# Patient Record
Sex: FEMALE | Race: WHITE | NOT HISPANIC OR LATINO | URBAN - METROPOLITAN AREA
[De-identification: names, ages, dates, MRNs, and addresses within clinical notes are randomized per-mention and may not be internally consistent; named-entity substitution may affect disease eponyms.]

---

## 2017-01-24 ENCOUNTER — OUTPATIENT (OUTPATIENT)
Dept: OUTPATIENT SERVICES | Facility: HOSPITAL | Age: 57
LOS: 1 days | Discharge: HOME | End: 2017-01-24

## 2017-06-27 DIAGNOSIS — Z98.0 INTESTINAL BYPASS AND ANASTOMOSIS STATUS: ICD-10-CM

## 2017-06-27 DIAGNOSIS — K95.09 OTHER COMPLICATIONS OF GASTRIC BAND PROCEDURE: ICD-10-CM

## 2017-06-27 DIAGNOSIS — E66.01 MORBID (SEVERE) OBESITY DUE TO EXCESS CALORIES: ICD-10-CM

## 2017-06-27 DIAGNOSIS — Z98.84 BARIATRIC SURGERY STATUS: ICD-10-CM

## 2019-06-03 PROBLEM — Z00.00 ENCOUNTER FOR PREVENTIVE HEALTH EXAMINATION: Status: ACTIVE | Noted: 2019-06-03

## 2019-07-08 ENCOUNTER — OUTPATIENT (OUTPATIENT)
Dept: OUTPATIENT SERVICES | Facility: HOSPITAL | Age: 59
LOS: 1 days | Discharge: HOME | End: 2019-07-08

## 2019-07-08 ENCOUNTER — LABORATORY RESULT (OUTPATIENT)
Age: 59
End: 2019-07-08

## 2019-07-08 ENCOUNTER — APPOINTMENT (OUTPATIENT)
Dept: OBGYN | Facility: CLINIC | Age: 59
End: 2019-07-08
Payer: COMMERCIAL

## 2019-07-08 VITALS — BODY MASS INDEX: 46.95 KG/M2 | HEIGHT: 63 IN | WEIGHT: 265 LBS

## 2019-07-08 LAB
BILIRUB UR QL STRIP: NORMAL
CLARITY UR: CLEAR
GLUCOSE UR-MCNC: NORMAL
HCG UR QL: NORMAL EU/DL
HGB UR QL STRIP.AUTO: NORMAL
KETONES UR-MCNC: NORMAL
LEUKOCYTE ESTERASE UR QL STRIP: 25
NITRITE UR QL STRIP: NORMAL
PH UR STRIP: 5
PROT UR STRIP-MCNC: NORMAL
SP GR UR STRIP: 1.02

## 2019-07-08 PROCEDURE — 99396 PREV VISIT EST AGE 40-64: CPT

## 2019-07-09 DIAGNOSIS — Z01.419 ENCOUNTER FOR GYNECOLOGICAL EXAMINATION (GENERAL) (ROUTINE) WITHOUT ABNORMAL FINDINGS: ICD-10-CM

## 2019-07-20 ENCOUNTER — APPOINTMENT (OUTPATIENT)
Dept: OBGYN | Facility: CLINIC | Age: 59
End: 2019-07-20

## 2020-06-12 ENCOUNTER — INPATIENT (INPATIENT)
Facility: HOSPITAL | Age: 60
LOS: 4 days | Discharge: HOME | End: 2020-06-17
Attending: SURGERY | Admitting: SURGERY
Payer: COMMERCIAL

## 2020-06-12 VITALS
OXYGEN SATURATION: 99 % | HEART RATE: 103 BPM | DIASTOLIC BLOOD PRESSURE: 90 MMHG | RESPIRATION RATE: 18 BRPM | WEIGHT: 285.06 LBS | TEMPERATURE: 98 F | HEIGHT: 63 IN | SYSTOLIC BLOOD PRESSURE: 169 MMHG

## 2020-06-12 LAB
ALBUMIN SERPL ELPH-MCNC: 4.1 G/DL — SIGNIFICANT CHANGE UP (ref 3.5–5.2)
ALP SERPL-CCNC: 78 U/L — SIGNIFICANT CHANGE UP (ref 30–115)
ALT FLD-CCNC: 16 U/L — SIGNIFICANT CHANGE UP (ref 0–41)
AMYLASE P1 CFR SERPL: 22 U/L — LOW (ref 25–115)
ANION GAP SERPL CALC-SCNC: 12 MMOL/L — SIGNIFICANT CHANGE UP (ref 7–14)
APPEARANCE UR: CLEAR — SIGNIFICANT CHANGE UP
AST SERPL-CCNC: 18 U/L — SIGNIFICANT CHANGE UP (ref 0–41)
BACTERIA # UR AUTO: NEGATIVE — SIGNIFICANT CHANGE UP
BASOPHILS # BLD AUTO: 0.01 K/UL — SIGNIFICANT CHANGE UP (ref 0–0.2)
BASOPHILS NFR BLD AUTO: 0.1 % — SIGNIFICANT CHANGE UP (ref 0–1)
BILIRUB SERPL-MCNC: 0.5 MG/DL — SIGNIFICANT CHANGE UP (ref 0.2–1.2)
BILIRUB UR-MCNC: ABNORMAL
BUN SERPL-MCNC: 13 MG/DL — SIGNIFICANT CHANGE UP (ref 10–20)
CALCIUM SERPL-MCNC: 8.7 MG/DL — SIGNIFICANT CHANGE UP (ref 8.5–10.1)
CHLORIDE SERPL-SCNC: 105 MMOL/L — SIGNIFICANT CHANGE UP (ref 98–110)
CO2 SERPL-SCNC: 25 MMOL/L — SIGNIFICANT CHANGE UP (ref 17–32)
COLOR SPEC: YELLOW — SIGNIFICANT CHANGE UP
CREAT SERPL-MCNC: 0.8 MG/DL — SIGNIFICANT CHANGE UP (ref 0.7–1.5)
DIFF PNL FLD: NEGATIVE — SIGNIFICANT CHANGE UP
EOSINOPHIL # BLD AUTO: 0.03 K/UL — SIGNIFICANT CHANGE UP (ref 0–0.7)
EOSINOPHIL NFR BLD AUTO: 0.4 % — SIGNIFICANT CHANGE UP (ref 0–8)
EPI CELLS # UR: 7 /HPF — HIGH (ref 0–5)
GLUCOSE SERPL-MCNC: 131 MG/DL — HIGH (ref 70–99)
GLUCOSE UR QL: NEGATIVE — SIGNIFICANT CHANGE UP
HCT VFR BLD CALC: 40.6 % — SIGNIFICANT CHANGE UP (ref 37–47)
HGB BLD-MCNC: 13.1 G/DL — SIGNIFICANT CHANGE UP (ref 12–16)
HYALINE CASTS # UR AUTO: 6 /LPF — SIGNIFICANT CHANGE UP (ref 0–7)
IMM GRANULOCYTES NFR BLD AUTO: 0.5 % — HIGH (ref 0.1–0.3)
KETONES UR-MCNC: ABNORMAL
LACTATE SERPL-SCNC: 1.1 MMOL/L — SIGNIFICANT CHANGE UP (ref 0.7–2)
LEUKOCYTE ESTERASE UR-ACNC: NEGATIVE — SIGNIFICANT CHANGE UP
LIDOCAIN IGE QN: 11 U/L — SIGNIFICANT CHANGE UP (ref 7–60)
LYMPHOCYTES # BLD AUTO: 1.11 K/UL — LOW (ref 1.2–3.4)
LYMPHOCYTES # BLD AUTO: 13.3 % — LOW (ref 20.5–51.1)
MAGNESIUM SERPL-MCNC: 1.9 MG/DL — SIGNIFICANT CHANGE UP (ref 1.8–2.4)
MCHC RBC-ENTMCNC: 29.4 PG — SIGNIFICANT CHANGE UP (ref 27–31)
MCHC RBC-ENTMCNC: 32.3 G/DL — SIGNIFICANT CHANGE UP (ref 32–37)
MCV RBC AUTO: 91.2 FL — SIGNIFICANT CHANGE UP (ref 81–99)
MONOCYTES # BLD AUTO: 0.49 K/UL — SIGNIFICANT CHANGE UP (ref 0.1–0.6)
MONOCYTES NFR BLD AUTO: 5.9 % — SIGNIFICANT CHANGE UP (ref 1.7–9.3)
NEUTROPHILS # BLD AUTO: 6.66 K/UL — HIGH (ref 1.4–6.5)
NEUTROPHILS NFR BLD AUTO: 79.8 % — HIGH (ref 42.2–75.2)
NITRITE UR-MCNC: NEGATIVE — SIGNIFICANT CHANGE UP
NRBC # BLD: 0 /100 WBCS — SIGNIFICANT CHANGE UP (ref 0–0)
PH UR: 6 — SIGNIFICANT CHANGE UP (ref 5–8)
PLATELET # BLD AUTO: 256 K/UL — SIGNIFICANT CHANGE UP (ref 130–400)
POTASSIUM SERPL-MCNC: 4.4 MMOL/L — SIGNIFICANT CHANGE UP (ref 3.5–5)
POTASSIUM SERPL-SCNC: 4.4 MMOL/L — SIGNIFICANT CHANGE UP (ref 3.5–5)
PROT SERPL-MCNC: 6.7 G/DL — SIGNIFICANT CHANGE UP (ref 6–8)
PROT UR-MCNC: ABNORMAL
RBC # BLD: 4.45 M/UL — SIGNIFICANT CHANGE UP (ref 4.2–5.4)
RBC # FLD: 14.3 % — SIGNIFICANT CHANGE UP (ref 11.5–14.5)
RBC CASTS # UR COMP ASSIST: 6 /HPF — HIGH (ref 0–4)
SODIUM SERPL-SCNC: 142 MMOL/L — SIGNIFICANT CHANGE UP (ref 135–146)
SP GR SPEC: >1.05 (ref 1.01–1.02)
UROBILINOGEN FLD QL: ABNORMAL
WBC # BLD: 8.34 K/UL — SIGNIFICANT CHANGE UP (ref 4.8–10.8)
WBC # FLD AUTO: 8.34 K/UL — SIGNIFICANT CHANGE UP (ref 4.8–10.8)
WBC UR QL: 3 /HPF — SIGNIFICANT CHANGE UP (ref 0–5)

## 2020-06-12 PROCEDURE — 99285 EMERGENCY DEPT VISIT HI MDM: CPT

## 2020-06-12 PROCEDURE — 74177 CT ABD & PELVIS W/CONTRAST: CPT | Mod: 26

## 2020-06-12 RX ORDER — IOHEXOL 300 MG/ML
30 INJECTION, SOLUTION INTRAVENOUS ONCE
Refills: 0 | Status: COMPLETED | OUTPATIENT
Start: 2020-06-12 | End: 2020-06-12

## 2020-06-12 RX ORDER — SODIUM CHLORIDE 9 MG/ML
2000 INJECTION, SOLUTION INTRAVENOUS ONCE
Refills: 0 | Status: COMPLETED | OUTPATIENT
Start: 2020-06-12 | End: 2020-06-12

## 2020-06-12 RX ADMIN — SODIUM CHLORIDE 2000 MILLILITER(S): 9 INJECTION, SOLUTION INTRAVENOUS at 20:45

## 2020-06-12 RX ADMIN — IOHEXOL 30 MILLILITER(S): 300 INJECTION, SOLUTION INTRAVENOUS at 20:50

## 2020-06-12 NOTE — ED ADULT TRIAGE NOTE - CHIEF COMPLAINT QUOTE
I vomiting on Wednesday and I had gas pain, and the pain is persistent. I had gastric bypass in 2001, and an obstruction in 2007 - patient

## 2020-06-12 NOTE — ED PROVIDER NOTE - PHYSICAL EXAMINATION
CONSTITUTIONAL: Well-developed; well-nourished; in no acute distress.   SKIN: warm, dry  HEAD: Normocephalic.  EYES: PERRL, EOMI.  ENT: Airway clear.  NECK: Supple.  LYMPH: No acute cervical adenopathy.  CARD: No murmurs, rubs or gallops. Regular rate and rhythm.   RESP: No wheezing, rales or rhonchi.  ABD: soft, TTP LLQ at site of abd hernia, no overlying rash or warmth  EXT: No clubbing, cyanosis.   NEURO: Alert, oriented.  PSYCH: Cooperative, appropriate.

## 2020-06-12 NOTE — ED PROVIDER NOTE - CLINICAL SUMMARY MEDICAL DECISION MAKING FREE TEXT BOX
61 yo F presented to ED for abdominal pain and was found to have an SBO. Patient was admitted to surgery for further management.

## 2020-06-12 NOTE — ED PROVIDER NOTE - OBJECTIVE STATEMENT
60F with pmh of gastric bypass 2001, obstruction 2007, abd hernias presents with LLQ abd pain at site of LLQ abd hernia since 3 days ago. PT denies fever, chills, rash, last BM yesterday normal, +passing gas. Notes 1 episode of NBNB vomiting yesterday. Denies fever, chills, cough, CP, SOB, diarrhea, melena, hematochezia, dysuria, hematuria. 60F with pmh of gastric bypass 2001, obstruction 2007, abd hernias presents with RLQ abd pain at site of RLQ abd hernia since 3 days ago. PT denies fever, chills, rash, last BM yesterday normal, +passing gas. Notes 1 episode of NBNB vomiting yesterday. Denies fever, chills, cough, CP, SOB, diarrhea, melena, hematochezia, dysuria, hematuria.  Surg:Dolly

## 2020-06-12 NOTE — ED PROVIDER NOTE - ATTENDING CONTRIBUTION TO CARE
59 yo M with PMH of gastric bypass surgery (2001) with obstruction in 2007 presents to ED for RLQ abdominal pain. She has had normal BM and is passing flatus. She states she has multiple hernias and has not noticed a change to any of them. One episode of NBNB emesis yesterday. Patient has had normal PO intake since then. No fever, chills, SOB, Cp.    On PE patient in NAD. Cardio RRR, Lungs CTA  patient is morbidly obese. Umbilical hernia noticed. Right sided lower hernia- hard and tender. Patient states it is always like that.     Concern for intestinal obstruction. Will do labs, CT scan.

## 2020-06-12 NOTE — ED PROVIDER NOTE - NS ED ROS FT
Constitutional: (-) fever  Eyes/ENT: (-) runny nose  Cardiovascular: (-) chest pain, (-) syncope  Respiratory: (-) cough, (-) shortness of breath  Gastrointestinal: (+) vomiting, (-) diarrhea, (+) abdominal pain  : (-) dysuria   Musculoskeletal: (-) back pain, (-) joint pain  Integumentary: (-) rash  Neurological: (-)loc  Allergic/Immunologic: (-) pruritus  Endocrine: (-) history of thyroid disease

## 2020-06-12 NOTE — ED ADULT NURSE NOTE - OBJECTIVE STATEMENT
pt c/o abdominal pain x2 days, vomiting on Wednesday. Pt has BM, normal, yesterday and passing gas. Pt states pain is worse on right side now but yesterday was worse on left side where known hernias are.

## 2020-06-12 NOTE — ED PROVIDER NOTE - PROGRESS NOTE DETAILS
SC:  attempt to reduce in ED made, however PT uncomfortable so efforts stopped. SC: PT refusing NGT. Patient to be admitted to an inpatient floor. Case discussed with and care endorsed to medical admitting resident. Admitting physician notified. SC: Several attempts made to place NGT. PT refusing NGT. SC: Several attempts to contact surgery for disposition unsuccessful. SC: Surgery disposition pending attending evaluation.

## 2020-06-13 DIAGNOSIS — Z98.891 HISTORY OF UTERINE SCAR FROM PREVIOUS SURGERY: Chronic | ICD-10-CM

## 2020-06-13 DIAGNOSIS — Z90.49 ACQUIRED ABSENCE OF OTHER SPECIFIED PARTS OF DIGESTIVE TRACT: Chronic | ICD-10-CM

## 2020-06-13 DIAGNOSIS — Z98.84 BARIATRIC SURGERY STATUS: Chronic | ICD-10-CM

## 2020-06-13 LAB
24R-OH-CALCIDIOL SERPL-MCNC: 19 NG/ML — LOW (ref 30–80)
ANION GAP SERPL CALC-SCNC: 12 MMOL/L — SIGNIFICANT CHANGE UP (ref 7–14)
ANION GAP SERPL CALC-SCNC: 13 MMOL/L — SIGNIFICANT CHANGE UP (ref 7–14)
APTT BLD: 27.2 SEC — SIGNIFICANT CHANGE UP (ref 27–39.2)
BASOPHILS # BLD AUTO: 0.01 K/UL — SIGNIFICANT CHANGE UP (ref 0–0.2)
BASOPHILS # BLD AUTO: 0.01 K/UL — SIGNIFICANT CHANGE UP (ref 0–0.2)
BASOPHILS NFR BLD AUTO: 0.2 % — SIGNIFICANT CHANGE UP (ref 0–1)
BASOPHILS NFR BLD AUTO: 0.2 % — SIGNIFICANT CHANGE UP (ref 0–1)
BLD GP AB SCN SERPL QL: SIGNIFICANT CHANGE UP
BUN SERPL-MCNC: 10 MG/DL — SIGNIFICANT CHANGE UP (ref 10–20)
BUN SERPL-MCNC: 11 MG/DL — SIGNIFICANT CHANGE UP (ref 10–20)
CALCIUM SERPL-MCNC: 8.5 MG/DL — SIGNIFICANT CHANGE UP (ref 8.5–10.1)
CALCIUM SERPL-MCNC: 8.8 MG/DL — SIGNIFICANT CHANGE UP (ref 8.5–10.1)
CHLORIDE SERPL-SCNC: 103 MMOL/L — SIGNIFICANT CHANGE UP (ref 98–110)
CHLORIDE SERPL-SCNC: 105 MMOL/L — SIGNIFICANT CHANGE UP (ref 98–110)
CO2 SERPL-SCNC: 24 MMOL/L — SIGNIFICANT CHANGE UP (ref 17–32)
CO2 SERPL-SCNC: 24 MMOL/L — SIGNIFICANT CHANGE UP (ref 17–32)
CREAT SERPL-MCNC: 0.7 MG/DL — SIGNIFICANT CHANGE UP (ref 0.7–1.5)
CREAT SERPL-MCNC: 0.7 MG/DL — SIGNIFICANT CHANGE UP (ref 0.7–1.5)
EOSINOPHIL # BLD AUTO: 0.01 K/UL — SIGNIFICANT CHANGE UP (ref 0–0.7)
EOSINOPHIL # BLD AUTO: 0.03 K/UL — SIGNIFICANT CHANGE UP (ref 0–0.7)
EOSINOPHIL NFR BLD AUTO: 0.2 % — SIGNIFICANT CHANGE UP (ref 0–8)
EOSINOPHIL NFR BLD AUTO: 0.5 % — SIGNIFICANT CHANGE UP (ref 0–8)
GLUCOSE SERPL-MCNC: 133 MG/DL — HIGH (ref 70–99)
GLUCOSE SERPL-MCNC: 135 MG/DL — HIGH (ref 70–99)
HCT VFR BLD CALC: 40.9 % — SIGNIFICANT CHANGE UP (ref 37–47)
HCT VFR BLD CALC: 40.9 % — SIGNIFICANT CHANGE UP (ref 37–47)
HGB BLD-MCNC: 13 G/DL — SIGNIFICANT CHANGE UP (ref 12–16)
HGB BLD-MCNC: 13 G/DL — SIGNIFICANT CHANGE UP (ref 12–16)
IMM GRANULOCYTES NFR BLD AUTO: 0.3 % — SIGNIFICANT CHANGE UP (ref 0.1–0.3)
IMM GRANULOCYTES NFR BLD AUTO: 0.5 % — HIGH (ref 0.1–0.3)
INR BLD: 1.07 RATIO — SIGNIFICANT CHANGE UP (ref 0.65–1.3)
IRON SATN MFR SERPL: 30 UG/DL — LOW (ref 35–150)
IRON SATN MFR SERPL: 7 % — LOW (ref 15–50)
LACTATE SERPL-SCNC: 1.1 MMOL/L — SIGNIFICANT CHANGE UP (ref 0.7–2)
LYMPHOCYTES # BLD AUTO: 0.76 K/UL — LOW (ref 1.2–3.4)
LYMPHOCYTES # BLD AUTO: 1.01 K/UL — LOW (ref 1.2–3.4)
LYMPHOCYTES # BLD AUTO: 11.8 % — LOW (ref 20.5–51.1)
LYMPHOCYTES # BLD AUTO: 16.1 % — LOW (ref 20.5–51.1)
MAGNESIUM SERPL-MCNC: 1.9 MG/DL — SIGNIFICANT CHANGE UP (ref 1.8–2.4)
MAGNESIUM SERPL-MCNC: 1.9 MG/DL — SIGNIFICANT CHANGE UP (ref 1.8–2.4)
MCHC RBC-ENTMCNC: 29.1 PG — SIGNIFICANT CHANGE UP (ref 27–31)
MCHC RBC-ENTMCNC: 29.3 PG — SIGNIFICANT CHANGE UP (ref 27–31)
MCHC RBC-ENTMCNC: 31.8 G/DL — LOW (ref 32–37)
MCHC RBC-ENTMCNC: 31.8 G/DL — LOW (ref 32–37)
MCV RBC AUTO: 91.5 FL — SIGNIFICANT CHANGE UP (ref 81–99)
MCV RBC AUTO: 92.3 FL — SIGNIFICANT CHANGE UP (ref 81–99)
MONOCYTES # BLD AUTO: 0.57 K/UL — SIGNIFICANT CHANGE UP (ref 0.1–0.6)
MONOCYTES # BLD AUTO: 0.68 K/UL — HIGH (ref 0.1–0.6)
MONOCYTES NFR BLD AUTO: 10.8 % — HIGH (ref 1.7–9.3)
MONOCYTES NFR BLD AUTO: 8.8 % — SIGNIFICANT CHANGE UP (ref 1.7–9.3)
NEUTROPHILS # BLD AUTO: 4.54 K/UL — SIGNIFICANT CHANGE UP (ref 1.4–6.5)
NEUTROPHILS # BLD AUTO: 5.07 K/UL — SIGNIFICANT CHANGE UP (ref 1.4–6.5)
NEUTROPHILS NFR BLD AUTO: 72.1 % — SIGNIFICANT CHANGE UP (ref 42.2–75.2)
NEUTROPHILS NFR BLD AUTO: 78.5 % — HIGH (ref 42.2–75.2)
NRBC # BLD: 0 /100 WBCS — SIGNIFICANT CHANGE UP (ref 0–0)
NRBC # BLD: 0 /100 WBCS — SIGNIFICANT CHANGE UP (ref 0–0)
PHOSPHATE SERPL-MCNC: 3.7 MG/DL — SIGNIFICANT CHANGE UP (ref 2.1–4.9)
PHOSPHATE SERPL-MCNC: 3.9 MG/DL — SIGNIFICANT CHANGE UP (ref 2.1–4.9)
PLATELET # BLD AUTO: 230 K/UL — SIGNIFICANT CHANGE UP (ref 130–400)
PLATELET # BLD AUTO: 235 K/UL — SIGNIFICANT CHANGE UP (ref 130–400)
POTASSIUM SERPL-MCNC: 4 MMOL/L — SIGNIFICANT CHANGE UP (ref 3.5–5)
POTASSIUM SERPL-MCNC: 4.1 MMOL/L — SIGNIFICANT CHANGE UP (ref 3.5–5)
POTASSIUM SERPL-SCNC: 4 MMOL/L — SIGNIFICANT CHANGE UP (ref 3.5–5)
POTASSIUM SERPL-SCNC: 4.1 MMOL/L — SIGNIFICANT CHANGE UP (ref 3.5–5)
PROTHROM AB SERPL-ACNC: 12.3 SEC — SIGNIFICANT CHANGE UP (ref 9.95–12.87)
RBC # BLD: 4.43 M/UL — SIGNIFICANT CHANGE UP (ref 4.2–5.4)
RBC # BLD: 4.47 M/UL — SIGNIFICANT CHANGE UP (ref 4.2–5.4)
RBC # FLD: 14.4 % — SIGNIFICANT CHANGE UP (ref 11.5–14.5)
RBC # FLD: 14.4 % — SIGNIFICANT CHANGE UP (ref 11.5–14.5)
SARS-COV-2 RNA SPEC QL NAA+PROBE: SIGNIFICANT CHANGE UP
SODIUM SERPL-SCNC: 139 MMOL/L — SIGNIFICANT CHANGE UP (ref 135–146)
SODIUM SERPL-SCNC: 142 MMOL/L — SIGNIFICANT CHANGE UP (ref 135–146)
TIBC SERPL-MCNC: 414 UG/DL — SIGNIFICANT CHANGE UP (ref 220–430)
UIBC SERPL-MCNC: 384 UG/DL — HIGH (ref 110–370)
WBC # BLD: 6.29 K/UL — SIGNIFICANT CHANGE UP (ref 4.8–10.8)
WBC # BLD: 6.45 K/UL — SIGNIFICANT CHANGE UP (ref 4.8–10.8)
WBC # FLD AUTO: 6.29 K/UL — SIGNIFICANT CHANGE UP (ref 4.8–10.8)
WBC # FLD AUTO: 6.45 K/UL — SIGNIFICANT CHANGE UP (ref 4.8–10.8)

## 2020-06-13 PROCEDURE — 93010 ELECTROCARDIOGRAM REPORT: CPT

## 2020-06-13 PROCEDURE — 71045 X-RAY EXAM CHEST 1 VIEW: CPT | Mod: 26

## 2020-06-13 RX ORDER — SODIUM CHLORIDE 9 MG/ML
1000 INJECTION, SOLUTION INTRAVENOUS
Refills: 0 | Status: DISCONTINUED | OUTPATIENT
Start: 2020-06-13 | End: 2020-06-13

## 2020-06-13 RX ORDER — HEPARIN SODIUM 5000 [USP'U]/ML
7500 INJECTION INTRAVENOUS; SUBCUTANEOUS EVERY 8 HOURS
Refills: 0 | Status: DISCONTINUED | OUTPATIENT
Start: 2020-06-13 | End: 2020-06-17

## 2020-06-13 RX ORDER — ACETAMINOPHEN 500 MG
1000 TABLET ORAL EVERY 8 HOURS
Refills: 0 | Status: COMPLETED | OUTPATIENT
Start: 2020-06-13 | End: 2020-06-13

## 2020-06-13 RX ORDER — ACETAMINOPHEN 500 MG
650 TABLET ORAL EVERY 6 HOURS
Refills: 0 | Status: DISCONTINUED | OUTPATIENT
Start: 2020-06-13 | End: 2020-06-13

## 2020-06-13 RX ORDER — SODIUM CHLORIDE 9 MG/ML
500 INJECTION, SOLUTION INTRAVENOUS
Refills: 0 | Status: DISCONTINUED | OUTPATIENT
Start: 2020-06-13 | End: 2020-06-13

## 2020-06-13 RX ORDER — FOLIC ACID 0.8 MG
1 TABLET ORAL DAILY
Refills: 0 | Status: DISCONTINUED | OUTPATIENT
Start: 2020-06-13 | End: 2020-06-17

## 2020-06-13 RX ORDER — BENZOCAINE 10 %
1 GEL (GRAM) MUCOUS MEMBRANE ONCE
Refills: 0 | Status: COMPLETED | OUTPATIENT
Start: 2020-06-13 | End: 2020-06-13

## 2020-06-13 RX ORDER — ONDANSETRON 8 MG/1
4 TABLET, FILM COATED ORAL EVERY 6 HOURS
Refills: 0 | Status: DISCONTINUED | OUTPATIENT
Start: 2020-06-13 | End: 2020-06-17

## 2020-06-13 RX ORDER — HEPARIN SODIUM 5000 [USP'U]/ML
7500 INJECTION INTRAVENOUS; SUBCUTANEOUS EVERY 8 HOURS
Refills: 0 | Status: DISCONTINUED | OUTPATIENT
Start: 2020-06-13 | End: 2020-06-13

## 2020-06-13 RX ORDER — LIDOCAINE 4 G/100G
1 CREAM TOPICAL ONCE
Refills: 0 | Status: COMPLETED | OUTPATIENT
Start: 2020-06-13 | End: 2020-06-13

## 2020-06-13 RX ORDER — DEXTROSE MONOHYDRATE, SODIUM CHLORIDE, AND POTASSIUM CHLORIDE 50; .745; 4.5 G/1000ML; G/1000ML; G/1000ML
1000 INJECTION, SOLUTION INTRAVENOUS
Refills: 0 | Status: DISCONTINUED | OUTPATIENT
Start: 2020-06-13 | End: 2020-06-16

## 2020-06-13 RX ORDER — THIAMINE MONONITRATE (VIT B1) 100 MG
100 TABLET ORAL DAILY
Refills: 0 | Status: DISCONTINUED | OUTPATIENT
Start: 2020-06-13 | End: 2020-06-17

## 2020-06-13 RX ADMIN — Medication 1 TABLET(S): at 17:45

## 2020-06-13 RX ADMIN — ONDANSETRON 4 MILLIGRAM(S): 8 TABLET, FILM COATED ORAL at 22:12

## 2020-06-13 RX ADMIN — LIDOCAINE 1 APPLICATION(S): 4 CREAM TOPICAL at 07:54

## 2020-06-13 RX ADMIN — SODIUM CHLORIDE 999 MILLILITER(S): 9 INJECTION, SOLUTION INTRAVENOUS at 11:53

## 2020-06-13 RX ADMIN — DEXTROSE MONOHYDRATE, SODIUM CHLORIDE, AND POTASSIUM CHLORIDE 125 MILLILITER(S): 50; .745; 4.5 INJECTION, SOLUTION INTRAVENOUS at 16:04

## 2020-06-13 RX ADMIN — Medication 1 SPRAY(S): at 09:29

## 2020-06-13 RX ADMIN — Medication 1 MILLIGRAM(S): at 17:45

## 2020-06-13 RX ADMIN — Medication 400 MILLIGRAM(S): at 16:38

## 2020-06-13 RX ADMIN — Medication 100 MILLIGRAM(S): at 17:45

## 2020-06-13 RX ADMIN — HEPARIN SODIUM 7500 UNIT(S): 5000 INJECTION INTRAVENOUS; SUBCUTANEOUS at 21:35

## 2020-06-13 RX ADMIN — ONDANSETRON 4 MILLIGRAM(S): 8 TABLET, FILM COATED ORAL at 09:25

## 2020-06-13 RX ADMIN — SODIUM CHLORIDE 999 MILLILITER(S): 9 INJECTION, SOLUTION INTRAVENOUS at 12:30

## 2020-06-13 NOTE — H&P ADULT - ATTENDING COMMENTS
60 year old lady with PMH morbid obesity PSH cesarian section '91, RNYGB '01 at OSH by Dr. David Fried, SBO '07 s/p ex lap, SBR, and primary anastomosis.  Pt now presents w ~24h n/v and abdominal pain, began suddenly yesterday at 03:00, not associated with exacerbating / relieving fx.   Pt denies CP, SOB, fever, chills, urinary symptoms.  No Flatus or Bowel movement since Thursday   Last Meal on Friday.    had NG Tube placement once with 50 ml drainage.  - removed by pt  had NG Tube placement second time with 250 ml drainage. - removed by pt    pt examined at 7:30   Pt sitting up   comfortable - feels better after 2nd NG tube insertion / removal   Abdomen - distended ( pt states it the normal size), non tender  impression : S/p Open RnY Gastric Bypass with hernia repair and recurrence.   partial sbo - adhesion vs hernia    options discussed with patient and daughter on the phone.   Knows risks of emergency surgery - would like to avoid it.   patient refusing to be able to insert NG tube now.   after talking she states to come back later.     Pt reexamined around 9 and 9:30  Abdomen still soft , no pain.   NG tube placed by me.   400 ml bilious output.     Pt reexamined around 1 pm   Abdomen still soft , no pain.   no flatus or bowel movement  states abdomen is softer and less distended      impression  :  S/p Open RnY Gastric Bypass with hernia repair and recurrence.   partial sbo - adhesion vs hernia  no distention of Abby limb  will keep NPO with NG tube  will get nutrition labs  will get medline if needing PPN  will get Dr. Steven for Cardiolgoy  will get pulm for her Sleep Apnea  If pain worsens or no improvement - will take her for laparotomy with BHUMI and repair of hernia

## 2020-06-13 NOTE — H&P ADULT - HISTORY OF PRESENT ILLNESS
JOSEDOLORES [id number]  60y Female    HPI:  60F PMH morbid obesity PSH cesarian section '91, RNYGB '01 at OSH by Dr. David Fried, SBO '07 s/p ex lap, SBR, and primary anastomosis.     S/p successful NGT placement in ED, bilious drainage, pt thrashed and gagged even after placment completed. Pulled it out immediately. Only time for 50 cc drainage. DOLORES GARCIA [id number]  60y Female    HPI:  60F PMH morbid obesity PSH cesarian section '91, RNYGB '01 at OSH by Dr. David Fried, SBO '07 s/p ex lap, SBR, and primary anastomosis. Pt now presents w ~24h n/v and abdominal pain, began suddenly yesterday at 03:00, not associated with exacerbating / relieving fx. Similar to pain during prior SBO. Pt denies CP, SOB, fever, chills, urinary symptoms.    S/p successful NGT placement in ED, bilious drainage, pt thrashed and gagged even after placement completed. Pulled it out immediately. Only time for 50 cc drainage.

## 2020-06-13 NOTE — H&P ADULT - NSHPPHYSICALEXAM_GEN_ALL_CORE
Vital Signs Last 24 Hrs  T(C): 37.3 (13 Jun 2020 00:25), Max: 37.3 (13 Jun 2020 00:25)  T(F): 99.1 (13 Jun 2020 00:25), Max: 99.1 (13 Jun 2020 00:25)  HR: 98 (13 Jun 2020 00:25) (92 - 103)  BP: 146/78 (13 Jun 2020 00:25) (146/78 - 169/90)  BP(mean): --  RR: 20 (13 Jun 2020 00:25) (18 - 20)  SpO2: 99% (13 Jun 2020 00:25) (99% - 99%)    PHYSICAL EXAM:  GENERAL: NAD, well-appearing  CHEST/LUNG: Clear to auscultation bilaterally  HEART: Regular rate and rhythm  ABDOMEN: Soft, Nontender, Nondistended;   EXTREMITIES:  No clubbing, cyanosis, or edema Vital Signs Last 24 Hrs  T(C): 37.3 (13 Jun 2020 00:25), Max: 37.3 (13 Jun 2020 00:25)  T(F): 99.1 (13 Jun 2020 00:25), Max: 99.1 (13 Jun 2020 00:25)  HR: 98 (13 Jun 2020 00:25) (92 - 103)  BP: 146/78 (13 Jun 2020 00:25) (146/78 - 169/90)  BP(mean): --  RR: 20 (13 Jun 2020 00:25) (18 - 20)  SpO2: 99% (13 Jun 2020 00:25) (99% - 99%)    PHYSICAL EXAM:  GENERAL: NAD, well-appearing  CHEST/LUNG: Clear to auscultation bilaterally  HEART: Regular rate and rhythm  ABDOMEN: Soft, Multiple ventral hernias of varying shapes, sizes, orientations, all bowel- containing, irreducible, tender, Nondistended; morbidly obese. No skin changes  EXTREMITIES:  No clubbing, cyanosis, or edema

## 2020-06-13 NOTE — H&P ADULT - ASSESSMENT
ASSESSMENT:  60y Female     PLAN:        D/w  ASSESSMENT:  60y Female     PLAN:  NPO, IVF  Pt pulled NGT, now refusing, continue to reaffirm importance  Avoid narcotics  An  Maintenance fluids with K  Trend WBC, lactate  D/w Torey Rivera

## 2020-06-13 NOTE — H&P ADULT - CLICK TO LAUNCH ORM
. Lost 3 lbs in 24 hrs, 168 lbs today, diuresing well. JVP about 8 cm today.  Continue Bumex 4 mg po BID. No metolazone.  Continue hydral 100 mg po TID.   Continue isordil 40 mg po TID.  Continue Coreg 6.25 mg po BID  Strict I/O and daily standing weights.  Pt to be seen on 1/2/18 at [facility] HF clinic w/ Dr. Enio Pavon. We will call pt with time.  Ok to d/c from HF standpoint.

## 2020-06-13 NOTE — H&P ADULT - NSHPLABSRESULTS_GEN_ALL_CORE
LABS:  Labs:  CAPILLARY BLOOD GLUCOSE                        13.1   8.34  )-----------( 256      ( 12 Jun 2020 21:14 )             40.6       Auto Neutrophil %: 79.8 % (06-12-20 @ 21:14)  Auto Immature Granulocyte %: 0.5 % (06-12-20 @ 21:14)    06-12    142  |  105  |  13  ----------------------------<  131<H>  4.4   |  25  |  0.8      Calcium, Total Serum: 8.7 mg/dL (06-12-20 @ 21:14)      LFTs:             6.7  | 0.5  | 18       ------------------[78      ( 12 Jun 2020 21:14 )  4.1  | x    | 16          Lipase:11     Amylase:22        Lactate, Blood: 1.1 mmol/L (06-12-20 @ 21:14)      Coags:     12.30  ----< 1.07    ( 13 Jun 2020 03:50 )     27.2       Urinalysis Basic - ( 12 Jun 2020 22:20 )    Color: Yellow / Appearance: Clear / SG: >1.050 / pH: x  Gluc: x / Ketone: Small  / Bili: Small / Urobili: 6 mg/dL   Blood: x / Protein: 100 mg/dL / Nitrite: Negative   Leuk Esterase: Negative / RBC: 6 /HPF / WBC 3 /HPF   Sq Epi: x / Non Sq Epi: 7 /HPF / Bacteria: Negative      RADIOLOGY & ADDITIONAL STUDIES:    < from: CT Abdomen and Pelvis w/ Oral Cont and w/ IV Cont (06.12.20 @ 23:32) >    Complex small bowel obstruction involving at least 4 separate ventral hernias throughout the abdominal wall, described above. Eventual transition to collapsed small bowel upon exiting fourth site of ventral herniation, within right lateral abdominal wall. Obstructed small bowel dilated to 6.4 cm with fecalization just proximal to this final site.    < end of copied text >

## 2020-06-14 LAB
24R-OH-CALCIDIOL SERPL-MCNC: 19 NG/ML — LOW (ref 30–80)
ALBUMIN SERPL ELPH-MCNC: 3.6 G/DL — SIGNIFICANT CHANGE UP (ref 3.5–5.2)
ALP SERPL-CCNC: 69 U/L — SIGNIFICANT CHANGE UP (ref 30–115)
ALT FLD-CCNC: 15 U/L — SIGNIFICANT CHANGE UP (ref 0–41)
ANION GAP SERPL CALC-SCNC: 12 MMOL/L — SIGNIFICANT CHANGE UP (ref 7–14)
AST SERPL-CCNC: 11 U/L — SIGNIFICANT CHANGE UP (ref 0–41)
BASOPHILS # BLD AUTO: 0.02 K/UL — SIGNIFICANT CHANGE UP (ref 0–0.2)
BASOPHILS NFR BLD AUTO: 0.4 % — SIGNIFICANT CHANGE UP (ref 0–1)
BILIRUB DIRECT SERPL-MCNC: <0.2 MG/DL — SIGNIFICANT CHANGE UP (ref 0–0.2)
BILIRUB INDIRECT FLD-MCNC: >0.2 MG/DL — SIGNIFICANT CHANGE UP (ref 0.2–1.2)
BILIRUB SERPL-MCNC: 0.4 MG/DL — SIGNIFICANT CHANGE UP (ref 0.2–1.2)
BUN SERPL-MCNC: 14 MG/DL — SIGNIFICANT CHANGE UP (ref 10–20)
CALCIUM SERPL-MCNC: 8.3 MG/DL — LOW (ref 8.5–10.1)
CHLORIDE SERPL-SCNC: 108 MMOL/L — SIGNIFICANT CHANGE UP (ref 98–110)
CO2 SERPL-SCNC: 25 MMOL/L — SIGNIFICANT CHANGE UP (ref 17–32)
CREAT SERPL-MCNC: 0.8 MG/DL — SIGNIFICANT CHANGE UP (ref 0.7–1.5)
EOSINOPHIL # BLD AUTO: 0.09 K/UL — SIGNIFICANT CHANGE UP (ref 0–0.7)
EOSINOPHIL NFR BLD AUTO: 2 % — SIGNIFICANT CHANGE UP (ref 0–8)
FERRITIN SERPL-MCNC: 49 NG/ML — SIGNIFICANT CHANGE UP (ref 15–150)
GLUCOSE SERPL-MCNC: 124 MG/DL — HIGH (ref 70–99)
HCT VFR BLD CALC: 37.9 % — SIGNIFICANT CHANGE UP (ref 37–47)
HGB BLD-MCNC: 11.9 G/DL — LOW (ref 12–16)
IMM GRANULOCYTES NFR BLD AUTO: 0.4 % — HIGH (ref 0.1–0.3)
LYMPHOCYTES # BLD AUTO: 1.07 K/UL — LOW (ref 1.2–3.4)
LYMPHOCYTES # BLD AUTO: 23.7 % — SIGNIFICANT CHANGE UP (ref 20.5–51.1)
MAGNESIUM SERPL-MCNC: 1.9 MG/DL — SIGNIFICANT CHANGE UP (ref 1.8–2.4)
MCHC RBC-ENTMCNC: 29.4 PG — SIGNIFICANT CHANGE UP (ref 27–31)
MCHC RBC-ENTMCNC: 31.4 G/DL — LOW (ref 32–37)
MCV RBC AUTO: 93.6 FL — SIGNIFICANT CHANGE UP (ref 81–99)
MONOCYTES # BLD AUTO: 0.4 K/UL — SIGNIFICANT CHANGE UP (ref 0.1–0.6)
MONOCYTES NFR BLD AUTO: 8.9 % — SIGNIFICANT CHANGE UP (ref 1.7–9.3)
NEUTROPHILS # BLD AUTO: 2.91 K/UL — SIGNIFICANT CHANGE UP (ref 1.4–6.5)
NEUTROPHILS NFR BLD AUTO: 64.6 % — SIGNIFICANT CHANGE UP (ref 42.2–75.2)
NRBC # BLD: 0 /100 WBCS — SIGNIFICANT CHANGE UP (ref 0–0)
PHOSPHATE SERPL-MCNC: 2.3 MG/DL — SIGNIFICANT CHANGE UP (ref 2.1–4.9)
PLATELET # BLD AUTO: 222 K/UL — SIGNIFICANT CHANGE UP (ref 130–400)
POTASSIUM SERPL-MCNC: 3.6 MMOL/L — SIGNIFICANT CHANGE UP (ref 3.5–5)
POTASSIUM SERPL-SCNC: 3.6 MMOL/L — SIGNIFICANT CHANGE UP (ref 3.5–5)
PROT SERPL-MCNC: 5.8 G/DL — LOW (ref 6–8)
RBC # BLD: 4.05 M/UL — LOW (ref 4.2–5.4)
RBC # FLD: 14.6 % — HIGH (ref 11.5–14.5)
SODIUM SERPL-SCNC: 145 MMOL/L — SIGNIFICANT CHANGE UP (ref 135–146)
VIT B12 SERPL-MCNC: 471 PG/ML — SIGNIFICANT CHANGE UP (ref 232–1245)
WBC # BLD: 4.51 K/UL — LOW (ref 4.8–10.8)
WBC # FLD AUTO: 4.51 K/UL — LOW (ref 4.8–10.8)

## 2020-06-14 PROCEDURE — 93306 TTE W/DOPPLER COMPLETE: CPT | Mod: 26

## 2020-06-14 PROCEDURE — 99233 SBSQ HOSP IP/OBS HIGH 50: CPT

## 2020-06-14 RX ORDER — SODIUM CHLORIDE 9 MG/ML
250 INJECTION INTRAMUSCULAR; INTRAVENOUS; SUBCUTANEOUS ONCE
Refills: 0 | Status: COMPLETED | OUTPATIENT
Start: 2020-06-14 | End: 2020-06-14

## 2020-06-14 RX ADMIN — HEPARIN SODIUM 7500 UNIT(S): 5000 INJECTION INTRAVENOUS; SUBCUTANEOUS at 05:34

## 2020-06-14 RX ADMIN — HEPARIN SODIUM 7500 UNIT(S): 5000 INJECTION INTRAVENOUS; SUBCUTANEOUS at 22:03

## 2020-06-14 RX ADMIN — SODIUM CHLORIDE 250 MILLILITER(S): 9 INJECTION INTRAMUSCULAR; INTRAVENOUS; SUBCUTANEOUS at 08:02

## 2020-06-14 RX ADMIN — DEXTROSE MONOHYDRATE, SODIUM CHLORIDE, AND POTASSIUM CHLORIDE 125 MILLILITER(S): 50; .745; 4.5 INJECTION, SOLUTION INTRAVENOUS at 17:19

## 2020-06-14 RX ADMIN — HEPARIN SODIUM 7500 UNIT(S): 5000 INJECTION INTRAVENOUS; SUBCUTANEOUS at 14:06

## 2020-06-14 NOTE — CONSULT NOTE ADULT - SUBJECTIVE AND OBJECTIVE BOX
Patient is a 60y old  Female who presents with a chief complaint of SBO     HPI:  60F PMH morbid obesity PSH cesarian section ', RNYGB ' at OSH by Dr. David Fried, SBO '07 s/p ex lap, SBR, and primary anastomosis. Pt now presents w ~24h n/v and abdominal pain, began suddenly yesterday at 03:00, not associated with exacerbating / relieving fx. Similar to pain during prior SBO. Pt denies CP, SOB, fever, chills, urinary symptoms.S/p successful NGT placement in ED, bilious drainage, pt thrashed and gagged even after placement completed. Pulled it out immediately. Only time for 50 cc drainage.      PAST MEDICAL & SURGICAL HISTORY:  Gastric bypass status for obesity  S/P small bowel resection  S/P   S/P gastric bypass      SOCIAL HX:   Smoking  denies                       ETOH   denies                         Other denies     FAMILY HISTORY:  .  No cardiovascular or pulmonary family history     Review of System:  See HPI    Allergies    No Known Allergies    Intolerances          PHYSICAL EXAM  Vital Signs Last 24 Hrs  T(C): 36.1 (2020 07:30), Max: 36.7 (2020 15:30)  T(F): 97 (2020 07:30), Max: 98.1 (2020 15:30)  HR: 102 (2020 07:30) (102 - 116)  BP: 132/76 (2020 07:30) (132/76 - 140/89)  BP(mean): --  RR: 18 (2020 07:30) (17 - 18)  SpO2: 98% in RA     General: In NAD  HEENT: PAULY             Lungs: Domenico BS  Cardiovascular: Regular  Gastrointestinal: Soft.  + BS   Musculoskeletal: No Clubbing.  Full range of motion.. Moves all extremities  Skin: Warm.  Intact  Neurological: awake and alert      LABS:                          13.0   6.29  )-----------( 235      ( 2020 20:50 )             40.9                                               06-13    139  |  103  |  11  ----------------------------<  135<H>  4.0   |  24  |  0.7    Ca    8.5      2020 20:50  Phos  3.7     06-13  Mg     1.9     06-13    TPro  6.7  /  Alb  4.1  /  TBili  0.5  /  DBili  x   /  AST  18  /  ALT  16  /  AlkPhos  78  06-12      PT/INR - ( 2020 03:50 )   PT: 12.30 sec;   INR: 1.07 ratio         PTT - ( 2020 03:50 )  PTT:27.2 sec                                       Urinalysis Basic - ( 2020 22:20 )    Color: Yellow / Appearance: Clear / SG: >1.050 / pH: x  Gluc: x / Ketone: Small  / Bili: Small / Urobili: 6 mg/dL   Blood: x / Protein: 100 mg/dL / Nitrite: Negative   Leuk Esterase: Negative / RBC: 6 /HPF / WBC 3 /HPF   Sq Epi: x / Non Sq Epi: 7 /HPF / Bacteria: Negative                                                  LIVER FUNCTIONS - ( 2020 21:14 )  Alb: 4.1 g/dL / Pro: 6.7 g/dL / ALK PHOS: 78 U/L / ALT: 16 U/L / AST: 18 U/L / GGT: x                                                                                                MEDICATIONS  (STANDING):  dextrose 5% + sodium chloride 0.45% with potassium chloride 20 mEq/L 1000 milliLiter(s) (125 mL/Hr) IV Continuous <Continuous>  folic acid 1 milliGRAM(s) Oral daily  heparin   Injectable 7500 Unit(s) SubCutaneous every 8 hours  multivitamin 1 Tablet(s) Oral daily  thiamine 100 milliGRAM(s) Oral daily    MEDICATIONS  (PRN):  ondansetron Injectable 4 milliGRAM(s) IV Push every 6 hours PRN Nausea      < from: Xray Chest 1 View- PORTABLE-Urgent (20 @ 13:11) >  No radiographic evidence of cardiopulmonary disease.      < end of copied text >    < from: CT Abdomen and Pelvis w/ Oral Cont and w/ IV Cont (20 @ 23:32) >  Complex small bowel obstruction involving at least 4 separate ventral hernias throughout the abdominal wall, described above. Eventual transition to collapsed small bowel upon exiting fourth site of ventral herniation, within right lateral abdominal wall. Obstructed small bowel dilated to 6.4 cm with fecalization just proximal to this final site.        < end of copied text >

## 2020-06-14 NOTE — PROGRESS NOTE ADULT - ATTENDING COMMENTS
60 year old lady with PMH morbid obesity PSH cesarian section '91, RNYGB '01 at OSH by Dr. David Fried, SBO '07 s/p ex lap, SBR, and primary anastomosis.  Pt now presents w ~24h n/v and abdominal pain, began suddenly yesterday at 03:00, not associated with exacerbating / relieving fx.   Pt denies CP, SOB, fever, chills, urinary symptoms.  No Flatus or Bowel movement since Thursday   Last Meal on Friday.    had NG Tube placement once with 50 ml drainage.  - removed by pt  had NG Tube placement second time with 250 ml drainage. - removed by pt    pt examined at 7:30   Pt sitting up   comfortable - feels better after 2nd NG tube insertion / removal   Abdomen - distended ( pt states it the normal size), non tender  impression : S/p Open RnY Gastric Bypass with hernia repair and recurrence.   partial sbo - adhesion vs hernia    options discussed with patient and daughter on the phone.   Knows risks of emergency surgery - would like to avoid it.   patient refusing to be able to insert NG tube now.   after talking she states to come back later.     Pt reexamined around 9 and 9:30  Abdomen still soft , no pain.   NG tube placed by me.   400 ml bilious output.     Pt reexamined around 1 pm   Abdomen still soft , no pain.   no flatus or bowel movement  states abdomen is softer and less distended    pt examined 6/14:  Had multiple bowel movement with flatus  ng output still high - less billious  abdomen softer        impression  :  S/p Open RnY Gastric Bypass with hernia repair and recurrence.   partial sbo - adhesion vs hernia  repeat labs  no distention of Abby limb  will keep NPO with NG tube  will get nutrition labs  will get Dr. Steven for Cardiolgoy  will get pulm for her Sleep Apnea  If pain worsens or no improvement - will take her for laparotomy with BHUMI and repair of hernia

## 2020-06-14 NOTE — PROGRESS NOTE ADULT - ASSESSMENT
Assessment:  60y Female PSH of C section, obesity s/p RNYGB 2001 c/b SBO s/p ex lap SBR and primary anastamosis. Presenting with SBO with multiple ventral hernias.     Plan:  - NPO, IVF  - NGT to suction  - f/u nutrition labs  - f/u cardiology and pulm clearance  - if deteriorate, operative intervention

## 2020-06-14 NOTE — CONSULT NOTE ADULT - ASSESSMENT
IMPRESSION    SBO  H/O [place] On CPAP (10 cm H20 )    PLAN    From the pulmonary Standpoint Patient is a moderate risk for the scheduled surgical procedure   Post operative use CPAP   Surgery and general care as per Surgical team recs  Outpatient Pulmonary follow up with Dr sunitha justin  Incentive Spirometry  Pain control  DVT PPX  Recall PRN

## 2020-06-14 NOTE — PROGRESS NOTE ADULT - SUBJECTIVE AND OBJECTIVE BOX
GENERAL SURGERY PROGRESS NOTE     DOLORES GARCIA  60y  Female  Hospital day :1d    OVERNIGHT EVENTS: no acute events overnight     T(F): 96.8 (06-14-20 @ 00:00), Max: 98.1 (06-13-20 @ 15:30)  HR: 104 (06-14-20 @ 00:00) (83 - 116)  BP: 133/74 (06-14-20 @ 00:00) (130/89 - 175/93)  RR: 17 (06-14-20 @ 00:00) (17 - 18)    DIET/FLUIDS: dextrose 5% + sodium chloride 0.45% with potassium chloride 20 mEq/L 1000 milliLiter(s) IV Continuous <Continuous>  folic acid 1 milliGRAM(s) Oral daily  multivitamin 1 Tablet(s) Oral daily  thiamine 100 milliGRAM(s) Oral daily    URINE:    Indwelling Urethral Catheter:     Connect To:  Straight Drainage/Ruth    Indication:  Perioperative use for Selected Surgical Procedures    Additional Instructions:  DO NOT REMOVE without a discontinuation order (06-13-20 @ 05:25)    GI proph:    AC/ proph: heparin   Injectable 7500 Unit(s) SubCutaneous every 8 hours    ABx:     PHYSICAL EXAM:  GENERAL: NAD, well-appearing  CHEST/LUNG: Clear to auscultation bilaterally  HEART: Regular rate and rhythm  ABDOMEN: Soft, Nontender, Nondistended;   EXTREMITIES:  No clubbing, cyanosis, or edema      LABS  CAPILLARY BLOOD GLUCOSE                        13.0   6.29  )-----------( 235      ( 13 Jun 2020 20:50 )             40.9       Auto Neutrophil %: 72.1 % (06-13-20 @ 20:50)  Auto Immature Granulocyte %: 0.3 % (06-13-20 @ 20:50)  Auto Immature Granulocyte %: 0.5 % (06-13-20 @ 11:00)  Auto Neutrophil %: 78.5 % (06-13-20 @ 11:00)    06-13    139  |  103  |  11  ----------------------------<  135<H>  4.0   |  24  |  0.7      Calcium, Total Serum: 8.5 mg/dL (06-13-20 @ 20:50)      LFTs:             6.7  | 0.5  | 18       ------------------[78      ( 12 Jun 2020 21:14 )  4.1  | x    | 16          Lipase:11     Amylase:22        Lactate, Blood: 1.1 mmol/L (06-13-20 @ 11:00)  Lactate, Blood: 1.1 mmol/L (06-12-20 @ 21:14)      Coags:     12.30  ----< 1.07    ( 13 Jun 2020 03:50 )     27.2                Urinalysis Basic - ( 12 Jun 2020 22:20 )    Color: Yellow / Appearance: Clear / SG: >1.050 / pH: x  Gluc: x / Ketone: Small  / Bili: Small / Urobili: 6 mg/dL   Blood: x / Protein: 100 mg/dL / Nitrite: Negative   Leuk Esterase: Negative / RBC: 6 /HPF / WBC 3 /HPF   Sq Epi: x / Non Sq Epi: 7 /HPF / Bacteria: Negative

## 2020-06-15 LAB
ANION GAP SERPL CALC-SCNC: 14 MMOL/L — SIGNIFICANT CHANGE UP (ref 7–14)
BUN SERPL-MCNC: 11 MG/DL — SIGNIFICANT CHANGE UP (ref 10–20)
CALCIUM SERPL-MCNC: 8.5 MG/DL — SIGNIFICANT CHANGE UP (ref 8.5–10.1)
CHLORIDE SERPL-SCNC: 107 MMOL/L — SIGNIFICANT CHANGE UP (ref 98–110)
CO2 SERPL-SCNC: 24 MMOL/L — SIGNIFICANT CHANGE UP (ref 17–32)
CREAT SERPL-MCNC: 0.7 MG/DL — SIGNIFICANT CHANGE UP (ref 0.7–1.5)
FOLATE SERPL-MCNC: 19.3 NG/ML — SIGNIFICANT CHANGE UP
FOLATE SERPL-MCNC: >20 NG/ML — SIGNIFICANT CHANGE UP
GLUCOSE SERPL-MCNC: 122 MG/DL — HIGH (ref 70–99)
HCT VFR BLD CALC: 40 % — SIGNIFICANT CHANGE UP (ref 37–47)
HCV AB S/CO SERPL IA: 0.03 COI — SIGNIFICANT CHANGE UP
HCV AB SERPL-IMP: SIGNIFICANT CHANGE UP
HGB BLD-MCNC: 12.4 G/DL — SIGNIFICANT CHANGE UP (ref 12–16)
MAGNESIUM SERPL-MCNC: 1.8 MG/DL — SIGNIFICANT CHANGE UP (ref 1.8–2.4)
MCHC RBC-ENTMCNC: 29.7 PG — SIGNIFICANT CHANGE UP (ref 27–31)
MCHC RBC-ENTMCNC: 31 G/DL — LOW (ref 32–37)
MCV RBC AUTO: 95.7 FL — SIGNIFICANT CHANGE UP (ref 81–99)
NRBC # BLD: 0 /100 WBCS — SIGNIFICANT CHANGE UP (ref 0–0)
PHOSPHATE SERPL-MCNC: 2.5 MG/DL — SIGNIFICANT CHANGE UP (ref 2.1–4.9)
PLATELET # BLD AUTO: 208 K/UL — SIGNIFICANT CHANGE UP (ref 130–400)
POTASSIUM SERPL-MCNC: 4.1 MMOL/L — SIGNIFICANT CHANGE UP (ref 3.5–5)
POTASSIUM SERPL-SCNC: 4.1 MMOL/L — SIGNIFICANT CHANGE UP (ref 3.5–5)
RBC # BLD: 4.18 M/UL — LOW (ref 4.2–5.4)
RBC # FLD: 14.6 % — HIGH (ref 11.5–14.5)
SODIUM SERPL-SCNC: 145 MMOL/L — SIGNIFICANT CHANGE UP (ref 135–146)
VIT B12 SERPL-MCNC: 500 PG/ML — SIGNIFICANT CHANGE UP (ref 232–1245)
WBC # BLD: 5.07 K/UL — SIGNIFICANT CHANGE UP (ref 4.8–10.8)
WBC # FLD AUTO: 5.07 K/UL — SIGNIFICANT CHANGE UP (ref 4.8–10.8)

## 2020-06-15 PROCEDURE — 64647 CHEMODENERV TRUNK MUSC 6/>: CPT

## 2020-06-15 PROCEDURE — 99254 IP/OBS CNSLTJ NEW/EST MOD 60: CPT

## 2020-06-15 PROCEDURE — 99233 SBSQ HOSP IP/OBS HIGH 50: CPT

## 2020-06-15 PROCEDURE — 76942 ECHO GUIDE FOR BIOPSY: CPT | Mod: 26

## 2020-06-15 PROCEDURE — 99152 MOD SED SAME PHYS/QHP 5/>YRS: CPT

## 2020-06-15 RX ORDER — POTASSIUM CHLORIDE 20 MEQ
20 PACKET (EA) ORAL
Refills: 0 | Status: COMPLETED | OUTPATIENT
Start: 2020-06-15 | End: 2020-06-15

## 2020-06-15 RX ADMIN — Medication 50 MILLIEQUIVALENT(S): at 10:13

## 2020-06-15 RX ADMIN — HEPARIN SODIUM 7500 UNIT(S): 5000 INJECTION INTRAVENOUS; SUBCUTANEOUS at 05:43

## 2020-06-15 RX ADMIN — DEXTROSE MONOHYDRATE, SODIUM CHLORIDE, AND POTASSIUM CHLORIDE 125 MILLILITER(S): 50; .745; 4.5 INJECTION, SOLUTION INTRAVENOUS at 14:47

## 2020-06-15 RX ADMIN — Medication 50 MILLIEQUIVALENT(S): at 05:42

## 2020-06-15 RX ADMIN — HEPARIN SODIUM 7500 UNIT(S): 5000 INJECTION INTRAVENOUS; SUBCUTANEOUS at 21:24

## 2020-06-15 NOTE — PROGRESS NOTE ADULT - ASSESSMENT
61 y/o woman with PMH/PSH of morbid obesity, gastric bypass, C/S, SBO s/p exp lap with small bowel resection and primary anastomosis, b/l knee replacement and LOUIS on CPAP is now admitted for complex small bowel obstruction. She is being treated conservatively at this time but plan is for surgery if she does not improve.

## 2020-06-15 NOTE — PROGRESS NOTE ADULT - ATTENDING COMMENTS
60 year old lady with PMH morbid obesity PSH cesarian section '91, RNYGB '01 at OSH by Dr. David Fried, SBO '07 s/p ex lap, SBR, and primary anastomosis.  Pt now presents w ~24h n/v and abdominal pain, began suddenly yesterday at 03:00, not associated with exacerbating / relieving fx.   Pt denies CP, SOB, fever, chills, urinary symptoms.  No Flatus or Bowel movement since Thursday   Last Meal on Friday.    had NG Tube placement once with 50 ml drainage.  - removed by pt  had NG Tube placement second time with 250 ml drainage. - removed by pt    pt examined at 7:30   Pt sitting up   comfortable - feels better after 2nd NG tube insertion / removal   Abdomen - distended ( pt states it the normal size), non tender  impression : S/p Open RnY Gastric Bypass with hernia repair and recurrence.   partial sbo - adhesion vs hernia    options discussed with patient and daughter on the phone.   Knows risks of emergency surgery - would like to avoid it.   patient refusing to be able to insert NG tube now.   after talking she states to come back later.     Pt reexamined around 9 and 9:30  Abdomen still soft , no pain.   NG tube placed by me.   400 ml bilious output.     Pt reexamined around 1 pm   Abdomen still soft , no pain.   no flatus or bowel movement  states abdomen is softer and less distended    pt examined 6/14:  Had multiple bowel movement with flatus  ng output still high - less billious  abdomen softer    pt examined 6/15:  Had multiple bowel movement with flatus yesterday   ng output minimal - some coffee ground   abdomen much softer  had IR for botox block to lateral abdominal wall        impression  :  S/p Open RnY Gastric Bypass with hernia repair and recurrence.   partial sbo - adhesion vs hernia - resovled    will keep NPO   will get Dr. Steven for Cardiolgoy  will get pulm for her Sleep Apnea  If pain worsens or no improvement - will take her for laparotomy with BHUMI and repair of hernia

## 2020-06-15 NOTE — PROGRESS NOTE ADULT - SUBJECTIVE AND OBJECTIVE BOX
GENERAL SURGERY PROGRESS NOTE     DOLORES GARCIA  Female  Hospital day :2d  POD:  Procedure:   OVERNIGHT EVENTS: no acute overnight events     T(F): 97 (06-15-20 @ 00:03), Max: 97 (20 @ 07:30)  HR: 95 (06-15-20 @ 00:03) (95 - 104)  BP: 140/75 (06-15-20 @ 00:03) (117/64 - 140/75)  RR: 18 (06-15-20 @ 00:03) (18 - 18)  SpO2: --    DIET/FLUIDS: dextrose 5% + sodium chloride 0.45% with potassium chloride 20 mEq/L 1000 milliLiter(s) IV Continuous <Continuous>  folic acid 1 milliGRAM(s) Oral daily  multivitamin 1 Tablet(s) Oral daily  potassium chloride  20 mEq/100 mL IVPB 20 milliEquivalent(s) IV Intermittent every 2 hours  thiamine 100 milliGRAM(s) Oral daily    N20 @ 07:01  -  06-15-20 @ 07:00  --------------------------------------------------------  OUT: 950 mL                                                                                 GI proph:    AC/ proph: heparin   Injectable 7500 Unit(s) SubCutaneous every 8 hours    ABx:     PHYSICAL EXAM:  GENERAL: NAD, well-appearing  CHEST/LUNG: Clear to auscultation bilaterally  HEART: Regular rate and rhythm  ABDOMEN: Soft, Nontender, Nondistended;   EXTREMITIES:  No clubbing, cyanosis, or edema      LABS  Labs:  CAPILLARY BLOOD GLUCOSE                              11.9   4.51  )-----------( 222      ( 2020 21:31 )             37.9       Auto Neutrophil %: 64.6 % (20 @ 21:31)  Auto Immature Granulocyte %: 0.4 % (20 @ 21:31)        145  |  108  |  14  ----------------------------<  124<H>  3.6   |  25  |  0.8      Calcium, Total Serum: 8.3 mg/dL (20 @ 21:31)      LFTs:             5.8  | 0.4  | 11       ------------------[69      ( 2020 16:55 )  3.6  | <0.2 | 15          Lipase:x      Amylase:x         Lactate, Blood: 1.1 mmol/L (20 @ 11:00)  Lactate, Blood: 1.1 mmol/L (20 @ 21:14)      Coags:                    RADIOLOGY & ADDITIONAL TESTS:      A/P

## 2020-06-15 NOTE — PROGRESS NOTE ADULT - SUBJECTIVE AND OBJECTIVE BOX
WAGNERDURAK, DOLORES  60y      Planned Procedure____laparotomy, BHUMI, repair of hernia___________________________________    Does the patient have the following conditions? Type Y or N    __N__DVT/PE           	  Currently anticoagulated ______ IVC Filter _______ Date:______    __N__DM                             Controlled    Y _______ N _______    __N__HTN	                              Controlled    Y _______ N _______    __N__CAD	                                  Prior MI  _____CABG _____STENT  ______ EF _____    __N__CHF                             Chronic _____ Acute _____ EF ______    __N__Afib	                                  Current Rhythm _________   Current anticoagulation _________    __N__PPM/ICD                         Indication ___________  last Interrogated _________    __Y__OSA	                              PAP  Type &Settings _____________    __N__Asthma/COPD	          Last Exac _______ Last Steroid use Date _______     __N__O2 dependent	          Reason ______________    _N___CKD or FUNMI	                  Stable Y _____  N ______    _N___Electrolyte Abn	          Type ___________     Stable Y ______   N _______    __N__Cirrhosis	                  Cause __________     Stable Y ______   N _______    __N__GI Bleed                          Cause __________     Stable Y ______   N _______    __Y__Anemia	                          Cause _iron deficiency_________     Stable Y ______   N _______    __N__Transfusion                  Last date ________      __N__ETOH Use	                  Last date________     Intervention __________________________    __N__Tobacco Abuse	          Last date ________    Intervention __________________________    _N___Drug Use	                 Type____________  Last dose _____ Intervention______________    ____Other                             Details_________________________________________________      PAST MEDICAL & SURGICAL HISTORY:    [place]IS on CPAP nightly  Gastric bypass status for morbid obesity (BMI 50)    S/P small bowel resection, exploratory lap, primary anastomosis   S/P    S/P gastric bypass - Abby-en-Y   b/l knee replacements   Lasik surgery    Allergies    No Known Allergies    Intolerances      MEDICATIONS  (STANDING):  dextrose 5% + sodium chloride 0.45% with potassium chloride 20 mEq/L 1000 milliLiter(s) (125 mL/Hr) IV Continuous <Continuous>  folic acid 1 milliGRAM(s) Oral daily  heparin   Injectable 7500 Unit(s) SubCutaneous every 8 hours  multivitamin 1 Tablet(s) Oral daily  Onabotulinumtoxin  A  (BOTOX) 300 Unit(s),Sodium chloride 0.9% 150 milliLiter(s) 300 Unit(s) Local Injection once  thiamine 100 milliGRAM(s) Oral daily    MEDICATIONS  (PRN):  ondansetron Injectable 4 milliGRAM(s) IV Push every 6 hours PRN Nausea      FH: brother  age 50 from MI (smoker)  mother  age 63 - lung cancer    ROS: pt s/p botox injection today - no complaints.  Last BM on  morning  + flatus  stable abdominal distension  No N/V - NGT now out  reviewed, otherwise negative    Duke Activity Status Index: Can the patient climb a flight of stairs or walk uphill?     ______ N   <4 METS   ___x____ Y > 4 METS    Physical Exam:  Vital Signs Last 24 Hrs  T(C): 35.8 (15 Stephan 2020 15:45), Max: 36.3 (15 Stephan 2020 07:25)  T(F): 96.5 (15 Stephan 2020 15:45), Max: 97.3 (15 Stephan 2020 07:25)  HR: 81 (15 Stephan 2020 15:45) (76 - 95)  BP: 132/66 (15 Stephan 2020 15:45) (123/77 - 140/75)  BP(mean): --  RR: 18 (15 Stephan 2020 15:45) (17 - 18)  SpO2: --    General: WD woman lying in bed in NAD    HEENT: NC, anicteric    Respiratory: CTA b/l    CVS: RRR no murmur    GI: soft, obese, distended (chronic per pt), NT, + BS    Ext: no edema    Neuro: moves all extremities, awake, alert    Psych: pleasant, cooperative    LABS AND OTHER PERTINENT TESTS:                          11.9   4.51  )-----------( 222      ( 2020 21:31 )             37.9     06-14    145  |  108  |  14  ----------------------------<  124<H>  3.6   |  25  |  0.8    Ca    8.3<L>      2020 21:31  Phos  2.3     06-14  Mg     1.9     06-14    TPro  5.8<L>  /  Alb  3.6  /  TBili  0.4  /  DBili  <0.2  /  AST  11  /  ALT  15  /  AlkPhos  69  06-14    Vitamin D, 25-Hydroxy (20 @ 16:55)    Vitamin D, 25-Hydroxy: 19: 30 - 80 ng/mL                                        Optimum Levels  (Reference range)  > 80 ng/mL                                             Toxicity possible  20 - 29 ng/mL                                         Insufficiency  10 - 19 ng/mL                                 Mild to Moderate  Deficiency  < 10 ng/mL                                             Severe Deficiency  Optimum levels for 25-Hydroxy vitamin D are 30 ng/mL and above based on  the Endocrine Society guidelines 2011. However, there is a lack of  consensus on this and the [place] [employer] recommends 20 ng/mL and  above as optimum levels. Vitamin D results may vary depending on the  method of analysis. The current Roche Mark electrochemiluminescent  immunoassaymethod measures both D2 and D3 and was introduced in 2018. ng/mL      Iron with Total Binding Capacity (20 @ 15:50)    % Saturation, Iron: 7 %    Iron - Total Binding Capacity.: 414 ug/dL    Iron Total, Serum: 30 ug/dL    Unsaturated Iron Binding Capacity: 384 ug/dL    Ferritin, Serum (20 @ 15:50)    Ferritin, Serum: 49 ng/mL          < from: CT Abdomen and Pelvis w/ Oral Cont and w/ IV Cont (20 @ 23:32) >  IMPRESSION:     Complex small bowel obstruction involving at least 4 separate ventral hernias throughout the abdominal wall, described above. Eventual transition to collapsed small bowel upon exiting fourth site of ventral herniation, within right lateral abdominal wall. Obstructed small bowel dilated to 6.4 cm with fecalization just proximal to this final site.      < end of copied text >      < from: 12 Lead ECG (20 @ 04:29) >  Diagnosis Line Sinus tachycardia  Otherwise normal ECG      < end of copied text >    < from: Transthoracic Echocardiogram (20 @ 10:34) >    Summary:   1. Left ventricular ejection fraction, by visual estimation, is 55 to 60%.   2. Spectral Doppler shows impaired relaxation pattern of left ventricular myocardial filling (Grade I diastolic dysfunction).    < end of copied text >    EKG reviewed by me    Risk Assessment: (Use One)    [employer] NSQIP Surgical Risk Calculator http://riskcalculator.facs.org/      Revised Cardiac Risk Index 1      ____x___  The patient is optimized for surgery/procedure and may proceed to the operating room as scheduled    _________The patient is NOT optimized for surgery/procedure and should not proceed to the operating room as scheduled      Recommendations for optimization:    f/u cardiology consult recommendations    pt at elevated risk for cardiovascular complications if she has open intraperitoneal procedure for SBO but she does have > 4 METS of activity. Proceed to surgery if the benefit > risk    Pulm consult appreciated: moderate risk, use CPAP postoperatively, incentive spirometry    would start venofer for iron deficiency anemia    start vitamin D supplementation      Anticoagulation Recommendations:     continue heparin SQ perioperatively

## 2020-06-15 NOTE — PROGRESS NOTE ADULT - ASSESSMENT
60 year old lady with PMH morbid obesity PSH cesarian section '91, RNYGB '01 at OSH by Dr. David Fried, SBO '07 s/p ex lap, SBR, and primary anastomosis.  Pt now presents w ~24h n/v and abdominal pain, began suddenly yesterday at 03:00, not associated with exacerbating / relieving fx.   Pt denies CP, SOB, fever, chills, urinary symptoms.  No Flatus or Bowel movement since Thursday   Last Meal on Friday.  impression  :  S/p Open RnY Gastric Bypass with hernia repair and recurrence  plan:     repeat labs  will keep NPO with NG tube  will get nutrition labs  will get Dr. Steven for Cardiolgoy  will get pulm for her Sleep Apnea  If pain worsens or no improvement - will take her for laparotomy with BHUMI and repair of hernia

## 2020-06-15 NOTE — CONSULT NOTE ADULT - SUBJECTIVE AND OBJECTIVE BOX
HPI:  DOLORES GARCIA [id number]  60y Female    HPI:  60 year old F with PMH morbid obesity PSH cesarian section ', RNYGB '01 at OSH by Dr. David Fried, SBO '07 s/p ex lap, SBR, and primary anastomosis. Pt now presents w ~24h n/v and abdominal pain, began suddenly yesterday at 03:00, not associated with exacerbating / relieving fx. Similar to pain during prior SBO. Pt denies CP, SOB, fever, chills, urinary symptoms.    S/p successful NGT placement in ED, bilious drainage, pt thrashed and gagged even after placement completed. Pulled it out immediately. Only time for 50 cc drainage. (2020 04:44)      PAST MEDICAL & SURGICAL HISTORY  Gastric bypass status for obesity  S/P small bowel resection  S/P   S/P gastric bypass      FAMILY HISTORY:  FAMILY HISTORY:      SOCIAL HISTORY:  []smoker  []Alcohol  []Drug    ALLERGIES:  No Known Allergies      MEDICATIONS:  MEDICATIONS  (STANDING):  dextrose 5% + sodium chloride 0.45% with potassium chloride 20 mEq/L 1000 milliLiter(s) (125 mL/Hr) IV Continuous <Continuous>  folic acid 1 milliGRAM(s) Oral daily  heparin   Injectable 7500 Unit(s) SubCutaneous every 8 hours  multivitamin 1 Tablet(s) Oral daily  Onabotulinumtoxin  A  (BOTOX) 300 Unit(s),Sodium chloride 0.9% 150 milliLiter(s) 300 Unit(s) Local Injection once  thiamine 100 milliGRAM(s) Oral daily    MEDICATIONS  (PRN):  ondansetron Injectable 4 milliGRAM(s) IV Push every 6 hours PRN Nausea      HOME MEDICATIONS:  Home Medications:      VITALS:   T(F): 97.3 (06-15 @ 07:25), Max: 99.1 ( @ 00:25)  HR: 93 (06-15 @ 07:25) (83 - 116)  BP: 123/77 (06-15 @ 07:25) (117/64 - 175/93)  BP(mean): --  RR: 18 (06-15 @ 07:25) (17 - 20)  SpO2: 99% ( @ 00:25) (99% - 99%)    I&O's Summary    2020 07:01  -  15 Stephan 2020 07:00  --------------------------------------------------------  IN: 0 mL / OUT: 950 mL / NET: -950 mL        REVIEW OF SYSTEMS:  CONSTITUTIONAL: No weakness, fevers or chills  EYES: No visual changes  ENT: No vertigo or throat pain   NECK: No pain or stiffness  RESPIRATORY: No cough, wheezing, hemoptysis; No shortness of breath  CARDIOVASCULAR: No chest pain or palpitations  GASTROINTESTINAL: No abdominal or epigastric pain. No nausea, vomiting, or hematemesis; No diarrhea or constipation. No melena or hematochezia.  GENITOURINARY: No dysuria, frequency or hematuria  NEUROLOGICAL: No numbness or weakness  SKIN: No itching, no rashes  MSK: no    PHYSICAL EXAM:  NEURO: patient is awake , alert and oriented  GEN: Not in acute distress  NECK: no thyroid enlargement, no JVD  LUNGS: Clear to auscultation bilaterally   CARDIOVASCULAR: S1/S2 present, RRR , no murmurs or rubs, no carotid bruits,  + PP bilaterally  ABD: Soft, non-tender, non-distended, +BS  EXT: No AVIVA  SKIN: Intact    LABS:                        11.9   4.51  )-----------( 222      ( 2020 21:31 )             37.9     -14    145  |  108  |  14  ----------------------------<  124<H>  3.6   |  25  |  0.8    Ca    8.3<L>      2020 21:31  Phos  2.3     -14  Mg     1.9     -14    TPro  5.8<L>  /  Alb  3.6  /  TBili  0.4  /  DBili  <0.2  /  AST  11  /  ALT  15  /  AlkPhos  69  -              Troponin trend:      RADIOLOGY:  -CXR:     XR CHEST PORTABLE URGENT 1V            PROCEDURE DATE:  2020        Impression:    No radiographic evidence of cardiopulmonary disease.      -TTE:    2-D ECHO (TTE) COMPLETE        PROCEDURE DATE:  2020      Summary:   1. Left ventricular ejection fraction, by visual estimation, is 55 to 60%.   2. Spectral Doppler shows impaired relaxation pattern of left ventricular myocardial filling (Grade I diastolic dysfunction).      -CCTA: None     -STRESS TEST: None     -CATHETERIZATION:  None     ECG:     Diagnosis Line Sinus tachycardia  Otherwise normal ECG    TELEMETRY EVENTS:  Not on tele HPI:  DOLORES GARCIA [id number]  60y Female    HPI:  60 year old F with PMH morbid obesity PSH cesarian section ', RNYGB '01 at OSH by Dr. David Fried, SBO '07 s/p ex lap, SBR, and primary anastomosis. Pt now presents w ~24h n/v and abdominal pain, began suddenly yesterday at 03:00, not associated with exacerbating / relieving fx. Similar to pain during prior SBO. Pt denies CP, SOB, fever, chills, urinary symptoms.    S/p successful NGT placement in ED, bilious drainage, pt thrashed and gagged even after placement completed. Pulled it out immediately. Only time for 50 cc drainage. (2020 04:44)      PAST MEDICAL & SURGICAL HISTORY  Gastric bypass status for obesity  S/P small bowel resection  S/P   S/P gastric bypass      FAMILY HISTORY:  FAMILY HISTORY:      SOCIAL HISTORY:  []smoker  []Alcohol  []Drug    ALLERGIES:  No Known Allergies      MEDICATIONS:  MEDICATIONS  (STANDING):  dextrose 5% + sodium chloride 0.45% with potassium chloride 20 mEq/L 1000 milliLiter(s) (125 mL/Hr) IV Continuous <Continuous>  folic acid 1 milliGRAM(s) Oral daily  heparin   Injectable 7500 Unit(s) SubCutaneous every 8 hours  multivitamin 1 Tablet(s) Oral daily  Onabotulinumtoxin  A  (BOTOX) 300 Unit(s),Sodium chloride 0.9% 150 milliLiter(s) 300 Unit(s) Local Injection once  thiamine 100 milliGRAM(s) Oral daily    MEDICATIONS  (PRN):  ondansetron Injectable 4 milliGRAM(s) IV Push every 6 hours PRN Nausea      HOME MEDICATIONS:  Home Medications:      VITALS:   T(F): 97.3 (06-15 @ 07:25), Max: 99.1 ( @ 00:25)  HR: 93 (06-15 @ 07:25) (83 - 116)  BP: 123/77 (06-15 @ 07:25) (117/64 - 175/93)  BP(mean): --  RR: 18 (06-15 @ 07:25) (17 - 20)  SpO2: 99% ( @ 00:25) (99% - 99%)    I&O's Summary    2020 07:01  -  15 Stephan 2020 07:00  --------------------------------------------------------  IN: 0 mL / OUT: 950 mL / NET: -950 mL        REVIEW OF SYSTEMS:  CONSTITUTIONAL: No weakness, fevers or chills  EYES: No visual changes  ENT: No vertigo or throat pain   NECK: No pain or stiffness  RESPIRATORY: No cough, wheezing, hemoptysis; No shortness of breath  CARDIOVASCULAR: No chest pain or palpitations  GASTROINTESTINAL: No abdominal or epigastric pain. No nausea, vomiting, or hematemesis; No diarrhea or constipation. No melena or hematochezia.  GENITOURINARY: No dysuria, frequency or hematuria  NEUROLOGICAL: No numbness or weakness  SKIN: No itching, no rashes  MSK: no    PHYSICAL EXAM:  NEURO: patient is awake , alert and oriented  GEN: Not in acute distress  NECK: no thyroid enlargement, no JVD  LUNGS: Clear to auscultation bilaterally   CARDIOVASCULAR: S1/S2 present, RRR , no murmurs or rubs, no carotid bruits,  + PP bilaterally  ABD: Obese, Soft, non-tender, non-distended, +BS  EXT: No AVIVA  SKIN: Intact    LABS:                        11.9   4.51  )-----------( 222      ( 2020 21:31 )             37.9     06-14    145  |  108  |  14  ----------------------------<  124<H>  3.6   |  25  |  0.8    Ca    8.3<L>      2020 21:31  Phos  2.3     06-14  Mg     1.9     -14    TPro  5.8<L>  /  Alb  3.6  /  TBili  0.4  /  DBili  <0.2  /  AST  11  /  ALT  15  /  AlkPhos  69  -        Troponin trend:      RADIOLOGY:  -CXR:     XR CHEST PORTABLE URGENT 1V            PROCEDURE DATE:  2020        Impression:    No radiographic evidence of cardiopulmonary disease.      -TTE:    2-D ECHO (TTE) COMPLETE        PROCEDURE DATE:  2020      Summary:   1. Left ventricular ejection fraction, by visual estimation, is 55 to 60%.   2. Spectral Doppler shows impaired relaxation pattern of left ventricular myocardial filling (Grade I diastolic dysfunction).      -CCTA: None     -STRESS TEST: None     -CATHETERIZATION:  None     ECG:     Diagnosis Line Sinus tachycardia  Otherwise normal ECG    TELEMETRY EVENTS:  Not on tele HPI:  DOLORES GARCIA [id number]  60y Female    HPI:  60 year old F with PMH morbid obesity PSH cesarian section ', RNYGB '01 at OSH by Dr. David Fried, SBO '07 s/p ex lap, SBR, and primary anastomosis. Pt now presents w ~24h n/v and abdominal pain, began suddenly yesterday at 03:00, not associated with exacerbating / relieving fx. Similar to pain during prior SBO. Pt denies CP, SOB, fever, chills, urinary symptoms.    S/p successful NGT placement in ED, bilious drainage, pt thrashed and gagged even after placement completed. Pulled it out immediately. Only time for 50 cc drainage. (2020 04:44)      PAST MEDICAL & SURGICAL HISTORY  Gastric bypass status for obesity  S/P small bowel resection  S/P   S/P gastric bypass      FAMILY HISTORY:  FAMILY HISTORY:  No family hx of premature CAD       SOCIAL HISTORY:  [] Non smoker  [] No Alcohol  []No Drug    ALLERGIES:  No Known Allergies    MEDICATIONS:  MEDICATIONS  (STANDING):  dextrose 5% + sodium chloride 0.45% with potassium chloride 20 mEq/L 1000 milliLiter(s) (125 mL/Hr) IV Continuous <Continuous>  folic acid 1 milliGRAM(s) Oral daily  heparin   Injectable 7500 Unit(s) SubCutaneous every 8 hours  multivitamin 1 Tablet(s) Oral daily  Onabotulinumtoxin  A  (BOTOX) 300 Unit(s),Sodium chloride 0.9% 150 milliLiter(s) 300 Unit(s) Local Injection once  thiamine 100 milliGRAM(s) Oral daily    MEDICATIONS  (PRN):  ondansetron Injectable 4 milliGRAM(s) IV Push every 6 hours PRN Nausea    HOME MEDICATIONS:  Home Medications:      VITALS:   T(F): 97.3 (06-15 @ 07:25), Max: 99.1 ( @ 00:25)  HR: 93 (06-15 @ 07:25) (83 - 116)  BP: 123/77 (06-15 @ 07:25) (117/64 - 175/93)  BP(mean): --  RR: 18 (06-15 @ 07:25) (17 - 20)  SpO2: 99% ( @ 00:25) (99% - 99%)    I&O's Summary    2020 07:01  -  15 Stephan 2020 07:00  --------------------------------------------------------  IN: 0 mL / OUT: 950 mL / NET: -950 mL      REVIEW OF SYSTEMS:  CONSTITUTIONAL: No weakness, fevers or chills  EYES: No visual changes  ENT: No vertigo or throat pain   NECK: No pain or stiffness  RESPIRATORY: No cough, wheezing, hemoptysis; No shortness of breath  CARDIOVASCULAR: No chest pain or palpitations  GASTROINTESTINAL: No abdominal or epigastric pain. No nausea, vomiting, or hematemesis; No diarrhea or constipation. No melena or hematochezia.  GENITOURINARY: No dysuria, frequency or hematuria  NEUROLOGICAL: No numbness or weakness  SKIN: No itching, no rashes  MSK: no    PHYSICAL EXAM:  NEURO: patient is awake , alert and oriented  GEN: Not in acute distress  NECK: no thyroid enlargement, no JVD  LUNGS: Clear to auscultation bilaterally   CARDIOVASCULAR: S1/S2 present, RRR , no murmurs or rubs, no carotid bruits,  + PP bilaterally  ABD: Obese, Soft, non-tender, non-distended, +BS  EXT: No VAIVA  SKIN: Intact    LABS:                        11.9   4.51  )-----------( 222      ( 2020 21:31 )             37.9     06-14    145  |  108  |  14  ----------------------------<  124<H>  3.6   |  25  |  0.8    Ca    8.3<L>      2020 21:31  Phos  2.3     06-14  Mg     1.9     06-14    TPro  5.8<L>  /  Alb  3.6  /  TBili  0.4  /  DBili  <0.2  /  AST  11  /  ALT  15  /  AlkPhos  69  06-14        Troponin trend:      RADIOLOGY:  -CXR:     XR CHEST PORTABLE URGENT 1V            PROCEDURE DATE:  2020        Impression:    No radiographic evidence of cardiopulmonary disease.      -TTE:    2-D ECHO (TTE) COMPLETE        PROCEDURE DATE:  2020      Summary:   1. Left ventricular ejection fraction, by visual estimation, is 55 to 60%.   2. Spectral Doppler shows impaired relaxation pattern of left ventricular myocardial filling (Grade I diastolic dysfunction).      -CCTA: None     -STRESS TEST: None     -CATHETERIZATION:  None     ECG:     Diagnosis Line Sinus tachycardia  Otherwise normal ECG    TELEMETRY EVENTS:  Not on tele

## 2020-06-15 NOTE — CONSULT NOTE ADULT - ASSESSMENT
60 year old F with PMH morbid obesity,  PSH cesarian section '91, RNYGB '01 at OSH by Dr. David Fried, SBO '07 s/p ex lap, SBR, and primary anastomosis  presenting  w ~24h n/v and abdominal pain.  Cardiology consulted for pre-operative risk stratification for repair of hernia.     IMPRESSION:      pre-operative risk stratification for moderate risk repair of hernia.    RECOMMENDATIONS: 60 year old F with PMHx HFpEF,  morbid obesity,  PSH cesarian section '91, RNYGB '01 at OSH by Dr. David Fried, SBO '07 s/p ex lap, SBR, and primary anastomosis  presenting  w ~24h n/v and abdominal pain.  Cardiology consulted for pre-operative risk stratification for repair of hernia.     IMPRESSION:    HFpEF, euvolemic   Pre-operative risk stratification for moderate risk repair of hernia.  No ACS, no ADHF, currently in NSR, rate controlled.  METS>4, participates independantly in all ADL's    RECOMMENDATIONS:  - patient is low risk patient undergoing moderate risk surgery, risk of perioperative cardiac events <1%, may proceed  if procedural benefits>risk.  - intraoperatively maintain I>O, f/u routine EKG postoperatively to monitor for perioperative cardiac events. 60 year old F with PMHx HFpEF,  morbid obesity,  PSH cesarian section '91, RNYGB '01 at OSH by Dr. David Fried, SBO '07 s/p ex lap, SBR, and primary anastomosis  presenting  w ~24h n/v and abdominal pain.  Cardiology consulted for pre-operative risk stratification for repair of hernia.     IMPRESSION:    HFpEF, euvolemic   Pre-operative risk stratification for moderate risk repair of hernia.  No ACS, no ADHF, currently in NSR, rate controlled.  METS>4, participates independantly in all ADL's    RECOMMENDATIONS:  - Patient is low to moderate risk patient undergoing moderate risk surgery, risk of perioperative cardiac events <1%, may proceed  if procedural benefits>risk.  - Intraoperatively maintain I<O  - F/u routine EKG postoperatively to monitor for perioperative cardiac events. 60 year old F with PMHx HFpEF,  morbid obesity,  PSH cesarian section '91, RNYGB '01 at OSH by Dr. David Fried, SBO '07 s/p ex lap, SBR, and primary anastomosis  presenting  w ~24h n/v and abdominal pain.  Cardiology consulted for pre-operative risk stratification for repair of hernia.     IMPRESSION:    HFpEF, euvolemic   Pre-operative risk stratification for moderate risk repair of hernia.  No ACS, no ADHF, currently in NSR, rate controlled.  METS>4, participates independantly in all ADL's    RECOMMENDATIONS:  - Patient is low to moderate risk patient undergoing moderate risk surgery, risk of perioperative cardiac events <1%, may proceed  if procedural benefits>risk.  - Intraoperatively maintain I<O  - F/u routine EKG postoperatively to monitor for perioperative cardiac events.    Attending Recs to Follow

## 2020-06-15 NOTE — CONSULT NOTE ADULT - SUBJECTIVE AND OBJECTIVE BOX
INTERVENTIONAL RADIOLOGY CONSULT:     Procedure Requested: Botox injection    HPI:  60F PMH morbid obesity PSH cesarian section ', RNYGB '01 at OSH by Dr. David Fried, SBO '07 s/p ex lap, SBR, and primary anastomosis. Pt now presents w ~24h n/v and abdominal pain, began suddenly yesterday at 03:00, not associated with exacerbating / relieving fx. Similar to pain during prior SBO. Pt denies CP, SOB, fever, chills, urinary symptoms.    S/p successful NGT placement in ED, bilious drainage, pt thrashed and gagged even after placement completed. Pulled it out immediately. Only time for 50 cc drainage. (2020 04:44)      PAST MEDICAL & SURGICAL HISTORY:  Gastric bypass status for obesity  S/P small bowel resection  S/P   S/P gastric bypass      MEDICATIONS  (STANDING):  dextrose 5% + sodium chloride 0.45% with potassium chloride 20 mEq/L 1000 milliLiter(s) (125 mL/Hr) IV Continuous <Continuous>  folic acid 1 milliGRAM(s) Oral daily  heparin   Injectable 7500 Unit(s) SubCutaneous every 8 hours  multivitamin 1 Tablet(s) Oral daily  potassium chloride  20 mEq/100 mL IVPB 20 milliEquivalent(s) IV Intermittent every 2 hours  thiamine 100 milliGRAM(s) Oral daily    MEDICATIONS  (PRN):  ondansetron Injectable 4 milliGRAM(s) IV Push every 6 hours PRN Nausea      Allergies  No Known Allergies    Physical Exam:   Vital Signs Last 24 Hrs  T(C): 36.3 (15 Stephan 2020 07:25), Max: 36.3 (15 Stephan 2020 07:25)  T(F): 97.3 (15 Stephan 2020 07:25), Max: 97.3 (15 Stephan 2020 07:25)  HR: 93 (15 Stephan 2020 07:25) (93 - 104)  BP: 123/77 (15 Stephan 2020 07:25) (117/64 - 140/75)  BP(mean): --  RR: 18 (15 Stephan 2020 07:25) (18 - 18)  SpO2: --      Labs:                         11.9   4.51  )-----------( 222      ( 2020 21:31 )             37.9     06-14    145  |  108  |  14  ----------------------------<  124<H>  3.6   |  25  |  0.8    Ca    8.3<L>      2020 21:31  Phos  2.3     06-14  Mg     1.9     06-14    TPro  5.8<L>  /  Alb  3.6  /  TBili  0.4  /  DBili  <0.2  /  AST  11  /  ALT  15  /  AlkPhos  69  14        Pertinent labs:                      11.9   4.51  )-----------( 222      ( 2020 21:31 )             37.9       06-14    145  |  108  |  14  ----------------------------<  124<H>  3.6   |  25  |  0.8    Ca    8.3<L>      2020 21:31  Phos  2.3     06-14  Mg     1.9     -14    TPro  5.8<L>  /  Alb  3.6  /  TBili  0.4  /  DBili  <0.2  /  AST  11  /  ALT  15  /  AlkPhos  69  14      ASSESSMENT/ PLAN:     60F PMH morbid obesity PSH cesarian section '91, RNYGB '01 at OSH by Dr. David Fried, SBO '07 s/p ex lap, SBR, and primary anastomosis.   -IR consulted for preoperative abdominal muscular botox injection.  -Will plan for today   -NPO      Risks, benefits, and alternatives to treatment discussed. All questions answered with understanding.    Thank you for the courtesy of this consult, please call [phone] with any further questions. INTERVENTIONAL RADIOLOGY CONSULT:     Procedure Requested: Botox injection    HPI:  60F PMH morbid obesity PSH cesarian section ', RNYGB '01 at OSH by Dr. David Fried, SBO '07 s/p ex lap, SBR, and primary anastomosis. Pt now presents w ~24h n/v and abdominal pain, began suddenly yesterday at 03:00, not associated with exacerbating / relieving fx. Similar to pain during prior SBO. Pt denies CP, SOB, fever, chills, urinary symptoms.    S/p successful NGT placement in ED, bilious drainage, pt thrashed and gagged even after placement completed. Pulled it out immediately. Only time for 50 cc drainage. (2020 04:44)      PAST MEDICAL & SURGICAL HISTORY:  Gastric bypass status for obesity  S/P small bowel resection  S/P   S/P gastric bypass      MEDICATIONS  (STANDING):  dextrose 5% + sodium chloride 0.45% with potassium chloride 20 mEq/L 1000 milliLiter(s) (125 mL/Hr) IV Continuous <Continuous>  folic acid 1 milliGRAM(s) Oral daily  heparin   Injectable 7500 Unit(s) SubCutaneous every 8 hours  multivitamin 1 Tablet(s) Oral daily  potassium chloride  20 mEq/100 mL IVPB 20 milliEquivalent(s) IV Intermittent every 2 hours  thiamine 100 milliGRAM(s) Oral daily    MEDICATIONS  (PRN):  ondansetron Injectable 4 milliGRAM(s) IV Push every 6 hours PRN Nausea      Allergies  No Known Allergies    Physical Exam:   Vital Signs Last 24 Hrs  T(C): 36.3 (15 Stephan 2020 07:25), Max: 36.3 (15 Stephan 2020 07:25)  T(F): 97.3 (15 Stephan 2020 07:25), Max: 97.3 (15 Stephan 2020 07:25)  HR: 93 (15 Stephan 2020 07:25) (93 - 104)  BP: 123/77 (15 Stephan 2020 07:25) (117/64 - 140/75)  BP(mean): --  RR: 18 (15 Stephan 2020 07:25) (18 - 18)  SpO2: --      Labs:                         11.9   4.51  )-----------( 222      ( 2020 21:31 )             37.9     06-14    145  |  108  |  14  ----------------------------<  124<H>  3.6   |  25  |  0.8    Ca    8.3<L>      2020 21:31  Phos  2.3     06-14  Mg     1.9     06-14    TPro  5.8<L>  /  Alb  3.6  /  TBili  0.4  /  DBili  <0.2  /  AST  11  /  ALT  15  /  AlkPhos  69  14        Pertinent labs:                      11.9   4.51  )-----------( 222      ( 2020 21:31 )             37.9       06-14    145  |  108  |  14  ----------------------------<  124<H>  3.6   |  25  |  0.8    Ca    8.3<L>      2020 21:31  Phos  2.3     06-14  Mg     1.9     -14    TPro  5.8<L>  /  Alb  3.6  /  TBili  0.4  /  DBili  <0.2  /  AST  11  /  ALT  15  /  AlkPhos  69  14      ASSESSMENT/ PLAN:     60F PMH morbid obesity PSH cesarian section '91, RNYGB '01 at OSH by Dr. David Fried, SBO '07 s/p ex lap, SBR, and primary anastomosis.   -IR consulted for preoperative abdominal muscular botox injection, for eventual hernia repair in about 2 weeks.  -Will plan for today   -NPO      Risks, benefits, and alternatives to treatment discussed. All questions answered with understanding.    Thank you for the courtesy of this consult, please call [phone] with any further questions.

## 2020-06-15 NOTE — PROGRESS NOTE ADULT - SUBJECTIVE AND OBJECTIVE BOX
INTERVENTIONAL RADIOLOGY BRIEF-OPERATIVE NOTE    Procedure: Image guided abdominal wall intramuscular Botox injection    Pre-Op Diagnosis: Large ventral abdominal wall hernia    Post-Op Diagnosis: same    Attending: Natalie  Resident: Riki    Anesthesia (type):  [ ] General Anesthesia  [x] Sedation 75mcg Fentanyl, 3mg Versed  [ ] Spinal Anesthesia  [x] Local/Regional    Contrast: none    Estimated Blood Loss: <5cc    Condition:   [ ] Critical  [ ] Serious  [ ] Fair   [x] Good    Findings/Follow up Plan of Care: US guided placement of 3 needles on each side into the anterior abdominal wall musculature. Position confirmed with cone beam CT. A solution of 300 units Botox was prepared in 150cc saline. 24cc injected into each position.     Specimens Removed: none    Implants: none    Complications: none    Disposition:  d/c back to floor. Scheduled for OR in 2 weeks.       Please call Interventional Radiology [phone] with any questions, concerns, or issues. INTERVENTIONAL RADIOLOGY BRIEF-OPERATIVE NOTE    Procedure: Image guided abdominal wall intramuscular Botox injection    Pre-Op Diagnosis: Large ventral abdominal wall hernia, history of mutliple surgeries    Post-Op Diagnosis: same    Attending: Natalie  Resident: Riki    Anesthesia (type):  [ ] General Anesthesia  [x] Sedation 75mcg Fentanyl, 3mg Versed  [ ] Spinal Anesthesia  [x] Local/Regional    Contrast: none    Estimated Blood Loss: <5cc    Condition:   [ ] Critical  [ ] Serious  [ ] Fair   [x] Good    Findings/Follow up Plan of Care: US guided placement of 3 needles on each side into the anterior abdominal wall musculature. Position confirmed with cone beam CT. A solution of 300 units Botox was prepared in 150cc saline. 24cc injected into each position.     Specimens Removed: none    Implants: none    Complications: none    Disposition:  d/c back to floor. Scheduled for OR in 2 weeks as per surgery.       Please call Interventional Radiology [phone] with any questions, concerns, or issues.

## 2020-06-15 NOTE — PROVIDER CONTACT NOTE (OTHER) - SITUATION
Blue team ext [phone] notified that pt Potassium is 3.6 and pt is receiving maintenance IV potassium and multiple additional bags IV. OK as per MD- no intervention or change.

## 2020-06-16 LAB
ANION GAP SERPL CALC-SCNC: 11 MMOL/L — SIGNIFICANT CHANGE UP (ref 7–14)
BASOPHILS # BLD AUTO: 0.01 K/UL — SIGNIFICANT CHANGE UP (ref 0–0.2)
BASOPHILS NFR BLD AUTO: 0.2 % — SIGNIFICANT CHANGE UP (ref 0–1)
BUN SERPL-MCNC: 8 MG/DL — LOW (ref 10–20)
CALCIUM SERPL-MCNC: 8.7 MG/DL — SIGNIFICANT CHANGE UP (ref 8.5–10.1)
CHLORIDE SERPL-SCNC: 106 MMOL/L — SIGNIFICANT CHANGE UP (ref 98–110)
CO2 SERPL-SCNC: 28 MMOL/L — SIGNIFICANT CHANGE UP (ref 17–32)
CREAT SERPL-MCNC: 0.7 MG/DL — SIGNIFICANT CHANGE UP (ref 0.7–1.5)
EOSINOPHIL # BLD AUTO: 0.06 K/UL — SIGNIFICANT CHANGE UP (ref 0–0.7)
EOSINOPHIL NFR BLD AUTO: 1.2 % — SIGNIFICANT CHANGE UP (ref 0–8)
GLUCOSE SERPL-MCNC: 101 MG/DL — HIGH (ref 70–99)
HCT VFR BLD CALC: 35.3 % — LOW (ref 37–47)
HGB BLD-MCNC: 11 G/DL — LOW (ref 12–16)
IMM GRANULOCYTES NFR BLD AUTO: 0.6 % — HIGH (ref 0.1–0.3)
LYMPHOCYTES # BLD AUTO: 1.12 K/UL — LOW (ref 1.2–3.4)
LYMPHOCYTES # BLD AUTO: 22.7 % — SIGNIFICANT CHANGE UP (ref 20.5–51.1)
MAGNESIUM SERPL-MCNC: 1.8 MG/DL — SIGNIFICANT CHANGE UP (ref 1.8–2.4)
MCHC RBC-ENTMCNC: 29 PG — SIGNIFICANT CHANGE UP (ref 27–31)
MCHC RBC-ENTMCNC: 31.2 G/DL — LOW (ref 32–37)
MCV RBC AUTO: 93.1 FL — SIGNIFICANT CHANGE UP (ref 81–99)
MONOCYTES # BLD AUTO: 0.27 K/UL — SIGNIFICANT CHANGE UP (ref 0.1–0.6)
MONOCYTES NFR BLD AUTO: 5.5 % — SIGNIFICANT CHANGE UP (ref 1.7–9.3)
NEUTROPHILS # BLD AUTO: 3.45 K/UL — SIGNIFICANT CHANGE UP (ref 1.4–6.5)
NEUTROPHILS NFR BLD AUTO: 69.8 % — SIGNIFICANT CHANGE UP (ref 42.2–75.2)
NRBC # BLD: 0 /100 WBCS — SIGNIFICANT CHANGE UP (ref 0–0)
PHOSPHATE SERPL-MCNC: 3.6 MG/DL — SIGNIFICANT CHANGE UP (ref 2.1–4.9)
PLATELET # BLD AUTO: 217 K/UL — SIGNIFICANT CHANGE UP (ref 130–400)
POTASSIUM SERPL-MCNC: 3.8 MMOL/L — SIGNIFICANT CHANGE UP (ref 3.5–5)
POTASSIUM SERPL-SCNC: 3.8 MMOL/L — SIGNIFICANT CHANGE UP (ref 3.5–5)
RBC # BLD: 3.79 M/UL — LOW (ref 4.2–5.4)
RBC # FLD: 14 % — SIGNIFICANT CHANGE UP (ref 11.5–14.5)
SODIUM SERPL-SCNC: 145 MMOL/L — SIGNIFICANT CHANGE UP (ref 135–146)
WBC # BLD: 4.94 K/UL — SIGNIFICANT CHANGE UP (ref 4.8–10.8)
WBC # FLD AUTO: 4.94 K/UL — SIGNIFICANT CHANGE UP (ref 4.8–10.8)

## 2020-06-16 PROCEDURE — 99232 SBSQ HOSP IP/OBS MODERATE 35: CPT

## 2020-06-16 RX ORDER — DEXTROSE MONOHYDRATE, SODIUM CHLORIDE, AND POTASSIUM CHLORIDE 50; .745; 4.5 G/1000ML; G/1000ML; G/1000ML
1000 INJECTION, SOLUTION INTRAVENOUS
Refills: 0 | Status: DISCONTINUED | OUTPATIENT
Start: 2020-06-16 | End: 2020-06-17

## 2020-06-16 RX ADMIN — HEPARIN SODIUM 7500 UNIT(S): 5000 INJECTION INTRAVENOUS; SUBCUTANEOUS at 21:08

## 2020-06-16 RX ADMIN — HEPARIN SODIUM 7500 UNIT(S): 5000 INJECTION INTRAVENOUS; SUBCUTANEOUS at 05:14

## 2020-06-16 RX ADMIN — Medication 100 MILLIGRAM(S): at 11:53

## 2020-06-16 RX ADMIN — Medication 1 TABLET(S): at 11:54

## 2020-06-16 RX ADMIN — Medication 1 MILLIGRAM(S): at 11:53

## 2020-06-16 NOTE — PROGRESS NOTE ADULT - SUBJECTIVE AND OBJECTIVE BOX
Interventional Radiology Follow- Up Note      60y Female s/p abdominal wall intramuscular Botox injection  on 6/15    O/N:     No complaints offered. Pt walking around comfortably. Endorses bowel movement. No bloating.     Vitals: T(F): 97.3 (06-16-20 @ 07:59), Max: 98.2 (06-15-20 @ 23:30)  HR: 77 (06-16-20 @ 07:59) (76 - 83)  BP: 135/82 (06-16-20 @ 07:59) (132/66 - 141/85)  RR: 18 (06-16-20 @ 07:59) (17 - 18)  SpO2: --  Wt(kg): --    LABS:                        12.4   5.07  )-----------( 208      ( 15 Stephan 2020 21:06 )             40.0     06-15    145  |  107  |  11  ----------------------------<  122<H>  4.1   |  24  |  0.7    Ca    8.5      15 Stephan 2020 21:06  Phos  2.5     06-15  Mg     1.8     06-15    TPro  5.8<L>  /  Alb  3.6  /  TBili  0.4  /  DBili  <0.2  /  AST  11  /  ALT  15  /  AlkPhos  69  06-14      A/P:  60F PMH morbid obesity PSH cesarian section '91, RNYGB '01 at OSH by Dr. David Fried, SBO '07 s/p ex lap, SBR, and primary anastomosis.   -s/p abdominal wall intramuscular Botox injection  on 6/15  -No complaints   -Maximal effect in 2 weeks from injection.   -Schedule for surgery with Dr. Johnson     Please call Interventional Radiology [phone] with any questions, concerns, or issues regarding above. Interventional Radiology Follow- Up Note      60y Female s/p abdominal wall intramuscular Botox injection  on 6/15    O/N:     No complaints offered. Pt walking around comfortably. Endorses bowel movement. No bloating.     Vitals: T(F): 97.3 (06-16-20 @ 07:59), Max: 98.2 (06-15-20 @ 23:30)  HR: 77 (06-16-20 @ 07:59) (76 - 83)  BP: 135/82 (06-16-20 @ 07:59) (132/66 - 141/85)  RR: 18 (06-16-20 @ 07:59) (17 - 18)  SpO2: --  Wt(kg): --    LABS:                        12.4   5.07  )-----------( 208      ( 15 Stephan 2020 21:06 )             40.0     06-15    145  |  107  |  11  ----------------------------<  122<H>  4.1   |  24  |  0.7    Ca    8.5      15 Stephan 2020 21:06  Phos  2.5     06-15  Mg     1.8     06-15    TPro  5.8<L>  /  Alb  3.6  /  TBili  0.4  /  DBili  <0.2  /  AST  11  /  ALT  15  /  AlkPhos  69  06-14      A/P:  60F PMH morbid obesity PSH cesarian section '91, RNYGB '01 at OSH by Dr. David Fried, SBO '07 s/p ex lap, SBR, and primary anastomosis.   -s/p abdominal wall intramuscular Botox injection  on 6/15  -No complaints   -Maximal effect in 2 weeks from injection.   -Schedule for surgery with Dr. Johnson  - Keep abdominal binder on for 2 weeks     Please call Interventional Radiology [phone] with any questions, concerns, or issues regarding above.

## 2020-06-16 NOTE — PROGRESS NOTE ADULT - ATTENDING COMMENTS
60 year old lady with PMH morbid obesity PSH cesarian section '91, RNYGB '01 at OSH by Dr. David Fried, SBO '07 s/p ex lap, SBR, and primary anastomosis.  Pt now presents w ~24h n/v and abdominal pain, began suddenly yesterday at 03:00, not associated with exacerbating / relieving fx.   Pt denies CP, SOB, fever, chills, urinary symptoms.  No Flatus or Bowel movement since Thursday   Last Meal on Friday.    had NG Tube placement once with 50 ml drainage.  - removed by pt  had NG Tube placement second time with 250 ml drainage. - removed by pt    pt examined at 7:30   Pt sitting up   comfortable - feels better after 2nd NG tube insertion / removal   Abdomen - distended ( pt states it the normal size), non tender  impression : S/p Open RnY Gastric Bypass with hernia repair and recurrence.   partial sbo - adhesion vs hernia    options discussed with patient and daughter on the phone.   Knows risks of emergency surgery - would like to avoid it.   patient refusing to be able to insert NG tube now.   after talking she states to come back later.     Pt reexamined around 9 and 9:30  Abdomen still soft , no pain.   NG tube placed by me.   400 ml bilious output.     Pt reexamined around 1 pm   Abdomen still soft , no pain.   no flatus or bowel movement  states abdomen is softer and less distended    pt examined 6/14:  Had multiple bowel movement with flatus  ng output still high - less billious  abdomen softer    pt examined 6/15:  Had multiple bowel movement with flatus yesterday   ng output minimal - some coffee ground   abdomen much softer  had IR for botox block to lateral abdominal wall  pt examined 6/16:  Had multiple bowel movement with flatus 2 days ago   had small bowel movement and passing flatus  abdomen much softer - at baseline  had IR for botox block to lateral abdominal wall - no obvious side effects    options discussed with patient.   pt wishes to delay the surgery by few weeks.     weight loss counselling done.         impression  :  S/p Open RnY Gastric Bypass with hernia repair and recurrence.   partial sbo - adhesion vs hernia - resovled  starteing diet  check tolernce  If pain worsens or no improvement - will take her for laparotomy with BHUMI and repair of hernia

## 2020-06-16 NOTE — PROGRESS NOTE ADULT - SUBJECTIVE AND OBJECTIVE BOX
GENERAL SURGERY PROGRESS NOTE     DOLORES GARCIA  60y  Female  Hospital day :3d  POD:  Procedure:   OVERNIGHT EVENTS: Uneventful. Tolerating clears. Ambulating. Voiding. Passing flatus. Denies pain.     T(F): 98.2 (06-15-20 @ 23:30), Max: 98.2 (06-15-20 @ 23:30)  HR: 83 (06-15-20 @ 23:30) (76 - 83)  BP: 141/85 (06-15-20 @ 23:30) (132/66 - 141/85)  ABP: --  ABP(mean): --  RR: 18 (06-15-20 @ 23:30) (17 - 18)  SpO2: --    DIET/FLUIDS: dextrose 5% + sodium chloride 0.45% with potassium chloride 20 mEq/L 1000 milliLiter(s) IV Continuous <Continuous>  folic acid 1 milliGRAM(s) Oral daily  multivitamin 1 Tablet(s) Oral daily  thiamine 100 milliGRAM(s) Oral daily    NG:                                                                                DRAINS:     BM:     EMESIS:     URINE:      GI proph:    AC/ proph: heparin   Injectable 7500 Unit(s) SubCutaneous every 8 hours    ABx:     PHYSICAL EXAM:  GENERAL: NAD, well-appearing  CHEST/LUNG: Clear to auscultation bilaterally  HEART: Regular rate and rhythm  ABDOMEN: Soft, Nontender, Nondistended;   EXTREMITIES:  No clubbing, cyanosis, or edema      LABS  Labs:  CAPILLARY BLOOD GLUCOSE                              12.4   5.07  )-----------( 208      ( 15 Stephan 2020 21:06 )             40.0         06-15    145  |  107  |  11  ----------------------------<  122<H>  4.1   |  24  |  0.7      Calcium, Total Serum: 8.5 mg/dL (06-15-20 @ 21:06)      LFTs:             5.8  | 0.4  | 11       ------------------[69      ( 14 Jun 2020 16:55 )  3.6  | <0.2 | 15          Lipase:x      Amylase:x         Lactate, Blood: 1.1 mmol/L (06-13-20 @ 11:00)      Coags:                    RADIOLOGY & ADDITIONAL TESTS:      A/P

## 2020-06-16 NOTE — PROGRESS NOTE ADULT - ASSESSMENT
60 year old lady with PMH morbid obesity PSH cesarian section '91, RNYGB '01 at OSH by Dr. David Fried, SBO '07 s/p ex lap, SBR, and primary anastomosis.  Pt now presents w ~24h n/v and abdominal pain, began suddenly yesterday at 03:00, not associated with exacerbating / relieving fx.   Pt denies CP, SOB, fever, chills, urinary symptoms.  No Flatus or Bowel movement since Thursday   Last Meal on Friday.  impression  :  S/p Open RnY Gastric Bypass with hernia repair and recurrence    PLAN:   FU nutrition labs  Cardiology: moderate risk  Pulmonology: moderate risk  If pain worsens or no improvement - will take her for laparotomy with BHUMI and repair of hernia

## 2020-06-17 ENCOUNTER — TRANSCRIPTION ENCOUNTER (OUTPATIENT)
Age: 60
End: 2020-06-17

## 2020-06-17 VITALS
SYSTOLIC BLOOD PRESSURE: 141 MMHG | DIASTOLIC BLOOD PRESSURE: 67 MMHG | RESPIRATION RATE: 18 BRPM | TEMPERATURE: 98 F | HEART RATE: 87 BPM

## 2020-06-17 PROBLEM — Z98.84 BARIATRIC SURGERY STATUS: Chronic | Status: ACTIVE | Noted: 2020-06-12

## 2020-06-17 PROCEDURE — 99232 SBSQ HOSP IP/OBS MODERATE 35: CPT

## 2020-06-17 RX ADMIN — HEPARIN SODIUM 7500 UNIT(S): 5000 INJECTION INTRAVENOUS; SUBCUTANEOUS at 13:40

## 2020-06-17 RX ADMIN — Medication 1 MILLIGRAM(S): at 13:40

## 2020-06-17 RX ADMIN — Medication 1 TABLET(S): at 13:40

## 2020-06-17 RX ADMIN — Medication 100 MILLIGRAM(S): at 13:40

## 2020-06-17 RX ADMIN — HEPARIN SODIUM 7500 UNIT(S): 5000 INJECTION INTRAVENOUS; SUBCUTANEOUS at 05:01

## 2020-06-17 NOTE — PROGRESS NOTE ADULT - ASSESSMENT
60y Female PSH of C section, obesity s/p RNYGB 2001 c/b SBO s/p ex lap SBR and primary anastamosis. Presenting with SBO with multiple ventral hernias.     Plan:   - D/c today with plan for surgery in two weeks for ventral hernia repair   - CLD with ensure  - cardiology and pulm cleared

## 2020-06-17 NOTE — CHART NOTE - NSCHARTNOTEFT_GEN_A_CORE
Upon Nutritional Assessment by the Registered Dietitian your patient was determined to meet criteria / has evidence of the following diagnosis/diagnoses:          [ ]  Mild Protein Calorie Malnutrition        [ ]  Moderate Protein Calorie Malnutrition        [ ] Severe Protein Calorie Malnutrition        [ ] Unspecified Protein Calorie Malnutrition        [ ] Underweight / BMI <19        [x] Morbid Obesity / BMI > 40    Morbid Obesity Indicator:  BMI 50.5 [63"/285#]    Findings as based on:  •  Comprehensive nutrition assessment and consultation  •  Calorie counts (nutrient intake analysis)  •  Food acceptance and intake status from observations by staff  •  Follow up  •  Patient education  •  Intervention secondary to interdisciplinary rounds  •   concerns      Treatment:    The following diet has been recommended:      PROVIDER Section:     By signing this assessment you are acknowledging and agree with the diagnosis/diagnoses assigned by the Registered Dietitian    Comments:

## 2020-06-17 NOTE — DIETITIAN INITIAL EVALUATION ADULT. - OTHER INFO
Nutrition Hx PTA: Pt with excellent appetite/po intake, typically consumes 3 meals w/ snacks daily and denies use of oral nutrition supplements. Pt with no cultural/Congregational restrictions and has NKFA. Pt follows a regular diet at home.    Pt p/w SBO with multiple ventral hernias s/p abdominal wall intramuscular Botox injection on 6/15.  Plan to d/c today with plan for surgery in two weeks for ventral hernia repair.

## 2020-06-17 NOTE — DIETITIAN INITIAL EVALUATION ADULT. - ADD RECOMMEND
1. Monitor Sx's POC and advance diet as per their recs when medically feasible; GOAL diet is GI Soft, 2. Can D/C Ensure Clear TID, instead order Ensure Enlive BID for more adequate nutrition

## 2020-06-17 NOTE — CHART NOTE - NSCHARTNOTEFT_GEN_A_CORE
pt without complaints, tolerated fulls liquid , voided and ambulated. pain resolved. + flatus, + Bm and vital signs stable and afebrile. As per Dr Lema pt can be discharged home today . pt advised to follow up with Dr Lema in 2 weeks and with Dr Msea @ [facility] ( call office for appointment) and continue full liquid diet and advance as tolerated.

## 2020-06-17 NOTE — DISCHARGE NOTE PROVIDER - HOSPITAL COURSE
60F PMH morbid obesity PSH cesarian section '91, RNYGB '01 at OSH by Dr. David Fried, SBO '07 s/p ex lap, SBR, and primary anastomosis. Pt now presents w ~24h n/v and abdominal pain, began suddenly yesterday at 03:00, not associated with exacerbating / relieving fx. Similar to pain during prior SBO. Pt denies CP, SOB, fever, chills, urinary symptoms. CT significant for complex small bowel obstruction involving at least 4 separate ventral hernias with transition to collapsed bowel upon exiting the fourth ventral hernia site within the right lateral abdominal wall with obstructed small bowel dilated to 6.4 cm with fecalization just proximal to the final site.     S/p successful NGT placement in ED, bilious drainage, pt thrashed and gagged even after placement completed. Pulled it out immediately. Only time for 50 cc drainage. Placed a second time, patient removed. NGT replaced on 6/14 successfully. Patient was admitted to the surgical service for nonoperative management of SBO. While admitted patient was seen by IR who performed botox injection into the abdominal wall in preparation for ventral hernia repair electively in the following weeks. Patient's diet was advanced as tolerated to clears. She is tolerating diet, passing flatus, and having bowel movements. She is stable and ready for discharge with follow-up in Dr. Lema's office for planning for elective repair of her ventral hernias.

## 2020-06-17 NOTE — DISCHARGE NOTE PROVIDER - NSDCFUADDINST_GEN_ALL_CORE_FT
follow up with Dr Lema in 2 weeks call office for appointment  follow up with Dr Mesa @ [facility] call office for appointment.  no strenuous activity    if experience fever, shortness of breath, chest pain, vomiting , dizziness, worsening abdominal pain call MD or return to ED

## 2020-06-17 NOTE — PROGRESS NOTE ADULT - ATTENDING COMMENTS
60 year old lady with PMH morbid obesity PSH cesarian section '91, RNYGB '01 at OSH by Dr. David Fried, SBO '07 s/p ex lap, SBR, and primary anastomosis.  Pt now presents w ~24h n/v and abdominal pain, began suddenly yesterday at 03:00, not associated with exacerbating / relieving fx.   Pt denies CP, SOB, fever, chills, urinary symptoms.  No Flatus or Bowel movement since Thursday   Last Meal on Friday.    had NG Tube placement once with 50 ml drainage.  - removed by pt  had NG Tube placement second time with 250 ml drainage. - removed by pt    pt examined at 7:30   Pt sitting up   comfortable - feels better after 2nd NG tube insertion / removal   Abdomen - distended ( pt states it the normal size), non tender  impression : S/p Open RnY Gastric Bypass with hernia repair and recurrence.   partial sbo - adhesion vs hernia    options discussed with patient and daughter on the phone.   Knows risks of emergency surgery - would like to avoid it.   patient refusing to be able to insert NG tube now.   after talking she states to come back later.     Pt reexamined around 9 and 9:30  Abdomen still soft , no pain.   NG tube placed by me.   400 ml bilious output.     Pt reexamined around 1 pm   Abdomen still soft , no pain.   no flatus or bowel movement  states abdomen is softer and less distended    pt examined 6/14:  Had multiple bowel movement with flatus  ng output still high - less billious  abdomen softer    pt examined 6/15:  Had multiple bowel movement with flatus yesterday   ng output minimal - some coffee ground   abdomen much softer  had IR for botox block to lateral abdominal wall  pt examined 6/16:  Had multiple bowel movement with flatus 2 days ago   had small bowel movement and passing flatus  abdomen much softer - at baseline  had IR for botox block to lateral abdominal wall - no obvious side effects  pt examined 6/17:  tolerating clear liquid diet  Had multiple bowel movement with flatus days ago   had small bowel movement and passing flatus  abdomen much softer - at baseline  had IR for botox block to lateral abdominal wall - no obvious side effects    options discussed with patient.   pt wishes to delay the surgery by few weeks.   pt also wishes revisional surgery   Pt discussed at bariatric conference - will consult Dr. Chavarria about revisional surgery     weight loss counselling done.         impression  :  S/p Open RnY Gastric Bypass with hernia repair and recurrence.   partial sbo - adhesion vs hernia - resovled  start full liquid diet  check tolernce  If pain worsens or no improvement - will take her for laparotomy with BHUMI and repair of hernia

## 2020-06-17 NOTE — PROGRESS NOTE ADULT - SUBJECTIVE AND OBJECTIVE BOX
GENERAL SURGERY PROGRESS NOTE     DOLORES GARCIA  60y  Female  Hospital day: 5    OVERNIGHT EVENTS: No acute events overnight, passing gas and having bowel movements, tolerating CLD with ensure     T(F): 97.6 (06-17-20 @ 07:25), Max: 98 (06-16-20 @ 16:01)  HR: 73 (06-17-20 @ 07:25) (73 - 92)  BP: 126/58 (06-17-20 @ 07:25) (126/58 - 168/88)  RR: 18 (06-17-20 @ 07:25) (18 - 18)    DIET/FLUIDS: dextrose 5% + sodium chloride 0.45% with potassium chloride 20 mEq/L 1000 milliLiter(s) IV Continuous <Continuous>  folic acid 1 milliGRAM(s) Oral daily  multivitamin 1 Tablet(s) Oral daily  thiamine 100 milliGRAM(s) Oral daily    AC/ proph: heparin   Injectable 7500 Unit(s) SubCutaneous every 8 hours    PHYSICAL EXAM:  GENERAL: NAD, well-appearing  CHEST/LUNG: Clear to auscultation bilaterally  HEART: Regular rate and rhythm  ABDOMEN: Soft, Nontender, Nondistended;   EXTREMITIES:  No clubbing, cyanosis, or edema    LABS               11.0   4.94  )-----------( 217      ( 16 Jun 2020 21:37 )             35.3       Auto Immature Granulocyte %: 0.6 % (06-16-20 @ 21:37)  Auto Neutrophil %: 69.8 % (06-16-20 @ 21:37)    06-16    145  |  106  |  8<L>  ----------------------------<  101<H>  3.8   |  28  |  0.7      Calcium, Total Serum: 8.7 mg/dL (06-16-20 @ 21:37)      RADIOLOGY & ADDITIONAL TESTS:  *No new imaging

## 2020-06-17 NOTE — DISCHARGE NOTE NURSING/CASE MANAGEMENT/SOCIAL WORK - PATIENT PORTAL LINK FT
You can access the FollowMyHealth Patient Portal offered by [facility] by registering at the following website: [email or web address]. By joining [email or web address]’s FollowMyHealth portal, you will also be able to view your health information using other applications (apps) compatible with our system.

## 2020-06-17 NOTE — DIETITIAN INITIAL EVALUATION ADULT. - FEEDING SKILL
independent/Pt with good appetite, tolerating CLD, eagerly awaiting diet advancement. At time of RD visit, pt was on CLD, diet recently advanced to FLD a few minutes ago. Pt is drinking Ensure Clear w/o any issues, likes it.

## 2020-06-17 NOTE — DISCHARGE NOTE PROVIDER - NSDCFUSCHEDAPPT_GEN_ALL_CORE_FT
DOLORES GARCIA ; 08/03/2020 ; [facility] [address] DOLORES GARCIA ; 06/26/2020 ; [facility] [address]  DOLORES GARCIA ; 08/03/2020 ; [facility] [address]

## 2020-06-17 NOTE — DISCHARGE NOTE PROVIDER - CARE PROVIDER_API CALL
Rose Lema  SURGERY  [address] [place] [address]  Phone: [phone]  Fax: [phone]  Follow Up Time:

## 2020-06-17 NOTE — DIETITIAN INITIAL EVALUATION ADULT. - ENERGY NEEDS
Calories: 2992-5183 kcal/day (25-30 kcal/kg IBW)--morbid obesity considered  Protein: 52-62 gm/day (1-1.2 gm/kg IBW)--same as above  Fluid: 1 ml/kcal

## 2020-06-19 LAB — VIT B1 SERPL-MCNC: 165.1 NMOL/L — SIGNIFICANT CHANGE UP (ref 66.5–200)

## 2020-06-21 DIAGNOSIS — Z98.84 BARIATRIC SURGERY STATUS: ICD-10-CM

## 2020-06-21 DIAGNOSIS — G47.33 OBSTRUCTIVE SLEEP APNEA (ADULT) (PEDIATRIC): ICD-10-CM

## 2020-06-21 DIAGNOSIS — K43.6 OTHER AND UNSPECIFIED VENTRAL HERNIA WITH OBSTRUCTION, WITHOUT GANGRENE: ICD-10-CM

## 2020-06-21 DIAGNOSIS — I50.32 CHRONIC DIASTOLIC (CONGESTIVE) HEART FAILURE: ICD-10-CM

## 2020-06-21 DIAGNOSIS — E66.01 MORBID (SEVERE) OBESITY DUE TO EXCESS CALORIES: ICD-10-CM

## 2020-06-21 DIAGNOSIS — Z99.89 DEPENDENCE ON OTHER ENABLING MACHINES AND DEVICES: ICD-10-CM

## 2020-06-21 DIAGNOSIS — Z96.653 PRESENCE OF ARTIFICIAL KNEE JOINT, BILATERAL: ICD-10-CM

## 2020-06-21 LAB — VIT B1 SERPL-MCNC: 162.2 NMOL/L — SIGNIFICANT CHANGE UP (ref 66.5–200)

## 2020-07-14 ENCOUNTER — TRANSCRIPTION ENCOUNTER (OUTPATIENT)
Age: 60
End: 2020-07-14

## 2020-07-14 ENCOUNTER — APPOINTMENT (OUTPATIENT)
Dept: SURGERY | Facility: CLINIC | Age: 60
End: 2020-07-14
Payer: COMMERCIAL

## 2020-07-14 PROCEDURE — 99204 OFFICE O/P NEW MOD 45 MIN: CPT | Mod: 95

## 2020-07-14 NOTE — HISTORY OF PRESENT ILLNESS
[de-identified] : 60 yr old female  had gastric bypass in 2001  with Dr Harper with minimal weight loss.  @006  she had a SBO  operated by  Dr Patel.\par Recently she was diagnosed   withSBo in [facility]  and treated conservatively but recommended  consultation for Abdominal wall hernia. She accordingly needs a abdominal wall reconstruction and has received botox injection  in preparation for  surgery. She currently weight about 280lbs  and 5,3 ft.\par she denies any symptoms at the moment

## 2020-07-14 NOTE — PLAN
[FreeTextEntry1] : recommend upper GI series and Endoscopy if she wishes to consider surgical weight loss,\par  discussed the associated risks and the relative urgent need to have the ventral hernia repaired.\par

## 2020-07-16 ENCOUNTER — APPOINTMENT (OUTPATIENT)
Dept: SURGERY | Facility: CLINIC | Age: 60
End: 2020-07-16
Payer: COMMERCIAL

## 2020-07-16 ENCOUNTER — APPOINTMENT (OUTPATIENT)
Dept: SURGERY | Facility: CLINIC | Age: 60
End: 2020-07-16

## 2020-07-16 VITALS
BODY MASS INDEX: 49.43 KG/M2 | SYSTOLIC BLOOD PRESSURE: 120 MMHG | DIASTOLIC BLOOD PRESSURE: 80 MMHG | WEIGHT: 279 LBS | TEMPERATURE: 97.5 F | HEIGHT: 63 IN | HEART RATE: 85 BPM

## 2020-07-16 PROCEDURE — 99214 OFFICE O/P EST MOD 30 MIN: CPT

## 2020-07-16 NOTE — PHYSICAL EXAM
[Respiratory Effort] : normal respiratory effort [JVD] : no jugular venous distention  [Purpura] : no purpura  [Alert] : alert [de-identified] : Normal [Calm] : calm [de-identified] : Normal [de-identified] : Normal\par Midline incision\par With reducible hernia

## 2020-07-16 NOTE — HISTORY OF PRESENT ILLNESS
[de-identified] : 60 yr old female had gastric bypass in 2001 with Dr Harper with minimal weight loss. 2006 she had a SBO operated by Dr Patel.\par Recently she was diagnosed with SBo in [facility] and treated conservatively but recommended consultation for Abdominal wall hernia. \par She accordingly needs a abdominal wall reconstruction and has received botox injection in preparation for surgery. \par She currently weight about 280lbs and 5,3 ft.\par Because of increased BMI she was refered to see Dr. Preciado for Revisional Bariatric surgery. \par She has seen him and requested a EGD and UGI. \par She wishes to have it done as well. \par

## 2020-07-16 NOTE — ASSESSMENT
[FreeTextEntry1] : 60 yr old female had gastric bypass in 2001 with Dr Harper with minimal weight loss. 2006 she had a SBO operated by Dr Patel.\par Recently she was diagnosed with SBo in [facility] and treated conservatively but recommended consultation for Abdominal wall hernia. \par She accordingly needs a abdominal wall reconstruction and has received botox injection in preparation for surgery. \par She currently weight about 280lbs and 5,3 ft.\par Because of increased BMI she was refered to see Dr. Preciado for Revision Bariatric surgery. \par She has seen him and requested a EGD and UGI. \par She wishes to have it done as well. \par  \par \par The various options were explained to the patient. The Risk , benefit and alternatives were discussed. We discussed recovery and possible complications.\par We explained in great detail the pathophysiology of the disease process. We discussed the workup for diagnosis and management. The Post operative care was explained to the patient. She was counselled on diet , exercise and wound care. The Procedure was explained to the patient. The Risk , benefit and alternatives were discussed. We discussed recovery and possible complications. We discussed recurrence rate and complications including chronic abdominal pain. We also discussed hernia, surgery and  pain in relation to her  Job.\par We discussed the intricacies of mesh insertion for hernia.  We discussed between kind of mesh synthetic vs biological, absorbability vs non absorbable meshes.We discussed about the position of mesh. We discussed about the fixation of the mesh. We discussed the complication of the mesh including erosions, infections, adhesions, recurrence, breaking , movement and chronic pain.\par Counselling:\par The issue of increased BMI and weight was explained to the patient. We discussed how reducing weight before surgery can improve the outcomes of surgery. The surgery is more precise, less blood loss, complication and duration. The recovery is also easier, with reduced wound complications including infection. \par She was counselled on diet and exercise. \par We discussed the pathology and surgery with her.\par \par She will continue to follow with Dr. Preciado in preparation for revisional bariatric surgery. \par \par We discussed the importance of close follow up. \par We informed that she needs to follow up as needed. \par We also informed that she can call us if anything changes or has any questions.\par

## 2020-08-03 ENCOUNTER — APPOINTMENT (OUTPATIENT)
Dept: OBGYN | Facility: CLINIC | Age: 60
End: 2020-08-03

## 2020-08-07 ENCOUNTER — APPOINTMENT (OUTPATIENT)
Dept: SURGERY | Facility: CLINIC | Age: 60
End: 2020-08-07
Payer: COMMERCIAL

## 2020-08-07 VITALS
TEMPERATURE: 97.8 F | SYSTOLIC BLOOD PRESSURE: 126 MMHG | HEIGHT: 62.5 IN | DIASTOLIC BLOOD PRESSURE: 88 MMHG | BODY MASS INDEX: 49.88 KG/M2 | WEIGHT: 278 LBS

## 2020-08-07 DIAGNOSIS — Z87.19 PERSONAL HISTORY OF OTHER DISEASES OF THE DIGESTIVE SYSTEM: ICD-10-CM

## 2020-08-07 DIAGNOSIS — Z80.1 FAMILY HISTORY OF MALIGNANT NEOPLASM OF TRACHEA, BRONCHUS AND LUNG: ICD-10-CM

## 2020-08-07 PROCEDURE — 99244 OFF/OP CNSLTJ NEW/EST MOD 40: CPT

## 2020-08-07 RX ORDER — GLUCOSAMINE/MSM/CHONDROIT SULF 500-166.6
500 TABLET ORAL DAILY
Refills: 0 | Status: ACTIVE | COMMUNITY

## 2020-08-10 NOTE — REASON FOR VISIT
[Obesity] : obesity [Initial Consultation] : an initial consultation for [FreeTextEntry2] : Patient presents for medical weight loss consultation.

## 2020-08-10 NOTE — PHYSICAL EXAM
[Normal] : grossly intact [de-identified] : Mallampati IV [de-identified] : Soft. Midline incision with reducible hernia

## 2020-08-10 NOTE — HISTORY OF PRESENT ILLNESS
[FreeTextEntry1] : 60 year old female with a BMI of 50 presents today for medical weight loss consultation. She had RYGB in 2001 and reports very poor weight loss. She consulted Dr. Walker who wants her to lose weight for consideration of revisional surgery. She also needs to have abdominal wall reconstruction which is another motivating factor to lose weight. patient states that she has tried multiple weight loss modalities in the past such as portion control, weight watchers and Corrina Gabe without any long term success. She is not exercising and was encouraged to take daily walks starting out with 5-10 minutes and increasing as tolerated to a goal of 30 minutes.  Discussed the importance of regular scheduled exercise to achieve optimal weight loss. \par \par \par

## 2020-08-12 ENCOUNTER — LABORATORY RESULT (OUTPATIENT)
Age: 60
End: 2020-08-12

## 2020-08-13 ENCOUNTER — APPOINTMENT (OUTPATIENT)
Dept: SURGERY | Facility: CLINIC | Age: 60
End: 2020-08-13
Payer: COMMERCIAL

## 2020-08-13 VITALS — WEIGHT: 278 LBS | BODY MASS INDEX: 49.88 KG/M2 | HEIGHT: 62.5 IN

## 2020-08-13 PROCEDURE — ZZZZZ: CPT

## 2020-08-21 ENCOUNTER — APPOINTMENT (OUTPATIENT)
Dept: SURGERY | Facility: CLINIC | Age: 60
End: 2020-08-21
Payer: COMMERCIAL

## 2020-08-21 VITALS
HEIGHT: 62.5 IN | TEMPERATURE: 97.3 F | HEART RATE: 93 BPM | BODY MASS INDEX: 50.06 KG/M2 | DIASTOLIC BLOOD PRESSURE: 85 MMHG | WEIGHT: 279 LBS | SYSTOLIC BLOOD PRESSURE: 132 MMHG

## 2020-08-21 PROCEDURE — 99213 OFFICE O/P EST LOW 20 MIN: CPT

## 2020-08-25 LAB
25(OH)D3 SERPL-MCNC: 22 NG/ML
ALBUMIN SERPL ELPH-MCNC: 4.2 G/DL
ALP BLD-CCNC: 70 U/L
ALT SERPL-CCNC: 19 U/L
ANION GAP SERPL CALC-SCNC: 13 MMOL/L
AST SERPL-CCNC: 14 U/L
BASOPHILS # BLD AUTO: 0.01 K/UL
BASOPHILS NFR BLD AUTO: 0.2 %
BILIRUB SERPL-MCNC: 0.4 MG/DL
BUN SERPL-MCNC: 18 MG/DL
CALCIUM SERPL-MCNC: 9.3 MG/DL
CHLORIDE SERPL-SCNC: 106 MMOL/L
CHOLEST SERPL-MCNC: 150 MG/DL
CHOLEST/HDLC SERPL: 2.8 RATIO
CO2 SERPL-SCNC: 24 MMOL/L
CREAT SERPL-MCNC: 0.8 MG/DL
EOSINOPHIL # BLD AUTO: 0.04 K/UL
EOSINOPHIL NFR BLD AUTO: 0.7 %
FERRITIN SERPL-MCNC: 21 NG/ML
FOLATE RBC-MCNC: 2044 NG/ML
GLUCOSE SERPL-MCNC: 126 MG/DL
HCT VFR BLD CALC: 39 %
HCT VFR BLD CALC: 40.8 %
HDLC SERPL-MCNC: 54 MG/DL
HGB BLD-MCNC: 11.7 G/DL
IMM GRANULOCYTES NFR BLD AUTO: 0.5 %
IRON SATN MFR SERPL: 14 %
IRON SERPL-MCNC: 66 UG/DL
LDLC SERPL CALC-MCNC: 81 MG/DL
LYMPHOCYTES # BLD AUTO: 1.29 K/UL
LYMPHOCYTES NFR BLD AUTO: 22 %
MAN DIFF?: NORMAL
MCHC RBC-ENTMCNC: 28.3 PG
MCHC RBC-ENTMCNC: 30 G/DL
MCV RBC AUTO: 94.2 FL
MONOCYTES # BLD AUTO: 0.34 K/UL
MONOCYTES NFR BLD AUTO: 5.8 %
NEUTROPHILS # BLD AUTO: 4.15 K/UL
NEUTROPHILS NFR BLD AUTO: 70.8 %
PLATELET # BLD AUTO: 211 K/UL
POTASSIUM SERPL-SCNC: 4.5 MMOL/L
PROT SERPL-MCNC: 6.8 G/DL
RBC # BLD: 4.14 M/UL
RBC # FLD: 14.3 %
SODIUM SERPL-SCNC: 143 MMOL/L
TIBC SERPL-MCNC: 462 UG/DL
TRIGL SERPL-MCNC: 82 MG/DL
UIBC SERPL-MCNC: 396 UG/DL
VIT B1 SERPL-MCNC: 149.6 NMOL/L
VIT B12 SERPL-MCNC: 346 PG/ML
WBC # FLD AUTO: 5.86 K/UL

## 2020-08-25 NOTE — HISTORY OF PRESENT ILLNESS
[FreeTextEntry1] : Patient has had poor weight after RYGB and would like to lose weight to have ventral hernia repair. She states that she has not followed the dietary guidelines recommended to her and she just had the time to order the vitamins and minerals that was recommended for her at her last visit.

## 2020-08-25 NOTE — ASSESSMENT
[FreeTextEntry1] : DOLORES GARCIA is a 60 year female seen today for medical weight loss follow up visit. Patient states that she has not mentally prepared herself to lose weight as she has too many things such as scheduling tests, planning procedures and working. I made patient aware that she has to make time for herself and commit to the process if she wants to lose weight and have her surgery. \par Breakfast -  eggs Frittata with vegetables and cheese. Lunch - tuna fish with grated cheese on a bed of lettuce Dinner - steak, hamburgers, chicken with vegetables and either potatoes or rice, General 's chicken and pizza. \par Snacks - cookies, potato chips and ice cream. Patient states that these are the snacks that her  prefers but she eats them when she is alone as he works the night shift. I discussed healthy snack ideas with patient. \par Fluid intake is usually water and diet sodas.\par She walks 3 days/week for 45 minutes.\par \par Plan: Replace 1 meal day with protein shake and follow My Plate model. Add vitamins as recommended. RTO in 2 weeks. Call with concerns.

## 2020-08-25 NOTE — REASON FOR VISIT
[Follow-Up Visit] : a follow-up visit for [Obesity] : obesity [FreeTextEntry2] : Patient presents for medical weight loss visit

## 2020-08-28 ENCOUNTER — APPOINTMENT (OUTPATIENT)
Dept: SURGERY | Facility: CLINIC | Age: 60
End: 2020-08-28

## 2020-09-05 ENCOUNTER — OUTPATIENT (OUTPATIENT)
Dept: OUTPATIENT SERVICES | Facility: HOSPITAL | Age: 60
LOS: 1 days | Discharge: HOME | End: 2020-09-05

## 2020-09-05 DIAGNOSIS — Z90.49 ACQUIRED ABSENCE OF OTHER SPECIFIED PARTS OF DIGESTIVE TRACT: Chronic | ICD-10-CM

## 2020-09-05 DIAGNOSIS — Z98.84 BARIATRIC SURGERY STATUS: Chronic | ICD-10-CM

## 2020-09-05 DIAGNOSIS — Z98.891 HISTORY OF UTERINE SCAR FROM PREVIOUS SURGERY: Chronic | ICD-10-CM

## 2020-09-05 DIAGNOSIS — Z11.59 ENCOUNTER FOR SCREENING FOR OTHER VIRAL DISEASES: ICD-10-CM

## 2020-09-08 ENCOUNTER — OUTPATIENT (OUTPATIENT)
Dept: OUTPATIENT SERVICES | Facility: HOSPITAL | Age: 60
LOS: 1 days | Discharge: HOME | End: 2020-09-08

## 2020-09-08 ENCOUNTER — TRANSCRIPTION ENCOUNTER (OUTPATIENT)
Age: 60
End: 2020-09-08

## 2020-09-08 VITALS
OXYGEN SATURATION: 99 % | TEMPERATURE: 98 F | SYSTOLIC BLOOD PRESSURE: 132 MMHG | HEART RATE: 81 BPM | RESPIRATION RATE: 17 BRPM | DIASTOLIC BLOOD PRESSURE: 65 MMHG

## 2020-09-08 VITALS
HEIGHT: 63 IN | SYSTOLIC BLOOD PRESSURE: 167 MMHG | DIASTOLIC BLOOD PRESSURE: 89 MMHG | HEART RATE: 74 BPM | TEMPERATURE: 98 F | WEIGHT: 279.99 LBS | RESPIRATION RATE: 18 BRPM

## 2020-09-08 DIAGNOSIS — Z90.49 ACQUIRED ABSENCE OF OTHER SPECIFIED PARTS OF DIGESTIVE TRACT: Chronic | ICD-10-CM

## 2020-09-08 DIAGNOSIS — Z96.653 PRESENCE OF ARTIFICIAL KNEE JOINT, BILATERAL: Chronic | ICD-10-CM

## 2020-09-08 DIAGNOSIS — Z98.84 BARIATRIC SURGERY STATUS: Chronic | ICD-10-CM

## 2020-09-08 DIAGNOSIS — Z98.891 HISTORY OF UTERINE SCAR FROM PREVIOUS SURGERY: Chronic | ICD-10-CM

## 2020-09-08 NOTE — H&P PST ADULT - NSICDXPASTSURGICALHX_GEN_ALL_CORE_FT
PAST SURGICAL HISTORY:  History of total bilateral knee replacement (TKR)     S/P      S/P gastric bypass     S/P small bowel resection

## 2020-09-08 NOTE — ASU PATIENT PROFILE, ADULT - PSH
History of total bilateral knee replacement (TKR)    S/P     S/P gastric bypass    S/P small bowel resection

## 2020-09-08 NOTE — ASU PREOP CHECKLIST - TEMPERATURE IN CELSIUS (DEGREES C)
Problem: DISCHARGE PLANNING  Goal: Discharge to home or other facility with appropriate resources  INTERVENTIONS:  - Identify barriers to discharge w/patient and caregiver  - Deny SI, depression and anxiety  Symptoms will resolve or diminish upon discharge  - Comply with meds, attend 75% of group therapy and identify positive coping skills  - Arrange for needed discharge resources and transportation as appropriate  - Identify discharge learning needs (meds, outpatient mental health services)  - Arrange for interpretive services to assist at discharge as needed  - Refer to Case Management Department for coordinating discharge planning if the patient needs post-hospital services based on physician/advanced practitioner order or complex needs related to functional status, cognitive ability, or social support system   Outcome: Progressing  Worker and pt discussed discharge planning and outpatient options  Pt reports he is willing to try PHP again and work the program differently  Pt reports he wants to try to reintegrate himself back into the community while in [facility] rather than waiting until he completes PHP  Worker educated pt on [employer] and psych rehab  Pt in agreement with all of the above  Pt signed ECHO for [facility] CHEMICAL DEPENDENCY [facility], [facility] Psych rehab and [facility]  36.7

## 2020-09-09 ENCOUNTER — LABORATORY RESULT (OUTPATIENT)
Age: 60
End: 2020-09-09

## 2020-09-09 PROBLEM — G47.33 OBSTRUCTIVE SLEEP APNEA (ADULT) (PEDIATRIC): Chronic | Status: ACTIVE | Noted: 2020-09-08

## 2020-09-10 ENCOUNTER — APPOINTMENT (OUTPATIENT)
Dept: OBGYN | Facility: CLINIC | Age: 60
End: 2020-09-10
Payer: COMMERCIAL

## 2020-09-10 VITALS — TEMPERATURE: 97.8 F | HEIGHT: 63 IN | WEIGHT: 280 LBS | BODY MASS INDEX: 49.61 KG/M2

## 2020-09-10 DIAGNOSIS — G47.33 OBSTRUCTIVE SLEEP APNEA (ADULT) (PEDIATRIC): ICD-10-CM

## 2020-09-10 DIAGNOSIS — K31.89 OTHER DISEASES OF STOMACH AND DUODENUM: ICD-10-CM

## 2020-09-10 DIAGNOSIS — Z96.653 PRESENCE OF ARTIFICIAL KNEE JOINT, BILATERAL: ICD-10-CM

## 2020-09-10 DIAGNOSIS — Z98.84 BARIATRIC SURGERY STATUS: ICD-10-CM

## 2020-09-10 DIAGNOSIS — K43.6 OTHER AND UNSPECIFIED VENTRAL HERNIA WITH OBSTRUCTION, W/OUT GANGRENE: ICD-10-CM

## 2020-09-10 PROCEDURE — 99396 PREV VISIT EST AGE 40-64: CPT

## 2020-09-11 ENCOUNTER — APPOINTMENT (OUTPATIENT)
Dept: SURGERY | Facility: CLINIC | Age: 60
End: 2020-09-11
Payer: COMMERCIAL

## 2020-09-11 VITALS
WEIGHT: 280.6 LBS | TEMPERATURE: 96.5 F | DIASTOLIC BLOOD PRESSURE: 98 MMHG | BODY MASS INDEX: 49.72 KG/M2 | SYSTOLIC BLOOD PRESSURE: 158 MMHG | HEIGHT: 63 IN

## 2020-09-11 DIAGNOSIS — K25.3 ACUTE GASTRIC ULCER W/OUT HEMORRHAGE OR PERFORATION: ICD-10-CM

## 2020-09-11 DIAGNOSIS — K92.89 OTHER SPECIFIED DISEASES OF THE DIGESTIVE SYSTEM: ICD-10-CM

## 2020-09-11 PROCEDURE — ZZZZZ: CPT

## 2020-09-17 ENCOUNTER — APPOINTMENT (OUTPATIENT)
Dept: OBGYN | Facility: CLINIC | Age: 60
End: 2020-09-17
Payer: COMMERCIAL

## 2020-09-17 PROCEDURE — 76830 TRANSVAGINAL US NON-OB: CPT

## 2020-10-02 ENCOUNTER — APPOINTMENT (OUTPATIENT)
Dept: SURGERY | Facility: CLINIC | Age: 60
End: 2020-10-02

## 2020-11-06 ENCOUNTER — OUTPATIENT (OUTPATIENT)
Dept: OUTPATIENT SERVICES | Facility: HOSPITAL | Age: 60
LOS: 1 days | Discharge: HOME | End: 2020-11-06

## 2020-11-06 DIAGNOSIS — Z96.653 PRESENCE OF ARTIFICIAL KNEE JOINT, BILATERAL: Chronic | ICD-10-CM

## 2020-11-06 DIAGNOSIS — Z90.49 ACQUIRED ABSENCE OF OTHER SPECIFIED PARTS OF DIGESTIVE TRACT: Chronic | ICD-10-CM

## 2020-11-06 DIAGNOSIS — Z98.891 HISTORY OF UTERINE SCAR FROM PREVIOUS SURGERY: Chronic | ICD-10-CM

## 2020-11-06 DIAGNOSIS — Z98.84 BARIATRIC SURGERY STATUS: Chronic | ICD-10-CM

## 2020-11-06 DIAGNOSIS — Z11.59 ENCOUNTER FOR SCREENING FOR OTHER VIRAL DISEASES: ICD-10-CM

## 2020-11-10 ENCOUNTER — TRANSCRIPTION ENCOUNTER (OUTPATIENT)
Age: 60
End: 2020-11-10

## 2020-11-10 ENCOUNTER — OUTPATIENT (OUTPATIENT)
Dept: OUTPATIENT SERVICES | Facility: HOSPITAL | Age: 60
LOS: 1 days | Discharge: HOME | End: 2020-11-10

## 2020-11-10 VITALS
OXYGEN SATURATION: 99 % | HEART RATE: 97 BPM | RESPIRATION RATE: 19 BRPM | SYSTOLIC BLOOD PRESSURE: 169 MMHG | DIASTOLIC BLOOD PRESSURE: 81 MMHG

## 2020-11-10 VITALS
HEIGHT: 63 IN | TEMPERATURE: 98 F | DIASTOLIC BLOOD PRESSURE: 88 MMHG | SYSTOLIC BLOOD PRESSURE: 173 MMHG | RESPIRATION RATE: 18 BRPM | WEIGHT: 279.99 LBS | HEART RATE: 92 BPM

## 2020-11-10 DIAGNOSIS — Z98.891 HISTORY OF UTERINE SCAR FROM PREVIOUS SURGERY: Chronic | ICD-10-CM

## 2020-11-10 DIAGNOSIS — Z98.890 OTHER SPECIFIED POSTPROCEDURAL STATES: Chronic | ICD-10-CM

## 2020-11-10 DIAGNOSIS — Z96.653 PRESENCE OF ARTIFICIAL KNEE JOINT, BILATERAL: Chronic | ICD-10-CM

## 2020-11-10 DIAGNOSIS — Z90.49 ACQUIRED ABSENCE OF OTHER SPECIFIED PARTS OF DIGESTIVE TRACT: Chronic | ICD-10-CM

## 2020-11-10 DIAGNOSIS — Z98.84 BARIATRIC SURGERY STATUS: Chronic | ICD-10-CM

## 2020-11-10 RX ORDER — OMEPRAZOLE 10 MG/1
0 CAPSULE, DELAYED RELEASE ORAL
Qty: 0 | Refills: 0 | DISCHARGE

## 2020-11-10 RX ORDER — SUCRALFATE 1 G
4 TABLET ORAL
Qty: 0 | Refills: 0 | DISCHARGE

## 2020-11-10 NOTE — ASU PATIENT PROFILE, ADULT - PSH
H/O colonoscopy  2016  History of total bilateral knee replacement (TKR)    S/P     S/P gastric bypass    S/P small bowel resection

## 2020-11-10 NOTE — H&P PST ADULT - NSICDXPASTSURGICALHX_GEN_ALL_CORE_FT
PAST SURGICAL HISTORY:  H/O colonoscopy 2016    History of total bilateral knee replacement (TKR)     S/P      S/P gastric bypass     S/P small bowel resection

## 2020-11-10 NOTE — H&P PST ADULT - HISTORY OF PRESENT ILLNESS
a 59 yo lady with obesity, abdominal hernias, LOUIS is here for Surveillance colonoscopy with history of polyps

## 2020-11-10 NOTE — ASU DISCHARGE PLAN (ADULT/PEDIATRIC) - CALL YOUR DOCTOR IF YOU HAVE ANY OF THE FOLLOWING:
Inability to tolerate liquids or foods/Numbness, tingling, color or temperature change to extremity/Nausea and vomiting that does not stop/Pain not relieved by Medications/Excessive diarrhea/Bleeding that does not stop

## 2020-11-10 NOTE — CHART NOTE - NSCHARTNOTEFT_GEN_A_CORE
PACU ANESTHESIA ADMISSION NOTE      Procedure: Colonoscopy    Post op diagnosis:  Colon Polyps    ____  Intubated  TV:______       Rate: ______      FiO2: ______    __x__  Patent Airway    __x__  Full return of protective reflexes    __x__  Full recovery from anesthesia / back to baseline status    Vitals:  T(C): 36.8 (11-10-20 @ 12:24), Max: 36.8 (11-10-20 @ 11:38)  HR:88  BP: 127/64  RR: 16  SpO2: 99%    Mental Status:  _x___ Awake   ___x__ Alert   _____ Drowsy   _____ Sedated    Nausea/Vomiting:  ___x_ NO  ______Yes,   See Post - Op Orders          Pain Scale (0-10):  _0____    Treatment: ____ None    ____ See Post - Op/PCA Orders    Post - Operative Fluids:   __x__ Oral   ____ See Post - Op Orders    Plan: Discharge:   __x__Home       _____Floor     _____Critical Care    _____  Other:_________________    Comments: MAC. Uneventful anesthesia course with no complications. VItals stable. Pt transferred to PACU. Please discharge to home once criteria are met.

## 2020-11-13 DIAGNOSIS — Z86.010 PERSONAL HISTORY OF COLONIC POLYPS: ICD-10-CM

## 2020-11-13 DIAGNOSIS — G47.33 OBSTRUCTIVE SLEEP APNEA (ADULT) (PEDIATRIC): ICD-10-CM

## 2020-11-13 DIAGNOSIS — E66.9 OBESITY, UNSPECIFIED: ICD-10-CM

## 2020-11-13 DIAGNOSIS — Z12.11 ENCOUNTER FOR SCREENING FOR MALIGNANT NEOPLASM OF COLON: ICD-10-CM

## 2020-11-13 DIAGNOSIS — K57.30 DIVERTICULOSIS OF LARGE INTESTINE WITHOUT PERFORATION OR ABSCESS WITHOUT BLEEDING: ICD-10-CM

## 2020-11-13 DIAGNOSIS — Z98.84 BARIATRIC SURGERY STATUS: ICD-10-CM

## 2020-11-13 DIAGNOSIS — Z99.89 DEPENDENCE ON OTHER ENABLING MACHINES AND DEVICES: ICD-10-CM

## 2020-12-17 ENCOUNTER — APPOINTMENT (OUTPATIENT)
Dept: OBGYN | Facility: CLINIC | Age: 60
End: 2020-12-17

## 2021-12-06 NOTE — H&P ADULT - NSHPRISKHIVSCREEN_GEN_ALL_CORE
Offered and patient declined
I have personally seen and examined this patient.  I have fully participated in the care of this patient. I have reviewed all pertinent clinical information, including history, physical exam, plan and the Resident’s note and agree except as noted.

## 2022-06-22 ENCOUNTER — NON-APPOINTMENT (OUTPATIENT)
Age: 62
End: 2022-06-22

## 2022-10-03 ENCOUNTER — APPOINTMENT (OUTPATIENT)
Dept: ORTHOPEDIC SURGERY | Facility: CLINIC | Age: 62
End: 2022-10-03

## 2022-10-03 ENCOUNTER — NON-APPOINTMENT (OUTPATIENT)
Age: 62
End: 2022-10-03

## 2022-10-03 DIAGNOSIS — Z96.652 PRESENCE OF LEFT ARTIFICIAL KNEE JOINT: ICD-10-CM

## 2022-10-03 DIAGNOSIS — Z96.651 PRESENCE OF RIGHT ARTIFICIAL KNEE JOINT: ICD-10-CM

## 2022-10-03 DIAGNOSIS — G47.30 SLEEP APNEA, UNSPECIFIED: ICD-10-CM

## 2022-10-03 PROCEDURE — 73564 X-RAY EXAM KNEE 4 OR MORE: CPT | Mod: RT,LT

## 2022-10-03 PROCEDURE — 99204 OFFICE O/P NEW MOD 45 MIN: CPT

## 2022-10-03 NOTE — HISTORY OF PRESENT ILLNESS
[de-identified] : 62 year old female s/p right total knee replacement in 2012 and left total knee replacement in 2013 presents to the office today for an annual follow up. Patient denies any pain in either knees. She denies any swelling, buckling, locking, clicking, or a loss of motion. Patient reports being limited with ambulation and negotiating stairs but not due to her total knees. She states it is usually her weight that limits her. Patient denies taking any medications for pain at this point. Overall, patient reports doing well.

## 2022-10-03 NOTE — ASSESSMENT
[FreeTextEntry1] : 62 year old female s/p RTK and LTK presents for an annual visit. She is doing very well. She is doing her ADLs, negotiating stairs, walking unlimited distances, getting in and out of chairs and cars with no issues. She is very happy with her result. She can f/u in 2-3 years.

## 2022-10-03 NOTE — PHYSICAL EXAM
[de-identified] : Left Knee\par Inspection: no effusion\par Wounds: healed midline incision, no drainage or erythema\par Alignment: normal.\par Palpation: no specific tenderness on palpation.\par ROM: 0-100 degrees \par Muscle Test: good quad strength.\par Leg examination: calf is soft and non-tender.\par \par Right Knee\par Inspection: no effusion\par Wounds: healed midline incision\par Alignment: normal.\par Palpation: no specific tenderness on palpation.\par ROM: 0-100 degrees \par Muscle Test: good quad strength.\par Leg examination: calf is soft and non-tender. [de-identified] : Radiographs done today AP lateral and skyline of knees shows well aligned cemented PS TKAs

## 2022-10-19 ENCOUNTER — NON-APPOINTMENT (OUTPATIENT)
Age: 62
End: 2022-10-19

## 2023-01-03 ENCOUNTER — LABORATORY RESULT (OUTPATIENT)
Age: 63
End: 2023-01-03

## 2023-01-04 ENCOUNTER — APPOINTMENT (OUTPATIENT)
Dept: OBGYN | Facility: CLINIC | Age: 63
End: 2023-01-04
Payer: COMMERCIAL

## 2023-01-04 VITALS — TEMPERATURE: 97 F | HEIGHT: 63 IN | BODY MASS INDEX: 51.91 KG/M2 | WEIGHT: 293 LBS

## 2023-01-04 DIAGNOSIS — Z01.419 ENCOUNTER FOR GYNECOLOGICAL EXAMINATION (GENERAL) (ROUTINE) W/OUT ABNORMAL FINDINGS: ICD-10-CM

## 2023-01-04 DIAGNOSIS — L08.9 SEBACEOUS CYST: ICD-10-CM

## 2023-01-04 DIAGNOSIS — N39.3 STRESS INCONTINENCE (FEMALE) (MALE): ICD-10-CM

## 2023-01-04 DIAGNOSIS — L72.3 SEBACEOUS CYST: ICD-10-CM

## 2023-01-04 DIAGNOSIS — E66.01 MORBID (SEVERE) OBESITY DUE TO EXCESS CALORIES: ICD-10-CM

## 2023-01-04 DIAGNOSIS — Z78.0 ASYMPTOMATIC MENOPAUSAL STATE: ICD-10-CM

## 2023-01-04 DIAGNOSIS — K43.9 VENTRAL HERNIA W/OUT OBSTRUCTION OR GANGRENE: ICD-10-CM

## 2023-01-04 PROCEDURE — 99396 PREV VISIT EST AGE 40-64: CPT

## 2023-01-04 PROCEDURE — 81003 URINALYSIS AUTO W/O SCOPE: CPT | Mod: QW

## 2023-01-04 PROCEDURE — 76830 TRANSVAGINAL US NON-OB: CPT

## 2023-01-06 PROBLEM — Z01.419 WELL WOMAN EXAM: Status: ACTIVE | Noted: 2019-07-14

## 2023-01-06 PROBLEM — K43.9 ABDOMINAL WALL HERNIA: Status: ACTIVE | Noted: 2023-01-06

## 2023-01-06 PROBLEM — N39.3 URINARY, INCONTINENCE, STRESS FEMALE: Status: ACTIVE | Noted: 2023-01-06

## 2023-01-06 PROBLEM — E66.01 OBESITY, MORBID: Status: ACTIVE | Noted: 2020-07-14

## 2023-01-06 PROBLEM — L72.3 INFECTED SEBACEOUS CYST OF SKIN: Status: ACTIVE | Noted: 2023-01-06

## 2023-01-06 PROBLEM — Z78.0 MENOPAUSE: Status: ACTIVE | Noted: 2019-07-14

## 2024-05-21 ENCOUNTER — NON-APPOINTMENT (OUTPATIENT)
Age: 64
End: 2024-05-21

## 2024-09-15 ENCOUNTER — LABORATORY RESULT (OUTPATIENT)
Age: 64
End: 2024-09-15

## 2024-09-16 ENCOUNTER — APPOINTMENT (OUTPATIENT)
Dept: OBGYN | Facility: CLINIC | Age: 64
End: 2024-09-16
Payer: COMMERCIAL

## 2024-09-16 VITALS — HEIGHT: 63 IN | TEMPERATURE: 97 F

## 2024-09-16 DIAGNOSIS — N39.3 STRESS INCONTINENCE (FEMALE) (MALE): ICD-10-CM

## 2024-09-16 DIAGNOSIS — I10 ESSENTIAL (PRIMARY) HYPERTENSION: ICD-10-CM

## 2024-09-16 DIAGNOSIS — K43.9 VENTRAL HERNIA W/OUT OBSTRUCTION OR GANGRENE: ICD-10-CM

## 2024-09-16 DIAGNOSIS — Z78.0 ASYMPTOMATIC MENOPAUSAL STATE: ICD-10-CM

## 2024-09-16 DIAGNOSIS — E66.01 MORBID (SEVERE) OBESITY DUE TO EXCESS CALORIES: ICD-10-CM

## 2024-09-16 DIAGNOSIS — Z01.419 ENCOUNTER FOR GYNECOLOGICAL EXAMINATION (GENERAL) (ROUTINE) W/OUT ABNORMAL FINDINGS: ICD-10-CM

## 2024-09-16 LAB
APPEARANCE: CLEAR
BILIRUBIN URINE: NEGATIVE
BLOOD URINE: NEGATIVE
COLOR: YELLOW
GLUCOSE QUALITATIVE U: NEGATIVE
KETONES URINE: NEGATIVE
LEUKOCYTE ESTERASE URINE: NEGATIVE
NITRITE URINE: NEGATIVE
PH URINE: 5
PROTEIN URINE: NEGATIVE
SPECIFIC GRAVITY URINE: 1.02
UROBILINOGEN URINE: 0.2 (ref 0.2–?)

## 2024-09-16 PROCEDURE — 99396 PREV VISIT EST AGE 40-64: CPT

## 2024-09-22 PROBLEM — I10 ESSENTIAL HYPERTENSION: Status: ACTIVE | Noted: 2024-09-22

## 2024-12-05 NOTE — ASU PATIENT PROFILE, ADULT - PATIENT'S HEIGHT AND WEIGHT RECORDED IN THE VITAL SIGNS FLOWSHEET
Progress Notes by Christy Nobles RN at 09/20/17 09:30 AM     Author:  Christy Nobles RN Service:  (none) Author Type:  Registered Nurse     Filed:  09/20/17 09:31 AM Encounter Date:  9/19/2017 Status:  Signed     :  Christy Nobles RN (Registered Nurse)            Noted, thank you.[DM1.1M]      Revision History        User Key Date/Time User Provider Type Action    > DM1.1 09/20/17 09:31 AM Christy Nobles RN Registered Nurse Sign    M - Manual            
yes
NASAL CONGESTION/SHORTNESS OF BREATH

## 2025-04-02 NOTE — ASU PREOP CHECKLIST - HAND OFF
yes

## 2025-04-03 ENCOUNTER — INPATIENT (INPATIENT)
Facility: HOSPITAL | Age: 65
LOS: 49 days | Discharge: HOME | DRG: 390 | End: 2025-05-23
Attending: SURGERY | Admitting: SURGERY
Payer: COMMERCIAL

## 2025-04-03 VITALS
DIASTOLIC BLOOD PRESSURE: 94 MMHG | SYSTOLIC BLOOD PRESSURE: 190 MMHG | HEART RATE: 94 BPM | TEMPERATURE: 98 F | WEIGHT: 293 LBS | OXYGEN SATURATION: 96 % | RESPIRATION RATE: 18 BRPM

## 2025-04-03 DIAGNOSIS — Z98.890 OTHER SPECIFIED POSTPROCEDURAL STATES: Chronic | ICD-10-CM

## 2025-04-03 DIAGNOSIS — Z90.49 ACQUIRED ABSENCE OF OTHER SPECIFIED PARTS OF DIGESTIVE TRACT: Chronic | ICD-10-CM

## 2025-04-03 DIAGNOSIS — Z96.653 PRESENCE OF ARTIFICIAL KNEE JOINT, BILATERAL: Chronic | ICD-10-CM

## 2025-04-03 DIAGNOSIS — K56.609 UNSPECIFIED INTESTINAL OBSTRUCTION, UNSPECIFIED AS TO PARTIAL VERSUS COMPLETE OBSTRUCTION: ICD-10-CM

## 2025-04-03 DIAGNOSIS — Z98.891 HISTORY OF UTERINE SCAR FROM PREVIOUS SURGERY: Chronic | ICD-10-CM

## 2025-04-03 DIAGNOSIS — Z98.84 BARIATRIC SURGERY STATUS: Chronic | ICD-10-CM

## 2025-04-03 LAB
ALBUMIN SERPL ELPH-MCNC: 4.5 G/DL — SIGNIFICANT CHANGE UP (ref 3.5–5.2)
ALP SERPL-CCNC: 79 U/L — SIGNIFICANT CHANGE UP (ref 30–115)
ALT FLD-CCNC: 19 U/L — SIGNIFICANT CHANGE UP (ref 0–41)
ANION GAP SERPL CALC-SCNC: 13 MMOL/L — SIGNIFICANT CHANGE UP (ref 7–14)
APPEARANCE UR: CLEAR — SIGNIFICANT CHANGE UP
APTT BLD: 27.5 SEC — SIGNIFICANT CHANGE UP (ref 27–39.2)
AST SERPL-CCNC: 18 U/L — SIGNIFICANT CHANGE UP (ref 0–41)
BACTERIA # UR AUTO: ABNORMAL /HPF
BASOPHILS # BLD AUTO: 0.02 K/UL — SIGNIFICANT CHANGE UP (ref 0–0.2)
BASOPHILS NFR BLD AUTO: 0.2 % — SIGNIFICANT CHANGE UP (ref 0–1)
BILIRUB SERPL-MCNC: 0.4 MG/DL — SIGNIFICANT CHANGE UP (ref 0.2–1.2)
BILIRUB UR-MCNC: NEGATIVE — SIGNIFICANT CHANGE UP
BUN SERPL-MCNC: 17 MG/DL — SIGNIFICANT CHANGE UP (ref 10–20)
CALCIUM SERPL-MCNC: 9.1 MG/DL — SIGNIFICANT CHANGE UP (ref 8.4–10.5)
CAST: 0 /LPF — SIGNIFICANT CHANGE UP (ref 0–4)
CHLORIDE SERPL-SCNC: 106 MMOL/L — SIGNIFICANT CHANGE UP (ref 98–110)
CO2 SERPL-SCNC: 22 MMOL/L — SIGNIFICANT CHANGE UP (ref 17–32)
COLOR SPEC: YELLOW — SIGNIFICANT CHANGE UP
CREAT SERPL-MCNC: 1 MG/DL — SIGNIFICANT CHANGE UP (ref 0.7–1.5)
DIFF PNL FLD: NEGATIVE — SIGNIFICANT CHANGE UP
EGFR: 63 ML/MIN/1.73M2 — SIGNIFICANT CHANGE UP
EGFR: 63 ML/MIN/1.73M2 — SIGNIFICANT CHANGE UP
EOSINOPHIL # BLD AUTO: 0.04 K/UL — SIGNIFICANT CHANGE UP (ref 0–0.7)
EOSINOPHIL NFR BLD AUTO: 0.4 % — SIGNIFICANT CHANGE UP (ref 0–8)
GLUCOSE SERPL-MCNC: 112 MG/DL — HIGH (ref 70–99)
GLUCOSE UR QL: NEGATIVE MG/DL — SIGNIFICANT CHANGE UP
HCT VFR BLD CALC: 36.2 % — LOW (ref 37–47)
HGB BLD-MCNC: 12.1 G/DL — SIGNIFICANT CHANGE UP (ref 12–16)
IMM GRANULOCYTES NFR BLD AUTO: 0.4 % — HIGH (ref 0.1–0.3)
INR BLD: 0.93 RATIO — SIGNIFICANT CHANGE UP (ref 0.65–1.3)
KETONES UR-MCNC: NEGATIVE MG/DL — SIGNIFICANT CHANGE UP
LACTATE SERPL-SCNC: 0.9 MMOL/L — SIGNIFICANT CHANGE UP (ref 0.7–2)
LEUKOCYTE ESTERASE UR-ACNC: ABNORMAL
LIDOCAIN IGE QN: 25 U/L — SIGNIFICANT CHANGE UP (ref 7–60)
LYMPHOCYTES # BLD AUTO: 1.04 K/UL — LOW (ref 1.2–3.4)
LYMPHOCYTES # BLD AUTO: 11.4 % — LOW (ref 20.5–51.1)
MCHC RBC-ENTMCNC: 30.7 PG — SIGNIFICANT CHANGE UP (ref 27–31)
MCHC RBC-ENTMCNC: 33.4 G/DL — SIGNIFICANT CHANGE UP (ref 32–37)
MCV RBC AUTO: 91.9 FL — SIGNIFICANT CHANGE UP (ref 81–99)
MONOCYTES # BLD AUTO: 0.57 K/UL — SIGNIFICANT CHANGE UP (ref 0.1–0.6)
MONOCYTES NFR BLD AUTO: 6.2 % — SIGNIFICANT CHANGE UP (ref 1.7–9.3)
NEUTROPHILS # BLD AUTO: 7.43 K/UL — HIGH (ref 1.4–6.5)
NEUTROPHILS NFR BLD AUTO: 81.4 % — HIGH (ref 42.2–75.2)
NITRITE UR-MCNC: NEGATIVE — SIGNIFICANT CHANGE UP
NRBC BLD AUTO-RTO: 0 /100 WBCS — SIGNIFICANT CHANGE UP (ref 0–0)
PH UR: 6 — SIGNIFICANT CHANGE UP (ref 5–8)
PLATELET # BLD AUTO: 239 K/UL — SIGNIFICANT CHANGE UP (ref 130–400)
PMV BLD: 10.5 FL — HIGH (ref 7.4–10.4)
POTASSIUM SERPL-MCNC: 4.5 MMOL/L — SIGNIFICANT CHANGE UP (ref 3.5–5)
POTASSIUM SERPL-SCNC: 4.5 MMOL/L — SIGNIFICANT CHANGE UP (ref 3.5–5)
PROT SERPL-MCNC: 7.4 G/DL — SIGNIFICANT CHANGE UP (ref 6–8)
PROT UR-MCNC: SIGNIFICANT CHANGE UP MG/DL
PROTHROM AB SERPL-ACNC: 11 SEC — SIGNIFICANT CHANGE UP (ref 9.95–12.87)
RBC # BLD: 3.94 M/UL — LOW (ref 4.2–5.4)
RBC # FLD: 13.2 % — SIGNIFICANT CHANGE UP (ref 11.5–14.5)
RBC CASTS # UR COMP ASSIST: 0 /HPF — SIGNIFICANT CHANGE UP (ref 0–4)
SODIUM SERPL-SCNC: 141 MMOL/L — SIGNIFICANT CHANGE UP (ref 135–146)
SP GR SPEC: 1.01 — SIGNIFICANT CHANGE UP (ref 1–1.03)
SQUAMOUS # UR AUTO: 1 /HPF — SIGNIFICANT CHANGE UP (ref 0–5)
UROBILINOGEN FLD QL: 0.2 MG/DL — SIGNIFICANT CHANGE UP (ref 0.2–1)
WBC # BLD: 9.14 K/UL — SIGNIFICANT CHANGE UP (ref 4.8–10.8)
WBC # FLD AUTO: 9.14 K/UL — SIGNIFICANT CHANGE UP (ref 4.8–10.8)
WBC UR QL: 1 /HPF — SIGNIFICANT CHANGE UP (ref 0–5)

## 2025-04-03 PROCEDURE — 76937 US GUIDE VASCULAR ACCESS: CPT

## 2025-04-03 PROCEDURE — 86140 C-REACTIVE PROTEIN: CPT

## 2025-04-03 PROCEDURE — C1765: CPT

## 2025-04-03 PROCEDURE — 87449 NOS EACH ORGANISM AG IA: CPT

## 2025-04-03 PROCEDURE — 80048 BASIC METABOLIC PNL TOTAL CA: CPT

## 2025-04-03 PROCEDURE — 74177 CT ABD & PELVIS W/CONTRAST: CPT | Mod: 26

## 2025-04-03 PROCEDURE — 85260 CLOT FACTOR X STUART-POWER: CPT

## 2025-04-03 PROCEDURE — 82803 BLOOD GASES ANY COMBINATION: CPT

## 2025-04-03 PROCEDURE — 36248 INS CATH ABD/L-EXT ART ADDL: CPT | Mod: 59

## 2025-04-03 PROCEDURE — 86923 COMPATIBILITY TEST ELECTRIC: CPT

## 2025-04-03 PROCEDURE — P9016: CPT

## 2025-04-03 PROCEDURE — 82306 VITAMIN D 25 HYDROXY: CPT

## 2025-04-03 PROCEDURE — 85027 COMPLETE CBC AUTOMATED: CPT

## 2025-04-03 PROCEDURE — 74177 CT ABD & PELVIS W/CONTRAST: CPT | Mod: MC

## 2025-04-03 PROCEDURE — 97530 THERAPEUTIC ACTIVITIES: CPT | Mod: GO

## 2025-04-03 PROCEDURE — 86900 BLOOD TYPING SEROLOGIC ABO: CPT

## 2025-04-03 PROCEDURE — C1887: CPT

## 2025-04-03 PROCEDURE — 84630 ASSAY OF ZINC: CPT

## 2025-04-03 PROCEDURE — 97112 NEUROMUSCULAR REEDUCATION: CPT | Mod: GP

## 2025-04-03 PROCEDURE — 83880 ASSAY OF NATRIURETIC PEPTIDE: CPT

## 2025-04-03 PROCEDURE — 97167 OT EVAL HIGH COMPLEX 60 MIN: CPT | Mod: GO

## 2025-04-03 PROCEDURE — 87324 CLOSTRIDIUM AG IA: CPT

## 2025-04-03 PROCEDURE — 97110 THERAPEUTIC EXERCISES: CPT | Mod: GP

## 2025-04-03 PROCEDURE — 74174 CTA ABD&PLVS W/CONTRAST: CPT | Mod: MC

## 2025-04-03 PROCEDURE — 84300 ASSAY OF URINE SODIUM: CPT

## 2025-04-03 PROCEDURE — 37191 INS ENDOVAS VENA CAVA FILTR: CPT

## 2025-04-03 PROCEDURE — 85610 PROTHROMBIN TIME: CPT

## 2025-04-03 PROCEDURE — 88307 TISSUE EXAM BY PATHOLOGIST: CPT

## 2025-04-03 PROCEDURE — 94003 VENT MGMT INPAT SUBQ DAY: CPT

## 2025-04-03 PROCEDURE — 99153 MOD SED SAME PHYS/QHP EA: CPT

## 2025-04-03 PROCEDURE — C1729: CPT

## 2025-04-03 PROCEDURE — 36430 TRANSFUSION BLD/BLD COMPNT: CPT

## 2025-04-03 PROCEDURE — 87075 CULTR BACTERIA EXCEPT BLOOD: CPT

## 2025-04-03 PROCEDURE — 83036 HEMOGLOBIN GLYCOSYLATED A1C: CPT

## 2025-04-03 PROCEDURE — 87070 CULTURE OTHR SPECIMN AEROBIC: CPT

## 2025-04-03 PROCEDURE — 85014 HEMATOCRIT: CPT

## 2025-04-03 PROCEDURE — 36246 INS CATH ABD/L-EXT ART 2ND: CPT

## 2025-04-03 PROCEDURE — P9045: CPT | Mod: JZ

## 2025-04-03 PROCEDURE — P9100: CPT

## 2025-04-03 PROCEDURE — 82607 VITAMIN B-12: CPT

## 2025-04-03 PROCEDURE — 75774 ARTERY X-RAY EACH VESSEL: CPT

## 2025-04-03 PROCEDURE — 82550 ASSAY OF CK (CPK): CPT

## 2025-04-03 PROCEDURE — 75726 ARTERY X-RAYS ABDOMEN: CPT

## 2025-04-03 PROCEDURE — 80053 COMPREHEN METABOLIC PANEL: CPT

## 2025-04-03 PROCEDURE — 87640 STAPH A DNA AMP PROBE: CPT

## 2025-04-03 PROCEDURE — C1894: CPT

## 2025-04-03 PROCEDURE — 10030 IMG GID FLU COLL DRG SFT TIS: CPT

## 2025-04-03 PROCEDURE — 85730 THROMBOPLASTIN TIME PARTIAL: CPT

## 2025-04-03 PROCEDURE — 83930 ASSAY OF BLOOD OSMOLALITY: CPT

## 2025-04-03 PROCEDURE — 87186 SC STD MICRODIL/AGAR DIL: CPT

## 2025-04-03 PROCEDURE — 86850 RBC ANTIBODY SCREEN: CPT

## 2025-04-03 PROCEDURE — 76775 US EXAM ABDO BACK WALL LIM: CPT

## 2025-04-03 PROCEDURE — 85384 FIBRINOGEN ACTIVITY: CPT

## 2025-04-03 PROCEDURE — 97163 PT EVAL HIGH COMPLEX 45 MIN: CPT | Mod: GP

## 2025-04-03 PROCEDURE — 84484 ASSAY OF TROPONIN QUANT: CPT

## 2025-04-03 PROCEDURE — 85018 HEMOGLOBIN: CPT

## 2025-04-03 PROCEDURE — 84100 ASSAY OF PHOSPHORUS: CPT

## 2025-04-03 PROCEDURE — 71275 CT ANGIOGRAPHY CHEST: CPT | Mod: MC

## 2025-04-03 PROCEDURE — 93306 TTE W/DOPPLER COMPLETE: CPT

## 2025-04-03 PROCEDURE — 83735 ASSAY OF MAGNESIUM: CPT

## 2025-04-03 PROCEDURE — 99285 EMERGENCY DEPT VISIT HI MDM: CPT

## 2025-04-03 PROCEDURE — 85652 RBC SED RATE AUTOMATED: CPT

## 2025-04-03 PROCEDURE — 83605 ASSAY OF LACTIC ACID: CPT

## 2025-04-03 PROCEDURE — 82746 ASSAY OF FOLIC ACID SERUM: CPT

## 2025-04-03 PROCEDURE — 64647 CHEMODENERV TRUNK MUSC 6/>: CPT

## 2025-04-03 PROCEDURE — 94640 AIRWAY INHALATION TREATMENT: CPT

## 2025-04-03 PROCEDURE — 87077 CULTURE AEROBIC IDENTIFY: CPT

## 2025-04-03 PROCEDURE — 94010 BREATHING CAPACITY TEST: CPT

## 2025-04-03 PROCEDURE — 84295 ASSAY OF SERUM SODIUM: CPT

## 2025-04-03 PROCEDURE — 84478 ASSAY OF TRIGLYCERIDES: CPT

## 2025-04-03 PROCEDURE — 87641 MR-STAPH DNA AMP PROBE: CPT

## 2025-04-03 PROCEDURE — 82962 GLUCOSE BLOOD TEST: CPT

## 2025-04-03 PROCEDURE — 87116 MYCOBACTERIA CULTURE: CPT

## 2025-04-03 PROCEDURE — 99222 1ST HOSP IP/OBS MODERATE 55: CPT | Mod: GC

## 2025-04-03 PROCEDURE — 85520 HEPARIN ASSAY: CPT

## 2025-04-03 PROCEDURE — C1781: CPT

## 2025-04-03 PROCEDURE — 81001 URINALYSIS AUTO W/SCOPE: CPT

## 2025-04-03 PROCEDURE — P9035: CPT

## 2025-04-03 PROCEDURE — 88302 TISSUE EXAM BY PATHOLOGIST: CPT

## 2025-04-03 PROCEDURE — 74018 RADEX ABDOMEN 1 VIEW: CPT

## 2025-04-03 PROCEDURE — 87205 SMEAR GRAM STAIN: CPT

## 2025-04-03 PROCEDURE — P9059: CPT

## 2025-04-03 PROCEDURE — 94002 VENT MGMT INPAT INIT DAY: CPT

## 2025-04-03 PROCEDURE — 83935 ASSAY OF URINE OSMOLALITY: CPT

## 2025-04-03 PROCEDURE — 87102 FUNGUS ISOLATION CULTURE: CPT

## 2025-04-03 PROCEDURE — 74176 CT ABD & PELVIS W/O CONTRAST: CPT | Mod: MC

## 2025-04-03 PROCEDURE — 88304 TISSUE EXAM BY PATHOLOGIST: CPT

## 2025-04-03 PROCEDURE — 94660 CPAP INITIATION&MGMT: CPT

## 2025-04-03 PROCEDURE — 93971 EXTREMITY STUDY: CPT | Mod: RT

## 2025-04-03 PROCEDURE — 85025 COMPLETE CBC W/AUTO DIFF WBC: CPT

## 2025-04-03 PROCEDURE — 87015 SPECIMEN INFECT AGNT CONCNTJ: CPT

## 2025-04-03 PROCEDURE — C1751: CPT

## 2025-04-03 PROCEDURE — 93970 EXTREMITY STUDY: CPT

## 2025-04-03 PROCEDURE — C1769: CPT

## 2025-04-03 PROCEDURE — 36415 COLL VENOUS BLD VENIPUNCTURE: CPT

## 2025-04-03 PROCEDURE — 87040 BLOOD CULTURE FOR BACTERIA: CPT

## 2025-04-03 PROCEDURE — C9399: CPT

## 2025-04-03 PROCEDURE — 86901 BLOOD TYPING SEROLOGIC RH(D): CPT

## 2025-04-03 PROCEDURE — 87206 SMEAR FLUORESCENT/ACID STAI: CPT

## 2025-04-03 PROCEDURE — C1889: CPT

## 2025-04-03 PROCEDURE — 97535 SELF CARE MNGMENT TRAINING: CPT | Mod: GO

## 2025-04-03 PROCEDURE — 82140 ASSAY OF AMMONIA: CPT

## 2025-04-03 PROCEDURE — 82570 ASSAY OF URINE CREATININE: CPT

## 2025-04-03 PROCEDURE — P9047: CPT | Mod: JZ

## 2025-04-03 PROCEDURE — 71045 X-RAY EXAM CHEST 1 VIEW: CPT

## 2025-04-03 PROCEDURE — 70450 CT HEAD/BRAIN W/O DYE: CPT | Mod: MC

## 2025-04-03 PROCEDURE — 99152 MOD SED SAME PHYS/QHP 5/>YRS: CPT

## 2025-04-03 PROCEDURE — 76942 ECHO GUIDE FOR BIOPSY: CPT

## 2025-04-03 PROCEDURE — 82330 ASSAY OF CALCIUM: CPT

## 2025-04-03 PROCEDURE — 93005 ELECTROCARDIOGRAM TRACING: CPT

## 2025-04-03 PROCEDURE — 80076 HEPATIC FUNCTION PANEL: CPT

## 2025-04-03 PROCEDURE — 84132 ASSAY OF SERUM POTASSIUM: CPT

## 2025-04-03 RX ORDER — ONDANSETRON HCL/PF 4 MG/2 ML
4 VIAL (ML) INJECTION EVERY 6 HOURS
Refills: 0 | Status: DISCONTINUED | OUTPATIENT
Start: 2025-04-03 | End: 2025-04-10

## 2025-04-03 RX ORDER — AMLODIPINE BESYLATE 10 MG/1
10 TABLET ORAL DAILY
Refills: 0 | Status: DISCONTINUED | OUTPATIENT
Start: 2025-04-03 | End: 2025-04-10

## 2025-04-03 RX ORDER — BENZOCAINE 220 MG/G
1 SPRAY, METERED PERIODONTAL ONCE
Refills: 0 | Status: COMPLETED | OUTPATIENT
Start: 2025-04-03 | End: 2025-04-04

## 2025-04-03 RX ORDER — ACETAMINOPHEN 500 MG/5ML
1000 LIQUID (ML) ORAL EVERY 6 HOURS
Refills: 0 | Status: COMPLETED | OUTPATIENT
Start: 2025-04-03 | End: 2025-04-04

## 2025-04-03 RX ORDER — SODIUM CHLORIDE 9 G/1000ML
1000 INJECTION, SOLUTION INTRAVENOUS
Refills: 0 | Status: DISCONTINUED | OUTPATIENT
Start: 2025-04-03 | End: 2025-04-08

## 2025-04-03 RX ORDER — IOHEXOL 350 MG/ML
30 INJECTION, SOLUTION INTRAVENOUS ONCE
Refills: 0 | Status: COMPLETED | OUTPATIENT
Start: 2025-04-03 | End: 2025-04-03

## 2025-04-03 RX ORDER — LOSARTAN POTASSIUM 100 MG/1
100 TABLET, FILM COATED ORAL DAILY
Refills: 0 | Status: DISCONTINUED | OUTPATIENT
Start: 2025-04-03 | End: 2025-04-07

## 2025-04-03 RX ORDER — ENOXAPARIN SODIUM 100 MG/ML
40 INJECTION SUBCUTANEOUS EVERY 24 HOURS
Refills: 0 | Status: DISCONTINUED | OUTPATIENT
Start: 2025-04-03 | End: 2025-04-07

## 2025-04-03 RX ADMIN — Medication 2 MILLIGRAM(S): at 23:27

## 2025-04-03 RX ADMIN — Medication 400 MILLIGRAM(S): at 23:29

## 2025-04-03 RX ADMIN — SODIUM CHLORIDE 125 MILLILITER(S): 9 INJECTION, SOLUTION INTRAVENOUS at 23:27

## 2025-04-03 RX ADMIN — IOHEXOL 30 MILLILITER(S): 350 INJECTION, SOLUTION INTRAVENOUS at 15:11

## 2025-04-03 RX ADMIN — Medication 1000 MILLILITER(S): at 15:04

## 2025-04-03 RX ADMIN — Medication 6 MILLIGRAM(S): at 15:09

## 2025-04-03 NOTE — H&P ADULT - HISTORY OF PRESENT ILLNESS
The patient is a 64-year-old female with a past medical history of high blood pressure and gastric bypass surgery in 2001, presenting with two days of sharp abdominal pain and one episode of non-bloody, non-bilious vomiting. She has a prior history of small bowel obstruction in 2006, 2008, and most recently in 2020, all of which resolved with conservative management. Her current symptoms are similar in nature. A computed tomography scan of the abdomen and pelvis with oral and intravenous contrast revealed multiple ventral abdominal wall hernias containing both obstructed and non-obstructed bowel loops. A complex small bowel obstruction is present, involving three hernias on the right side of the abdomen, with dilated, fluid-filled loops of small bowel, trace fluid between loops, and a collapsed segment of bowel beyond a hernia in the right lower quadrant. These findings are consistent with a recurrent small bowel obstruction. The hernia was non reducible at bedside.

## 2025-04-03 NOTE — H&P ADULT - ASSESSMENT
The patient is a 64-year-old female with a past medical history of high blood pressure and gastric bypass surgery in 2001, presenting with two days of sharp abdominal pain and one episode of non-bloody, non-bilious vomiting. She has a prior history of small bowel obstruction in 2006, 2008, and most recently in 2020, all of which resolved with conservative management. Her current symptoms are similar in nature. A computed tomography scan of the abdomen and pelvis with oral and intravenous contrast revealed multiple ventral abdominal wall hernias containing both obstructed and non-obstructed bowel loops. A complex small bowel obstruction is present, involving three hernias on the right side of the abdomen, with dilated, fluid-filled loops of small bowel, trace fluid between loops, and a collapsed segment of bowel beyond a hernia in the right lower quadrant. These findings are consistent with a recurrent small bowel obstruction     PLAN:  Admit to surgery   Keep NPO  with IVF  NGT  Possible OR tonight  The patient is a 64-year-old female with a past medical history of high blood pressure and gastric bypass surgery in 2001, presenting with two days of sharp abdominal pain and one episode of non-bloody, non-bilious vomiting. She has a prior history of small bowel obstruction in 2006, 2008, and most recently in 2020, all of which resolved with conservative management. Her current symptoms are similar in nature. A computed tomography scan of the abdomen and pelvis with oral and intravenous contrast revealed multiple ventral abdominal wall hernias containing both obstructed and non-obstructed bowel loops. A complex small bowel obstruction is present, involving three hernias on the right side of the abdomen, with dilated, fluid-filled loops of small bowel, trace fluid between loops, and a collapsed segment of bowel beyond a hernia in the right lower quadrant. These findings are consistent with a recurrent small bowel obstruction     PLAN:  Admit to surgery   Keep NPO  with IVF  NGT  Added on for tomorrow 4/4 for diagnostic laparoscopy, possible ex lap, possible bowel resection, possible ventral hernia repair, possible open

## 2025-04-03 NOTE — H&P ADULT - NSHPREVIEWOFSYSTEMS_GEN_ALL_CORE
PAST MEDICAL & SURGICAL HISTORY:   Gastric bypass status for obesity       LOUIS (obstructive sleep apnea)      S/P gastric bypass       S/P        S/P small bowel resection       History of total bilateral knee replacement (TKR)       H/O colonoscopy

## 2025-04-03 NOTE — ED ADULT NURSE NOTE - CHIEF COMPLAINT
He was given found follow-up Breztri inhaler was told to use it regularly.  He was told if it is helping he can call back and I will send prescription.  Also discussed about possibility of referral to pulmonologist or getting extra testing but he refuses for now.          The patient is a 64y Female complaining of abdominal pain.

## 2025-04-03 NOTE — H&P ADULT - NSHPLABSRESULTS_GEN_ALL_CORE
LABS:            12.1  9.14  )-----------( 239      ( 2025 15:29 )            36.2   04-03   141  |  106  |  17 ----------------------------<  112[H] 4.5   |  22  |  1.0   Ca    9.1      2025 15:29   TPro  7.4  /  Alb  4.5  /  TBili  0.4  /  DBili  x   /  AST  18  /  ALT  19  /  AlkPhos  79  04-03     Urinalysis Basic - ( 2025 20:06 )   Color: Yellow / Appearance: Clear / S.014 / pH: x Gluc: x / Ketone: Negative mg/dL  / Bili: Negative / Urobili: 0.2 mg/dL  Blood: x / Protein: Trace mg/dL / Nitrite: Negative  Leuk Esterase: Small / RBC: 0 /HPF / WBC 1 /HPF  Sq Epi: x / Non Sq Epi: 1 /HPF / Bacteria: Occasional /HPF     CAPILLARY BLOOD GLUCOSE     LIVER FUNCTIONS - ( 2025 15:29 ) Alb: 4.5 g/dL / Pro: 7.4 g/dL / ALK PHOS: 79 U/L / ALT: 19 U/L / AST: 18 U/L / GGT: x

## 2025-04-03 NOTE — H&P ADULT - NSHPPHYSICALEXAM_GEN_ALL_CORE
Physical Exam:   General: NAD, resting comfortably   HEENT: NC/AT, EOMI, normal hearing, no oral lesions, no LAD, neck supple   Pulmonary: normal resp effort, CTA-B   Cardiovascular: NSR   Abdominal: soft, umbilical and ventral hernia with TTP, distention, no CVA tenderness   Extremities: WWP, normal strength, no clubbing/cyanosis/edema

## 2025-04-03 NOTE — ED PROVIDER NOTE - PHYSICAL EXAMINATION
From: Brandy Lopez  To: Viola Flowers  Sent: 10/23/2023 7:44 AM CDT  Subject: RSV shot    Good morning I had previously asked if Dr Viola Flowers wanted me to obtain an RSV shot and was told that she had to read up on it and reach back out to her to see if she indeed did want me to obtain this. I know the pharmacy that is up here is where I've been getting my shots I just recently got my flu shot and my covid booster. On October 9th. But they do not give the RSV shot so I would have to schedule to get this done which I'm uncertain if they will take me at the [facility] because in the past they've said that I don't have a doctor there so they wouldn't take me for other things which I didn't think that was an ongoing clinic with having a ongoing physician's assistant I thought maybe you know it was on an as-need basis that you could schedule. But I have a friend because I do not drive long distances who will be taking me on Friday and I thought I could check with the [facility] pharmacy in [place] and set up an appointment to get an RSV shot if needed I just need to know if   I need to obtain this or not if you could let me know I'd appreciate it.   Gen: NAD, AOx3  Head: NCAT  HEENT: PERRL, oral mucosa moist, normal conjunctiva, oropharynx clear without exudate or erythema  Lung: CTAB, no respiratory distress, no wheezing, rales, rhonchi  CV: normal s1/s2, rrr, Normal perfusion, pulses 2+ throughout  Abd: soft, umbilical and ventral hernia with TTP, distention, no CVA tenderness  Genitourinary: no pelvic tenderness  MSK: No edema, no visible deformities, full range of motion in all 4 extremities  Neuro: CN II-XII grossly intact, No focal neurologic deficits  Skin: No rash   Psych: normal affect

## 2025-04-03 NOTE — ED PROVIDER NOTE - CLINICAL SUMMARY MEDICAL DECISION MAKING FREE TEXT BOX
64F with a past medical history of high blood pressure and gastric bypass surgery in 2001, presenting with two days of sharp abdominal pain and one episode of non-bloody, non-bilious vomiting. She has a prior history of small bowel obstruction in 2006, 2008, and most recently in 2020, all of which resolved with conservative management. Her current symptoms are similar in nature. CT of the abdomen and pelvis with oral and intravenous contrast revealed multiple ventral abdominal wall hernias containing both obstructed and non-obstructed bowel loops. A complex small bowel obstruction is present, involving three hernias on the right side of the abdomen, with dilated, fluid-filled loops of small bowel, trace fluid between loops, and a collapsed segment of bowel beyond a hernia in the right lower quadrant. These findings are consistent with a recurrent small bowel obstruction. The hernia was non reducible at bedside.  Surgery consulted. ADMIT.

## 2025-04-03 NOTE — ED PROVIDER NOTE - OBJECTIVE STATEMENT
64-year-old female with a pmh of hypertension, diabetes, gastric bypass presents complaining of abdominal pain for 2 days.  Patient describes the pain as sharp in nature and this morning had 1 episode of NBNB emesis.  Patient states to have had a SBO in the past that felt similar to her symptoms today. pt denies any other symptoms including fevers, chill, headache, recent illness/travel, cough, chest pain, or SOB.

## 2025-04-03 NOTE — H&P ADULT - ATTENDING COMMENTS
Pt examined  labs and images reviewed  pt with supermorbid obesity and complex hernia with sbo  previously treated without surgery     impression : extremely high risk - supermorbid obesity and complex hernia with partial sbo  various options discussed  pt and family and me would try to avoid surgery   if things change will emergently take her to OR

## 2025-04-04 LAB
ANION GAP SERPL CALC-SCNC: 10 MMOL/L — SIGNIFICANT CHANGE UP (ref 7–14)
BASOPHILS # BLD AUTO: 0.01 K/UL — SIGNIFICANT CHANGE UP (ref 0–0.2)
BASOPHILS NFR BLD AUTO: 0.2 % — SIGNIFICANT CHANGE UP (ref 0–1)
BUN SERPL-MCNC: 18 MG/DL — SIGNIFICANT CHANGE UP (ref 10–20)
CALCIUM SERPL-MCNC: 8.6 MG/DL — SIGNIFICANT CHANGE UP (ref 8.4–10.5)
CHLORIDE SERPL-SCNC: 106 MMOL/L — SIGNIFICANT CHANGE UP (ref 98–110)
CO2 SERPL-SCNC: 27 MMOL/L — SIGNIFICANT CHANGE UP (ref 17–32)
CREAT SERPL-MCNC: 1.1 MG/DL — SIGNIFICANT CHANGE UP (ref 0.7–1.5)
EGFR: 56 ML/MIN/1.73M2 — LOW
EGFR: 56 ML/MIN/1.73M2 — LOW
EOSINOPHIL # BLD AUTO: 0.07 K/UL — SIGNIFICANT CHANGE UP (ref 0–0.7)
EOSINOPHIL NFR BLD AUTO: 1.1 % — SIGNIFICANT CHANGE UP (ref 0–8)
GLUCOSE SERPL-MCNC: 105 MG/DL — HIGH (ref 70–99)
HCT VFR BLD CALC: 34.5 % — LOW (ref 37–47)
HGB BLD-MCNC: 11 G/DL — LOW (ref 12–16)
IMM GRANULOCYTES NFR BLD AUTO: 0.6 % — HIGH (ref 0.1–0.3)
LACTATE SERPL-SCNC: 0.9 MMOL/L — SIGNIFICANT CHANGE UP (ref 0.7–2)
LYMPHOCYTES # BLD AUTO: 0.95 K/UL — LOW (ref 1.2–3.4)
LYMPHOCYTES # BLD AUTO: 14.8 % — LOW (ref 20.5–51.1)
MAGNESIUM SERPL-MCNC: 2.1 MG/DL — SIGNIFICANT CHANGE UP (ref 1.8–2.4)
MCHC RBC-ENTMCNC: 30.2 PG — SIGNIFICANT CHANGE UP (ref 27–31)
MCHC RBC-ENTMCNC: 31.9 G/DL — LOW (ref 32–37)
MCV RBC AUTO: 94.8 FL — SIGNIFICANT CHANGE UP (ref 81–99)
MONOCYTES # BLD AUTO: 0.51 K/UL — SIGNIFICANT CHANGE UP (ref 0.1–0.6)
MONOCYTES NFR BLD AUTO: 7.9 % — SIGNIFICANT CHANGE UP (ref 1.7–9.3)
NEUTROPHILS # BLD AUTO: 4.85 K/UL — SIGNIFICANT CHANGE UP (ref 1.4–6.5)
NEUTROPHILS NFR BLD AUTO: 75.4 % — HIGH (ref 42.2–75.2)
NRBC BLD AUTO-RTO: 0 /100 WBCS — SIGNIFICANT CHANGE UP (ref 0–0)
PHOSPHATE SERPL-MCNC: 4.1 MG/DL — SIGNIFICANT CHANGE UP (ref 2.1–4.9)
PLATELET # BLD AUTO: 209 K/UL — SIGNIFICANT CHANGE UP (ref 130–400)
PMV BLD: 10.1 FL — SIGNIFICANT CHANGE UP (ref 7.4–10.4)
POTASSIUM SERPL-MCNC: 3.9 MMOL/L — SIGNIFICANT CHANGE UP (ref 3.5–5)
POTASSIUM SERPL-SCNC: 3.9 MMOL/L — SIGNIFICANT CHANGE UP (ref 3.5–5)
RBC # BLD: 3.64 M/UL — LOW (ref 4.2–5.4)
RBC # FLD: 13.2 % — SIGNIFICANT CHANGE UP (ref 11.5–14.5)
SODIUM SERPL-SCNC: 143 MMOL/L — SIGNIFICANT CHANGE UP (ref 135–146)
WBC # BLD: 6.43 K/UL — SIGNIFICANT CHANGE UP (ref 4.8–10.8)
WBC # FLD AUTO: 6.43 K/UL — SIGNIFICANT CHANGE UP (ref 4.8–10.8)

## 2025-04-04 PROCEDURE — 74018 RADEX ABDOMEN 1 VIEW: CPT | Mod: 26

## 2025-04-04 PROCEDURE — 93010 ELECTROCARDIOGRAM REPORT: CPT

## 2025-04-04 RX ORDER — KETOROLAC TROMETHAMINE 30 MG/ML
15 INJECTION, SOLUTION INTRAMUSCULAR; INTRAVENOUS EVERY 6 HOURS
Refills: 0 | Status: DISCONTINUED | OUTPATIENT
Start: 2025-04-04 | End: 2025-04-04

## 2025-04-04 RX ADMIN — Medication 40 MILLIGRAM(S): at 06:44

## 2025-04-04 RX ADMIN — AMLODIPINE BESYLATE 10 MILLIGRAM(S): 10 TABLET ORAL at 06:44

## 2025-04-04 RX ADMIN — Medication 400 MILLIGRAM(S): at 17:45

## 2025-04-04 RX ADMIN — Medication 400 MILLIGRAM(S): at 06:44

## 2025-04-04 RX ADMIN — Medication 400 MILLIGRAM(S): at 11:34

## 2025-04-04 RX ADMIN — BENZOCAINE 1 SPRAY(S): 220 SPRAY, METERED PERIODONTAL at 00:10

## 2025-04-04 RX ADMIN — SODIUM CHLORIDE 125 MILLILITER(S): 9 INJECTION, SOLUTION INTRAVENOUS at 06:40

## 2025-04-04 RX ADMIN — ENOXAPARIN SODIUM 40 MILLIGRAM(S): 100 INJECTION SUBCUTANEOUS at 06:40

## 2025-04-04 RX ADMIN — LOSARTAN POTASSIUM 100 MILLIGRAM(S): 100 TABLET, FILM COATED ORAL at 06:44

## 2025-04-04 RX ADMIN — KETOROLAC TROMETHAMINE 15 MILLIGRAM(S): 30 INJECTION, SOLUTION INTRAMUSCULAR; INTRAVENOUS at 03:23

## 2025-04-04 NOTE — CHART NOTE - NSCHARTNOTEFT_GEN_A_CORE
Patient was seen and examined at bedside in no acute distress. Patient agreed to nasogastric tube placement which was attempted and she was unable to tolerate, gagging and pulling the tube shortly after advancing pass the nasopharynx cavity and unable to gain passage pass the epiglottis. Final attempt was done with adjunctive hurricane spray with minimal relief, but patient still unable to tolerate. After multiple attempts, patient refused the NGT, stating she is unable to tolerate due to the pain and gagging. Attending Dr. Lema to be made aware and plan for surgical intervention to be initiated.    [phone]

## 2025-04-04 NOTE — PROGRESS NOTE ADULT - SUBJECTIVE AND OBJECTIVE BOX
Procedure/ diagnosis: complex SBO with transition in ventral hernia      PAST MEDICAL & SURGICAL HISTORY:  Gastric bypass status for obesity      LOUIS (obstructive sleep apnea)      S/P gastric bypass      S/P       S/P small bowel resection      History of total bilateral knee replacement (TKR)      H/O colonoscopy        24H EVENTS: pt refused NGT placement                           Vital Signs Last 24 Hrs  T(C): 36.6 (2025 07:30), Max: 36.9 (2025 14:05)  T(F): 97.9 (2025 07:30), Max: 98.4 (2025 14:05)  HR: 90 (2025 07:30) (88 - 96)  BP: 114/73 (2025 07:30) (108/66 - 190/94)  BP(mean): 87 (2025 07:30) (80 - 87)  RR: 18 (2025 07:30) (18 - 18)  SpO2: 97% (2025 07:30) (96% - 99%)    Parameters below as of 2025 07:30  Patient On (Oxygen Delivery Method): room air    I&O's Detail      Urinalysis Basic - ( 2025 06:37 )    Color: x / Appearance: x / SG: x / pH: x  Gluc: 105 mg/dL / Ketone: x  / Bili: x / Urobili: x   Blood: x / Protein: x / Nitrite: x   Leuk Esterase: x / RBC: x / WBC x   Sq Epi: x / Non Sq Epi: x / Bacteria: x                   11.0   6.43  )-----------( 209      (  @ 06:37 )             34.5                12.1   9.14  )-----------( 239      (  @ 15:29 )             36.2                    143   |  106   |  18                 Ca: 8.6    BMP:   ----------------------------< 105    M.1   (25 @ 06:37)             3.9    |  27    | 1.1                Ph: 4.1      LFT:     TPro: 7.4 / Alb: 4.5 / TBili: 0.4 / DBili: x / AST: 18 / ALT: 19 / AlkPhos: 79   (25 @ 15:29)          PT/INR - ( 2025 20:08 )   PT: 11.00 sec;   INR: 0.93 ratio         PTT - ( 2025 20:08 )  PTT:27.5 sec          Urinalysis Basic - ( 2025 06:37 )    Color: x / Appearance: x / SG: x / pH: x  Gluc: 105 mg/dL / Ketone: x  / Bili: x / Urobili: x   Blood: x / Protein: x / Nitrite: x   Leuk Esterase: x / RBC: x / WBC x   Sq Epi: x / Non Sq Epi: x / Bacteria: x      Urinalysis with Rflx Culture (collected 2025 20:06)    MEDICATIONS  (STANDING):  acetaminophen   IVPB .. 1000 milliGRAM(s) IV Intermittent every 6 hours  amLODIPine   Tablet 10 milliGRAM(s) Oral daily  enoxaparin Injectable 40 milliGRAM(s) SubCutaneous every 24 hours  hydrochlorothiazide 25 milliGRAM(s) Oral daily  lactated ringers. 1000 milliLiter(s) (125 mL/Hr) IV Continuous <Continuous>  losartan 100 milliGRAM(s) Oral daily  pantoprazole    Tablet 40 milliGRAM(s) Oral before breakfast    MEDICATIONS  (PRN):  ketorolac   Injectable 15 milliGRAM(s) IV Push every 6 hours PRN Moderate Pain (4 - 6)  ondansetron Injectable 4 milliGRAM(s) IV Push every 6 hours PRN Nausea      Diet, NPO:   Except Medications     Special Instructions for Nursing:  Except Medications (25 @ 22:49)      PHYSICAL EXAM:  General Appearance: pt in NAD  Chest: + air entry bilat  CV: S1, S2  Abdomen: Soft, distended  no rebound tenderness no guarding  Extremities: + ROM  Neuro: A&Ox3

## 2025-04-04 NOTE — PROGRESS NOTE ADULT - ATTENDING COMMENTS
Pt examined  labs and images reviewed  pt with supermorbid obesity and complex hernia with sbo  previously treated without surgery     impression : extremely high risk - supermorbid obesity and complex hernia with partial sbo  will continue aggressive hydration  various options discussed  pt and family and me would try to avoid surgery   if things change will emergently take her to OR

## 2025-04-04 NOTE — PROGRESS NOTE ADULT - ASSESSMENT
A/P The patient is a 64-year-old female with a past medical history of high blood pressure and gastric bypass surgery in 2001, presenting with two days of sharp abdominal pain and one episode of non-bloody, non-bilious vomiting. She has a prior history of small bowel obstruction in 2006, 2008, and most recently in 2020, all of which resolved with conservative management. Her current symptoms are similar in nature.  Ct scan with complex SBO with multiple ventral abdominal wall hernias    continue current management   aspiration precaution   monitor Bowel function  serial abdominal exams  NGT if pt vomits  IR for botox   encourage ambulation  continue close monitoring

## 2025-04-05 LAB
ANION GAP SERPL CALC-SCNC: 13 MMOL/L — SIGNIFICANT CHANGE UP (ref 7–14)
ANION GAP SERPL CALC-SCNC: 15 MMOL/L — HIGH (ref 7–14)
BASOPHILS # BLD AUTO: 0.02 K/UL — SIGNIFICANT CHANGE UP (ref 0–0.2)
BASOPHILS # BLD AUTO: 0.02 K/UL — SIGNIFICANT CHANGE UP (ref 0–0.2)
BASOPHILS NFR BLD AUTO: 0.3 % — SIGNIFICANT CHANGE UP (ref 0–1)
BASOPHILS NFR BLD AUTO: 0.3 % — SIGNIFICANT CHANGE UP (ref 0–1)
BLD GP AB SCN SERPL QL: SIGNIFICANT CHANGE UP
BUN SERPL-MCNC: 22 MG/DL — HIGH (ref 10–20)
BUN SERPL-MCNC: 25 MG/DL — HIGH (ref 10–20)
CALCIUM SERPL-MCNC: 8.1 MG/DL — LOW (ref 8.4–10.5)
CALCIUM SERPL-MCNC: 8.7 MG/DL — SIGNIFICANT CHANGE UP (ref 8.4–10.5)
CHLORIDE SERPL-SCNC: 107 MMOL/L — SIGNIFICANT CHANGE UP (ref 98–110)
CHLORIDE SERPL-SCNC: 108 MMOL/L — SIGNIFICANT CHANGE UP (ref 98–110)
CO2 SERPL-SCNC: 21 MMOL/L — SIGNIFICANT CHANGE UP (ref 17–32)
CO2 SERPL-SCNC: 23 MMOL/L — SIGNIFICANT CHANGE UP (ref 17–32)
CREAT SERPL-MCNC: 1.2 MG/DL — SIGNIFICANT CHANGE UP (ref 0.7–1.5)
CREAT SERPL-MCNC: 1.2 MG/DL — SIGNIFICANT CHANGE UP (ref 0.7–1.5)
EGFR: 51 ML/MIN/1.73M2 — LOW
EOSINOPHIL # BLD AUTO: 0.03 K/UL — SIGNIFICANT CHANGE UP (ref 0–0.7)
EOSINOPHIL # BLD AUTO: 0.08 K/UL — SIGNIFICANT CHANGE UP (ref 0–0.7)
EOSINOPHIL NFR BLD AUTO: 0.4 % — SIGNIFICANT CHANGE UP (ref 0–8)
EOSINOPHIL NFR BLD AUTO: 1.1 % — SIGNIFICANT CHANGE UP (ref 0–8)
GLUCOSE SERPL-MCNC: 107 MG/DL — HIGH (ref 70–99)
GLUCOSE SERPL-MCNC: 89 MG/DL — SIGNIFICANT CHANGE UP (ref 70–99)
HCT VFR BLD CALC: 30.2 % — LOW (ref 37–47)
HCT VFR BLD CALC: 34 % — LOW (ref 37–47)
HGB BLD-MCNC: 11 G/DL — LOW (ref 12–16)
HGB BLD-MCNC: 9.6 G/DL — LOW (ref 12–16)
IMM GRANULOCYTES NFR BLD AUTO: 0.3 % — SIGNIFICANT CHANGE UP (ref 0.1–0.3)
IMM GRANULOCYTES NFR BLD AUTO: 0.7 % — HIGH (ref 0.1–0.3)
LYMPHOCYTES # BLD AUTO: 1 K/UL — LOW (ref 1.2–3.4)
LYMPHOCYTES # BLD AUTO: 1.04 K/UL — LOW (ref 1.2–3.4)
LYMPHOCYTES # BLD AUTO: 13.6 % — LOW (ref 20.5–51.1)
LYMPHOCYTES # BLD AUTO: 14.7 % — LOW (ref 20.5–51.1)
MAGNESIUM SERPL-MCNC: 1.7 MG/DL — LOW (ref 1.8–2.4)
MAGNESIUM SERPL-MCNC: 2 MG/DL — SIGNIFICANT CHANGE UP (ref 1.8–2.4)
MCHC RBC-ENTMCNC: 30.1 PG — SIGNIFICANT CHANGE UP (ref 27–31)
MCHC RBC-ENTMCNC: 30.2 PG — SIGNIFICANT CHANGE UP (ref 27–31)
MCHC RBC-ENTMCNC: 31.8 G/DL — LOW (ref 32–37)
MCHC RBC-ENTMCNC: 32.4 G/DL — SIGNIFICANT CHANGE UP (ref 32–37)
MCV RBC AUTO: 93.4 FL — SIGNIFICANT CHANGE UP (ref 81–99)
MCV RBC AUTO: 94.7 FL — SIGNIFICANT CHANGE UP (ref 81–99)
MONOCYTES # BLD AUTO: 0.58 K/UL — SIGNIFICANT CHANGE UP (ref 0.1–0.6)
MONOCYTES # BLD AUTO: 0.66 K/UL — HIGH (ref 0.1–0.6)
MONOCYTES NFR BLD AUTO: 8.2 % — SIGNIFICANT CHANGE UP (ref 1.7–9.3)
MONOCYTES NFR BLD AUTO: 9 % — SIGNIFICANT CHANGE UP (ref 1.7–9.3)
NEUTROPHILS # BLD AUTO: 5.34 K/UL — SIGNIFICANT CHANGE UP (ref 1.4–6.5)
NEUTROPHILS # BLD AUTO: 5.58 K/UL — SIGNIFICANT CHANGE UP (ref 1.4–6.5)
NEUTROPHILS NFR BLD AUTO: 75.4 % — HIGH (ref 42.2–75.2)
NEUTROPHILS NFR BLD AUTO: 76 % — HIGH (ref 42.2–75.2)
NRBC BLD AUTO-RTO: 0 /100 WBCS — SIGNIFICANT CHANGE UP (ref 0–0)
NRBC BLD AUTO-RTO: 0 /100 WBCS — SIGNIFICANT CHANGE UP (ref 0–0)
PHOSPHATE SERPL-MCNC: 3.1 MG/DL — SIGNIFICANT CHANGE UP (ref 2.1–4.9)
PHOSPHATE SERPL-MCNC: 3.9 MG/DL — SIGNIFICANT CHANGE UP (ref 2.1–4.9)
PLATELET # BLD AUTO: 199 K/UL — SIGNIFICANT CHANGE UP (ref 130–400)
PLATELET # BLD AUTO: 214 K/UL — SIGNIFICANT CHANGE UP (ref 130–400)
PMV BLD: 10.1 FL — SIGNIFICANT CHANGE UP (ref 7.4–10.4)
PMV BLD: 10.5 FL — HIGH (ref 7.4–10.4)
POTASSIUM SERPL-MCNC: 3.9 MMOL/L — SIGNIFICANT CHANGE UP (ref 3.5–5)
POTASSIUM SERPL-MCNC: 3.9 MMOL/L — SIGNIFICANT CHANGE UP (ref 3.5–5)
POTASSIUM SERPL-SCNC: 3.9 MMOL/L — SIGNIFICANT CHANGE UP (ref 3.5–5)
POTASSIUM SERPL-SCNC: 3.9 MMOL/L — SIGNIFICANT CHANGE UP (ref 3.5–5)
RBC # BLD: 3.19 M/UL — LOW (ref 4.2–5.4)
RBC # BLD: 3.64 M/UL — LOW (ref 4.2–5.4)
RBC # FLD: 13.2 % — SIGNIFICANT CHANGE UP (ref 11.5–14.5)
RBC # FLD: 13.3 % — SIGNIFICANT CHANGE UP (ref 11.5–14.5)
SODIUM SERPL-SCNC: 143 MMOL/L — SIGNIFICANT CHANGE UP (ref 135–146)
SODIUM SERPL-SCNC: 144 MMOL/L — SIGNIFICANT CHANGE UP (ref 135–146)
WBC # BLD: 7.08 K/UL — SIGNIFICANT CHANGE UP (ref 4.8–10.8)
WBC # BLD: 7.34 K/UL — SIGNIFICANT CHANGE UP (ref 4.8–10.8)
WBC # FLD AUTO: 7.08 K/UL — SIGNIFICANT CHANGE UP (ref 4.8–10.8)
WBC # FLD AUTO: 7.34 K/UL — SIGNIFICANT CHANGE UP (ref 4.8–10.8)

## 2025-04-05 PROCEDURE — 99231 SBSQ HOSP IP/OBS SF/LOW 25: CPT

## 2025-04-05 RX ORDER — ACETAMINOPHEN 500 MG/5ML
1000 LIQUID (ML) ORAL ONCE
Refills: 0 | Status: DISCONTINUED | OUTPATIENT
Start: 2025-04-05 | End: 2025-04-10

## 2025-04-05 RX ORDER — MAGNESIUM SULFATE 500 MG/ML
2 SYRINGE (ML) INJECTION ONCE
Refills: 0 | Status: COMPLETED | OUTPATIENT
Start: 2025-04-05 | End: 2025-04-06

## 2025-04-05 RX ADMIN — LOSARTAN POTASSIUM 100 MILLIGRAM(S): 100 TABLET, FILM COATED ORAL at 05:04

## 2025-04-05 RX ADMIN — Medication 40 MILLIGRAM(S): at 05:05

## 2025-04-05 RX ADMIN — AMLODIPINE BESYLATE 10 MILLIGRAM(S): 10 TABLET ORAL at 05:04

## 2025-04-05 RX ADMIN — ENOXAPARIN SODIUM 40 MILLIGRAM(S): 100 INJECTION SUBCUTANEOUS at 06:13

## 2025-04-05 RX ADMIN — SODIUM CHLORIDE 125 MILLILITER(S): 9 INJECTION, SOLUTION INTRAVENOUS at 03:36

## 2025-04-05 NOTE — PROGRESS NOTE ADULT - ASSESSMENT
The patient is a 64-year-old female with a past medical history of high blood pressure and gastric bypass surgery in 2001, presenting with two days of sharp abdominal pain and one episode of non-bloody, non-bilious vomiting. She has a prior history of small bowel obstruction in 2006, 2008, and most recently in 2020, all of which resolved with conservative management. Her current symptoms are similar in nature.  Ct scan with complex SBO with multiple ventral abdominal wall hernias    Possible OR Monday for  dx lap, poss ex lap, poss bowel resection, poss ventral hernia  continue current management   aspiration precaution   monitor Bowel function  serial abdominal exams  NGT if pt vomits  f/u IR for botox   encourage ambulation  continue close monitoring

## 2025-04-05 NOTE — PROVIDER CONTACT NOTE (OTHER) - ACTION/TREATMENT ORDERED:
MD Allred made aware, states vs are stable. No further intervention needed at this time. MD Allred made aware.. No further intervention needed at this time, continue to monitor pt.

## 2025-04-05 NOTE — PROGRESS NOTE ADULT - ATTENDING COMMENTS
64F with PMH of high blood pressure and gastric bypass surgery in 2001, ventral hernia presents with SBO     Patient seen and examined. No acute events. Patient reports discomfort. Denies flatus or BM, nausea or vomiting    Abdomen - soft, non distended, non tender.  Large ventral hernia with midline incision    Vitals and labs reviewed    Plan  - NPO  - IVF  - pain control  - possible OR Monday for ex lap, possible bowel resection possible hernia repair

## 2025-04-05 NOTE — PROGRESS NOTE ADULT - SUBJECTIVE AND OBJECTIVE BOX
GENERAL SURGERY PROGRESS NOTE     NANCY SARGENT  [address] [place] [address] day :3d    Surgical Attending: Dr. Mason  24 hour events: NPO w/ IVF still, pain controlled, -N/V, -g, -bm    PHYSICAL EXAM:  General Appearance: pt in NAD  Chest: + air entry bilat  CV: S1, S2  Abdomen: Soft, distended, prominent hernia noted, ventral  no rebound tenderness no guarding  Extremities: + ROM  Neuro: A&Ox3        T(F): 98.1 (04-05-25 @ 00:25), Max: 98.2 (04-04-25 @ 19:05)  HR: 100 (04-05-25 @ 00:25) (88 - 113)  BP: 115/69 (04-05-25 @ 00:25) (107/71 - 116/81)  ABP: --  ABP(mean): --  RR: 18 (04-05-25 @ 00:25) (18 - 18)  SpO2: 95% (04-05-25 @ 00:25) (95% - 97%)    IN'S / OUT's:    04-04-25 @ 07:01  -  04-05-25 @ 04:32  --------------------------------------------------------  IN:    Lactated Ringers: 1250 mL  Total IN: 1250 mL    OUT:  Total OUT: 0 mL    Total NET: 1250 mL          MEDICATIONS  (STANDING):  amLODIPine   Tablet 10 milliGRAM(s) Oral daily  enoxaparin Injectable 40 milliGRAM(s) SubCutaneous every 24 hours  hydrochlorothiazide 25 milliGRAM(s) Oral daily  lactated ringers. 1000 milliLiter(s) (125 mL/Hr) IV Continuous <Continuous>  losartan 100 milliGRAM(s) Oral daily  pantoprazole    Tablet 40 milliGRAM(s) Oral before breakfast    MEDICATIONS  (PRN):  ondansetron Injectable 4 milliGRAM(s) IV Push every 6 hours PRN Nausea      LABS  Labs:  CAPILLARY BLOOD GLUCOSE                              11.0   7.08  )-----------( 214      ( 05 Apr 2025 00:45 )             34.0       Auto Immature Granulocyte %: 0.3 % (04-05-25 @ 00:45)  Auto Immature Granulocyte %: 0.6 % (04-04-25 @ 06:37)    04-05    144  |  108  |  22[H]  ----------------------------<  107[H]  3.9   |  23  |  1.2      Calcium: 8.7 mg/dL (04-05-25 @ 00:45)      LFTs:             7.4  | 0.4  | 18       ------------------[79      ( 03 Apr 2025 15:29 )  4.5  | x    | 19          Lipase:25     Amylase:x         Lactate, Blood: 0.9 mmol/L (04-04-25 @ 06:37)  Lactate, Blood: 0.9 mmol/L (04-03-25 @ 20:08)      Coags:     11.00  ----< 0.93    ( 03 Apr 2025 20:08 )     27.5                Urinalysis Basic - ( 05 Apr 2025 00:45 )    Color: x / Appearance: x / SG: x / pH: x  Gluc: 107 mg/dL / Ketone: x  / Bili: x / Urobili: x   Blood: x / Protein: x / Nitrite: x   Leuk Esterase: x / RBC: x / WBC x   Sq Epi: x / Non Sq Epi: x / Bacteria: x        Urinalysis with Rflx Culture (collected 03 Apr 2025 20:06)          RADIOLOGY & ADDITIONAL TESTS:

## 2025-04-06 LAB
-  AMOXICILLIN/CLAVULANIC ACID: SIGNIFICANT CHANGE UP
-  AMPICILLIN/SULBACTAM: SIGNIFICANT CHANGE UP
-  AMPICILLIN: SIGNIFICANT CHANGE UP
-  AZTREONAM: SIGNIFICANT CHANGE UP
-  CEFAZOLIN: SIGNIFICANT CHANGE UP
-  CEFEPIME: SIGNIFICANT CHANGE UP
-  CEFOXITIN: SIGNIFICANT CHANGE UP
-  CEFTRIAXONE: SIGNIFICANT CHANGE UP
-  CEFUROXIME: SIGNIFICANT CHANGE UP
-  CIPROFLOXACIN: SIGNIFICANT CHANGE UP
-  ERTAPENEM: SIGNIFICANT CHANGE UP
-  GENTAMICIN: SIGNIFICANT CHANGE UP
-  IMIPENEM: SIGNIFICANT CHANGE UP
-  LEVOFLOXACIN: SIGNIFICANT CHANGE UP
-  MEROPENEM: SIGNIFICANT CHANGE UP
-  NITROFURANTOIN: SIGNIFICANT CHANGE UP
-  PIPERACILLIN/TAZOBACTAM: SIGNIFICANT CHANGE UP
-  TOBRAMYCIN: SIGNIFICANT CHANGE UP
-  TRIMETHOPRIM/SULFAMETHOXAZOLE: SIGNIFICANT CHANGE UP
ANION GAP SERPL CALC-SCNC: 18 MMOL/L — HIGH (ref 7–14)
APTT BLD: 25.9 SEC — LOW (ref 27–39.2)
BASOPHILS # BLD AUTO: 0.02 K/UL — SIGNIFICANT CHANGE UP (ref 0–0.2)
BASOPHILS NFR BLD AUTO: 0.2 % — SIGNIFICANT CHANGE UP (ref 0–1)
BUN SERPL-MCNC: 39 MG/DL — HIGH (ref 10–20)
CALCIUM SERPL-MCNC: 8.9 MG/DL — SIGNIFICANT CHANGE UP (ref 8.4–10.5)
CHLORIDE SERPL-SCNC: 108 MMOL/L — SIGNIFICANT CHANGE UP (ref 98–110)
CO2 SERPL-SCNC: 21 MMOL/L — SIGNIFICANT CHANGE UP (ref 17–32)
CREAT SERPL-MCNC: 1.6 MG/DL — HIGH (ref 0.7–1.5)
CULTURE RESULTS: ABNORMAL
EGFR: 36 ML/MIN/1.73M2 — LOW
EGFR: 36 ML/MIN/1.73M2 — LOW
EOSINOPHIL # BLD AUTO: 0.06 K/UL — SIGNIFICANT CHANGE UP (ref 0–0.7)
EOSINOPHIL NFR BLD AUTO: 0.7 % — SIGNIFICANT CHANGE UP (ref 0–8)
GLUCOSE SERPL-MCNC: 109 MG/DL — HIGH (ref 70–99)
HCT VFR BLD CALC: 36.1 % — LOW (ref 37–47)
HGB BLD-MCNC: 11.4 G/DL — LOW (ref 12–16)
IMM GRANULOCYTES NFR BLD AUTO: 0.6 % — HIGH (ref 0.1–0.3)
INR BLD: 0.97 RATIO — SIGNIFICANT CHANGE UP (ref 0.65–1.3)
LYMPHOCYTES # BLD AUTO: 1.07 K/UL — LOW (ref 1.2–3.4)
LYMPHOCYTES # BLD AUTO: 12.6 % — LOW (ref 20.5–51.1)
MCHC RBC-ENTMCNC: 30.2 PG — SIGNIFICANT CHANGE UP (ref 27–31)
MCHC RBC-ENTMCNC: 31.6 G/DL — LOW (ref 32–37)
MCV RBC AUTO: 95.5 FL — SIGNIFICANT CHANGE UP (ref 81–99)
METHOD TYPE: SIGNIFICANT CHANGE UP
MONOCYTES # BLD AUTO: 0.62 K/UL — HIGH (ref 0.1–0.6)
MONOCYTES NFR BLD AUTO: 7.3 % — SIGNIFICANT CHANGE UP (ref 1.7–9.3)
NEUTROPHILS # BLD AUTO: 6.68 K/UL — HIGH (ref 1.4–6.5)
NEUTROPHILS NFR BLD AUTO: 78.6 % — HIGH (ref 42.2–75.2)
NRBC BLD AUTO-RTO: 0 /100 WBCS — SIGNIFICANT CHANGE UP (ref 0–0)
ORGANISM # SPEC MICROSCOPIC CNT: ABNORMAL
ORGANISM # SPEC MICROSCOPIC CNT: SIGNIFICANT CHANGE UP
PHOSPHATE SERPL-MCNC: 4 MG/DL — SIGNIFICANT CHANGE UP (ref 2.1–4.9)
PLATELET # BLD AUTO: 277 K/UL — SIGNIFICANT CHANGE UP (ref 130–400)
PMV BLD: 10.2 FL — SIGNIFICANT CHANGE UP (ref 7.4–10.4)
POTASSIUM SERPL-MCNC: 3.8 MMOL/L — SIGNIFICANT CHANGE UP (ref 3.5–5)
POTASSIUM SERPL-SCNC: 3.8 MMOL/L — SIGNIFICANT CHANGE UP (ref 3.5–5)
PROTHROM AB SERPL-ACNC: 11.4 SEC — SIGNIFICANT CHANGE UP (ref 9.95–12.87)
RBC # BLD: 3.78 M/UL — LOW (ref 4.2–5.4)
RBC # FLD: 13.3 % — SIGNIFICANT CHANGE UP (ref 11.5–14.5)
SODIUM SERPL-SCNC: 147 MMOL/L — HIGH (ref 135–146)
SPECIMEN SOURCE: SIGNIFICANT CHANGE UP
WBC # BLD: 8.5 K/UL — SIGNIFICANT CHANGE UP (ref 4.8–10.8)
WBC # FLD AUTO: 8.5 K/UL — SIGNIFICANT CHANGE UP (ref 4.8–10.8)

## 2025-04-06 PROCEDURE — 99231 SBSQ HOSP IP/OBS SF/LOW 25: CPT

## 2025-04-06 PROCEDURE — 93010 ELECTROCARDIOGRAM REPORT: CPT

## 2025-04-06 RX ORDER — SODIUM CHLORIDE 9 G/1000ML
500 INJECTION, SOLUTION INTRAVENOUS ONCE
Refills: 0 | Status: COMPLETED | OUTPATIENT
Start: 2025-04-06 | End: 2025-04-06

## 2025-04-06 RX ADMIN — SODIUM CHLORIDE 125 MILLILITER(S): 9 INJECTION, SOLUTION INTRAVENOUS at 01:00

## 2025-04-06 RX ADMIN — AMLODIPINE BESYLATE 10 MILLIGRAM(S): 10 TABLET ORAL at 05:53

## 2025-04-06 RX ADMIN — Medication 25 GRAM(S): at 01:00

## 2025-04-06 RX ADMIN — ENOXAPARIN SODIUM 40 MILLIGRAM(S): 100 INJECTION SUBCUTANEOUS at 05:52

## 2025-04-06 RX ADMIN — SODIUM CHLORIDE 1000 MILLILITER(S): 9 INJECTION, SOLUTION INTRAVENOUS at 11:20

## 2025-04-06 RX ADMIN — LOSARTAN POTASSIUM 100 MILLIGRAM(S): 100 TABLET, FILM COATED ORAL at 05:53

## 2025-04-06 RX ADMIN — Medication 40 MILLIGRAM(S): at 05:53

## 2025-04-06 NOTE — PROGRESS NOTE ADULT - ATTENDING COMMENTS
64F with PMH of high blood pressure and gastric bypass surgery in 2001, ventral hernia presents with SBO     Patient seen and examined. No acute events. Patient reports discomfort. Reports small flatus and denies BM, nausea or vomiting    Abdomen - soft, non distended, non tender.  Large ventral hernia with midline incision    Vitals and labs reviewed    Plan  - NPO  - IVF  - pain control  - possible OR Monday for ex lap, possible bowel resection possible hernia repair 22-Jan-2022 16:30

## 2025-04-06 NOTE — PROGRESS NOTE ADULT - SUBJECTIVE AND OBJECTIVE BOX
GENERAL SURGERY PROGRESS NOTE     Patient: NANCY SARGENT , 64y ([birth year])Female   MRN: [id number]  Location: [address]  Visit: 04-03-25 Inpatient  Date: 04-06-25 @ 01:44        Admitted :04-03-25 (3d)  LOS: 3d    Procedure/Dx/Injuries: Small bowel obstruction         Events of past 24 hours: Patient denies nausea or vomiting. passing gas, no bowel movements.   >>> <<<>>> <<<>>> <<<>>> <<<>>> <<<>>> <<<>>> <<<>>> <<<>>> <<<>>> <<<    Vitals:   T(F): 99 (04-06-25 @ 00:03), Max: 99 (04-06-25 @ 00:03)  HR: 109 (04-06-25 @ 00:03)  BP: 106/72 (04-06-25 @ 00:03)  RR: 18 (04-06-25 @ 00:03)  SpO2: 96% (04-06-25 @ 00:03)      PHYSICAL EXAM:  GENERAL: NAD, awake and alert  CHEST/LUNG: Equal chest rise bilaterally  HEART: Regular rate and rhythm  ABDOMEN: Soft, non-distended.   EXTREMITIES: No peripheral edema  >>> <<<>>> <<<>>> <<<>>> <<<>>> <<<>>> <<<>>> <<<>>> <<<>>> <<<>>> <<<   Is & Os:   Diet, NPO:   Except Medications     Special Instructions for Nursing:  Except Medications    Fluids:     04-04-25 @ 07:01  -  04-05-25 @ 07:00  --------------------------------------------------------  IN:    Lactated Ringers: 1375 mL  Total IN: 1375 mL    OUT:  Total OUT: 0 mL    Total NET: 1375 mL      >>> <<<>>> <<<>>> <<<>>> <<<>>> <<<>>> <<<>>> <<<>>> <<<>>> <<<>>> <<<    MEDICATIONS  (STANDING):  amLODIPine   Tablet 10 milliGRAM(s) Oral daily  enoxaparin Injectable 40 milliGRAM(s) SubCutaneous every 24 hours  hydrochlorothiazide 25 milliGRAM(s) Oral daily  lactated ringers. 1000 milliLiter(s) (125 mL/Hr) IV Continuous <Continuous>  losartan 100 milliGRAM(s) Oral daily  pantoprazole    Tablet 40 milliGRAM(s) Oral before breakfast    MEDICATIONS  (PRN):  acetaminophen   IVPB .. 1000 milliGRAM(s) IV Intermittent once PRN Moderate Pain (4 - 6)  ondansetron Injectable 4 milliGRAM(s) IV Push every 6 hours PRN Nausea      DVT PROPHYLAXIS: enoxaparin Injectable 40 milliGRAM(s) SubCutaneous every 24 hours    GI PROPHYLAXIS: pantoprazole    Tablet 40 milliGRAM(s) Oral before breakfast    ANTICOAGULATION:   ANTIBIOTICS:      >>> <<<>>> <<<>>> <<<>>> <<<>>> <<<>>> <<<>>> <<<>>> <<<>>> <<<>>> <<<        LAB/STUDIES:  Labs:  CAPILLARY BLOOD GLUCOSE                              9.6    7.34  )-----------( 199      ( 05 Apr 2025 20:00 )             30.2       Auto Immature Granulocyte %: 0.7 % (04-05-25 @ 20:00)    04-05    143  |  107  |  25[H]  ----------------------------<  89  3.9   |  21  |  1.2      Calcium: 8.1 mg/dL (04-05-25 @ 20:00)      LFTs:     Lactate, Blood: 0.9 mmol/L (04-04-25 @ 06:37)  Lactate, Blood: 0.9 mmol/L (04-03-25 @ 20:08)      Coags:            Urinalysis Basic - ( 05 Apr 2025 20:00 )    Color: x / Appearance: x / SG: x / pH: x  Gluc: 89 mg/dL / Ketone: x  / Bili: x / Urobili: x   Blood: x / Protein: x / Nitrite: x   Leuk Esterase: x / RBC: x / WBC x   Sq Epi: x / Non Sq Epi: x / Bacteria: x        Urinalysis with Rflx Culture (collected 03 Apr 2025 20:06)    Culture - Urine (collected 03 Apr 2025 20:06)  Source: Clean Catch None  Preliminary Report (05 Apr 2025 10:51):    50,000 - 99,000 CFU/mL Escherichia coli    <10,000 CFU/ml Normal Urogenital andra present

## 2025-04-07 LAB
ANION GAP SERPL CALC-SCNC: 13 MMOL/L — SIGNIFICANT CHANGE UP (ref 7–14)
ANION GAP SERPL CALC-SCNC: 15 MMOL/L — HIGH (ref 7–14)
BASOPHILS # BLD AUTO: 0.02 K/UL — SIGNIFICANT CHANGE UP (ref 0–0.2)
BASOPHILS NFR BLD AUTO: 0.3 % — SIGNIFICANT CHANGE UP (ref 0–1)
BUN SERPL-MCNC: 38 MG/DL — HIGH (ref 10–20)
BUN SERPL-MCNC: 38 MG/DL — HIGH (ref 10–20)
CALCIUM SERPL-MCNC: 8.5 MG/DL — SIGNIFICANT CHANGE UP (ref 8.4–10.5)
CALCIUM SERPL-MCNC: 8.6 MG/DL — SIGNIFICANT CHANGE UP (ref 8.4–10.5)
CHLORIDE SERPL-SCNC: 108 MMOL/L — SIGNIFICANT CHANGE UP (ref 98–110)
CHLORIDE SERPL-SCNC: 111 MMOL/L — HIGH (ref 98–110)
CO2 SERPL-SCNC: 22 MMOL/L — SIGNIFICANT CHANGE UP (ref 17–32)
CO2 SERPL-SCNC: 23 MMOL/L — SIGNIFICANT CHANGE UP (ref 17–32)
CREAT SERPL-MCNC: 1.2 MG/DL — SIGNIFICANT CHANGE UP (ref 0.7–1.5)
CREAT SERPL-MCNC: 1.3 MG/DL — SIGNIFICANT CHANGE UP (ref 0.7–1.5)
EGFR: 46 ML/MIN/1.73M2 — LOW
EGFR: 46 ML/MIN/1.73M2 — LOW
EGFR: 51 ML/MIN/1.73M2 — LOW
EGFR: 51 ML/MIN/1.73M2 — LOW
EOSINOPHIL # BLD AUTO: 0.06 K/UL — SIGNIFICANT CHANGE UP (ref 0–0.7)
EOSINOPHIL NFR BLD AUTO: 1 % — SIGNIFICANT CHANGE UP (ref 0–8)
GLUCOSE SERPL-MCNC: 103 MG/DL — HIGH (ref 70–99)
GLUCOSE SERPL-MCNC: 98 MG/DL — SIGNIFICANT CHANGE UP (ref 70–99)
HCT VFR BLD CALC: 32.7 % — LOW (ref 37–47)
HGB BLD-MCNC: 10.6 G/DL — LOW (ref 12–16)
IMM GRANULOCYTES NFR BLD AUTO: 0.8 % — HIGH (ref 0.1–0.3)
LYMPHOCYTES # BLD AUTO: 1.09 K/UL — LOW (ref 1.2–3.4)
LYMPHOCYTES # BLD AUTO: 18.4 % — LOW (ref 20.5–51.1)
MAGNESIUM SERPL-MCNC: 2 MG/DL — SIGNIFICANT CHANGE UP (ref 1.8–2.4)
MCHC RBC-ENTMCNC: 30.5 PG — SIGNIFICANT CHANGE UP (ref 27–31)
MCHC RBC-ENTMCNC: 32.4 G/DL — SIGNIFICANT CHANGE UP (ref 32–37)
MCV RBC AUTO: 94.2 FL — SIGNIFICANT CHANGE UP (ref 81–99)
MONOCYTES # BLD AUTO: 0.54 K/UL — SIGNIFICANT CHANGE UP (ref 0.1–0.6)
MONOCYTES NFR BLD AUTO: 9.1 % — SIGNIFICANT CHANGE UP (ref 1.7–9.3)
NEUTROPHILS # BLD AUTO: 4.16 K/UL — SIGNIFICANT CHANGE UP (ref 1.4–6.5)
NEUTROPHILS NFR BLD AUTO: 70.4 % — SIGNIFICANT CHANGE UP (ref 42.2–75.2)
NRBC BLD AUTO-RTO: 0 /100 WBCS — SIGNIFICANT CHANGE UP (ref 0–0)
PHOSPHATE SERPL-MCNC: 3.3 MG/DL — SIGNIFICANT CHANGE UP (ref 2.1–4.9)
PLATELET # BLD AUTO: 239 K/UL — SIGNIFICANT CHANGE UP (ref 130–400)
PMV BLD: 10.4 FL — SIGNIFICANT CHANGE UP (ref 7.4–10.4)
POTASSIUM SERPL-MCNC: 3.7 MMOL/L — SIGNIFICANT CHANGE UP (ref 3.5–5)
POTASSIUM SERPL-MCNC: 3.9 MMOL/L — SIGNIFICANT CHANGE UP (ref 3.5–5)
POTASSIUM SERPL-SCNC: 3.7 MMOL/L — SIGNIFICANT CHANGE UP (ref 3.5–5)
POTASSIUM SERPL-SCNC: 3.9 MMOL/L — SIGNIFICANT CHANGE UP (ref 3.5–5)
RBC # BLD: 3.47 M/UL — LOW (ref 4.2–5.4)
RBC # FLD: 13.2 % — SIGNIFICANT CHANGE UP (ref 11.5–14.5)
SODIUM SERPL-SCNC: 144 MMOL/L — SIGNIFICANT CHANGE UP (ref 135–146)
SODIUM SERPL-SCNC: 148 MMOL/L — HIGH (ref 135–146)
WBC # BLD: 5.92 K/UL — SIGNIFICANT CHANGE UP (ref 4.8–10.8)
WBC # FLD AUTO: 5.92 K/UL — SIGNIFICANT CHANGE UP (ref 4.8–10.8)

## 2025-04-07 PROCEDURE — 99232 SBSQ HOSP IP/OBS MODERATE 35: CPT | Mod: GC

## 2025-04-07 PROCEDURE — 74176 CT ABD & PELVIS W/O CONTRAST: CPT | Mod: 26

## 2025-04-07 RX ORDER — SODIUM CHLORIDE 9 G/1000ML
500 INJECTION, SOLUTION INTRAVENOUS ONCE
Refills: 0 | Status: COMPLETED | OUTPATIENT
Start: 2025-04-07 | End: 2025-04-07

## 2025-04-07 RX ORDER — HEPARIN SODIUM 1000 [USP'U]/ML
5000 INJECTION INTRAVENOUS; SUBCUTANEOUS EVERY 8 HOURS
Refills: 0 | Status: DISCONTINUED | OUTPATIENT
Start: 2025-04-08 | End: 2025-04-10

## 2025-04-07 RX ORDER — HEPARIN SODIUM 1000 [USP'U]/ML
5000 INJECTION INTRAVENOUS; SUBCUTANEOUS EVERY 8 HOURS
Refills: 0 | Status: DISCONTINUED | OUTPATIENT
Start: 2025-04-07 | End: 2025-04-07

## 2025-04-07 RX ADMIN — Medication 40 MILLIGRAM(S): at 05:23

## 2025-04-07 RX ADMIN — ENOXAPARIN SODIUM 40 MILLIGRAM(S): 100 INJECTION SUBCUTANEOUS at 05:28

## 2025-04-07 RX ADMIN — SODIUM CHLORIDE 125 MILLILITER(S): 9 INJECTION, SOLUTION INTRAVENOUS at 14:42

## 2025-04-07 RX ADMIN — SODIUM CHLORIDE 500 MILLILITER(S): 9 INJECTION, SOLUTION INTRAVENOUS at 13:00

## 2025-04-07 RX ADMIN — Medication 50 MILLIEQUIVALENT(S): at 14:39

## 2025-04-07 RX ADMIN — SODIUM CHLORIDE 500 MILLILITER(S): 9 INJECTION, SOLUTION INTRAVENOUS at 07:18

## 2025-04-07 RX ADMIN — LOSARTAN POTASSIUM 100 MILLIGRAM(S): 100 TABLET, FILM COATED ORAL at 05:23

## 2025-04-07 RX ADMIN — SODIUM CHLORIDE 125 MILLILITER(S): 9 INJECTION, SOLUTION INTRAVENOUS at 00:02

## 2025-04-07 RX ADMIN — AMLODIPINE BESYLATE 10 MILLIGRAM(S): 10 TABLET ORAL at 05:23

## 2025-04-07 NOTE — DIETITIAN INITIAL EVALUATION ADULT - ORAL INTAKE PTA/DIET HISTORY
Patient reports adequate appetite at home usually eating 3 meals/day. Denies difficulty chewing/swallowing. NKFA or Mandaeism/cultural food restrictions.     Reports UBW is 132kg, with some weight gain within the past few months due to overeating. Current weight documented is 140.6kg. No height documented for current admission; previous visit data documents height of 160cm.     Patient was NPO at time of RD visit; will attempt to observe PO intake of meals in hospital at f/u. Denies symptoms of N/V/D/Constipation. Last BM was on 4/2/25 per patient; patient reports usually having daily BMs.

## 2025-04-07 NOTE — CONSULT NOTE ADULT - SUBJECTIVE AND OBJECTIVE BOX
PLASTIC SURGERY CONSULT NOTE    Patient is a 64y old  Female who presents with a chief complaint of     HPI:  The patient is a 64-year-old female with a past medical history of high blood pressure and gastric bypass surgery in , presenting with two days of sharp abdominal pain and one episode of non-bloody, non-bilious vomiting. She has a prior history of small bowel obstruction in , , and most recently in , all of which resolved with conservative management. Her current symptoms are similar in nature. A computed tomography scan of the abdomen and pelvis with oral and intravenous contrast revealed multiple ventral abdominal wall hernias containing both obstructed and non-obstructed bowel loops. A complex small bowel obstruction is present, involving three hernias on the right side of the abdomen, with dilated, fluid-filled loops of small bowel, trace fluid between loops, and a collapsed segment of bowel beyond a hernia in the right lower quadrant. These findings are consistent with a recurrent small bowel obstruction. The hernia was non reducible at bedside. (2025 20:41)    History as above. Briefly, patient with multiple abdominal surgeries and history of SBOs treated conservatively in the past. General surgery discussed with patient that she is high risk for future SBO and recommend surgery for hernia repair. PRS consulted for abdominal reconstruction. Patient reports b/l TKA, but denies any other extremity surgery.       PAST MEDICAL & SURGICAL HISTORY:  Gastric bypass status for obesity      LOUIS (obstructive sleep apnea)      S/P gastric bypass      S/P       S/P small bowel resection      History of total bilateral knee replacement (TKR)      H/O colonoscopy          [  ] No significant past history as reviewed with the patient and family    FAMILY HISTORY:    [  ] Family history not pertinent as reviewed with the patient and family    SOCIAL HISTORY:    MEDICATIONS  (STANDING):  amLODIPine   Tablet 10 milliGRAM(s) Oral daily  chlorhexidine 2% Cloths 1 Application(s) Topical <User Schedule>  hydrochlorothiazide 25 milliGRAM(s) Oral daily  lactated ringers Bolus 500 milliLiter(s) IV Bolus once  lactated ringers. 1000 milliLiter(s) (125 mL/Hr) IV Continuous <Continuous>  pantoprazole    Tablet 40 milliGRAM(s) Oral before breakfast    MEDICATIONS  (PRN):  acetaminophen   IVPB .. 1000 milliGRAM(s) IV Intermittent once PRN Moderate Pain (4 - 6)  ondansetron Injectable 4 milliGRAM(s) IV Push every 6 hours PRN Nausea    Allergies    No Known Allergies    Intolerances        Vital Signs Last 24 Hrs  T(C): 36.4 (2025 09:38), Max: 36.8 (2025 21:00)  T(F): 97.6 (2025 09:38), Max: 98.2 (2025 21:00)  HR: 101 (2025 09:38) (101 - 113)  BP: 126/70 (2025 09:38) (109/73 - 126/70)  BP(mean): --  RR: 18 (2025 09:38) (18 - 18)  SpO2: 98% (2025 09:38) (96% - 98%)      Daily     Daily     EXAM:  Gen: In NAD  Abd: Large ventral scar cdi, abdomen is soft and distended. No obvious wounds/dehiscences on exam  Extremity:   B/L TKA scars, but no other apparent scars on thighs.     Labs:                        11.4   8.50  )-----------( 277      ( 2025 20:00 )             36.1     04-06    147[H]  |  108  |  39[H]  ----------------------------<  109[H]  3.8   |  21  |  1.6[H]    Ca    8.9      2025 20:00  Phos  4.0     04-06  Mg     1.7     04-05      PT/INR - ( 2025 20:00 )   PT: 11.40 sec;   INR: 0.97 ratio         PTT - ( 2025 20:00 )  PTT:25.9 sec  Urinalysis Basic - ( 2025 20:00 )    Color: x / Appearance: x / SG: x / pH: x  Gluc: 109 mg/dL / Ketone: x  / Bili: x / Urobili: x   Blood: x / Protein: x / Nitrite: x   Leuk Esterase: x / RBC: x / WBC x   Sq Epi: x / Non Sq Epi: x / Bacteria: x        IMAGING STUDIES:      < from: CT Abdomen and Pelvis w/ Oral Cont and w/ IV Cont (25 @ 17:47) >  PERITONEUM/MESENTERY/BOWEL/ABDOMINAL WALL: Post gastric bypass.      Multiple ventral abdominal wall hernias containing obstructed and   nonobstructed bowel loops. Complex small bowel obstruction again noted   with involvement of 3 right-sided abdominal hernias with multiple dilated   fluid-filled loops of small bowel with several areas of short segment   colonic. Completely collapsed small bowel/colon noted distal to a right   lower quadrant hernia which contains a short segment of small bowel   (4/251). Trace interloop ascites and edema surrounding the dilated loops.   Upper abdominal wall hernias containing short segment of nondilated colon.      No ascites or pneumoperitoneum. Scattered colonic diverticulosis.    BONES/SOFT TISSUES: Multilevel degenerative changes of the spine.    IMPRESSION:  Complex small bowel obstruction with multiple ventral abdominal wall   hernias containing nonobstructed small bowel loops; trace interloop   ascites/edema. Transition point in a right lower quadrant hernia with   completely collapsed distal small bowel and colonic loops.    < end of copied text >

## 2025-04-07 NOTE — CONSULT NOTE ADULT - ASSESSMENT
A: 64-year-old female with a past medical history of high blood pressure and gastric bypass surgery in 2001, presenting with two days of sharp abdominal pain and one episode of non-bloody, non-bilious vomiting. She has a prior history of small bowel obstruction in 2006, 2008, and most recently in 2020, all of which resolved with conservative management.     Had bedside conversation with Dr. Huerta and patient. Discussed that she is high risk for recurrent SBO and would recommend surgery. PRS would assist with abdominal reconstruction. Patient unlikely to be candidate for component separation and would likely require Melecio of b/l legs to recruit new tissue. Discussed with patient/daughter (over the phone). They are understanding and would like to pursue non-operative mgmt at this time with weight loss. Patient to follow up with Dr. Woodard in the office to further discussed TE surgery.     Plan:  -No acute surgical intervention  -F/U repeat CT scan  -PRS available to assist with abdominal reconstruction  -Pt to follow up with Dr. Woodard to further discuss operative mgmt   -If patient changes mind while in patient, please let PRS know and can further discuss next steps    Patient and plan discussed with Dr. Woodard    Formerly named [facility]  Plastic Surgery  #[phone] [facility] pager  [phone] [facility] pager  Available on teams

## 2025-04-07 NOTE — DIETITIAN INITIAL EVALUATION ADULT - ADD RECOMMEND
Interventions:  -When medically feasible, advance diet starting with Clear Liquids -> Full Liquids -> Soft and Bite Sized texture -> Regular texture with Low Fiber and DASH/TLC diet modifiers   -Add Ensure Clear 3x/day while on Clear Liquids diet (provides 240kcal, 8g protein each)    Monitor:  -Clearance for PO intake  -Diet/texture tolerance  -Weight  -Nutrition related labs  -Nutrition focused physical findings    High Nutrition Risk Follow Up

## 2025-04-07 NOTE — DIETITIAN INITIAL EVALUATION ADULT - NS FNS DIET ORDER
Diet, NPO:   Except Medications     Special Instructions for Nursing:  Except Medications (04-03-25 @ 22:49) [Active]

## 2025-04-07 NOTE — PROGRESS NOTE ADULT - ATTENDING COMMENTS
Pt examined  labs and images reviewed  pt with supermorbid obesity and complex hernia with sbo  previously treated without surgery     passing flatus  impression : extremely high risk - supermorbid obesity and complex hernia with partial sbo  will continue aggressive hydration  awaiting bowel movement  various options discussed  pt and family and me would try to avoid surgery   if things change will emergently take her to OR

## 2025-04-07 NOTE — PROGRESS NOTE ADULT - SUBJECTIVE AND OBJECTIVE BOX
SURGERY PROGRESS NOTE    Patient: NANCY SARGENT , 64y (62)Female   MRN: [id number]  Location: [address]  Visit: 25 Inpatient  Date: 25 @ 00:01    Hospital Day #: 5     Events of past 24 hours:  Pt is add on for OR today. Preoped. Pending AM CT.     PAST MEDICAL & SURGICAL HISTORY:  Gastric bypass status for obesity  LOUIS (obstructive sleep apnea)  S/P gastric bypass  S/P   S/P small bowel resection  History of total bilateral knee replacement (TKR)  H/O colonoscopy          Vitals:   T(F): 98.2 (25 @ 21:00), Max: 99 (25 @ 00:03)  HR: 113 (25 @ 21:00)  BP: 123/75 (25 @ 21:00)  RR: 18 (25 @ 21:00)  SpO2: 96% (25 @ 21:00)      Diet, NPO:   Except Medications     Special Instructions for Nursing:  Except Medications      Fluids:     I & O's:    PHYSICAL EXAM:  General: NAD, calm  HEENT: NCAT, EOMI  Cardiac: RRR  Respiratory: On RA, saturating well  Abdomen: Soft, distended, nontender  Skin: Warm/dry, normal color, no jaundice    MEDICATIONS  (STANDING):  amLODIPine   Tablet 10 milliGRAM(s) Oral daily  enoxaparin Injectable 40 milliGRAM(s) SubCutaneous every 24 hours  hydrochlorothiazide 25 milliGRAM(s) Oral daily  lactated ringers. 1000 milliLiter(s) (125 mL/Hr) IV Continuous <Continuous>  losartan 100 milliGRAM(s) Oral daily  pantoprazole    Tablet 40 milliGRAM(s) Oral before breakfast    MEDICATIONS  (PRN):  acetaminophen   IVPB .. 1000 milliGRAM(s) IV Intermittent once PRN Moderate Pain (4 - 6)  ondansetron Injectable 4 milliGRAM(s) IV Push every 6 hours PRN Nausea      DVT PROPHYLAXIS: enoxaparin Injectable 40 milliGRAM(s) SubCutaneous every 24 hours    GI PROPHYLAXIS: pantoprazole    Tablet 40 milliGRAM(s) Oral before breakfast    ANTICOAGULATION:   ANTIBIOTICS:            LAB/STUDIES:  Labs:  CAPILLARY BLOOD GLUCOSE                              11.4   8.50  )-----------( 277      ( 2025 20:00 )             36.1       Auto Immature Granulocyte %: 0.6 % (25 @ 20:00)    04    147[H]  |  108  |  39[H]  ----------------------------<  109[H]  3.8   |  21  |  1.6[H]      Calcium: 8.9 mg/dL (25 @ 20:00)      LFTs:     Lactate, Blood: 0.9 mmol/L (25 @ 06:37)      Coags:     11.40  ----< 0.97    ( 2025 20:00 )     25.9                Urinalysis Basic - ( 2025 20:00 )    Color: x / Appearance: x / SG: x / pH: x  Gluc: 109 mg/dL / Ketone: x  / Bili: x / Urobili: x   Blood: x / Protein: x / Nitrite: x   Leuk Esterase: x / RBC: x / WBC x   Sq Epi: x / Non Sq Epi: x / Bacteria: x                IMAGING:      ACCESS/ DEVICES:  [ ] Peripheral IV  [ ] Central Venous Line	[ ] R	[ ] L	[ ] IJ	[ ] Fem	[ ] SC	Placed:   [ ] Arterial Line		[ ] R	[ ] L	[ ] Fem	[ ] Rad	[ ] Ax	Placed:   [ ] PICC:					[ ] Mediport  [ ] Urinary Catheter,  Date Placed:   [ ] Chest tube: [ ] Right, [ ] Left  [ ] THONY/Oliver Drains

## 2025-04-07 NOTE — CONSULT NOTE ADULT - SUBJECTIVE AND OBJECTIVE BOX
INTERVENTIONAL RADIOLOGY CONSULT:     HPI:  The patient is a 64-year-old female with a past medical history of high blood pressure and gastric bypass surgery in , presenting with two days of sharp abdominal pain and one episode of non-bloody, non-bilious vomiting. She has a prior history of small bowel obstruction in , , and most recently in , all of which resolved with conservative management. Her current symptoms are similar in nature. A computed tomography scan of the abdomen and pelvis with oral and intravenous contrast revealed multiple ventral abdominal wall hernias containing both obstructed and non-obstructed bowel loops. A complex small bowel obstruction is present, involving three hernias on the right side of the abdomen, with dilated, fluid-filled loops of small bowel, trace fluid between loops, and a collapsed segment of bowel beyond a hernia in the right lower quadrant. These findings are consistent with a recurrent small bowel obstruction. The hernia was non reducible at bedside. (2025 20:41)      PAST MEDICAL & SURGICAL HISTORY:  Gastric bypass status for obesity      LOUIS (obstructive sleep apnea)      S/P gastric bypass      S/P       S/P small bowel resection      History of total bilateral knee replacement (TKR)      H/O colonoscopy            MEDICATIONS  (STANDING):  amLODIPine   Tablet 10 milliGRAM(s) Oral daily  chlorhexidine 2% Cloths 1 Application(s) Topical <User Schedule>  hydrochlorothiazide 25 milliGRAM(s) Oral daily  lactated ringers. 1000 milliLiter(s) (125 mL/Hr) IV Continuous <Continuous>  pantoprazole    Tablet 40 milliGRAM(s) Oral before breakfast    MEDICATIONS  (PRN):  acetaminophen   IVPB .. 1000 milliGRAM(s) IV Intermittent once PRN Moderate Pain (4 - 6)  ondansetron Injectable 4 milliGRAM(s) IV Push every 6 hours PRN Nausea      Allergies    No Known Allergies    Intolerances          FAMILY HISTORY:      Physical Exam:   Vital Signs Last 24 Hrs  T(C): 36.4 (2025 09:38), Max: 36.8 (2025 21:00)  T(F): 97.6 (2025 09:38), Max: 98.2 (2025 21:00)  HR: 101 (2025 09:38) (101 - 113)  BP: 126/70 (2025 09:38) (109/73 - 126/70)  BP(mean): --  RR: 18 (2025 09:38) (18 - 18)  SpO2: 98% (2025 09:38) (96% - 98%)          Labs:                         11.4   8.50  )-----------( 277      ( 2025 20:00 )             36.1     04-07    144  |  108  |  38[H]  ----------------------------<  103[H]  3.7   |  23  |  1.2    Ca    8.6      2025 10:55  Phos  4.0     04-06  Mg     1.7     04-05      PT/INR - ( 2025 20:00 )   PT: 11.40 sec;   INR: 0.97 ratio         PTT - ( 2025 20:00 )  PTT:25.9 sec    Pertinent labs:                      11.4   8.50  )-----------( 277      ( 2025 20:00 )             36.1       04-07    144  |  108  |  38[H]  ----------------------------<  103[H]  3.7   |  23  |  1.2    Ca    8.6      2025 10:55  Phos  4.0     04-06  Mg     1.7     04-05        PT/INR - ( 2025 20:00 )   PT: 11.40 sec;   INR: 0.97 ratio         PTT - ( 2025 20:00 )  PTT:25.9 sec    Radiology & Additional Studies:     Radiology imaging reviewed.

## 2025-04-07 NOTE — CONSULT NOTE ADULT - ASSESSMENT
ASSESSMENT AND PLAN:  4 y/o F PMH gastric bypass surgery in 2001, hx multiple SBO and abdominal wall hernias, presented to ED for 2 days of abdominal pain, CTAP showed complex SBO, multiple ventral abdominal wall hernias, planned for OR today for exploratory lap., consulted for abdominal wall Botox to facilitate ventral hernia repair    - We will not able to perform procedure, as there is no efficacy for Botox injections to assist in ventral hernia repair for up to 10 days prior to surgery. Discussed with primary team.    Please call Interventional Radiology with questions or concerns:   - M-F [phone]: [phone]   - All other hours: [phone]

## 2025-04-07 NOTE — PROGRESS NOTE ADULT - ASSESSMENT
ASSESSMENT:  64-year-old female with a past medical history of high blood pressure and gastric bypass surgery in 2001, presenting with two days of sharp abdominal pain and one episode of non-bloody, non-bilious vomiting.     PLAN:  - F/u CT A/P no con  - NPO + IVF  - Preoped for OR today  - Monitor BF  - Encourage ambulation        BLUE TEAM SPECTRA: [id number]

## 2025-04-07 NOTE — DIETITIAN INITIAL EVALUATION ADULT - OTHER CALCULATIONS
Energy: 1933kcal/day (using MSJ x 1) with consideration for age, weight status  Protein: 78-104g/day (using 1.5-2g/kg of IBW 52kg) with considerations for same reasons as above  Fluid: 1mL/kcal/day

## 2025-04-07 NOTE — DIETITIAN INITIAL EVALUATION ADULT - OTHER INFO
Patient is a 64-year-old female with a past medical history of high blood pressure and gastric bypass surgery in 2001, presenting with two days of sharp abdominal pain and one episode of non-bloody, non-bilious vomiting. She has a prior history of small bowel obstruction in 2006, 2008, and most recently in 2020, all of which resolved with conservative management. Her current symptoms are similar in nature. Ct scan with complex SBO with multiple ventral abdominal wall hernias.       Patient noted with anemia, elevated BUN, low eGFR, hypomagnesemia.

## 2025-04-07 NOTE — DIETITIAN INITIAL EVALUATION ADULT - PERTINENT MEDS FT
MEDICATIONS  (STANDING):  amLODIPine   Tablet 10 milliGRAM(s) Oral daily  chlorhexidine 2% Cloths 1 Application(s) Topical <User Schedule>  hydrochlorothiazide 25 milliGRAM(s) Oral daily  lactated ringers. 1000 milliLiter(s) (125 mL/Hr) IV Continuous <Continuous>  pantoprazole    Tablet 40 milliGRAM(s) Oral before breakfast    MEDICATIONS  (PRN):  acetaminophen   IVPB .. 1000 milliGRAM(s) IV Intermittent once PRN Moderate Pain (4 - 6)  ondansetron Injectable 4 milliGRAM(s) IV Push every 6 hours PRN Nausea

## 2025-04-07 NOTE — DIETITIAN INITIAL EVALUATION ADULT - PERTINENT LABORATORY DATA
04-07    144  |  108  |  38[H]  ----------------------------<  103[H]  3.7   |  23  |  1.2    Ca    8.6      07 Apr 2025 10:55  Phos  4.0     04-06  Mg     1.7     04-05

## 2025-04-08 RX ORDER — SODIUM CHLORIDE 9 G/1000ML
1000 INJECTION, SOLUTION INTRAVENOUS
Refills: 0 | Status: DISCONTINUED | OUTPATIENT
Start: 2025-04-08 | End: 2025-04-10

## 2025-04-08 RX ORDER — SODIUM CHLORIDE 9 G/1000ML
500 INJECTION, SOLUTION INTRAVENOUS ONCE
Refills: 0 | Status: COMPLETED | OUTPATIENT
Start: 2025-04-08 | End: 2025-04-08

## 2025-04-08 RX ADMIN — Medication 40 MILLIGRAM(S): at 06:26

## 2025-04-08 RX ADMIN — AMLODIPINE BESYLATE 10 MILLIGRAM(S): 10 TABLET ORAL at 05:23

## 2025-04-08 RX ADMIN — HEPARIN SODIUM 5000 UNIT(S): 1000 INJECTION INTRAVENOUS; SUBCUTANEOUS at 05:24

## 2025-04-08 RX ADMIN — SODIUM CHLORIDE 160 MILLILITER(S): 9 INJECTION, SOLUTION INTRAVENOUS at 18:53

## 2025-04-08 RX ADMIN — HEPARIN SODIUM 5000 UNIT(S): 1000 INJECTION INTRAVENOUS; SUBCUTANEOUS at 21:30

## 2025-04-08 RX ADMIN — SODIUM CHLORIDE 500 MILLILITER(S): 9 INJECTION, SOLUTION INTRAVENOUS at 07:54

## 2025-04-08 RX ADMIN — SODIUM CHLORIDE 125 MILLILITER(S): 9 INJECTION, SOLUTION INTRAVENOUS at 11:06

## 2025-04-08 RX ADMIN — SODIUM CHLORIDE 125 MILLILITER(S): 9 INJECTION, SOLUTION INTRAVENOUS at 07:53

## 2025-04-08 RX ADMIN — Medication 1 APPLICATION(S): at 05:28

## 2025-04-08 RX ADMIN — HEPARIN SODIUM 5000 UNIT(S): 1000 INJECTION INTRAVENOUS; SUBCUTANEOUS at 14:07

## 2025-04-08 NOTE — PROGRESS NOTE ADULT - ASSESSMENT
ASSESSMENT:  64y F w/ PMHx of HTN and gastric bypass surgery in 2001 presents with abdominal distention. CT with incarcerated ventral hernia with SBO.     PLAN:  - NPO + IVF  - Booked for today   - Monitor BF  - Encourage ambulation      BLUE TEAM SPECTRA: [id number]

## 2025-04-08 NOTE — PROGRESS NOTE ADULT - SUBJECTIVE AND OBJECTIVE BOX
GENERAL SURGERY PROGRESS NOTE    Patient: NANCY SARGENT , 64y (62)Female   MRN: [id number]  Location: [address]  Visit: 25 Inpatient  Date: 25 @ 06:46    Hospital Day #: 6    Procedure/Dx/Injuries: incarcerated ventral hernia with SBO    Events of past 24 hours: Patient was seen and examined bedside. Patient denies having a bowel movement. Denies nausea and vomiting. Admits to passing gas. Denies abdominal pain. Patient is ambulating. IR was consulted and no intervention.     PAST MEDICAL & SURGICAL HISTORY:    Gastric bypass status for obesity  LOUIS (obstructive sleep apnea)  S/P gastric bypass  S/P   S/P small bowel resection  History of total bilateral knee replacement (TKR)  H/O colonoscopy     Vitals:   T(F): 97.6 (25 @ 00:29), Max: 98.4 (25 @ 16:39)  HR: 90 (25 @ 05:15)  BP: 117/90 (25 @ 05:15)  RR: 18 (25 @ 00:29)  SpO2: 97% (25 @ 00:29)    Diet, NPO:   Except Medications     Special Instructions for Nursing:  Except Medications    Fluids:     I & O's:    25 @ 07:01  -  25 @ 07:00  --------------------------------------------------------  IN:    Lactated Ringers: 1375 mL  Total IN: 1375 mL    OUT:    Voided (mL): 250 mL  Total OUT: 250 mL    Total NET: 1125 mL    PHYSICAL EXAM:  General: NAD  Cardiac: RRR   Respiratory: normal respiratory effort  Abdomen: Distended abdomen, non-tender, no rebound, no guarding.     MEDICATIONS  (STANDING):  amLODIPine   Tablet 10 milliGRAM(s) Oral daily  chlorhexidine 2% Cloths 1 Application(s) Topical <User Schedule>  heparin   Injectable 5000 Unit(s) SubCutaneous every 8 hours  hydrochlorothiazide 25 milliGRAM(s) Oral daily  lactated ringers. 1000 milliLiter(s) (125 mL/Hr) IV Continuous <Continuous>  pantoprazole    Tablet 40 milliGRAM(s) Oral before breakfast    MEDICATIONS  (PRN):  acetaminophen   IVPB .. 1000 milliGRAM(s) IV Intermittent once PRN Moderate Pain (4 - 6)  ondansetron Injectable 4 milliGRAM(s) IV Push every 6 hours PRN Nausea    DVT PROPHYLAXIS: heparin   Injectable 5000 Unit(s) SubCutaneous every 8 hours    GI PROPHYLAXIS: pantoprazole    Tablet 40 milliGRAM(s) Oral before breakfast    LAB/STUDIES:  Labs:                        10.6   5.92  )-----------( 239      ( 2025 21:35 )             32.7       Auto Immature Granulocyte %: 0.8 % (25 @ 21:35)        148[H]  |  111[H]  |  38[H]  ----------------------------<  98  3.9   |  22  |  1.3      Calcium: 8.5 mg/dL (25 @ 21:35)    Coags:     11.40  ----< 0.97    ( 2025 20:00 )     25.9      Urinalysis Basic - ( 2025 21:35 )    Color: x / Appearance: x / SG: x / pH: x  Gluc: 98 mg/dL / Ketone: x  / Bili: x / Urobili: x   Blood: x / Protein: x / Nitrite: x   Leuk Esterase: x / RBC: x / WBC x   Sq Epi: x / Non Sq Epi: x / Bacteria: x    IMAGING:    CT Abdomen and Pelvis No Cont (25 @ 14:27)     IMPRESSION:  Redemonstration of multiple ventral abdominal wall hernias with complex   small bowel obstruction. Once again, there is transition point at the   exit site of right-sided hernia. Contrast is largely proximal to this   transition point. There is question of a small amount of contrast   traversing immediately past the transition point but this is   indeterminate. Degree of small bowel dilatation is similar to slightly   increased from prior.    Remainder findings are without significant change on this short-term   follow-up exam. See prior report.      ACCESS/ DEVICES:  [X] Peripheral IV

## 2025-04-09 LAB
ANION GAP SERPL CALC-SCNC: 13 MMOL/L — SIGNIFICANT CHANGE UP (ref 7–14)
ANION GAP SERPL CALC-SCNC: 14 MMOL/L — SIGNIFICANT CHANGE UP (ref 7–14)
APTT BLD: 26.6 SEC — LOW (ref 27–39.2)
BASOPHILS # BLD AUTO: 0.01 K/UL — SIGNIFICANT CHANGE UP (ref 0–0.2)
BASOPHILS # BLD AUTO: 0.02 K/UL — SIGNIFICANT CHANGE UP (ref 0–0.2)
BASOPHILS NFR BLD AUTO: 0.2 % — SIGNIFICANT CHANGE UP (ref 0–1)
BASOPHILS NFR BLD AUTO: 0.3 % — SIGNIFICANT CHANGE UP (ref 0–1)
BUN SERPL-MCNC: 16 MG/DL — SIGNIFICANT CHANGE UP (ref 10–20)
BUN SERPL-MCNC: 23 MG/DL — HIGH (ref 10–20)
CALCIUM SERPL-MCNC: 8.1 MG/DL — LOW (ref 8.4–10.5)
CALCIUM SERPL-MCNC: 8.5 MG/DL — SIGNIFICANT CHANGE UP (ref 8.4–10.5)
CHLORIDE SERPL-SCNC: 105 MMOL/L — SIGNIFICANT CHANGE UP (ref 98–110)
CHLORIDE SERPL-SCNC: 107 MMOL/L — SIGNIFICANT CHANGE UP (ref 98–110)
CO2 SERPL-SCNC: 23 MMOL/L — SIGNIFICANT CHANGE UP (ref 17–32)
CO2 SERPL-SCNC: 23 MMOL/L — SIGNIFICANT CHANGE UP (ref 17–32)
CREAT SERPL-MCNC: 1 MG/DL — SIGNIFICANT CHANGE UP (ref 0.7–1.5)
CREAT SERPL-MCNC: 1.1 MG/DL — SIGNIFICANT CHANGE UP (ref 0.7–1.5)
EGFR: 56 ML/MIN/1.73M2 — LOW
EGFR: 56 ML/MIN/1.73M2 — LOW
EGFR: 63 ML/MIN/1.73M2 — SIGNIFICANT CHANGE UP
EGFR: 63 ML/MIN/1.73M2 — SIGNIFICANT CHANGE UP
EOSINOPHIL # BLD AUTO: 0.03 K/UL — SIGNIFICANT CHANGE UP (ref 0–0.7)
EOSINOPHIL # BLD AUTO: 0.07 K/UL — SIGNIFICANT CHANGE UP (ref 0–0.7)
EOSINOPHIL NFR BLD AUTO: 0.5 % — SIGNIFICANT CHANGE UP (ref 0–8)
EOSINOPHIL NFR BLD AUTO: 1.6 % — SIGNIFICANT CHANGE UP (ref 0–8)
GLUCOSE SERPL-MCNC: 111 MG/DL — HIGH (ref 70–99)
GLUCOSE SERPL-MCNC: 120 MG/DL — HIGH (ref 70–99)
HCT VFR BLD CALC: 30.6 % — LOW (ref 37–47)
HCT VFR BLD CALC: 31.5 % — LOW (ref 37–47)
HGB BLD-MCNC: 10 G/DL — LOW (ref 12–16)
HGB BLD-MCNC: 10.2 G/DL — LOW (ref 12–16)
IMM GRANULOCYTES NFR BLD AUTO: 0.9 % — HIGH (ref 0.1–0.3)
IMM GRANULOCYTES NFR BLD AUTO: 1.1 % — HIGH (ref 0.1–0.3)
INR BLD: 0.93 RATIO — SIGNIFICANT CHANGE UP (ref 0.65–1.3)
LYMPHOCYTES # BLD AUTO: 0.79 K/UL — LOW (ref 1.2–3.4)
LYMPHOCYTES # BLD AUTO: 0.97 K/UL — LOW (ref 1.2–3.4)
LYMPHOCYTES # BLD AUTO: 12.3 % — LOW (ref 20.5–51.1)
LYMPHOCYTES # BLD AUTO: 21.7 % — SIGNIFICANT CHANGE UP (ref 20.5–51.1)
MAGNESIUM SERPL-MCNC: 1.8 MG/DL — SIGNIFICANT CHANGE UP (ref 1.8–2.4)
MAGNESIUM SERPL-MCNC: 2.1 MG/DL — SIGNIFICANT CHANGE UP (ref 1.8–2.4)
MCHC RBC-ENTMCNC: 30.4 PG — SIGNIFICANT CHANGE UP (ref 27–31)
MCHC RBC-ENTMCNC: 30.4 PG — SIGNIFICANT CHANGE UP (ref 27–31)
MCHC RBC-ENTMCNC: 32.4 G/DL — SIGNIFICANT CHANGE UP (ref 32–37)
MCHC RBC-ENTMCNC: 32.7 G/DL — SIGNIFICANT CHANGE UP (ref 32–37)
MCV RBC AUTO: 93 FL — SIGNIFICANT CHANGE UP (ref 81–99)
MCV RBC AUTO: 93.8 FL — SIGNIFICANT CHANGE UP (ref 81–99)
MONOCYTES # BLD AUTO: 0.36 K/UL — SIGNIFICANT CHANGE UP (ref 0.1–0.6)
MONOCYTES # BLD AUTO: 0.46 K/UL — SIGNIFICANT CHANGE UP (ref 0.1–0.6)
MONOCYTES NFR BLD AUTO: 7.2 % — SIGNIFICANT CHANGE UP (ref 1.7–9.3)
MONOCYTES NFR BLD AUTO: 8.1 % — SIGNIFICANT CHANGE UP (ref 1.7–9.3)
NEUTROPHILS # BLD AUTO: 3.01 K/UL — SIGNIFICANT CHANGE UP (ref 1.4–6.5)
NEUTROPHILS # BLD AUTO: 5.04 K/UL — SIGNIFICANT CHANGE UP (ref 1.4–6.5)
NEUTROPHILS NFR BLD AUTO: 67.5 % — SIGNIFICANT CHANGE UP (ref 42.2–75.2)
NEUTROPHILS NFR BLD AUTO: 78.6 % — HIGH (ref 42.2–75.2)
NRBC BLD AUTO-RTO: 0 /100 WBCS — SIGNIFICANT CHANGE UP (ref 0–0)
NRBC BLD AUTO-RTO: 0 /100 WBCS — SIGNIFICANT CHANGE UP (ref 0–0)
PHOSPHATE SERPL-MCNC: 2.4 MG/DL — SIGNIFICANT CHANGE UP (ref 2.1–4.9)
PHOSPHATE SERPL-MCNC: 3.2 MG/DL — SIGNIFICANT CHANGE UP (ref 2.1–4.9)
PLATELET # BLD AUTO: 208 K/UL — SIGNIFICANT CHANGE UP (ref 130–400)
PLATELET # BLD AUTO: 213 K/UL — SIGNIFICANT CHANGE UP (ref 130–400)
PMV BLD: 10 FL — SIGNIFICANT CHANGE UP (ref 7.4–10.4)
PMV BLD: 10.4 FL — SIGNIFICANT CHANGE UP (ref 7.4–10.4)
POTASSIUM SERPL-MCNC: 3.4 MMOL/L — LOW (ref 3.5–5)
POTASSIUM SERPL-MCNC: 3.9 MMOL/L — SIGNIFICANT CHANGE UP (ref 3.5–5)
POTASSIUM SERPL-SCNC: 3.4 MMOL/L — LOW (ref 3.5–5)
POTASSIUM SERPL-SCNC: 3.9 MMOL/L — SIGNIFICANT CHANGE UP (ref 3.5–5)
PROTHROM AB SERPL-ACNC: 10.9 SEC — SIGNIFICANT CHANGE UP (ref 9.95–12.87)
RBC # BLD: 3.29 M/UL — LOW (ref 4.2–5.4)
RBC # BLD: 3.36 M/UL — LOW (ref 4.2–5.4)
RBC # FLD: 12.8 % — SIGNIFICANT CHANGE UP (ref 11.5–14.5)
RBC # FLD: 13 % — SIGNIFICANT CHANGE UP (ref 11.5–14.5)
SODIUM SERPL-SCNC: 141 MMOL/L — SIGNIFICANT CHANGE UP (ref 135–146)
SODIUM SERPL-SCNC: 144 MMOL/L — SIGNIFICANT CHANGE UP (ref 135–146)
WBC # BLD: 4.46 K/UL — LOW (ref 4.8–10.8)
WBC # BLD: 6.41 K/UL — SIGNIFICANT CHANGE UP (ref 4.8–10.8)
WBC # FLD AUTO: 4.46 K/UL — LOW (ref 4.8–10.8)
WBC # FLD AUTO: 6.41 K/UL — SIGNIFICANT CHANGE UP (ref 4.8–10.8)

## 2025-04-09 PROCEDURE — 99232 SBSQ HOSP IP/OBS MODERATE 35: CPT | Mod: 57,GC

## 2025-04-09 RX ORDER — BISACODYL 5 MG
10 TABLET, DELAYED RELEASE (ENTERIC COATED) ORAL AT BEDTIME
Refills: 0 | Status: COMPLETED | OUTPATIENT
Start: 2025-04-09 | End: 2025-04-09

## 2025-04-09 RX ORDER — MAGNESIUM SULFATE 500 MG/ML
2 SYRINGE (ML) INJECTION ONCE
Refills: 0 | Status: COMPLETED | OUTPATIENT
Start: 2025-04-09 | End: 2025-04-09

## 2025-04-09 RX ORDER — POTASSIUM PHOSPHATE, MONOBASIC POTASSIUM PHOSPHATE, DIBASIC INJECTION, 236; 224 MG/ML; MG/ML
15 SOLUTION, CONCENTRATE INTRAVENOUS ONCE
Refills: 0 | Status: COMPLETED | OUTPATIENT
Start: 2025-04-09 | End: 2025-04-09

## 2025-04-09 RX ADMIN — Medication 50 MILLIEQUIVALENT(S): at 08:15

## 2025-04-09 RX ADMIN — POTASSIUM PHOSPHATE, MONOBASIC POTASSIUM PHOSPHATE, DIBASIC INJECTION, 63.75 MILLIMOLE(S): 236; 224 SOLUTION, CONCENTRATE INTRAVENOUS at 22:23

## 2025-04-09 RX ADMIN — HEPARIN SODIUM 5000 UNIT(S): 1000 INJECTION INTRAVENOUS; SUBCUTANEOUS at 14:09

## 2025-04-09 RX ADMIN — Medication 10 MILLIGRAM(S): at 22:23

## 2025-04-09 RX ADMIN — SODIUM CHLORIDE 160 MILLILITER(S): 9 INJECTION, SOLUTION INTRAVENOUS at 00:37

## 2025-04-09 RX ADMIN — SODIUM CHLORIDE 160 MILLILITER(S): 9 INJECTION, SOLUTION INTRAVENOUS at 11:26

## 2025-04-09 RX ADMIN — HEPARIN SODIUM 5000 UNIT(S): 1000 INJECTION INTRAVENOUS; SUBCUTANEOUS at 22:30

## 2025-04-09 RX ADMIN — Medication 25 GRAM(S): at 12:01

## 2025-04-09 RX ADMIN — Medication 50 MILLIEQUIVALENT(S): at 14:08

## 2025-04-09 RX ADMIN — Medication 1 APPLICATION(S): at 06:01

## 2025-04-09 RX ADMIN — SODIUM CHLORIDE 160 MILLILITER(S): 9 INJECTION, SOLUTION INTRAVENOUS at 05:58

## 2025-04-09 RX ADMIN — Medication 40 MILLIGRAM(S): at 05:58

## 2025-04-09 RX ADMIN — Medication 50 MILLIEQUIVALENT(S): at 11:59

## 2025-04-09 RX ADMIN — HEPARIN SODIUM 5000 UNIT(S): 1000 INJECTION INTRAVENOUS; SUBCUTANEOUS at 05:58

## 2025-04-09 RX ADMIN — AMLODIPINE BESYLATE 10 MILLIGRAM(S): 10 TABLET ORAL at 05:58

## 2025-04-09 NOTE — PROVIDER CONTACT NOTE (MEDICATION) - ACTION/TREATMENT ORDERED:
MD notified, will look into ordering medication again
Provider aware pt received 1 dose, give 2 additional doses.

## 2025-04-09 NOTE — PROGRESS NOTE ADULT - ATTENDING COMMENTS
Pt examined  labs and images reviewed  pt with supermorbid obesity and complex hernia with sbo  previously treated without surgery   passing flatus awaiting bowel movement    passing flatus  impression : extremely high risk - supermorbid obesity and complex hernia with partial sbo  will continue aggressive hydration  as she is not improving will schedule for surgery   various options discussed  pt and family and me would try to avoid surgery   if things change will emergently take her to OR

## 2025-04-09 NOTE — PROGRESS NOTE ADULT - SUBJECTIVE AND OBJECTIVE BOX
SURGERY PROGRESS NOTE    Patient: NANCY SARGENT , 64y (62)Female   MRN: [id number]  Location: [address]  Visit: 25 Inpatient  Date: 25 @ 03:30    Hospital Day #: 7    Procedure/Dx/Injuries: incarcerated ventral hernia with SBO    Events of past 24 hours: Yesterday patient declined surgery despite discussion about risks/benefits. Booked again for thursday. Continues to walk around the unit and pass gas, no BM. Denies nausea and vomiting. Over night refused labs.     PHYSICAL EXAM:  General: NAD  Cardiac: RRR   Respiratory: normal respiratory effort  Abdomen: Morbid obesity, ventral hernia. Distended abdomen, non-tender, no rebound, no guarding.       PAST MEDICAL & SURGICAL HISTORY:  Gastric bypass status for obesity      LOUIS (obstructive sleep apnea)      S/P gastric bypass      S/P       S/P small bowel resection      History of total bilateral knee replacement (TKR)      H/O colonoscopy            Vitals:   T(F): 97.1 (25 @ 00:04), Max: 98 (25 @ 16:53)  HR: 93 (25 @ 00:04)  BP: 112/75 (25 @ 00:04)  RR: 18 (25 @ 00:04)  SpO2: 96% (25 @ 00:04)      Diet, NPO:   Except Medications     Special Instructions for Nursing:  Except Medications      Fluids: dextrose 5% + sodium chloride 0.45%.: Solution, 1000 milliLiter(s) infuse at 160 mL/Hr      I & O's:    25 @ 07:01  -  25 @ 07:00  --------------------------------------------------------  IN:    Lactated Ringers: 2125 mL  Total IN: 2125 mL    OUT:    Voided (mL): 1350 mL  Total OUT: 1350 mL    Total NET: 775 mL      MEDICATIONS  (STANDING):  amLODIPine   Tablet 10 milliGRAM(s) Oral daily  chlorhexidine 2% Cloths 1 Application(s) Topical <User Schedule>  dextrose 5% + sodium chloride 0.45%. 1000 milliLiter(s) (160 mL/Hr) IV Continuous <Continuous>  heparin   Injectable 5000 Unit(s) SubCutaneous every 8 hours  hydrochlorothiazide 25 milliGRAM(s) Oral daily  pantoprazole    Tablet 40 milliGRAM(s) Oral before breakfast    MEDICATIONS  (PRN):  acetaminophen   IVPB .. 1000 milliGRAM(s) IV Intermittent once PRN Moderate Pain (4 - 6)  ondansetron Injectable 4 milliGRAM(s) IV Push every 6 hours PRN Nausea      DVT PROPHYLAXIS: heparin   Injectable 5000 Unit(s) SubCutaneous every 8 hours    GI PROPHYLAXIS: pantoprazole    Tablet 40 milliGRAM(s) Oral before breakfast    ANTICOAGULATION:   ANTIBIOTICS:            LAB/STUDIES:  Labs:  CAPILLARY BLOOD GLUCOSE                              10.6   5.92  )-----------( 239      ( 2025 21:35 )             32.7         04-07    148[H]  |  111[H]  |  38[H]  ----------------------------<  98  3.9   |  22  |  1.3          LFTs:         Coags:            Urinalysis Basic - ( 2025 21:35 )    Color: x / Appearance: x / SG: x / pH: x  Gluc: 98 mg/dL / Ketone: x  / Bili: x / Urobili: x   Blood: x / Protein: x / Nitrite: x   Leuk Esterase: x / RBC: x / WBC x   Sq Epi: x / Non Sq Epi: x / Bacteria: x      64y F w/ PMHx of HTN and gastric bypass surgery in  presents with abdominal distention. CT with incarcerated ventral hernia with SBO.     PLAN:  - NPO + IVF  - NG tube to LCWS  - Booked for thursday  - Preop today  - Monitor BF  - Encourage ambulation      BLUE TEAM SPECTRA: [id number]

## 2025-04-09 NOTE — PROVIDER CONTACT NOTE (MEDICATION) - SITUATION
patient has a new order for potassium chloride x2 doses, RN already gave a dose of potassium this AM, does team still want 2 additional doses?
patient has been getting Losartan 100 mg daily. order was discontinued and patient does not know why and is asking for it

## 2025-04-10 ENCOUNTER — RESULT REVIEW (OUTPATIENT)
Age: 65
End: 2025-04-10

## 2025-04-10 LAB
ANION GAP SERPL CALC-SCNC: 16 MMOL/L — HIGH (ref 7–14)
APTT BLD: 20.4 SEC — CRITICAL LOW (ref 27–39.2)
BASOPHILS # BLD AUTO: 0.06 K/UL — SIGNIFICANT CHANGE UP (ref 0–0.2)
BASOPHILS NFR BLD AUTO: 0.6 % — SIGNIFICANT CHANGE UP (ref 0–1)
BUN SERPL-MCNC: 17 MG/DL — SIGNIFICANT CHANGE UP (ref 10–20)
CALCIUM SERPL-MCNC: 7.9 MG/DL — LOW (ref 8.4–10.5)
CHLORIDE SERPL-SCNC: 108 MMOL/L — SIGNIFICANT CHANGE UP (ref 98–110)
CO2 SERPL-SCNC: 18 MMOL/L — SIGNIFICANT CHANGE UP (ref 17–32)
CREAT SERPL-MCNC: 1.9 MG/DL — HIGH (ref 0.7–1.5)
EGFR: 29 ML/MIN/1.73M2 — LOW
EGFR: 29 ML/MIN/1.73M2 — LOW
EOSINOPHIL # BLD AUTO: 0.03 K/UL — SIGNIFICANT CHANGE UP (ref 0–0.7)
EOSINOPHIL NFR BLD AUTO: 0.3 % — SIGNIFICANT CHANGE UP (ref 0–8)
GLUCOSE SERPL-MCNC: 217 MG/DL — HIGH (ref 70–99)
HCT VFR BLD CALC: 36.5 % — LOW (ref 37–47)
HCT VFR BLD CALC: 37.2 % — SIGNIFICANT CHANGE UP (ref 37–47)
HGB BLD-MCNC: 11.3 G/DL — LOW (ref 12–16)
HGB BLD-MCNC: 12 G/DL — SIGNIFICANT CHANGE UP (ref 12–16)
IMM GRANULOCYTES NFR BLD AUTO: 0.8 % — HIGH (ref 0.1–0.3)
INR BLD: 1.06 RATIO — SIGNIFICANT CHANGE UP (ref 0.65–1.3)
LYMPHOCYTES # BLD AUTO: 1.02 K/UL — LOW (ref 1.2–3.4)
LYMPHOCYTES # BLD AUTO: 9.6 % — LOW (ref 20.5–51.1)
MAGNESIUM SERPL-MCNC: 1.6 MG/DL — LOW (ref 1.8–2.4)
MCHC RBC-ENTMCNC: 29.8 PG — SIGNIFICANT CHANGE UP (ref 27–31)
MCHC RBC-ENTMCNC: 30.3 PG — SIGNIFICANT CHANGE UP (ref 27–31)
MCHC RBC-ENTMCNC: 31 G/DL — LOW (ref 32–37)
MCHC RBC-ENTMCNC: 32.3 G/DL — SIGNIFICANT CHANGE UP (ref 32–37)
MCV RBC AUTO: 93.9 FL — SIGNIFICANT CHANGE UP (ref 81–99)
MCV RBC AUTO: 96.3 FL — SIGNIFICANT CHANGE UP (ref 81–99)
MONOCYTES # BLD AUTO: 0.32 K/UL — SIGNIFICANT CHANGE UP (ref 0.1–0.6)
MONOCYTES NFR BLD AUTO: 3 % — SIGNIFICANT CHANGE UP (ref 1.7–9.3)
NEUTROPHILS # BLD AUTO: 9.07 K/UL — HIGH (ref 1.4–6.5)
NEUTROPHILS NFR BLD AUTO: 85.7 % — HIGH (ref 42.2–75.2)
NRBC BLD AUTO-RTO: 0 /100 WBCS — SIGNIFICANT CHANGE UP (ref 0–0)
PHOSPHATE SERPL-MCNC: 5.6 MG/DL — HIGH (ref 2.1–4.9)
PLATELET # BLD AUTO: 291 K/UL — SIGNIFICANT CHANGE UP (ref 130–400)
PLATELET # BLD AUTO: 306 K/UL — SIGNIFICANT CHANGE UP (ref 130–400)
PMV BLD: 10.7 FL — HIGH (ref 7.4–10.4)
PMV BLD: 10.9 FL — HIGH (ref 7.4–10.4)
POTASSIUM SERPL-MCNC: 4.5 MMOL/L — SIGNIFICANT CHANGE UP (ref 3.5–5)
POTASSIUM SERPL-SCNC: 4.5 MMOL/L — SIGNIFICANT CHANGE UP (ref 3.5–5)
PROTHROM AB SERPL-ACNC: 12.5 SEC — SIGNIFICANT CHANGE UP (ref 9.95–12.87)
RBC # BLD: 3.79 M/UL — LOW (ref 4.2–5.4)
RBC # BLD: 3.96 M/UL — LOW (ref 4.2–5.4)
RBC # FLD: 13.5 % — SIGNIFICANT CHANGE UP (ref 11.5–14.5)
RBC # FLD: 13.5 % — SIGNIFICANT CHANGE UP (ref 11.5–14.5)
SODIUM SERPL-SCNC: 142 MMOL/L — SIGNIFICANT CHANGE UP (ref 135–146)
WBC # BLD: 10.58 K/UL — SIGNIFICANT CHANGE UP (ref 4.8–10.8)
WBC # BLD: 15.24 K/UL — HIGH (ref 4.8–10.8)
WBC # FLD AUTO: 10.58 K/UL — SIGNIFICANT CHANGE UP (ref 4.8–10.8)
WBC # FLD AUTO: 15.24 K/UL — HIGH (ref 4.8–10.8)

## 2025-04-10 PROCEDURE — 99233 SBSQ HOSP IP/OBS HIGH 50: CPT | Mod: 57

## 2025-04-10 PROCEDURE — 44120 REMOVAL OF SMALL INTESTINE: CPT

## 2025-04-10 PROCEDURE — 88307 TISSUE EXAM BY PATHOLOGIST: CPT | Mod: 26

## 2025-04-10 PROCEDURE — 88302 TISSUE EXAM BY PATHOLOGIST: CPT | Mod: 26

## 2025-04-10 PROCEDURE — 49594 RPR AA HRN 1ST 3-10 NCR/STRN: CPT | Mod: 59

## 2025-04-10 PROCEDURE — 74176 CT ABD & PELVIS W/O CONTRAST: CPT | Mod: 26

## 2025-04-10 PROCEDURE — 71045 X-RAY EXAM CHEST 1 VIEW: CPT | Mod: 26

## 2025-04-10 PROCEDURE — 88304 TISSUE EXAM BY PATHOLOGIST: CPT | Mod: 26

## 2025-04-10 PROCEDURE — 44955 APPENDECTOMY ADD-ON: CPT

## 2025-04-10 PROCEDURE — 93010 ELECTROCARDIOGRAM REPORT: CPT

## 2025-04-10 RX ORDER — HEPARIN SODIUM 1000 [USP'U]/ML
5000 INJECTION INTRAVENOUS; SUBCUTANEOUS EVERY 8 HOURS
Refills: 0 | Status: DISCONTINUED | OUTPATIENT
Start: 2025-04-10 | End: 2025-04-11

## 2025-04-10 RX ORDER — HYDROMORPHONE/SOD CHLOR,ISO/PF 2 MG/10 ML
0.5 SYRINGE (ML) INJECTION
Refills: 0 | Status: DISCONTINUED | OUTPATIENT
Start: 2025-04-10 | End: 2025-04-10

## 2025-04-10 RX ORDER — SODIUM CHLORIDE 9 G/1000ML
1000 INJECTION, SOLUTION INTRAVENOUS
Refills: 0 | Status: DISCONTINUED | OUTPATIENT
Start: 2025-04-10 | End: 2025-04-14

## 2025-04-10 RX ORDER — ACETAMINOPHEN 500 MG/5ML
1000 LIQUID (ML) ORAL ONCE
Refills: 0 | Status: COMPLETED | OUTPATIENT
Start: 2025-04-11 | End: 2025-04-11

## 2025-04-10 RX ORDER — PIPERACILLIN-TAZO-DEXTROSE,ISO 3.375G/5
3.38 IV SOLUTION, PIGGYBACK PREMIX FROZEN(ML) INTRAVENOUS EVERY 8 HOURS
Refills: 0 | Status: DISCONTINUED | OUTPATIENT
Start: 2025-04-11 | End: 2025-04-11

## 2025-04-10 RX ORDER — PIPERACILLIN-TAZO-DEXTROSE,ISO 3.375G/5
3.38 IV SOLUTION, PIGGYBACK PREMIX FROZEN(ML) INTRAVENOUS ONCE
Refills: 0 | Status: COMPLETED | OUTPATIENT
Start: 2025-04-10 | End: 2025-04-10

## 2025-04-10 RX ORDER — SODIUM CHLORIDE 9 G/1000ML
1000 INJECTION, SOLUTION INTRAVENOUS
Refills: 0 | Status: DISCONTINUED | OUTPATIENT
Start: 2025-04-10 | End: 2025-04-10

## 2025-04-10 RX ORDER — ACETAMINOPHEN 500 MG/5ML
1000 LIQUID (ML) ORAL ONCE
Refills: 0 | Status: COMPLETED | OUTPATIENT
Start: 2025-04-10 | End: 2025-04-12

## 2025-04-10 RX ORDER — MAGNESIUM SULFATE 500 MG/ML
2 SYRINGE (ML) INJECTION ONCE
Refills: 0 | Status: COMPLETED | OUTPATIENT
Start: 2025-04-10 | End: 2025-04-10

## 2025-04-10 RX ORDER — ONDANSETRON HCL/PF 4 MG/2 ML
4 VIAL (ML) INJECTION EVERY 6 HOURS
Refills: 0 | Status: DISCONTINUED | OUTPATIENT
Start: 2025-04-10 | End: 2025-04-11

## 2025-04-10 RX ORDER — HYDROMORPHONE/SOD CHLOR,ISO/PF 2 MG/10 ML
0.5 SYRINGE (ML) INJECTION EVERY 6 HOURS
Refills: 0 | Status: DISCONTINUED | OUTPATIENT
Start: 2025-04-10 | End: 2025-04-11

## 2025-04-10 RX ORDER — ACETAMINOPHEN 500 MG/5ML
1000 LIQUID (ML) ORAL ONCE
Refills: 0 | Status: COMPLETED | OUTPATIENT
Start: 2025-04-10 | End: 2025-04-10

## 2025-04-10 RX ORDER — HYDROMORPHONE/SOD CHLOR,ISO/PF 2 MG/10 ML
0.25 SYRINGE (ML) INJECTION EVERY 6 HOURS
Refills: 0 | Status: DISCONTINUED | OUTPATIENT
Start: 2025-04-10 | End: 2025-04-11

## 2025-04-10 RX ORDER — SIMETHICONE 80 MG
80 TABLET,CHEWABLE ORAL ONCE
Refills: 0 | Status: COMPLETED | OUTPATIENT
Start: 2025-04-10 | End: 2025-04-10

## 2025-04-10 RX ADMIN — Medication 0.5 MILLIGRAM(S): at 20:31

## 2025-04-10 RX ADMIN — Medication 1000 MILLIGRAM(S): at 20:29

## 2025-04-10 RX ADMIN — SODIUM CHLORIDE 160 MILLILITER(S): 9 INJECTION, SOLUTION INTRAVENOUS at 02:33

## 2025-04-10 RX ADMIN — Medication 25 GRAM(S): at 20:41

## 2025-04-10 RX ADMIN — Medication 25 GRAM(S): at 21:13

## 2025-04-10 RX ADMIN — Medication 4 MILLIGRAM(S): at 07:30

## 2025-04-10 RX ADMIN — Medication 0.5 MILLIGRAM(S): at 23:24

## 2025-04-10 RX ADMIN — HEPARIN SODIUM 5000 UNIT(S): 1000 INJECTION INTRAVENOUS; SUBCUTANEOUS at 21:14

## 2025-04-10 RX ADMIN — Medication 1 APPLICATION(S): at 20:41

## 2025-04-10 RX ADMIN — Medication 0.25 MILLIGRAM(S): at 20:51

## 2025-04-10 RX ADMIN — Medication 1 APPLICATION(S): at 05:12

## 2025-04-10 RX ADMIN — Medication 0.5 MILLIGRAM(S): at 19:39

## 2025-04-10 RX ADMIN — Medication 80 MILLIGRAM(S): at 05:01

## 2025-04-10 RX ADMIN — Medication 400 MILLIGRAM(S): at 19:43

## 2025-04-10 RX ADMIN — Medication 200 GRAM(S): at 19:43

## 2025-04-10 RX ADMIN — Medication 40 MILLIGRAM(S): at 05:06

## 2025-04-10 RX ADMIN — AMLODIPINE BESYLATE 10 MILLIGRAM(S): 10 TABLET ORAL at 05:06

## 2025-04-10 RX ADMIN — Medication 0.25 MILLIGRAM(S): at 20:39

## 2025-04-10 RX ADMIN — HEPARIN SODIUM 5000 UNIT(S): 1000 INJECTION INTRAVENOUS; SUBCUTANEOUS at 05:03

## 2025-04-10 NOTE — PROGRESS NOTE ADULT - SUBJECTIVE AND OBJECTIVE BOX
GENERAL SURGERY PROGRESS NOTE     NANCY SARGENT  [address] [place] [address] day :8d      Surgical Attending: Rose Lema  24 hour events: +g, -bm, -N/V. Ambulating around the floor unit. Consented and preopd for today. 15Kphos for repletions. R brachial vein midline placed.    PHYSICAL EXAM:  General: NAD  Cardiac: RRR   Respiratory: normal respiratory effort  Abdomen: Morbid obesity, ventral hernia. Distended abdomen, non-tender, no rebound, no guarding.       T(F): 98.6 (04-09-25 @ 17:04), Max: 98.6 (04-09-25 @ 17:04)  HR: 100 (04-09-25 @ 17:04) (93 - 100)  BP: 130/80 (04-09-25 @ 17:04) (112/75 - 139/87)  ABP: --  ABP(mean): --  RR: 18 (04-09-25 @ 17:04) (18 - 18)  SpO2: 96% (04-09-25 @ 17:04) (96% - 97%)    IN'S / OUT's:    04-08-25 @ 07:01  -  04-09-25 @ 07:00  --------------------------------------------------------  IN:    dextrose 5% + sodium chloride 0.45%: 1920 mL    Lactated Ringers: 1000 mL    Lactated Ringers Bolus: 500 mL  Total IN: 3420 mL    OUT:    Voided (mL): 2650 mL  Total OUT: 2650 mL    Total NET: 770 mL      04-09-25 @ 07:01  -  04-10-25 @ 00:02  --------------------------------------------------------  IN:    dextrose 5% + sodium chloride 0.45%: 1600 mL    IV PiggyBack: 350 mL  Total IN: 1950 mL    OUT:    Voided (mL): 1100 mL  Total OUT: 1100 mL    Total NET: 850 mL          MEDICATIONS  (STANDING):  amLODIPine   Tablet 10 milliGRAM(s) Oral daily  chlorhexidine 2% Cloths 1 Application(s) Topical <User Schedule>  dextrose 5% + sodium chloride 0.45%. 1000 milliLiter(s) (160 mL/Hr) IV Continuous <Continuous>  heparin   Injectable 5000 Unit(s) SubCutaneous every 8 hours  hydrochlorothiazide 25 milliGRAM(s) Oral daily  pantoprazole    Tablet 40 milliGRAM(s) Oral before breakfast    MEDICATIONS  (PRN):  acetaminophen   IVPB .. 1000 milliGRAM(s) IV Intermittent once PRN Moderate Pain (4 - 6)  ondansetron Injectable 4 milliGRAM(s) IV Push every 6 hours PRN Nausea      LABS  Labs:  CAPILLARY BLOOD GLUCOSE                              10.0   6.41  )-----------( 213      ( 09 Apr 2025 20:04 )             30.6       Auto Immature Granulocyte %: 1.1 % (04-09-25 @ 20:04)  Auto Immature Granulocyte %: 0.9 % (04-09-25 @ 06:18)    04-09    141  |  105  |  16  ----------------------------<  111[H]  3.9   |  23  |  1.0      Calcium: 8.5 mg/dL (04-09-25 @ 20:04)      LFTs:         Coags:     10.90  ----< 0.93    ( 09 Apr 2025 06:18 )     26.6                Urinalysis Basic - ( 09 Apr 2025 20:04 )    Color: x / Appearance: x / SG: x / pH: x  Gluc: 111 mg/dL / Ketone: x  / Bili: x / Urobili: x   Blood: x / Protein: x / Nitrite: x   Leuk Esterase: x / RBC: x / WBC x   Sq Epi: x / Non Sq Epi: x / Bacteria: x            RADIOLOGY & ADDITIONAL TESTS:

## 2025-04-10 NOTE — CONSULT NOTE ADULT - ASSESSMENT
ASSESSMENT:  64F PMHx HTN, morbid obesity s/p 2001 Abby-en-Y gastric bypass who presented on 4/3 with incarcerated ventral hernia with SBO. S/p 4/10 open ventral hernia repair, small bowel resection, and primary fascial closure with Torey Cornejo. Admitted to SICU for Q1 abdominal checks and hemodynamic monitoring.     NEURO:  #Postoperative pain  - Acetaminophen IV Q6  - Dilaudid 0.25mg Q6 PRN (moderate), 0.5mg Q6 PRN (severe)    PULM:  #Oxygenation  - Postoperatively placed on BiPAP  - AM CXR, ABG    CV:  #HTN  - Home med: HCTZ 25mg QD (holding while NPO)    GI:  #SBO  #Ventral hernia s/p 4/10 open repair  - Prevena in place, holding suction  - Monitor RUQ and RLQ THONY drain output  - NGT to LCS, monitor output  - Q1 abdominal exams, monitor for change in exam, SOB, decreased urinary output  F: LR @ 180  E: Replete for K > 4, P > 3, Mg > 2  N: NPO    :  #Monitoring  - An placed 4/10  - Strict I/Os  - F/u postoperative BMP  - Preoperative Cr 1.0 (gudelia to 1.6 on 4/6, since downtrending)    HEME:  - DVT ppx: HSQ Q8  - F/u postoperative CBC  - Preoperative hgb 12    ID:  #Purulent abscesses noted intraoperatively   - Monitor for fevers, WBC  - Abx: Zosyn Q8  - Preoperative WBC 10.6    MSK:  - Activity: OOBAT    SKIN:        DISPO: SICU    Discussed with and approved by Dr. Alva. ASSESSMENT:  64F PMHx HTN, morbid obesity s/p 2001 Abby-en-Y gastric bypass who presented on 4/3 with incarcerated ventral hernia with SBO. S/p 4/10 open ventral hernia repair, small bowel resection, and primary fascial closure with Torey Cornejo. Admitted to SICU for Q1 abdominal checks and hemodynamic monitoring.     NEURO:  #Postoperative pain  - Acetaminophen IV Q6  - Dilaudid 0.25mg Q6 PRN (moderate), 0.5mg Q6 PRN (severe)    PULM:  #Oxygenation  #PMHx LOUIS on home CPAP  - Postoperatively placed on BiPAP, will remain overnight  - Postop CXR: low lung volumes   - AM CXR, ABG    CV:  #HTN  - Home med: HCTZ 25mg QD (holding while NPO)    GI:  #SBO  #Ventral hernia s/p 4/10 open repair  - Prevena in place, holding suction  - Monitor RUQ and RLQ THONY drain output  - NGT to LCS, monitor output  - Q1 abdominal exams, monitor for change in exam, SOB, decreased urinary output  F: LR @ 180  E: Replete for K > 4, P > 3, Mg > 2  N: NPO    :  #Monitoring  - An placed 4/10  - Strict I/Os  04-10    142  |  108  |  17  ----------------------------<  217[H]  4.5   |  18  |  1.9[H]    Ca    7.9[L]      10 Apr 2025 17:45  Phos  5.6     04-10  Mg     1.6     04-10    HEME:  - DVT ppx: HSQ Q8                        12.0   10.58 )-----------( 306      ( 10 Apr 2025 17:45 )             37.2     ID:  #Purulent abscesses noted intraoperatively   - Monitor for fevers, WBC  - Abx: Zosyn Q8    MSK:  - Activity: OOBAT    SKIN:        DISPO: SICU    Discussed with and approved by Dr. Alva. ASSESSMENT:  64F PMHx HTN, morbid obesity s/p 2001 Abby-en-Y gastric bypass who presented on 4/3 with incarcerated ventral hernia with SBO. S/p 4/10 open ventral hernia repair, small bowel resection, and primary fascial closure with Torey Cornejo. Admitted to SICU for Q1 abdominal checks and hemodynamic monitoring.     NEURO:  #Postoperative pain  - Acetaminophen IV Q6  - Dilaudid 0.25mg Q6 PRN (moderate), 0.5mg Q6 PRN (severe)    PULM:  #Oxygenation  #PMHx LOUIS on home CPAP  - Postoperatively placed on BiPAP, will remain overnight  - Postop CXR: low lung volumes   - AM CXR, ABG    CV:  #HTN  - Home med: HCTZ 25mg QD (holding while NPO)    GI:  #SBO  #Ventral hernia s/p 4/10 open repair  - Prevena in place, holding suction  - Monitor RUQ and RLQ THONY drain output  - NGT to LCS, monitor output  - Q1 abdominal exams, monitor for change in exam, SOB, decreased urinary output  F: LR @ 180  E: Replete for K > 4, P > 3, Mg > 2  N: NPO    :  #Monitoring  - An placed 4/10  - Strict I/Os  04-10    142  |  108  |  17  ----------------------------<  217[H]  4.5   |  18  |  1.9[H]    Ca    7.9[L]      10 Apr 2025 17:45  Phos  5.6     04-10  Mg     1.6     04-10    HEME:  - DVT ppx: HSQ Q8                        12.0   10.58 )-----------( 306      ( 10 Apr 2025 17:45 )             37.2     ID:  #Purulent abscesses noted intraoperatively   - Monitor for fevers, WBC  - Abx: Zosyn Q8    MSK:  - Activity: OOBAT    SKIN:  #DTI - negative    DISPO: SICU    Discussed with and approved by Dr. Alva.

## 2025-04-10 NOTE — PROGRESS NOTE ADULT - ATTENDING COMMENTS
Pt examined  labs and images reviewed  pt with supermorbid obesity and complex hernia with sbo  previously treated without surgery   passing flatus awaiting bowel movement  had a bm   but more pain     impression : extremely high risk - supermorbid obesity and complex hernia with partial sbo  was scheduled -but will take her urgently for surgery

## 2025-04-10 NOTE — CONSULT NOTE ADULT - SUBJECTIVE AND OBJECTIVE BOX
NANCY SARGENT   [id number]   [birth year]  64yF  ============================================================   DATE OF INITIAL SICU/SDU CONSULT: 04-10-25    INDICATION FOR SICU CONSULT:       SICU COURSE EVENTS :  04-10 - admitted to SICU service     24HOUR EVENTS  -Admission under SICU service    [X] A ten-point review of systems was negative except as expressed in note.  [X ] History was obtained from patient. If unable to participate in their care, history was from review of the chart and collateral sources of information.  ============================================================      HPI     HPI:  The patient is a 64-year-old female with a past medical history of high blood pressure and gastric bypass surgery in 2001, presenting with two days of sharp abdominal pain and one episode of non-bloody, non-bilious vomiting. She has a prior history of small bowel obstruction in 2006, 2008, and most recently in 2020, all of which resolved with conservative management. Her current symptoms are similar in nature. A computed tomography scan of the abdomen and pelvis with oral and intravenous contrast revealed multiple ventral abdominal wall hernias containing both obstructed and non-obstructed bowel loops. A complex small bowel obstruction is present, involving three hernias on the right side of the abdomen, with dilated, fluid-filled loops of small bowel, trace fluid between loops, and a collapsed segment of bowel beyond a hernia in the right lower quadrant. These findings are consistent with a recurrent small bowel obstruction. The hernia was non reducible at bedside. (03 Apr 2025 20:41)      Pertinent Imaging    OR Stats for 04-10-25    OR Time :   IV Fluids:  EBL:  Blood Products:  UOP:  Findings -    PAST MEDICAL & SURGICAL HISTORY  Gastric bypass status for obesity    LOUIS (obstructive sleep apnea)        HOME MEDICATIONS    amLODIPine 10 mg oral tablet: 1 tab(s) orally once a day  cyanocobalamin 2500 mcg sublingual tablet: 1 tab(s) sublingually once a day  hydroCHLOROthiazide 25 mg oral tablet: 1 tab(s) orally once a day  losartan 100 mg oral tablet: 1 tab(s) orally once a day  omeprazole 40 mg oral delayed release capsule: 1 cap(s) orally once a day as needed for  heartburn  Wegovy (2.4 mg dose) subcutaneous solution: 2.4 milligram(s) subcutaneously    ACTIVE MEDICATIONS    acetaminophen   IVPB .. 1000 milliGRAM(s) IV Intermittent once  acetaminophen   IVPB .. 1000 milliGRAM(s) IV Intermittent once PRN Moderate Pain (4 - 6)  chlorhexidine 2% Cloths 1 Application(s) Topical <User Schedule>  heparin   Injectable 5000 Unit(s) SubCutaneous every 8 hours  hydrochlorothiazide 25 milliGRAM(s) Oral daily  HYDROmorphone  Injectable 0.5 milliGRAM(s) IV Push every 10 minutes PRN Moderate Pain (4 - 6)  lactated ringers. 1000 milliLiter(s) IV Continuous <Continuous>  ondansetron Injectable 4 milliGRAM(s) IV Push every 6 hours PRN Nausea  piperacillin/tazobactam IVPB. 3.375 Gram(s) IV Intermittent once  piperacillin/tazobactam IVPB.- 3.375 Gram(s) IV Intermittent once    ALLERGIES  Allergies    No Known Allergies    Intolerances         VITAL SIGNS/INTAKE/OUTPUT (Last 24Hours)  ICU Vital Signs Last 24 Hrs  T(C): 36.7 (10 Apr 2025 17:22), Max: 37.5 (10 Apr 2025 08:53)  T(F): 98.1 (10 Apr 2025 17:22), Max: 99.5 (10 Apr 2025 08:53)  HR: 95 (10 Apr 2025 17:35) (95 - 115)  BP: 113/56 (10 Apr 2025 17:35) (112/73 - 126/60)  BP(mean): 83 (10 Apr 2025 07:48) (83 - 83)  ABP: --  ABP(mean): --  RR: 18 (10 Apr 2025 17:35) (16 - 18)  SpO2: 96% (10 Apr 2025 17:35) (93% - 96%)    O2 Parameters below as of 10 Apr 2025 18:00  Patient On (Oxygen Delivery Method): BiPAP/CPAP          I&O's Summary    09 Apr 2025 07:01  -  10 Apr 2025 07:00  --------------------------------------------------------  IN: 1950 mL / OUT: 1100 mL / NET: 850 mL    10 Apr 2025 07:01  -  10 Apr 2025 18:31  --------------------------------------------------------  IN: 0 mL / OUT: 20 mL / NET: -20 mL        LABS (Last 24Hours)        MECHANICAL VENT/BLOOD GAS  if indicated         PHYSICAL EXAM   GCS:  15  Exam: A&Ox3, no focal deficits    RESPIRATORY:  On BiPAP 50/8    CARDIOVASCULAR:   Regular rate and rhythm    GASTROINTESTINAL:  Abdomen obese, soft, non-distended, prevena in place, holding suction, RUQ THONY with SS output, RLQ THONY with murky output, NGT in place    :   Exam: An catheter in place, draining clear yellow urine    MUSCULOSKELETAL:  Extremities warm, pink, well-perfused, moving BLE and BUE spontaneously     DERM:  No skin breakdown     ----------------------------------------------------------------------------------------------------------  Tubes/Lines/Drains    [x] Peripheral IV    [x] Urinary Catheter An  [ ]Present on Admission          Insertion Date: 4/10                                  [ ] Central Venous Line       [ ]IJ             [ ]Femoral     [ ]Subclavian   [ ]Left  [ ]Right      Insertion Date:          [ ] Arterial Line 	                [ ]Radial    [ ]Femoral     [ ]Axillary         [ ]Left  [ ]Right      Insertion Date:        SARGENTNANCY   [id number]   [birth year]  64yF  ============================================================   DATE OF INITIAL SICU/SDU CONSULT: 04-10-25    INDICATION FOR SICU CONSULT:       SICU COURSE EVENTS :  04-10 - admitted to SICU service     24HOUR EVENTS  -Admission under SICU service    [X] A ten-point review of systems was negative except as expressed in note.  [X ] History was obtained from patient. If unable to participate in their care, history was from review of the chart and collateral sources of information.  ============================================================     HPI  Pt is 64F PMHx HTN, morbid obesity, LOUIS (home CPAP), PSHx 2001 Abby-en-Y gastric bypass who presented 4/3 with SBO from ventral hernia. S/p 4/10 open repair of ventral hernia, small bowel resection (areas of purulence encountered), vicryl underlay mesh placement, primary fascial closure. Admitted to SICU for abdominal exams and monitoring for compartment syndrome. Pt brought to recovery extubated, placed on BiPAP.     OR Stats for 04-10-25    OR Time : 6.5 hours   IV Fluids: 5L  EBL: 100cc  Blood Products: None  UOP: 200cc  Findings - Laparoscopic converted to open repair of large ventral hernia, incarcerated small bowel with two areas of perforation requiring SBR.  THONY drain placed in pelvis, underlay Vicryl mesh placed and fascia closed primarily.    PAST MEDICAL & SURGICAL HISTORY  Gastric bypass status for obesity  LOUIS (obstructive sleep apnea)    HOME MEDICATIONS    amLODIPine 10 mg oral tablet: 1 tab(s) orally once a day  cyanocobalamin 2500 mcg sublingual tablet: 1 tab(s) sublingually once a day  hydroCHLOROthiazide 25 mg oral tablet: 1 tab(s) orally once a day  losartan 100 mg oral tablet: 1 tab(s) orally once a day  omeprazole 40 mg oral delayed release capsule: 1 cap(s) orally once a day as needed for  heartburn  Wegovy (2.4 mg dose) subcutaneous solution: 2.4 milligram(s) subcutaneously    ACTIVE MEDICATIONS    acetaminophen   IVPB .. 1000 milliGRAM(s) IV Intermittent once  acetaminophen   IVPB .. 1000 milliGRAM(s) IV Intermittent once PRN Moderate Pain (4 - 6)  chlorhexidine 2% Cloths 1 Application(s) Topical <User Schedule>  heparin   Injectable 5000 Unit(s) SubCutaneous every 8 hours  hydrochlorothiazide 25 milliGRAM(s) Oral daily  HYDROmorphone  Injectable 0.5 milliGRAM(s) IV Push every 10 minutes PRN Moderate Pain (4 - 6)  lactated ringers. 1000 milliLiter(s) IV Continuous <Continuous>  ondansetron Injectable 4 milliGRAM(s) IV Push every 6 hours PRN Nausea  piperacillin/tazobactam IVPB. 3.375 Gram(s) IV Intermittent once  piperacillin/tazobactam IVPB.- 3.375 Gram(s) IV Intermittent once    ALLERGIES  Allergies  No Known Allergies    Intolerances    VITAL SIGNS/INTAKE/OUTPUT (Last 24Hours)  ICU Vital Signs Last 24 Hrs  T(C): 36.7 (10 Apr 2025 17:22), Max: 37.5 (10 Apr 2025 08:53)  T(F): 98.1 (10 Apr 2025 17:22), Max: 99.5 (10 Apr 2025 08:53)  HR: 95 (10 Apr 2025 17:35) (95 - 115)  BP: 113/56 (10 Apr 2025 17:35) (112/73 - 126/60)  BP(mean): 83 (10 Apr 2025 07:48) (83 - 83)  ABP: --  ABP(mean): --  RR: 18 (10 Apr 2025 17:35) (16 - 18)  SpO2: 96% (10 Apr 2025 17:35) (93% - 96%)    O2 Parameters below as of 10 Apr 2025 18:00  Patient On (Oxygen Delivery Method): BiPAP/CPAP    I&O's Summary    09 Apr 2025 07:01  -  10 Apr 2025 07:00  --------------------------------------------------------  IN: 1950 mL / OUT: 1100 mL / NET: 850 mL    10 Apr 2025 07:01  -  10 Apr 2025 18:31  --------------------------------------------------------  IN: 0 mL / OUT: 20 mL / NET: -20 mL      LABS (Last 24Hours)                        12.0   10.58 )-----------( 306      ( 10 Apr 2025 17:45 )             37.2   04-10    142  |  108  |  17  ----------------------------<  217[H]  4.5   |  18  |  1.9[H]    Ca    7.9[L]      10 Apr 2025 17:45  Phos  5.6     04-10  Mg     1.6     04-10        PHYSICAL EXAM   GCS:  15  Exam: A&Ox3, no focal deficits    RESPIRATORY:  On BiPAP 50/8    CARDIOVASCULAR:   Regular rate and rhythm    GASTROINTESTINAL:  Abdomen obese, soft, non-distended, prevena in place, holding suction, RUQ THONY with SS output, RLQ THONY with murky output, NGT in place    :   Exam: An catheter in place, draining clear yellow urine    MUSCULOSKELETAL:  Extremities warm, pink, well-perfused, moving BLE and BUE spontaneously     DERM:  No skin breakdown     ----------------------------------------------------------------------------------------------------------  Tubes/Lines/Drains    [x] Peripheral IV    [x] Urinary Catheter An  [ ]Present on Admission          Insertion Date: 4/10                                  [ ] Central Venous Line       [ ]IJ             [ ]Femoral     [ ]Subclavian   [ ]Left  [ ]Right      Insertion Date:          [ ] Arterial Line 	                [ ]Radial    [ ]Femoral     [ ]Axillary         [ ]Left  [ ]Right      Insertion Date:

## 2025-04-10 NOTE — PATIENT PROFILE ADULT - FALL HARM RISK - HARM RISK INTERVENTIONS
Assistance with ambulation/Assistance OOB with selected safe patient handling equipment/Communicate Risk of Fall with Harm to all staff/Discuss with provider need for PT consult/Monitor gait and stability/Provide patient with walking aids - walker, cane, crutches/Reinforce activity limits and safety measures with patient and family/Sit up slowly, dangle for a short time, stand at bedside before walking/Tailored Fall Risk Interventions/Use of alarms - bed, chair and/or voice tab/Visual Cue: Yellow wristband and red socks/Bed in lowest position, wheels locked, appropriate side rails in place/Call bell, personal items and telephone in reach/Instruct patient to call for assistance before getting out of bed or chair/Non-slip footwear when patient is out of bed/[place] to call system/Physically safe environment - no spills, clutter or unnecessary equipment/Purposeful Proactive Rounding/Room/bathroom lighting operational, light cord in reach

## 2025-04-10 NOTE — PROGRESS NOTE ADULT - ASSESSMENT
64y F w/ PMHx of HTN and gastric bypass surgery in 2001 presents with abdominal distention. CT with incarcerated ventral hernia with SBO.     PLAN:  - NPO + IVF, preopd and consented for   - Preop today  - Monitor BF  - Encourage ambulation      BLUE TEAM SPECTRA: [id number]

## 2025-04-10 NOTE — CHART NOTE - NSCHARTNOTEFT_GEN_A_CORE
PACU ANESTHESIA ADMISSION NOTE      Procedure: Open repair of ventral hernia using mesh and component separation technique    Small bowel resection      Post op diagnosis:  Incarcerated ventral hernia    SBO (small bowel obstruction)        ____  Intubated  TV:______       Rate: ______      FiO2: ______    __x__  Patent Airway    _x___  Full return of protective reflexes    ___x_  Full recovery from anesthesia / back to baseline status    Vitals:  T(F): 97 (04-10-25 @ 17:23), Max: 37.5 (04-10-25 @ 08:53)  HR: 98 (04-10-25 @ 17:23) (101 - 115)  BP: 119/64 (04-10-25 @ 17:23) (112/73 - 121/85)  RR: 24 (04-10-25 @ 17:23) (16 - 18)  SpO2: 99% (04-10-25 @ 17:23) (93% - 96%)    Mental Status:  __x__ Awake   ____x_ Alert   _____ Drowsy   _____ Sedated    Nausea/Vomiting:  __x__ NO  ______Yes,   See Post - Op Orders          Pain Scale (0-10):  ___5__    Treatment: ____ None    ___x_ See Post - Op/PCA Orders    Post - Operative Fluids:   ____ Oral   __x__ See Post - Op Orders    Plan: Discharge:   ____Home       _____Floor     ___x__Critical Care    _____  Other:_________________    Comments: Transferred care to PACU RN; discharge to SICU when criteria met.

## 2025-04-11 ENCOUNTER — RESULT REVIEW (OUTPATIENT)
Age: 65
End: 2025-04-11

## 2025-04-11 LAB
ANION GAP SERPL CALC-SCNC: 13 MMOL/L — SIGNIFICANT CHANGE UP (ref 7–14)
ANION GAP SERPL CALC-SCNC: 15 MMOL/L — HIGH (ref 7–14)
ANION GAP SERPL CALC-SCNC: 16 MMOL/L — HIGH (ref 7–14)
ANION GAP SERPL CALC-SCNC: 18 MMOL/L — HIGH (ref 7–14)
APTT BLD: 26.4 SEC — LOW (ref 27–39.2)
BASOPHILS # BLD AUTO: 0.05 K/UL — SIGNIFICANT CHANGE UP (ref 0–0.2)
BASOPHILS NFR BLD AUTO: 0.3 % — SIGNIFICANT CHANGE UP (ref 0–1)
BUN SERPL-MCNC: 20 MG/DL — SIGNIFICANT CHANGE UP (ref 10–20)
BUN SERPL-MCNC: 27 MG/DL — HIGH (ref 10–20)
BUN SERPL-MCNC: 31 MG/DL — HIGH (ref 10–20)
BUN SERPL-MCNC: 36 MG/DL — HIGH (ref 10–20)
CALCIUM SERPL-MCNC: 6.5 MG/DL — LOW (ref 8.4–10.5)
CALCIUM SERPL-MCNC: 7.3 MG/DL — LOW (ref 8.4–10.5)
CALCIUM SERPL-MCNC: 7.7 MG/DL — LOW (ref 8.4–10.5)
CALCIUM SERPL-MCNC: 7.9 MG/DL — LOW (ref 8.4–10.5)
CHLORIDE SERPL-SCNC: 104 MMOL/L — SIGNIFICANT CHANGE UP (ref 98–110)
CHLORIDE SERPL-SCNC: 106 MMOL/L — SIGNIFICANT CHANGE UP (ref 98–110)
CHLORIDE SERPL-SCNC: 108 MMOL/L — SIGNIFICANT CHANGE UP (ref 98–110)
CHLORIDE SERPL-SCNC: 112 MMOL/L — HIGH (ref 98–110)
CO2 SERPL-SCNC: 19 MMOL/L — SIGNIFICANT CHANGE UP (ref 17–32)
CO2 SERPL-SCNC: 21 MMOL/L — SIGNIFICANT CHANGE UP (ref 17–32)
CO2 SERPL-SCNC: 21 MMOL/L — SIGNIFICANT CHANGE UP (ref 17–32)
CO2 SERPL-SCNC: 22 MMOL/L — SIGNIFICANT CHANGE UP (ref 17–32)
CREAT ?TM UR-MCNC: 105 MG/DL — SIGNIFICANT CHANGE UP
CREAT SERPL-MCNC: 2.1 MG/DL — HIGH (ref 0.7–1.5)
CREAT SERPL-MCNC: 3 MG/DL — HIGH (ref 0.7–1.5)
CREAT SERPL-MCNC: 3.3 MG/DL — HIGH (ref 0.7–1.5)
CREAT SERPL-MCNC: 3.6 MG/DL — HIGH (ref 0.7–1.5)
EGFR: 14 ML/MIN/1.73M2 — LOW
EGFR: 14 ML/MIN/1.73M2 — LOW
EGFR: 15 ML/MIN/1.73M2 — LOW
EGFR: 15 ML/MIN/1.73M2 — LOW
EGFR: 17 ML/MIN/1.73M2 — LOW
EGFR: 17 ML/MIN/1.73M2 — LOW
EGFR: 26 ML/MIN/1.73M2 — LOW
EGFR: 26 ML/MIN/1.73M2 — LOW
EOSINOPHIL # BLD AUTO: 0.03 K/UL — SIGNIFICANT CHANGE UP (ref 0–0.7)
EOSINOPHIL NFR BLD AUTO: 0.2 % — SIGNIFICANT CHANGE UP (ref 0–8)
GAS PNL BLDA: SIGNIFICANT CHANGE UP
GAS PNL BLDA: SIGNIFICANT CHANGE UP
GLUCOSE BLDC GLUCOMTR-MCNC: 167 MG/DL — HIGH (ref 70–99)
GLUCOSE SERPL-MCNC: 145 MG/DL — HIGH (ref 70–99)
GLUCOSE SERPL-MCNC: 163 MG/DL — HIGH (ref 70–99)
GLUCOSE SERPL-MCNC: 168 MG/DL — HIGH (ref 70–99)
GLUCOSE SERPL-MCNC: 176 MG/DL — HIGH (ref 70–99)
HCT VFR BLD CALC: 30.2 % — LOW (ref 37–47)
HCT VFR BLD CALC: 34.4 % — LOW (ref 37–47)
HGB BLD-MCNC: 10.6 G/DL — LOW (ref 12–16)
HGB BLD-MCNC: 9.5 G/DL — LOW (ref 12–16)
IMM GRANULOCYTES NFR BLD AUTO: 1.4 % — HIGH (ref 0.1–0.3)
INR BLD: 1.27 RATIO — SIGNIFICANT CHANGE UP (ref 0.65–1.3)
LYMPHOCYTES # BLD AUTO: 1.15 K/UL — LOW (ref 1.2–3.4)
LYMPHOCYTES # BLD AUTO: 7.5 % — LOW (ref 20.5–51.1)
MAGNESIUM SERPL-MCNC: 1.9 MG/DL — SIGNIFICANT CHANGE UP (ref 1.8–2.4)
MAGNESIUM SERPL-MCNC: 1.9 MG/DL — SIGNIFICANT CHANGE UP (ref 1.8–2.4)
MAGNESIUM SERPL-MCNC: 2 MG/DL — SIGNIFICANT CHANGE UP (ref 1.8–2.4)
MCHC RBC-ENTMCNC: 30.3 PG — SIGNIFICANT CHANGE UP (ref 27–31)
MCHC RBC-ENTMCNC: 30.8 G/DL — LOW (ref 32–37)
MCHC RBC-ENTMCNC: 30.8 PG — SIGNIFICANT CHANGE UP (ref 27–31)
MCHC RBC-ENTMCNC: 31.5 G/DL — LOW (ref 32–37)
MCV RBC AUTO: 98.1 FL — SIGNIFICANT CHANGE UP (ref 81–99)
MCV RBC AUTO: 98.3 FL — SIGNIFICANT CHANGE UP (ref 81–99)
MONOCYTES # BLD AUTO: 0.48 K/UL — SIGNIFICANT CHANGE UP (ref 0.1–0.6)
MONOCYTES NFR BLD AUTO: 3.1 % — SIGNIFICANT CHANGE UP (ref 1.7–9.3)
MRSA PCR RESULT.: NEGATIVE — SIGNIFICANT CHANGE UP
NEUTROPHILS # BLD AUTO: 13.32 K/UL — HIGH (ref 1.4–6.5)
NEUTROPHILS NFR BLD AUTO: 87.5 % — HIGH (ref 42.2–75.2)
NIGHT BLUE STAIN TISS: SIGNIFICANT CHANGE UP
NIGHT BLUE STAIN TISS: SIGNIFICANT CHANGE UP
NRBC BLD AUTO-RTO: 0 /100 WBCS — SIGNIFICANT CHANGE UP (ref 0–0)
PHOSPHATE SERPL-MCNC: 5.3 MG/DL — HIGH (ref 2.1–4.9)
PHOSPHATE SERPL-MCNC: 6.8 MG/DL — HIGH (ref 2.1–4.9)
PHOSPHATE SERPL-MCNC: 7.2 MG/DL — HIGH (ref 2.1–4.9)
PLATELET # BLD AUTO: 280 K/UL — SIGNIFICANT CHANGE UP (ref 130–400)
PLATELET # BLD AUTO: 333 K/UL — SIGNIFICANT CHANGE UP (ref 130–400)
PMV BLD: 10.3 FL — SIGNIFICANT CHANGE UP (ref 7.4–10.4)
PMV BLD: 10.6 FL — HIGH (ref 7.4–10.4)
POTASSIUM SERPL-MCNC: 4.1 MMOL/L — SIGNIFICANT CHANGE UP (ref 3.5–5)
POTASSIUM SERPL-MCNC: 4.7 MMOL/L — SIGNIFICANT CHANGE UP (ref 3.5–5)
POTASSIUM SERPL-MCNC: 4.7 MMOL/L — SIGNIFICANT CHANGE UP (ref 3.5–5)
POTASSIUM SERPL-MCNC: 5.1 MMOL/L — HIGH (ref 3.5–5)
POTASSIUM SERPL-SCNC: 4.1 MMOL/L — SIGNIFICANT CHANGE UP (ref 3.5–5)
POTASSIUM SERPL-SCNC: 4.7 MMOL/L — SIGNIFICANT CHANGE UP (ref 3.5–5)
POTASSIUM SERPL-SCNC: 4.7 MMOL/L — SIGNIFICANT CHANGE UP (ref 3.5–5)
POTASSIUM SERPL-SCNC: 5.1 MMOL/L — HIGH (ref 3.5–5)
PROTHROM AB SERPL-ACNC: 15 SEC — HIGH (ref 9.95–12.87)
RBC # BLD: 3.08 M/UL — LOW (ref 4.2–5.4)
RBC # BLD: 3.5 M/UL — LOW (ref 4.2–5.4)
RBC # FLD: 14 % — SIGNIFICANT CHANGE UP (ref 11.5–14.5)
RBC # FLD: 14.1 % — SIGNIFICANT CHANGE UP (ref 11.5–14.5)
SODIUM SERPL-SCNC: 140 MMOL/L — SIGNIFICANT CHANGE UP (ref 135–146)
SODIUM SERPL-SCNC: 143 MMOL/L — SIGNIFICANT CHANGE UP (ref 135–146)
SODIUM SERPL-SCNC: 145 MMOL/L — SIGNIFICANT CHANGE UP (ref 135–146)
SODIUM SERPL-SCNC: 147 MMOL/L — HIGH (ref 135–146)
SODIUM UR-SCNC: <20 MMOL/L — SIGNIFICANT CHANGE UP
SPECIMEN SOURCE: SIGNIFICANT CHANGE UP
SPECIMEN SOURCE: SIGNIFICANT CHANGE UP
WBC # BLD: 17.94 K/UL — HIGH (ref 4.8–10.8)
WBC # BLD: 24.05 K/UL — HIGH (ref 4.8–10.8)
WBC # FLD AUTO: 17.94 K/UL — HIGH (ref 4.8–10.8)
WBC # FLD AUTO: 24.05 K/UL — HIGH (ref 4.8–10.8)

## 2025-04-11 PROCEDURE — 36556 INSERT NON-TUNNEL CV CATH: CPT

## 2025-04-11 PROCEDURE — 76942 ECHO GUIDE FOR BIOPSY: CPT | Mod: 26

## 2025-04-11 PROCEDURE — 64647 CHEMODENERV TRUNK MUSC 6/>: CPT | Mod: LT

## 2025-04-11 PROCEDURE — 93306 TTE W/DOPPLER COMPLETE: CPT | Mod: 26

## 2025-04-11 PROCEDURE — 99291 CRITICAL CARE FIRST HOUR: CPT | Mod: 24

## 2025-04-11 PROCEDURE — 71045 X-RAY EXAM CHEST 1 VIEW: CPT | Mod: 26

## 2025-04-11 PROCEDURE — 71045 X-RAY EXAM CHEST 1 VIEW: CPT | Mod: 26,77

## 2025-04-11 PROCEDURE — 36620 INSERTION CATHETER ARTERY: CPT

## 2025-04-11 RX ORDER — HYDROXYZINE HYDROCHLORIDE 25 MG/1
12.5 TABLET, FILM COATED ORAL ONCE
Refills: 0 | Status: COMPLETED | OUTPATIENT
Start: 2025-04-11 | End: 2025-04-11

## 2025-04-11 RX ORDER — PIPERACILLIN-TAZO-DEXTROSE,ISO 3.375G/5
4.5 IV SOLUTION, PIGGYBACK PREMIX FROZEN(ML) INTRAVENOUS EVERY 8 HOURS
Refills: 0 | Status: DISCONTINUED | OUTPATIENT
Start: 2025-04-11 | End: 2025-04-14

## 2025-04-11 RX ORDER — FENTANYL CITRATE-0.9 % NACL/PF 100MCG/2ML
0.5 SYRINGE (ML) INTRAVENOUS
Qty: 2500 | Refills: 0 | Status: DISCONTINUED | OUTPATIENT
Start: 2025-04-11 | End: 2025-04-15

## 2025-04-11 RX ORDER — DEXMEDETOMIDINE HYDROCHLORIDE IN SODIUM CHLORIDE 4 UG/ML
0.2 INJECTION INTRAVENOUS
Qty: 400 | Refills: 0 | Status: DISCONTINUED | OUTPATIENT
Start: 2025-04-11 | End: 2025-04-11

## 2025-04-11 RX ORDER — DEXMEDETOMIDINE HYDROCHLORIDE IN SODIUM CHLORIDE 4 UG/ML
0.2 INJECTION INTRAVENOUS
Qty: 400 | Refills: 0 | Status: DISCONTINUED | OUTPATIENT
Start: 2025-04-11 | End: 2025-04-12

## 2025-04-11 RX ORDER — ALBUMIN (HUMAN) 12.5 G/50ML
250 INJECTION, SOLUTION INTRAVENOUS ONCE
Refills: 0 | Status: COMPLETED | OUTPATIENT
Start: 2025-04-11 | End: 2025-04-11

## 2025-04-11 RX ORDER — CALCIUM GLUCONATE 20 MG/ML
2 INJECTION, SOLUTION INTRAVENOUS ONCE
Refills: 0 | Status: COMPLETED | OUTPATIENT
Start: 2025-04-11 | End: 2025-04-11

## 2025-04-11 RX ORDER — PROPOFOL 10 MG/ML
100 INJECTION, EMULSION INTRAVENOUS ONCE
Refills: 0 | Status: COMPLETED | OUTPATIENT
Start: 2025-04-11 | End: 2025-04-11

## 2025-04-11 RX ORDER — ALBUMIN (HUMAN) 12.5 G/50ML
100 INJECTION, SOLUTION INTRAVENOUS ONCE
Refills: 0 | Status: COMPLETED | OUTPATIENT
Start: 2025-04-11 | End: 2025-04-11

## 2025-04-11 RX ORDER — SODIUM CHLORIDE 9 G/1000ML
500 INJECTION, SOLUTION INTRAVENOUS ONCE
Refills: 0 | Status: COMPLETED | OUTPATIENT
Start: 2025-04-11 | End: 2025-04-11

## 2025-04-11 RX ORDER — HEPARIN SODIUM 1000 [USP'U]/ML
7500 INJECTION INTRAVENOUS; SUBCUTANEOUS EVERY 8 HOURS
Refills: 0 | Status: DISCONTINUED | OUTPATIENT
Start: 2025-04-11 | End: 2025-04-17

## 2025-04-11 RX ORDER — NOREPINEPHRINE BITARTRATE 8 MG
0.05 SOLUTION INTRAVENOUS
Qty: 16 | Refills: 0 | Status: DISCONTINUED | OUTPATIENT
Start: 2025-04-11 | End: 2025-04-13

## 2025-04-11 RX ORDER — CISATRACURIUM BESYLATE 10 MG/5ML
3 INJECTION, SOLUTION INTRAVENOUS
Qty: 200 | Refills: 0 | Status: DISCONTINUED | OUTPATIENT
Start: 2025-04-11 | End: 2025-04-12

## 2025-04-11 RX ORDER — NOREPINEPHRINE BITARTRATE 8 MG
0.05 SOLUTION INTRAVENOUS
Qty: 8 | Refills: 0 | Status: DISCONTINUED | OUTPATIENT
Start: 2025-04-11 | End: 2025-04-11

## 2025-04-11 RX ORDER — FENTANYL CITRATE-0.9 % NACL/PF 100MCG/2ML
25 SYRINGE (ML) INTRAVENOUS
Refills: 0 | Status: DISCONTINUED | OUTPATIENT
Start: 2025-04-11 | End: 2025-04-11

## 2025-04-11 RX ORDER — SODIUM CHLORIDE 9 G/1000ML
1000 INJECTION, SOLUTION INTRAVENOUS ONCE
Refills: 0 | Status: DISCONTINUED | OUTPATIENT
Start: 2025-04-11 | End: 2025-04-11

## 2025-04-11 RX ORDER — ROCURONIUM BROMIDE 10 MG/ML
100 INJECTION, SOLUTION INTRAVENOUS ONCE
Refills: 0 | Status: COMPLETED | OUTPATIENT
Start: 2025-04-11 | End: 2025-04-11

## 2025-04-11 RX ADMIN — DEXMEDETOMIDINE HYDROCHLORIDE IN SODIUM CHLORIDE 7.03 MICROGRAM(S)/KG/HR: 4 INJECTION INTRAVENOUS at 12:48

## 2025-04-11 RX ADMIN — SODIUM CHLORIDE 120 MILLILITER(S): 9 INJECTION, SOLUTION INTRAVENOUS at 12:49

## 2025-04-11 RX ADMIN — Medication 400 MILLIGRAM(S): at 01:58

## 2025-04-11 RX ADMIN — NOREPINEPHRINE BITARTRATE 13.2 MICROGRAM(S)/KG/MIN: 8 SOLUTION at 12:48

## 2025-04-11 RX ADMIN — SODIUM CHLORIDE 500 MILLILITER(S): 9 INJECTION, SOLUTION INTRAVENOUS at 21:00

## 2025-04-11 RX ADMIN — CISATRACURIUM BESYLATE 25.3 MICROGRAM(S)/KG/MIN: 10 INJECTION, SOLUTION INTRAVENOUS at 12:48

## 2025-04-11 RX ADMIN — ALBUMIN (HUMAN) 125 MILLILITER(S): 12.5 INJECTION, SOLUTION INTRAVENOUS at 17:40

## 2025-04-11 RX ADMIN — CALCIUM GLUCONATE 200 GRAM(S): 20 INJECTION, SOLUTION INTRAVENOUS at 13:26

## 2025-04-11 RX ADMIN — Medication 25 GRAM(S): at 22:44

## 2025-04-11 RX ADMIN — ROCURONIUM BROMIDE 100 MILLIGRAM(S): 10 INJECTION, SOLUTION INTRAVENOUS at 10:00

## 2025-04-11 RX ADMIN — Medication 40 MILLIGRAM(S): at 13:27

## 2025-04-11 RX ADMIN — HYDROXYZINE HYDROCHLORIDE 12.5 MILLIGRAM(S): 25 TABLET, FILM COATED ORAL at 06:13

## 2025-04-11 RX ADMIN — Medication 25 GRAM(S): at 14:55

## 2025-04-11 RX ADMIN — PROPOFOL 100 MILLIGRAM(S): 10 INJECTION, EMULSION INTRAVENOUS at 10:00

## 2025-04-11 RX ADMIN — Medication 25 GRAM(S): at 06:12

## 2025-04-11 RX ADMIN — HEPARIN SODIUM 7500 UNIT(S): 1000 INJECTION INTRAVENOUS; SUBCUTANEOUS at 13:28

## 2025-04-11 RX ADMIN — CALCIUM GLUCONATE 200 GRAM(S): 20 INJECTION, SOLUTION INTRAVENOUS at 17:55

## 2025-04-11 RX ADMIN — HEPARIN SODIUM 5000 UNIT(S): 1000 INJECTION INTRAVENOUS; SUBCUTANEOUS at 06:11

## 2025-04-11 RX ADMIN — SODIUM CHLORIDE 1000 MILLILITER(S): 9 INJECTION, SOLUTION INTRAVENOUS at 11:15

## 2025-04-11 RX ADMIN — ALBUMIN (HUMAN) 50 MILLILITER(S): 12.5 INJECTION, SOLUTION INTRAVENOUS at 01:58

## 2025-04-11 RX ADMIN — Medication 1 APPLICATION(S): at 06:12

## 2025-04-11 RX ADMIN — Medication 15 MILLILITER(S): at 17:55

## 2025-04-11 RX ADMIN — Medication 7.03 MICROGRAM(S)/KG/HR: at 12:49

## 2025-04-11 RX ADMIN — HEPARIN SODIUM 7500 UNIT(S): 1000 INJECTION INTRAVENOUS; SUBCUTANEOUS at 22:44

## 2025-04-11 NOTE — PROGRESS NOTE ADULT - ASSESSMENT
ASSESSMENT:  64F PMHx HTN, morbid obesity s/p 2001 Abby-en-Y gastric bypass who presented on 4/3 with incarcerated ventral hernia with SBO. S/p 4/10 open ventral hernia repair, small bowel resection, and primary fascial closure with Torey Cornejo. Admitted to SICU for Q1 abdominal checks and hemodynamic monitoring.     NEURO:  #Postoperative pain  - Acetaminophen IV Q6  - Dilaudid 0.25mg Q6 PRN (moderate), 0.5mg Q6 PRN (severe)    PULM:  #Oxygenation  #PMHx LOUIS on home CPAP  - Postoperatively placed on BiPAP, will remain overnight  - Postop CXR: low lung volumes   - AM CXR, ABG    CV:  #HTN  - Home med: HCTZ 25mg QD (holding while NPO)    GI:  #SBO  #Ventral hernia s/p 4/10 open repair  - Prevena in place, holding suction  - Monitor RUQ and RLQ THONY drain output  - NGT to LCS, monitor output  - Q1 abdominal exams, monitor for change in exam, SOB, decreased urinary output  F: LR @ 180  E: Replete for K > 4, P > 3, Mg > 2  N: NPO    :  #Monitoring  - Uptrending Cr 4/10: 1.0 > 1.9 > 2.1 and dec UOP (10/hr > 0/hr)   - urine lytes, albumin 100cc  - An placed 4/10  - Strict I/Os  04-10    142  |  108  |  17  ----------------------------<  217[H]  4.5   |  18  |  1.9[H]    Ca    7.9[L]      10 Apr 2025 17:45  Phos  5.6     04-10  Mg     1.6     04-10    HEME:  - DVT ppx: HSQ Q8                        12.0   10.58 )-----------( 306      ( 10 Apr 2025 17:45 )             37.2     ID:  #Purulent abscesses noted intraoperatively   - Monitor for fevers, WBC  - Abx: Zosyn Q8    MSK:  - Activity: OOBAT    SKIN:  #DTI - negative    DISPO: SICU    Discussed with and approved by Dr. Alva.     ASSESSMENT:  64F PMHx HTN, morbid obesity s/p 2001 Abby-en-Y gastric bypass who presented on 4/3 with incarcerated ventral hernia with SBO. S/p 4/10 open ventral hernia repair, small bowel resection, and primary fascial closure with Torey Cornejo. Admitted to SICU for Q1 abdominal checks and hemodynamic monitoring.       NEUROLOGICAL:  #Acute pain    - IV APAP prn   - Dilaudid 0.25mg Q6 PRN (moderate), 0.5mg Q6 PRN (severe)    RESPIRATORY:   #Oxygenation    -wean to RA as tolerated    - encourage incentive spirometry use  #hx LOUIS on CPAP@ home  - currently on BiPAP  #Activity    -increase as tolerated    CARDS:   #hx of HTN  - holding home HCTZ 25mg QD, amlodipine and losartan  (while NPO)  #EKG- NSR  #TTE 6/2022: EF 55-60%. G1DD.     GASTROINTESTINAL/NUTRITION:   #Diet, NPO    -aspiration precautions, HOB 30  #SBO  - s/p ventral hernia repair with SBR  - NGT LCWS, strict NPO  -  Q1 abdominal exams; monitor for compartment syndrome  #GI Prophylaxis/hx GERD   - pantoprazole  Injectable 40 IV Push daily (takes omeprazole @home)  #Bowel regimen    - holding    -last bowel movement prior to admission    /RENAL:   #urine output in critically ill    -indwelling pierce   #FUNMI  - Cr uptrending, now anuric   - pending urine lytes  - s/p 100cc albumin, 1L LR bolus overnight  - nephrology consult pending    Labs          BUN/Cr: 20/2.1 --> 27/3.0          [04-11 @ 06:19]Na  145 // K  4.7 // Mg  -- // Phos  --  [04-10 @ 22:44]Na  147 // K  4.1 // Mg  1.9 // Phos  5.3         HEME/ONC:   #DVT prophylaxis     -Chemical: heparin   Injectable 5000 Unit(s) SubCutaneous every 8 hours     -Mechanical: SCDs       Labs: Hb/Hct:  12.0/37.2  (04-10 @ 17:45)  -->,  11.3/36.5  (04-10 @ 22:44)  -->                      Plts:  306  -->,  291  -->                 PTT/INR:  20.4/1.06  --->         ID:  #Antibiotics Course  - continue zosyn > Purulent abscesses noted intraoperatively   WBC- 6.41  --->>,  10.58  --->>,  15.24  --->>  Temp trend- 24hrs T(F): 96.8 (04-11 @ 04:00), Max: 99.5 (04-10 @ 08:53)  Current antibiotics-piperacillin/tazobactam IVPB.. 3.375 every 8 hours  #MRSA nares negative       ENDOCRINE:  #glycemic monitoring    -FSG q6 if NPO    -Glucose goal 140-180. If above 180, will start corrective insulin sliding scale    MSK:  #Activity - Ambulate as Tolerated:     Time/Priority:  Routine (04-10-25 @ 17:00)      SKIN:  #DTI screening negative     - will continue to monitor daily skin changes      LINES/DRAINS:  PIV, Pierce, NGT  ADVANCED DIRECTIVES:  Full Code  INDICATION FOR SICU/SDU: Intestinal obstruction  DISPO:   SICU. Case discussed with attending Dr. Bear         ASSESSMENT:  64F PMHx HTN, morbid obesity s/p 2001 Abby-en-Y gastric bypass who presented on 4/3 with incarcerated ventral hernia with SBO. S/p 4/10 open ventral hernia repair, small bowel resection, and primary fascial closure with Torey Cornejo. Admitted to SICU for Q1 abdominal checks and hemodynamic monitoring.       NEUROLOGICAL:  #Acute pain    - IV APAP prn   - Dilaudid 0.25mg Q6 PRN (moderate), 0.5mg Q6 PRN (severe)    RESPIRATORY:   #Oxygenation    -wean to RA as tolerated    - encourage incentive spirometry use  #hx LOUIS on CPAP@ home  - currently on BiPAP  #Activity    -increase as tolerated    CARDS:   #hx of HTN  - holding home HCTZ 25mg QD, amlodipine and losartan  (while NPO)  #EKG- NSR  #TTE 6/2022: EF 55-60%. G1DD.     GASTROINTESTINAL/NUTRITION:   #Diet, NPO    -aspiration precautions, HOB 30  #SBO  - s/p ventral hernia repair with SBR  - NGT LCWS, strict NPO  -  Q1 abdominal exams; monitor for compartment syndrome  #GI Prophylaxis/hx GERD   - pantoprazole  Injectable 40 IV Push daily (takes omeprazole @home)  #Bowel regimen    - holding    -last bowel movement prior to admission    /RENAL:   #urine output in critically ill    -indwelling pierce   #FUNMI  - Cr uptrending, now anuric   - FeNa 0.4% > pre-renal --> s/p 100cc albumin, 1L LR bolus overnight  - nephrology consult pending    Labs          BUN/Cr: 20/2.1 --> 27/3.0          [04-11 @ 06:19]Na  145 // K  4.7 // Mg  -- // Phos  --  [04-10 @ 22:44]Na  147 // K  4.1 // Mg  1.9 // Phos  5.3         HEME/ONC:   #DVT prophylaxis     -Chemical: heparin   Injectable 5000 Unit(s) SubCutaneous every 8 hours     -Mechanical: SCDs       Labs: Hb/Hct:  12.0/37.2  (04-10 @ 17:45)  -->,  11.3/36.5  (04-10 @ 22:44)  -->                      Plts:  306  -->,  291  -->                 PTT/INR:  20.4/1.06  --->         ID:  #Antibiotics Course  - continue zosyn > Purulent abscesses noted intraoperatively   WBC- 6.41  --->>,  10.58  --->>,  15.24  --->>  Temp trend- 24hrs T(F): 96.8 (04-11 @ 04:00), Max: 99.5 (04-10 @ 08:53)  Current antibiotics-piperacillin/tazobactam IVPB.. 3.375 every 8 hours  #MRSA nares negative       ENDOCRINE:  #glycemic monitoring    -FSG q6 if NPO    -Glucose goal 140-180. If above 180, will start corrective insulin sliding scale    MSK:  #Activity - Ambulate as Tolerated:     Time/Priority:  Routine (04-10-25 @ 17:00)      SKIN:  #DTI screening negative     - will continue to monitor daily skin changes      LINES/DRAINS:  PIV, Pierce, NGT  ADVANCED DIRECTIVES:  Full Code  INDICATION FOR SICU/SDU: Intestinal obstruction  DISPO:   SICU. Case discussed with attending Dr. Bear         ASSESSMENT:  64F PMHx HTN, morbid obesity s/p 2001 Abby-en-Y gastric bypass who presented on 4/3 with incarcerated ventral hernia with SBO. S/p 4/10 open ventral hernia repair, small bowel resection, and primary fascial closure with Torey Cornejo. Admitted to SICU for Q1 abdominal checks and hemodynamic monitoring.       NEUROLOGICAL:  #Acute pain    - IV APAP prn   - Dilaudid 0.25mg Q6 PRN (moderate), 0.5mg Q6 PRN (severe)    RESPIRATORY:   #Oxygenation    -wean to RA as tolerated    - encourage incentive spirometry use  #hx LOUIS on CPAP@ home  - currently on BiPAP  #Activity    -increase as tolerated    CARDS:   #hx of HTN  - holding home HCTZ 25mg QD, amlodipine and losartan  (while NPO)  #EKG- NSR  #TTE 6/2022: EF 55-60%. G1DD.     GASTROINTESTINAL/NUTRITION:   #Diet, NPO    -aspiration precautions, HOB 30  #SBO  - s/p ventral hernia repair with SBR  - NGT LCWS, strict NPO  -  Q1 abdominal exams; monitor for compartment syndrome  #GI Prophylaxis/hx GERD   - pantoprazole  Injectable 40 IV Push daily (takes omeprazole @home)  #Bowel regimen    - holding    -last bowel movement prior to admission    /RENAL:   #urine output in critically ill    -indwelling pierce   #FUNMI  - Cr uptrending, now anuric   - FeNa 0.4% > pre-renal --> s/p 100cc albumin, 1L LR bolus overnight  - nephrology consult pending  - LR @ 120cc/hr     Labs          BUN/Cr: 20/2.1 --> 27/3.0          [04-11 @ 06:19]Na  145 // K  4.7 // Mg  -- // Phos  --  [04-10 @ 22:44]Na  147 // K  4.1 // Mg  1.9 // Phos  5.3         HEME/ONC:   #DVT prophylaxis     -Chemical: heparin   Injectable 5000 Unit(s) SubCutaneous every 8 hours     -Mechanical: SCDs       Labs: Hb/Hct:  12.0/37.2  (04-10 @ 17:45)  -->,  11.3/36.5  (04-10 @ 22:44)  -->                      Plts:  306  -->,  291  -->                 PTT/INR:  20.4/1.06  --->         ID:  #Antibiotics Course  - continue zosyn > Purulent abscesses noted intraoperatively   WBC- 6.41  --->>,  10.58  --->>,  15.24  --->>  Temp trend- 24hrs T(F): 96.8 (04-11 @ 04:00), Max: 99.5 (04-10 @ 08:53)  Current antibiotics-piperacillin/tazobactam IVPB.. 3.375 every 8 hours  #MRSA nares negative       ENDOCRINE:  #glycemic monitoring    -FSG q6 if NPO    -Glucose goal 140-180. If above 180, will start corrective insulin sliding scale    MSK:  #Activity - Ambulate as Tolerated:     Time/Priority:  Routine (04-10-25 @ 17:00)      SKIN:  #DTI screening negative     - will continue to monitor daily skin changes      LINES/DRAINS:  PIV, Pierce, NGT  ADVANCED DIRECTIVES:  Full Code  INDICATION FOR SICU/SDU: Intestinal obstruction  DISPO:   SICU. Case discussed with attending Dr. Bear

## 2025-04-11 NOTE — PROGRESS NOTE ADULT - SUBJECTIVE AND OBJECTIVE BOX
NANCY SARGENT   [id number]   [birth year]  64yF  ============================================================   DATE OF INITIAL SICU/SDU CONSULT: 04-10-25    INDICATION FOR SICU CONSULT:  s/p open repair of ventral hernia, SBR     SICU COURSE EVENTS :  04-10 - admitted to SICU service s/p open repair of ventral hernia, SBR. Patient requiring bipap for ams and hypercarbia, improvement of co2 and mental status to anox3. Uptrending Cr and decreased UOP, albumin 100cc given, urine lytes     24HOUR EVENTS  4/10  NIGHT  - PM rounds: BiPAP 12/8. 50% O2 sats >96%, systolics 90s, HR 90s, anox3, abdomen distended slightly tense in upper quadrants, soft lower quadrants, pravena in place, THONY drain x (RU SS, RL murky).  - BiPAP  - 2g Mag  - uptrending Cr > pending urinlytes, albumin 100cc    [X] A ten-point review of systems was negative except as expressed in note.  [X ] History was obtained from patient. If unable to participate in their care, history was from review of the chart and collateral sources of information.  ============================================================    NANCY SARGENT   [id number]   [birth year]  64yF  ============================================================   DATE OF INITIAL SICU/SDU CONSULT: 04-10-25    INDICATION FOR SICU CONSULT:  s/p open repair of ventral hernia, SBR     SICU COURSE EVENTS :  04-10 - admitted to SICU service s/p open repair of ventral hernia, SBR, extubated to bipap. Uptrending Cr and decreased UOP, albumin 100cc given, urine lytes     24HOUR EVENTS  4/10  NIGHT  - PM rounds: BiPAP 12/8. 50% O2 sats >96%, systolics 90s, HR 90s, anox3, abdomen distended slightly tense in upper quadrants, soft lower quadrants, pravena in place, THONY drain x (RU SS, RL murky).  - BiPAP  - 2g Mag  - uptrending Cr > pending urinlytes, albumin 100cc    [X] A ten-point review of systems was negative except as expressed in note.  [X ] History was obtained from patient. If unable to participate in their care, history was from review of the chart and collateral sources of information.  ============================================================    NANCY SARGENT   [id number]   [birth year]  64yF  ============================================================   DATE OF INITIAL SICU/SDU CONSULT: 04-10-25    INDICATION FOR SICU CONSULT:  s/p open repair of ventral hernia, SBR    SICU COURSE EVENTS :  04-10 - admitted to SICU service s/p open repair of ventral hernia, SBR, extubated to bipap. Uptrending Cr and decreased UOP, albumin 100cc given, urine lytes     24HOUR EVENTS  4/10  NIGHT  - PM rounds: BiPAP 12/8. 50% O2 sats >96%, systolics 90s, HR 90s, anox3, abdomen distended slightly tense in upper quadrants, soft lower quadrants, pravena in place, THONY drain x (RU SS, RL murky).  - BiPAP  - 2g Mag  - uptrending Cr > pending urinlytes, albumin 100cc    [X] A ten-point review of systems was negative except as expressed in note.  [X ] History was obtained from patient. If unable to participate in their care, history was from review of the chart and collateral sources of information.  ============================================================    NANCY SARGENT   [id number]   [birth year]  64yF  ============================================================   DATE OF INITIAL SICU/SDU CONSULT: 04-10-25    INDICATION FOR SICU CONSULT:  s/p open repair of ventral hernia, SBR    SICU COURSE EVENTS :  04-10 - admitted to SICU service s/p open repair of ventral hernia, SBR, extubated to bipap. Uptrending Cr and decreased UOP, albumin 100cc given, urine lytes     24HOUR EVENTS  4/10  NIGHT  - PM rounds: BiPAP . 50% O2 sats >96%, systolics 90s, HR 90s, anox3, abdomen distended slightly tense in upper quadrants, soft lower quadrants, pravena in place, THONY drain x (RU SS, RL murky).  - BiPAP  - 2g Mag  - uptrending Cr > pending urinlytes, albumin 100cc    [X] A ten-point review of systems was negative except as expressed in note.  [X ] History was obtained from patient. If unable to participate in their care, history was from review of the chart and collateral sources of information.  ============================================================     Daily     Daily Weight in k.3 (2025 06:00)  Diet, NPO (04-10-25 @ 17:00)    CURRENT MEDS:  Neurologic Medications  acetaminophen   IVPB .. 1000 milliGRAM(s) IV Intermittent once PRN Moderate Pain (4 - 6)  acetaminophen   IVPB .. 1000 milliGRAM(s) IV Intermittent once  HYDROmorphone  Injectable 0.25 milliGRAM(s) IV Push every 6 hours PRN Moderate Pain (4 - 6)  HYDROmorphone  Injectable 0.5 milliGRAM(s) IV Push every 6 hours PRN Severe Pain (7 - 10)  ondansetron Injectable 4 milliGRAM(s) IV Push every 6 hours PRN Nausea    Respiratory Medications    Cardiovascular Medications    Gastrointestinal Medications  lactated ringers Bolus 1000 milliLiter(s) IV Bolus once  lactated ringers. 1000 milliLiter(s) IV Continuous <Continuous>  pantoprazole  Injectable 40 milliGRAM(s) IV Push daily    Genitourinary Medications    Hematologic/Oncologic Medications  heparin   Injectable 5000 Unit(s) SubCutaneous every 8 hours    Antimicrobial/Immunologic Medications  piperacillin/tazobactam IVPB.. 3.375 Gram(s) IV Intermittent every 8 hours    Endocrine/Metabolic Medications    Topical/Other Medications  chlorhexidine 2% Cloths 1 Application(s) Topical <User Schedule>    ICU Vital Signs Last 24 Hrs  T(C): 36 (2025 04:00), Max: 37.5 (10 Apr 2025 08:53)  T(F): 96.8 (2025 04:00), Max: 99.5 (10 Apr 2025 08:53)  HR: 105 (2025 06:00) (88 - 115)  BP: 97/57 (2025 06:00) (89/63 - 126/60)  BP(mean): 73 (2025 06:00) (71 - 79)  ABP: --  ABP(mean): --  RR: 18 (2025 05:00) (16 - 20)  SpO2: 89% (2025 06:00) (89% - 98%)    O2 Parameters below as of 2025 05:00  Patient On (Oxygen Delivery Method): BiPAP/CPAP              I&O's Summary    10 Apr 2025 07:  -  2025 07:00  --------------------------------------------------------  IN: 2520 mL / OUT: 160 mL / NET: 2360 mL      I&O's Detail    10 Apr 2025 07:01  -  2025 07:00  --------------------------------------------------------  IN:    IV PiggyBack: 450 mL    Lactated Ringers: 150 mL    Lactated Ringers: 1920 mL  Total IN: 2520 mL    OUT:    Bulb (mL): 85 mL    Bulb (mL): 15 mL    Indwelling Catheter - Urethral (mL): 60 mL  Total OUT: 160 mL    Total NET: 2360 mL          PHYSICAL EXAM:   NEURO/GENERAL: NAD. A&Ox3. no focal deficits   RESP: equal chest rise b/l, currently on BiPAP .   CARDIAC: S1,S2. Sinus tachycardia on monitor to 100s.   GI: abdomen soft, distended. NGT in place. midline vac holding suction. THONY x2 with minimal output.   : urinary catheter in place, minimal dark urine in bag  EXTREMITIES: moving extremities; no peripheral edema   SKIN: no DTI noted

## 2025-04-11 NOTE — PROGRESS NOTE ADULT - ATTENDING COMMENTS
Pt examined  labs and images reviewed  pt with supermorbid obesity and complex hernia with sbo  previously treated without surgery   passing flatus awaiting bowel movement  had a bm   but more pain   s/p BHUMI , Bowel Resection / Anastamosis / Primary repair of Fascia / hernia sac    opvernight - renal failure / respiratory compromise    impression : extremely high risk - supermorbid obesity and complex hernia with partial sbo  will intubate  will resuscitate  may need paralyzing agent    SICU     prognosis discussed with family

## 2025-04-11 NOTE — PROGRESS NOTE ADULT - SUBJECTIVE AND OBJECTIVE BOX
SURGERY PROGRESS NOTE    Patient: NANCY SARGENT , 64y (62)Female   MRN: [id number]  Location: [address]  Visit: 25 Inpatient  Date: 25 @ 02:02    Hospital Day #: 9  Post-Op Day #: 1    Procedure/Dx/Injuries:  S/p Open repair of ventral hernia using mesh and component separation technique, Small bowel resection     Events of past 24 hours:  No events overnight. Pt was transferred to SICU. Remains on BiPAP    PAST MEDICAL & SURGICAL HISTORY:  Gastric bypass status for obesity      LOUIS (obstructive sleep apnea)      S/P gastric bypass      S/P       S/P small bowel resection      History of total bilateral knee replacement (TKR)      H/O colonoscopy            Vitals:   T(F): 96.7 (04-10-25 @ 23:00), Max: 99.5 (04-10-25 @ 08:53)  HR: 98 (04-10-25 @ 23:00)  BP: 98/70 (04-10-25 @ 23:00)  RR: 18 (04-10-25 @ 23:00)  SpO2: 94% (04-10-25 @ 23:00)      Diet, NPO      Fluids: lactated ringers.: Solution, 1000 milliLiter(s) infuse at 120 mL/Hr      I & O's:    25 @ 07:01  -  04-10-25 @ 07:00  --------------------------------------------------------  IN:    dextrose 5% + sodium chloride 0.45%: 1600 mL    IV PiggyBack: 350 mL  Total IN: 1950 mL    OUT:    Voided (mL): 1100 mL  Total OUT: 1100 mL    Total NET: 850 mL        Bowel Movement: : [] YES [] NO  Flatus: : [] YES [] NO    PHYSICAL EXAM:  General: NAD, AAOx3, calm  HEENT: NCAT, EOMI  Cardiac: RRR  Respiratory: On BiPaP, normal respiratory effort  Abdomen: Soft, non-distended, non-tender, THONY x2 in place, midline previna in place, abd binder over  Skin: Warm/dry, normal color, no jaundice  Incision/wound: healing well, dressings in place, clean, dry and intact    MEDICATIONS  (STANDING):  acetaminophen   IVPB .. 1000 milliGRAM(s) IV Intermittent once  chlorhexidine 2% Cloths 1 Application(s) Topical <User Schedule>  heparin   Injectable 5000 Unit(s) SubCutaneous every 8 hours  lactated ringers. 1000 milliLiter(s) (120 mL/Hr) IV Continuous <Continuous>  pantoprazole  Injectable 40 milliGRAM(s) IV Push daily  piperacillin/tazobactam IVPB.. 3.375 Gram(s) IV Intermittent every 8 hours    MEDICATIONS  (PRN):  acetaminophen   IVPB .. 1000 milliGRAM(s) IV Intermittent once PRN Moderate Pain (4 - 6)  HYDROmorphone  Injectable 0.25 milliGRAM(s) IV Push every 6 hours PRN Moderate Pain (4 - 6)  HYDROmorphone  Injectable 0.5 milliGRAM(s) IV Push every 6 hours PRN Severe Pain (7 - 10)  ondansetron Injectable 4 milliGRAM(s) IV Push every 6 hours PRN Nausea      DVT PROPHYLAXIS: heparin   Injectable 5000 Unit(s) SubCutaneous every 8 hours    GI PROPHYLAXIS: pantoprazole  Injectable 40 milliGRAM(s) IV Push daily    ANTICOAGULATION:   ANTIBIOTICS:  piperacillin/tazobactam IVPB.. 3.375 Gram(s)            LAB/STUDIES:  Labs:  CAPILLARY BLOOD GLUCOSE                              11.3   15.24 )-----------( 291      ( 10 Apr 2025 22:44 )             36.5       Auto Immature Granulocyte %: 1.4 % (04-10-25 @ 22:44)  Auto Immature Granulocyte %: 0.8 % (04-10-25 @ 17:45)    04-10    147[H]  |  112[H]  |  20  ----------------------------<  176[H]  4.1   |  19  |  2.1[H]      Calcium: 6.5 mg/dL (04-10-25 @ 22:44)      LFTs:         Coags:     12.50  ----< 1.06    ( 10 Apr 2025 17:45 )     20.4                Urinalysis Basic - ( 10 Apr 2025 22:44 )    Color: x / Appearance: x / SG: x / pH: x  Gluc: 176 mg/dL / Ketone: x  / Bili: x / Urobili: x   Blood: x / Protein: x / Nitrite: x   Leuk Esterase: x / RBC: x / WBC x   Sq Epi: x / Non Sq Epi: x / Bacteria: x                IMAGING:      ACCESS/ DEVICES:  [ ] Peripheral IV  [ ] Central Venous Line	[ ] R	[ ] L	[ ] IJ	[ ] Fem	[ ] SC	Placed:   [ ] Arterial Line		[ ] R	[ ] L	[ ] Fem	[ ] Rad	[ ] Ax	Placed:   [ ] PICC:					[ ] Mediport  [ ] Urinary Catheter,  Date Placed:   [ ] Chest tube: [ ] Right, [ ] Left  [ ] THONY/Oliver Drains

## 2025-04-11 NOTE — CONSULT NOTE ADULT - ASSESSMENT
Patient is a 64F PMHx HTN, morbid obesity s/p 2001 Abby-en-Y gastric bypass who presented on 4/3 with incarcerated ventral hernia with SBO. S/p open ventral hernia repair, small bowel resection, and primary fascial closure with Dr. Lema on 4/10. Currently admitted to SICU for monitoring. Nephrology consulted for FUNMI and oliguria.    ASSESSMENT AND PLAN:  #FUNMI- likely pre-renal/ATN  #Incarcerated ventral hernia with SBO  #S/p open hernia repair and small bowel resection 4/10  #HTN  #LOUIS  #Hx of Morbid obesity sp gastric bypass Sx      - Cr trend: 3<--2.1<--1.9<--1.0(baseline Cr 1)  - K 4.1, HCO3 21, Phos 5.3  - Please get VBG, UA, U lytes  - Monitor BMP, lytes and urine OP  - Maintain MAP>65 Patient is a 64F PMHx HTN, morbid obesity s/p 2001 Abby-en-Y gastric bypass who presented on 4/3 with incarcerated ventral hernia with SBO. S/p open ventral hernia repair, small bowel resection, and primary fascial closure with Dr. Lema on 4/10. Currently admitted to SICU for monitoring. Nephrology consulted for FUNMI and oliguria.    ASSESSMENT AND PLAN:  #FUNMI- likely pre-renal/ATN  #Incarcerated ventral hernia with SBO  #S/p open hernia repair and small bowel resection 4/10  #HTN  #LOUIS  #Hx of Morbid obesity sp gastric bypass Sx      - Cr trend: 3<--2.1<--1.9<--1.0(baseline Cr 1)  - K 4.1, HCO3 21, Phos 5.3  - Please get VBG, UA, U lytes  - Monitor BMP, lytes and urine OP  - Maintain MAP>65  - Dose medications(Zosyn) for gfr<15 Patient is a 64F PMHx HTN, morbid obesity s/p 2001 Abby-en-Y gastric bypass who presented on 4/3 with incarcerated ventral hernia with SBO. S/p open ventral hernia repair, small bowel resection, and primary fascial closure with Dr. Lema on 4/10. Currently admitted to SICU for monitoring. Nephrology consulted for FUNMI and oliguria.    ASSESSMENT AND PLAN:  #FUNMI- likely pre-renal/ATN  #Incarcerated ventral hernia with SBO  #S/p open hernia repair and small bowel resection 4/10  #HTN  #LOUIS on CPAP  #Hx of Morbid obesity sp gastric bypass Sx      - Cr trend: 3<--2.1<--1.9<--1.0(baseline Cr 1)  - K 4.1, HCO3 21, Phos 5.3  - Please get VBG, UA, U lytes  - Monitor BMP, lytes and urine OP  - Maintain MAP>65  - Dose medications(Zosyn) for gfr<15 Patient is a 64F PMHx HTN, morbid obesity s/p 2001 Abby-en-Y gastric bypass who presented on 4/3 with incarcerated ventral hernia with SBO. S/p open ventral hernia repair, small bowel resection, and primary fascial closure with Dr. Lema on 4/10. Currently admitted to SICU for monitoring. Nephrology consulted for FUNMI and oliguria.    ASSESSMENT AND PLAN:  #FUNMI- likely pre-renal/ATN  #Acute hypoxic respiratory failure sp intubation  #?Abdominal compartment syndrome  #Incarcerated ventral hernia with SBO  #S/p open hernia repair and small bowel resection 4/10  #HTN  #LOUIS on CPAP  #Hx of Morbid obesity sp gastric bypass Sx      - Cr trend: 3<--2.1<--1.9<--1.0(baseline Cr 1)  - K 4.1, HCO3 21, Phos 5.3  - Please get ABG, UA, U lytes  - Monitor bladder pressures  - Monitor BMP, lytes and urine OP  - Maintain MAP>65  - Dose medications(Zosyn) for gfr<15 Patient is a 64F PMHx HTN, morbid obesity s/p 2001 Abby-en-Y gastric bypass who presented on 4/3 with incarcerated ventral hernia with SBO. S/p open ventral hernia repair, small bowel resection, and primary fascial closure with Dr. Lema on 4/10. Currently admitted to SICU for monitoring. Nephrology consulted for FUNMI and oliguria.    ASSESSMENT AND PLAN:  #FUNMI- likely pre-renal/ATN  #Acute hypoxic respiratory failure sp intubation  #Concern for abdominal compartment syndrome  #Incarcerated ventral hernia with SBO  #S/p open hernia repair and small bowel resection 4/10  #HTN  #LOUIS on CPAP  #Hx of Morbid obesity sp gastric bypass Sx      - Cr trend: 3<--2.1<--1.9<--1.0(baseline Cr 1)  - K 4.1, HCO3 21, Phos 5.3  - Start IV Bumex 2mg BID  - Please get ABG, UA, U lytes  - Monitor bladder pressures if possible  - Monitor BMP, lytes and urine OP  - Maintain MAP>65  - Dose medications(Zosyn) for gfr<15; avoid nephrotoxic agents Patient is a 64F PMHx HTN, morbid obesity s/p 2001 Abby-en-Y gastric bypass who presented on 4/3 with incarcerated ventral hernia with SBO. S/p open ventral hernia repair, small bowel resection, and primary fascial closure with Dr. Lema on 4/10. Currently admitted to SICU for monitoring. Nephrology consulted for FUNMI and oliguria.    ASSESSMENT AND PLAN:  #FUNMI- likely pre-renal; ATN  #Acute hypoxic respiratory failure sp intubation  #Concern for abdominal compartment syndrome  #Incarcerated ventral hernia with SBO  #S/p open hernia repair and small bowel resection 4/10  #HTN  #LOUIS on CPAP  #Hx of Morbid obesity sp gastric bypass Sx      - Cr trend: 3<--2.1<--1.9<--1.0(baseline Cr 1)  - K 4.1, HCO3 21, Phos 5.3  - Start IV Bumex 2mg BID  - Please get ABG, UA, U lytes  - Monitor bladder pressures if possible  - Monitor BMP, lytes and urine OP  - Maintain MAP>65  - Dose medications(Zosyn) for gfr<15; avoid nephrotoxic agents

## 2025-04-11 NOTE — PROCEDURE NOTE - NSPROCDETAILS_GEN_ALL_CORE
sutured in place location identified, draped/prepped, sterile technique used, needle inserted/introduced/positive blood return obtained via catheter/sutured in place

## 2025-04-11 NOTE — PROGRESS NOTE ADULT - CRITICAL CARE ATTENDING COMMENT
POD C1.  Patient was on BiPAP overnight.  Looks uncomfortable.  CXR with bilateral atelectasis.  Creat 3 from 1 preop. Minimal urine output.  WBC 15.    PE:  AAO x3  Chest: decreased breath sounds in bilateral lung bases  CV : rrr  Abdomen: moderately distended.    ASSESSMENT:  65 y/o female with Incarcerated Ventral Hernia. Small Bowel Obstruction.  S/P repair of large ventral hernia, incarcerated.   S/P small bowel resection in two areas of perforation with anastomosis.  Acute postoperative pain.  Atelectasis. LOUIS.  At risk for respiratory complications.  At risk for hemodynamic instability.  Abdominal hypertension.  FUNMI.  Morbid obesity.    PLAN:  - pain control - on Tylenol, Dilaudid prn  - patient needs intubation and mechanical ventilation.  - needs sedation - use Precedex and Fentanyl  - keep MAP>65; use Flowtrack and fluid resuscitate patient  - NPO, NGT to suction  - follow serum electrolytes and UOP  - Nephrology eval needed  - ID - on Zosyn  - DVT proph

## 2025-04-11 NOTE — CONSULT NOTE ADULT - SUBJECTIVE AND OBJECTIVE BOX
NEPHROLOGY CONSULTATION NOTE    Patient is a 64F PMHx HTN, morbid obesity s/p  Abby-en-Y gastric bypass who presented on 4/3 with incarcerated ventral hernia with SBO. S/p open ventral hernia repair, small bowel resection, and primary fascial closure with Dr. Lema on 4/10. Currently admitted to SICU for monitoring.     PAST MEDICAL & SURGICAL HISTORY:  Gastric bypass status for obesity      LOUIS (obstructive sleep apnea)      S/P gastric bypass      S/P       S/P small bowel resection      History of total bilateral knee replacement (TKR)      H/O colonoscopy          Allergies:  No Known Allergies    Home Medications Reviewed  Hospital Medications:   MEDICATIONS  (STANDING):  acetaminophen   IVPB .. 1000 milliGRAM(s) IV Intermittent once  chlorhexidine 2% Cloths 1 Application(s) Topical <User Schedule>  heparin   Injectable 5000 Unit(s) SubCutaneous every 8 hours  lactated ringers Bolus 1000 milliLiter(s) IV Bolus once  lactated ringers. 1000 milliLiter(s) (120 mL/Hr) IV Continuous <Continuous>  pantoprazole  Injectable 40 milliGRAM(s) IV Push daily  piperacillin/tazobactam IVPB.. 3.375 Gram(s) IV Intermittent every 8 hours    SOCIAL HISTORY:  Denies ETOH,Smoking,   FAMILY HISTORY:      REVIEW OF SYSTEMS:  CONSTITUTIONAL: No weakness, fevers or chills  EYES/ENT: No visual changes;  No vertigo or throat pain   NECK: No pain or stiffness  RESPIRATORY: No cough, wheezing, hemoptysis; No shortness of breath  CARDIOVASCULAR: No chest pain or palpitations.  GASTROINTESTINAL: No abdominal or epigastric pain. No nausea, vomiting, or hematemesis; No diarrhea or constipation. No melena or hematochezia.  GENITOURINARY: No dysuria, frequency, foamy urine, urinary urgency, incontinence or hematuria  NEUROLOGICAL: No numbness or weakness  SKIN: No itching, burning, rashes, or lesions   VASCULAR: No bilateral lower extremity edema.   All other review of systems is negative unless indicated above.    VITALS:  Vital Signs Last 24 Hrs  T(C): 36.1 (2025 08:00), Max: 37.1 (10 Apr 2025 19:00)  T(F): 97 (2025 08:00), Max: 98.8 (10 Apr 2025 19:00)  HR: 108 (2025 08:00) (88 - 108)  BP: 104/73 (2025 08:00) (89/63 - 126/60)  BP(mean): 73 (2025 06:00) (71 - 79)  RR: 18 (2025 05:00) (16 - 20)  SpO2: 92% (2025 08:00) (89% - 98%)    Parameters below as of 2025 08:00  Patient On (Oxygen Delivery Method): BiPAP/CPAP        04-10 @ 07:  -   @ 07:00  --------------------------------------------------------  IN: 2520 mL / OUT: 160 mL / NET: 2360 mL     @ 07: @ 09:00  --------------------------------------------------------  IN: 145 mL / OUT: 10 mL / NET: 135 mL        PHYSICAL EXAM:  Constitutional: NAD  HEENT: anicteric sclera, oropharynx clear, MMM  Neck: No JVD  Respiratory: CTAB, no wheezes, rales or rhonchi  Cardiovascular: S1, S2, RRR  Gastrointestinal:   Extremities: No cyanosis or clubbing. No peripheral edema  Neurological: A/O x 3, no focal deficits  Psychiatric: Normal mood, normal affect  : No CVA tenderness. + pierce.   Skin: No rashes      LABS:      145  |  106  |  27[H]  ----------------------------<  145[H]  4.7   |  21  |  3.0[H]    Ca    7.9[L]      2025 06:19  Phos  5.3     04-10  Mg     1.9     04-10      Creatinine Trend: 3.0 <--, 2.1 <--, 1.9 <--, 1.0 <--, 1.1 <--, 1.3 <--, 1.2 <--, 1.6 <--, 1.2 <--, 1.2 <--, 1.1 <--, 1.0 <--                        11.3   15.24 )-----------( 291      ( 10 Apr 2025 22:44 )             36.5     Urine Studies:  Urinalysis Basic - ( 2025 06:19 )    Color:  / Appearance:  / SG:  / pH:   Gluc: 145 mg/dL / Ketone:   / Bili:  / Urobili:    Blood:  / Protein:  / Nitrite:    Leuk Esterase:  / RBC:  / WBC    Sq Epi:  / Non Sq Epi:  / Bacteria:       Sodium, Random Urine: <20.0 mmoL/L ( @ 06:27)  Creatinine, Random Urine: 105 mg/dL ( @ 06:27)            RADIOLOGY & ADDITIONAL STUDIES:    CT Abdomen and Pelvis No Cont (04.10.25 @ 08:17)   IMPRESSION:    Increased proximal small bowel loop dilatation with increased   inflammatory changes distally at the level of the hernias.    Stable dilated loop of small bowel containing oral contrast associated   with the right abdominal wall hernias as described. A developing closed   loop (given twotransition points/hernia entries) obstruction is not   excluded.    There is interval passage of contrast into the ascending colon.    Distended gallbladder with questionable sludge. Likely mildly dilated CBD.                   NEPHROLOGY CONSULTATION NOTE    Patient is a 64F PMHx HTN, morbid obesity s/p  Abby-en-Y gastric bypass who presented on 4/3 with incarcerated ventral hernia with SBO. S/p open ventral hernia repair, small bowel resection, and primary fascial closure with Dr. Lema on 4/10. Currently admitted to SICU for monitoring.     PAST MEDICAL & SURGICAL HISTORY:  Gastric bypass status for obesity      LOUIS (obstructive sleep apnea)      S/P gastric bypass      S/P       S/P small bowel resection      History of total bilateral knee replacement (TKR)      H/O colonoscopy          Allergies:  No Known Allergies    Home Medications Reviewed  Hospital Medications:   MEDICATIONS  (STANDING):  acetaminophen   IVPB .. 1000 milliGRAM(s) IV Intermittent once  chlorhexidine 2% Cloths 1 Application(s) Topical <User Schedule>  heparin   Injectable 5000 Unit(s) SubCutaneous every 8 hours  lactated ringers Bolus 1000 milliLiter(s) IV Bolus once  lactated ringers. 1000 milliLiter(s) (120 mL/Hr) IV Continuous <Continuous>  pantoprazole  Injectable 40 milliGRAM(s) IV Push daily  piperacillin/tazobactam IVPB.. 3.375 Gram(s) IV Intermittent every 8 hours    SOCIAL HISTORY:  Denies ETOH,Smoking,   FAMILY HISTORY:      REVIEW OF SYSTEMS:  Unable to obtain    VITALS:  Vital Signs Last 24 Hrs  T(C): 36.1 (2025 08:00), Max: 37.1 (10 Apr 2025 19:00)  T(F): 97 (2025 08:00), Max: 98.8 (10 Apr 2025 19:00)  HR: 108 (2025 08:00) (88 - 108)  BP: 104/73 (2025 08:00) (89/63 - 126/60)  BP(mean): 73 (2025 06:00) (71 - 79)  RR: 18 (2025 05:00) (16 - 20)  SpO2: 92% (2025 08:00) (89% - 98%)    Parameters below as of 2025 08:00  Patient On (Oxygen Delivery Method): BiPAP/CPAP        -10 @ 07:01  -  -11 @ 07:00  --------------------------------------------------------  IN: 2520 mL / OUT: 160 mL / NET: 2360 mL     @ 07:01  -   @ 09:00  --------------------------------------------------------  IN: 145 mL / OUT: 10 mL / NET: 135 mL        PHYSICAL EXAM:  Constitutional: Intubated, sedated  HEENT: anicteric sclera, oropharynx clear, MMM  Neck: No JVD  Respiratory: CTAB, no wheezes, rales or rhonchi  Cardiovascular: S1, S2, RRR  Gastrointestinal: distended and firm abdomen with drains  Extremities: No cyanosis or clubbing. No peripheral edema  Neurological: sedated  : No CVA tenderness. + pierce.   Skin: No rashes      LABS:      145  |  106  |  27[H]  ----------------------------<  145[H]  4.7   |  21  |  3.0[H]    Ca    7.9[L]      2025 06:19  Phos  5.3     04-10  Mg     1.9     04-10      Creatinine Trend: 3.0 <--, 2.1 <--, 1.9 <--, 1.0 <--, 1.1 <--, 1.3 <--, 1.2 <--, 1.6 <--, 1.2 <--, 1.2 <--, 1.1 <--, 1.0 <--                        11.3   15.24 )-----------( 291      ( 10 Apr 2025 22:44 )             36.5     Urine Studies:  Urinalysis Basic - ( 2025 06:19 )    Color:  / Appearance:  / SG:  / pH:   Gluc: 145 mg/dL / Ketone:   / Bili:  / Urobili:    Blood:  / Protein:  / Nitrite:    Leuk Esterase:  / RBC:  / WBC    Sq Epi:  / Non Sq Epi:  / Bacteria:       Sodium, Random Urine: <20.0 mmoL/L ( @ 06:27)  Creatinine, Random Urine: 105 mg/dL ( @ :27)            RADIOLOGY & ADDITIONAL STUDIES:    CT Abdomen and Pelvis No Cont (04.10.25 @ 08:17)   IMPRESSION:    Increased proximal small bowel loop dilatation with increased   inflammatory changes distally at the level of the hernias.    Stable dilated loop of small bowel containing oral contrast associated   with the right abdominal wall hernias as described. A developing closed   loop (given twotransition points/hernia entries) obstruction is not   excluded.    There is interval passage of contrast into the ascending colon.    Distended gallbladder with questionable sludge. Likely mildly dilated CBD.

## 2025-04-11 NOTE — CONSULT NOTE ADULT - SUBJECTIVE AND OBJECTIVE BOX
INTERVENTIONAL RADIOLOGY CONSULT:     Procedure Requested: ABDOMINAL WALL BOTOX     HPI:  The patient is a 64-year-old female with a past medical history of high blood pressure and gastric bypass surgery in , presenting with two days of sharp abdominal pain and one episode of non-bloody, non-bilious vomiting. She has a prior history of small bowel obstruction in , , and most recently in , all of which resolved with conservative management. Her current symptoms are similar in nature. A computed tomography scan of the abdomen and pelvis with oral and intravenous contrast revealed multiple ventral abdominal wall hernias containing both obstructed and non-obstructed bowel loops. A complex small bowel obstruction is present, involving three hernias on the right side of the abdomen, with dilated, fluid-filled loops of small bowel, trace fluid between loops, and a collapsed segment of bowel beyond a hernia in the right lower quadrant. These findings are consistent with a recurrent small bowel obstruction. The hernia was non reducible at bedside. (2025 20:41)      PAST MEDICAL & SURGICAL HISTORY:  Gastric bypass status for obesity    LOUIS (obstructive sleep apnea)    S/P gastric bypass    S/P     S/P small bowel resection    History of total bilateral knee replacement (TKR)    H/O colonoscopy      MEDICATIONS  (STANDING):  acetaminophen   IVPB .. 1000 milliGRAM(s) IV Intermittent once  chlorhexidine 0.12% Liquid 15 milliLiter(s) Oral Mucosa every 12 hours  chlorhexidine 2% Cloths 1 Application(s) Topical <User Schedule>  cisatracurium Infusion 3 MICROgram(s)/kG/Min (25.3 mL/Hr) IV Continuous <Continuous>  dexMEDEtomidine Infusion 0.2 MICROgram(s)/kG/Hr (7.03 mL/Hr) IV Continuous <Continuous>  dexMEDEtomidine Infusion 0.2 MICROgram(s)/kG/Hr (7.03 mL/Hr) IV Continuous <Continuous>  fentaNYL   Infusion 0.5 MICROgram(s)/kG/Hr (7.03 mL/Hr) IV Continuous <Continuous>  heparin   Injectable 7500 Unit(s) SubCutaneous every 8 hours  lactated ringers Bolus 1000 milliLiter(s) IV Bolus once  lactated ringers. 1000 milliLiter(s) (120 mL/Hr) IV Continuous <Continuous>  pantoprazole  Injectable 40 milliGRAM(s) IV Push daily  piperacillin/tazobactam IVPB.. 4.5 Gram(s) IV Intermittent every 8 hours    MEDICATIONS  (PRN):  acetaminophen   IVPB .. 1000 milliGRAM(s) IV Intermittent once PRN Moderate Pain (4 - 6)      Allergies    No Known Allergies    Intolerances      FAMILY HISTORY:    Physical Exam:   Vital Signs Last 24 Hrs  T(C): 36.1 (2025 08:00), Max: 37.1 (10 Apr 2025 19:00)  T(F): 97 (2025 08:00), Max: 98.8 (10 Apr 2025 19:00)  HR: 108 (2025 08:00) (88 - 108)  BP: 104/73 (2025 08:00) (89/63 - 126/60)  BP(mean): 73 (2025 06:00) (71 - 79)  RR: 18 (2025 05:00) (16 - 20)  SpO2: 92% (2025 08:00) (89% - 98%)    Parameters below as of 2025 08:00  Patient On (Oxygen Delivery Method): BiPAP/CPAP          Labs:                         11.3   15.24 )-----------( 291      ( 10 Apr 2025 22:44 )             36.5     04-11    145  |  106  |  27[H]  ----------------------------<  145[H]  4.7   |  21  |  3.0[H]    Ca    7.9[L]      2025 06:19  Phos  5.3     04-10  Mg     1.9     04-10      PT/INR - ( 10 Apr 2025 17:45 )   PT: 12.50 sec;   INR: 1.06 ratio         PTT - ( 10 Apr 2025 17:45 )  PTT:20.4 sec    Pertinent labs:                      11.3   15.24 )-----------( 291      ( 10 Apr 2025 22:44 )             36.5       04-11    145  |  106  |  27[H]  ----------------------------<  145[H]  4.7   |  21  |  3.0[H]    Ca    7.9[L]      2025 06:19  Phos  5.3     04-10  Mg     1.9     04-10        PT/INR - ( 10 Apr 2025 17:45 )   PT: 12.50 sec;   INR: 1.06 ratio         PTT - ( 10 Apr 2025 17:45 )  PTT:20.4 sec    Radiology & Additional Studies:     Radiology imaging reviewed.       ASSESSMENT AND PLAN:  -Will proceed with abdominal wall botox today at the bedside.  -Spoke with Dr. Lema and Dr. Santizo     Please call Interventional Radiology with questions or concerns:   - M-F [id number]: [phone]   - All other hours: [phone]

## 2025-04-11 NOTE — PROGRESS NOTE ADULT - ASSESSMENT
ASSESSMENT:  64y F w/ PMHx of HTN and gastric bypass surgery in 2001 presents with abdominal distention. CT with incarcerated ventral hernia with SBO.     S/p Open repair of ventral hernia using mesh and component separation technique, Small bowel resection     PLAN:  - Monitor pierce output  - NGT to LCWS  - NPO + IVF  - Monitor THONY drain output  - Pain control  - Rest of care per SICU      BLUE TEAM SPECTRA: [id number]

## 2025-04-12 LAB
ALBUMIN SERPL ELPH-MCNC: 2.6 G/DL — LOW (ref 3.5–5.2)
ALP SERPL-CCNC: 50 U/L — SIGNIFICANT CHANGE UP (ref 30–115)
ALT FLD-CCNC: 43 U/L — HIGH (ref 0–41)
ANION GAP SERPL CALC-SCNC: 13 MMOL/L — SIGNIFICANT CHANGE UP (ref 7–14)
ANION GAP SERPL CALC-SCNC: 15 MMOL/L — HIGH (ref 7–14)
APTT BLD: 27.6 SEC — SIGNIFICANT CHANGE UP (ref 27–39.2)
AST SERPL-CCNC: 51 U/L — HIGH (ref 0–41)
BILIRUB DIRECT SERPL-MCNC: 0.2 MG/DL — SIGNIFICANT CHANGE UP (ref 0–0.3)
BILIRUB INDIRECT FLD-MCNC: 0.2 MG/DL — SIGNIFICANT CHANGE UP (ref 0.2–1.2)
BILIRUB SERPL-MCNC: 0.4 MG/DL — SIGNIFICANT CHANGE UP (ref 0.2–1.2)
BUN SERPL-MCNC: 39 MG/DL — HIGH (ref 10–20)
BUN SERPL-MCNC: 45 MG/DL — HIGH (ref 10–20)
CALCIUM SERPL-MCNC: 7.6 MG/DL — LOW (ref 8.4–10.5)
CALCIUM SERPL-MCNC: 8 MG/DL — LOW (ref 8.4–10.5)
CHLORIDE SERPL-SCNC: 105 MMOL/L — SIGNIFICANT CHANGE UP (ref 98–110)
CHLORIDE SERPL-SCNC: 107 MMOL/L — SIGNIFICANT CHANGE UP (ref 98–110)
CO2 SERPL-SCNC: 20 MMOL/L — SIGNIFICANT CHANGE UP (ref 17–32)
CO2 SERPL-SCNC: 21 MMOL/L — SIGNIFICANT CHANGE UP (ref 17–32)
CREAT SERPL-MCNC: 4.2 MG/DL — CRITICAL HIGH (ref 0.7–1.5)
CREAT SERPL-MCNC: 4.4 MG/DL — CRITICAL HIGH (ref 0.7–1.5)
EGFR: 11 ML/MIN/1.73M2 — LOW
GAS PNL BLDA: SIGNIFICANT CHANGE UP
GLUCOSE BLDC GLUCOMTR-MCNC: 134 MG/DL — HIGH (ref 70–99)
GLUCOSE BLDC GLUCOMTR-MCNC: 135 MG/DL — HIGH (ref 70–99)
GLUCOSE BLDC GLUCOMTR-MCNC: 141 MG/DL — HIGH (ref 70–99)
GLUCOSE BLDC GLUCOMTR-MCNC: 142 MG/DL — HIGH (ref 70–99)
GLUCOSE BLDC GLUCOMTR-MCNC: 144 MG/DL — HIGH (ref 70–99)
GLUCOSE SERPL-MCNC: 133 MG/DL — HIGH (ref 70–99)
GLUCOSE SERPL-MCNC: 146 MG/DL — HIGH (ref 70–99)
HCT VFR BLD CALC: 26.7 % — LOW (ref 37–47)
HCT VFR BLD CALC: 28.2 % — LOW (ref 37–47)
HCT VFR BLD CALC: 28.9 % — LOW (ref 37–47)
HGB BLD-MCNC: 8.3 G/DL — LOW (ref 12–16)
HGB BLD-MCNC: 8.6 G/DL — LOW (ref 12–16)
HGB BLD-MCNC: 8.9 G/DL — LOW (ref 12–16)
INR BLD: 1.09 RATIO — SIGNIFICANT CHANGE UP (ref 0.65–1.3)
MAGNESIUM SERPL-MCNC: 1.9 MG/DL — SIGNIFICANT CHANGE UP (ref 1.8–2.4)
MAGNESIUM SERPL-MCNC: 2 MG/DL — SIGNIFICANT CHANGE UP (ref 1.8–2.4)
MCHC RBC-ENTMCNC: 30.2 PG — SIGNIFICANT CHANGE UP (ref 27–31)
MCHC RBC-ENTMCNC: 30.4 PG — SIGNIFICANT CHANGE UP (ref 27–31)
MCHC RBC-ENTMCNC: 30.5 G/DL — LOW (ref 32–37)
MCHC RBC-ENTMCNC: 30.5 PG — SIGNIFICANT CHANGE UP (ref 27–31)
MCHC RBC-ENTMCNC: 30.8 G/DL — LOW (ref 32–37)
MCHC RBC-ENTMCNC: 31.1 G/DL — LOW (ref 32–37)
MCV RBC AUTO: 98 FL — SIGNIFICANT CHANGE UP (ref 81–99)
MCV RBC AUTO: 98.2 FL — SIGNIFICANT CHANGE UP (ref 81–99)
MCV RBC AUTO: 99.6 FL — HIGH (ref 81–99)
NRBC BLD AUTO-RTO: 0 /100 WBCS — SIGNIFICANT CHANGE UP (ref 0–0)
PHOSPHATE SERPL-MCNC: 5.9 MG/DL — HIGH (ref 2.1–4.9)
PHOSPHATE SERPL-MCNC: 5.9 MG/DL — HIGH (ref 2.1–4.9)
PLATELET # BLD AUTO: 206 K/UL — SIGNIFICANT CHANGE UP (ref 130–400)
PLATELET # BLD AUTO: 241 K/UL — SIGNIFICANT CHANGE UP (ref 130–400)
PLATELET # BLD AUTO: 256 K/UL — SIGNIFICANT CHANGE UP (ref 130–400)
PMV BLD: 10.4 FL — SIGNIFICANT CHANGE UP (ref 7.4–10.4)
PMV BLD: 10.5 FL — HIGH (ref 7.4–10.4)
PMV BLD: 10.5 FL — HIGH (ref 7.4–10.4)
POTASSIUM SERPL-MCNC: 4.7 MMOL/L — SIGNIFICANT CHANGE UP (ref 3.5–5)
POTASSIUM SERPL-MCNC: 4.8 MMOL/L — SIGNIFICANT CHANGE UP (ref 3.5–5)
POTASSIUM SERPL-SCNC: 4.7 MMOL/L — SIGNIFICANT CHANGE UP (ref 3.5–5)
POTASSIUM SERPL-SCNC: 4.8 MMOL/L — SIGNIFICANT CHANGE UP (ref 3.5–5)
PROT SERPL-MCNC: 4.4 G/DL — LOW (ref 6–8)
PROTHROM AB SERPL-ACNC: 12.9 SEC — HIGH (ref 9.95–12.87)
RBC # BLD: 2.72 M/UL — LOW (ref 4.2–5.4)
RBC # BLD: 2.83 M/UL — LOW (ref 4.2–5.4)
RBC # BLD: 2.95 M/UL — LOW (ref 4.2–5.4)
RBC # FLD: 13.9 % — SIGNIFICANT CHANGE UP (ref 11.5–14.5)
RBC # FLD: 14 % — SIGNIFICANT CHANGE UP (ref 11.5–14.5)
RBC # FLD: 14 % — SIGNIFICANT CHANGE UP (ref 11.5–14.5)
SODIUM SERPL-SCNC: 140 MMOL/L — SIGNIFICANT CHANGE UP (ref 135–146)
SODIUM SERPL-SCNC: 141 MMOL/L — SIGNIFICANT CHANGE UP (ref 135–146)
WBC # BLD: 11.11 K/UL — HIGH (ref 4.8–10.8)
WBC # BLD: 14.38 K/UL — HIGH (ref 4.8–10.8)
WBC # BLD: 14.8 K/UL — HIGH (ref 4.8–10.8)
WBC # FLD AUTO: 11.11 K/UL — HIGH (ref 4.8–10.8)
WBC # FLD AUTO: 14.38 K/UL — HIGH (ref 4.8–10.8)
WBC # FLD AUTO: 14.8 K/UL — HIGH (ref 4.8–10.8)

## 2025-04-12 PROCEDURE — 99291 CRITICAL CARE FIRST HOUR: CPT | Mod: 24

## 2025-04-12 PROCEDURE — 76775 US EXAM ABDO BACK WALL LIM: CPT | Mod: 26

## 2025-04-12 PROCEDURE — 71045 X-RAY EXAM CHEST 1 VIEW: CPT | Mod: 26

## 2025-04-12 RX ORDER — CALCIUM GLUCONATE 20 MG/ML
2 INJECTION, SOLUTION INTRAVENOUS ONCE
Refills: 0 | Status: COMPLETED | OUTPATIENT
Start: 2025-04-12 | End: 2025-04-12

## 2025-04-12 RX ORDER — SODIUM CHLORIDE 9 G/1000ML
1000 INJECTION, SOLUTION INTRAVENOUS ONCE
Refills: 0 | Status: COMPLETED | OUTPATIENT
Start: 2025-04-12 | End: 2025-04-12

## 2025-04-12 RX ORDER — DEXMEDETOMIDINE HYDROCHLORIDE IN SODIUM CHLORIDE 4 UG/ML
0.2 INJECTION INTRAVENOUS
Qty: 400 | Refills: 0 | Status: DISCONTINUED | OUTPATIENT
Start: 2025-04-12 | End: 2025-04-15

## 2025-04-12 RX ORDER — SODIUM CHLORIDE 9 G/1000ML
1000 INJECTION, SOLUTION INTRAVENOUS ONCE
Refills: 0 | Status: DISCONTINUED | OUTPATIENT
Start: 2025-04-12 | End: 2025-04-12

## 2025-04-12 RX ORDER — BUMETANIDE 1 MG/1
1 TABLET ORAL
Qty: 20 | Refills: 0 | Status: DISCONTINUED | OUTPATIENT
Start: 2025-04-12 | End: 2025-04-14

## 2025-04-12 RX ORDER — DEXTROSE 50 % IN WATER 50 %
50 SYRINGE (ML) INTRAVENOUS ONCE
Refills: 0 | Status: COMPLETED | OUTPATIENT
Start: 2025-04-12 | End: 2025-04-12

## 2025-04-12 RX ORDER — SODIUM CHLORIDE 9 G/1000ML
500 INJECTION, SOLUTION INTRAVENOUS ONCE
Refills: 0 | Status: COMPLETED | OUTPATIENT
Start: 2025-04-12 | End: 2025-04-12

## 2025-04-12 RX ORDER — ALBUMIN (HUMAN) 12.5 G/50ML
100 INJECTION, SOLUTION INTRAVENOUS ONCE
Refills: 0 | Status: COMPLETED | OUTPATIENT
Start: 2025-04-12 | End: 2025-04-12

## 2025-04-12 RX ORDER — BUMETANIDE 1 MG/1
2 TABLET ORAL ONCE
Refills: 0 | Status: COMPLETED | OUTPATIENT
Start: 2025-04-12 | End: 2025-04-12

## 2025-04-12 RX ADMIN — Medication 50 MILLILITER(S): at 03:11

## 2025-04-12 RX ADMIN — DEXMEDETOMIDINE HYDROCHLORIDE IN SODIUM CHLORIDE 7.03 MICROGRAM(S)/KG/HR: 4 INJECTION INTRAVENOUS at 21:48

## 2025-04-12 RX ADMIN — CALCIUM GLUCONATE 200 GRAM(S): 20 INJECTION, SOLUTION INTRAVENOUS at 09:14

## 2025-04-12 RX ADMIN — SODIUM CHLORIDE 2000 MILLILITER(S): 9 INJECTION, SOLUTION INTRAVENOUS at 08:35

## 2025-04-12 RX ADMIN — NOREPINEPHRINE BITARTRATE 6.59 MICROGRAM(S)/KG/MIN: 8 SOLUTION at 21:47

## 2025-04-12 RX ADMIN — SODIUM CHLORIDE 80 MILLILITER(S): 9 INJECTION, SOLUTION INTRAVENOUS at 21:47

## 2025-04-12 RX ADMIN — SODIUM CHLORIDE 500 MILLILITER(S): 9 INJECTION, SOLUTION INTRAVENOUS at 06:45

## 2025-04-12 RX ADMIN — Medication 15 MILLILITER(S): at 18:29

## 2025-04-12 RX ADMIN — HEPARIN SODIUM 7500 UNIT(S): 1000 INJECTION INTRAVENOUS; SUBCUTANEOUS at 13:08

## 2025-04-12 RX ADMIN — Medication 5 UNIT(S): at 03:11

## 2025-04-12 RX ADMIN — Medication 7.03 MICROGRAM(S)/KG/HR: at 21:48

## 2025-04-12 RX ADMIN — BUMETANIDE 10 MG/HR: 1 TABLET ORAL at 21:47

## 2025-04-12 RX ADMIN — BUMETANIDE 2 MILLIGRAM(S): 1 TABLET ORAL at 12:20

## 2025-04-12 RX ADMIN — ALBUMIN (HUMAN) 50 MILLILITER(S): 12.5 INJECTION, SOLUTION INTRAVENOUS at 09:41

## 2025-04-12 RX ADMIN — Medication 1000 MILLIGRAM(S): at 06:00

## 2025-04-12 RX ADMIN — HEPARIN SODIUM 7500 UNIT(S): 1000 INJECTION INTRAVENOUS; SUBCUTANEOUS at 21:48

## 2025-04-12 RX ADMIN — CALCIUM GLUCONATE 200 GRAM(S): 20 INJECTION, SOLUTION INTRAVENOUS at 03:12

## 2025-04-12 RX ADMIN — Medication 25 GRAM(S): at 13:08

## 2025-04-12 RX ADMIN — Medication 1 APPLICATION(S): at 05:01

## 2025-04-12 RX ADMIN — HEPARIN SODIUM 7500 UNIT(S): 1000 INJECTION INTRAVENOUS; SUBCUTANEOUS at 05:01

## 2025-04-12 RX ADMIN — Medication 400 MILLIGRAM(S): at 05:01

## 2025-04-12 RX ADMIN — Medication 25 GRAM(S): at 05:01

## 2025-04-12 RX ADMIN — Medication 25 GRAM(S): at 21:47

## 2025-04-12 RX ADMIN — BUMETANIDE 10 MG/HR: 1 TABLET ORAL at 14:24

## 2025-04-12 RX ADMIN — Medication 40 MILLIGRAM(S): at 11:08

## 2025-04-12 RX ADMIN — Medication 15 MILLILITER(S): at 05:01

## 2025-04-12 NOTE — PROGRESS NOTE ADULT - ASSESSMENT
64y F w/ PMHx of HTN and gastric bypass surgery in 2001 presents with abdominal distention. CT with incarcerated ventral hernia with SBO. S/p Open repair of ventral hernia using mesh and component separation technique, Small bowel resection. On POD 1 with respiratory failure, now mechanically ventilated and in FUNMI.    PLAN:  - Continue mechanical ventilation and and muscle paralysis.  - Monitor pierce output  - NPO + IVF  - Monitor THONY drain output  - Multimodal pain control  - Continue following recs by neprhology/IR  - Rest of care per SICU    BLUE TEAM SPECTRA: [id number]

## 2025-04-12 NOTE — PROGRESS NOTE ADULT - SUBJECTIVE AND OBJECTIVE BOX
SURGERY PROGRESS NOTE    Patient: NANCY SARGENT , 64y (62)Female   MRN: [id number]  Location: [address]  Visit: 25 Inpatient  Date: 25 @ 00:29    Hospital day :10d  POD1    24 hour events: Early in AM patient with FUNMI, respiratory difficulty on bipap, and increased abdominal tension, decision made to intubate and paralyze the muscle tissue. Evaluated by nephrology for FUNMI, will conitnue to follow, no RRT for now, evaluate for compartment syndrome. Evaluated by IR for increased abdominal pressure, they  performed an US guided intra abdominal muscle botox injection. Now with improved urinary output, mechanicall ventilation paramenters stable, on minimal vasopressive support. Abdominal dressings c/d/i.     PHYSICAL EXAM:  General: NAD  Cardiac: RRR   Respiratory: normal respiratory effort on mechanical ventilation  Abdomen: Morbid obesity, Distended abdomen, non-tender, no rebound, no guarding. Prevena in place.     PAST MEDICAL & SURGICAL HISTORY:  Gastric bypass status for obesity      LOUIS (obstructive sleep apnea)      S/P gastric bypass      S/P       S/P small bowel resection      History of total bilateral knee replacement (TKR)      H/O colonoscopy            Vitals:   T(F): 99 (25 @ 20:00), Max: 99 (25 @ 20:00)  HR: 104 (25 @ 21:00)  BP: 161/119 (25 @ 10:00)  RR: 22 (25 @ 21:00)  SpO2: 100% (25 @ 21:00)  Mode: AC/ CMV (Assist Control/ Continuous Mandatory Ventilation), RR (machine): 22, TV (machine): 400, FiO2: 70, PEEP: 12, ITime: 1, MAP: 17, PIP: 25    Diet, NPO      Fluids:     I & O's:    04-10-25 @ 07:01  -  25 @ 07:00  --------------------------------------------------------  IN:    IV PiggyBack: 450 mL    Lactated Ringers: 150 mL    Lactated Ringers: 1920 mL  Total IN: 2520 mL    OUT:    Bulb (mL): 85 mL    Bulb (mL): 15 mL    Indwelling Catheter - Urethral (mL): 60 mL  Total OUT: 160 mL    Total NET: 2360 mL    MEDICATIONS  (STANDING):  chlorhexidine 0.12% Liquid 15 milliLiter(s) Oral Mucosa every 12 hours  chlorhexidine 2% Cloths 1 Application(s) Topical <User Schedule>  cisatracurium Infusion 3 MICROgram(s)/kG/Min (25.3 mL/Hr) IV Continuous <Continuous>  dexMEDEtomidine Infusion 0.2 MICROgram(s)/kG/Hr (7.03 mL/Hr) IV Continuous <Continuous>  fentaNYL   Infusion 0.5 MICROgram(s)/kG/Hr (7.03 mL/Hr) IV Continuous <Continuous>  heparin   Injectable 7500 Unit(s) SubCutaneous every 8 hours  lactated ringers. 1000 milliLiter(s) (120 mL/Hr) IV Continuous <Continuous>  norepinephrine Infusion 0.05 MICROgram(s)/kG/Min (6.59 mL/Hr) IV Continuous <Continuous>  pantoprazole  Injectable 40 milliGRAM(s) IV Push daily  piperacillin/tazobactam IVPB.. 4.5 Gram(s) IV Intermittent every 8 hours    MEDICATIONS  (PRN):  acetaminophen   IVPB .. 1000 milliGRAM(s) IV Intermittent once PRN Moderate Pain (4 - 6)      DVT PROPHYLAXIS: heparin   Injectable 7500 Unit(s) SubCutaneous every 8 hours    GI PROPHYLAXIS: pantoprazole  Injectable 40 milliGRAM(s) IV Push daily    ANTICOAGULATION:   ANTIBIOTICS:  piperacillin/tazobactam IVPB.. 4.5 Gram(s)            LAB/STUDIES:  Labs:  CAPILLARY BLOOD GLUCOSE      POCT Blood Glucose.: 141 mg/dL (2025 00:09)  POCT Blood Glucose.: 167 mg/dL (2025 18:08)                          9.5    17.94 )-----------( 280      ( 2025 19:07 )             30.2             143  |  108  |  36[H]  ----------------------------<  163[H]  5.1[H]   |  22  |  3.6[H]      Calcium: 7.7 mg/dL (25 @ 19:07)      LFTs:     Blood Gas Arterial, Lactate: 0.8 mmol/L (25 @ 00:21)  Blood Gas Arterial, Lactate: 1.0 mmol/L (25 @ 17:42)  Blood Gas Arterial, Lactate: 1.1 mmol/L (25 @ 11:07)    ABG - ( 2025 00:21 )  pH: 7.25  /  pCO2: 50    /  pO2: 83    / HCO3: 22    / Base Excess: -4.8  /  SaO2: 96.9            ABG - ( 2025 17:42 )  pH: 7.24  /  pCO2: 51    /  pO2: 81    / HCO3: 22    / Base Excess: -5.6  /  SaO2: 93.6            ABG - ( 2025 11:07 )  pH: 7.23  /  pCO2: 54    /  pO2: 73    / HCO3: 23    / Base Excess: -5.2  /  SaO2: 91.8              Coags:     15.00  ----< 1.27    ( 2025 12:44 )     26.4                Urinalysis Basic - ( 2025 19:07 )    Color: x / Appearance: x / SG: x / pH: x  Gluc: 163 mg/dL / Ketone: x  / Bili: x / Urobili: x   Blood: x / Protein: x / Nitrite: x   Leuk Esterase: x / RBC: x / WBC x   Sq Epi: x / Non Sq Epi: x / Bacteria: x        Culture - Acid Fast - Other w/Smear (collected 10 Apr 2025 18:00)  Source: Other Peritoneal Fluid #2    Culture - Acid Fast - Other w/Smear (collected 10 Apr 2025 10:30)  Source: Other Peritoneal Fluid #1

## 2025-04-12 NOTE — PROGRESS NOTE ADULT - CRITICAL CARE ATTENDING COMMENT
POD 2.  Patient was intubated yesterday.  On Nimbex drip as well.  Had Bladder pressure 17 last night.  On Vent, /24/70%/PEEP 12  Has P/F ratio 132  ABG this am 7.31/41/93  Urine output 5-10 ml/h  Creat 4.2 and potassium 4.7 this am.  Had 2D Echo- EF 56%.  Volume +6L/24h.  On Levo 0.1 mcg/kg/min  Flowtrack - CO 6; SVV 7%    PE:  AAO x3  Chest: decreased breath sounds in bilateral lung bases  CV : rrr  Abdomen: moderately distended.    ASSESSMENT:  63 y/o female with Incarcerated Ventral Hernia. Small Bowel Obstruction.  S/P repair of large ventral hernia, incarcerated.   S/P small bowel resection in two areas of perforation with anastomosis.  ARDS.  On mechanica ventilation  At risk for hemodynamic instability.  FUNMI.  Morbid obesity.    PLAN:  - sedation  - use Precedex and Fentanyl drip for RASS -2  - stop Nimbex  - on Vent support, continue /24/70%/PEEP 12  - keep MAP>65; on Levo; give Alb 25% 100 ml  - NPO, NGT to suction  - follow serum electrolytes and UOP  - Nephrology f/u  - may need CVVHD later today  - ID - on Zosyn  - DVT proph    Patient's family was at bedside and was updated on all the above. POD 2.  Patient was intubated yesterday.  On Nimbex drip as well.  Had Bladder pressure 17 last night.  On Vent, /24/70%/PEEP 12  Has P/F ratio 132  ABG this am 7.31/41/93  Urine output 5-10 ml/h  Creat 4.2 and potassium 4.7 this am.  Had 2D Echo- EF 56%.  Volume +6L/24h.  On Levo 0.1 mcg/kg/min  Flowtrack - CO 6; SVV 7%    PE:  AAO x3  Chest: decreased breath sounds in bilateral lung bases  CV : rrr  Abdomen: moderately distended.    ASSESSMENT:  65 y/o female with Incarcerated Ventral Hernia. Small Bowel Obstruction.  S/P repair of large ventral hernia, incarcerated.   S/P small bowel resection in two areas of perforation with anastomosis.  ARDS.  On mechanica ventilation  Hemodynamic instability.  UFNMI.  Morbid obesity.    PLAN:  - sedation  - use Precedex and Fentanyl drip for RASS -2  - stop Nimbex  - on Vent support, continue /24/70%/PEEP 12  - keep MAP>65; on Levo; give Alb 25% 100 ml  - NPO, NGT to suction  - follow serum electrolytes and UOP  - Nephrology f/u  - may need CVVHD later today  - ID - on Zosyn  - DVT proph    Patient's family was at bedside and was updated on all the above.

## 2025-04-12 NOTE — PROGRESS NOTE ADULT - SUBJECTIVE AND OBJECTIVE BOX
NANCY SARGENT   [id number]   [birth year]  64yF  ============================================================   DATE OF INITIAL SICU/SDU CONSULT: 04-10-25    INDICATION FOR SICU CONSULT:       SICU COURSE EVENTS :  04-10 - admitted to SICU service  4/11: Intubated and started on paralytic. Arterial line placed, subclavian TLC placed. Nephrology consulted for oliguria, recommending bumex bid, no RRT. Given fluids with 500cc bolus, albumin 250 bolus, LR 500cc bolus, UOP approx 10/hr. IR abdominal muscle botox injection performed. TTE performed. Bladder pressure 17-19.      24HOUR EVENTS  4/11  NIGHT  -Levo @ 0.07, precedex @ 0.5, fentanyl @ 2, Nimbex @ 1 mcg  -CVP 12  -bladder pressure: 17-19  -500mL LR bolus      DAY  - FeNa > 0.4% pre-renal > additional 500cc bolus given   - nephrology consulted > bumex bid?  - IR consult > intraabdominal botox injection  - Intubate, precedex and fentanyl gtt for sedation, paralyze with nimbex gtt  - TTE > EF of 56 %.G1DD.   - peak pressure 20s  - 2g Ca  - levophed started  - Subclavian central line placed, A-line placed  - albumin 250cc  - 2 g Ca  - vent settings > 500/22/12/70  - Replacing pierce with bladder pressure > family refusing      [X] A ten-point review of systems was negative except as expressed in note.  [X ] History was obtained from patient. If unable to participate in their care, history was from review of the chart and collateral sources of information.  ============================================================    NANCY SARGENT   [id number]   [birth year]  64yF  ============================================================   DATE OF INITIAL SICU/SDU CONSULT: 04-10-25    INDICATION FOR SICU CONSULT:       SICU COURSE EVENTS :  04-10 - admitted to SICU service  : Intubated and started on paralytic. Arterial line placed, subclavian TLC placed. Nephrology consulted for oliguria, recommending bumex bid, no RRT. Given fluids with 500cc bolus, albumin 250 bolus, LR 500cc bolus, UOP approx 10/hr. IR abdominal muscle botox injection performed. TTE performed. Bladder pressure 17-19.      24HOUR EVENTS    NIGHT  -Levo @ 0.07, precedex @ 0.5, fentanyl @ 2, Nimbex @ 1 mcg  -CVP 12  -bladder pressure: 17-19  -500mL LR bolus  -2g Ca, insulin 5/dextrose 50  -AM AB.23/51/79/21 > increased RR to 24 (500//)  - 500cc LR . 2-3L over the course of the day per Dr. Lema    DAY  - FeNa > 0.4% pre-renal > additional 500cc bolus given   - nephrology consulted > bumex bid?  - IR consult > intraabdominal botox injection  - Intubate, precedex and fentanyl gtt for sedation, paralyze with nimbex gtt  - TTE > EF of 56 %.G1DD.   - peak pressure 20s  - 2g Ca  - levophed started  - Subclavian central line placed, A-line placed  - albumin 250cc  - 2 g Ca  - vent settings > 500//      [X] A ten-point review of systems was negative except as expressed in note.  [X ] History was obtained from patient. If unable to participate in their care, history was from review of the chart and collateral sources of information.  ============================================================     Daily     Daily   Diet, NPO (04-10-25 @ 17:00)    CURRENT MEDS:  Neurologic Medications  cisatracurium Infusion 3 MICROgram(s)/kG/Min IV Continuous <Continuous>  dexMEDEtomidine Infusion 0.2 MICROgram(s)/kG/Hr IV Continuous <Continuous>  fentaNYL   Infusion 0.5 MICROgram(s)/kG/Hr IV Continuous <Continuous>    Respiratory Medications    Cardiovascular Medications  norepinephrine Infusion 0.05 MICROgram(s)/kG/Min IV Continuous <Continuous>    Gastrointestinal Medications  lactated ringers Bolus 1000 milliLiter(s) IV Bolus once  lactated ringers. 1000 milliLiter(s) IV Continuous <Continuous>  pantoprazole  Injectable 40 milliGRAM(s) IV Push daily    Genitourinary Medications    Hematologic/Oncologic Medications  heparin   Injectable 7500 Unit(s) SubCutaneous every 8 hours    Antimicrobial/Immunologic Medications  piperacillin/tazobactam IVPB.. 4.5 Gram(s) IV Intermittent every 8 hours    Endocrine/Metabolic Medications    Topical/Other Medications  chlorhexidine 0.12% Liquid 15 milliLiter(s) Oral Mucosa every 12 hours  chlorhexidine 2% Cloths 1 Application(s) Topical <User Schedule>    ICU Vital Signs Last 24 Hrs  T(C): 37.7 (2025 06:00), Max: 38.1 (2025 04:00)  T(F): 99.8 (2025 06:00), Max: 100.6 (2025 04:00)  HR: 95 (2025 07:00) (95 - 124)  BP: 161/119 (2025 10:00) (104/73 - 161/119)  BP(mean): 132 (2025 10:00) (99 - 132)  ABP: 113/53 (2025 07:00) (74/43 - 160/87)  ABP(mean): 71 (2025 07:00) (52 - 104)  RR: 10 (2025 07:00) (5 - 24)  SpO2: 98% (2025 07:00) (90% - 100%)    O2 Parameters below as of 2025 07:00  Patient On (Oxygen Delivery Method): ventilator    O2 Concentration (%): 70      Mode: AC/ CMV (Assist Control/ Continuous Mandatory Ventilation)  RR (machine): 22  TV (machine): 400  FiO2: 70  PEEP: 12  ITime: 1  MAP: 17  PIP: 26    ABG - ( 2025 04:12 )  pH, Arterial: 7.23  pH, Blood: x     /  pCO2: 51    /  pO2: 79    / HCO3: 21    / Base Excess: -6.0  /  SaO2: 94.0              I&O's Summary    2025 07:  -  2025 07:00  --------------------------------------------------------  IN: 6622.7 mL / OUT: 645 mL / NET: 5977.7 mL      I&O's Detail    2025 07:01  -  2025 07:00  --------------------------------------------------------  IN:    Cisatracurium: 193.3 mL    Dexmedetomidine: 366 mL    FentaNYL: 632.8 mL    IV PiggyBack: 700 mL    Lactated Ringers: 2880 mL    Lactated Ringers Bolus: 1500 mL    Norepinephrine: 126.6 mL    Norepinephrine: 224 mL  Total IN: 6622.7 mL    OUT:    Bulb (mL): 20 mL    Bulb (mL): 140 mL    Indwelling Catheter - Urethral (mL): 235 mL    Nasogastric/Oral tube (mL): 250 mL  Total OUT: 645 mL    Total NET: 5977.7 mL          PHYSICAL EXAM:     NEURO/GENERAL: NAD. currently on nimbex gtt, fentanyl gtt and precedex gtt   RESP: equal chest rise b/l, intubated on mechanical ventilation  CARDIAC: S1,S2. RRR on monitor.   GI: abdomen soft, nontender, distended. NGT in place. midline vac holding suction. THONY x2 in place.   : urinary catheter in place   SKIN: no DTI noted

## 2025-04-12 NOTE — PROGRESS NOTE ADULT - ASSESSMENT
Patient is a 64F PMHx HTN, morbid obesity s/p 2001 Abby-en-Y gastric bypass who presented on 4/3 with incarcerated ventral hernia with SBO. S/p open ventral hernia repair, small bowel resection, and primary fascial closure with Dr. Lema on 4/10. Currently admitted to SICU for monitoring. Nephrology consulted for FUNMI and oliguria.  # FUNMI most likely ATN   # resp failure on MV  #  incarcerated ventral hernia with SBO S/p open ventral hernia repair, small bowel resection, and primary fascial closure  cr trending up   receiving bolus now   UO noted   will give 1 dose of bumex 2 if positive  response start bumex drip / if UO not improving will start CVVHD   on pressors   check CK level   adjust all meds to GFR < 15   case discussed with surgical team and family  will follow

## 2025-04-12 NOTE — PROGRESS NOTE ADULT - SUBJECTIVE AND OBJECTIVE BOX
seen and examined   24 h events noted   intubated/ventilated       PAST HISTORY  --------------------------------------------------------------------------------  No significant changes to PMH, PSH, FHx, SHx, unless otherwise noted    ALLERGIES & MEDICATIONS  --------------------------------------------------------------------------------  Allergies    No Known Allergies    Intolerances      Standing Inpatient Medications  chlorhexidine 0.12% Liquid 15 milliLiter(s) Oral Mucosa every 12 hours  chlorhexidine 2% Cloths 1 Application(s) Topical <User Schedule>  cisatracurium Infusion 3 MICROgram(s)/kG/Min IV Continuous <Continuous>  dexMEDEtomidine Infusion 0.2 MICROgram(s)/kG/Hr IV Continuous <Continuous>  fentaNYL   Infusion 0.5 MICROgram(s)/kG/Hr IV Continuous <Continuous>  heparin   Injectable 7500 Unit(s) SubCutaneous every 8 hours  lactated ringers Bolus 1000 milliLiter(s) IV Bolus once  lactated ringers. 1000 milliLiter(s) IV Continuous <Continuous>  norepinephrine Infusion 0.05 MICROgram(s)/kG/Min IV Continuous <Continuous>  pantoprazole  Injectable 40 milliGRAM(s) IV Push daily  piperacillin/tazobactam IVPB.. 4.5 Gram(s) IV Intermittent every 8 hours    PRN Inpatient Medications          VITALS/PHYSICAL EXAM  --------------------------------------------------------------------------------  T(C): 37.7 (04-12-25 @ 06:00), Max: 38.1 (04-12-25 @ 04:00)  HR: 95 (04-12-25 @ 07:00) (95 - 124)  BP: 161/119 (04-11-25 @ 10:00) (129/80 - 161/119)  RR: 10 (04-12-25 @ 07:00) (5 - 24)  SpO2: 98% (04-12-25 @ 07:00) (90% - 100%)  Wt(kg): --  Height (cm): 160 (04-10-25 @ 08:47)  Weight (kg): 140.6 (04-10-25 @ 08:47)  BMI (kg/m2): 54.9 (04-10-25 @ 08:47)  BSA (m2): 2.33 (04-10-25 @ 08:47)      04-11-25 @ 07:01  -  04-12-25 @ 07:00  --------------------------------------------------------  IN: 6822.7 mL / OUT: 645 mL / NET: 6177.7 mL      Physical Exam:  	Gen: intubated/ ventilated  	Pulm:  B/L shannon   	CV:  S1S2; no rub  	Abd: +distended/ drain / wound  	    LABS/STUDIES  --------------------------------------------------------------------------------              8.6    14.38 >-----------<  256      [04-12-25 @ 04:15]              28.2     141  |  107  |  39  ----------------------------<  146      [04-12-25 @ 04:15]  4.7   |  21  |  4.2        Ca     8.0     [04-12-25 @ 04:15]      Mg     2.0     [04-12-25 @ 04:15]      Phos  5.9     [04-12-25 @ 04:15]    TPro  4.4  /  Alb  2.6  /  TBili  0.4  /  DBili  0.2  /  AST  51  /  ALT  43  /  AlkPhos  50  [04-12-25 @ 04:15]    PT/INR: PT 15.00, INR 1.27       [04-11-25 @ 12:44]  PTT: 26.4       [04-11-25 @ 12:44]      Creatinine Trend:  SCr 4.2 [04-12 @ 04:15]  SCr 3.6 [04-11 @ 19:07]  SCr 3.3 [04-11 @ 12:44]  SCr 3.0 [04-11 @ 06:19]  SCr 2.1 [04-10 @ 22:44]    Urinalysis - [04-12-25 @ 04:15]      Color  / Appearance  / SG  / pH       Gluc 146 / Ketone   / Bili  / Urobili        Blood  / Protein  / Leuk Est  / Nitrite       RBC  / WBC  / Hyaline  / Gran  / Sq Epi  / Non Sq Epi  / Bacteria     Urine Creatinine 105      [04-11-25 @ 06:27]  Urine Sodium <20.0      [04-11-25 @ 06:27]         seen and examined   24 h events noted   intubated/ventilated       PAST HISTORY  --------------------------------------------------------------------------------  No significant changes to PMH, PSH, FHx, SHx, unless otherwise noted    ALLERGIES & MEDICATIONS  --------------------------------------------------------------------------------  Allergies    No Known Allergies    Intolerances      Standing Inpatient Medications  chlorhexidine 0.12% Liquid 15 milliLiter(s) Oral Mucosa every 12 hours  chlorhexidine 2% Cloths 1 Application(s) Topical <User Schedule>  cisatracurium Infusion 3 MICROgram(s)/kG/Min IV Continuous <Continuous>  dexMEDEtomidine Infusion 0.2 MICROgram(s)/kG/Hr IV Continuous <Continuous>  fentaNYL   Infusion 0.5 MICROgram(s)/kG/Hr IV Continuous <Continuous>  heparin   Injectable 7500 Unit(s) SubCutaneous every 8 hours  lactated ringers Bolus 1000 milliLiter(s) IV Bolus once  lactated ringers. 1000 milliLiter(s) IV Continuous <Continuous>  norepinephrine Infusion 0.05 MICROgram(s)/kG/Min IV Continuous <Continuous>  pantoprazole  Injectable 40 milliGRAM(s) IV Push daily  piperacillin/tazobactam IVPB.. 4.5 Gram(s) IV Intermittent every 8 hours    PRN Inpatient Medications          VITALS/PHYSICAL EXAM  --------------------------------------------------------------------------------  T(C): 37.7 (04-12-25 @ 06:00), Max: 38.1 (04-12-25 @ 04:00)  HR: 95 (04-12-25 @ 07:00) (95 - 124)  BP: 161/119 (04-11-25 @ 10:00) (129/80 - 161/119)  RR: 10 (04-12-25 @ 07:00) (5 - 24)  SpO2: 98% (04-12-25 @ 07:00) (90% - 100%)  Wt(kg): --  Height (cm): 160 (04-10-25 @ 08:47)  Weight (kg): 140.6 (04-10-25 @ 08:47)  BMI (kg/m2): 54.9 (04-10-25 @ 08:47)  BSA (m2): 2.33 (04-10-25 @ 08:47)      04-11-25 @ 07:01  -  04-12-25 @ 07:00  --------------------------------------------------------  IN: 6822.7 mL / OUT: 645 mL / NET: 6177.7 mL      Physical Exam:  	Gen: intubated/ ventilated  	Pulm:  B/L shannon   	CV:  S1S2; no rub  	Abd: +distended/ drain / wound  	    LABS/STUDIES  --------------------------------------------------------------------------------              8.6    14.38 >-----------<  256      [04-12-25 @ 04:15]              28.2     141  |  107  |  39  ----------------------------<  146      [04-12-25 @ 04:15]  4.7   |  21  |  4.2        Ca     8.0     [04-12-25 @ 04:15]      Mg     2.0     [04-12-25 @ 04:15]      Phos  5.9     [04-12-25 @ 04:15]    TPro  4.4  /  Alb  2.6  /  TBili  0.4  /  DBili  0.2  /  AST  51  /  ALT  43  /  AlkPhos  50  [04-12-25 @ 04:15]    PT/INR: PT 15.00, INR 1.27       [04-11-25 @ 12:44]  PTT: 26.4       [04-11-25 @ 12:44]      Creatinine Trend:  SCr 4.2 [04-12 @ 04:15]  SCr 3.6 [04-11 @ 19:07]  SCr 3.3 [04-11 @ 12:44]  SCr 3.0 [04-11 @ 06:19]  SCr 2.1 [04-10 @ 22:44]    Urinalysis - [04-12-25 @ 04:15]      Color  / Appearance  / SG  / pH       Gluc 146 / Ketone   / Bili  / Urobili        Blood  / Protein  / Leuk Est  / Nitrite       RBC  / WBC  / Hyaline  / Gran  / Sq Epi  / Non Sq Epi  / Bacteria

## 2025-04-12 NOTE — PROGRESS NOTE ADULT - ASSESSMENT
ASSESSMENT:  64F PMHx HTN, morbid obesity s/p 2001 Abby-en-Y gastric bypass who presented on 4/3 with incarcerated ventral hernia with SBO. S/p 4/10 open ventral hernia repair, small bowel resection, and primary fascial closure with Torey Cornejo. Admitted to SICU for Q1 abdominal checks and hemodynamic monitoring.       NEUROLOGICAL:  #Acute pain    - IV APAP prn    - fent gtt  #sedation   - precedex gtt  #paralytic   - nimbex gtt     RESPIRATORY:   #mechanically ventilated   - /22/12/70  - monitor ABG/CXR  #hx LOUIS on CPAP@ home  #Activity   - bedrest    CARDS:   #acute hypotension  - currently on levophed gtt  - MAP >65  #hx of HTN  - holding home HCTZ 25mg QD, amlodipine and losartan  (while NPO)  #EKG- NSR  #TTE 6/2022: EF 55-60%. G1DD.   - TTE 4/11: EF of 56 %.G1DD.     GASTROINTESTINAL/NUTRITION:   #Diet, NPO    -aspiration precautions, HOB 30  #SBO s/p ventral hernia repair with SBR  - NGT LCWS, strict NPO  -  Q1 abdominal exams; monitor for compartment syndrome > peak pressures in 20s  - bladder pressure 17-19 on 4/11  #GI Prophylaxis/hx GERD   - pantoprazole  Injectable 40 IV Push daily (takes omeprazole @home)  #Bowel regimen    - holding    -last bowel movement prior to admission    /RENAL:   #urine output in critically ill    -indwelling pierce   #FUNMI  - LR @120cc/hr  - oliguric   - FeNa 0.4% > pre-renal > 500cc bolus > albumin 250cc >   - nephrology consulted > recommending bumex bid, no RRT    Labs          BUN/Cr: 20/2.1 --> 27/3.0 > 31/3.3 > 36/3.6          [04-11 @ 06:19]Na  145 // K  4.7 // Mg  -- // Phos  --  [04-10 @ 22:44]Na  147 // K  4.1 // Mg  1.9 // Phos  5.3         HEME/ONC:   #DVT prophylaxis     -Chemical: heparin   Injectable 5000 Unit(s) SubCutaneous every 8 hours     -Mechanical: SCDs       Labs: Hb/Hct:  12.0/37.2  (04-10 @ 17:45)  -->,  11.3/36.5  (04-10 @ 22:44)  -->                      Plts:  306  -->,  291  -->                 PTT/INR:  20.4/1.06  --->         ID:  #Antibiotics Course  - continue zosyn > Purulent abscesses noted intraoperatively   WBC- 6.41  --->>,  10.58  --->>,  15.24  --->> 24 > 17  Temp trend- 24hrs T(F): 96.8 (04-11 @ 04:00), Max: 99.5 (04-10 @ 08:53)  Current antibiotics-piperacillin/tazobactam IVPB.. 3.375 every 8 hours  #MRSA nares negative       ENDOCRINE:  #glycemic monitoring    -FSG q6 if NPO    -Glucose goal 140-180. If above 180, will start corrective insulin sliding scale    MSK:  #Bedrest       SKIN:  #DTI screening negative     - will continue to monitor daily skin changes      LINES/DRAINS:  PIV, Pierce, NGT, ETT, L radial arterial line, L subclavian TLC  ADVANCED DIRECTIVES:  Full Code  HCP- Daughter   INDICATION FOR SICU/SDU: Intestinal obstruction  DISPO:   SICU. Case discussed with attending Dr. Bear       ASSESSMENT:  64F PMHx HTN, morbid obesity s/p 2001 Abby-en-Y gastric bypass who presented on 4/3 with incarcerated ventral hernia with SBO. S/p 4/10 open ventral hernia repair, small bowel resection, and primary fascial closure with Torey Cornejo. Admitted to SICU for Q1 abdominal checks and hemodynamic monitoring.       NEUROLOGICAL:  #Acute pain    - IV APAP prn    - fent gtt  #sedation   - precedex gtt  #paralytic   - nimbex gtt > likely wean off today     RESPIRATORY:   #mechanically ventilated   - /24/12/70  - monitor ABG/CXR  ABG - ( 12 Apr 2025 04:12 )  pH, Arterial: 7.23  pH, Blood: x     /  pCO2: 51    /  pO2: 79    / HCO3: 21    / Base Excess: -6.0  /  SaO2: 94.0    #hx LOUIS on CPAP@ home  #Activity   - bedrest    CARDS:   #acute hypotension  - currently on levophed gtt  - MAP >65  #hx of HTN  - holding home HCTZ 25mg QD, amlodipine and losartan  (while NPO)  #EKG- NSR  #TTE 6/2022: EF 55-60%. G1DD.   - TTE 4/11: EF of 56 %.G1DD.     GASTROINTESTINAL/NUTRITION:   #Diet, NPO    -aspiration precautions, HOB 30  #SBO s/p ventral hernia repair with SBR  - NGT LCWS, strict NPO  - Q1 abdominal exams; monitor for compartment syndrome > peak pressures in 20s  - bladder pressure 17-19 on 4/11  #GI Prophylaxis/hx GERD   - pantoprazole  Injectable 40 IV Push daily (takes omeprazole @home)  #Bowel regimen    - holding    -last bowel movement prior to admission    /RENAL:   #urine output in critically ill    -indwelling pierce   #worsening FUNMI  - LR @120cc/hr  - oliguric   - FeNa 0.4% > pre-renal > multiple fluid boluses given   - nephrology consulted > recommending bumex bid, no RRT    Labs          BUN/Cr: 27/3.0 > 31/3.3 > 36/3.6 --> 39/4.2           Na  141 // K  4.7 // Mg  2.0 // Phos  5.9         HEME/ONC:   #DVT prophylaxis     -Chemical: heparin   Injectable 5000 Unit(s) SubCutaneous every 8 hours     -Mechanical: SCDs       Labs: Hb/Hct:  8.9/28.9  (04-12 @ 00:50)  -->,  8.6/28.2  (04-12 @ 04:15)  -->                      Plts:  241  -->,  256  -->                 PTT/INR:  26.4/1.27  --->         ID:  #Antibiotics Course  - continue zosyn > Purulent abscesses noted intraoperatively   WBC- 17.94  --->>,  14.80  --->>,  14.38  --->>  Temp trend- 24hrs T(F): 99.8 (04-12 @ 06:00), Max: 100.6 (04-12 @ 04:00)  Current antibiotics-piperacillin/tazobactam IVPB.. 3.375 every 8 hours  #MRSA nares negative       ENDOCRINE:  #glycemic monitoring    -FSG q6 if NPO    -Glucose goal 140-180. If above 180, will start corrective insulin sliding scale    MSK:  #Bedrest > currently on paralytic       SKIN:  #DTI screening negative     - will continue to monitor daily skin changes      LINES/DRAINS:  PIV, Pierce, NGT, ETT, L radial arterial line, L subclavian TLC  ADVANCED DIRECTIVES:  Full Code  HCP- Daughter   INDICATION FOR SICU/SDU: Intestinal obstruction  DISPO:   SICU. Case discussed with attending Dr. Bear

## 2025-04-13 LAB
-  AMOXICILLIN/CLAVULANIC ACID: SIGNIFICANT CHANGE UP
-  AMPICILLIN/SULBACTAM: SIGNIFICANT CHANGE UP
-  AMPICILLIN: SIGNIFICANT CHANGE UP
-  AZTREONAM: SIGNIFICANT CHANGE UP
-  CEFAZOLIN: SIGNIFICANT CHANGE UP
-  CEFEPIME: SIGNIFICANT CHANGE UP
-  CEFOXITIN: SIGNIFICANT CHANGE UP
-  CEFTRIAXONE: SIGNIFICANT CHANGE UP
-  CIPROFLOXACIN: SIGNIFICANT CHANGE UP
-  ERTAPENEM: SIGNIFICANT CHANGE UP
-  GENTAMICIN: SIGNIFICANT CHANGE UP
-  IMIPENEM: SIGNIFICANT CHANGE UP
-  LEVOFLOXACIN: SIGNIFICANT CHANGE UP
-  MEROPENEM: SIGNIFICANT CHANGE UP
-  PIPERACILLIN/TAZOBACTAM: SIGNIFICANT CHANGE UP
-  TOBRAMYCIN: SIGNIFICANT CHANGE UP
-  TRIMETHOPRIM/SULFAMETHOXAZOLE: SIGNIFICANT CHANGE UP
ALBUMIN SERPL ELPH-MCNC: 2.8 G/DL — LOW (ref 3.5–5.2)
ALP SERPL-CCNC: 57 U/L — SIGNIFICANT CHANGE UP (ref 30–115)
ALT FLD-CCNC: 32 U/L — SIGNIFICANT CHANGE UP (ref 0–41)
ANION GAP SERPL CALC-SCNC: 16 MMOL/L — HIGH (ref 7–14)
ANION GAP SERPL CALC-SCNC: 16 MMOL/L — HIGH (ref 7–14)
APPEARANCE UR: CLEAR — SIGNIFICANT CHANGE UP
AST SERPL-CCNC: 35 U/L — SIGNIFICANT CHANGE UP (ref 0–41)
BACTERIA # UR AUTO: NEGATIVE /HPF — SIGNIFICANT CHANGE UP
BILIRUB DIRECT SERPL-MCNC: 0.2 MG/DL — SIGNIFICANT CHANGE UP (ref 0–0.3)
BILIRUB INDIRECT FLD-MCNC: 0.1 MG/DL — LOW (ref 0.2–1.2)
BILIRUB SERPL-MCNC: 0.3 MG/DL — SIGNIFICANT CHANGE UP (ref 0.2–1.2)
BILIRUB UR-MCNC: NEGATIVE — SIGNIFICANT CHANGE UP
BUN SERPL-MCNC: 51 MG/DL — HIGH (ref 10–20)
BUN SERPL-MCNC: 57 MG/DL — HIGH (ref 10–20)
CALCIUM SERPL-MCNC: 7.5 MG/DL — LOW (ref 8.4–10.5)
CALCIUM SERPL-MCNC: 7.7 MG/DL — LOW (ref 8.4–10.5)
CAST: 7 /LPF — HIGH (ref 0–4)
CHLORIDE SERPL-SCNC: 106 MMOL/L — SIGNIFICANT CHANGE UP (ref 98–110)
CHLORIDE SERPL-SCNC: 106 MMOL/L — SIGNIFICANT CHANGE UP (ref 98–110)
CK SERPL-CCNC: 1318 U/L — HIGH (ref 0–225)
CK SERPL-CCNC: 1502 U/L — HIGH (ref 0–225)
CO2 SERPL-SCNC: 21 MMOL/L — SIGNIFICANT CHANGE UP (ref 17–32)
CO2 SERPL-SCNC: 21 MMOL/L — SIGNIFICANT CHANGE UP (ref 17–32)
COLOR SPEC: YELLOW — SIGNIFICANT CHANGE UP
CREAT SERPL-MCNC: 4.8 MG/DL — CRITICAL HIGH (ref 0.7–1.5)
CREAT SERPL-MCNC: 5.2 MG/DL — CRITICAL HIGH (ref 0.7–1.5)
CULTURE RESULTS: ABNORMAL
CULTURE RESULTS: ABNORMAL
DIFF PNL FLD: ABNORMAL
EGFR: 10 ML/MIN/1.73M2 — LOW
EGFR: 10 ML/MIN/1.73M2 — LOW
EGFR: 9 ML/MIN/1.73M2 — LOW
EGFR: 9 ML/MIN/1.73M2 — LOW
FINE GRAN CASTS #/AREA URNS AUTO: PRESENT
GAS PNL BLDA: SIGNIFICANT CHANGE UP
GLUCOSE BLDC GLUCOMTR-MCNC: 134 MG/DL — HIGH (ref 70–99)
GLUCOSE BLDC GLUCOMTR-MCNC: 147 MG/DL — HIGH (ref 70–99)
GLUCOSE BLDC GLUCOMTR-MCNC: 155 MG/DL — HIGH (ref 70–99)
GLUCOSE BLDC GLUCOMTR-MCNC: 157 MG/DL — HIGH (ref 70–99)
GLUCOSE SERPL-MCNC: 132 MG/DL — HIGH (ref 70–99)
GLUCOSE SERPL-MCNC: 140 MG/DL — HIGH (ref 70–99)
GLUCOSE UR QL: NEGATIVE MG/DL — SIGNIFICANT CHANGE UP
HCT VFR BLD CALC: 25 % — LOW (ref 37–47)
HGB BLD-MCNC: 7.8 G/DL — LOW (ref 12–16)
KETONES UR-MCNC: NEGATIVE MG/DL — SIGNIFICANT CHANGE UP
LEUKOCYTE ESTERASE UR-ACNC: NEGATIVE — SIGNIFICANT CHANGE UP
MAGNESIUM SERPL-MCNC: 1.7 MG/DL — LOW (ref 1.8–2.4)
MAGNESIUM SERPL-MCNC: 2.1 MG/DL — SIGNIFICANT CHANGE UP (ref 1.8–2.4)
MCHC RBC-ENTMCNC: 29.9 PG — SIGNIFICANT CHANGE UP (ref 27–31)
MCHC RBC-ENTMCNC: 31.2 G/DL — LOW (ref 32–37)
MCV RBC AUTO: 95.8 FL — SIGNIFICANT CHANGE UP (ref 81–99)
METHOD TYPE: SIGNIFICANT CHANGE UP
NITRITE UR-MCNC: NEGATIVE — SIGNIFICANT CHANGE UP
NRBC BLD AUTO-RTO: 0 /100 WBCS — SIGNIFICANT CHANGE UP (ref 0–0)
ORGANISM # SPEC MICROSCOPIC CNT: ABNORMAL
ORGANISM # SPEC MICROSCOPIC CNT: SIGNIFICANT CHANGE UP
PH UR: 6 — SIGNIFICANT CHANGE UP (ref 5–8)
PHOSPHATE SERPL-MCNC: 5.9 MG/DL — HIGH (ref 2.1–4.9)
PHOSPHATE SERPL-MCNC: 6.5 MG/DL — HIGH (ref 2.1–4.9)
PLATELET # BLD AUTO: 182 K/UL — SIGNIFICANT CHANGE UP (ref 130–400)
PMV BLD: 10.1 FL — SIGNIFICANT CHANGE UP (ref 7.4–10.4)
POTASSIUM SERPL-MCNC: 4.2 MMOL/L — SIGNIFICANT CHANGE UP (ref 3.5–5)
POTASSIUM SERPL-MCNC: 4.5 MMOL/L — SIGNIFICANT CHANGE UP (ref 3.5–5)
POTASSIUM SERPL-SCNC: 4.2 MMOL/L — SIGNIFICANT CHANGE UP (ref 3.5–5)
POTASSIUM SERPL-SCNC: 4.5 MMOL/L — SIGNIFICANT CHANGE UP (ref 3.5–5)
PROT SERPL-MCNC: 4.4 G/DL — LOW (ref 6–8)
PROT UR-MCNC: 30 MG/DL
RBC # BLD: 2.61 M/UL — LOW (ref 4.2–5.4)
RBC # FLD: 13.7 % — SIGNIFICANT CHANGE UP (ref 11.5–14.5)
RBC CASTS # UR COMP ASSIST: 2 /HPF — SIGNIFICANT CHANGE UP (ref 0–4)
SODIUM SERPL-SCNC: 143 MMOL/L — SIGNIFICANT CHANGE UP (ref 135–146)
SODIUM SERPL-SCNC: 143 MMOL/L — SIGNIFICANT CHANGE UP (ref 135–146)
SP GR SPEC: 1.01 — SIGNIFICANT CHANGE UP (ref 1–1.03)
SPECIMEN SOURCE: SIGNIFICANT CHANGE UP
SPECIMEN SOURCE: SIGNIFICANT CHANGE UP
SQUAMOUS # UR AUTO: 1 /HPF — SIGNIFICANT CHANGE UP (ref 0–5)
UROBILINOGEN FLD QL: 0.2 MG/DL — SIGNIFICANT CHANGE UP (ref 0.2–1)
WBC # BLD: 9.63 K/UL — SIGNIFICANT CHANGE UP (ref 4.8–10.8)
WBC # FLD AUTO: 9.63 K/UL — SIGNIFICANT CHANGE UP (ref 4.8–10.8)
WBC UR QL: 1 /HPF — SIGNIFICANT CHANGE UP (ref 0–5)

## 2025-04-13 PROCEDURE — 99291 CRITICAL CARE FIRST HOUR: CPT | Mod: 24

## 2025-04-13 PROCEDURE — 71045 X-RAY EXAM CHEST 1 VIEW: CPT | Mod: 26

## 2025-04-13 RX ORDER — ACETAMINOPHEN 500 MG/5ML
1000 LIQUID (ML) ORAL ONCE
Refills: 0 | Status: COMPLETED | OUTPATIENT
Start: 2025-04-13 | End: 2025-04-13

## 2025-04-13 RX ORDER — MAGNESIUM SULFATE 500 MG/ML
2 SYRINGE (ML) INJECTION ONCE
Refills: 0 | Status: COMPLETED | OUTPATIENT
Start: 2025-04-13 | End: 2025-04-13

## 2025-04-13 RX ORDER — CALCIUM GLUCONATE 20 MG/ML
2 INJECTION, SOLUTION INTRAVENOUS ONCE
Refills: 0 | Status: COMPLETED | OUTPATIENT
Start: 2025-04-13 | End: 2025-04-13

## 2025-04-13 RX ADMIN — Medication 1 APPLICATION(S): at 06:02

## 2025-04-13 RX ADMIN — Medication 25 GRAM(S): at 00:20

## 2025-04-13 RX ADMIN — Medication 25 GRAM(S): at 21:42

## 2025-04-13 RX ADMIN — Medication 40 MILLIGRAM(S): at 11:27

## 2025-04-13 RX ADMIN — HEPARIN SODIUM 7500 UNIT(S): 1000 INJECTION INTRAVENOUS; SUBCUTANEOUS at 06:02

## 2025-04-13 RX ADMIN — Medication 400 MILLIGRAM(S): at 00:20

## 2025-04-13 RX ADMIN — Medication 25 GRAM(S): at 06:01

## 2025-04-13 RX ADMIN — Medication 15 MILLILITER(S): at 06:01

## 2025-04-13 RX ADMIN — HEPARIN SODIUM 7500 UNIT(S): 1000 INJECTION INTRAVENOUS; SUBCUTANEOUS at 13:04

## 2025-04-13 RX ADMIN — CALCIUM GLUCONATE 200 GRAM(S): 20 INJECTION, SOLUTION INTRAVENOUS at 07:20

## 2025-04-13 RX ADMIN — Medication 25 GRAM(S): at 13:05

## 2025-04-13 RX ADMIN — Medication 15 MILLILITER(S): at 17:04

## 2025-04-13 RX ADMIN — HEPARIN SODIUM 7500 UNIT(S): 1000 INJECTION INTRAVENOUS; SUBCUTANEOUS at 21:42

## 2025-04-13 NOTE — PROGRESS NOTE ADULT - SUBJECTIVE AND OBJECTIVE BOX
Nephrology progress note    THIS IS AN INCOMPLETE NOTE . FULL NOTE TO FOLLOW SHORTLY    Patient is seen and examined, events over the last 24 h noted .    Allergies:  No Known Allergies    Hospital Medications:   MEDICATIONS  (STANDING):  buMETAnide Infusion 2 mG/Hr (10 mL/Hr) IV Continuous <Continuous>  chlorhexidine 0.12% Liquid 15 milliLiter(s) Oral Mucosa every 12 hours  chlorhexidine 2% Cloths 1 Application(s) Topical <User Schedule>  dexMEDEtomidine Infusion 0.2 MICROgram(s)/kG/Hr (7.03 mL/Hr) IV Continuous <Continuous>  fentaNYL   Infusion 0.5 MICROgram(s)/kG/Hr (7.03 mL/Hr) IV Continuous <Continuous>  heparin   Injectable 7500 Unit(s) SubCutaneous every 8 hours  lactated ringers. 1000 milliLiter(s) (80 mL/Hr) IV Continuous <Continuous>  norepinephrine Infusion 0.05 MICROgram(s)/kG/Min (6.59 mL/Hr) IV Continuous <Continuous>  pantoprazole  Injectable 40 milliGRAM(s) IV Push daily  piperacillin/tazobactam IVPB.. 4.5 Gram(s) IV Intermittent every 8 hours        VITALS:  T(F): 100.4 (25 @ 08:00), Max: 101.3 (25 @ 00:00)  HR: 98 (25 @ 08:30)  BP: 114/55 (25 @ 05:00)  RR: 12 (25 @ 08:30)  SpO2: 100% (25 @ 08:30)  Wt(kg): --     @ :  -   @ 07:00  --------------------------------------------------------  IN: 6822.7 mL / OUT: 645 mL / NET: 6177.7 mL     @ 07:01  -   @ 07:00  --------------------------------------------------------  IN: 5305.1 mL / OUT: 2813 mL / NET: 2492.1 mL     @ 07: @ 08:50  --------------------------------------------------------  IN: 240.6 mL / OUT: 225 mL / NET: 15.6 mL      2025 07:  -  2025 07:00  --------------------------------------------------------  IN:    Albumin 25%  -  50 mL: 100 mL    Bumetanide: 180 mL    Cisatracurium: 16.9 mL    Dexmedetomidine: 337.4 mL    Dexmedetomidine: 316 mL    FentaNYL: 359.4 mL    IV PiggyBack: 100 mL    IV PiggyBack: 100 mL    IV PiggyBack: 50 mL    IV PiggyBack: 200 mL    Lactated Ringers: 2400 mL    Lactated Ringers Bolus: 1000 mL    Norepinephrine: 145.4 mL  Total IN: 5305.1 mL    OUT:    Bulb (mL): 208 mL    Bulb (mL): 30 mL    Indwelling Catheter - Urethral (mL): 2525 mL    Nasogastric/Oral tube (mL): 50 mL  Total OUT: 2813 mL    Total NET: 2492.1 mL      2025 07:  -  2025 08:50  --------------------------------------------------------  IN:    Bumetanide: 10 mL    Dexmedetomidine: 35.2 mL    FentaNYL: 14.1 mL    IV PiggyBack: 100 mL    Lactated Ringers: 80 mL    Norepinephrine: 1.3 mL  Total IN: 240.6 mL    OUT:    Indwelling Catheter - Urethral (mL): 225 mL  Total OUT: 225 mL    Total NET: 15.6 mL            PHYSICAL EXAM:  Constitutional: NAD  HEENT: anicteric sclera, oropharynx clear, MMM  Neck: No JVD  Respiratory: CTAB, no wheezes, rales or rhonchi  Cardiovascular: S1, S2, RRR  Gastrointestinal: BS+, soft, NT/ND  Extremities: No cyanosis or clubbing. No peripheral edema  :  No pierce.   Skin: No rashes    LABS:      143  |  106  |  51[H]  ----------------------------<  132[H]  4.5   |  21  |  4.8[HH]    Ca    7.5[L]      2025 23:15  Phos  5.9       Mg     1.7         TPro  4.4[L]  /  Alb  2.6[L]  /  TBili  0.4  /  DBili  0.2  /  AST  51[H]  /  ALT  43[H]  /  AlkPhos  50                            8.3    11.11 )-----------( 206      ( 2025 23:15 )             26.7       Urine Studies:  Urinalysis Basic - ( 2025 07:03 )    Color: Yellow / Appearance: Clear / S.012 / pH:   Gluc:  / Ketone: Negative mg/dL  / Bili: Negative / Urobili: 0.2 mg/dL   Blood:  / Protein: 30 mg/dL / Nitrite: Negative   Leuk Esterase: Negative / RBC: 2 /HPF / WBC 1 /HPF   Sq Epi:  / Non Sq Epi: 1 /HPF / Bacteria: Negative /HPF      Sodium, Random Urine: <20.0 mmoL/L ( @ 06:27)  Creatinine, Random Urine: 105 mg/dL ( @ 06:27)              RADIOLOGY & ADDITIONAL STUDIES:   Nephrology progress note    Patient is seen and examined, events over the last 24 h noted .  intubated on MV     Allergies:  No Known Allergies    Hospital Medications:   MEDICATIONS  (STANDING):  buMETAnide Infusion 2 mG/Hr (10 mL/Hr) IV Continuous <Continuous>  dexMEDEtomidine Infusion 0.2 MICROgram(s)/kG/Hr (7.03 mL/Hr) IV Continuous <Continuous>  fentaNYL   Infusion 0.5 MICROgram(s)/kG/Hr (7.03 mL/Hr) IV Continuous <Continuous>  heparin   Injectable 7500 Unit(s) SubCutaneous every 8 hours  lactated ringers. 1000 milliLiter(s) (80 mL/Hr) IV Continuous <Continuous>  norepinephrine Infusion 0.05 MICROgram(s)/kG/Min (6.59 mL/Hr) IV Continuous <Continuous>  pantoprazole  Injectable 40 milliGRAM(s) IV Push daily  piperacillin/tazobactam IVPB.. 4.5 Gram(s) IV Intermittent every 8 hours        VITALS:  T(F): 100.4 (25 @ 08:00), Max: 101.3 (25 @ 00:00)  HR: 98 (25 @ 08:30)  BP: 114/55 (25 @ 05:00)  RR: 12 (25 @ 08:30)  SpO2: 100% (25 @ 08:30)       @ 07:  -   @ 07:00  --------------------------------------------------------  IN: 6822.7 mL / OUT: 645 mL / NET: 6177.7 mL     @ 07:  -   @ 07:00  --------------------------------------------------------  IN: 5305.1 mL / OUT: 2813 mL / NET: 2492.1 mL     @ 07:  -   @ 08:50  --------------------------------------------------------  IN: 240.6 mL / OUT: 225 mL / NET: 15.6 mL      2025 07:01  -  2025 07:00  --------------------------------------------------------  IN:    Albumin 25%  -  50 mL: 100 mL    Bumetanide: 180 mL    Cisatracurium: 16.9 mL    Dexmedetomidine: 337.4 mL    Dexmedetomidine: 316 mL    FentaNYL: 359.4 mL    IV PiggyBack: 100 mL    IV PiggyBack: 100 mL    IV PiggyBack: 50 mL    IV PiggyBack: 200 mL    Lactated Ringers: 2400 mL    Lactated Ringers Bolus: 1000 mL    Norepinephrine: 145.4 mL  Total IN: 5305.1 mL    OUT:    Bulb (mL): 208 mL    Bulb (mL): 30 mL    Indwelling Catheter - Urethral (mL): 2525 mL    Nasogastric/Oral tube (mL): 50 mL  Total OUT: 2813 mL    Total NET: 2492.1 mL      2025 07:01  -  2025 08:50  --------------------------------------------------------  IN:    Bumetanide: 10 mL    Dexmedetomidine: 35.2 mL    FentaNYL: 14.1 mL    IV PiggyBack: 100 mL    Lactated Ringers: 80 mL    Norepinephrine: 1.3 mL  Total IN: 240.6 mL    OUT:    Indwelling Catheter - Urethral (mL): 225 mL  Total OUT: 225 mL    Total NET: 15.6 mL            PHYSICAL EXAM:  Constitutional: intubated on MV   Respiratory: CTAB,  Cardiovascular: S1, S2, RRR  Gastrointestinal: BS+, soft, NT/ND  Extremities: No cyanosis or clubbing. No peripheral edema  :  pierce.   Skin: No rashes    LABS:      143  |  106  |  51[H]  ----------------------------<  132[H]  4.5   |  21  |  4.8[HH]    Ca    7.5[L]      2025 23:15  Phos  5.9       Mg     1.7         TPro  4.4[L]  /  Alb  2.6[L]  /  TBili  0.4  /  DBili  0.2  /  AST  51[H]  /  ALT  43[H]  /  AlkPhos  50                            8.3    11.11 )-----------( 206      ( 2025 23:15 )             26.7       Urine Studies:  Urinalysis Basic - ( 2025 07:03 )    Color: Yellow / Appearance: Clear / S.012 / pH:   Gluc:  / Ketone: Negative mg/dL  / Bili: Negative / Urobili: 0.2 mg/dL   Blood:  / Protein: 30 mg/dL / Nitrite: Negative   Leuk Esterase: Negative / RBC: 2 /HPF / WBC 1 /HPF   Sq Epi:  / Non Sq Epi: 1 /HPF / Bacteria: Negative /HPF      Sodium, Random Urine: <20.0 mmoL/L ( @ 06:27)  Creatinine, Random Urine: 105 mg/dL ( @ 06:27)      Creatine Kinase: 1502 U/L (25 @ 23:15)            RADIOLOGY & ADDITIONAL STUDIES:

## 2025-04-13 NOTE — PROGRESS NOTE ADULT - SUBJECTIVE AND OBJECTIVE BOX
SURGERY DAILY PROGRESS NOTE    24 HR EVENTS: Patient febrile to 101.3, given IV tylenol and placed on cooling blanket. Patient ventilated 70/12 and saturating well. Tacky to 109 and mildly tachypneic to a max of 25, otherwise vitals WNL. BUN/cr rising (51/4.8 from 39/4.2), WBC decreasing (11.11 from 14.38).    OBJECTIVE:  Vital Signs Last 24 Hrs  T(C): 38.4 (13 Apr 2025 01:45), Max: 38.5 (13 Apr 2025 00:00)  T(F): 101.1 (13 Apr 2025 01:45), Max: 101.3 (13 Apr 2025 00:00)  HR: 101 (13 Apr 2025 02:00) (91 - 112)  BP: 113/59 (13 Apr 2025 02:00) (113/59 - 113/59)  BP(mean): 80 (13 Apr 2025 02:00) (80 - 80)  RR: 24 (13 Apr 2025 02:00) (9 - 28)  SpO2: 100% (13 Apr 2025 02:00) (97% - 100%)    Parameters below as of 13 Apr 2025 01:00  Patient On (Oxygen Delivery Method): ventilator    O2 Concentration (%): 70    I&O's Summary    11 Apr 2025 07:01  -  12 Apr 2025 07:00  --------------------------------------------------------  IN: 6822.7 mL / OUT: 645 mL / NET: 6177.7 mL    12 Apr 2025 07:01  -  13 Apr 2025 02:33  --------------------------------------------------------  IN: 4638.3 mL / OUT: 2058 mL / NET: 2580.3 mL        Physical Exam:  General Appearance: Sedated and ventilated, NGT  Chest: Equal expansion bilaterally, NARD, ventilated 70/12  CV: Pulse regular presently  Abdomen: Morbid obesity. Soft, distended. Preveena and THONY x2 with SS fluid output  Extremities: Grossly symmetric, SCD's in place     LABS:                        8.3    11.11 )-----------( 206      ( 12 Apr 2025 23:15 )             26.7     04-12    143  |  106  |  51[H]  ----------------------------<  132[H]  4.5   |  21  |  4.8[HH]    Ca    7.5[L]      12 Apr 2025 23:15  Phos  5.9     04-12  Mg     1.7     04-12    TPro  4.4[L]  /  Alb  2.6[L]  /  TBili  0.4  /  DBili  0.2  /  AST  51[H]  /  ALT  43[H]  /  AlkPhos  50  04-12    PT/INR - ( 12 Apr 2025 23:15 )   PT: 12.90 sec;   INR: 1.09 ratio         PTT - ( 12 Apr 2025 23:15 )  PTT:27.6 sec  Urinalysis Basic - ( 12 Apr 2025 23:15 )    Color: x / Appearance: x / SG: x / pH: x  Gluc: 132 mg/dL / Ketone: x  / Bili: x / Urobili: x   Blood: x / Protein: x / Nitrite: x   Leuk Esterase: x / RBC: x / WBC x   Sq Epi: x / Non Sq Epi: x / Bacteria: x        RADIOLOGY & ADDITIONAL STUDIES:

## 2025-04-13 NOTE — PROGRESS NOTE ADULT - ASSESSMENT
ASSESSMENT:  64F PMHx HTN, morbid obesity s/p 2001 Abyb-en-Y gastric bypass who presented on 4/3 with incarcerated ventral hernia with SBO. S/p 4/10 open ventral hernia repair, small bowel resection, and primary fascial closure with Torey Cornejo. Admitted to SICU for Q1 abdominal checks and hemodynamic monitoring.       NEUROLOGICAL:  #Acute pain    - IV APAP prn    - fent gtt  #sedation   - precedex gtt    RESPIRATORY:   #mechanically ventilated   - /24/12/70  - monitor ABG/CXR  #hx LOUIS on CPAP@ home  #Activity   - bedrest    CARDS:   #acute hypotension  - currently on levophed gtt  - MAP >65  #hx of HTN  - holding home HCTZ 25mg QD, amlodipine and losartan  (while NPO)  #EKG- NSR  #TTE 6/2022: EF 55-60%. G1DD.   - TTE 4/11: EF of 56 %.G1DD.     GASTROINTESTINAL/NUTRITION:   #Diet, NPO    -aspiration precautions, HOB 30  #SBO s/p ventral hernia repair with SBR  - NGT LCWS, strict NPO  - Q1 abdominal exams; monitor for compartment syndrome > peak pressures in 20s  - bladder pressure 17-19 on 4/11  #GI Prophylaxis/hx GERD   - pantoprazole  Injectable 40 IV Push daily (takes omeprazole @home)  #Bowel regimen    - holding    -last bowel movement prior to admission    /RENAL:   #urine output in critically ill    -indwelling pierce   #worsening FUNMI  - LR @80cc/hr  - oliguric   - bumex 2 IV push given 4/12, bumex drip started > -220 cc/hr  - nephrology consulted > may consider starting CVVH  -renal u/s> no hydronephrosis. 2-3cm anechoic cyst on right kidney     Labs          BUN/Cr: 27/3.0 > 31/3.3 > 36/3.6 --> 39/4.2 -> 45/ 4.4 --> 51/4.8           Na  141 // K  4.7 // Mg  2.0 // Phos  5.9         HEME/ONC:   #DVT prophylaxis     -Chemical: heparin   Injectable 5000 Unit(s) SubCutaneous every 8 hours     -Mechanical: SCDs       Labs: Hb/Hct:  8.9/28.9  (04-12 @ 00:50)  -->,  8.6/28.2  (04-12 @ 04:15)  -->                      Plts:  241  -->,  256  -->                 PTT/INR:  26.4/1.27  --->         ID:  #Antibiotics Course  - continue zosyn > Purulent abscesses noted intraoperatively   #cultures  - OR cx: few E.coli  WBC- 17.94  --->>,  14.80  --->>,  14.38  --->>  Temp trend- 24hrs T(F): 99.8 (04-12 @ 06:00), Max: 100.6 (04-12 @ 04:00)  Current antibiotics-piperacillin/tazobactam IVPB.. 3.375 every 8 hours  #MRSA nares negative       ENDOCRINE:  #glycemic monitoring    -FSG q6 if NPO    -Glucose goal 140-180. If above 180, will start corrective insulin sliding scale    MSK:  #Bedrest       SKIN:  #DTI screening negative     - will continue to monitor daily skin changes      LINES/DRAINS:  PIV, Pierce, NGT, ETT, L radial arterial line, L subclavian TLC  ADVANCED DIRECTIVES:  Full Code  HCP- Daughter   INDICATION FOR SICU/SDU: Intestinal obstruction  DISPO:   SICU. Case discussed with attending Dr. Bear     ASSESSMENT:  64F PMHx HTN, morbid obesity s/p 2001 Abby-en-Y gastric bypass who presented on 4/3 with incarcerated ventral hernia with SBO. S/p 4/10 open ventral hernia repair, small bowel resection, and primary fascial closure with Torey Cornejo. Admitted to SICU for Q1 abdominal checks and hemodynamic monitoring.     NEUROLOGICAL:  #Acute pain    - IV APAP prn    - fent gtt  #sedation   - precedex gtt    RESPIRATORY:   #mechanically ventilated/ ARDS  - /24/12/70  > P/F ratio 140s = moderate ARDS  - monitor ABG/CXR  ABG - ( 13 Apr 2025 03:29 )  pH, Arterial: 7.33  pH, Blood: x     /  pCO2: 38    /  pO2: 100   / HCO3: 20    / Base Excess: -5.5  /  SaO2: 96.8    #hx LOUIS on CPAP@ home  #Activity   - bedrest    CARDS:   #acute hypotension  - currently on levophed gtt  - MAP >65  #hx of HTN  - holding home HCTZ 25mg QD, amlodipine and losartan  #EKG- NSR  #TTE 6/2022: EF 55-60%. G1DD.   - TTE 4/11: EF of 56 %.G1DD.     GASTROINTESTINAL/NUTRITION:   #Diet, NPO    -aspiration precautions, HOB 30  #SBO s/p ventral hernia repair with SBR  - NGT LCWS, strict NPO  - Q1 abdominal exams; monitor for compartment syndrome > peak pressures in 20s  #GI Prophylaxis/hx GERD   - pantoprazole  Injectable 40 IV Push daily (takes omeprazole @home)  #Bowel regimen    - holding    -last bowel movement prior to admission    /RENAL:   #urine output in critically ill    -indwelling pierce   #worsening FUNMI  - LR @80cc/hr  - oliguric > improving on bumex gtt  - -220 cc/hr  - nephrology consulted and following > likely hold off on CVVH  -renal u/s> no hydronephrosis. 2-3cm anechoic cyst on right kidney     Labs          BUN/Cr:  36/3.6 --> 39/4.2 -> 45/ 4.4 --> 51/4.8          Na  143 // K  4.5 // Mg  1.7 // Phos  5.9         HEME/ONC:   #DVT prophylaxis     -Chemical: heparin   Injectable 5000 Unit(s) SubCutaneous every 8 hours     -Mechanical: SCDs       Labs: Hb/Hct:  8.6/28.2  (04-12 @ 04:15)  -->,  8.3/26.7  (04-12 @ 23:15)  -->                      Plts:  256  -->,  206  -->                 PTT/INR:  27.6/1.09  --->         ID:  #Antibiotics Course  - continue zosyn > Purulent abscesses noted intraoperatively   #febrile  - UA negative  - blood cultures sent 4/13   - sputum cultures pending   #cultures  - OR cx: few E.coli  WBC- 14.80  --->>,  14.38  --->>,  11.11  --->>  Temp trend- 24hrs T(F): 100.4 (04-13 @ 08:00), Max: 101.3 (04-13 @ 00:00)  Current antibiotics-piperacillin/tazobactam IVPB.. 3.375 every 8 hours  #MRSA nares negative       ENDOCRINE:  #glycemic monitoring    -FSG q6 if NPO    -Glucose goal 140-180. If above 180, will start corrective insulin sliding scale    MSK:  #Bedrest       SKIN:  #DTI screening negative     - will continue to monitor daily skin changes      LINES/DRAINS:  PIV, Pierce, NGT, ETT, L radial arterial line, L subclavian TLC  ADVANCED DIRECTIVES:  Full Code  HCP- Daughter   INDICATION FOR SICU/SDU: Intestinal obstruction  DISPO:  SICU. Case discussed with attending Dr. Bear

## 2025-04-13 NOTE — PROGRESS NOTE ADULT - SUBJECTIVE AND OBJECTIVE BOX
SARGENTNANCY   [id number]   62  64yF  ============================================================   DATE OF INITIAL SICU/SDU CONSULT: 04-10-25    INDICATION FOR SICU CONSULT:       SICU COURSE EVENTS :  04-10 - admitted to SICU service  : Intubated and started on paralytic. Arterial line placed, subclavian TLC placed. Nephrology consulted for oliguria. IR abdominal muscle botox injection performed. TTE performed.   : Additional fluid boluses given; trial fo bumex started, considering CVVH. Weaned off nimbex gtt.  > 220 /hr. P/F ratio 130 > moderate ARDS. Renal U/S w/out hydro. Febrile to 101.3, IV tylenol x 1.     24HOUR EVENTS    NIGHT  -PM rounds: SBP 09s (MAP 66), HR 90s-100, Levo 0.04, Precedex 0.9, Fentanyl 1, Bumex @ 2. Awake, following commands. Abdomen soft. THONY x 2   -VENT: 400/12//70  -PM ab.33/39/91/21  - Febrile to 101.3, IV tylenol x1  - 2g Mag  - CK 1502    DAY  - 1L bolus per Dr. Mendez, additional 100cc albumin bolus given  - d/c nimbex gtt  - P/F ratio 130 > moderate ARDS  - 2g Ca   - bumex 2 IV x1 given > 75cc/hr UOP > per nephrology recs started on bumex gtt@2mg/hr  - OR cx > few E.coli   - renal u/s> no hydronephrosis. 2-3cm anechoic cyst on right kidney   - IVF to 80cc/hr      [X] A ten-point review of systems was negative except as expressed in note.  [X ] History was obtained from patient. If unable to participate in their care, history was from review of the chart and collateral sources of information.  ============================================================    SARGENTNANCY   [id number]   62  64yF  ============================================================   DATE OF INITIAL SICU/SDU CONSULT: 04-10-25    INDICATION FOR SICU CONSULT:       SICU COURSE EVENTS :  04-10 - admitted to SICU service  : Intubated and started on paralytic. Arterial line placed, subclavian TLC placed. Nephrology consulted for oliguria. IR abdominal muscle botox injection performed. TTE performed.   : Additional fluid boluses given; trial fo bumex started, considering CVVH. Weaned off nimbex gtt.  > 220 /hr. P/F ratio 130 > moderate ARDS. Renal U/S w/out hydro. Febrile to 101.3, IV tylenol x 1.     24HOUR EVENTS    NIGHT  -PM rounds: SBP 09s (MAP 66), HR 90s-100, Levo 0.04, Precedex 0.9, Fentanyl 1, Bumex @ 2. Awake, following commands. Abdomen soft. THONY x 2   -VENT: 400/12//70  -PM ab.33/39/91/21  - Febrile to 101.3, IV tylenol x1  - 2g Mag  - CK 1502    DAY  - 1L bolus per Dr. Mendez, additional 100cc albumin bolus given  - d/c nimbex gtt  - P/F ratio 130 > moderate ARDS  - 2g Ca   - bumex 2 IV x1 given > 75cc/hr UOP > per nephrology recs started on bumex gtt@2mg/hr  - OR cx > few E.coli   - renal u/s> no hydronephrosis. 2-3cm anechoic cyst on right kidney   - IVF to 80cc/hr      [X] A ten-point review of systems was negative except as expressed in note.  [X ] History was obtained from patient. If unable to participate in their care, history was from review of the chart and collateral sources of information.  ============================================================   Daily     Daily   Diet, NPO (04-10-25 @ 17:00)    CURRENT MEDS:  Neurologic Medications  dexMEDEtomidine Infusion 0.2 MICROgram(s)/kG/Hr IV Continuous <Continuous>  fentaNYL   Infusion 0.5 MICROgram(s)/kG/Hr IV Continuous <Continuous>    Respiratory Medications    Cardiovascular Medications  buMETAnide Infusion 2 mG/Hr IV Continuous <Continuous>  norepinephrine Infusion 0.05 MICROgram(s)/kG/Min IV Continuous <Continuous>    Gastrointestinal Medications  lactated ringers. 1000 milliLiter(s) IV Continuous <Continuous>  pantoprazole  Injectable 40 milliGRAM(s) IV Push daily    Genitourinary Medications    Hematologic/Oncologic Medications  heparin   Injectable 7500 Unit(s) SubCutaneous every 8 hours    Antimicrobial/Immunologic Medications  piperacillin/tazobactam IVPB.. 4.5 Gram(s) IV Intermittent every 8 hours    Endocrine/Metabolic Medications    Topical/Other Medications  chlorhexidine 0.12% Liquid 15 milliLiter(s) Oral Mucosa every 12 hours  chlorhexidine 2% Cloths 1 Application(s) Topical <User Schedule>    ICU Vital Signs Last 24 Hrs  T(C): 38 (2025 08:00), Max: 38.5 (2025 00:00)  T(F): 100.4 (2025 08:00), Max: 101.3 (2025 00:00)  HR: 100 (2025 07:00) (91 - 112)  BP: 114/55 (2025 05:00) (113/59 - 115/60)  BP(mean): 75 (2025 05:00) (75 - 82)  ABP: 119/64 (2025 07:00) (88/49 - 137/64)  ABP(mean): 81 (2025 07:00) (61 - 89)  RR: 24 (2025 07:00) (9 - 28)  SpO2: 100% (2025 07:00) (97% - 100%)    O2 Parameters below as of 2025 07:00  Patient On (Oxygen Delivery Method): ventilator    O2 Concentration (%): 70      Mode: AC/ CMV (Assist Control/ Continuous Mandatory Ventilation)  RR (machine): 24  TV (machine): 400  FiO2: 70  PEEP: 12  ITime: 0.8  MAP: 18  PIP: 29    ABG - ( 2025 03:29 )  pH, Arterial: 7.33  pH, Blood: x     /  pCO2: 38    /  pO2: 100   / HCO3: 20    / Base Excess: -5.5  /  SaO2: 96.8              I&O's Summary    2025 07:  -  2025 07:00  --------------------------------------------------------  IN: 5305.1 mL / OUT: 2813 mL / NET: 2492.1 mL      I&O's Detail    2025 07:01  -  2025 07:00  --------------------------------------------------------  IN:    Albumin 25%  -  50 mL: 100 mL    Bumetanide: 180 mL    Cisatracurium: 16.9 mL    Dexmedetomidine: 337.4 mL    Dexmedetomidine: 316 mL    FentaNYL: 359.4 mL    IV PiggyBack: 100 mL    IV PiggyBack: 100 mL    IV PiggyBack: 50 mL    IV PiggyBack: 200 mL    Lactated Ringers: 2400 mL    Lactated Ringers Bolus: 1000 mL    Norepinephrine: 145.4 mL  Total IN: 5305.1 mL    OUT:    Bulb (mL): 208 mL    Bulb (mL): 30 mL    Indwelling Catheter - Urethral (mL): 2525 mL    Nasogastric/Oral tube (mL): 50 mL  Total OUT: 2813 mL    Total NET: 2492.1 mL          PHYSICAL EXAM:     NEURO/GENERAL: NAD. GCS 11T, follows commands. PERRLA   RESP: equal chest rise b/l, intubated on mechanical ventilation.   CARDIAC: S1,S2. sinus tachycardia to 100s on monitor.  GI: abdomen soft, nontender, distended. NGT in place. midline vac holding suction. THONY x2 in place.    : urinary catheter in place with urine.   EXTREMITIES: moving extremities spontaneously   SKIN: no DTI noted

## 2025-04-13 NOTE — CHART NOTE - NSCHARTNOTEFT_GEN_A_CORE
Registered Dietitian Follow-Up     Patient Profile Reviewed                           Yes [x]   No []     Pertinent Subjective Information:  Patient seen due to now intubated. Not on propofol at time of visit. Currently NPO      Pertinent Medical Interventions:  Patient is a 64F PMHx HTN, morbid obesity s/p  Abby-en-Y gastric bypass who presented on 4/3 with incarcerated ventral hernia with SBO. S/p 4/10 open ventral hernia repair, small bowel resection, and primary fascial closure with Torey Cornejo. Admitted to SICU for Q1 abdominal checks and hemodynamic monitoring.     #SBO s/p ventral hernia repair with SBR  - NGT LCWS, strict NPO  - Q1 abdominal exams; monitor for compartment syndrome > peak pressures in 20s  #GI Prophylaxis/hx GERD   - pantoprazole  Injectable 40 IV Push daily (takes omeprazole @home)  #Bowel regimen    - holding    -last bowel movement prior to admission  #worsening FUNMI  - LR @80cc/hr  - oliguric > improving on bumex gtt  - -220 cc/hr  - nephrology consulted and following > likely hold off on CVVH  #glycemic monitoring    -FSG q6 if NPO    -Glucose goal 140-180. If above 180, will start corrective insulin sliding scale     Diet order:   Diet, NPO (04-10-25 @ 17:00) [Active]    Anthropometrics:  Height (cm): 160 (04-10-25 @ 08:47)  Weight (kg): 140.6 (04-10-25 @ 08:47)  BMI (kg/m2): 54.9 (04-10-25 @ 08:47)  IBW: 45.3 kg     Daily Weight in k.3 (), Weight in k ()  140.6 kg (4/10) vs 130.3 kg ()  indicating 7.9% weight loss x 1 day?    MEDICATIONS  (STANDING):  buMETAnide Infusion 2 mG/Hr (10 mL/Hr) IV Continuous <Continuous>  chlorhexidine 0.12% Liquid 15 milliLiter(s) Oral Mucosa every 12 hours  chlorhexidine 2% Cloths 1 Application(s) Topical <User Schedule>  dexMEDEtomidine Infusion 0.2 MICROgram(s)/kG/Hr (7.03 mL/Hr) IV Continuous <Continuous>  fentaNYL   Infusion 0.5 MICROgram(s)/kG/Hr (7.03 mL/Hr) IV Continuous <Continuous>  heparin   Injectable 7500 Unit(s) SubCutaneous every 8 hours  lactated ringers. 1000 milliLiter(s) (50 mL/Hr) IV Continuous <Continuous>  norepinephrine Infusion 0.05 MICROgram(s)/kG/Min (6.59 mL/Hr) IV Continuous <Continuous>  pantoprazole  Injectable 40 milliGRAM(s) IV Push daily  piperacillin/tazobactam IVPB.. 4.5 Gram(s) IV Intermittent every 8 hours    MEDICATIONS  (PRN):    Pertinent Labs:  @ 12:19: Na 143, BUN 57[H], Cr 5.2[HH], [H], K+ 4.2, Phos 6.5[H], Mg 2.1, Alk Phos 57, ALT/SGPT 32, AST/SGOT 35, HbA1c --   @ 23:15: Na 143, BUN 51[H], Cr 4.8[HH], [H], K+ 4.5, Phos 5.9[H], Mg 1.7[L], Alk Phos --, ALT/SGPT --, AST/SGOT --, HbA1c --    Finger Sticks:  POCT Blood Glucose.: 147 mg/dL ( @ 12:00)  POCT Blood Glucose.: 134 mg/dL ( @ 05:17)  POCT Blood Glucose.: 144 mg/dL ( @ 23:02)  POCT Blood Glucose.: 134 mg/dL ( @ 18:44)    Physical Findings:  - Appearance: intubated  - GI function: NGT output 50 mL   - Tubes: +NGT  - Oral/Mouth cavity: NPO   - Skin: no pressure injuries documented  - Edema: 2+ edema  - left and right foot      Nutrition Requirements:  Weight Used: 130.3 kg () for kcal needs and IBW 52.3 kg for protein needs - estimated needs with consideration for age, BMI, acuity of illness, critical care setting     Estimated Energy Needs    Continue []  Adjust [x]  1150 - 1308 kcal/day (22-25 kcal/kg IBW 52.3 kg) vs (PSE 2010 2077.31 kcal/day (Tmax 38.5, Ve 9.26)      Estimated Protein Needs    Continue []  Adjust [x]  104 - 131 gm/day (2-2.5 gm/kg IBW 52.3 kg)     Estimated Fluid Needs        Continue [x]  Adjust []  1 mL/kcal      Nutrient Intake: NPO      [x] Previous Nutrition Diagnosis:  Inadequate Protein Energy Intake             [x] Ongoing          [] Resolved    Nutrition Education: deferred     Nutrition Intervention:  EN recs     Goal/Expected Outcome:   EN to meet >85% to 105% of estimated nutrient needs within 3-5 days      Indicator/Monitoring:   diet order, weight, labs, skin status, NFPF, BM, GI s/s      Recommendation:   1) As patient has been NPO since admission (10 days), recommend to consider PN in 3-5 days if SBO is not resolved and diet cannot be advanced  2) Monitor electrolytes, BG, renal profile  -insulin regimen if BG >/=180  3) Monitor BM, NG output, GI s/s     Patient is at high nutrition risk, RD to f/u  RD to remain available: Rayna Venegas, DEL [phone] or via Team

## 2025-04-13 NOTE — PROGRESS NOTE ADULT - ATTENDING COMMENTS
POD 3.  Patient remains on Vent support, /24/70%/PEEP 12.  AB.33/38/100  Levo at 0.02 mcg/kg/min this am.  Patient is off Nimbex, on sedation.  She was placed on Bumex drip 2mg/h - started making urine and this am -250 ml/h  Volume +2.5L/24h    PE:  Sedated  Chest: decreased breath sounds in bilateral lung bases  CV : rrr  Abdomen: moderately distended.    ASSESSMENT:  63 y/o female with Incarcerated Ventral Hernia. Small Bowel Obstruction.  S/P repair of large ventral hernia, incarcerated.   S/P small bowel resection in two areas of perforation with anastomosis.  ARDS.  On mechanica ventilation  Hemodynamic instability.  FUNMI.  Morbid obesity.    PLAN:  - sedation  - use Precedex and Fentanyl drip  - no SAT today  - on Vent support, continue AC ; put on FiO2 60%, keep PEEP 12; follow ABG  - keep MAP>65; reaching euvolemia; wean Levo off  - NPO, NGT to suction  - follow serum electrolytes and UOP  - continue Bumex drip   - Nephrology f/u  - ID - on Zosyn  - DVT proph  Patient's family was again at bedside and was updated on the above.

## 2025-04-13 NOTE — PROGRESS NOTE ADULT - ASSESSMENT
Patient is a 64F PMHx HTN, morbid obesity s/p 2001 Abby-en-Y gastric bypass who presented on 4/3 with incarcerated ventral hernia with SBO. S/p open ventral hernia repair, small bowel resection, and primary fascial closure with Dr. Lema on 4/10. Currently admitted to SICU for monitoring. Nephrology consulted for FUNMI and oliguria.    # FUNMI most likely ATN   # resp failure on MV  #  incarcerated ventral hernia with SBO S/p open ventral hernia repair, small bowel resection, and primary fascial closure    cr trending up on BUmex drip  non oliguric  continue BUmex drip decrease to 1 mg per hour   no need for CVVHD today   UO noted   on pressors keep MAP > 65   noted CK level order and  follow repeat   adjust all meds to GFR < 15   case discussed with surgical team and family today as well   will follow

## 2025-04-13 NOTE — PROGRESS NOTE ADULT - ASSESSMENT
ASSESSMENT:  64y F w/ PMHx of HTN and gastric bypass surgery in 2001 presents with abdominal distention. CT with incarcerated ventral hernia with SBO. S/p Open repair of ventral hernia using mesh and component separation technique, Small bowel resection. On POD 1 with respiratory failure, now mechanically ventilated and in FUNMI.    PLAN:  - Continue mechanical ventilation as needed  - Wean pressors as toelrated  - Monitor pierce output  - NPO + IVF  - Monitor THONY drain output  - Multimodal pain control  - Continue following recs by nephrology  - Rest of care per SICU    BLUE TEAM SPECTRA: [id number]

## 2025-04-14 LAB
ALBUMIN SERPL ELPH-MCNC: 2.6 G/DL — LOW (ref 3.5–5.2)
ALP SERPL-CCNC: 76 U/L — SIGNIFICANT CHANGE UP (ref 30–115)
ALT FLD-CCNC: 47 U/L — HIGH (ref 0–41)
ANION GAP SERPL CALC-SCNC: 17 MMOL/L — HIGH (ref 7–14)
ANION GAP SERPL CALC-SCNC: 20 MMOL/L — HIGH (ref 7–14)
ANION GAP SERPL CALC-SCNC: 22 MMOL/L — HIGH (ref 7–14)
APTT BLD: 22.6 SEC — CRITICAL LOW (ref 27–39.2)
AST SERPL-CCNC: 89 U/L — HIGH (ref 0–41)
BILIRUB DIRECT SERPL-MCNC: 0.2 MG/DL — SIGNIFICANT CHANGE UP (ref 0–0.3)
BILIRUB INDIRECT FLD-MCNC: 0.1 MG/DL — LOW (ref 0.2–1.2)
BILIRUB SERPL-MCNC: 0.3 MG/DL — SIGNIFICANT CHANGE UP (ref 0.2–1.2)
BUN SERPL-MCNC: 65 MG/DL — CRITICAL HIGH (ref 10–20)
BUN SERPL-MCNC: 73 MG/DL — CRITICAL HIGH (ref 10–20)
BUN SERPL-MCNC: 77 MG/DL — CRITICAL HIGH (ref 10–20)
CALCIUM SERPL-MCNC: 7.6 MG/DL — LOW (ref 8.4–10.5)
CALCIUM SERPL-MCNC: 8.1 MG/DL — LOW (ref 8.4–10.5)
CALCIUM SERPL-MCNC: 8.2 MG/DL — LOW (ref 8.4–10.5)
CHLORIDE SERPL-SCNC: 104 MMOL/L — SIGNIFICANT CHANGE UP (ref 98–110)
CHLORIDE SERPL-SCNC: 106 MMOL/L — SIGNIFICANT CHANGE UP (ref 98–110)
CHLORIDE SERPL-SCNC: 106 MMOL/L — SIGNIFICANT CHANGE UP (ref 98–110)
CO2 SERPL-SCNC: 21 MMOL/L — SIGNIFICANT CHANGE UP (ref 17–32)
CO2 SERPL-SCNC: 21 MMOL/L — SIGNIFICANT CHANGE UP (ref 17–32)
CO2 SERPL-SCNC: 22 MMOL/L — SIGNIFICANT CHANGE UP (ref 17–32)
CREAT SERPL-MCNC: 4.9 MG/DL — CRITICAL HIGH (ref 0.7–1.5)
CREAT SERPL-MCNC: 5.1 MG/DL — CRITICAL HIGH (ref 0.7–1.5)
CREAT SERPL-MCNC: 5.1 MG/DL — CRITICAL HIGH (ref 0.7–1.5)
EGFR: 9 ML/MIN/1.73M2 — LOW
GAS PNL BLDA: SIGNIFICANT CHANGE UP
GAS PNL BLDA: SIGNIFICANT CHANGE UP
GLUCOSE BLDC GLUCOMTR-MCNC: 158 MG/DL — HIGH (ref 70–99)
GLUCOSE BLDC GLUCOMTR-MCNC: 166 MG/DL — HIGH (ref 70–99)
GLUCOSE SERPL-MCNC: 143 MG/DL — HIGH (ref 70–99)
GLUCOSE SERPL-MCNC: 145 MG/DL — HIGH (ref 70–99)
GLUCOSE SERPL-MCNC: 154 MG/DL — HIGH (ref 70–99)
HCT VFR BLD CALC: 25.2 % — LOW (ref 37–47)
HCT VFR BLD CALC: 26.5 % — LOW (ref 37–47)
HGB BLD-MCNC: 8 G/DL — LOW (ref 12–16)
HGB BLD-MCNC: 8.5 G/DL — LOW (ref 12–16)
INR BLD: 0.96 RATIO — SIGNIFICANT CHANGE UP (ref 0.65–1.3)
MAGNESIUM SERPL-MCNC: 2 MG/DL — SIGNIFICANT CHANGE UP (ref 1.8–2.4)
MAGNESIUM SERPL-MCNC: 2.1 MG/DL — SIGNIFICANT CHANGE UP (ref 1.8–2.4)
MAGNESIUM SERPL-MCNC: 2.2 MG/DL — SIGNIFICANT CHANGE UP (ref 1.8–2.4)
MCHC RBC-ENTMCNC: 30.1 PG — SIGNIFICANT CHANGE UP (ref 27–31)
MCHC RBC-ENTMCNC: 30.3 PG — SIGNIFICANT CHANGE UP (ref 27–31)
MCHC RBC-ENTMCNC: 31.7 G/DL — LOW (ref 32–37)
MCHC RBC-ENTMCNC: 32.1 G/DL — SIGNIFICANT CHANGE UP (ref 32–37)
MCV RBC AUTO: 94 FL — SIGNIFICANT CHANGE UP (ref 81–99)
MCV RBC AUTO: 95.5 FL — SIGNIFICANT CHANGE UP (ref 81–99)
NRBC BLD AUTO-RTO: 0 /100 WBCS — SIGNIFICANT CHANGE UP (ref 0–0)
NRBC BLD AUTO-RTO: 0 /100 WBCS — SIGNIFICANT CHANGE UP (ref 0–0)
PHOSPHATE SERPL-MCNC: 6.2 MG/DL — HIGH (ref 2.1–4.9)
PHOSPHATE SERPL-MCNC: 6.2 MG/DL — HIGH (ref 2.1–4.9)
PHOSPHATE SERPL-MCNC: 6.3 MG/DL — HIGH (ref 2.1–4.9)
PLATELET # BLD AUTO: 180 K/UL — SIGNIFICANT CHANGE UP (ref 130–400)
PLATELET # BLD AUTO: 191 K/UL — SIGNIFICANT CHANGE UP (ref 130–400)
PMV BLD: 10.6 FL — HIGH (ref 7.4–10.4)
PMV BLD: 10.7 FL — HIGH (ref 7.4–10.4)
POTASSIUM SERPL-MCNC: 3.9 MMOL/L — SIGNIFICANT CHANGE UP (ref 3.5–5)
POTASSIUM SERPL-MCNC: 3.9 MMOL/L — SIGNIFICANT CHANGE UP (ref 3.5–5)
POTASSIUM SERPL-MCNC: 4.1 MMOL/L — SIGNIFICANT CHANGE UP (ref 3.5–5)
POTASSIUM SERPL-SCNC: 3.9 MMOL/L — SIGNIFICANT CHANGE UP (ref 3.5–5)
POTASSIUM SERPL-SCNC: 3.9 MMOL/L — SIGNIFICANT CHANGE UP (ref 3.5–5)
POTASSIUM SERPL-SCNC: 4.1 MMOL/L — SIGNIFICANT CHANGE UP (ref 3.5–5)
PROT SERPL-MCNC: 5.3 G/DL — LOW (ref 6–8)
PROTHROM AB SERPL-ACNC: 11.3 SEC — SIGNIFICANT CHANGE UP (ref 9.95–12.87)
RBC # BLD: 2.64 M/UL — LOW (ref 4.2–5.4)
RBC # BLD: 2.82 M/UL — LOW (ref 4.2–5.4)
RBC # FLD: 13.4 % — SIGNIFICANT CHANGE UP (ref 11.5–14.5)
RBC # FLD: 13.8 % — SIGNIFICANT CHANGE UP (ref 11.5–14.5)
SODIUM SERPL-SCNC: 144 MMOL/L — SIGNIFICANT CHANGE UP (ref 135–146)
SODIUM SERPL-SCNC: 147 MMOL/L — HIGH (ref 135–146)
SODIUM SERPL-SCNC: 148 MMOL/L — HIGH (ref 135–146)
WBC # BLD: 10.03 K/UL — SIGNIFICANT CHANGE UP (ref 4.8–10.8)
WBC # BLD: 11.11 K/UL — HIGH (ref 4.8–10.8)
WBC # FLD AUTO: 10.03 K/UL — SIGNIFICANT CHANGE UP (ref 4.8–10.8)
WBC # FLD AUTO: 11.11 K/UL — HIGH (ref 4.8–10.8)

## 2025-04-14 PROCEDURE — 99291 CRITICAL CARE FIRST HOUR: CPT

## 2025-04-14 PROCEDURE — 71045 X-RAY EXAM CHEST 1 VIEW: CPT | Mod: 26

## 2025-04-14 RX ORDER — PIPERACILLIN-TAZO-DEXTROSE,ISO 3.375G/5
3.38 IV SOLUTION, PIGGYBACK PREMIX FROZEN(ML) INTRAVENOUS EVERY 12 HOURS
Refills: 0 | Status: DISCONTINUED | OUTPATIENT
Start: 2025-04-14 | End: 2025-04-17

## 2025-04-14 RX ORDER — CALCIUM GLUCONATE 20 MG/ML
2 INJECTION, SOLUTION INTRAVENOUS ONCE
Refills: 0 | Status: COMPLETED | OUTPATIENT
Start: 2025-04-14 | End: 2025-04-14

## 2025-04-14 RX ORDER — PIPERACILLIN-TAZO-DEXTROSE,ISO 3.375G/5
4.5 IV SOLUTION, PIGGYBACK PREMIX FROZEN(ML) INTRAVENOUS EVERY 8 HOURS
Refills: 0 | Status: DISCONTINUED | OUTPATIENT
Start: 2025-04-14 | End: 2025-04-14

## 2025-04-14 RX ORDER — BUMETANIDE 1 MG/1
0.5 TABLET ORAL
Qty: 20 | Refills: 0 | Status: DISCONTINUED | OUTPATIENT
Start: 2025-04-14 | End: 2025-04-15

## 2025-04-14 RX ORDER — SODIUM CHLORIDE 9 G/1000ML
500 INJECTION, SOLUTION INTRAVENOUS ONCE
Refills: 0 | Status: COMPLETED | OUTPATIENT
Start: 2025-04-14 | End: 2025-04-14

## 2025-04-14 RX ORDER — NOREPINEPHRINE BITARTRATE 8 MG
0.05 SOLUTION INTRAVENOUS
Qty: 16 | Refills: 0 | Status: DISCONTINUED | OUTPATIENT
Start: 2025-04-14 | End: 2025-04-15

## 2025-04-14 RX ADMIN — HEPARIN SODIUM 7500 UNIT(S): 1000 INJECTION INTRAVENOUS; SUBCUTANEOUS at 13:20

## 2025-04-14 RX ADMIN — SODIUM CHLORIDE 500 MILLILITER(S): 9 INJECTION, SOLUTION INTRAVENOUS at 04:20

## 2025-04-14 RX ADMIN — CALCIUM GLUCONATE 200 GRAM(S): 20 INJECTION, SOLUTION INTRAVENOUS at 19:19

## 2025-04-14 RX ADMIN — Medication 40 MILLIGRAM(S): at 11:22

## 2025-04-14 RX ADMIN — BUMETANIDE 5 MG/HR: 1 TABLET ORAL at 21:11

## 2025-04-14 RX ADMIN — Medication 15 MILLILITER(S): at 17:21

## 2025-04-14 RX ADMIN — Medication 15 MILLILITER(S): at 05:42

## 2025-04-14 RX ADMIN — HEPARIN SODIUM 7500 UNIT(S): 1000 INJECTION INTRAVENOUS; SUBCUTANEOUS at 21:09

## 2025-04-14 RX ADMIN — NOREPINEPHRINE BITARTRATE 6.47 MICROGRAM(S)/KG/MIN: 8 SOLUTION at 07:20

## 2025-04-14 RX ADMIN — HEPARIN SODIUM 7500 UNIT(S): 1000 INJECTION INTRAVENOUS; SUBCUTANEOUS at 05:42

## 2025-04-14 RX ADMIN — DEXMEDETOMIDINE HYDROCHLORIDE IN SODIUM CHLORIDE 7.03 MICROGRAM(S)/KG/HR: 4 INJECTION INTRAVENOUS at 21:11

## 2025-04-14 RX ADMIN — CALCIUM GLUCONATE 200 GRAM(S): 20 INJECTION, SOLUTION INTRAVENOUS at 09:11

## 2025-04-14 RX ADMIN — Medication 25 GRAM(S): at 13:20

## 2025-04-14 RX ADMIN — Medication 1 APPLICATION(S): at 05:42

## 2025-04-14 RX ADMIN — Medication 7.03 MICROGRAM(S)/KG/HR: at 21:11

## 2025-04-14 RX ADMIN — SODIUM CHLORIDE 80 MILLILITER(S): 9 INJECTION, SOLUTION INTRAVENOUS at 05:42

## 2025-04-14 RX ADMIN — DEXMEDETOMIDINE HYDROCHLORIDE IN SODIUM CHLORIDE 7.03 MICROGRAM(S)/KG/HR: 4 INJECTION INTRAVENOUS at 05:41

## 2025-04-14 RX ADMIN — Medication 25 GRAM(S): at 05:42

## 2025-04-14 NOTE — PROGRESS NOTE ADULT - SUBJECTIVE AND OBJECTIVE BOX
seen and examined   intubated/ventilated         PAST HISTORY  --------------------------------------------------------------------------------  No significant changes to PMH, PSH, FHx, SHx, unless otherwise noted    ALLERGIES & MEDICATIONS  --------------------------------------------------------------------------------  Allergies    No Known Allergies    Intolerances      Standing Inpatient Medications  buMETAnide Infusion 1 mG/Hr IV Continuous <Continuous>  chlorhexidine 0.12% Liquid 15 milliLiter(s) Oral Mucosa every 12 hours  chlorhexidine 2% Cloths 1 Application(s) Topical <User Schedule>  dexMEDEtomidine Infusion 0.2 MICROgram(s)/kG/Hr IV Continuous <Continuous>  fentaNYL   Infusion 0.5 MICROgram(s)/kG/Hr IV Continuous <Continuous>  heparin   Injectable 7500 Unit(s) SubCutaneous every 8 hours  lactated ringers. 1000 milliLiter(s) IV Continuous <Continuous>  norepinephrine Infusion 0.05 MICROgram(s)/kG/Min IV Continuous <Continuous>  pantoprazole  Injectable 40 milliGRAM(s) IV Push daily  piperacillin/tazobactam IVPB.. 4.5 Gram(s) IV Intermittent every 8 hours    PRN Inpatient Medications        VITALS/PHYSICAL EXAM  --------------------------------------------------------------------------------  T(C): 37.6 (04-14-25 @ 04:00), Max: 38 (04-13-25 @ 08:00)  HR: 117 (04-14-25 @ 06:00) (84 - 117)  BP: --  RR: 20 (04-14-25 @ 06:00) (11 - 24)  SpO2: 98% (04-14-25 @ 06:00) (98% - 100%)  Wt(kg): --    Weight (kg): 138 (04-14-25 @ 05:00)      04-13-25 @ 07:01  -  04-14-25 @ 07:00  --------------------------------------------------------  IN: 3738.5 mL / OUT: 5245 mL / NET: -1506.5 mL      Physical Exam:  	Gen: intubated/ventilated   	Pulm: decrease BS  B/L  	CV: S1S2; no rub  	Abd: +distended/ THONY drain   	LE:  edema      LABS/STUDIES  --------------------------------------------------------------------------------              8.0    10.03 >-----------<  180      [04-13-25 @ 23:58]              25.2     144  |  106  |  65  ----------------------------<  143      [04-13-25 @ 23:58]  4.1   |  21  |  5.1        Ca     7.6     [04-13-25 @ 23:58]      Mg     2.0     [04-13-25 @ 23:58]      Phos  6.3     [04-13-25 @ 23:58]    TPro  4.4  /  Alb  2.8  /  TBili  0.3  /  DBili  0.2  /  AST  35  /  ALT  32  /  AlkPhos  57  [04-13-25 @ 12:19]    PT/INR: PT 12.90, INR 1.09       [04-12-25 @ 23:15]  PTT: 27.6       [04-12-25 @ 23:15]    Creatinine Trend:  SCr 5.1 [04-13 @ 23:58]  SCr 5.2 [04-13 @ 12:19]  SCr 4.8 [04-12 @ 23:15]  SCr 4.4 [04-12 @ 14:11]  SCr 4.2 [04-12 @ 04:15]    Urinalysis - [04-13-25 @ 23:58]      Color  / Appearance  / SG  / pH       Gluc 143 / Ketone   / Bili  / Urobili        Blood  / Protein  / Leuk Est  / Nitrite       RBC  / WBC  / Hyaline  / Gran  / Sq Epi  / Non Sq Epi  / Bacteria     Urine Creatinine 105      [04-11-25 @ 06:27]  Urine Sodium <20.0      [04-11-25 @ 06:27]

## 2025-04-14 NOTE — PROGRESS NOTE ADULT - ATTENDING COMMENTS
Pt examined  labs and images reviewed  pt with supermorbid obesity and complex hernia with sbo  previously treated without surgery   passing flatus awaiting bowel movement  had a bm   but more pain   s/p BHUMI , Bowel Resection / Anastamosis / Primary repair of Fascia / hernia sac  overnight - renal failure / respiratory compromise  was intubated / paralysed/ botox  now on bumex - tapering     impression : extremely high risk - supermorbid obesity and complex hernia with partial sbo  awaiting bm     SICU     prognosis and plan discussed with family

## 2025-04-14 NOTE — PROGRESS NOTE ADULT - ASSESSMENT
ASSESSMENT:  64F PMHx HTN, morbid obesity s/p 2001 Abby-en-Y gastric bypass who presented on 4/3 with incarcerated ventral hernia with SBO. S/p 4/10 open ventral hernia repair, small bowel resection, and primary fascial closure with Torey Cornejo. Admitted to SICU for Q1 abdominal checks and hemodynamic monitoring.     NEUROLOGICAL:  #Acute pain    - IV APAP prn    - fent gtt  #sedation   - precedex gtt    RESPIRATORY:   #mechanically ventilated/ ARDS  - /24/12/60  > P/F ratio 140s = moderate ARDS  - monitor ABG/CXR  #hx LOUIS on CPAP@ home  #Activity   - bedrest    CARDS:   #acute hypotension  - currently on levophed gtt  - MAP >65  #hx of HTN  - holding home HCTZ 25mg QD, amlodipine and losartan  #EKG- NSR  #TTE 6/2022: EF 55-60%. G1DD.   - TTE 4/11: EF of 56 %.G1DD.     GASTROINTESTINAL/NUTRITION:   #Diet, NPO    -aspiration precautions, HOB 30  #SBO s/p ventral hernia repair with SBR  - NGT LCWS, strict NPO  - Q1 abdominal exams; monitor for compartment syndrome > peak pressures in 20s  #GI Prophylaxis/hx GERD   - pantoprazole  Injectable 40 IV Push daily (takes omeprazole @home)  #Bowel regimen    - holding    -last bowel movement prior to admission    /RENAL:   #urine output in critically ill    -indwelling pierce   #worsening FUNMI  - LR @80cc/hr  - oliguric > improving on bumex gtt @ 1  - -220 cc/hr  - nephrology consulted and following > hold off on CVVH  -renal u/s> no hydronephrosis. 2-3cm anechoic cyst on right kidney     Labs          BUN/Cr:  45/ 4.4 --> 51/4.8  -->57/5.2          Na  143 // K  4.2 // Mg  2.1 // Phos  6.5         HEME/ONC:   #DVT prophylaxis     -Chemical: heparin   Injectable 5000 Unit(s) SubCutaneous every 8 hours     -Mechanical: SCDs       Labs: Hb/Hct:  8.3/26.7 --> 7.8/25 --> 8.0/25.2                    Plts:  206  --> 182 --> 180              PTT/INR:  27.6/1.09  --->         ID:  #Antibiotics Course  - continue zosyn > Purulent abscesses noted intraoperatively   #febrile  - UA negative  - blood cultures sent 4/13   - sputum cultures pending   #cultures  - OR cx: few E.coli, few bacteroides, rare clostridium  WBC- 9.63 > 10.03  Temp trend- 24hrs T(F): 100.4 (04-13 @ 08:00), Max: 101.3 (04-13 @ 00:00)  Current antibiotics-piperacillin/tazobactam IVPB.. 3.375 every 8 hours  #MRSA nares negative       ENDOCRINE:  #glycemic monitoring    -FSG q6 if NPO    -Glucose goal 140-180. If above 180, will start corrective insulin sliding scale    MSK:  #Bedrest       SKIN:  #DTI screening negative     - will continue to monitor daily skin changes      LINES/DRAINS:  PIV, Pierce, NGT, ETT, L radial arterial line, L subclavian TLC  ADVANCED DIRECTIVES:  Full Code  HCP- Daughter   INDICATION FOR SICU/SDU: Intestinal obstruction  DISPO:  SICU. Case discussed with attending Dr. Bear     ASSESSMENT:  64F PMHx HTN, morbid obesity s/p 2001 Abby-en-Y gastric bypass who presented on 4/3 with incarcerated ventral hernia with SBO. S/p 4/10 open ventral hernia repair, small bowel resection, and primary fascial closure with Torey Cornejo. Admitted to SICU for Q1 abdominal checks and hemodynamic monitoring.     NEUROLOGICAL:  #Acute pain    - IV APAP prn    - fent gtt  #sedation   - precedex gtt    RESPIRATORY:   #mechanically ventilated/ ARDS  - /24/12/50  > P/F ratio 160s  ABG - ( 14 Apr 2025 05:11 )  pH, Arterial: 7.40  pH, Blood: x     /  pCO2: 37    /  pO2: 82    / HCO3: 23    / Base Excess: -1.7  /  SaO2: 95.5    - monitor ABG/CXR  #hx LOUIS on CPAP@ home  #Activity   - bedrest    CARDS:   #acute hypotension  - intermittently requiring levophed gtt, currently off   - MAP >65  #hx of HTN  - holding home HCTZ 25mg QD, amlodipine and losartan  #EKG- NSR  #TTE 6/2022: EF 55-60%. G1DD.   - TTE 4/11: EF of 56 %.G1DD.     GASTROINTESTINAL/NUTRITION:   #Diet, NPO    -aspiration precautions, HOB 30  #SBO s/p ventral hernia repair with SBR  - NGT LCWS, strict NPO  - Q1 abdominal exams; monitor for compartment syndrome > peak pressures in 20s  #GI Prophylaxis/hx GERD   - pantoprazole  Injectable 40 IV Push daily (takes omeprazole @home)  #Bowel regimen    - holding    -last bowel movement prior to admission    /RENAL:   #urine output in critically ill    -indwelling pierce   #worsening FUNMI  - LR @80cc/hr  - oliguric > improving on bumex gtt @ 1mg/hr  - -220 cc/hr  - nephrology consulted and following > hold off on CVVH  - renal u/s> no hydronephrosis. 2-3cm anechoic cyst on right kidney     Labs          BUN/Cr:  45/ 4.4 --> 51/4.8  -->57/5.2 -->65/5.1          Na  144 // K  4.1 // Mg  2.0 // Phos  6.3         HEME/ONC:   #DVT prophylaxis     -Chemical: heparin   Injectable 5000 Unit(s) SubCutaneous every 8 hours     -Mechanical: SCDs       Labs: Hb/Hct:  8.3/26.7 --> 7.8/25 --> 8.0/25.2                    Plts:  206  --> 182 --> 180              PTT/INR:  27.6/1.09  --->         ID:  #Antibiotics Course  - continue zosyn > Purulent abscesses noted intraoperatively   #febrile  - UA negative  - blood cultures sent 4/13   - sputum cultures pending   #cultures  - OR cx: few E.coli, few bacteroides, rare clostridium  WBC- 11.11  --->>,  9.63  --->>,  10.03  --->>  Temp trend- 24hrs T(F): 99.7 (04-14 @ 04:00), Max: 100.4 (04-13 @ 08:00)  Current antibiotics-piperacillin/tazobactam IVPB.. 3.375 every 8 hours  #MRSA nares negative       ENDOCRINE:  #glycemic monitoring    -FSG q6 if NPO    -Glucose goal 140-180. If above 180, will start corrective insulin sliding scale    MSK:  #Bedrest       SKIN:  #DTI screening negative     - will continue to monitor daily skin changes      LINES/DRAINS:  PIV, Pierce, NGT, ETT, L radial arterial line, L subclavian TLC  ADVANCED DIRECTIVES:  Full Code  HCP- Daughter   INDICATION FOR SICU/SDU: Intestinal obstruction  DISPO:  SICU. Case discussed with attending Dr. Bear     ASSESSMENT:  64F PMHx HTN, morbid obesity s/p 2001 Abby-en-Y gastric bypass who presented on 4/3 with incarcerated ventral hernia with SBO. S/p 4/10 open ventral hernia repair, small bowel resection, and primary fascial closure with Torey Cornejo. Admitted to SICU for Q1 abdominal checks and hemodynamic monitoring.     NEUROLOGICAL:  #Acute pain    - IV APAP prn    - fent gtt  #sedation   - precedex gtt    RESPIRATORY:   #mechanically ventilated/ ARDS  - /24/12/50  > P/F ratio 160s  ABG - ( 14 Apr 2025 05:11 )  pH, Arterial: 7.40  pH, Blood: x     /  pCO2: 37    /  pO2: 82    / HCO3: 23    / Base Excess: -1.7  /  SaO2: 95.5    - monitor ABG/CXR  #hx LOUIS on CPAP@ home  #Activity   - bedrest    CARDS:   #acute hypotension  - intermittently requiring levophed gtt, currently off   - MAP >65  #hx of HTN  - holding home HCTZ 25mg QD, amlodipine and losartan  #EKG- NSR  #TTE 6/2022: EF 55-60%. G1DD.   - TTE 4/11: EF of 56 %.G1DD.     GASTROINTESTINAL/NUTRITION:   #Diet, NPO    -aspiration precautions, HOB 30  #SBO s/p ventral hernia repair with SBR  - NGT LCWS, strict NPO  - Q1 abdominal exams; monitor for compartment syndrome > peak pressures in 20s  #GI Prophylaxis/hx GERD   - pantoprazole  Injectable 40 IV Push daily (takes omeprazole @home)  #Bowel regimen    - holding    -last bowel movement prior to admission    /RENAL:   #urine output in critically ill    -indwelling pierce   #worsening FUNMI  - LR @80cc/hr  - oliguric > improving on bumex gtt @ 1mg/hr  - -220 cc/hr  - nephrology consulted and following > hold off on CVVH  - renal u/s> no hydronephrosis. 2-3cm anechoic cyst on right kidney     Labs          BUN/Cr:  45/ 4.4 --> 51/4.8  -->57/5.2 -->65/5.1          Na  144 // K  4.1 // Mg  2.0 // Phos  6.3         HEME/ONC:   #DVT prophylaxis     -Chemical: heparin   Injectable 5000 Unit(s) SubCutaneous every 8 hours     -Mechanical: SCDs       Labs: Hb/Hct:  8.3/26.7 --> 7.8/25 --> 8.0/25.2                    Plts:  206  --> 182 --> 180              PTT/INR:  27.6/1.09  --->         ID:  #Antibiotics Course  - continue zosyn > Purulent abscesses noted intraoperatively   #febrile  - UA negative  - blood cultures sent 4/13   - sputum cultures pending   #cultures  - OR cx: few E.coli, few bacteroides, rare clostridium  WBC- 11.11  --->>,  9.63  --->>,  10.03  --->>  Temp trend- 24hrs T(F): 99.7 (04-14 @ 04:00), Max: 100.4 (04-13 @ 08:00)  Current antibiotics-piperacillin/tazobactam IVPB.. 3.375 every 8 hours  #MRSA nares negative       ENDOCRINE:  #glycemic monitoring    -FSG q6 if NPO    -Glucose goal 140-180. If above 180, will start corrective insulin sliding scale    MSK:  #Bedrest       SKIN:  #DTI screening negative     - will continue to monitor daily skin changes      LINES/DRAINS:  PIV, Pierce, NGT, ETT, L radial arterial line, L subclavian TLC  ADVANCED DIRECTIVES:  Full Code  HCP- Daughter   INDICATION FOR SICU/SDU: Intestinal obstruction  DISPO:  SICU. Case discussed with attending Dr. Bermudez

## 2025-04-14 NOTE — PROGRESS NOTE ADULT - SUBJECTIVE AND OBJECTIVE BOX
GENERAL SURGERY PROGRESS NOTE    Patient: NANCY SARGENT , 64y (62)Female   MRN: [id number]  Location: [address]  Visit: 25 Inpatient  Date: 25 @ 07:56    Hospital Day #: 12  Post-Op Day #: 4    Procedure/Dx/Injuries: INCARCERATED VENTRAL HERNIA W/ SBO  S/P LAP CONVERTED TO OPEN VENTRAL HERNIA REPAIR W/ MESH AND SMALL BOWEL RESECTION    Events of past 24 hours:   - Vent: 400///50, fentanyl @ 1, precedex @ 1, Bumex @ 1  - Inc maintence to 80  - Fio2 dec to 50%  - MAPs low 60s > 500L bolus > levophed  - off Bumex drip since 6am     PAST MEDICAL & SURGICAL HISTORY:  Gastric bypass status for obesity  LOUIS (obstructive sleep apnea)  S/P gastric bypass  S/P   S/P small bowel resection  History of total bilateral knee replacement (TKR)  H/O colonoscopy      Vitals:   T(F): 99.7 (25 @ 04:00), Max: 100.4 (25 @ 08:00)  HR: 117 (25 @ 06:00)  BP: --  RR: 20 (25 @ 06:00)  SpO2: 98% (25 @ 06:00)  Mode: AC/ CMV (Assist Control/ Continuous Mandatory Ventilation), RR (machine): 24, TV (machine): 400, FiO2: 50, PEEP: 12, ITime: 0.8, MAP: 16, PIP: 24    Diet, NPO    Fluids:     I & O's:    25 @ 07:01  -  25 @ 07:00  --------------------------------------------------------  IN:    Bumetanide: 80 mL    Bumetanide: 80 mL    Dexmedetomidine: 838.8 mL    FentaNYL: 338.4 mL    IV PiggyBack: 700 mL    IV PiggyBack: 200 mL    Lactated Ringers: 1500 mL    Norepinephrine: 1.3 mL  Total IN: 3738.5 mL    OUT:    Bulb (mL): 280 mL    Bulb (mL): 15 mL    Indwelling Catheter - Urethral (mL): 4950 mL  Total OUT: 5245 mL    Total NET: -1506.5 mL    PHYSICAL EXAM:  General: NAD, awake, following commands  HEENT: NCAT, EOMI, Trachea ML, NGT in place  Cardiac: RRR  Respiratory: intubated, vented  Abdomen: Soft, non-distended, non-tender, midline prevena in place with THONY drains x2  Skin: Warm/dry, normal color, no jaundice  Incision/wound: healing well, dressings in place, clean, dry and intact    MEDICATIONS  (STANDING):  buMETAnide Infusion 1 mG/Hr (5 mL/Hr) IV Continuous <Continuous>  chlorhexidine 0.12% Liquid 15 milliLiter(s) Oral Mucosa every 12 hours  chlorhexidine 2% Cloths 1 Application(s) Topical <User Schedule>  dexMEDEtomidine Infusion 0.2 MICROgram(s)/kG/Hr (7.03 mL/Hr) IV Continuous <Continuous>  fentaNYL   Infusion 0.5 MICROgram(s)/kG/Hr (7.03 mL/Hr) IV Continuous <Continuous>  heparin   Injectable 7500 Unit(s) SubCutaneous every 8 hours  lactated ringers. 1000 milliLiter(s) (80 mL/Hr) IV Continuous <Continuous>  norepinephrine Infusion 0.05 MICROgram(s)/kG/Min (6.47 mL/Hr) IV Continuous <Continuous>  pantoprazole  Injectable 40 milliGRAM(s) IV Push daily  piperacillin/tazobactam IVPB.. 4.5 Gram(s) IV Intermittent every 8 hours    DVT PROPHYLAXIS: heparin   Injectable 7500 Unit(s) SubCutaneous every 8 hours  GI PROPHYLAXIS: pantoprazole  Injectable 40 milliGRAM(s) IV Push daily  ANTIBIOTICS:  piperacillin/tazobactam IVPB.. 4.5 Gram(s)    LAB/STUDIES:  POCT Blood Glucose.: 157 mg/dL (2025 23:11)  POCT Blood Glucose.: 155 mg/dL (2025 17:13)  POCT Blood Glucose.: 147 mg/dL (2025 12:00)                          8.0    10.03 )-----------( 180      ( 2025 23:58 )             25.2         04-13    144  |  106  |  65[HH]  ----------------------------<  143[H]  4.1   |  21  |  5.1[HH]      Calcium: 7.6 mg/dL (25 @ 23:58)      LFTs:             4.4  | 0.3  | 35       ------------------[57      ( 2025 12:19 )  2.8  | 0.2  | 32          Lipase:x      Amylase:x         Blood Gas Arterial, Lactate: 0.9 mmol/L (25 @ 05:11)  Blood Gas Arterial, Lactate: 0.7 mmol/L (25 @ 03:29)  Blood Gas Arterial, Lactate: 0.7 mmol/L (25 @ 23:04)  Blood Gas Arterial, Lactate: 0.8 mmol/L (25 @ 07:38)  Blood Gas Arterial, Lactate: 1.2 mmol/L (25 @ 04:12)  Blood Gas Arterial, Lactate: 0.8 mmol/L (25 @ 00:21)  Blood Gas Arterial, Lactate: 1.0 mmol/L (25 @ 17:42)  Blood Gas Arterial, Lactate: 1.1 mmol/L (25 @ 11:07)    ABG - ( 2025 05:11 )  pH: 7.40  /  pCO2: 37    /  pO2: 82    / HCO3: 23    / Base Excess: -1.7  /  SaO2: 95.5      ABG - ( 2025 03:29 )  pH: 7.33  /  pCO2: 38    /  pO2: 100   / HCO3: 20    / Base Excess: -5.5  /  SaO2: 96.8      ABG - ( 2025 23:04 )  pH: 7.33  /  pCO2: 39    /  pO2: 91    / HCO3: 21    / Base Excess: -4.9  /  SaO2: 96.1      Coags:     12.90  ----< 1.09    ( 2025 23:15 )     27.6      Urinalysis Basic - ( 2025 23:58 )    Color: x / Appearance: x / SG: x / pH: x  Gluc: 143 mg/dL / Ketone: x  / Bili: x / Urobili: x   Blood: x / Protein: x / Nitrite: x   Leuk Esterase: x / RBC: x / WBC x   Sq Epi: x / Non Sq Epi: x / Bacteria: x

## 2025-04-14 NOTE — PROGRESS NOTE ADULT - ASSESSMENT
64y F w/ PMHx of HTN and gastric bypass surgery in 2001 presents with abdominal distention. CT with incarcerated ventral hernia with SBO. S/p Open repair of ventral hernia using mesh and component separation technique, Small bowel resection.    PLAN:  - Monitor vent settings  - SBT, possible extubation  - On sedation: precedex gtt, fentanyl gtt  - Off pressors (previously required levo)   - Holding home HTN meds  - strict NPO w/ NGT to LCWS  - GI ppx  - Monitor bowel function  - Worsening FUNMI -> pierce in place, monitor UOP and creatinine  - Nephrology following:  recommending bumex drip (off this morning) and holding off on CVVH  - IV abx: zosyn  - Monitor for fevers  - Monitor THONY drain outputs  - Rest of care per SICU    BLUE TEAM SPECTRA: [id number]

## 2025-04-14 NOTE — PROGRESS NOTE ADULT - ASSESSMENT
Patient is a 64F PMHx HTN, morbid obesity s/p 2001 Abby-en-Y gastric bypass who presented on 4/3 with incarcerated ventral hernia with SBO. S/p open ventral hernia repair, small bowel resection, and primary fascial closure with Dr. Lema on 4/10. Currently admitted to SICU for monitoring. Nephrology consulted for FUNMI and oliguria.  # FUNMI most likely ATN   # resp failure on MV  #  incarcerated ventral hernia with SBO S/p open ventral hernia repair, small bowel resection, and primary fascial closure  cr  stable   hold bumex drip   non oliguric  follow CK level   continue LR at 75 cc/h   ph noted / no binders   adjust all meds to GFR < 15   no acute indication for RRT   will follow

## 2025-04-14 NOTE — PROGRESS NOTE ADULT - SUBJECTIVE AND OBJECTIVE BOX
NANCY SARGENT   [id number]   [birth year]  64yF  ============================================================   DATE OF INITIAL SICU/SDU CONSULT: 04-10-25    INDICATION FOR SICU CONSULT:       SICU COURSE EVENTS :  04-10 - admitted to SICU service  4/11: Intubated and started on paralytic. Arterial line placed, subclavian TLC placed. Nephrology consulted for oliguria. IR abdominal muscle botox injection performed. TTE performed.   4/12: Additional fluid boluses given; trial fo bumex started, considering CVVH. Weaned off nimbex gtt.  > 220 /hr. Renal U/S w/out hydro.   4/13: off Levophed at 08:30. Bumex gtt 2 > 1. Goal net negative 1-1.5L, UOP approx, 200c/hr. Maintenance increased to 80.     24HOUR EVENTS  4/13  NIGHT  -PM rounds: SBP 130s, HR 80s, Vent: 400/12/24/50, fentanyl @ 1, precedex @ 1, Bumex @ 1, awake, following commands. abdomen soft. THONY x2 w/ SS.  - Inc maintence to 80    DAY  - UA neg  - 4/12 wound culture, pan sens E-coli,Bacteroides, Clostridium   - Renal, no need for CVVH. keep on bumex gtt @2  - decrease IVF to 50  - off Levo @830AM  - net negative goal 1L -1.5L for today   - bumex gtt decreased to 1      [X] A ten-point review of systems was negative except as expressed in note.  [X ] History was obtained from patient. If unable to participate in their care, history was from review of the chart and collateral sources of information.  ============================================================    NANCY SARGENT   [id number]   [birth year]  64yF  ============================================================   DATE OF INITIAL SICU/SDU CONSULT: 04-10-25    INDICATION FOR SICU CONSULT:       SICU COURSE EVENTS :  04-10 - admitted to SICU service  4/11: Intubated and started on paralytic. Arterial line placed, subclavian TLC placed. Nephrology consulted for oliguria. IR abdominal muscle botox injection performed. TTE performed.   4/12: Additional fluid boluses given; trial fo bumex started, considering CVVH. Weaned off nimbex gtt.  > 220 /hr. Renal U/S w/out hydro.   4/13: off Levophed at 08:30. Bumex gtt @2mg/hr > 1mg/hr. Goal net negative 1-1.5L, UOP approx, 200c/hr. Maintenance increased to 80.     24HOUR EVENTS  4/13  NIGHT  -PM rounds: SBP 130s, HR 80s, Vent: 400/12/24/50, fentanyl @ 1, precedex @ 1, Bumex @ 1, awake, following commands. abdomen soft. THONY x2 w/ SS.  - Inc maintence to 80    DAY  - UA neg  - 4/12 wound culture, pan sens E-coli,Bacteroides, Clostridium   - Renal, no need for CVVH. keep on bumex gtt @2  - decrease IVF to 50  - off Levo @830AM  - net negative goal 1L -1.5L for today   - bumex gtt decreased to 1      [X] A ten-point review of systems was negative except as expressed in note.  [X ] History was obtained from patient. If unable to participate in their care, history was from review of the chart and collateral sources of information.  ============================================================     Daily     Daily   Diet, NPO (04-10-25 @ 17:00)    CURRENT MEDS:  Neurologic Medications  dexMEDEtomidine Infusion 0.2 MICROgram(s)/kG/Hr IV Continuous <Continuous>  fentaNYL   Infusion 0.5 MICROgram(s)/kG/Hr IV Continuous <Continuous>    Respiratory Medications    Cardiovascular Medications  buMETAnide Infusion 1 mG/Hr IV Continuous <Continuous>  norepinephrine Infusion 0.05 MICROgram(s)/kG/Min IV Continuous <Continuous>    Gastrointestinal Medications  lactated ringers. 1000 milliLiter(s) IV Continuous <Continuous>  pantoprazole  Injectable 40 milliGRAM(s) IV Push daily    Genitourinary Medications    Hematologic/Oncologic Medications  heparin   Injectable 7500 Unit(s) SubCutaneous every 8 hours    Antimicrobial/Immunologic Medications  piperacillin/tazobactam IVPB.. 4.5 Gram(s) IV Intermittent every 8 hours    Endocrine/Metabolic Medications    Topical/Other Medications  chlorhexidine 0.12% Liquid 15 milliLiter(s) Oral Mucosa every 12 hours  chlorhexidine 2% Cloths 1 Application(s) Topical <User Schedule>    ICU Vital Signs Last 24 Hrs  T(C): 37.6 (14 Apr 2025 04:00), Max: 38 (13 Apr 2025 08:00)  T(F): 99.7 (14 Apr 2025 04:00), Max: 100.4 (13 Apr 2025 08:00)  HR: 117 (14 Apr 2025 06:00) (84 - 117)  BP: --  BP(mean): --  ABP: 154/80 (14 Apr 2025 06:00) (92/49 - 154/80)  ABP(mean): 104 (14 Apr 2025 06:00) (61 - 104)  RR: 20 (14 Apr 2025 06:00) (11 - 24)  SpO2: 98% (14 Apr 2025 06:00) (98% - 100%)    O2 Parameters below as of 13 Apr 2025 19:00  Patient On (Oxygen Delivery Method): ventilator    O2 Concentration (%): 60      Mode: AC/ CMV (Assist Control/ Continuous Mandatory Ventilation)  RR (machine): 24  TV (machine): 400  FiO2: 50  PEEP: 12  ITime: 0.8  MAP: 16  PIP: 24    ABG - ( 14 Apr 2025 05:11 )  pH, Arterial: 7.40  pH, Blood: x     /  pCO2: 37    /  pO2: 82    / HCO3: 23    / Base Excess: -1.7  /  SaO2: 95.5              I&O's Summary    13 Apr 2025 07:01  -  14 Apr 2025 07:00  --------------------------------------------------------  IN: 3738.5 mL / OUT: 5245 mL / NET: -1506.5 mL      I&O's Detail    13 Apr 2025 07:01  -  14 Apr 2025 07:00  --------------------------------------------------------  IN:    Bumetanide: 80 mL    Bumetanide: 80 mL    Dexmedetomidine: 838.8 mL    FentaNYL: 338.4 mL    IV PiggyBack: 700 mL    IV PiggyBack: 200 mL    Lactated Ringers: 1500 mL    Norepinephrine: 1.3 mL  Total IN: 3738.5 mL    OUT:    Bulb (mL): 280 mL    Bulb (mL): 15 mL    Indwelling Catheter - Urethral (mL): 4950 mL  Total OUT: 5245 mL    Total NET: -1506.5 mL          PHYSICAL EXAM:   NEURO/GENERAL: NAD. GCS 11T, follows commands. PERRLA   RESP: equal chest rise b/l, intubated on mechanical ventilation.   CARDIAC: S1,S2. sinus tachycardia to 100s on monitor.  GI: abdomen soft, nontender, distended. NGT in place. midline vac holding suction. THONY x2 in place.    : urinary catheter in place with urine.   EXTREMITIES: moving extremities spontaneously   SKIN: no DTI noted         NANCY SARGENT   [id number]   [birth year]  64yF  ============================================================   DATE OF INITIAL SICU/SDU CONSULT: 04-10-25    INDICATION FOR SICU CONSULT:  abdominal checks     SICU COURSE EVENTS :  04-10 - admitted to SICU service  4/11: Intubated and started on paralytic. Arterial line placed, subclavian TLC placed. Nephrology consulted for oliguria. IR abdominal muscle botox injection performed. TTE performed.   4/12: Additional fluid boluses given; trial fo bumex started, considering CVVH. Weaned off nimbex gtt.  > 220 /hr. Renal U/S w/out hydro.   4/13: off Levophed at 08:30. Bumex gtt @2mg/hr > 1mg/hr. Goal net negative 1-1.5L, UOP approx, 200c/hr. Maintenance increased to 80.     24HOUR EVENTS  4/13  NIGHT  -PM rounds: SBP 130s, HR 80s, Vent: 400/12/24/50, fentanyl @ 1, precedex @ 1, Bumex @ 1, awake, following commands. abdomen soft. THONY x2 w/ SS.  - Inc maintence to 80    DAY  - UA neg  - 4/12 wound culture, pan sens E-coli,Bacteroides, Clostridium   - Renal, no need for CVVH. keep on bumex gtt @2  - decrease IVF to 50  - off Levo @830AM  - net negative goal 1L -1.5L for today   - bumex gtt decreased to 1      [X] A ten-point review of systems was negative except as expressed in note.  [X ] History was obtained from patient. If unable to participate in their care, history was from review of the chart and collateral sources of information.  ============================================================     Daily     Daily   Diet, NPO (04-10-25 @ 17:00)    CURRENT MEDS:  Neurologic Medications  dexMEDEtomidine Infusion 0.2 MICROgram(s)/kG/Hr IV Continuous <Continuous>  fentaNYL   Infusion 0.5 MICROgram(s)/kG/Hr IV Continuous <Continuous>    Respiratory Medications    Cardiovascular Medications  buMETAnide Infusion 1 mG/Hr IV Continuous <Continuous>  norepinephrine Infusion 0.05 MICROgram(s)/kG/Min IV Continuous <Continuous>    Gastrointestinal Medications  lactated ringers. 1000 milliLiter(s) IV Continuous <Continuous>  pantoprazole  Injectable 40 milliGRAM(s) IV Push daily    Genitourinary Medications    Hematologic/Oncologic Medications  heparin   Injectable 7500 Unit(s) SubCutaneous every 8 hours    Antimicrobial/Immunologic Medications  piperacillin/tazobactam IVPB.. 4.5 Gram(s) IV Intermittent every 8 hours    Endocrine/Metabolic Medications    Topical/Other Medications  chlorhexidine 0.12% Liquid 15 milliLiter(s) Oral Mucosa every 12 hours  chlorhexidine 2% Cloths 1 Application(s) Topical <User Schedule>    ICU Vital Signs Last 24 Hrs  T(C): 37.6 (14 Apr 2025 04:00), Max: 38 (13 Apr 2025 08:00)  T(F): 99.7 (14 Apr 2025 04:00), Max: 100.4 (13 Apr 2025 08:00)  HR: 117 (14 Apr 2025 06:00) (84 - 117)  BP: --  BP(mean): --  ABP: 154/80 (14 Apr 2025 06:00) (92/49 - 154/80)  ABP(mean): 104 (14 Apr 2025 06:00) (61 - 104)  RR: 20 (14 Apr 2025 06:00) (11 - 24)  SpO2: 98% (14 Apr 2025 06:00) (98% - 100%)    O2 Parameters below as of 13 Apr 2025 19:00  Patient On (Oxygen Delivery Method): ventilator    O2 Concentration (%): 60      Mode: AC/ CMV (Assist Control/ Continuous Mandatory Ventilation)  RR (machine): 24  TV (machine): 400  FiO2: 50  PEEP: 12  ITime: 0.8  MAP: 16  PIP: 24    ABG - ( 14 Apr 2025 05:11 )  pH, Arterial: 7.40  pH, Blood: x     /  pCO2: 37    /  pO2: 82    / HCO3: 23    / Base Excess: -1.7  /  SaO2: 95.5              I&O's Summary    13 Apr 2025 07:01  -  14 Apr 2025 07:00  --------------------------------------------------------  IN: 3738.5 mL / OUT: 5245 mL / NET: -1506.5 mL      I&O's Detail    13 Apr 2025 07:01  -  14 Apr 2025 07:00  --------------------------------------------------------  IN:    Bumetanide: 80 mL    Bumetanide: 80 mL    Dexmedetomidine: 838.8 mL    FentaNYL: 338.4 mL    IV PiggyBack: 700 mL    IV PiggyBack: 200 mL    Lactated Ringers: 1500 mL    Norepinephrine: 1.3 mL  Total IN: 3738.5 mL    OUT:    Bulb (mL): 280 mL    Bulb (mL): 15 mL    Indwelling Catheter - Urethral (mL): 4950 mL  Total OUT: 5245 mL    Total NET: -1506.5 mL          PHYSICAL EXAM:   NEURO/GENERAL: NAD. GCS 11T, follows commands. PERRLA   RESP: equal chest rise b/l, intubated on mechanical ventilation.   CARDIAC: S1,S2. sinus tachycardia to 100s on monitor.  GI: abdomen soft, nontender, distended. NGT in place. midline vac holding suction. THONY x2 in place.    : urinary catheter in place with urine.   EXTREMITIES: moving extremities spontaneously   SKIN: no DTI noted

## 2025-04-14 NOTE — PROGRESS NOTE ADULT - CRITICAL CARE ATTENDING COMMENT
Critical Care: [id number]   This patient has a high probability of sudden, clinically significant deterioration, which requires the highest level of physician preparedness to intervene urgently. I managed/supervised life or organ supporting interventions that required frequent physician assessment. I have reviewed and agree with note above. I devoted my full attention to the direct care of this patient for the period of time indicated, including reviewing relevant labs and imaging, discussing treatment plan with the SICU team, nurses, and primary team at the time of multidisciplinary rounds, and reviewing findings with patient and family. This does not include time devoted to teaching any trainees and to performing any procedures.    NANCY SARGENT 64y Female admitted to SICU with incarcerated ventral hernia with SBO now s/p open VHR, SBR with postop respiratory failure and nonoliguric ARF secondary to septic shock    Issues we are addressing today:    Neuro: minimizing sedation, minimizing pain meds, home meds as possible, delirium precautions, sleep meds as needed at night  CV: optimize fluid status, wean pressors as possible, holding home antihypertensives  Respiratory: continue to wean support as possible, PS as tolerated  Nutrition: npo at this time, ngt suction  Renal: monitor Is &Os, diuresis today  ID: abx ongoing   Lines: continue for now  Heme: continue to evaluate for acute blood loss anemia   Endocrine: Prevent and treat hyperglycemia with insulin as needed    PV: follow pulse exam  Skin: decub precautions  DVT Prophylaxis   Stress Gastritis Prophylaxis   Mobility: bedrest for now    I spoke to family about patient's current status and plan for today as well as the next day. They expressed concern regarding her kidney function among other concerns. I stated the risk/benefit balance of continuing aggressive diuresis may delay full recovery of her creatinine but achieving a significant negative fluid balance will dramatically improve her respiratory, cardiac, and GI tract recovery and therefore lead to an overall recovery faster than waiting for her kidney function to improve on its own. They understood and agreed with the plan.    The above note is NOT written at the time of rounds and will reflect all changes throughout management of the patient for the day note is written for.    Chi Bermudez MD, D.PRATEEK

## 2025-04-15 LAB
ALBUMIN SERPL ELPH-MCNC: 2.8 G/DL — LOW (ref 3.5–5.2)
ALP SERPL-CCNC: 81 U/L — SIGNIFICANT CHANGE UP (ref 30–115)
ALT FLD-CCNC: 62 U/L — HIGH (ref 0–41)
ANION GAP SERPL CALC-SCNC: 21 MMOL/L — HIGH (ref 7–14)
AST SERPL-CCNC: 90 U/L — HIGH (ref 0–41)
BILIRUB DIRECT SERPL-MCNC: 0.3 MG/DL — SIGNIFICANT CHANGE UP (ref 0–0.3)
BILIRUB INDIRECT FLD-MCNC: 0.1 MG/DL — LOW (ref 0.2–1.2)
BILIRUB SERPL-MCNC: 0.4 MG/DL — SIGNIFICANT CHANGE UP (ref 0.2–1.2)
BUN SERPL-MCNC: 90 MG/DL — CRITICAL HIGH (ref 10–20)
CALCIUM SERPL-MCNC: 8.6 MG/DL — SIGNIFICANT CHANGE UP (ref 8.4–10.5)
CHLORIDE SERPL-SCNC: 102 MMOL/L — SIGNIFICANT CHANGE UP (ref 98–110)
CO2 SERPL-SCNC: 23 MMOL/L — SIGNIFICANT CHANGE UP (ref 17–32)
CREAT SERPL-MCNC: 5.1 MG/DL — CRITICAL HIGH (ref 0.7–1.5)
EGFR: 9 ML/MIN/1.73M2 — LOW
EGFR: 9 ML/MIN/1.73M2 — LOW
GAS PNL BLDA: SIGNIFICANT CHANGE UP
GAS PNL BLDA: SIGNIFICANT CHANGE UP
GLUCOSE BLDC GLUCOMTR-MCNC: 120 MG/DL — HIGH (ref 70–99)
GLUCOSE BLDC GLUCOMTR-MCNC: 121 MG/DL — HIGH (ref 70–99)
GLUCOSE BLDC GLUCOMTR-MCNC: 150 MG/DL — HIGH (ref 70–99)
GLUCOSE BLDC GLUCOMTR-MCNC: 155 MG/DL — HIGH (ref 70–99)
GLUCOSE BLDC GLUCOMTR-MCNC: 181 MG/DL — HIGH (ref 70–99)
GLUCOSE SERPL-MCNC: 118 MG/DL — HIGH (ref 70–99)
GRAM STN FLD: SIGNIFICANT CHANGE UP
HCT VFR BLD CALC: 27.9 % — LOW (ref 37–47)
HGB BLD-MCNC: 9.1 G/DL — LOW (ref 12–16)
MAGNESIUM SERPL-MCNC: 2.2 MG/DL — SIGNIFICANT CHANGE UP (ref 1.8–2.4)
MCHC RBC-ENTMCNC: 30.5 PG — SIGNIFICANT CHANGE UP (ref 27–31)
MCHC RBC-ENTMCNC: 32.6 G/DL — SIGNIFICANT CHANGE UP (ref 32–37)
MCV RBC AUTO: 93.6 FL — SIGNIFICANT CHANGE UP (ref 81–99)
NRBC BLD AUTO-RTO: 0 /100 WBCS — SIGNIFICANT CHANGE UP (ref 0–0)
PHOSPHATE SERPL-MCNC: 6.8 MG/DL — HIGH (ref 2.1–4.9)
PLATELET # BLD AUTO: 253 K/UL — SIGNIFICANT CHANGE UP (ref 130–400)
PMV BLD: 10.5 FL — HIGH (ref 7.4–10.4)
POTASSIUM SERPL-MCNC: 3 MMOL/L — LOW (ref 3.5–5)
POTASSIUM SERPL-SCNC: 3 MMOL/L — LOW (ref 3.5–5)
PROT SERPL-MCNC: 5.6 G/DL — LOW (ref 6–8)
RBC # BLD: 2.98 M/UL — LOW (ref 4.2–5.4)
RBC # FLD: 13.6 % — SIGNIFICANT CHANGE UP (ref 11.5–14.5)
SODIUM SERPL-SCNC: 146 MMOL/L — SIGNIFICANT CHANGE UP (ref 135–146)
SPECIMEN SOURCE: SIGNIFICANT CHANGE UP
SURGICAL PATHOLOGY STUDY: SIGNIFICANT CHANGE UP
WBC # BLD: 14.08 K/UL — HIGH (ref 4.8–10.8)
WBC # FLD AUTO: 14.08 K/UL — HIGH (ref 4.8–10.8)

## 2025-04-15 PROCEDURE — 71045 X-RAY EXAM CHEST 1 VIEW: CPT | Mod: 26

## 2025-04-15 PROCEDURE — 99291 CRITICAL CARE FIRST HOUR: CPT

## 2025-04-15 RX ORDER — ACETAMINOPHEN 500 MG/5ML
1000 LIQUID (ML) ORAL ONCE
Refills: 0 | Status: COMPLETED | OUTPATIENT
Start: 2025-04-15 | End: 2025-04-15

## 2025-04-15 RX ORDER — BUMETANIDE 1 MG/1
2 TABLET ORAL ONCE
Refills: 0 | Status: COMPLETED | OUTPATIENT
Start: 2025-04-15 | End: 2025-04-15

## 2025-04-15 RX ORDER — CALCIUM GLUCONATE 20 MG/ML
1 INJECTION, SOLUTION INTRAVENOUS ONCE
Refills: 0 | Status: COMPLETED | OUTPATIENT
Start: 2025-04-15 | End: 2025-04-15

## 2025-04-15 RX ORDER — HYDROMORPHONE/SOD CHLOR,ISO/PF 2 MG/10 ML
0.5 SYRINGE (ML) INJECTION EVERY 4 HOURS
Refills: 0 | Status: DISCONTINUED | OUTPATIENT
Start: 2025-04-15 | End: 2025-04-17

## 2025-04-15 RX ORDER — ACETAMINOPHEN 500 MG/5ML
650 LIQUID (ML) ORAL EVERY 6 HOURS
Refills: 0 | Status: DISCONTINUED | OUTPATIENT
Start: 2025-04-15 | End: 2025-04-16

## 2025-04-15 RX ORDER — HYDROMORPHONE/SOD CHLOR,ISO/PF 2 MG/10 ML
0.25 SYRINGE (ML) INJECTION EVERY 4 HOURS
Refills: 0 | Status: DISCONTINUED | OUTPATIENT
Start: 2025-04-15 | End: 2025-04-17

## 2025-04-15 RX ORDER — LABETALOL HYDROCHLORIDE 200 MG/1
5 TABLET, FILM COATED ORAL ONCE
Refills: 0 | Status: COMPLETED | OUTPATIENT
Start: 2025-04-15 | End: 2025-04-15

## 2025-04-15 RX ADMIN — BUMETANIDE 2.5 MG/HR: 1 TABLET ORAL at 08:42

## 2025-04-15 RX ADMIN — HEPARIN SODIUM 7500 UNIT(S): 1000 INJECTION INTRAVENOUS; SUBCUTANEOUS at 13:51

## 2025-04-15 RX ADMIN — HEPARIN SODIUM 7500 UNIT(S): 1000 INJECTION INTRAVENOUS; SUBCUTANEOUS at 05:03

## 2025-04-15 RX ADMIN — Medication 1 APPLICATION(S): at 05:05

## 2025-04-15 RX ADMIN — LABETALOL HYDROCHLORIDE 5 MILLIGRAM(S): 200 TABLET, FILM COATED ORAL at 22:50

## 2025-04-15 RX ADMIN — DEXMEDETOMIDINE HYDROCHLORIDE IN SODIUM CHLORIDE 7.03 MICROGRAM(S)/KG/HR: 4 INJECTION INTRAVENOUS at 08:42

## 2025-04-15 RX ADMIN — Medication 400 MILLIGRAM(S): at 14:04

## 2025-04-15 RX ADMIN — Medication 650 MILLIGRAM(S): at 23:20

## 2025-04-15 RX ADMIN — Medication 40 MILLIGRAM(S): at 12:22

## 2025-04-15 RX ADMIN — CALCIUM GLUCONATE 100 GRAM(S): 20 INJECTION, SOLUTION INTRAVENOUS at 15:37

## 2025-04-15 RX ADMIN — Medication 25 GRAM(S): at 13:51

## 2025-04-15 RX ADMIN — Medication 7.03 MICROGRAM(S)/KG/HR: at 08:41

## 2025-04-15 RX ADMIN — Medication 1000 MILLIGRAM(S): at 14:30

## 2025-04-15 RX ADMIN — Medication 15 MILLILITER(S): at 05:04

## 2025-04-15 RX ADMIN — Medication 25 GRAM(S): at 02:56

## 2025-04-15 RX ADMIN — HEPARIN SODIUM 7500 UNIT(S): 1000 INJECTION INTRAVENOUS; SUBCUTANEOUS at 22:49

## 2025-04-15 RX ADMIN — Medication 50 MILLIEQUIVALENT(S): at 22:49

## 2025-04-15 RX ADMIN — Medication 650 MILLIGRAM(S): at 22:50

## 2025-04-15 RX ADMIN — BUMETANIDE 2 MILLIGRAM(S): 1 TABLET ORAL at 18:36

## 2025-04-15 NOTE — PROGRESS NOTE ADULT - SUBJECTIVE AND OBJECTIVE BOX
Nephrology progress note    Patient is seen and examined, events over the last 24 h noted .  Lying in bed comfortable     Allergies:  No Known Allergies    Hospital Medications:   MEDICATIONS  (STANDING):  buMETAnide Infusion 0.5 mG/Hr (2.5 mL/Hr) IV Continuous <Continuous>  dexMEDEtomidine Infusion 0.2 MICROgram(s)/kG/Hr (7.03 mL/Hr) IV Continuous <Continuous>  fentaNYL   Infusion 0.5 MICROgram(s)/kG/Hr (7.03 mL/Hr) IV Continuous <Continuous>  heparin   Injectable 7500 Unit(s) SubCutaneous every 8 hours  metolazone 5 milliGRAM(s) Oral once  pantoprazole  Injectable 40 milliGRAM(s) IV Push daily  piperacillin/tazobactam IVPB.. 3.375 Gram(s) IV Intermittent every 12 hours        VITALS:  T(F): 98.1 (04-15-25 @ 08:00), Max: 99.7 (04-14-25 @ 21:00)  HR: 89 (04-15-25 @ 11:30)  RR: 24 (04-15-25 @ 11:00)  SpO2: 97% (04-15-25 @ 11:30)    04-13 @ 07:01  -  04-14 @ 07:00  --------------------------------------------------------  IN: 3738.5 mL / OUT: 5245 mL / NET: -1506.5 mL    04-14 @ 07:01  -  04-15 @ 07:00  --------------------------------------------------------  IN: 1368 mL / OUT: 5095 mL / NET: -3727 mL    04-15 @ 07:01  -  04-15 @ 12:02  --------------------------------------------------------  IN: 117.9 mL / OUT: 500 mL / NET: -382.1 mL          PHYSICAL EXAM:  Constitutional: intubated on MV   Respiratory: CTAB  Cardiovascular: S1, S2, RRR  Gastrointestinal: BS+, soft, NT/ND  Extremities: No cyanosis or clubbing. No peripheral edema  :   pierce.   Skin: No rashes    LABS:  04-14    148[H]  |  106  |  77[HH]  ----------------------------<  145[H]  3.9   |  22  |  4.9[HH]  Creatinine Trend: 4.9<--, 5.1<--, 5.1<--, 5.2<--, 4.8<--, 4.4<--  Ca    8.1[L]      14 Apr 2025 20:21  Phos  6.2     04-14  Mg     2.1     04-14    TPro  5.3[L]  /  Alb  2.6[L]  /  TBili  0.3  /  DBili  0.2  /  AST  89[H]  /  ALT  47[H]  /  AlkPhos  76  04-14                          8.5    11.11 )-----------( 191      ( 14 Apr 2025 20:21 )             26.5       Urine Studies:  Urinalysis Basic - ( 14 Apr 2025 20:21 )    Color:  / Appearance:  / SG:  / pH:   Gluc: 145 mg/dL / Ketone:   / Bili:  / Urobili:    Blood:  / Protein:  / Nitrite:    Leuk Esterase:  / RBC:  / WBC    Sq Epi:  / Non Sq Epi:  / Bacteria:       Sodium, Random Urine: <20.0 mmoL/L (04-11 @ 06:27)  Creatinine, Random Urine: 105 mg/dL (04-11 @ 06:27)              RADIOLOGY & ADDITIONAL STUDIES:

## 2025-04-15 NOTE — PROGRESS NOTE ADULT - ASSESSMENT
ASSESSMENT:  64F PMHx HTN, morbid obesity s/p 2001 Abby-en-Y gastric bypass who presented on 4/3 with incarcerated ventral hernia with SBO. S/p 4/10 open ventral hernia repair, small bowel resection, and primary fascial closure with Torey Cornejo. Admitted to SICU for Q1 abdominal checks and hemodynamic monitoring.     NEUROLOGICAL:  #Acute pain    - IV APAP prn    - fent gtt  #sedation   - precedex gtt    RESPIRATORY:   #mechanically ventilated/ ARDS  - /24/12/30    - monitor ABG/CXR  - tolerated 6 hours CPAP 4/14  #hx LOUIS on CPAP@ home  #Activity   - bedrest    CARDS:   #acute hypotension  - intermittently requiring levophed gtt, currently off   - MAP >65  #hx of HTN  - holding home HCTZ 25mg QD, amlodipine and losartan  #EKG- NSR  #TTE 6/2022: EF 55-60%. G1DD.   - TTE 4/11: EF of 56 %.G1DD.     GASTROINTESTINAL/NUTRITION:   #Diet, NPO    -aspiration precautions, HOB 30  #SBO s/p ventral hernia repair with SBR  - NGT LCWS, strict NPO  - Q1 abdominal exams; monitor for compartment syndrome > peak pressures in 20s  #GI Prophylaxis/hx GERD   - pantoprazole  Injectable 40 IV Push daily (takes omeprazole @home)  #Bowel regimen    - holding    -last bowel movement prior to admission    /RENAL:   #urine output in critically ill    -indwelling pierce   #worsening FUNMI  - LR @80cc/hr  - previously oliguric, now improved. currently on bumex gtt @ 0.5mg/hr  - -220 cc/hr  - nephrology consulted and following > hold off on CVVH  - renal u/s> no hydronephrosis. 2-3cm anechoic cyst on right kidney     Labs          BUN/Cr:  57/5.2 -->65/5.1--> 73/5.1 --> 77/4.9          Na  148 // K  3.9 // Mg  2.1 // Phos  6.2         HEME/ONC:   #DVT prophylaxis     -Chemical: heparin   Injectable 5000 Unit(s) SubCutaneous every 8 hours     -Mechanical: SCDs       Labs: Hb/Hct: 8.0/25.2  --> 8.5/26.5                  Plts:  206  --> 182 --> 180              PTT/INR:  27.6/1.09  --->         ID:  #Antibiotics Course  - continue zosyn > Purulent abscesses noted intraoperatively   #febrile  - UA negative  - blood cultures sent 4/13   - sputum cultures pending   #cultures  - OR cx: few E.coli, few bacteroides, rare clostridium  WBC- 11.11  --->>,  9.63  --->>,  10.03  --->> 11.11  Temp trend- 24hrs T(F): 99.7 (04-14 @ 04:00), Max: 100.4 (04-13 @ 08:00)  Current antibiotics-piperacillin/tazobactam IVPB.. 3.375 every 12 hours  #MRSA nares negative       ENDOCRINE:  #glycemic monitoring    -FSG q6 if NPO    -Glucose goal 140-180. If above 180, will start corrective insulin sliding scale    MSK:  #Bedrest       SKIN:  #DTI screening negative     - will continue to monitor daily skin changes      LINES/DRAINS:  PIV, Pierce, NGT, ETT, L radial arterial line, L subclavian TLC  ADVANCED DIRECTIVES:  Full Code  HCP- Daughter   INDICATION FOR SICU/SDU: Intestinal obstruction  DISPO:  SICU. Case discussed with attending Dr. Bermudez     ASSESSMENT:  64F PMHx HTN, morbid obesity s/p 2001 Abby-en-Y gastric bypass who presented on 4/3 with incarcerated ventral hernia with SBO. S/p 4/10 open ventral hernia repair, small bowel resection, and primary fascial closure with Torey Cornejo. Admitted to SICU for Q1 abdominal checks and hemodynamic monitoring.     NEUROLOGICAL:  #Acute pain    - IV APAP prn    - fent gtt  #sedation   - precedex gtt    RESPIRATORY:   #ARDS  -extubated to BiPAP 4/15   - /24/12/30    - monitor ABG/CXR  - tolerated 6 hours CPAP 4/14  #hx LOUIS on CPAP@ home  #Activity   - bedrest    CARDS:   #acute hypotension  - intermittently requiring levophed gtt, currently off   - MAP >65  #hx of HTN  - holding home HCTZ 25mg QD, amlodipine and losartan  #EKG- NSR  #TTE 6/2022: EF 55-60%. G1DD.   - TTE 4/11: EF of 56 %.G1DD.     GASTROINTESTINAL/NUTRITION:   #Diet, NPO    -aspiration precautions, HOB 30  #SBO s/p ventral hernia repair with SBR  - NGT LCWS, strict NPO  - Q1 abdominal exams; monitor for compartment syndrome > peak pressures in 20s  #GI Prophylaxis/hx GERD   - pantoprazole  Injectable 40 IV Push daily (takes omeprazole @home)  #Bowel regimen    - holding    -last bowel movement prior to admission    /RENAL:   #urine output in critically ill    -indwelling pierce   #worsening FUNMI  - LR @80cc/hr  - previously oliguric, now improved. currently on bumex gtt @ 0.5mg/hr  - -220 cc/hr  - nephrology consulted and following > hold off on CVVH  - renal u/s> no hydronephrosis. 2-3cm anechoic cyst on right kidney     Labs          BUN/Cr:  57/5.2 -->65/5.1--> 73/5.1 --> 77/4.9          Na  148 // K  3.9 // Mg  2.1 // Phos  6.2         HEME/ONC:   #DVT prophylaxis     -Chemical: heparin   Injectable 5000 Unit(s) SubCutaneous every 8 hours     -Mechanical: SCDs       Labs: Hb/Hct: 8.0/25.2  --> 8.5/26.5                  Plts:  206  --> 182 --> 180              PTT/INR:  27.6/1.09  --->         ID:  #Antibiotics Course  - continue zosyn > Purulent abscesses noted intraoperatively   #febrile  - UA negative  - blood cultures sent 4/13   - sputum cultures pending   #cultures  - OR cx: few E.coli, few bacteroides, rare clostridium  WBC- 11.11  --->>,  9.63  --->>,  10.03  --->> 11.11  Temp trend- 24hrs T(F): 99.7 (04-14 @ 04:00), Max: 100.4 (04-13 @ 08:00)  Current antibiotics-piperacillin/tazobactam IVPB.. 3.375 every 12 hours  #MRSA nares negative       ENDOCRINE:  #glycemic monitoring    -FSG q6 if NPO    -Glucose goal 140-180. If above 180, will start corrective insulin sliding scale    MSK:  #Bedrest       SKIN:  #DTI screening negative     - will continue to monitor daily skin changes      LINES/DRAINS:  PIV, Pierce, NGT, ETT, L radial arterial line, L subclavian TLC  ADVANCED DIRECTIVES:  Full Code  HCP- Daughter   INDICATION FOR SICU/SDU: Intestinal obstruction  DISPO:  SICU. Case discussed with attending Dr. Bermudez

## 2025-04-15 NOTE — PROGRESS NOTE ADULT - SUBJECTIVE AND OBJECTIVE BOX
SURGERY PROGRESS NOTE    Patient: NANCY SARGENT , 64y (62)Female   MRN: [id number]  Location: [address]  Visit: 25 Inpatient  Date: 04-15-25 @ 00:12    Hospital Day #: 13  Post-Op Day #: 5    Procedure/Dx/Injuries: INCARCERATED VENTRAL HERNIA W/ SBO  S/P LAP CONVERTED TO OPEN VENTRAL HERNIA REPAIR W/ MESH AND SMALL BOWEL RESECTION    Events of past 24 hours: Over night Fentanyl gtt @ 1 c/o pain, bumex @1, precedex gtt @ 0.8, following commands. Both JPs serosanguinous. Creatinine downtrending 4.9 from 5.1.    PHYSICAL EXAM:  General: NAD, awake, following commands  HEENT: NCAT, EOMI, Trachea ML, NGT in place  Cardiac: RRR  Respiratory: intubated, vented  Abdomen: Soft, non-distended, non-tender, midline prevena in place with THONY drains x2  Skin: Warm/dry, normal color, no jaundice  Incision/wound: healing well, dressings in place, clean, dry and intact    PAST MEDICAL & SURGICAL HISTORY:  Gastric bypass status for obesity      LOUIS (obstructive sleep apnea)      S/P gastric bypass      S/P       S/P small bowel resection      History of total bilateral knee replacement (TKR)      H/O colonoscopy            Vitals:   T(F): 99.7 (04-15-25 @ 00:00), Max: 99.7 (25 @ 04:00)  HR: 83 (04-15-25 @ 00:00)  BP: --  RR: 24 (04-15-25 @ 00:00)  SpO2: 100% (04-15-25 @ 00:00)  Mode: AC/ CMV (Assist Control/ Continuous Mandatory Ventilation), RR (machine): 24, TV (machine): 400, FiO2: 30, PEEP: 12, ITime: 0.8, MAP: 23, PIP: 32    Diet, NPO      Fluids:     I & O's:    25 @ 07:01  -  25 @ 07:00  --------------------------------------------------------  IN:    Bumetanide: 80 mL    Bumetanide: 80 mL    Dexmedetomidine: 838.8 mL    FentaNYL: 338.4 mL    IV PiggyBack: 700 mL    IV PiggyBack: 200 mL    Lactated Ringers: 1500 mL    Norepinephrine: 1.3 mL  Total IN: 3738.5 mL    OUT:    Bulb (mL): 280 mL    Bulb (mL): 15 mL    Indwelling Catheter - Urethral (mL): 4950 mL  Total OUT: 5245 mL    Total NET: -1506.5 mL    MEDICATIONS  (STANDING):  buMETAnide Infusion 1 mG/Hr (5 mL/Hr) IV Continuous <Continuous>  chlorhexidine 0.12% Liquid 15 milliLiter(s) Oral Mucosa every 12 hours  chlorhexidine 2% Cloths 1 Application(s) Topical <User Schedule>  dexMEDEtomidine Infusion 0.2 MICROgram(s)/kG/Hr (7.03 mL/Hr) IV Continuous <Continuous>  fentaNYL   Infusion 0.5 MICROgram(s)/kG/Hr (7.03 mL/Hr) IV Continuous <Continuous>  heparin   Injectable 7500 Unit(s) SubCutaneous every 8 hours  norepinephrine Infusion 0.05 MICROgram(s)/kG/Min (6.47 mL/Hr) IV Continuous <Continuous>  pantoprazole  Injectable 40 milliGRAM(s) IV Push daily  piperacillin/tazobactam IVPB.. 3.375 Gram(s) IV Intermittent every 12 hours    MEDICATIONS  (PRN):      DVT PROPHYLAXIS: heparin   Injectable 7500 Unit(s) SubCutaneous every 8 hours    GI PROPHYLAXIS: pantoprazole  Injectable 40 milliGRAM(s) IV Push daily    ANTICOAGULATION:   ANTIBIOTICS:  piperacillin/tazobactam IVPB.. 3.375 Gram(s)            LAB/STUDIES:  Labs:  CAPILLARY BLOOD GLUCOSE      POCT Blood Glucose.: 150 mg/dL (15 Apr 2025 00:10)  POCT Blood Glucose.: 158 mg/dL (2025 19:47)  POCT Blood Glucose.: 166 mg/dL (2025 12:41)                          8.5    11.11 )-----------( 191      ( 2025 20:21 )             26.5         04-14    148[H]  |  106  |  77[HH]  ----------------------------<  145[H]  3.9   |  22  |  4.9[HH]      Calcium: 8.1 mg/dL (25 @ 20:21)      LFTs:             5.3  | 0.3  | 89       ------------------[76      ( 2025 20:21 )  2.6  | 0.2  | 47          Lipase:x      Amylase:x         Blood Gas Arterial, Lactate: 0.9 mmol/L (25 @ 18:46)  Blood Gas Arterial, Lactate: 0.9 mmol/L (25 @ 05:11)  Blood Gas Arterial, Lactate: 0.7 mmol/L (25 @ 03:29)  Blood Gas Arterial, Lactate: 0.7 mmol/L (25 @ 23:04)  Blood Gas Arterial, Lactate: 0.8 mmol/L (25 @ 07:38)  Blood Gas Arterial, Lactate: 1.2 mmol/L (25 @ 04:12)  Blood Gas Arterial, Lactate: 0.8 mmol/L (25 @ 00:21)    ABG - ( 2025 18:46 )  pH: 7.45  /  pCO2: 35    /  pO2: 70    / HCO3: 24    / Base Excess: 0.5   /  SaO2: 93.7            ABG - ( 2025 05:11 )  pH: 7.40  /  pCO2: 37    /  pO2: 82    / HCO3: 23    / Base Excess: -1.7  /  SaO2: 95.5            ABG - ( 2025 03:29 )  pH: 7.33  /  pCO2: 38    /  pO2: 100   / HCO3: 20    / Base Excess: -5.5  /  SaO2: 96.8              Coags:     11.30  ----< 0.96    ( 2025 20:21 )     22.6                Urinalysis Basic - ( 2025 20:21 )    Color: x / Appearance: x / SG: x / pH: x  Gluc: 145 mg/dL / Ketone: x  / Bili: x / Urobili: x   Blood: x / Protein: x / Nitrite: x   Leuk Esterase: x / RBC: x / WBC x   Sq Epi: x / Non Sq Epi: x / Bacteria: x        Culture - Blood (collected 2025 07:01)  Source: Blood Blood-Peripheral  Preliminary Report (2025 12:01):    No growth at 24 hours

## 2025-04-15 NOTE — PROGRESS NOTE ADULT - ATTENDING COMMENTS
Pt examined  labs and images reviewed  pt with supermorbid obesity and complex hernia with sbo  previously treated without surgery   passing flatus awaiting bowel movement  had a bm   but more pain   4/10 s/p BHUMI , Bowel Resection / Anastamosis / Primary repair of Fascia / hernia sac  overnight - renal failure / respiratory compromise  was intubated / paralysed/ botox  now on bumex - tapering   4/15: everythign continues to improve  passing flatus     impression : extremely high risk - supermorbid obesity and complex hernia with partial sbo  awaiting bm     SICU     prognosis and plan discussed with family

## 2025-04-15 NOTE — PROGRESS NOTE ADULT - ASSESSMENT
Patient is a 64F PMHx HTN, morbid obesity s/p 2001 Abby-en-Y gastric bypass who presented on 4/3 with incarcerated ventral hernia with SBO. S/p open ventral hernia repair, small bowel resection, and primary fascial closure with Dr. Lema on 4/10. Currently admitted to SICU for monitoring. Nephrology consulted for FUNMI and oliguria.  # FUNMI most likely ATN   # resp failure on MV  #  incarcerated ventral hernia with SBO S/p open ventral hernia repair, small bowel resection, and primary fascial closure  cr  stable better  hold bumex drip today   non oliguric  follow CK level    LR at 75 cc/h   ph noted / no binders   adjust all meds to GFR < 15   no acute indication for RRT   will follow     discussed with family at bedside

## 2025-04-15 NOTE — PROGRESS NOTE ADULT - SUBJECTIVE AND OBJECTIVE BOX
NANCY SARGENT   [id number]   [birth year]  64yF  ============================================================   DATE OF INITIAL SICU/SDU CONSULT: 04-10-25    INDICATION FOR SICU CONSULT:  q1hr abdominal checks, mechanical ventilation     SICU COURSE EVENTS :  04-10 - admitted to SICU service  4/11: Intubated and started on paralytic. Arterial line placed, subclavian TLC placed. Nephrology consulted for oliguria. IR abdominal muscle botox injection performed. TTE performed.   4/12: Additional fluid boluses given; trial fo bumex started, considering CVVH. Weaned off nimbex gtt.  > 220 /hr. Renal U/S w/out hydro.   4/13: Weaned off Levophed. Bumex gtt 2mg/hr > 1mg/hr. Goal net negative 1-1.5L, UOP approx, 200c/hr.   4/14: Remained on bumex gtt for further diuresis, decreased to 0.5 overnight, vent settings weaned. Cr downtrending, LFTs worsening.      24HOUR EVENTS  -Admission under SICU service    [X] A ten-point review of systems was negative except as expressed in note.  [X ] History was obtained from patient. If unable to participate in their care, history was from review of the chart and collateral sources of information.  ============================================================    NANCY SARGENT   [id number]   [birth year]  64yF  ============================================================   DATE OF INITIAL SICU/SDU CONSULT: 04-10-25    INDICATION FOR SICU CONSULT:  q1hr abdominal checks, mechanical ventilation     SICU COURSE EVENTS :  04-10 - admitted to SICU service  : Intubated and started on paralytic. Arterial line placed, subclavian TLC placed. Nephrology consulted for oliguria. IR abdominal muscle botox injection performed. TTE performed.   : Additional fluid boluses given; trial fo bumex started, considering CVVH. Weaned off nimbex gtt.  > 220 /hr. Renal U/S w/out hydro.   : Weaned off Levophed. Bumex gtt 2mg/hr > 1mg/hr. Goal net negative 1-1.5L, UOP approx, 200c/hr.   : Remained on bumex gtt for further diuresis, decreased to 0.5 overnight, vent settings weaned. Cr downtrending, LFTs worsening.      24HOUR EVENTS  -Admission under SICU service    [X] A ten-point review of systems was negative except as expressed in note.  [X ] History was obtained from patient. If unable to participate in their care, history was from review of the chart and collateral sources of information.  ============================================================   Daily     Daily Weight in k.9 (15 Apr 2025 07:00)    Diet, NPO:   Except Medications (04-15-25 @ 17:26)      CURRENT MEDS:  Neurologic Medications    Respiratory Medications    Cardiovascular Medications  buMETAnide Injectable 2 milliGRAM(s) IV Push once    Gastrointestinal Medications  pantoprazole  Injectable 40 milliGRAM(s) IV Push daily    Genitourinary Medications    Hematologic/Oncologic Medications  heparin   Injectable 7500 Unit(s) SubCutaneous every 8 hours    Antimicrobial/Immunologic Medications  piperacillin/tazobactam IVPB.. 3.375 Gram(s) IV Intermittent every 12 hours    Endocrine/Metabolic Medications    Topical/Other Medications  chlorhexidine 2% Cloths 1 Application(s) Topical <User Schedule>      ICU Vital Signs Last 24 Hrs  T(C): 36.2 (15 Apr 2025 16:00), Max: 37.6 (2025 20:00)  T(F): 97.2 (15 Apr 2025 16:00), Max: 99.7 (2025 21:00)  HR: 95 (15 Apr 2025 16:00) (70 - 117)  BP: --  BP(mean): --  ABP: 128/60 (15 Apr 2025 16:00) (98/56 - 212/101)  ABP(mean): 81 (15 Apr 2025 16:00) (68 - 131)  RR: 18 (15 Apr 2025 16:00) (10 - 34)  SpO2: 97% (15 Apr 2025 16:00) (97% - 100%)    O2 Parameters below as of 15 Apr 2025 16:00  Patient On (Oxygen Delivery Method): nasal cannula  O2 Flow (L/min): 6            Mode: AC/ CMV (Assist Control/ Continuous Mandatory Ventilation)  RR (machine): 24  TV (machine): 400  FiO2: 30  PEEP: 12  ITime: 0.8    ABG - ( 15 Apr 2025 14:31 )  pH, Arterial: 7.50  pH, Blood: x     /  pCO2: 33    /  pO2: 126   / HCO3: 26    / Base Excess: 2.5   /  SaO2: 96.1                I&O's Summary    2025 07:  -  15 Apr 2025 07:00  --------------------------------------------------------  IN: 1368 mL / OUT: 5095 mL / NET: -3727 mL    15 Apr 2025 07:01  -  15 Apr 2025 17:43  --------------------------------------------------------  IN: 185.7 mL / OUT: 1570 mL / NET: -1384.3 mL      I&O's Detail    2025 07:01  -  15 Apr 2025 07:00  --------------------------------------------------------  IN:    Bumetanide: 5 mL    Bumetanide: 90 mL    Bumetanide: 35 mL    Bumetanide: 17.5 mL    Dexmedetomidine: 682.4 mL    FentaNYL: 258.1 mL    IV PiggyBack: 100 mL    IV PiggyBack: 100 mL    Lactated Ringers: 80 mL  Total IN: 1368 mL    OUT:    Bulb (mL): 220 mL    Bulb (mL): 20 mL    Indwelling Catheter - Urethral (mL): 4855 mL    Nasogastric/Oral tube (mL): 0 mL    Norepinephrine: 0 mL    VAC (Vacuum Assisted Closure) System (mL): 0 mL  Total OUT: 5095 mL    Total NET: -3727 mL      15 Apr 2025 07:01  -  15 2025 17:43  --------------------------------------------------------  IN:    Bumetanide: 22.5 mL    Dexmedetomidine: 135.6 mL    FentaNYL: 27.6 mL  Total IN: 185.7 mL    OUT:    Bulb (mL): 10 mL    Bulb (mL): 35 mL    Indwelling Catheter - Urethral (mL): 1525 mL    Nasogastric/Oral tube (mL): 0 mL    Norepinephrine: 0 mL    VAC (Vacuum Assisted Closure) System (mL): 0 mL  Total OUT: 1570 mL    Total NET: -1384.3 mL          PHYSICAL EXAM:  *reflects exam during AM rounds     General/Neuro: Sedated on precedex and fentanyl gtt. RASS=-1. GCS 11T.   Lungs: Vented on /24/30/12.   Cardiovascular : S1, S2.  Non-tachycardic.   GI: Abdomen soft, Non-tender, Non-distended.  NGT in place. midline vac holding suction. THONY x2 in place, serous OP.  Extremities: Extremities warm, pink, well-perfused. Peripheral edema.   Derm: Good skin turgor, no skin breakdown.    :   An catheter in place.      LABS:  CAPILLARY BLOOD GLUCOSE      POCT Blood Glucose.: 155 mg/dL (15 Apr 2025 12:20)  POCT Blood Glucose.: 181 mg/dL (15 Apr 2025 05:02)  POCT Blood Glucose.: 150 mg/dL (15 Apr 2025 00:10)  POCT Blood Glucose.: 158 mg/dL (2025 19:47)                          8.5    11.11 )-----------( 191      ( 2025 20:21 )             26.5       04-14    148[H]  |  106  |  77[HH]  ----------------------------<  145[H]  3.9   |  22  |  4.9[HH]    Ca    8.1[L]      2025 20:21  Phos  6.2     04-14  Mg     2.1     04-14    TPro  5.3[L]  /  Alb  2.6[L]  /  TBili  0.3  /  DBili  0.2  /  AST  89[H]  /  ALT  47[H]  /  AlkPhos  76  04-14      PT/INR - ( 2025 20:21 )   PT: 11.30 sec;   INR: 0.96 ratio         PTT - ( 2025 20:21 )  PTT:22.6 sec      Urinalysis Basic - ( 2025 20:21 )    Color: x / Appearance: x / SG: x / pH: x  Gluc: 145 mg/dL / Ketone: x  / Bili: x / Urobili: x   Blood: x / Protein: x / Nitrite: x   Leuk Esterase: x / RBC: x / WBC x   Sq Epi: x / Non Sq Epi: x / Bacteria: x        Culture - Sputum (collected 2025 20:15)  Source: Trach Asp Tracheal Aspirate  Gram Stain (15 Apr 2025 11:15):    Rare polymorphonuclear leukocytes per low power field    No Squamous epithelial cells per low power field    No organisms seen per oil power field    Culture - Blood (collected 2025 07:01)  Source: Blood Blood-Peripheral  Preliminary Report (15 Apr 2025 12:01):    No growth at 48 Hours

## 2025-04-15 NOTE — PROGRESS NOTE ADULT - CRITICAL CARE ATTENDING COMMENT
Critical Care: [id number]   This patient has a high probability of sudden, clinically significant deterioration, which requires the highest level of physician preparedness to intervene urgently. I managed/supervised life or organ supporting interventions that required frequent physician assessment. I have reviewed and agree with note above. I devoted my full attention to the direct care of this patient for the period of time indicated, including reviewing relevant labs and imaging, discussing treatment plan with the SICU team, nurses, and primary team at the time of multidisciplinary rounds, and reviewing findings with patient and family. This does not include time devoted to teaching any trainees and to performing any procedures.    NANCY SARGENT 64y Female admitted to SICU with incarcerated ventral hernia with SBO now s/p open VHR, SBR with postop respiratory failure and nonoliguric ARF secondary to septic shock    Issues we are addressing today:    Neuro: minimizing pain meds, home meds as possible, delirium precautions, sleep meds as needed at night  CV: optimize fluid status, holding home antihypertensives  Respiratory: continue to wean support as possible, extubated today, bipap and HFNC as tolerated  Nutrition: npo at this time, ngt suction  Renal: monitor Is &Os, continue diuresis today  ID: abx ongoing   Lines: continue for now  Heme: continue to evaluate for acute blood loss anemia   Endocrine: Prevent and treat hyperglycemia with insulin as needed    PV: follow pulse exam  Skin: decub precautions  DVT Prophylaxis   Stress Gastritis Prophylaxis   Mobility: PT/OT as tolerated    I spoke to family about patient's current status and plan for today as well as the next day. They expressed concern regarding her kidney function among other concerns. I stated the risk/benefit balance of continuing aggressive diuresis may delay full recovery of her creatinine but achieving a significant negative fluid balance will dramatically improve her respiratory, cardiac, and GI tract recovery and therefore lead to an overall recovery faster than waiting for her kidney function to improve on its own. They understood and agreed with the plan.    The above note is NOT written at the time of rounds and will reflect all changes throughout management of the patient for the day note is written for.    Chi Bermudez MD, D.[email or web address].

## 2025-04-15 NOTE — PROGRESS NOTE ADULT - CRITICAL CARE SERVICES
Controlled Substance Refill Request  Medication:   Requested Prescriptions     Pending Prescriptions Disp Refills     HYDROcodone-acetaminophen 5-325 mg per tablet 90 tablet 0     Sig: Take 1 tablet by mouth 3 (three) times a day.     Date Last Fill: 8/28/2019     Pharmacy: [facility]  Submit electronically to pharmacy  Controlled Substance Agreement on File:   Encounter-Level CSA Scan Date:    There are no encounter-level csa scan date.       Last office visit: 8/28/2019 Enio Lechuga MD       65

## 2025-04-15 NOTE — PROGRESS NOTE ADULT - ASSESSMENT
64y F w/ PMHx of HTN and gastric bypass surgery in 2001 presents with abdominal distention. CT with incarcerated ventral hernia with SBO. S/p Open repair of ventral hernia using mesh and component separation technique, Small bowel resection.    PLAN:  - Monitor vent settings and continue SBT  - On sedation: precedex gtt, fentanyl gtt  - Holding home HTN meds  - strict NPO w/ NGT to LCWS  - GI ppx  - Monitor bowel function  - Worsening FUNMI -> pierce in place, monitor UOP and creatinine  - Nephrology following: initially recommending bumex drip and holding off on CVVH  - IV abx: zosyn  - Monitor for fevers  - Monitor THONY drain outputs  - Rest of care per SICU    BLUE TEAM SPECTRA: [id number]

## 2025-04-16 LAB
ALBUMIN SERPL ELPH-MCNC: 3.2 G/DL — LOW (ref 3.5–5.2)
ALP SERPL-CCNC: 85 U/L — SIGNIFICANT CHANGE UP (ref 30–115)
ALT FLD-CCNC: 54 U/L — HIGH (ref 0–41)
AMMONIA BLD-MCNC: 35 UMOL/L — SIGNIFICANT CHANGE UP (ref 11–55)
ANION GAP SERPL CALC-SCNC: 21 MMOL/L — HIGH (ref 7–14)
ANION GAP SERPL CALC-SCNC: 21 MMOL/L — HIGH (ref 7–14)
ANION GAP SERPL CALC-SCNC: 22 MMOL/L — HIGH (ref 7–14)
AST SERPL-CCNC: 52 U/L — HIGH (ref 0–41)
BILIRUB SERPL-MCNC: 0.4 MG/DL — SIGNIFICANT CHANGE UP (ref 0.2–1.2)
BUN SERPL-MCNC: 100 MG/DL — CRITICAL HIGH (ref 10–20)
BUN SERPL-MCNC: 97 MG/DL — CRITICAL HIGH (ref 10–20)
BUN SERPL-MCNC: 99 MG/DL — CRITICAL HIGH (ref 10–20)
CALCIUM SERPL-MCNC: 8.2 MG/DL — LOW (ref 8.4–10.5)
CALCIUM SERPL-MCNC: 8.2 MG/DL — LOW (ref 8.4–10.5)
CALCIUM SERPL-MCNC: 8.3 MG/DL — LOW (ref 8.4–10.5)
CHLORIDE SERPL-SCNC: 106 MMOL/L — SIGNIFICANT CHANGE UP (ref 98–110)
CHLORIDE SERPL-SCNC: 107 MMOL/L — SIGNIFICANT CHANGE UP (ref 98–110)
CHLORIDE SERPL-SCNC: 108 MMOL/L — SIGNIFICANT CHANGE UP (ref 98–110)
CO2 SERPL-SCNC: 25 MMOL/L — SIGNIFICANT CHANGE UP (ref 17–32)
CO2 SERPL-SCNC: 26 MMOL/L — SIGNIFICANT CHANGE UP (ref 17–32)
CO2 SERPL-SCNC: 27 MMOL/L — SIGNIFICANT CHANGE UP (ref 17–32)
CREAT SERPL-MCNC: 4.3 MG/DL — CRITICAL HIGH (ref 0.7–1.5)
CREAT SERPL-MCNC: 4.3 MG/DL — CRITICAL HIGH (ref 0.7–1.5)
CREAT SERPL-MCNC: 4.5 MG/DL — CRITICAL HIGH (ref 0.7–1.5)
CULTURE RESULTS: NO GROWTH — SIGNIFICANT CHANGE UP
EGFR: 10 ML/MIN/1.73M2 — LOW
EGFR: 10 ML/MIN/1.73M2 — LOW
EGFR: 11 ML/MIN/1.73M2 — LOW
GLUCOSE BLDC GLUCOMTR-MCNC: 132 MG/DL — HIGH (ref 70–99)
GLUCOSE BLDC GLUCOMTR-MCNC: 132 MG/DL — HIGH (ref 70–99)
GLUCOSE BLDC GLUCOMTR-MCNC: 137 MG/DL — HIGH (ref 70–99)
GLUCOSE BLDC GLUCOMTR-MCNC: 140 MG/DL — HIGH (ref 70–99)
GLUCOSE SERPL-MCNC: 125 MG/DL — HIGH (ref 70–99)
GLUCOSE SERPL-MCNC: 130 MG/DL — HIGH (ref 70–99)
GLUCOSE SERPL-MCNC: 149 MG/DL — HIGH (ref 70–99)
HCT VFR BLD CALC: 30.5 % — LOW (ref 37–47)
HGB BLD-MCNC: 9.7 G/DL — LOW (ref 12–16)
MAGNESIUM SERPL-MCNC: 2.1 MG/DL — SIGNIFICANT CHANGE UP (ref 1.8–2.4)
MAGNESIUM SERPL-MCNC: 2.4 MG/DL — SIGNIFICANT CHANGE UP (ref 1.8–2.4)
MCHC RBC-ENTMCNC: 30.1 PG — SIGNIFICANT CHANGE UP (ref 27–31)
MCHC RBC-ENTMCNC: 31.8 G/DL — LOW (ref 32–37)
MCV RBC AUTO: 94.7 FL — SIGNIFICANT CHANGE UP (ref 81–99)
NRBC BLD AUTO-RTO: 0 /100 WBCS — SIGNIFICANT CHANGE UP (ref 0–0)
PHOSPHATE SERPL-MCNC: 6.3 MG/DL — HIGH (ref 2.1–4.9)
PHOSPHATE SERPL-MCNC: 6.6 MG/DL — HIGH (ref 2.1–4.9)
PLATELET # BLD AUTO: 358 K/UL — SIGNIFICANT CHANGE UP (ref 130–400)
PMV BLD: 11.1 FL — HIGH (ref 7.4–10.4)
POTASSIUM SERPL-MCNC: 2.9 MMOL/L — LOW (ref 3.5–5)
POTASSIUM SERPL-MCNC: 3.3 MMOL/L — LOW (ref 3.5–5)
POTASSIUM SERPL-MCNC: 3.3 MMOL/L — LOW (ref 3.5–5)
POTASSIUM SERPL-SCNC: 2.9 MMOL/L — LOW (ref 3.5–5)
POTASSIUM SERPL-SCNC: 3.3 MMOL/L — LOW (ref 3.5–5)
POTASSIUM SERPL-SCNC: 3.3 MMOL/L — LOW (ref 3.5–5)
PROT SERPL-MCNC: 5.8 G/DL — LOW (ref 6–8)
RBC # BLD: 3.22 M/UL — LOW (ref 4.2–5.4)
RBC # FLD: 13.9 % — SIGNIFICANT CHANGE UP (ref 11.5–14.5)
SODIUM SERPL-SCNC: 153 MMOL/L — HIGH (ref 135–146)
SODIUM SERPL-SCNC: 154 MMOL/L — HIGH (ref 135–146)
SODIUM SERPL-SCNC: 156 MMOL/L — HIGH (ref 135–146)
SPECIMEN SOURCE: SIGNIFICANT CHANGE UP
WBC # BLD: 14.53 K/UL — HIGH (ref 4.8–10.8)
WBC # FLD AUTO: 14.53 K/UL — HIGH (ref 4.8–10.8)

## 2025-04-16 PROCEDURE — 99291 CRITICAL CARE FIRST HOUR: CPT

## 2025-04-16 PROCEDURE — 71045 X-RAY EXAM CHEST 1 VIEW: CPT | Mod: 26

## 2025-04-16 RX ORDER — LABETALOL HYDROCHLORIDE 200 MG/1
5 TABLET, FILM COATED ORAL EVERY 6 HOURS
Refills: 0 | Status: DISCONTINUED | OUTPATIENT
Start: 2025-04-16 | End: 2025-04-16

## 2025-04-16 RX ORDER — ONDANSETRON HCL/PF 4 MG/2 ML
4 VIAL (ML) INJECTION ONCE
Refills: 0 | Status: COMPLETED | OUTPATIENT
Start: 2025-04-16 | End: 2025-04-16

## 2025-04-16 RX ORDER — SODIUM CHLORIDE 9 G/1000ML
500 INJECTION, SOLUTION INTRAVENOUS ONCE
Refills: 0 | Status: COMPLETED | OUTPATIENT
Start: 2025-04-16 | End: 2025-04-16

## 2025-04-16 RX ORDER — SODIUM CHLORIDE 9 G/1000ML
1000 INJECTION, SOLUTION INTRAVENOUS
Refills: 0 | Status: DISCONTINUED | OUTPATIENT
Start: 2025-04-16 | End: 2025-04-17

## 2025-04-16 RX ORDER — IOHEXOL 350 MG/ML
30 INJECTION, SOLUTION INTRAVENOUS ONCE
Refills: 0 | Status: COMPLETED | OUTPATIENT
Start: 2025-04-16 | End: 2025-04-16

## 2025-04-16 RX ORDER — HYDROMORPHONE/SOD CHLOR,ISO/PF 2 MG/10 ML
0.25 SYRINGE (ML) INJECTION ONCE
Refills: 0 | Status: DISCONTINUED | OUTPATIENT
Start: 2025-04-16 | End: 2025-04-16

## 2025-04-16 RX ADMIN — SODIUM CHLORIDE 500 MILLILITER(S): 9 INJECTION, SOLUTION INTRAVENOUS at 20:22

## 2025-04-16 RX ADMIN — Medication 100 MILLIEQUIVALENT(S): at 13:28

## 2025-04-16 RX ADMIN — Medication 650 MILLIGRAM(S): at 05:48

## 2025-04-16 RX ADMIN — HEPARIN SODIUM 7500 UNIT(S): 1000 INJECTION INTRAVENOUS; SUBCUTANEOUS at 21:55

## 2025-04-16 RX ADMIN — Medication 0.25 MILLIGRAM(S): at 23:15

## 2025-04-16 RX ADMIN — Medication 0.5 MILLIGRAM(S): at 17:23

## 2025-04-16 RX ADMIN — Medication 0.5 MILLIGRAM(S): at 21:55

## 2025-04-16 RX ADMIN — Medication 650 MILLIGRAM(S): at 06:18

## 2025-04-16 RX ADMIN — Medication 0.25 MILLIGRAM(S): at 13:30

## 2025-04-16 RX ADMIN — Medication 100 MILLIEQUIVALENT(S): at 14:22

## 2025-04-16 RX ADMIN — SODIUM CHLORIDE 75 MILLILITER(S): 9 INJECTION, SOLUTION INTRAVENOUS at 23:55

## 2025-04-16 RX ADMIN — Medication 0.25 MILLIGRAM(S): at 19:09

## 2025-04-16 RX ADMIN — Medication 0.25 MILLIGRAM(S): at 13:03

## 2025-04-16 RX ADMIN — IOHEXOL 30 MILLILITER(S): 350 INJECTION, SOLUTION INTRAVENOUS at 20:36

## 2025-04-16 RX ADMIN — Medication 0.5 MILLIGRAM(S): at 22:30

## 2025-04-16 RX ADMIN — Medication 1 APPLICATION(S): at 05:48

## 2025-04-16 RX ADMIN — Medication 25 GRAM(S): at 14:14

## 2025-04-16 RX ADMIN — Medication 50 MILLIEQUIVALENT(S): at 23:56

## 2025-04-16 RX ADMIN — Medication 25 GRAM(S): at 02:00

## 2025-04-16 RX ADMIN — Medication 100 MILLIEQUIVALENT(S): at 12:18

## 2025-04-16 RX ADMIN — Medication 50 MILLIEQUIVALENT(S): at 00:00

## 2025-04-16 RX ADMIN — Medication 0.5 MILLIGRAM(S): at 18:00

## 2025-04-16 RX ADMIN — Medication 0.25 MILLIGRAM(S): at 09:30

## 2025-04-16 RX ADMIN — HEPARIN SODIUM 7500 UNIT(S): 1000 INJECTION INTRAVENOUS; SUBCUTANEOUS at 14:13

## 2025-04-16 RX ADMIN — Medication 4 MILLIGRAM(S): at 12:16

## 2025-04-16 RX ADMIN — HEPARIN SODIUM 7500 UNIT(S): 1000 INJECTION INTRAVENOUS; SUBCUTANEOUS at 05:48

## 2025-04-16 RX ADMIN — Medication 0.25 MILLIGRAM(S): at 08:54

## 2025-04-16 RX ADMIN — Medication 40 MILLIGRAM(S): at 12:16

## 2025-04-16 NOTE — CONSULT NOTE ADULT - SUBJECTIVE AND OBJECTIVE BOX
INTERVENTIONAL RADIOLOGY CONSULT:     HPI:  The patient is a 64-year-old female with a past medical history of high blood pressure and gastric bypass surgery in , presenting with two days of sharp abdominal pain and one episode of non-bloody, non-bilious vomiting. She has a prior history of small bowel obstruction in , , and most recently in , all of which resolved with conservative management. Her current symptoms are similar in nature. A computed tomography scan of the abdomen and pelvis with oral and intravenous contrast revealed multiple ventral abdominal wall hernias containing both obstructed and non-obstructed bowel loops. A complex small bowel obstruction is present, involving three hernias on the right side of the abdomen, with dilated, fluid-filled loops of small bowel, trace fluid between loops, and a collapsed segment of bowel beyond a hernia in the right lower quadrant. These findings are consistent with a recurrent small bowel obstruction. The hernia was non reducible at bedside. (2025 20:41)      PAST MEDICAL & SURGICAL HISTORY:  Gastric bypass status for obesity      LOUIS (obstructive sleep apnea)      S/P gastric bypass      S/P       S/P small bowel resection      History of total bilateral knee replacement (TKR)      H/O colonoscopy            MEDICATIONS  (STANDING):  chlorhexidine 2% Cloths 1 Application(s) Topical <User Schedule>  heparin   Injectable 7500 Unit(s) SubCutaneous every 8 hours  pantoprazole  Injectable 40 milliGRAM(s) IV Push daily  piperacillin/tazobactam IVPB.. 3.375 Gram(s) IV Intermittent every 12 hours    MEDICATIONS  (PRN):  HYDROmorphone  Injectable 0.25 milliGRAM(s) IV Push every 4 hours PRN Moderate Pain (4 - 6)  HYDROmorphone  Injectable 0.5 milliGRAM(s) IV Push every 4 hours PRN Severe Pain (7 - 10)  labetalol Injectable 5 milliGRAM(s) IV Push every 6 hours PRN SBP > 160      Allergies    No Known Allergies    Intolerances          FAMILY HISTORY:      Physical Exam:   Vital Signs Last 24 Hrs  T(C): 36.3 (2025 08:00), Max: 37 (2025 00:00)  T(F): 97.4 (2025 08:00), Max: 98.6 (2025 00:00)  HR: 99 (2025 10:00) (84 - 107)  BP: 145/75 (2025 10:00) (145/75 - 145/75)  BP(mean): 102 (2025 10:00) (102 - 102)  RR: 27 (2025 10:00) (10 - 27)  SpO2: 100% (2025 10:00) (97% - 100%)    Parameters below as of 2025 10:00  Patient On (Oxygen Delivery Method): nasal cannula  O2 Flow (L/min): 4        Labs:                         9.1    14.08 )-----------( 253      ( 15 Apr 2025 21:33 )             27.9     04-15    146  |  102  |  90[HH]  ----------------------------<  118[H]  3.0[L]   |  23  |  5.1[HH]    Ca    8.6      15 Apr 2025 21:33  Phos  6.8     04-15  Mg     2.2     04-15    TPro  5.6[L]  /  Alb  2.8[L]  /  TBili  0.4  /  DBili  0.3  /  AST  90[H]  /  ALT  62[H]  /  AlkPhos  81  04-15    PT/INR - ( 2025 20:21 )   PT: 11.30 sec;   INR: 0.96 ratio         PTT - ( 2025 20:21 )  PTT:22.6 sec    Pertinent labs:                      9.1    14.08 )-----------( 253      ( 15 Apr 2025 21:33 )             27.9       04-15    146  |  102  |  90[HH]  ----------------------------<  118[H]  3.0[L]   |  23  |  5.1[HH]    Ca    8.6      15 Apr 2025 21:33  Phos  6.8     04-15  Mg     2.2     04-15    TPro  5.6[L]  /  Alb  2.8[L]  /  TBili  0.4  /  DBili  0.3  /  AST  90[H]  /  ALT  62[H]  /  AlkPhos  81  04-15      PT/INR - ( 2025 20:21 )   PT: 11.30 sec;   INR: 0.96 ratio         PTT - ( 2025 20:21 )  PTT:22.6 sec    Radiology & Additional Studies:     Radiology imaging reviewed.

## 2025-04-16 NOTE — PROGRESS NOTE ADULT - SUBJECTIVE AND OBJECTIVE BOX
SARGENTNANCY   [id number]   [birth year]  64yF  ============================================================   DATE OF INITIAL SICU/SDU CONSULT: 04-10-25    INDICATION FOR SICU CONSULT:  q1hr abdominal checks, mechanical ventilation     SICU COURSE EVENTS :  04-10 - admitted to SICU service  4/11: Intubated and started on paralytic. Arterial line placed, subclavian TLC placed. Nephrology consulted for oliguria. IR abdominal muscle botox injection performed. TTE performed.   4/12: Additional fluid boluses given; trial fo bumex started, considering CVVH. Weaned off nimbex gtt.  > 220 /hr. Renal U/S w/out hydro.   4/13: Weaned off Levophed. Bumex gtt 2mg/hr > 1mg/hr. Goal net negative 1-1.5L, UOP approx, 200c/hr.   4/14: Remained on bumex gtt for further diuresis, decreased to 0.5 overnight, vent settings weaned. Cr downtrending, LFTs worsening.   4/15: Extubated to BiPAP, transitioned to NC. Dc'd bumex gtt, given 5mg metolazone x 1 and 2mg bumex x 1.      24HOUR EVENTS  -Admission under SICU service  4/15  NIGHT   -PM rounds: SBP 150s-160s remains off pressors, CO 8.0, SVV 12, sinus tachycardic 100s, saturating 99% on 4L NC, mild tenderness to palpation over incision, THONY drains x2 serous  -40mEq K  -BUN 90   -AM CXR  -Labetalol 5mg IVP SBP 160s-170s    Day   - SBT on 8/12  -  metolazone 5mg x 1   - keep bumex 0.5mg - keep bumex on as long as stays off pressors   - f/up primary team for continuing NPO/TPN vs starting feeds  - no CXR tomorrow  -7.47/34/74/25 RSBI 35, NIF -18, + cuff leak--> on CPAP 8/12 30%, extubating to BiPAP 14/12   - 1gm calcium  - d/c bumex gtt at 4pm - 2mg bumex x1 given at 6pm  - d/c NGT, ok for meds, start clears tomorrow morning per Dr. Mendez      [X] A ten-point review of systems was negative except as expressed in note.  [X ] History was obtained from patient. If unable to participate in their care, history was from review of the chart and collateral sources of information.  ============================================================      SARGENTNANCY   [id number]   [birth year]  64yF  ============================================================   DATE OF INITIAL SICU/SDU CONSULT: 04-10-25    INDICATION FOR SICU CONSULT:  q1hr abdominal checks, mechanical ventilation     SICU COURSE EVENTS :  04-10 - admitted to SICU service  4/11: Intubated and started on paralytic. Arterial line placed, subclavian TLC placed. Nephrology consulted for oliguria. IR abdominal muscle botox injection performed. TTE performed.   4/12: Additional fluid boluses given; trial fo bumex started, considering CVVH. Weaned off nimbex gtt.  > 220 /hr. Renal U/S w/out hydro.   4/13: Weaned off Levophed. Bumex gtt 2mg/hr > 1mg/hr. Goal net negative 1-1.5L, UOP approx, 200c/hr.   4/14: Remained on bumex gtt for further diuresis, decreased to 0.5 overnight, vent settings weaned. Cr downtrending, LFTs worsening.   4/15: Extubated to BiPAP, transitioned to NC. Dc'd bumex gtt, given 5mg metolazone x 1 and 2mg bumex x 1.      24HOUR EVENTS  -Admission under SICU service  4/15  NIGHT   -PM rounds: SBP 150s-160s remains off pressors, CO 8.0, SVV 12, sinus tachycardic 100s, saturating 99% on 4L NC, mild tenderness to palpation over incision, THONY drains x2 serous  -40mEq K  -BUN 90   -AM CXR  -Labetalol 5mg IVP SBP 160s-170s    Day   - SBT on 8/12  -  metolazone 5mg x 1   - keep bumex 0.5mg - keep bumex on as long as stays off pressors   - f/up primary team for continuing NPO/TPN vs starting feeds  - no CXR tomorrow  -7.47/34/74/25 RSBI 35, NIF -18, + cuff leak--> on CPAP 8/12 30%, extubating to BiPAP 14/12   - 1gm calcium  - d/c bumex gtt at 4pm - 2mg bumex x1 given at 6pm  - d/c NGT, ok for meds, start clears tomorrow morning per Dr. Mendez      [X] A ten-point review of systems was negative except as expressed in note.  [X ] History was obtained from patient. If unable to participate in their care, history was from review of the chart and collateral sources of information.  ============================================================   Daily     Daily     Diet, NPO (04-16-25 @ 08:40)      CURRENT MEDS:  Neurologic Medications  HYDROmorphone  Injectable 0.25 milliGRAM(s) IV Push every 4 hours PRN Moderate Pain (4 - 6)  HYDROmorphone  Injectable 0.5 milliGRAM(s) IV Push every 4 hours PRN Severe Pain (7 - 10)    Respiratory Medications    Cardiovascular Medications    Gastrointestinal Medications  pantoprazole  Injectable 40 milliGRAM(s) IV Push daily    Genitourinary Medications    Hematologic/Oncologic Medications  heparin   Injectable 7500 Unit(s) SubCutaneous every 8 hours    Antimicrobial/Immunologic Medications  piperacillin/tazobactam IVPB.. 3.375 Gram(s) IV Intermittent every 12 hours    Endocrine/Metabolic Medications    Topical/Other Medications  chlorhexidine 2% Cloths 1 Application(s) Topical <User Schedule>      ICU Vital Signs Last 24 Hrs  T(C): 36.3 (16 Apr 2025 08:00), Max: 37 (16 Apr 2025 00:00)  T(F): 97.4 (16 Apr 2025 08:00), Max: 98.6 (16 Apr 2025 00:00)  HR: 97 (16 Apr 2025 08:00) (71 - 107)  BP: --  BP(mean): --  ABP: 157/81 (16 Apr 2025 08:00) (103/57 - 162/64)  ABP(mean): 107 (16 Apr 2025 08:00) (70 - 107)  RR: 25 (16 Apr 2025 08:00) (10 - 25)  SpO2: 97% (16 Apr 2025 08:00) (97% - 100%)    O2 Parameters below as of 16 Apr 2025 08:00  Patient On (Oxygen Delivery Method): nasal cannula  O2 Flow (L/min): 4            Mode: AC/ CMV (Assist Control/ Continuous Mandatory Ventilation)  RR (machine): 24  TV (machine): 400  FiO2: 30  PEEP: 12  ITime: 0.8    ABG - ( 15 Apr 2025 14:31 )  pH, Arterial: 7.50  pH, Blood: x     /  pCO2: 33    /  pO2: 126   / HCO3: 26    / Base Excess: 2.5   /  SaO2: 96.1                I&O's Summary    15 Apr 2025 07:01  -  16 Apr 2025 07:00  --------------------------------------------------------  IN: 735.7 mL / OUT: 5180 mL / NET: -4444.3 mL    16 Apr 2025 07:01  -  16 Apr 2025 08:41  --------------------------------------------------------  IN: 0 mL / OUT: 150 mL / NET: -150 mL      I&O's Detail    15 Apr 2025 07:01  -  16 Apr 2025 07:00  --------------------------------------------------------  IN:    Bumetanide: 22.5 mL    Dexmedetomidine: 135.6 mL    FentaNYL: 27.6 mL    IV PiggyBack: 100 mL    IV PiggyBack: 250 mL    IV PiggyBack: 200 mL  Total IN: 735.7 mL    OUT:    Bulb (mL): 10 mL    Bulb (mL): 80 mL    Indwelling Catheter - Urethral (mL): 5090 mL    Nasogastric/Oral tube (mL): 0 mL    Norepinephrine: 0 mL    VAC (Vacuum Assisted Closure) System (mL): 0 mL  Total OUT: 5180 mL    Total NET: -4444.3 mL      16 Apr 2025 07:01  -  16 Apr 2025 08:41  --------------------------------------------------------  IN:  Total IN: 0 mL    OUT:    Bulb (mL): 10 mL    Bulb (mL): 15 mL    Indwelling Catheter - Urethral (mL): 125 mL    VAC (Vacuum Assisted Closure) System (mL): 0 mL  Total OUT: 150 mL    Total NET: -150 mL          PHYSICAL EXAM:    General/Neuro: A&O x 3, no focal deficits.   Lungs: Saturating well on 3L NC. Mild rales.   Cardiovascular : S1, S2.  Non-tachycardic.   GI: Abdomen soft, mildly tender to RUQ and RLQ. Mild distension RLQ. THONY drain #1 serous, THONY drain #2 tea-colored.   Extremities: Extremities warm, pink, well-perfused. Peripheral edema.   Derm: Good skin turgor, no skin breakdown.    :  An catheter in place.    LABS:  CAPILLARY BLOOD GLUCOSE      POCT Blood Glucose.: 137 mg/dL (16 Apr 2025 05:44)  POCT Blood Glucose.: 121 mg/dL (15 Apr 2025 23:00)  POCT Blood Glucose.: 120 mg/dL (15 Apr 2025 19:14)  POCT Blood Glucose.: 155 mg/dL (15 Apr 2025 12:20)                          9.1    14.08 )-----------( 253      ( 15 Apr 2025 21:33 )             27.9       04-15    146  |  102  |  90[HH]  ----------------------------<  118[H]  3.0[L]   |  23  |  5.1[HH]    Ca    8.6      15 Apr 2025 21:33  Phos  6.8     04-15  Mg     2.2     04-15    TPro  5.6[L]  /  Alb  2.8[L]  /  TBili  0.4  /  DBili  0.3  /  AST  90[H]  /  ALT  62[H]  /  AlkPhos  81  04-15      PT/INR - ( 14 Apr 2025 20:21 )   PT: 11.30 sec;   INR: 0.96 ratio         PTT - ( 14 Apr 2025 20:21 )  PTT:22.6 sec      Urinalysis Basic - ( 15 Apr 2025 21:33 )    Color: x / Appearance: x / SG: x / pH: x  Gluc: 118 mg/dL / Ketone: x  / Bili: x / Urobili: x   Blood: x / Protein: x / Nitrite: x   Leuk Esterase: x / RBC: x / WBC x   Sq Epi: x / Non Sq Epi: x / Bacteria: x        Culture - Sputum (collected 14 Apr 2025 20:15)  Source: Trach Asp Tracheal Aspirate  Gram Stain (15 Apr 2025 11:15):    Rare polymorphonuclear leukocytes per low power field    No Squamous epithelial cells per low power field    No organisms seen per oil power field  Preliminary Report (16 Apr 2025 07:13):    No growth to date

## 2025-04-16 NOTE — PROGRESS NOTE ADULT - ATTENDING COMMENTS
Pt examined  labs and images reviewed  pt with supermorbid obesity and complex hernia with sbo  previously treated without surgery   passing flatus awaiting bowel movement  had a bm   but more pain   4/10 s/p BHUMI , Bowel Resection / Anastamosis / Primary repair of Fascia / hernia sac  overnight - renal failure / respiratory compromise  was intubated / paralysed/ botox  now on bumex - tapering   4/16: everything continues to improve  passing flatus having bm  ng tube was removed  superificial drain appears bile tinged    impression : extremely high risk - supermorbid obesity and complex hernia with partial sbo  superificial drain appears bile tinged  will consider CT scan     SICU     prognosis and plan discussed with family

## 2025-04-16 NOTE — PROGRESS NOTE ADULT - CRITICAL CARE ATTENDING COMMENT
Critical Care: [id number]   This patient has a high probability of sudden, clinically significant deterioration, which requires the highest level of physician preparedness to intervene urgently. I managed/supervised life or organ supporting interventions that required frequent physician assessment. I have reviewed and agree with note above. I devoted my full attention to the direct care of this patient for the period of time indicated, including reviewing relevant labs and imaging, discussing treatment plan with the SICU team, nurses, and primary team at the time of multidisciplinary rounds, and reviewing findings with patient and family. This does not include time devoted to teaching any trainees and to performing any procedures.    NANCY SARGENT 64y Female admitted to SICU with incarcerated ventral hernia with SBO now s/p open VHR, SBR with postop respiratory failure and nonoliguric ARF secondary to septic shock    Issues we are addressing today:    Neuro: minimizing pain meds, home meds as possible, delirium precautions, sleep meds as needed at night  CV: optimize fluid status, holding home antihypertensives  Respiratory: continue to wean support as possible, cpap when sleeping for LOUIS  Nutrition: npo at this time  Renal: monitor Is &Os, hold diuresis today  ID: [email or web address] ongoing   Lines: continue for now  Heme: continue to evaluate for acute blood loss anemia   Endocrine: Prevent and treat hyperglycemia with insulin as needed    PV: follow pulse exam  Skin: decub precautions  DVT Prophylaxis   Stress Gastritis Prophylaxis   Mobility: PT/OT, OOBTC    I spoke to family about patient's current status and plan for today as well as the next day. They expressed concern regarding her kidney function among other concerns. I stated the risk/benefit balance of continuing aggressive diuresis may delay full recovery of her creatinine but achieving a significant negative fluid balance will dramatically improve her respiratory, cardiac, and GI tract recovery and therefore lead to an overall recovery faster than waiting for her kidney function to improve on its own. They understood and agreed with the plan.    The above note is NOT written at the time of rounds and will reflect all changes throughout management of the patient for the day note is written for.    Chi Bermudez MD, D.PRATEEK. Critical Care: [id number]   This patient has a high probability of sudden, clinically significant deterioration, which requires the highest level of physician preparedness to intervene urgently. I managed/supervised life or organ supporting interventions that required frequent physician assessment. I have reviewed and agree with note above. I devoted my full attention to the direct care of this patient for the period of time indicated, including reviewing relevant labs and imaging, discussing treatment plan with the SICU team, nurses, and primary team at the time of multidisciplinary rounds, and reviewing findings with patient and family. This does not include time devoted to teaching any trainees and to performing any procedures.    NANCY SARGENT 64y Female admitted to SICU with incarcerated ventral hernia with SBO now s/p open VHR, SBR with postop respiratory failure and nonoliguric ARF secondary to septic shock    Issues we are addressing today:    Neuro: minimizing pain meds, home meds as possible, delirium precautions, sleep meds as needed at night  CV: optimize fluid status, holding home antihypertensives  Respiratory: continue to wean support as possible, cpap when sleeping for LOUIS  Nutrition: npo at this time  Renal: monitor Is &Os, hold diuresis today  ID: [email or web address] ongoing   Lines: continue for now  Heme: continue to evaluate for acute blood loss anemia   Endocrine: Prevent and treat hyperglycemia with insulin as needed    PV: follow pulse exam  Skin: decub precautions  DVT Prophylaxis   Stress Gastritis Prophylaxis   Mobility: PT/OT, OOBTC    Safe for transfer to SDU    The above note is NOT written at the time of rounds and will reflect all changes throughout management of the patient for the day note is written for.    Chi Bermudez MD, D.PRATEEK.

## 2025-04-16 NOTE — CONSULT NOTE ADULT - SUBJECTIVE AND OBJECTIVE BOX
HPI:  The patient is a 64-year-old female with a past medical history of high blood pressure and gastric bypass surgery in , presenting with two days of sharp abdominal pain and one episode of non-bloody, non-bilious vomiting. She has a prior history of small bowel obstruction in , , and most recently in , all of which resolved with conservative management. Her current symptoms are similar in nature. A computed tomography scan of the abdomen and pelvis with oral and intravenous contrast revealed multiple ventral abdominal wall hernias containing both obstructed and non-obstructed bowel loops. A complex small bowel obstruction is present, involving three hernias on the right side of the abdomen, with dilated, fluid-filled loops of small bowel, trace fluid between loops, and a collapsed segment of bowel beyond a hernia in the right lower quadrant. These findings are consistent with a recurrent small bowel obstruction. The hernia was non reducible at bedside.     CT with incarcerated ventral hernia with SBO. S/p Open repair of ventral hernia using mesh and component separation technique, Small bowel resection done on 4/10/25      PAST MEDICAL & SURGICAL HISTORY:  Gastric bypass status for obesity      LOUIS (obstructive sleep apnea)      S/P gastric bypass      S/P       S/P small bowel resection      History of total bilateral knee replacement (TKR)      H/O colonoscopy  2016          Hospital Course:    TODAY'S SUBJECTIVE & REVIEW OF SYMPTOMS:     Constitutional WNL   Cardio WNL   Resp WNL   GI abd pain   Heme WNL  Endo WNL  Skin WNL  MSK WNL  Neuro WNL  Cognitive WNL  Psych WNL      MEDICATIONS  (STANDING):  chlorhexidine 2% Cloths 1 Application(s) Topical <User Schedule>  heparin   Injectable 7500 Unit(s) SubCutaneous every 8 hours  pantoprazole  Injectable 40 milliGRAM(s) IV Push daily  piperacillin/tazobactam IVPB.. 3.375 Gram(s) IV Intermittent every 12 hours    MEDICATIONS  (PRN):  HYDROmorphone  Injectable 0.25 milliGRAM(s) IV Push every 4 hours PRN Moderate Pain (4 - 6)  HYDROmorphone  Injectable 0.5 milliGRAM(s) IV Push every 4 hours PRN Severe Pain (7 - 10)  labetalol Injectable 5 milliGRAM(s) IV Push every 6 hours PRN SBP > 160      FAMILY HISTORY:      Allergies    No Known Allergies    Intolerances        SOCIAL HISTORY:    [  ] Etoh  [  ] Smoking  [  ] Substance abuse     Home Environment:  [   ] Home Alone  [  x ] Lives with Family  [   ] Home Health Aid    Dwelling:  [   ] Apartment  [ x  ] Private House  [   ] Adult Home  [   ] Skilled Nursing Facility      [   ] Short Term  [   ] Long Term  [ x  ] Stairs       Elevator [   ]    FUNCTIONAL STATUS PTA: (Check all that apply)  Ambulation: [ x   ]Independent    [   ] Dependent     [   ] Non-Ambulatory  Assistive Device: [   ] SA Cane  [   ]  Q Cane  [   ] Walker  [   ]  Wheelchair  ADL : [ x  ] Independent  [    ]  Dependent       Vital Signs Last 24 Hrs  T(C): 36.7 (2025 12:00), Max: 37 (2025 00:00)  T(F): 98 (2025 12:00), Max: 98.6 (2025 00:00)  HR: 109 (2025 13:00) (94 - 109)  BP: 152/81 (2025 13:00) (145/75 - 152/81)  BP(mean): 107 (2025 13:00) (98 - 107)  RR: 15 (2025 13:00) (11 - 30)  SpO2: 99% (2025 13:00) (97% - 100%)    Parameters below as of 2025 13:00  Patient On (Oxygen Delivery Method): nasal cannula  O2 Flow (L/min): 3        PHYSICAL EXAM: Awake & Alert  GENERAL: NAD  HEAD:  Normocephalic  CHEST/LUNG: Clear   HEART: S1S2+  ABDOMEN: Soft  EXTREMITIES:  no calf tenderness    NERVOUS SYSTEM:  Cranial Nerves 2-12 intact [   ] Abnormal  [   ]  ROM: WFL all extremities [   ]  Abnormal [   ]able to move all ext equally - limited exam due to abd pain   Motor Strength: WFL all extremities  [   ]  Abnormal [   ]  Sensation: intact to light touch [ x  ] Abnormal [   ]    FUNCTIONAL STATUS:  Bed Mobility: Independent [   ]  Supervision [   ]  Needs Assistance [ x  ]  N/A [   ]  Transfers: Independent [   ]  Supervision [   ]  Needs Assistance [   ]  N/A [   ]   Ambulation: Independent [   ]  Supervision [   ]  Needs Assistance [   ]  N/A [   ]  ADL: Independent [   ] Requires Assistance [   ] N/A [   ]      LABS:                        9.1    14.08 )-----------( 253      ( 15 Apr 2025 21:33 )             27.9     04-16    153[H]  |  107  |  97[HH]  ----------------------------<  149[H]  2.9[L]   |  25  |  4.5[HH]    Ca    8.2[L]      2025 10:55  Phos  6.6     04-16  Mg     2.1     -16    TPro  5.6[L]  /  Alb  2.8[L]  /  TBili  0.4  /  DBili  0.3  /  AST  90[H]  /  ALT  62[H]  /  AlkPhos  81  04-15    PT/INR - ( 2025 20:21 )   PT: 11.30 sec;   INR: 0.96 ratio         PTT - ( 2025 20:21 )  PTT:22.6 sec  Urinalysis Basic - ( 2025 10:55 )    Color: x / Appearance: x / SG: x / pH: x  Gluc: 149 mg/dL / Ketone: x  / Bili: x / Urobili: x   Blood: x / Protein: x / Nitrite: x   Leuk Esterase: x / RBC: x / WBC x   Sq Epi: x / Non Sq Epi: x / Bacteria: x        RADIOLOGY & ADDITIONAL STUDIES:

## 2025-04-16 NOTE — PROGRESS NOTE ADULT - ASSESSMENT
Patient is a 64F PMHx HTN, morbid obesity s/p 2001 Abby-en-Y gastric bypass who presented on 4/3 with incarcerated ventral hernia with SBO. S/p open ventral hernia repair, small bowel resection, and primary fascial closure with Dr. Lema on 4/10. Currently admitted to SICU for monitoring. Nephrology consulted for FUNMI and oliguria.    # FUNMI most likely ATN   # resp failure on MV  #  incarcerated ventral hernia with SBO S/p open ventral hernia repair, small bowel resection, and primary fascial closure    cr  stable   off diuretics   non oliguric  follow CK level    cont LR at 75 cc/h ? TPN   ph noted / no binders   adjust all meds to GFR < 15 ml/min   still no acute indication for RRT   will follow   / follow BMP replete K to 4 / check phosphorus   discussed with family at bedside

## 2025-04-16 NOTE — PROGRESS NOTE ADULT - SUBJECTIVE AND OBJECTIVE BOX
GENERAL SURGERY PROGRESS NOTE    Patient: NANCY SARGENT , 64y (62)Female   MRN: [id number]  Location: [address]  Visit: 25 Inpatient  Date: 25 @ 00:46    Hospital Day #: 14  Post-Op Day #: 6    Procedure/Dx/Injuries: S/P LAP CONVERTED TO OPEN VENTRAL HERNIA REPAIR W/ MESH AND SMALL BOWEL RESECTION    Events of past 24 hours:   SBP 150s-160s remains off pressors, CO 8.0, SVV 12, sinus tachycardic 100s, saturating 99% on 4L NC, mild tenderness to palpation over incision, THONY drains x2 serous. NGT DC'd. Remained NPO except for meds.     PHYSICAL EXAM:  General: NAD, awake, following commands  HEENT: NCAT, EOMI, Trachea ML, 4L NC  Cardiac: RRR  Abdomen: Soft, non-distended, non-tender, midline prevena in place with THONY drains x2 serous.   Skin: Warm/dry, normal color, no jaundice  Incision/wound: healing well, dressings in place, clean, dry and intact      PAST MEDICAL & SURGICAL HISTORY:  Gastric bypass status for obesity      LOUIS (obstructive sleep apnea)      S/P gastric bypass      S/P       S/P small bowel resection      History of total bilateral knee replacement (TKR)      H/O colonoscopy            Vitals:   T(F): 98.6 (25 @ 00:00), Max: 99 (04-15-25 @ 01:00)  HR: 97 (25 @ 00:38)  BP: --  RR: 20 (25 @ 00:00)  SpO2: 100% (25 @ 00:38)  Mode: AC/ CMV (Assist Control/ Continuous Mandatory Ventilation), RR (machine): 24, TV (machine): 400, FiO2: 30, PEEP: 12, ITime: 0.8    Diet, NPO:   Except Medications      Fluids:     I & O's:    25 @ 07:01  -  04-15-25 @ 07:00  --------------------------------------------------------  IN:    Bumetanide: 5 mL    Bumetanide: 90 mL    Bumetanide: 35 mL    Bumetanide: 17.5 mL    Dexmedetomidine: 682.4 mL    FentaNYL: 258.1 mL    IV PiggyBack: 100 mL    IV PiggyBack: 100 mL    Lactated Ringers: 80 mL  Total IN: 1368 mL    OUT:    Bulb (mL): 220 mL    Bulb (mL): 20 mL    Indwelling Catheter - Urethral (mL): 4855 mL    Nasogastric/Oral tube (mL): 0 mL    Norepinephrine: 0 mL    VAC (Vacuum Assisted Closure) System (mL): 0 mL  Total OUT: 5095 mL    Total NET: -3727 mL      MEDICATIONS  (STANDING):  acetaminophen     Tablet .. 650 milliGRAM(s) Oral every 6 hours  chlorhexidine 2% Cloths 1 Application(s) Topical <User Schedule>  heparin   Injectable 7500 Unit(s) SubCutaneous every 8 hours  pantoprazole  Injectable 40 milliGRAM(s) IV Push daily  piperacillin/tazobactam IVPB.. 3.375 Gram(s) IV Intermittent every 12 hours    MEDICATIONS  (PRN):  HYDROmorphone  Injectable 0.25 milliGRAM(s) IV Push every 4 hours PRN Moderate Pain (4 - 6)  HYDROmorphone  Injectable 0.5 milliGRAM(s) IV Push every 4 hours PRN Severe Pain (7 - 10)      DVT PROPHYLAXIS: heparin   Injectable 7500 Unit(s) SubCutaneous every 8 hours    GI PROPHYLAXIS: pantoprazole  Injectable 40 milliGRAM(s) IV Push daily    ANTICOAGULATION:   ANTIBIOTICS:  piperacillin/tazobactam IVPB.. 3.375 Gram(s)            LAB/STUDIES:  Labs:  CAPILLARY BLOOD GLUCOSE      POCT Blood Glucose.: 121 mg/dL (15 Apr 2025 23:00)  POCT Blood Glucose.: 120 mg/dL (15 Apr 2025 19:14)  POCT Blood Glucose.: 155 mg/dL (15 Apr 2025 12:20)  POCT Blood Glucose.: 181 mg/dL (15 Apr 2025 05:02)                          9.1    14.08 )-----------( 253      ( 15 Apr 2025 21:33 )             27.9         04-15    146  |  102  |  90[HH]  ----------------------------<  118[H]  3.0[L]   |  23  |  5.1[HH]      Calcium: 8.6 mg/dL (04-15-25 @ 21:33)      LFTs:             5.6  | 0.4  | 90       ------------------[81      ( 15 Apr 2025 23:22 )  2.8  | 0.3  | 62          Lipase:x      Amylase:x         Blood Gas Arterial, Lactate: 1.1 mmol/L (04-15-25 @ 14:31)  Blood Gas Arterial, Lactate: 0.8 mmol/L (04-15-25 @ 04:05)  Blood Gas Arterial, Lactate: 0.9 mmol/L (25 @ 18:46)  Blood Gas Arterial, Lactate: 0.9 mmol/L (25 @ 05:11)  Blood Gas Arterial, Lactate: 0.7 mmol/L (25 @ 03:29)    ABG - ( 15 Apr 2025 14:31 )  pH: 7.50  /  pCO2: 33    /  pO2: 126   / HCO3: 26    / Base Excess: 2.5   /  SaO2: 96.1            ABG - ( 15 Apr 2025 04:05 )  pH: 7.45  /  pCO2: 36    /  pO2: 85    / HCO3: 25    / Base Excess: 1.1   /  SaO2: 95.3            ABG - ( 2025 18:46 )  pH: 7.45  /  pCO2: 35    /  pO2: 70    / HCO3: 24    / Base Excess: 0.5   /  SaO2: 93.7              Coags:     11.30  ----< 0.96    ( 2025 20:21 )     22.6                Urinalysis Basic - ( 15 Apr 2025 21:33 )    Color: x / Appearance: x / SG: x / pH: x  Gluc: 118 mg/dL / Ketone: x  / Bili: x / Urobili: x   Blood: x / Protein: x / Nitrite: x   Leuk Esterase: x / RBC: x / WBC x   Sq Epi: x / Non Sq Epi: x / Bacteria: x        Culture - Sputum (collected 2025 20:15)  Source: Trach Asp Tracheal Aspirate  Gram Stain (15 Apr 2025 11:15):    Rare polymorphonuclear leukocytes per low power field    No Squamous epithelial cells per low power field    No organisms seen per oil power field    Culture - Blood (collected 2025 07:01)  Source: Blood Blood-Peripheral  Preliminary Report (15 Apr 2025 12:01):    No growth at 48 Hours        64y F w/ PMHx of HTN and gastric bypass surgery in  presents with abdominal distention. CT with incarcerated ventral hernia with SBO. S/p Open repair of ventral hernia using mesh and component separation technique, Small bowel resection.    PLAN:    - NPO with meds.  - Monitor bowel function  - monitor UOP and creatinine  - IV abx: zosyn  - Monitor for fevers  - Monitor THONY drain outputs  - Rest of care per SICU    BLUE TEAM SPECTRA: [id number]   GENERAL SURGERY PROGRESS NOTE    Patient: NANCY SARGENT , 64y (62)Female   MRN: [id number]  Location: [address]  Visit: 25 Inpatient  Date: 25 @ 00:46    Hospital Day #: 14  Post-Op Day #: 6    Procedure/Dx/Injuries: S/P LAP CONVERTED TO OPEN VENTRAL HERNIA REPAIR W/ MESH AND SMALL BOWEL RESECTION    Events of past 24 hours:   SBP 150s-160s remains off pressors, CO 8.0, SVV 12, sinus tachycardic 100s, saturating 99% on 4L NC and BIPAP/CPAP alternatively, mild tenderness to palpation over incision, THONY drains x2 serous. NGT DC'd. Remained NPO except for meds.     PHYSICAL EXAM:  General: NAD, awake, following commands  HEENT: NCAT, EOMI, Trachea ML, 4L NC  Cardiac: RRR  Abdomen: Soft, non-distended, non-tender, midline prevena in place with THONY drains x2 serous.   Skin: Warm/dry, normal color, no jaundice  Incision/wound: healing well, dressings in place, clean, dry and intact      PAST MEDICAL & SURGICAL HISTORY:  Gastric bypass status for obesity      LOUIS (obstructive sleep apnea)      S/P gastric bypass      S/P       S/P small bowel resection      History of total bilateral knee replacement (TKR)      H/O colonoscopy            Vitals:   T(F): 98.6 (25 @ 00:00), Max: 99 (04-15-25 @ 01:00)  HR: 97 (25 @ 00:38)  BP: --  RR: 20 (25 @ 00:00)  SpO2: 100% (25 @ 00:38)  Mode: AC/ CMV (Assist Control/ Continuous Mandatory Ventilation), RR (machine): 24, TV (machine): 400, FiO2: 30, PEEP: 12, ITime: 0.8    Diet, NPO:   Except Medications      Fluids:     I & O's:    25 @ 07:01  -  04-15-25 @ 07:00  --------------------------------------------------------  IN:    Bumetanide: 5 mL    Bumetanide: 90 mL    Bumetanide: 35 mL    Bumetanide: 17.5 mL    Dexmedetomidine: 682.4 mL    FentaNYL: 258.1 mL    IV PiggyBack: 100 mL    IV PiggyBack: 100 mL    Lactated Ringers: 80 mL  Total IN: 1368 mL    OUT:    Bulb (mL): 220 mL    Bulb (mL): 20 mL    Indwelling Catheter - Urethral (mL): 4855 mL    Nasogastric/Oral tube (mL): 0 mL    Norepinephrine: 0 mL    VAC (Vacuum Assisted Closure) System (mL): 0 mL  Total OUT: 5095 mL    Total NET: -3727 mL      MEDICATIONS  (STANDING):  acetaminophen     Tablet .. 650 milliGRAM(s) Oral every 6 hours  chlorhexidine 2% Cloths 1 Application(s) Topical <User Schedule>  heparin   Injectable 7500 Unit(s) SubCutaneous every 8 hours  pantoprazole  Injectable 40 milliGRAM(s) IV Push daily  piperacillin/tazobactam IVPB.. 3.375 Gram(s) IV Intermittent every 12 hours    MEDICATIONS  (PRN):  HYDROmorphone  Injectable 0.25 milliGRAM(s) IV Push every 4 hours PRN Moderate Pain (4 - 6)  HYDROmorphone  Injectable 0.5 milliGRAM(s) IV Push every 4 hours PRN Severe Pain (7 - 10)      DVT PROPHYLAXIS: heparin   Injectable 7500 Unit(s) SubCutaneous every 8 hours    GI PROPHYLAXIS: pantoprazole  Injectable 40 milliGRAM(s) IV Push daily    ANTICOAGULATION:   ANTIBIOTICS:  piperacillin/tazobactam IVPB.. 3.375 Gram(s)            LAB/STUDIES:  Labs:  CAPILLARY BLOOD GLUCOSE      POCT Blood Glucose.: 121 mg/dL (15 Apr 2025 23:00)  POCT Blood Glucose.: 120 mg/dL (15 Apr 2025 19:14)  POCT Blood Glucose.: 155 mg/dL (15 Apr 2025 12:20)  POCT Blood Glucose.: 181 mg/dL (15 Apr 2025 05:02)                          9.1    14.08 )-----------( 253      ( 15 Apr 2025 21:33 )             27.9         04-15    146  |  102  |  90[HH]  ----------------------------<  118[H]  3.0[L]   |  23  |  5.1[HH]      Calcium: 8.6 mg/dL (04-15-25 @ 21:33)      LFTs:             5.6  | 0.4  | 90       ------------------[81      ( 15 Apr 2025 23:22 )  2.8  | 0.3  | 62          Lipase:x      Amylase:x         Blood Gas Arterial, Lactate: 1.1 mmol/L (04-15-25 @ 14:31)  Blood Gas Arterial, Lactate: 0.8 mmol/L (04-15-25 @ 04:05)  Blood Gas Arterial, Lactate: 0.9 mmol/L (25 @ 18:46)  Blood Gas Arterial, Lactate: 0.9 mmol/L (25 @ 05:11)  Blood Gas Arterial, Lactate: 0.7 mmol/L (25 @ 03:29)    ABG - ( 15 Apr 2025 14:31 )  pH: 7.50  /  pCO2: 33    /  pO2: 126   / HCO3: 26    / Base Excess: 2.5   /  SaO2: 96.1            ABG - ( 15 Apr 2025 04:05 )  pH: 7.45  /  pCO2: 36    /  pO2: 85    / HCO3: 25    / Base Excess: 1.1   /  SaO2: 95.3            ABG - ( 2025 18:46 )  pH: 7.45  /  pCO2: 35    /  pO2: 70    / HCO3: 24    / Base Excess: 0.5   /  SaO2: 93.7              Coags:     11.30  ----< 0.96    ( 2025 20:21 )     22.6                Urinalysis Basic - ( 15 Apr 2025 21:33 )    Color: x / Appearance: x / SG: x / pH: x  Gluc: 118 mg/dL / Ketone: x  / Bili: x / Urobili: x   Blood: x / Protein: x / Nitrite: x   Leuk Esterase: x / RBC: x / WBC x   Sq Epi: x / Non Sq Epi: x / Bacteria: x        Culture - Sputum (collected 2025 20:15)  Source: Trach Asp Tracheal Aspirate  Gram Stain (15 Apr 2025 11:15):    Rare polymorphonuclear leukocytes per low power field    No Squamous epithelial cells per low power field    No organisms seen per oil power field    Culture - Blood (collected 2025 07:01)  Source: Blood Blood-Peripheral  Preliminary Report (15 Apr 2025 12:01):    No growth at 48 Hours        64y F w/ PMHx of HTN and gastric bypass surgery in  presents with abdominal distention. CT with incarcerated ventral hernia with SBO. S/p Open repair of ventral hernia using mesh and component separation technique, Small bowel resection.    PLAN:    - NPO with meds.  - Monitor bowel function  - monitor UOP and creatinine  - IV abx: zosyn  - Monitor for fevers  - Monitor THONY drain outputs  - Rest of care per SICU    BLUE TEAM SPECTRA: [id number]

## 2025-04-16 NOTE — CONSULT NOTE ADULT - ASSESSMENT
IMPRESSION: Rehab of debilitation / incarcerated ventral hernia, S/P LAP CONVERTED TO OPEN VENTRAL HERNIA REPAIR W/ MESH AND SMALL BOWEL RESECTION / HTN and gastric bypass surgery in 2001    PRECAUTIONS: [   ] Cardiac  [   ] Respiratory  [   ] Seizures [   ] Contact Isolation  [   ] Droplet Isolation  [   ] Other    Weight Bearing Status:     RECOMMENDATION:    Out of Bed to Chair     DVT/Decubiti Prophylaxis    REHAB PLAN:     [ x   ] Bedside P/T 3-5 times a week   [  x  ]   Bedside O/T  2-3 times a week             [    ] Speech Therapy               [    ]  No Rehab Therapy Indicated   Conditioning/ROM                                    ADL  Bed Mobility                                               Conditioning/ROM  Transfers                                                     Bed Mobility  Sitting /Standing Balance                         Transfers                                        Gait Training                                               Sitting/Standing Balance  Stair Training [   ]Applicable                    Home equipment Eval                                                                        Splinting  [   ] Only      GOALS:   ADL   [  x  ]   Independent                    Transfers  [  x  ] Independent                          Ambulation  [ x   ] Independent     [  x   ] With device                            [    ]  CG                                                         [    ]  CG                                                                  [    ] CG                            [    ] Min A                                                   [    ] Min A                                                              [    ] Min  A          DISCHARGE PLAN:   [    ]  Good candidate for Intensive Rehabilitation/Hospital based                                             Will tolerate 3hrs Intensive Rehab Daily                                       [     ]  Short Term Rehab in Skilled Nursing Facility                                       [     ]  Home with Outpatient or  services                                         [ x    ]  Possible Candidate for Intensive Hospital based Rehab

## 2025-04-16 NOTE — CONSULT NOTE ADULT - ASSESSMENT
Patient is a 63 y/o PMHx of HTN and gastric bypass surgery in 2001 presents with abdominal distention. CT with incarcerated ventral hernia with SBO. S/p Open repair of ventral hernia and small bowel resection. Patient is s/p abdominal muscle Botox injection by IR on 4/14. IR consulted for PICC line placement for TPN d/t NPO status and concern for leak.    Patient will be scheduled as an add-on for PICC line placement for Friday, 4/18/25.  - NPO after midnight 4/17  - Draw CBC/CMP/coags prior to procedure  - Hold anticoagulation until after procedure    Please call Interventional Radiology with questions or concerns:   - M-F [phone]: [phone]   - All other hours: [phone]

## 2025-04-16 NOTE — PROGRESS NOTE ADULT - SUBJECTIVE AND OBJECTIVE BOX
Nephrology progress note    Patient is seen and examined, events over the last 24 h noted .  extubated     Allergies:  No Known Allergies    Hospital Medications:   MEDICATIONS  (STANDING):  chlorhexidine 2% Cloths 1 Application(s) Topical <User Schedule>  heparin   Injectable 7500 Unit(s) SubCutaneous every 8 hours  pantoprazole  Injectable 40 milliGRAM(s) IV Push daily  piperacillin/tazobactam IVPB.. 3.375 Gram(s) IV Intermittent every 12 hours        VITALS:  T(F): 97.4 (04-16-25 @ 08:00), Max: 98.6 (04-16-25 @ 00:00)  HR: 99 (04-16-25 @ 10:00)  BP: 145/75 (04-16-25 @ 10:00)  RR: 27 (04-16-25 @ 10:00)  SpO2: 100% (04-16-25 @ 10:00)      04-14 @ 07:01  -  04-15 @ 07:00  --------------------------------------------------------  IN: 1368 mL / OUT: 5095 mL / NET: -3727 mL    04-15 @ 07:01  -  04-16 @ 07:00  --------------------------------------------------------  IN: 735.7 mL / OUT: 5180 mL / NET: -4444.3 mL    04-16 @ 07:01  -  04-16 @ 11:17  --------------------------------------------------------  IN: 0 mL / OUT: 925 mL / NET: -925 mL        Weight (kg): 135.6 (04-16 @ 06:00)    PHYSICAL EXAM:  Constitutional: NAD  Respiratory: CTAB,   Cardiovascular: S1, S2, RRR  Gastrointestinal: BS+, soft, NT/ND  Extremities: No cyanosis or clubbing. No peripheral edema  :   pierce.   Skin: No rashes    LABS:  04-15    146  |  102  |  90[HH]  ----------------------------<  118[H]  3.0[L]   |  23  |  5.1[HH]  Creatinine Trend: 5.1<--, 4.9<--, 5.1<--, 5.1<--, 5.2<--, 4.8<--  Ca    8.6      15 Apr 2025 21:33  Phos  6.8     04-15  Mg     2.2     04-15    TPro  5.6[L]  /  Alb  2.8[L]  /  TBili  0.4  /  DBili  0.3  /  AST  90[H]  /  ALT  62[H]  /  AlkPhos  81  04-15                          9.1    14.08 )-----------( 253      ( 15 Apr 2025 21:33 )             27.9       Urine Studies:  Urinalysis Basic - ( 15 Apr 2025 21:33 )    Color:  / Appearance:  / SG:  / pH:   Gluc: 118 mg/dL / Ketone:   / Bili:  / Urobili:    Blood:  / Protein:  / Nitrite:    Leuk Esterase:  / RBC:  / WBC    Sq Epi:  / Non Sq Epi:  / Bacteria:       Sodium, Random Urine: <20.0 mmoL/L (04-11 @ 06:27)  Creatinine, Random Urine: 105 mg/dL (04-11 @ 06:27)              RADIOLOGY & ADDITIONAL STUDIES:

## 2025-04-16 NOTE — PROGRESS NOTE ADULT - ASSESSMENT
ASSESSMENT:  64F PMHx HTN, morbid obesity s/p 2001 Abby-en-Y gastric bypass who presented on 4/3 with incarcerated ventral hernia with SBO. S/p 4/10 open ventral hernia repair, small bowel resection, and primary fascial closure with Torey Cornejo. Admitted to SICU for Q1 abdominal checks and hemodynamic monitoring.     NEUROLOGICAL:  #Acute pain  -APAP ATC  -Dilaudid 0.25mg q 4 hours PRN for moderate pain, dilaudid 0.5mg q 4 hours PRN for severe pain     RESPIRATORY:   #ARDS  -extubated to BiPAP on 4/15  -CPAP at night   -diuresed with 5mg metolazone 5mg x 1, lasix 20mg x 1   #hx LOUIS on CPAP@ home  #Activity   - bedrest    CARDS:   #acute hypotension - resolved   - intermittently requiring levophed gtt, currently off   - MAP >65  #hx of HTN  - s/p labetalol 5mg IVP x1 4/15 PM   - holding home HCTZ 25mg QD, amlodipine and losartan  #EKG- NSR  #TTE 6/2022: EF 55-60%. G1DD.   - TTE 4/11: EF of 56 %.G1DD.     GASTROINTESTINAL/NUTRITION:   #Diet, NPOxrx  -possible CLD tomorow per Dr. Lema    -aspiration precautions, HOB 30  #SBO s/p ventral hernia repair with SBR  - NGT removed per Dr. Lema  #GI Prophylaxis/hx GERD   - pantoprazole  Injectable 40 IV Push daily (takes omeprazole @home)  #Bowel regimen    -holding    -last bowel movement prior to admission    /RENAL:   #urine output in critically ill  - indwelling pierce   - IVL   #worsening FUNMI   - Uptrending BUN   - previously oliguric, now improved. wss on bumex gtt @ 0.5mg/hr  - s/p bumex 2mg x1 IVP   - -220 cc/hr  - nephrology consulted and following > hold off on CVVH  - renal u/s> no hydronephrosis. 2-3cm anechoic cyst on right kidney   #Hypokalemia - repleted    Labs:          BUN/Cr- 77/4.9  -->,  90/5.1  -->          [04-15 @ 21:33]Na  146 // K  3.0 // Mg  2.2 // Phos  6.8    HEME/ONC:   #DVT prophylaxis     -Chemical: heparin   Injectable 5000 Unit(s) SubCutaneous every 8 hours     -Mechanical: SCDs    Labs: Hb/Hct:  8.5/26.5  -->,  9.1/27.9  -->                      Plts:  191  -->,  253  -->                 PTT/INR:        ID:  #Antibiotics Course  - continue zosyn > Purulent abscesses noted intraoperatively   #febrile  - UA negative  - blood cultures sent 4/13   - sputum cultures pending   #cultures  - OR cx: few E.coli, few bacteroides, rare clostridium  #MRSA nares negative   WBC- 10.03  --->>,  11.11  --->>,  14.08  --->>  Temp trend- 24hrs T(F): 98.6 (04-16 @ 00:00), Max: 99 (04-15 @ 01:00)  Current antibiotics-piperacillin/tazobactam IVPB.. 3.375 every 12 hours    ENDOCRINE:  #glycemic monitoring    -FSG q6 if NPO    -Glucose goal 140-180. If above 180, will start corrective insulin sliding scale    MSK:  #Bedrest       SKIN:  #DTI screening negative     - will continue to monitor daily skin changes      LINES/DRAINS:  PIV, Pierce, NGT, ETT, L radial arterial line, L subclavian TLC  ADVANCED DIRECTIVES:  Full Code  HCP- Daughter   INDICATION FOR SICU/SDU: Intestinal obstruction  DISPO:  SICU. Case discussed with attending Dr. Bermudez ASSESSMENT:  64F PMHx HTN, morbid obesity s/p 2001 Abby-en-Y gastric bypass who presented on 4/3 with incarcerated ventral hernia with SBO. S/p 4/10 open ventral hernia repair, small bowel resection, and primary fascial closure with Torey Cornejo. Admitted to SICU for Q1 abdominal checks and hemodynamic monitoring.     NEUROLOGICAL:  #Acute pain  -APAP ATC  -Dilaudid 0.25mg q 4 hours PRN for moderate pain, dilaudid 0.5mg q 4 hours PRN for severe pain     RESPIRATORY:   #ARDS  -extubated to BiPAP on 4/15  -CPAP at night   -diuresed with 5mg metolazone 5mg x 1, lasix 20mg x 1   #hx LOUIS on CPAP@ home  #Activity   - bedrest    CARDS:   #acute hypotension - resolved   - intermittently requiring levophed gtt, currently off   - MAP >65  #hx of HTN  - s/p labetalol 5mg IVP x1 4/15 PM   - holding home HCTZ 25mg QD, amlodipine and losartan  #EKG- NSR  #TTE 6/2022: EF 55-60%. G1DD.   - TTE 4/11: EF of 56 %.G1DD.     GASTROINTESTINAL/NUTRITION:   #Diet, NPOxrx  -possible CLD tomorow per Dr. Lema    -aspiration precautions, HOB 30  #SBO s/p ventral hernia repair with SBR  - NGT removed per Dr. Lema  #GI Prophylaxis/hx GERD   - pantoprazole  Injectable 40 IV Push daily (takes omeprazole @home)  #Bowel regimen    -holding    -last bowel movement prior to admission    /RENAL:   #urine output in critically ill  - indwelling pierce   - IVL   #worsening FUNMI   - Uptrending BUN   - previously oliguric, now improved. wss on bumex gtt @ 0.5mg/hr  - s/p bumex 2mg x1 IVP   - -220 cc/hr  - nephrology consulted and following > hold off on CVVH  - renal u/s> no hydronephrosis. 2-3cm anechoic cyst on right kidney   #Hypokalemia - repleted    Labs:          BUN/Cr- 77/4.9  -->,  90/5.1  -->          [04-15 @ 21:33]Na  146 // K  3.0 // Mg  2.2 // Phos  6.8    HEME/ONC:   #DVT prophylaxis     -Chemical: heparin   Injectable 5000 Unit(s) SubCutaneous every 8 hours     -Mechanical: SCDs    Labs: Hb/Hct:  8.5/26.5  -->,  9.1/27.9  -->                      Plts:  191  -->,  253  -->                 PTT/INR:        ID:  #Antibiotics Course  - continue zosyn > Purulent abscesses noted intraoperatively   #febrile  - UA negative  - blood cultures sent 4/13   - sputum cultures pending   #cultures  - OR cx: few E.coli, few bacteroides, rare clostridium  #MRSA nares negative   WBC- 10.03  --->>,  11.11  --->>,  14.08  --->>  Temp trend- 24hrs T(F): 98.6 (04-16 @ 00:00), Max: 99 (04-15 @ 01:00)  Current antibiotics-piperacillin/tazobactam IVPB.. 3.375 every 12 hours    ENDOCRINE:  #glycemic monitoring    -FSG q6 if NPO    -Glucose goal 140-180. If above 180, will start corrective insulin sliding scale    MSK:  #Bedrest       SKIN:  #DTI screening negative     - will continue to monitor daily skin changes      LINES/DRAINS:  PIV, Pierce, NGT, ETT, L radial arterial line, L subclavian TLC  ADVANCED DIRECTIVES:  Full Code  HCP- Daughter   INDICATION FOR SICU/SDU: Intestinal obstruction  DISPO:  SICU. Case to be discussed with attending Dr. Bermudez ASSESSMENT:  64F PMHx HTN, morbid obesity s/p 2001 Abby-en-Y gastric bypass who presented on 4/3 with incarcerated ventral hernia with SBO. S/p 4/10 open ventral hernia repair, small bowel resection, and primary fascial closure with Torey Cornejo. Admitted to SICU for Q1 abdominal checks and hemodynamic monitoring.     NEUROLOGICAL:  #Acute pain  -IV tylenol  -Dilaudid 0.25mg q 4 hours PRN for moderate pain, dilaudid 0.5mg q 4 hours PRN for severe pain     RESPIRATORY:   #ARDS  -extubated to BiPAP on 4/15  -CPAP at night   -diuresed with 5mg metolazone 5mg x 1, lasix 20mg x 1   #hx LOUIS on CPAP@ home  #Activity   - bedrest    CARDS:   #acute hypotension - resolved   -off levophed since 4/13   - MAP >65  #hx of HTN  -labetalol 5mg q 6 hours PRN for SBP > 160  - s/p labetalol 5mg IVP x1 4/15 PM   - holding home HCTZ 25mg QD, amlodipine and losartan  #EKG- NSR  #TTE 6/2022: EF 55-60%. G1DD.   - TTE 4/11: EF of 56 %.G1DD.     GASTROINTESTINAL/NUTRITION:   #Diet, strict NPO  -concern for leak per Dr. Lema, THONY drain #2 tea-colored; messaged for further recs   -IR consult for TPN     -aspiration precautions, HOB 30  #SBO s/p ventral hernia repair with SBR  - NGT removed per Dr. Lema  #GI Prophylaxis/hx GERD   - pantoprazole  Injectable 40 IV Push daily (takes omeprazole @home)  #Bowel regimen    -holding    -last bowel movement prior to admission    /RENAL:   #urine output in critically ill  - indwelling pierce   - IVL   #worsening FUNMI   - Uptrending BUN   - previously oliguric, now improved. wss on bumex gtt @ 0.5mg/hr  - s/p bumex 2mg x1 IVP   - -220 cc/hr  - nephrology consulted and following > hold off on CVVH  - renal u/s> no hydronephrosis. 2-3cm anechoic cyst on right kidney   #Hypokalemia - repleted    Labs:          BUN/Cr- 77/4.9  -->,  90/5.1  -->          [04-15 @ 21:33]Na  146 // K  3.0 // Mg  2.2 // Phos  6.8    HEME/ONC:   #DVT prophylaxis     -Chemical: heparin   Injectable 5000 Unit(s) SubCutaneous every 8 hours     -Mechanical: SCDs    Labs: Hb/Hct:  8.5/26.5  -->,  9.1/27.9  -->                      Plts:  191  -->,  253  -->                 PTT/INR:        ID:  #Antibiotics Course  - continue zosyn > Purulent abscesses noted intraoperatively   #febrile  - UA negative  - blood cultures sent 4/13   - sputum cultures pending   #cultures  - OR cx: few E.coli, few bacteroides, rare clostridium  #MRSA nares negative   WBC- 10.03  --->>,  11.11  --->>,  14.08  --->>  Temp trend- 24hrs T(F): 98.6 (04-16 @ 00:00), Max: 99 (04-15 @ 01:00)  Current antibiotics-piperacillin/tazobactam IVPB.. 3.375 every 12 hours    ENDOCRINE:  #glycemic monitoring    -FSG q6 if NPO    -Glucose goal 140-180. If above 180, will start corrective insulin sliding scale    MSK:  #Bedrest       SKIN:  #DTI screening negative     - will continue to monitor daily skin changes      LINES/DRAINS:  PIV, Pierce, NGT, ETT, L radial arterial line, L subclavian TLC  ADVANCED DIRECTIVES:  Full Code  HCP- Daughter   INDICATION FOR SICU/SDU: Intestinal obstruction  DISPO:  SICU. Case to be discussed with attending Dr. Bermudez

## 2025-04-16 NOTE — PHYSICAL THERAPY INITIAL EVALUATION ADULT - PERTINENT HX OF CURRENT PROBLEM, REHAB EVAL
64y F w/ PMHx of HTN and gastric bypass surgery in 2001 presents with abdominal distention. CT with incarcerated ventral hernia with SBO. S/p Open repair of ventral hernia using mesh and component separation technique, Small bowel resection.

## 2025-04-17 ENCOUNTER — RESULT REVIEW (OUTPATIENT)
Age: 65
End: 2025-04-17

## 2025-04-17 LAB
ANION GAP SERPL CALC-SCNC: 15 MMOL/L — HIGH (ref 7–14)
ANION GAP SERPL CALC-SCNC: 17 MMOL/L — HIGH (ref 7–14)
ANION GAP SERPL CALC-SCNC: 18 MMOL/L — HIGH (ref 7–14)
ANISOCYTOSIS BLD QL: SLIGHT — SIGNIFICANT CHANGE UP
APTT BLD: 19.6 SEC — CRITICAL LOW (ref 27–39.2)
BASOPHILS # BLD AUTO: 0 K/UL — SIGNIFICANT CHANGE UP (ref 0–0.2)
BASOPHILS # BLD AUTO: 0.07 K/UL — SIGNIFICANT CHANGE UP (ref 0–0.2)
BASOPHILS NFR BLD AUTO: 0 % — SIGNIFICANT CHANGE UP (ref 0–1)
BASOPHILS NFR BLD AUTO: 0.4 % — SIGNIFICANT CHANGE UP (ref 0–1)
BUN SERPL-MCNC: 104 MG/DL — CRITICAL HIGH (ref 10–20)
BUN SERPL-MCNC: 95 MG/DL — CRITICAL HIGH (ref 10–20)
BUN SERPL-MCNC: 96 MG/DL — CRITICAL HIGH (ref 10–20)
CALCIUM SERPL-MCNC: 7.9 MG/DL — LOW (ref 8.4–10.5)
CALCIUM SERPL-MCNC: 8.1 MG/DL — LOW (ref 8.4–10.5)
CALCIUM SERPL-MCNC: 9 MG/DL — SIGNIFICANT CHANGE UP (ref 8.4–10.5)
CHLORIDE SERPL-SCNC: 106 MMOL/L — SIGNIFICANT CHANGE UP (ref 98–110)
CHLORIDE SERPL-SCNC: 106 MMOL/L — SIGNIFICANT CHANGE UP (ref 98–110)
CHLORIDE SERPL-SCNC: 108 MMOL/L — SIGNIFICANT CHANGE UP (ref 98–110)
CO2 SERPL-SCNC: 26 MMOL/L — SIGNIFICANT CHANGE UP (ref 17–32)
CO2 SERPL-SCNC: 27 MMOL/L — SIGNIFICANT CHANGE UP (ref 17–32)
CO2 SERPL-SCNC: 28 MMOL/L — SIGNIFICANT CHANGE UP (ref 17–32)
CREAT ?TM UR-MCNC: 65 MG/DL — SIGNIFICANT CHANGE UP
CREAT SERPL-MCNC: 3.7 MG/DL — HIGH (ref 0.7–1.5)
CREAT SERPL-MCNC: 3.7 MG/DL — HIGH (ref 0.7–1.5)
CREAT SERPL-MCNC: 4.2 MG/DL — CRITICAL HIGH (ref 0.7–1.5)
EGFR: 11 ML/MIN/1.73M2 — LOW
EGFR: 11 ML/MIN/1.73M2 — LOW
EGFR: 13 ML/MIN/1.73M2 — LOW
EOSINOPHIL # BLD AUTO: 0 K/UL — SIGNIFICANT CHANGE UP (ref 0–0.7)
EOSINOPHIL # BLD AUTO: 0.02 K/UL — SIGNIFICANT CHANGE UP (ref 0–0.7)
EOSINOPHIL NFR BLD AUTO: 0 % — SIGNIFICANT CHANGE UP (ref 0–8)
EOSINOPHIL NFR BLD AUTO: 0.1 % — SIGNIFICANT CHANGE UP (ref 0–8)
GAS PNL BLDA: SIGNIFICANT CHANGE UP
GLUCOSE BLDC GLUCOMTR-MCNC: 163 MG/DL — HIGH (ref 70–99)
GLUCOSE BLDC GLUCOMTR-MCNC: 166 MG/DL — HIGH (ref 70–99)
GLUCOSE BLDC GLUCOMTR-MCNC: 201 MG/DL — HIGH (ref 70–99)
GLUCOSE SERPL-MCNC: 153 MG/DL — HIGH (ref 70–99)
GLUCOSE SERPL-MCNC: 164 MG/DL — HIGH (ref 70–99)
GLUCOSE SERPL-MCNC: 172 MG/DL — HIGH (ref 70–99)
HCT VFR BLD CALC: 25.9 % — LOW (ref 37–47)
HCT VFR BLD CALC: 26.9 % — LOW (ref 37–47)
HGB BLD-MCNC: 7.9 G/DL — LOW (ref 12–16)
HGB BLD-MCNC: 8.4 G/DL — LOW (ref 12–16)
IMM GRANULOCYTES NFR BLD AUTO: 5.8 % — HIGH (ref 0.1–0.3)
INR BLD: 0.98 RATIO — SIGNIFICANT CHANGE UP (ref 0.65–1.3)
LYMPHOCYTES # BLD AUTO: 0.63 K/UL — LOW (ref 1.2–3.4)
LYMPHOCYTES # BLD AUTO: 1.14 K/UL — LOW (ref 1.2–3.4)
LYMPHOCYTES # BLD AUTO: 3.5 % — LOW (ref 20.5–51.1)
LYMPHOCYTES # BLD AUTO: 6.1 % — LOW (ref 20.5–51.1)
MAGNESIUM SERPL-MCNC: 2.1 MG/DL — SIGNIFICANT CHANGE UP (ref 1.8–2.4)
MANUAL SMEAR VERIFICATION: SIGNIFICANT CHANGE UP
MCHC RBC-ENTMCNC: 29.8 PG — SIGNIFICANT CHANGE UP (ref 27–31)
MCHC RBC-ENTMCNC: 30.3 PG — SIGNIFICANT CHANGE UP (ref 27–31)
MCHC RBC-ENTMCNC: 30.5 G/DL — LOW (ref 32–37)
MCHC RBC-ENTMCNC: 31.2 G/DL — LOW (ref 32–37)
MCV RBC AUTO: 97.1 FL — SIGNIFICANT CHANGE UP (ref 81–99)
MCV RBC AUTO: 97.7 FL — SIGNIFICANT CHANGE UP (ref 81–99)
MICROCYTES BLD QL: SLIGHT — SIGNIFICANT CHANGE UP
MONOCYTES # BLD AUTO: 0.3 K/UL — SIGNIFICANT CHANGE UP (ref 0.1–0.6)
MONOCYTES # BLD AUTO: 0.78 K/UL — HIGH (ref 0.1–0.6)
MONOCYTES NFR BLD AUTO: 1.7 % — SIGNIFICANT CHANGE UP (ref 1.7–9.3)
MONOCYTES NFR BLD AUTO: 4.1 % — SIGNIFICANT CHANGE UP (ref 1.7–9.3)
NEUTROPHILS # BLD AUTO: 15.73 K/UL — HIGH (ref 1.4–6.5)
NEUTROPHILS # BLD AUTO: 16.83 K/UL — HIGH (ref 1.4–6.5)
NEUTROPHILS NFR BLD AUTO: 83.5 % — HIGH (ref 42.2–75.2)
NEUTROPHILS NFR BLD AUTO: 93.9 % — HIGH (ref 42.2–75.2)
NRBC BLD AUTO-RTO: 0 /100 WBCS — SIGNIFICANT CHANGE UP (ref 0–0)
OSMOLALITY SERPL: 348 MOS/KG — HIGH (ref 280–301)
OSMOLALITY UR: 382 MOS/KG — SIGNIFICANT CHANGE UP (ref 50–1200)
PHOSPHATE SERPL-MCNC: 5.2 MG/DL — HIGH (ref 2.1–4.9)
PLAT MORPH BLD: NORMAL — SIGNIFICANT CHANGE UP
PLATELET # BLD AUTO: 359 K/UL — SIGNIFICANT CHANGE UP (ref 130–400)
PLATELET # BLD AUTO: 394 K/UL — SIGNIFICANT CHANGE UP (ref 130–400)
PMV BLD: 11 FL — HIGH (ref 7.4–10.4)
PMV BLD: 11.3 FL — HIGH (ref 7.4–10.4)
POLYCHROMASIA BLD QL SMEAR: SLIGHT — SIGNIFICANT CHANGE UP
POTASSIUM SERPL-MCNC: 3.3 MMOL/L — LOW (ref 3.5–5)
POTASSIUM SERPL-MCNC: 3.7 MMOL/L — SIGNIFICANT CHANGE UP (ref 3.5–5)
POTASSIUM SERPL-MCNC: 3.7 MMOL/L — SIGNIFICANT CHANGE UP (ref 3.5–5)
POTASSIUM SERPL-SCNC: 3.3 MMOL/L — LOW (ref 3.5–5)
POTASSIUM SERPL-SCNC: 3.7 MMOL/L — SIGNIFICANT CHANGE UP (ref 3.5–5)
POTASSIUM SERPL-SCNC: 3.7 MMOL/L — SIGNIFICANT CHANGE UP (ref 3.5–5)
PROTHROM AB SERPL-ACNC: 11.6 SEC — SIGNIFICANT CHANGE UP (ref 9.95–12.87)
RBC # BLD: 2.65 M/UL — LOW (ref 4.2–5.4)
RBC # BLD: 2.77 M/UL — LOW (ref 4.2–5.4)
RBC # FLD: 13.9 % — SIGNIFICANT CHANGE UP (ref 11.5–14.5)
RBC # FLD: 14.3 % — SIGNIFICANT CHANGE UP (ref 11.5–14.5)
RBC BLD AUTO: ABNORMAL
SODIUM SERPL-SCNC: 150 MMOL/L — HIGH (ref 135–146)
SODIUM SERPL-SCNC: 150 MMOL/L — HIGH (ref 135–146)
SODIUM SERPL-SCNC: 151 MMOL/L — HIGH (ref 135–146)
SODIUM UR-SCNC: 51 MMOL/L — SIGNIFICANT CHANGE UP
TRIGL SERPL-MCNC: 290 MG/DL — HIGH
VARIANT LYMPHS # BLD: 0.9 % — SIGNIFICANT CHANGE UP (ref 0–5)
VARIANT LYMPHS NFR BLD MANUAL: 0.9 % — SIGNIFICANT CHANGE UP (ref 0–5)
WBC # BLD: 17.92 K/UL — HIGH (ref 4.8–10.8)
WBC # BLD: 18.84 K/UL — HIGH (ref 4.8–10.8)
WBC # FLD AUTO: 17.92 K/UL — HIGH (ref 4.8–10.8)
WBC # FLD AUTO: 18.84 K/UL — HIGH (ref 4.8–10.8)

## 2025-04-17 PROCEDURE — 71045 X-RAY EXAM CHEST 1 VIEW: CPT | Mod: 26

## 2025-04-17 PROCEDURE — 71045 X-RAY EXAM CHEST 1 VIEW: CPT | Mod: 26,76,77

## 2025-04-17 PROCEDURE — 88307 TISSUE EXAM BY PATHOLOGIST: CPT | Mod: 26

## 2025-04-17 PROCEDURE — 74176 CT ABD & PELVIS W/O CONTRAST: CPT | Mod: 26

## 2025-04-17 PROCEDURE — 14301 TIS TRNFR ANY 30.1-60 SQ CM: CPT | Mod: 78

## 2025-04-17 PROCEDURE — 49618 RPR AA HRN RCR > 10 NCR/STRN: CPT | Mod: 78

## 2025-04-17 PROCEDURE — 97606 NEG PRS WND THER DME>50 SQCM: CPT | Mod: 78

## 2025-04-17 PROCEDURE — 44120 REMOVAL OF SMALL INTESTINE: CPT | Mod: 78

## 2025-04-17 RX ORDER — DEXMEDETOMIDINE HYDROCHLORIDE IN SODIUM CHLORIDE 4 UG/ML
0.2 INJECTION INTRAVENOUS
Qty: 400 | Refills: 0 | Status: DISCONTINUED | OUTPATIENT
Start: 2025-04-17 | End: 2025-04-18

## 2025-04-17 RX ORDER — I.V. FAT EMULSION 20 G/100ML
0.37 EMULSION INTRAVENOUS
Qty: 50 | Refills: 0 | Status: DISCONTINUED | OUTPATIENT
Start: 2025-04-17 | End: 2025-04-17

## 2025-04-17 RX ORDER — FENTANYL CITRATE-0.9 % NACL/PF 100MCG/2ML
0.5 SYRINGE (ML) INTRAVENOUS
Qty: 2500 | Refills: 0 | Status: DISCONTINUED | OUTPATIENT
Start: 2025-04-17 | End: 2025-04-18

## 2025-04-17 RX ORDER — PIPERACILLIN-TAZO-DEXTROSE,ISO 3.375G/5
3.38 IV SOLUTION, PIGGYBACK PREMIX FROZEN(ML) INTRAVENOUS EVERY 12 HOURS
Refills: 0 | Status: DISCONTINUED | OUTPATIENT
Start: 2025-04-17 | End: 2025-04-21

## 2025-04-17 RX ORDER — FENTANYL CITRATE-0.9 % NACL/PF 100MCG/2ML
50 SYRINGE (ML) INTRAVENOUS EVERY 4 HOURS
Refills: 0 | Status: DISCONTINUED | OUTPATIENT
Start: 2025-04-17 | End: 2025-04-17

## 2025-04-17 RX ORDER — NOREPINEPHRINE BITARTRATE 8 MG
0.05 SOLUTION INTRAVENOUS
Qty: 16 | Refills: 0 | Status: DISCONTINUED | OUTPATIENT
Start: 2025-04-17 | End: 2025-04-19

## 2025-04-17 RX ORDER — HEPARIN SODIUM 1000 [USP'U]/ML
7500 INJECTION INTRAVENOUS; SUBCUTANEOUS EVERY 8 HOURS
Refills: 0 | Status: DISCONTINUED | OUTPATIENT
Start: 2025-04-17 | End: 2025-04-21

## 2025-04-17 RX ORDER — SODIUM/POT/MAG/CALC/CHLOR/ACET 35-20-5MEQ
1 VIAL (ML) INTRAVENOUS
Refills: 0 | Status: DISCONTINUED | OUTPATIENT
Start: 2025-04-17 | End: 2025-04-17

## 2025-04-17 RX ORDER — ACETAMINOPHEN 500 MG/5ML
1000 LIQUID (ML) ORAL ONCE
Refills: 0 | Status: COMPLETED | OUTPATIENT
Start: 2025-04-17 | End: 2025-04-17

## 2025-04-17 RX ORDER — SODIUM CHLORIDE 9 G/1000ML
1000 INJECTION, SOLUTION INTRAVENOUS
Refills: 0 | Status: DISCONTINUED | OUTPATIENT
Start: 2025-04-17 | End: 2025-04-18

## 2025-04-17 RX ORDER — PROPOFOL 10 MG/ML
10 INJECTION, EMULSION INTRAVENOUS
Qty: 1000 | Refills: 0 | Status: DISCONTINUED | OUTPATIENT
Start: 2025-04-17 | End: 2025-04-18

## 2025-04-17 RX ORDER — FENTANYL CITRATE-0.9 % NACL/PF 100MCG/2ML
25 SYRINGE (ML) INTRAVENOUS
Refills: 0 | Status: DISCONTINUED | OUTPATIENT
Start: 2025-04-17 | End: 2025-04-17

## 2025-04-17 RX ADMIN — Medication 0.5 MILLIGRAM(S): at 04:02

## 2025-04-17 RX ADMIN — Medication 6.78 MICROGRAM(S)/KG/HR: at 20:39

## 2025-04-17 RX ADMIN — Medication 50 MILLIEQUIVALENT(S): at 20:19

## 2025-04-17 RX ADMIN — Medication 50 MILLIEQUIVALENT(S): at 02:25

## 2025-04-17 RX ADMIN — PROPOFOL 8.14 MICROGRAM(S)/KG/MIN: 10 INJECTION, EMULSION INTRAVENOUS at 18:21

## 2025-04-17 RX ADMIN — Medication 50 MILLIEQUIVALENT(S): at 06:56

## 2025-04-17 RX ADMIN — HEPARIN SODIUM 7500 UNIT(S): 1000 INJECTION INTRAVENOUS; SUBCUTANEOUS at 21:52

## 2025-04-17 RX ADMIN — NOREPINEPHRINE BITARTRATE 6.36 MICROGRAM(S)/KG/MIN: 8 SOLUTION at 21:12

## 2025-04-17 RX ADMIN — DEXMEDETOMIDINE HYDROCHLORIDE IN SODIUM CHLORIDE 6.78 MICROGRAM(S)/KG/HR: 4 INJECTION INTRAVENOUS at 23:00

## 2025-04-17 RX ADMIN — Medication 50 MILLIEQUIVALENT(S): at 21:53

## 2025-04-17 RX ADMIN — SODIUM CHLORIDE 125 MILLILITER(S): 9 INJECTION, SOLUTION INTRAVENOUS at 20:04

## 2025-04-17 RX ADMIN — Medication 1000 MILLIGRAM(S): at 19:25

## 2025-04-17 RX ADMIN — Medication 25 MICROGRAM(S): at 18:36

## 2025-04-17 RX ADMIN — DEXMEDETOMIDINE HYDROCHLORIDE IN SODIUM CHLORIDE 6.78 MICROGRAM(S)/KG/HR: 4 INJECTION INTRAVENOUS at 19:11

## 2025-04-17 RX ADMIN — Medication 50 MILLIEQUIVALENT(S): at 10:57

## 2025-04-17 RX ADMIN — Medication 400 MILLIGRAM(S): at 19:10

## 2025-04-17 RX ADMIN — HEPARIN SODIUM 7500 UNIT(S): 1000 INJECTION INTRAVENOUS; SUBCUTANEOUS at 06:56

## 2025-04-17 RX ADMIN — Medication 75 EACH: at 20:04

## 2025-04-17 RX ADMIN — Medication 0.25 MILLIGRAM(S): at 00:30

## 2025-04-17 RX ADMIN — SODIUM CHLORIDE 150 MILLILITER(S): 9 INJECTION, SOLUTION INTRAVENOUS at 18:21

## 2025-04-17 RX ADMIN — Medication 25 GRAM(S): at 18:39

## 2025-04-17 RX ADMIN — Medication 25 MICROGRAM(S): at 18:21

## 2025-04-17 RX ADMIN — Medication 1 APPLICATION(S): at 06:57

## 2025-04-17 RX ADMIN — Medication 25 GRAM(S): at 02:26

## 2025-04-17 RX ADMIN — I.V. FAT EMULSION 20.8 GM/KG/DAY: 20 EMULSION INTRAVENOUS at 20:04

## 2025-04-17 RX ADMIN — Medication 50 MILLIEQUIVALENT(S): at 09:01

## 2025-04-17 NOTE — CHART NOTE - NSCHARTNOTEFT_GEN_A_CORE
NANCY SARGENT   [id number] (1960)    Procedure: s/p ex lap, small bowel resection, side to side anastomosis       OR time: 3 hours  EBL:  50cc  IVF:  3L IVF  UOP: 200cc    Operative findings: exploratory laparotomy, opened previous incision found Vicryl mesh intact. excised mesh. once peritoneum was entered, discovery of bilious contents in abdomen approx 500cc. significant adhesions and small bowel distention, found to have anastomotic leak at staple line of end to side ileoileostomy. resected proximal and distal measuring about 30cm. creation of new ileoileostomy, side to side, antiperistaltic, using EndoGIA stapler, tan loads. common enterotomy closed with PDS. placement of new 10Fr THONY drain from left abdomen to near anastomosis, repair of ventral hernia using 2 layered Vicryl meshes [id number], fixed to fascia and peritoneum with Vicryl suture. thickened peritoneum closed overtop of Vicryl using #2 PDS Quill running, with interrupted 0 vicryl internal retention sutures. scarpas closed and skin approximated with deep dermal interrupted vicryl and steri strips with placement of prevena.        T(C): 36.4 (04-17), Max: 37.2 (04-16)  HR: 96 (04-17) (87 - 120)  BP: 157/78 (04-17) (126/72 - 176/83)  BP(mean): 102 (04-17) (82 - 115)  ABP: 110/57 (04-17) (110/57 - 183/80)  ABP(mean): 72 (04-17) (72 - 105)  RR: 26 (04-17) (18 - 26)  SpO2: 100% (04-17) (92% - 100%)    Physical Exam:  Neuro: GCS 11T  Respiratory: CTAB/L  Cards: tachycardic  GI: Abdomen soft, wound vac in place. THONY drains x 3, all serosanguinous  : pierce in place    A/P:  - STAT labs  - EKG  - NGT placement   - Post op CXR  - Wean Fio2

## 2025-04-17 NOTE — PROGRESS NOTE ADULT - SUBJECTIVE AND OBJECTIVE BOX
Nephrology progress note      Patient is seen and examined, events over the last 24 h noted .    Allergies:  No Known Allergies    Hospital Medications:   MEDICATIONS  (STANDING):  chlorhexidine 2% Cloths 1 Application(s) Topical <User Schedule>  dextrose 5%. 1000 milliLiter(s) (75 mL/Hr) IV Continuous <Continuous>  heparin   Injectable 7500 Unit(s) SubCutaneous every 8 hours  lipid, fat emulsion (Fish Oil and Plant Based) 20% Infusion 0.3687 Gm/kG/Day (20.8 mL/Hr) IV Continuous <Continuous>  pantoprazole  Injectable 40 milliGRAM(s) IV Push daily  Parenteral Nutrition - Adult 1 Each (75 mL/Hr) TPN Continuous <Continuous>  piperacillin/tazobactam IVPB.. 3.375 Gram(s) IV Intermittent every 12 hours        VITALS:  T(F): 98.8 (04-17-25 @ 12:00), Max: 99.1 (04-16-25 @ 18:03)  HR: 94 (04-17-25 @ 12:00)  BP: 137/67 (04-17-25 @ 12:00)  RR: 18 (04-17-25 @ 12:00)  SpO2: 97% (04-17-25 @ 12:00)  Wt(kg): --    04-15 @ 07:01  -  04-16 @ 07:00  --------------------------------------------------------  IN: 735.7 mL / OUT: 5180 mL / NET: -4444.3 mL    04-16 @ 07:01  -  04-17 @ 07:00  --------------------------------------------------------  IN: 2000 mL / OUT: 3405 mL / NET: -1405 mL    04-17 @ 07:01  -  04-17 @ 12:47  --------------------------------------------------------  IN: 325 mL / OUT: 500 mL / NET: -175 mL      Height (cm): 160 (04-17 @ 11:26)  Weight (kg): 135.6 (04-17 @ 11:26)  BMI (kg/m2): 53 (04-17 @ 11:26)  BSA (m2): 2.29 (04-17 @ 11:26)    PHYSICAL EXAM:  Constitutional: NAD  Respiratory: CTAB, no wheezes, rales or rhonchi  Cardiovascular: S1, S2, RRR  Gastrointestinal: BS+, soft, NT/ND  Extremities: No cyanosis or clubbing. No peripheral edema  :  No pierce.   Skin: No rashes    LABS:  04-17    150[H]  |  106  |  96[HH]  ----------------------------<  164[H]  3.3[L]   |  26  |  4.2[HH]    Ca    8.1[L]      17 Apr 2025 04:26  Phos  6.3     04-16  Mg     2.4     04-16    TPro  5.8[L]  /  Alb  3.2[L]  /  TBili  0.4  /  DBili      /  AST  52[H]  /  ALT  54[H]  /  AlkPhos  85  04-16                          9.7    14.53 )-----------( 358      ( 16 Apr 2025 22:33 )             30.5       Urine Studies:  Urinalysis Basic - ( 17 Apr 2025 04:26 )    Color:  / Appearance:  / SG:  / pH:   Gluc: 164 mg/dL / Ketone:   / Bili:  / Urobili:    Blood:  / Protein:  / Nitrite:    Leuk Esterase:  / RBC:  / WBC    Sq Epi:  / Non Sq Epi:  / Bacteria:       Sodium, Random Urine: 51.0 mmoL/L (04-16 @ 07:08)  Creatinine, Random Urine: 65 mg/dL (04-16 @ 07:08)  Osmolality, Random Urine: 382 mos/kg (04-16 @ 07:08)  Sodium, Random Urine: <20.0 mmoL/L (04-11 @ 06:27)  Creatinine, Random Urine: 105 mg/dL (04-11 @ 06:27)      Lipid: chol --, , HDL --, LDL --      [04-17-25 @ 12:00]          RADIOLOGY & ADDITIONAL STUDIES:

## 2025-04-17 NOTE — PROGRESS NOTE ADULT - ASSESSMENT
ASSESSMENT:  64y F w/ PMHx of HTN and gastric bypass surgery in 2001 presents with abdominal distention. CT with incarcerated ventral hernia with SBO. S/p Open repair of ventral hernia using mesh and component separation technique, Small bowel resection.    PLAN:  - F/u CT read  - Closely monitor THONY drain outputs  - NPO with meds  - Monitor bowel function  - Monitor UOP and creatinine  - IV abx: zosyn  - Monitor for fevers  - Rest of care per SICU    BLUE TEAM SPECTRA: [id number] ASSESSMENT:  64y F w/ PMHx of HTN and gastric bypass surgery in 2001 presents with abdominal distention. CT with incarcerated ventral hernia with SBO. S/p Open repair of ventral hernia using mesh and component separation technique, Small bowel resection.    PLAN:  - F/u CT read  - Start TPN  - Closely monitor THONY drain outputs  - Strict NPO  - Monitor bowel function  - Monitor UOP and creatinine  - IV abx: zosyn  - Monitor for fevers  - Rest of care per SICU    BLUE TEAM SPECTRA: [id number] ASSESSMENT:  64y F w/ PMHx of HTN and gastric bypass surgery in 2001 presents with abdominal distention. CT with incarcerated ventral hernia with SBO. S/p Open repair of ventral hernia using mesh and component separation technique, Small bowel resection.    PLAN:  - F/u CT read  - Start TPN  - F/U IR for PICC line placement  - Closely monitor THONY drain outputs  - Strict NPO  - Monitor bowel function  - Monitor UOP and creatinine  - IV abx: zosyn  - Monitor for fevers  - Rest of care per SICU    BLUE TEAM SPECTRA: [id number] ASSESSMENT:  64y F w/ PMHx of HTN and gastric bypass surgery in 2001 presents with abdominal distention. CT with incarcerated ventral hernia with SBO. S/p Open repair of ventral hernia using mesh and component separation technique, Small bowel resection.    PLAN:  - F/u CT read  - Strict NPO  - Start TPN  - F/U IR for PICC line placement  - Added on today (4/17) for Exploratory Laparotomy, possible bowel resection and all indicated procedures  - Closely monitor THONY drain outputs  - Monitor bowel function  - Monitor UOP and creatinine  - IV abx: zosyn  - Monitor for fevers  - Rest of care per SICU    BLUE TEAM SPECTRA: [id number] ASSESSMENT:  64y F w/ PMHx of HTN and gastric bypass surgery in 2001 presents with abdominal distention. CT with incarcerated ventral hernia with SBO. S/p Open repair of ventral hernia using mesh and component separation technique, Small bowel resection.    PLAN:  - F/u CT read  - Strict NPO  - Start TPN  - F/U IR for PICC line placement today  - Added on today (4/17) for Exploratory Laparotomy, possible bowel resection and all indicated procedures  - Closely monitor THONY drain outputs  - Monitor bowel function  - Monitor UOP and creatinine  - IV abx: zosyn  - Monitor for fevers  - Rest of care per SICU    BLUE TEAM SPECTRA: [id number] ASSESSMENT:  64y F w/ PMHx of HTN and gastric bypass surgery in 2001 presents with abdominal distention. CT with incarcerated ventral hernia with SBO. S/p Open repair of ventral hernia using mesh and component separation technique, Small bowel resection.    PLAN:  - Strict NPO  - Start TPN  - F/U IR for PICC line placement today  - Added on today (4/17) for Exploratory Laparotomy, possible bowel resection and all indicated procedures  - Closely monitor THONY drain outputs  - Monitor bowel function  - Monitor UOP and creatinine  - IV abx: zosyn  - Monitor for fevers  - Rest of care per SICU    BLUE TEAM SPECTRA: [id number] ASSESSMENT:  64y F w/ PMHx of HTN and gastric bypass surgery in 2001 presents with abdominal distention. CT with incarcerated ventral hernia with SBO. S/p Open repair of ventral hernia using mesh and component separation technique, Small bowel resection.    PLAN:  - Strict NPO  - F/U IR for PICC line placement today  - Start TPN once PICC line placed  - Added on today (4/17) for Exploratory Laparotomy, possible bowel resection and all indicated procedures  - Closely monitor THONY drain outputs  - Monitor bowel function  - Monitor UOP and creatinine  - IV abx: zosyn  - Monitor for fevers  - Rest of care per SICU    BLUE TEAM SPECTRA: [id number]

## 2025-04-17 NOTE — PRE-ANESTHESIA EVALUATION ADULT - NSANTHADDINFOFT_GEN_ALL_CORE
Multidisciplinary discussion had with surgical, SICU/stepdown, and anesthesia team. Plan for disposition to stepdown vs. ICU depending on ability to extubate patient at end of case. Plan for arterial line, large bore PIVs +/- TLC depending on pressor requirements intraop.

## 2025-04-17 NOTE — PROGRESS NOTE ADULT - ASSESSMENT
ASSESSMENT:  64F PMHx HTN, morbid obesity s/p 2001 Abby-en-Y gastric bypass who presented on 4/3 with incarcerated ventral hernia with SBO. S/p 4/10 open ventral hernia repair, small bowel resection, and primary fascial closure with Torey Cornejo. Admitted to SICU for Q1 abdominal checks and hemodynamic monitoring.     NEUROLOGICAL:  #Acute pain  -IV tylenol  -Dilaudid 0.25mg q 4 hours PRN for moderate pain, dilaudid 0.5mg q 4 hours PRN for severe pain     RESPIRATORY:   #ARDS  -extubated to BiPAP on 4/15  -CPAP at night   #hx LOUIS on CPAP@ home  #Activity   - bedrest    CARDS:   #Sinus tachycardia   -s/p 500cc IVF bolus   #acute hypotension - resolved   -off levophed since 4/13   - MAP >65  #hx of HTN  - holding home HCTZ 25mg QD, amlodipine and losartan  #EKG- NSR  #TTE 6/2022: EF 55-60%. G1DD.   - TTE 4/11: EF of 56 %.G1DD.     GASTROINTESTINAL/NUTRITION:   #Diet, strict NPO  -concern for leak, THONY drain #2 tea-colored  -Per primary team, CTAP with PO contrast:   -IR consult for TPN, add on for Friday 4/18/25    -aspiration precautions, HOB 30  #SBO s/p ventral hernia repair with SBR  - NGT removed 4/15  #GI Prophylaxis/hx GERD   - pantoprazole  Injectable 40 IV Push daily (takes omeprazole @home)  #Bowel regimen    -holding    -last BM today 4/16     /RENAL:   #urine output in critically ill  - indwelling pierce   - High UOP, pending urine lytes   #worsening FUNMI   - Uptrending BUN   - Ammonia 35  - previously oliguric, now improved. wss on bumex gtt @ 0.5mg/hr  - -300cc/hr  - nephrology consulted and following > hold off on CVVH  - renal u/s> no hydronephrosis. 2-3cm anechoic cyst on right kidney   #Hypernatremia   -Na 156, free water deficit 5L   -Started D5W @ 75cc/hr,  #Hypokalemia - repleted     Labs:          BUN/Cr- 100/4.3  -->,  99/4.3  -->          [04-16 @ 22:33]Na  156 // K  3.3 // Mg  2.4 // Phos  6.3    HEME/ONC:   #DVT prophylaxis     -Chemical: heparin   Injectable 5000 Unit(s) SubCutaneous every 8 hours     -Mechanical: SCDs    Labs: Hb/Hct:  9.1/27.9  -->,  9.7/30.5  -->                      Plts:  253  -->,  358  -->                 PTT/INR:        ID:  #MRSA nares negative   WBC- 11.11  --->>,  14.08  --->>,  14.53  --->>  Temp trend- 24hrs T(F): 98.9 (04-16 @ 20:00), Max: 99.1 (04-16 @ 18:03)  Current antibiotics-piperacillin/tazobactam IVPB.. 3.375 every 12 hours  #Antibiotics Course  - continue zosyn > Purulent abscesses noted intraoperatively   #febrile  - UA negative  - blood cultures sent 4/13   - sputum cultures pending   #cultures  - OR cx: few E.coli, few bacteroides, rare clostridium  -4/13 Blood Cx: no growth at 72 hrs   -4/14 tracheal aspirate: no growth     ENDOCRINE:  #glycemic monitoring    -FSG q6 if NPO    -Glucose goal 140-180. If above 180, will start corrective insulin sliding scale    MSK:  #Bedrest     SKIN:  #DTI screening negative     - will continue to monitor daily skin changes    LINES/DRAINS:  Nataliya DAVIES  ADVANCED DIRECTIVES:  Full Code  HCP- Daughter   INDICATION FOR SICU/SDU: Intestinal obstruction  DISPO:  SICU. Case to be discussed with attending Dr. Bermudez  4A candidate    ASSESSMENT:  64F PMHx HTN, morbid obesity s/p 2001 Abby-en-Y gastric bypass who presented on 4/3 with incarcerated ventral hernia with SBO. S/p 4/10 open ventral hernia repair, small bowel resection, and primary fascial closure with Torey Cornejo. Admitted to SICU for Q1 abdominal checks and hemodynamic monitoring.     NEUROLOGICAL:  #Acute pain  -IV tylenol  -Dilaudid 0.25mg q 4 hours PRN for moderate pain  - dilaudid 0.5mg q 4 hours PRN for severe pain     RESPIRATORY:   #ARDS  -extubated to BiPAP on 4/15  -CPAP at night   #hx LOUIS on CPAP@ home  #Activity   - activity- increase as tolerated     CARDS:   #Sinus tachycardia   -s/p 500cc IVF bolus   #acute hypotension - resolved   -off levophed since 4/13   - MAP >65  #hx of HTN  - holding home HCTZ 25mg QD, amlodipine and losartan  #EKG- NSR  #TTE 6/2022: EF 55-60%. G1DD.   - TTE 4/11: EF of 56 %.G1DD.     GASTROINTESTINAL/NUTRITION:   #Diet, strict NPO  -concern for leak, THONY drain #2 tea-colored  - CTAP with PO contrast: Evidence of defect in small bowel loop with connection to anterior midline intraperitoneal and subcutaneous pannus collections containing extravasated oral contrast. Unclear if this small bowel defect occurs at the anastomotic site. Surgical review recommended.  -IR consult for TPN, add on for Thursday 4/17/25    -aspiration precautions, HOB 30  #SBO s/p ventral hernia repair with SBR  - NGT removed 4/15  #GI Prophylaxis/hx GERD   - pantoprazole  Injectable 40 IV Push daily (takes omeprazole @home)  #Bowel regimen    -holding    -last BM today 4/17     /RENAL:   #urine output in critically ill  - indwelling pierce   - High UOP, pending urine lytes   #worsening FUNMI   - Uptrending BUN   - Ammonia 35  - previously oliguric, now improved. Previously on bumex gtt @ 0.5mg/hr  - -300cc/hr  - nephrology consulted and following > hold off on CVVH  - renal u/s> no hydronephrosis. 2-3cm anechoic cyst on right kidney   #Hypernatremia   -Na 156, free water deficit 5L   -Started D5W @ 75cc/hr,  #Hypokalemia - repleted   Labs:          BUN/Cr- 99/4.3  -->,  96/4.2  -->          [04-17 @ 04:26]Na  150 // K  3.3 // Mg  -- // Phos  --  [04-16 @ 22:33]Na  156 // K  3.3 // Mg  2.4 // Phos  6.3      HEME/ONC:   #DVT prophylaxis     DVT prophylaxis-heparin   Injectable 7500 q8, SCDs    Labs: Hb/Hct:  9.1/27.9  -->,  9.7/30.5  -->                      Plts:  253  -->,  358  -->                 PTT/INR:        ID:  #MRSA nares negative   WBC- 11.11  --->>,  14.08  --->>,  14.53  --->>  Temp trend- 24hrs T(F): 98.9 (04-16 @ 20:00), Max: 99.1 (04-16 @ 18:03)  Current antibiotics-piperacillin/tazobactam IVPB.. 3.375 every 12 hours  #Antibiotics Course  - continue zosyn > Purulent abscesses noted intraoperatively   #febrile  - UA negative  - blood cultures sent 4/13   - sputum cultures pending   #cultures  - OR cx: few E.coli, few bacteroides, rare clostridium  -4/13 Blood Cx: no growth at 72 hrs   -4/14 tracheal aspirate: no growth     ENDOCRINE:  #glycemic monitoring    -FSG q6 if NPO    -Glucose goal 140-180. If above 180, will start corrective insulin sliding scale    MSK:  #Activity- increase as tolerated     SKIN:  #DTI screening negative     - will continue to monitor daily skin changes    LINES/DRAINS:  Nataliya DAVIES  ADVANCED DIRECTIVES:  Full Code  HCP- Daughter   INDICATION FOR SICU/SDU: Intestinal obstruction  DISPO:  SICU. Case to be discussed with attending Dr. Bermudez  4A candidate

## 2025-04-17 NOTE — CHART NOTE - NSCHARTNOTEFT_GEN_A_CORE
Surgery Post-Op Note    CC:  Pre-Op Dx: Intestinal obstruction    Incarcerated ventral hernia    SBO (small bowel obstruction)    Perforated viscus    Small bowel obstruction      Procedure: Open repair of ventral hernia using mesh and component separation technique    Small bowel resection    Insertion, arterial line, percutaneous    Exploratory laparotomy      Vital Signs Last 24 Hrs  T(C): 36.6 (17 Apr 2025 20:00), Max: 37.2 (17 Apr 2025 04:00)  T(F): 97.8 (17 Apr 2025 20:00), Max: 98.9 (17 Apr 2025 04:00)  HR: 77 (17 Apr 2025 23:30) (61 - 118)  BP: 100/68 (17 Apr 2025 23:15) (82/43 - 176/83)  BP(mean): 64 (17 Apr 2025 23:00) (60 - 115)  RR: 22 (17 Apr 2025 23:30) (17 - 26)  SpO2: 94% (17 Apr 2025 23:30) (94% - 100%)    Parameters below as of 17 Apr 2025 20:00  Patient On (Oxygen Delivery Method): ventilator    O2 Concentration (%): 80    PHYSICAL EXAM:  General: NAD  HEENT: NCAT, EOMI, Trachea ML  CHEST: intubated.  Cardiac: RRR  Abdomen: Soft, non-distended, non-tender, midline prevena in place with THONY drains x3 ss outputs. pierce intact. abd binder intact  Skin: Warm/dry, normal color, no jaundice  Incision/wound: healing well, dressings in place, clean, dry and intact    LABS:                        7.9    18.84 )-----------( 394      ( 17 Apr 2025 22:38 )             25.9     04-17    150[H]  |  108  |  95[HH]  ----------------------------<  153[H]  3.7   |  27  |  3.7[H]    Ca    9.0      17 Apr 2025 17:56  Phos  5.2     04-17  Mg     2.1     04-17    TPro  5.8[L]  /  Alb  3.2[L]  /  TBili  0.4  /  DBili  x   /  AST  52[H]  /  ALT  54[H]  /  AlkPhos  85  04-16    PT/INR - ( 17 Apr 2025 17:56 )   PT: 11.60 sec;   INR: 0.98 ratio         PTT - ( 17 Apr 2025 17:56 )  PTT:19.6 sec  CAPILLARY BLOOD GLUCOSE      POCT Blood Glucose.: 219 mg/dL (18 Apr 2025 00:33)  POCT Blood Glucose.: 166 mg/dL (17 Apr 2025 18:26)  POCT Blood Glucose.: 163 mg/dL (17 Apr 2025 12:57)  POCT Blood Glucose.: 201 mg/dL (17 Apr 2025 06:54)    LIVER FUNCTIONS - ( 16 Apr 2025 22:33 )  Alb: 3.2 g/dL / Pro: 5.8 g/dL / ALK PHOS: 85 U/L / ALT: 54 U/L / AST: 52 U/L / GGT: x           Urinalysis Basic - ( 17 Apr 2025 17:56 )    Color: x / Appearance: x / SG: x / pH: x  Gluc: 153 mg/dL / Ketone: x  / Bili: x / Urobili: x   Blood: x / Protein: x / Nitrite: x   Leuk Esterase: x / RBC: x / WBC x   Sq Epi: x / Non Sq Epi: x / Bacteria: x        Radiology and Additional Studies:    Assessment:  64y F w/ PMHx of HTN and gastric bypass surgery in 2001 presents with abdominal distention. CT with incarcerated ventral hernia with SBO. S/p Open repair of ventral hernia using mesh and component separation technique, Small bowel resection. 4/17 s/p ex lap, 30 cm sbr, creation of new side to side ileoileostomy, ventral hernia repair w/ mesh    PLAN:  - Strict NPO w/ NGT  - TPN via R IJ central line (brown port)  - Closely monitor THONY drain outputs  - Monitor bowel function  - Monitor UOP and creatinine  - IV abx: zosyn  - Monitor for fevers  - Rest of care per SICU    BLUE TEAM SPECTRA: [id number]

## 2025-04-17 NOTE — CHART NOTE - NSCHARTNOTEFT_GEN_A_CORE
Contacted by SICU team for initiation of TPN. NPO day#15 with PICC placement planned. Suspected anastomotic leak with RTOR this afternoon. Start TPN tonight, re-evaluate IVF's post op when TPN starts. Monitor lytes, gluc closely. TG level 290, start SMOF lipids (contraindicated with TG >1000) and monitor TG clearance. Will follow

## 2025-04-17 NOTE — PROGRESS NOTE ADULT - SUBJECTIVE AND OBJECTIVE BOX
NANCY SARGENT   [id number]   [birth year]  64yF  ============================================================   DATE OF INITIAL SICU/SDU CONSULT: 04-10-25    INDICATION FOR SICU CONSULT:  q1hr abdominal checks, mechanical ventilation     SICU COURSE EVENTS :  04-10 - admitted to SICU service  4/11: Intubated and started on paralytic. Arterial line placed, subclavian TLC placed. Nephrology consulted for oliguria. IR abdominal muscle botox injection performed. TTE performed.   4/12: Additional fluid boluses given; trial fo bumex started, considering CVVH. Weaned off nimbex gtt.  > 220 /hr. Renal U/S w/out hydro.   4/13: Weaned off Levophed. Bumex gtt 2mg/hr > 1mg/hr. Goal net negative 1-1.5L, UOP approx, 200c/hr.   4/14: Remained on bumex gtt for further diuresis, decreased to 0.5 overnight, vent settings weaned. Cr downtrending, LFTs worsening.   4/15: Extubated to BiPAP, transitioned to NC. Dc'd bumex gtt, given 5mg metolazone x 1 and 2mg bumex x 1.      24HOUR EVENTS  -Admission under SICU service  4/16  NIGHT  - PM rounds: , sinus tachycardia -120, saturating 98% on 2L NC, temperature 99.1, c/o worsened abdominal pain compared to yesterday, abdomen soft, tender, #1 THONY serous, #2 THONY murky/bilious, UOP 300cc/hr  - Per primary team, CTAP with PO contrast, 1am   - Hypernatremia 156 > D5W @ 75cc/hr ordered   - Persistent hypokalemia > 60mEq K   - Pending urine lytes, serum osmolality for high UOP     DAY  - Bring cpap from home  - PT/ Phys consult placed  - No CT per primary  - IR c/s for PICC--> scheduled as an add-on for PICC line placement for Friday  - D/c breanne, TLC  - DG to SDU  - Labetalol 5mg q6  -3 K riders for K 2.9  - 4mg zofran x 1 for nausea  - Bilious output in THONY, per Torey re-examine in AM, will consider scanning  - IR probably going to add on for PICC tomorrow   - tachycardic to 120s --> 500mL LR bolus ordered, primary and SICU attending aware      [X] A ten-point review of systems was negative except as expressed in note.  [X ] History was obtained from patient. If unable to participate in their care, history was from review of the chart and collateral sources of information.  ============================================================        ROSETTANANCY   [id number]   [birth year]  64yF  ============================================================   DATE OF INITIAL SICU/SDU CONSULT: 04-10-25    INDICATION FOR SICU CONSULT:  q1hr abdominal checks, mechanical ventilation     SICU COURSE EVENTS :  04-10 - admitted to SICU service  4/11: Intubated and started on paralytic. Arterial line placed, subclavian TLC placed. Nephrology consulted for oliguria. IR abdominal muscle botox injection performed. TTE performed.   4/12: Additional fluid boluses given; trial fo bumex started, considering CVVH. Weaned off nimbex gtt.  > 220 /hr. Renal U/S w/out hydro.   4/13: Weaned off Levophed. Bumex gtt 2mg/hr > 1mg/hr. Goal net negative 1-1.5L, UOP approx, 200c/hr.   4/14: Remained on bumex gtt for further diuresis, decreased to 0.5 overnight, vent settings weaned. Cr downtrending, LFTs worsening.   4/15: Extubated to BiPAP, transitioned to NC. Dc'd bumex gtt, given 5mg metolazone x 1 and 2mg bumex x 1.   4/16: THONY drain #2 murky/bilious, pending CTAP with PO contrast, hypernatremia, started D5W @ 75cc/hr, persistent hypokalemia      24HOUR EVENTS  -Admission under SICU service  4/16  NIGHT  - PM rounds: , sinus tachycardia -120, saturating 98% on 2L NC, temperature 99.1, c/o worsened abdominal pain compared to yesterday, abdomen soft, tender, #1 THONY serous, #2 THONY murky/bilious, UOP 300cc/hr  - Per primary team, CTAP with PO contrast, 1am   - Hypernatremia 156 > D5W @ 75cc/hr ordered   - Persistent hypokalemia > 60mEq K   - Pending urine lytes, serum osmolality for high UOP     DAY  - Bring cpap from home  - PT/ Phys consult placed  - No CT per primary  - IR c/s for PICC--> scheduled as an add-on for PICC line placement for Friday  - D/c breanne, TLC  - DG to SDU  - Labetalol 5mg q6  -3 K riders for K 2.9  - 4mg zofran x 1 for nausea  - Bilious output in THONY, per Torey re-examine in AM, will consider scanning  - IR probably going to add on for PICC tomorrow   - tachycardic to 120s --> 500mL LR bolus ordered, primary and SICU attending aware      [X] A ten-point review of systems was negative except as expressed in note.  [X ] History was obtained from patient. If unable to participate in their care, history was from review of the chart and collateral sources of information.  ============================================================        ROSETTANANCY   [id number]   [birth year]  64yF  ============================================================   DATE OF INITIAL SICU/SDU CONSULT: 04-10-25    INDICATION FOR SICU CONSULT:  q1hr abdominal checks, mechanical ventilation     SICU COURSE EVENTS :  04-10 - admitted to SICU service  4/11: Intubated and started on paralytic. Arterial line placed, subclavian TLC placed. Nephrology consulted for oliguria. IR abdominal muscle botox injection performed. TTE performed.   4/12: Additional fluid boluses given; trial fo bumex started, considering CVVH. Weaned off nimbex gtt.  > 220 /hr. Renal U/S w/out hydro.   4/13: Weaned off Levophed. Bumex gtt 2mg/hr > 1mg/hr. Goal net negative 1-1.5L, UOP approx, 200c/hr.   4/14: Remained on bumex gtt for further diuresis, decreased to 0.5 overnight, vent settings weaned. Cr downtrending, LFTs worsening.   4/15: Extubated to BiPAP, transitioned to NC. Dc'd bumex gtt, given 5mg metolazone x 1 and 2mg bumex x 1.   4/16: THONY drain #2 murky/bilious, pending CTAP with PO contrast, hypernatremia, started D5W @ 75cc/hr, persistent hypokalemia      24HOUR EVENTS  -Admission under SICU service  4/16  NIGHT  - PM rounds: , sinus tachycardia -120, saturating 98% on 2L NC, temperature 99.1, c/o worsened abdominal pain compared to yesterday, abdomen soft, tender, #1 THONY serous, #2 THONY murky/bilious, UOP 300cc/hr  - Per primary team, CTAP with PO contrast, 1am   - Hypernatremia 156 > D5W @ 75cc/hr ordered   - Persistent hypokalemia > 60mEq K   - Pending urine lytes, serum osmolality for high UOP     DAY  - Bring cpap from home  - PT/ Phys consult placed  - No CT per primary  - IR c/s for PICC--> scheduled as an add-on for PICC line placement for Friday  - D/c ERIKA raymundo  - DG to SDU  - Labetalol 5mg q6  -3 K riders for K 2.9  - 4mg zofran x 1 for nausea  - Bilious output in THONY, per Torey re-examine in AM, will consider scanning  - IR probably going to add on for PICC tomorrow   - tachycardic to 120s --> 500mL LR bolus ordered, primary and SICU attending aware      [X] A ten-point review of systems was negative except as expressed in note.  [X ] History was obtained from patient. If unable to participate in their care, history was from review of the chart and collateral sources of information.  ============================================================     Daily     Daily     Diet, NPO (04-16-25 @ 08:40)      CURRENT MEDS:  Neurologic Medications  HYDROmorphone  Injectable 0.25 milliGRAM(s) IV Push every 4 hours PRN Moderate Pain (4 - 6)  HYDROmorphone  Injectable 0.5 milliGRAM(s) IV Push every 4 hours PRN Severe Pain (7 - 10)    Respiratory Medications    Cardiovascular Medications    Gastrointestinal Medications  dextrose 5%. 1000 milliLiter(s) IV Continuous <Continuous>  pantoprazole  Injectable 40 milliGRAM(s) IV Push daily  potassium chloride  20 mEq/100 mL IVPB 20 milliEquivalent(s) IV Intermittent every 2 hours    Genitourinary Medications    Hematologic/Oncologic Medications  heparin   Injectable 7500 Unit(s) SubCutaneous every 8 hours    Antimicrobial/Immunologic Medications  piperacillin/tazobactam IVPB.. 3.375 Gram(s) IV Intermittent every 12 hours    Endocrine/Metabolic Medications    Topical/Other Medications  chlorhexidine 2% Cloths 1 Application(s) Topical <User Schedule>      ICU Vital Signs Last 24 Hrs  T(C): 36.7 (17 Apr 2025 08:00), Max: 37.3 (16 Apr 2025 18:03)  T(F): 98 (17 Apr 2025 08:00), Max: 99.1 (16 Apr 2025 18:03)  HR: 99 (17 Apr 2025 08:00) (94 - 120)  BP: 135/66 (17 Apr 2025 08:00) (127/63 - 152/81)  BP(mean): 93 (17 Apr 2025 08:00) (82 - 107)  ABP: --  ABP(mean): --  RR: 18 (17 Apr 2025 08:00) (15 - 30)  SpO2: 96% (17 Apr 2025 08:00) (92% - 99%)    O2 Parameters below as of 17 Apr 2025 04:00  Patient On (Oxygen Delivery Method): nasal cannula  O2 Flow (L/min): 2          Adult Advanced Hemodynamics Last 24 Hrs  CVP(mm Hg): --  CVP(cm H2O): --  CO: --  CI: --  PA: --  PA(mean): --  PCWP: --  SVR: --  SVRI: --  PVR: --  PVRI: --      ABG - ( 15 Apr 2025 14:31 )  pH, Arterial: 7.50  pH, Blood: x     /  pCO2: 33    /  pO2: 126   / HCO3: 26    / Base Excess: 2.5   /  SaO2: 96.1                I&O's Summary    16 Apr 2025 07:01  -  17 Apr 2025 07:00  --------------------------------------------------------  IN: 2000 mL / OUT: 3405 mL / NET: -1405 mL    17 Apr 2025 07:01  -  17 Apr 2025 10:34  --------------------------------------------------------  IN: 325 mL / OUT: 500 mL / NET: -175 mL      I&O's Detail    16 Apr 2025 07:01  -  17 Apr 2025 07:00  --------------------------------------------------------  IN:    dextrose 5%: 600 mL    IV PiggyBack: 600 mL    IV PiggyBack: 300 mL    Lactated Ringers Bolus: 500 mL  Total IN: 2000 mL    OUT:    Bulb (mL): 100 mL    Bulb (mL): 15 mL    Indwelling Catheter - Urethral (mL): 3290 mL    VAC (Vacuum Assisted Closure) System (mL): 0 mL  Total OUT: 3405 mL    Total NET: -1405 mL      17 Apr 2025 07:01  -  17 Apr 2025 10:34  --------------------------------------------------------  IN:    dextrose 5%: 225 mL    IV PiggyBack: 100 mL  Total IN: 325 mL    OUT:    Indwelling Catheter - Urethral (mL): 500 mL  Total OUT: 500 mL    Total NET: -175 mL          PHYSICAL EXAM:    General/Neuro  RASS:             GCS:  15   = E   / V   / M      Deficits:    alert & oriented x 3, no focal deficits    Lungs:      clear to auscultation, Normal expansion/effort.     Cardiovascular : S1, S2.  Regular rate and rhythm.  Peripheral edema in bilateral lower extremities   Cardiac Rhythm: Normal Sinus Rhythm with PVCs     GI: Abdomen soft, +tender in all 4 quadrants, +distension  Wound vac in place  THONY drain x 2 bilious/murky     Extremities: Extremities warm, pink, well-perfused. Palpable DP pulses bilaterally     Derm: Good skin turgor, no skin breakdown.      :    An catheter in place.      LABS:  POCT Blood Glucose.: 201 mg/dL (17 Apr 2025 06:54)  POCT Blood Glucose.: 132 mg/dL (16 Apr 2025 23:48)  POCT Blood Glucose.: 132 mg/dL (16 Apr 2025 17:56)  POCT Blood Glucose.: 140 mg/dL (16 Apr 2025 12:50)                          9.7    14.53 )-----------( 358      ( 16 Apr 2025 22:33 )             30.5       04-17    150[H]  |  106  |  96[HH]  ----------------------------<  164[H]  3.3[L]   |  26  |  4.2[HH]    Ca    8.1[L]      17 Apr 2025 04:26  Phos  6.3     04-16  Mg     2.4     04-16    TPro  5.8[L]  /  Alb  3.2[L]  /  TBili  0.4  /  DBili  x   /  AST  52[H]  /  ALT  54[H]  /  AlkPhos  85  04-16       Urinalysis Basic - ( 17 Apr 2025 04:26 )    Color: x / Appearance: x / SG: x / pH: x  Gluc: 164 mg/dL / Ketone: x  / Bili: x / Urobili: x   Blood: x / Protein: x / Nitrite: x   Leuk Esterase: x / RBC: x / WBC x   Sq Epi: x / Non Sq Epi: x / Bacteria: x        Culture - Sputum (collected 14 Apr 2025 20:15)  Source: Trach Asp Tracheal Aspirate  Gram Stain (15 Apr 2025 11:15):    Rare polymorphonuclear leukocytes per low power field    No Squamous epithelial cells per low power field    No organisms seen per oil power field  Final Report (16 Apr 2025 21:32):    No growth

## 2025-04-17 NOTE — PROGRESS NOTE ADULT - ATTENDING COMMENTS
Pt examined  labs and images reviewed  pt with supermorbid obesity and complex hernia with sbo  previously treated without surgery   passing flatus awaiting bowel movement  had a bm   but more pain   4/10 s/p BHUMI , Bowel Resection / Anastamosis / Primary repair of Fascia / hernia sac  overnight - renal failure / respiratory compromise  was intubated / paralysed/ botox  now on bumex - tapering   4/17: everything was imprving - but more pain and two drain billious  passing flatus having bm  CT scan done      impression : extremely high risk - supermorbid obesity and complex hernia with partial sbo  will take to OR  SICU     prognosis and plan discussed with family

## 2025-04-17 NOTE — PRE PROCEDURE NOTE - PRE PROCEDURE EVALUATION
Vascular & Interventional Radiology Pre-Procedure Note    Procedure Name: image guided peripherally inserted central catheter      Allergies: No Known Allergies    Medications (Abx/Cardiac/Anticoagulation/Blood Products)    buMETAnide Injectable: 2 milliGRAM(s) IV Push (04-15 @ 18:36)  heparin   Injectable: 7500 Unit(s) SubCutaneous (04-17 @ 06:56)  labetalol Injectable: 5 milliGRAM(s) IV Push (04-15 @ 22:50)  metolazone: 5 milliGRAM(s) Oral (04-15 @ 12:22)  piperacillin/tazobactam IVPB..: 25 mL/Hr IV Intermittent (04-17 @ 02:26)    Data:  160  135.6  T(C): 36.7  HR: 99  BP: 135/66  RR: 18  SpO2: 96%    Exam  General: NAD, AAO x3, no distress  Chest: breathing comfortably on room air, CTAB  Abdomen: soft, non-tender, non- distended   Extremities: positive pulses bilaterally x4        Labs:   -WBC 14.53 / HgB 9.7 / Hct 30.5 / Plt 358  -Na 150 / Cl 106 / BUN 96 / Glucose 164  -K 3.3 / CO2 26 / Cr 4.2  -ALT -- / Alk Phos -- / T.Bili --  -INR0.96      Consentable: [x ] Yes   [ ] No     Plan:   -64y Female presents for image guided peripherally inserted central catheter  -Risks/Benefits/alternatives explained with the patient and/or healthcare proxy and witnessed informed consent obtained.

## 2025-04-17 NOTE — PROGRESS NOTE ADULT - SUBJECTIVE AND OBJECTIVE BOX
SURGERY PROGRESS NOTE    24 HR EVENTS: Patient with new bilious drainage from THONY #2 and increased output, as well as new tachycardia. CT abd/pelvis with oral contrast ordered and following LFTs, CT read pending. Patient distended, complains of mild diffuse abdominal pain. Denies nausea or vomiting, no gas or BMs overnight. THONY x2 with JP1 serous and JP2 bilious. Abdominal binder and preveena in place. HR max 120, tachypenic to RR max 26 on 2L NC, saturating WNL. LFTs stable, labs stable.    OBJECTIVE:  Vital Signs Last 24 Hrs  T(C): 37.2 (16 Apr 2025 20:00), Max: 37.3 (16 Apr 2025 18:03)  T(F): 98.9 (16 Apr 2025 20:00), Max: 99.1 (16 Apr 2025 18:03)  HR: 107 (17 Apr 2025 00:00) (94 - 120)  BP: 147/73 (17 Apr 2025 00:00) (127/63 - 152/81)  BP(mean): 101 (17 Apr 2025 00:00) (82 - 107)  RR: 25 (17 Apr 2025 00:00) (15 - 30)  SpO2: 95% (17 Apr 2025 00:00) (92% - 100%)    Parameters below as of 17 Apr 2025 00:00  Patient On (Oxygen Delivery Method): nasal cannula  O2 Flow (L/min): 2      I&O's Summary    15 Apr 2025 07:01  -  16 Apr 2025 07:00  --------------------------------------------------------  IN: 735.7 mL / OUT: 5180 mL / NET: -4444.3 mL    16 Apr 2025 07:01  -  17 Apr 2025 01:54  --------------------------------------------------------  IN: 1225 mL / OUT: 2535 mL / NET: -1310 mL        PHYSICAL EXAM:  General: NAD, awake, following commands  HEENT: NCAT, EOMI, Trachea ML  CHEST: Mildly tachypneic saturating well on 2L NC  Cardiac: RRR  Abdomen: Soft, non-distended, non-tender, midline prevena in place with THONY drains x2- serous and bilious serous outputs.   Skin: Warm/dry, normal color, no jaundice  Incision/wound: healing well, dressings in place, clean, dry and intact    LABS:                        9.7    14.53 )-----------( 358      ( 16 Apr 2025 22:33 )             30.5     04-16    156[H]  |  108  |  99[HH]  ----------------------------<  130[H]  3.3[L]   |  27  |  4.3[HH]    Ca    8.2[L]      16 Apr 2025 22:33  Phos  6.3     04-16  Mg     2.4     04-16    TPro  5.8[L]  /  Alb  3.2[L]  /  TBili  0.4  /  DBili  x   /  AST  52[H]  /  ALT  54[H]  /  AlkPhos  85  04-16      Urinalysis Basic - ( 16 Apr 2025 22:33 )    Color: x / Appearance: x / SG: x / pH: x  Gluc: 130 mg/dL / Ketone: x  / Bili: x / Urobili: x   Blood: x / Protein: x / Nitrite: x   Leuk Esterase: x / RBC: x / WBC x   Sq Epi: x / Non Sq Epi: x / Bacteria: x        RADIOLOGY & ADDITIONAL STUDIES:

## 2025-04-17 NOTE — PROGRESS NOTE ADULT - ASSESSMENT
Patient is a 64F PMHx HTN, morbid obesity s/p 2001 Abby-en-Y gastric bypass who presented on 4/3 with incarcerated ventral hernia with SBO. S/p open ventral hernia repair, small bowel resection, and primary fascial closure with Dr. Lema on 4/10. Currently admitted to SICU for monitoring. Nephrology consulted for FUNMI and oliguria.    # FUNMI most likely ATN   # resp failure on MV  #  incarcerated ventral hernia with SBO S/p open ventral hernia repair, small bowel resection, and primary fascial closure  # hypernatremia     cr  stable better  off diuretics   non oliguric  follow CK level    cont IVF switch to D5w 1/2 ns at 100 cc per hour    ph noted / no binders   adjust all meds to GFR < 15 ml/min   still no acute indication for RRT   will follow   / follow BMP replete K to 4 / cIP nad Ca noted no binders yet   discussed with family at bedside / OR today

## 2025-04-17 NOTE — PRE-ANESTHESIA EVALUATION ADULT - NSANTHPMHFT_GEN_ALL_CORE
Evidence of defect in small bowel loop with connection to anterior midline intraperitoneal and subcutaneous pannus collections containing extravasated oral contrast. Unclear if this small bowel defect occurs at the anastomotic site. Plan for exploratory laparotomy with surgical team. Per SICU team, patient in ARF secondary to ATN however patient making good urine and appropriately diuresing. Patient with RUE midline and R hand 20g PIV in place.

## 2025-04-18 LAB
ALBUMIN SERPL ELPH-MCNC: 2.4 G/DL — LOW (ref 3.5–5.2)
ALP SERPL-CCNC: 58 U/L — SIGNIFICANT CHANGE UP (ref 30–115)
ALT FLD-CCNC: 45 U/L — HIGH (ref 0–41)
ANION GAP SERPL CALC-SCNC: 12 MMOL/L — SIGNIFICANT CHANGE UP (ref 7–14)
AST SERPL-CCNC: 37 U/L — SIGNIFICANT CHANGE UP (ref 0–41)
BASOPHILS # BLD AUTO: 0.04 K/UL — SIGNIFICANT CHANGE UP (ref 0–0.2)
BASOPHILS NFR BLD AUTO: 0.2 % — SIGNIFICANT CHANGE UP (ref 0–1)
BILIRUB DIRECT SERPL-MCNC: 0.2 MG/DL — SIGNIFICANT CHANGE UP (ref 0–0.3)
BILIRUB INDIRECT FLD-MCNC: 0 MG/DL — LOW (ref 0.2–1.2)
BILIRUB SERPL-MCNC: 0.2 MG/DL — SIGNIFICANT CHANGE UP (ref 0.2–1.2)
BUN SERPL-MCNC: 97 MG/DL — CRITICAL HIGH (ref 10–20)
CALCIUM SERPL-MCNC: 8.4 MG/DL — SIGNIFICANT CHANGE UP (ref 8.4–10.5)
CHLORIDE SERPL-SCNC: 110 MMOL/L — SIGNIFICANT CHANGE UP (ref 98–110)
CO2 SERPL-SCNC: 26 MMOL/L — SIGNIFICANT CHANGE UP (ref 17–32)
CREAT SERPL-MCNC: 3.5 MG/DL — HIGH (ref 0.7–1.5)
CULTURE RESULTS: SIGNIFICANT CHANGE UP
EGFR: 14 ML/MIN/1.73M2 — LOW
EGFR: 14 ML/MIN/1.73M2 — LOW
EOSINOPHIL # BLD AUTO: 0.03 K/UL — SIGNIFICANT CHANGE UP (ref 0–0.7)
EOSINOPHIL NFR BLD AUTO: 0.2 % — SIGNIFICANT CHANGE UP (ref 0–8)
GAS PNL BLDA: SIGNIFICANT CHANGE UP
GAS PNL BLDA: SIGNIFICANT CHANGE UP
GLUCOSE BLDC GLUCOMTR-MCNC: 153 MG/DL — HIGH (ref 70–99)
GLUCOSE BLDC GLUCOMTR-MCNC: 166 MG/DL — HIGH (ref 70–99)
GLUCOSE BLDC GLUCOMTR-MCNC: 201 MG/DL — HIGH (ref 70–99)
GLUCOSE BLDC GLUCOMTR-MCNC: 219 MG/DL — HIGH (ref 70–99)
GLUCOSE SERPL-MCNC: 211 MG/DL — HIGH (ref 70–99)
GRAM STN FLD: SIGNIFICANT CHANGE UP
HCT VFR BLD CALC: 25.6 % — LOW (ref 37–47)
HGB BLD-MCNC: 7.9 G/DL — LOW (ref 12–16)
IMM GRANULOCYTES NFR BLD AUTO: 6.8 % — HIGH (ref 0.1–0.3)
LYMPHOCYTES # BLD AUTO: 0.92 K/UL — LOW (ref 1.2–3.4)
LYMPHOCYTES # BLD AUTO: 5.7 % — LOW (ref 20.5–51.1)
MAGNESIUM SERPL-MCNC: 2 MG/DL — SIGNIFICANT CHANGE UP (ref 1.8–2.4)
MCHC RBC-ENTMCNC: 30.3 PG — SIGNIFICANT CHANGE UP (ref 27–31)
MCHC RBC-ENTMCNC: 30.9 G/DL — LOW (ref 32–37)
MCV RBC AUTO: 98.1 FL — SIGNIFICANT CHANGE UP (ref 81–99)
MONOCYTES # BLD AUTO: 0.52 K/UL — SIGNIFICANT CHANGE UP (ref 0.1–0.6)
MONOCYTES NFR BLD AUTO: 3.2 % — SIGNIFICANT CHANGE UP (ref 1.7–9.3)
NEUTROPHILS # BLD AUTO: 13.5 K/UL — HIGH (ref 1.4–6.5)
NEUTROPHILS NFR BLD AUTO: 83.9 % — HIGH (ref 42.2–75.2)
NRBC BLD AUTO-RTO: 0 /100 WBCS — SIGNIFICANT CHANGE UP (ref 0–0)
PHOSPHATE SERPL-MCNC: 3.8 MG/DL — SIGNIFICANT CHANGE UP (ref 2.1–4.9)
PLATELET # BLD AUTO: 371 K/UL — SIGNIFICANT CHANGE UP (ref 130–400)
PMV BLD: 11 FL — HIGH (ref 7.4–10.4)
POTASSIUM SERPL-MCNC: 4.1 MMOL/L — SIGNIFICANT CHANGE UP (ref 3.5–5)
POTASSIUM SERPL-SCNC: 4.1 MMOL/L — SIGNIFICANT CHANGE UP (ref 3.5–5)
PROT SERPL-MCNC: 4.6 G/DL — LOW (ref 6–8)
RBC # BLD: 2.61 M/UL — LOW (ref 4.2–5.4)
RBC # FLD: 14.2 % — SIGNIFICANT CHANGE UP (ref 11.5–14.5)
SODIUM SERPL-SCNC: 148 MMOL/L — HIGH (ref 135–146)
SPECIMEN SOURCE: SIGNIFICANT CHANGE UP
SPECIMEN SOURCE: SIGNIFICANT CHANGE UP
WBC # BLD: 16.11 K/UL — HIGH (ref 4.8–10.8)
WBC # FLD AUTO: 16.11 K/UL — HIGH (ref 4.8–10.8)

## 2025-04-18 PROCEDURE — 71045 X-RAY EXAM CHEST 1 VIEW: CPT | Mod: 26

## 2025-04-18 PROCEDURE — 99291 CRITICAL CARE FIRST HOUR: CPT

## 2025-04-18 RX ORDER — BENZOCAINE 220 MG/G
1 SPRAY, METERED PERIODONTAL ONCE
Refills: 0 | Status: COMPLETED | OUTPATIENT
Start: 2025-04-18 | End: 2025-04-18

## 2025-04-18 RX ORDER — INSULIN LISPRO 100 U/ML
INJECTION, SOLUTION INTRAVENOUS; SUBCUTANEOUS EVERY 6 HOURS
Refills: 0 | Status: DISCONTINUED | OUTPATIENT
Start: 2025-04-18 | End: 2025-04-18

## 2025-04-18 RX ORDER — ACETAMINOPHEN 500 MG/5ML
1000 LIQUID (ML) ORAL ONCE
Refills: 0 | Status: COMPLETED | OUTPATIENT
Start: 2025-04-18 | End: 2025-04-18

## 2025-04-18 RX ORDER — HYDROMORPHONE/SOD CHLOR,ISO/PF 2 MG/10 ML
0.5 SYRINGE (ML) INJECTION EVERY 4 HOURS
Refills: 0 | Status: DISCONTINUED | OUTPATIENT
Start: 2025-04-18 | End: 2025-04-18

## 2025-04-18 RX ORDER — ACETAMINOPHEN 500 MG/5ML
650 LIQUID (ML) ORAL EVERY 6 HOURS
Refills: 0 | Status: DISCONTINUED | OUTPATIENT
Start: 2025-04-18 | End: 2025-04-18

## 2025-04-18 RX ORDER — BUMETANIDE 1 MG/1
2 TABLET ORAL ONCE
Refills: 0 | Status: DISCONTINUED | OUTPATIENT
Start: 2025-04-18 | End: 2025-04-18

## 2025-04-18 RX ORDER — SODIUM/POT/MAG/CALC/CHLOR/ACET 35-20-5MEQ
1 VIAL (ML) INTRAVENOUS
Refills: 0 | Status: DISCONTINUED | OUTPATIENT
Start: 2025-04-18 | End: 2025-04-18

## 2025-04-18 RX ORDER — INSULIN LISPRO 100 U/ML
INJECTION, SOLUTION INTRAVENOUS; SUBCUTANEOUS EVERY 6 HOURS
Refills: 0 | Status: DISCONTINUED | OUTPATIENT
Start: 2025-04-18 | End: 2025-04-19

## 2025-04-18 RX ORDER — I.V. FAT EMULSION 20 G/100ML
0.37 EMULSION INTRAVENOUS
Qty: 50 | Refills: 0 | Status: DISCONTINUED | OUTPATIENT
Start: 2025-04-18 | End: 2025-04-18

## 2025-04-18 RX ADMIN — Medication 15 MILLILITER(S): at 05:17

## 2025-04-18 RX ADMIN — Medication 0.5 MILLIGRAM(S): at 21:25

## 2025-04-18 RX ADMIN — Medication 0.5 MILLIGRAM(S): at 21:47

## 2025-04-18 RX ADMIN — BENZOCAINE 1 SPRAY(S): 220 SPRAY, METERED PERIODONTAL at 16:56

## 2025-04-18 RX ADMIN — INSULIN LISPRO 2: 100 INJECTION, SOLUTION INTRAVENOUS; SUBCUTANEOUS at 12:45

## 2025-04-18 RX ADMIN — Medication 1 APPLICATION(S): at 05:24

## 2025-04-18 RX ADMIN — HEPARIN SODIUM 7500 UNIT(S): 1000 INJECTION INTRAVENOUS; SUBCUTANEOUS at 15:21

## 2025-04-18 RX ADMIN — BENZOCAINE 1 SPRAY(S): 220 SPRAY, METERED PERIODONTAL at 21:45

## 2025-04-18 RX ADMIN — INSULIN LISPRO 4: 100 INJECTION, SOLUTION INTRAVENOUS; SUBCUTANEOUS at 01:03

## 2025-04-18 RX ADMIN — HEPARIN SODIUM 7500 UNIT(S): 1000 INJECTION INTRAVENOUS; SUBCUTANEOUS at 05:17

## 2025-04-18 RX ADMIN — Medication 25 GRAM(S): at 05:17

## 2025-04-18 RX ADMIN — INSULIN LISPRO 4: 100 INJECTION, SOLUTION INTRAVENOUS; SUBCUTANEOUS at 05:57

## 2025-04-18 RX ADMIN — INSULIN LISPRO 2: 100 INJECTION, SOLUTION INTRAVENOUS; SUBCUTANEOUS at 18:19

## 2025-04-18 RX ADMIN — Medication 40 MILLIGRAM(S): at 12:43

## 2025-04-18 RX ADMIN — Medication 50 EACH: at 21:36

## 2025-04-18 RX ADMIN — HEPARIN SODIUM 7500 UNIT(S): 1000 INJECTION INTRAVENOUS; SUBCUTANEOUS at 21:25

## 2025-04-18 RX ADMIN — Medication 25 GRAM(S): at 18:19

## 2025-04-18 RX ADMIN — Medication 400 MILLIGRAM(S): at 17:05

## 2025-04-18 RX ADMIN — I.V. FAT EMULSION 20.8 GM/KG/DAY: 20 EMULSION INTRAVENOUS at 21:37

## 2025-04-18 RX ADMIN — Medication 400 MILLIGRAM(S): at 04:00

## 2025-04-18 NOTE — PROGRESS NOTE ADULT - SUBJECTIVE AND OBJECTIVE BOX
NANCY SARGENT   [id number]   [birth year]  64yF  ============================================================   DATE OF INITIAL SICU/SDU CONSULT: 04-10-25    INDICATION FOR SICU CONSULT:  q1hr abdominal checks, mechanical ventilation     SICU COURSE EVENTS :  04-10 - admitted to SICU service  4/11: Intubated and started on paralytic. Arterial line placed, subclavian TLC placed. Nephrology consulted for oliguria. IR abdominal muscle botox injection performed. TTE performed.   4/12: Additional fluid boluses given; trial fo bumex started, considering CVVH. Weaned off nimbex gtt.  > 220 /hr. Renal U/S w/out hydro.   4/13: Weaned off Levophed. Bumex gtt 2mg/hr > 1mg/hr. Goal net negative 1-1.5L, UOP approx, 200c/hr.   4/14: Remained on bumex gtt for further diuresis, decreased to 0.5 overnight, vent settings weaned. Cr downtrending, LFTs worsening.   4/15: Extubated to BiPAP, transitioned to NC. Dc'd bumex gtt, given 5mg metolazone x 1 and 2mg bumex x 1.   4/16: THONY drain #2 murky/bilious, pending CTAP with PO contrast, hypernatremia, started D5W @ 75cc/hr, persistent hypokalemia   4/17: RTOR for resection of anastomosis for anastomotic leak. Returned intubated, 400/24/80/10, sinus tachy to . Abdomen soft. Was initially on propofol @ 30 and precedex, but propofol weaned off due to hypotension with MAPs in 50s, fentanyl ggt started for acute pain. Remained hypotensive despite fluids, started on Levophed, on 0.05.      24HOUR EVENTS  4/17  NIGHT   -PM rounds: -120, sinus tachycardia , saturating 96% on 400/24/80/10, abdomen, precedex @ 0.7, propofol @ 25, weaning, TPN started, abdomen soft, prevena vac w/ good suction, THONY x1 and 2 serosanguinous, THONY#3 sanguinous, primary team aware    -Fentanyl gtt started for acute pain per pt, hypotensive on propofol which is now off   -Hypotensive despite fluids, LR 250cc bolus, low dose levophed gtt started. Stat CBC, primary team and ICU attending aware  -AM labs   -AM CXR/ABG      DAY  - FENA intrinsic  - BMP 11am  - IR today for PICC for TPN  - if no PICC > needs TLC  - triglycerides 290  - s/p OR: ex lap with discovery of 500cc of bilious contents in abdomen, anastomotic leak at staple liene of end side of ileostomu, resected and creation new new ileoileostomy, placement of THONY drain, closed and Prevena placed          [X] A ten-point review of systems was negative except as expressed in note.  [X ] History was obtained from patient. If unable to participate in their care, history was from review of the chart and collateral sources of information.  ============================================================    NANCY SARGENT   [id number]   [birth year]  64yF  ============================================================   DATE OF INITIAL SICU/SDU CONSULT: 04-10-25    INDICATION FOR SICU CONSULT:  q1hr abdominal checks, mechanical ventilation     SICU COURSE EVENTS :  04-10 - admitted to SICU service  4/11: Intubated and started on paralytic. Arterial line placed, subclavian TLC placed. Nephrology consulted for oliguria. IR abdominal muscle botox injection performed. TTE performed.   4/12: Additional fluid boluses given; trial fo bumex started, considering CVVH. Weaned off nimbex gtt.  > 220 /hr. Renal U/S w/out hydro.   4/13: Weaned off Levophed. Bumex gtt 2mg/hr > 1mg/hr. Goal net negative 1-1.5L, UOP approx, 200c/hr.   4/14: Remained on bumex gtt for further diuresis, decreased to 0.5 overnight, vent settings weaned. Cr downtrending, LFTs worsening.   4/15: Extubated to BiPAP, transitioned to NC. Dc'd bumex gtt, given 5mg metolazone x 1 and 2mg bumex x 1.   4/16: THONY drain #2 murky/bilious, pending CTAP with PO contrast, hypernatremia, started D5W @ 75cc/hr, persistent hypokalemia   4/17: RTOR for resection of anastomosis for anastomotic leak. Returned intubated, 400/24/80/10, sinus tachy to . Abdomen soft. Was initially on propofol @ 30 and precedex, but propofol weaned off due to hypotension with MAPs in 50s, fentanyl ggt started for acute pain. Remained hypotensive despite fluids, started on Levophed, on 0.05.      24HOUR EVENTS  4/17  NIGHT   -PM rounds: -120, sinus tachycardia , saturating 96% on 400/24/80/10, abdomen, precedex @ 0.7, propofol @ 25, weaning, TPN started, abdomen soft, prevena vac w/ good suction, THONY x1 and 2 serosanguinous, THONY#3 sanguinous, primary team aware    -Fentanyl gtt started for acute pain per pt, hypotensive on propofol which is now off   -Hypotensive despite fluids, LR 250cc bolus, low dose levophed gtt started. Stat CBC, primary team and ICU attending aware  -AM labs   -AM CXR/ABG      DAY  - FENA intrinsic  - BMP 11am  - IR today for PICC for TPN  - if no PICC > needs TLC  - triglycerides 290  - s/p OR: ex lap with discovery of 500cc of bilious contents in abdomen, anastomotic leak at staple liene of end side of ileostomu, resected and creation new new ileoileostomy, placement of THONY drain, closed and Prevena placed      [X] A ten-point review of systems was negative except as expressed in note.  [X ] History was obtained from patient. If unable to participate in their care, history was from review of the chart and collateral sources of information.  ============================================================   Daily Height in cm: 160.02 (17 Apr 2025 11:26)    Daily     Diet, NPO (04-17-25 @ 18:07)      CURRENT MEDS:  Neurologic Medications  dexMEDEtomidine Infusion 0.2 MICROgram(s)/kG/Hr IV Continuous <Continuous>  fentaNYL   Infusion. 0.5 MICROgram(s)/kG/Hr IV Continuous <Continuous>  propofol Infusion 10 MICROgram(s)/kG/Min IV Continuous <Continuous>    Respiratory Medications    Cardiovascular Medications  norepinephrine Infusion 0.05 MICROgram(s)/kG/Min IV Continuous <Continuous>    Gastrointestinal Medications  lipid, fat emulsion (Fish Oil and Plant Based) 20% Infusion 0.3687 Gm/kG/Day IV Continuous <Continuous>  pantoprazole  Injectable 40 milliGRAM(s) IV Push daily  Parenteral Nutrition - Adult 1 Each TPN Continuous <Continuous>    Genitourinary Medications    Hematologic/Oncologic Medications  heparin   Injectable 7500 Unit(s) SubCutaneous every 8 hours    Antimicrobial/Immunologic Medications  piperacillin/tazobactam IVPB.. 3.375 Gram(s) IV Intermittent every 12 hours    Endocrine/Metabolic Medications  insulin lispro (ADMELOG) corrective regimen sliding scale   SubCutaneous every 6 hours    Topical/Other Medications  chlorhexidine 0.12% Liquid 15 milliLiter(s) Oral Mucosa every 12 hours  chlorhexidine 2% Cloths 1 Application(s) Topical <User Schedule>      ICU Vital Signs Last 24 Hrs  T(C): 37.7 (18 Apr 2025 08:00), Max: 37.9 (18 Apr 2025 03:00)  T(F): 99.8 (18 Apr 2025 08:00), Max: 100.3 (18 Apr 2025 03:00)  HR: 77 (18 Apr 2025 08:00) (61 - 118)  BP: 100/60 (18 Apr 2025 08:00) (82/43 - 176/83)  BP(mean): 74 (18 Apr 2025 08:00) (60 - 115)  ABP: 95/49 (18 Apr 2025 08:00) (59/53 - 183/80)  ABP(mean): 63 (18 Apr 2025 08:00) (50 - 105)  RR: 21 (18 Apr 2025 08:00) (17 - 26)  SpO2: 97% (18 Apr 2025 08:00) (94% - 100%)    O2 Parameters below as of 18 Apr 2025 05:00  Patient On (Oxygen Delivery Method): ventilator    O2 Concentration (%): 70    Mode: AC/ CMV (Assist Control/ Continuous Mandatory Ventilation)  RR (machine): 24  TV (machine): 400  FiO2: 70  PEEP: 10  ITime: 1  MAP: 15  PIP: 28    ABG - ( 18 Apr 2025 06:06 )  pH, Arterial: 7.46  pH, Blood: x     /  pCO2: 39    /  pO2: 179   / HCO3: 28    / Base Excess: 3.6   /  SaO2: 96.7        I&O's Summary    17 Apr 2025 07:01  -  18 Apr 2025 07:00  --------------------------------------------------------  IN: 4644.8 mL / OUT: 2295 mL / NET: 2349.8 mL    18 Apr 2025 07:01  -  18 Apr 2025 08:10  --------------------------------------------------------  IN: 183.4 mL / OUT: 70 mL / NET: 113.4 mL      I&O's Detail    17 Apr 2025 07:01  -  18 Apr 2025 07:00  --------------------------------------------------------  IN:    Dexmedetomidine: 309.6 mL    dextrose 5%: 375 mL    Fat Emulsion (Fish Oil &amp; Plant Based) 20% Infusion: 249.6 mL    FentaNYL: 61.5 mL    IV PiggyBack: 100 mL    IV PiggyBack: 300 mL    IV PiggyBack: 100 mL    IV PiggyBack: 200 mL    Lactated Ringers: 1925 mL    Norepinephrine: 63 mL    Propofol: 61.1 mL    TPN (Total Parenteral Nutrition): 900 mL  Total IN: 4644.8 mL    OUT:    Bulb (mL): 100 mL    Bulb (mL): 120 mL    Bulb (mL): 145 mL    Indwelling Catheter - Urethral (mL): 1730 mL    Nasogastric/Oral tube (mL): 200 mL    VAC (Vacuum Assisted Closure) System (mL): 0 mL  Total OUT: 2295 mL    Total NET: 2349.8 mL      18 Apr 2025 07:01  -  18 Apr 2025 08:10  --------------------------------------------------------  IN:    Dexmedetomidine: 27.1 mL    Norepinephrine: 6.3 mL    TPN (Total Parenteral Nutrition): 150 mL  Total IN: 183.4 mL    OUT:    Indwelling Catheter - Urethral (mL): 70 mL  Total OUT: 70 mL    Total NET: 113.4 mL      PHYSICAL EXAM:    General/Neuro  RASS:             GCS:  14   = E   / V   / M      Deficits:  alert & oriented x 2, no focal deficits, moving all extremities    Lungs:   clear to auscultation, Normal expansion/effort.     Cardiovascular : S1, S2.  Regular rate and rhythm.  Peripheral edema   Cardiac Rhythm: Normal Sinus Rhythm    GI: Abdomen soft, +tender, +distended/obese  THONY drains draining serosanguinous      Extremities: Extremities warm, pink, well-perfused. Palpable DP B/L    Derm: Good skin turgor, no skin breakdown.      :   An catheter in place- draining cloudy urine       LABS:  POCT Blood Glucose.: 201 mg/dL (18 Apr 2025 05:56)  POCT Blood Glucose.: 219 mg/dL (18 Apr 2025 00:33)  POCT Blood Glucose.: 166 mg/dL (17 Apr 2025 18:26)  POCT Blood Glucose.: 163 mg/dL (17 Apr 2025 12:57)                          7.9    16.11 )-----------( 371      ( 18 Apr 2025 05:55 )             25.6       04-18    148[H]  |  110  |  97[HH]  ----------------------------<  211[H]  4.1   |  26  |  3.5[H]    Ca    8.4      18 Apr 2025 05:55  Phos  3.8     04-18  Mg     2.0     04-18    TPro  4.6[L]  /  Alb  2.4[L]  /  TBili  0.2  /  DBili  0.2  /  AST  37  /  ALT  45[H]  /  AlkPhos  58  04-18      PT/INR - ( 17 Apr 2025 17:56 )   PT: 11.60 sec;   INR: 0.98 ratio         PTT - ( 17 Apr 2025 17:56 )  PTT:19.6 sec      Urinalysis Basic - ( 18 Apr 2025 05:55 )    Color: x / Appearance: x / SG: x / pH: x  Gluc: 211 mg/dL / Ketone: x  / Bili: x / Urobili: x   Blood: x / Protein: x / Nitrite: x   Leuk Esterase: x / RBC: x / WBC x   Sq Epi: x / Non Sq Epi: x / Bacteria: x   NANCY SARGENT   [id number]   [birth year]  64yF  ============================================================   DATE OF INITIAL SICU/SDU CONSULT: 04-10-25    INDICATION FOR SICU CONSULT:  q1hr abdominal checks, mechanical ventilation     SICU COURSE EVENTS :  04-10 - admitted to SICU service  4/11: Intubated and started on paralytic. Arterial line placed, subclavian TLC placed. Nephrology consulted for oliguria. IR abdominal muscle botox injection performed. TTE performed.   4/12: Additional fluid boluses given; trial fo bumex started, considering CVVH. Weaned off nimbex gtt.  > 220 /hr. Renal U/S w/out hydro.   4/13: Weaned off Levophed. Bumex gtt 2mg/hr > 1mg/hr. Goal net negative 1-1.5L, UOP approx, 200c/hr.   4/14: Remained on bumex gtt for further diuresis, decreased to 0.5 overnight, vent settings weaned. Cr downtrending, LFTs worsening.   4/15: Extubated to BiPAP, transitioned to NC. Dc'd bumex gtt, given 5mg metolazone x 1 and 2mg bumex x 1.   4/16: THONY drain #2 murky/bilious, pending CTAP with PO contrast, hypernatremia, started D5W @ 75cc/hr, persistent hypokalemia   4/17: RTOR for resection of anastomosis for anastomotic leak. Returned intubated, 400/24/80/10, sinus tachy to . Abdomen soft. Was initially on propofol @ 30 and precedex, but propofol weaned off due to hypotension with MAPs in 50s, fentanyl ggt started for acute pain. Remained hypotensive despite fluids, started on Levophed, on 0.05.      24HOUR EVENTS  4/17  NIGHT   -PM rounds: -120, sinus tachycardia , saturating 96% on 400/24/80/10, abdomen, precedex @ 0.7, propofol @ 25, weaning, TPN started, abdomen soft, prevena vac w/ good suction, THONY x1 and 2 serosanguinous, THONY#3 sanguinous, primary team aware    -Fentanyl gtt started for acute pain per pt, hypotensive on propofol which is now off   -Hypotensive despite fluids, LR 250cc bolus, low dose levophed gtt started. Stat CBC, primary team and ICU attending aware  -AM labs   -AM CXR/ABG      DAY  - FENA intrinsic  - BMP 11am  - IR today for PICC for TPN  - if no PICC > needs TLC  - triglycerides 290  - s/p OR: ex lap with discovery of 500cc of bilious contents in abdomen, anastomotic leak at staple liene of end side of ileostomu, resected and creation new new ileoileostomy, placement of THONY drain, closed and Prevena placed      [X] A ten-point review of systems was negative except as expressed in note.  [X ] History was obtained from patient. If unable to participate in their care, history was from review of the chart and collateral sources of information.  ============================================================   Daily Height in cm: 160.02 (17 Apr 2025 11:26)    Daily     Diet, NPO (04-17-25 @ 18:07)      CURRENT MEDS:  Neurologic Medications  dexMEDEtomidine Infusion 0.2 MICROgram(s)/kG/Hr IV Continuous <Continuous>  fentaNYL   Infusion. 0.5 MICROgram(s)/kG/Hr IV Continuous <Continuous>  propofol Infusion 10 MICROgram(s)/kG/Min IV Continuous <Continuous>    Respiratory Medications    Cardiovascular Medications  norepinephrine Infusion 0.05 MICROgram(s)/kG/Min IV Continuous <Continuous>    Gastrointestinal Medications  lipid, fat emulsion (Fish Oil and Plant Based) 20% Infusion 0.3687 Gm/kG/Day IV Continuous <Continuous>  pantoprazole  Injectable 40 milliGRAM(s) IV Push daily  Parenteral Nutrition - Adult 1 Each TPN Continuous <Continuous>    Genitourinary Medications    Hematologic/Oncologic Medications  heparin   Injectable 7500 Unit(s) SubCutaneous every 8 hours    Antimicrobial/Immunologic Medications  piperacillin/tazobactam IVPB.. 3.375 Gram(s) IV Intermittent every 12 hours    Endocrine/Metabolic Medications  insulin lispro (ADMELOG) corrective regimen sliding scale   SubCutaneous every 6 hours    Topical/Other Medications  chlorhexidine 0.12% Liquid 15 milliLiter(s) Oral Mucosa every 12 hours  chlorhexidine 2% Cloths 1 Application(s) Topical <User Schedule>      ICU Vital Signs Last 24 Hrs  T(C): 37.7 (18 Apr 2025 08:00), Max: 37.9 (18 Apr 2025 03:00)  T(F): 99.8 (18 Apr 2025 08:00), Max: 100.3 (18 Apr 2025 03:00)  HR: 77 (18 Apr 2025 08:00) (61 - 118)  BP: 100/60 (18 Apr 2025 08:00) (82/43 - 176/83)  BP(mean): 74 (18 Apr 2025 08:00) (60 - 115)  ABP: 95/49 (18 Apr 2025 08:00) (59/53 - 183/80)  ABP(mean): 63 (18 Apr 2025 08:00) (50 - 105)  RR: 21 (18 Apr 2025 08:00) (17 - 26)  SpO2: 97% (18 Apr 2025 08:00) (94% - 100%)    O2 Parameters below as of 18 Apr 2025 05:00  Patient On (Oxygen Delivery Method): ventilator    O2 Concentration (%): 70    Mode: AC/ CMV (Assist Control/ Continuous Mandatory Ventilation)  RR (machine): 24  TV (machine): 400  FiO2: 70  PEEP: 10  ITime: 1  MAP: 15  PIP: 28    ABG - ( 18 Apr 2025 06:06 )  pH, Arterial: 7.46  pH, Blood: x     /  pCO2: 39    /  pO2: 179   / HCO3: 28    / Base Excess: 3.6   /  SaO2: 96.7        I&O's Summary    17 Apr 2025 07:01  -  18 Apr 2025 07:00  --------------------------------------------------------  IN: 4644.8 mL / OUT: 2295 mL / NET: 2349.8 mL    18 Apr 2025 07:01  -  18 Apr 2025 08:10  --------------------------------------------------------  IN: 183.4 mL / OUT: 70 mL / NET: 113.4 mL      I&O's Detail    17 Apr 2025 07:01  -  18 Apr 2025 07:00  --------------------------------------------------------  IN:    Dexmedetomidine: 309.6 mL    dextrose 5%: 375 mL    Fat Emulsion (Fish Oil &amp; Plant Based) 20% Infusion: 249.6 mL    FentaNYL: 61.5 mL    IV PiggyBack: 100 mL    IV PiggyBack: 300 mL    IV PiggyBack: 100 mL    IV PiggyBack: 200 mL    Lactated Ringers: 1925 mL    Norepinephrine: 63 mL    Propofol: 61.1 mL    TPN (Total Parenteral Nutrition): 900 mL  Total IN: 4644.8 mL    OUT:    Bulb (mL): 100 mL    Bulb (mL): 120 mL    Bulb (mL): 145 mL    Indwelling Catheter - Urethral (mL): 1730 mL    Nasogastric/Oral tube (mL): 200 mL    VAC (Vacuum Assisted Closure) System (mL): 0 mL  Total OUT: 2295 mL    Total NET: 2349.8 mL      18 Apr 2025 07:01  -  18 Apr 2025 08:10  --------------------------------------------------------  IN:    Dexmedetomidine: 27.1 mL    Norepinephrine: 6.3 mL    TPN (Total Parenteral Nutrition): 150 mL  Total IN: 183.4 mL    OUT:    Indwelling Catheter - Urethral (mL): 70 mL  Total OUT: 70 mL    Total NET: 113.4 mL      PHYSICAL EXAM:    General/Neuro  RASS:             GCS:  11T  = E   / V   / M      Deficits: no focal deficits, moving all extremities    Lungs:   clear to auscultation, Normal expansion/effort.     Cardiovascular : S1, S2.  Regular rate and rhythm.  Peripheral edema   Cardiac Rhythm: Normal Sinus Rhythm with PVCs    GI: Abdomen soft, +tender, +distended/obese  THONY drains x 3 - draining serosanguinous      Extremities: Extremities warm, pink, well-perfused. Palpable DP B/L    Derm: Good skin turgor, no skin breakdown.      :   An catheter in place      LABS:  POCT Blood Glucose.: 201 mg/dL (18 Apr 2025 05:56)  POCT Blood Glucose.: 219 mg/dL (18 Apr 2025 00:33)  POCT Blood Glucose.: 166 mg/dL (17 Apr 2025 18:26)  POCT Blood Glucose.: 163 mg/dL (17 Apr 2025 12:57)                          7.9    16.11 )-----------( 371      ( 18 Apr 2025 05:55 )             25.6       04-18    148[H]  |  110  |  97[HH]  ----------------------------<  211[H]  4.1   |  26  |  3.5[H]    Ca    8.4      18 Apr 2025 05:55  Phos  3.8     04-18  Mg     2.0     04-18    TPro  4.6[L]  /  Alb  2.4[L]  /  TBili  0.2  /  DBili  0.2  /  AST  37  /  ALT  45[H]  /  AlkPhos  58  04-18      PT/INR - ( 17 Apr 2025 17:56 )   PT: 11.60 sec;   INR: 0.98 ratio         PTT - ( 17 Apr 2025 17:56 )  PTT:19.6 sec      Urinalysis Basic - ( 18 Apr 2025 05:55 )    Color: x / Appearance: x / SG: x / pH: x  Gluc: 211 mg/dL / Ketone: x  / Bili: x / Urobili: x   Blood: x / Protein: x / Nitrite: x   Leuk Esterase: x / RBC: x / WBC x   Sq Epi: x / Non Sq Epi: x / Bacteria: x

## 2025-04-18 NOTE — CHART NOTE - NSCHARTNOTEFT_GEN_A_CORE
GI NUTRITION SUPPORT TEAM  -  CONSULT NOTE     The patient is a 64-year-old female with a past medical history of high blood pressure and gastric bypass surgery in , presenting with two days of sharp abdominal pain and one episode of non-bloody, non-bilious vomiting. She has a prior history of small bowel obstruction in , , and most recently in , all of which resolved with conservative management. Her current symptoms are similar in nature. A computed tomography scan of the abdomen and pelvis with oral and intravenous contrast revealed multiple ventral abdominal wall hernias containing both obstructed and non-obstructed bowel loops. A complex small bowel obstruction is present, involving three hernias on the right side of the abdomen, with dilated, fluid-filled loops of small bowel, trace fluid between loops, and a collapsed segment of bowel beyond a hernia in the right lower quadrant. These findings are consistent with a recurrent small bowel obstruction. The hernia was non reducible at bedside. (2025 20:41)      GI NUTRITION SUPPORT NOTE:    POD#1 for anastomotic leak s/p ex lap, 30 cm small bowel resection, creation of new side to side ileoileostomy, ventral hernia repair w/ mesh. Extubated and on HFNC this am. NPO with NG suction, TPN initiated last night via RIJ TLC. Hyperglycemia with ss coverage. , SMOF lipids given.     REVIEW OF SYSTEMS:  Negative except as noted above.     PAST MEDICAL/SURGICAL HISTORY:   Gastric bypass status for obesity  LOUIS (obstructive sleep apnea)  S/P gastric bypass  S/P   S/P small bowel resection  History of total bilateral knee replacement (TKR)  H/O colonoscopy      ALLERGIES:  No Known Allergies      VITALS:  T(F): 99.5 (18 @ 12:00), Max: 100.3 (18 @ 03:00)  HR: 123 (18 @ 13:00) (62 - 123)  BP: 159/78 (18 @ 13:00) (92/50 - 159/78)  RR: 33 (18 @ 13:00) (16 - 33)  SpO2: 93% (18 @ 13:00) (93% - 100%)    HEIGHT/WEIGHT/BMI:   Height (cm): 160 (-17)  Weight (kg): 135.6 (-17)  BMI (kg/m2): 53 (-17)    PHYSICAL EXAM:   GENERAL: A&O X 3, comfortable on HFNC  HEENT: Moist mucous membranes, adequate dentition   ABDOMEN: Soft, Nondistended +THONY's X 2 RUQ, X 1 LUQ SS output  EXTREMITIES: +bilat LE edema  SKIN: warm and well perfused; No obvious rashes or lesions  IV ACCESS: RIJ TLC dressing c/d/i  ENTERAL ACCESS: NG [place] (suction)    I/Os:     25 @ 07:01  -  25 @ 07:00  --------------------------------------------------------  IN:    Dexmedetomidine: 309.6 mL    dextrose 5%: 375 mL    Fat Emulsion (Fish Oil &amp; Plant Based) 20% Infusion: 249.6 mL    FentaNYL: 61.5 mL    IV PiggyBack: 100 mL    IV PiggyBack: 300 mL    IV PiggyBack: 100 mL    IV PiggyBack: 200 mL    Lactated Ringers: 1925 mL    Norepinephrine: 63 mL    Propofol: 61.1 mL    TPN (Total Parenteral Nutrition): 900 mL  Total IN: 4644.8 mL    OUT:    Bulb (mL): 100 mL    Bulb (mL): 120 mL    Bulb (mL): 145 mL    Indwelling Catheter - Urethral (mL): 1730 mL    Nasogastric/Oral tube (mL): 200 mL    VAC (Vacuum Assisted Closure) System (mL): 0 mL  Total OUT: 2295 mL    Total NET: 2349.8 mL      STANDING MEDICATIONS:   acetaminophen   IVPB .. 1000 milliGRAM(s) IV Intermittent once  chlorhexidine 2% Cloths 1 Application(s) Topical <User Schedule>  heparin   Injectable 7500 Unit(s) SubCutaneous every 8 hours  HYDROmorphone  Injectable 0.5 milliGRAM(s) IV Push every 4 hours PRN  insulin lispro (ADMELOG) corrective regimen sliding scale   SubCutaneous every 6 hours  lipid, fat emulsion (Fish Oil and Plant Based) 20% Infusion 0.3687 Gm/kG/Day IV Continuous <Continuous>  lipid, fat emulsion (Fish Oil and Plant Based) 20% Infusion 0.3687 Gm/kG/Day IV Continuous <Continuous>  norepinephrine Infusion 0.05 MICROgram(s)/kG/Min IV Continuous <Continuous>  pantoprazole  Injectable 40 milliGRAM(s) IV Push daily  Parenteral Nutrition - Adult 1 Each TPN Continuous <Continuous>  Parenteral Nutrition - Adult 1 Each TPN Continuous <Continuous>  piperacillin/tazobactam IVPB.. 3.375 Gram(s) IV Intermittent every 12 hours      LABS:                         7.9    16.11 )-----------( 371      ( 2025 05:55 )             25.6     Mean Cell Volume: 98.1 fL (25 @ 05:55)  Red Cell Distrib Width: 14.2 % (25 05:55)    148[H]  |  110  |  97[HH]  ----------------------------<  211[H]          (25 @ 05:55)  4.1   |  26  |  3.5[H]    Ca    8.4          (25 05:55)  Phos  3.8         (25 05:55)  Mg     2.0         (25 05:55)    TPro  4.6[L]  /  Alb  2.4[L]  /  TBili  0.2  /  DBili  0.2  /  AST  37  /  ALT  45[H]  /  AlkPhos  58       25 @ 05:55    Triglycerides, Serum: 290 mg/dL ( @ 12:00)    Blood Glucose (Past 24 hours):  153 mg/dL ( @ 12:29)  201 mg/dL ( 05:56)  219 mg/dL ( @ 00:33)  166 mg/dL ( 18:26)      DIET:   Diet, NPO (25 @ 18:07) [Active]    Parenteral Nutrition - Adult 1 Each (50 mL/Hr) TPN Continuous <Continuous>, 25 @ 20:00, 20:00, Stop order after: 1 Days  Parenteral Nutrition - Adult 1 Each (75 mL/Hr) TPN Continuous <Continuous>, 25 @ 20:00, 20:00, Stop order after: 1 Days      ASSESSMENT  64y F w/ PMHx of HTN and gastric bypass surgery in  presented with abdominal distention and CT showing incarcerated ventral hernia with SBO. Taken to OR 4/10 s/p open repair of ventral hernia using mesh and component separation technique, small bowel resection and primary fascial closure. RTOR  for anastomotic leak s/p ex lap, 30 cm small bowel resection, creation of new side to side ileoileostomy, ventral hernia repair w/ mesh.     - recurrent SBO, s/p repair 4/10 then anastomotic leak s/p repair    - FUNMI   - high risk for malnutrition, prolonged NPO X 15d, TPN initiated   - class III obesity, BMI 53  - hypertriglyceridemia  - hyperglycemia     Est nutrient needs: IBW 52.3kg, 2803-5920 kcals (22-25kcals/kg IBW ASPEN/CCM guidelines for BMI>50), 105-131g protein (2-2.5g/kg IBW ASPEN/CCM guidelines for BMI >40)       PLAN  - cont TPN tonight, solution concentrated (15% AA)- 115g protein, 165g dextrose, 50g SMOF lipid @50ml/hr- avg 1271 kcals daily (lipids qod)  - cont with lipids for tonight then will give QOD, please get TG clearance when off lipids min 4-6 hrs   - glycemic control- 10U regular insulin added to tonight's TPN  - thiamine and folate added to TPN (refeeding, prolonged NPO)  - daily am bmp, phos, mg while on parenteral nutrition  - add zinc, 25-oh vitamin D, vitamin B12, folate and A1c levels to next blood draw  -  please add CRP and prealbumin to am labs   - will follow  d/w Dr. Bermudez and SICU team GI NUTRITION SUPPORT TEAM  -  CONSULT NOTE     The patient is a 64-year-old female with a past medical history of high blood pressure and gastric bypass surgery in , presenting with two days of sharp abdominal pain and one episode of non-bloody, non-bilious vomiting. She has a prior history of small bowel obstruction in , , and most recently in , all of which resolved with conservative management. Her current symptoms are similar in nature. A computed tomography scan of the abdomen and pelvis with oral and intravenous contrast revealed multiple ventral abdominal wall hernias containing both obstructed and non-obstructed bowel loops. A complex small bowel obstruction is present, involving three hernias on the right side of the abdomen, with dilated, fluid-filled loops of small bowel, trace fluid between loops, and a collapsed segment of bowel beyond a hernia in the right lower quadrant. These findings are consistent with a recurrent small bowel obstruction. The hernia was non reducible at bedside. (2025 20:41)      GI NUTRITION SUPPORT NOTE:    POD#1 for anastomotic leak s/p ex lap, 30 cm small bowel resection, creation of new side to side ileoileostomy, ventral hernia repair w/ mesh. Extubated and on HFNC this am. NPO with NG suction, TPN initiated last night via RIJ TLC. Hyperglycemia with ss coverage. , SMOF lipids given.     REVIEW OF SYSTEMS:  Negative except as noted above.     PAST MEDICAL/SURGICAL HISTORY:   Gastric bypass status for obesity  LOUIS (obstructive sleep apnea)  S/P gastric bypass  S/P   S/P small bowel resection  History of total bilateral knee replacement (TKR)  H/O colonoscopy      ALLERGIES:  No Known Allergies      VITALS:  T(F): 99.5 (18 @ 12:00), Max: 100.3 (18 @ 03:00)  HR: 123 (18 @ 13:00) (62 - 123)  BP: 159/78 (18 @ 13:00) (92/50 - 159/78)  RR: 33 (18 @ 13:00) (16 - 33)  SpO2: 93% (18 @ 13:00) (93% - 100%)    HEIGHT/WEIGHT/BMI:   Height (cm): 160 (-17)  Weight (kg): 135.6 (-17)  BMI (kg/m2): 53 (-17)    PHYSICAL EXAM:   GENERAL: A&O X 3, comfortable on HFNC  HEENT: Moist mucous membranes, adequate dentition   ABDOMEN: Soft, Nondistended +THONY's X 2 RUQ, X 1 LUQ SS output  EXTREMITIES: +bilat LE edema  SKIN: warm and well perfused; No obvious rashes or lesions  IV ACCESS: RIJ TLC dressing c/d/i  ENTERAL ACCESS: NG [place] (suction)    I/Os:     25 @ 07:01  -  25 @ 07:00  --------------------------------------------------------  IN:    Dexmedetomidine: 309.6 mL    dextrose 5%: 375 mL    Fat Emulsion (Fish Oil &amp; Plant Based) 20% Infusion: 249.6 mL    FentaNYL: 61.5 mL    IV PiggyBack: 100 mL    IV PiggyBack: 300 mL    IV PiggyBack: 100 mL    IV PiggyBack: 200 mL    Lactated Ringers: 1925 mL    Norepinephrine: 63 mL    Propofol: 61.1 mL    TPN (Total Parenteral Nutrition): 900 mL  Total IN: 4644.8 mL    OUT:    Bulb (mL): 100 mL    Bulb (mL): 120 mL    Bulb (mL): 145 mL    Indwelling Catheter - Urethral (mL): 1730 mL    Nasogastric/Oral tube (mL): 200 mL    VAC (Vacuum Assisted Closure) System (mL): 0 mL  Total OUT: 2295 mL    Total NET: 2349.8 mL      STANDING MEDICATIONS:   acetaminophen   IVPB .. 1000 milliGRAM(s) IV Intermittent once  chlorhexidine 2% Cloths 1 Application(s) Topical <User Schedule>  heparin   Injectable 7500 Unit(s) SubCutaneous every 8 hours  HYDROmorphone  Injectable 0.5 milliGRAM(s) IV Push every 4 hours PRN  insulin lispro (ADMELOG) corrective regimen sliding scale   SubCutaneous every 6 hours  lipid, fat emulsion (Fish Oil and Plant Based) 20% Infusion 0.3687 Gm/kG/Day IV Continuous <Continuous>  lipid, fat emulsion (Fish Oil and Plant Based) 20% Infusion 0.3687 Gm/kG/Day IV Continuous <Continuous>  norepinephrine Infusion 0.05 MICROgram(s)/kG/Min IV Continuous <Continuous>  pantoprazole  Injectable 40 milliGRAM(s) IV Push daily  Parenteral Nutrition - Adult 1 Each TPN Continuous <Continuous>  Parenteral Nutrition - Adult 1 Each TPN Continuous <Continuous>  piperacillin/tazobactam IVPB.. 3.375 Gram(s) IV Intermittent every 12 hours      LABS:                         7.9    16.11 )-----------( 371      ( 2025 05:55 )             25.6     Mean Cell Volume: 98.1 fL (25 @ 05:55)  Red Cell Distrib Width: 14.2 % (25 05:55)    148[H]  |  110  |  97[HH]  ----------------------------<  211[H]          (25 @ 05:55)  4.1   |  26  |  3.5[H]    Ca    8.4          (25 05:55)  Phos  3.8         (25 05:55)  Mg     2.0         (25 05:55)    TPro  4.6[L]  /  Alb  2.4[L]  /  TBili  0.2  /  DBili  0.2  /  AST  37  /  ALT  45[H]  /  AlkPhos  58       25 @ 05:55    Triglycerides, Serum: 290 mg/dL ( @ 12:00)    Blood Glucose (Past 24 hours):  153 mg/dL ( @ 12:29)  201 mg/dL ( 05:56)  219 mg/dL ( @ 00:33)  166 mg/dL ( 18:26)      DIET:   Diet, NPO (25 @ 18:07) [Active]    Parenteral Nutrition - Adult 1 Each (50 mL/Hr) TPN Continuous <Continuous>, 25 @ 20:00, 20:00, Stop order after: 1 Days  Parenteral Nutrition - Adult 1 Each (75 mL/Hr) TPN Continuous <Continuous>, 25 @ 20:00, 20:00, Stop order after: 1 Days      ASSESSMENT  64y F w/ PMHx of HTN and gastric bypass surgery in  presented with abdominal distention and CT showing incarcerated ventral hernia with SBO. Taken to OR 4/10 s/p open repair of ventral hernia using mesh and component separation technique, small bowel resection and primary fascial closure. RTOR  for anastomotic leak s/p ex lap, 30 cm small bowel resection, creation of new side to side ileoileostomy, ventral hernia repair w/ mesh.     - recurrent SBO, s/p repair 4/10 then anastomotic leak s/p repair    - FUNMI   - high risk for malnutrition, prolonged NPO X 15d, TPN initiated   - class III obesity, BMI 53  - hypertriglyceridemia  - hyperglycemia     Est nutrient needs: IBW 52.3kg, 3389-9055 kcals (22-25kcals/kg IBW ASPEN/CCM guidelines for BMI>50), 105-131g protein (2-2.5g/kg IBW ASPEN/CCM guidelines for BMI >40)       PLAN  - cont TPN tonight, solution concentrated (15% AA)- 115g protein, 165g dextrose, 50g SMOF lipid @50ml/hr- avg 1271 kcals daily (lipids qod)  - cont with lipids for tonight then will give QOD, please get TG clearance when off lipids min 4-6 hrs   - glycemic control- 10U regular insulin added to tonight's TPN  - thiamine and folate added to TPN (refeeding, prolonged NPO)  - daily am bmp, phos, mg while on parenteral nutrition  - add zinc, 25-oh vitamin D, vitamin B12, folate and A1c levels to next blood draw  -  please add CRP and prealbumin to am labs   - will follow  d/w Dr. Bermudez and SICU team  Patient seen and examined during the GI advanced endoscopy rounds with GI Fellow and GI PA, GI Nutritionist, case discussed during rounds and discussed with primary team, assessment and plan as above

## 2025-04-18 NOTE — PROGRESS NOTE ADULT - ASSESSMENT
ASSESSMENT:  64F PMHx HTN, morbid obesity s/p  Abby-en-Y gastric bypass who presented on 4/3 with incarcerated ventral hernia with SBO. S/p 4/10 open ventral hernia repair, small bowel resection, and primary fascial closure with Torey Cornejo. Admitted to SICU for Q1 abdominal checks and hemodynamic monitoring.     NEUROLOGICAL:  #Acute pain  - IV tylenol  - fentanyl 25 mcg q3 prn severe pain   #sedation  -precedex ggt  -fentanyl ggt    RESPIRATORY:   #mechanical ventilation  - remained intubated post op   - ETT 8.0, 20cm at lipline   - Vent settings: 400/24/80/10  - Post op ab.44/45/201/31  #ARDS  -extubated to BiPAP on 4/15  #hx LOUIS on CPAP@ home  -CPAP at night when extubated   #Activity   - activity- increase as tolerated     CARDS:   #Sinus tachycardia   - improved   #acute hypotension - resolved   -off levophed since    -Levophed 0.05 on   - MAP >65  #hx of HTN  - holding home HCTZ 25mg QD, amlodipine and losartan  #EKG- NSR  #TTE 2022: EF 55-60%. G1DD.   - TTE : EF of 56 %.G1DD.     GASTROINTESTINAL/NUTRITION:   #Diet, strict NPO  -concern for leak, THONY drain #2 tea-colored  - CTAP with PO contrast: Evidence of defect in small bowel loop with connection to anterior midline intraperitoneal and subcutaneous pannus collections containing extravasated oral contrast. Unclear if this small bowel defect occurs at the anastomotic site. Surgical review recommended.  #: ex lap anastomosis resection and creation of side to side anastomosis   -IR consult - readdress when extubated     -aspiration precautions, HOB 30  #SBO s/p ventral hernia repair with SBR  #GI Prophylaxis/hx GERD   - pantoprazole  Injectable 40 IV Push daily (takes omeprazole @home)  #Bowel regimen    -holding    -last BM today     - NO dignishield     /RENAL:   #urine output in critically ill  - indwelling pirece   - High UOP  #worsening FUNMI   - Uptrending BUN   - Ammonia 35  - previously oliguric, now improved. Previously on bumex gtt @ 0.5mg/hr  - -300cc/hr  - nephrology consulted and following > hold off on CVVH  - renal u/s> no hydronephrosis. 2-3cm anechoic cyst on right kidney   #Hypernatremia   #maintain euvolemia   - LR @ 125  #Hypokalemia - repleted   Labs:        HEME/ONC:   # DVT prophylaxis-heparin   Injectable 7500 q8, SCDs          ID:  #MRSA nares negative       Current antibiotics-piperacillin/tazobactam IVPB.. 3.375 every 12 hours  #Antibiotics Course  - continue zosyn > Purulent abscesses noted intraoperatively   #febrile  - UA negative  - blood cultures sent    - sputum cultures pending   #cultures  - OR cx: few E.coli, few bacteroides, rare clostridium  - Blood Cx: no growth at 72 hrs   - tracheal aspirate: no growth     ENDOCRINE:  #glycemic monitoring    -FSG q6 if NPO    -Glucose goal 140-180. If above 180, will start corrective insulin sliding scale    MSK:  #Activity- increase as tolerated     SKIN:  #DTI screening negative     - will continue to monitor daily skin changes    LINES/DRAINS:  PIV, [place], ETT, NGT, THONY drain x 3, Radial A line, RIJ TLC   ADVANCED DIRECTIVES:  Full Code  HCP- Daughter   INDICATION FOR SICU/SDU: Intestinal obstruction  DISPO:  SICU. Case to be discussed with attending Dr. Bermudez  4A candidate      ASSESSMENT:  64F PMHx HTN, morbid obesity s/p  Abby-en-Y gastric bypass who presented on 4/3 with incarcerated ventral hernia with SBO. S/p 4/10 open ventral hernia repair, small bowel resection, and primary fascial closure with Torey Cornejo. Admitted to SICU for Q1 abdominal checks and hemodynamic monitoring.     NEUROLOGICAL:  #Acute pain  - IV tylenol  - fentanyl 25 mcg q3 prn severe pain   #sedation  -precedex gtt  -fentanyl ggt    RESPIRATORY:   #mechanical ventilation  - remained intubated post op   - ETT 8.0, 20cm at lipline   - Vent settings: 400/24/60/10  - Post op ab.44/45/201/31  - AM AB.46/39/179/28  #ARDS  -extubated to BiPAP on 4/15  #hx LOUIS on CPAP@ home  -CPAP at night when extubated   #Activity   - activity- increase as tolerated     CARDS:   #Sinus tachycardia   - improved   #acute hypotension - resolved   -off levophed since    -Levophed 0.05 on   - MAP >65  #hx of HTN  - holding home HCTZ 25mg QD, amlodipine and losartan  #EKG- NSR  #TTE 2022: EF 55-60%. G1DD.   - TTE : EF of 56 %.G1DD.     GASTROINTESTINAL/NUTRITION:   #Diet, strict NPO  -concern for leak, THONY drain #2 tea-colored  - CTAP with PO contrast: Evidence of defect in small bowel loop with connection to anterior midline intraperitoneal and subcutaneous pannus collections containing extravasated oral contrast. Unclear if this small bowel defect occurs at the anastomotic site. Surgical review recommended.  #: ex lap anastomosis resection and creation of side to side anastomosis   -IR consult - readdress when extubated     -aspiration precautions, HOB 30  #SBO s/p ventral hernia repair with SBR  #GI Prophylaxis/hx GERD   - pantoprazole  Injectable 40 IV Push daily (takes omeprazole @home)  #Bowel regimen    -holding    -last BM today     - NO dignishield     /RENAL:   #urine output in critically ill  - indwelling pierce   - High UOP  #worsening FUNMI   - Uptrending BUN   - Ammonia 35  - previously oliguric, now improved. Previously on bumex gtt @ 0.5mg/hr  - -300cc/hr  - nephrology consulted and following > hold off on CVVH  - renal u/s> no hydronephrosis. 2-3cm anechoic cyst on right kidney   #Hypernatremia   #maintain euvolemia   - LR @ 125  #Hypokalemia - repleted   Labs:        HEME/ONC:   # DVT prophylaxis-heparin   Injectable 7500 q8, SCDs          ID:  #MRSA nares negative       Current antibiotics-piperacillin/tazobactam IVPB.. 3.375 every 12 hours  #Antibiotics Course  - continue zosyn > Purulent abscesses noted intraoperatively   #febrile  - UA negative  - blood cultures sent    - sputum cultures pending   #cultures  - OR cx: few E.coli, few bacteroides, rare clostridium  - Blood Cx: no growth at 72 hrs   - tracheal aspirate: no growth     ENDOCRINE:  #glycemic monitoring    -FSG q6 if NPO    -Glucose goal 140-180. If above 180, will start corrective insulin sliding scale    MSK:  #Activity- increase as tolerated     SKIN:  #DTI screening negative     - will continue to monitor daily skin changes    LINES/DRAINS:  PIV, [place], ETT, NGT, THONY drain x 3, Radial A line, RIJ TLC   ADVANCED DIRECTIVES:  Full Code  HCP- Daughter   INDICATION FOR SICU/SDU: Intestinal obstruction  DISPO:  SICU. Case to be discussed with attending Dr. Bermudez  4A candidate

## 2025-04-18 NOTE — PROGRESS NOTE ADULT - SUBJECTIVE AND OBJECTIVE BOX
GENERAL SURGERY PROGRESS NOTE     NANCY SARGENT  [address] [place] [address] day :16d    POD: 1d  Procedure: Open repair of ventral hernia using mesh and component separation technique    Small bowel resection    Insertion, arterial line, percutaneous    Exploratory laparotomy      Surgical Attending: Rose Lema  24 hour events: s/p  s/p ex lap, 30 cm sbr, creation of new side to side ileoileostomy, ventral hernia repair w/ mesh    PHYSICAL EXAM:  General: NAD  HEENT: NCAT, EOMI, Trachea ML  CHEST: intubated.  Cardiac: RRR  Abdomen: Soft, non-distended, non-tender, midline prevena in place with THONY drains x3 ss outputs. pierce intact. abd binder intact  Skin: Warm/dry, normal color, no jaundice  Incision/wound: healing well, dressings in place, clean, dry and intact      T(F): 97.8 (04-17-25 @ 20:00), Max: 98.9 (04-17-25 @ 04:00)  HR: 77 (04-17-25 @ 23:30) (61 - 118)  BP: 100/68 (04-17-25 @ 23:15) (82/43 - 176/83)  ABP: 116/64 (04-17-25 @ 23:30) (78/35 - 183/80)  ABP(mean): 77 (04-17-25 @ 23:30) (50 - 105)  RR: 22 (04-17-25 @ 23:30) (17 - 26)  SpO2: 94% (04-17-25 @ 23:30) (94% - 100%)    IN'S / OUT's:    04-16-25 @ 07:01  -  04-17-25 @ 07:00  --------------------------------------------------------  IN:    dextrose 5%: 600 mL    IV PiggyBack: 600 mL    IV PiggyBack: 300 mL    Lactated Ringers Bolus: 500 mL  Total IN: 2000 mL    OUT:    Bulb (mL): 100 mL    Bulb (mL): 15 mL    Indwelling Catheter - Urethral (mL): 3290 mL    VAC (Vacuum Assisted Closure) System (mL): 0 mL  Total OUT: 3405 mL    Total NET: -1405 mL      04-17-25 @ 07:01  -  04-18-25 @ 00:59  --------------------------------------------------------  IN:    Dexmedetomidine: 119.9 mL    dextrose 5%: 375 mL    Fat Emulsion (Fish Oil &amp; Plant Based) 20% Infusion: 104 mL    FentaNYL: 7.2 mL    IV PiggyBack: 100 mL    IV PiggyBack: 100 mL    IV PiggyBack: 300 mL    Lactated Ringers: 1050 mL    Norepinephrine: 18.9 mL    Propofol: 61.1 mL    TPN (Total Parenteral Nutrition): 375 mL  Total IN: 2611.1 mL    OUT:    Bulb (mL): 70 mL    Bulb (mL): 80 mL    Bulb (mL): 100 mL    Indwelling Catheter - Urethral (mL): 1215 mL    VAC (Vacuum Assisted Closure) System (mL): 0 mL  Total OUT: 1465 mL    Total NET: 1146.1 mL          MEDICATIONS  (STANDING):  chlorhexidine 0.12% Liquid 15 milliLiter(s) Oral Mucosa every 12 hours  chlorhexidine 2% Cloths 1 Application(s) Topical <User Schedule>  dexMEDEtomidine Infusion 0.2 MICROgram(s)/kG/Hr (6.78 mL/Hr) IV Continuous <Continuous>  fentaNYL   Infusion. 0.5 MICROgram(s)/kG/Hr (6.78 mL/Hr) IV Continuous <Continuous>  heparin   Injectable 7500 Unit(s) SubCutaneous every 8 hours  insulin lispro (ADMELOG) corrective regimen sliding scale   SubCutaneous every 6 hours  lactated ringers. 1000 milliLiter(s) (125 mL/Hr) IV Continuous <Continuous>  lipid, fat emulsion (Fish Oil and Plant Based) 20% Infusion 0.3687 Gm/kG/Day (20.8 mL/Hr) IV Continuous <Continuous>  norepinephrine Infusion 0.05 MICROgram(s)/kG/Min (6.36 mL/Hr) IV Continuous <Continuous>  pantoprazole  Injectable 40 milliGRAM(s) IV Push daily  Parenteral Nutrition - Adult 1 Each (75 mL/Hr) TPN Continuous <Continuous>  piperacillin/tazobactam IVPB.. 3.375 Gram(s) IV Intermittent every 12 hours  propofol Infusion 10 MICROgram(s)/kG/Min (8.14 mL/Hr) IV Continuous <Continuous>    MEDICATIONS  (PRN):      LABS  Labs:  CAPILLARY BLOOD GLUCOSE      POCT Blood Glucose.: 219 mg/dL (18 Apr 2025 00:33)  POCT Blood Glucose.: 166 mg/dL (17 Apr 2025 18:26)  POCT Blood Glucose.: 163 mg/dL (17 Apr 2025 12:57)  POCT Blood Glucose.: 201 mg/dL (17 Apr 2025 06:54)                          7.9    18.84 )-----------( 394      ( 17 Apr 2025 22:38 )             25.9       Auto Immature Granulocyte %: 5.8 % (04-17-25 @ 22:38)    04-17    150[H]  |  108  |  95[HH]  ----------------------------<  153[H]  3.7   |  27  |  3.7[H]      Calcium: 9.0 mg/dL (04-17-25 @ 17:56)      LFTs:             5.8  | 0.4  | 52       ------------------[85      ( 16 Apr 2025 22:33 )  3.2  | x    | 54          Lipase:x      Amylase:x         Blood Gas Arterial, Lactate: 1.2 mmol/L (04-17-25 @ 18:36)  Blood Gas Arterial, Lactate: 1.1 mmol/L (04-15-25 @ 14:31)  Blood Gas Arterial, Lactate: 0.8 mmol/L (04-15-25 @ 04:05)    ABG - ( 17 Apr 2025 18:36 )  pH: 7.44  /  pCO2: 45    /  pO2: 201   / HCO3: 31    / Base Excess: 5.6   /  SaO2: 97.2            ABG - ( 15 Apr 2025 14:31 )  pH: 7.50  /  pCO2: 33    /  pO2: 126   / HCO3: 26    / Base Excess: 2.5   /  SaO2: 96.1            ABG - ( 15 Apr 2025 04:05 )  pH: 7.45  /  pCO2: 36    /  pO2: 85    / HCO3: 25    / Base Excess: 1.1   /  SaO2: 95.3              Coags:     11.60  ----< 0.98    ( 17 Apr 2025 17:56 )     19.6                Urinalysis Basic - ( 17 Apr 2025 17:56 )    Color: x / Appearance: x / SG: x / pH: x  Gluc: 153 mg/dL / Ketone: x  / Bili: x / Urobili: x   Blood: x / Protein: x / Nitrite: x   Leuk Esterase: x / RBC: x / WBC x   Sq Epi: x / Non Sq Epi: x / Bacteria: x            RADIOLOGY & ADDITIONAL TESTS:

## 2025-04-18 NOTE — PROGRESS NOTE ADULT - ASSESSMENT
64y F w/ PMHx of HTN and gastric bypass surgery in 2001 presents with abdominal distention. CT with incarcerated ventral hernia with SBO. S/p Open repair of ventral hernia using mesh and component separation technique, Small bowel resection. 4/17 s/p ex lap, 30 cm sbr, creation of new side to side ileoileostomy, ventral hernia repair w/ mesh    PLAN:  - Strict NPO w/ NGT  - TPN via R IJ central line (brown port)  - Closely monitor THONY drain outputs  - Monitor bowel function  - Monitor UOP and creatinine  - IV abx: zosyn  - Monitor for fevers  - Rest of care per SICU    BLUE TEAM SPECTRA: [id number].

## 2025-04-18 NOTE — CHART NOTE - NSCHARTNOTEFT_GEN_A_CORE
Patient noted to be POD 1 s/p ex-lap, 30 cm sbr, creation of new side to side ileoileostomy, ventral hernia repair w/ mesh. Patient is also intubated. Receiving TPN through right IJ central line. If needed, please call IR for PICC placement once patient is extubated and stable.    Please call Interventional Radiology with questions or concerns:   - M-F [phone]: [phone]   - All other hours: [phone]

## 2025-04-18 NOTE — PROGRESS NOTE ADULT - ATTENDING COMMENTS
Pt examined  labs and images reviewed  pt with supermorbid obesity and complex hernia with sbo  previously treated without surgery   passing flatus awaiting bowel movement  had a bm   but more pain   4/10 s/p BHUMI , Bowel Resection / Anastamosis / Primary repair of Fascia / hernia sac  overnight - renal failure / respiratory compromise  was intubated / paralysed/ botox  now on bumex - tapering   4/17:  s/p BHUMI , Bowel Resection / Anastamosis / Primary repair of Fascia / hernia sac  4/18: intubated - but labs and vitals stable  mariam drain sero sang    impression : extremely high risk - supermorbid obesity and complex hernia with partial sbo  will keep npo   SICU     prognosis and plan discussed with family

## 2025-04-18 NOTE — PROGRESS NOTE ADULT - ASSESSMENT
Patient is a 64F PMHx HTN, morbid obesity s/p 2001 Abby-en-Y gastric bypass who presented on 4/3 with incarcerated ventral hernia with SBO. S/p open ventral hernia repair, small bowel resection, and primary fascial closure with Dr. Lema on 4/10. Currently admitted to SICU for monitoring. Nephrology consulted for FUNMI and oliguria.    # FUNMI most likely ATN   # resp failure on MV  #  incarcerated ventral hernia with SBO S/p open ventral hernia repair, small bowel resection, and primary fascial closure  # hypernatremia     s/p ex-lap, 30 cm sbr, creation of new side to side ileoileostomy, ventral hernia repair w/ mesh yesterday   intubated now   cr  stable better  off diuretics on TPN   non oliguric  follow CK level   ph noted / no binders   adjust all meds to GFR < 15 ml/min   still no acute indication for RRT   will follow  IP nad Ca noted no binders yet   discussed with family at bedside

## 2025-04-18 NOTE — PROGRESS NOTE ADULT - SUBJECTIVE AND OBJECTIVE BOX
Nephrology progress note    THIS IS AN INCOMPLETE NOTE . FULL NOTE TO FOLLOW SHORTLY    Patient is seen and examined, events over the last 24 h noted .    Allergies:  No Known Allergies    Hospital Medications:   MEDICATIONS  (STANDING):  chlorhexidine 0.12% Liquid 15 milliLiter(s) Oral Mucosa every 12 hours  chlorhexidine 2% Cloths 1 Application(s) Topical <User Schedule>  dexMEDEtomidine Infusion 0.2 MICROgram(s)/kG/Hr (6.78 mL/Hr) IV Continuous <Continuous>  fentaNYL   Infusion. 0.5 MICROgram(s)/kG/Hr (6.78 mL/Hr) IV Continuous <Continuous>  heparin   Injectable 7500 Unit(s) SubCutaneous every 8 hours  insulin lispro (ADMELOG) corrective regimen sliding scale   SubCutaneous every 6 hours  lipid, fat emulsion (Fish Oil and Plant Based) 20% Infusion 0.3687 Gm/kG/Day (20.8 mL/Hr) IV Continuous <Continuous>  norepinephrine Infusion 0.05 MICROgram(s)/kG/Min (6.36 mL/Hr) IV Continuous <Continuous>  pantoprazole  Injectable 40 milliGRAM(s) IV Push daily  Parenteral Nutrition - Adult 1 Each (75 mL/Hr) TPN Continuous <Continuous>  piperacillin/tazobactam IVPB.. 3.375 Gram(s) IV Intermittent every 12 hours  propofol Infusion 10 MICROgram(s)/kG/Min (8.14 mL/Hr) IV Continuous <Continuous>        VITALS:  T(F): 99.8 (04-18-25 @ 08:00), Max: 100.3 (04-18-25 @ 03:00)  HR: 71 (04-18-25 @ 08:45)  BP: 112/56 (04-18-25 @ 08:30)  RR: 20 (04-18-25 @ 08:45)  SpO2: 98% (04-18-25 @ 08:45)  Wt(kg): --    04-16 @ 07:01  -  04-17 @ 07:00  --------------------------------------------------------  IN: 2000 mL / OUT: 3405 mL / NET: -1405 mL    04-17 @ 07:01  -  04-18 @ 07:00  --------------------------------------------------------  IN: 4644.8 mL / OUT: 2295 mL / NET: 2349.8 mL    04-18 @ 07:01  -  04-18 @ 08:46  --------------------------------------------------------  IN: 183.4 mL / OUT: 70 mL / NET: 113.4 mL      Height (cm): 160 (04-17 @ 11:26)  Weight (kg): 135.6 (04-17 @ 11:26)  BMI (kg/m2): 53 (04-17 @ 11:26)  BSA (m2): 2.29 (04-17 @ 11:26)    PHYSICAL EXAM:  Constitutional: NAD  HEENT: anicteric sclera, oropharynx clear, MMM  Neck: No JVD  Respiratory: CTAB, no wheezes, rales or rhonchi  Cardiovascular: S1, S2, RRR  Gastrointestinal: BS+, soft, NT/ND  Extremities: No cyanosis or clubbing. No peripheral edema  :  No pierce.   Skin: No rashes    LABS:  04-18    148[H]  |  110  |  97[HH]  ----------------------------<  211[H]  4.1   |  26  |  3.5[H]    Ca    8.4      18 Apr 2025 05:55  Phos  3.8     04-18  Mg     2.0     04-18    TPro  4.6[L]  /  Alb  2.4[L]  /  TBili  0.2  /  DBili  0.2  /  AST  37  /  ALT  45[H]  /  AlkPhos  58  04-18                          7.9    16.11 )-----------( 371      ( 18 Apr 2025 05:55 )             25.6       Urine Studies:  Urinalysis Basic - ( 18 Apr 2025 05:55 )    Color:  / Appearance:  / SG:  / pH:   Gluc: 211 mg/dL / Ketone:   / Bili:  / Urobili:    Blood:  / Protein:  / Nitrite:    Leuk Esterase:  / RBC:  / WBC    Sq Epi:  / Non Sq Epi:  / Bacteria:       Sodium, Random Urine: 51.0 mmoL/L (04-16 @ 07:08)  Creatinine, Random Urine: 65 mg/dL (04-16 @ 07:08)  Osmolality, Random Urine: 382 mos/kg (04-16 @ 07:08)      Lipid: chol --, , HDL --, LDL --      [04-17-25 @ 12:00]          RADIOLOGY & ADDITIONAL STUDIES:   Nephrology progress note    Patient is seen and examined, events over the last 24 h noted .  POD 1 s/p ex-lap, 30 cm sbr, creation of new side to side ileoileostomy, ventral hernia repair w/ mesh  intubated     Allergies:  No Known Allergies    Hospital Medications:   MEDICATIONS  (STANDING):  dexMEDEtomidine Infusion 0.2 MICROgram(s)/kG/Hr (6.78 mL/Hr) IV Continuous <Continuous>  fentaNYL   Infusion. 0.5 MICROgram(s)/kG/Hr (6.78 mL/Hr) IV Continuous <Continuous>  heparin   Injectable 7500 Unit(s) SubCutaneous every 8 hours  insulin lispro (ADMELOG) corrective regimen sliding scale   SubCutaneous every 6 hours  lipid, fat emulsion (Fish Oil and Plant Based) 20% Infusion 0.3687 Gm/kG/Day (20.8 mL/Hr) IV Continuous <Continuous>  norepinephrine Infusion 0.05 MICROgram(s)/kG/Min (6.36 mL/Hr) IV Continuous <Continuous>  pantoprazole  Injectable 40 milliGRAM(s) IV Push daily  Parenteral Nutrition - Adult 1 Each (75 mL/Hr) TPN Continuous <Continuous>  piperacillin/tazobactam IVPB.. 3.375 Gram(s) IV Intermittent every 12 hours  propofol Infusion 10 MICROgram(s)/kG/Min (8.14 mL/Hr) IV Continuous <Continuous>        VITALS:  T(F): 99.8 (04-18-25 @ 08:00), Max: 100.3 (04-18-25 @ 03:00)  HR: 71 (04-18-25 @ 08:45)  BP: 112/56 (04-18-25 @ 08:30)  RR: 20 (04-18-25 @ 08:45)  SpO2: 98% (04-18-25 @ 08:45)      04-16 @ 07:01  -  04-17 @ 07:00  --------------------------------------------------------  IN: 2000 mL / OUT: 3405 mL / NET: -1405 mL    04-17 @ 07:01  -  04-18 @ 07:00  --------------------------------------------------------  IN: 4644.8 mL / OUT: 2295 mL / NET: 2349.8 mL    04-18 @ 07:01  -  04-18 @ 08:46  --------------------------------------------------------  IN: 183.4 mL / OUT: 70 mL / NET: 113.4 mL      Height (cm): 160 (04-17 @ 11:26)  Weight (kg): 135.6 (04-17 @ 11:26)  BMI (kg/m2): 53 (04-17 @ 11:26)  BSA (m2): 2.29 (04-17 @ 11:26)    PHYSICAL EXAM:  Constitutional: on MV   Respiratory: CTAB,  Cardiovascular: S1, S2, RRR  Gastrointestinal: BS+, soft, NT/ND  Extremities: No cyanosis or clubbing. No peripheral edema  :  No pierce.   Skin: No rashes    LABS:  04-18    148[H]  |  110  |  97[HH]  ----------------------------<  211[H]  4.1   |  26  |  3.5[H]    Ca    8.4      18 Apr 2025 05:55  Phos  3.8     04-18  Mg     2.0     04-18    TPro  4.6[L]  /  Alb  2.4[L]  /  TBili  0.2  /  DBili  0.2  /  AST  37  /  ALT  45[H]  /  AlkPhos  58  04-18                          7.9    16.11 )-----------( 371      ( 18 Apr 2025 05:55 )             25.6       Urine Studies:  Urinalysis Basic - ( 18 Apr 2025 05:55 )    Color:  / Appearance:  / SG:  / pH:   Gluc: 211 mg/dL / Ketone:   / Bili:  / Urobili:    Blood:  / Protein:  / Nitrite:    Leuk Esterase:  / RBC:  / WBC    Sq Epi:  / Non Sq Epi:  / Bacteria:       Sodium, Random Urine: 51.0 mmoL/L (04-16 @ 07:08)  Creatinine, Random Urine: 65 mg/dL (04-16 @ 07:08)  Osmolality, Random Urine: 382 mos/kg (04-16 @ 07:08)      Lipid: chol --, , HDL --, LDL --      [04-17-25 @ 12:00]          RADIOLOGY & ADDITIONAL STUDIES:

## 2025-04-18 NOTE — PROGRESS NOTE ADULT - CRITICAL CARE ATTENDING COMMENT
Critical Care: [id number]   This patient has a high probability of sudden, clinically significant deterioration, which requires the highest level of physician preparedness to intervene urgently. I managed/supervised life or organ supporting interventions that required frequent physician assessment. I have reviewed and agree with note above. I devoted my full attention to the direct care of this patient for the period of time indicated, including reviewing relevant labs and imaging, discussing treatment plan with the SICU team, nurses, and primary team at the time of multidisciplinary rounds, and reviewing findings with patient and family. This does not include time devoted to teaching any trainees and to performing any procedures.    NANCY SARGENT 64y Female admitted to SICU with incarcerated ventral hernia with SBO now s/p open VHR, SBR with postop respiratory failure and nonoliguric ARF secondary to septic shock    Issues we are addressing today:    Neuro: minimizing pain meds, home meds as possible, delirium precautions, sleep meds as needed at night  CV: optimize fluid status, holding home antihypertensives  Respiratory: continue to wean support as possible, extubated today, bipap and HFNC as tolerated  Nutrition: npo at this time, ngt suction, TPN  Renal: monitor Is &Os, consider diuresis today  ID: abx ongoing   Lines: continue for now  Heme: continue to evaluate for acute blood loss anemia   Endocrine: Prevent and treat hyperglycemia with insulin as needed    PV: follow pulse exam  Skin: decub precautions  DVT Prophylaxis   Stress Gastritis Prophylaxis   Mobility: PT/OT, OOBTC    The above note is NOT written at the time of rounds and will reflect all changes throughout management of the patient for the day note is written for.    Chi Bermudez MD, D.[email or web address].

## 2025-04-19 LAB
24R-OH-CALCIDIOL SERPL-MCNC: 13 NG/ML — SIGNIFICANT CHANGE UP
A1C WITH ESTIMATED AVERAGE GLUCOSE RESULT: 5.8 % — HIGH (ref 4–5.6)
ANION GAP SERPL CALC-SCNC: 13 MMOL/L — SIGNIFICANT CHANGE UP (ref 7–14)
ANION GAP SERPL CALC-SCNC: 14 MMOL/L — SIGNIFICANT CHANGE UP (ref 7–14)
ANION GAP SERPL CALC-SCNC: 15 MMOL/L — HIGH (ref 7–14)
BASOPHILS # BLD AUTO: 0.05 K/UL — SIGNIFICANT CHANGE UP (ref 0–0.2)
BASOPHILS NFR BLD AUTO: 0.3 % — SIGNIFICANT CHANGE UP (ref 0–1)
BUN SERPL-MCNC: 101 MG/DL — CRITICAL HIGH (ref 10–20)
BUN SERPL-MCNC: 102 MG/DL — CRITICAL HIGH (ref 10–20)
BUN SERPL-MCNC: 99 MG/DL — CRITICAL HIGH (ref 10–20)
CALCIUM SERPL-MCNC: 7.9 MG/DL — LOW (ref 8.4–10.5)
CALCIUM SERPL-MCNC: 8 MG/DL — LOW (ref 8.4–10.5)
CALCIUM SERPL-MCNC: 8 MG/DL — LOW (ref 8.4–10.5)
CHLORIDE SERPL-SCNC: 115 MMOL/L — HIGH (ref 98–110)
CHLORIDE SERPL-SCNC: 116 MMOL/L — HIGH (ref 98–110)
CHLORIDE SERPL-SCNC: 119 MMOL/L — HIGH (ref 98–110)
CO2 SERPL-SCNC: 25 MMOL/L — SIGNIFICANT CHANGE UP (ref 17–32)
CO2 SERPL-SCNC: 26 MMOL/L — SIGNIFICANT CHANGE UP (ref 17–32)
CO2 SERPL-SCNC: 26 MMOL/L — SIGNIFICANT CHANGE UP (ref 17–32)
CREAT SERPL-MCNC: 2.5 MG/DL — HIGH (ref 0.7–1.5)
CREAT SERPL-MCNC: 2.5 MG/DL — HIGH (ref 0.7–1.5)
CREAT SERPL-MCNC: 2.9 MG/DL — HIGH (ref 0.7–1.5)
EGFR: 18 ML/MIN/1.73M2 — LOW
EGFR: 18 ML/MIN/1.73M2 — LOW
EGFR: 21 ML/MIN/1.73M2 — LOW
EOSINOPHIL # BLD AUTO: 0.05 K/UL — SIGNIFICANT CHANGE UP (ref 0–0.7)
EOSINOPHIL NFR BLD AUTO: 0.3 % — SIGNIFICANT CHANGE UP (ref 0–8)
ESTIMATED AVERAGE GLUCOSE: 120 MG/DL — HIGH (ref 68–114)
FOLATE SERPL-MCNC: 17.3 NG/ML — SIGNIFICANT CHANGE UP
GLUCOSE BLDC GLUCOMTR-MCNC: 171 MG/DL — HIGH (ref 70–99)
GLUCOSE BLDC GLUCOMTR-MCNC: 172 MG/DL — HIGH (ref 70–99)
GLUCOSE BLDC GLUCOMTR-MCNC: 175 MG/DL — HIGH (ref 70–99)
GLUCOSE BLDC GLUCOMTR-MCNC: 180 MG/DL — HIGH (ref 70–99)
GLUCOSE BLDC GLUCOMTR-MCNC: 201 MG/DL — HIGH (ref 70–99)
GLUCOSE SERPL-MCNC: 175 MG/DL — HIGH (ref 70–99)
GLUCOSE SERPL-MCNC: 178 MG/DL — HIGH (ref 70–99)
GLUCOSE SERPL-MCNC: 184 MG/DL — HIGH (ref 70–99)
GRAM STN FLD: ABNORMAL
HCT VFR BLD CALC: 24.1 % — LOW (ref 37–47)
HGB BLD-MCNC: 7.5 G/DL — LOW (ref 12–16)
IMM GRANULOCYTES NFR BLD AUTO: 5.2 % — HIGH (ref 0.1–0.3)
LYMPHOCYTES # BLD AUTO: 0.91 K/UL — LOW (ref 1.2–3.4)
LYMPHOCYTES # BLD AUTO: 4.9 % — LOW (ref 20.5–51.1)
MAGNESIUM SERPL-MCNC: 2 MG/DL — SIGNIFICANT CHANGE UP (ref 1.8–2.4)
MAGNESIUM SERPL-MCNC: 2.1 MG/DL — SIGNIFICANT CHANGE UP (ref 1.8–2.4)
MAGNESIUM SERPL-MCNC: 2.3 MG/DL — SIGNIFICANT CHANGE UP (ref 1.8–2.4)
MCHC RBC-ENTMCNC: 30.7 PG — SIGNIFICANT CHANGE UP (ref 27–31)
MCHC RBC-ENTMCNC: 31.1 G/DL — LOW (ref 32–37)
MCV RBC AUTO: 98.8 FL — SIGNIFICANT CHANGE UP (ref 81–99)
MONOCYTES # BLD AUTO: 0.72 K/UL — HIGH (ref 0.1–0.6)
MONOCYTES NFR BLD AUTO: 3.9 % — SIGNIFICANT CHANGE UP (ref 1.7–9.3)
NEUTROPHILS # BLD AUTO: 15.92 K/UL — HIGH (ref 1.4–6.5)
NEUTROPHILS NFR BLD AUTO: 85.4 % — HIGH (ref 42.2–75.2)
NRBC BLD AUTO-RTO: 0 /100 WBCS — SIGNIFICANT CHANGE UP (ref 0–0)
OSMOLALITY UR: 408 MOS/KG — SIGNIFICANT CHANGE UP (ref 50–1200)
PHOSPHATE SERPL-MCNC: 2.5 MG/DL — SIGNIFICANT CHANGE UP (ref 2.1–4.9)
PHOSPHATE SERPL-MCNC: 2.5 MG/DL — SIGNIFICANT CHANGE UP (ref 2.1–4.9)
PHOSPHATE SERPL-MCNC: 2.9 MG/DL — SIGNIFICANT CHANGE UP (ref 2.1–4.9)
PLATELET # BLD AUTO: 298 K/UL — SIGNIFICANT CHANGE UP (ref 130–400)
PMV BLD: 11.1 FL — HIGH (ref 7.4–10.4)
POTASSIUM SERPL-MCNC: 3.2 MMOL/L — LOW (ref 3.5–5)
POTASSIUM SERPL-MCNC: 3.3 MMOL/L — LOW (ref 3.5–5)
POTASSIUM SERPL-MCNC: 3.4 MMOL/L — LOW (ref 3.5–5)
POTASSIUM SERPL-SCNC: 3.2 MMOL/L — LOW (ref 3.5–5)
POTASSIUM SERPL-SCNC: 3.3 MMOL/L — LOW (ref 3.5–5)
POTASSIUM SERPL-SCNC: 3.4 MMOL/L — LOW (ref 3.5–5)
RBC # BLD: 2.44 M/UL — LOW (ref 4.2–5.4)
RBC # FLD: 14.3 % — SIGNIFICANT CHANGE UP (ref 11.5–14.5)
SODIUM SERPL-SCNC: 155 MMOL/L — HIGH (ref 135–146)
SODIUM SERPL-SCNC: 155 MMOL/L — HIGH (ref 135–146)
SODIUM SERPL-SCNC: 159 MMOL/L — HIGH (ref 135–146)
VIT B12 SERPL-MCNC: 1417 PG/ML — HIGH (ref 232–1245)
WBC # BLD: 18.62 K/UL — HIGH (ref 4.8–10.8)
WBC # FLD AUTO: 18.62 K/UL — HIGH (ref 4.8–10.8)

## 2025-04-19 PROCEDURE — 99291 CRITICAL CARE FIRST HOUR: CPT

## 2025-04-19 PROCEDURE — 71045 X-RAY EXAM CHEST 1 VIEW: CPT | Mod: 26

## 2025-04-19 RX ORDER — BUMETANIDE 1 MG/1
1 TABLET ORAL ONCE
Refills: 0 | Status: COMPLETED | OUTPATIENT
Start: 2025-04-19 | End: 2025-04-19

## 2025-04-19 RX ORDER — INSULIN LISPRO 100 U/ML
INJECTION, SOLUTION INTRAVENOUS; SUBCUTANEOUS EVERY 6 HOURS
Refills: 0 | Status: DISCONTINUED | OUTPATIENT
Start: 2025-04-19 | End: 2025-04-23

## 2025-04-19 RX ORDER — POTASSIUM PHOSPHATE, MONOBASIC POTASSIUM PHOSPHATE, DIBASIC INJECTION, 236; 224 MG/ML; MG/ML
30 SOLUTION, CONCENTRATE INTRAVENOUS ONCE
Refills: 0 | Status: COMPLETED | OUTPATIENT
Start: 2025-04-19 | End: 2025-04-19

## 2025-04-19 RX ORDER — IPRATROPIUM BROMIDE AND ALBUTEROL SULFATE .5; 2.5 MG/3ML; MG/3ML
3 SOLUTION RESPIRATORY (INHALATION) EVERY 6 HOURS
Refills: 0 | Status: DISCONTINUED | OUTPATIENT
Start: 2025-04-19 | End: 2025-04-27

## 2025-04-19 RX ORDER — SODIUM CHLORIDE 9 G/1000ML
1000 INJECTION, SOLUTION INTRAVENOUS
Refills: 0 | Status: DISCONTINUED | OUTPATIENT
Start: 2025-04-19 | End: 2025-04-20

## 2025-04-19 RX ORDER — SODIUM/POT/MAG/CALC/CHLOR/ACET 35-20-5MEQ
1 VIAL (ML) INTRAVENOUS
Refills: 0 | Status: DISCONTINUED | OUTPATIENT
Start: 2025-04-19 | End: 2025-04-19

## 2025-04-19 RX ADMIN — INSULIN LISPRO 2: 100 INJECTION, SOLUTION INTRAVENOUS; SUBCUTANEOUS at 05:08

## 2025-04-19 RX ADMIN — Medication 25 GRAM(S): at 05:08

## 2025-04-19 RX ADMIN — HEPARIN SODIUM 7500 UNIT(S): 1000 INJECTION INTRAVENOUS; SUBCUTANEOUS at 22:28

## 2025-04-19 RX ADMIN — INSULIN LISPRO 2: 100 INJECTION, SOLUTION INTRAVENOUS; SUBCUTANEOUS at 00:08

## 2025-04-19 RX ADMIN — INSULIN LISPRO 2: 100 INJECTION, SOLUTION INTRAVENOUS; SUBCUTANEOUS at 18:22

## 2025-04-19 RX ADMIN — SODIUM CHLORIDE 50 MILLILITER(S): 9 INJECTION, SOLUTION INTRAVENOUS at 06:39

## 2025-04-19 RX ADMIN — INSULIN LISPRO 4: 100 INJECTION, SOLUTION INTRAVENOUS; SUBCUTANEOUS at 12:33

## 2025-04-19 RX ADMIN — Medication 40 MILLIGRAM(S): at 12:15

## 2025-04-19 RX ADMIN — Medication 50 EACH: at 20:32

## 2025-04-19 RX ADMIN — Medication 25 GRAM(S): at 17:37

## 2025-04-19 RX ADMIN — INSULIN LISPRO 2: 100 INJECTION, SOLUTION INTRAVENOUS; SUBCUTANEOUS at 23:23

## 2025-04-19 RX ADMIN — HEPARIN SODIUM 7500 UNIT(S): 1000 INJECTION INTRAVENOUS; SUBCUTANEOUS at 05:08

## 2025-04-19 RX ADMIN — Medication 50 MILLIEQUIVALENT(S): at 06:39

## 2025-04-19 RX ADMIN — Medication 50 MILLIEQUIVALENT(S): at 15:23

## 2025-04-19 RX ADMIN — IPRATROPIUM BROMIDE AND ALBUTEROL SULFATE 3 MILLILITER(S): .5; 2.5 SOLUTION RESPIRATORY (INHALATION) at 14:17

## 2025-04-19 RX ADMIN — HEPARIN SODIUM 7500 UNIT(S): 1000 INJECTION INTRAVENOUS; SUBCUTANEOUS at 13:46

## 2025-04-19 RX ADMIN — BUMETANIDE 1 MILLIGRAM(S): 1 TABLET ORAL at 12:14

## 2025-04-19 RX ADMIN — Medication 1 APPLICATION(S): at 05:08

## 2025-04-19 RX ADMIN — Medication 50 MILLIEQUIVALENT(S): at 14:26

## 2025-04-19 RX ADMIN — IPRATROPIUM BROMIDE AND ALBUTEROL SULFATE 3 MILLILITER(S): .5; 2.5 SOLUTION RESPIRATORY (INHALATION) at 19:43

## 2025-04-19 NOTE — PROGRESS NOTE ADULT - SUBJECTIVE AND OBJECTIVE BOX
NANCY SARGENT   [id number]   [birth year]  64yF  ============================================================   DATE OF INITIAL SICU/SDU CONSULT: 04-10-25    INDICATION FOR SICU CONSULT:  q1hr abdominal checks, mechanical ventilation     SICU COURSE EVENTS :  04-10 - admitted to SICU service  4/11: Intubated and started on paralytic. Arterial line placed, subclavian TLC placed. Nephrology consulted for oliguria. IR abdominal muscle botox injection performed. TTE performed.   4/12: Additional fluid boluses given; trial fo bumex started, considering CVVH. Weaned off nimbex gtt.  > 220 /hr. Renal U/S w/out hydro.   4/13: Weaned off Levophed. Bumex gtt 2mg/hr > 1mg/hr. Goal net negative 1-1.5L, UOP approx, 200c/hr.   4/14: Remained on bumex gtt for further diuresis, decreased to 0.5 overnight, vent settings weaned. Cr downtrending, LFTs worsening.   4/15: Extubated to BiPAP, transitioned to NC. Dc'd bumex gtt, given 5mg metolazone x 1 and 2mg bumex x 1.   4/16: THONY drain #2 murky/bilious, pending CTAP with PO contrast, hypernatremia, started D5W @ 75cc/hr, persistent hypokalemia   4/17: RTOR for resection of anastomosis for anastomotic leak. Returned intubated, 400/24/80/10, sinus tachy to . Abdomen soft. Was initially on propofol @ 30 and precedex, but propofol weaned off due to hypotension with MAPs in 50s, fentanyl ggt started for acute pain. Remained hypotensive despite fluids, started on Levophed, on 0.05.   4/18: Extubated. HFNC 40L/49%, Levo off since 11 AM      24HOUR EVENTS  4/18  NIGHT  -PM rounds: systolics 130-140s, HFNC 40L/49%, O2 sat >94%, HR 110s,  no abdominal pain, abdomen soft. THONY x 2 SS (on the right), THONY x 1 Serous (on the left)    DAY  - Extubated to HFNC   - IV tylenol, dilaudid prn   - A-line not reflecting pressures   - concentrate TPN   - Zosyn x 4 days   - wean levophed - off since 11AM  - Blood clx negative after 5 days  - placed on bipap for labored breathing   - d/c ISS once new bag of TPN starts       [X] A ten-point review of systems was negative except as expressed in note.  [X ] History was obtained from patient. If unable to participate in their care, history was from review of the chart and collateral sources of information.  ============================================================    NANCY SARGENT   [id number]   [birth year]  64yF  ============================================================   DATE OF INITIAL SICU/SDU CONSULT: 04-10-25    INDICATION FOR SICU CONSULT:  q1hr abdominal checks, mechanical ventilation     SICU COURSE EVENTS :  04-10 - admitted to SICU service  4/11: Intubated and started on paralytic. Arterial line placed, subclavian TLC placed. Nephrology consulted for oliguria. IR abdominal muscle botox injection performed. TTE performed.   4/12: Additional fluid boluses given; trial fo bumex started, considering CVVH. Weaned off nimbex gtt.  > 220 /hr. Renal U/S w/out hydro.   4/13: Weaned off Levophed. Bumex gtt 2mg/hr > 1mg/hr. Goal net negative 1-1.5L, UOP approx, 200c/hr.   4/14: Remained on bumex gtt for further diuresis, decreased to 0.5 overnight, vent settings weaned. Cr downtrending, LFTs worsening.   4/15: Extubated to BiPAP, transitioned to NC. Dc'd bumex gtt, given 5mg metolazone x 1 and 2mg bumex x 1.   4/16: THONY drain #2 murky/bilious, pending CTAP with PO contrast, hypernatremia, started D5W @ 75cc/hr, persistent hypokalemia   4/17: RTOR for resection of anastomosis for anastomotic leak. Returned intubated, 400/24/80/10, sinus tachy to . Abdomen soft. Was initially on propofol @ 30 and precedex, but propofol weaned off due to hypotension with MAPs in 50s, fentanyl ggt started for acute pain. Remained hypotensive despite fluids, started on Levophed, on 0.05.   4/18: Extubated. HFNC 40L/49%, Levo off since 11 AM      24HOUR EVENTS  4/18  NIGHT  -PM rounds: systolics 130-140s, HFNC 40L/49%, O2 sat >94%, HR 110s,  no abdominal pain, abdomen soft. THONY x 2 SS (on the right), THONY x 1 Serous (on the left)    DAY  - Extubated to HFNC   - IV tylenol, dilaudid prn   - A-line not reflecting pressures   - concentrate TPN   - Zosyn x 4 days   - wean levophed - off since 11AM  - Blood clx negative after 5 days  - placed on bipap for labored breathing   - d/c ISS once new bag of TPN starts       [X] A ten-point review of systems was negative except as expressed in note.  [X ] History was obtained from patient. If unable to participate in their care, history was from review of the chart and collateral sources of information.  ============================================================   Daily     Daily     Diet, NPO (04-17-25 @ 18:07)      CURRENT MEDS:  Neurologic Medications  HYDROmorphone  Injectable 0.5 milliGRAM(s) IV Push every 4 hours PRN Severe Pain (7 - 10)    Respiratory Medications    Cardiovascular Medications    Gastrointestinal Medications  dextrose 5%. 1000 milliLiter(s) IV Continuous <Continuous>  lipid, fat emulsion (Fish Oil and Plant Based) 20% Infusion 0.3687 Gm/kG/Day IV Continuous <Continuous>  pantoprazole  Injectable 40 milliGRAM(s) IV Push daily  Parenteral Nutrition - Adult 1 Each TPN Continuous <Continuous>    Genitourinary Medications    Hematologic/Oncologic Medications  heparin   Injectable 7500 Unit(s) SubCutaneous every 8 hours    Antimicrobial/Immunologic Medications  piperacillin/tazobactam IVPB.. 3.375 Gram(s) IV Intermittent every 12 hours    Endocrine/Metabolic Medications  insulin lispro (ADMELOG) corrective regimen sliding scale   SubCutaneous every 6 hours    Topical/Other Medications  chlorhexidine 2% Cloths 1 Application(s) Topical <User Schedule>      ICU Vital Signs Last 24 Hrs  T(C): 36.3 (19 Apr 2025 08:00), Max: 37.5 (18 Apr 2025 12:00)  T(F): 97.3 (19 Apr 2025 08:00), Max: 99.5 (18 Apr 2025 12:00)  HR: 102 (19 Apr 2025 06:00) (62 - 123)  BP: 142/70 (19 Apr 2025 05:00) (104/57 - 159/78)  BP(mean): 98 (19 Apr 2025 05:00) (75 - 100)  ABP: 147/70 (19 Apr 2025 06:00) (70/27 - 157/73)  ABP(mean): 94 (19 Apr 2025 06:00) (51 - 144)  RR: 27 (19 Apr 2025 06:00) (16 - 33)  SpO2: 97% (19 Apr 2025 06:00) (92% - 99%)          Mode: PS (Pressure Support)/ Spontaneous  FiO2: 40  PEEP: 10  ITime: 1  PIP: 19    ABG - ( 18 Apr 2025 15:00 )  pH, Arterial: 7.52  pH, Blood: x     /  pCO2: 33    /  pO2: 105   / HCO3: 27    / Base Excess: 4.0   /  SaO2: 96.5                I&O's Summary    18 Apr 2025 07:01  -  19 Apr 2025 07:00  --------------------------------------------------------  IN: 2133 mL / OUT: 2170 mL / NET: -37 mL      I&O's Detail    18 Apr 2025 07:01  -  19 Apr 2025 07:00  --------------------------------------------------------  IN:    Dexmedetomidine: 27.1 mL    dextrose 5%: 50 mL    Fat Emulsion (Fish Oil &amp; Plant Based) 20% Infusion: 249.6 mL    IV PiggyBack: 100 mL    IV PiggyBack: 100 mL    IV PiggyBack: 100 mL    Norepinephrine: 6.3 mL    TPN (Total Parenteral Nutrition): 1500 mL  Total IN: 2133 mL    OUT:    Bulb (mL): 45 mL    Bulb (mL): 55 mL    Bulb (mL): 60 mL    Indwelling Catheter - Urethral (mL): 1890 mL    Nasogastric/Oral tube (mL): 120 mL    VAC (Vacuum Assisted Closure) System (mL): 0 mL  Total OUT: 2170 mL    Total NET: -37 mL          PHYSICAL EXAM:    General/Neuro: A&O x 3, no focal deficits.   Lungs: Saturating well on BiPAP 14/6. No increased work of breathing or tachypnea. CTAB. Normal expansion/effort.  Cardiovascular : S1, S2.  Non-tachycardic.  GI: Abdomen soft, mildly distended, mild tenderness on deep palpation. THONY drains x 3 (RUQ, RLQ, LLQ) with serosanginous OP. NGT in place to suction.   Extremities: Extremities warm, pink, well-perfused.  Derm: Good skin turgor, no skin breakdown.    :       An catheter in place.      CXR:     LABS:  CAPILLARY BLOOD GLUCOSE      POCT Blood Glucose.: 171 mg/dL (19 Apr 2025 05:00)  POCT Blood Glucose.: 172 mg/dL (19 Apr 2025 00:05)  POCT Blood Glucose.: 166 mg/dL (18 Apr 2025 17:59)  POCT Blood Glucose.: 153 mg/dL (18 Apr 2025 12:29)                          7.5    18.62 )-----------( 298      ( 19 Apr 2025 05:08 )             24.1       04-19    155[H]  |  116[H]  |  99[HH]  ----------------------------<  178[H]  3.4[L]   |  26  |  2.9[H]    Ca    8.0[L]      19 Apr 2025 05:08  Phos  2.9     04-19  Mg     2.1     04-19    TPro  4.6[L]  /  Alb  2.4[L]  /  TBili  0.2  /  DBili  0.2  /  AST  37  /  ALT  45[H]  /  AlkPhos  58  04-18      PT/INR - ( 17 Apr 2025 17:56 )   PT: 11.60 sec;   INR: 0.98 ratio         PTT - ( 17 Apr 2025 17:56 )  PTT:19.6 sec      Urinalysis Basic - ( 19 Apr 2025 05:08 )    Color: x / Appearance: x / SG: x / pH: x  Gluc: 178 mg/dL / Ketone: x  / Bili: x / Urobili: x   Blood: x / Protein: x / Nitrite: x   Leuk Esterase: x / RBC: x / WBC x   Sq Epi: x / Non Sq Epi: x / Bacteria: x        Culture - Body Fluid with Gram Stain (collected 17 Apr 2025 15:20)  Source: Body Fluid None  Gram Stain (18 Apr 2025 10:09):    No polymorphonuclear leukocytes seen    No organisms seen

## 2025-04-19 NOTE — PROGRESS NOTE ADULT - ATTENDING COMMENTS
Pt examined  labs and images reviewed  pt with supermorbid obesity and complex hernia with sbo  previously treated without surgery   passing flatus awaiting bowel movement but was having  more pain taken to OR urgently   4/10 s/p BHUMI , Bowel Resection / Anastamosis / Primary repair of Fascia / hernia sac  overnight - renal failure / respiratory compromise  was intubated / paralysed/ botox  now on bumex - tapering   4/17:  s/p BHUMI , Bowel Resection / Anastamosis / Primary repair of Fascia / hernia sac  4/19: extubated - but labs and vitals stable  mariam drain sero sang    impression : extremely high risk - supermorbid obesity and complex hernia with partial sbo  will keep npo   SICU

## 2025-04-19 NOTE — PROGRESS NOTE ADULT - CRITICAL CARE ATTENDING COMMENT
Critical Care: [id number]   This patient has a high probability of sudden, clinically significant deterioration, which requires the highest level of physician preparedness to intervene urgently. I managed/supervised life or organ supporting interventions that required frequent physician assessment. I have reviewed and agree with note above. I devoted my full attention to the direct care of this patient for the period of time indicated, including reviewing relevant labs and imaging, discussing treatment plan with the SICU team, nurses, and primary team at the time of multidisciplinary rounds, and reviewing findings with patient and family. This does not include time devoted to teaching any trainees and to performing any procedures.    NANCY SARGENT 64y Female admitted to SICU with incarcerated ventral hernia with SBO now s/p open VHR, SBR with postop respiratory failure and nonoliguric ARF secondary to septic shock    Issues we are addressing today:    Neuro: minimizing pain meds, home meds as possible, delirium precautions, sleep meds as needed at night  CV: optimize fluid status, holding home antihypertensives  Respiratory: continue to wean support as possible, extubated today, bipap and HFNC as tolerated  Nutrition: npo at this time, TPN  Renal: monitor Is &Os, repeat diuresis today  ID: abx ongoing   Lines: continue for now  Heme: continue to evaluate for acute blood loss anemia   Endocrine: Prevent and treat hyperglycemia with insulin as needed    PV: follow pulse exam  Skin: decub precautions  DVT Prophylaxis   Stress Gastritis Prophylaxis   Mobility: PT/OT, OOBTC    The above note is NOT written at the time of rounds and will reflect all changes throughout management of the patient for the day note is written for.    Chi Bermudez MD, D.PRATEEK.

## 2025-04-19 NOTE — PROGRESS NOTE ADULT - SUBJECTIVE AND OBJECTIVE BOX
SURGERY PROGRESS NOTE    Patient: NANCY SARGENT , 64y (62)Female   MRN: [id number]  Location: [address]  Visit: 25 Inpatient  Date: 25 @ 01:01    Hospital day :17d  POD: 2d  Procedure: Open repair of ventral hernia using mesh and component separation technique    Small bowel resection    Insertion, arterial line, percutaneous    Exploratory laparotomy      24 hour events: Yesterday extubated, now on a HFNC 40L/49%. Bloodd cx negtive. THONY x 2 SS (on the right), THONY x 1 Serous (on the left).    PHYSICAL EXAM:  General: NAD  HEENT: NCAT, EOMI, Trachea ML  CHEST: intubated.  Cardiac: RRR  Abdomen: Soft, distended, noderately tender, midline prevena in place with R THONY drains x2 ss outputs, L THONY serous output. pierce intact. abd binder intact  Skin: Warm/dry, normal color, no jaundice  Incision/wound: healing well, dressings in place, clean, dry and intact      PAST MEDICAL & SURGICAL HISTORY:  Gastric bypass status for obesity      LOUIS (obstructive sleep apnea)      S/P gastric bypass      S/P       S/P small bowel resection      History of total bilateral knee replacement (TKR)      H/O colonoscopy            Vitals:   T(F): 99.5 (25 @ 12:00), Max: 100.3 (25 @ 03:00)  HR: 101 (25 @ 00:00)  BP: 140/69 (25 @ 00:00)  RR: 22 (25 @ 00:00)  SpO2: 97% (25 @ 00:00)  Mode: PS (Pressure Support)/ Spontaneous, FiO2: 40, PEEP: 10, ITime: 1, PIP: 19    Diet, NPO      Fluids:     I & O's:    25 @ 07:01  -  25 @ 07:00  --------------------------------------------------------  IN:    Dexmedetomidine: 309.6 mL    dextrose 5%: 375 mL    Fat Emulsion (Fish Oil &amp; Plant Based) 20% Infusion: 249.6 mL    FentaNYL: 61.5 mL    IV PiggyBack: 100 mL    IV PiggyBack: 300 mL    IV PiggyBack: 100 mL    IV PiggyBack: 200 mL    Lactated Ringers: 1925 mL    Norepinephrine: 63 mL    Propofol: 61.1 mL    TPN (Total Parenteral Nutrition): 900 mL  Total IN: 4644.8 mL    OUT:    Bulb (mL): 100 mL    Bulb (mL): 120 mL    Bulb (mL): 145 mL    Indwelling Catheter - Urethral (mL): 1730 mL    Nasogastric/Oral tube (mL): 200 mL    VAC (Vacuum Assisted Closure) System (mL): 0 mL  Total OUT: 2295 mL    Total NET: 2349.8 mL          MEDICATIONS  (STANDING):  chlorhexidine 2% Cloths 1 Application(s) Topical <User Schedule>  heparin   Injectable 7500 Unit(s) SubCutaneous every 8 hours  insulin lispro (ADMELOG) corrective regimen sliding scale   SubCutaneous every 6 hours  lipid, fat emulsion (Fish Oil and Plant Based) 20% Infusion 0.3687 Gm/kG/Day (20.8 mL/Hr) IV Continuous <Continuous>  norepinephrine Infusion 0.05 MICROgram(s)/kG/Min (6.36 mL/Hr) IV Continuous <Continuous>  pantoprazole  Injectable 40 milliGRAM(s) IV Push daily  Parenteral Nutrition - Adult 1 Each (50 mL/Hr) TPN Continuous <Continuous>  Parenteral Nutrition - Adult 1 Each (75 mL/Hr) TPN Continuous <Continuous>  piperacillin/tazobactam IVPB.. 3.375 Gram(s) IV Intermittent every 12 hours    MEDICATIONS  (PRN):  HYDROmorphone  Injectable 0.5 milliGRAM(s) IV Push every 4 hours PRN Severe Pain (7 - 10)      DVT PROPHYLAXIS: heparin   Injectable 7500 Unit(s) SubCutaneous every 8 hours    GI PROPHYLAXIS: pantoprazole  Injectable 40 milliGRAM(s) IV Push daily    ANTICOAGULATION:   ANTIBIOTICS:  piperacillin/tazobactam IVPB.. 3.375 Gram(s)            LAB/STUDIES:  Labs:  CAPILLARY BLOOD GLUCOSE      POCT Blood Glucose.: 172 mg/dL (2025 00:05)  POCT Blood Glucose.: 166 mg/dL (2025 17:59)  POCT Blood Glucose.: 153 mg/dL (2025 12:29)  POCT Blood Glucose.: 201 mg/dL (2025 05:56)                          7.9    16.11 )-----------( 371      ( 2025 05:55 )             25.6       Auto Immature Granulocyte %: 6.8 % (25 @ 05:55)        148[H]  |  110  |  97[HH]  ----------------------------<  211[H]  4.1   |  26  |  3.5[H]      Calcium: 8.4 mg/dL (25 @ 05:55)      LFTs:             4.6  | 0.2  | 37       ------------------[58      ( 2025 05:55 )  2.4  | 0.2  | 45          Lipase:x      Amylase:x         Blood Gas Arterial, Lactate: 1.3 mmol/L (25 @ 15:00)  Blood Gas Arterial, Lactate: 1.1 mmol/L (25 @ 06:06)  Blood Gas Arterial, Lactate: 1.2 mmol/L (25 @ 18:36)    ABG - ( 2025 15:00 )  pH: 7.52  /  pCO2: 33    /  pO2: 105   / HCO3: 27    / Base Excess: 4.0   /  SaO2: 96.5            ABG - ( 2025 06:06 )  pH: 7.46  /  pCO2: 39    /  pO2: 179   / HCO3: 28    / Base Excess: 3.6   /  SaO2: 96.7            ABG - ( 2025 18:36 )  pH: 7.44  /  pCO2: 45    /  pO2: 201   / HCO3: 31    / Base Excess: 5.6   /  SaO2: 97.2              Coags:     11.60  ----< 0.98    ( 2025 17:56 )     19.6                Urinalysis Basic - ( 2025 05:55 )    Color: x / Appearance: x / SG: x / pH: x  Gluc: 211 mg/dL / Ketone: x  / Bili: x / Urobili: x   Blood: x / Protein: x / Nitrite: x   Leuk Esterase: x / RBC: x / WBC x   Sq Epi: x / Non Sq Epi: x / Bacteria: x        Culture - Body Fluid with Gram Stain (collected 2025 15:20)  Source: Body Fluid None  Gram Stain (2025 10:09):    No polymorphonuclear leukocytes seen    No organisms seen

## 2025-04-19 NOTE — PROGRESS NOTE ADULT - ASSESSMENT
Patient is a 64F PMHx HTN, morbid obesity s/p 2001 Abby-en-Y gastric bypass who presented on 4/3 with incarcerated ventral hernia with SBO. S/p open ventral hernia repair, small bowel resection, and primary fascial closure with Dr. Lema on 4/10. Currently admitted to SICU for monitoring. Nephrology consulted for FUNMI and oliguria.  # FUNMI most likely ATN   # resp failure on MV/ extubated / on high flow   #  incarcerated ventral hernia with SBO S/p open ventral hernia repair, small bowel resection, and primary fascial closure  # hypernatremia   s/p ex-lap, creation of new side to side ileoileostomy, ventral hernia repair w/ mesh yesterday   on high flow   cr  trending down   off diuretics on TPN   hypernatremic on TPN/ repeat u osm  start d5 at 60 cc/h  follow sodium level q 12   non oliguric  ph noted / no binders   adjust all meds to GFR < 15 ml/min   still no acute indication for RRT   will follow

## 2025-04-19 NOTE — PROGRESS NOTE ADULT - SUBJECTIVE AND OBJECTIVE BOX
seen and examined   24 h events noted   on high flow         PAST HISTORY  --------------------------------------------------------------------------------  No significant changes to PMH, PSH, FHx, SHx, unless otherwise noted    ALLERGIES & MEDICATIONS  --------------------------------------------------------------------------------  Allergies    No Known Allergies    Intolerances      Standing Inpatient Medications  chlorhexidine 2% Cloths 1 Application(s) Topical <User Schedule>  dextrose 5%. 1000 milliLiter(s) IV Continuous <Continuous>  heparin   Injectable 7500 Unit(s) SubCutaneous every 8 hours  insulin lispro (ADMELOG) corrective regimen sliding scale   SubCutaneous every 6 hours  lipid, fat emulsion (Fish Oil and Plant Based) 20% Infusion 0.3687 Gm/kG/Day IV Continuous <Continuous>  pantoprazole  Injectable 40 milliGRAM(s) IV Push daily  Parenteral Nutrition - Adult 1 Each TPN Continuous <Continuous>  piperacillin/tazobactam IVPB.. 3.375 Gram(s) IV Intermittent every 12 hours    PRN Inpatient Medications  HYDROmorphone  Injectable 0.5 milliGRAM(s) IV Push every 4 hours PRN          VITALS/PHYSICAL EXAM  --------------------------------------------------------------------------------  T(C): 36.7 (04-19-25 @ 04:00), Max: 37.7 (04-18-25 @ 08:00)  HR: 102 (04-19-25 @ 06:00) (62 - 123)  BP: 142/70 (04-19-25 @ 05:00) (100/60 - 159/78)  RR: 27 (04-19-25 @ 06:00) (16 - 33)  SpO2: 97% (04-19-25 @ 06:00) (92% - 100%)  Wt(kg): --  Height (cm): 160 (04-17-25 @ 11:26)  Weight (kg): 135.6 (04-17-25 @ 11:26)  BMI (kg/m2): 53 (04-17-25 @ 11:26)  BSA (m2): 2.29 (04-17-25 @ 11:26)      04-18-25 @ 07:01  -  04-19-25 @ 07:00  --------------------------------------------------------  IN: 2133 mL / OUT: 2170 mL / NET: -37 mL      Physical Exam:  	Gen: on highflow   	Abd:distended/ THONY drain   	LE: edema      LABS/STUDIES  --------------------------------------------------------------------------------              7.5    18.62 >-----------<  298      [04-19-25 @ 05:08]              24.1     155  |  116  |  99  ----------------------------<  178      [04-19-25 @ 05:08]  3.4   |  26  |  2.9        Ca     8.0     [04-19-25 @ 05:08]      Mg     2.1     [04-19-25 @ 05:08]      Phos  2.9     [04-19-25 @ 05:08]    TPro  4.6  /  Alb  2.4  /  TBili  0.2  /  DBili  0.2  /  AST  37  /  ALT  45  /  AlkPhos  58  [04-18-25 @ 05:55]    PT/INR: PT 11.60, INR 0.98       [04-17-25 @ 17:56]  PTT: 19.6       [04-17-25 @ 17:56]      Creatinine Trend:  SCr 2.9 [04-19 @ 05:08]  SCr 3.5 [04-18 @ 05:55]  SCr 3.7 [04-17 @ 17:56]  SCr 3.7 [04-17 @ 11:08]  SCr 4.2 [04-17 @ 04:26]    Urinalysis - [04-19-25 @ 05:08]      Color  / Appearance  / SG  / pH       Gluc 178 / Ketone   / Bili  / Urobili        Blood  / Protein  / Leuk Est  / Nitrite       RBC  / WBC  / Hyaline  / Gran  / Sq Epi  / Non Sq Epi  / Bacteria     Urine Creatinine 65      [04-16-25 @ 07:08]  Urine Sodium 51.0      [04-16-25 @ 07:08]  Urine Osmolality 382      [04-16-25 @ 07:08]    Lipid: chol --, , HDL --, LDL --      [04-17-25 @ 12:00]

## 2025-04-19 NOTE — PROGRESS NOTE ADULT - ASSESSMENT
ASSESSMENT:  64F PMHx HTN, morbid obesity s/p 2001 Abby-en-Y gastric bypass who presented on 4/3 with incarcerated ventral hernia with SBO. S/p 4/10 open ventral hernia repair, small bowel resection, and primary fascial closure with Torey Cornejo. Admitted to SICU for Q1 abdominal checks and hemodynamic monitoring.     NEUROLOGICAL:  #Acute pain  - IV tylenol  - dilaudid 0.5mg prn severe pain     RESPIRATORY:   #Oxygenation  -intubated 4/10-4/15  -reintubated 4/17-4/18  - 4hrs on/off bipap/HFNC   #hx LOUIS on CPAP@ home  -CPAP at night when extubated   #Activity   - activity- increase as tolerated     CARDS:   #acute hypotension in setting of SIRS, resolved   -off levophed since 4/18  - MAP >65  #hx of HTN  - holding home HCTZ 25mg QD, amlodipine and losartan  #EKG- NSR  #TTE 6/2022: EF 55-60%. G1DD.   - TTE 4/11: EF of 56 %.G1DD.     GASTROINTESTINAL/NUTRITION:   #s/p lap converted to open repair of large ventral hernia with incarcerated small bowel, small bowel resection, vicryl mesh placement; c/b anastomotic leak     - CTAP with PO contrast: Evidence of defect in small bowel loop with connection to anterior midline intraperitoneal and subcutaneous pannus collections containing extravasated oral contrast. Unclear if this small bowel defect occurs at the anastomotic site. Surgical review recommended.    - 4/17: ex lap anastomosis resection and creation of side to side anastomosis     - IR consult - readdress when extubated     - aspiration precautions, HOB 30  #SBO s/p ventral hernia repair with SBR  #GI Prophylaxis/hx GERD   - pantoprazole  Injectable 40 IV Push daily (takes omeprazole @home)  #Bowel regimen    -holding    -last BM 4/19   - NO dignishield     /RENAL:   #urine output in critically ill  - indwelling pierce   - High UOP  #worsening FUNMI   - Uptrending BUN   - Ammonia 35  - previously oliguric, now improved. Previously on bumex gtt @ 0.5mg/hr  - -300cc/hr  - nephrology consulted and following > hold off on CVVH  - renal u/s> no hydronephrosis. 2-3cm anechoic cyst on right kidney     Labs:          BUN/Cr- 97/3.5  -->,  99/2.9  -->          [04-19 @ 05:08]Na  155 // K  3.4 // Mg  2.1 // Phos  2.9  [04-18 @ 05:55]Na  148 // K  4.1 // Mg  2.0 // Phos  3.8  #hypernatremia  -started on D5W @ 50cc/hr   #hypokalemia- repleted in TPN, 20mEq KCl overnight      HEME/ONC:   # DVT prophylaxis-heparin   Injectable 7500 q8, SCDs          ID:  #MRSA nares negative       Current antibiotics-piperacillin/tazobactam IVPB.. 3.375 every 12 hours x 4 days post op (end date 4/22)  #Antibiotics Course  - continue zosyn > Purulent abscesses noted intraoperatively   #febrile  - UA negative  - blood cultures sent 4/13   - sputum cultures pending   #cultures  - OR cx: few E.coli, few bacteroides, rare clostridium  -4/13 Blood Cx: NGTF  -4/14 tracheal aspirate: no growth     ENDOCRINE:  #glycemic monitoring    -FSG q6   -ISS    MSK:  #Activity- increase as tolerated     SKIN:  #DTI screening negative     - will continue to monitor daily skin changes    LINES/DRAINS:  PIV, Pierce, NGT, THONY drain x 3, Radial A line, RIJ TLC   ADVANCED DIRECTIVES:  Full Code  HCP- Daughter   INDICATION FOR SICU/SDU: Intestinal obstruction  DISPO:  SICU. Case to be discussed with attending Dr. Bermudez  4A candidate

## 2025-04-19 NOTE — PROGRESS NOTE ADULT - ASSESSMENT
64y F w/ PMHx of HTN and gastric bypass surgery in 2001 presents with abdominal distention. CT with incarcerated ventral hernia with SBO. S/p Open repair of ventral hernia using mesh and component separation technique, Small bowel resection. 4/17 s/p ex lap, 30 cm sbr, creation of new side to side ileoileostomy, ventral hernia repair w/ mesh    PLAN:  - Strict NPO w/ NGT  - TPN via R IJ central line (brown port)  - Closely monitor THONY drain outputs  - Monitor bowel function  - Monitor UOP and creatinine  - IV abx: zosyn  - Monitor for fevers  - Rest of care per SICU    BLUE TEAM SPECTRA: [id number]. 64y F w/ PMHx of HTN and gastric bypass surgery in 2001 presents with abdominal distention. CT with incarcerated ventral hernia with SBO. S/p Open repair of ventral hernia using mesh and component separation technique, Small bowel resection. 4/17 s/p ex lap, 30 cm sbr, creation of new side to side ileoileostomy, ventral hernia repair w/ mesh    PLAN:  - Strict NPO w/ NGT  - TPN via R IJ central line (brown port)  - Closely monitor THONY drain outputs  - Monitor bowel function  - Monitor UOP and creatinine  - IV abx: zosyn  - Monitor for fevers  - Rest of care per SICU    BLUE TEAM SPECTRA: [id number].  Attending to be assigned in AM by covering team

## 2025-04-20 LAB
-  AMOXICILLIN/CLAVULANIC ACID: SIGNIFICANT CHANGE UP
-  AMPICILLIN/SULBACTAM: SIGNIFICANT CHANGE UP
-  AMPICILLIN: SIGNIFICANT CHANGE UP
-  AZTREONAM: SIGNIFICANT CHANGE UP
-  CEFAZOLIN: SIGNIFICANT CHANGE UP
-  CEFEPIME: SIGNIFICANT CHANGE UP
-  CEFOXITIN: SIGNIFICANT CHANGE UP
-  CEFTRIAXONE: SIGNIFICANT CHANGE UP
-  CIPROFLOXACIN: SIGNIFICANT CHANGE UP
-  ERTAPENEM: SIGNIFICANT CHANGE UP
-  GENTAMICIN: SIGNIFICANT CHANGE UP
-  IMIPENEM: SIGNIFICANT CHANGE UP
-  LEVOFLOXACIN: SIGNIFICANT CHANGE UP
-  MEROPENEM: SIGNIFICANT CHANGE UP
-  PIPERACILLIN/TAZOBACTAM: SIGNIFICANT CHANGE UP
-  TOBRAMYCIN: SIGNIFICANT CHANGE UP
-  TRIMETHOPRIM/SULFAMETHOXAZOLE: SIGNIFICANT CHANGE UP
ANION GAP SERPL CALC-SCNC: 12 MMOL/L — SIGNIFICANT CHANGE UP (ref 7–14)
ANION GAP SERPL CALC-SCNC: 14 MMOL/L — SIGNIFICANT CHANGE UP (ref 7–14)
BASOPHILS # BLD AUTO: 0.04 K/UL — SIGNIFICANT CHANGE UP (ref 0–0.2)
BASOPHILS NFR BLD AUTO: 0.2 % — SIGNIFICANT CHANGE UP (ref 0–1)
BUN SERPL-MCNC: 87 MG/DL — CRITICAL HIGH (ref 10–20)
BUN SERPL-MCNC: 98 MG/DL — CRITICAL HIGH (ref 10–20)
CALCIUM SERPL-MCNC: 7.9 MG/DL — LOW (ref 8.4–10.5)
CALCIUM SERPL-MCNC: 8 MG/DL — LOW (ref 8.4–10.5)
CHLORIDE SERPL-SCNC: 110 MMOL/L — SIGNIFICANT CHANGE UP (ref 98–110)
CHLORIDE SERPL-SCNC: 117 MMOL/L — HIGH (ref 98–110)
CO2 SERPL-SCNC: 23 MMOL/L — SIGNIFICANT CHANGE UP (ref 17–32)
CO2 SERPL-SCNC: 26 MMOL/L — SIGNIFICANT CHANGE UP (ref 17–32)
CREAT SERPL-MCNC: 2.1 MG/DL — HIGH (ref 0.7–1.5)
CREAT SERPL-MCNC: 2.5 MG/DL — HIGH (ref 0.7–1.5)
EGFR: 21 ML/MIN/1.73M2 — LOW
EGFR: 21 ML/MIN/1.73M2 — LOW
EGFR: 26 ML/MIN/1.73M2 — LOW
EGFR: 26 ML/MIN/1.73M2 — LOW
EOSINOPHIL # BLD AUTO: 0.06 K/UL — SIGNIFICANT CHANGE UP (ref 0–0.7)
EOSINOPHIL NFR BLD AUTO: 0.3 % — SIGNIFICANT CHANGE UP (ref 0–8)
GAS PNL BLDA: SIGNIFICANT CHANGE UP
GLUCOSE BLDC GLUCOMTR-MCNC: 136 MG/DL — HIGH (ref 70–99)
GLUCOSE BLDC GLUCOMTR-MCNC: 175 MG/DL — HIGH (ref 70–99)
GLUCOSE BLDC GLUCOMTR-MCNC: 182 MG/DL — HIGH (ref 70–99)
GLUCOSE BLDC GLUCOMTR-MCNC: 186 MG/DL — HIGH (ref 70–99)
GLUCOSE BLDC GLUCOMTR-MCNC: 217 MG/DL — HIGH (ref 70–99)
GLUCOSE SERPL-MCNC: 156 MG/DL — HIGH (ref 70–99)
GLUCOSE SERPL-MCNC: 172 MG/DL — HIGH (ref 70–99)
HCT VFR BLD CALC: 22.4 % — LOW (ref 37–47)
HCT VFR BLD CALC: 26.8 % — LOW (ref 37–47)
HGB BLD-MCNC: 6.8 G/DL — CRITICAL LOW (ref 12–16)
HGB BLD-MCNC: 8.4 G/DL — LOW (ref 12–16)
IMM GRANULOCYTES NFR BLD AUTO: 4.7 % — HIGH (ref 0.1–0.3)
LYMPHOCYTES # BLD AUTO: 0.77 K/UL — LOW (ref 1.2–3.4)
LYMPHOCYTES # BLD AUTO: 4.4 % — LOW (ref 20.5–51.1)
MAGNESIUM SERPL-MCNC: 1.9 MG/DL — SIGNIFICANT CHANGE UP (ref 1.8–2.4)
MAGNESIUM SERPL-MCNC: 2.2 MG/DL — SIGNIFICANT CHANGE UP (ref 1.8–2.4)
MCHC RBC-ENTMCNC: 30.4 G/DL — LOW (ref 32–37)
MCHC RBC-ENTMCNC: 30.5 PG — SIGNIFICANT CHANGE UP (ref 27–31)
MCHC RBC-ENTMCNC: 30.6 PG — SIGNIFICANT CHANGE UP (ref 27–31)
MCHC RBC-ENTMCNC: 31.3 G/DL — LOW (ref 32–37)
MCV RBC AUTO: 100.9 FL — HIGH (ref 81–99)
MCV RBC AUTO: 97.5 FL — SIGNIFICANT CHANGE UP (ref 81–99)
METHOD TYPE: SIGNIFICANT CHANGE UP
MONOCYTES # BLD AUTO: 0.76 K/UL — HIGH (ref 0.1–0.6)
MONOCYTES NFR BLD AUTO: 4.4 % — SIGNIFICANT CHANGE UP (ref 1.7–9.3)
NEUTROPHILS # BLD AUTO: 15 K/UL — HIGH (ref 1.4–6.5)
NEUTROPHILS NFR BLD AUTO: 86 % — HIGH (ref 42.2–75.2)
NRBC BLD AUTO-RTO: 0 /100 WBCS — SIGNIFICANT CHANGE UP (ref 0–0)
NRBC BLD AUTO-RTO: 0 /100 WBCS — SIGNIFICANT CHANGE UP (ref 0–0)
PHOSPHATE SERPL-MCNC: 2.8 MG/DL — SIGNIFICANT CHANGE UP (ref 2.1–4.9)
PHOSPHATE SERPL-MCNC: 4.3 MG/DL — SIGNIFICANT CHANGE UP (ref 2.1–4.9)
PLATELET # BLD AUTO: 269 K/UL — SIGNIFICANT CHANGE UP (ref 130–400)
PLATELET # BLD AUTO: 329 K/UL — SIGNIFICANT CHANGE UP (ref 130–400)
PMV BLD: 10.9 FL — HIGH (ref 7.4–10.4)
PMV BLD: 11.3 FL — HIGH (ref 7.4–10.4)
POTASSIUM SERPL-MCNC: 3.2 MMOL/L — LOW (ref 3.5–5)
POTASSIUM SERPL-MCNC: 3.6 MMOL/L — SIGNIFICANT CHANGE UP (ref 3.5–5)
POTASSIUM SERPL-SCNC: 3.2 MMOL/L — LOW (ref 3.5–5)
POTASSIUM SERPL-SCNC: 3.6 MMOL/L — SIGNIFICANT CHANGE UP (ref 3.5–5)
RBC # BLD: 2.22 M/UL — LOW (ref 4.2–5.4)
RBC # BLD: 2.75 M/UL — LOW (ref 4.2–5.4)
RBC # FLD: 14.6 % — HIGH (ref 11.5–14.5)
RBC # FLD: 14.8 % — HIGH (ref 11.5–14.5)
SODIUM SERPL-SCNC: 147 MMOL/L — HIGH (ref 135–146)
SODIUM SERPL-SCNC: 155 MMOL/L — HIGH (ref 135–146)
TRIGL SERPL-MCNC: 232 MG/DL — HIGH
WBC # BLD: 12.84 K/UL — HIGH (ref 4.8–10.8)
WBC # BLD: 17.45 K/UL — HIGH (ref 4.8–10.8)
WBC # FLD AUTO: 12.84 K/UL — HIGH (ref 4.8–10.8)
WBC # FLD AUTO: 17.45 K/UL — HIGH (ref 4.8–10.8)

## 2025-04-20 PROCEDURE — 71045 X-RAY EXAM CHEST 1 VIEW: CPT | Mod: 26

## 2025-04-20 PROCEDURE — 99291 CRITICAL CARE FIRST HOUR: CPT

## 2025-04-20 RX ORDER — BUMETANIDE 1 MG/1
1 TABLET ORAL ONCE
Refills: 0 | Status: COMPLETED | OUTPATIENT
Start: 2025-04-20 | End: 2025-04-20

## 2025-04-20 RX ORDER — NICARDIPINE HCL 30 MG
5 CAPSULE ORAL
Qty: 40 | Refills: 0 | Status: DISCONTINUED | OUTPATIENT
Start: 2025-04-20 | End: 2025-04-23

## 2025-04-20 RX ORDER — ACETAMINOPHEN 500 MG/5ML
1000 LIQUID (ML) ORAL ONCE
Refills: 0 | Status: COMPLETED | OUTPATIENT
Start: 2025-04-20 | End: 2025-04-20

## 2025-04-20 RX ORDER — AMLODIPINE BESYLATE 10 MG/1
10 TABLET ORAL DAILY
Refills: 0 | Status: DISCONTINUED | OUTPATIENT
Start: 2025-04-20 | End: 2025-04-21

## 2025-04-20 RX ORDER — MAGNESIUM SULFATE 500 MG/ML
2 SYRINGE (ML) INJECTION ONCE
Refills: 0 | Status: COMPLETED | OUTPATIENT
Start: 2025-04-20 | End: 2025-04-20

## 2025-04-20 RX ORDER — SODIUM CHLORIDE 9 G/1000ML
1000 INJECTION, SOLUTION INTRAVENOUS
Refills: 0 | Status: DISCONTINUED | OUTPATIENT
Start: 2025-04-20 | End: 2025-04-21

## 2025-04-20 RX ORDER — MIDAZOLAM IN 0.9 % SOD.CHLORID 1 MG/ML
1 PLASTIC BAG, INJECTION (ML) INTRAVENOUS ONCE
Refills: 0 | Status: DISCONTINUED | OUTPATIENT
Start: 2025-04-20 | End: 2025-04-20

## 2025-04-20 RX ORDER — I.V. FAT EMULSION 20 G/100ML
0.37 EMULSION INTRAVENOUS
Qty: 50 | Refills: 0 | Status: DISCONTINUED | OUTPATIENT
Start: 2025-04-20 | End: 2025-04-20

## 2025-04-20 RX ORDER — DIPHENHYDRAMINE HCL 12.5MG/5ML
12.5 ELIXIR ORAL ONCE
Refills: 0 | Status: COMPLETED | OUTPATIENT
Start: 2025-04-20 | End: 2025-04-20

## 2025-04-20 RX ORDER — TRAZODONE HCL 100 MG
25 TABLET ORAL AT BEDTIME
Refills: 0 | Status: DISCONTINUED | OUTPATIENT
Start: 2025-04-20 | End: 2025-04-27

## 2025-04-20 RX ORDER — AMLODIPINE BESYLATE 10 MG/1
10 TABLET ORAL DAILY
Refills: 0 | Status: DISCONTINUED | OUTPATIENT
Start: 2025-04-20 | End: 2025-04-20

## 2025-04-20 RX ORDER — CALCIUM GLUCONATE 20 MG/ML
2 INJECTION, SOLUTION INTRAVENOUS ONCE
Refills: 0 | Status: COMPLETED | OUTPATIENT
Start: 2025-04-20 | End: 2025-04-20

## 2025-04-20 RX ORDER — SODIUM/POT/MAG/CALC/CHLOR/ACET 35-20-5MEQ
1 VIAL (ML) INTRAVENOUS
Refills: 0 | Status: DISCONTINUED | OUTPATIENT
Start: 2025-04-20 | End: 2025-04-20

## 2025-04-20 RX ADMIN — IPRATROPIUM BROMIDE AND ALBUTEROL SULFATE 3 MILLILITER(S): .5; 2.5 SOLUTION RESPIRATORY (INHALATION) at 20:20

## 2025-04-20 RX ADMIN — Medication 25 GRAM(S): at 23:33

## 2025-04-20 RX ADMIN — Medication 40 MILLIGRAM(S): at 11:27

## 2025-04-20 RX ADMIN — POTASSIUM PHOSPHATE, MONOBASIC POTASSIUM PHOSPHATE, DIBASIC INJECTION, 83.33 MILLIMOLE(S): 236; 224 SOLUTION, CONCENTRATE INTRAVENOUS at 01:01

## 2025-04-20 RX ADMIN — Medication 65 EACH: at 22:00

## 2025-04-20 RX ADMIN — AMLODIPINE BESYLATE 10 MILLIGRAM(S): 10 TABLET ORAL at 17:36

## 2025-04-20 RX ADMIN — BUMETANIDE 1 MILLIGRAM(S): 1 TABLET ORAL at 12:32

## 2025-04-20 RX ADMIN — Medication 1 MILLIGRAM(S): at 20:47

## 2025-04-20 RX ADMIN — Medication 10 MILLIGRAM(S): at 19:55

## 2025-04-20 RX ADMIN — Medication 1 APPLICATION(S): at 06:19

## 2025-04-20 RX ADMIN — Medication 5 MILLIGRAM(S): at 14:03

## 2025-04-20 RX ADMIN — CALCIUM GLUCONATE 200 GRAM(S): 20 INJECTION, SOLUTION INTRAVENOUS at 22:27

## 2025-04-20 RX ADMIN — Medication 100 MILLIEQUIVALENT(S): at 23:33

## 2025-04-20 RX ADMIN — Medication 25 MG/HR: at 20:15

## 2025-04-20 RX ADMIN — INSULIN LISPRO 0: 100 INJECTION, SOLUTION INTRAVENOUS; SUBCUTANEOUS at 06:18

## 2025-04-20 RX ADMIN — Medication 50 MILLIEQUIVALENT(S): at 09:03

## 2025-04-20 RX ADMIN — Medication 100 MILLIEQUIVALENT(S): at 21:40

## 2025-04-20 RX ADMIN — Medication 50 MILLIEQUIVALENT(S): at 00:03

## 2025-04-20 RX ADMIN — Medication 25 GRAM(S): at 17:37

## 2025-04-20 RX ADMIN — Medication 25 GRAM(S): at 06:09

## 2025-04-20 RX ADMIN — IPRATROPIUM BROMIDE AND ALBUTEROL SULFATE 3 MILLILITER(S): .5; 2.5 SOLUTION RESPIRATORY (INHALATION) at 07:08

## 2025-04-20 RX ADMIN — IPRATROPIUM BROMIDE AND ALBUTEROL SULFATE 3 MILLILITER(S): .5; 2.5 SOLUTION RESPIRATORY (INHALATION) at 02:26

## 2025-04-20 RX ADMIN — INSULIN LISPRO 4: 100 INJECTION, SOLUTION INTRAVENOUS; SUBCUTANEOUS at 11:42

## 2025-04-20 RX ADMIN — IPRATROPIUM BROMIDE AND ALBUTEROL SULFATE 3 MILLILITER(S): .5; 2.5 SOLUTION RESPIRATORY (INHALATION) at 14:24

## 2025-04-20 RX ADMIN — Medication 12.5 MILLIGRAM(S): at 00:30

## 2025-04-20 RX ADMIN — INSULIN LISPRO 6: 100 INJECTION, SOLUTION INTRAVENOUS; SUBCUTANEOUS at 23:33

## 2025-04-20 RX ADMIN — Medication 1 MILLIGRAM(S): at 20:30

## 2025-04-20 RX ADMIN — INSULIN LISPRO 2: 100 INJECTION, SOLUTION INTRAVENOUS; SUBCUTANEOUS at 17:52

## 2025-04-20 RX ADMIN — Medication 100 MILLIEQUIVALENT(S): at 22:22

## 2025-04-20 RX ADMIN — Medication 400 MILLIGRAM(S): at 22:20

## 2025-04-20 RX ADMIN — Medication 50 MILLIEQUIVALENT(S): at 10:17

## 2025-04-20 RX ADMIN — HEPARIN SODIUM 7500 UNIT(S): 1000 INJECTION INTRAVENOUS; SUBCUTANEOUS at 22:18

## 2025-04-20 RX ADMIN — SODIUM CHLORIDE 80 MILLILITER(S): 9 INJECTION, SOLUTION INTRAVENOUS at 11:26

## 2025-04-20 RX ADMIN — HEPARIN SODIUM 7500 UNIT(S): 1000 INJECTION INTRAVENOUS; SUBCUTANEOUS at 06:09

## 2025-04-20 RX ADMIN — HEPARIN SODIUM 7500 UNIT(S): 1000 INJECTION INTRAVENOUS; SUBCUTANEOUS at 13:44

## 2025-04-20 RX ADMIN — I.V. FAT EMULSION 20.8 GM/KG/DAY: 20 EMULSION INTRAVENOUS at 21:59

## 2025-04-20 NOTE — PROGRESS NOTE ADULT - SUBJECTIVE AND OBJECTIVE BOX
GENERAL SURGERY PROGRESS NOTE    Patient: NANCY SARGENT , 64y (62)Female   MRN: [id number]  Location: [place] [address] A  Visit: 25 Inpatient  Date: 25 @ 03:55    Hospital Day #: 18  Post-Op Day #: 3    Procedure/Dx/Injuries:  INCARCERATED VENTRAL HERNIA W/ SBO  4/10 S/P LAP CONVERTED TO OPEN VENTRAL HERNIA REPAIR W/ MESH AND SMALL BOWEL RESECTION   S/P EX LAP, 30 CM SBR, CREATION OF NEW SIDE TO SIDE ILEOILEOSTOMY, VENTRAL HERNIA REPAIR W/ MESH     Events of past 24 hours:  - fluid cx  rare e. coli  -1mg bumex > 1.5L response  - PM rounds: HR 87, SBP 130s, SpO2 99% on BiPAP .   - Several BMs  - PO4 2.5, K 3.3 --> 20 mEq KCl, 30mmol Kphos  - Na 159, D5W increased to 80cc/hr        PAST MEDICAL & SURGICAL HISTORY:  Gastric bypass status for obesity      LOUIS (obstructive sleep apnea)      S/P gastric bypass      S/P       S/P small bowel resection      History of total bilateral knee replacement (TKR)      H/O colonoscopy            Vitals:   T(F): 97.5 (25 @ 00:00), Max: 99 (25 @ 12:00)  HR: 82 (25 @ 02:00)  BP: 142/60 (25 @ 02:00)  RR: 22 (25 @ 02:00)  SpO2: 99% (25 @ 02:00)      Diet, NPO      Fluids:     I & O's:    25 @ 07:01  -  25 @ 07:00  --------------------------------------------------------  IN:    Dexmedetomidine: 27.1 mL    dextrose 5%: 50 mL    Fat Emulsion (Fish Oil &amp; Plant Based) 20% Infusion: 249.6 mL    IV PiggyBack: 100 mL    IV PiggyBack: 100 mL    IV PiggyBack: 100 mL    Norepinephrine: 6.3 mL    TPN (Total Parenteral Nutrition): 1500 mL  Total IN: 2133 mL    OUT:    Bulb (mL): 45 mL    Bulb (mL): 55 mL    Bulb (mL): 60 mL    Indwelling Catheter - Urethral (mL): 1890 mL    Nasogastric/Oral tube (mL): 120 mL    VAC (Vacuum Assisted Closure) System (mL): 0 mL  Total OUT: 2170 mL    Total NET: -37 mL        PHYSICAL EXAM:  General: NAD  HEENT: NCAT, EOMI, Trachea ML  CHEST: On BIPAP.  Cardiac: RRR  Abdomen: Soft, distended, noderately tender, midline prevena in place with R THONY drains x2 ss outputs, L THONY serous output. pierce intact. abd binder intact  Skin: Warm/dry, normal color, no jaundice  Incision/wound: healing well, dressings in place, clean, dry and intact    MEDICATIONS  (STANDING):  albuterol/ipratropium for Nebulization 3 milliLiter(s) Nebulizer every 6 hours  chlorhexidine 2% Cloths 1 Application(s) Topical <User Schedule>  dextrose 5%. 1000 milliLiter(s) (80 mL/Hr) IV Continuous <Continuous>  heparin   Injectable 7500 Unit(s) SubCutaneous every 8 hours  insulin lispro (ADMELOG) corrective regimen sliding scale   SubCutaneous every 6 hours  pantoprazole  Injectable 40 milliGRAM(s) IV Push daily  Parenteral Nutrition - Adult 1 Each (50 mL/Hr) TPN Continuous <Continuous>  piperacillin/tazobactam IVPB.. 3.375 Gram(s) IV Intermittent every 12 hours    MEDICATIONS  (PRN):  HYDROmorphone  Injectable 0.5 milliGRAM(s) IV Push every 4 hours PRN Severe Pain (7 - 10)      DVT PROPHYLAXIS: heparin   Injectable 7500 Unit(s) SubCutaneous every 8 hours    GI PROPHYLAXIS: pantoprazole  Injectable 40 milliGRAM(s) IV Push daily    ANTICOAGULATION:   ANTIBIOTICS:  piperacillin/tazobactam IVPB.. 3.375 Gram(s)            LAB/STUDIES:  Labs:  CAPILLARY BLOOD GLUCOSE      POCT Blood Glucose.: 180 mg/dL (2025 23:17)  POCT Blood Glucose.: 175 mg/dL (2025 17:27)  POCT Blood Glucose.: 201 mg/dL (2025 12:24)  POCT Blood Glucose.: 171 mg/dL (2025 05:00)                          7.5    18.62 )-----------( 298      ( 2025 05:08 )             24.1       Auto Immature Granulocyte %: 5.2 % (25 @ 05:08)        159[H]  |  119[H]  |  102[HH]  ----------------------------<  175[H]  3.3[L]   |  26  |  2.5[H]      Calcium: 8.0 mg/dL (25 @ 19:37)      LFTs:             4.6  | 0.2  | 37       ------------------[58      ( 2025 05:55 )  2.4  | 0.2  | 45          Lipase:x      Amylase:x         Blood Gas Arterial, Lactate: 1.3 mmol/L (25 @ 15:00)  Blood Gas Arterial, Lactate: 1.1 mmol/L (25 @ 06:06)  Blood Gas Arterial, Lactate: 1.2 mmol/L (25 @ 18:36)    ABG - ( 2025 15:00 )  pH: 7.52  /  pCO2: 33    /  pO2: 105   / HCO3: 27    / Base Excess: 4.0   /  SaO2: 96.5            ABG - ( 2025 06:06 )  pH: 7.46  /  pCO2: 39    /  pO2: 179   / HCO3: 28    / Base Excess: 3.6   /  SaO2: 96.7            ABG - ( 2025 18:36 )  pH: 7.44  /  pCO2: 45    /  pO2: 201   / HCO3: 31    / Base Excess: 5.6   /  SaO2: 97.2              Coags:            Urinalysis Basic - ( 2025 19:37 )    Color: x / Appearance: x / SG: x / pH: x  Gluc: 175 mg/dL / Ketone: x  / Bili: x / Urobili: x   Blood: x / Protein: x / Nitrite: x   Leuk Esterase: x / RBC: x / WBC x   Sq Epi: x / Non Sq Epi: x / Bacteria: x        Culture - Body Fluid with Gram Stain (collected 2025 15:20)  Source: Body Fluid None  Gram Stain (2025 12:15):    No polymorphonuclear leukocytes seen    No organisms seen  Preliminary Report (2025 12:15):    Rare Escherichia coli                ASSESSMENT:  64y F w/ PMHx of HTN and gastric bypass surgery in  presents with abdominal distention. CT with incarcerated ventral hernia with SBO. S/p Open repair of ventral hernia using mesh and component separation technique, Small bowel resection.  s/p ex lap, 30 cm sbr, creation of new side to side ileoileostomy, ventral hernia repair w/ mesh    PLAN:  - TPN via R IJ central line (brown port)  - Closely monitor THONY drain outputs  - Monitor bowel function  - Monitor UOP and creatinine  - IV abx: zosyn  - Monitor for fevers  - Rest of care per SICU    BLUE TEAM SPECTRA: [id number].

## 2025-04-20 NOTE — PROGRESS NOTE ADULT - SUBJECTIVE AND OBJECTIVE BOX
NANCY SARGENT   [id number]   [birth year]  64yF  ============================================================   DATE OF INITIAL SICU/SDU CONSULT: 04-10-25    INDICATION FOR SICU CONSULT:  q1hr abdominal checks, mechanical ventilation     SICU COURSE EVENTS :  04-10 - admitted to SICU service  4/11: Intubated and started on paralytic. Arterial line placed, subclavian TLC placed. Nephrology consulted for oliguria. IR abdominal muscle botox injection performed. TTE performed.   4/12: Additional fluid boluses given; trial fo bumex started, considering CVVH. Weaned off nimbex gtt.  > 220 /hr. Renal U/S w/out hydro.   4/13: Weaned off Levophed. Bumex gtt 2mg/hr > 1mg/hr. Goal net negative 1-1.5L, UOP approx, 200c/hr.   4/14: Remained on bumex gtt for further diuresis, decreased to 0.5 overnight, vent settings weaned. Cr downtrending, LFTs worsening.   4/15: Extubated to BiPAP, transitioned to NC. Dc'd bumex gtt, given 5mg metolazone x 1 and 2mg bumex x 1.   4/16: THONY drain #2 murky/bilious, pending CTAP with PO contrast, hypernatremia, started D5W @ 75cc/hr, persistent hypokalemia   4/17: RTOR for resection of anastomosis for anastomotic leak. Returned intubated, 400/24/80/10, sinus tachy to . Abdomen soft. Was initially on propofol @ 30 and precedex, but propofol weaned off due to hypotension with MAPs in 50s, fentanyl ggt started for acute pain. Remained hypotensive despite fluids, started on Levophed, on 0.05.   4/18: Extubated. HFNC 40L/49%, Levo off since 11 AM   4/19: NGT removed. Started on duonebs. D5W increased to 60cc/hr. 1mg bumex, > 1.5L response     24HOUR EVENTS  4/19  NIGHT  - PM rounds: HR 87, SBP 130s, SpO2 99% on BiPAP 12/6.   - Several BMs  - PO4 2.5, K 3.3 --> 20 mEq KCl, 30mmol Kphos, AM BMP  -Na 159, D5W increased to 80cc/hr    DAY  -dc NGT   -duoneb   -2K riders  -fluid cx 4/17 rare e. coli  -1mg bumex > 1.5L response      [X] A ten-point review of systems was negative except as expressed in note.  [X ] History was obtained from patient. If unable to participate in their care, history was from review of the chart and collateral sources of information.

## 2025-04-20 NOTE — PROGRESS NOTE ADULT - CRITICAL CARE ATTENDING COMMENT
Critical Care: [id number]   This patient has a high probability of sudden, clinically significant deterioration, which requires the highest level of physician preparedness to intervene urgently. I managed/supervised life or organ supporting interventions that required frequent physician assessment. I have reviewed and agree with note above. I devoted my full attention to the direct care of this patient for the period of time indicated, including reviewing relevant labs and imaging, discussing treatment plan with the SICU team, nurses, and primary team at the time of multidisciplinary rounds, and reviewing findings with patient and family. This does not include time devoted to teaching any trainees and to performing any procedures.    NANCY SARGENT 64y Female admitted to SICU with incarcerated ventral hernia with SBO now s/p open VHR, SBR with postop respiratory failure and nonoliguric ARF secondary to septic shock    Issues we are addressing today:    Neuro: minimizing pain meds, home meds as possible, delirium precautions, sleep meds as needed at night  CV: optimize fluid status, consider restarting home antihypertensives  Respiratory: continue to wean support as possible, bipap and HFNC as tolerated  Nutrition: TPN, sips of clears, advance slowly  Renal: monitor Is &Os, repeat gentle diuresis today  ID: abx ongoing   Lines: continue for now  Heme: continue to evaluate for acute blood loss anemia   Endocrine: Prevent and treat hyperglycemia with insulin as needed    PV: follow pulse exam  Skin: decub precdautions  DVT Prophylaxis   Stress Gastritis Prophylaxis   Mobility: PT/OT, OOBTC    The above note is NOT written at the time of rounds and will reflect all changes throughout management of the patient for the day note is written for.    Chi Bermudez MD, D.PRATEEK.

## 2025-04-20 NOTE — PROGRESS NOTE ADULT - ASSESSMENT
============================================================   ASSESSMENT:  64F PMHx HTN, morbid obesity s/p 2001 Abby-en-Y gastric bypass who presented on 4/3 with incarcerated ventral hernia with SBO. S/p 4/10 open ventral hernia repair, small bowel resection, and primary fascial closure with Torey Cornejo. Admitted to SICU for Q1 abdominal checks and hemodynamic monitoring.     NEUROLOGICAL:  #Acute pain  - IV tylenol  - dilaudid 0.5mg prn severe pain     RESPIRATORY:   #Oxygenation  -intubated 4/10-4/15  -reintubated 4/17-4/18  - 4hrs on/off bipap/HFNC   #diuresis  -1mg bumex x 1 4/19  #hx LOUIS on CPAP@ home  -CPAP at night when extubated   #Activity   - activity- increase as tolerated     CARDS:   #acute hypotension in setting of SIRS, resolved   -off levophed since 4/18  - MAP >65  #hx of HTN  - holding home HCTZ 25mg QD, amlodipine and losartan  #EKG- NSR  #TTE 6/2022: EF 55-60%. G1DD.   - TTE 4/11: EF of 56 %.G1DD.     GASTROINTESTINAL/NUTRITION:   #s/p lap converted to open repair of large ventral hernia with incarcerated small bowel, small bowel resection, vicryl mesh placement; c/b anastomotic leak     - CTAP with PO contrast: Evidence of defect in small bowel loop with connection to anterior midline intraperitoneal and subcutaneous pannus collections containing extravasated oral contrast. Unclear if this small bowel defect occurs at the anastomotic site. Surgical review recommended.    - 4/17: ex lap anastomosis resection and creation of side to side anastomosis     - IR consult - readdress when extubated     - aspiration precautions, HOB 30  #SBO s/p ventral hernia repair with SBR  #GI Prophylaxis/hx GERD   - pantoprazole  Injectable 40 IV Push  daily (omeprazole @home)  #Bowel regimen    -holding    -last BM 4/19   - NO dignishield     /RENAL:   #urine output in critically ill  - indwelling pierce   - High UOP   #worsening FUNMI   - Uptrending BUN   - Ammonia 35  - previously oliguric, now improved. Previously on bumex gtt @ 0.5mg/hr  - -300cc/hr  - nephrology consulted and following > hold off on CVVH  - renal u/s> no hydronephrosis. 2-3cm anechoic cyst on right kidney     Labs:          BUN/Cr- 101/2.5  -->,  102/2.5  -->          [04-19 @ 19:37]Na  159 // K  3.3 // Mg  2.3 // Phos  2.5  [04-19 @ 12:43]Na  155 // K  3.2 // Mg  2.0 // Phos  2.5  #hypernatremia  -started on D5W @ 80cc/hr       HEME/ONC:   # DVT prophylaxis-heparin   Injectable 7500 q8, SCDs    Labs: Hb/Hct:  7.9/25.6  -->,  7.5/24.1  -->                      Plts:  371  -->,  298  -->                 PTT/INR:      T&S Expires:       ID:  #MRSA nares negative   Current antibiotics-piperacillin/tazobactam IVPB.. 3.375 every 12 hours x 4 days post op (end date 4/22)  #Antibiotics Course  - continue zosyn > Purulent abscesses noted intraoperatively   WBC- 18.84  --->>,  16.11  --->>,  18.62  --->>  Temp trend- 24hrs T(F): 99 (04-19 @ 20:00), Max: 99 (04-19 @ 12:00)  #febrile  - UA negative  - blood cultures; no growth at 5d  - sputum cultures pending   #cultures  - OR cx: few E.coli, few bacteroides, rare clostridium  -4/13 Blood Cx: NGTF  -4/14 tracheal aspirate: no growth   -4/17 body fluid cx rare e. coli     ENDOCRINE:  #glycemic monitoring    -FSG q6   -ISS  -A1C 5.8%   MSK:  #Activity- increase as tolerated     SKIN:  #DTI screening negative     - will continue to monitor daily skin changes    LINES/DRAINS:  PIV, Pierce, NGT, THONY drain x 3, Radial A line, RIJ TLC   ADVANCED DIRECTIVES:  Full Code  HCP- Daughter   INDICATION FOR SICU/SDU: Intestinal obstruction  DISPO:  SICU. Case to be discussed with attending Dr. Lara NEVES candidate      ============================================================   ASSESSMENT:  64F PMHx HTN, morbid obesity s/p 2001 Abby-en-Y gastric bypass who presented on 4/3 with incarcerated ventral hernia with SBO. S/p 4/10 open ventral hernia repair, small bowel resection, and primary fascial closure with Dr. Lema. Admitted to SICU for Q1 abdominal checks and hemodynamic monitoring. S/p 4/17 takeback for exploratory     NEUROLOGICAL:  #Acute pain  - IV tylenol  - dilaudid 0.5mg prn severe pain     RESPIRATORY:   #Oxygenation  -intubated 4/10-4/15  -reintubated 4/17-4/18  - 4hrs on/off bipap/HFNC   #diuresis  -1mg bumex x 1 4/19  #hx LOUIS on CPAP@ home  -CPAP at night when extubated   #Activity   - activity- increase as tolerated     CARDS:   #acute hypotension in setting of SIRS, resolved   -off levophed since 4/18  - MAP >65  #hx of HTN  - holding home HCTZ 25mg QD, amlodipine and losartan  #EKG- NSR  #TTE 6/2022: EF 55-60%. G1DD.   - TTE 4/11: EF of 56 %.G1DD.     GASTROINTESTINAL/NUTRITION:   #s/p lap converted to open repair of large ventral hernia with incarcerated small bowel, small bowel resection, vicryl mesh placement; c/b anastomotic leak     - CTAP with PO contrast: Evidence of defect in small bowel loop with connection to anterior midline intraperitoneal and subcutaneous pannus collections containing extravasated oral contrast. Unclear if this small bowel defect occurs at the anastomotic site. Surgical review recommended.    - 4/17: ex lap anastomosis resection and creation of side to side anastomosis     - IR consult - readdress when extubated     - aspiration precautions, HOB 30  #SBO s/p ventral hernia repair with SBR  #GI Prophylaxis/hx GERD   - pantoprazole  Injectable 40 IV Push  daily (omeprazole @home)  #Bowel regimen    -holding    -last BM 4/19   - NO dignishield     /RENAL:   #urine output in critically ill  - indwelling pierce   - High UOP   #worsening FUNMI   - Uptrending BUN   - Ammonia 35  - previously oliguric, now improved. Previously on bumex gtt @ 0.5mg/hr  - -300cc/hr  - nephrology consulted and following > hold off on CVVH  - renal u/s> no hydronephrosis. 2-3cm anechoic cyst on right kidney     Labs:          BUN/Cr- 101/2.5  -->,  102/2.5  -->          [04-19 @ 19:37]Na  159 // K  3.3 // Mg  2.3 // Phos  2.5  [04-19 @ 12:43]Na  155 // K  3.2 // Mg  2.0 // Phos  2.5  #hypernatremia  -started on D5W @ 80cc/hr       HEME/ONC:   # DVT prophylaxis-heparin   Injectable 7500 q8, SCDs    Labs: Hb/Hct:  7.9/25.6  -->,  7.5/24.1  -->                      Plts:  371  -->,  298  -->                 PTT/INR:      T&S Expires:       ID:  #MRSA nares negative   Current antibiotics-piperacillin/tazobactam IVPB.. 3.375 every 12 hours x 4 days post op (end date 4/22)  #Antibiotics Course  - continue zosyn > Purulent abscesses noted intraoperatively   WBC- 18.84  --->>,  16.11  --->>,  18.62  --->>  Temp trend- 24hrs T(F): 99 (04-19 @ 20:00), Max: 99 (04-19 @ 12:00)  #febrile  - UA negative  - blood cultures; no growth at 5d  - sputum cultures pending   #cultures  - OR cx: few E.coli, few bacteroides, rare clostridium  -4/13 Blood Cx: NGTF  -4/14 tracheal aspirate: no growth   -4/17 body fluid cx rare e. coli     ENDOCRINE:  #glycemic monitoring    -FSG q6   -ISS  -A1C 5.8%   MSK:  #Activity- increase as tolerated     SKIN:  #DTI screening negative     - will continue to monitor daily skin changes    LINES/DRAINS:  PIV, Pierce, NGT, THONY drain x 3, Radial A line, RIJ TLC   ADVANCED DIRECTIVES:  Full Code  HCP- Daughter   INDICATION FOR SICU/SDU: Intestinal obstruction  DISPO:  SICU. Case to be discussed with attending Dr. Lara NEVES candidate      ============================================================   ASSESSMENT:  64F PMHx HTN, morbid obesity s/p 2001 Abby-en-Y gastric bypass who presented on 4/3 with incarcerated ventral hernia with SBO. S/p 4/10 open ventral hernia repair, small bowel resection, and primary fascial closure with Dr. Lema. Admitted to SICU for Q1 abdominal checks and hemodynamic monitoring. S/p 4/17 takeback for exploratory laparotomy, repair of anastomotic leak.    NEUROLOGICAL:  #Acute pain  - IV tylenol PRN  - Dilaudid 0.5mg Q4 PRN severe pain   - Trazodone 25mg QPM    RESPIRATORY:   #Oxygenation  - Intubated 4/10-4/15  - Reintubated 4/17-4/18  - 4hrs on/off BiPAP/HFNC   #Diuresis  - 1mg bumex x 1 4/19  - 1mg Bumex x 1 4/20  #PMHxx LOUIS on CPAP@ home   #Activity   - Activity- increase as tolerated     CARDS:   #acute hypotension in setting of SIRS, resolved   - Off levophed since 4/18  - MAP >65  #PMHx of HTN  - Holding home amlodipine and losartan  - 4/20 restarted home HCTZ 25mg QD  #EKG- NSR  #TTE 6/2022: EF 55-60%. G1DD.   - TTE 4/11: EF of 56 %.G1DD.     GASTROINTESTINAL/NUTRITION:   #s/p lap converted to open repair of large ventral hernia with incarcerated small bowel, small bowel resection, vicryl mesh placement; c/b anastomotic leak     - CTAP with PO contrast: Evidence of defect in small bowel loop with connection to anterior midline intraperitoneal and subcutaneous pannus collections containing extravasated oral contrast. Unclear if this small bowel defect occurs at the anastomotic site. Surgical review recommended.    - 4/17: ex lap anastomosis resection and creation of side to side anastomosis     - IR consult - readdress when extubated     - aspiration precautions, HOB 30  #SBO s/p ventral hernia repair with SBR  #Diet, Bariatric Clears, sips  - Continue TPN with AM labs  #GI Prophylaxis/hx GERD   - pantoprazole  Injectable 40 IV Push  daily (omeprazole @home)  #Bowel regimen    -holding    -last BM 4/19   - NO dignishield     /RENAL:   #urine output in critically ill  - An removed 4/20, f/u TOV  - High UOP   #worsening FUNMI   - Uptrending BUN   - Ammonia 35  - previously oliguric, now improved. Previously on bumex gtt @ 0.5mg/hr  - -300cc/hr  - Nephrology consulted and following > hold off on CVVH  - Renal u/s> no hydronephrosis. 2-3cm anechoic cyst on right kidney     Labs:          BUN/Cr- 101/2.5  -->,  102/2.5  -->          [04-19 @ 19:37]Na  159 // K  3.3 // Mg  2.3 // Phos  2.5  [04-19 @ 12:43]Na  155 // K  3.2 // Mg  2.0 // Phos  2.5  #Hypernatremia  -started on D5W @ 80cc/hr     HEME/ONC:   # DVT prophylaxis-heparin   Injectable 7500 q8, SCDs    Labs: Hb/Hct:  7.9/25.6  -->,  7.5/24.1  -->                      Plts:  371  -->,  298  -->                 PTT/INR:      T&S Expires: 4/23  - Hgb 6.8 from 7.5 4/20, s/p 1U pRBC, f/u post-transfusion CBC    ID:  #MRSA nares negative   Current antibiotics-piperacillin/tazobactam IVPB.. 3.375 every 12 hours x 4 days post op (end date 4/22)  #Antibiotics Course  - continue zosyn > Purulent abscesses noted intraoperatively   WBC- 18.84  --->>,  16.11  --->>,  18.62  --->>  Temp trend- 24hrs T(F): 99 (04-19 @ 20:00), Max: 99 (04-19 @ 12:00)  #febrile  - UA negative  - blood cultures; no growth at 5d  - sputum cultures pending   #cultures  - OR cx: few E.coli, few bacteroides, rare clostridium  -4/13 Blood Cx: NGTF  -4/14 tracheal aspirate: no growth   -4/17 body fluid cx rare e. coli     ENDOCRINE:  #Glycemic monitoring  - FSG Q6   - ISS  - A1C 5.8%     MSK:  #Activity- increase as tolerated     SKIN:  #DTI screening negative     - will continue to monitor daily skin changes    LINES/DRAINS:  PIV, NGT, THONY drain x 3, Radial A line, RIJ TLC   ADVANCED DIRECTIVES:  Full Code  HCP- Daughter  (Shavonne Moss)  INDICATION FOR SICU/SDU: Intestinal obstruction  DISPO:  SICU.     Discussed with SICU attending Dr. Bermudez.

## 2025-04-20 NOTE — PROGRESS NOTE ADULT - ASSESSMENT
Patient is a 64F PMHx HTN, morbid obesity s/p 2001 Abby-en-Y gastric bypass who presented on 4/3 with incarcerated ventral hernia with SBO. S/p open ventral hernia repair, small bowel resection, and primary fascial closure with Dr. Lema on 4/10. Currently admitted to SICU for monitoring. Nephrology consulted for FUNMI and oliguria.  # FUNMI most likely ATN   # resp failure on MV/ extubated / on high flow   #  incarcerated ventral hernia with SBO S/p open ventral hernia repair, small bowel resection, and primary fascial closure  # hypernatremia   s/p ex-lap, creation of new side to side ileoileostomy, ventral hernia repair w/ mesh   on high flow   cr  trending down   off diuretics on TPN   hypernatremic on TPN/ repeat u osm  cont d5w   follow sodium level q 12 / follow with nutrition for TPN   non oliguric  ph noted / no binders   transfuse to Hb>7  adjust all meds to GFR < 15 ml/min   still no acute indication for RRT   will follow

## 2025-04-20 NOTE — PROGRESS NOTE ADULT - SUBJECTIVE AND OBJECTIVE BOX
Nephrology progress note    Patient is seen and examined, events over the last 24 h noted .  in bed talkative     Allergies:  No Known Allergies    Hospital Medications:   MEDICATIONS  (STANDING):  albuterol/ipratropium for Nebulization 3 milliLiter(s) Nebulizer every 6 hours  buMETAnide Injectable 1 milliGRAM(s) IV Push once  dextrose 5%. 1000 milliLiter(s) (80 mL/Hr) IV Continuous <Continuous>  heparin   Injectable 7500 Unit(s) SubCutaneous every 8 hours  hydrochlorothiazide 25 milliGRAM(s) Oral once  insulin lispro (ADMELOG) corrective regimen sliding scale   SubCutaneous every 6 hours  lipid, fat emulsion (Fish Oil and Plant Based) 20% Infusion 0.3687 Gm/kG/Day (20.8 mL/Hr) IV Continuous <Continuous>  pantoprazole  Injectable 40 milliGRAM(s) IV Push daily  Parenteral Nutrition - Adult 1 Each (65 mL/Hr) TPN Continuous <Continuous>  Parenteral Nutrition - Adult 1 Each (50 mL/Hr) TPN Continuous <Continuous>  piperacillin/tazobactam IVPB.. 3.375 Gram(s) IV Intermittent every 12 hours  traZODone 25 milliGRAM(s) Oral at bedtime        VITALS:  T(F): 97.3 (04-20-25 @ 12:00), Max: 99 (04-19-25 @ 20:00)  HR: 80 (04-20-25 @ 12:00)  BP: 181/79 (04-20-25 @ 11:50)  RR: 24 (04-20-25 @ 12:00)  SpO2: 100% (04-20-25 @ 12:00)      04-18 @ 07:01  -  04-19 @ 07:00  --------------------------------------------------------  IN: 2133 mL / OUT: 2170 mL / NET: -37 mL    04-19 @ 07:01  -  04-20 @ 07:00  --------------------------------------------------------  IN: 3438.6 mL / OUT: 3492.5 mL / NET: -53.9 mL    04-20 @ 07:01  -  04-20 @ 12:13  --------------------------------------------------------  IN: 1330 mL / OUT: 920 mL / NET: 410 mL          PHYSICAL EXAM:  Constitutional: NAD  Respiratory: CTAB, no wheezes, rales or rhonchi  Cardiovascular: S1, S2, RRR  Gastrointestinal: BS+, soft, NT/ND  Extremities: No cyanosis or clubbing. No peripheral edema  :   pierce.   Skin: No rashes    LABS:  04-20    155[H]  |  117[H]  |  98[HH]  ----------------------------<  172[H]  3.6   |  26  |  2.5[H]    Ca    8.0[L]      20 Apr 2025 04:59  Phos  4.3     04-20  Mg     2.2     04-20                            6.8    12.84 )-----------( 269      ( 20 Apr 2025 07:41 )             22.4       Urine Studies:  Urinalysis Basic - ( 20 Apr 2025 04:59 )    Color:  / Appearance:  / SG:  / pH:   Gluc: 172 mg/dL / Ketone:   / Bili:  / Urobili:    Blood:  / Protein:  / Nitrite:    Leuk Esterase:  / RBC:  / WBC    Sq Epi:  / Non Sq Epi:  / Bacteria:       Osmolality, Random Urine: 408 mos/kg (04-19 @ 20:25)  Sodium, Random Urine: 51.0 mmoL/L (04-16 @ 07:08)  Creatinine, Random Urine: 65 mg/dL (04-16 @ 07:08)  Osmolality, Random Urine: 382 mos/kg (04-16 @ 07:08)      Vitamin D (25OH) 13      [04-19-25 @ 05:08]  Lipid: chol --, , HDL --, LDL --      [04-20-25 @ 04:59]          RADIOLOGY & ADDITIONAL STUDIES:

## 2025-04-20 NOTE — PROGRESS NOTE ADULT - ATTENDING COMMENTS
Pt examined  labs and images reviewed  pt with supermorbid obesity and complex hernia with sbo  previously treated without surgery   passing flatus awaiting bowel movement but was having  more pain taken to OR urgently   4/10 s/p BHUMI , Bowel Resection / Anastamosis / Primary repair of Fascia / hernia sac  overnight - renal failure / respiratory compromise  was intubated / paralysed/ botox  now on bumex - tapering   4/17:  s/p BHUMI , Bowel Resection / Anastamosis / Primary repair of Fascia / hernia sac  4/20: extubated - but labs and vitals stable  mariam drain sero sang  ng tube removed and started on sips  wbc elevated    impression : extremely high risk - supermorbid obesity and complex hernia with partial sbo  will keep npo other than sips  SICU

## 2025-04-21 LAB
ANION GAP SERPL CALC-SCNC: 14 MMOL/L — SIGNIFICANT CHANGE UP (ref 7–14)
ANION GAP SERPL CALC-SCNC: 14 MMOL/L — SIGNIFICANT CHANGE UP (ref 7–14)
APPEARANCE UR: CLEAR — SIGNIFICANT CHANGE UP
APTT BLD: 23.2 SEC — CRITICAL LOW (ref 27–39.2)
BILIRUB UR-MCNC: NEGATIVE — SIGNIFICANT CHANGE UP
BUN SERPL-MCNC: 77 MG/DL — CRITICAL HIGH (ref 10–20)
BUN SERPL-MCNC: 85 MG/DL — CRITICAL HIGH (ref 10–20)
CALCIUM SERPL-MCNC: 8.3 MG/DL — LOW (ref 8.4–10.5)
CALCIUM SERPL-MCNC: 8.5 MG/DL — SIGNIFICANT CHANGE UP (ref 8.4–10.5)
CHLORIDE SERPL-SCNC: 111 MMOL/L — HIGH (ref 98–110)
CHLORIDE SERPL-SCNC: 113 MMOL/L — HIGH (ref 98–110)
CO2 SERPL-SCNC: 23 MMOL/L — SIGNIFICANT CHANGE UP (ref 17–32)
CO2 SERPL-SCNC: 24 MMOL/L — SIGNIFICANT CHANGE UP (ref 17–32)
COLOR SPEC: YELLOW — SIGNIFICANT CHANGE UP
CREAT SERPL-MCNC: 1.7 MG/DL — HIGH (ref 0.7–1.5)
CREAT SERPL-MCNC: 1.7 MG/DL — HIGH (ref 0.7–1.5)
CULTURE RESULTS: ABNORMAL
CULTURE RESULTS: ABNORMAL
DIFF PNL FLD: ABNORMAL
EGFR: 33 ML/MIN/1.73M2 — LOW
GLUCOSE BLDC GLUCOMTR-MCNC: 173 MG/DL — HIGH (ref 70–99)
GLUCOSE BLDC GLUCOMTR-MCNC: 192 MG/DL — HIGH (ref 70–99)
GLUCOSE BLDC GLUCOMTR-MCNC: 224 MG/DL — HIGH (ref 70–99)
GLUCOSE SERPL-MCNC: 175 MG/DL — HIGH (ref 70–99)
GLUCOSE SERPL-MCNC: 218 MG/DL — HIGH (ref 70–99)
GLUCOSE UR QL: NEGATIVE MG/DL — SIGNIFICANT CHANGE UP
HCT VFR BLD CALC: 27.9 % — LOW (ref 37–47)
HGB BLD-MCNC: 8.8 G/DL — LOW (ref 12–16)
INR BLD: 0.89 RATIO — SIGNIFICANT CHANGE UP (ref 0.65–1.3)
KETONES UR-MCNC: NEGATIVE MG/DL — SIGNIFICANT CHANGE UP
LEUKOCYTE ESTERASE UR-ACNC: NEGATIVE — SIGNIFICANT CHANGE UP
MAGNESIUM SERPL-MCNC: 1.8 MG/DL — SIGNIFICANT CHANGE UP (ref 1.8–2.4)
MAGNESIUM SERPL-MCNC: 2.5 MG/DL — HIGH (ref 1.8–2.4)
MCHC RBC-ENTMCNC: 30.3 PG — SIGNIFICANT CHANGE UP (ref 27–31)
MCHC RBC-ENTMCNC: 31.5 G/DL — LOW (ref 32–37)
MCV RBC AUTO: 96.2 FL — SIGNIFICANT CHANGE UP (ref 81–99)
NITRITE UR-MCNC: NEGATIVE — SIGNIFICANT CHANGE UP
NRBC BLD AUTO-RTO: 0 /100 WBCS — SIGNIFICANT CHANGE UP (ref 0–0)
ORGANISM # SPEC MICROSCOPIC CNT: ABNORMAL
ORGANISM # SPEC MICROSCOPIC CNT: SIGNIFICANT CHANGE UP
PH UR: 6 — SIGNIFICANT CHANGE UP (ref 5–8)
PHOSPHATE SERPL-MCNC: 2.3 MG/DL — SIGNIFICANT CHANGE UP (ref 2.1–4.9)
PHOSPHATE SERPL-MCNC: 2.9 MG/DL — SIGNIFICANT CHANGE UP (ref 2.1–4.9)
PLATELET # BLD AUTO: 345 K/UL — SIGNIFICANT CHANGE UP (ref 130–400)
PMV BLD: 10.5 FL — HIGH (ref 7.4–10.4)
POTASSIUM SERPL-MCNC: 3.3 MMOL/L — LOW (ref 3.5–5)
POTASSIUM SERPL-MCNC: 3.5 MMOL/L — SIGNIFICANT CHANGE UP (ref 3.5–5)
POTASSIUM SERPL-SCNC: 3.3 MMOL/L — LOW (ref 3.5–5)
POTASSIUM SERPL-SCNC: 3.5 MMOL/L — SIGNIFICANT CHANGE UP (ref 3.5–5)
PROT UR-MCNC: SIGNIFICANT CHANGE UP MG/DL
PROTHROM AB SERPL-ACNC: 10.5 SEC — SIGNIFICANT CHANGE UP (ref 9.95–12.87)
RBC # BLD: 2.9 M/UL — LOW (ref 4.2–5.4)
RBC # FLD: 14.6 % — HIGH (ref 11.5–14.5)
SODIUM SERPL-SCNC: 149 MMOL/L — HIGH (ref 135–146)
SODIUM SERPL-SCNC: 150 MMOL/L — HIGH (ref 135–146)
SP GR SPEC: 1.01 — SIGNIFICANT CHANGE UP (ref 1–1.03)
SPECIMEN SOURCE: SIGNIFICANT CHANGE UP
UROBILINOGEN FLD QL: 0.2 MG/DL — SIGNIFICANT CHANGE UP (ref 0.2–1)
WBC # BLD: 19.06 K/UL — HIGH (ref 4.8–10.8)
WBC # FLD AUTO: 19.06 K/UL — HIGH (ref 4.8–10.8)

## 2025-04-21 PROCEDURE — 99291 CRITICAL CARE FIRST HOUR: CPT | Mod: 24

## 2025-04-21 PROCEDURE — 74177 CT ABD & PELVIS W/CONTRAST: CPT | Mod: 26

## 2025-04-21 PROCEDURE — 71045 X-RAY EXAM CHEST 1 VIEW: CPT | Mod: 26

## 2025-04-21 PROCEDURE — 93970 EXTREMITY STUDY: CPT | Mod: 26

## 2025-04-21 PROCEDURE — 71275 CT ANGIOGRAPHY CHEST: CPT | Mod: 26

## 2025-04-21 RX ORDER — IOHEXOL 350 MG/ML
30 INJECTION, SOLUTION INTRAVENOUS ONCE
Refills: 0 | Status: COMPLETED | OUTPATIENT
Start: 2025-04-21 | End: 2025-04-21

## 2025-04-21 RX ORDER — DEXMEDETOMIDINE HYDROCHLORIDE IN SODIUM CHLORIDE 4 UG/ML
0.1 INJECTION INTRAVENOUS
Qty: 200 | Refills: 0 | Status: DISCONTINUED | OUTPATIENT
Start: 2025-04-21 | End: 2025-04-22

## 2025-04-21 RX ORDER — SODIUM CHLORIDE 9 G/1000ML
1000 INJECTION, SOLUTION INTRAVENOUS
Refills: 0 | Status: DISCONTINUED | OUTPATIENT
Start: 2025-04-21 | End: 2025-04-24

## 2025-04-21 RX ORDER — METOPROLOL SUCCINATE 50 MG/1
25 TABLET, EXTENDED RELEASE ORAL
Refills: 0 | Status: DISCONTINUED | OUTPATIENT
Start: 2025-04-21 | End: 2025-04-27

## 2025-04-21 RX ORDER — PIPERACILLIN-TAZO-DEXTROSE,ISO 3.375G/5
3.38 IV SOLUTION, PIGGYBACK PREMIX FROZEN(ML) INTRAVENOUS EVERY 8 HOURS
Refills: 0 | Status: DISCONTINUED | OUTPATIENT
Start: 2025-04-21 | End: 2025-04-22

## 2025-04-21 RX ORDER — AMLODIPINE BESYLATE 10 MG/1
10 TABLET ORAL DAILY
Refills: 0 | Status: DISCONTINUED | OUTPATIENT
Start: 2025-04-21 | End: 2025-04-27

## 2025-04-21 RX ORDER — SODIUM/POT/MAG/CALC/CHLOR/ACET 35-20-5MEQ
1 VIAL (ML) INTRAVENOUS
Refills: 0 | Status: DISCONTINUED | OUTPATIENT
Start: 2025-04-21 | End: 2025-04-22

## 2025-04-21 RX ORDER — AMLODIPINE BESYLATE 10 MG/1
10 TABLET ORAL DAILY
Refills: 0 | Status: DISCONTINUED | OUTPATIENT
Start: 2025-04-21 | End: 2025-04-21

## 2025-04-21 RX ORDER — HEPARIN SODIUM 1000 [USP'U]/ML
2400 INJECTION INTRAVENOUS; SUBCUTANEOUS
Qty: 25000 | Refills: 0 | Status: DISCONTINUED | OUTPATIENT
Start: 2025-04-21 | End: 2025-04-22

## 2025-04-21 RX ORDER — MELATONIN 5 MG
5 TABLET ORAL AT BEDTIME
Refills: 0 | Status: DISCONTINUED | OUTPATIENT
Start: 2025-04-21 | End: 2025-04-27

## 2025-04-21 RX ORDER — MAGNESIUM SULFATE 500 MG/ML
2 SYRINGE (ML) INJECTION ONCE
Refills: 0 | Status: COMPLETED | OUTPATIENT
Start: 2025-04-21 | End: 2025-04-21

## 2025-04-21 RX ADMIN — HEPARIN SODIUM 7500 UNIT(S): 1000 INJECTION INTRAVENOUS; SUBCUTANEOUS at 06:42

## 2025-04-21 RX ADMIN — INSULIN LISPRO 6: 100 INJECTION, SOLUTION INTRAVENOUS; SUBCUTANEOUS at 18:50

## 2025-04-21 RX ADMIN — IPRATROPIUM BROMIDE AND ALBUTEROL SULFATE 3 MILLILITER(S): .5; 2.5 SOLUTION RESPIRATORY (INHALATION) at 19:58

## 2025-04-21 RX ADMIN — HEPARIN SODIUM 7500 UNIT(S): 1000 INJECTION INTRAVENOUS; SUBCUTANEOUS at 13:39

## 2025-04-21 RX ADMIN — Medication 100 MILLIEQUIVALENT(S): at 01:30

## 2025-04-21 RX ADMIN — HEPARIN SODIUM 24 UNIT(S)/HR: 1000 INJECTION INTRAVENOUS; SUBCUTANEOUS at 22:50

## 2025-04-21 RX ADMIN — Medication 1 APPLICATION(S): at 06:44

## 2025-04-21 RX ADMIN — AMLODIPINE BESYLATE 10 MILLIGRAM(S): 10 TABLET ORAL at 18:38

## 2025-04-21 RX ADMIN — Medication 25 GRAM(S): at 06:42

## 2025-04-21 RX ADMIN — Medication 25 GRAM(S): at 01:30

## 2025-04-21 RX ADMIN — METOPROLOL SUCCINATE 25 MILLIGRAM(S): 50 TABLET, EXTENDED RELEASE ORAL at 19:02

## 2025-04-21 RX ADMIN — INSULIN LISPRO 2: 100 INJECTION, SOLUTION INTRAVENOUS; SUBCUTANEOUS at 06:00

## 2025-04-21 RX ADMIN — Medication 100 MILLIEQUIVALENT(S): at 04:00

## 2025-04-21 RX ADMIN — Medication 40 MILLIGRAM(S): at 11:00

## 2025-04-21 RX ADMIN — INSULIN LISPRO 4: 100 INJECTION, SOLUTION INTRAVENOUS; SUBCUTANEOUS at 11:10

## 2025-04-21 RX ADMIN — Medication 25 MILLIGRAM(S): at 22:51

## 2025-04-21 RX ADMIN — IPRATROPIUM BROMIDE AND ALBUTEROL SULFATE 3 MILLILITER(S): .5; 2.5 SOLUTION RESPIRATORY (INHALATION) at 14:21

## 2025-04-21 RX ADMIN — Medication 65 EACH: at 20:30

## 2025-04-21 RX ADMIN — Medication 25 MILLIGRAM(S): at 03:00

## 2025-04-21 RX ADMIN — Medication 25 GRAM(S): at 22:50

## 2025-04-21 RX ADMIN — IPRATROPIUM BROMIDE AND ALBUTEROL SULFATE 3 MILLILITER(S): .5; 2.5 SOLUTION RESPIRATORY (INHALATION) at 08:20

## 2025-04-21 RX ADMIN — IOHEXOL 30 MILLILITER(S): 350 INJECTION, SOLUTION INTRAVENOUS at 11:14

## 2025-04-21 RX ADMIN — Medication 25 GRAM(S): at 13:39

## 2025-04-21 RX ADMIN — Medication 100 MILLIEQUIVALENT(S): at 02:39

## 2025-04-21 RX ADMIN — IPRATROPIUM BROMIDE AND ALBUTEROL SULFATE 3 MILLILITER(S): .5; 2.5 SOLUTION RESPIRATORY (INHALATION) at 02:14

## 2025-04-21 NOTE — PROGRESS NOTE ADULT - SUBJECTIVE AND OBJECTIVE BOX
NANCY SARGENT   [id number]   62  64yF  ============================================================   DATE OF INITIAL SICU/SDU CONSULT: 04-10-25    INDICATION FOR SICU CONSULT:  q1hr abdominal checks, mechanical ventilation     SICU COURSE EVENTS :  04-10 - admitted to SICU service  : Intubated and started on paralytic. Arterial line placed, subclavian TLC placed. Nephrology consulted for oliguria. IR abdominal muscle botox injection performed. TTE performed.   : Additional fluid boluses given; trial fo bumex started, considering CVVH. Weaned off nimbex gtt.  > 220 /hr. Renal U/S w/out hydro.   : Weaned off Levophed. Bumex gtt 2mg/hr > 1mg/hr. Goal net negative 1-1.5L, UOP approx, 200c/hr.   : Remained on bumex gtt for further diuresis, decreased to 0.5 overnight, vent settings weaned. Cr downtrending, LFTs worsening.   4/15: Extubated to BiPAP, transitioned to NC. Dc'd bumex gtt, given 5mg metolazone x 1 and 2mg bumex x 1.   : THONY drain #2 murky/bilious, pending CTAP with PO contrast, hypernatremia, started D5W @ 75cc/hr, persistent hypokalemia   : RTOR for resection of anastomosis for anastomotic leak. Returned intubated, 400/24/80/10, sinus tachy to . Abdomen soft. Was initially on propofol @ 30 and precedex, but propofol weaned off due to hypotension with MAPs in 50s, fentanyl ggt started for acute pain. Remained hypotensive despite fluids, started on Levophed, on 0.05.   : Extubated. HFNC 40L/49%, Levo off since 11 AM   : NGT removed. Started on duonebs. D5W increased to 60cc/hr. 1mg bumex, > 1.5L response  : Episode of afib RVR resolved with metoprolol 5mg IVP, cardiology c/s placed      24HOUR EVENTS    NIGHT  - Hydral 10mg x 1 IV for SBP 200s  -Nicrdipine gtt for SBP 200s; currently at 7.5  -Versed 1mg x2 for severe anxiety   -AB.51/29/137/23 on BiPAP at 80%  -Na 147, decreased D5W to 50cc/hr  -K 3.2 60mEq given, repeat 3.3 60mEq given; 2g Mg  -CT PE study pending     DAY  - Advanced to sips of bariatric clears  - Hgb 6.8 from 7.5, 1U pRBC ordered after T&S updated  - An out, TOV, out of bed   - Bumex 1mg, goal 500-1000cc net negative over next day  - HCTZ 25 QD started (home med), amlodipine 10mg QD started       [X] A ten-point review of systems was negative except as expressed in note.  [X ] History was obtained from patient. If unable to participate in their care, history was from review of the chart and collateral sources of information.  ============================================================   ============================================================    NANCY SARGENT   [id number]   62  64yF  ============================================================   DATE OF INITIAL SICU/SDU CONSULT: 04-10-25    INDICATION FOR SICU CONSULT:  q1hr abdominal checks, mechanical ventilation     SICU COURSE EVENTS :  04-10 - admitted to SICU service  : Intubated and started on paralytic. Arterial line placed, subclavian TLC placed. Nephrology consulted for oliguria. IR abdominal muscle botox injection performed. TTE performed.   : Additional fluid boluses given; trial fo bumex started, considering CVVH. Weaned off nimbex gtt.  > 220 /hr. Renal U/S w/out hydro.   : Weaned off Levophed. Bumex gtt 2mg/hr > 1mg/hr. Goal net negative 1-1.5L, UOP approx, 200c/hr.   : Remained on bumex gtt for further diuresis, decreased to 0.5 overnight, vent settings weaned. Cr downtrending, LFTs worsening.   4/15: Extubated to BiPAP, transitioned to NC. Dc'd bumex gtt, given 5mg metolazone x 1 and 2mg bumex x 1.   : THONY drain #2 murky/bilious, pending CTAP with PO contrast, hypernatremia, started D5W @ 75cc/hr, persistent hypokalemia   : RTOR for resection of anastomosis for anastomotic leak. Returned intubated, 400/24/80/10, sinus tachy to . Abdomen soft. Was initially on propofol @ 30 and precedex, but propofol weaned off due to hypotension with MAPs in 50s, fentanyl ggt started for acute pain. Remained hypotensive despite fluids, started on Levophed, on 0.05.   : Extubated. HFNC 40L/49%, Levo off since 11 AM   : NGT removed. Started on duonebs. D5W increased to 60cc/hr. 1mg bumex, > 1.5L response  : Episode of afib RVR resolved with metoprolol 5mg IVP, cardiology c/s placed      24HOUR EVENTS    NIGHT  - Hydral 10mg x 1 IV for SBP 200s  -Nicrdipine gtt for SBP 200s; currently at 7.5  -Versed 1mg x2 for severe anxiety   -AB.51/29/137/23 on BiPAP at 80%  -Na 147, decreased D5W to 50cc/hr  -K 3.2 60mEq given, repeat 3.3 60mEq given; 2g Mg  -CT PE study pending     DAY  - Advanced to sips of bariatric clears  - Hgb 6.8 from 7.5, 1U pRBC ordered after T&S updated  - An out, TOV, out of bed   - Bumex 1mg, goal 500-1000cc net negative over next day  - HCTZ 25 QD started (home med), amlodipine 10mg QD started       [X] A ten-point review of systems was negative except as expressed in note.  [X ] History was obtained from patient. If unable to participate in their care, history was from review of the chart and collateral sources of information.    Daily     Daily     Diet, Clear Liquid:   Bariatric Clear Liquid (BARICLLIQ) (25 @ 08:13)      CURRENT MEDS:  Neurologic Medications  dexMEDEtomidine Infusion 0.1 MICROgram(s)/kG/Hr IV Continuous <Continuous>  HYDROmorphone  Injectable 0.5 milliGRAM(s) IV Push every 4 hours PRN Severe Pain (7 - 10)  traZODone 25 milliGRAM(s) Oral at bedtime    Respiratory Medications  albuterol/ipratropium for Nebulization 3 milliLiter(s) Nebulizer every 6 hours    Cardiovascular Medications  amLODIPine   Tablet 10 milliGRAM(s) Oral daily  hydrochlorothiazide 25 milliGRAM(s) Oral daily  niCARdipine Infusion 5 mG/Hr IV Continuous <Continuous>    Gastrointestinal Medications  dextrose 5%. 1000 milliLiter(s) IV Continuous <Continuous>  pantoprazole  Injectable 40 milliGRAM(s) IV Push daily  Parenteral Nutrition - Adult 1 Each TPN Continuous <Continuous>  Parenteral Nutrition - Adult 1 Each TPN Continuous <Continuous>    Genitourinary Medications    Hematologic/Oncologic Medications  heparin   Injectable 7500 Unit(s) SubCutaneous every 8 hours    Antimicrobial/Immunologic Medications  piperacillin/tazobactam IVPB.. 3.375 Gram(s) IV Intermittent every 8 hours    Endocrine/Metabolic Medications  insulin lispro (ADMELOG) corrective regimen sliding scale   SubCutaneous every 6 hours    Topical/Other Medications  chlorhexidine 2% Cloths 1 Application(s) Topical <User Schedule>      ICU Vital Signs Last 24 Hrs  T(C): 36.2 (2025 12:00), Max: 36.7 (2025 16:00)  T(F): 97.1 (2025 12:00), Max: 98.1 (2025 16:00)  HR: 111 (2025 13:45) (86 - 175)  BP: 143/65 (2025 13:00) (115/65 - 218/86)  BP(mean): 93 (2025 13:00) (82 - 135)  ABP: 149/53 (2025 13:45) (118/68 - 223/79)  ABP(mean): 75 (2025 13:45) (70 - 129)  RR: 31 (2025 13:45) (20 - 40)  SpO2: 92% (2025 13:45) (84% - 100%)    O2 Parameters below as of 2025 13:00  Patient On (Oxygen Delivery Method): nasal cannula, high flow  O2 Flow (L/min): 60  O2 Concentration (%): 80        Adult Advanced Hemodynamics Last 24 Hrs  CVP(mm Hg): --  CVP(cm H2O): --  CO: --  CI: --  PA: --  PA(mean): --  PCWP: --  SVR: --  SVRI: --  PVR: --  PVRI: --      ABG - ( 2025 21:19 )  pH, Arterial: 7.51  pH, Blood: x     /  pCO2: 29    /  pO2: 137   / HCO3: 23    / Base Excess: 0.6   /  SaO2: 96.6                I&O's Summary    2025 07:01  -  2025 07:00  --------------------------------------------------------  IN: 5580 mL / OUT: 6225 mL / NET: -645 mL    2025 07:  -  2025 14:17  --------------------------------------------------------  IN: 1347.5 mL / OUT: 950 mL / NET: 397.5 mL      I&O's Detail    2025 07:  -  2025 07:00  --------------------------------------------------------  IN:    dextrose 5%: 1360 mL    dextrose 5%: 350 mL    Fat Emulsion (Fish Oil &amp; Plant Based) 20% Infusion: 207.5 mL    IV PiggyBack: 100 mL    IV PiggyBack: 100 mL    IV PiggyBack: 200 mL    IV PiggyBack: 800 mL    IV PiggyBack: 200 mL    NiCARdipine: 512.5 mL    Oral Fluid: 50 mL    PRBCs (Packed Red Blood Cells): 300 mL    TPN (Total Parenteral Nutrition): 1400 mL  Total IN: 5580 mL    OUT:    Bulb (mL): 20 mL    Bulb (mL): 50 mL    Bulb (mL): 30 mL    Indwelling Catheter - Urethral (mL): 920 mL    VAC (Vacuum Assisted Closure) System (mL): 5 mL    Voided (mL): 5200 mL  Total OUT: 6225 mL    Total NET: -645 mL      2025 07:  -  2025 14:17  --------------------------------------------------------  IN:    dextrose 5%: 350 mL    NiCARdipine: 542.5 mL    TPN (Total Parenteral Nutrition): 455 mL  Total IN: 1347.5 mL    OUT:    Voided (mL): 950 mL  Total OUT: 950 mL    Total NET: 397.5 mL    PHYSICAL EXAM:  GENERAL: A&Ox3, NAD  HEENT: Normocephalic, atraumatic, NGT to suction  PULM: Non-labored respirations, bilateral chest rise, saturating well on BiPAP  CV: Regular rate and rhythm  ABDOMEN: Abdomen obese, soft, mildly-distended, mildly-tender to deep palpation, THONY x 3 (RUQ, LRLQ, LLQ), SS output  : Primafit in place  MSK: Moving extremities spontaneously  NEURO: No focal deficits  SKIN: Warm, dry, normal skin color, texture, turgor    LABS:  CAPILLARY BLOOD GLUCOSE  POCT Blood Glucose.: 192 mg/dL (2025 10:59)  POCT Blood Glucose.: 173 mg/dL (2025 05:28)  POCT Blood Glucose.: 217 mg/dL (2025 23:26)  POCT Blood Glucose.: 175 mg/dL (2025 17:43)                     8.8    19.06 )-----------( 345      ( 2025 05:45 )             27.9       04-21    149[H]  |  111[H]  |  77[HH]  ----------------------------<  175[H]  3.5   |  24  |  1.7[H]    Ca    8.5      2025 05:45  Phos  2.3     04-21  Mg     2.5     04-21    Urinalysis Basic - ( 2025 05:45 )    Color: x / Appearance: x / SG: x / pH: x  Gluc: 175 mg/dL / Ketone: x  / Bili: x / Urobili: x   Blood: x / Protein: x / Nitrite: x   Leuk Esterase: x / RBC: x / WBC x   Sq Epi: x / Non Sq Epi: x / Bacteria: x  ============================================================   ============================================================

## 2025-04-21 NOTE — PROGRESS NOTE ADULT - ASSESSMENT
ASSESSMENT:  64F PMHx HTN, morbid obesity s/p 2001 Abby-en-Y gastric bypass who presented on 4/3 with incarcerated ventral hernia with SBO. S/p 4/10 open ventral hernia repair, small bowel resection, and primary fascial closure with Dr. Lema. Admitted to SICU for Q1 abdominal checks and hemodynamic monitoring. S/p 4/17 takeback for exploratory laparotomy, repair of anastomotic leak.    NEUROLOGICAL:  #Acute pain  - IV tylenol PRN  - Dilaudid 0.5mg Q4 PRN severe pain   - Trazodone 25mg QPM    RESPIRATORY:   #Oxygenation  - Intubated 4/10-4/15  - Reintubated 4/17-4/18  - 4hrs on/off BiPAP/HFNC   #Diuresis  - 1mg bumex x 1 4/19  - 1mg Bumex x 1 4/20  #PMHxx LOUIS on CPAP@ home   #Activity   - Activity- increase as tolerated     CARDS:   #acute hypotension in setting of SIRS, resolved   - Off levophed since 4/18  - MAP >65  #PMHx of HTN  - Nicardipine gtt   - Amlodipine   - Restarted amlodipine 10mg qD   - Restarted home HCTZ 25mg QD  - Holding home losartan  #EKG- NSR  #TTE 6/2022: EF 55-60%. G1DD.   - TTE 4/11: EF of 56 %.G1DD.     GASTROINTESTINAL/NUTRITION:   #s/p lap converted to open repair of large ventral hernia with incarcerated small bowel, small bowel resection, vicryl mesh placement; c/b anastomotic leak     - CTAP with PO contrast: Evidence of defect in small bowel loop with connection to anterior midline intraperitoneal and subcutaneous pannus collections containing extravasated oral contrast. Unclear if this small bowel defect occurs at the anastomotic site. Surgical review recommended.    - 4/17: ex lap anastomosis resection and creation of side to side anastomosis     - IR consult - readdress when extubated     - aspiration precautions, HOB 30  #SBO s/p ventral hernia repair with SBR  #Diet, Bariatric Clears, sips  - Continue TPN with AM labs  #GI Prophylaxis/hx GERD   - pantoprazole  Injectable 40 IV Push  daily (omeprazole @home)  #Bowel regimen    -holding    -last BM 4/19   - NO dignishield     /RENAL:   #urine output in critically ill  - An removed, passed TOV   - High UOP   #worsening FUNMI   - Uptrending BUN   - Ammonia 35  - previously oliguric, now improved. Previously on bumex gtt @ 0.5mg/hr  - Nephrology consulted and following > hold off on CVVH  - Renal u/s> no hydronephrosis. 2-3cm anechoic cyst on right kidney     Labs:          BUN/Cr- 87/2.1  -->,  85/1.7  -->          [04-20 @ 23:40]Na  150 // K  3.3 // Mg  1.8 // Phos  2.9  [04-20 @ 20:08]Na  147 // K  3.2 // Mg  1.9 // Phos  2.8  #Hypernatremia  -started on D5W @ 50cc/hr     HEME/ONC:   # DVT prophylaxis-heparin   Injectable 7500 q8, SCDs    Labs: Hb/Hct:  6.8/22.4  -->,  8.4/26.8  -->                      Plts:  269  -->,  329  -->                 PTT/INR:      T&S Expires: 4/23  - Hgb 6.8 s/p 1U pRBC     ID:  #MRSA nares negative   Current antibiotics-piperacillin/tazobactam IVPB.. 3.375 every 12 hours x 4 days post op (end date 4/22)  #Antibiotics Course  - continue zosyn > Purulent abscesses noted intraoperatively   WBC- 18.62  --->>,  12.84  --->>,  17.45  --->>  Temp trend- 24hrs T(F): 98 (04-21 @ 00:00), Max: 98.1 (04-20 @ 16:00)  Current antibiotics-piperacillin/tazobactam IVPB.. 3.375 every 12 hours  #febrile  - UA negative  - blood cultures; no growth at 5d  - sputum cultures pending   #cultures  - OR cx: few E.coli, few bacteroides, rare clostridium  -4/13 Blood Cx: NGTF  -4/14 tracheal aspirate: no growth   -4/17 body fluid cx rare e. coli     ENDOCRINE:  #Glycemic monitoring  - FSG Q6   - ISS  - A1C 5.8%     MSK:  #Activity- increase as tolerated     SKIN:  #DTI screening negative     - will continue to monitor daily skin changes    LINES/DRAINS:  PIV, THONY drain x 3, Radial A line, RIJ TLC   ADVANCED DIRECTIVES:  Full Code  HCP- Daughter  (Shavonne Moss)  INDICATION FOR SICU/SDU: Intestinal obstruction  DISPO:  SICU.     Discussed with SICU attending Dr. Johnson

## 2025-04-21 NOTE — PROGRESS NOTE ADULT - ASSESSMENT
ASSESSMENT:  64y F w/ PMHx of HTN and gastric bypass surgery in 2001 presents with abdominal distention. CT with incarcerated ventral hernia with SBO. S/p Open repair of ventral hernia using mesh and component separation technique, Small bowel resection. 4/17 s/p ex lap, 30 cm sbr, creation of new side to side ileoileostomy, ventral hernia repair w/ mesh    PLAN:  - TPN via R IJ central line (brown port)  - Closely monitor THONY drain outputs  - Monitor bowel function  - Monitor UOP and creatinine  - IV abx: zosyn  - Monitor for fevers  - Rest of care per SICU    BLUE TEAM SPECTRA: [id number]

## 2025-04-21 NOTE — PROGRESS NOTE ADULT - ATTENDING COMMENTS
Pt examined  labs and images reviewed  pt with supermorbid obesity and complex hernia with sbo  previously treated without surgery   passing flatus awaiting bowel movement but was having  more pain taken to OR urgently   4/10 s/p BHUMI , Bowel Resection / Anastamosis / Primary repair of Fascia / hernia sac  overnight - renal failure / respiratory compromise  was intubated / paralysed/ botox  now on bumex - tapering   4/17:  s/p BHUMI , Bowel Resection / Anastamosis / Primary repair of Fascia / hernia sac  4/21: extubated -  wbc increasing  hypertensive - on nicardipine drip  tachy cardia - episode acute even yesterday at 8 pm - still not completely resolving    mariam drain sero sang  ng tube removed and started on sips      impression : extremely high risk - supermorbid obesity and complex hernia with partial sbo  will keep npo other than sips\  will get ct o r/o PE and abdomen - to r/o collection v leak   SICU

## 2025-04-21 NOTE — PROGRESS NOTE ADULT - ATTENDING COMMENTS
Subjective:  - Patient ID : Miss Dawn Eddy  - Subjective, Family History, Past Medical History, and Impression :  - Subjective : Patient with a history of morbid obesity. She underwent incurred ventral hernia, open ventral hernia repair, small bowel resection, and primary fascial exposure. Thereafter she presented signs of compartment syndrome and underwent another surgery for the repair of an anastomotic leak. Recently, Dawn has been showing persistent symptoms of hypertension and arrhythmia, as well as suddenly becoming very agitated.  - Family History :  - Past Medical History : Patient has a medical history of morbid obesity. She had a bypass in 2001, and had incarcerated ventral hernia repair and small bowel resection on April 10th. Subsequently, patient was diagnosed with an anastomotic leak and underwent another exploratory laparotomy. She is currently on overnight monitoring.  - Impression : The patient's overall medical state appears to be fluctuating due to multiple factors. The health care team is primarily concerned about the rising white cell count, which could indicate an ongoing infection. Her hypertension, arrhythmia and agitation symptoms might also suggest a possible cardiac or neurological disorder. Persistent renal issues and low potassium levels are also requiring attention.  - Review Of Systems :  - Review of Systems : The patient has reported no changes in the subjective state of the abdominal drain or its output, despite the changes in white cell counts. She presented with elevated blood pressure and anxiety-like symptoms, which could not be attributed to abdominal causes. Cardiac and neurological symptoms are also noted.  Objective:  - Physical Examination (PE) : No significant changes noted in the patient’s abdominal exam. White cell count has been rising from 12,000 to 17 to 19. No evidence of a pneumonia observed in the chest x-ray. Kidney function has been slowly improving with a creatinine of 1.7 from 2.1.  Assessment:  - Summary : Critical assessment of the patient's condition shows increased anxiety with hypertension and arrhythmia, rising white cell count, and improvement in kidney function. However, there remains uncertainty about the cause of the elevated white cell count.  - Problems :  - Increased Anxiety    - Hypertension and arrhythmia    - Rising white cell count    - Persistent low potassium levels    - Differential Diagnosis :  - Intra-abdominal infection    - Intravenous line infection    - Leaking anastomosis    - Cardiac complications    Plan:  - Summary : Plan includes performing a CT angiogram of chest, abdomen, and pelvis, escalating antibiotics, consulting with an infectious disease specialist, gaining blood cultures, and following up with a cardiology consultation. In addition, adjusting the TPN bag formulation for potassium and considering a bariatric ICU bed for her comfort.  - Plan :  - Perform a CT angio of the chest, abdomen, and pelvis    - Escalate antibiotic regimen    - Consult infectious disease    - Gather blood cultures    - Follow up with cardiology consultation.    - Adjust TPN bag with appropriate potassium levels    - Investigate the availability of a bariatric ICU bed    - Continue monitoring vitals and address any emotional distress    Signature:  - Werner Johnson MD FACS

## 2025-04-21 NOTE — PROGRESS NOTE ADULT - SUBJECTIVE AND OBJECTIVE BOX
seen and examined   24 h events noted   on high flow         PAST HISTORY  --------------------------------------------------------------------------------  No significant changes to PMH, PSH, FHx, SHx, unless otherwise noted    ALLERGIES & MEDICATIONS  --------------------------------------------------------------------------------  Allergies    No Known Allergies    Intolerances      Standing Inpatient Medications  albuterol/ipratropium for Nebulization 3 milliLiter(s) Nebulizer every 6 hours  amLODIPine   Tablet 10 milliGRAM(s) Oral daily  chlorhexidine 2% Cloths 1 Application(s) Topical <User Schedule>  dexMEDEtomidine Infusion 0.1 MICROgram(s)/kG/Hr IV Continuous <Continuous>  dextrose 5%. 1000 milliLiter(s) IV Continuous <Continuous>  heparin   Injectable 7500 Unit(s) SubCutaneous every 8 hours  hydrochlorothiazide 25 milliGRAM(s) Oral daily  insulin lispro (ADMELOG) corrective regimen sliding scale   SubCutaneous every 6 hours  lipid, fat emulsion (Fish Oil and Plant Based) 20% Infusion 0.3687 Gm/kG/Day IV Continuous <Continuous>  niCARdipine Infusion 5 mG/Hr IV Continuous <Continuous>  pantoprazole  Injectable 40 milliGRAM(s) IV Push daily  Parenteral Nutrition - Adult 1 Each TPN Continuous <Continuous>  piperacillin/tazobactam IVPB.. 3.375 Gram(s) IV Intermittent every 12 hours  traZODone 25 milliGRAM(s) Oral at bedtime    PRN Inpatient Medications  HYDROmorphone  Injectable 0.5 milliGRAM(s) IV Push every 4 hours PRN          VITALS/PHYSICAL EXAM  --------------------------------------------------------------------------------  T(C): 36.7 (04-21-25 @ 00:00), Max: 36.7 (04-20-25 @ 16:00)  HR: 104 (04-21-25 @ 07:00) (73 - 175)  BP: 146/65 (04-21-25 @ 06:15) (115/65 - 218/86)  RR: 33 (04-21-25 @ 07:00) (18 - 40)  SpO2: 95% (04-21-25 @ 07:00) (84% - 100%)  Wt(kg): --        04-20-25 @ 07:01  -  04-21-25 @ 07:00  --------------------------------------------------------  IN: 5580 mL / OUT: 6225 mL / NET: -645 mL      Physical Exam:  	Gen: on high flow   	Pulm: decrease BS  B/L  	CV:  S1S2; no rub  	Abd: +distended  	LE:  edema      LABS/STUDIES  --------------------------------------------------------------------------------              8.8    19.06 >-----------<  345      [04-21-25 @ 05:45]              27.9     149  |  111  |  77  ----------------------------<  175      [04-21-25 @ 05:45]  3.5   |  24  |  1.7        Ca     8.5     [04-21-25 @ 05:45]      Mg     2.5     [04-21-25 @ 05:45]      Phos  2.3     [04-21-25 @ 05:45]        Creatinine Trend:  SCr 1.7 [04-21 @ 05:45]  SCr 1.7 [04-20 @ 23:40]  SCr 2.1 [04-20 @ 20:08]  SCr 2.5 [04-20 @ 04:59]  SCr 2.5 [04-19 @ 19:37]    Urinalysis - [04-21-25 @ 05:45]      Color  / Appearance  / SG  / pH       Gluc 175 / Ketone   / Bili  / Urobili        Blood  / Protein  / Leuk Est  / Nitrite       RBC  / WBC  / Hyaline  / Gran  / Sq Epi  / Non Sq Epi  / Bacteria     Urine Creatinine 65      [04-16-25 @ 07:08]  Urine Sodium 51.0      [04-16-25 @ 07:08]  Urine Osmolality 408      [04-19-25 @ 20:25]    Vitamin D (25OH) 13      [04-19-25 @ 05:08]  Lipid: chol --, , HDL --, LDL --      [04-20-25 @ 04:59]

## 2025-04-21 NOTE — CHART NOTE - NSCHARTNOTEFT_GEN_A_CORE
Alerted by RN patient anxious, came to assess patient tachycardic with HR fluctuating between 140-200, possible afib RVR with ventricular beats. Attached patient to [facility] and obtained EKG, demonstrating afib RVR. Patient given Metoprolol 5mg IVP, with improvement to HR 130s and in normal sinus rhythm with PVCs. ABG obtained 7.51/29/137/23 lactate 1.2. Chest xray unchanged from previous study. Bedside echo was negative for right heart strain and effusion. Cardiology fellow called to evaluate, recommending to continue current management and correct electrolytes. Potassium 3.3, 60mEq KCl and 2g Mg given. Cardiology consult placed with outpatient cardiologist Dr. Cam. CT PE study pending. At approximately 8:30PM alerted by RN patient anxious, came to assess patient tachycardic with HR fluctuating between 140-200, possible afib RVR with ventricular beats. Attached patient to Zoll and obtained EKG, demonstrating afib RVR. Patient given Metoprolol 5mg IVP, with improvement to HR 130s and in normal sinus rhythm with PVCs. ABG obtained 7.51/29/137/23 lactate 1.2. Chest xray unchanged from previous study. Bedside echo was negative for right heart strain and effusion. Cardiology fellow called to evaluate, recommending to continue current management and correct electrolytes. Potassium 3.3, 60mEq KCl and 2g Mg given. Cardiology consult placed with outpatient cardiologist Dr. Cam. CT PE study pending. At approximately 8:30PM alerted by RN patient anxious, came to assess patient tachycardic with HR fluctuating between 140-200, possible afib RVR with ventricular beats. Attached patient to Zoll and obtained EKG, demonstrating afib RVR. Patient given Metoprolol 5mg IVP, with improvement to HR 130s and in normal sinus rhythm with PVCs. ABG obtained 7.51/29/137/23 lactate 1.2. Chest xray unchanged from previous study. Bedside echo was negative for right heart strain and effusion. Cardiology fellow called to evaluate, recommending to continue current management and correct electrolytes. Potassium 3.2, 60mEq KCl and 2g Mg given. Cardiology consult placed with outpatient cardiologist Dr. Cam. CT PE study pending. At approximately 8:30PM alerted by RN patient anxious, came to assess patient tachycardic with HR fluctuating between 140-200, possible afib RVR with ventricular beats. Attached patient to Zoll and obtained EKG, demonstrating afib RVR. Patient given Metoprolol 5mg IVP, with improvement to HR 130s and in normal sinus rhythm with PVCs. ABG obtained 7.51/29/137/23 lactate 1.2. Chest xray unchanged from previous study. Bedside echo was negative for right heart strain and effusion. Cardiology fellow called to evaluate, recommending to continue current management and correct electrolytes. Potassium 3.2, 60mEq KCl and 2g Mg given. Cardiology consult placed with outpatient cardiologist Dr. Cam.

## 2025-04-21 NOTE — PROGRESS NOTE ADULT - ASSESSMENT
Patient is a 64F PMHx HTN, morbid obesity s/p 2001 Abby-en-Y gastric bypass who presented on 4/3 with incarcerated ventral hernia with SBO. S/p open ventral hernia repair, small bowel resection, and primary fascial closure with Dr. Lema on 4/10. Currently admitted to SICU for monitoring. Nephrology consulted for FUNMI and oliguria.  # FUNMI most likely ATN   # resp failure on MV/ extubated / on high flow   #  incarcerated ventral hernia with SBO S/p open ventral hernia repair, small bowel resection, and primary fascial closure  # hypernatremia   s/p ex-lap, creation of new side to side ileoileostomy, ventral hernia repair w/ mesh   on high flow   cr stable   sodium level stable   d/c HCTZ  on cardene drip/ start tapering down / can add labetalol  non oliguric  replete k to 4   ph noted / no binders   transfuse to Hb>7  adjust all meds to GFR < 15 ml/min   still no acute indication for RRT   will follow

## 2025-04-21 NOTE — PROGRESS NOTE ADULT - SUBJECTIVE AND OBJECTIVE BOX
SURGERY DAILY PROGRESS NOTE    24 HR EVENTS: Patient passing gas, having BMs. Pain currently well-controlled. Pierce removed, passed TOV. Received hydralazine and nicardipine drip started for persistent hypertension. Versed given x1 for anxiety. CT PE pending. Patient low-grade tachycardic to HR max 103 overnight, RR max 28 (tachypneic) and patient on HFNC. THONY x3 in place and [place] functioning.    OBJECTIVE:  Vital Signs Last 24 Hrs  T(C): 36.7 (21 Apr 2025 00:00), Max: 36.7 (20 Apr 2025 04:00)  T(F): 98 (21 Apr 2025 00:00), Max: 98.1 (20 Apr 2025 16:00)  HR: 103 (21 Apr 2025 02:33) (69 - 175)  BP: 146/63 (21 Apr 2025 02:33) (133/62 - 218/86)  BP(mean): 90 (21 Apr 2025 02:33) (86 - 135)  RR: 24 (21 Apr 2025 02:33) (18 - 40)  SpO2: 93% (21 Apr 2025 02:33) (84% - 100%)    Parameters below as of 21 Apr 2025 02:33  Patient On (Oxygen Delivery Method): nasal cannula, high flow  O2 Flow (L/min): 60  O2 Concentration (%): 50    I&O's Summary    19 Apr 2025 07:01  -  20 Apr 2025 07:00  --------------------------------------------------------  IN: 3438.6 mL / OUT: 3492.5 mL / NET: -53.9 mL    20 Apr 2025 07:01  -  21 Apr 2025 02:39  --------------------------------------------------------  IN: 3978.2 mL / OUT: 3270 mL / NET: 708.2 mL        PHYSICAL EXAM:  General: NAD  HEENT: NCAT, EOMI, Trachea ML  CHEST: On BIPAP.  Cardiac: RRR  Abdomen: Soft, distended, noderately tender, midline prevena in place with R THONY drains x2 SS outputs, L THONY serous output. pierce intact. abd binder intact  Skin: Warm/dry, normal color, no jaundice  Incision/wound: healing well, dressings in place, clean, dry and intact    LABS:                        8.4    17.45 )-----------( 329      ( 20 Apr 2025 20:08 )             26.8     04-20    150[H]  |  113[H]  |  85[HH]  ----------------------------<  218[H]  3.3[L]   |  23  |  1.7[H]    Ca    8.3[L]      20 Apr 2025 23:40  Phos  2.9     04-20  Mg     1.8     04-20        Urinalysis Basic - ( 20 Apr 2025 23:40 )    Color: x / Appearance: x / SG: x / pH: x  Gluc: 218 mg/dL / Ketone: x  / Bili: x / Urobili: x   Blood: x / Protein: x / Nitrite: x   Leuk Esterase: x / RBC: x / WBC x   Sq Epi: x / Non Sq Epi: x / Bacteria: x        RADIOLOGY & ADDITIONAL STUDIES:

## 2025-04-22 ENCOUNTER — RESULT REVIEW (OUTPATIENT)
Age: 65
End: 2025-04-22

## 2025-04-22 LAB
ANION GAP SERPL CALC-SCNC: 15 MMOL/L — HIGH (ref 7–14)
ANION GAP SERPL CALC-SCNC: 15 MMOL/L — HIGH (ref 7–14)
APTT BLD: 38.1 SEC — SIGNIFICANT CHANGE UP (ref 27–39.2)
APTT BLD: 52.3 SEC — HIGH (ref 27–39.2)
APTT BLD: 94.1 SEC — CRITICAL HIGH (ref 27–39.2)
BASOPHILS # BLD AUTO: 0.09 K/UL — SIGNIFICANT CHANGE UP (ref 0–0.2)
BASOPHILS NFR BLD AUTO: 0.4 % — SIGNIFICANT CHANGE UP (ref 0–1)
BUN SERPL-MCNC: 64 MG/DL — CRITICAL HIGH (ref 10–20)
BUN SERPL-MCNC: 64 MG/DL — CRITICAL HIGH (ref 10–20)
CALCIUM SERPL-MCNC: 7.9 MG/DL — LOW (ref 8.4–10.5)
CALCIUM SERPL-MCNC: 8.1 MG/DL — LOW (ref 8.4–10.5)
CHLORIDE SERPL-SCNC: 109 MMOL/L — SIGNIFICANT CHANGE UP (ref 98–110)
CHLORIDE SERPL-SCNC: 109 MMOL/L — SIGNIFICANT CHANGE UP (ref 98–110)
CO2 SERPL-SCNC: 22 MMOL/L — SIGNIFICANT CHANGE UP (ref 17–32)
CO2 SERPL-SCNC: 23 MMOL/L — SIGNIFICANT CHANGE UP (ref 17–32)
CREAT SERPL-MCNC: 1.4 MG/DL — SIGNIFICANT CHANGE UP (ref 0.7–1.5)
CREAT SERPL-MCNC: 1.5 MG/DL — SIGNIFICANT CHANGE UP (ref 0.7–1.5)
EGFR: 39 ML/MIN/1.73M2 — LOW
EGFR: 39 ML/MIN/1.73M2 — LOW
EGFR: 42 ML/MIN/1.73M2 — LOW
EGFR: 42 ML/MIN/1.73M2 — LOW
EOSINOPHIL # BLD AUTO: 0.05 K/UL — SIGNIFICANT CHANGE UP (ref 0–0.7)
EOSINOPHIL NFR BLD AUTO: 0.2 % — SIGNIFICANT CHANGE UP (ref 0–8)
GLUCOSE BLDC GLUCOMTR-MCNC: 169 MG/DL — HIGH (ref 70–99)
GLUCOSE BLDC GLUCOMTR-MCNC: 179 MG/DL — HIGH (ref 70–99)
GLUCOSE BLDC GLUCOMTR-MCNC: 186 MG/DL — HIGH (ref 70–99)
GLUCOSE BLDC GLUCOMTR-MCNC: 195 MG/DL — HIGH (ref 70–99)
GLUCOSE BLDC GLUCOMTR-MCNC: 196 MG/DL — HIGH (ref 70–99)
GLUCOSE SERPL-MCNC: 171 MG/DL — HIGH (ref 70–99)
GLUCOSE SERPL-MCNC: 173 MG/DL — HIGH (ref 70–99)
HCT VFR BLD CALC: 28.5 % — LOW (ref 37–47)
HGB BLD-MCNC: 9.3 G/DL — LOW (ref 12–16)
IMM GRANULOCYTES NFR BLD AUTO: 4.4 % — HIGH (ref 0.1–0.3)
LYMPHOCYTES # BLD AUTO: 1.11 K/UL — LOW (ref 1.2–3.4)
LYMPHOCYTES # BLD AUTO: 4.8 % — LOW (ref 20.5–51.1)
MAGNESIUM SERPL-MCNC: 2 MG/DL — SIGNIFICANT CHANGE UP (ref 1.8–2.4)
MAGNESIUM SERPL-MCNC: 2 MG/DL — SIGNIFICANT CHANGE UP (ref 1.8–2.4)
MCHC RBC-ENTMCNC: 30.6 PG — SIGNIFICANT CHANGE UP (ref 27–31)
MCHC RBC-ENTMCNC: 32.6 G/DL — SIGNIFICANT CHANGE UP (ref 32–37)
MCV RBC AUTO: 93.8 FL — SIGNIFICANT CHANGE UP (ref 81–99)
MONOCYTES # BLD AUTO: 1.1 K/UL — HIGH (ref 0.1–0.6)
MONOCYTES NFR BLD AUTO: 4.8 % — SIGNIFICANT CHANGE UP (ref 1.7–9.3)
NEUTROPHILS # BLD AUTO: 19.68 K/UL — HIGH (ref 1.4–6.5)
NEUTROPHILS NFR BLD AUTO: 85.4 % — HIGH (ref 42.2–75.2)
NRBC BLD AUTO-RTO: 0 /100 WBCS — SIGNIFICANT CHANGE UP (ref 0–0)
PHOSPHATE SERPL-MCNC: 2.6 MG/DL — SIGNIFICANT CHANGE UP (ref 2.1–4.9)
PHOSPHATE SERPL-MCNC: 2.7 MG/DL — SIGNIFICANT CHANGE UP (ref 2.1–4.9)
PLATELET # BLD AUTO: 422 K/UL — HIGH (ref 130–400)
PMV BLD: 10.6 FL — HIGH (ref 7.4–10.4)
POTASSIUM SERPL-MCNC: 3.2 MMOL/L — LOW (ref 3.5–5)
POTASSIUM SERPL-MCNC: 3.5 MMOL/L — SIGNIFICANT CHANGE UP (ref 3.5–5)
POTASSIUM SERPL-SCNC: 3.2 MMOL/L — LOW (ref 3.5–5)
POTASSIUM SERPL-SCNC: 3.5 MMOL/L — SIGNIFICANT CHANGE UP (ref 3.5–5)
RBC # BLD: 3.04 M/UL — LOW (ref 4.2–5.4)
RBC # FLD: 14.6 % — HIGH (ref 11.5–14.5)
SODIUM SERPL-SCNC: 146 MMOL/L — SIGNIFICANT CHANGE UP (ref 135–146)
SODIUM SERPL-SCNC: 147 MMOL/L — HIGH (ref 135–146)
SURGICAL PATHOLOGY STUDY: SIGNIFICANT CHANGE UP
WBC # BLD: 23.04 K/UL — HIGH (ref 4.8–10.8)
WBC # FLD AUTO: 23.04 K/UL — HIGH (ref 4.8–10.8)
ZINC SERPL-MCNC: 54 UG/DL — SIGNIFICANT CHANGE UP (ref 44–115)

## 2025-04-22 PROCEDURE — 99291 CRITICAL CARE FIRST HOUR: CPT | Mod: 24

## 2025-04-22 PROCEDURE — 36620 INSERTION CATHETER ARTERY: CPT

## 2025-04-22 PROCEDURE — 93306 TTE W/DOPPLER COMPLETE: CPT | Mod: 26

## 2025-04-22 PROCEDURE — 99254 IP/OBS CNSLTJ NEW/EST MOD 60: CPT | Mod: 25

## 2025-04-22 PROCEDURE — 10030 IMG GID FLU COLL DRG SFT TIS: CPT

## 2025-04-22 PROCEDURE — 36620 INSERTION CATHETER ARTERY: CPT | Mod: 59

## 2025-04-22 RX ORDER — SODIUM/POT/MAG/CALC/CHLOR/ACET 35-20-5MEQ
1 VIAL (ML) INTRAVENOUS
Refills: 0 | Status: DISCONTINUED | OUTPATIENT
Start: 2025-04-22 | End: 2025-04-22

## 2025-04-22 RX ORDER — HEPARIN SODIUM 1000 [USP'U]/ML
3200 INJECTION INTRAVENOUS; SUBCUTANEOUS
Qty: 25000 | Refills: 0 | Status: DISCONTINUED | OUTPATIENT
Start: 2025-04-22 | End: 2025-04-23

## 2025-04-22 RX ORDER — HEPARIN SODIUM 1000 [USP'U]/ML
2900 INJECTION INTRAVENOUS; SUBCUTANEOUS
Qty: 25000 | Refills: 0 | Status: DISCONTINUED | OUTPATIENT
Start: 2025-04-22 | End: 2025-04-22

## 2025-04-22 RX ORDER — MEROPENEM 1 G/30ML
1000 INJECTION INTRAVENOUS ONCE
Refills: 0 | Status: COMPLETED | OUTPATIENT
Start: 2025-04-22 | End: 2025-04-22

## 2025-04-22 RX ORDER — I.V. FAT EMULSION 20 G/100ML
0.37 EMULSION INTRAVENOUS
Qty: 50 | Refills: 0 | Status: DISCONTINUED | OUTPATIENT
Start: 2025-04-22 | End: 2025-04-22

## 2025-04-22 RX ORDER — MEROPENEM 1 G/30ML
1000 INJECTION INTRAVENOUS EVERY 8 HOURS
Refills: 0 | Status: COMPLETED | OUTPATIENT
Start: 2025-04-22 | End: 2025-04-29

## 2025-04-22 RX ORDER — MEROPENEM 1 G/30ML
INJECTION INTRAVENOUS
Refills: 0 | Status: COMPLETED | OUTPATIENT
Start: 2025-04-22 | End: 2025-04-29

## 2025-04-22 RX ADMIN — DEXMEDETOMIDINE HYDROCHLORIDE IN SODIUM CHLORIDE 3.39 MICROGRAM(S)/KG/HR: 4 INJECTION INTRAVENOUS at 02:40

## 2025-04-22 RX ADMIN — IPRATROPIUM BROMIDE AND ALBUTEROL SULFATE 3 MILLILITER(S): .5; 2.5 SOLUTION RESPIRATORY (INHALATION) at 20:32

## 2025-04-22 RX ADMIN — INSULIN LISPRO 4: 100 INJECTION, SOLUTION INTRAVENOUS; SUBCUTANEOUS at 11:33

## 2025-04-22 RX ADMIN — Medication 25 MG/HR: at 08:52

## 2025-04-22 RX ADMIN — IPRATROPIUM BROMIDE AND ALBUTEROL SULFATE 3 MILLILITER(S): .5; 2.5 SOLUTION RESPIRATORY (INHALATION) at 13:55

## 2025-04-22 RX ADMIN — Medication 65 EACH: at 08:53

## 2025-04-22 RX ADMIN — Medication 25 MILLIGRAM(S): at 21:46

## 2025-04-22 RX ADMIN — METOPROLOL SUCCINATE 25 MILLIGRAM(S): 50 TABLET, EXTENDED RELEASE ORAL at 05:37

## 2025-04-22 RX ADMIN — METOPROLOL SUCCINATE 25 MILLIGRAM(S): 50 TABLET, EXTENDED RELEASE ORAL at 18:33

## 2025-04-22 RX ADMIN — I.V. FAT EMULSION 20.8 GM/KG/DAY: 20 EMULSION INTRAVENOUS at 20:04

## 2025-04-22 RX ADMIN — Medication 50 MILLIEQUIVALENT(S): at 04:05

## 2025-04-22 RX ADMIN — INSULIN LISPRO 2: 100 INJECTION, SOLUTION INTRAVENOUS; SUBCUTANEOUS at 00:11

## 2025-04-22 RX ADMIN — Medication 5 MILLIGRAM(S): at 21:46

## 2025-04-22 RX ADMIN — HEPARIN SODIUM 29 UNIT(S)/HR: 1000 INJECTION INTRAVENOUS; SUBCUTANEOUS at 05:37

## 2025-04-22 RX ADMIN — INSULIN LISPRO 4: 100 INJECTION, SOLUTION INTRAVENOUS; SUBCUTANEOUS at 05:38

## 2025-04-22 RX ADMIN — HEPARIN SODIUM 29 UNIT(S)/HR: 1000 INJECTION INTRAVENOUS; SUBCUTANEOUS at 08:53

## 2025-04-22 RX ADMIN — MEROPENEM 100 MILLIGRAM(S): 1 INJECTION INTRAVENOUS at 11:33

## 2025-04-22 RX ADMIN — SODIUM CHLORIDE 50 MILLILITER(S): 9 INJECTION, SOLUTION INTRAVENOUS at 08:53

## 2025-04-22 RX ADMIN — HEPARIN SODIUM 32 UNIT(S)/HR: 1000 INJECTION INTRAVENOUS; SUBCUTANEOUS at 21:19

## 2025-04-22 RX ADMIN — IPRATROPIUM BROMIDE AND ALBUTEROL SULFATE 3 MILLILITER(S): .5; 2.5 SOLUTION RESPIRATORY (INHALATION) at 09:08

## 2025-04-22 RX ADMIN — HEPARIN SODIUM 32 UNIT(S)/HR: 1000 INJECTION INTRAVENOUS; SUBCUTANEOUS at 13:46

## 2025-04-22 RX ADMIN — Medication 50 MILLIEQUIVALENT(S): at 01:27

## 2025-04-22 RX ADMIN — MEROPENEM 100 MILLIGRAM(S): 1 INJECTION INTRAVENOUS at 21:46

## 2025-04-22 RX ADMIN — Medication 1 APPLICATION(S): at 05:38

## 2025-04-22 RX ADMIN — Medication 50 MILLIEQUIVALENT(S): at 02:39

## 2025-04-22 RX ADMIN — INSULIN LISPRO 4: 100 INJECTION, SOLUTION INTRAVENOUS; SUBCUTANEOUS at 18:32

## 2025-04-22 RX ADMIN — Medication 25 GRAM(S): at 05:36

## 2025-04-22 RX ADMIN — AMLODIPINE BESYLATE 10 MILLIGRAM(S): 10 TABLET ORAL at 05:37

## 2025-04-22 RX ADMIN — Medication 40 MILLIGRAM(S): at 11:32

## 2025-04-22 RX ADMIN — Medication 67 EACH: at 20:04

## 2025-04-22 NOTE — OCCUPATIONAL THERAPY INITIAL EVALUATION ADULT - DIAGNOSIS, OT EVAL
Rehab of debilitation / incarcerated ventral hernia, S/P LAP CONVERTED TO OPEN VENTRAL HERNIA REPAIR W/ MESH AND SMALL BOWEL RESECTION / HTN and gastric bypass surgery in 2001

## 2025-04-22 NOTE — CONSULT NOTE ADULT - ASSESSMENT
The patient is a 64-year-old female with a past medical history of high blood pressure and gastric bypass surgery in 2001,zeeshan, borderline dm, bowel obstruction,  presenting with two days of sharp abdominal pain and one episode of non-bloody, non-bilious vomiting.  pt with surgery for bowel obstruction and re expolore for anastomotic leak.  pt with pulmonary embolus and svt, vpds and nsvt in this setting of respiratory distress.  pt on iv heparin and nicardipine.  wean off nicardipine drip  cont metoprolol 25 po q12  d/c hctz  bedside echo  ekg today  cont norvasc  supplement K+

## 2025-04-22 NOTE — CONSULT NOTE ADULT - SUBJECTIVE AND OBJECTIVE BOX
Patient is a 64y old  Female who presents with a chief complaint of Abdominal Pain (2025 07:01)      HPI:  The patient is a 64-year-old female with a past medical history of high blood pressure and gastric bypass surgery in ,zeeshan, borderline dm, bowel obstruction,  presenting with two days of sharp abdominal pain and one episode of non-bloody, non-bilious vomiting. She has a prior history of small bowel obstruction in , , and most recently in , all of which resolved with conservative management. Her current symptoms are similar in nature. A computed tomography scan of the abdomen and pelvis with oral and intravenous contrast revealed multiple ventral abdominal wall hernias containing both obstructed and non-obstructed bowel loops. A complex small bowel obstruction is present, involving three hernias on the right side of the abdomen, with dilated, fluid-filled loops of small bowel, trace fluid between loops, and a collapsed segment of bowel beyond a hernia in the right lower quadrant. These findings are consistent with a recurrent small bowel obstruction. The hernia was non reducible at bedside. (2025 20:41.  pt had repair of hernia and small bowel obstruction.  pt had re exploration and repai of a leak.  pt 2 nights ago became anxiuos and sob with svt to 200 bp, ventricular ectopy and sinus tachycardia with elevated bp. pt was placed on a nicardipine drip. pt was dx with a pulmonary embolus and placed on heparin.  pt had renal insufficiency that is improving.  pt also being tx for infection and possible pneumonia.   )      PAST MEDICAL & SURGICAL HISTORY:  Gastric bypass status for obesity      ZEESHAN (obstructive sleep apnea)      S/P gastric bypass      S/P       S/P small bowel resection      History of total bilateral knee replacement (TKR)      H/O colonoscopy  2016          PREVIOUS DIAGNOSTIC TESTING:      ECHO  FINDINGS:    STRESS TEST  FINDINGS:    CATHETERIZATION  FINDINGS:    MEDICATIONS  (STANDING):  albuterol/ipratropium for Nebulization 3 milliLiter(s) Nebulizer every 6 hours  amLODIPine   Tablet 10 milliGRAM(s) Oral daily  chlorhexidine 2% Cloths 1 Application(s) Topical <User Schedule>  dexMEDEtomidine Infusion 0.1 MICROgram(s)/kG/Hr (3.39 mL/Hr) IV Continuous <Continuous>  dextrose 5%. 1000 milliLiter(s) (50 mL/Hr) IV Continuous <Continuous>  heparin  Infusion 2900 Unit(s)/Hr (29 mL/Hr) IV Continuous <Continuous>  hydrochlorothiazide 25 milliGRAM(s) Oral daily  insulin lispro (ADMELOG) corrective regimen sliding scale   SubCutaneous every 6 hours  melatonin 5 milliGRAM(s) Oral at bedtime  metoprolol tartrate 25 milliGRAM(s) Oral two times a day  niCARdipine Infusion 5 mG/Hr (25 mL/Hr) IV Continuous <Continuous>  pantoprazole  Injectable 40 milliGRAM(s) IV Push daily  Parenteral Nutrition - Adult 1 Each (65 mL/Hr) TPN Continuous <Continuous>  piperacillin/tazobactam IVPB.. 3.375 Gram(s) IV Intermittent every 8 hours  traZODone 25 milliGRAM(s) Oral at bedtime    MEDICATIONS  (PRN):  HYDROmorphone  Injectable 0.5 milliGRAM(s) IV Push every 4 hours PRN Severe Pain (7 - 10)      FAMILY HISTORY:      SOCIAL HISTORY:  CIGARETTES:  ALCOHOL:  DRUGS:                      REVIEW OF SYSTEMS:  CONSTITUTIONAL: No distress, Looks stable  NECK: No pain or stiffnes  RESPIRATORY: No cough, wheezing,(+) shortness of breath  CARDIOVASCULAR: No chest pain, (+)SOB, no  palpitations,(+) leg swelling  GASTROINTESTINAL:(+) abdominal or epigastric pain. No nausea, vomiting, or hematemesis;  No melena.  NEUROLOGICAL: No dizziness, headaches, memory loss, loss of strength  SKIN: No itching, burning, rashes, or lesions   ENDOCRINE: No heat or cold intolerance  MUSCULOSKELETAL: No joint pain, No  swelling; (+) muscle pain  PSYCHIATRIC: No depression,(+) anxiety,  difficulty sleeping  ALLERGY: No hives, itching, rash          Vital Signs Last 24 Hrs  T(C): 37.4 (2025 04:00), Max: 37.4 (2025 04:00)  T(F): 99.3 (2025 04:00), Max: 99.3 (2025 04:00)  HR: 93 (2025 06:15) (83 - 116)  BP: 116/59 (2025 07:00) (105/54 - 174/74)  BP(mean): 84 (2025 07:00) (68 - 112)  RR: 34 (2025 06:00) (22 - 39)  SpO2: 94% (2025 07:00) (87% - 99%)    Parameters below as of 2025 07:00  Patient On (Oxygen Delivery Method): nasal cannula, high flow  O2 Flow (L/min): 60  O2 Concentration (%): 80                      PHYSICAL EXAM:  GENERAL: No distress, well developed  HEAD:  Atraumatic, Normocephalic  NECK: Supple, No JVD, No Bruit of either carotid arteries  NERVOUS SYSTEM:  Alert, Awake, Oriented to time, place, person; Normal memory and speech; Normal motor Strength 5/5 B/L upper and lower extremities  CHEST/LUNG: Normal air entry to lung base bilaterally; No wheeze, crackle, rales, rhonchi  HEART: Regular heart beat, S1, A2, P2, No S3, No S4, No gallop, No murmur  ABDOMEN: Soft, Non tender, Non distended; Bowel sounds present  EXTREMITIES:  2+ Peripheral Pulses, No clubbing, (+)No edema  SKIN: No rashes or lesions    TELEMETRY:nsr, svt, nsvt, vpd    ECG:Diagnosis Line Normal sinus rhythm  Normal ECG      I&O's Detail    2025 07:01  -  2025 07:00  --------------------------------------------------------  IN:    Dexmedetomidine: 25.4 mL    dextrose 5%: 1200 mL    Heparin: 87 mL    Heparin: 150 mL    IV PiggyBack: 100 mL    NiCARdipine: 1390 mL    TPN (Total Parenteral Nutrition): 1680 mL  Total IN: 4632.4 mL    OUT:    Bulb (mL): 13 mL    Bulb (mL): 35 mL    Bulb (mL): 13 mL    VAC (Vacuum Assisted Closure) System (mL): 0 mL    Voided (mL): 3000 mL  Total OUT: 3061 mL    Total NET: 1571.4 mL          LABS:                        9.3    23.04 )-----------( 422      ( 2025 04:20 )             28.5     04-22    146  |  109  |  64[HH]  ----------------------------<  173[H]  3.5   |  22  |  1.5    Ca    8.1[L]      2025 04:20  Phos  2.6     04-22  Mg     2.0     -22          PT/INR - ( 2025 21:27 )   PT: 10.50 sec;   INR: 0.89 ratio         PTT - ( 2025 04:20 )  PTT:38.1 sec  Urinalysis Basic - ( 2025 04:20 )    Color: x / Appearance: x / SG: x / pH: x  Gluc: 173 mg/dL / Ketone: x  / Bili: x / Urobili: x   Blood: x / Protein: x / Nitrite: x   Leuk Esterase: x / RBC: x / WBC x   Sq Epi: x / Non Sq Epi: x / Bacteria: x      I&O's Summary    2025 07:01  -  2025 07:00  --------------------------------------------------------  IN: 4632.4 mL / OUT: 3061 mL / NET: 1571.4 mL        RADIOLOGY & ADDITIONAL STUDIES:

## 2025-04-22 NOTE — PROCEDURAL SAFETY CHECKLIST WITH OR WITHOUT SEDATION - NSPRESEDATION2FT_GEN_ALL_CORE
Care of the patient is governed by the Department of Anesthesia policy and procedure manual.

## 2025-04-22 NOTE — OCCUPATIONAL THERAPY INITIAL EVALUATION ADULT - PERTINENT HX OF CURRENT PROBLEM, REHAB EVAL
64-year-old female with a past medical history of high blood pressure and gastric bypass surgery in 2001, presenting with two days of sharp abdominal pain and one episode of non-bloody, non-bilious vomiting. She has a prior history of small bowel obstruction in 2006, 2008, and most recently in 2020, all of which resolved with conservative management. Her current symptoms are similar in nature. A computed tomography scan of the abdomen and pelvis with oral and intravenous contrast revealed multiple ventral abdominal wall hernias containing both obstructed and non-obstructed bowel loops. A complex small bowel obstruction is present, involving three hernias on the right side of the abdomen, with dilated, fluid-filled loops of small bowel, trace fluid between loops, and a collapsed segment of bowel beyond a hernia in the right lower quadrant. These findings are consistent with a recurrent small bowel obstruction. The hernia was non reducible at bedside.

## 2025-04-22 NOTE — PROGRESS NOTE ADULT - SUBJECTIVE AND OBJECTIVE BOX
GENERAL SURGERY PROGRESS NOTE  Patient: NANCY SARGENT , 64y ([birth year])Female   MRN: [id number]  Location: [place] [address] A  Visit: 04-03-25 Inpatient  Date: 04-22-25 @ 00:37    Admission: Intestinal obstruction    Hospital Day:  Post operative day:  Incarcerated ventral hernia  SBO (small bowel obstruction)  Perforated viscus   s/p Open repair of ventral hernia using mesh and component separation technique  Small bowel resection  Insertion, arterial line, percutaneous  Exploratory laparotomy      24 Hour Events:  Nicardipine gtt for HTN, titrated to goal SBP  Between BiPAP and HFNC, mentating well  tolerating sips of aubrey CLD with +BM/+F  R upper THONY drain with brown tinged SS fluid, remainder of drains SS  CT PE with R sided PE, no evidence of R heart strain --> Hep gtt started    Vitals:  T(F): 98.1 (04-21-25 @ 20:00), Max: 98.1 (04-21-25 @ 20:00)  HR: 92 (04-21-25 @ 22:00)  BP: 167/67 (04-21-25 @ 21:00)  RR: 30 (04-21-25 @ 22:00)  SpO2: 94% (04-21-25 @ 22:00)    Diet, Clear Liquid:   Bariatric Clear Liquid (BARICLLIQ)    Fluids:   I & O's:    04-20-25 @ 07:01  -  04-21-25 @ 07:00  --------------------------------------------------------  IN:    dextrose 5%: 1360 mL    dextrose 5%: 350 mL    Fat Emulsion (Fish Oil &amp; Plant Based) 20% Infusion: 207.5 mL    IV PiggyBack: 100 mL    IV PiggyBack: 100 mL    IV PiggyBack: 200 mL    IV PiggyBack: 800 mL    IV PiggyBack: 200 mL    NiCARdipine: 512.5 mL    Oral Fluid: 50 mL    PRBCs (Packed Red Blood Cells): 300 mL    TPN (Total Parenteral Nutrition): 1400 mL  Total IN: 5580 mL    OUT:    Bulb (mL): 20 mL    Bulb (mL): 50 mL    Bulb (mL): 30 mL    Indwelling Catheter - Urethral (mL): 920 mL    VAC (Vacuum Assisted Closure) System (mL): 5 mL    Voided (mL): 5200 mL  Total OUT: 6225 mL    Total NET: -645 mL    PHYSICAL EXAM:  General: NAD  Cardiac: RRR  Respiratory: unlabored breathing at rest, BiPAP  Abdomen: Soft, non-distended, mild midline tenderness, no rebound, no guarding.   - R upper abdominal THONY with brown tinged SS fluid, remainder of drains SS  Musculoskeletal: FROM in b/l UE and LE  Neuro: Sensation grossly intact and equal throughout, no focal deficits  Skin: Warm/dry, normal color, no jaundice  Incision/wound: prevena in place, C/d/i    MEDICATIONS  (STANDING):  albuterol/ipratropium for Nebulization 3 milliLiter(s) Nebulizer every 6 hours  amLODIPine   Tablet 10 milliGRAM(s) Oral daily  chlorhexidine 2% Cloths 1 Application(s) Topical <User Schedule>  dexMEDEtomidine Infusion 0.1 MICROgram(s)/kG/Hr (3.39 mL/Hr) IV Continuous <Continuous>  dextrose 5%. 1000 milliLiter(s) (50 mL/Hr) IV Continuous <Continuous>  heparin  Infusion 2400 Unit(s)/Hr (24 mL/Hr) IV Continuous <Continuous>  hydrochlorothiazide 25 milliGRAM(s) Oral daily  insulin lispro (ADMELOG) corrective regimen sliding scale   SubCutaneous every 6 hours  melatonin 5 milliGRAM(s) Oral at bedtime  metoprolol tartrate 25 milliGRAM(s) Oral two times a day  niCARdipine Infusion 5 mG/Hr (25 mL/Hr) IV Continuous <Continuous>  pantoprazole  Injectable 40 milliGRAM(s) IV Push daily  Parenteral Nutrition - Adult 1 Each (65 mL/Hr) TPN Continuous <Continuous>  Parenteral Nutrition - Adult 1 Each (65 mL/Hr) TPN Continuous <Continuous>  piperacillin/tazobactam IVPB.. 3.375 Gram(s) IV Intermittent every 8 hours  traZODone 25 milliGRAM(s) Oral at bedtime    MEDICATIONS  (PRN):  HYDROmorphone  Injectable 0.5 milliGRAM(s) IV Push every 4 hours PRN Severe Pain (7 - 10)  DVT PROPHYLAXIS: heparin  Infusion 2400 Unit(s)/Hr IV Continuous <Continuous>  GI PROPHYLAXIS: pantoprazole  Injectable 40 milliGRAM(s) IV Push daily  ANTICOAGULATION:   ANTIBIOTICS:  piperacillin/tazobactam IVPB.. 3.375 Gram(s)    LAB/STUDIES:  Labs:  CAPILLARY BLOOD GLUCOSE  POCT Blood Glucose.: 179 mg/dL (22 Apr 2025 00:02)  POCT Blood Glucose.: 224 mg/dL (21 Apr 2025 18:34)  POCT Blood Glucose.: 192 mg/dL (21 Apr 2025 10:59)  POCT Blood Glucose.: 173 mg/dL (21 Apr 2025 05:28)               8.8    19.06 )-----------( 345      ( 21 Apr 2025 05:45 )             27.9       04-21    149[H]  |  111[H]  |  77[HH]  ----------------------------<  175[H]  3.5   |  24  |  1.7[H]      Calcium: 8.5 mg/dL (04-21-25 @ 05:45)    LFTs:     Blood Gas Arterial, Lactate: 1.2 mmol/L (04-20-25 @ 21:19)    ABG - ( 20 Apr 2025 21:19 )  pH: 7.51  /  pCO2: 29    /  pO2: 137   / HCO3: 23    / Base Excess: 0.6   /  SaO2: 96.6      ABG - ( 18 Apr 2025 15:00 )  pH: 7.52  /  pCO2: 33    /  pO2: 105   / HCO3: 27    / Base Excess: 4.0   /  SaO2: 96.5      ABG - ( 18 Apr 2025 06:06 )  pH: 7.46  /  pCO2: 39    /  pO2: 179   / HCO3: 28    / Base Excess: 3.6   /  SaO2: 96.7      Coags:     10.50  ----< 0.89    ( 21 Apr 2025 21:27 )     23.2      Urinalysis Basic - ( 21 Apr 2025 05:45 )    Color: x / Appearance: x / SG: x / pH: x  Gluc: 175 mg/dL / Ketone: x  / Bili: x / Urobili: x   Blood: x / Protein: x / Nitrite: x   Leuk Esterase: x / RBC: x / WBC x   Sq Epi: x / Non Sq Epi: x / Bacteria: x

## 2025-04-22 NOTE — CHART NOTE - NSCHARTNOTEFT_GEN_A_CORE
GI NUTRITION SUPPORT TEAM  -  PROGRESS NOTE     Interval Events:        REVIEW OF SYSTEMS:  Negative except as noted above.     VITALS:  T(F): 99.3 (04-22 @ 04:00), Max: 99.3 (04-22 @ 04:00)  HR: 93 (04-22 @ 06:15) (83 - 106)  BP: 116/59 (04-22 @ 07:00) (105/54 - 174/74)  RR: 34 (04-22 @ 06:00) (28 - 37)  SpO2: 94% (04-22 @ 07:00) (87% - 99%)    HEIGHT/WEIGHT/BMI:   Height (cm): 160 (04-17)  Weight (kg): 135.6 (04-17)  BMI (kg/m2): 53 (04-17)    I/Os:     04-21-25 @ 07:01  -  04-22-25 @ 07:00  --------------------------------------------------------  IN:    Dexmedetomidine: 25.4 mL    dextrose 5%: 1200 mL    Heparin: 87 mL    Heparin: 150 mL    IV PiggyBack: 100 mL    NiCARdipine: 1390 mL    TPN (Total Parenteral Nutrition): 1680 mL  Total IN: 4632.4 mL    OUT:    Bulb (mL): 13 mL    Bulb (mL): 35 mL    Bulb (mL): 13 mL    VAC (Vacuum Assisted Closure) System (mL): 0 mL    Voided (mL): 3000 mL  Total OUT: 3061 mL    Total NET: 1571.4 mL      MEDICATIONS:   albuterol/ipratropium for Nebulization 3 milliLiter(s) Nebulizer every 6 hours  amLODIPine   Tablet 10 milliGRAM(s) Oral daily  chlorhexidine 2% Cloths 1 Application(s) Topical <User Schedule>  dexMEDEtomidine Infusion 0.1 MICROgram(s)/kG/Hr (3.39 mL/Hr) IV Continuous <Continuous>  dextrose 5%. 1000 milliLiter(s) (50 mL/Hr) IV Continuous <Continuous>  heparin  Infusion 2900 Unit(s)/Hr (29 mL/Hr) IV Continuous <Continuous>  hydrochlorothiazide 25 milliGRAM(s) Oral daily  HYDROmorphone  Injectable 0.5 milliGRAM(s) IV Push every 4 hours PRN  insulin lispro (ADMELOG) corrective regimen sliding scale   SubCutaneous every 6 hours  melatonin 5 milliGRAM(s) Oral at bedtime  metoprolol tartrate 25 milliGRAM(s) Oral two times a day  niCARdipine Infusion 5 mG/Hr (25 mL/Hr) IV Continuous <Continuous>  pantoprazole  Injectable 40 milliGRAM(s) IV Push daily  Parenteral Nutrition - Adult 1 Each (65 mL/Hr) TPN Continuous <Continuous>  piperacillin/tazobactam IVPB.. 3.375 Gram(s) IV Intermittent every 8 hours  traZODone 25 milliGRAM(s) Oral at bedtime    LABS:                         9.3    23.04 )-----------( 422      ( 22 Apr 2025 04:20 )             28.5     146  |  109  |  64[HH]  ----------------------------<  173[H]          (04-22-25 @ 04:20)  3.5   |  22  |  1.5    Ca    8.1[L]          (04-22-25 @ 04:20)  Phos  2.6         (04-22-25 @ 04:20)  Mg     2.0         (04-22-25 @ 04:20)    Triglycerides, Serum: 232 mg/dL (04-20 @ 04:59)  Vitamin D, 25-Hydroxy: 13 ng/mL (04-19 @ 05:08)  Folate: 17.3 ng/mL (04-19 @ 05:08)  Vitamin B12, Serum: 1417 pg/mL (04-19 @ 05:08)  Zinc Level, Plasma: 54 ug/dL (04-19 @ 05:08)    A1C with Estimated Average Glucose Result: 5.8   Estimated Average Glucose: 120        Blood Glucose (Past 24 hours):  186 mg/dL (04-22 @ 05:34)  179 mg/dL (04-22 @ 00:02)  224 mg/dL (04-21 @ 18:34)  192 mg/dL (04-21 @ 10:59)    DIET:   Diet, Clear Liquid:   Bariatric Clear Liquid (BARICLLIQ) (04-20-25 @ 08:13) [Active]    Parenteral Nutrition - Adult 1 Each (65 mL/Hr) TPN Continuous <Continuous>, 04-21-25 @ 20:00, 20:00, Stop order after: 1 Days      RADIOLOGY:   < from: CT Abdomen and Pelvis w/ Oral Cont and w/ IV Cont (04.21.25 @ 18:11) >    ACC: [id number] EXAM:  CT ABDOMEN AND PELVIS OC IC   ORDERED BY: JAYLEN DRAKE     ACC: [id number] EXAM:  CT ANGIO CHEST PULM ART WAWIC   ORDERED BY: JAYLEN DRAKE     PROCEDURE DATE:  04/21/2025      INTERPRETATION:  CTA chest with IV contrast, CT abdomen and pelvis with   IV contrast    CLINICAL HISTORY/REASON FOR EXAM: Post ventral hernia repair. Tachypnea.   Tachycardia..    TECHNIQUE: CTA chest with IV contrast, CT abdomen and pelvis with IV   contrast performed. Initial CTA imagesof chest obtained with 100 cc   Omnipaque 350 IV contrast administration. Subsequently, CT images of   abdomen and pelvis obtained with IV contrast administration. Oral   contrast was not administered. Reformatted images in the coronal and   sagittalplanes were acquired. 3D (MIP images) generated.    COMPARISON: CT abdomen and pelvis 4/17/2025.      FINDINGS:    CHEST:    PULMONARY ARTERIES: Images degraded by streak artifact. Evidence of a   right distal main pulmonary embolus extending to right lower lobar and   segmental pulmonary arteries; no definite right heart strain (series 4,   image [id number]).    LUNGS, PLEURA, AIRWAYS: Bilateral lower lobe consolidative opacities,   findings could represent atelectasis versus pneumonia in the appropriate   clinical setting. No lobar mass, effusion, or pneumothorax. No evidence   of central endobronchial obstruction. No bronchiectasis or honeycombing.   No suspicious pulmonary nodule.    THORACIC NODES: No mediastinal, hilar, supraclavicular, oraxillary   lymphadenopathy.    MEDIASTINUM/GREAT VESSELS: No pericardial effusion. Heart size is within   normal limits. The aorta and main pulmonary artery are of normal caliber.   Right IJ central venous catheter to SVC.    ABDOMEN/PELVIS:    HEPATOBILIARY: Unchanged.    SPLEEN: Unchanged.    PANCREAS: Unchanged.    ADRENAL GLANDS: Unchanged.    KIDNEYS: Unchanged.    ABDOMINOPELVIC NODES: Unchanged.    PELVIC ORGANS: Urinary bladder unremarkable. Uterus unremarkable.    PERITONEUM/MESENTERY/BOWEL/SOFT TISSUES: Interval ventral hernia repair ,   small bowel resection. Lower abdominal wall 9.7 x 6.2 cm fluid collection   at site of previously seen extravasated contrast, similar in size since   prior study (series 5, image 360). Decreased anterior peritoneal fluid   collection, measuring up to 6.3 x 8.3 cm (series 5, image 315). No   definite current oral contrast leak. Post multiple change catheter   placement. No pneumoperitoneum.    BONES: Degenerative change of spine.    IMPRESSION:    Evidence of a right distal main pulmonary embolus extending to right   lower lobar and segmental pulmonary arteries: Examination limited by   streak artifact; no definite right heart strain .    Interval ventral hernia repair, small bowel resection. Lower abdominal   wall 9.7 x 6.2 cm fluid collection at site of previously seen   extravasated contrast, similar in size since prior study. Decreased   anterior peritoneal fluid collection, measuring up to 6.3 x 8.3 cm. No   definite current oral contrast leak.    Bilateral lower lobe consolidative opacities, findings could represent   atelectasis versus pneumonia in the appropriate clinical setting    Speak with KAROLYN De Luna    --- End of Report ---  < end of copied text >      ASSESSMENT  64y F w/ PMHx of HTN and gastric bypass surgery in 2001 presented with abdominal distention and CT showing incarcerated ventral hernia with SBO. Taken to OR 4/10 s/p open repair of ventral hernia using mesh and component separation technique, small bowel resection and primary fascial closure. RTOR 4/17 for anastomotic leak s/p ex lap, 30 cm small bowel resection, creation of new side to side ileoileostomy, ventral hernia repair w/ mesh.     - recurrent SBO, s/p repair 4/10 then anastomotic leak s/p repair 4/17   - FUNMI   - high risk for malnutrition, prolonged NPO X 15d, TPN initiated 4/17  - class III obesity, BMI 53  - hypertriglyceridemia  - hyperglycemia   - persistent hypokalemia    Est nutrient needs: IBW 52.3kg, 2134-2811 kcals (22-25kcals/kg IBW ASPEN/CCM guidelines for BMI>50), 105-131g protein (2-2.5g/kg IBW ASPEN/CCM guidelines for BMI >40)       PLAN  - cont TPN tonight- 115g protein, 165g dextrose, 50g SMOF lipid @67ml/hr- avg 1271 kcals daily (lipids qod)  - glycemic control- 22U regular insulin added to tonight's TPN  - daily am bmp, phos, mg while on parenteral nutrition  - will follow GI NUTRITION SUPPORT TEAM  -  PROGRESS NOTE     Interval Events:    Tolerating 4L HFNC, bipap overnight. CT significant for PE and abdominal fluid collection. IR planning drainage of collection today. Remains NPO with TPN infusing via RIJ TLC. WBC's 23.04, afebrile and ID consulted. Blood cx's being drawn.     REVIEW OF SYSTEMS:  Negative except as noted above.     VITALS:  T(F): 99.3 (04-22 @ 04:00), Max: 99.3 (04-22 @ 04:00)  HR: 93 (04-22 @ 06:15) (83 - 106)  BP: 116/59 (04-22 @ 07:00) (105/54 - 174/74)  RR: 34 (04-22 @ 06:00) (28 - 37)  SpO2: 94% (04-22 @ 07:00) (87% - 99%)    HEIGHT/WEIGHT/BMI:   Height (cm): 160 (04-17)  Weight (kg): 135.6 (04-17)  BMI (kg/m2): 53 (04-17)    I/Os:     04-21-25 @ 07:01  -  04-22-25 @ 07:00  --------------------------------------------------------  IN:    Dexmedetomidine: 25.4 mL    dextrose 5%: 1200 mL    Heparin: 87 mL    Heparin: 150 mL    IV PiggyBack: 100 mL    NiCARdipine: 1390 mL    TPN (Total Parenteral Nutrition): 1680 mL  Total IN: 4632.4 mL    OUT:    Bulb (mL): 13 mL    Bulb (mL): 35 mL    Bulb (mL): 13 mL    VAC (Vacuum Assisted Closure) System (mL): 0 mL    Voided (mL): 3000 mL  Total OUT: 3061 mL    Total NET: 1571.4 mL      MEDICATIONS:   albuterol/ipratropium for Nebulization 3 milliLiter(s) Nebulizer every 6 hours  amLODIPine   Tablet 10 milliGRAM(s) Oral daily  chlorhexidine 2% Cloths 1 Application(s) Topical <User Schedule>  dexMEDEtomidine Infusion 0.1 MICROgram(s)/kG/Hr (3.39 mL/Hr) IV Continuous <Continuous>  dextrose 5%. 1000 milliLiter(s) (50 mL/Hr) IV Continuous <Continuous>  heparin  Infusion 2900 Unit(s)/Hr (29 mL/Hr) IV Continuous <Continuous>  hydrochlorothiazide 25 milliGRAM(s) Oral daily  HYDROmorphone  Injectable 0.5 milliGRAM(s) IV Push every 4 hours PRN  insulin lispro (ADMELOG) corrective regimen sliding scale   SubCutaneous every 6 hours  melatonin 5 milliGRAM(s) Oral at bedtime  metoprolol tartrate 25 milliGRAM(s) Oral two times a day  niCARdipine Infusion 5 mG/Hr (25 mL/Hr) IV Continuous <Continuous>  pantoprazole  Injectable 40 milliGRAM(s) IV Push daily  Parenteral Nutrition - Adult 1 Each (65 mL/Hr) TPN Continuous <Continuous>  piperacillin/tazobactam IVPB.. 3.375 Gram(s) IV Intermittent every 8 hours  traZODone 25 milliGRAM(s) Oral at bedtime    LABS:                         9.3    23.04 )-----------( 422      ( 22 Apr 2025 04:20 )             28.5     146  |  109  |  64[HH]  ----------------------------<  173[H]          (04-22-25 @ 04:20)  3.5   |  22  |  1.5    Ca    8.1[L]          (04-22-25 @ 04:20)  Phos  2.6         (04-22-25 @ 04:20)  Mg     2.0         (04-22-25 @ 04:20)    Triglycerides, Serum: 232 mg/dL (04-20 @ 04:59)  Vitamin D, 25-Hydroxy: 13 ng/mL (04-19 @ 05:08)  Folate: 17.3 ng/mL (04-19 @ 05:08)  Vitamin B12, Serum: 1417 pg/mL (04-19 @ 05:08)  Zinc Level, Plasma: 54 ug/dL (04-19 @ 05:08)    A1C with Estimated Average Glucose Result: 5.8   Estimated Average Glucose: 120        Blood Glucose (Past 24 hours):  186 mg/dL (04-22 @ 05:34)  179 mg/dL (04-22 @ 00:02)  224 mg/dL (04-21 @ 18:34)  192 mg/dL (04-21 @ 10:59)    DIET:   Diet, Clear Liquid:   Bariatric Clear Liquid (BARICLLIQ) (04-20-25 @ 08:13) [Active]    Parenteral Nutrition - Adult 1 Each (65 mL/Hr) TPN Continuous <Continuous>, 04-21-25 @ 20:00, 20:00, Stop order after: 1 Days      RADIOLOGY:   < from: CT Abdomen and Pelvis w/ Oral Cont and w/ IV Cont (04.21.25 @ 18:11) >    ACC: [id number] EXAM:  CT ABDOMEN AND PELVIS OC IC   ORDERED BY: JAYLEN DRAKE     ACC: [id number] EXAM:  CT ANGIO CHEST PULM ART WAWIC   ORDERED BY: JAYLEN DRAKE     PROCEDURE DATE:  04/21/2025      INTERPRETATION:  CTA chest with IV contrast, CT abdomen and pelvis with   IV contrast    CLINICAL HISTORY/REASON FOR EXAM: Post ventral hernia repair. Tachypnea.   Tachycardia..    TECHNIQUE: CTA chest with IV contrast, CT abdomen and pelvis with IV   contrast performed. Initial CTA imagesof chest obtained with 100 cc   Omnipaque 350 IV contrast administration. Subsequently, CT images of   abdomen and pelvis obtained with IV contrast administration. Oral   contrast was not administered. Reformatted images in the coronal and   sagittalplanes were acquired. 3D (MIP images) generated.    COMPARISON: CT abdomen and pelvis 4/17/2025.      FINDINGS:    CHEST:    PULMONARY ARTERIES: Images degraded by streak artifact. Evidence of a   right distal main pulmonary embolus extending to right lower lobar and   segmental pulmonary arteries; no definite right heart strain (series 4,   image [id number]).    LUNGS, PLEURA, AIRWAYS: Bilateral lower lobe consolidative opacities,   findings could represent atelectasis versus pneumonia in the appropriate   clinical setting. No lobar mass, effusion, or pneumothorax. No evidence   of central endobronchial obstruction. No bronchiectasis or honeycombing.   No suspicious pulmonary nodule.    THORACIC NODES: No mediastinal, hilar, supraclavicular, oraxillary   lymphadenopathy.    MEDIASTINUM/GREAT VESSELS: No pericardial effusion. Heart size is within   normal limits. The aorta and main pulmonary artery are of normal caliber.   Right IJ central venous catheter to SVC.    ABDOMEN/PELVIS:    HEPATOBILIARY: Unchanged.    SPLEEN: Unchanged.    PANCREAS: Unchanged.    ADRENAL GLANDS: Unchanged.    KIDNEYS: Unchanged.    ABDOMINOPELVIC NODES: Unchanged.    PELVIC ORGANS: Urinary bladder unremarkable. Uterus unremarkable.    PERITONEUM/MESENTERY/BOWEL/SOFT TISSUES: Interval ventral hernia repair ,   small bowel resection. Lower abdominal wall 9.7 x 6.2 cm fluid collection   at site of previously seen extravasated contrast, similar in size since   prior study (series 5, image 360). Decreased anterior peritoneal fluid   collection, measuring up to 6.3 x 8.3 cm (series 5, image 315). No   definite current oral contrast leak. Post multiple change catheter   placement. No pneumoperitoneum.    BONES: Degenerative change of spine.    IMPRESSION:    Evidence of a right distal main pulmonary embolus extending to right   lower lobar and segmental pulmonary arteries: Examination limited by   streak artifact; no definite right heart strain .    Interval ventral hernia repair, small bowel resection. Lower abdominal   wall 9.7 x 6.2 cm fluid collection at site of previously seen   extravasated contrast, similar in size since prior study. Decreased   anterior peritoneal fluid collection, measuring up to 6.3 x 8.3 cm. No   definite current oral contrast leak.    Bilateral lower lobe consolidative opacities, findings could represent   atelectasis versus pneumonia in the appropriate clinical setting    Speak with KAROLYN DeL una    --- End of Report ---  < end of copied text >      ASSESSMENT  64y F w/ PMHx of HTN and gastric bypass surgery in 2001 presented with abdominal distention and CT showing incarcerated ventral hernia with SBO. Taken to OR 4/10 s/p open repair of ventral hernia using mesh and component separation technique, small bowel resection and primary fascial closure. RTOR 4/17 for anastomotic leak s/p ex lap, 30 cm small bowel resection, creation of new side to side ileoileostomy, ventral hernia repair w/ mesh.     - recurrent SBO, s/p repair 4/10 then anastomotic leak s/p repair 4/17   - FUNMI   - high risk for malnutrition, prolonged NPO X 15d, TPN initiated 4/17  - class III obesity, BMI 53  - hypertriglyceridemia  - hyperglycemia   - hypokalemia  - vitamin D deficiency     Est nutrient needs: IBW 52.3kg, 4687-2314 kcals (22-25kcals/kg IBW ASPEN/CCM guidelines for BMI>50), 105-131g protein (2-2.5g/kg IBW ASPEN/CCM guidelines for BMI >40)       PLAN  - cont TPN tonight- 115g protein, 165g dextrose, 50g SMOF lipid @67ml/hr- avg 1271 kcals daily (lipids qod)  - lytes adjusted in TPN, please be cautious with K supplementation- 187.2mEq K+ in tonight's TPN   - glycemic control- 22U regular insulin added to tonight's TPN  - daily am bmp, phos, mg while on parenteral nutrition  - rec starting 50,000 IU ergocalciferol PO daily X 2 days then give once weekly. Repeat 25-oh vitamin D level 4-6 weeks after starting tx   - will follow

## 2025-04-22 NOTE — PROCEDURE NOTE - NSPROCDETAILS_GEN_ALL_CORE
positive blood return obtained via catheter/connected to a pressurized flush line/sutured in place/all materials/supplies accounted for at end of procedure

## 2025-04-22 NOTE — PROGRESS NOTE ADULT - ASSESSMENT
64y F w/ PMHx of HTN and gastric bypass surgery in 2001 presents with abdominal distention. CT with incarcerated ventral hernia with SBO. S/p Open repair of ventral hernia using mesh and component separation technique, Small bowel resection. 4/17 s/p ex lap, 30 cm sbr, creation of new side to side ileoileostomy, ventral hernia repair w/ mesh    PLAN:  #SBO 2/2 Complex Incarcerated Ventral Hernia  #S/p Open Ventral Hernia Repair with Mesh, SBR (4/10)  #RTOR for Anastomotic Leak, SBR, Creation of New Ileoileostomy, Ventral Hernia Repair (4/17)   - Pain control   - Wean BiPAP and HFNC as tolerated   - TPN via R IJ central line (brown port), plan to wean when tolerating regular diet   - Continue aubrey CLD for now   - Closely monitor THONY drain outputs and character of fluid   - Monitor bowel function   - Monitor UOP and creatinine   - IV abx: zosyn   - Monitor for fevers   - Continue full AC for new PE   - Rest of care per SICU    BLUE TEAM SPECTRA: [id number]

## 2025-04-22 NOTE — CONSULT NOTE ADULT - ASSESSMENT
ASSESSMENT AND PLAN:  63 y/o F w/ MHx HTN, morbid obesity s/p 2001 Abby-en-Y gastric bypass who presented on 4/3 with incarcerated ventral hernia with SBO. S/p 4/10 open ventral hernia repair, small bowel resection, and primary fascial closure with Dr. Lema. Admitted to SICU for Q1 abdominal checks and hemodynamic monitoring. S/p 4/17 takeback for exploratory laparotomy, repair of anastomotic leak. IR performed Abdominal muscle botox 4/11, pending PICC placement by us but noted to be intubated after surgery.    Overnight, patient had CTA performed for tachypnea and tachycardia which showed R main -> lobar pulm thrombosis/embolism, no R heart strain. Echo pending, Duplex awaiting final read.    Recommend full dose anticoagulation/conservative management at this time.    Please call Interventional Radiology with questions or concerns:   - M-F [phone]: [phone]   - All other hours: [phone]

## 2025-04-22 NOTE — PROGRESS NOTE ADULT - ATTENDING COMMENTS
Pt examined  labs and images reviewed  pt with supermorbid obesity and complex hernia with sbo  previously treated without surgery   passing flatus awaiting bowel movement but was having  more pain taken to OR urgently   4/10 s/p BHUMI , Bowel Resection / Anastamosis / Primary repair of Fascia / hernia sac  overnight - renal failure / respiratory compromise  was intubated / paralysed/ botox  now on bumex - tapering   4/17:  s/p BHUMI , Bowel Resection / Anastamosis / Primary repair of Fascia / hernia sac  4/21 extubated -  4/22 : hypertensive - on nicardipine drip  tachy cardia - episode acute even yesterday at 8 pm - still not completely resolving  Ct shows PE  will start anticoagulation    mariam drain sero sang  ng tube removed and started on sips      impression : extremely high risk - supermorbid obesity and complex hernia with partial sbo  will keep npo other than sips\  PE: continue heparin anticoagulation  SICU

## 2025-04-22 NOTE — CONSULT NOTE ADULT - SUBJECTIVE AND OBJECTIVE BOX
INTERVENTIONAL RADIOLOGY CONSULT:     HPI:  The patient is a 64-year-old female with a past medical history of high blood pressure and gastric bypass surgery in , presenting with two days of sharp abdominal pain and one episode of non-bloody, non-bilious vomiting. She has a prior history of small bowel obstruction in , , and most recently in , all of which resolved with conservative management. Her current symptoms are similar in nature. A computed tomography scan of the abdomen and pelvis with oral and intravenous contrast revealed multiple ventral abdominal wall hernias containing both obstructed and non-obstructed bowel loops. A complex small bowel obstruction is present, involving three hernias on the right side of the abdomen, with dilated, fluid-filled loops of small bowel, trace fluid between loops, and a collapsed segment of bowel beyond a hernia in the right lower quadrant. These findings are consistent with a recurrent small bowel obstruction. The hernia was non reducible at bedside. (2025 20:41)      PAST MEDICAL & SURGICAL HISTORY:  Gastric bypass status for obesity      LOUIS (obstructive sleep apnea)      S/P gastric bypass      S/P       S/P small bowel resection      History of total bilateral knee replacement (TKR)      H/O colonoscopy            MEDICATIONS  (STANDING):  albuterol/ipratropium for Nebulization 3 milliLiter(s) Nebulizer every 6 hours  amLODIPine   Tablet 10 milliGRAM(s) Oral daily  chlorhexidine 2% Cloths 1 Application(s) Topical <User Schedule>  dexMEDEtomidine Infusion 0.1 MICROgram(s)/kG/Hr (3.39 mL/Hr) IV Continuous <Continuous>  dextrose 5%. 1000 milliLiter(s) (50 mL/Hr) IV Continuous <Continuous>  heparin  Infusion 2900 Unit(s)/Hr (29 mL/Hr) IV Continuous <Continuous>  hydrochlorothiazide 25 milliGRAM(s) Oral daily  insulin lispro (ADMELOG) corrective regimen sliding scale   SubCutaneous every 6 hours  melatonin 5 milliGRAM(s) Oral at bedtime  metoprolol tartrate 25 milliGRAM(s) Oral two times a day  niCARdipine Infusion 5 mG/Hr (25 mL/Hr) IV Continuous <Continuous>  pantoprazole  Injectable 40 milliGRAM(s) IV Push daily  Parenteral Nutrition - Adult 1 Each (65 mL/Hr) TPN Continuous <Continuous>  piperacillin/tazobactam IVPB.. 3.375 Gram(s) IV Intermittent every 8 hours  traZODone 25 milliGRAM(s) Oral at bedtime    MEDICATIONS  (PRN):  HYDROmorphone  Injectable 0.5 milliGRAM(s) IV Push every 4 hours PRN Severe Pain (7 - 10)      Allergies    No Known Allergies    Intolerances          FAMILY HISTORY:      Physical Exam:   Vital Signs Last 24 Hrs  T(C): 37.4 (2025 04:00), Max: 37.4 (2025 04:00)  T(F): 99.3 (2025 04:00), Max: 99.3 (2025 04:00)  HR: 93 (2025 06:15) (83 - 116)  BP: 116/59 (2025 07:00) (105/54 - 174/74)  BP(mean): 84 (2025 07:00) (68 - 112)  RR: 34 (2025 06:00) (22 - 39)  SpO2: 94% (2025 07:00) (87% - 99%)    Parameters below as of 2025 07:00  Patient On (Oxygen Delivery Method): nasal cannula, high flow  O2 Flow (L/min): 60  O2 Concentration (%): 80      Labs:                         9.3    23.04 )-----------( 422      ( 2025 04:20 )             28.5     04-22    146  |  109  |  64[HH]  ----------------------------<  173[H]  3.5   |  22  |  1.5    Ca    8.1[L]      2025 04:20  Phos  2.6     04-22  Mg     2.0     04-22      PT/INR - ( 2025 21:27 )   PT: 10.50 sec;   INR: 0.89 ratio         PTT - ( 2025 04:20 )  PTT:38.1 sec    Pertinent labs:                      9.3    23.04 )-----------( 422      ( 2025 04:20 )             28.5       04-22    146  |  109  |  64[HH]  ----------------------------<  173[H]  3.5   |  22  |  1.5    Ca    8.1[L]      2025 04:20  Phos  2.6     -  Mg     2.0     -        PT/INR - ( 2025 21:27 )   PT: 10.50 sec;   INR: 0.89 ratio         PTT - ( 2025 04:20 )  PTT:38.1 sec    Radiology & Additional Studies:     Radiology imaging reviewed.

## 2025-04-22 NOTE — CONSULT NOTE ADULT - SUBJECTIVE AND OBJECTIVE BOX
TERESA SARGENTY  64y, Female  Allergy: No Known Allergies      CHIEF COMPLAINT: SBO 2/2 complex incarcerated hernia (2025 00:36)      LOS  19d    HPI:  The patient is a 64-year-old female with a past medical history of high blood pressure and gastric bypass surgery in , presenting with two days of sharp abdominal pain and one episode of non-bloody, non-bilious vomiting. She has a prior history of small bowel obstruction in , , and most recently in , all of which resolved with conservative management. Her current symptoms are similar in nature. A computed tomography scan of the abdomen and pelvis with oral and intravenous contrast revealed multiple ventral abdominal wall hernias containing both obstructed and non-obstructed bowel loops. A complex small bowel obstruction is present, involving three hernias on the right side of the abdomen, with dilated, fluid-filled loops of small bowel, trace fluid between loops, and a collapsed segment of bowel beyond a hernia in the right lower quadrant. These findings are consistent with a recurrent small bowel obstruction. The hernia was non reducible at bedside. (2025 20:41)      INFECTIOUS DISEASE HISTORY:  History as above.  ID Consulted for worsening leukocytosis.   She is s/p small bowell resection 4/10, followed by ex-lap  -- found to have nastomotic leap at staple line of end to side ileoleostomy -- [id number] with creation of new ileoielostomy   Also repair of ventral hernia  CT Abd/Pelvis  with fluid collection.     PAST MEDICAL & SURGICAL HISTORY:  Gastric bypass status for obesity      LOUIS (obstructive sleep apnea)      S/P gastric bypass      S/P       S/P small bowel resection      History of total bilateral knee replacement (TKR)      H/O colonoscopy  2016          FAMILY HISTORY      SOCIAL HISTORY  Social History:        ROS  General: Denies rigors, nightsweats  HEENT: Denies headache, rhinorrhea, sore throat, eye pain  CV: Denies CP, palpitations  PULM: Denies wheezing, hemoptysis  GI: Denies hematemesis, hematochezia, melena  : Denies discharge, hematuria  MSK: Denies arthralgias, myalgias  SKIN: Denies rash, lesions  NEURO: Denies paresthesias, weakness  PSYCH: Denies depression, anxiety    VITALS:  T(F): 99.3, Max: 99.3 (25 @ 04:00)  HR: 94  BP: 116/57  RR: 34Vital Signs Last 24 Hrs  T(C): 37.4 (2025 04:00), Max: 37.4 (2025 04:00)  T(F): 99.3 (2025 04:00), Max: 99.3 (2025 04:00)  HR: 94 (2025 06:00) (83 - 116)  BP: 116/57 (2025 06:00) (105/54 - 174/74)  BP(mean): 77 (2025 06:00) (68 - 112)  RR: 34 (2025 06:00) (22 - 39)  SpO2: 92% (2025 06:00) (87% - 99%)    Parameters below as of 2025 06:00  Patient On (Oxygen Delivery Method): nasal cannula, high flow  O2 Flow (L/min): 60  O2 Concentration (%): 80    PHYSICAL EXAM:  Gen: NAD, resting in bed  HEENT: Normocephalic, atraumatic  Neck: supple, no lymphadenopathy  CV: Regular rate & regular rhythm  Lungs: decreased BS at bases, no fremitus  Abdomen: Soft, BS present  Ext: Warm, well perfused  Neuro: non focal, awake  Skin: no rash, no erythema  Lines: no phlebitis    TESTS & MEASUREMENTS:                        9.3    23.04 )-----------( 422      ( 2025 04:20 )             28.5     04-22    146  |  109  |  64[HH]  ----------------------------<  173[H]  3.5   |  22  |  1.5    Ca    8.1[L]      2025 04:20  Phos  2.6     04-22  Mg     2.0     04-22          Urinalysis Basic - ( 2025 04:20 )    Color: x / Appearance: x / SG: x / pH: x  Gluc: 173 mg/dL / Ketone: x  / Bili: x / Urobili: x   Blood: x / Protein: x / Nitrite: x   Leuk Esterase: x / RBC: x / WBC x   Sq Epi: x / Non Sq Epi: x / Bacteria: x        Culture - Body Fluid with Gram Stain (collected 25 @ 15:20)  Source: Body Fluid None  Gram Stain (25 @ 12:15):    No polymorphonuclear leukocytes seen    No organisms seen  Final Report (25 @ 13:36):    Culture in progress    Rare Escherichia coli    Rare Corynebacterium tuberculostearicum "Susceptibilities not performed"  Organism: Escherichia coli (25 @ 13:35)  Organism: Escherichia coli (25 @ 13:35)      -  Levofloxacin: S <=0.5      -  Tobramycin: S <=2      -  Aztreonam: S <=4      -  Gentamicin: S <=2      -  Cefepime: S <=2      -  Cefazolin: S <=2      -  Piperacillin/Tazobactam: S <=8      -  Ciprofloxacin: S <=0.25      -  Imipenem: S <=1      -  Ceftriaxone: S <=1      -  Ampicillin: S <=8 These ampicillin results predict results for amoxicillin      Method Type: LOUISE      -  Meropenem: S <=1      -  Ampicillin/Sulbactam: S <=4/2      -  Cefoxitin: S <=8      -  Amoxicillin/Clavulanic Acid: S <=8/4      -  Trimethoprim/Sulfamethoxazole: S <=0.5/9.5      -  Ertapenem: S <=0.5    Culture - Sputum (collected 25 @ 20:15)  Source: Trach Asp Tracheal Aspirate  Gram Stain (04-15-25 @ 11:15):    Rare polymorphonuclear leukocytes per low power field    No Squamous epithelial cells per low power field    No organisms seen per oil power field  Final Report (25 @ 21:32):    No growth    Culture - Blood (collected 25 @ 07:01)  Source: Blood Blood-Peripheral  Final Report (25 @ 12:01):    No growth at 5 days    Culture - Acid Fast - Other w/Smear (collected 04-10-25 @ 18:00)  Source: Other Peritoneal Fluid #2  Preliminary Report (25 @ 23:06):    No acid-fast bacilli isolated at 1 week. ****Acid-fast cultures are held    for 6 weeks.****    Culture - Fungal, Other (collected 04-10-25 @ 18:00)  Source: Other Peritoneal Fluid #2  Preliminary Report (25 @ 23:02):    No fungus isolated at 1 week.    Culture - Wound Aerobic/Anaerobic (collected 04-10-25 @ 10:30)  Source: Surgical Swab Peritoneal Fluid #1  Final Report (25 @ 14:15):    Rare Bacteroides fragilis "Susceptibilities not performed"    Rare Clostridium clostridioforme "Susceptibilities not performed"    Culture - Acid Fast - Other w/Smear (collected 04-10-25 @ 10:30)  Source: Other Peritoneal Fluid #1  Preliminary Report (25 @ 23:06):    No acid-fast bacilli isolated at 1 week. ****Acid-fast cultures are held    for 6 weeks.****    Culture - Fungal, Other (collected 04-10-25 @ 10:30)  Source: Other Peritoneal Fluid #1  Preliminary Report (25 @ 23:02):    No fungus isolated at 1 week.    Culture - Wound Aerobic/Anaerobic (collected 04-10-25 @ 10:30)  Source: Surgical Swab Peritoneal Fluid #1  Final Report (25 @ 13:48):    Few Escherichia coli    Few Bacteroides fragilis "Susceptibilities not performed"  Organism: Escherichia coli (25 @ 13:48)  Organism: Escherichia coli (25 @ 13:48)      -  Levofloxacin: S <=0.5      -  Tobramycin: S <=2      -  Aztreonam: S <=4      -  Gentamicin: S <=2      -  Cefazolin: S <=2      -  Cefepime: S <=2      -  Piperacillin/Tazobactam: S <=8      -  Ciprofloxacin: S <=0.25      -  Imipenem: S <=1      -  Ceftriaxone: S <=1      -  Ampicillin: S <=8 These ampicillin results predict results for amoxicillin      Method Type: LOUISE      -  Meropenem: S <=1      -  Ampicillin/Sulbactam: S <=4/2      -  Cefoxitin: S <=8      -  Amoxicillin/Clavulanic Acid: S <=8/4      -  Trimethoprim/Sulfamethoxazole: S <=0.5/9.5      -  Ertapenem: S <=0.5    Urinalysis with Rflx Culture (collected 25 @ 20:06)    Culture - Urine (collected 25 @ 20:06)  Source: Clean Catch None  Final Report (25 @ 14:03):    50,000 - 99,000 CFU/mL Escherichia coli    <10,000 CFU/ml Normal Urogenital andra present  Organism: Escherichia coli (25 @ 14:03)  Organism: Escherichia coli (25 @ 14:03)      -  Levofloxacin: S <=0.5      -  Tobramycin: S <=2      -  Nitrofurantoin: S <=32 Should not be used to treat pyelonephritis      -  Aztreonam: S <=4      -  Gentamicin: S <=2      -  Cefazolin: S <=2 For uncomplicated UTI with K. pneumoniae, E. coli, or P. mirablis: LOUISE <=16 is sensitive and LOUISE >=32 is resistant. This also predicts results for oral agents cefaclor, cefdinir, cefpodoxime, cefprozil, cefuroxime axetil, cephalexin and locarbef for uncomplicated UTI. Note that some isolates may be susceptible to these agents while testing resistant to cefazolin.      -  Cefepime: S <=2      -  Piperacillin/Tazobactam: S <=8      -  Ciprofloxacin: S <=0.25      -  Imipenem: S <=1      -  Ceftriaxone: S <=1      -  Ampicillin: S <=8 These ampicillin results predict results for amoxicillin      Method Type: LOUISE      -  Meropenem: S <=1      -  Ampicillin/Sulbactam: S <=4/2      -  Cefoxitin: S <=8      -  Cefuroxime: S <=4      -  Amoxicillin/Clavulanic Acid: S <=8/4      -  Trimethoprim/Sulfamethoxazole: S <=0.5/9.5      -  Ertapenem: S <=0.5            INFECTIOUS DISEASES TESTING  MRSA PCR Result.: Negative (25 @ 00:40)      RADIOLOGY & ADDITIONAL TESTS:  I have personally reviewed the last Chest xray  CXR  Xray Chest 1 View- PORTABLE-Urgent:   ACC: [id number] EXAM:  XR CHEST PORTABLE URGENT 1V   ORDERED BY: ARIA ROMAN     PROCEDURE DATE:  2025          INTERPRETATION:  Clinical History / Reason for exam: SICU    Comparison : Chest radiograph: 2025    Technique/Positioning: Frontal view of the chest    Findings/  impression:    Support devices: . There is a right internal jugular central venous   catheter terminating at the distal SVC.    Cardiac/mediastinum/hilum: No significant change    Skeleton/soft tissues: No significant change.    Lung parenchyma/Pleura: Low lung volume. Stable bibasilar opacities. No   definite pneumothorax.    --- End of Report ---            ASAD GUEVARA MD; Attending Radiologist  This document has been electronically signed. 2025 10:42PM (25 @ 22:11)      CT  CT Angio Chest PE Protocol w/ IV Cont:   ACC: [id number] EXAM:  CT ABDOMEN AND PELVIS OC IC   ORDERED BY: JAYLEN DRAKE     ACC: [id number] EXAM:  CT ANGIO CHEST PULM ART WAWIC   ORDERED BY: JAYLEN DRAKE     PROCEDURE DATE:  2025          INTERPRETATION:  CTA chest with IV contrast, CT abdomen and pelvis with   IV contrast    CLINICAL HISTORY/REASON FOR EXAM: Post ventral hernia repair. Tachypnea.   Tachycardia..    TECHNIQUE: CTA chest with IV contrast, CT abdomen and pelvis with IV   contrast performed. Initial CTA imagesof chest obtained with 100 cc   Omnipaque 350 IV contrast administration. Subsequently, CT images of   abdomen and pelvis obtained with IV contrast administration. Oral   contrast was not administered. Reformatted images in the coronal and   sagittalplanes were acquired. 3D (MIP images) generated.    COMPARISON: CT abdomen and pelvis 2025.      FINDINGS:    CHEST:      PULMONARY ARTERIES: Images degraded by streak artifact. Evidence of a   right distal main pulmonary embolus extending to right lower lobar and   segmental pulmonary arteries; no definite right heart strain (series 4,   image [id number]).    LUNGS, PLEURA, AIRWAYS: Bilateral lower lobe consolidative opacities,   findings could represent atelectasis versus pneumonia in the appropriate   clinical setting. No lobar mass, effusion, or pneumothorax. No evidence   of central endobronchial obstruction. No bronchiectasis or honeycombing.   No suspicious pulmonary nodule.    THORACIC NODES: No mediastinal, hilar, supraclavicular, oraxillary   lymphadenopathy.    MEDIASTINUM/GREAT VESSELS: No pericardial effusion. Heart size is within   normal limits. The aorta and main pulmonary artery are of normal caliber.   Right IJ central venous catheter to SVC.    ABDOMEN/PELVIS:    HEPATOBILIARY: Unchanged.    SPLEEN: Unchanged.    PANCREAS: Unchanged.    ADRENAL GLANDS: Unchanged.    KIDNEYS: Unchanged.    ABDOMINOPELVIC NODES: Unchanged.    PELVIC ORGANS: Urinary bladder unremarkable. Uterus unremarkable.    PERITONEUM/MESENTERY/BOWEL/SOFT TISSUES: Interval ventral hernia repair ,   small bowel resection. Lower abdominal wall 9.7 x 6.2 cm fluid collection   at site of previously seen extravasated contrast, similar in size since   prior study (series 5, image 360). Decreased anterior peritoneal fluid   collection, measuring up to 6.3 x 8.3 cm (series 5, image 315). No   definite current oral contrast leak. Post multiple change catheter   placement. No pneumoperitoneum.    BONES: Degenerative change of spine.      IMPRESSION:    Evidence of a right distal main pulmonary embolus extending to right   lower lobar and segmental pulmonary arteries: Examination limited by   streak artifact; no definite right heart strain .    Interval ventral hernia repair, small bowel resection. Lower abdominal   wall 9.7 x 6.2 cm fluid collection at site of previously seen   extravasated contrast, similar in size since prior study. Decreased   anterior peritoneal fluid collection, measuring up to 6.3 x 8.3 cm. No   definite current oral contrast leak.    Bilateral lower lobe consolidative opacities, findings could represent   atelectasis versus pneumonia in the appropriate clinical setting    Speak with KAROLYN De Luna    --- End of Report ---            NIXON SALDAÑA MD; Attending Radiologist  This document has been electronically signed. 2025  7:59PM (25 @ 18:11)      CARDIOLOGY TESTING  12 Lead ECG:   Ventricular Rate 96 BPM    Atrial Rate 96 BPM    P-R Interval 130 ms    QRS Duration 104 ms    Q-T Interval 364 ms    QTC Calculation(Bazett) 459 ms    P Axis 41 degrees    R Axis 25 degrees    T Axis 26 degrees    Diagnosis Line Normal sinus rhythm  Normal ECG    Confirmed by JUSTINO GILES MD ([id number]) on 2025 7:13:52 AM (04-10-25 @ 17:33)      MEDICATIONS  albuterol/ipratropium for Nebulization 3 Nebulizer every 6 hours  amLODIPine   Tablet 10 Oral daily  chlorhexidine 2% Cloths 1 Topical <User Schedule>  dexMEDEtomidine Infusion 0.1 IV Continuous <Continuous>  dextrose 5%. 1000 IV Continuous <Continuous>  heparin  Infusion 2900 IV Continuous <Continuous>  hydrochlorothiazide 25 Oral daily  insulin lispro (ADMELOG) corrective regimen sliding scale  SubCutaneous every 6 hours  melatonin 5 Oral at bedtime  metoprolol tartrate 25 Oral two times a day  niCARdipine Infusion 5 IV Continuous <Continuous>  pantoprazole  Injectable 40 IV Push daily  Parenteral Nutrition - Adult 1 TPN Continuous <Continuous>  piperacillin/tazobactam IVPB.. 3.375 IV Intermittent every 8 hours  traZODone 25 Oral at bedtime      Weight  Weight (kg): 135.6 (25 @ 11:26)    ANTIBIOTICS:  piperacillin/tazobactam IVPB.. 3.375 Gram(s) IV Intermittent every 8 hours      ALLERGIES:  No Known Allergies           ROSETTA NANCY  64y, Female  Allergy: No Known Allergies      CHIEF COMPLAINT: SBO 2/2 complex incarcerated hernia (2025 00:36)      LOS  19d    HPI:  The patient is a 64-year-old female with a past medical history of high blood pressure and gastric bypass surgery in , presenting with two days of sharp abdominal pain and one episode of non-bloody, non-bilious vomiting. She has a prior history of small bowel obstruction in , , and most recently in , all of which resolved with conservative management. Her current symptoms are similar in nature. A computed tomography scan of the abdomen and pelvis with oral and intravenous contrast revealed multiple ventral abdominal wall hernias containing both obstructed and non-obstructed bowel loops. A complex small bowel obstruction is present, involving three hernias on the right side of the abdomen, with dilated, fluid-filled loops of small bowel, trace fluid between loops, and a collapsed segment of bowel beyond a hernia in the right lower quadrant. These findings are consistent with a recurrent small bowel obstruction. The hernia was non reducible at bedside. (2025 20:41)      INFECTIOUS DISEASE HISTORY:  History as above.  ID Consulted for worsening leukocytosis.   She is s/p small bowell resection 4/10, followed by ex-lap  -- found to have nastomotic leap at staple line of end to side ileoleostomy -- [id number] with creation of new ileoielostomy   Also repair of ventral hernia  CT Abd/Pelvis  with fluid collection.   On HFNC -- has been on mix of bipap/HFNC since recent intubations.   Deneis any abdominal pain, nausea.   Denies any chills.   No fevers.     PAST MEDICAL & SURGICAL HISTORY:  Gastric bypass status for obesity      LOUIS (obstructive sleep apnea)      S/P gastric bypass      S/P       S/P small bowel resection      History of total bilateral knee replacement (TKR)      H/O colonoscopy  2016          FAMILY HISTORY      SOCIAL HISTORY  Social History:        ROS  General: Denies rigors, nightsweats  HEENT: Denies headache, rhinorrhea, sore throat, eye pain  CV: Denies CP, palpitations  PULM: Denies wheezing, hemoptysis  GI: Denies hematemesis, hematochezia, melena  : Denies discharge, hematuria  MSK: Denies arthralgias, myalgias  SKIN: Denies rash, lesions  NEURO: Denies paresthesias, weakness  PSYCH: Denies depression, anxiety    VITALS:  T(F): 99.3, Max: 99.3 (25 @ 04:00)  HR: 94  BP: 116/57  RR: 34Vital Signs Last 24 Hrs  T(C): 37.4 (2025 04:00), Max: 37.4 (2025 04:00)  T(F): 99.3 (2025 04:00), Max: 99.3 (2025 04:00)  HR: 94 (2025 06:00) (83 - 116)  BP: 116/57 (2025 06:00) (105/54 - 174/74)  BP(mean): 77 (2025 06:00) (68 - 112)  RR: 34 (2025 06:00) (22 - 39)  SpO2: 92% (2025 06:00) (87% - 99%)    Parameters below as of 2025 06:00  Patient On (Oxygen Delivery Method): nasal cannula, high flow  O2 Flow (L/min): 60  O2 Concentration (%): 80    PHYSICAL EXAM:  Gen: NAD, resting in bed  HEENT: Normocephalic, atraumatic  Neck: supple, no lymphadenopathy  CV: Regular rate & regular rhythm  Lungs: decreased BS at bases, no fremitus  Abdomen: Soft, BS present; surgical site with vac -- 3 drains in place - no puurlent drainage  Ext: Warm, well perfused  Neuro: non focal, awake  Skin: no rash, no erythema  Lines: no phlebitis    TESTS & MEASUREMENTS:                        9.3    23.04 )-----------( 422      ( 2025 04:20 )             28.5     04-22    146  |  109  |  64[HH]  ----------------------------<  173[H]  3.5   |  22  |  1.5    Ca    8.1[L]      2025 04:20  Phos  2.6     04-22  Mg     2.0     04-22          Urinalysis Basic - ( 2025 04:20 )    Color: x / Appearance: x / SG: x / pH: x  Gluc: 173 mg/dL / Ketone: x  / Bili: x / Urobili: x   Blood: x / Protein: x / Nitrite: x   Leuk Esterase: x / RBC: x / WBC x   Sq Epi: x / Non Sq Epi: x / Bacteria: x        Culture - Body Fluid with Gram Stain (collected 25 @ 15:20)  Source: Body Fluid None  Gram Stain (25 @ 12:15):    No polymorphonuclear leukocytes seen    No organisms seen  Final Report (25 @ 13:36):    Culture in progress    Rare Escherichia coli    Rare Corynebacterium tuberculostearicum "Susceptibilities not performed"  Organism: Escherichia coli (25 @ 13:35)  Organism: Escherichia coli (25 @ 13:35)      -  Levofloxacin: S <=0.5      -  Tobramycin: S <=2      -  Aztreonam: S <=4      -  Gentamicin: S <=2      -  Cefepime: S <=2      -  Cefazolin: S <=2      -  Piperacillin/Tazobactam: S <=8      -  Ciprofloxacin: S <=0.25      -  Imipenem: S <=1      -  Ceftriaxone: S <=1      -  Ampicillin: S <=8 These ampicillin results predict results for amoxicillin      Method Type: LOUISE      -  Meropenem: S <=1      -  Ampicillin/Sulbactam: S <=4/2      -  Cefoxitin: S <=8      -  Amoxicillin/Clavulanic Acid: S <=8/4      -  Trimethoprim/Sulfamethoxazole: S <=0.5/9.5      -  Ertapenem: S <=0.5    Culture - Sputum (collected 25 @ 20:15)  Source: Trach Asp Tracheal Aspirate  Gram Stain (04-15-25 @ 11:15):    Rare polymorphonuclear leukocytes per low power field    No Squamous epithelial cells per low power field    No organisms seen per oil power field  Final Report (25 @ 21:32):    No growth    Culture - Blood (collected 25 @ 07:01)  Source: Blood Blood-Peripheral  Final Report (25 @ 12:01):    No growth at 5 days    Culture - Acid Fast - Other w/Smear (collected 04-10-25 @ 18:00)  Source: Other Peritoneal Fluid #2  Preliminary Report (25 @ 23:06):    No acid-fast bacilli isolated at 1 week. ****Acid-fast cultures are held    for 6 weeks.****    Culture - Fungal, Other (collected 04-10-25 @ 18:00)  Source: Other Peritoneal Fluid #2  Preliminary Report (25 @ 23:02):    No fungus isolated at 1 week.    Culture - Wound Aerobic/Anaerobic (collected 04-10-25 @ 10:30)  Source: Surgical Swab Peritoneal Fluid #1  Final Report (25 @ 14:15):    Rare Bacteroides fragilis "Susceptibilities not performed"    Rare Clostridium clostridioforme "Susceptibilities not performed"    Culture - Acid Fast - Other w/Smear (collected 04-10-25 @ 10:30)  Source: Other Peritoneal Fluid #1  Preliminary Report (25 @ 23:06):    No acid-fast bacilli isolated at 1 week. ****Acid-fast cultures are held    for 6 weeks.****    Culture - Fungal, Other (collected 04-10-25 @ 10:30)  Source: Other Peritoneal Fluid #1  Preliminary Report (25 @ 23:02):    No fungus isolated at 1 week.    Culture - Wound Aerobic/Anaerobic (collected 04-10-25 @ 10:30)  Source: Surgical Swab Peritoneal Fluid #1  Final Report (25 @ 13:48):    Few Escherichia coli    Few Bacteroides fragilis "Susceptibilities not performed"  Organism: Escherichia coli (25 @ 13:48)  Organism: Escherichia coli (25 @ 13:48)      -  Levofloxacin: S <=0.5      -  Tobramycin: S <=2      -  Aztreonam: S <=4      -  Gentamicin: S <=2      -  Cefazolin: S <=2      -  Cefepime: S <=2      -  Piperacillin/Tazobactam: S <=8      -  Ciprofloxacin: S <=0.25      -  Imipenem: S <=1      -  Ceftriaxone: S <=1      -  Ampicillin: S <=8 These ampicillin results predict results for amoxicillin      Method Type: LOUISE      -  Meropenem: S <=1      -  Ampicillin/Sulbactam: S <=4/2      -  Cefoxitin: S <=8      -  Amoxicillin/Clavulanic Acid: S <=8/4      -  Trimethoprim/Sulfamethoxazole: S <=0.5/9.5      -  Ertapenem: S <=0.5    Urinalysis with Rflx Culture (collected 25 @ 20:06)    Culture - Urine (collected 25 @ 20:06)  Source: Clean Catch None  Final Report (25 @ 14:03):    50,000 - 99,000 CFU/mL Escherichia coli    <10,000 CFU/ml Normal Urogenital andra present  Organism: Escherichia coli (25 @ 14:03)  Organism: Escherichia coli (25 @ 14:03)      -  Levofloxacin: S <=0.5      -  Tobramycin: S <=2      -  Nitrofurantoin: S <=32 Should not be used to treat pyelonephritis      -  Aztreonam: S <=4      -  Gentamicin: S <=2      -  Cefazolin: S <=2 For uncomplicated UTI with K. pneumoniae, E. coli, or P. mirablis: LOUISE <=16 is sensitive and LOUISE >=32 is resistant. This also predicts results for oral agents cefaclor, cefdinir, cefpodoxime, cefprozil, cefuroxime axetil, cephalexin and locarbef for uncomplicated UTI. Note that some isolates may be susceptible to these agents while testing resistant to cefazolin.      -  Cefepime: S <=2      -  Piperacillin/Tazobactam: S <=8      -  Ciprofloxacin: S <=0.25      -  Imipenem: S <=1      -  Ceftriaxone: S <=1      -  Ampicillin: S <=8 These ampicillin results predict results for amoxicillin      Method Type: LOUISE      -  Meropenem: S <=1      -  Ampicillin/Sulbactam: S <=4/2      -  Cefoxitin: S <=8      -  Cefuroxime: S <=4      -  Amoxicillin/Clavulanic Acid: S <=8/4      -  Trimethoprim/Sulfamethoxazole: S <=0.5/9.5      -  Ertapenem: S <=0.5            INFECTIOUS DISEASES TESTING  MRSA PCR Result.: Negative (25 @ 00:40)      RADIOLOGY & ADDITIONAL TESTS:  I have personally reviewed the last Chest xray  CXR  Xray Chest 1 View- PORTABLE-Urgent:   ACC: [id number] EXAM:  XR CHEST PORTABLE URGENT 1V   ORDERED BY: ARIA ROMAN     PROCEDURE DATE:  2025          INTERPRETATION:  Clinical History / Reason for exam: SICU    Comparison : Chest radiograph: 2025    Technique/Positioning: Frontal view of the chest    Findings/  impression:    Support devices: . There is a right internal jugular central venous   catheter terminating at the distal SVC.    Cardiac/mediastinum/hilum: No significant change    Skeleton/soft tissues: No significant change.    Lung parenchyma/Pleura: Low lung volume. Stable bibasilar opacities. No   definite pneumothorax.    --- End of Report ---            ASAD GUEVARA MD; Attending Radiologist  This document has been electronically signed. 2025 10:42PM (25 @ 22:11)      CT  CT Angio Chest PE Protocol w/ IV Cont:   ACC: [id number] EXAM:  CT ABDOMEN AND PELVIS OC IC   ORDERED BY: JAYLEN DRAKE     ACC: [id number] EXAM:  CT ANGIO CHEST PULM ART WAWIC   ORDERED BY: JAYLEN DRAKE     PROCEDURE DATE:  2025          INTERPRETATION:  CTA chest with IV contrast, CT abdomen and pelvis with   IV contrast    CLINICAL HISTORY/REASON FOR EXAM: Post ventral hernia repair. Tachypnea.   Tachycardia..    TECHNIQUE: CTA chest with IV contrast, CT abdomen and pelvis with IV   contrast performed. Initial CTA imagesof chest obtained with 100 cc   Omnipaque 350 IV contrast administration. Subsequently, CT images of   abdomen and pelvis obtained with IV contrast administration. Oral   contrast was not administered. Reformatted images in the coronal and   sagittalplanes were acquired. 3D (MIP images) generated.    COMPARISON: CT abdomen and pelvis 2025.      FINDINGS:    CHEST:      PULMONARY ARTERIES: Images degraded by streak artifact. Evidence of a   right distal main pulmonary embolus extending to right lower lobar and   segmental pulmonary arteries; no definite right heart strain (series 4,   image [id number]).    LUNGS, PLEURA, AIRWAYS: Bilateral lower lobe consolidative opacities,   findings could represent atelectasis versus pneumonia in the appropriate   clinical setting. No lobar mass, effusion, or pneumothorax. No evidence   of central endobronchial obstruction. No bronchiectasis or honeycombing.   No suspicious pulmonary nodule.    THORACIC NODES: No mediastinal, hilar, supraclavicular, oraxillary   lymphadenopathy.    MEDIASTINUM/GREAT VESSELS: No pericardial effusion. Heart size is within   normal limits. The aorta and main pulmonary artery are of normal caliber.   Right IJ central venous catheter to SVC.    ABDOMEN/PELVIS:    HEPATOBILIARY: Unchanged.    SPLEEN: Unchanged.    PANCREAS: Unchanged.    ADRENAL GLANDS: Unchanged.    KIDNEYS: Unchanged.    ABDOMINOPELVIC NODES: Unchanged.    PELVIC ORGANS: Urinary bladder unremarkable. Uterus unremarkable.    PERITONEUM/MESENTERY/BOWEL/SOFT TISSUES: Interval ventral hernia repair ,   small bowel resection. Lower abdominal wall 9.7 x 6.2 cm fluid collection   at site of previously seen extravasated contrast, similar in size since   prior study (series 5, image 360). Decreased anterior peritoneal fluid   collection, measuring up to 6.3 x 8.3 cm (series 5, image 315). No   definite current oral contrast leak. Post multiple change catheter   placement. No pneumoperitoneum.    BONES: Degenerative change of spine.      IMPRESSION:    Evidence of a right distal main pulmonary embolus extending to right   lower lobar and segmental pulmonary arteries: Examination limited by   streak artifact; no definite right heart strain .    Interval ventral hernia repair, small bowel resection. Lower abdominal   wall 9.7 x 6.2 cm fluid collection at site of previously seen   extravasated contrast, similar in size since prior study. Decreased   anterior peritoneal fluid collection, measuring up to 6.3 x 8.3 cm. No   definite current oral contrast leak.    Bilateral lower lobe consolidative opacities, findings could represent   atelectasis versus pneumonia in the appropriate clinical setting    Speak with KAROLYN De Luna    --- End of Report ---            NIXON SALDAÑA MD; Attending Radiologist  This document has been electronically signed. 2025  7:59PM (25 @ 18:11)      CARDIOLOGY TESTING  12 Lead ECG:   Ventricular Rate 96 BPM    Atrial Rate 96 BPM    P-R Interval 130 ms    QRS Duration 104 ms    Q-T Interval 364 ms    QTC Calculation(Bazett) 459 ms    P Axis 41 degrees    R Axis 25 degrees    T Axis 26 degrees    Diagnosis Line Normal sinus rhythm  Normal ECG    Confirmed by JUSTINO GILES MD ([id number]) on 2025 7:13:52 AM (04-10-25 @ 17:33)      MEDICATIONS  albuterol/ipratropium for Nebulization 3 Nebulizer every 6 hours  amLODIPine   Tablet 10 Oral daily  chlorhexidine 2% Cloths 1 Topical <User Schedule>  dexMEDEtomidine Infusion 0.1 IV Continuous <Continuous>  dextrose 5%. 1000 IV Continuous <Continuous>  heparin  Infusion 2900 IV Continuous <Continuous>  hydrochlorothiazide 25 Oral daily  insulin lispro (ADMELOG) corrective regimen sliding scale  SubCutaneous every 6 hours  melatonin 5 Oral at bedtime  metoprolol tartrate 25 Oral two times a day  niCARdipine Infusion 5 IV Continuous <Continuous>  pantoprazole  Injectable 40 IV Push daily  Parenteral Nutrition - Adult 1 TPN Continuous <Continuous>  piperacillin/tazobactam IVPB.. 3.375 IV Intermittent every 8 hours  traZODone 25 Oral at bedtime      Weight  Weight (kg): 135.6 (25 @ 11:26)    ANTIBIOTICS:  piperacillin/tazobactam IVPB.. 3.375 Gram(s) IV Intermittent every 8 hours      ALLERGIES:  No Known Allergies

## 2025-04-22 NOTE — PROGRESS NOTE ADULT - SUBJECTIVE AND OBJECTIVE BOX
NANCY SARGENT   [id number]   [birth year]  64yF  ============================================================   DATE OF INITIAL SICU/SDU CONSULT: 04-10-25    INDICATION FOR SICU CONSULT:  q1hr abdominal checks, mechanical ventilation     SICU COURSE EVENTS :  04-10 - admitted to SICU service  4/11: Intubated and started on paralytic. Arterial line placed, subclavian TLC placed. Nephrology consulted for oliguria. IR abdominal muscle botox injection performed. TTE performed.   4/12: Additional fluid boluses given; trial fo bumex started, considering CVVH. Weaned off nimbex gtt.  > 220 /hr. Renal U/S w/out hydro.   4/13: Weaned off Levophed. Bumex gtt 2mg/hr > 1mg/hr. Goal net negative 1-1.5L, UOP approx, 200c/hr.   4/14: Remained on bumex gtt for further diuresis, decreased to 0.5 overnight, vent settings weaned. Cr downtrending, LFTs worsening.   4/15: Extubated to BiPAP, transitioned to NC. Dc'd bumex gtt, given 5mg metolazone x 1 and 2mg bumex x 1.   4/16: THONY drain #2 murky/bilious, pending CTAP with PO contrast, hypernatremia, started D5W @ 75cc/hr, persistent hypokalemia   4/17: RTOR for resection of anastomosis for anastomotic leak. Returned intubated, 400/24/80/10, sinus tachy to . Abdomen soft. Was initially on propofol @ 30 and precedex, but propofol weaned off due to hypotension with MAPs in 50s, fentanyl ggt started for acute pain. Remained hypotensive despite fluids, started on Levophed, on 0.05.   4/18: Extubated. HFNC 40L/49%, Levo off since 11 AM   4/19: NGT removed. Started on duonebs. D5W increased to 60cc/hr. 1mg bumex, > 1.5L response  4/20: Episode of afib RVR resolved with metoprolol 5mg IVP, cardiology c/s placed   4/21: PE on CTA, therapeutic heparin gtt started      24HOUR EVENTS  4/21  NIGHT  -distal R main -> lobar pulm thrombosis/embolism, no R heart strain; starting heparin gtt, echo pending, bedside echo, DVT sono ordered   -CT abd/pelvis without extravasation, decreased fluid collections  -nicardipine at 8.5mcg/kg/hr  -HFNC at 60L and 80% fio2, pt will sleep on bipap tn  -3k riders     DAY  - F/u Dr. Lema re: rising WBC: CTAP PO and IV contrast (aubrey protocol), CT PE protocol chest> F/U READ  - Blood clx  - ID consult pending > OR cultures with Ecoli and Corynebacterium tuberculostearium   - Add meropenem   - F/u cardiology consult > Dr. Cam > start lopressor 25bid, repeat TTE, EKG  - F/u formal CT read  - IR consult for drainage       [X] A ten-point review of systems was negative except as expressed in note.  [X ] History was obtained from patient. If unable to participate in their care, history was from review of the chart and collateral sources of information.  ============================================================   ============================================================  NANCY SARGENT   [id number]   [birth year]  64yF  ============================================================   DATE OF INITIAL SICU/SDU CONSULT: 04-10-25    INDICATION FOR SICU CONSULT:  q1hr abdominal checks, mechanical ventilation     SICU COURSE EVENTS :  04-10 - admitted to SICU service  4/11: Intubated and started on paralytic. Arterial line placed, subclavian TLC placed. Nephrology consulted for oliguria. IR abdominal muscle botox injection performed. TTE performed.   4/12: Additional fluid boluses given; trial fo bumex started, considering CVVH. Weaned off nimbex gtt.  > 220 /hr. Renal U/S w/out hydro.   4/13: Weaned off Levophed. Bumex gtt 2mg/hr > 1mg/hr. Goal net negative 1-1.5L, UOP approx, 200c/hr.   4/14: Remained on bumex gtt for further diuresis, decreased to 0.5 overnight, vent settings weaned. Cr downtrending, LFTs worsening.   4/15: Extubated to BiPAP, transitioned to NC. Dc'd bumex gtt, given 5mg metolazone x 1 and 2mg bumex x 1.   4/16: THONY drain #2 murky/bilious, pending CTAP with PO contrast, hypernatremia, started D5W @ 75cc/hr, persistent hypokalemia   4/17: RTOR for resection of anastomosis for anastomotic leak. Returned intubated, 400/24/80/10, sinus tachy to . Abdomen soft. Was initially on propofol @ 30 and precedex, but propofol weaned off due to hypotension with MAPs in 50s, fentanyl ggt started for acute pain. Remained hypotensive despite fluids, started on Levophed, on 0.05.   4/18: Extubated. HFNC 40L/49%, Levo off since 11 AM   4/19: NGT removed. Started on duonebs. D5W increased to 60cc/hr. 1mg bumex, > 1.5L response  4/20: Episode of afib RVR resolved with metoprolol 5mg IVP, cardiology c/s placed   4/21: PE on CTA, therapeutic heparin gtt started      24HOUR EVENTS  4/21  NIGHT  -distal R main -> lobar pulm thrombosis/embolism, no R heart strain; starting heparin gtt, echo pending, bedside echo, DVT sono ordered   -CT abd/pelvis without extravasation, decreased fluid collections  -nicardipine at 8.5mcg/kg/hr  -HFNC at 60L and 80% fio2, pt will sleep on bipap tn  -3k riders     DAY  - F/u Dr. Lema re: rising WBC: CTAP PO and IV contrast (aubrey protocol), CT PE protocol chest> F/U READ  - Blood clx  - ID consult pending > OR cultures with Ecoli and Corynebacterium tuberculostearium   - Add meropenem   - F/u cardiology consult > Dr. Cam > start lopressor 25bid, repeat TTE, EKG  - F/u formal CT read  - IR consult for drainage     [X] A ten-point review of systems was negative except as expressed in note.  [X ] History was obtained from patient. If unable to participate in their care, history was from review of the chart and collateral sources of information.  ============================================================   Daily     Daily     Diet, Clear Liquid:   Bariatric Clear Liquid (BARICLLIQ) (04-20-25 @ 08:13)      CURRENT MEDS:  Neurologic Medications  dexMEDEtomidine Infusion 0.1 MICROgram(s)/kG/Hr IV Continuous <Continuous>  HYDROmorphone  Injectable 0.5 milliGRAM(s) IV Push every 4 hours PRN Severe Pain (7 - 10)  melatonin 5 milliGRAM(s) Oral at bedtime  traZODone 25 milliGRAM(s) Oral at bedtime    Respiratory Medications  albuterol/ipratropium for Nebulization 3 milliLiter(s) Nebulizer every 6 hours    Cardiovascular Medications  amLODIPine   Tablet 10 milliGRAM(s) Oral daily  hydrochlorothiazide 25 milliGRAM(s) Oral daily  metoprolol tartrate 25 milliGRAM(s) Oral two times a day  niCARdipine Infusion 5 mG/Hr IV Continuous <Continuous>    Gastrointestinal Medications  dextrose 5%. 1000 milliLiter(s) IV Continuous <Continuous>  lipid, fat emulsion (Fish Oil and Plant Based) 20% Infusion 0.3687 Gm/kG/Day IV Continuous <Continuous>  pantoprazole  Injectable 40 milliGRAM(s) IV Push daily  Parenteral Nutrition - Adult 1 Each TPN Continuous <Continuous>  Parenteral Nutrition - Adult 1 Each TPN Continuous <Continuous>    Genitourinary Medications    Hematologic/Oncologic Medications  heparin  Infusion 2900 Unit(s)/Hr IV Continuous <Continuous>    Antimicrobial/Immunologic Medications  meropenem  IVPB      meropenem  IVPB 1000 milliGRAM(s) IV Intermittent every 8 hours    Endocrine/Metabolic Medications  insulin lispro (ADMELOG) corrective regimen sliding scale   SubCutaneous every 6 hours    Topical/Other Medications  chlorhexidine 2% Cloths 1 Application(s) Topical <User Schedule>      ICU Vital Signs Last 24 Hrs  T(C): 37.4 (22 Apr 2025 04:00), Max: 37.4 (22 Apr 2025 04:00)  T(F): 99.3 (22 Apr 2025 04:00), Max: 99.3 (22 Apr 2025 04:00)  HR: 93 (22 Apr 2025 06:15) (83 - 116)  BP: 116/59 (22 Apr 2025 07:00) (105/54 - 174/74)  BP(mean): 84 (22 Apr 2025 07:00) (68 - 112)  ABP: 94/64 (22 Apr 2025 05:45) (-16/-19 - 163/52)  ABP(mean): 78 (22 Apr 2025 05:45) (-71 - 104)  RR: 34 (22 Apr 2025 06:00) (27 - 39)  SpO2: 94% (22 Apr 2025 07:00) (87% - 99%)    O2 Parameters below as of 22 Apr 2025 07:00  Patient On (Oxygen Delivery Method): nasal cannula, high flow  O2 Flow (L/min): 60  O2 Concentration (%): 80        Adult Advanced Hemodynamics Last 24 Hrs  CVP(mm Hg): --  CVP(cm H2O): --  CO: --  CI: --  PA: --  PA(mean): --  PCWP: --  SVR: --  SVRI: --  PVR: --  PVRI: --      ABG - ( 20 Apr 2025 21:19 )  pH, Arterial: 7.51  pH, Blood: x     /  pCO2: 29    /  pO2: 137   / HCO3: 23    / Base Excess: 0.6   /  SaO2: 96.6                I&O's Summary    21 Apr 2025 07:01  -  22 Apr 2025 07:00  --------------------------------------------------------  IN: 4632.4 mL / OUT: 3061 mL / NET: 1571.4 mL      I&O's Detail    21 Apr 2025 07:01  -  22 Apr 2025 07:00  --------------------------------------------------------  IN:    Dexmedetomidine: 25.4 mL    dextrose 5%: 1200 mL    Heparin: 87 mL    Heparin: 150 mL    IV PiggyBack: 100 mL    NiCARdipine: 1390 mL    TPN (Total Parenteral Nutrition): 1680 mL  Total IN: 4632.4 mL    OUT:    Bulb (mL): 13 mL    Bulb (mL): 35 mL    Bulb (mL): 13 mL    VAC (Vacuum Assisted Closure) System (mL): 0 mL    Voided (mL): 3000 mL  Total OUT: 3061 mL    Total NET: 1571.4 mL    PHYSICAL EXAM:  GENERAL: A&Ox3, NAD  HEENT: Normocephalic, atraumatic, NGT to suction  PULM: Non-labored respirations, bilateral chest rise, saturating well on HFNC (60/80)  CV: Regular rate and rhythm  ABDOMEN: Abdomen obese, soft, mildly-distended, mildly-tender to deep palpation, THONY x 3 (RUQ, LRLQ, LLQ), SS output  : Primafit in place  MSK: Moving extremities spontaneously  NEURO: No focal deficits  SKIN: Warm, dry, normal skin color, texture, turgor    LABS:  CAPILLARY BLOOD GLUCOSE  POCT Blood Glucose.: 196 mg/dL (22 Apr 2025 11:27)  POCT Blood Glucose.: 186 mg/dL (22 Apr 2025 05:34)  POCT Blood Glucose.: 179 mg/dL (22 Apr 2025 00:02)  POCT Blood Glucose.: 224 mg/dL (21 Apr 2025 18:34)                      9.3    23.04 )-----------( 422      ( 22 Apr 2025 04:20 )             28.5       04-22    146  |  109  |  64[HH]  ----------------------------<  173[H]  3.5   |  22  |  1.5    Ca    8.1[L]      22 Apr 2025 04:20  Phos  2.6     04-22  Mg     2.0     04-22    PT/INR - ( 21 Apr 2025 21:27 )   PT: 10.50 sec;   INR: 0.89 ratio     PTT - ( 22 Apr 2025 04:20 )  PTT:38.1 sec    Urinalysis Basic - ( 22 Apr 2025 04:20 )    Color: x / Appearance: x / SG: x / pH: x  Gluc: 173 mg/dL / Ketone: x  / Bili: x / Urobili: x   Blood: x / Protein: x / Nitrite: x   Leuk Esterase: x / RBC: x / WBC x   Sq Epi: x / Non Sq Epi: x / Bacteria: x

## 2025-04-22 NOTE — PROCEDURAL SAFETY CHECKLIST WITH OR WITHOUT SEDATION - NS2PTIDENTIFIERS_GEN_ALL_CORE
Arrived tro clinic to receive 1st dose daptomycin. . Pt tolerated infusion. Discharged in NAD.    
done

## 2025-04-22 NOTE — CONSULT NOTE ADULT - ASSESSMENT
ASSESSMENT  64-year-old female with a past medical history of high blood pressure and gastric bypass surgery in 2001, presenting with two days of sharp abdominal pain and one episode of non-bloody, non-bilious vomiting. She has a prior history of small bowel obstruction in 2006, 2008, and most recently in 2020, all of which resolved with conservative management    IMPRESSION  #SBO 2/2 Complex Incarcerated Ventral Hernia, s/p small bowel resection and ventral hernia repair 4/10   - OR Cx 4/10 - B frag, Clostridium species, E coli     #RTOR for Anastomotic Leak, SBR, Creation of New Ileoileostomy, Ventral Hernia Repair - 4/17   - CT abd/pelvis 4/17 - PERITONEUM/MESENTERY/BOWEL: Ventral abdominal wall midline defect containing nonobstructed small bowel loop with surrounding subcutaneous  stranding (series 5, image 83). The small bowel segment just proximal to  this herniated loop demonstrates a possible transmural defect with  extravasation of oral contrast into a midline intraperitoneal collection   measuring 9.0 x 4.0 x 6.1 cm (series 601, image 83). This intraperitoneal  collection connects to a more inferiorly-located subcutaneous pannus  collection, which contains oral contrast and measures 9.0 x 4.3 cm  (series 5, image 114). Wall thickening and interloop ascites of the involved small bowel loops,  likely reactive. Oral contrast visualized in the sigmoid colon.   Intraperitoneal surgical drain. Descending and sigmoid colonic wall  thickening. Post gastric bypass surgery. Scattered foci of  pneumoperitoneum.  - s/p OR for anastomatic leak/SBR/Creation of new ileostomy 4/17 - Wound Cx E coli   - CT Abdomen and Pelvis w/ Oral Cont and w/ IV Cont (04.21.25 @ 18:11) > Interval ventral hernia repair, small bowel resection. Lower abdominal  wall 9.7 x 6.2 cm fluid collection at site of previously seen  extravasated contrast, similar in size since prior study. Decreased anterior peritoneal fluid collection, measuring up to 6.3 x 8.3 cm. No  definite current oral contrast leak. Bilateral lower lobe consolidative opacities, findings could represent  atelectasis versus pneumonia in the appropriate clinical setting    #Pulmonary Embolism   - CT Abdomen and Pelvis w/ Oral Cont and w/ IV Cont (04.21.25 @ 18:11): Evidence of a right distal main pulmonary embolus extending to right  lower lobar and segmental pulmonary arteries: Examination limited by  streak artifact; no definite right heart strain .    #Obesity BMI (kg/m2): 53  #Abx allergy: No Known Allergies    RECOMMENDATIONS  This is a preliminary incomplete pended note, all final recommendations to follow after interview and examination of the patient.    Please call or message on [employer] Teams if with any questions.  Spectra [phone]   ASSESSMENT  64-year-old female with a past medical history of high blood pressure and gastric bypass surgery in 2001, presenting with two days of sharp abdominal pain and one episode of non-bloody, non-bilious vomiting. She has a prior history of small bowel obstruction in 2006, 2008, and most recently in 2020, all of which resolved with conservative management    IMPRESSION  #SBO 2/2 Complex Incarcerated Ventral Hernia, s/p small bowel resection and ventral hernia repair 4/10   - OR Cx 4/10 - B frag, Clostridium species, E coli     #RTOR for Anastomotic Leak, SBR, Creation of New Ileoileostomy, Ventral Hernia Repair - 4/17   - CT abd/pelvis 4/17 - PERITONEUM/MESENTERY/BOWEL: Ventral abdominal wall midline defect containing nonobstructed small bowel loop with surrounding subcutaneous  stranding (series 5, image 83). The small bowel segment just proximal to  this herniated loop demonstrates a possible transmural defect with  extravasation of oral contrast into a midline intraperitoneal collection   measuring 9.0 x 4.0 x 6.1 cm (series 601, image 83). This intraperitoneal  collection connects to a more inferiorly-located subcutaneous pannus  collection, which contains oral contrast and measures 9.0 x 4.3 cm  (series 5, image 114). Wall thickening and interloop ascites of the involved small bowel loops,  likely reactive. Oral contrast visualized in the sigmoid colon.   Intraperitoneal surgical drain. Descending and sigmoid colonic wall  thickening. Post gastric bypass surgery. Scattered foci of  pneumoperitoneum.  - s/p OR for anastomatic leak/SBR/Creation of new ileostomy 4/17 - Wound Cx E coli   - CT Abdomen and Pelvis w/ Oral Cont and w/ IV Cont (04.21.25 @ 18:11) > Interval ventral hernia repair, small bowel resection. Lower abdominal  wall 9.7 x 6.2 cm fluid collection at site of previously seen  extravasated contrast, similar in size since prior study. Decreased anterior peritoneal fluid collection, measuring up to 6.3 x 8.3 cm. No  definite current oral contrast leak. Bilateral lower lobe consolidative opacities, findings could represent  atelectasis versus pneumonia in the appropriate clinical setting    #Pulmonary Embolism   - CT Abdomen and Pelvis w/ Oral Cont and w/ IV Cont (04.21.25 @ 18:11): Evidence of a right distal main pulmonary embolus extending to right  lower lobar and segmental pulmonary arteries: Examination limited by  streak artifact; no definite right heart strain .    #Leukocytosis     #Obesity BMI (kg/m2): 53  #Abx allergy: No Known Allergies    RECOMMENDATIONS  - CT Abd/pelvis 4/21 reviewed -- known abdominal fluid collections and decreased anterior peritoneal fluid collections -- overall abdominal exam is benign   - possible for Leukocytosis to be reactive to newly diagnosed PE -- she is currently on heparin   - will hold off on broadening antibiotics at this time, but if worsening, despite treatment of PE -- ideally would need sample of fluid collection for culture   - please monitor for fevers   - obtain 2 sets of blood cx   - continue zosyn 3.375 mg q 8 hours for now     Please call or message on [employer] Teams if with any questions.  Spectra [phone]   ASSESSMENT  64-year-old female with a past medical history of high blood pressure and gastric bypass surgery in 2001, presenting with two days of sharp abdominal pain and one episode of non-bloody, non-bilious vomiting. She has a prior history of small bowel obstruction in 2006, 2008, and most recently in 2020, all of which resolved with conservative management    IMPRESSION  #SBO 2/2 Complex Incarcerated Ventral Hernia, s/p small bowel resection and ventral hernia repair 4/10   - OR Cx 4/10 - B frag, Clostridium species, E coli     #RTOR for Anastomotic Leak, SBR, Creation of New Ileoileostomy, Ventral Hernia Repair - 4/17   - CT abd/pelvis 4/17 - PERITONEUM/MESENTERY/BOWEL: Ventral abdominal wall midline defect containing nonobstructed small bowel loop with surrounding subcutaneous  stranding (series 5, image 83). The small bowel segment just proximal to  this herniated loop demonstrates a possible transmural defect with  extravasation of oral contrast into a midline intraperitoneal collection   measuring 9.0 x 4.0 x 6.1 cm (series 601, image 83). This intraperitoneal  collection connects to a more inferiorly-located subcutaneous pannus  collection, which contains oral contrast and measures 9.0 x 4.3 cm  (series 5, image 114). Wall thickening and interloop ascites of the involved small bowel loops,  likely reactive. Oral contrast visualized in the sigmoid colon.   Intraperitoneal surgical drain. Descending and sigmoid colonic wall  thickening. Post gastric bypass surgery. Scattered foci of  pneumoperitoneum.  - s/p OR for anastomatic leak/SBR/Creation of new ileostomy 4/17 - Wound Cx E coli   - CT Abdomen and Pelvis w/ Oral Cont and w/ IV Cont (04.21.25 @ 18:11) > Interval ventral hernia repair, small bowel resection. Lower abdominal  wall 9.7 x 6.2 cm fluid collection at site of previously seen  extravasated contrast, similar in size since prior study. Decreased anterior peritoneal fluid collection, measuring up to 6.3 x 8.3 cm. No  definite current oral contrast leak. Bilateral lower lobe consolidative opacities, findings could represent  atelectasis versus pneumonia in the appropriate clinical setting    #Pulmonary Embolism   - CT Abdomen and Pelvis w/ Oral Cont and w/ IV Cont (04.21.25 @ 18:11): Evidence of a right distal main pulmonary embolus extending to right  lower lobar and segmental pulmonary arteries: Examination limited by  streak artifact; no definite right heart strain .    #Leukocytosis     #Obesity BMI (kg/m2): 53  #Abx allergy: No Known Allergies    RECOMMENDATIONS  - CT Abd/pelvis 4/21 reviewed -- known abdominal fluid collections and decreased anterior peritoneal fluid collections -- overall abdominal exam is benign   - possible for Leukocytosis to be reactive to newly diagnosed PE -- she is currently on heparin   - will hold off on broadening antibiotics at this time, but if worsening, despite treatment of PE -- ideally would need sample of fluid collection for culture   - please monitor for fevers   - obtain 2 sets of blood cx   - continue zosyn 3.375 mg q 8 hours for now     Addendum -- Discussed with Surgery and SICU teams -- will plan on getting drainage of fluid collections; in mean time, can broaden zosyn to meropenem 1g q 8 hours while working on obtaining -- once drained, please send for bacterial and fungal Cx     Please call or message on [employer] Teams if with any questions.  Spectra [phone]

## 2025-04-22 NOTE — PROCEDURAL SAFETY CHECKLIST WITH OR WITHOUT SEDATION - [id number]_ALL_CORE
S/P Watchman device.      COPD - w/ acute exacerbation.  Responded to tx in ED - improving on current tx per pulmonology consult - appreciated.    -continued steroid taper on dc    ESRD - on HD Tues/Thurs/Sat.  Nephrology consulted and appreciated      Acute on Chronic Respiratory Failure - w/ hypoxia, POArrival.  Mulitfactorial 2nd to above.  Continued current tx.  Supplemental O2 and wean as tolerated. Pulmonology consulted and appreciated.      HTN/CAD - w/ known CAD but no evidence of active signs/sxs of ischemia/failure. Currently controlled on home meds w/ vitals reviewed and documented as above.     HyperLipidemia - controlled on home Statin. Continued, w/ f/u and med adjustment w/ PCP     Afib - Rate controlled on Amiodarone - continued.  with hx of Watchman procedure   -cards on board, plan for SWETHA (6 month f/u) for monitoring and to be done as outpt    DM2 - diet controlled on no home meds.  Follow FSBS/SSI low regimen.      GERD - w/out active signs/sxs of dysphagia/odynophagia. No evidence of active PUD or hx of GI bleed bu w/ hx Rosario's. Controlled on home PPI - continued.     Obesity -  With Body mass index is 61.37 kg/m². Complicating assessment and treatment. Placing patient at risk for multiple co-morbidities as well as early death and contributing to the patient's presentation. Counseled on weight loss.            Physical Exam Performed:     /77   Pulse 101   Temp 97.4 °F (36.3 °C) (Oral)   Resp 16   Ht 5' 10\" (1.778 m)   Wt 212 lb (96.2 kg)   SpO2 100%   BMI 30.42 kg/m²       General appearance:  No apparent distress, appears stated age and cooperative. HEENT:  Normal cephalic, atraumatic without obvious deformity. Pupils equal, round, and reactive to light. Extra ocular muscles intact. Conjunctivae/corneas clear. Neck: Supple, with full range of motion. No jugular venous distention. Trachea midline. Respiratory:  Normal respiratory effort.  Clear to auscultation, bilaterally
done
without Rales/Wheezes/Rhonchi. Cardiovascular:  Regular rate and rhythm with normal S1/S2 without murmurs, rubs or gallops. Abdomen: Soft, non-tender, non-distended with normal bowel sounds. Musculoskeletal:  No clubbing, cyanosis or edema bilaterally. Full range of motion without deformity. Skin: Skin color, texture, turgor normal.  No rashes or lesions. Neurologic:  Neurovascularly intact without any focal sensory/motor deficits. Cranial nerves: II-XII intact, grossly non-focal.  Psychiatric:  Alert and oriented, thought content appropriate, normal insight  Capillary Refill: Brisk,< 3 seconds   Peripheral Pulses: +2 palpable, equal bilaterally       Labs: For convenience and continuity at follow-up the following most recent labs are provided:      CBC:    Lab Results   Component Value Date    WBC 10.6 09/16/2018    HGB 13.9 09/16/2018    HCT 43.3 09/16/2018     09/16/2018       Renal:    Lab Results   Component Value Date     09/15/2018    K 4.9 09/15/2018    K 4.1 09/12/2018    CL 95 09/15/2018    CO2 21 09/15/2018    BUN 70 09/15/2018    CREATININE 6.4 09/15/2018    CALCIUM 9.8 09/15/2018    PHOS 4.6 09/15/2018         Significant Diagnostic Studies    Radiology:   XR CHEST PORTABLE   Final Result   No acute interval change.                 Consults:     IP CONSULT TO HOSPITALIST  IP CONSULT TO NEPHROLOGY  IP CONSULT TO CARDIOLOGY  IP CONSULT TO PULMONOLOGY  IP CONSULT TO HOME CARE NEEDS    Disposition:  Home with [facility]     Condition at Discharge: Stable    Discharge Instructions/Follow-up:  PCP, nephro, EP as outpt as instructed    Code Status:  Full Code     Activity: activity as tolerated    Diet: diabetic diet      Discharge Medications:     Discharge Medication List as of 9/17/2018  6:54 PM           Details   guaiFENesin (MUCINEX) 600 MG extended release tablet Take 1 tablet by mouth 2 times daily, Disp-6 tablet, R-0Print      metoprolol tartrate (LOPRESSOR) 50 MG tablet Take 1 tablet by
done
done

## 2025-04-22 NOTE — PROGRESS NOTE ADULT - ATTENDING COMMENTS
ubjective:  - Patient ID : Dawn Eddy  - Subjective, Family History, Past Medical History, and Impression :  - Subjective : Patient exhibits episodes of agitation and has pulled out her arterial line resulting in replacement. She's on high flow at 60 over 80 and is currently on a heparin drip. Past medical history includes treatment for a hernia and bowel resection.  - Family History :  - Past Medical History : Patient initially presented with a hernia and obstruction. Treated for hernia and underwent bowel resection. Patient experienced an anastomotic leak that was dealt with in further surgery.  - Impression : Patient is agitated and restless. They are breathing with the support of high flow oxygen and are currently on a heparin drip. A hernia patient with past surgical interventions.  - Review Of Systems :  - Review of Systems : Regarding the Cardiovascular system, a PE was found and she was started on a heparin drip. Respiratory system requires high flow oxygen. The patient's gastrointestinal health is being supported with TPN. The presence of infectious symptoms raises concerns.  Objective:  - Physical Examination (PE) : Physical examination revealed agitation in the patient and arterial line replacement. Use of high flow oxygen machine and the administration of a heparin drip. No details provided on specific measurements or observations.  Assessment:  - Summary : Patient displays rise in white cell count, suggesting an infection. Sub-therapeutic PTT levels and recent CT Scan resulted in detection of a PE and a fluid collection in the anterior abdominal wall which hinders her recovery.  - Problems :  - Increased white cell count.    -Sepsis    -Pulmonary embolism    -Respiratory insufficiency    -Delirium    - Sub-therapeutic PTT levels.    - Fluid collection in anterior abdominal wall.    - Possible Infection.    - Differential Diagnosis :  - Inflammation due to surgical history.    - Infection localized within the anterior abdominal wall.    Plan:  - Summary : Awaiting further work-up including PTT levels, blood cultures and an aspirate of the fluid collection in the anterior abdominal wall. If deemed feasible, initiation of drainage procedures can happen. A change in the antibiotic regimen may be required and possibility of removing the central line or placing a midline needs to be explored.  - Plan :  - Draw PTT and ascertain levels.    - Obtain blood cultures.    - Aspirate fluid collection in anterior abdominal wall.    - Possible drainage of fluid collection.    - Upgrade antibiotics regimen.    - Consider removing the central line or placing a midline.    - Mobilization assistance and PT intervention.    Signature:  - Werner Johnson MD FACS

## 2025-04-22 NOTE — PROGRESS NOTE ADULT - ASSESSMENT
============================================================   ============================================================   ASSESSMENT:  64F PMHx HTN, morbid obesity s/p 2001 Abby-en-Y gastric bypass who presented on 4/3 with incarcerated ventral hernia with SBO. S/p 4/10 open ventral hernia repair, small bowel resection, and primary fascial closure with Dr. Lema. Admitted to SICU for Q1 abdominal checks and hemodynamic monitoring. S/p 4/17 takeback for exploratory laparotomy, repair of anastomotic leak.    NEUROLOGICAL:  #Acute pain  - IV tylenol PRN  - Dilaudid 0.5mg Q4 PRN severe pain   - Trazodone 25mg QPM  - Melatonin 5mg qHS    RESPIRATORY:   #Right distal main pulmonary embolus  - Therapeutic Heparin gtt   - IR consult   - Echo pending   - B/l DVT sono complete, pending read  #Oxygenation  - Intubated 4/10-4/15  - Reintubated 4/17-4/18  - 4hrs on/off BiPAP/HFNC   #Diuresis  - 1mg bumex x 1 4/19  - 1mg Bumex x 1 4/20  #PMHxx LOUIS on CPAP@ home   #Activity   - Activity- increase as tolerated     CARDS:   #acute hypotension in setting of SIRS, resolved   - Off levophed since 4/18  - MAP >65  #PMHx of HTN  - Nicardipine gtt   - Restarted amlodipine 10mg qD   - Restarted home HCTZ 25mg QD  - Holding home losartan  #EKG- NSR  #TTE 6/2022: EF 55-60%. G1DD.   - TTE 4/11: EF of 56 %.G1DD.     GASTROINTESTINAL/NUTRITION:   #s/p lap converted to open repair of large ventral hernia with incarcerated small bowel, small bowel resection, vicryl mesh placement; c/b anastomotic leak     - CTAP with PO contrast: Evidence of defect in small bowel loop with connection to anterior midline intraperitoneal and subcutaneous pannus collections containing extravasated oral contrast. Unclear if this small bowel defect occurs at the anastomotic site. Surgical review recommended.    - 4/17: ex lap anastomosis resection and creation of side to side anastomosis     - IR consult - readdress when extubated     - aspiration precautions, HOB 30  #SBO s/p ventral hernia repair with SBR  #Diet, Bariatric Clears, sips  - Continue TPN with AM labs  #GI Prophylaxis/hx GERD   - pantoprazole  Injectable 40 IV Push  daily (omeprazole @home)  #Bowel regimen    -holding    -last BM 4/19   - NO dignishield     /RENAL:   #urine output in critically ill  - An removed, passed TOV   - High UOP   #FUNMI; resolving   - Uptrending BUN   - Ammonia 35  - previously oliguric, now improved. Previously on bumex gtt @ 0.5mg/hr  - Nephrology consulted and following > hold off on CVVH  - Renal u/s> no hydronephrosis. 2-3cm anechoic cyst on right kidney     Labs:          BUN/Cr- 87/2.1  -->,  85/1.7  -->          [04-20 @ 23:40]Na  150 // K  3.3 // Mg  1.8 // Phos  2.9  [04-20 @ 20:08]Na  147 // K  3.2 // Mg  1.9 // Phos  2.8  #Hypernatremia  -started on D5W @ 50cc/hr     HEME/ONC:   # DVT prophylaxis-  - Therapeutic heparin gtt in setting of PE    Labs: Hb/Hct:  6.8/22.4  -->,  8.4/26.8  -->                      Plts:  269  -->,  329  -->                 PTT/INR:      T&S Expires: 4/23      ID:  #MRSA nares negative   Current antibiotics-piperacillin/tazobactam IVPB.. 3.375 every 12 hours x 4 days post op (end date 4/22)  #Antibiotics Course  - continue zosyn > Purulent abscesses noted intraoperatively   WBC- 12.84  --->>,  17.45  --->>,  19.06  --->>  Temp trend- 24hrs T(F): 96.7 (04-22 @ 00:00), Max: 98.1 (04-21 @ 20:00)  Current antibiotics-piperacillin/tazobactam IVPB.. 3.375 every 8 hours  #febrile  - UA negative  - blood cultures; no growth at 5d  - sputum cultures pending   #cultures  - OR cx: few E.coli, few bacteroides, rare clostridium  -4/13 Blood Cx: NGTF  -4/14 tracheal aspirate: no growth   -4/17 body fluid cx rare e. coli     ENDOCRINE:  #Glycemic monitoring  - FSG Q6   - ISS  - A1C 5.8%     MSK:  #Activity- increase as tolerated     SKIN:  #DTI screening negative     - will continue to monitor daily skin changes    LINES/DRAINS:  PIV, THONY drain x 3, Radial A line, RIJ TLC   ADVANCED DIRECTIVES:  Full Code  HCP- Daughter  (Shavonne Moss)  INDICATION FOR SICU/SDU: Intestinal obstruction  DISPO:  SICU.     Discussed with SICU attending Dr. Johnson ============================================================   ============================================================   ASSESSMENT:  64F PMHx HTN, morbid obesity s/p 2001 Abby-en-Y gastric bypass who presented on 4/3 with incarcerated ventral hernia with SBO. S/p 4/10 open ventral hernia repair, small bowel resection, and primary fascial closure with Dr. Lema. Admitted to SICU for Q1 abdominal checks and hemodynamic monitoring. S/p 4/17 takeback for exploratory laparotomy, repair of anastomotic leak.    NEUROLOGICAL:  #Acute pain  - IV tylenol PRN  - Dilaudid 0.5mg Q4 PRN severe pain   - Trazodone 25mg QPM  - Melatonin 5mg qHS    RESPIRATORY:   #Right distal main pulmonary embolus  - Therapeutic Heparin gtt   - IR consult   - Echo pending   - B/l DVT sono complete, pending read  #Oxygenation  - Intubated 4/10-4/15  - Reintubated 4/17-4/18  - 4hrs on/off BiPAP/HFNC   #Diuresis  - 1mg bumex x 1 4/19  - 1mg Bumex x 1 4/20  #PMHxx L[place]IS on CPAP@ home   #Activity   - Activity- increase as tolerated     CARDS:   #acute hypotension in setting of SIRS, resolved   - Off levophed since 4/18  - MAP >65  #PMHx of HTN  - Nicardipine gtt   - Restarted amlodipine 10mg qD   - Restarted home HCTZ 25mg QD  - Holding home losartan  #EKG- NSR  #TTE 6/2022: EF 55-60%. G1DD.   - TTE 4/11: EF of 56 %.G1DD.     GASTROINTESTINAL/NUTRITION:   #s/p lap converted to open repair of large ventral hernia with incarcerated small bowel, small bowel resection, vicryl mesh placement; c/b anastomotic leak     - CTAP with PO contrast: Evidence of defect in small bowel loop with connection to anterior midline intraperitoneal and subcutaneous pannus collections containing extravasated oral contrast. Unclear if this small bowel defect occurs at the anastomotic site. Surgical review recommended.    - 4/17: ex lap anastomosis resection and creation of side to side anastomosis     - IR consult - readdress when extubated     - aspiration precautions, HOB 30  #SBO s/p ventral hernia repair with SBR  #Diet, Bariatric Clears, sips  - Continue TPN with AM labs  #GI Prophylaxis/hx GERD   - pantoprazole  Injectable 40 IV Push  daily (omeprazole @home)  #Bowel regimen    -holding    -last BM 4/19   - NO dignishield     /RENAL:   #urine output in critically ill  - An removed, passed TOV   - High UOP   #FUNMI; resolving   - Uptrending BUN   - Ammonia 35  - previously oliguric, now improved. Previously on bumex gtt @ 0.5mg/hr  - Nephrology consulted and following > hold off on CVVH  - Renal u/s> no hydronephrosis. 2-3cm anechoic cyst on right kidney     Labs:          BUN/Cr- 77/1.7  -->,  64/1.4  -->          [04-22 @ 00:07]Na  147 // K  3.2 // Mg  2.0 // Phos  2.7  [04-21 @ 05:45]Na  149 // K  3.5 // Mg  2.5 // Phos  2.3  #Hypernatremia  -started on D5W @ 50cc/hr     HEME/ONC:   # DVT prophylaxis-  - Therapeutic heparin gtt in setting of PE    Labs: Hb/Hct:  8.4/26.8  -->,  8.8/27.9  -->                      Plts:  329  -->,  345  -->                 PTT/INR:  23.2/0.89  --->     T&S Expires: 4/23      ID:  #MRSA nares negative   Current antibiotics-piperacillin/tazobactam IVPB.. 3.375 every 12 hours x 4 days post op (end date 4/22)  #Antibiotics Course  - continue zosyn > Purulent abscesses noted intraoperatively   WBC- 12.84  --->>,  17.45  --->>,  19.06  --->>  Temp trend- 24hrs T(F): 96.7 (04-22 @ 00:00), Max: 98.1 (04-21 @ 20:00)  Current antibiotics-piperacillin/tazobactam IVPB.. 3.375 every 8 hours  #febrile  - UA negative  - blood cultures; no growth at 5d  - sputum cultures pending   #cultures  - OR cx: few E.coli, few bacteroides, rare clostridium  -4/13 Blood Cx: NGTF  -4/14 tracheal aspirate: no growth   -4/17 body fluid cx rare e. coli     ENDOCRINE:  #Glycemic monitoring  - FSG Q6   - ISS  - A1C 5.8%     MSK:  #Activity- increase as tolerated     SKIN:  #DTI screening negative     - will continue to monitor daily skin changes    LINES/DRAINS:  PIV, THONY drain x 3, Radial A line, RIJ TLC   ADVANCED DIRECTIVES:  Full Code  HCP- Daughter  (Shavonne Moss)  INDICATION FOR SICU/SDU: Intestinal obstruction  DISPO:  SICU.     Discussed with SICU attending Dr. Johnson ============================================================   ASSESSMENT:  64F PMHx HTN, morbid obesity s/p 2001 Abby-en-Y gastric bypass who presented on 4/3 with incarcerated ventral hernia with SBO. S/p 4/10 open ventral hernia repair, small bowel resection, and primary fascial closure with Dr. Lema. Admitted to SICU for Q1 abdominal checks and hemodynamic monitoring. S/p 4/17 takeback for exploratory laparotomy, repair of anastomotic leak.    NEUROLOGICAL:  #Acute pain  - IV tylenol PRN  - Dilaudid 0.5mg Q4 PRN severe pain   - Trazodone 25mg QPM  - Melatonin 5mg qHS    RESPIRATORY:   #Right distal main pulmonary embolus  - Therapeutic Heparin gtt   - Echo:  < from: TTE Echo Complete w/o Contrast w/ Doppler (04.22.25 @ 08:45) >  Summary:   1. Left ventricular ejection fraction, byvisual estimation, is 70 to   75%.   2. Normal global left ventricular systolic function.   3. Normal left ventricular internal cavity size.   4. Normal right ventricular size and function.   5. Normal left atrial size.   6. Normal right atrial size.   7. Trace mitral valve regurgitation.    < end of copied text >    - B/l DVT sono complete, prelim R PT thrombus, f/u final read  #Oxygenation  - Intubated 4/10-4/15  - Reintubated 4/17-4/18  - 4hrs on/off BiPAP/HFNC   #Diuresis  - 1mg bumex x 1 4/19  - 1mg Bumex x 1 4/20  #PMHxx LOUIS on CPAP@ home   #Activity   - Activity- increase as tolerated     CARDS:   #acute hypotension in setting of SIRS, resolved   - Off levophed since 4/18  - MAP >65  #PMHx of HTN  - Nicardipine gtt   - Restarted amlodipine 10mg qD   - Restarted home HCTZ 25mg QD  - Holding home losartan  #EKG- NSR  #TTE 6/2022: EF 55-60%. G1DD.   - TTE 4/11: EF of 56 %.G1DD.     GASTROINTESTINAL/NUTRITION:   #s/p lap converted to open repair of large ventral hernia with incarcerated small bowel, small bowel resection, vicryl mesh placement; c/b anastomotic leak     - CTAP with PO contrast: Evidence of defect in small bowel loop with connection to anterior midline intraperitoneal and subcutaneous pannus collections containing extravasated oral contrast. Unclear if this small bowel defect occurs at the anastomotic site. Surgical review recommended.    - 4/17: ex lap anastomosis resection and creation of side to side anastomosis     - IR consult - readdress when extubated     - aspiration precautions, HOB 30  #Anterior abdominal wall collection  < from: CT Abdomen and Pelvis w/ Oral Cont and w/ IV Cont (04.21.25 @ 18:11) >  Interval ventral hernia repair, small bowel resection. Lower abdominal   wall 9.7 x 6.2 cm fluid collection at site of previously seen   extravasated contrast, similar in size since prior study. Decreased   anterior peritoneal fluid collection, measuring up to 6.3 x 8.3 cm. No   definite current oral contrast leak.    < end of copied text >  - F/u IR consult for drainage   #SBO s/p ventral hernia repair with SBR  #Diet, Bariatric Clears, sips  - Continue TPN with AM labs  #GI Prophylaxis/hx GERD   - pantoprazole  Injectable 40 IV Push  daily (omeprazole @home)  #Bowel regimen  - Holding  - Last BM 4/19  - NO dignishield     /RENAL:   #urine output in critically ill  - An removed, passed TOV   - High UOP   #FUNMI; resolving   - Uptrending BUN   - Ammonia 35  - previously oliguric, now improved. Previously on bumex gtt @ 0.5mg/hr  - Nephrology consulted and following > hold off on CVVH  - Renal u/s> no hydronephrosis. 2-3cm anechoic cyst on right kidney     Labs:          BUN/Cr- 77/1.7  -->,  64/1.4  -->          [04-22 @ 00:07]Na  147 // K  3.2 // Mg  2.0 // Phos  2.7  [04-21 @ 05:45]Na  149 // K  3.5 // Mg  2.5 // Phos  2.3  #Hypernatremia  -started on D5W @ 50cc/hr     HEME/ONC:   # DVT prophylaxis-  - Therapeutic heparin gtt in setting of PE    Labs: Hb/Hct:  8.4/26.8  -->,  8.8/27.9  -->                      Plts:  329  -->,  345  -->                 PTT/INR:  23.2/0.89  --->     T&S Expires: 4/23  - F/u 11AM PTT    ID:  #MRSA nares negative   Current antibiotics-piperacillin/tazobactam IVPB.. 3.375 every 12 hours x 4 days post op (end date 4/22)  #Antibiotics Course  - continue zosyn > Purulent abscesses noted intraoperatively   WBC- 12.84  --->>,  17.45  --->>,  19.06  --->>  Temp trend- 24hrs T(F): 96.7 (04-22 @ 00:00), Max: 98.1 (04-21 @ 20:00)  Current antibiotics-piperacillin/tazobactam IVPB.. 3.375 every 8 hours  #febrile  - UA negative  - blood cultures; no growth at 5d  - sputum cultures pending   #cultures  - OR cx: few E.coli, few bacteroides, rare clostridium  -4/13 Blood Cx: NGTF  -4/14 tracheal aspirate: no growth   -4/17 body fluid cx rare e. coli     ENDOCRINE:  #Glycemic monitoring  - FSG Q6   - ISS  - A1C 5.8%     MSK:  #Activity- increase as tolerated     SKIN:  #DTI screening negative     - will continue to monitor daily skin changes    LINES/DRAINS:  PIV, THONY drain x 3, Radial A line, RIJ TLC   ADVANCED DIRECTIVES:  Full Code  HCP- Daughter  (Shavonne Moss)  INDICATION FOR SICU/SDU: Intestinal obstruction  DISPO:  SICU.     Discussed with SICU attending Dr. Johnson

## 2025-04-22 NOTE — PROGRESS NOTE ADULT - SUBJECTIVE AND OBJECTIVE BOX
seen and examined  24 h events noted   on high flow         PAST HISTORY  --------------------------------------------------------------------------------  No significant changes to PMH, PSH, FHx, SHx, unless otherwise noted    ALLERGIES & MEDICATIONS  --------------------------------------------------------------------------------  Allergies    No Known Allergies    Intolerances      Standing Inpatient Medications  albuterol/ipratropium for Nebulization 3 milliLiter(s) Nebulizer every 6 hours  amLODIPine   Tablet 10 milliGRAM(s) Oral daily  chlorhexidine 2% Cloths 1 Application(s) Topical <User Schedule>  dexMEDEtomidine Infusion 0.1 MICROgram(s)/kG/Hr IV Continuous <Continuous>  dextrose 5%. 1000 milliLiter(s) IV Continuous <Continuous>  heparin  Infusion 2900 Unit(s)/Hr IV Continuous <Continuous>  hydrochlorothiazide 25 milliGRAM(s) Oral daily  insulin lispro (ADMELOG) corrective regimen sliding scale   SubCutaneous every 6 hours  melatonin 5 milliGRAM(s) Oral at bedtime  metoprolol tartrate 25 milliGRAM(s) Oral two times a day  niCARdipine Infusion 5 mG/Hr IV Continuous <Continuous>  pantoprazole  Injectable 40 milliGRAM(s) IV Push daily  Parenteral Nutrition - Adult 1 Each TPN Continuous <Continuous>  piperacillin/tazobactam IVPB.. 3.375 Gram(s) IV Intermittent every 8 hours  traZODone 25 milliGRAM(s) Oral at bedtime    PRN Inpatient Medications  HYDROmorphone  Injectable 0.5 milliGRAM(s) IV Push every 4 hours PRN          VITALS/PHYSICAL EXAM  --------------------------------------------------------------------------------  T(C): 37.4 (04-22-25 @ 04:00), Max: 37.4 (04-22-25 @ 04:00)  HR: 93 (04-22-25 @ 06:15) (83 - 116)  BP: 116/59 (04-22-25 @ 07:00) (105/54 - 174/74)  RR: 34 (04-22-25 @ 06:00) (22 - 39)  SpO2: 94% (04-22-25 @ 07:00) (87% - 99%)  Wt(kg): --        04-21-25 @ 07:01  -  04-22-25 @ 07:00  --------------------------------------------------------  IN: 4632.4 mL / OUT: 3061 mL / NET: 1571.4 mL      Physical Exam:  	Gen: on high flow   	Pulm: decrease BS  B/L  	CV:  S1S2; no rub  	Abd: +distended  	LE: edema  	    LABS/STUDIES  --------------------------------------------------------------------------------              9.3    23.04 >-----------<  422      [04-22-25 @ 04:20]              28.5     146  |  109  |  64  ----------------------------<  173      [04-22-25 @ 04:20]  3.5   |  22  |  1.5        Ca     8.1     [04-22-25 @ 04:20]      Mg     2.0     [04-22-25 @ 04:20]      Phos  2.6     [04-22-25 @ 04:20]      PT/INR: PT 10.50, INR 0.89       [04-21-25 @ 21:27]  PTT: 38.1       [04-22-25 @ 04:20]      Creatinine Trend:  SCr 1.5 [04-22 @ 04:20]  SCr 1.4 [04-22 @ 00:07]  SCr 1.7 [04-21 @ 05:45]  SCr 1.7 [04-20 @ 23:40]  SCr 2.1 [04-20 @ 20:08]    Urinalysis - [04-22-25 @ 04:20]      Color  / Appearance  / SG  / pH       Gluc 173 / Ketone   / Bili  / Urobili        Blood  / Protein  / Leuk Est  / Nitrite       RBC  / WBC  / Hyaline  / Gran  / Sq Epi  / Non Sq Epi  / Bacteria     Urine Creatinine 65      [04-16-25 @ 07:08]  Urine Sodium 51.0      [04-16-25 @ 07:08]  Urine Osmolality 408      [04-19-25 @ 20:25]    Vitamin D (25OH) 13      [04-19-25 @ 05:08]  Lipid: chol --, , HDL --, LDL --      [04-20-25 @ 04:59]

## 2025-04-22 NOTE — CONSULT NOTE ADULT - SUBJECTIVE AND OBJECTIVE BOX
VASCULAR SURGERY CONSULT NOTE      HPI:  The patient is a 64-year-old female with a past medical history of high blood pressure and gastric bypass surgery in , presenting with two days of sharp abdominal pain and one episode of non-bloody, non-bilious vomiting. She has a prior history of small bowel obstruction in , , and most recently in , all of which resolved with conservative management. Her current symptoms are similar in nature. A computed tomography scan of the abdomen and pelvis with oral and intravenous contrast revealed multiple ventral abdominal wall hernias containing both obstructed and non-obstructed bowel loops. A complex small bowel obstruction is present, involving three hernias on the right side of the abdomen, with dilated, fluid-filled loops of small bowel, trace fluid between loops, and a collapsed segment of bowel beyond a hernia in the right lower quadrant. These findings are consistent with a recurrent small bowel obstruction. The hernia was non reducible at bedside. (2025 20:41)        PAST MEDICAL & SURGICAL HISTORY:  Gastric bypass status for obesity      LOUIS (obstructive sleep apnea)      S/P gastric bypass      S/P       S/P small bowel resection      History of total bilateral knee replacement (TKR)      H/O colonoscopy          No Known Allergies    Home Medications:  amLODIPine 10 mg oral tablet: 1 tab(s) orally once a day (2025 22:39)  cyanocobalamin 2500 mcg sublingual tablet: 1 tab(s) sublingually once a day (2025 22:42)  hydroCHLOROthiazide 25 mg oral tablet: 1 tab(s) orally once a day (2025 02:18)  losartan 100 mg oral tablet: 1 tab(s) orally once a day (2025 22:38)  omeprazole 40 mg oral delayed release capsule: 1 cap(s) orally once a day as needed for  heartburn (2025 22:41)  Wegovy (2.4 mg dose) subcutaneous solution: 2.4 milligram(s) subcutaneously (2025 02:38)    No permtinent family history of PVD    REVIEW OF SYSTEMS:  GENERAL:                                         negative  SKIN:                                                 negative  OPTHALMOLOGIC:                          negative  ENMT:                                               negative  RESPIRATORY AND THORAX:        negative  CARDIOVASCULAR:                         see HPI  GASTROINTESTINAL:                       negative  NEPHROLOGY:                                  negative  MUSCULOSKELETAL:                       negative  NEUROLOGIC:                                   negative  PSYCHIATRIC:                                    negative  HEMATOLOGY/LYMPHATICS:         negative  ENDOCRINE:                                     negative  ALLERGIC/IMMUNOLOGIC:            negative    12 point ROS otherwise normal except as stated in HPI  FHx: none  SHX:  [ ]  smoking     [ ] alcohol use    Vital Signs Last 24 Hrs  T(C): 37.4 (2025 04:00), Max: 37.4 (2025 04:00)  T(F): 99.3 (2025 04:00), Max: 99.3 (2025 04:00)  HR: 93 (2025 06:15) (83 - 116)  BP: 116/59 (2025 07:00) (105/54 - 174/74)  BP(mean): 84 (2025 07:00) (68 - 112)  RR: 34 (2025 06:00) (27 - 39)  SpO2: 94% (2025 07:00) (87% - 99%)    Parameters below as of 2025 07:00  Patient On (Oxygen Delivery Method): nasal cannula, high flow  O2 Flow (L/min): 60  O2 Concentration (%): 80      PHYSICAL EXAM  Appearance: Normal	  HEENT:   Normal oral mucosa, PERRL, EOMI	  Neck: Supple, - JVD; Carotid Bruit   Cardiovascular: Normal S1 S2, No JVD, No murmurs,   Respiratory: Lungs clear to auscultation, No Rales, Rhonchi, Wheezing	  Gastrointestinal:  Soft, BS present; surgical site with vac -- 3 drains in place - no purulent discharge  Rt upper abdominal THONY with brown tinged SS fluid, remainder of drains SS  Skin: No rashes, No ecchymoses, No cyanosis  Extremities: Normal range of motion, No clubbing, cyanosis or edema  Neurologic: Non-focal  Psychiatry: A & O x 3, Mood & affect appropriate      MEDICATIONS:   MEDICATIONS  (STANDING):  albuterol/ipratropium for Nebulization 3 milliLiter(s) Nebulizer every 6 hours  amLODIPine   Tablet 10 milliGRAM(s) Oral daily  chlorhexidine 2% Cloths 1 Application(s) Topical <User Schedule>  dexMEDEtomidine Infusion 0.1 MICROgram(s)/kG/Hr (3.39 mL/Hr) IV Continuous <Continuous>  dextrose 5%. 1000 milliLiter(s) (50 mL/Hr) IV Continuous <Continuous>  heparin  Infusion 2900 Unit(s)/Hr (29 mL/Hr) IV Continuous <Continuous>  hydrochlorothiazide 25 milliGRAM(s) Oral daily  insulin lispro (ADMELOG) corrective regimen sliding scale   SubCutaneous every 6 hours  lipid, fat emulsion (Fish Oil and Plant Based) 20% Infusion 0.3687 Gm/kG/Day (20.8 mL/Hr) IV Continuous <Continuous>  melatonin 5 milliGRAM(s) Oral at bedtime  meropenem  IVPB      meropenem  IVPB 1000 milliGRAM(s) IV Intermittent every 8 hours  metoprolol tartrate 25 milliGRAM(s) Oral two times a day  niCARdipine Infusion 5 mG/Hr (25 mL/Hr) IV Continuous <Continuous>  pantoprazole  Injectable 40 milliGRAM(s) IV Push daily  Parenteral Nutrition - Adult 1 Each (65 mL/Hr) TPN Continuous <Continuous>  Parenteral Nutrition - Adult 1 Each (67 mL/Hr) TPN Continuous <Continuous>  traZODone 25 milliGRAM(s) Oral at bedtime    MEDICATIONS  (PRN):  HYDROmorphone  Injectable 0.5 milliGRAM(s) IV Push every 4 hours PRN Severe Pain (7 - 10)      LAB/STUDIES:                        9.3    23.04 )-----------( 422      ( 2025 04:20 )             28.5     04-22    146  |  109  |  64[HH]  ----------------------------<  173[H]  3.5   |  22  |  1.5    Ca    8.1[L]      2025 04:20  Phos  2.6     04-22  Mg     2.0     04-22      PT/INR - ( 2025 21:27 )   PT: 10.50 sec;   INR: 0.89 ratio         PTT - ( 2025 04:20 )  PTT:38.1 sec      Urinalysis Basic - ( 2025 04:20 )    Color: x / Appearance: x / SG: x / pH: x  Gluc: 173 mg/dL / Ketone: x  / Bili: x / Urobili: x   Blood: x / Protein: x / Nitrite: x   Leuk Esterase: x / RBC: x / WBC x   Sq Epi: x / Non Sq Epi: x / Bacteria: x              ABG - ( 2025 21:19 )  pH, Arterial: 7.51  pH, Blood: x     /  pCO2: 29    /  pO2: 137   / HCO3: 23    / Base Excess: 0.6   /  SaO2: 96.6                  IMAGING:    VA Duplex Lower Ext Vein Scan, Bilat (25 @ 22:40) >  Right posterior tibial vein DVT  No evidence of DVT in the left lower extremity        CT Angio Chest PE Protocol w/ IV Cont (25 @ 18:11) >  Evidence of a right distal main pulmonary embolus extending to right   lower lobar and segmental pulmonary arteries: Examination limited by   streak artifact; no definite right heart strain .    Interval ventral hernia repair, small bowel resection. Lower abdominal   wall 9.7 x 6.2 cm fluid collection at site of previously seen   extravasated contrast, similar in size since prior study. Decreased   anterior peritoneal fluid collection, measuring up to 6.3 x 8.3 cm. No   definite current oral contrast leak.    Bilateral lower lobe consolidative opacities, findings could represent   atelectasis versus pneumonia in the appropriate clinical setting

## 2025-04-22 NOTE — CHART NOTE - NSCHARTNOTEFT_GEN_A_CORE
Reconsulted by primary team for lower abdominal wall fluid drainage. 4/21Overnight CT abdomen/pelvis performed that showed lower abdominal wall fluid collection measuring 9.7 x 6.2 cm, correlating to area of extravasated oral contrast seen on  CT abd/pel 4/17.    Patient will be scheduled as an add-on for lower abdominal fluid collection drainage today, 4/22/25.    Please call Interventional Radiology with questions or concerns:   M-[phone]: [phone]   All other hours: [phone]

## 2025-04-22 NOTE — CONSULT NOTE ADULT - ASSESSMENT
64y F w/ PMHx of HTN and gastric bypass surgery in 2001 presents with abdominal distention. CT with incarcerated ventral hernia with SBO. S/p Open repair of ventral hernia using mesh and component separation technique, Small bowel resection. 4/17 s/p ex lap, 30 cm sbr, creation of new side to side ileoileostomy, ventral hernia repair w/ mesh  Tachycardia work up included CTA performed for tachypnea and tachycardia which showed R main -> lobar pulm thrombosis/embolism, no R heart strain. Initiated on Heparin drip  Vascular surgery called after venous dplx showed Right posterior tibial vein DVT    Plan:  - I reviewed today's labs  - I reviewed and personally visualized all the radiology imagings  - I discussed the plan with attending Dr. Mora:  - no need for any surgical interventions for DVT  - AC with heparin drip (upon discharge transition to Eliquis 10 mg po BID x 7 days then 5 mg po BID for at least 6 months)  - Follow up with vascular surgery as outpt in 1 months after pt is discharged    SPECTRA [id number] 64y F w/ PMHx of HTN and gastric bypass surgery in 2001 presents with abdominal distention. CT with incarcerated ventral hernia with SBO. S/p Open repair of ventral hernia using mesh and component separation technique, Small bowel resection. 4/17 s/p ex lap, 30 cm sbr, creation of new side to side ileoileostomy, ventral hernia repair w/ mesh  Tachycardia work up included CTA performed for tachypnea and tachycardia which showed R main -> lobar pulm thrombosis/embolism, no R heart strain. Initiated on Heparin drip  Vascular surgery called after venous dplx showed Right posterior tibial vein DVT    Plan:  - I reviewed today's labs  - I reviewed and personally visualized all the radiology imagings  - I discussed the plan with attending Dr. Mora:  - no need for any surgical interventions for DVT  - AC with heparin drip (upon discharge transition to Eliquis 10 mg po BID x 7 days then 5 mg po BID for at least 6 months)  - Follow up with vascular surgery as outpt in 1 months after pt is discharged    I spoke with the patient and her family in extend. Patient verbalized understanding. All questions have been answered.    SPECTRA [id number]

## 2025-04-22 NOTE — PROCEDURAL SAFETY CHECKLIST WITH OR WITHOUT SEDATION - NSPRESEDATIONFT_GEN_ALL_CORE
Physician confirms case reviewed for anesthesia consultation.

## 2025-04-23 LAB
ALBUMIN SERPL ELPH-MCNC: 3.1 G/DL — LOW (ref 3.5–5.2)
ALP SERPL-CCNC: 93 U/L — SIGNIFICANT CHANGE UP (ref 30–115)
ALT FLD-CCNC: 72 U/L — HIGH (ref 0–41)
ANION GAP SERPL CALC-SCNC: 15 MMOL/L — HIGH (ref 7–14)
ANION GAP SERPL CALC-SCNC: 16 MMOL/L — HIGH (ref 7–14)
APTT BLD: 128.9 SEC — CRITICAL HIGH (ref 27–39.2)
APTT BLD: 84.1 SEC — CRITICAL HIGH (ref 27–39.2)
AST SERPL-CCNC: 52 U/L — HIGH (ref 0–41)
BASOPHILS # BLD AUTO: 0.13 K/UL — SIGNIFICANT CHANGE UP (ref 0–0.2)
BASOPHILS NFR BLD AUTO: 0.6 % — SIGNIFICANT CHANGE UP (ref 0–1)
BILIRUB DIRECT SERPL-MCNC: 0.2 MG/DL — SIGNIFICANT CHANGE UP (ref 0–0.3)
BILIRUB INDIRECT FLD-MCNC: 0.2 MG/DL — SIGNIFICANT CHANGE UP (ref 0.2–1.2)
BILIRUB SERPL-MCNC: 0.4 MG/DL — SIGNIFICANT CHANGE UP (ref 0.2–1.2)
BUN SERPL-MCNC: 59 MG/DL — HIGH (ref 10–20)
BUN SERPL-MCNC: 62 MG/DL — CRITICAL HIGH (ref 10–20)
CALCIUM SERPL-MCNC: 8.2 MG/DL — LOW (ref 8.4–10.5)
CALCIUM SERPL-MCNC: 8.6 MG/DL — SIGNIFICANT CHANGE UP (ref 8.4–10.5)
CHLORIDE SERPL-SCNC: 104 MMOL/L — SIGNIFICANT CHANGE UP (ref 98–110)
CHLORIDE SERPL-SCNC: 107 MMOL/L — SIGNIFICANT CHANGE UP (ref 98–110)
CO2 SERPL-SCNC: 22 MMOL/L — SIGNIFICANT CHANGE UP (ref 17–32)
CO2 SERPL-SCNC: 23 MMOL/L — SIGNIFICANT CHANGE UP (ref 17–32)
CREAT SERPL-MCNC: 1.2 MG/DL — SIGNIFICANT CHANGE UP (ref 0.7–1.5)
CREAT SERPL-MCNC: 1.3 MG/DL — SIGNIFICANT CHANGE UP (ref 0.7–1.5)
CULTURE RESULTS: ABNORMAL
EGFR: 46 ML/MIN/1.73M2 — LOW
EGFR: 46 ML/MIN/1.73M2 — LOW
EGFR: 51 ML/MIN/1.73M2 — LOW
EGFR: 51 ML/MIN/1.73M2 — LOW
EOSINOPHIL # BLD AUTO: 0.12 K/UL — SIGNIFICANT CHANGE UP (ref 0–0.7)
EOSINOPHIL NFR BLD AUTO: 0.6 % — SIGNIFICANT CHANGE UP (ref 0–8)
GAS PNL BLDA: SIGNIFICANT CHANGE UP
GAS PNL BLDA: SIGNIFICANT CHANGE UP
GLUCOSE BLDC GLUCOMTR-MCNC: 130 MG/DL — HIGH (ref 70–99)
GLUCOSE BLDC GLUCOMTR-MCNC: 139 MG/DL — HIGH (ref 70–99)
GLUCOSE BLDC GLUCOMTR-MCNC: 156 MG/DL — HIGH (ref 70–99)
GLUCOSE BLDC GLUCOMTR-MCNC: 160 MG/DL — HIGH (ref 70–99)
GLUCOSE BLDC GLUCOMTR-MCNC: 170 MG/DL — HIGH (ref 70–99)
GLUCOSE SERPL-MCNC: 140 MG/DL — HIGH (ref 70–99)
GLUCOSE SERPL-MCNC: 170 MG/DL — HIGH (ref 70–99)
GRAM STN FLD: SIGNIFICANT CHANGE UP
HCT VFR BLD CALC: 29.4 % — LOW (ref 37–47)
HGB BLD-MCNC: 9.4 G/DL — LOW (ref 12–16)
IMM GRANULOCYTES NFR BLD AUTO: 5.1 % — HIGH (ref 0.1–0.3)
LYMPHOCYTES # BLD AUTO: 1.3 K/UL — SIGNIFICANT CHANGE UP (ref 1.2–3.4)
LYMPHOCYTES # BLD AUTO: 6.2 % — LOW (ref 20.5–51.1)
MAGNESIUM SERPL-MCNC: 1.9 MG/DL — SIGNIFICANT CHANGE UP (ref 1.8–2.4)
MCHC RBC-ENTMCNC: 30.6 PG — SIGNIFICANT CHANGE UP (ref 27–31)
MCHC RBC-ENTMCNC: 32 G/DL — SIGNIFICANT CHANGE UP (ref 32–37)
MCV RBC AUTO: 95.8 FL — SIGNIFICANT CHANGE UP (ref 81–99)
MONOCYTES # BLD AUTO: 1.23 K/UL — HIGH (ref 0.1–0.6)
MONOCYTES NFR BLD AUTO: 5.9 % — SIGNIFICANT CHANGE UP (ref 1.7–9.3)
NEUTROPHILS # BLD AUTO: 17.05 K/UL — HIGH (ref 1.4–6.5)
NEUTROPHILS NFR BLD AUTO: 81.6 % — HIGH (ref 42.2–75.2)
NRBC BLD AUTO-RTO: 0 /100 WBCS — SIGNIFICANT CHANGE UP (ref 0–0)
PHOSPHATE SERPL-MCNC: 3.5 MG/DL — SIGNIFICANT CHANGE UP (ref 2.1–4.9)
PLATELET # BLD AUTO: 373 K/UL — SIGNIFICANT CHANGE UP (ref 130–400)
PMV BLD: 11 FL — HIGH (ref 7.4–10.4)
POTASSIUM SERPL-MCNC: 3.2 MMOL/L — LOW (ref 3.5–5)
POTASSIUM SERPL-MCNC: 3.8 MMOL/L — SIGNIFICANT CHANGE UP (ref 3.5–5)
POTASSIUM SERPL-SCNC: 3.2 MMOL/L — LOW (ref 3.5–5)
POTASSIUM SERPL-SCNC: 3.8 MMOL/L — SIGNIFICANT CHANGE UP (ref 3.5–5)
PROT SERPL-MCNC: 6.1 G/DL — SIGNIFICANT CHANGE UP (ref 6–8)
RBC # BLD: 3.07 M/UL — LOW (ref 4.2–5.4)
RBC # FLD: 14.7 % — HIGH (ref 11.5–14.5)
SODIUM SERPL-SCNC: 142 MMOL/L — SIGNIFICANT CHANGE UP (ref 135–146)
SODIUM SERPL-SCNC: 145 MMOL/L — SIGNIFICANT CHANGE UP (ref 135–146)
SPECIMEN SOURCE: SIGNIFICANT CHANGE UP
WBC # BLD: 20.9 K/UL — HIGH (ref 4.8–10.8)
WBC # FLD AUTO: 20.9 K/UL — HIGH (ref 4.8–10.8)

## 2025-04-23 PROCEDURE — 99232 SBSQ HOSP IP/OBS MODERATE 35: CPT | Mod: GC,24

## 2025-04-23 PROCEDURE — 99291 CRITICAL CARE FIRST HOUR: CPT | Mod: 24

## 2025-04-23 PROCEDURE — 71045 X-RAY EXAM CHEST 1 VIEW: CPT | Mod: 26

## 2025-04-23 RX ORDER — INSULIN LISPRO 100 U/ML
INJECTION, SOLUTION INTRAVENOUS; SUBCUTANEOUS
Refills: 0 | Status: DISCONTINUED | OUTPATIENT
Start: 2025-04-23 | End: 2025-04-27

## 2025-04-23 RX ORDER — SODIUM/POT/MAG/CALC/CHLOR/ACET 35-20-5MEQ
1 VIAL (ML) INTRAVENOUS
Refills: 0 | Status: DISCONTINUED | OUTPATIENT
Start: 2025-04-23 | End: 2025-04-23

## 2025-04-23 RX ORDER — INSULIN LISPRO 100 U/ML
2 INJECTION, SOLUTION INTRAVENOUS; SUBCUTANEOUS ONCE
Refills: 0 | Status: COMPLETED | OUTPATIENT
Start: 2025-04-23 | End: 2025-04-23

## 2025-04-23 RX ORDER — ENOXAPARIN SODIUM 100 MG/ML
135 INJECTION SUBCUTANEOUS EVERY 12 HOURS
Refills: 0 | Status: DISCONTINUED | OUTPATIENT
Start: 2025-04-23 | End: 2025-04-27

## 2025-04-23 RX ORDER — HEPARIN SODIUM 1000 [USP'U]/ML
2800 INJECTION INTRAVENOUS; SUBCUTANEOUS
Qty: 25000 | Refills: 0 | Status: DISCONTINUED | OUTPATIENT
Start: 2025-04-23 | End: 2025-04-23

## 2025-04-23 RX ORDER — CALCIUM GLUCONATE 20 MG/ML
2 INJECTION, SOLUTION INTRAVENOUS ONCE
Refills: 0 | Status: COMPLETED | OUTPATIENT
Start: 2025-04-23 | End: 2025-04-23

## 2025-04-23 RX ADMIN — CALCIUM GLUCONATE 200 GRAM(S): 20 INJECTION, SOLUTION INTRAVENOUS at 13:30

## 2025-04-23 RX ADMIN — IPRATROPIUM BROMIDE AND ALBUTEROL SULFATE 3 MILLILITER(S): .5; 2.5 SOLUTION RESPIRATORY (INHALATION) at 08:05

## 2025-04-23 RX ADMIN — METOPROLOL SUCCINATE 25 MILLIGRAM(S): 50 TABLET, EXTENDED RELEASE ORAL at 05:10

## 2025-04-23 RX ADMIN — INSULIN LISPRO 2: 100 INJECTION, SOLUTION INTRAVENOUS; SUBCUTANEOUS at 00:00

## 2025-04-23 RX ADMIN — INSULIN LISPRO 2: 100 INJECTION, SOLUTION INTRAVENOUS; SUBCUTANEOUS at 05:45

## 2025-04-23 RX ADMIN — Medication 40 MILLIGRAM(S): at 12:20

## 2025-04-23 RX ADMIN — HEPARIN SODIUM 28 UNIT(S)/HR: 1000 INJECTION INTRAVENOUS; SUBCUTANEOUS at 05:09

## 2025-04-23 RX ADMIN — Medication 25 MILLIGRAM(S): at 21:16

## 2025-04-23 RX ADMIN — Medication 1 APPLICATION(S): at 05:11

## 2025-04-23 RX ADMIN — IPRATROPIUM BROMIDE AND ALBUTEROL SULFATE 3 MILLILITER(S): .5; 2.5 SOLUTION RESPIRATORY (INHALATION) at 15:38

## 2025-04-23 RX ADMIN — ENOXAPARIN SODIUM 135 MILLIGRAM(S): 100 INJECTION SUBCUTANEOUS at 17:01

## 2025-04-23 RX ADMIN — Medication 50 MILLIEQUIVALENT(S): at 07:38

## 2025-04-23 RX ADMIN — METOPROLOL SUCCINATE 25 MILLIGRAM(S): 50 TABLET, EXTENDED RELEASE ORAL at 17:28

## 2025-04-23 RX ADMIN — INSULIN LISPRO 2 UNIT(S): 100 INJECTION, SOLUTION INTRAVENOUS; SUBCUTANEOUS at 14:22

## 2025-04-23 RX ADMIN — IPRATROPIUM BROMIDE AND ALBUTEROL SULFATE 3 MILLILITER(S): .5; 2.5 SOLUTION RESPIRATORY (INHALATION) at 20:21

## 2025-04-23 RX ADMIN — MEROPENEM 100 MILLIGRAM(S): 1 INJECTION INTRAVENOUS at 14:48

## 2025-04-23 RX ADMIN — MEROPENEM 100 MILLIGRAM(S): 1 INJECTION INTRAVENOUS at 21:16

## 2025-04-23 RX ADMIN — MEROPENEM 100 MILLIGRAM(S): 1 INJECTION INTRAVENOUS at 05:10

## 2025-04-23 RX ADMIN — AMLODIPINE BESYLATE 10 MILLIGRAM(S): 10 TABLET ORAL at 05:10

## 2025-04-23 RX ADMIN — Medication 5 MILLIGRAM(S): at 21:17

## 2025-04-23 NOTE — PROGRESS NOTE ADULT - SUBJECTIVE AND OBJECTIVE BOX
seen and examined  24 h events noted   on high flow       PAST HISTORY  --------------------------------------------------------------------------------  No significant changes to PMH, PSH, FHx, SHx, unless otherwise noted    ALLERGIES & MEDICATIONS  --------------------------------------------------------------------------------  Allergies    No Known Allergies    Intolerances      Standing Inpatient Medications  albuterol/ipratropium for Nebulization 3 milliLiter(s) Nebulizer every 6 hours  amLODIPine   Tablet 10 milliGRAM(s) Oral daily  chlorhexidine 2% Cloths 1 Application(s) Topical <User Schedule>  dextrose 5%. 1000 milliLiter(s) IV Continuous <Continuous>  heparin  Infusion 2800 Unit(s)/Hr IV Continuous <Continuous>  insulin lispro (ADMELOG) corrective regimen sliding scale   SubCutaneous every 6 hours  lipid, fat emulsion (Fish Oil and Plant Based) 20% Infusion 0.3687 Gm/kG/Day IV Continuous <Continuous>  melatonin 5 milliGRAM(s) Oral at bedtime  meropenem  IVPB      meropenem  IVPB 1000 milliGRAM(s) IV Intermittent every 8 hours  metoprolol tartrate 25 milliGRAM(s) Oral two times a day  pantoprazole  Injectable 40 milliGRAM(s) IV Push daily  Parenteral Nutrition - Adult 1 Each TPN Continuous <Continuous>  traZODone 25 milliGRAM(s) Oral at bedtime    PRN Inpatient Medications  HYDROmorphone  Injectable 0.5 milliGRAM(s) IV Push every 4 hours PRN      VITALS/PHYSICAL EXAM  --------------------------------------------------------------------------------  T(C): 36.1 (04-23-25 @ 08:00), Max: 37.1 (04-23-25 @ 00:00)  HR: 85 (04-23-25 @ 08:00) (85 - 109)  BP: 115/57 (04-22-25 @ 16:30) (112/57 - 184/79)  RR: 25 (04-23-25 @ 08:00) (22 - 60)  SpO2: 97% (04-23-25 @ 08:00) (91% - 99%)  Wt(kg): --        04-22-25 @ 07:01  -  04-23-25 @ 07:00  --------------------------------------------------------  IN: 3926.8 mL / OUT: 4665 mL / NET: -738.2 mL      Physical Exam:  	Gen: on high flow   	Pulm: decrease BS  B/L  	CV:  S1S2; no rub  	Abd: +distended  	LE:  edema  	  LABS/STUDIES  --------------------------------------------------------------------------------              9.4    20.90 >-----------<  373      [04-23-25 @ 04:42]              29.4     145  |  107  |  62  ----------------------------<  140      [04-23-25 @ 04:42]  3.2   |  23  |  1.3        Ca     8.2     [04-23-25 @ 04:42]      Mg     1.9     [04-23-25 @ 04:42]      Phos  3.5     [04-23-25 @ 04:42]    TPro  6.1  /  Alb  3.1  /  TBili  0.4  /  DBili  0.2  /  AST  52  /  ALT  72  /  AlkPhos  93  [04-23-25 @ 04:42]    PT/INR: PT 10.50, INR 0.89       [04-21-25 @ 21:27]  PTT: 128.9      [04-23-25 @ 02:40]      Creatinine Trend:  SCr 1.3 [04-23 @ 04:42]  SCr 1.5 [04-22 @ 04:20]  SCr 1.4 [04-22 @ 00:07]  SCr 1.7 [04-21 @ 05:45]  SCr 1.7 [04-20 @ 23:40]    Urinalysis - [04-23-25 @ 04:42]      Color  / Appearance  / SG  / pH       Gluc 140 / Ketone   / Bili  / Urobili        Blood  / Protein  / Leuk Est  / Nitrite       RBC  / WBC  / Hyaline  / Gran  / Sq Epi  / Non Sq Epi  / Bacteria     Urine Osmolality 408      [04-19-25 @ 20:25]    Vitamin D (25OH) 13      [04-19-25 @ 05:08]  Lipid: chol --, , HDL --, LDL --      [04-20-25 @ 04:59]

## 2025-04-23 NOTE — PROGRESS NOTE ADULT - ATTENDING COMMENTS
Pt examined  labs and images reviewed  pt with supermorbid obesity and complex hernia with sbo  previously treated without surgery   passing flatus awaiting bowel movement but was having  more pain taken to OR urgently   4/10 s/p BHUMI , Bowel Resection / Anastamosis / Primary repair of Fascia / hernia sac  overnight - renal failure / respiratory compromise  was intubated / paralysed/ botox  now on bumex - tapering   4/17:  s/p BHUMI , Bowel Resection / Anastamosis / Primary repair of Fascia / hernia sac  4/21 extubated -  4/22 : hypertensive - on nicardipine drip  tachy cardia - episode acute even yesterday at 8 pm - still not completely resolving  Ct shows PE  subcutaneous collection drained by IR  will start anticoagulation  mariam drain sero sang  tolerating clears      impression : extremely high risk - supermorbid obesity and complex hernia with partial sbo  will keep npo other than sips\  PE: continue heparin anticoagulation  collection to be by IR  SICU

## 2025-04-23 NOTE — PROGRESS NOTE ADULT - ASSESSMENT
Patient is a 64F PMHx HTN, morbid obesity s/p 2001 Abby-en-Y gastric bypass who presented on 4/3 with incarcerated ventral hernia with SBO. S/p open ventral hernia repair, small bowel resection, and primary fascial closure with Dr. Lema on 4/10. Currently admitted to SICU for monitoring. Nephrology consulted for FUNMI and oliguria.  # FUNMI most likely ATN   # resp failure on MV/ extubated / on high flow   #  incarcerated ventral hernia with SBO S/p open ventral hernia repair, small bowel resection, and primary fascial closure  # hypernatremia   s/p ex-lap, creation of new side to side ileoileostomy, ventral hernia repair w/ mesh   on high flow   cr trending down   sodium level stable   bp better controlled/ off HCTZ   IR notes appreciated s/p drainage / follow culture / on melissa  PE on heparin / follow PTT   non oliguric  replete k to 4   ph noted / no binders    will sign off recall as needed

## 2025-04-23 NOTE — PROGRESS NOTE ADULT - SUBJECTIVE AND OBJECTIVE BOX
GENERAL SURGERY PROGRESS NOTE    Patient: NANCY SARGENT , 64y (62)Female   MRN: [id number]  Location: [address]  Visit: 25 Inpatient  Date: 25 @ 01:13      Events of past 24 hours: NAEON, S/P IR Drain placement of abd wall collection, zosyn broadened to meropenem,  nicardipine drip discontinued, Heparin drip currently being titrated for DVT/PE. Still tenuous respiratory status    PAST MEDICAL & SURGICAL HISTORY:  Gastric bypass status for obesity      LOUIS (obstructive sleep apnea)      S/P gastric bypass      S/P       S/P small bowel resection      History of total bilateral knee replacement (TKR)      H/O colonoscopy            Vitals:   T(F): 98.7 (25 @ 00:00), Max: 99.3 (25 @ 04:00)  HR: 108 (25 @ 00:00)  BP: 115/57 (25 @ 16:30)  RR: 32 (25 @ 00:00)  SpO2: 95% (25 @ 00:00)      Diet, Clear Liquid:   Bariatric Clear Liquid (BARICLLIQ)      Fluids:     I & O's:    25 @ 07:01  -  25 @ 07:00  --------------------------------------------------------  IN:    Dexmedetomidine: 25.4 mL    dextrose 5%: 1200 mL    Heparin: 87 mL    Heparin: 150 mL    IV PiggyBack: 100 mL    NiCARdipine: 1390 mL    TPN (Total Parenteral Nutrition): 1670 mL  Total IN: 4622.4 mL    OUT:    Bulb (mL): 13 mL    Bulb (mL): 35 mL    Bulb (mL): 13 mL    VAC (Vacuum Assisted Closure) System (mL): 0 mL    Voided (mL): 3000 mL  Total OUT: 3061 mL    Total NET: 1561.4 mL        Bowel Movement: : [] YES [] NO  Flatus: : [] YES [] NO    PHYSICAL EXAM:  GENERAL: A&Ox3, NAD  HEENT: Normocephalic, atraumatic, NGT to suction  PULM: bilateral chest rise, saturating well on BiPAP  CV: Regular rate and rhythm  ABDOMEN: Abdomen obese, soft, mildly-distended, mildly-tender to deep palpation, THONY x 3, IR drain with purulent drainage  : Primafit in place  MSK: Moving extremities spontaneously  NEURO: No focal deficits  SKIN: Warm, dry, normal skin color, texture, turgor    MEDICATIONS  (STANDING):  albuterol/ipratropium for Nebulization 3 milliLiter(s) Nebulizer every 6 hours  amLODIPine   Tablet 10 milliGRAM(s) Oral daily  chlorhexidine 2% Cloths 1 Application(s) Topical <User Schedule>  dextrose 5%. 1000 milliLiter(s) (50 mL/Hr) IV Continuous <Continuous>  heparin  Infusion 3200 Unit(s)/Hr (32 mL/Hr) IV Continuous <Continuous>  insulin lispro (ADMELOG) corrective regimen sliding scale   SubCutaneous every 6 hours  lipid, fat emulsion (Fish Oil and Plant Based) 20% Infusion 0.3687 Gm/kG/Day (20.8 mL/Hr) IV Continuous <Continuous>  melatonin 5 milliGRAM(s) Oral at bedtime  meropenem  IVPB      meropenem  IVPB 1000 milliGRAM(s) IV Intermittent every 8 hours  metoprolol tartrate 25 milliGRAM(s) Oral two times a day  niCARdipine Infusion 5 mG/Hr (25 mL/Hr) IV Continuous <Continuous>  pantoprazole  Injectable 40 milliGRAM(s) IV Push daily  Parenteral Nutrition - Adult 1 Each (67 mL/Hr) TPN Continuous <Continuous>  Parenteral Nutrition - Adult 1 Each (65 mL/Hr) TPN Continuous <Continuous>  traZODone 25 milliGRAM(s) Oral at bedtime    MEDICATIONS  (PRN):  HYDROmorphone  Injectable 0.5 milliGRAM(s) IV Push every 4 hours PRN Severe Pain (7 - 10)      DVT PROPHYLAXIS: heparin  Infusion 3200 Unit(s)/Hr IV Continuous <Continuous>    GI PROPHYLAXIS: pantoprazole  Injectable 40 milliGRAM(s) IV Push daily    ANTICOAGULATION:   ANTIBIOTICS:  meropenem  IVPB    meropenem  IVPB 1000 milliGRAM(s)            LAB/STUDIES:  Labs:  CAPILLARY BLOOD GLUCOSE      POCT Blood Glucose.: 169 mg/dL (2025 23:55)  POCT Blood Glucose.: 195 mg/dL (2025 17:45)  POCT Blood Glucose.: 196 mg/dL (2025 11:27)  POCT Blood Glucose.: 186 mg/dL (2025 05:34)                          9.3    23.04 )-----------( 422      ( 2025 04:20 )             28.5       Auto Immature Granulocyte %: 4.4 % (25 @ 04:20)        146  |  109  |  64[HH]  ----------------------------<  173[H]  3.5   |  22  |  1.5      Calcium: 8.1 mg/dL (25 @ 04:20)      LFTs:     Blood Gas Arterial, Lactate: 1.2 mmol/L (25 @ 21:19)    ABG - ( 2025 21:19 )  pH: 7.51  /  pCO2: 29    /  pO2: 137   / HCO3: 23    / Base Excess: 0.6   /  SaO2: 96.6            ABG - ( 2025 15:00 )  pH: 7.52  /  pCO2: 33    /  pO2: 105   / HCO3: 27    / Base Excess: 4.0   /  SaO2: 96.5            ABG - ( 2025 06:06 )  pH: 7.46  /  pCO2: 39    /  pO2: 179   / HCO3: 28    / Base Excess: 3.6   /  SaO2: 96.7              Coags:     x      ----< x       ( 2025 19:58 )     94.1                Urinalysis Basic - ( 2025 04:20 )    Color: x / Appearance: x / SG: x / pH: x  Gluc: 173 mg/dL / Ketone: x  / Bili: x / Urobili: x   Blood: x / Protein: x / Nitrite: x   Leuk Esterase: x / RBC: x / WBC x   Sq Epi: x / Non Sq Epi: x / Bacteria: x                IMAGING:      ACCESS/ DEVICES:  [ x] Peripheral IV  [ ] Central Venous Line	[ ] R	[ ] L	[ ] IJ	[ ] Fem	[ ] SC	Placed:   [ ] Arterial Line		[ ] R	[ ] L	[ ] Fem	[ ] Rad	[ ] Ax	Placed:   [ ] PICC:					[ ] Mediport  [ ] Urinary Catheter,  Date Placed:   [ ] Chest tube: [ ] Right, [ ] Left  [ ] THONY/Oliver Drains

## 2025-04-23 NOTE — PROGRESS NOTE ADULT - ASSESSMENT
ASSESSMENT:  64y F w/ PMHx of  Morbid obesity, LOUIS, large complex ventral hernia s/p repair sbr and loss of domain c/b post op intubation, hypotension requiring vasopressors, anastomotic leakage s/p RTOR and reanastomosis, now doing better post op from a respiratory status, however, still tenuous. She has a rising leukocytosis and an abdominal fluid collection which was drained by IR on 4/22, Zosyn broadened to meropenem, and has a newly diagnosed DVT/PE which she is currently on a heparin drip.     PLAN:  -Continue to monitor respiratory status with NIV.   -PT eval; patient will likely need rehab placement given how susceptible she is to severe deconditioning with her body habitus and postoperative course  -TPN should be ideally be continued until she is able to reliably tolerate PO intake given healing concerns from anastomosis and hernia repair, although would consider discontinuing and removing TLC if blood cx + given leukocytosis and infection concerns. If wbc continues to rise or she has a fever would discuss with ID regarding fungal coverage   -Continue AC for DVT/PE

## 2025-04-23 NOTE — PROGRESS NOTE ADULT - SUBJECTIVE AND OBJECTIVE BOX
NANCY SARGENT  64y, Female  Allergy: No Known Allergies      LOS  20d    CHIEF COMPLAINT: bowel obstruction (22 Apr 2025 08:33)      INTERVAL EVENTS/HPI  - No acute events overnight  - T(F): , Max: 98.7 (04-23-25 @ 00:00)  - underwent drainge of anterior abdominal wall collection -- brown fluid aspirated   - denies any abdominal pain  - remains on HFNC - no dyspnea   - WBC Count: 20.90 (04-23-25 @ 04:42)  WBC Count: 23.04 (04-22-25 @ 04:20)     - Creatinine: 1.3 (04-23-25 @ 04:42)  Creatinine: 1.5 (04-22-25 @ 04:20)       ROS  General: Denies rigors, nightsweats  HEENT: Denies headache, rhinorrhea, sore throat, eye pain  CV: Denies CP, palpitations  PULM: Denies wheezing, hemoptysis  GI: Denies hematemesis, hematochezia, melena  : Denies discharge, hematuria  MSK: Denies arthralgias, myalgias  SKIN: Denies rash, lesions  NEURO: Denies paresthesias, weakness  PSYCH: Denies depression, anxiety    VITALS:  T(F): 97, Max: 98.7 (04-23-25 @ 00:00)  HR: 85  BP: 115/57  RR: 26Vital Signs Last 24 Hrs  T(C): 36.1 (23 Apr 2025 08:00), Max: 37.1 (23 Apr 2025 00:00)  T(F): 97 (23 Apr 2025 08:00), Max: 98.7 (23 Apr 2025 00:00)  HR: 85 (23 Apr 2025 08:00) (85 - 109)  BP: 115/57 (22 Apr 2025 16:30) (112/57 - 184/79)  BP(mean): 79 (22 Apr 2025 16:30) (79 - 110)  RR: 26 (23 Apr 2025 08:10) (22 - 60)  SpO2: 98% (23 Apr 2025 08:10) (93% - 99%)    Parameters below as of 23 Apr 2025 08:10  Patient On (Oxygen Delivery Method): nasal cannula, high flow  O2 Flow (L/min): 60  O2 Concentration (%): 60    PHYSICAL EXAM:  Gen: NAD, resting in bed  HEENT: Normocephalic, atraumatic  Neck: supple, no lymphadenopathy  CV: Regular rate & regular rhythm  Lungs: decreased BS at bases, no fremitus  Abdomen: Soft, BS present  Ext: Warm, well perfused  Neuro: non focal, awake  Skin: no rash, no erythema  Lines: no phlebitis    FH: Non-contributory  Social Hx: Non-contributory    TESTS & MEASUREMENTS:                        9.4    20.90 )-----------( 373      ( 23 Apr 2025 04:42 )             29.4     04-23    145  |  107  |  62[HH]  ----------------------------<  140[H]  3.2[L]   |  23  |  1.3    Ca    8.2[L]      23 Apr 2025 04:42  Phos  3.5     04-23  Mg     1.9     04-23    TPro  6.1  /  Alb  3.1[L]  /  TBili  0.4  /  DBili  0.2  /  AST  52[H]  /  ALT  72[H]  /  AlkPhos  93  04-23      LIVER FUNCTIONS - ( 23 Apr 2025 04:42 )  Alb: 3.1 g/dL / Pro: 6.1 g/dL / ALK PHOS: 93 U/L / ALT: 72 U/L / AST: 52 U/L / GGT: x           Urinalysis Basic - ( 23 Apr 2025 04:42 )    Color: x / Appearance: x / SG: x / pH: x  Gluc: 140 mg/dL / Ketone: x  / Bili: x / Urobili: x   Blood: x / Protein: x / Nitrite: x   Leuk Esterase: x / RBC: x / WBC x   Sq Epi: x / Non Sq Epi: x / Bacteria: x        Culture - Body Fluid with Gram Stain (collected 04-22-25 @ 15:00)  Source: Abdominal Fl Abdominal Fluid  Gram Stain (04-23-25 @ 06:50):    polymorphonuclear leukocytes seen    No organisms seen    by cytocentrifuge    Culture - Body Fluid with Gram Stain (collected 04-17-25 @ 15:20)  Source: Body Fluid None  Gram Stain (04-19-25 @ 12:15):    No polymorphonuclear leukocytes seen    No organisms seen  Final Report (04-21-25 @ 13:36):    Culture in progress    Rare Escherichia coli    Rare Corynebacterium tuberculostearicum "Susceptibilities not performed"  Organism: Escherichia coli (04-21-25 @ 13:35)  Organism: Escherichia coli (04-21-25 @ 13:35)      -  Levofloxacin: S <=0.5      -  Tobramycin: S <=2      -  Aztreonam: S <=4      -  Gentamicin: S <=2      -  Cefepime: S <=2      -  Cefazolin: S <=2      -  Piperacillin/Tazobactam: S <=8      -  Ciprofloxacin: S <=0.25      -  Imipenem: S <=1      -  Ceftriaxone: S <=1      -  Ampicillin: S <=8 These ampicillin results predict results for amoxicillin      Method Type: LOUISE      -  Meropenem: S <=1      -  Ampicillin/Sulbactam: S <=4/2      -  Cefoxitin: S <=8      -  Amoxicillin/Clavulanic Acid: S <=8/4      -  Trimethoprim/Sulfamethoxazole: S <=0.5/9.5      -  Ertapenem: S <=0.5    Culture - Sputum (collected 04-14-25 @ 20:15)  Source: Trach Asp Tracheal Aspirate  Gram Stain (04-15-25 @ 11:15):    Rare polymorphonuclear leukocytes per low power field    No Squamous epithelial cells per low power field    No organisms seen per oil power field  Final Report (04-16-25 @ 21:32):    No growth    Culture - Blood (collected 04-13-25 @ 07:01)  Source: Blood Blood-Peripheral  Final Report (04-18-25 @ 12:01):    No growth at 5 days    Culture - Acid Fast - Other w/Smear (collected 04-10-25 @ 18:00)  Source: Other Peritoneal Fluid #2  Preliminary Report (04-19-25 @ 23:06):    No acid-fast bacilli isolated at 1 week. ****Acid-fast cultures are held    for 6 weeks.****    Culture - Fungal, Other (collected 04-10-25 @ 18:00)  Source: Other Peritoneal Fluid #2  Preliminary Report (04-19-25 @ 23:02):    No fungus isolated at 1 week.    Culture - Wound Aerobic/Anaerobic (collected 04-10-25 @ 10:30)  Source: Surgical Swab Peritoneal Fluid #1  Final Report (04-13-25 @ 14:15):    Rare Bacteroides fragilis "Susceptibilities not performed"    Rare Clostridium clostridioforme "Susceptibilities not performed"    Culture - Acid Fast - Other w/Smear (collected 04-10-25 @ 10:30)  Source: Other Peritoneal Fluid #1  Preliminary Report (04-19-25 @ 23:06):    No acid-fast bacilli isolated at 1 week. ****Acid-fast cultures are held    for 6 weeks.****    Culture - Fungal, Other (collected 04-10-25 @ 10:30)  Source: Other Peritoneal Fluid #1  Preliminary Report (04-19-25 @ 23:02):    No fungus isolated at 1 week.    Culture - Wound Aerobic/Anaerobic (collected 04-10-25 @ 10:30)  Source: Surgical Swab Peritoneal Fluid #1  Final Report (04-13-25 @ 13:48):    Few Escherichia coli    Few Bacteroides fragilis "Susceptibilities not performed"  Organism: Escherichia coli (04-13-25 @ 13:48)  Organism: Escherichia coli (04-13-25 @ 13:48)      -  Levofloxacin: S <=0.5      -  Tobramycin: S <=2      -  Aztreonam: S <=4      -  Gentamicin: S <=2      -  Cefazolin: S <=2      -  Cefepime: S <=2      -  Piperacillin/Tazobactam: S <=8      -  Ciprofloxacin: S <=0.25      -  Imipenem: S <=1      -  Ceftriaxone: S <=1      -  Ampicillin: S <=8 These ampicillin results predict results for amoxicillin      Method Type: LOUISE      -  Meropenem: S <=1      -  Ampicillin/Sulbactam: S <=4/2      -  Cefoxitin: S <=8      -  Amoxicillin/Clavulanic Acid: S <=8/4      -  Trimethoprim/Sulfamethoxazole: S <=0.5/9.5      -  Ertapenem: S <=0.5    Urinalysis with Rflx Culture (collected 04-03-25 @ 20:06)    Culture - Urine (collected 04-03-25 @ 20:06)  Source: Clean Catch None  Final Report (04-06-25 @ 14:03):    50,000 - 99,000 CFU/mL Escherichia coli    <10,000 CFU/ml Normal Urogenital andra present  Organism: Escherichia coli (04-06-25 @ 14:03)  Organism: Escherichia coli (04-06-25 @ 14:03)      -  Levofloxacin: S <=0.5      -  Tobramycin: S <=2      -  Nitrofurantoin: S <=32 Should not be used to treat pyelonephritis      -  Aztreonam: S <=4      -  Gentamicin: S <=2      -  Cefazolin: S <=2 For uncomplicated UTI with K. pneumoniae, E. coli, or P. mirablis: LOUISE <=16 is sensitive and LOUISE >=32 is resistant. This also predicts results for oral agents cefaclor, cefdinir, cefpodoxime, cefprozil, cefuroxime axetil, cephalexin and locarbef for uncomplicated UTI. Note that some isolates may be susceptible to these agents while testing resistant to cefazolin.      -  Cefepime: S <=2      -  Piperacillin/Tazobactam: S <=8      -  Ciprofloxacin: S <=0.25      -  Imipenem: S <=1      -  Ceftriaxone: S <=1      -  Ampicillin: S <=8 These ampicillin results predict results for amoxicillin      Method Type: LOUISE      -  Meropenem: S <=1      -  Ampicillin/Sulbactam: S <=4/2      -  Cefoxitin: S <=8      -  Cefuroxime: S <=4      -  Amoxicillin/Clavulanic Acid: S <=8/4      -  Trimethoprim/Sulfamethoxazole: S <=0.5/9.5      -  Ertapenem: S <=0.5            INFECTIOUS DISEASES TESTING  MRSA PCR Result.: Negative (04-11-25 @ 00:40)      INFLAMMATORY MARKERS      RADIOLOGY & ADDITIONAL TESTS:  I have personally reviewed the last available Chest xray  CXR  Xray Chest 1 View- PORTABLE-Urgent:   ACC: [id number] EXAM:  XR CHEST PORTABLE URGENT 1V   ORDERED BY: ARIA ROMAN     PROCEDURE DATE:  04/20/2025          INTERPRETATION:  Clinical History / Reason for exam: SICU    Comparison : Chest radiograph: April 19 2025    Technique/Positioning: Frontal view of the chest    Findings/  impression:    Support devices: . There is a right internal jugular central venous   catheter terminating at the distal SVC.    Cardiac/mediastinum/hilum: No significant change    Skeleton/soft tissues: No significant change.    Lung parenchyma/Pleura: Low lung volume. Stable bibasilar opacities. No   definite pneumothorax.    --- End of Report ---            ASAD GUEVARA MD; Attending Radiologist  This document has been electronically signed. Apr 20 2025 10:42PM (04-20-25 @ 22:11)      CT  CT Angio Chest PE Protocol w/ IV Cont:   ACC: [id number] EXAM:  CT ABDOMEN AND PELVIS OC IC   ORDERED BY: JAYLEN DRAKE     ACC: [id number] EXAM:  CT ANGIO CHEST PULM ART WAWIC   ORDERED BY: JAYLEN DRAKE     PROCEDURE DATE:  04/21/2025          INTERPRETATION:  CTA chest with IV contrast, CT abdomen and pelvis with   IV contrast    CLINICAL HISTORY/REASON FOR EXAM: Post ventral hernia repair. Tachypnea.   Tachycardia..    TECHNIQUE: CTA chest with IV contrast, CT abdomen and pelvis with IV   contrast performed. Initial CTA imagesof chest obtained with 100 cc   Omnipaque 350 IV contrast administration. Subsequently, CT images of   abdomen and pelvis obtained with IV contrast administration. Oral   contrast was not administered. Reformatted images in the coronal and   sagittalplanes were acquired. 3D (MIP images) generated.    COMPARISON: CT abdomen and pelvis 4/17/2025.      FINDINGS:    CHEST:      PULMONARY ARTERIES: Images degraded by streak artifact. Evidence of a   right distal main pulmonary embolus extending to right lower lobar and   segmental pulmonary arteries; no definite right heart strain (series 4,   image [id number]).    LUNGS, PLEURA, AIRWAYS: Bilateral lower lobe consolidative opacities,   findings could represent atelectasis versus pneumonia in the appropriate   clinical setting. No lobar mass, effusion, or pneumothorax. No evidence   of central endobronchial obstruction. No bronchiectasis or honeycombing.   No suspicious pulmonary nodule.    THORACIC NODES: No mediastinal, hilar, supraclavicular, oraxillary   lymphadenopathy.    MEDIASTINUM/GREAT VESSELS: No pericardial effusion. Heart size is within   normal limits. The aorta and main pulmonary artery are of normal caliber.   Right IJ central venous catheter to SVC.    ABDOMEN/PELVIS:    HEPATOBILIARY: Unchanged.    SPLEEN: Unchanged.    PANCREAS: Unchanged.    ADRENAL GLANDS: Unchanged.    KIDNEYS: Unchanged.    ABDOMINOPELVIC NODES: Unchanged.    PELVIC ORGANS: Urinary bladder unremarkable. Uterus unremarkable.    PERITONEUM/MESENTERY/BOWEL/SOFT TISSUES: Interval ventral hernia repair ,   small bowel resection. Lower abdominal wall 9.7 x 6.2 cm fluid collection   at site of previously seen extravasated contrast, similar in size since   prior study (series 5, image 360). Decreased anterior peritoneal fluid   collection, measuring up to 6.3 x 8.3 cm (series 5, image 315). No   definite current oral contrast leak. Post multiple change catheter   placement. No pneumoperitoneum.    BONES: Degenerative change of spine.      IMPRESSION:    Evidence of a right distal main pulmonary embolus extending to right   lower lobar and segmental pulmonary arteries: Examination limited by   streak artifact; no definite right heart strain .    Interval ventral hernia repair, small bowel resection. Lower abdominal   wall 9.7 x 6.2 cm fluid collection at site of previously seen   extravasated contrast, similar in size since prior study. Decreased   anterior peritoneal fluid collection, measuring up to 6.3 x 8.3 cm. No   definite current oral contrast leak.    Bilateral lower lobe consolidative opacities, findings could represent   atelectasis versus pneumonia in the appropriate clinical setting    Speak with KAROLYN De Luna    --- End of Report ---            NIXON SALDAÑA MD; Attending Radiologist  This document has been electronically signed. Apr 21 2025  7:59PM (04-21-25 @ 18:11)      CARDIOLOGY TESTING  12 Lead ECG:   Ventricular Rate 96 BPM    Atrial Rate 96 BPM    P-R Interval 130 ms    QRS Duration 104 ms    Q-T Interval 364 ms    QTC Calculation(Bazett) 459 ms    P Axis 41 degrees    R Axis 25 degrees    T Axis 26 degrees    Diagnosis Line Normal sinus rhythm  Normal ECG    Confirmed by JUSTINO GILES MD ([id number]) on 4/11/2025 7:13:52 AM (04-10-25 @ 17:33)      MEDICATIONS  albuterol/ipratropium for Nebulization 3 Nebulizer every 6 hours  amLODIPine   Tablet 10 Oral daily  chlorhexidine 2% Cloths 1 Topical <User Schedule>  dextrose 5%. 1000 IV Continuous <Continuous>  heparin  Infusion 2800 IV Continuous <Continuous>  insulin lispro (ADMELOG) corrective regimen sliding scale  SubCutaneous every 6 hours  lipid, fat emulsion (Fish Oil and Plant Based) 20% Infusion 0.3687 IV Continuous <Continuous>  melatonin 5 Oral at bedtime  meropenem  IVPB     meropenem  IVPB 1000 IV Intermittent every 8 hours  metoprolol tartrate 25 Oral two times a day  pantoprazole  Injectable 40 IV Push daily  Parenteral Nutrition - Adult 1 TPN Continuous <Continuous>  traZODone 25 Oral at bedtime      WEIGHT  Weight (kg): 135.6 (04-17-25 @ 11:26)  Creatinine: 1.3 mg/dL (04-23-25 @ 04:42)      ANTIBIOTICS:  meropenem  IVPB      meropenem  IVPB 1000 milliGRAM(s) IV Intermittent every 8 hours      All available historical records have been reviewed

## 2025-04-23 NOTE — PROGRESS NOTE ADULT - ASSESSMENT
ASSESSMENT:  64F PMHx HTN, morbid obesity s/p 2001 Abby-en-Y gastric bypass who presented on 4/3 with incarcerated ventral hernia with SBO. S/p 4/10 open ventral hernia repair, small bowel resection, and primary fascial closure with Dr. Lema. Admitted to SICU for Q1 abdominal checks and hemodynamic monitoring. S/p 4/17 takeback for exploratory laparotomy, repair of anastomotic leak.    NEUROLOGICAL:  #Acute pain  - IV tylenol PRN  - Dilaudid 0.5mg Q4 PRN severe pain   - Trazodone 25mg QPM  - Melatonin 5mg qHS    RESPIRATORY:   #Right distal main pulmonary embolus  - Therapeutic Heparin gtt   - Echo 4/22/25: EF 70 to 75%.  - B/l DVT sono complete, prelim R PT thrombus, f/u final read  #Oxygenation  - Intubated 4/10-4/15  - Reintubated 4/17-4/18  - 4hrs on/off BiPAP/HFNC   #Diuresis  - 1mg bumex x 1 4/19  - 1mg Bumex x 1 4/20  #PMHxx [place] on CPAP@ home   #Activity   - Activity- increase as tolerated     CARDS:   #acute hypotension in setting of SIRS, resolved   - Off levophed since 4/18  - MAP >65  #PMHx of HTN  - Nicardipine gtt off at 20:00  - Restarted amlodipine 10mg qD   - Restarted home HCTZ 25mg QD  - Holding home losartan  #EKG- NSR  #TTE 6/2022: EF 55-60%. G1DD.   - TTE 4/11: EF of 56 %.G1DD.     GASTROINTESTINAL/NUTRITION:   #s/p lap converted to open repair of large ventral hernia with incarcerated small bowel, small bowel resection, vicryl mesh placement; c/b anastomotic leak     - CTAP with PO contrast: Evidence of defect in small bowel loop with connection to anterior midline intraperitoneal and subcutaneous pannus collections containing extravasated oral contrast. Unclear if this small bowel defect occurs at the anastomotic site. Surgical review recommended.    - 4/17: ex lap anastomosis resection and creation of side to side anastomosis     - aspiration precautions, HOB 30  #Anterior abdominal wall collection  < from: CT Abdomen and Pelvis w/ Oral Cont and w/ IV Cont (04.21.25 @ 18:11) >  Interval ventral hernia repair, small bowel resection. Lower abdominal wall 9.7 x 6.2 cm fluid collection at site of previously seen extravasated contrast, similar in size since prior study. Decreased anterior peritoneal fluid collection, measuring up to 6.3 x 8.3 cm. No definite current oral contrast leak.  < end of copied text >  - 4/22: s/p IR percutaneous placement of a 8.5 Kiny[place]wanda drainage catheter into lower abdominal wall fluid collection, yielding 150 mL of foul appearing fluid.  #SBO s/p ventral hernia repair with SBR  #Diet, Bariatric Clears, sips  - Continue TPN with AM labs  #GI Prophylaxis/hx GERD   - pantoprazole  Injectable 40 IV Push  daily (omeprazole @home)  #Bowel regimen  - Holding  - Last BM 4/22  - NO dignishield     /RENAL:   #urine output in critically ill  - An removed, passed TOV   - High UOP   #FUNMI; resolving   - Uptrending BUN   - Ammonia 35  - previously oliguric, now improved. Previously on bumex gtt @ 0.5mg/hr  - Nephrology consulted and following > hold off on CVVH  - Renal u/s> no hydronephrosis. 2-3cm anechoic cyst on right kidney     Labs:          BUN/Cr- 64/1.4  -->,  64/1.5  -->          [04-22 @ 04:20]Na  146 // K  3.5 // Mg  2.0 // Phos  2.6  [04-22 @ 00:07]Na  147 // K  3.2 // Mg  2.0 // Phos  2.7  #Hypernatremia  -started on D5W @ 50cc/hr     HEME/ONC:   # DVT prophylaxis-  - Therapeutic heparin gtt in setting of PE - currently at 3200 units/hr   #R posterior tibial vein DVT  - vascular surgery consulted- - AC with heparin drip (upon discharge transition to Eliquis 10 mg po BID x 7 days then 5 mg po BID for at least 6 months)    Labs: Hb/Hct:  8.8/27.9  -->,  9.3/28.5  -->                      Plts:  345  -->,  422  -->                 PTT/INR:  52.3/--  --->,  94.1/--  --->     T&S Expires: 4/23    ID:  #MRSA nares negative   Current antibiotics-piperacillin/tazobactam IVPB.. 3.375 every 12 hours x 4 days post op (end date 4/22)  #Antibiotics Course  - continue zosyn > Purulent abscesses noted intraoperatively - ended 4/22  - Meropenem (4/22 - )  WBC- 17.45  --->>,  19.06  --->>,  23.04  --->>  Temp trend- 24hrs T(F): 98.7 (04-23 @ 00:00), Max: 99.3 (04-22 @ 04:00)  #febrile  - UA negative  - blood cultures; no growth at 5d  - sputum cultures pending   #cultures  - OR cx: few E.coli, few bacteroides, rare clostridium  -4/13 Blood Cx: NGTF  -4/14 tracheal aspirate: no growth   -4/17 body fluid cx rare e. coli     ENDOCRINE:  #Glycemic monitoring  - FSG Q6   - ISS  - A1C 5.8%     MSK:  #Activity- increase as tolerated     SKIN:  #DTI screening negative     - will continue to monitor daily skin changes    LINES/DRAINS:  PIV, THONY drain x 4, Radial A line, RIJ TLC   ADVANCED DIRECTIVES:  Full Code  HCP- Daughter  (Shavonne Moss)  INDICATION FOR SICU/SDU: Intestinal obstruction  DISPO:  SICU - to be diiscussed with SICU attending Dr. Johnson. ASSESSMENT:  64F PMHx HTN, morbid obesity s/p 2001 Abby-en-Y gastric bypass who presented on 4/3 with incarcerated ventral hernia with SBO. S/p 4/10 open ventral hernia repair, small bowel resection, and primary fascial closure with Dr. Lema. Admitted to SICU for Q1 abdominal checks and hemodynamic monitoring. S/p 4/17 takeback for exploratory laparotomy, repair of anastomotic leak.    NEUROLOGICAL:  #Acute pain  - IV tylenol PRN  - Dilaudid 0.5mg Q4 PRN severe pain   - Trazodone 25mg QPM  - Melatonin 5mg qHS    RESPIRATORY:   #Right distal main pulmonary embolus  - Therapeutic Heparin gtt   - Echo 4/22/25: EF 70 to 75%.  - B/l DVT sono complete, prelim R PT thrombus, f/u final read  #Oxygenation  - Intubated 4/10-4/15  - Reintubated 4/17-4/18  - 4hrs on/off BiPAP/HFNC   #Diuresis  - 1mg bumex x 1 4/19  - 1mg Bumex x 1 4/20  #PMHxx [place] on CPAP@ home   #Activity   - Activity- increase as tolerated     CARDS:   #acute hypotension in setting of SIRS, resolved   - Off levophed since 4/18  - MAP >65  #PMHx of HTN  - Nicardipine gtt off at 20:00  - Restarted amlodipine 10mg qD   - Restarted home HCTZ 25mg QD  - Holding home losartan  #EKG- NSR  #TTE 6/2022: EF 55-60%. G1DD.   - TTE 4/11: EF of 56 %.G1DD.     GASTROINTESTINAL/NUTRITION:   #s/p lap converted to open repair of large ventral hernia with incarcerated small bowel, small bowel resection, vicryl mesh placement; c/b anastomotic leak     - CTAP with PO contrast: Evidence of defect in small bowel loop with connection to anterior midline intraperitoneal and subcutaneous pannus collections containing extravasated oral contrast. Unclear if this small bowel defect occurs at the anastomotic site. Surgical review recommended.    - 4/17: ex lap anastomosis resection and creation of side to side anastomosis     - aspiration precautions, HOB 30  #Anterior abdominal wall collection  < from: CT Abdomen and Pelvis w/ Oral Cont and w/ IV Cont (04.21.25 @ 18:11) >  Interval ventral hernia repair, small bowel resection. Lower abdominal wall 9.7 x 6.2 cm fluid collection at site of previously seen extravasated contrast, similar in size since prior study. Decreased anterior peritoneal fluid collection, measuring up to 6.3 x 8.3 cm. No definite current oral contrast leak.  < end of copied text >  - 4/22: s/p IR percutaneous placement of a 8.5 [place]ian drainage catheter into lower abdominal wall fluid collection, yielding 150 mL of foul appearing fluid.  #SBO s/p ventral hernia repair with SBR  #Diet, Bariatric Clears, sips  - Continue TPN with AM labs  #GI Prophylaxis/hx GERD   - pantoprazole  Injectable 40 IV Push  daily (omeprazole @home)  #Bowel regimen  - Holding  - Last BM 4/22  - NO dignishield     /RENAL:   #urine output in critically ill  - An removed, passed TOV   - High UOP   #FUNMI; resolving   - Uptrending BUN   - Ammonia 35  - previously oliguric, now improved. Previously on bumex gtt @ 0.5mg/hr  - Nephrology consulted and following > hold off on CVVH  - Renal u/s> no hydronephrosis. 2-3cm anechoic cyst on right kidney     Labs:          BUN/Cr- 64/1.5  -->,  62/1.3  -->          [04-23 @ 04:42]Na  145 // K  3.2 // Mg  1.9 // Phos  3.5  [04-22 @ 04:20]Na  146 // K  3.5 // Mg  2.0 // Phos  2.6  #Hypernatremia  -started on D5W @ 50cc/hr     HEME/ONC:   # DVT prophylaxis-  - Therapeutic heparin gtt in setting of PE - currently at 3200 units/hr   #R posterior tibial vein DVT  - vascular surgery consulted- - AC with heparin drip (upon discharge transition to Eliquis 10 mg po BID x 7 days then 5 mg po BID for at least 6 months)    Labs: Hb/Hct:  9.3/28.5  -->,  9.4/29.4  -->                      Plts:  422  -->,  373  -->                 PTT/INR:  94.1/--  --->,  128.9/--  --->     T&S Expires: 4/23    ID:  #MRSA nares negative   Current antibiotics-piperacillin/tazobactam IVPB.. 3.375 every 12 hours x 4 days post op (end date 4/22)  #Antibiotics Course  - continue zosyn > Purulent abscesses noted intraoperatively - ended 4/22  - Meropenem (4/22 - )  WBC- 19.06  --->>,  23.04  --->>,  20.90  --->>  Temp trend- 24hrs T(F): 98.6 (04-23 @ 04:00), Max: 98.7 (04-23 @ 00:00)  Antibiotics- meropenem  IVPB 1000 every 8 hours  #febrile  - UA negative  - blood cultures; no growth at 5d  - sputum cultures pending   #cultures  - OR cx: few E.coli, few bacteroides, rare clostridium  -4/13 Blood Cx: NGTF  -4/14 tracheal aspirate: no growth   -4/17 body fluid cx rare e. coli     ENDOCRINE:  #Glycemic monitoring  - FSG Q6   - ISS  - A1C 5.8%     MSK:  #Activity- increase as tolerated     SKIN:  #DTI screening negative     - will continue to monitor daily skin changes    LINES/DRAINS:  PIV, THONY drain x 4, Radial A line, RIJ TLC   ADVANCED DIRECTIVES:  Full Code  HCP- Daughter  (Shavonne Moss)  INDICATION FOR SICU/SDU: Intestinal obstruction  DISPO:  SICU - to be diiscussed with SICU attending Dr. Johnson. ASSESSMENT:  64F PMHx HTN, morbid obesity s/p 2001 Abby-en-Y gastric bypass who presented on 4/3 with incarcerated ventral hernia with SBO. S/p 4/10 open ventral hernia repair, small bowel resection, and primary fascial closure with Dr. Lema. Admitted to SICU for Q1 abdominal checks and hemodynamic monitoring. S/p 4/17 takeback for exploratory laparotomy, repair of anastomotic leak.    NEUROLOGICAL:  #Acute pain  - IV tylenol PRN  - Dilaudid 0.5mg Q4 PRN severe pain   - Trazodone 25mg QPM  - Melatonin 5mg qHS    RESPIRATORY:   #Right distal main pulmonary embolus  - Therapeutic Heparin gtt   - Echo 4/22/25: EF 70 to 75%.  - B/l DVT sono complete, prelim R PT thrombus, f/u final read  #Oxygenation  - Intubated 4/10-4/15  - Reintubated 4/17-4/18  - 4hrs on/off BiPAP/HFNC   #Diuresis  - 1mg bumex x 1 4/19  - 1mg Bumex x 1 4/20  #PMHxx [place] on CPAP@ home   #Activity   - Activity- increase as tolerated     CARDS:   #acute hypotension in setting of SIRS, resolved   - Off levophed since 4/18  - MAP >65  #PMHx of HTN  - Nicardipine gtt off at 20:00  - Restarted amlodipine 10mg qD   - holding home HCTZ 25mg QD per cardiology and nephrology  - Holding home losartan  #EKG- NSR  #TTE 6/2022: EF 55-60%. G1DD.   - TTE 4/11: EF of 56 %.G1DD.     GASTROINTESTINAL/NUTRITION:   #s/p lap converted to open repair of large ventral hernia with incarcerated small bowel, small bowel resection, vicryl mesh placement; c/b anastomotic leak     - CTAP with PO contrast: Evidence of defect in small bowel loop with connection to anterior midline intraperitoneal and subcutaneous pannus collections containing extravasated oral contrast. Unclear if this small bowel defect occurs at the anastomotic site. Surgical review recommended.    - 4/17: ex lap anastomosis resection and creation of side to side anastomosis     - aspiration precautions, HOB 30  #Anterior abdominal wall collection  < from: CT Abdomen and Pelvis w/ Oral Cont and w/ IV Cont (04.21.25 @ 18:11) >  Interval ventral hernia repair, small bowel resection. Lower abdominal wall 9.7 x 6.2 cm fluid collection at site of previously seen extravasated contrast, similar in size since prior study. Decreased anterior peritoneal fluid collection, measuring up to 6.3 x 8.3 cm. No definite current oral contrast leak.  < end of copied text >  - 4/22: s/p IR percutaneous placement of a 8.5 Urdu drainage catheter into lower abdominal wall fluid collection, yielding 150 mL of foul appearing fluid.  #SBO s/p ventral hernia repair with SBR  #Diet, Bariatric Clears, sips  - Continue TPN with AM labs  #GI Prophylaxis/hx GERD   - pantoprazole  Injectable 40 IV Push  daily (omeprazole @home)  #Bowel regimen  - Holding  - Last BM 4/22  - NO dignishield     /RENAL:   #urine output in critically ill  - An removed, passed TOV   - High UOP   #FUMNI; resolving   - Uptrending BUN   - Ammonia 35  - previously oliguric, now improved. Previously on bumex gtt @ 0.5mg/hr  - Nephrology consulted and following > hold off on CVVH  - Renal u/s> no hydronephrosis. 2-3cm anechoic cyst on right kidney     Labs:          BUN/Cr- 64/1.5  -->,  62/1.3  -->          [04-23 @ 04:42]Na  145 // K  3.2 // Mg  1.9 // Phos  3.5  [04-22 @ 04:20]Na  146 // K  3.5 // Mg  2.0 // Phos  2.6  #Hypernatremia  -started on D5W @ 50cc/hr     HEME/ONC:   # DVT prophylaxis-  - Therapeutic heparin gtt in setting of PE - currently at 2800u/hr --> transitioning to therapeutic lovenox 135mg BID  #R posterior tibial vein DVT  - vascular surgery consulted- - AC with heparin drip (upon discharge transition to Eliquis 10 mg po BID x 7 days then 5 mg po BID for at least 6 months)    Labs: Hb/Hct:  9.3/28.5  -->,  9.4/29.4  -->                      Plts:  422  -->,  373  -->                 PTT/INR:  94.1/--  --->,  128.9/--  --->     T&S Expires: 4/23    ID:  #MRSA nares negative   Current antibiotics-piperacillin/tazobactam IVPB.. 3.375 every 12 hours x 4 days post op (end date 4/22)  #Antibiotics Course  - continue zosyn > Purulent abscesses noted intraoperatively - ended 4/22  - Meropenem (4/22 - )  WBC- 19.06  --->>,  23.04  --->>,  20.90  --->>  Temp trend- 24hrs T(F): 98.6 (04-23 @ 04:00), Max: 98.7 (04-23 @ 00:00)  Antibiotics- meropenem  IVPB 1000 every 8 hours  #febrile  - UA negative  - blood cultures; no growth at 5d  - sputum cultures pending   #cultures  - OR cx: few E.coli, few bacteroides, rare clostridium  -4/13 Blood Cx: NGTF  -4/14 tracheal aspirate: no growth   -4/17 body fluid cx rare e. coli     ENDOCRINE:  #Glycemic monitoring  - FSG Q6   - ISS  - A1C 5.8%     MSK:  #Activity- increase as tolerated     SKIN:  #DTI screening negative     - will continue to monitor daily skin changes    LINES/DRAINS:  PIV, THONY drain x 4, Radial A line, RIJ TLC   ADVANCED DIRECTIVES:  Full Code  HCP- Daughter  (Shavonne Moss)  INDICATION FOR SICU/SDU: Intestinal obstruction  DISPO:  SICU - to be diiscussed with SICU attending Dr. Johnson.

## 2025-04-23 NOTE — PROGRESS NOTE ADULT - SUBJECTIVE AND OBJECTIVE BOX
NANCY SARGENT   [id number]   [birth year]  64yF  ============================================================   DATE OF INITIAL SICU/SDU CONSULT: 04-10-25    INDICATION FOR SICU CONSULT:  q1hr abdominal checks, mechanical ventilation     SICU COURSE EVENTS :  04-10 - admitted to SICU service  4/11: Intubated and started on paralytic. Arterial line placed, subclavian TLC placed. Nephrology consulted for oliguria. IR abdominal muscle botox injection performed. TTE performed.   4/12: Additional fluid boluses given; trial fo bumex started, considering CVVH. Weaned off nimbex gtt.  > 220 /hr. Renal U/S w/out hydro.   4/13: Weaned off Levophed. Bumex gtt 2mg/hr > 1mg/hr. Goal net negative 1-1.5L, UOP approx, 200c/hr.   4/14: Remained on bumex gtt for further diuresis, decreased to 0.5 overnight, vent settings weaned. Cr downtrending, LFTs worsening.   4/15: Extubated to BiPAP, transitioned to NC. Dc'd bumex gtt, given 5mg metolazone x 1 and 2mg bumex x 1.   4/16: THONY drain #2 murky/bilious, pending CTAP with PO contrast, hypernatremia, started D5W @ 75cc/hr, persistent hypokalemia   4/17: RTOR for resection of anastomosis for anastomotic leak. Returned intubated, 400/24/80/10, sinus tachy to . Abdomen soft. Was initially on propofol @ 30 and precedex, but propofol weaned off due to hypotension with MAPs in 50s, fentanyl ggt started for acute pain. Remained hypotensive despite fluids, started on Levophed, on 0.05.   4/18: Extubated. HFNC 40L/49%, Levo off since 11 AM   4/19: NGT removed. Started on duonebs. D5W increased to 60cc/hr. 1mg bumex, > 1.5L response  4/20: Episode of afib RVR resolved with metoprolol 5mg IVP, cardiology c/s placed   4/21: PE on CTA, therapeutic heparin gtt started      24HOUR EVENTS  4/22  NIGHT  -PTT therapeutic at 84.1, heparin drip @ , repeat PTT @ 2AM  -nicardpine drip d/c, BP 140s/90s  -hctz held per cardiology and nephrology given persistent hypokalemia despite TPN and additional repletion, and subsequent diarrhea  -new drain with serous output, 2 drains with serosanguinous output, 1 with murky/serosang    DAY  - Echo: EF 70-75%, trace mitral regurgitation, otherwise normal function  - repeat blood cx  - LFTs  - Start meropenem per ID  -   - heparin gtt @ 3200 units  - S/p IR percutaneous placement of a 8.5 Austrian drainage catheter into lower abdominal wall fluid collection, yielding 150 mL of bowel appearing fluid.  - new left radial a line placed      [X] A ten-point review of systems was negative except as expressed in note.  [X ] History was obtained from patient. If unable to participate in their care, history was from review of the chart and collateral sources of information.  ============================================================    NANCY SARGENT   [id number]   [birth year]  64yF  ============================================================   DATE OF INITIAL SICU/SDU CONSULT: 04-10-25    INDICATION FOR SICU CONSULT:  q1hr abdominal checks, mechanical ventilation     SICU COURSE EVENTS :  04-10 - admitted to SICU service  : Intubated and started on paralytic. Arterial line placed, subclavian TLC placed. Nephrology consulted for oliguria. IR abdominal muscle botox injection performed. TTE performed.   : Additional fluid boluses given; trial fo bumex started, considering CVVH. Weaned off nimbex gtt.  > 220 /hr. Renal U/S w/out hydro.   : Weaned off Levophed. Bumex gtt 2mg/hr > 1mg/hr. Goal net negative 1-1.5L, UOP approx, 200c/hr.   : Remained on bumex gtt for further diuresis, decreased to 0.5 overnight, vent settings weaned. Cr downtrending, LFTs worsening.   4/15: Extubated to BiPAP, transitioned to NC. Dc'd bumex gtt, given 5mg metolazone x 1 and 2mg bumex x 1.   : THONY drain #2 murky/bilious, pending CTAP with PO contrast, hypernatremia, started D5W @ 75cc/hr, persistent hypokalemia   : RTOR for resection of anastomosis for anastomotic leak. Returned intubated, 400/24/80/10, sinus tachy to . Abdomen soft. Was initially on propofol @ 30 and precedex, but propofol weaned off due to hypotension with MAPs in 50s, fentanyl ggt started for acute pain. Remained hypotensive despite fluids, started on Levophed, on 0.05.   : Extubated. HFNC 40L/49%, Levo off since 11 AM   : NGT removed. Started on duonebs. D5W increased to 60cc/hr. 1mg bumex, > 1.5L response  : Episode of afib RVR resolved with metoprolol 5mg IVP, cardiology c/s placed   : PE on CTA, therapeutic heparin gtt started      24HOUR EVENTS    NIGHT  -PTT therapeutic at 84.1, heparin drip @ , repeat PTT @ 2AM  -nicardpine drip d/c, BP 140s/90s  -hctz held per cardiology and nephrology given persistent hypokalemia despite TPN and additional repletion, and subsequent diarrhea  -new drain with serous output, 2 drains with serosanguinous output, 1 with murky/serosang    DAY  - Echo: EF 70-75%, trace mitral regurgitation, otherwise normal function  - repeat blood cx  - LFTs  - Start meropenem per ID  -   - heparin gtt @ 3200 units  - S/p IR percutaneous placement of a 8.5 Micronesian drainage catheter into lower abdominal wall fluid collection, yielding 150 mL of bowel appearing fluid.  - new left radial a line placed      [X] A ten-point review of systems was negative except as expressed in note.  [X ] History was obtained from patient. If unable to participate in their care, history was from review of the chart and collateral sources of information.  ============================================================   Daily     Daily Weight in k (2025 06:00)    Diet, Clear Liquid:   Bariatric Clear Liquid (BARICLLIQ)  Supplement Feeding Modality:  Oral  Ensure Surgery Cans or Servings Per Day:  2       Frequency:  Three Times a day (25 @ 08:38)      CURRENT MEDS:  Neurologic Medications  HYDROmorphone  Injectable 0.5 milliGRAM(s) IV Push every 4 hours PRN Severe Pain (7 - 10)  melatonin 5 milliGRAM(s) Oral at bedtime  traZODone 25 milliGRAM(s) Oral at bedtime    Respiratory Medications  albuterol/ipratropium for Nebulization 3 milliLiter(s) Nebulizer every 6 hours    Cardiovascular Medications  amLODIPine   Tablet 10 milliGRAM(s) Oral daily  metoprolol tartrate 25 milliGRAM(s) Oral two times a day    Gastrointestinal Medications  dextrose 5%. 1000 milliLiter(s) IV Continuous <Continuous>  pantoprazole    Tablet 40 milliGRAM(s) Oral every 24 hours  Parenteral Nutrition - Adult 1 Each TPN Continuous <Continuous>  Parenteral Nutrition - Adult 1 Each TPN Continuous <Continuous>    Genitourinary Medications    Hematologic/Oncologic Medications  heparin  Infusion 2800 Unit(s)/Hr IV Continuous <Continuous>    Antimicrobial/Immunologic Medications  meropenem  IVPB      meropenem  IVPB 1000 milliGRAM(s) IV Intermittent every 8 hours    Endocrine/Metabolic Medications  insulin lispro (ADMELOG) corrective regimen sliding scale   SubCutaneous Before meals and at bedtime  insulin lispro Injectable (ADMELOG). 2 Unit(s) SubCutaneous once    Topical/Other Medications  chlorhexidine 2% Cloths 1 Application(s) Topical <User Schedule>      ICU Vital Signs Last 24 Hrs  T(C): 36.1 (2025 12:00), Max: 37.1 (2025 00:00)  T(F): 97 (2025 12:00), Max: 98.7 (2025 00:00)  HR: 94 (2025 10:00) (85 - 109)  BP: 115/57 (2025 16:30) (115/57 - 184/79)  BP(mean): 79 (2025 16:30) (79 - 110)  ABP: 141/62 (2025 10:00) (92/53 - 147/65)  ABP(mean): 85 (2025 10:00) (67 - 90)  RR: 25 (2025 10:00) (22 - 60)  SpO2: 96% (2025 10:00) (95% - 99%)    O2 Parameters below as of 2025 08:10  Patient On (Oxygen Delivery Method): nasal cannula, high flow  O2 Flow (L/min): 60  O2 Concentration (%): 60          ABG - ( 2025 11:59 )  pH, Arterial: 7.58  pH, Blood: x     /  pCO2: 26    /  pO2: 121   / HCO3: 24    / Base Excess: 3.6   /  SaO2: 97.5                I&O's Summary    2025 07:01  -  2025 07:00  --------------------------------------------------------  IN: 3926.8 mL / OUT: 4665 mL / NET: -738.2 mL    2025 07:01  -  2025 14:08  --------------------------------------------------------  IN: 84 mL / OUT: 330 mL / NET: -246 mL      I&O's Detail    2025 07:  -  2025 07:00  --------------------------------------------------------  IN:    dextrose 5%: 1150 mL    Fat Emulsion (Fish Oil &amp; Plant Based) 20% Infusion: 228.8 mL    Heparin: 174 mL    Heparin: 448 mL    Heparin: 56 mL    IV PiggyBack: 100 mL    NiCARdipine: 275 mL    TPN (Total Parenteral Nutrition): 1495 mL  Total IN: 3926.8 mL    OUT:    Bulb (mL): 0 mL    Bulb (mL): 40 mL    Bulb (mL): 25 mL    Bulb (mL): 0 mL    Dexmedetomidine: 0 mL    VAC (Vacuum Assisted Closure) System (mL): 0 mL    Voided (mL): 4600 mL  Total OUT: 4665 mL    Total NET: -738.2 mL      2025 07:01  -  2025 14:08  --------------------------------------------------------  IN:    Heparin: 84 mL  Total IN: 84 mL    OUT:    Bulb (mL): 30 mL    Voided (mL): 300 mL  Total OUT: 330 mL    Total NET: -246 mL              PHYSICAL EXAM: reflects exam at AM rounds    General: Pt lying comfortably in bed.        Neuro: Alert & oriented x 3, no focal deficits. CNs II-XII grossly intact. PERRLA, EOMs intact. Spontaneously moving all extremities.    Lungs: Clear to auscultation bilaterally, normal expansion/effort. Oxygen delivery: HFNC 60L/60%. Pulling 750mL on IS.     Cardiovascular : S1, S2.  Regular rate and rhythm. Cardiac Rhythm: NSR  Peripheral edema: 1+ pedal edema bilaterally    GI: Abdomen soft, Non-tender, Non-distended. 3 THONY drains with serosanguinous output    Wound: midline wound vac to suction    Extremities: Extremities warm, pink, well-perfused.        - Pulse exam: DP/PT palpable bilaterally.     Derm: Good skin turgor, no skin breakdown.     : voiding freely.     CXR: < from: Xray Chest 1 View-PORTABLE IMMEDIATE (Xray Chest 1 View-PORTABLE IMMEDIATE .) (25 @ 12:21) >  Findings/impression:    Low lung volumes    Support devices: Right IJ catheter satisfactory position. External devices obscures anatomy.    Cardiac/ Mediastinum: Stable    Lungs/ Pleura: Elevation of right hemidiaphragm. Bilateral basilar atelectatic opacities without change. No pneumothorax    Skeletal/ soft tissues: Stable    < end of copied text >      LABS:  CAPILLARY BLOOD GLUCOSE      POCT Blood Glucose.: 160 mg/dL (2025 13:28)  POCT Blood Glucose.: 170 mg/dL (2025 05:42)  POCT Blood Glucose.: 169 mg/dL (2025 23:55)  POCT Blood Glucose.: 195 mg/dL (2025 17:45)                          9.4    20.90 )-----------( 373      ( 2025 04:42 )             29.4           145  |  107  |  62[HH]  ----------------------------<  140[H]  3.2[L]   |  23  |  1.3    Ca    8.2[L]      2025 04:42  Phos  3.5       Mg     1.9         TPro  6.1  /  Alb  3.1[L]  /  TBili  0.4  /  DBili  0.2  /  AST  52[H]  /  ALT  72[H]  /  AlkPhos  93  -      PT/INR - ( 2025 21:27 )   PT: 10.50 sec;   INR: 0.89 ratio         PTT - ( 2025 11:20 )  PTT:84.1 sec      Urinalysis Basic - ( 2025 04:42 )    Color: x / Appearance: x / SG: x / pH: x  Gluc: 140 mg/dL / Ketone: x  / Bili: x / Urobili: x   Blood: x / Protein: x / Nitrite: x   Leuk Esterase: x / RBC: x / WBC x   Sq Epi: x / Non Sq Epi: x / Bacteria: x        Culture - Body Fluid with Gram Stain (collected 2025 15:00)  Source: Abdominal Fl Abdominal Fluid  Gram Stain (2025 06:50):    polymorphonuclear leukocytes seen    No organisms seen    by cytocentrifuge

## 2025-04-23 NOTE — PROGRESS NOTE ADULT - ASSESSMENT
ASSESSMENT  64-year-old female with a past medical history of high blood pressure and gastric bypass surgery in 2001, presenting with two days of sharp abdominal pain and one episode of non-bloody, non-bilious vomiting. She has a prior history of small bowel obstruction in 2006, 2008, and most recently in 2020, all of which resolved with conservative management    IMPRESSION  #SBO 2/2 Complex Incarcerated Ventral Hernia, s/p small bowel resection and ventral hernia repair 4/10   - OR Cx 4/10 - B frag, Clostridium species, E coli     #RTOR for Anastomotic Leak, SBR, Creation of New Ileoileostomy, Ventral Hernia Repair - 4/17   - CT abd/pelvis 4/17 - PERITONEUM/MESENTERY/BOWEL: Ventral abdominal wall midline defect containing nonobstructed small bowel loop with surrounding subcutaneous  stranding (series 5, image 83). The small bowel segment just proximal to  this herniated loop demonstrates a possible transmural defect with  extravasation of oral contrast into a midline intraperitoneal collection   measuring 9.0 x 4.0 x 6.1 cm (series 601, image 83). This intraperitoneal  collection connects to a more inferiorly-located subcutaneous pannus  collection, which contains oral contrast and measures 9.0 x 4.3 cm  (series 5, image 114). Wall thickening and interloop ascites of the involved small bowel loops,  likely reactive. Oral contrast visualized in the sigmoid colon.   Intraperitoneal surgical drain. Descending and sigmoid colonic wall  thickening. Post gastric bypass surgery. Scattered foci of  pneumoperitoneum.  - s/p OR for anastomatic leak/SBR/Creation of new ileostomy 4/17 - Wound Cx E coli   - CT Abdomen and Pelvis w/ Oral Cont and w/ IV Cont (04.21.25 @ 18:11) > Interval ventral hernia repair, small bowel resection. Lower abdominal  wall 9.7 x 6.2 cm fluid collection at site of previously seen  extravasated contrast, similar in size since prior study. Decreased anterior peritoneal fluid collection, measuring up to 6.3 x 8.3 cm. No  definite current oral contrast leak. Bilateral lower lobe consolidative opacities, findings could represent  atelectasis versus pneumonia in the appropriate clinical setting  - s/p drainage of anterior fluid collection 4/22 -- 150cc of foul appearing material aspirated with THONY drain in place    #Pulmonary Embolism   - CT Abdomen and Pelvis w/ Oral Cont and w/ IV Cont (04.21.25 @ 18:11): Evidence of a right distal main pulmonary embolus extending to right  lower lobar and segmental pulmonary arteries: Examination limited by  streak artifact; no definite right heart strain .    #Leukocytosis     #Obesity BMI (kg/m2): 53  #Abx allergy: No Known Allergies    RECOMMENDATIONS  - underwent drainage of anterior fluid collection with brown fluid aspirated/in THONY drain   - follow-up cultures   - continue meropenem 1g q 8 hours empirically until cx finalizes   - trend WBC    Please call or message on [employer] Teams if with any questions.  Spectra [phone]

## 2025-04-24 LAB
ANION GAP SERPL CALC-SCNC: 10 MMOL/L — SIGNIFICANT CHANGE UP (ref 7–14)
BASOPHILS # BLD AUTO: 0.03 K/UL — SIGNIFICANT CHANGE UP (ref 0–0.2)
BASOPHILS NFR BLD AUTO: 0.2 % — SIGNIFICANT CHANGE UP (ref 0–1)
BUN SERPL-MCNC: 62 MG/DL — CRITICAL HIGH (ref 10–20)
CALCIUM SERPL-MCNC: 8.6 MG/DL — SIGNIFICANT CHANGE UP (ref 8.4–10.5)
CHLORIDE SERPL-SCNC: 109 MMOL/L — SIGNIFICANT CHANGE UP (ref 98–110)
CO2 SERPL-SCNC: 25 MMOL/L — SIGNIFICANT CHANGE UP (ref 17–32)
CREAT SERPL-MCNC: 1.3 MG/DL — SIGNIFICANT CHANGE UP (ref 0.7–1.5)
EGFR: 46 ML/MIN/1.73M2 — LOW
EGFR: 46 ML/MIN/1.73M2 — LOW
EOSINOPHIL # BLD AUTO: 0.13 K/UL — SIGNIFICANT CHANGE UP (ref 0–0.7)
EOSINOPHIL NFR BLD AUTO: 1 % — SIGNIFICANT CHANGE UP (ref 0–8)
GLUCOSE BLDC GLUCOMTR-MCNC: 112 MG/DL — HIGH (ref 70–99)
GLUCOSE BLDC GLUCOMTR-MCNC: 113 MG/DL — HIGH (ref 70–99)
GLUCOSE BLDC GLUCOMTR-MCNC: 124 MG/DL — HIGH (ref 70–99)
GLUCOSE BLDC GLUCOMTR-MCNC: 133 MG/DL — HIGH (ref 70–99)
GLUCOSE SERPL-MCNC: 128 MG/DL — HIGH (ref 70–99)
HCT VFR BLD CALC: 25.9 % — LOW (ref 37–47)
HGB BLD-MCNC: 8.2 G/DL — LOW (ref 12–16)
IMM GRANULOCYTES NFR BLD AUTO: 5.3 % — HIGH (ref 0.1–0.3)
LYMPHOCYTES # BLD AUTO: 1.14 K/UL — LOW (ref 1.2–3.4)
LYMPHOCYTES # BLD AUTO: 8.3 % — LOW (ref 20.5–51.1)
MAGNESIUM SERPL-MCNC: 2 MG/DL — SIGNIFICANT CHANGE UP (ref 1.8–2.4)
MCHC RBC-ENTMCNC: 30.5 PG — SIGNIFICANT CHANGE UP (ref 27–31)
MCHC RBC-ENTMCNC: 31.7 G/DL — LOW (ref 32–37)
MCV RBC AUTO: 96.3 FL — SIGNIFICANT CHANGE UP (ref 81–99)
MONOCYTES # BLD AUTO: 0.74 K/UL — HIGH (ref 0.1–0.6)
MONOCYTES NFR BLD AUTO: 5.4 % — SIGNIFICANT CHANGE UP (ref 1.7–9.3)
NEUTROPHILS # BLD AUTO: 10.91 K/UL — HIGH (ref 1.4–6.5)
NEUTROPHILS NFR BLD AUTO: 79.8 % — HIGH (ref 42.2–75.2)
NRBC BLD AUTO-RTO: 0 /100 WBCS — SIGNIFICANT CHANGE UP (ref 0–0)
PHOSPHATE SERPL-MCNC: 4.6 MG/DL — SIGNIFICANT CHANGE UP (ref 2.1–4.9)
PLATELET # BLD AUTO: 338 K/UL — SIGNIFICANT CHANGE UP (ref 130–400)
PMV BLD: 10.8 FL — HIGH (ref 7.4–10.4)
POTASSIUM SERPL-MCNC: 4.1 MMOL/L — SIGNIFICANT CHANGE UP (ref 3.5–5)
POTASSIUM SERPL-SCNC: 4.1 MMOL/L — SIGNIFICANT CHANGE UP (ref 3.5–5)
RBC # BLD: 2.69 M/UL — LOW (ref 4.2–5.4)
RBC # FLD: 14.6 % — HIGH (ref 11.5–14.5)
SODIUM SERPL-SCNC: 144 MMOL/L — SIGNIFICANT CHANGE UP (ref 135–146)
WBC # BLD: 13.68 K/UL — HIGH (ref 4.8–10.8)
WBC # FLD AUTO: 13.68 K/UL — HIGH (ref 4.8–10.8)

## 2025-04-24 PROCEDURE — 71045 X-RAY EXAM CHEST 1 VIEW: CPT | Mod: 26

## 2025-04-24 PROCEDURE — 99291 CRITICAL CARE FIRST HOUR: CPT

## 2025-04-24 RX ORDER — SODIUM/POT/MAG/CALC/CHLOR/ACET 35-20-5MEQ
1 VIAL (ML) INTRAVENOUS
Refills: 0 | Status: DISCONTINUED | OUTPATIENT
Start: 2025-04-24 | End: 2025-04-24

## 2025-04-24 RX ORDER — I.V. FAT EMULSION 20 G/100ML
0.37 EMULSION INTRAVENOUS
Qty: 50 | Refills: 0 | Status: DISCONTINUED | OUTPATIENT
Start: 2025-04-24 | End: 2025-04-24

## 2025-04-24 RX ORDER — ACETAMINOPHEN 500 MG/5ML
650 LIQUID (ML) ORAL EVERY 6 HOURS
Refills: 0 | Status: DISCONTINUED | OUTPATIENT
Start: 2025-04-24 | End: 2025-04-27

## 2025-04-24 RX ADMIN — MEROPENEM 100 MILLIGRAM(S): 1 INJECTION INTRAVENOUS at 21:42

## 2025-04-24 RX ADMIN — MEROPENEM 100 MILLIGRAM(S): 1 INJECTION INTRAVENOUS at 13:50

## 2025-04-24 RX ADMIN — IPRATROPIUM BROMIDE AND ALBUTEROL SULFATE 3 MILLILITER(S): .5; 2.5 SOLUTION RESPIRATORY (INHALATION) at 01:45

## 2025-04-24 RX ADMIN — I.V. FAT EMULSION 20.8 GM/KG/DAY: 20 EMULSION INTRAVENOUS at 20:42

## 2025-04-24 RX ADMIN — Medication 40 MILLIGRAM(S): at 12:04

## 2025-04-24 RX ADMIN — AMLODIPINE BESYLATE 10 MILLIGRAM(S): 10 TABLET ORAL at 06:39

## 2025-04-24 RX ADMIN — Medication 25 MILLIGRAM(S): at 21:43

## 2025-04-24 RX ADMIN — ENOXAPARIN SODIUM 135 MILLIGRAM(S): 100 INJECTION SUBCUTANEOUS at 06:40

## 2025-04-24 RX ADMIN — MEROPENEM 100 MILLIGRAM(S): 1 INJECTION INTRAVENOUS at 06:40

## 2025-04-24 RX ADMIN — Medication 1 APPLICATION(S): at 07:22

## 2025-04-24 RX ADMIN — ENOXAPARIN SODIUM 135 MILLIGRAM(S): 100 INJECTION SUBCUTANEOUS at 17:46

## 2025-04-24 RX ADMIN — IPRATROPIUM BROMIDE AND ALBUTEROL SULFATE 3 MILLILITER(S): .5; 2.5 SOLUTION RESPIRATORY (INHALATION) at 08:16

## 2025-04-24 RX ADMIN — METOPROLOL SUCCINATE 25 MILLIGRAM(S): 50 TABLET, EXTENDED RELEASE ORAL at 17:46

## 2025-04-24 RX ADMIN — Medication 650 MILLIGRAM(S): at 06:39

## 2025-04-24 RX ADMIN — METOPROLOL SUCCINATE 25 MILLIGRAM(S): 50 TABLET, EXTENDED RELEASE ORAL at 06:40

## 2025-04-24 RX ADMIN — IPRATROPIUM BROMIDE AND ALBUTEROL SULFATE 3 MILLILITER(S): .5; 2.5 SOLUTION RESPIRATORY (INHALATION) at 20:25

## 2025-04-24 RX ADMIN — IPRATROPIUM BROMIDE AND ALBUTEROL SULFATE 3 MILLILITER(S): .5; 2.5 SOLUTION RESPIRATORY (INHALATION) at 13:50

## 2025-04-24 RX ADMIN — Medication 650 MILLIGRAM(S): at 12:04

## 2025-04-24 RX ADMIN — Medication 67 EACH: at 20:42

## 2025-04-24 RX ADMIN — Medication 5 MILLIGRAM(S): at 21:43

## 2025-04-24 NOTE — PROGRESS NOTE ADULT - SUBJECTIVE AND OBJECTIVE BOX
GENERAL SURGERY PROGRESS NOTE    Patient: NANCY SARGENT , 64y (62)Female   MRN: [id number]  Location: [address]  Visit: 25 Inpatient  Date: 25 @ 06:34    Hospital Day #: 22  Post-Op Day #: 7     Procedure/Dx/Injuries:  INCARCERATED VENTRAL HERNIA W/ SBO  4/10 S/P LAP CONVERTED TO OPEN VENTRAL HERNIA REPAIR W/ MESH AND SMALL BOWEL RESECTION   S/P EX LAP, 30 CM SBR, CREATION OF NEW SIDE TO SIDE ILEOILEOSTOMY, VENTRAL HERNIA REPAIR W/ MESH    S/P IR PLACEMENT OF 8.5 Fr DRAINAGE  CATH     Events of past 24 hours:  A&O3, on HFNC 60L 60% saturating 97%. Abdomen soft, nondistended. THONY drains serosanguinous, IR drain seropurulent. Episode of liquid diarrhea non bloody  repeat ABG--> 7.53/28/109/23; leave on HFNC overnight         PAST MEDICAL & SURGICAL HISTORY:  Gastric bypass status for obesity      LOUIS (obstructive sleep apnea)      S/P gastric bypass      S/P       S/P small bowel resection      History of total bilateral knee replacement (TKR)      H/O colonoscopy            Vitals:   T(F): 97.7 (25 @ 00:00), Max: 97.8 (25 @ 20:00)  HR: 93 (25 @ 05:00)  BP: --  RR: 28 (25 @ 05:00)  SpO2: 97% (25 @ 05:00)      Diet, Clear Liquid:   Bariatric Clear Liquid (BARICLLIQ)  Supplement Feeding Modality:  Oral  Ensure Surgery Cans or Servings Per Day:  2       Frequency:  Three Times a day      Fluids:     I & O's:    25 @ 07:01  -  25 @ 07:00  --------------------------------------------------------  IN:    dextrose 5%: 1150 mL    Fat Emulsion (Fish Oil &amp; Plant Based) 20% Infusion: 228.8 mL    Heparin: 174 mL    Heparin: 448 mL    Heparin: 56 mL    IV PiggyBack: 100 mL    NiCARdipine: 275 mL    TPN (Total Parenteral Nutrition): 1495 mL  Total IN: 3926.8 mL    OUT:    Bulb (mL): 0 mL    Bulb (mL): 40 mL    Bulb (mL): 25 mL    Bulb (mL): 0 mL    Dexmedetomidine: 0 mL    VAC (Vacuum Assisted Closure) System (mL): 0 mL    Voided (mL): 4600 mL  Total OUT: 4665 mL    Total NET: -738.2 mL        PHYSICAL EXAM:  GENERAL: A&Ox3, NAD  HEENT: Normocephalic, atraumatic, NGT to suction  PULM: bilateral chest rise, saturating well on BiPAP  CV: Regular rate and rhythm  ABDOMEN: Abdomen obese, soft, mildly-distended, mildly-tender to deep palpation, THONY x 3, IR drain with purulent drainage  : Primafit in place  MSK: Moving extremities spontaneously  NEURO: No focal deficits  SKIN: Warm, dry, normal skin color, texture, turgor      MEDICATIONS  (STANDING):  acetaminophen     Tablet .. 650 milliGRAM(s) Oral every 6 hours  albuterol/ipratropium for Nebulization 3 milliLiter(s) Nebulizer every 6 hours  amLODIPine   Tablet 10 milliGRAM(s) Oral daily  chlorhexidine 2% Cloths 1 Application(s) Topical <User Schedule>  dextrose 5%. 1000 milliLiter(s) (50 mL/Hr) IV Continuous <Continuous>  enoxaparin Injectable 135 milliGRAM(s) SubCutaneous every 12 hours  insulin lispro (ADMELOG) corrective regimen sliding scale   SubCutaneous Before meals and at bedtime  melatonin 5 milliGRAM(s) Oral at bedtime  meropenem  IVPB      meropenem  IVPB 1000 milliGRAM(s) IV Intermittent every 8 hours  metoprolol tartrate 25 milliGRAM(s) Oral two times a day  pantoprazole    Tablet 40 milliGRAM(s) Oral every 24 hours  Parenteral Nutrition - Adult 1 Each (67 mL/Hr) TPN Continuous <Continuous>  traZODone 25 milliGRAM(s) Oral at bedtime    MEDICATIONS  (PRN):  HYDROmorphone  Injectable 0.5 milliGRAM(s) IV Push every 4 hours PRN Severe Pain (7 - 10)      DVT PROPHYLAXIS: enoxaparin Injectable 135 milliGRAM(s) SubCutaneous every 12 hours    GI PROPHYLAXIS: pantoprazole    Tablet 40 milliGRAM(s) Oral every 24 hours    ANTICOAGULATION:   ANTIBIOTICS:  meropenem  IVPB    meropenem  IVPB 1000 milliGRAM(s)            LAB/STUDIES:  Labs:  CAPILLARY BLOOD GLUCOSE      POCT Blood Glucose.: 133 mg/dL (2025 05:56)  POCT Blood Glucose.: 130 mg/dL (2025 22:56)  POCT Blood Glucose.: 156 mg/dL (2025 21:39)  POCT Blood Glucose.: 139 mg/dL (2025 17:01)  POCT Blood Glucose.: 160 mg/dL (2025 13:28)                          8.2    13.68 )-----------( 338      ( 2025 05:05 )             25.9       Auto Immature Granulocyte %: 5.3 % (25 @ 05:05)        144  |  109  |  62[HH]  ----------------------------<  128[H]  4.1   |  25  |  1.3      Calcium: 8.6 mg/dL (25 @ 05:05)      LFTs:             6.1  | 0.4  | 52       ------------------[93      ( 2025 04:42 )  3.1  | 0.2  | 72          Lipase:x      Amylase:x         Blood Gas Arterial, Lactate: 1.0 mmol/L (25 @ 21:36)  Blood Gas Arterial, Lactate: 1.3 mmol/L (25 @ 11:59)    ABG - ( 2025 21:36 )  pH: 7.53  /  pCO2: 28    /  pO2: 109   / HCO3: 23    / Base Excess: 1.7   /  SaO2: 97.1            ABG - ( 2025 11:59 )  pH: 7.58  /  pCO2: 26    /  pO2: 121   / HCO3: 24    / Base Excess: 3.6   /  SaO2: 97.5            ABG - ( 2025 21:19 )  pH: 7.51  /  pCO2: 29    /  pO2: 137   / HCO3: 23    / Base Excess: 0.6   /  SaO2: 96.6              Coags:     x      ----< x       ( 2025 11:20 )     84.1                Urinalysis Basic - ( 2025 05:05 )    Color: x / Appearance: x / SG: x / pH: x  Gluc: 128 mg/dL / Ketone: x  / Bili: x / Urobili: x   Blood: x / Protein: x / Nitrite: x   Leuk Esterase: x / RBC: x / WBC x   Sq Epi: x / Non Sq Epi: x / Bacteria: x        Culture - Blood (collected 2025 17:47)  Source: Blood Blood  Preliminary Report (2025 23:09):    No growth at 24 hours    Culture - Body Fluid with Gram Stain (collected 2025 15:00)  Source: Abdominal Fl Abdominal Fluid  Gram Stain (2025 06:50):    polymorphonuclear leukocytes seen    No organisms seen    by cytocentrifuge  Preliminary Report (2025 18:25):    No growth    Culture - Blood (collected 2025 13:16)  Source: Blood Blood-Peripheral  Preliminary Report (2025 23:09):    No growth at 24 hours        ASSESSMENT:  64y F w/ PMHx of  Morbid obesity, LOUIS, large complex ventral hernia s/p repair sbr and loss of domain c/b post op intubation, hypotension requiring vasopressors, anastomotic leakage s/p RTOR and reanastomosis, now doing better post op from a respiratory status, however, still tenuous. She has a rising leukocytosis and an abdominal fluid collection which was drained by IR on , Zosyn broadened to meropenem, and has a newly diagnosed DVT/PE which she is currently on a LVX 135BID.     PLAN:   - Wean off HFNC as tolerated   - TPN via R IJ central line (brown port), plan to wean when tolerating regular diet   - Continue aubrey CLD for now   - Closely monitor THONY drain outputs and character of fluid   - Monitor bowel function   - Monitor UOP and creatinine   - IV abx: meropenem   - Monitor for fevers   - Continue full AC    - Rest of care per SICU

## 2025-04-24 NOTE — PROGRESS NOTE ADULT - SUBJECTIVE AND OBJECTIVE BOX
NANCY SARGENT  64y, Female  Allergy: No Known Allergies      LOS  21d    CHIEF COMPLAINT: bowel obstruction (22 Apr 2025 08:33)      INTERVAL EVENTS/HPI  - No acute events overnight  - T(F): , Max: 98.2 (04-24-25 @ 04:00)  - remains on HFNC - denies abdominal pain - noted to have diarrhea  - WBC Count: 13.68 (04-24-25 @ 05:05)  WBC Count: 20.90 (04-23-25 @ 04:42)     - Creatinine: 1.3 (04-24-25 @ 05:05)  Creatinine: 1.2 (04-23-25 @ 20:00)       ROS  General: Denies rigors, nightsweats  HEENT: Denies headache, rhinorrhea, sore throat, eye pain  CV: Denies CP, palpitations  PULM: Denies wheezing, hemoptysis  GI: Denies hematemesis, hematochezia, melena  : Denies discharge, hematuria  MSK: Denies arthralgias, myalgias  SKIN: Denies rash, lesions  NEURO: Denies paresthesias, weakness  PSYCH: Denies depression, anxiety    VITALS:  T(F): 98.2, Max: 98.2 (04-24-25 @ 04:00)  HR: 79  BP: --  RR: 19Vital Signs Last 24 Hrs  T(C): 36.8 (24 Apr 2025 04:00), Max: 36.8 (24 Apr 2025 04:00)  T(F): 98.2 (24 Apr 2025 04:00), Max: 98.2 (24 Apr 2025 04:00)  HR: 79 (24 Apr 2025 06:00) (77 - 99)  BP: --  BP(mean): --  RR: 19 (24 Apr 2025 06:00) (18 - 33)  SpO2: 98% (24 Apr 2025 06:00) (96% - 99%)    Parameters below as of 23 Apr 2025 20:25  Patient On (Oxygen Delivery Method): nasal cannula, high flow  O2 Flow (L/min): 60  O2 Concentration (%): 60    PHYSICAL EXAM:  Gen: NAD, resting in bed  HEENT: Normocephalic, atraumatic  Neck: supple, no lymphadenopathy  CV: Regular rate & regular rhythm  Lungs: decreased BS at bases, no fremitus  Abdomen: Soft, BS present  Ext: Warm, well perfused  Neuro: non focal, awake  Skin: no rash, no erythema  Lines: no phlebitis    FH: Non-contributory  Social Hx: Non-contributory    TESTS & MEASUREMENTS:                        8.2    13.68 )-----------( 338      ( 24 Apr 2025 05:05 )             25.9     04-24    144  |  109  |  62[HH]  ----------------------------<  128[H]  4.1   |  25  |  1.3    Ca    8.6      24 Apr 2025 05:05  Phos  4.6     04-24  Mg     2.0     04-24    TPro  6.1  /  Alb  3.1[L]  /  TBili  0.4  /  DBili  0.2  /  AST  52[H]  /  ALT  72[H]  /  AlkPhos  93  04-23      LIVER FUNCTIONS - ( 23 Apr 2025 04:42 )  Alb: 3.1 g/dL / Pro: 6.1 g/dL / ALK PHOS: 93 U/L / ALT: 72 U/L / AST: 52 U/L / GGT: x           Urinalysis Basic - ( 24 Apr 2025 05:05 )    Color: x / Appearance: x / SG: x / pH: x  Gluc: 128 mg/dL / Ketone: x  / Bili: x / Urobili: x   Blood: x / Protein: x / Nitrite: x   Leuk Esterase: x / RBC: x / WBC x   Sq Epi: x / Non Sq Epi: x / Bacteria: x        Culture - Blood (collected 04-22-25 @ 17:47)  Source: Blood Blood  Preliminary Report (04-23-25 @ 23:09):    No growth at 24 hours    Culture - Body Fluid with Gram Stain (collected 04-22-25 @ 15:00)  Source: Abdominal Fl Abdominal Fluid  Gram Stain (04-23-25 @ 06:50):    polymorphonuclear leukocytes seen    No organisms seen    by cytocentrifuge  Preliminary Report (04-23-25 @ 18:25):    No growth    Culture - Blood (collected 04-22-25 @ 13:16)  Source: Blood Blood-Peripheral  Preliminary Report (04-23-25 @ 23:09):    No growth at 24 hours    Culture - Body Fluid with Gram Stain (collected 04-17-25 @ 15:20)  Source: Body Fluid None  Gram Stain (04-19-25 @ 12:15):    No polymorphonuclear leukocytes seen    No organisms seen  Final Report (04-23-25 @ 10:23):    Rare Escherichia coli    Rare Corynebacterium tuberculostearicum "Susceptibilities not performed"  Organism: Escherichia coli (04-21-25 @ 13:35)  Organism: Escherichia coli (04-21-25 @ 13:35)      Method Type: LOUISE      -  Amoxicillin/Clavulanic Acid: S <=8/4      -  Ampicillin: S <=8 These ampicillin results predict results for amoxicillin      -  Ampicillin/Sulbactam: S <=4/2      -  Aztreonam: S <=4      -  Cefazolin: S <=2      -  Cefepime: S <=2      -  Cefoxitin: S <=8      -  Ceftriaxone: S <=1      -  Ciprofloxacin: S <=0.25      -  Ertapenem: S <=0.5      -  Gentamicin: S <=2      -  Imipenem: S <=1      -  Levofloxacin: S <=0.5      -  Meropenem: S <=1      -  Piperacillin/Tazobactam: S <=8      -  Tobramycin: S <=2      -  Trimethoprim/Sulfamethoxazole: S <=0.5/9.5    Culture - Sputum (collected 04-14-25 @ 20:15)  Source: Trach Asp Tracheal Aspirate  Gram Stain (04-15-25 @ 11:15):    Rare polymorphonuclear leukocytes per low power field    No Squamous epithelial cells per low power field    No organisms seen per oil power field  Final Report (04-16-25 @ 21:32):    No growth    Culture - Blood (collected 04-13-25 @ 07:01)  Source: Blood Blood-Peripheral  Final Report (04-18-25 @ 12:01):    No growth at 5 days    Culture - Fungal, Other (collected 04-10-25 @ 18:00)  Source: Other Peritoneal Fluid #2  Preliminary Report (04-19-25 @ 23:02):    No fungus isolated at 1 week.    Culture - Acid Fast - Other w/Smear (collected 04-10-25 @ 18:00)  Source: Other Peritoneal Fluid #2  Preliminary Report (04-19-25 @ 23:06):    No acid-fast bacilli isolated at 1 week. ****Acid-fast cultures are held    for 6 weeks.****    Culture - Wound Aerobic/Anaerobic (collected 04-10-25 @ 10:30)  Source: Surgical Swab Peritoneal Fluid #1  Final Report (04-13-25 @ 13:48):    Few Escherichia coli    Few Bacteroides fragilis "Susceptibilities not performed"  Organism: Escherichia coli (04-13-25 @ 13:48)  Organism: Escherichia coli (04-13-25 @ 13:48)      Method Type: LOUISE      -  Amoxicillin/Clavulanic Acid: S <=8/4      -  Ampicillin: S <=8 These ampicillin results predict results for amoxicillin      -  Ampicillin/Sulbactam: S <=4/2      -  Aztreonam: S <=4      -  Cefazolin: S <=2      -  Cefepime: S <=2      -  Cefoxitin: S <=8      -  Ceftriaxone: S <=1      -  Ciprofloxacin: S <=0.25      -  Ertapenem: S <=0.5      -  Gentamicin: S <=2      -  Imipenem: S <=1      -  Levofloxacin: S <=0.5      -  Meropenem: S <=1      -  Piperacillin/Tazobactam: S <=8      -  Tobramycin: S <=2      -  Trimethoprim/Sulfamethoxazole: S <=0.5/9.5    Culture - Fungal, Other (collected 04-10-25 @ 10:30)  Source: Other Peritoneal Fluid #1  Preliminary Report (04-19-25 @ 23:02):    No fungus isolated at 1 week.    Culture - Acid Fast - Other w/Smear (collected 04-10-25 @ 10:30)  Source: Other Peritoneal Fluid #1  Preliminary Report (04-19-25 @ 23:06):    No acid-fast bacilli isolated at 1 week. ****Acid-fast cultures are held    for 6 weeks.****    Culture - Wound Aerobic/Anaerobic (collected 04-10-25 @ 10:30)  Source: Surgical Swab Peritoneal Fluid #1  Final Report (04-13-25 @ 14:15):    Rare Bacteroides fragilis "Susceptibilities not performed"    Rare Clostridium clostridioforme "Susceptibilities not performed"    Urinalysis with Rflx Culture (collected 04-03-25 @ 20:06)    Culture - Urine (collected 04-03-25 @ 20:06)  Source: Clean Catch None  Final Report (04-06-25 @ 14:03):    50,000 - 99,000 CFU/mL Escherichia coli    <10,000 CFU/ml Normal Urogenital andra present  Organism: Escherichia coli (04-06-25 @ 14:03)  Organism: Escherichia coli (04-06-25 @ 14:03)      Method Type: LOUISE      -  Amoxicillin/Clavulanic Acid: S <=8/4      -  Ampicillin: S <=8 These ampicillin results predict results for amoxicillin      -  Ampicillin/Sulbactam: S <=4/2      -  Aztreonam: S <=4      -  Cefazolin: S <=2 For uncomplicated UTI with K. pneumoniae, E. coli, or P. mirablis: LOUISE <=16 is sensitive and LOUISE >=32 is resistant. This also predicts results for oral agents cefaclor, cefdinir, cefpodoxime, cefprozil, cefuroxime axetil, cephalexin and locarbef for uncomplicated UTI. Note that some isolates may be susceptible to these agents while testing resistant to cefazolin.      -  Cefepime: S <=2      -  Cefoxitin: S <=8      -  Ceftriaxone: S <=1      -  Cefuroxime: S <=4      -  Ciprofloxacin: S <=0.25      -  Ertapenem: S <=0.5      -  Gentamicin: S <=2      -  Imipenem: S <=1      -  Levofloxacin: S <=0.5      -  Meropenem: S <=1      -  Nitrofurantoin: S <=32 Should not be used to treat pyelonephritis      -  Piperacillin/Tazobactam: S <=8      -  Tobramycin: S <=2      -  Trimethoprim/Sulfamethoxazole: S <=0.5/9.5            INFECTIOUS DISEASES TESTING  MRSA PCR Result.: Negative (04-11-25 @ 00:40)      INFLAMMATORY MARKERS      RADIOLOGY & ADDITIONAL TESTS:  I have personally reviewed the last available Chest xray  CXR      CT  CT Angio Chest PE Protocol w/ IV Cont:   ACC: [id number] EXAM:  CT ABDOMEN AND PELVIS OC IC   ORDERED BY: JAYLEN DRAKE     ACC: [id number] EXAM:  CT ANGIO CHEST PULM ART WAWIC   ORDERED BY: AJYLEN DRAKE     PROCEDURE DATE:  04/21/2025          INTERPRETATION:  CTA chest with IV contrast, CT abdomen and pelvis with   IV contrast    CLINICAL HISTORY/REASON FOR EXAM: Post ventral hernia repair. Tachypnea.   Tachycardia..    TECHNIQUE: CTA chest with IV contrast, CT abdomen and pelvis with IV   contrast performed. Initial CTA imagesof chest obtained with 100 cc   Omnipaque 350 IV contrast administration. Subsequently, CT images of   abdomen and pelvis obtained with IV contrast administration. Oral   contrast was not administered. Reformatted images in the coronal and   sagittalplanes were acquired. 3D (MIP images) generated.    COMPARISON: CT abdomen and pelvis 4/17/2025.      FINDINGS:    CHEST:      PULMONARY ARTERIES: Images degraded by streak artifact. Evidence of a   right distal main pulmonary embolus extending to right lower lobar and   segmental pulmonary arteries; no definite right heart strain (series 4,   image [id number]).    LUNGS, PLEURA, AIRWAYS: Bilateral lower lobe consolidative opacities,   findings could represent atelectasis versus pneumonia in the appropriate   clinical setting. No lobar mass, effusion, or pneumothorax. No evidence   of central endobronchial obstruction. No bronchiectasis or honeycombing.   No suspicious pulmonary nodule.    THORACIC NODES: No mediastinal, hilar, supraclavicular, oraxillary   lymphadenopathy.    MEDIASTINUM/GREAT VESSELS: No pericardial effusion. Heart size is within   normal limits. The aorta and main pulmonary artery are of normal caliber.   Right IJ central venous catheter to SVC.    ABDOMEN/PELVIS:    HEPATOBILIARY: Unchanged.    SPLEEN: Unchanged.    PANCREAS: Unchanged.    ADRENAL GLANDS: Unchanged.    KIDNEYS: Unchanged.    ABDOMINOPELVIC NODES: Unchanged.    PELVIC ORGANS: Urinary bladder unremarkable. Uterus unremarkable.    PERITONEUM/MESENTERY/BOWEL/SOFT TISSUES: Interval ventral hernia repair ,   small bowel resection. Lower abdominal wall 9.7 x 6.2 cm fluid collection   at site of previously seen extravasated contrast, similar in size since   prior study (series 5, image 360). Decreased anterior peritoneal fluid   collection, measuring up to 6.3 x 8.3 cm (series 5, image 315). No   definite current oral contrast leak. Post multiple change catheter   placement. No pneumoperitoneum.    BONES: Degenerative change of spine.      IMPRESSION:    Evidence of a right distal main pulmonary embolus extending to right   lower lobar and segmental pulmonary arteries: Examination limited by   streak artifact; no definite right heart strain .    Interval ventral hernia repair, small bowel resection. Lower abdominal   wall 9.7 x 6.2 cm fluid collection at site of previously seen   extravasated contrast, similar in size since prior study. Decreased   anterior peritoneal fluid collection, measuring up to 6.3 x 8.3 cm. No   definite current oral contrast leak.    Bilateral lower lobe consolidative opacities, findings could represent   atelectasis versus pneumonia in the appropriate clinical setting    Speak with KAROLYN De Luna    --- End of Report ---            NIXON SALDAÑA MD; Attending Radiologist  This document has been electronically signed. Apr 21 2025  7:59PM (04-21-25 @ 18:11)      CARDIOLOGY TESTING  12 Lead ECG:   Ventricular Rate 96 BPM    Atrial Rate 96 BPM    P-R Interval 130 ms    QRS Duration 104 ms    Q-T Interval 364 ms    QTC Calculation(Bazett) 459 ms    P Axis 41 degrees    R Axis 25 degrees    T Axis 26 degrees    Diagnosis Line Normal sinus rhythm  Normal ECG    Confirmed by JUSTINO GILES MD ([id number]) on 4/11/2025 7:13:52 AM (04-10-25 @ 17:33)      MEDICATIONS  acetaminophen     Tablet .. 650 Oral every 6 hours  albuterol/ipratropium for Nebulization 3 Nebulizer every 6 hours  amLODIPine   Tablet 10 Oral daily  chlorhexidine 2% Cloths 1 Topical <User Schedule>  enoxaparin Injectable 135 SubCutaneous every 12 hours  insulin lispro (ADMELOG) corrective regimen sliding scale  SubCutaneous Before meals and at bedtime  melatonin 5 Oral at bedtime  meropenem  IVPB     meropenem  IVPB 1000 IV Intermittent every 8 hours  metoprolol tartrate 25 Oral two times a day  pantoprazole    Tablet 40 Oral every 24 hours  Parenteral Nutrition - Adult 1 TPN Continuous <Continuous>  traZODone 25 Oral at bedtime      WEIGHT  Weight (kg): 135.6 (04-17-25 @ 11:26)  Creatinine: 1.3 mg/dL (04-24-25 @ 05:05)  Creatinine: 1.2 mg/dL (04-23-25 @ 20:00)      ANTIBIOTICS:  meropenem  IVPB      meropenem  IVPB 1000 milliGRAM(s) IV Intermittent every 8 hours      All available historical records have been reviewed

## 2025-04-24 NOTE — PROGRESS NOTE ADULT - ATTENDING COMMENTS
Pt examined  labs and images reviewed  pt with supermorbid obesity and complex hernia with sbo  previously treated without surgery   passing flatus awaiting bowel movement but was having  more pain taken to OR urgently   4/10 s/p BHUMI , Bowel Resection / Anastamosis / Primary repair of Fascia / hernia sac  overnight - renal failure / respiratory compromise  was intubated / paralysed/ botox  now on bumex - tapering   4/17:  s/p BHUMI , Bowel Resection / Anastamosis / Primary repair of Fascia / hernia sac  4/22 : IR drain placed - subcutaneous collection  - purulent   hypertensive - on nicardipine drip  Ct shows PE - transition to Lovenox BID  mariam drain sero sang  tolerating clears - having bm       impression : extremely high risk - supermorbid obesity and complex hernia with partial sbo  will gradually advance diet    continue anticoagulation    SICU

## 2025-04-24 NOTE — PROGRESS NOTE ADULT - ASSESSMENT
ASSESSMENT & PLAN:  64F PMHx HTN, morbid obesity s/p  Abby-en-Y gastric bypass who presented on 4/3 with incarcerated ventral hernia with SBO. S/p 4/10 open ventral hernia repair, small bowel resection, and primary fascial closure with Dr. Lema. Admitted to SICU for Q1 abdominal checks and hemodynamic monitoring. S/p  takeback for exploratory laparotomy, repair of anastomotic leak.    NEUROLOGICAL:  #Acute pain  -APAP ATC   - Dilaudid 0.5mg Q4 PRN severe pain   - Trazodone 25mg QPM  - Melatonin 5mg qHS    RESPIRATORY:   #Right distal main pulmonary embolus  -on therapeutic heparin gtt -, currently on therapeutic lovenox   - Echo 25: EF 70 to 75%.  - B/l DVT sono: Right posterior tibial vein DVT    #Oxygenation  - Intubated 4/10-4/15  - Reintubated -  -currently on HFNC 60L/60%  -duoneb   -encourage IS   #Diuresis  - 1mg bumex x 1   - 1mg Bumex x 1   #PMHxx [place] on CPAP@ home   #Activity   - Activity- increase as tolerated     CARDS:   #acute hypotension in setting of SIRS, resolved   - Off levophed since   - MAP >65  #supraventricular tachycardia, Non-sustained ventricular tachycardia, Ventricular Premature Depolarization  -evaluated by cardiology  -continue metoprolol 25mg q 12 per recommendations  #PMHx of HTN  -off cardene   - Restarted amlodipine 10mg qD   - Restarted home HCTZ 25mg QD  - Holding home losartan  #EKG- NSR  #TTE 2022: EF 55-60%. G1DD.   - TTE : EF of 56 %.G1DD.     GASTROINTESTINAL/NUTRITION:   #s/p lap converted to open repair of large ventral hernia with incarcerated small bowel, small bowel resection, vicryl mesh placement; c/b anastomotic leak     - CTAP with PO contrast: Evidence of defect in small bowel loop with connection to anterior midline intraperitoneal and subcutaneous pannus collections containing extravasated oral contrast. Unclear if this small bowel defect occurs at the anastomotic site. Surgical review recommended.    - : ex lap anastomosis resection and creation of side to side anastomosis     - aspiration precautions, HOB 30  #Anterior abdominal wall collection  < from: CT Abdomen and Pelvis w/ Oral Cont and w/ IV Cont (25 @ 18:11) >  Interval ventral hernia repair, small bowel resection. Lower abdominal wall 9.7 x 6.2 cm fluid collection at site of previously seen extravasated contrast, similar in size since prior study. Decreased anterior peritoneal fluid collection, measuring up to 6.3 x 8.3 cm. No definite current oral contrast leak.  < end of copied text >  - : s/p IR percutaneous placement of a 8.5 [place]ien drainage catheter into lower abdominal wall fluid collection, yielding 150 mL of foul appearing fluid.  #SBO s/p ventral hernia repair with SBR  #Diet, Bariatric Clears, sips  - Continue TPN with AM labs  #GI Prophylaxis/hx GERD   - pantoprazole  Injectable 40 IV Push  daily (omeprazole @home)  #Bowel regimen  - Holding  - Last BM   - NO dignishield     /RENAL:   #urine output in critically ill  - An removed, passed TOV   - High UOP   #FUNMI; resolving   - Uptrending BUN   - Ammonia 35  - previously oliguric, now improved. Previously on bumex gtt @ 0.5mg/hr  - Nephrology consulted and following > hold off on CVVH  - Renal u/s> no hydronephrosis. 2-3cm anechoic cyst on right kidney     Labs:          BUN/Cr- 62/1.3  -->,  59/1.2  -->          [ @ 20:00]Na  142 // K  3.8 // Mg  -- // Phos  --  [ @ 04:42]Na  145 // K  3.2 // Mg  1.9 // Phos  3.5  **PENDING AM LABS WHILE ON TPN    #Hypernatremia  -started on D5W @ 50cc/hr     HEME/ONC:   # DVT prophylaxis-  - Therapeutic heparin gtt in setting of PE - currently at 3200 units/hr   #R posterior tibial vein DVT  - vascular surgery consulted- - AC with heparin drip (upon discharge transition to Eliquis 10 mg po BID x 7 days then 5 mg po BID for at least 6 months)    Labs: Hb/Hct:  9.3/28.5  -->,  9.4/29.4  -->                      Plts:  422  -->,  373  -->                 PTT/INR:  128.9/--  --->,  84.1/--  --->     *AM LABS PENDING*  T&S , repeat pending    ID:  #MRSA nares negative   Current antibiotics-piperacillin/tazobactam IVPB.. 3.375 every 12 hours x 4 days post op (end date )  #Antibiotics Course  -ID following  - continue zosyn > Purulent abscesses noted intraoperatively - ended   - Meropenem ( - )  WBC- 19.06  --->>,  23.04  --->>,  20.90  --->>  Temp trend- 24hrs T(F): 97.8 ( @ 20:00), Max: 98.6 ( @ 04:00)    #febrile  - UA negative  - blood cultures; no growth at 5d  - sputum cultures pending   #cultures  - OR cx: few E.coli, few bacteroides, rare clostridium  - Blood Cx: NGTF  - tracheal aspirate: no growth   - body fluid cx rare e. coli     ENDOCRINE:  #Glycemic monitoring  - FSG Q6   - ISS  - A1C 5.8%     MSK:  #Activity- increase as tolerated     SKIN:  #DTI screening negative     - will continue to monitor daily skin changes    LINES/DRAINS:  PIV, THONY drain x 4, Radial A line, RIJ TLC   ADVANCED DIRECTIVES:  Full Code  HCP- Daughter  (Shavonne Moss)  INDICATION FOR SICU/SDU: Intestinal obstruction  DISPO:  SICU - to be diiscussed with SICU attending Dr. Johnson.   	  ASSESSMENT & PLAN:  64F PMHx HTN, morbid obesity s/p  Abby-en-Y gastric bypass who presented on 4/3 with incarcerated ventral hernia with SBO. S/p 4/10 open ventral hernia repair, small bowel resection, and primary fascial closure with Dr. Lema. Admitted to SICU for Q1 abdominal checks and hemodynamic monitoring. S/p  takeback for exploratory laparotomy, repair of anastomotic leak.    NEUROLOGICAL:  #Acute pain  -APAP ATC   - Dilaudid 0.5mg Q4 PRN severe pain   #sleep hygiene  - Trazodone 25mg QPM  - Melatonin 5mg qHS    RESPIRATORY:   #Right distal main pulmonary embolus  -on therapeutic heparin gtt -, currently on therapeutic lovenox   - Echo 25: EF 70 to 75%.  - B/l DVT sono: Right posterior tibial vein DVT    #Oxygenation  - Intubated 4/10-4/15  - Reintubated -  -currently on HFNC 60L/60%  -duoneb   -encourage IS   #Diuresis  - 1mg bumex x 1   - 1mg Bumex x 1   #PMHxx [place] on CPAP@ home   #Activity   - Activity- increase as tolerated     CARDS:   #acute hypotension in setting of SIRS, resolved   - Off levophed since   - MAP >65  #supraventricular tachycardia, Non-sustained ventricular tachycardia, Ventricular Premature Depolarization  -evaluated by cardiology  -continue metoprolol 25mg q 12 per recommendations  #PMHx of HTN  -off cardene   - Restarted amlodipine 10mg qD   - Restarted home HCTZ 25mg QD  - Holding home losartan  #EKG- NSR  #TTE 2022: EF 55-60%. G1DD.   - TTE : EF of 56 %.G1DD.     GASTROINTESTINAL/NUTRITION:   #s/p lap converted to open repair of large ventral hernia with incarcerated small bowel, small bowel resection, vicryl mesh placement; c/b anastomotic leak     - CTAP with PO contrast: Evidence of defect in small bowel loop with connection to anterior midline intraperitoneal and subcutaneous pannus collections containing extravasated oral contrast. Unclear if this small bowel defect occurs at the anastomotic site. Surgical review recommended.    - : ex lap anastomosis resection and creation of side to side anastomosis     - aspiration precautions, HOB 30  #Anterior abdominal wall collection  < from: CT Abdomen and Pelvis w/ Oral Cont and w/ IV Cont (25 @ 18:11) >  Interval ventral hernia repair, small bowel resection. Lower abdominal wall 9.7 x 6.2 cm fluid collection at site of previously seen extravasated contrast, similar in size since prior study. Decreased anterior peritoneal fluid collection, measuring up to 6.3 x 8.3 cm. No definite current oral contrast leak.  < end of copied text >  - : s/p IR percutaneous placement of a 8.5 [place] drainage catheter into lower abdominal wall fluid collection, yielding 150 mL of foul appearing fluid.  #SBO s/p ventral hernia repair with SBR  #Diet, Bariatric Clears, sips  - Continue TPN with AM labs  #GI Prophylaxis/hx GERD   - pantoprazole  Injectable 40 IV Push  daily (omeprazole @home)  #Bowel regimen  - Holding  - Last BM   - NO dignishield     /RENAL:   #urine output in critically ill  - An removed, passed TOV   - High UOP   #FUNMI; resolving   - Uptrending BUN   - Ammonia 35  - previously oliguric, now improved. Previously on bumex gtt @ 0.5mg/hr  - Nephrology consulted and following > hold off on CVVH  - Renal u/s> no hydronephrosis. 2-3cm anechoic cyst on right kidney     Labs:          BUN/Cr- 62/1.3  -->,  59/1.2  -->          [ @ 20:00]Na  142 // K  3.8 // Mg  -- // Phos  --  [ @ 04:42]Na  145 // K  3.2 // Mg  1.9 // Phos  3.5  **PENDING AM LABS WHILE ON TPN    #Hypernatremia  -started on D5W @ 50cc/hr     HEME/ONC:   # DVT prophylaxis-  - Therapeutic heparin gtt in setting of PE - currently at 3200 units/hr   #R posterior tibial vein DVT  - vascular surgery consulted- - AC with heparin drip (upon discharge transition to Eliquis 10 mg po BID x 7 days then 5 mg po BID for at least 6 months)    Labs: Hb/Hct:  9.3/28.5  -->,  9.4/29.4  -->                      Plts:  422  -->,  373  -->                 PTT/INR:  128.9/--  --->,  84.1/--  --->     *AM LABS PENDING*  T&S , repeat pending    ID:  #MRSA nares negative   Current antibiotics-piperacillin/tazobactam IVPB.. 3.375 every 12 hours x 4 days post op (end date )  #Antibiotics Course  -ID following  - continue zosyn > Purulent abscesses noted intraoperatively - ended   - Meropenem ( - )  WBC- 19.06  --->>,  23.04  --->>,  20.90  --->>  Temp trend- 24hrs T(F): 97.8 ( @ 20:00), Max: 98.6 ( @ 04:00)    #febrile  - UA negative  - blood cultures; no growth at 5d  - sputum cultures pending   #cultures  - OR cx: few E.coli, few bacteroides, rare clostridium  - Blood Cx: NGTF  - tracheal aspirate: no growth   - body fluid cx rare e. coli     ENDOCRINE:  #Glycemic monitoring  - FSG Q6   - ISS  - A1C 5.8%     MSK:  #Activity- increase as tolerated     SKIN:  #DTI screening negative     - will continue to monitor daily skin changes    LINES/DRAINS:  PIV, THONY drain x 4, Radial A line, RIJ TLC   ADVANCED DIRECTIVES:  Full Code  HCP- Daughter  (Shavonne Moss)  INDICATION FOR SICU/SDU: Intestinal obstruction  DISPO:  SICU - to be diiscussed with SICU attending Dr. Johnson.   	  ASSESSMENT & PLAN:  64F PMHx HTN, morbid obesity s/p  Abby-en-Y gastric bypass who presented on 4/3 with incarcerated ventral hernia with SBO. S/p 4/10 open ventral hernia repair, small bowel resection, and primary fascial closure with Dr. Lema. Admitted to SICU for Q1 abdominal checks and hemodynamic monitoring. S/p  takeback for exploratory laparotomy, repair of anastomotic leak.    NEUROLOGICAL:  #Acute pain  -APAP ATC   - Dilaudid 0.5mg Q4 PRN severe pain   #sleep hygiene  - Trazodone 25mg QPM  - Melatonin 5mg qHS    RESPIRATORY:   #Right distal main pulmonary embolus  -on therapeutic heparin gtt -, currently on therapeutic lovenox   - Echo 25: EF 70 to 75%.  - B/l DVT sono: Right posterior tibial vein DVT    #Oxygenation  - Intubated 4/10-4/15  - Reintubated -  -currently on HFNC 60L/60%  -duoneb   -encourage IS   #Diuresis  - 1mg bumex x 1   - 1mg Bumex x 1   #PMHxx [place] on CPAP@ home   #Activity   - Activity- increase as tolerated     CARDS:   #acute hypotension in setting of SIRS, resolved   - Off levophed since   - MAP >65  #supraventricular tachycardia, Non-sustained ventricular tachycardia, Ventricular Premature Depolarization  -evaluated by cardiology  -continue metoprolol 25mg q 12 per recommendations  #PMHx of HTN  -off cardene   - Restarted amlodipine 10mg qD   - Restarted home HCTZ 25mg QD  - Holding home losartan  #EKG- NSR  #TTE 2022: EF 55-60%. G1DD.   - TTE : EF of 56 %.G1DD.     GASTROINTESTINAL/NUTRITION:   #s/p lap converted to open repair of large ventral hernia with incarcerated small bowel, small bowel resection, vicryl mesh placement; c/b anastomotic leak     - CTAP with PO contrast: Evidence of defect in small bowel loop with connection to anterior midline intraperitoneal and subcutaneous pannus collections containing extravasated oral contrast. Unclear if this small bowel defect occurs at the anastomotic site. Surgical review recommended.    - : ex lap anastomosis resection and creation of side to side anastomosis     - aspiration precautions, HOB 30  #Anterior abdominal wall collection  < from: CT Abdomen and Pelvis w/ Oral Cont and w/ IV Cont (25 @ 18:11) >  Interval ventral hernia repair, small bowel resection. Lower abdominal wall 9.7 x 6.2 cm fluid collection at site of previously seen extravasated contrast, similar in size since prior study. Decreased anterior peritoneal fluid collection, measuring up to 6.3 x 8.3 cm. No definite current oral contrast leak.  < end of copied text >  - : s/p IR percutaneous placement of a 8.5 [place] drainage catheter into lower abdominal wall fluid collection, yielding 150 mL of foul appearing fluid.  #SBO s/p ventral hernia repair with SBR  #Diet, aubrey fulls   - Continue TPN with AM labs  #GI Prophylaxis/hx GERD   - pantoprazole  Injectable 40 IV Push  daily (omeprazole @home)  #Bowel regimen  - Holding  - Last BM   - NO dignishield     /RENAL:   #urine output in critically ill  - An removed, passed TOV   - High UOP   #FUNMI; resolving   - Uptrending BUN   - Ammonia 35  - previously oliguric, now improved. Previously on bumex gtt @ 0.5mg/hr  - Nephrology consulted and following > hold off on CVVH  - Renal u/s> no hydronephrosis. 2-3cm anechoic cyst on right kidney     Labs:          BUN/Cr- 62/1.3  -->,  59/1.2  -->62/1.3         Electrolytes-Na 144 // K 4.1 // Mg 2.0 //  Phos 4.6  @ 05:05  Na 142 // K 3.8 // Mg -- //  Phos --  @ 20:00      #Hypernatremia  -started on D5W @ 50cc/hr     HEME/ONC:   # DVT prophylaxis-  - Therapeutic heparin gtt in setting of PE - currently at 3200 units/hr   #R posterior tibial vein DVT  - vascular surgery consulted- - AC with heparin drip (upon discharge transition to Eliquis 10 mg po BID x 7 days then 5 mg po BID for at least 6 months)    Labs: DVT propylaxis-enoxaparin Injectable    Hb/Hct:  9.3/28.5  -->,  9.4/29.4  -->,  8.2/25.9  -->  Plts:  422  -->,  373  -->,  338  -->              PTT/INR:  128.9/--  --->,  84.1/--  --->     T&S , repeat pending    ID:  #MRSA nares negative   Current antibiotics-piperacillin/tazobactam IVPB.. 3.375 every 12 hours x 4 days post op (end date )  #Antibiotics Course  -ID following  - continue zosyn > Purulent abscesses noted intraoperatively - ended   - Meropenem ( - )  WBC- 19.06  --->>,  23.04  --->>,  20.90  --->>13.6  Temp trend- 24hrs T(F):96.5 (2025 08:00), Max: 98.2 (2025 04:00)    #febrile  - UA negative  - blood cultures; no growth at 5d  - sputum cultures pending   #cultures  - OR cx: few E.coli, few bacteroides, rare clostridium  - Blood Cx: NGTF  - tracheal aspirate: no growth   - body fluid cx rare e. coli     ENDOCRINE:  #Glycemic monitoring  - FSG Q6   - ISS  - A1C 5.8%     MSK:  #Activity- increase as tolerated     SKIN:  #DTI screening negative     - will continue to monitor daily skin changes    LINES/DRAINS:  PIV, THONY drain x 4, Radial A line, RIJ TLC   ADVANCED DIRECTIVES:  Full Code  HCP- Daughter  (Shavonne Moss)  INDICATION FOR SICU/SDU: Intestinal obstruction  DISPO:  SICU - to be diiscussed with SICU attending Dr. Johnson.

## 2025-04-24 NOTE — CHART NOTE - NSCHARTNOTEFT_GEN_A_CORE
GI NUTRITION SUPPORT TEAM  -  PROGRESS NOTE     Interval Events:        REVIEW OF SYSTEMS:  Negative except as noted above.     VITALS:  T(F): 96.8 (04-24 @ 12:00), Max: 98.2 (04-24 @ 04:00)  HR: 66 (04-24 @ 11:00) (66 - 93)  BP: 121/76 (04-24 @ 08:00) (121/76 - 121/76)  RR: 18 (04-24 @ 11:00) (18 - 28)  SpO2: 96% (04-24 @ 11:00) (96% - 98%)    HEIGHT/WEIGHT/BMI:   Height (cm): 160 (04-17)  Weight (kg): 135.6 (04-17)  BMI (kg/m2): 53 (04-17)    PHYSICAL EXAM:   GENERAL: NAD, well-groomed, well-developed  HEENT: Moist mucous membranes, Good dentition, No lesions  ABDOMEN: Soft, Nontender, Nondistended  EXTREMITIES:  No clubbing, cyanosis, or edema  SKIN: warm and well perfused; No obvious rashes or lesions  IV ACCESS:   ENTERAL ACCESS:     I/Os:     04-23-25 @ 07:01  -  04-24-25 @ 07:00  --------------------------------------------------------  IN:    dextrose 5%: 1200 mL    Heparin: 252 mL    TPN (Total Parenteral Nutrition): 1608 mL  Total IN: 3060 mL    OUT:    Bulb (mL): 0 mL    Bulb (mL): 25 mL    Bulb (mL): 50 mL    Bulb (mL): 65 mL    VAC (Vacuum Assisted Closure) System (mL): 0 mL    Voided (mL): 1400 mL  Total OUT: 1540 mL    Total NET: 1520 mL        MEDICATIONS:   acetaminophen     Tablet .. 650 milliGRAM(s) Oral every 6 hours  albuterol/ipratropium for Nebulization 3 milliLiter(s) Nebulizer every 6 hours  amLODIPine   Tablet 10 milliGRAM(s) Oral daily  chlorhexidine 2% Cloths 1 Application(s) Topical <User Schedule>  enoxaparin Injectable 135 milliGRAM(s) SubCutaneous every 12 hours  HYDROmorphone  Injectable 0.5 milliGRAM(s) IV Push every 4 hours PRN  insulin lispro (ADMELOG) corrective regimen sliding scale   SubCutaneous Before meals and at bedtime  melatonin 5 milliGRAM(s) Oral at bedtime  meropenem  IVPB      meropenem  IVPB 1000 milliGRAM(s) IV Intermittent every 8 hours  metoprolol tartrate 25 milliGRAM(s) Oral two times a day  pantoprazole    Tablet 40 milliGRAM(s) Oral every 24 hours  Parenteral Nutrition - Adult 1 Each (67 mL/Hr) TPN Continuous <Continuous>  traZODone 25 milliGRAM(s) Oral at bedtime    LABS:                         8.2    13.68 )-----------( 338      ( 24 Apr 2025 05:05 )             25.9     144  |  109  |  62[HH]  ----------------------------<  128[H]          (04-24-25 @ 05:05)  4.1   |  25  |  1.3    Ca    8.6          (04-24-25 @ 05:05)  Phos  4.6         (04-24-25 @ 05:05)  Mg     2.0         (04-24-25 @ 05:05)    TPro  6.1  /  Alb  3.1[L]  /  TBili  0.4  /  DBili  0.2  /  AST  52[H]  /  ALT  72[H]  /  AlkPhos  93       04-23-25 @ 04:42    Triglycerides, Serum: 232 mg/dL (04-20 @ 04:59)  Vitamin D, 25-Hydroxy: 13 ng/mL (04-19 @ 05:08)  Folate: 17.3 ng/mL (04-19 @ 05:08)  Vitamin B12, Serum: 1417 pg/mL (04-19 @ 05:08)  Zinc Level, Plasma: 54 ug/dL (04-19 @ 05:08)    Blood Glucose (Past 24 hours):  124 mg/dL (04-24 @ 11:54)  133 mg/dL (04-24 @ 05:56)  130 mg/dL (04-23 @ 22:56)  156 mg/dL (04-23 @ 21:39)  139 mg/dL (04-23 @ 17:01)  160 mg/dL (04-23 @ 13:28)    DIET:   Diet, Full Liquid:   Phase 1 Bariatric Full Liquid ([id number])  Beneprotein ([facility] Only)     Qty per Day:  3  Supplement Feeding Modality:  Oral  Glucerna Shake Cans or Servings Per Day:  3       Frequency:  Three Times a day (04-24-25 @ 11:45) [Active]    Parenteral Nutrition - Adult 1 Each (67 mL/Hr) TPN Continuous <Continuous>, 04-23-25 @ 20:00, 20:00, Stop order after: 1 Days          ASSESSMENT/PLAN: GI NUTRITION SUPPORT TEAM  -  PROGRESS NOTE     Interval Events:        REVIEW OF SYSTEMS:  Negative except as noted above.     VITALS:  T(F): 96.8 (04-24 @ 12:00), Max: 98.2 (04-24 @ 04:00)  HR: 66 (04-24 @ 11:00) (66 - 93)  BP: 121/76 (04-24 @ 08:00) (121/76 - 121/76)  RR: 18 (04-24 @ 11:00) (18 - 28)  SpO2: 96% (04-24 @ 11:00) (96% - 98%)    HEIGHT/WEIGHT/BMI:   Height (cm): 160 (04-17)  Weight (kg): 135.6 (04-17)  BMI (kg/m2): 53 (04-17)    I/Os:     04-23-25 @ 07:01  -  04-24-25 @ 07:00  --------------------------------------------------------  IN:    dextrose 5%: 1200 mL    Heparin: 252 mL    TPN (Total Parenteral Nutrition): 1608 mL  Total IN: 3060 mL    OUT:    Bulb (mL): 0 mL    Bulb (mL): 25 mL    Bulb (mL): 50 mL    Bulb (mL): 65 mL    VAC (Vacuum Assisted Closure) System (mL): 0 mL    Voided (mL): 1400 mL  Total OUT: 1540 mL    Total NET: 1520 mL    MEDICATIONS:   acetaminophen     Tablet .. 650 milliGRAM(s) Oral every 6 hours  albuterol/ipratropium for Nebulization 3 milliLiter(s) Nebulizer every 6 hours  amLODIPine   Tablet 10 milliGRAM(s) Oral daily  chlorhexidine 2% Cloths 1 Application(s) Topical <User Schedule>  enoxaparin Injectable 135 milliGRAM(s) SubCutaneous every 12 hours  HYDROmorphone  Injectable 0.5 milliGRAM(s) IV Push every 4 hours PRN  insulin lispro (ADMELOG) corrective regimen sliding scale   SubCutaneous Before meals and at bedtime  melatonin 5 milliGRAM(s) Oral at bedtime  meropenem  IVPB      meropenem  IVPB 1000 milliGRAM(s) IV Intermittent every 8 hours  metoprolol tartrate 25 milliGRAM(s) Oral two times a day  pantoprazole    Tablet 40 milliGRAM(s) Oral every 24 hours  Parenteral Nutrition - Adult 1 Each (67 mL/Hr) TPN Continuous <Continuous>  traZODone 25 milliGRAM(s) Oral at bedtime    LABS:                         8.2    13.68 )-----------( 338      ( 24 Apr 2025 05:05 )             25.9     144  |  109  |  62[HH]  ----------------------------<  128[H]          (04-24-25 @ 05:05)  4.1   |  25  |  1.3    Ca    8.6          (04-24-25 @ 05:05)  Phos  4.6         (04-24-25 @ 05:05)  Mg     2.0         (04-24-25 @ 05:05)    TPro  6.1  /  Alb  3.1[L]  /  TBili  0.4  /  DBili  0.2  /  AST  52[H]  /  ALT  72[H]  /  AlkPhos  93       04-23-25 @ 04:42    Triglycerides, Serum: 232 mg/dL (04-20 @ 04:59)  Vitamin D, 25-Hydroxy: 13 ng/mL (04-19 @ 05:08)  Folate: 17.3 ng/mL (04-19 @ 05:08)  Vitamin B12, Serum: 1417 pg/mL (04-19 @ 05:08)  Zinc Level, Plasma: 54 ug/dL (04-19 @ 05:08)    Blood Glucose (Past 24 hours):  124 mg/dL (04-24 @ 11:54)  133 mg/dL (04-24 @ 05:56)  130 mg/dL (04-23 @ 22:56)  156 mg/dL (04-23 @ 21:39)  139 mg/dL (04-23 @ 17:01)  160 mg/dL (04-23 @ 13:28)    DIET:   Diet, Full Liquid:   Phase 1 Bariatric Full Liquid ([id number])  Beneprotein ([facility] Only)     Qty per Day:  3  Supplement Feeding Modality:  Oral  Glucerna Shake Cans or Servings Per Day:  3       Frequency:  Three Times a day (04-24-25 @ 11:45) [Active]    Parenteral Nutrition - Adult 1 Each (67 mL/Hr) TPN Continuous <Continuous>, 04-23-25 @ 20:00, 20:00, Stop order after: 1 Days    ASSESSMENT  64y F w/ PMHx of HTN and gastric bypass surgery in 2001 presented with abdominal distention and CT showing incarcerated ventral hernia with SBO. Taken to OR 4/10 s/p open repair of ventral hernia using mesh and component separation technique, small bowel resection and primary fascial closure. RTOR 4/17 for anastomotic leak s/p ex lap, 30 cm small bowel resection, creation of new side to side ileoileostomy, ventral hernia repair w/ mesh.     - recurrent SBO, s/p repair 4/10 then anastomotic leak s/p repair 4/17   - FUNMI   - high risk for malnutrition, prolonged NPO X 15d, TPN initiated 4/17  - class III obesity, BMI 53  - hypertriglyceridemia  - hyperglycemia   - hypokalemia  - vitamin D deficiency     Est nutrient needs: IBW 52.3kg, 3537-4257 kcals (22-25kcals/kg IBW ASPEN/CCM guidelines for BMI>50), 105-131g protein (2-2.5g/kg IBW ASPEN/CCM guidelines for BMI >40)       PLAN  - cont TPN tonight- 115g protein, 165g dextrose, 50g SMOF lipid @67ml/hr- avg 1271 kcals daily (lipids qod)  - lytes adjusted in TPN, K+ decreased to 80.8 mEq in tonight's back   - glycemic control- 25U regular insulin added to tonight's TPN  - daily am bmp, phos, mg while on parenteral nutrition  - rec starting 50,000 IU ergocalciferol PO daily X 2 days then give once weekly. Repeat 25-oh vitamin D level 4-6 weeks after starting tx   - will follow GI NUTRITION SUPPORT TEAM  -  PROGRESS NOTE     Interval Events:    Comfortable on HFNC, OOB this afternoon. Diet advanced to full liquids, tolerated clears well. Denies N/V, reporting good appetite. Having BM's. TPN infusing day#7    REVIEW OF SYSTEMS:  Negative except as noted above.     VITALS:  T(F): 96.8 (04-24 @ 12:00), Max: 98.2 (04-24 @ 04:00)  HR: 66 (04-24 @ 11:00) (66 - 93)  BP: 121/76 (04-24 @ 08:00) (121/76 - 121/76)  RR: 18 (04-24 @ 11:00) (18 - 28)  SpO2: 96% (04-24 @ 11:00) (96% - 98%)    HEIGHT/WEIGHT/BMI:   Height (cm): 160 (04-17)  Weight (kg): 135.6 (04-17)  BMI (kg/m2): 53 (04-17)    I/Os:     04-23-25 @ 07:01  -  04-24-25 @ 07:00  --------------------------------------------------------  IN:    dextrose 5%: 1200 mL    Heparin: 252 mL    TPN (Total Parenteral Nutrition): 1608 mL  Total IN: 3060 mL    OUT:    Bulb (mL): 0 mL    Bulb (mL): 25 mL    Bulb (mL): 50 mL    Bulb (mL): 65 mL    VAC (Vacuum Assisted Closure) System (mL): 0 mL    Voided (mL): 1400 mL  Total OUT: 1540 mL    Total NET: 1520 mL    MEDICATIONS:   acetaminophen     Tablet .. 650 milliGRAM(s) Oral every 6 hours  albuterol/ipratropium for Nebulization 3 milliLiter(s) Nebulizer every 6 hours  amLODIPine   Tablet 10 milliGRAM(s) Oral daily  chlorhexidine 2% Cloths 1 Application(s) Topical <User Schedule>  enoxaparin Injectable 135 milliGRAM(s) SubCutaneous every 12 hours  HYDROmorphone  Injectable 0.5 milliGRAM(s) IV Push every 4 hours PRN  insulin lispro (ADMELOG) corrective regimen sliding scale   SubCutaneous Before meals and at bedtime  melatonin 5 milliGRAM(s) Oral at bedtime  meropenem  IVPB      meropenem  IVPB 1000 milliGRAM(s) IV Intermittent every 8 hours  metoprolol tartrate 25 milliGRAM(s) Oral two times a day  pantoprazole    Tablet 40 milliGRAM(s) Oral every 24 hours  Parenteral Nutrition - Adult 1 Each (67 mL/Hr) TPN Continuous <Continuous>  traZODone 25 milliGRAM(s) Oral at bedtime    LABS:                         8.2    13.68 )-----------( 338      ( 24 Apr 2025 05:05 )             25.9     144  |  109  |  62[HH]  ----------------------------<  128[H]          (04-24-25 @ 05:05)  4.1   |  25  |  1.3    Ca    8.6          (04-24-25 @ 05:05)  Phos  4.6         (04-24-25 @ 05:05)  Mg     2.0         (04-24-25 @ 05:05)    TPro  6.1  /  Alb  3.1[L]  /  TBili  0.4  /  DBili  0.2  /  AST  52[H]  /  ALT  72[H]  /  AlkPhos  93       04-23-25 @ 04:42    Triglycerides, Serum: 232 mg/dL (04-20 @ 04:59)  Vitamin D, 25-Hydroxy: 13 ng/mL (04-19 @ 05:08)  Folate: 17.3 ng/mL (04-19 @ 05:08)  Vitamin B12, Serum: 1417 pg/mL (04-19 @ 05:08)  Zinc Level, Plasma: 54 ug/dL (04-19 @ 05:08)    Blood Glucose (Past 24 hours):  124 mg/dL (04-24 @ 11:54)  133 mg/dL (04-24 @ 05:56)  130 mg/dL (04-23 @ 22:56)  156 mg/dL (04-23 @ 21:39)  139 mg/dL (04-23 @ 17:01)  160 mg/dL (04-23 @ 13:28)    DIET:   Diet, Full Liquid:   Phase 1 Bariatric Full Liquid ([id number])  Beneprotein ([facility] Only)     Qty per Day:  3  Supplement Feeding Modality:  Oral  Glucerna Shake Cans or Servings Per Day:  3       Frequency:  Three Times a day (04-24-25 @ 11:45) [Active]    Parenteral Nutrition - Adult 1 Each (67 mL/Hr) TPN Continuous <Continuous>, 04-23-25 @ 20:00, 20:00, Stop order after: 1 Days    ASSESSMENT  64y F w/ PMHx of HTN and gastric bypass surgery in 2001 presented with abdominal distention and CT showing incarcerated ventral hernia with SBO. Taken to OR 4/10 s/p open repair of ventral hernia using mesh and component separation technique, small bowel resection and primary fascial closure. RTOR 4/17 for anastomotic leak s/p ex lap, 30 cm small bowel resection, creation of new side to side ileoileostomy, ventral hernia repair w/ mesh.     - recurrent SBO, s/p repair 4/10 then anastomotic leak s/p repair 4/17   - FUNMI   - high risk for malnutrition, prolonged NPO X 15d, TPN initiated 4/17  - class III obesity, BMI 53  - hypertriglyceridemia  - hyperglycemia   - hypokalemia  - vitamin D deficiency     Est nutrient needs: IBW 52.3kg, 7676-8567 kcals (22-25kcals/kg IBW ASPEN/CCM guidelines for BMI>50), 105-131g protein (2-2.5g/kg IBW ASPEN/CCM guidelines for BMI >40)       PLAN  - PO liquids as tolerated per surgery- add Ensure Max 240ml X 3/d and magic cup twice daily   - cont TPN tonight- 115g protein, 165g dextrose, 50g SMOF lipid @67ml/hr- avg 1271 kcals daily (lipids qod)  - lytes adjusted in TPN, K+ decreased to 80.8 mEq in tonight's back   - glycemic control- 25U regular insulin added to tonight's TPN  - daily am bmp, phos, mg while on parenteral nutrition  - rec starting 50,000 IU ergocalciferol PO daily X 2 days then give once weekly. Repeat 25-oh vitamin D level 4-6 weeks after starting tx   - will follow

## 2025-04-24 NOTE — PROGRESS NOTE ADULT - ASSESSMENT
ASSESSMENT  64-year-old female with a past medical history of high blood pressure and gastric bypass surgery in 2001, presenting with two days of sharp abdominal pain and one episode of non-bloody, non-bilious vomiting. She has a prior history of small bowel obstruction in 2006, 2008, and most recently in 2020, all of which resolved with conservative management    IMPRESSION  #SBO 2/2 Complex Incarcerated Ventral Hernia, s/p small bowel resection and ventral hernia repair 4/10   - OR Cx 4/10 - B frag, Clostridium species, E coli     #RTOR for Anastomotic Leak, SBR, Creation of New Ileoileostomy, Ventral Hernia Repair - 4/17   - CT abd/pelvis 4/17 - PERITONEUM/MESENTERY/BOWEL: Ventral abdominal wall midline defect containing nonobstructed small bowel loop with surrounding subcutaneous  stranding (series 5, image 83). The small bowel segment just proximal to  this herniated loop demonstrates a possible transmural defect with  extravasation of oral contrast into a midline intraperitoneal collection   measuring 9.0 x 4.0 x 6.1 cm (series 601, image 83). This intraperitoneal  collection connects to a more inferiorly-located subcutaneous pannus  collection, which contains oral contrast and measures 9.0 x 4.3 cm  (series 5, image 114). Wall thickening and interloop ascites of the involved small bowel loops,  likely reactive. Oral contrast visualized in the sigmoid colon.   Intraperitoneal surgical drain. Descending and sigmoid colonic wall  thickening. Post gastric bypass surgery. Scattered foci of  pneumoperitoneum.  - s/p OR for anastomatic leak/SBR/Creation of new ileostomy 4/17 - Wound Cx E coli   - CT Abdomen and Pelvis w/ Oral Cont and w/ IV Cont (04.21.25 @ 18:11) > Interval ventral hernia repair, small bowel resection. Lower abdominal  wall 9.7 x 6.2 cm fluid collection at site of previously seen  extravasated contrast, similar in size since prior study. Decreased anterior peritoneal fluid collection, measuring up to 6.3 x 8.3 cm. No  definite current oral contrast leak. Bilateral lower lobe consolidative opacities, findings could represent  atelectasis versus pneumonia in the appropriate clinical setting  - s/p drainage of anterior fluid collection 4/22 -- 150cc of foul appearing material aspirated with THONY drain in place    #Pulmonary Embolism   - CT Abdomen and Pelvis w/ Oral Cont and w/ IV Cont (04.21.25 @ 18:11): Evidence of a right distal main pulmonary embolus extending to right  lower lobar and segmental pulmonary arteries: Examination limited by  streak artifact; no definite right heart strain .    #Leukocytosis     #Obesity BMI (kg/m2): 53  #Abx allergy: No Known Allergies    RECOMMENDATIONS  - continue meropenem 1g q 8 hours  - follow-up IR drainage cx - so far negative   - trend WBC -- improving so far, monitor diarrhea     Please call or message on [employer] Teams if with any questions.  Spectra [phone]

## 2025-04-24 NOTE — PROGRESS NOTE ADULT - SUBJECTIVE AND OBJECTIVE BOX
NANCY SARGENT   [id number]   [birth year]  64yF  ============================================================   DATE OF INITIAL SICU/SDU CONSULT: 04-10-25    INDICATION FOR SICU CONSULT:  q1hr abdominal checks, mechanical ventilation     SICU COURSE EVENTS :  04-10 - admitted to SICU service  4/11: Intubated and started on paralytic. Arterial line placed, subclavian TLC placed. Nephrology consulted for oliguria. IR abdominal muscle botox injection performed. TTE performed.   4/12: Additional fluid boluses given; trial fo bumex started, considering CVVH. Weaned off nimbex gtt.  > 220 /hr. Renal U/S w/out hydro.   4/13: Weaned off Levophed. Bumex gtt 2mg/hr > 1mg/hr. Goal net negative 1-1.5L, UOP approx, 200c/hr.   4/14: Remained on bumex gtt for further diuresis, decreased to 0.5 overnight, vent settings weaned. Cr downtrending, LFTs worsening.   4/15: Extubated to BiPAP, transitioned to NC. Dc'd bumex gtt, given 5mg metolazone x 1 and 2mg bumex x 1.   4/16: THONY drain #2 murky/bilious, pending CTAP with PO contrast, hypernatremia, started D5W @ 75cc/hr, persistent hypokalemia   4/17: RTOR for resection of anastomosis for anastomotic leak. Returned intubated, 400/24/80/10, sinus tachy to . Abdomen soft. Was initially on propofol @ 30 and precedex, but propofol weaned off due to hypotension with MAPs in 50s, fentanyl ggt started for acute pain. Remained hypotensive despite fluids, started on Levophed, on 0.05.   4/18: Extubated. HFNC 40L/49%, Levo off since 11 AM   4/19: NGT removed. Started on duonebs. D5W increased to 60cc/hr. 1mg bumex, > 1.5L response  4/20: Episode of afib RVR resolved with metoprolol 5mg IVP, cardiology c/s placed   4/21: PE on CTA, therapeutic heparin gtt started. Started ib cardebe gtt,   4/22: Meropenem started per ID.  S/p IR percutaneous placement of a 8.5 [place] drainage catheter into lower abdominal wall fluid collection, yielding 150 mL of bowel appearing fluid. New left radial a-line placed. Off nicarrdipine gtt.   4/23: Switched to therapeutic lovenox. Diet advanced to [employer] with ensures.    4/23  Night   - PM rounds: A&O3, on HFNC 60L 60% saturating 97%. Abdomen soft, nondistended. THONY drains serosanguinous, IR drain seropurulent. Episode of liquid diarrhea non bloody  - repeat BMP--> K 3.8  - repeat ABG--> 7.53/28/109/23; leave on HFNC overnight       4/23  DAY  - 20meq KCL repleted  - advanced to [facility] with ensures per Dr Lema  - F/u Dr. Lema about eliquis  - F/u ABG --> 04-23 @ 11:59--7.58 / 26 / 121 / 24 / [id number] 97.5 / Lactate 1.3 / iCal 1.11  - 2g Ca  - F/u CXR  - PTT at 11am 84, keep at 28, rpt in 6h  - eliquis/lovenox --> eliquis has reduced absorption w/ current gut, therapeutic lovenox    [X] A ten-point review of systems was negative except as expressed in note.  [X ] History was obtained from patient. If unable to participate in their care, history was from review of the chart and collateral sources of information.  ===================================================================================================================   NANCY SARGENT   [id number]   [birth year]  64yF  ============================================================   DATE OF INITIAL SICU/SDU CONSULT: 04-10-25    INDICATION FOR SICU CONSULT:  q1hr abdominal checks, mechanical ventilation     SICU COURSE EVENTS :  04-10 - admitted to SICU service  : Intubated and started on paralytic. Arterial line placed, subclavian TLC placed. Nephrology consulted for oliguria. IR abdominal muscle botox injection performed. TTE performed.   : Additional fluid boluses given; trial fo bumex started, considering CVVH. Weaned off nimbex gtt.  > 220 /hr. Renal U/S w/out hydro.   : Weaned off Levophed. Bumex gtt 2mg/hr > 1mg/hr. Goal net negative 1-1.5L, UOP approx, 200c/hr.   : Remained on bumex gtt for further diuresis, decreased to 0.5 overnight, vent settings weaned. Cr downtrending, LFTs worsening.   4/15: Extubated to BiPAP, transitioned to NC. Dc'd bumex gtt, given 5mg metolazone x 1 and 2mg bumex x 1.   : THONY drain #2 murky/bilious, pending CTAP with PO contrast, hypernatremia, started D5W @ 75cc/hr, persistent hypokalemia   : RTOR for resection of anastomosis for anastomotic leak. Returned intubated, 400/24/80/10, sinus tachy to . Abdomen soft. Was initially on propofol @ 30 and precedex, but propofol weaned off due to hypotension with MAPs in 50s, fentanyl ggt started for acute pain. Remained hypotensive despite fluids, started on Levophed, on 0.05.   : Extubated. HFNC 40L/49%, Levo off since 11 AM   : NGT removed. Started on duonebs. D5W increased to 60cc/hr. 1mg bumex, > 1.5L response  : Episode of afib RVR resolved with metoprolol 5mg IVP, cardiology c/s placed   : PE on CTA, therapeutic heparin gtt started. Started ib cardebe gtt,   : Meropenem started per ID.  S/p IR percutaneous placement of a 8.5 [place] drainage catheter into lower abdominal wall fluid collection, yielding 150 mL of bowel appearing fluid. New left radial a-line placed. Off nicarrdipine gtt.   : Switched to therapeutic lovenox. Diet advanced to [employer] with ensures.      Night   - PM rounds: A&O3, on HFNC 60L 60% saturating 97%. Abdomen soft, nondistended. THONY drains serosanguinous, IR drain seropurulent. Episode of liquid diarrhea non bloody  - repeat BMP--> K 3.8  - repeat ABG--> 7.53/28/109/23; leave on HFNC overnight         DAY  - 20meq KCL repleted  - advanced to [place] with ensures per Dr Lema  - F/u Dr. Lema about eliquis  - F/u ABG -->  @ 11:59--7.58 / 26 / 121 / 24 / [id number] 97.5 / Lactate 1.3 / iCal 1.11  - 2g Ca  - F/u CXR  - PTT at 11am 84, keep at 28, rpt in 6h  - eliquis/lovenox --> eliquis has reduced absorption w/ current gut, therapeutic lovenox    [X] A ten-point review of systems was negative except as expressed in note.  [X ] History was obtained from patient. If unable to participate in their care, history was from review of the chart and collateral sources of information.  ===================================================================================================================  Daily     Daily Weight in k.6 (2025 06:00)    Diet, Clear Liquid:   Bariatric Clear Liquid (BARICLLIQ)  Supplement Feeding Modality:  Oral  Ensure Surgery Cans or Servings Per Day:  2       Frequency:  Three Times a day (25 @ 08:38)      CURRENT MEDS:  Neurologic Medications  acetaminophen     Tablet .. 650 milliGRAM(s) Oral every 6 hours  HYDROmorphone  Injectable 0.5 milliGRAM(s) IV Push every 4 hours PRN Severe Pain (7 - 10)  melatonin 5 milliGRAM(s) Oral at bedtime  traZODone 25 milliGRAM(s) Oral at bedtime    Respiratory Medications  albuterol/ipratropium for Nebulization 3 milliLiter(s) Nebulizer every 6 hours    Cardiovascular Medications  amLODIPine   Tablet 10 milliGRAM(s) Oral daily  metoprolol tartrate 25 milliGRAM(s) Oral two times a day    Gastrointestinal Medications  pantoprazole    Tablet 40 milliGRAM(s) Oral every 24 hours  Parenteral Nutrition - Adult 1 Each TPN Continuous <Continuous>    Genitourinary Medications    Hematologic/Oncologic Medications  enoxaparin Injectable 135 milliGRAM(s) SubCutaneous every 12 hours    Antimicrobial/Immunologic Medications  meropenem  IVPB      meropenem  IVPB 1000 milliGRAM(s) IV Intermittent every 8 hours    Endocrine/Metabolic Medications  insulin lispro (ADMELOG) corrective regimen sliding scale   SubCutaneous Before meals and at bedtime    Topical/Other Medications  chlorhexidine 2% Cloths 1 Application(s) Topical <User Schedule>      ICU Vital Signs Last 24 Hrs  T(C): 35.8 (2025 08:00), Max: 36.8 (2025 04:00)  T(F): 96.5 (2025 08:00), Max: 98.2 (2025 04:00)  HR: 79 (2025 06:00) (77 - 99)  BP: --  BP(mean): --  ABP: 130/53 (2025 06:00) (93/88 - 147/62)  ABP(mean): 73 (2025 06:00) (73 - 101)  RR: 19 (2025 06:00) (18 - 33)  SpO2: 98% (2025 06:00) (96% - 99%)    O2 Parameters below as of 2025 20:25  Patient On (Oxygen Delivery Method): nasal cannula, high flow  O2 Flow (L/min): 60  O2 Concentration (%): 60        ABG - ( 2025 21:36 )  pH, Arterial: 7.53  pH, Blood: x     /  pCO2: 28    /  pO2: 109   / HCO3: 23    / Base Excess: 1.7   /  SaO2: 97.1        I&O's Summary    2025 07:  -  2025 07:00  --------------------------------------------------------  IN: 3060 mL / OUT: 1540 mL / NET: 1520 mL      I&O's Detail    2025 07:01  -  2025 07:00  --------------------------------------------------------  IN:    dextrose 5%: 1200 mL    Heparin: 252 mL    TPN (Total Parenteral Nutrition): 1608 mL  Total IN: 3060 mL    OUT:    Bulb (mL): 0 mL    Bulb (mL): 25 mL    Bulb (mL): 50 mL    Bulb (mL): 65 mL    VAC (Vacuum Assisted Closure) System (mL): 0 mL    Voided (mL): 1400 mL  Total OUT: 1540 mL    Total NET: 1520 mL      PHYSICAL EXAM:    General/Neuro  RASS:             GCS:     = E   / V   / M      Deficits:                             alert & oriented x 3, no focal deficits  Pupils:    Lungs:      clear to auscultation, Normal expansion/effort.     Cardiovascular : S1, S2.  Regular rate and rhythm.  Peripheral edema   Cardiac Rhythm: Normal Sinus Rhythm    GI: Abdomen soft, Non-tender, Non-distended.    Wound: 4 drains, vac drain     Extremities: Extremities warm, pink, well-perfused. Pulses:Rt     Lt    Derm: Good skin turgor, no skin breakdown.      :       voiding      CXR:     LABS:  CAPILLARY BLOOD GLUCOSE      POCT Blood Glucose.: 133 mg/dL (2025 05:56)  POCT Blood Glucose.: 130 mg/dL (2025 22:56)  POCT Blood Glucose.: 156 mg/dL (2025 21:39)  POCT Blood Glucose.: 139 mg/dL (2025 17:01)  POCT Blood Glucose.: 160 mg/dL (2025 13:28)                          8.2    13.68 )-----------( 338      ( 2025 05:05 )             25.9       04-24    144  |  109  |  62[HH]  ----------------------------<  128[H]  4.1   |  25  |  1.3    Ca    8.6      2025 05:05  Phos  4.6     04-24  Mg     2.0     04-24    TPro  6.1  /  Alb  3.1[L]  /  TBili  0.4  /  DBili  0.2  /  AST  52[H]  /  ALT  72[H]  /  AlkPhos  93  04-23      PTT - ( 2025 11:20 )  PTT:84.1 sec      Urinalysis Basic - ( 2025 05:05 )    Color: x / Appearance: x / SG: x / pH: x  Gluc: 128 mg/dL / Ketone: x  / Bili: x / Urobili: x   Blood: x / Protein: x / Nitrite: x   Leuk Esterase: x / RBC: x / WBC x   Sq Epi: x / Non Sq Epi: x / Bacteria: x        Culture - Blood (collected 2025 17:47)  Source: Blood Blood  Preliminary Report (2025 23:09):    No growth at 24 hours    Culture - Body Fluid with Gram Stain (collected 2025 15:00)  Source: Abdominal Fl Abdominal Fluid  Gram Stain (2025 06:50):    polymorphonuclear leukocytes seen    No organisms seen    by cytocentrifuge  Preliminary Report (2025 18:25):    No growth    Culture - Blood (collected 2025 13:16)  Source: Blood Blood-Peripheral  Preliminary Report (2025 23:09):    No growth at 24 hours       NANCY SARGENT   [id number]   62  64yF  ============================================================   DATE OF INITIAL SICU/SDU CONSULT: 04-10-25    INDICATION FOR SICU CONSULT:  q1hr abdominal checks, mechanical ventilation     SICU COURSE EVENTS :  04-10 - admitted to SICU service  : Intubated and started on paralytic. Arterial line placed, subclavian TLC placed. Nephrology consulted for oliguria. IR abdominal muscle botox injection performed. TTE performed.   : Additional fluid boluses given; trial fo bumex started, considering CVVH. Weaned off nimbex gtt.  > 220 /hr. Renal U/S w/out hydro.   : Weaned off Levophed. Bumex gtt 2mg/hr > 1mg/hr. Goal net negative 1-1.5L, UOP approx, 200c/hr.   : Remained on bumex gtt for further diuresis, decreased to 0.5 overnight, vent settings weaned. Cr downtrending, LFTs worsening.   4/15: Extubated to BiPAP, transitioned to NC. Dc'd bumex gtt, given 5mg metolazone x 1 and 2mg bumex x 1.   : THONY drain #2 murky/bilious, pending CTAP with PO contrast, hypernatremia, started D5W @ 75cc/hr, persistent hypokalemia   : RTOR for resection of anastomosis for anastomotic leak. Returned intubated, 400/24/80/10, sinus tachy to . Abdomen soft. Was initially on propofol @ 30 and precedex, but propofol weaned off due to hypotension with MAPs in 50s, fentanyl ggt started for acute pain. Remained hypotensive despite fluids, started on Levophed, on 0.05.   : Extubated. HFNC 40L/49%, Levo off since 11 AM   : NGT removed. Started on duonebs. D5W increased to 60cc/hr. 1mg bumex, > 1.5L response  : Episode of afib RVR resolved with metoprolol 5mg IVP, cardiology c/s placed   : PE on CTA, therapeutic heparin gtt started. Started ib cardebe gtt,   : Meropenem started per ID.  S/p IR percutaneous placement of a 8.5 [place] drainage catheter into lower abdominal wall fluid collection, yielding 150 mL of bowel appearing fluid. New left radial a-line placed. Off nicarrdipine gtt.   : Switched to therapeutic lovenox. Diet advanced to [employer] Study Course with ensures.      Night   - PM rounds: A&O3, on HFNC 60L 60% saturating 97%. Abdomen soft, nondistended. THONY drains serosanguinous, IR drain seropurulent. Episode of liquid diarrhea non bloody  - repeat BMP--> K 3.8  - repeat ABG--> 7.53/28/109/23; leave on HFNC overnight         DAY  - 20meq KCL repleted  - advanced to [facility] Study Course with ensures per Dr Lema  - F/u Dr. Lema about eliquis  - F/u ABG -->  @ 11:59--7.58 / 26 / 121 / 24 / [id number] 97.5 / Lactate 1.3 / iCal 1.11  - 2g Ca  - F/u CXR  - PTT at 11am 84, keep at 28, rpt in 6h  - eliquis/lovenox --> eliquis has reduced absorption w/ current gut, therapeutic lovenox    [X] A ten-point review of systems was negative except as expressed in note.  [X ] History was obtained from patient. If unable to participate in their care, history was from review of the chart and collateral sources of information.  ===================================================================================================================  Daily     Daily Weight in k.6 (2025 06:00)    Diet, Clear Liquid:   Bariatric Clear Liquid (BARICLLIQ)  Supplement Feeding Modality:  Oral  Ensure Surgery Cans or Servings Per Day:  2       Frequency:  Three Times a day (25 @ 08:38)      CURRENT MEDS:  Neurologic Medications  acetaminophen     Tablet .. 650 milliGRAM(s) Oral every 6 hours  HYDROmorphone  Injectable 0.5 milliGRAM(s) IV Push every 4 hours PRN Severe Pain (7 - 10)  melatonin 5 milliGRAM(s) Oral at bedtime  traZODone 25 milliGRAM(s) Oral at bedtime    Respiratory Medications  albuterol/ipratropium for Nebulization 3 milliLiter(s) Nebulizer every 6 hours    Cardiovascular Medications  amLODIPine   Tablet 10 milliGRAM(s) Oral daily  metoprolol tartrate 25 milliGRAM(s) Oral two times a day    Gastrointestinal Medications  pantoprazole    Tablet 40 milliGRAM(s) Oral every 24 hours  Parenteral Nutrition - Adult 1 Each TPN Continuous <Continuous>    Genitourinary Medications    Hematologic/Oncologic Medications  enoxaparin Injectable 135 milliGRAM(s) SubCutaneous every 12 hours    Antimicrobial/Immunologic Medications  meropenem  IVPB      meropenem  IVPB 1000 milliGRAM(s) IV Intermittent every 8 hours    Endocrine/Metabolic Medications  insulin lispro (ADMELOG) corrective regimen sliding scale   SubCutaneous Before meals and at bedtime    Topical/Other Medications  chlorhexidine 2% Cloths 1 Application(s) Topical <User Schedule>      ICU Vital Signs Last 24 Hrs  T(C): 35.8 (2025 08:00), Max: 36.8 (2025 04:00)  T(F): 96.5 (2025 08:00), Max: 98.2 (2025 04:00)  HR: 79 (2025 06:00) (77 - 99)  BP: --  BP(mean): --  ABP: 130/53 (2025 06:00) (93/88 - 147/62)  ABP(mean): 73 (2025 06:00) (73 - 101)  RR: 19 (2025 06:00) (18 - 33)  SpO2: 98% (2025 06:00) (96% - 99%)    O2 Parameters below as of 2025 20:25  Patient On (Oxygen Delivery Method): nasal cannula, high flow  O2 Flow (L/min): 60  O2 Concentration (%): 60        ABG - ( 2025 21:36 )  pH, Arterial: 7.53  pH, Blood: x     /  pCO2: 28    /  pO2: 109   / HCO3: 23    / Base Excess: 1.7   /  SaO2: 97.1        I&O's Summary    2025 07:  -  2025 07:00  --------------------------------------------------------  IN: 3060 mL / OUT: 1540 mL / NET: 1520 mL      I&O's Detail    2025 07:01  -  2025 07:00  --------------------------------------------------------  IN:    dextrose 5%: 1200 mL    Heparin: 252 mL    TPN (Total Parenteral Nutrition): 1608 mL  Total IN: 3060 mL    OUT:    Bulb (mL): 0 mL    Bulb (mL): 25 mL    Bulb (mL): 50 mL    Bulb (mL): 65 mL    VAC (Vacuum Assisted Closure) System (mL): 0 mL    Voided (mL): 1400 mL  Total OUT: 1540 mL    Total NET: 1520 mL      PHYSICAL EXAM:    General/Neuro  Deficits:                             alert & oriented x 3, no focal deficits  Pupils:    Lungs:      clear to auscultation, Normal expansion/effort.     Cardiovascular : S1, S2.  Regular rate and rhythm.  3+Peripheral edema LLE>R  Cardiac Rhythm: Normal Sinus Rhythm    GI: Abdomen soft, Non-tender, Non-distended.    Wound: 4 drains, midline incision w/ vac drain     Extremities: Extremities warm, pink, well-perfused. Pulses: palpable dp b/l    Derm: Good skin turgor, no skin breakdown.      :       voiding      CXR:     LABS:  CAPILLARY BLOOD GLUCOSE      POCT Blood Glucose.: 133 mg/dL (2025 05:56)  POCT Blood Glucose.: 130 mg/dL (2025 22:56)  POCT Blood Glucose.: 156 mg/dL (2025 21:39)  POCT Blood Glucose.: 139 mg/dL (2025 17:01)  POCT Blood Glucose.: 160 mg/dL (2025 13:28)                          8.2    13.68 )-----------( 338      ( 2025 05:05 )             25.9       04-24    144  |  109  |  62[HH]  ----------------------------<  128[H]  4.1   |  25  |  1.3    Ca    8.6      2025 05:05  Phos  4.6     04-24  Mg     2.0     04-24    TPro  6.1  /  Alb  3.1[L]  /  TBili  0.4  /  DBili  0.2  /  AST  52[H]  /  ALT  72[H]  /  AlkPhos  93  04-23      PTT - ( 2025 11:20 )  PTT:84.1 sec      Urinalysis Basic - ( 2025 05:05 )    Color: x / Appearance: x / SG: x / pH: x  Gluc: 128 mg/dL / Ketone: x  / Bili: x / Urobili: x   Blood: x / Protein: x / Nitrite: x   Leuk Esterase: x / RBC: x / WBC x   Sq Epi: x / Non Sq Epi: x / Bacteria: x      Culture - Blood (collected 2025 17:47)  Source: Blood Blood  Preliminary Report (2025 23:09):    No growth at 24 hours    Culture - Body Fluid with Gram Stain (collected 2025 15:00)  Source: Abdominal Fl Abdominal Fluid  Gram Stain (2025 06:50):    polymorphonuclear leukocytes seen    No organisms seen    by cytocentrifuge  Preliminary Report (2025 18:25):    No growth    Culture - Blood (collected 2025 13:16)  Source: Blood Blood-Peripheral  Preliminary Report (2025 23:09):    No growth at 24 hours

## 2025-04-25 LAB
ANION GAP SERPL CALC-SCNC: 11 MMOL/L — SIGNIFICANT CHANGE UP (ref 7–14)
ANION GAP SERPL CALC-SCNC: 14 MMOL/L — SIGNIFICANT CHANGE UP (ref 7–14)
APTT BLD: 31.2 SEC — SIGNIFICANT CHANGE UP (ref 27–39.2)
BUN SERPL-MCNC: 52 MG/DL — HIGH (ref 10–20)
BUN SERPL-MCNC: 54 MG/DL — HIGH (ref 10–20)
CALCIUM SERPL-MCNC: 8.3 MG/DL — LOW (ref 8.4–10.5)
CALCIUM SERPL-MCNC: 8.3 MG/DL — LOW (ref 8.4–10.5)
CHLORIDE SERPL-SCNC: 105 MMOL/L — SIGNIFICANT CHANGE UP (ref 98–110)
CHLORIDE SERPL-SCNC: 106 MMOL/L — SIGNIFICANT CHANGE UP (ref 98–110)
CO2 SERPL-SCNC: 20 MMOL/L — SIGNIFICANT CHANGE UP (ref 17–32)
CO2 SERPL-SCNC: 22 MMOL/L — SIGNIFICANT CHANGE UP (ref 17–32)
CREAT SERPL-MCNC: 1.2 MG/DL — SIGNIFICANT CHANGE UP (ref 0.7–1.5)
CREAT SERPL-MCNC: 1.2 MG/DL — SIGNIFICANT CHANGE UP (ref 0.7–1.5)
EGFR: 51 ML/MIN/1.73M2 — LOW
GLUCOSE BLDC GLUCOMTR-MCNC: 120 MG/DL — HIGH (ref 70–99)
GLUCOSE BLDC GLUCOMTR-MCNC: 122 MG/DL — HIGH (ref 70–99)
GLUCOSE BLDC GLUCOMTR-MCNC: 122 MG/DL — HIGH (ref 70–99)
GLUCOSE BLDC GLUCOMTR-MCNC: 143 MG/DL — HIGH (ref 70–99)
GLUCOSE SERPL-MCNC: 115 MG/DL — HIGH (ref 70–99)
GLUCOSE SERPL-MCNC: 131 MG/DL — HIGH (ref 70–99)
HCT VFR BLD CALC: 26.1 % — LOW (ref 37–47)
HCT VFR BLD CALC: 27 % — LOW (ref 37–47)
HGB BLD-MCNC: 8.1 G/DL — LOW (ref 12–16)
HGB BLD-MCNC: 8.6 G/DL — LOW (ref 12–16)
INR BLD: 0.95 RATIO — SIGNIFICANT CHANGE UP (ref 0.65–1.3)
MAGNESIUM SERPL-MCNC: 2 MG/DL — SIGNIFICANT CHANGE UP (ref 1.8–2.4)
MAGNESIUM SERPL-MCNC: 2.4 MG/DL — SIGNIFICANT CHANGE UP (ref 1.8–2.4)
MCHC RBC-ENTMCNC: 30.6 PG — SIGNIFICANT CHANGE UP (ref 27–31)
MCHC RBC-ENTMCNC: 30.6 PG — SIGNIFICANT CHANGE UP (ref 27–31)
MCHC RBC-ENTMCNC: 31 G/DL — LOW (ref 32–37)
MCHC RBC-ENTMCNC: 31.9 G/DL — LOW (ref 32–37)
MCV RBC AUTO: 96.1 FL — SIGNIFICANT CHANGE UP (ref 81–99)
MCV RBC AUTO: 98.5 FL — SIGNIFICANT CHANGE UP (ref 81–99)
NRBC BLD AUTO-RTO: 0 /100 WBCS — SIGNIFICANT CHANGE UP (ref 0–0)
NRBC BLD AUTO-RTO: 0 /100 WBCS — SIGNIFICANT CHANGE UP (ref 0–0)
PHOSPHATE SERPL-MCNC: 3.6 MG/DL — SIGNIFICANT CHANGE UP (ref 2.1–4.9)
PHOSPHATE SERPL-MCNC: 4.2 MG/DL — SIGNIFICANT CHANGE UP (ref 2.1–4.9)
PLATELET # BLD AUTO: 263 K/UL — SIGNIFICANT CHANGE UP (ref 130–400)
PLATELET # BLD AUTO: 346 K/UL — SIGNIFICANT CHANGE UP (ref 130–400)
PMV BLD: 11.1 FL — HIGH (ref 7.4–10.4)
PMV BLD: 11.3 FL — HIGH (ref 7.4–10.4)
POTASSIUM SERPL-MCNC: 4 MMOL/L — SIGNIFICANT CHANGE UP (ref 3.5–5)
POTASSIUM SERPL-MCNC: 4 MMOL/L — SIGNIFICANT CHANGE UP (ref 3.5–5)
POTASSIUM SERPL-SCNC: 4 MMOL/L — SIGNIFICANT CHANGE UP (ref 3.5–5)
POTASSIUM SERPL-SCNC: 4 MMOL/L — SIGNIFICANT CHANGE UP (ref 3.5–5)
PROTHROM AB SERPL-ACNC: 11.2 SEC — SIGNIFICANT CHANGE UP (ref 9.95–12.87)
RBC # BLD: 2.65 M/UL — LOW (ref 4.2–5.4)
RBC # BLD: 2.81 M/UL — LOW (ref 4.2–5.4)
RBC # FLD: 14.8 % — HIGH (ref 11.5–14.5)
RBC # FLD: 14.8 % — HIGH (ref 11.5–14.5)
SODIUM SERPL-SCNC: 138 MMOL/L — SIGNIFICANT CHANGE UP (ref 135–146)
SODIUM SERPL-SCNC: 140 MMOL/L — SIGNIFICANT CHANGE UP (ref 135–146)
WBC # BLD: 11.11 K/UL — HIGH (ref 4.8–10.8)
WBC # BLD: 12.77 K/UL — HIGH (ref 4.8–10.8)
WBC # FLD AUTO: 11.11 K/UL — HIGH (ref 4.8–10.8)
WBC # FLD AUTO: 12.77 K/UL — HIGH (ref 4.8–10.8)

## 2025-04-25 PROCEDURE — 71045 X-RAY EXAM CHEST 1 VIEW: CPT | Mod: 26

## 2025-04-25 PROCEDURE — 93971 EXTREMITY STUDY: CPT | Mod: 26,RT

## 2025-04-25 PROCEDURE — 99291 CRITICAL CARE FIRST HOUR: CPT | Mod: 24

## 2025-04-25 RX ORDER — FUROSEMIDE 10 MG/ML
20 INJECTION INTRAMUSCULAR; INTRAVENOUS ONCE
Refills: 0 | Status: DISCONTINUED | OUTPATIENT
Start: 2025-04-25 | End: 2025-04-25

## 2025-04-25 RX ORDER — BUMETANIDE 1 MG/1
1 TABLET ORAL ONCE
Refills: 0 | Status: COMPLETED | OUTPATIENT
Start: 2025-04-25 | End: 2025-04-25

## 2025-04-25 RX ADMIN — MEROPENEM 100 MILLIGRAM(S): 1 INJECTION INTRAVENOUS at 21:35

## 2025-04-25 RX ADMIN — BUMETANIDE 1 MILLIGRAM(S): 1 TABLET ORAL at 11:35

## 2025-04-25 RX ADMIN — ENOXAPARIN SODIUM 135 MILLIGRAM(S): 100 INJECTION SUBCUTANEOUS at 05:10

## 2025-04-25 RX ADMIN — IPRATROPIUM BROMIDE AND ALBUTEROL SULFATE 3 MILLILITER(S): .5; 2.5 SOLUTION RESPIRATORY (INHALATION) at 15:22

## 2025-04-25 RX ADMIN — METOPROLOL SUCCINATE 25 MILLIGRAM(S): 50 TABLET, EXTENDED RELEASE ORAL at 18:11

## 2025-04-25 RX ADMIN — AMLODIPINE BESYLATE 10 MILLIGRAM(S): 10 TABLET ORAL at 05:09

## 2025-04-25 RX ADMIN — IPRATROPIUM BROMIDE AND ALBUTEROL SULFATE 3 MILLILITER(S): .5; 2.5 SOLUTION RESPIRATORY (INHALATION) at 07:48

## 2025-04-25 RX ADMIN — Medication 5 MILLIGRAM(S): at 22:55

## 2025-04-25 RX ADMIN — Medication 1 APPLICATION(S): at 05:18

## 2025-04-25 RX ADMIN — METOPROLOL SUCCINATE 25 MILLIGRAM(S): 50 TABLET, EXTENDED RELEASE ORAL at 05:09

## 2025-04-25 RX ADMIN — Medication 650 MILLIGRAM(S): at 18:14

## 2025-04-25 RX ADMIN — IPRATROPIUM BROMIDE AND ALBUTEROL SULFATE 3 MILLILITER(S): .5; 2.5 SOLUTION RESPIRATORY (INHALATION) at 20:11

## 2025-04-25 RX ADMIN — MEROPENEM 100 MILLIGRAM(S): 1 INJECTION INTRAVENOUS at 16:01

## 2025-04-25 RX ADMIN — IPRATROPIUM BROMIDE AND ALBUTEROL SULFATE 3 MILLILITER(S): .5; 2.5 SOLUTION RESPIRATORY (INHALATION) at 01:31

## 2025-04-25 RX ADMIN — ENOXAPARIN SODIUM 135 MILLIGRAM(S): 100 INJECTION SUBCUTANEOUS at 18:11

## 2025-04-25 RX ADMIN — Medication 650 MILLIGRAM(S): at 23:32

## 2025-04-25 RX ADMIN — Medication 25 MILLIGRAM(S): at 22:54

## 2025-04-25 RX ADMIN — MEROPENEM 100 MILLIGRAM(S): 1 INJECTION INTRAVENOUS at 05:08

## 2025-04-25 RX ADMIN — Medication 40 MILLIGRAM(S): at 11:43

## 2025-04-25 NOTE — PROGRESS NOTE ADULT - ATTENDING COMMENTS
Pt examined  labs and images reviewed  pt with supermorbid obesity and complex hernia with sbo  previously treated without surgery   passing flatus awaiting bowel movement but was having  more pain taken to OR urgently   4/10 s/p BHUMI , Bowel Resection / Anastamosis / Primary repair of Fascia / hernia sac  overnight - renal failure / respiratory compromise  was intubated / paralysed/ botox  now on bumex - tapering   4/17:  s/p BHUMI , Bowel Resection / Anastamosis / Primary repair of Fascia / hernia sac  4/22 : IR drain placed - subcutaneous collection  - purulent   hypertensive - on nicardipine drip  Ct shows PE - transition to Lovenox BID  mariam drain sero sang  tolerating clears - having bm   gradually improving   tpn continue     impression : extremely high risk - supermorbid obesity and complex hernia with partial sbo  will gradually advance diet    continue anticoagulation    SICU

## 2025-04-25 NOTE — PROGRESS NOTE ADULT - SUBJECTIVE AND OBJECTIVE BOX
SURGERY PROGRESS NOTE    24 HR EVENTS: Patient not nauseous or vomiting. Passing gas and having BMs (liquid consistency). 3 JPs and 1 IR drain in place. Patient on HFNC and tachypneic to RR max of 27.     OBJECTIVE:  Vital Signs Last 24 Hrs  T(C): 37.1 (25 Apr 2025 00:00), Max: 37.1 (25 Apr 2025 00:00)  T(F): 98.8 (25 Apr 2025 00:00), Max: 98.8 (25 Apr 2025 00:00)  HR: 81 (25 Apr 2025 00:00) (66 - 93)  BP: 121/76 (24 Apr 2025 08:00) (121/76 - 121/76)  BP(mean): 93 (24 Apr 2025 08:00) (93 - 93)  RR: 25 (25 Apr 2025 00:00) (18 - 28)  SpO2: 96% (25 Apr 2025 00:00) (95% - 98%)    Parameters below as of 25 Apr 2025 00:00  Patient On (Oxygen Delivery Method): nasal cannula, high flow  O2 Flow (L/min): 50  O2 Concentration (%): 50    I&O's Summary    23 Apr 2025 07:01  -  24 Apr 2025 07:00  --------------------------------------------------------  IN: 3060 mL / OUT: 1540 mL / NET: 1520 mL    24 Apr 2025 07:01  -  25 Apr 2025 01:24  --------------------------------------------------------  IN: 2654 mL / OUT: 3047 mL / NET: -393 mL        PHYSICAL EXAM:  GENERAL: A&Ox3, NAD  HEENT: Normocephalic, atraumatic  PULM: bilateral chest rise, saturating well on HFNC  CV: Regular rate and rhythm  ABDOMEN: Abdomen obese, soft, mildly-distended, mildly-tender to deep palpation, THONY x 3, IR drain with purulent drainage  : Primafit in place  MSK: Moving extremities spontaneously  NEURO: No focal deficits  SKIN: Warm, dry, normal skin color, texture, turgor    LABS:                        8.2    13.68 )-----------( 338      ( 24 Apr 2025 05:05 )             25.9     04-24    144  |  109  |  62[HH]  ----------------------------<  128[H]  4.1   |  25  |  1.3    Ca    8.6      24 Apr 2025 05:05  Phos  4.6     04-24  Mg     2.0     04-24    TPro  6.1  /  Alb  3.1[L]  /  TBili  0.4  /  DBili  0.2  /  AST  52[H]  /  ALT  72[H]  /  AlkPhos  93  04-23    PTT - ( 23 Apr 2025 11:20 )  PTT:84.1 sec  Urinalysis Basic - ( 24 Apr 2025 05:05 )    Color: x / Appearance: x / SG: x / pH: x  Gluc: 128 mg/dL / Ketone: x  / Bili: x / Urobili: x   Blood: x / Protein: x / Nitrite: x   Leuk Esterase: x / RBC: x / WBC x   Sq Epi: x / Non Sq Epi: x / Bacteria: x        RADIOLOGY & ADDITIONAL STUDIES:

## 2025-04-25 NOTE — CHART NOTE - NSCHARTNOTEFT_GEN_A_CORE
GI NUTRITION SUPPORT TEAM  -  PROGRESS NOTE     Interval Events:        REVIEW OF SYSTEMS:  Negative except as noted above.     VITALS:  T(F): 98.8 (04-25 @ 00:00), Max: 98.8 (04-25 @ 00:00)  HR: 81 (04-25 @ 07:50) (74 - 89)  BP: --  RR: 22 (04-25 @ 07:00) (22 - 26)  SpO2: 95% (04-25 @ 07:50) (95% - 98%)    HEIGHT/WEIGHT/BMI:   Height (cm): 160 (04-17)  Weight (kg): 135.6 (04-17)  BMI (kg/m2): 53 (04-17)      I/Os:     04-24-25 @ 07:01  -  04-25-25 @ 07:00  --------------------------------------------------------  IN:    Fat Emulsion (Fish Oil &amp; Plant Based) 20% Infusion: 249.6 mL    IV PiggyBack: 150 mL    Oral Fluid: 1361 mL    TPN (Total Parenteral Nutrition): 1608 mL  Total IN: 3368.6 mL    OUT:    Bulb (mL): 7.5 mL    Bulb (mL): 11 mL    Bulb (mL): 19.5 mL    Bulb (mL): 26 mL    Rectal Tube (mL): 400 mL    VAC (Vacuum Assisted Closure) System (mL): 0 mL    Voided (mL): 3500 mL  Total OUT: 3964 mL    Total NET: -595.4 mL      MEDICATIONS:   acetaminophen     Tablet .. 650 milliGRAM(s) Oral every 6 hours  albuterol/ipratropium for Nebulization 3 milliLiter(s) Nebulizer every 6 hours  amLODIPine   Tablet 10 milliGRAM(s) Oral daily  chlorhexidine 2% Cloths 1 Application(s) Topical <User Schedule>  enoxaparin Injectable 135 milliGRAM(s) SubCutaneous every 12 hours  HYDROmorphone  Injectable 0.5 milliGRAM(s) IV Push every 4 hours PRN  insulin lispro (ADMELOG) corrective regimen sliding scale   SubCutaneous Before meals and at bedtime  lipid, fat emulsion (Fish Oil and Plant Based) 20% Infusion 0.3687 Gm/kG/Day (20.8 mL/Hr) IV Continuous <Continuous>  melatonin 5 milliGRAM(s) Oral at bedtime  meropenem  IVPB      meropenem  IVPB 1000 milliGRAM(s) IV Intermittent every 8 hours  metoprolol tartrate 25 milliGRAM(s) Oral two times a day  pantoprazole    Tablet 40 milliGRAM(s) Oral every 24 hours  Parenteral Nutrition - Adult 1 Each (67 mL/Hr) TPN Continuous <Continuous>  traZODone 25 milliGRAM(s) Oral at bedtime    LABS:                         8.1    11.11 )-----------( 263      ( 25 Apr 2025 04:53 )             26.1     138  |  105  |  52[H]  ----------------------------<  131[H]          (04-25-25 @ 04:53)  4.0   |  22  |  1.2    Ca    8.3[L]          (04-25-25 @ 04:53)  Phos  3.6         (04-25-25 @ 04:53)  Mg     2.0         (04-25-25 @ 04:53)    Triglycerides, Serum: 232 mg/dL (04-20 @ 04:59)    Vitamin D, 25-Hydroxy: 13 ng/mL (04-19 @ 05:08)    Folate: 17.3 ng/mL (04-19 @ 05:08)    Vitamin B12, Serum: 1417 pg/mL (04-19 @ 05:08)    Zinc Level, Plasma: 54 ug/dL (04-19 @ 05:08)      Blood Glucose (Past 24 hours):  122 mg/dL (04-25 @ 06:27)  112 mg/dL (04-24 @ 21:37)  113 mg/dL (04-24 @ 17:55)  124 mg/dL (04-24 @ 11:54)    DIET:   Diet, Full Liquid:   Phase 1 Bariatric Full Liquid ([id number])  Beneprotein ([facility] Only)     Qty per Day:  3  Supplement Feeding Modality:  Oral  Glucerna Shake Cans or Servings Per Day:  3       Frequency:  Three Times a day (04-24-25 @ 11:45) [Active]    Parenteral Nutrition - Adult 1 Each (67 mL/Hr) TPN Continuous <Continuous>, 04-24-25 @ 20:00, 20:00, Stop order after: 1 Days       ASSESSMENT  64y F w/ PMHx of HTN and gastric bypass surgery in 2001 presented with abdominal distention and CT showing incarcerated ventral hernia with SBO. Taken to OR 4/10 s/p open repair of ventral hernia using mesh and component separation technique, small bowel resection and primary fascial closure. RTOR 4/17 for anastomotic leak s/p ex lap, 30 cm small bowel resection, creation of new side to side ileoileostomy, ventral hernia repair w/ mesh.     - recurrent SBO, s/p repair 4/10 then anastomotic leak s/p repair 4/17   - FUNMI   - high risk for malnutrition, prolonged NPO X 15d, TPN initiated 4/17  - class III obesity, BMI 53  - hypertriglyceridemia  - hyperglycemia   - hypokalemia  - vitamin D deficiency     Est nutrient needs: IBW 52.3kg, 7772-3964 kcals (22-25kcals/kg IBW ASPEN/CCM guidelines for BMI>50), 105-131g protein (2-2.5g/kg IBW ASPEN/CCM guidelines for BMI >40)       PLAN  - PO liquids as tolerated per surgery- add Ensure Max 240ml X 3/d and magic cup twice daily   - cont TPN tonight- 115g protein, 165g dextrose, 50g SMOF lipid @67ml/hr- avg 1271 kcals daily (lipids qod)  - lytes adjusted in TPN   - glycemic control- 25U regular insulin added to tonight's TPN  - daily am bmp, phos, mg while on parenteral nutrition  - rec starting 50,000 IU ergocalciferol PO daily X 2 days then give once weekly. Repeat 25-oh vitamin D level 4-6 weeks after starting tx   - will follow. GI NUTRITION SUPPORT TEAM  -  PROGRESS NOTE     Interval Events:    Comfortable on HFNC, tolerating PO liquids. +loose BM's, dignishield placed 4/24. TPN infusing day #8    REVIEW OF SYSTEMS:  Negative except as noted above.     VITALS:  T(F): 98.8 (04-25 @ 00:00), Max: 98.8 (04-25 @ 00:00)  HR: 81 (04-25 @ 07:50) (74 - 89)  BP: --  RR: 22 (04-25 @ 07:00) (22 - 26)  SpO2: 95% (04-25 @ 07:50) (95% - 98%)    HEIGHT/WEIGHT/BMI:   Height (cm): 160 (04-17)  Weight (kg): 135.6 (04-17)  BMI (kg/m2): 53 (04-17)    I/Os:     04-24-25 @ 07:01  -  04-25-25 @ 07:00  --------------------------------------------------------  IN:    Fat Emulsion (Fish Oil &amp; Plant Based) 20% Infusion: 249.6 mL    IV PiggyBack: 150 mL    Oral Fluid: 1361 mL    TPN (Total Parenteral Nutrition): 1608 mL  Total IN: 3368.6 mL    OUT:    Bulb (mL): 7.5 mL    Bulb (mL): 11 mL    Bulb (mL): 19.5 mL    Bulb (mL): 26 mL    Rectal Tube (mL): 400 mL    VAC (Vacuum Assisted Closure) System (mL): 0 mL    Voided (mL): 3500 mL  Total OUT: 3964 mL    Total NET: -595.4 mL      MEDICATIONS:   acetaminophen     Tablet .. 650 milliGRAM(s) Oral every 6 hours  albuterol/ipratropium for Nebulization 3 milliLiter(s) Nebulizer every 6 hours  amLODIPine   Tablet 10 milliGRAM(s) Oral daily  chlorhexidine 2% Cloths 1 Application(s) Topical <User Schedule>  enoxaparin Injectable 135 milliGRAM(s) SubCutaneous every 12 hours  HYDROmorphone  Injectable 0.5 milliGRAM(s) IV Push every 4 hours PRN  insulin lispro (ADMELOG) corrective regimen sliding scale   SubCutaneous Before meals and at bedtime  lipid, fat emulsion (Fish Oil and Plant Based) 20% Infusion 0.3687 Gm/kG/Day (20.8 mL/Hr) IV Continuous <Continuous>  melatonin 5 milliGRAM(s) Oral at bedtime  meropenem  IVPB      meropenem  IVPB 1000 milliGRAM(s) IV Intermittent every 8 hours  metoprolol tartrate 25 milliGRAM(s) Oral two times a day  pantoprazole    Tablet 40 milliGRAM(s) Oral every 24 hours  Parenteral Nutrition - Adult 1 Each (67 mL/Hr) TPN Continuous <Continuous>  traZODone 25 milliGRAM(s) Oral at bedtime    LABS:                         8.1    11.11 )-----------( 263      ( 25 Apr 2025 04:53 )             26.1     138  |  105  |  52[H]  ----------------------------<  131[H]          (04-25-25 @ 04:53)  4.0   |  22  |  1.2    Ca    8.3[L]          (04-25-25 @ 04:53)  Phos  3.6         (04-25-25 @ 04:53)  Mg     2.0         (04-25-25 @ 04:53)    Triglycerides, Serum: 232 mg/dL (04-20 @ 04:59)    Vitamin D, 25-Hydroxy: 13 ng/mL (04-19 @ 05:08)    Folate: 17.3 ng/mL (04-19 @ 05:08)    Vitamin B12, Serum: 1417 pg/mL (04-19 @ 05:08)    Zinc Level, Plasma: 54 ug/dL (04-19 @ 05:08)      Blood Glucose (Past 24 hours):  122 mg/dL (04-25 @ 06:27)  112 mg/dL (04-24 @ 21:37)  113 mg/dL (04-24 @ 17:55)  124 mg/dL (04-24 @ 11:54)    DIET:   Diet, Full Liquid:   Phase 1 Bariatric Full Liquid ([id number])  Beneprotein ([facility] Only)     Qty per Day:  3  Supplement Feeding Modality:  Oral  Glucerna Shake Cans or Servings Per Day:  3       Frequency:  Three Times a day (04-24-25 @ 11:45) [Active]    Parenteral Nutrition - Adult 1 Each (67 mL/Hr) TPN Continuous <Continuous>, 04-24-25 @ 20:00, 20:00, Stop order after: 1 Days       ASSESSMENT  64y F w/ PMHx of HTN and gastric bypass surgery in 2001 presented with abdominal distention and CT showing incarcerated ventral hernia with SBO. Taken to OR 4/10 s/p open repair of ventral hernia using mesh and component separation technique, small bowel resection and primary fascial closure. RTOR 4/17 for anastomotic leak s/p ex lap, 30 cm small bowel resection, creation of new side to side ileoileostomy, ventral hernia repair w/ mesh.     - recurrent SBO, s/p repair 4/10 then anastomotic leak s/p repair 4/17   - FUNMI   - high risk for malnutrition, prolonged NPO X 15d, TPN initiated 4/17  - class III obesity, BMI 53  - hypertriglyceridemia  - hyperglycemia   - hypokalemia  - vitamin D deficiency     Est nutrient needs: IBW 52.3kg, 7673-3135 kcals (22-25kcals/kg IBW ASPEN/CCM guidelines for BMI>50), 105-131g protein (2-2.5g/kg IBW ASPEN/CCM guidelines for BMI >40)       PLAN  - PO liquids as tolerated per surgery- high protein supplements per pt preference (Ensure Max, SF Prosource gelatein, SF Prosource at bedside)  - taper and d/c TPN tonight   - glycemic control   - rec starting 50,000 IU ergocalciferol PO daily X 2 days then give once weekly. Repeat 25-oh vitamin D level 4-6 weeks after starting tx   - will follow.

## 2025-04-25 NOTE — CHART NOTE - NSCHARTNOTEFT_GEN_A_CORE
Spoke with patient and family bedside.   -transitioned from High flow nasal cannual to 5L  -out of bed to chair today  -possible candidate for inpatient rehab, will be reevalauted by PT/Physiatry    All questions and concerns were addressed and answered. SICU Team and attending made aware of all updates.

## 2025-04-25 NOTE — PROGRESS NOTE ADULT - ASSESSMENT
ASSESSMENT  64-year-old female with a past medical history of high blood pressure and gastric bypass surgery in 2001, presenting with two days of sharp abdominal pain and one episode of non-bloody, non-bilious vomiting. She has a prior history of small bowel obstruction in 2006, 2008, and most recently in 2020, all of which resolved with conservative management    IMPRESSION  #SBO 2/2 Complex Incarcerated Ventral Hernia, s/p small bowel resection and ventral hernia repair 4/10   - OR Cx 4/10 - B frag, Clostridium species, E coli     #RTOR for Anastomotic Leak, SBR, Creation of New Ileoileostomy, Ventral Hernia Repair - 4/17   - CT abd/pelvis 4/17 - PERITONEUM/MESENTERY/BOWEL: Ventral abdominal wall midline defect containing nonobstructed small bowel loop with surrounding subcutaneous  stranding (series 5, image 83). The small bowel segment just proximal to  this herniated loop demonstrates a possible transmural defect with  extravasation of oral contrast into a midline intraperitoneal collection   measuring 9.0 x 4.0 x 6.1 cm (series 601, image 83). This intraperitoneal  collection connects to a more inferiorly-located subcutaneous pannus  collection, which contains oral contrast and measures 9.0 x 4.3 cm  (series 5, image 114). Wall thickening and interloop ascites of the involved small bowel loops,  likely reactive. Oral contrast visualized in the sigmoid colon.   Intraperitoneal surgical drain. Descending and sigmoid colonic wall  thickening. Post gastric bypass surgery. Scattered foci of  pneumoperitoneum.  - s/p OR for anastomatic leak/SBR/Creation of new ileostomy 4/17 - Wound Cx E coli   - CT Abdomen and Pelvis w/ Oral Cont and w/ IV Cont (04.21.25 @ 18:11) > Interval ventral hernia repair, small bowel resection. Lower abdominal  wall 9.7 x 6.2 cm fluid collection at site of previously seen  extravasated contrast, similar in size since prior study. Decreased anterior peritoneal fluid collection, measuring up to 6.3 x 8.3 cm. No  definite current oral contrast leak. Bilateral lower lobe consolidative opacities, findings could represent  atelectasis versus pneumonia in the appropriate clinical setting  - s/p drainage of anterior fluid collection 4/22 -- 150cc of foul appearing material aspirated with THONY drain in place - CX NG     #Pulmonary Embolism   - CT Abdomen and Pelvis w/ Oral Cont and w/ IV Cont (04.21.25 @ 18:11): Evidence of a right distal main pulmonary embolus extending to right  lower lobar and segmental pulmonary arteries: Examination limited by  streak artifact; no definite right heart strain .    #Leukocytosis     #Obesity BMI (kg/m2): 53  #Abx allergy: No Known Allergies    RECOMMENDATIONS  - continue meropenem 1g q 8 hours  - WBC continues to improve -- Although Cx no growth, will keep meropenem for now   -- will plan to keep until repeat CT to re-evaluate abdomina fluid collections     I will be away from 4/26-5/4  Please call Dr. Cnotreras, Dr. Perdue or on-call ID  if with further questions during those days.   Spectra [phone]

## 2025-04-25 NOTE — PROGRESS NOTE ADULT - SUBJECTIVE AND OBJECTIVE BOX
NANCY SARGENT   [id number]   [birth year]  64yF  ============================================================   DATE OF INITIAL SICU/SDU CONSULT: 04-10-25    INDICATION FOR SICU CONSULT:  q1hr abdominal checks, mechanical ventilation     SICU COURSE EVENTS :  04-10 - admitted to SICU service  4/11: Intubated and started on paralytic. Arterial line placed, subclavian TLC placed. Nephrology consulted for oliguria. IR abdominal muscle botox injection performed. TTE performed.   4/12: Additional fluid boluses given; trial fo bumex started, considering CVVH. Weaned off nimbex gtt.  > 220 /hr. Renal U/S w/out hydro.   4/13: Weaned off Levophed. Bumex gtt 2mg/hr > 1mg/hr. Goal net negative 1-1.5L, UOP approx, 200c/hr.   4/14: Remained on bumex gtt for further diuresis, decreased to 0.5 overnight, vent settings weaned. Cr downtrending, LFTs worsening.   4/15: Extubated to BiPAP, transitioned to NC. Dc'd bumex gtt, given 5mg metolazone x 1 and 2mg bumex x 1.   4/16: THONY drain #2 murky/bilious, pending CTAP with PO contrast, hypernatremia, started D5W @ 75cc/hr, persistent hypokalemia   4/17: RTOR for resection of anastomosis for anastomotic leak. Returned intubated, 400/24/80/10, sinus tachy to . Abdomen soft. Was initially on propofol @ 30 and precedex, but propofol weaned off due to hypotension with MAPs in 50s, fentanyl ggt started for acute pain. Remained hypotensive despite fluids, started on Levophed, on 0.05.   4/18: Extubated. HFNC 40L/49%, Levo off since 11 AM   4/19: NGT removed. Started on duonebs. D5W increased to 60cc/hr. 1mg bumex, > 1.5L response  4/20: Episode of afib RVR resolved with metoprolol 5mg IVP, cardiology c/s placed, recommending metoprolol 25mg q 12   4/21: PE on CTA, therapeutic heparin gtt started. Started on cardebe gtt,   4/22: Meropenem started per ID.  S/p IR percutaneous placement of a 8.5 [place] drainage catheter into lower abdominal wall fluid collection, yielding 150 mL of bowel appearing fluid. New left radial a-line placed. Off nicarrdipine gtt.   4/23: Switched to therapeutic lovenox. Diet advanced to aubrey clears with ensures.  4/24: advacned to bariatric FLD, downgraded to SDU    24Hour Events:  4/24  NIGHT  - PM ROUNDS: A&O x 3, abdomen soft ND, . THONY drains serosanguinous, IR drain seropurulent, on HFNC 50L/50%    DAY  - Aubrey FLD, magic cup  - Protein added to diet  - SDU    [X] A ten-point review of systems was negative except as expressed in note.  [X ] History was obtained from patient. If unable to participate in their care, history was from review of the chart and collateral sources of information.  =================================================================================================================== NANCY SARGENT   [id number]   [birth year]  64yF  ============================================================   DATE OF INITIAL SICU/SDU CONSULT: 04-10-25    INDICATION FOR SICU CONSULT:  q1hr abdominal checks, mechanical ventilation     SICU COURSE EVENTS :  04-10 - admitted to SICU service  4/11: Intubated and started on paralytic. Arterial line placed, subclavian TLC placed. Nephrology consulted for oliguria. IR abdominal muscle botox injection performed. TTE performed.   4/12: Additional fluid boluses given; trial fo bumex started, considering CVVH. Weaned off nimbex gtt.  > 220 /hr. Renal U/S w/out hydro.   4/13: Weaned off Levophed. Bumex gtt 2mg/hr > 1mg/hr. Goal net negative 1-1.5L, UOP approx, 200c/hr.   4/14: Remained on bumex gtt for further diuresis, decreased to 0.5 overnight, vent settings weaned. Cr downtrending, LFTs worsening.   4/15: Extubated to BiPAP, transitioned to NC. Dc'd bumex gtt, given 5mg metolazone x 1 and 2mg bumex x 1.   4/16: THONY drain #2 murky/bilious, pending CTAP with PO contrast, hypernatremia, started D5W @ 75cc/hr, persistent hypokalemia   4/17: RTOR for resection of anastomosis for anastomotic leak. Returned intubated, 400/24/80/10, sinus tachy to . Abdomen soft. Was initially on propofol @ 30 and precedex, but propofol weaned off due to hypotension with MAPs in 50s, fentanyl ggt started for acute pain. Remained hypotensive despite fluids, started on Levophed, on 0.05.   4/18: Extubated. HFNC 40L/49%, Levo off since 11 AM   4/19: NGT removed. Started on duonebs. D5W increased to 60cc/hr. 1mg bumex, > 1.5L response  4/20: Episode of afib RVR resolved with metoprolol 5mg IVP, cardiology c/s placed, recommending metoprolol 25mg q 12   4/21: PE on CTA, therapeutic heparin gtt started. Started on cardebe gtt,   4/22: Meropenem started per ID.  S/p IR percutaneous placement of a 8.5 [place] drainage catheter into lower abdominal wall fluid collection, yielding 150 mL of bowel appearing fluid. New left radial a-line placed. Off nicarrdipine gtt.   4/23: Switched to therapeutic lovenox. Diet advanced to aubrey clears with ensures.  4/24: advacned to bariatric FLD, downgraded to SDU    24Hour Events:  4/24  NIGHT  - PM ROUNDS: A&O x 3, abdomen soft ND, . THONY drains serosanguinous, IR drain seropurulent, on HFNC 50L/50%    DAY  - Aubrey FLD, magic cup  - Protein added to diet  - SDU    [X] A ten-point review of systems was negative except as expressed in note.  [X ] History was obtained from patient. If unable to participate in their care, history was from review of the chart and collateral sources of information.  ===================================================================================================================        ACTIVE MEDICATIONS  acetaminophen     Tablet .. 650 milliGRAM(s) Oral every 6 hours  albuterol/ipratropium for Nebulization 3 milliLiter(s) Nebulizer every 6 hours  amLODIPine   Tablet 10 milliGRAM(s) Oral daily  buMETAnide Injectable 1 milliGRAM(s) IV Push once  chlorhexidine 2% Cloths 1 Application(s) Topical <User Schedule>  enoxaparin Injectable 135 milliGRAM(s) SubCutaneous every 12 hours  HYDROmorphone  Injectable 0.5 milliGRAM(s) IV Push every 4 hours PRN Severe Pain (7 - 10)  insulin lispro (ADMELOG) corrective regimen sliding scale   SubCutaneous Before meals and at bedtime  lipid, fat emulsion (Fish Oil and Plant Based) 20% Infusion 0.3687 Gm/kG/Day IV Continuous <Continuous>  melatonin 5 milliGRAM(s) Oral at bedtime  meropenem  IVPB      meropenem  IVPB 1000 milliGRAM(s) IV Intermittent every 8 hours  metoprolol tartrate 25 milliGRAM(s) Oral two times a day  pantoprazole    Tablet 40 milliGRAM(s) Oral every 24 hours  Parenteral Nutrition - Adult 1 Each TPN Continuous <Continuous>  traZODone 25 milliGRAM(s) Oral at bedtime     VITALS LAST 24 HOURS   T(F): 96.7 (04-25 @ 08:35), Max: 98.8 (04-25 @ 00:00)  HR: 89 (04-25 @ 11:00) (74 - 92)  BP: --  BP(mean): --  ABP: 135/65 (04-25 @ 08:00) (119/57 - 167/71)  ABP(mean): 87 (04-25 @ 08:00) (77 - 96)  RR: 23 (04-25 @ 08:00) (21 - 27)  SpO2: 96% (04-25 @ 11:00) (95% - 98%)  FLUID STATUS    04-24-25 @ 07:01  -  04-25-25 @ 07:00  --------------------------------------------------------  IN:    Fat Emulsion (Fish Oil &amp; Plant Based) 20% Infusion: 249.6 mL    IV PiggyBack: 150 mL    Oral Fluid: 1361 mL    TPN (Total Parenteral Nutrition): 1608 mL  Total IN: 3368.6 mL    OUT:    Bulb (mL): 7.5 mL    Bulb (mL): 11 mL    Bulb (mL): 19.5 mL    Bulb (mL): 26 mL    Rectal Tube (mL): 400 mL    VAC (Vacuum Assisted Closure) System (mL): 0 mL    Voided (mL): 3500 mL  Total OUT: 3964 mL    Total NET: -595.4 mL      04-25-25 @ 07:01  -  04-25-25 @ 11:22  --------------------------------------------------------  IN:    Fat Emulsion (Fish Oil &amp; Plant Based) 20% Infusion: 20.8 mL    Oral Fluid: 337 mL    TPN (Total Parenteral Nutrition): 134 mL  Total IN: 491.8 mL    OUT:    Bulb (mL): 0 mL    Bulb (mL): 0 mL    Bulb (mL): 0 mL    Bulb (mL): 0 mL    Rectal Tube (mL): 50 mL    VAC (Vacuum Assisted Closure) System (mL): 0 mL    Voided (mL): 800 mL  Total OUT: 850 mL    Total NET: -358.2 mL        MECHANICAL VENT/BLOOD GAS       PHYSICAL EXAM:    a&ox3  follows commands, BLACK  no acute distress  HFNC  equal chest rise b/l  abdomen soft, obese body habitus  prevena vac in place on suction  right sided THONY drain x2 minimal output serous  left sided THONY drain x1 minimal output serous  midline abdominal IR drain, serous no blood, mild yello tinge  extremities soft  urinary cath in place

## 2025-04-25 NOTE — PROGRESS NOTE ADULT - SUBJECTIVE AND OBJECTIVE BOX
NANCY SARGENT  64y, Female  Allergy: No Known Allergies      LOS  22d    CHIEF COMPLAINT: bowel obstruction (22 Apr 2025 08:33)      INTERVAL EVENTS/HPI  - No acute events overnight  - T(F): , Max: 98.8 (04-25-25 @ 00:00)  - remains on HFNC - denies abdominal pain   - WBC Count: 11.11 (04-25-25 @ 04:53)  WBC Count: 13.68 (04-24-25 @ 05:05)     - Creatinine: 1.2 (04-25-25 @ 04:53)  Creatinine: 1.3 (04-24-25 @ 05:05)       ROS  General: Denies rigors, nightsweats  HEENT: Denies headache, rhinorrhea, sore throat, eye pain  CV: Denies CP, palpitations  PULM: Denies wheezing, hemoptysis  GI: Denies hematemesis, hematochezia, melena  : Denies discharge, hematuria  MSK: Denies arthralgias, myalgias  SKIN: Denies rash, lesions  NEURO: Denies paresthesias, weakness  PSYCH: Denies depression, anxiety    VITALS:  T(F): 98.8, Max: 98.8 (04-25-25 @ 00:00)  HR: 81  BP: --  RR: 22Vital Signs Last 24 Hrs  T(C): 37.1 (25 Apr 2025 00:00), Max: 37.1 (25 Apr 2025 00:00)  T(F): 98.8 (25 Apr 2025 00:00), Max: 98.8 (25 Apr 2025 00:00)  HR: 81 (25 Apr 2025 07:50) (66 - 92)  BP: --  BP(mean): --  RR: 22 (25 Apr 2025 07:00) (18 - 27)  SpO2: 95% (25 Apr 2025 07:50) (95% - 98%)    Parameters below as of 25 Apr 2025 07:50  Patient On (Oxygen Delivery Method): nasal cannula, high flow  O2 Flow (L/min): 50  O2 Concentration (%): 45    PHYSICAL EXAM:  Gen: NAD, resting in bed  HEENT: Normocephalic, atraumatic  Neck: supple, no lymphadenopathy  CV: Regular rate & regular rhythm  Lungs: decreased BS at bases, no fremitus  Abdomen: Soft, BS present  Ext: Warm, well perfused  Neuro: non focal, awake  Skin: no rash, no erythema  Lines: no phlebitis    FH: Non-contributory  Social Hx: Non-contributory    TESTS & MEASUREMENTS:                        8.1    11.11 )-----------( 263      ( 25 Apr 2025 04:53 )             26.1     04-25    138  |  105  |  52[H]  ----------------------------<  131[H]  4.0   |  22  |  1.2    Ca    8.3[L]      25 Apr 2025 04:53  Phos  3.6     04-25  Mg     2.0     04-25          Urinalysis Basic - ( 25 Apr 2025 04:53 )    Color: x / Appearance: x / SG: x / pH: x  Gluc: 131 mg/dL / Ketone: x  / Bili: x / Urobili: x   Blood: x / Protein: x / Nitrite: x   Leuk Esterase: x / RBC: x / WBC x   Sq Epi: x / Non Sq Epi: x / Bacteria: x        Culture - Blood (collected 04-22-25 @ 17:47)  Source: Blood Blood  Preliminary Report (04-24-25 @ 23:01):    No growth at 48 Hours    Culture - Body Fluid with Gram Stain (collected 04-22-25 @ 15:00)  Source: Abdominal Fl Abdominal Fluid  Gram Stain (04-23-25 @ 06:50):    polymorphonuclear leukocytes seen    No organisms seen    by cytocentrifuge  Preliminary Report (04-23-25 @ 18:25):    No growth    Culture - Blood (collected 04-22-25 @ 13:16)  Source: Blood Blood-Peripheral  Preliminary Report (04-24-25 @ 23:01):    No growth at 48 Hours    Culture - Body Fluid with Gram Stain (collected 04-17-25 @ 15:20)  Source: Body Fluid None  Gram Stain (04-19-25 @ 12:15):    No polymorphonuclear leukocytes seen    No organisms seen  Final Report (04-23-25 @ 10:23):    Rare Escherichia coli    Rare Corynebacterium tuberculostearicum "Susceptibilities not performed"  Organism: Escherichia coli (04-21-25 @ 13:35)  Organism: Escherichia coli (04-21-25 @ 13:35)      -  Levofloxacin: S <=0.5      -  Tobramycin: S <=2      -  Aztreonam: S <=4      -  Gentamicin: S <=2      -  Cefepime: S <=2      -  Cefazolin: S <=2      -  Piperacillin/Tazobactam: S <=8      -  Ciprofloxacin: S <=0.25      -  Imipenem: S <=1      -  Ceftriaxone: S <=1      -  Ampicillin: S <=8 These ampicillin results predict results for amoxicillin      Method Type: LOUISE      -  Meropenem: S <=1      -  Ampicillin/Sulbactam: S <=4/2      -  Cefoxitin: S <=8      -  Amoxicillin/Clavulanic Acid: S <=8/4      -  Trimethoprim/Sulfamethoxazole: S <=0.5/9.5      -  Ertapenem: S <=0.5    Culture - Sputum (collected 04-14-25 @ 20:15)  Source: Trach Asp Tracheal Aspirate  Gram Stain (04-15-25 @ 11:15):    Rare polymorphonuclear leukocytes per low power field    No Squamous epithelial cells per low power field    No organisms seen per oil power field  Final Report (04-16-25 @ 21:32):    No growth    Culture - Blood (collected 04-13-25 @ 07:01)  Source: Blood Blood-Peripheral  Final Report (04-18-25 @ 12:01):    No growth at 5 days    Culture - Acid Fast - Other w/Smear (collected 04-10-25 @ 18:00)  Source: Other Peritoneal Fluid #2  Preliminary Report (04-19-25 @ 23:06):    No acid-fast bacilli isolated at 1 week. ****Acid-fast cultures are held    for 6 weeks.****    Culture - Fungal, Other (collected 04-10-25 @ 18:00)  Source: Other Peritoneal Fluid #2  Preliminary Report (04-19-25 @ 23:02):    No fungus isolated at 1 week.    Culture - Wound Aerobic/Anaerobic (collected 04-10-25 @ 10:30)  Source: Surgical Swab Peritoneal Fluid #1  Final Report (04-13-25 @ 14:15):    Rare Bacteroides fragilis "Susceptibilities not performed"    Rare Clostridium clostridioforme "Susceptibilities not performed"    Culture - Acid Fast - Other w/Smear (collected 04-10-25 @ 10:30)  Source: Other Peritoneal Fluid #1  Preliminary Report (04-19-25 @ 23:06):    No acid-fast bacilli isolated at 1 week. ****Acid-fast cultures are held    for 6 weeks.****    Culture - Fungal, Other (collected 04-10-25 @ 10:30)  Source: Other Peritoneal Fluid #1  Preliminary Report (04-19-25 @ 23:02):    No fungus isolated at 1 week.    Culture - Wound Aerobic/Anaerobic (collected 04-10-25 @ 10:30)  Source: Surgical Swab Peritoneal Fluid #1  Final Report (04-13-25 @ 13:48):    Few Escherichia coli    Few Bacteroides fragilis "Susceptibilities not performed"  Organism: Escherichia coli (04-13-25 @ 13:48)  Organism: Escherichia coli (04-13-25 @ 13:48)      -  Levofloxacin: S <=0.5      -  Tobramycin: S <=2      -  Aztreonam: S <=4      -  Gentamicin: S <=2      -  Cefazolin: S <=2      -  Cefepime: S <=2      -  Piperacillin/Tazobactam: S <=8      -  Ciprofloxacin: S <=0.25      -  Imipenem: S <=1      -  Ceftriaxone: S <=1      -  Ampicillin: S <=8 These ampicillin results predict results for amoxicillin      Method Type: LOUISE      -  Meropenem: S <=1      -  Ampicillin/Sulbactam: S <=4/2      -  Cefoxitin: S <=8      -  Amoxicillin/Clavulanic Acid: S <=8/4      -  Trimethoprim/Sulfamethoxazole: S <=0.5/9.5      -  Ertapenem: S <=0.5    Urinalysis with Rflx Culture (collected 04-03-25 @ 20:06)    Culture - Urine (collected 04-03-25 @ 20:06)  Source: Clean Catch None  Final Report (04-06-25 @ 14:03):    50,000 - 99,000 CFU/mL Escherichia coli    <10,000 CFU/ml Normal Urogenital andra present  Organism: Escherichia coli (04-06-25 @ 14:03)  Organism: Escherichia coli (04-06-25 @ 14:03)      -  Levofloxacin: S <=0.5      -  Tobramycin: S <=2      -  Nitrofurantoin: S <=32 Should not be used to treat pyelonephritis      -  Aztreonam: S <=4      -  Gentamicin: S <=2      -  Cefazolin: S <=2 For uncomplicated UTI with K. pneumoniae, E. coli, or P. mirablis: LOUISE <=16 is sensitive and LOUISE >=32 is resistant. This also predicts results for oral agents cefaclor, cefdinir, cefpodoxime, cefprozil, cefuroxime axetil, cephalexin and locarbef for uncomplicated UTI. Note that some isolates may be susceptible to these agents while testing resistant to cefazolin.      -  Cefepime: S <=2      -  Piperacillin/Tazobactam: S <=8      -  Ciprofloxacin: S <=0.25      -  Imipenem: S <=1      -  Ceftriaxone: S <=1      -  Ampicillin: S <=8 These ampicillin results predict results for amoxicillin      Method Type: LOUISE      -  Meropenem: S <=1      -  Ampicillin/Sulbactam: S <=4/2      -  Cefoxitin: S <=8      -  Cefuroxime: S <=4      -  Amoxicillin/Clavulanic Acid: S <=8/4      -  Trimethoprim/Sulfamethoxazole: S <=0.5/9.5      -  Ertapenem: S <=0.5            INFECTIOUS DISEASES TESTING  MRSA PCR Result.: Negative (04-11-25 @ 00:40)      INFLAMMATORY MARKERS      RADIOLOGY & ADDITIONAL TESTS:  I have personally reviewed the last available Chest xray  CXR      CT      CARDIOLOGY TESTING      MEDICATIONS  acetaminophen     Tablet .. 650 Oral every 6 hours  albuterol/ipratropium for Nebulization 3 Nebulizer every 6 hours  amLODIPine   Tablet 10 Oral daily  chlorhexidine 2% Cloths 1 Topical <User Schedule>  enoxaparin Injectable 135 SubCutaneous every 12 hours  insulin lispro (ADMELOG) corrective regimen sliding scale  SubCutaneous Before meals and at bedtime  lipid, fat emulsion (Fish Oil and Plant Based) 20% Infusion 0.3687 IV Continuous <Continuous>  melatonin 5 Oral at bedtime  meropenem  IVPB     meropenem  IVPB 1000 IV Intermittent every 8 hours  metoprolol tartrate 25 Oral two times a day  pantoprazole    Tablet 40 Oral every 24 hours  Parenteral Nutrition - Adult 1 TPN Continuous <Continuous>  traZODone 25 Oral at bedtime      WEIGHT  Weight (kg): 135.6 (04-17-25 @ 11:26)  Creatinine: 1.2 mg/dL (04-25-25 @ 04:53)      ANTIBIOTICS:  meropenem  IVPB      meropenem  IVPB 1000 milliGRAM(s) IV Intermittent every 8 hours      All available historical records have been reviewed

## 2025-04-25 NOTE — PROGRESS NOTE ADULT - NS ATTEND AMEND GEN_ALL_CORE FT
- Subjective : Patient Dawn Eddy, currently in bed number three, has had significant developments within the last 24 hours. She's undergone advancements to the barrier and consults have been made regarding changes to her feeding practices.  - Past Medical History : The patient has a history of Deep Vein Thrombosis (DVT). She is currently being treated with Lovenox. She has been on a 50-50 solution, but it has now been reduced to 40. She also battles with insomnia and has been prescribed Trazodone and Meladone. She has been on home Sartan, which has been discontinued for a while. She has a history of renal insufficiency.  - Impression : Patient Dawn Eddy's condition has shown improvement. She is experiencing weight fluctuations and needs frequent assessments for treatment adjustments. She is also on a diuretic plan, treating with Lovenox for her Right-sided Pulmonary Embolism (PE). Her Creatinine is slightly up from 1.2 to 1.3.  - Review of Systems : Her respiratory system has shown considerable improvements as her oxygen flow and the required percentage of IO2 has been reduced. There are concerns regarding her gastrointestinal system where the quantity and range of her oral intake needs to be reviewed.  Objective:  - Physical Examination (PE) : Physical examination has been rescheduled due to the patient being previously assessed multiple times. However, her sodium level is slightly elevated from 144 to 138. Creatinine level increased from 1.2 to 1.3.  Assessment:  - Summary : Patient Dawn Eddy has been making gradual progress, with signs of improvement in her PE. However, the overall weight gains and the amount of her oral intake needs close monitoring.  - Problems :  - Significant weight fluctuations.    - Slight increase in Creatinine level.    - Insomnia.    - Right-sided Pulmonary Embolism (PE).    - Differential Diagnosis :  - PE    - Weight fluctuations    - Insomnia    - Elevated Creatinine and Sodium levels    Plan:  - Summary : Plan involves adjusting Lovenox dosage as per her new weight results, continued monitoring, and trending down high flow oxygen. Potential to transition patient to out-of-bed treatment.  - Plan :  - Continue patient on Lovenox 135 Q12    - Aim to improve oral intake, including boosting protein    - Gradual transition from bed confinement.    - Discontinuation of TPN when feasible.    - Increase in diuretic regimen with attention to renal function.    - Physical therapy when possible.    - Scheduled Labs for 4 p.m.    I have personally seen and examined the patient.  I fully participated in the care of this patient.  I have made amendments to the documentation where necessary, and agree with the history, physical exam, and plan as documented by the Resident.    Critical Care: [id number]  This patient has a high probability of sudden, clinically significant deterioration, which requires the highest level of physician preparedness to intervene urgently. I managed/supervised life or organ supporting interventions that required frequent physician assessment. I devoted my full attention in the ICU to the direct care of this patient for the period of time indicated below. Time I spent with the family or surrogate(s) is included only if the patient was incapable of providing the necessary information or participating in medical decision making. Time devoted to teaching and to any procedures I billed separately is not included.    Patient is examined and evaluated at the bedside with SICU team. Treatment plan discussed with SICU team, nurses and primary team.  Chest X-ray and all relevant studies reviewed during rounds.  Will continue hemodynamic monitoring as per protocol in SICU.    I have personally and independently provided [ 40 ] minutes of critical care services. This excludes any time spent on separate procedures or teaching. I have reviewed and amended the note as needed. Treatment plan discussed with SICU team, nurses and primary team at the time of the multidisciplinary rounds. The above note is NOT written at the time of rounds and will reflect all changes throughout management of the patient for the day note is written for.    Werner Johnson MD, FACS  Trauma/ACS/SICU Attending .
I have made amendments to the documentation where necessary. Additional comments: This patient has a high probability of sudden, clinically significant deterioration, which requires the highest level of physician preparedness to intervene urgently. I managed/supervised life or organ supporting interventions that required frequent physician assessment. I devoted my full attention in the ICU to the direct care of this patient for the period of time indicated below. Time I spent with the family or surrogate(s) is included only if the patient was incapable of providing the necessary information or participating in medical decision making. Time devoted to teaching and to any procedures I billed separately is not included.     Patient examined and evaluated at the bedside with SICU team. I performed a medically appropriate exam. Pertinent findings are detailed below. Vital signs, laboratory work, and imaging reviewed.     Neuro:  Awake and alert x 3  Respiratory: respirations even and unlabored on high flow oxygen  CXR reviewed and interpreted by me -     #Nutrition - On TPN. Tolerating PO with full liquids. Add protien to PO diet    Renal: Continue I&Os monitoring, replete electrolytes as needed  Heme: continue to evaluate for acute blood loss anemia- trend Hg/Hct              Anticoagulation - Replace i/v heparin wityh lovenox    ID: trend decreasing WBC, monitor for fevers: continue meropenem  Recent culture data - awaiting drain c/s    Endocrine: Prevent and treat hyperglycemia with insulin as needed    PV: follow pulse exam    MSK: OOB and mobilize as able, PT eval    Skin: decub precautions    Lines:   DVT Prophylaxis:   Stress Ulcer Prophylaxis:   Disposition:    - Subjective : Mrs. Eddy is a 64-year-old female recuperating from a lap converted to open repair of a large ventral hernia with incarcerated small bowel. This was followed by a small bowel resection, and bipolar mass displacement. An anastomotic leak required her to return to the OR for resection and side-to-side anastomosis. Another recent event involved an IR drain placement to manage an abscess. The patient was on high flow. She does not complain of pain, and has had no gas.  - Impression : Given her operative history, Mrs. Eddy appears to have endured multiple operations and is currently showing some signs of progress. Her pain management regimen appears to be effective.  - Review of Systems : Mrs. Eddy was reduced to 50 percent oxygen and 50L/min this morning on high flow. Cardiovascular conditions are being managed with metoprolol and amlodipine, and her Losartan is being held because of FUNMI. Her most recent echo showed EF 56% with a grade 1 diastolic dysfunction. The patient has four drains inserted, with the most recent drain being for fluid collection.  Objective:  - Physical Examination (PE) : Mrs. Eddy was reportedly awake in the morning with no complaints of pain. Intake of oral fluids has been noticeably low. Her white cell count has dropped from 23 to 13 indicating that her sepsis is improving. However, her creatinine levels hint at renal insufficiency.  Assessment:  - Summary : Mrs. Eddy is exhibiting progress post multiple operations. Her pain is currently well managed, and sepsis signs are reducing. Renal insufficiency is a concern.  - Problems :  -Respiratory insufficiency  -Abdominal sepsis  - Renal Insufficiency  -Moderate malnutrition  Plan:  - Summary : The treatment plan focuses on monitoring her fluid intake and output, ensuring protein intake for healing, avoiding aggressive diurese due to renal insufficiency and ensuring she becomes more active to hasten recovery.  - Plan :  - Continue current treatments and medications.  - Focus on improving oral fluid intake.  - Increase protein intake for healing - 200 grams additional to start.  - Avoid aggressive diuresis due to renal insufficiency.  - Encourage more physical activity - sitting up, standing, taking a few steps.  - Preparing for transfer to step down ICU.    Signature:  - Dr. Werner Johnson MD, FACS  Trauma/EGS and SICU Attending.     Patient is critically ill, requiring critical care services.     Attending: I have personally and independently provided 45 minutes of critical care services.  This excludes any time spent on separate procedures or teaching.
SDU Addendum    NANCY SARGENT 64y Female admitted to SICU with incarcerated ventral hernia with SBO now s/p open VHR, SBR with postop respiratory failure and nonoliguric ARF secondary to septic shock    Issues we are addressing today:    Neuro: minimizing pain meds, home meds as possible, delirium precautions, sleep meds as needed at night  CV: optimize fluid status, holding home antihypertensives  Respiratory: continue to wean support as possible, cpap when sleeping for LOUIS  Nutrition: npo at this time  Renal: monitor Is &Os, hold diuresis today  ID: abx ongoing   Lines: continue for now  Heme: continue to evaluate for acute blood loss anemia   Endocrine: Prevent and treat hyperglycemia with insulin as needed    PV: follow pulse exam  Skin: decub precautions  DVT Prophylaxis   Stress Gastritis Prophylaxis   Mobility: PT/OT, OOBTC    will reevaluate after OR    I saw and evaluated the patient personally. I have reviewed and agree with note above. Treatment plan discussed with SICU team, nurses and primary team at the time of the multidisciplinary rounds. The above note is NOT written at the time of rounds and will reflect all changes throughout management of the patient for the day note is written for.    Chi Bermudez MD, D.PRATEEK.
This patient has a high probability of sudden, clinically significant deterioration, which requires the highest level of physician preparedness to intervene urgently. I managed/supervised life or organ supporting interventions that required frequent physician assessment. I devoted my full attention in the ICU to the direct care of this patient for the period of time indicated below. Time I spent with the family or surrogate(s) is included only if the patient was incapable of providing the necessary information or participating in medical decision making. Time devoted to teaching and to any procedures I billed separately is not included.     Patient examined and evaluated at the bedside with SICU team. I performed a medically appropriate exam. Pertinent findings are detailed below. Vital signs, laboratory work, and imaging reviewed.     Neuro:  Awake and alert x 3    Respiratory: respirations even and unlabored on high flow oxygen  CXR reviewed and interpreted by me -     ABG - ( 23 Apr 2025 11:59 )  pH, Arterial: 7.58  pH, Blood: x     /  pCO2: 26    /  pO2: 121   / HCO3: 24    / Base Excess: 3.6   /  SaO2: 97.5      CV: monitor hemodynamics, MAP goal > 65    GI: abd s/nt/nd  ( 23 Apr 2025 04:42 )  Alb: 3.1 g/dL / Pro: 6.1 g/dL / AlkPhos: 93 U/L / ALT: 72 U/L / AST: 52 U/L / GGT:x     / Tbili 0.4 mg/dL/ Dbili 0.2 mg/dL / Indbili 0.2 mg/dL    #Nutrition - On TPN. May take PO    Renal: Continue I&Os monitoring, replete electrolytes as needed  04-23    145  |  107  |  62[HH]  ----------------------------<  140[H]  3.2[L]   |  23  |  1.3    Ca 8.2[L]; Mg 1.9; Phos 3.5      UOP -     I/O -   IN: 3926.8 mL / OUT: 4665 mL / NET: -738.2 mL    IN: 1472 mL / OUT: 1050 mL / NET: 422 mL      Heme: continue to evaluate for acute blood loss anemia- trend Hg/Hct                         9.4    20.90 )-----------( 373      ( 23 Apr 2025 04:42 )             29.4     PT/INR/PTT - ( 23 Apr 2025 11:20 )   PT: x    ; INR: x    ; PTT 84.1 sec     Anticoagulation - Replace i/v heparin wityh lovenox    ID: trend WBC, monitor for fevers: continue meropenem  Recent culture data - awaiting drain c/s    Endocrine: Prevent and treat hyperglycemia with insulin as needed    PV: follow pulse exam    MSK: OOB and mobilize as able, PT eval    Skin: decub precautions    Lines:   DVT Prophylaxis:   Stress Ulcer Prophylaxis:   Disposition:      Werner Johnson MD, FACS  Trauma/EGS and SICU Attending

## 2025-04-25 NOTE — PROGRESS NOTE ADULT - ASSESSMENT
ASSESSMENT:  64y F w/ PMHx of  Morbid obesity, LOUIS, large complex ventral hernia s/p repair sbr and loss of domain c/b post op intubation, hypotension requiring vasopressors, anastomotic leakage s/p RTOR and reanastomosis, now doing better post op from a respiratory status, however, still tenuous. She has a rising leukocytosis and an abdominal fluid collection which was drained by IR on 4/22, Zosyn broadened to meropenem, and has a newly diagnosed DVT/PE which she is currently on a LVX 135BID.     PLAN:   - Wean off HFNC as tolerated   - TPN via R IJ central line (brown port), plan to wean when tolerating regular diet   - Continue aubrey CLD for now   - Closely monitor THONY drain outputs and character of fluid   - Monitor bowel function   - Monitor UOP and creatinine   - Monitor for fevers   - Continue full AC    - Rest of care per SICU

## 2025-04-25 NOTE — PROGRESS NOTE ADULT - ASSESSMENT
ASSESSMENT & PLAN:  64F PMHx HTN, morbid obesity s/p 2001 Abby-en-Y gastric bypass who presented on 4/3 with incarcerated ventral hernia with SBO. S/p 4/10 open ventral hernia repair, small bowel resection, and primary fascial closure with Dr. Lema. Admitted to SICU for Q1 abdominal checks and hemodynamic monitoring. S/p 4/17 takeback for exploratory laparotomy, repair of anastomotic leak.    NEUROLOGICAL:  #Acute pain  - APAP ATC   - Dilaudid 0.5mg Q4 PRN severe pain   #sleep hygiene  - Trazodone 25mg QPM  - Melatonin 5mg qHS    RESPIRATORY:   #Right distal main pulmonary embolus  - on therapeutic heparin gtt 4/21-4/23, currently on therapeutic lovenox   - Echo 4/22/25: EF 70 to 75%.  - B/l DVT sono: Right posterior tibial vein DVT    #Oxygenation  - Intubated 4/10-4/15  - Reintubated 4/17-4/18  -currently on HFNC 50L/50%  -duoneb   -encourage IS   #Diuresis  - 1mg Bumex x 1 4/20  #PMHxx LOUIS on CPAP@ home   #Activity   - Activity- increase as tolerated     CARDS:   #acute hypotension in setting of SIRS, resolved   - Off levophed since 4/18  - MAP >65  #supraventricular tachycardia, Non-sustained ventricular tachycardia, Ventricular Premature Depolarization  -evaluated by cardiology  -continue metoprolol 25mg q 12 per recommendations  #PMHx of HTN  -off cardene 4/22  - Restarted home amlodipine 10mg qD   - Restarted home HCTZ 25mg QD  - Holding home losartan  #EKG- NSR  #TTE 6/2022: EF 55-60%. G1DD.   - TTE 4/11: EF of 56 %.G1DD.     GASTROINTESTINAL/NUTRITION:   #s/p lap converted to open repair of large ventral hernia with incarcerated small bowel, small bowel resection, vicryl mesh placement; c/b anastomotic leak     - CTAP with PO contrast: Evidence of defect in small bowel loop with connection to anterior midline intraperitoneal and subcutaneous pannus collections containing extravasated oral contrast. Unclear if this small bowel defect occurs at the anastomotic site. Surgical review recommended.    - 4/17: ex lap anastomosis resection and creation of side to side anastomosis     - aspiration precautions, HOB 30  #Anterior abdominal wall collection  < from: CT Abdomen and Pelvis w/ Oral Cont and w/ IV Cont (04.21.25 @ 18:11) >  Interval ventral hernia repair, small bowel resection. Lower abdominal wall 9.7 x 6.2 cm fluid collection at site of previously seen extravasated contrast, similar in size since prior study. Decreased anterior peritoneal fluid collection, measuring up to 6.3 x 8.3 cm. No definite current oral contrast leak.  < end of copied text >  - 4/22: s/p IR percutaneous placement of a 8.5 [place] drainage catheter into lower abdominal wall fluid collection, yielding 150 mL of foul appearing fluid.  #SBO s/p ventral hernia repair with SBR  #Diet, Bariatric fulls,   - Continue TPN with AM labs  #GI Prophylaxis/hx GERD  - home pantoprazole 40mg PO QD  #Bowel regimen  - Holding  - Last BM 4/24  - NO dignishield     /RENAL:   #urine output in critically ill  - voiding  - High UOP   #FUNMI; resolving   - Uptrending BUN   - Ammonia 35  - previously oliguric, now improved. Previously on bumex gtt @ 0.5mg/hr  - Nephrology consulted and following > hold off on CVVH  - Renal u/s> no hydronephrosis. 2-3cm anechoic cyst on right kidney   #Hypernatremia - resolved      Labs:          BUN/Cr -  59/1.2  -->,  62/1.3  -->          [04-24 @ 05:05]Na  144 // K  4.1 // Mg  2.0 // Phos  4.6  [04-23 @ 20:00]Na  142 // K  3.8 // Mg  -- // Phos  --    HEME/ONC:   #Full anticoagulation in the setting of DVT/PE  - previously on heparin gtt, transitioned to therapeutic lovenox - enoxaparin Injectable 135 milliGRAM(s) SubCutaneous every 12 hours  - concern for malabsorption of Eliquis given small bowel resection per pharmacy  #R posterior tibial vein DVT  - vascular surgery consulted - AC with heparin drip (upon discharge transition to Eliquis 10 mg po BID x 7 days then 5 mg po BID for at least 6 months)    T&S expires: 4/27      Labs: Hgb/Hct:  9.4/29.4  (04-23 @ 04:42)  -->,  8.2/25.9  (04-24 @ 05:05)  -->                      Platelets:  373  -->,  338  -->                 PTT/INR:        ID:  #MRSA nares negative   Current antibiotics-piperacillin/tazobactam IVPB.. 3.375 every 12 hours x 4 days post op (end date 4/22)  #Antibiotics Course  -ID following  - continue zosyn > Purulent abscesses noted intraoperatively - ended 4/22  - Meropenem (4/22 - )  #febrile  - UA negative  - blood cultures; no growth at 5d  - sputum cultures pending   #cultures  - OR cx: few E.coli, few bacteroides, rare clostridium  -4/13 Blood Cx: NGTF  -4/14 tracheal aspirate: no growth   -4/17 body fluid cx rare e. coli     WBC -  23.04  --->>,  20.90  --->>,  13.68  --->>  Temp trend - 24hrs T(F): 98.8 (04-25 @ 00:00), Max: 98.8 (04-25 @ 00:00)    Current antibiotics - meropenem  IVPB     meropenem  IVPB 1000 IV Intermittent every 8 hours, Stop order after: 7 Days    ENDOCRINE:  #Glycemic monitoring  - FSG Q6 while on TPN  - ISS  - A1C 5.8%     MSK:  #Activity- increase as tolerated     SKIN:  #DTI screening negative     - will continue to monitor daily skin changes    LINES/DRAINS:  PIV, THONY drain x 4, Radial A line, RIJ TLC   ADVANCED DIRECTIVES:  Full Code  HCP- Daughter  (Shavonne Moss)  INDICATION FOR SICU/SDU: Intestinal obstruction  DISPO:  SDU - to be discussed with SICU attending Dr. Johnson.   ASSESSMENT & PLAN:  64F PMHx HTN, morbid obesity s/p 2001 Abby-en-Y gastric bypass who presented on 4/3 with incarcerated ventral hernia with SBO. S/p 4/10 open ventral hernia repair, small bowel resection, and primary fascial closure with Dr. Lema. Admitted to SICU for Q1 abdominal checks and hemodynamic monitoring. S/p 4/17 takeback for exploratory laparotomy, repair of anastomotic leak.    NEUROLOGICAL:  #Acute pain  - APAP ATC   - Dilaudid 0.5mg Q4 PRN severe pain   #sleep hygiene  - Trazodone 25mg QPM  - Melatonin 5mg qHS    RESPIRATORY:   #Right distal main pulmonary embolus  - on therapeutic heparin gtt 4/21-4/23, currently on therapeutic lovenox   - Echo 4/22/25: EF 70 to 75%.  - B/l DVT sono: Right posterior tibial vein DVT    #Oxygenation  - Intubated 4/10-4/15  - Reintubated 4/17-4/18  -currently on HFNC 50L/50%  -duoneb   -encourage IS   #Diuresis  - 1mg Bumex x 1 4/20  #PMHxx LOUIS on CPAP@ home   #Activity   - Activity- increase as tolerated     CARDS:   #acute hypotension in setting of SIRS, resolved   - Off levophed since 4/18  - MAP >65  #supraventricular tachycardia, Non-sustained ventricular tachycardia, Ventricular Premature Depolarization  -evaluated by cardiology  -continue metoprolol 25mg q 12 per recommendations  #PMHx of HTN  -off cardene 4/22  - Restarted home amlodipine 10mg qD   - Restarted home HCTZ 25mg QD  - Holding home losartan  #EKG- NSR  #TTE 6/2022: EF 55-60%. G1DD.   - TTE 4/11: EF of 56 %.G1DD.     GASTROINTESTINAL/NUTRITION:   #s/p lap converted to open repair of large ventral hernia with incarcerated small bowel, small bowel resection, vicryl mesh placement; c/b anastomotic leak     - CTAP with PO contrast: Evidence of defect in small bowel loop with connection to anterior midline intraperitoneal and subcutaneous pannus collections containing extravasated oral contrast. Unclear if this small bowel defect occurs at the anastomotic site. Surgical review recommended.    - 4/17: ex lap anastomosis resection and creation of side to side anastomosis     - aspiration precautions, HOB 30  #Anterior abdominal wall collection  < from: CT Abdomen and Pelvis w/ Oral Cont and w/ IV Cont (04.21.25 @ 18:11) >  Interval ventral hernia repair, small bowel resection. Lower abdominal wall 9.7 x 6.2 cm fluid collection at site of previously seen extravasated contrast, similar in size since prior study. Decreased anterior peritoneal fluid collection, measuring up to 6.3 x 8.3 cm. No definite current oral contrast leak.  < end of copied text >  - 4/22: s/p IR percutaneous placement of a 8.5 [place] drainage catheter into lower abdominal wall fluid collection, yielding 150 mL of foul appearing fluid.  #SBO s/p ventral hernia repair with SBR  #Diet, Bariatric fulls,   - Continue TPN with AM labs  #GI Prophylaxis/hx GERD  - home pantoprazole 40mg PO QD  #Bowel regimen  - Holding  - Last BM 4/24  - NO dignishield     /RENAL:   #urine output in critically ill  - voiding  - High UOP   #FUNMI; resolving   - Uptrending BUN   - Ammonia 35  - previously oliguric, now improved. Previously on bumex gtt @ 0.5mg/hr  - Nephrology consulted and following > hold off on CVVH  - Renal u/s> no hydronephrosis. 2-3cm anechoic cyst on right kidney   #Hypernatremia - resolved      Labs:          BUN/Cr -  59/1.2  -->,  62/1.3  -->          [04-24 @ 05:05]Na  144 // K  4.1 // Mg  2.0 // Phos  4.6  [04-23 @ 20:00]Na  142 // K  3.8 // Mg  -- // Phos  --    HEME/ONC:   #Full anticoagulation in the setting of DVT/PE  - previously on heparin gtt, transitioned to therapeutic lovenox - enoxaparin Injectable 135 milliGRAM(s) SubCutaneous every 12 hours  - concern for malabsorption of Eliquis given small bowel resection per pharmacy  #R posterior tibial vein DVT  - vascular surgery consulted - AC with heparin drip (upon discharge transition to Eliquis 10 mg po BID x 7 days then 5 mg po BID for at least 6 months)    T&S expires: 4/27      Labs: Hgb/Hct:  9.4/29.4  (04-23 @ 04:42)  -->,  8.2/25.9  (04-24 @ 05:05)  -->                      Platelets:  373  -->,  338  -->                 PTT/INR:        ID:  #MRSA nares negative   Current antibiotics-piperacillin/tazobactam IVPB.. 3.375 every 12 hours x 4 days post op (end date 4/22)  #Antibiotics Course  -ID following  - continue zosyn > Purulent abscesses noted intraoperatively - ended 4/22  - Meropenem (4/22 - )  #febrile  - UA negative  - blood cultures; no growth at 5d  - sputum cultures pending   #cultures  - OR cx: few E.coli, few bacteroides, rare clostridium  -4/13 Blood Cx: NGTF  -4/14 tracheal aspirate: no growth   -4/17 body fluid cx rare e. coli     WBC -  23.04  --->>,  20.90  --->>,  13.68  --->>  Temp trend - 24hrs T(F): 98.8 (04-25 @ 00:00), Max: 98.8 (04-25 @ 00:00)    Current antibiotics - meropenem  IVPB     meropenem  IVPB 1000 IV Intermittent every 8 hours, Stop order after: 7 Days    ENDOCRINE:  #Glycemic monitoring  - FSG Q6 while on TPN  - ISS  - A1C 5.8%     MSK:  #Activity- increase as tolerated     SKIN:  #DTI screening negative     - will continue to monitor daily skin changes    LINES/DRAINS:  PIV, THONY drain x 4, Radial A line, RIJ TLC   ADVANCED DIRECTIVES:  Full Code  HCP- Daughter  (Shavonne Moss)  INDICATION FOR SICU/SDU: Intestinal obstruction  DISPO:  SDU - to be discussed with SICU attending Dr. Johnson.     ASSESSMENT & PLAN:  64F PMHx HTN, morbid obesity s/p 2001 Abby-en-Y gastric bypass who presented on 4/3 with incarcerated ventral hernia with SBO.     4/10 open ventral hernia repair, small bowel resection, and primary fascial closure with Dr. Lema. Admitted to SICU for Q1 abdominal checks and hemodynamic monitoring.   4/17 takeback for exploratory laparotomy, repair of anastomotic leak  4/22 8Fr pigtail into lower abdominal wall collection,150cc of foul appearing material aspirated, Attached to THONY bulb      NEUROLOGICAL:  #Acute pain  - APAP ATC   - Dilaudid 0.5mg Q4 PRN severe pain   #sleep hygiene  - Trazodone 25mg QPM  - Melatonin 5mg qHS    RESPIRATORY:   #Right distal main pulmonary embolus    -Lovenox 135mg q12  - Echo 4/22/25: EF 70 to 75%.    -B/L LE Venous duplex 4/21-RIGHT PT DVT, no LEFT DVT  #acute hypoxic respiratory failure    -extubated 4/18  -currently on HFNC 50L/50%  -duoneb   #intermittent diuresis    Bumex 1mg on 4/20  #PMHxx LOUIS on CPAP@ home   #Activity   - Activity- increase as tolerated     CARDS:   #acute hypotension in setting of SIRS, resolved   #supraventricular tachycardia, Non-sustained ventricular tachycardia, Ventricular Premature Depolarization    Cardiology - Metoprolol 25mg q12   #PMHx of HTN    - Amlodipine 10mg qD     - HCTZ 25mg QD    - Holding home losartan  #EKG- NSR  - TTE 4/11: EF of 56 %.G1DD.     GASTROINTESTINAL/NUTRITION:   #4/10 open ventral hernia repair, small bowel resection, and primary fascial closure with Dr. Lema. Admitted to SICU for Q1 abdominal checks and hemodynamic monitoring.   #4/17 takeback for exploratory laparotomy, repair of anastomotic leak  #4/22 8Fr pigtail into lower abdominal wall collection,150cc of foul appearing material aspirated, Attached to THONY bulb    CT 4/21 :right distal main PE, no heart strain, abdominal wall 9.7*6.2cm fluid collection, no oral contrast leak    - aspiration precautions, HOB 30  #Anterior abdominal wall collection  - 4/22: s/p IR percutaneous placement of a 8.5 [place] drainage catheter into lower abdominal wall fluid collection, yielding 150 mL of foul appearing fluid.  #SBO s/p ventral hernia repair with SBR  #Diet, Bariatric fulls,   - Continue TPN with AM labs  #GI Prophylaxis/hx GERD  - home pantoprazole 40mg PO QD  #Bowel regimen  - Holding  - Last BM 4/24  - NO dignishield     /RENAL:   #urine output in critically ill  - voiding  - High UOP   #FUNMI; resolving   - Uptrending BUN   - Ammonia 35  - previously oliguric, now improved. Previously on bumex gtt @ 0.5mg/hr  - Nephrology consulted and following > hold off on CVVH  - Renal u/s> no hydronephrosis. 2-3cm anechoic cyst on right kidney   #Hypernatremia - resolved    Labs:  BUN/Cr -  59/1.2  -->,  62/1.3  -->  [04-24 @ 05:05]Na  144 // K  4.1 // Mg  2.0 // Phos  4.6  [04-23 @ 20:00]Na  142 // K  3.8 // Mg  -- // Phos  --    HEME/ONC:   #Full anticoagulation in the setting of DVT/PE  - Lovenox 135mg q12  - concern for malabsorption of Eliquis given small bowel resection per pharmacy  #R posterior tibial vein DVT  - vascular surgery consulted - AC with heparin drip (upon discharge transition to Eliquis 10 mg po BID x 7 days then 5 mg po BID for at least 6 months)  T&S expires: 4/27      Labs: Hb/Hct:  8.2/25.9  (04-24 @ 05:05)  -->,  8.1/26.1  (04-25 @ 04:53)  -->                      Plts:  338  -->,  263  -->                 PTT/INR:      ID:  #MRSA nares negative   Current antibiotics-piperacillin/tazobactam IVPB.. 3.375 every 12 hours x 4 days post op (end date 4/22)    - continue meropenem 1g q 8 hours  - WBC continues to improve -- Although Cx no growth, will keep meropenem for now   -- will plan to keep until repeat CT to re-evaluate abdomina fluid collections   - Meropenem (4/22 - )  #febrile  - UA negative  - blood cultures; no growth at 5d  - sputum cultures pending   #cultures    OR 4/1 - E Coli  - OR cx: few E.coli, few bacteroides, rare clostridium  -4/13 Blood Cx: NGTF  -4/14 tracheal aspirate: no growth   -4/17 body fluid cx rare e. coli    4/11 - MRSA negative  WBC- 20.90  --->>,  13.68  --->>,  11.11  --->>  Temp trend- 24hrs T(F): 96.7 (04-25 @ 08:35), Max: 98.8 (04-25 @ 00:00)  Current antibiotics-  meropenem  IVPB 1000 every 8 hours    ENDOCRINE:  #Glycemic monitoring  - FSG Q6 while on TPN  - ISS  - A1C 5.8%     MSK:  #Activity- increase as tolerated     SKIN:  #DTI screening negative     - will continue to monitor daily skin changes    LINES/DRAINS:  PIV, THONY drain x 4, Radial A line, RIJ TLC   ADVANCED DIRECTIVES:  Full Code  HCP- Daughter  (Shavonne Moss)  INDICATION FOR SICU/SDU: Intestinal obstruction  DISPO:  SDU - to be discussed with SICU attending Dr. Johnson.     ASSESSMENT & PLAN:  64F PMHx HTN, morbid obesity s/p 2001 Abby-en-Y gastric bypass who presented on 4/3 with incarcerated ventral hernia with SBO.     4/10 open ventral hernia repair, small bowel resection, and primary fascial closure with Dr. Lema. Admitted to SICU for Q1 abdominal checks and hemodynamic monitoring.   4/17 takeback for exploratory laparotomy, repair of anastomotic leak  4/22 8Fr pigtail into lower abdominal wall collection,150cc of foul appearing material aspirated, Attached to THONY bulb      NEUROLOGICAL:  #Acute pain  - APAP ATC   - Dilaudid 0.5mg Q4 PRN severe pain   #sleep hygiene  - Trazodone 25mg QPM  - Melatonin 5mg qHS    RESPIRATORY:   #Right distal main pulmonary embolus    -Lovenox 135mg q12  - Echo 4/22/25: EF 70 to 75%.    -B/L LE Venous duplex 4/21-RIGHT PT DVT, no LEFT DVT  #acute hypoxic respiratory failure    -extubated 4/18  -currently on HFNC 50L/50%  -duoneb   #intermittent diuresis    Bumex 1mg on 4/20  #PMHxx LOUIS on CPAP@ home   #Activity   - Activity- increase as tolerated     CARDS:   #acute hypotension in setting of SIRS, resolved   #supraventricular tachycardia, Non-sustained ventricular tachycardia, Ventricular Premature Depolarization    Cardiology - Metoprolol 25mg q12   #PMHx of HTN    - Amlodipine 10mg qD     - HCTZ 25mg QD    - Holding home losartan  #EKG- NSR  - TTE 4/11: EF of 56 %.G1DD.     GASTROINTESTINAL/NUTRITION:   #4/10 open ventral hernia repair, small bowel resection, and primary fascial closure with Dr. Lema. Admitted to SICU for Q1 abdominal checks and hemodynamic monitoring.   #4/17 takeback for exploratory laparotomy, repair of anastomotic leak  #4/22 8Fr pigtail into lower abdominal wall collection,150cc of foul appearing material aspirated, Attached to THONY bulb    CT 4/21 :right distal main PE, no heart strain, abdominal wall 9.7*6.2cm fluid collection, no oral contrast leak    - aspiration precautions, HOB 30  #Anterior abdominal wall collection  - 4/22: s/p IR percutaneous placement of a 8.5 Italian drainage catheter into lower abdominal wall fluid collection, yielding 150 mL of foul appearing fluid.  #SBO s/p ventral hernia repair with SBR  #Diet, Bariatric fulls,   - Continue TPN with AM labs  #GI Prophylaxis/hx GERD  - home pantoprazole 40mg PO QD  #Bowel regimen  - Holding  - Last BM 4/24  - NO dignishield     /RENAL:   #urine output in critically ill  - voiding  - High UOP   #FUNMI; resolving   - Uptrending BUN   - Ammonia 35  - previously oliguric, now improved. Previously on bumex gtt @ 0.5mg/hr  - Nephrology consulted and following > hold off on CVVH  - Renal u/s> no hydronephrosis. 2-3cm anechoic cyst on right kidney   #Hypernatremia - resolved    Labs:    [04-24 @ 05:05] BUN/Cr  62/1.3  -->, [04-25 @ 04:53] BUN/Cr  52/1.2  -->  [04-25 @ 04:53]Na  138 // K  4.0 // Mg  2.0 // Phos  3.6  [04-24 @ 05:05]Na  144 // K  4.1 // Mg  2.0 // Phos  4.6    HEME/ONC:   #Full anticoagulation in the setting of DVT/PE  - Lovenox 135mg q12  - concern for malabsorption of Eliquis given small bowel resection per pharmacy  #R posterior tibial vein DVT  - vascular surgery consulted - AC with heparin drip (upon discharge transition to Eliquis 10 mg po BID x 7 days then 5 mg po BID for at least 6 months)  T&S expires: 4/27       Labs: Hb/Hct:  8.2/25.9  (04-24 @ 05:05)  -->,  8.1/26.1  (04-25 @ 04:53)  -->                      Plts:  338  -->,  263  -->                 PTT/INR:        ID:  #MRSA nares negative   Current antibiotics-piperacillin/tazobactam IVPB.. 3.375 every 12 hours x 4 days post op (end date 4/22)    - continue meropenem 1g q 8 hours  - WBC continues to improve -- Although Cx no growth, will keep meropenem for now   -- will plan to keep until repeat CT to re-evaluate abdomina fluid collections   - Meropenem (4/22 - )  #febrile  - UA negative  - blood cultures; no growth at 5d  - sputum cultures pending   #cultures    OR 4/1 - E Coli  - OR cx: few E.coli, few bacteroides, rare clostridium  -4/13 Blood Cx: NGTF  -4/14 tracheal aspirate: no growth   -4/17 body fluid cx rare e. coli    4/11 - MRSA negative  WBC- 20.90  --->>,  13.68  --->>,  11.11  --->>  Temp trend- 24hrs T(F): 96.7 (04-25 @ 08:35), Max: 98.8 (04-25 @ 00:00)  meropenem  IVPB 1000 every 8 hours        ENDOCRINE:  #Glycemic monitoring  - FSG Q6 while on TPN  - ISS  - A1C 5.8%     MSK:  #Activity- increase as tolerated     SKIN:  #DTI screening negative     - will continue to monitor daily skin changes    LINES/DRAINS:  PIV, THONY drain x 4, Radial A line, RIJ TLC   ADVANCED DIRECTIVES:  Full Code  HCP- Daughter  (Shavonne Moss)  INDICATION FOR SICU/SDU: Intestinal obstruction  DISPO:  SDU - to be discussed with SICU attending Dr. Johnson.

## 2025-04-26 LAB
ANION GAP SERPL CALC-SCNC: 13 MMOL/L — SIGNIFICANT CHANGE UP (ref 7–14)
BASOPHILS # BLD AUTO: 0.03 K/UL — SIGNIFICANT CHANGE UP (ref 0–0.2)
BASOPHILS NFR BLD AUTO: 0.3 % — SIGNIFICANT CHANGE UP (ref 0–1)
BUN SERPL-MCNC: 43 MG/DL — HIGH (ref 10–20)
CALCIUM SERPL-MCNC: 8 MG/DL — LOW (ref 8.4–10.5)
CHLORIDE SERPL-SCNC: 107 MMOL/L — SIGNIFICANT CHANGE UP (ref 98–110)
CO2 SERPL-SCNC: 20 MMOL/L — SIGNIFICANT CHANGE UP (ref 17–32)
CREAT SERPL-MCNC: 1.1 MG/DL — SIGNIFICANT CHANGE UP (ref 0.7–1.5)
EGFR: 56 ML/MIN/1.73M2 — LOW
EGFR: 56 ML/MIN/1.73M2 — LOW
EOSINOPHIL # BLD AUTO: 0.04 K/UL — SIGNIFICANT CHANGE UP (ref 0–0.7)
EOSINOPHIL NFR BLD AUTO: 0.4 % — SIGNIFICANT CHANGE UP (ref 0–8)
GLUCOSE BLDC GLUCOMTR-MCNC: 108 MG/DL — HIGH (ref 70–99)
GLUCOSE BLDC GLUCOMTR-MCNC: 109 MG/DL — HIGH (ref 70–99)
GLUCOSE BLDC GLUCOMTR-MCNC: 112 MG/DL — HIGH (ref 70–99)
GLUCOSE BLDC GLUCOMTR-MCNC: 123 MG/DL — HIGH (ref 70–99)
GLUCOSE SERPL-MCNC: 108 MG/DL — HIGH (ref 70–99)
HCT VFR BLD CALC: 26.6 % — LOW (ref 37–47)
HGB BLD-MCNC: 8.3 G/DL — LOW (ref 12–16)
IMM GRANULOCYTES NFR BLD AUTO: 5.2 % — HIGH (ref 0.1–0.3)
LYMPHOCYTES # BLD AUTO: 0.87 K/UL — LOW (ref 1.2–3.4)
LYMPHOCYTES # BLD AUTO: 8.8 % — LOW (ref 20.5–51.1)
MAGNESIUM SERPL-MCNC: 1.9 MG/DL — SIGNIFICANT CHANGE UP (ref 1.8–2.4)
MCHC RBC-ENTMCNC: 30.5 PG — SIGNIFICANT CHANGE UP (ref 27–31)
MCHC RBC-ENTMCNC: 31.2 G/DL — LOW (ref 32–37)
MCV RBC AUTO: 97.8 FL — SIGNIFICANT CHANGE UP (ref 81–99)
MONOCYTES # BLD AUTO: 0.53 K/UL — SIGNIFICANT CHANGE UP (ref 0.1–0.6)
MONOCYTES NFR BLD AUTO: 5.3 % — SIGNIFICANT CHANGE UP (ref 1.7–9.3)
NEUTROPHILS # BLD AUTO: 7.93 K/UL — HIGH (ref 1.4–6.5)
NEUTROPHILS NFR BLD AUTO: 80 % — HIGH (ref 42.2–75.2)
NRBC BLD AUTO-RTO: 0 /100 WBCS — SIGNIFICANT CHANGE UP (ref 0–0)
PHOSPHATE SERPL-MCNC: 3.9 MG/DL — SIGNIFICANT CHANGE UP (ref 2.1–4.9)
PLATELET # BLD AUTO: 325 K/UL — SIGNIFICANT CHANGE UP (ref 130–400)
PMV BLD: 11.4 FL — HIGH (ref 7.4–10.4)
POTASSIUM SERPL-MCNC: 3.9 MMOL/L — SIGNIFICANT CHANGE UP (ref 3.5–5)
POTASSIUM SERPL-SCNC: 3.9 MMOL/L — SIGNIFICANT CHANGE UP (ref 3.5–5)
RBC # BLD: 2.72 M/UL — LOW (ref 4.2–5.4)
RBC # FLD: 15.2 % — HIGH (ref 11.5–14.5)
SODIUM SERPL-SCNC: 140 MMOL/L — SIGNIFICANT CHANGE UP (ref 135–146)
WBC # BLD: 9.92 K/UL — SIGNIFICANT CHANGE UP (ref 4.8–10.8)
WBC # FLD AUTO: 9.92 K/UL — SIGNIFICANT CHANGE UP (ref 4.8–10.8)

## 2025-04-26 PROCEDURE — 71045 X-RAY EXAM CHEST 1 VIEW: CPT | Mod: 26

## 2025-04-26 PROCEDURE — 93971 EXTREMITY STUDY: CPT | Mod: 26,LT

## 2025-04-26 RX ORDER — MAGNESIUM SULFATE 500 MG/ML
1 SYRINGE (ML) INJECTION ONCE
Refills: 0 | Status: COMPLETED | OUTPATIENT
Start: 2025-04-26 | End: 2025-04-26

## 2025-04-26 RX ADMIN — AMLODIPINE BESYLATE 10 MILLIGRAM(S): 10 TABLET ORAL at 06:31

## 2025-04-26 RX ADMIN — IPRATROPIUM BROMIDE AND ALBUTEROL SULFATE 3 MILLILITER(S): .5; 2.5 SOLUTION RESPIRATORY (INHALATION) at 19:52

## 2025-04-26 RX ADMIN — Medication 650 MILLIGRAM(S): at 13:19

## 2025-04-26 RX ADMIN — Medication 650 MILLIGRAM(S): at 00:20

## 2025-04-26 RX ADMIN — MEROPENEM 100 MILLIGRAM(S): 1 INJECTION INTRAVENOUS at 21:57

## 2025-04-26 RX ADMIN — METOPROLOL SUCCINATE 25 MILLIGRAM(S): 50 TABLET, EXTENDED RELEASE ORAL at 06:32

## 2025-04-26 RX ADMIN — ENOXAPARIN SODIUM 135 MILLIGRAM(S): 100 INJECTION SUBCUTANEOUS at 17:46

## 2025-04-26 RX ADMIN — MEROPENEM 100 MILLIGRAM(S): 1 INJECTION INTRAVENOUS at 13:19

## 2025-04-26 RX ADMIN — Medication 100 MILLIEQUIVALENT(S): at 23:14

## 2025-04-26 RX ADMIN — IPRATROPIUM BROMIDE AND ALBUTEROL SULFATE 3 MILLILITER(S): .5; 2.5 SOLUTION RESPIRATORY (INHALATION) at 07:39

## 2025-04-26 RX ADMIN — Medication 5 MILLIGRAM(S): at 21:58

## 2025-04-26 RX ADMIN — METOPROLOL SUCCINATE 25 MILLIGRAM(S): 50 TABLET, EXTENDED RELEASE ORAL at 17:44

## 2025-04-26 RX ADMIN — Medication 50 GRAM(S): at 23:14

## 2025-04-26 RX ADMIN — Medication 650 MILLIGRAM(S): at 06:31

## 2025-04-26 RX ADMIN — Medication 650 MILLIGRAM(S): at 17:44

## 2025-04-26 RX ADMIN — Medication 25 MILLIGRAM(S): at 21:58

## 2025-04-26 RX ADMIN — Medication 1 APPLICATION(S): at 06:46

## 2025-04-26 RX ADMIN — ENOXAPARIN SODIUM 135 MILLIGRAM(S): 100 INJECTION SUBCUTANEOUS at 06:30

## 2025-04-26 RX ADMIN — Medication 40 MILLIGRAM(S): at 13:19

## 2025-04-26 RX ADMIN — Medication 650 MILLIGRAM(S): at 23:14

## 2025-04-26 RX ADMIN — IPRATROPIUM BROMIDE AND ALBUTEROL SULFATE 3 MILLILITER(S): .5; 2.5 SOLUTION RESPIRATORY (INHALATION) at 13:47

## 2025-04-26 RX ADMIN — MEROPENEM 100 MILLIGRAM(S): 1 INJECTION INTRAVENOUS at 06:30

## 2025-04-26 NOTE — PROGRESS NOTE ADULT - ASSESSMENT
64y F w/ PMHx of  Morbid obesity, LOUIS, large complex ventral hernia s/p repair sbr and loss of domain c/b post op intubation, hypotension requiring vasopressors, anastomotic leakage s/p RTOR and reanastomosis, now doing better post op from a respiratory status, however, still tenuous. She has a rising leukocytosis and an abdominal fluid collection which was drained by IR on 4/22, Zosyn broadened to meropenem, and has a newly diagnosed DVT/PE which she is currently on a LVX 135BID.     PLAN:   - wean off O2, currently on 5LNC, IS 500cc   - Continue aubrey CLD for now, weaned off TPN   - Closely monitor THONY drain outputs and character of fluid   - Monitor bowel function   - Monitor UOP and creatinine   - Monitor for fevers   - Continue full AC    - Rest of care per SICU

## 2025-04-26 NOTE — CHART NOTE - NSCHARTNOTEFT_GEN_A_CORE
NANCY SARGENT   [id number]   [birth year]  64yF  ============================================================   DATE OF INITIAL SICU/SDU CONSULT: 04-10-25    INDICATION FOR SICU CONSULT:  q1hr abdominal checks, mechanical ventilation     SICU COURSE EVENTS :  04-10 - admitted to SICU service  : Intubated and started on paralytic. Arterial line placed, subclavian TLC placed. Nephrology consulted for oliguria. IR abdominal muscle botox injection performed. TTE performed.   : Additional fluid boluses given; trial fo bumex started, considering CVVH. Weaned off nimbex gtt.  > 220 /hr. Renal U/S w/out hydro.   : Weaned off Levophed. Bumex gtt 2mg/hr > 1mg/hr. Goal net negative 1-1.5L, UOP approx, 200c/hr.   : Remained on bumex gtt for further diuresis, decreased to 0.5 overnight, vent settings weaned. Cr downtrending, LFTs worsening.   4/15: Extubated to BiPAP, transitioned to NC. Dc'd bumex gtt, given 5mg metolazone x 1 and 2mg bumex x 1.   : THONY drain #2 murky/bilious, pending CTAP with PO contrast, hypernatremia, started D5W @ 75cc/hr, persistent hypokalemia   : RTOR for resection of anastomosis for anastomotic leak. Returned intubated, 400/24/80/10, sinus tachy to . Abdomen soft. Was initially on propofol @ 30 and precedex, but propofol weaned off due to hypotension with MAPs in 50s, fentanyl ggt started for acute pain. Remained hypotensive despite fluids, started on Levophed, on 0.05.   : Extubated. HFNC 40L/49%, Levo off since 11 AM   : NGT removed. Started on duonebs. D5W increased to 60cc/hr. 1mg bumex, > 1.5L response  : Episode of afib RVR resolved with metoprolol 5mg IVP, cardiology c/s placed, recommending metoprolol 25mg q 12   : PE on CTA, therapeutic heparin gtt started. Started on cardebe gtt,   : Meropenem started per ID.  S/p IR percutaneous placement of a 8.5 Japanese drainage catheter into lower abdominal wall fluid collection, yielding 150 mL of bowel appearing fluid. New left radial a-line placed. Off nicarrdipine gtt.   : Switched to therapeutic lovenox. Diet advanced to aubrey clears with ensures.  : advacned to bariatric FLD, downgraded to SDU  : TPN discontinued, diet advanced to full liquid , Right cephalic DVT prelim  : Right VA Duplex final no DVT, thromboplebitis, Arterial line removed, 2L NC    ================================================================================      SICU Transfer Note  NANCY SARGENT  64y (1960)  [id number]  Admitted to Hospital:25  HD: 23d    Transfer from: SICU  Transfer to: 4C     [  ]BLUE          [  ]TRAUMA            [  ]GREEN/THORACIC                [  ]ENT/UROLOGY                [  ]NSGY      64y Female HD#23d  SICU CONSULTED FOR:      SICU COURSE:    PAST MEDICAL & SURGICAL HISTORY:  Gastric bypass status for obesity      LOUIS (obstructive sleep apnea)      S/P gastric bypass      S/P       S/P small bowel resection      History of total bilateral knee replacement (TKR)      H/O colonoscopy          Allergies    No Known Allergies    Intolerances      MEDICATIONS  (STANDING):  acetaminophen     Tablet .. 650 milliGRAM(s) Oral every 6 hours  albuterol/ipratropium for Nebulization 3 milliLiter(s) Nebulizer every 6 hours  amLODIPine   Tablet 10 milliGRAM(s) Oral daily  chlorhexidine 2% Cloths 1 Application(s) Topical <User Schedule>  enoxaparin Injectable 135 milliGRAM(s) SubCutaneous every 12 hours  insulin lispro (ADMELOG) corrective regimen sliding scale   SubCutaneous Before meals and at bedtime  melatonin 5 milliGRAM(s) Oral at bedtime  meropenem  IVPB      meropenem  IVPB 1000 milliGRAM(s) IV Intermittent every 8 hours  metoprolol tartrate 25 milliGRAM(s) Oral two times a day  pantoprazole    Tablet 40 milliGRAM(s) Oral every 24 hours  traZODone 25 milliGRAM(s) Oral at bedtime    MEDICATIONS  (PRN):    Vital Signs Last 24 Hrs  T(C): 36.6 (2025 17:45), Max: 36.6 (2025 12:00)  T(F): 97.9 (2025 17:45), Max: 97.9 (2025 17:45)  HR: 93 (2025 17:45) (78 - 93)  BP: 135/77 (2025 17:45) (135/77 - 135/77)  BP(mean): 99 (2025 17:45) (99 - 99)  RR: 26 (2025 17:45) (20 - 28)  SpO2: 97% (2025 17:45) (96% - 99%)    Parameters below as of 2025 17:45  Patient On (Oxygen Delivery Method): nasal cannula  O2 Flow (L/min): 4    I&O's Summary    2025 07:  -  2025 07:00  --------------------------------------------------------  IN: 2735.8 mL / OUT: 4625 mL / NET: -1889.2 mL    2025 07:  -  2025 18:56  --------------------------------------------------------  IN: 50 mL / OUT: 1500 mL / NET: -1450 mL    ASSESSMENT & PLAN:  64F PMHx HTN, morbid obesity s/p  Abby-en-Y gastric bypass who presented on 4/3 with incarcerated ventral hernia with SBO.     4/10 open ventral hernia repair, small bowel resection, and primary fascial closure with Dr. Lema. Admitted to SICU for Q1 abdominal checks and hemodynamic monitoring.    takeback for exploratory laparotomy, repair of anastomotic leak   8Fr pigtail into lower abdominal wall collection,150cc of foul appearing material aspirated, Attached to THONY bulb    NEUROLOGICAL:  #Acute pain  - APAP ATC   #sleep hygiene  - Trazodone 25mg QPM  - Melatonin 5mg qHS    RESPIRATORY:   #Right distal main pulmonary embolus    -Lovenox 135mg q12  - Echo 25: EF 70 to 75%.    -B/L LE Venous duplex -RIGHT PT DVT, no LEFT DVT  #acute hypoxic respiratory failure    -extubated , requiring 4L NC    -uses home CPAP machine    -duoneb   #intermittent diuresis    Bumex 1mg on    #PMHxx LOUIS on CPAP@ home   #Activity   - Activity- increase as tolerated     CARDS:   #acute hypotension in setting of SIRS, resolved   #supraventricular tachycardia, Non-sustained ventricular tachycardia, Ventricular Premature Depolarization    Cardiology - Metoprolol 25mg q12   #PMHx of HTN    - Amlodipine 10mg qD     - HCTZ 25mg QD HOLDING    -Losartan 100mg daily HOLDING    - #EKG- NSR  - TTE : EF of 56 %.G1DD.     GASTROINTESTINAL/NUTRITION:   #4/10 open ventral hernia repair, small bowel resection, ileoileo anstamosis and primary fascial closure, 2 THONY Right  # takeback for exploratory laparotomy, repair of anastomotic leak at previsou ileoileo anastamosis, resected, new ileoileo anastamosis created, 1 THONY Left (total 3)  # 8Fr pigtail into lower abdominal wall collection,150cc of foul appearing material aspirated, Attached to THONY bulb    CT  :right distal main PE, no heart strain, abdominal wall 9.7*6.2cm fluid collection, no oral contrast leak    - aspiration precautions, HOB 30  #Anterior abdominal wall collection  - : s/p IR percutaneous placement of a 8.5 Japanese drainage catheter into lower abdominal wall fluid collection, yielding 150 mL of foul appearing fluid.  #SBO s/p ventral hernia repair with SBR  #Diet, Regular    -approx 2L oral intake    -if diarrhea, d/w team adding fiber  - TPN discontinued   wound vac to be changed and THONY to be removed  #GI Prophylaxis/hx GERD  - home pantoprazole 40mg PO QD  #Bowel regimen  - Holding  - Last BM   - Dignishield in place     /RENAL:   #urine output in critically ill  - voiding  4.3 L urine output  Negative 1.3L/24hrs  Positive 7L/LOS  #FUNMI;oliguric, resolving   - Nephrology consulted and following > hold off on CVVH  - Renal u/s> no hydronephrosis. 2-3cm anechoic cyst on right kidney     [ @ 04:53] BUN/Cr  52/1.2  -->, [ @ 20:19] BUN/Cr  54/1.2  -->  [ @ 20:19]Na  140 // K  4.0 // Mg  2.4 // Phos  4.2  [ @ :53]Na  138 // K  4.0 // Mg  2.0 // Phos  3.6       HEME/ONC:   #Acute right pulmonary embolis  #RUE Venous Duplex -thrombopheblitis  #B/L LE Venous Duplex - right PT DVT    -Lovenox 135mg q12  - concern for malabsorption of Eliquis given small bowel resection per pharmacy  - vascular surgery consulted - AC with heparin drip (upon discharge transition to Eliquis 10 mg po BID x 7 days then 5 mg po BID for at least 6 months)  T&S expires:     Labs: Hb/Hct:  8.1/26.1  ( @ 04:53)  -->,  8.6/27.0  ( @ 20:19)  -->                      Plts:  263  -->,  346  -->                 PTT/INR:  31.2/0.95  --->       ID:  #intraabdominal anastamotic leak, fluid collection  - continue meropenem 1g q 8 hours  - WBC continues to improve -- Although Cx no growth, will keep meropenem for now   -- will plan to keep until repeat CT to re-evaluate abdomina fluid collections   - Meropenem ( - )  #Culture    OR  - E Coli    OR cx: few E.coli, few bacteroides, rare clostridium     Blood Cx: NGTF     tracheal aspirate: no growth      body fluid cx rare e. coli      Blood - NGTD f/u final     Abdominal - Negative FINAL  WBC- 13.68  --->>,  11.11  --->>,  12.77  --->>  Temp trend- 24hrs T(F): 97.2 ( @ 08:00), Max: 97.6 ( @ 04:00)  meropenem  IVPB 1000 every 8 hours    ENDOCRINE:  #Glycemic monitoring    -ISS ACHS    - ISS    - A1C 5.8%     MSK:  #Activity- increase as tolerated     SKIN:  #DTI screening negative     - will continue to monitor daily skin changes    LINES/DRAINS:  PIV, THONY drain x 4, Prevena Vac midline, Radial A line, RIJ TLC    ADVANCED DIRECTIVES:  Full Code  HCP- Daughter  (Shavonne Moss)  INDICATION FOR SICU/SDU: Intestinal obstruction      Follow Up:  -8pm routine labs  -wound vac change, removal of THONY drains  -f/u timeline of antibiotics  -f/u if patient will require outpatient antibiotics vs no antibiotics if patient to require midline vs picc      Signed out to:Lissa SALAZAR  Date: 25  Time:1900

## 2025-04-26 NOTE — PROGRESS NOTE ADULT - ASSESSMENT
ASSESSMENT & PLAN:  64F PMHx HTN, morbid obesity s/p 2001 Abby-en-Y gastric bypass who presented on 4/3 with incarcerated ventral hernia with SBO.     4/10 open ventral hernia repair, small bowel resection, and primary fascial closure with Dr. Lema. Admitted to SICU for Q1 abdominal checks and hemodynamic monitoring.   4/17 takeback for exploratory laparotomy, repair of anastomotic leak  4/22 8Fr pigtail into lower abdominal wall collection,150cc of foul appearing material aspirated, Attached to THONY bulb    NEUROLOGICAL:  #Acute pain  - APAP ATC   - Dilaudid 0.5mg Q4 PRN severe pain   #sleep hygiene  - Trazodone 25mg QPM  - Melatonin 5mg qHS    RESPIRATORY:   #Right distal main pulmonary embolus    -Lovenox 135mg q12  - Echo 4/22/25: EF 70 to 75%.    -B/L LE Venous duplex 4/21-RIGHT PT DVT, no LEFT DVT  #acute hypoxic respiratory failure    -extubated 4/18  -currently on HFNC 50L/50%  -duoneb   #intermittent diuresis    Bumex 1mg on 4/20  #PMHxx LOUIS on CPAP@ home   #Activity   - Activity- increase as tolerated     CARDS:   #acute hypotension in setting of SIRS, resolved   #supraventricular tachycardia, Non-sustained ventricular tachycardia, Ventricular Premature Depolarization    Cardiology - Metoprolol 25mg q12   #PMHx of HTN    - Amlodipine 10mg qD     - HCTZ 25mg QD    - Holding home losartan  #EKG- NSR  - TTE 4/11: EF of 56 %.G1DD.     GASTROINTESTINAL/NUTRITION:   #4/10 open ventral hernia repair, small bowel resection, and primary fascial closure with Dr. Lema. Admitted to SICU for Q1 abdominal checks and hemodynamic monitoring.   #4/17 takeback for exploratory laparotomy, repair of anastomotic leak  #4/22 8Fr pigtail into lower abdominal wall collection,150cc of foul appearing material aspirated, Attached to THONY bulb    CT 4/21 :right distal main PE, no heart strain, abdominal wall 9.7*6.2cm fluid collection, no oral contrast leak    - aspiration precautions, HOB 30  #Anterior abdominal wall collection  - 4/22: s/p IR percutaneous placement of a 8.5 Chinese drainage catheter into lower abdominal wall fluid collection, yielding 150 mL of foul appearing fluid.  #SBO s/p ventral hernia repair with SBR  #Diet, Bariatric fulls,   - TPN discontinued 4/25  #GI Prophylaxis/hx GERD  - home pantoprazole 40mg PO QD  #Bowel regimen  - Holding  - Last BM 4/25  - Dignishield in place     /RENAL:   #urine output in critically ill  - voiding  Negative 1.3L/24hrs  Positive 7L/LOS  #FUNMI;oliguric, resolving   - Nephrology consulted and following > hold off on CVVH  - Renal u/s> no hydronephrosis. 2-3cm anechoic cyst on right kidney     [04-25 @ 04:53] BUN/Cr  52/1.2  -->, [04-25 @ 20:19] BUN/Cr  54/1.2  -->  [04-25 @ 20:19]Na  140 // K  4.0 // Mg  2.4 // Phos  4.2  [04-25 @ 04:53]Na  138 // K  4.0 // Mg  2.0 // Phos  3.6       HEME/ONC:   #Full anticoagulation in the setting of DVT/PE  - Lovenox 135mg q12  - concern for malabsorption of Eliquis given small bowel resection per pharmacy  #Right posterior tibial vein DVT  - vascular surgery consulted - AC with heparin drip (upon discharge transition to Eliquis 10 mg po BID x 7 days then 5 mg po BID for at least 6 months)  T&S expires: 4/27    Labs: Hb/Hct:  8.1/26.1  (04-25 @ 04:53)  -->,  8.6/27.0  (04-25 @ 20:19)  -->                      Plts:  263  -->,  346  -->                 PTT/INR:  31.2/0.95  --->       ID:  #MRSA nares negative   Current antibiotics-piperacillin/tazobactam IVPB.. 3.375 every 12 hours x 4 days post op (end date 4/22)  - continue meropenem 1g q 8 hours  - WBC continues to improve -- Although Cx no growth, will keep meropenem for now   -- will plan to keep until repeat CT to re-evaluate abdomina fluid collections   - Meropenem (4/22 - )  #febrile  - UA negative  - blood cultures; no growth at 5d  - sputum cultures pending   #cultures    OR 4/1 - E Coli  - OR cx: few E.coli, few bacteroides, rare clostridium  -4/13 Blood Cx: NGTF  -4/14 tracheal aspirate: no growth   -4/17 body fluid cx rare e. coli    4/11 - MRSA negative  WBC- 20.90  --->>,  13.68  --->>,  11.11  --->>  Temp trend- 24hrs T(F): 96.7 (04-25 @ 08:35), Max: 98.8 (04-25 @ 00:00)  meropenem  IVPB 1000 every 8 hours    ENDOCRINE:  #Glycemic monitoring  - FSG ACHS  - ISS  - A1C 5.8%     MSK:  #Activity- increase as tolerated     SKIN:  #DTI screening negative     - will continue to monitor daily skin changes    LINES/DRAINS:  PIV, THONY drain x 4, Radial A line, RIJ TLC   ADVANCED DIRECTIVES:  Full Code  HCP- Daughter  (Shavonne Moss)  INDICATION FOR SICU/SDU: Intestinal obstruction  DISPO: SDU     Pending discussion with SICU attending Dr. Lane ASSESSMENT & PLAN:  64F PMHx HTN, morbid obesity s/p 2001 Abby-en-Y gastric bypass who presented on 4/3 with incarcerated ventral hernia with SBO.     4/10 open ventral hernia repair, small bowel resection, and primary fascial closure with Dr. Lema. Admitted to SICU for Q1 abdominal checks and hemodynamic monitoring.   4/17 takeback for exploratory laparotomy, repair of anastomotic leak  4/22 8Fr pigtail into lower abdominal wall collection,150cc of foul appearing material aspirated, Attached to THONY bulb    NEUROLOGICAL:  #Acute pain  - APAP ATC   - Dilaudid 0.5mg Q4 PRN severe pain   #sleep hygiene  - Trazodone 25mg QPM  - Melatonin 5mg qHS    RESPIRATORY:   #Right distal main pulmonary embolus    -Lovenox 135mg q12  - Echo 4/22/25: EF 70 to 75%.    -B/L LE Venous duplex 4/21-RIGHT PT DVT, no LEFT DVT  #acute hypoxic respiratory failure    -extubated 4/18   - weaned from HFNC to 5L on 4/25  -duoneb   #intermittent diuresis    Bumex 1mg on 4/25   4.3 L urine output  Negative 1.3L/24hrs  Positive 7L/LOS  #PMHxx LOUIS on CPAP@ home   #Activity   - Activity- increase as tolerated     CARDS:   #acute hypotension in setting of SIRS, resolved   #supraventricular tachycardia, Non-sustained ventricular tachycardia, Ventricular Premature Depolarization    Cardiology - Metoprolol 25mg q12   #PMHx of HTN    - Amlodipine 10mg qD     - HCTZ 25mg QD HOLDING    -Losartan 100mg daily HOLDING    - #EKG- NSR  - TTE 4/11: EF of 56 %.G1DD.     GASTROINTESTINAL/NUTRITION:   #4/10 open ventral hernia repair, small bowel resection, ileoileo anstamosis and primary fascial closure, 2 THONY Right  #4/17 takeback for exploratory laparotomy, repair of anastomotic leak at previsou ileoileo anastamosis, resected, new ileoileo anastamosis created, 1 THONY Left (total 3)  #4/22 8Fr pigtail into lower abdominal wall collection,150cc of foul appearing material aspirated, Attached to THONY bulb    CT 4/21 :right distal main PE, no heart strain, abdominal wall 9.7*6.2cm fluid collection, no oral contrast leak    - aspiration precautions, HOB 30  #Anterior abdominal wall collection  - 4/22: s/p IR percutaneous placement of a 8.5 Qatari drainage catheter into lower abdominal wall fluid collection, yielding 150 mL of foul appearing fluid.  #SBO s/p ventral hernia repair with SBR  #Diet, Full Liquid diet    -approx 2L oral intake  - TPN discontinued 4/25  #GI Prophylaxis/hx GERD  - home pantoprazole 40mg PO QD  #Bowel regimen  - Holding  - Last BM 4/25  - Dignishield in place     /RENAL:   #urine output in critically ill  - voiding  4.3 L urine output  Negative 1.3L/24hrs  Positive 7L/LOS  #FUNMI;oliguric, resolving   - Nephrology consulted and following > hold off on CVVH  - Renal u/s> no hydronephrosis. 2-3cm anechoic cyst on right kidney     [04-25 @ 04:53] BUN/Cr  52/1.2  -->, [04-25 @ 20:19] BUN/Cr  54/1.2  -->  [04-25 @ 20:19]Na  140 // K  4.0 // Mg  2.4 // Phos  4.2  [04-25 @ 04:53]Na  138 // K  4.0 // Mg  2.0 // Phos  3.6       HEME/ONC:   #Acute right pulmonary embolis  #RUE Venous Duplex 4/26-right cephalic DVT  #B/L LE Venous Duplex 4/21- right PT DVT    -Lovenox 135mg q12  - concern for malabsorption of Eliquis given small bowel resection per pharmacy  - vascular surgery consulted - AC with heparin drip (upon discharge transition to Eliquis 10 mg po BID x 7 days then 5 mg po BID for at least 6 months)  T&S expires: 4/27    Labs: Hb/Hct:  8.1/26.1  (04-25 @ 04:53)  -->,  8.6/27.0  (04-25 @ 20:19)  -->                      Plts:  263  -->,  346  -->                 PTT/INR:  31.2/0.95  --->       ID:  #intraabdominal anastamotic leak, fluid collection  - continue meropenem 1g q 8 hours  - WBC continues to improve -- Although Cx no growth, will keep meropenem for now   -- will plan to keep until repeat CT to re-evaluate abdomina fluid collections   - Meropenem (4/22 - )  #Culture    OR 4/1 - E Coli    OR cx: few E.coli, few bacteroides, rare clostridium    4/13 Blood Cx: NGTF    4/14 tracheal aspirate: no growth     4/17 body fluid cx rare e. coli     4/22 Blood - NGTD f/u final    4/22 Abdominal - Negative FINAL  WBC- 13.68  --->>,  11.11  --->>,  12.77  --->>  Temp trend- 24hrs T(F): 97.2 (04-26 @ 08:00), Max: 97.6 (04-26 @ 04:00)  meropenem  IVPB 1000 every 8 hours    ENDOCRINE:  #Glycemic monitoring    -ISS ACHS    - ISS    - A1C 5.8%     MSK:  #Activity- increase as tolerated     SKIN:  #DTI screening negative     - will continue to monitor daily skin changes    LINES/DRAINS:  PIV, THONY drain x 4, Prevena Vac midline, Radial A line, RIJ TLC    ADVANCED DIRECTIVES:  Full Code  HCP- Daughter  (Shavonne Moss)  INDICATION FOR SICU/SDU: Intestinal obstruction  DISPO: SDU

## 2025-04-26 NOTE — PROGRESS NOTE ADULT - SUBJECTIVE AND OBJECTIVE BOX
NANCY SARGENT   [id number]   [birth year]  64yF  ============================================================   DATE OF INITIAL SICU/SDU CONSULT: 04-10-25    INDICATION FOR SICU CONSULT:  q1hr abdominal checks, mechanical ventilation     SICU COURSE EVENTS :  04-10 - admitted to SICU service  4/11: Intubated and started on paralytic. Arterial line placed, subclavian TLC placed. Nephrology consulted for oliguria. IR abdominal muscle botox injection performed. TTE performed.   4/12: Additional fluid boluses given; trial fo bumex started, considering CVVH. Weaned off nimbex gtt.  > 220 /hr. Renal U/S w/out hydro.   4/13: Weaned off Levophed. Bumex gtt 2mg/hr > 1mg/hr. Goal net negative 1-1.5L, UOP approx, 200c/hr.   4/14: Remained on bumex gtt for further diuresis, decreased to 0.5 overnight, vent settings weaned. Cr downtrending, LFTs worsening.   4/15: Extubated to BiPAP, transitioned to NC. Dc'd bumex gtt, given 5mg metolazone x 1 and 2mg bumex x 1.   4/16: THONY drain #2 murky/bilious, pending CTAP with PO contrast, hypernatremia, started D5W @ 75cc/hr, persistent hypokalemia   4/17: RTOR for resection of anastomosis for anastomotic leak. Returned intubated, 400/24/80/10, sinus tachy to . Abdomen soft. Was initially on propofol @ 30 and precedex, but propofol weaned off due to hypotension with MAPs in 50s, fentanyl ggt started for acute pain. Remained hypotensive despite fluids, started on Levophed, on 0.05.   4/18: Extubated. HFNC 40L/49%, Levo off since 11 AM   4/19: NGT removed. Started on duonebs. D5W increased to 60cc/hr. 1mg bumex, > 1.5L response  4/20: Episode of afib RVR resolved with metoprolol 5mg IVP, cardiology c/s placed, recommending metoprolol 25mg q 12   4/21: PE on CTA, therapeutic heparin gtt started. Started on cardebe gtt,   4/22: Meropenem started per ID.  S/p IR percutaneous placement of a 8.5 Frisian drainage catheter into lower abdominal wall fluid collection, yielding 150 mL of bowel appearing fluid. New left radial a-line placed. Off nicarrdipine gtt.   4/23: Switched to therapeutic lovenox. Diet advanced to aubrey clears with ensures.  4/24: advacned to bariatric FLD, downgraded to SDU  4/25: TPN discontinued, diet advanced to full liquid     24Hour Events:  4/25  NIGHT  - no repletions  - resting comfortably, 5L NC, IS 500cc, abdomen soft, nontender, midline vac with good suction, x3 drains serosang, LLQ drain slightly purulent     DAY  -Bumex 1mg IVP, 3.6L output  -Repeat CT before end date of abx  -Diet Full liquid  -Discontinued TPN  -bowel movement    [X] A ten-point review of systems was negative except as expressed in note.  [X ] History was obtained from patient. If unable to participate in their care, history was from review of the chart and collateral sources of information.  ================================================================================  ASSESSMENT & PLAN:  64F PMHx HTN, morbid obesity s/p 2001 Abby-en-Y gastric bypass who presented on 4/3 with incarcerated ventral hernia with SBO.     4/10 open ventral hernia repair, small bowel resection, and primary fascial closure with Dr. Lema. Admitted to SICU for Q1 abdominal checks and hemodynamic monitoring.   4/17 takeback for exploratory laparotomy, repair of anastomotic leak  4/22 8Fr pigtail into lower abdominal wall collection,150cc of foul appearing material aspirated, Attached to THONY bulb    NEUROLOGICAL:  #Acute pain  - APAP ATC   - Dilaudid 0.5mg Q4 PRN severe pain   #sleep hygiene  - Trazodone 25mg QPM  - Melatonin 5mg qHS    RESPIRATORY:   #Right distal main pulmonary embolus    -Lovenox 135mg q12  - Echo 4/22/25: EF 70 to 75%.    -B/L LE Venous duplex 4/21-RIGHT PT DVT, no LEFT DVT  #acute hypoxic respiratory failure    -extubated 4/18  -currently on HFNC 50L/50%  -duoneb   #intermittent diuresis    Bumex 1mg on 4/20  #PMHxx LOUIS on CPAP@ home   #Activity   - Activity- increase as tolerated     CARDS:   #acute hypotension in setting of SIRS, resolved   #supraventricular tachycardia, Non-sustained ventricular tachycardia, Ventricular Premature Depolarization    Cardiology - Metoprolol 25mg q12   #PMHx of HTN    - Amlodipine 10mg qD     - HCTZ 25mg QD    - Holding home losartan  #EKG- NSR  - TTE 4/11: EF of 56 %.G1DD.     GASTROINTESTINAL/NUTRITION:   #4/10 open ventral hernia repair, small bowel resection, and primary fascial closure with Dr. Lema. Admitted to SICU for Q1 abdominal checks and hemodynamic monitoring.   #4/17 takeback for exploratory laparotomy, repair of anastomotic leak  #4/22 8Fr pigtail into lower abdominal wall collection,150cc of foul appearing material aspirated, Attached to THONY bulb    CT 4/21 :right distal main PE, no heart strain, abdominal wall 9.7*6.2cm fluid collection, no oral contrast leak    - aspiration precautions, HOB 30  #Anterior abdominal wall collection  - 4/22: s/p IR percutaneous placement of a 8.5 Frisian drainage catheter into lower abdominal wall fluid collection, yielding 150 mL of foul appearing fluid.  #SBO s/p ventral hernia repair with SBR  #Diet, Bariatric fulls,   - TPN discontinued 4/25  #GI Prophylaxis/hx GERD  - home pantoprazole 40mg PO QD  #Bowel regimen  - Holding  - Last BM 4/25  - Dignishield in place     /RENAL:   #urine output in critically ill  - voiding  - High UOP   #FUNMI; resolving   - Uptrending BUN   - Ammonia 35  - previously oliguric, now improved. Previously on bumex gtt @ 0.5mg/hr  - Nephrology consulted and following > hold off on CVVH  - Renal u/s> no hydronephrosis. 2-3cm anechoic cyst on right kidney   #Hypernatremia - resolved    Labs:   BUN/Cr- 52/1.2  -->,  54/1.2  -->  [04-25 @ 20:19]Na  140 // K  4.0 // Mg  2.4 // Phos  4.2  [04-25 @ 04:53]Na  138 // K  4.0 // Mg  2.0 // Phos  3.6    HEME/ONC:   #Full anticoagulation in the setting of DVT/PE  - Lovenox 135mg q12  - concern for malabsorption of Eliquis given small bowel resection per pharmacy  #R posterior tibial vein DVT  - vascular surgery consulted - AC with heparin drip (upon discharge transition to Eliquis 10 mg po BID x 7 days then 5 mg po BID for at least 6 months)  T&S expires: 4/27    Labs: Hb/Hct:  8.1/26.1  (04-25 @ 04:53)  -->,  8.6/27.0  (04-25 @ 20:19)  -->                      Plts:  263  -->,  346  -->                 PTT/INR:  31.2/0.95  --->       ID:  #MRSA nares negative   Current antibiotics-piperacillin/tazobactam IVPB.. 3.375 every 12 hours x 4 days post op (end date 4/22)    - continue meropenem 1g q 8 hours  - WBC continues to improve -- Although Cx no growth, will keep meropenem for now   -- will plan to keep until repeat CT to re-evaluate abdomina fluid collections   - Meropenem (4/22 - )  #febrile  - UA negative  - blood cultures; no growth at 5d  - sputum cultures pending   #cultures    OR 4/1 - E Coli  - OR cx: few E.coli, few bacteroides, rare clostridium  -4/13 Blood Cx: NGTF  -4/14 tracheal aspirate: no growth   -4/17 body fluid cx rare e. coli    4/11 - MRSA negative  WBC- 20.90  --->>,  13.68  --->>,  11.11  --->>  Temp trend- 24hrs T(F): 96.7 (04-25 @ 08:35), Max: 98.8 (04-25 @ 00:00)  meropenem  IVPB 1000 every 8 hours    ENDOCRINE:  #Glycemic monitoring  - FSG ACHS  - ISS  - A1C 5.8%     MSK:  #Activity- increase as tolerated     SKIN:  #DTI screening negative     - will continue to monitor daily skin changes    LINES/DRAINS:  PIV, THONY drain x 4, Radial A line, RIJ TLC   ADVANCED DIRECTIVES:  Full Code  HCP- Daughter  (Shavonne Moss)  INDICATION FOR SICU/SDU: Intestinal obstruction  DISPO: SDU     Pending discussion with SICU attending Dr. Lane NANCY SARGENT   [id number]   [birth year]  64yF  ============================================================   DATE OF INITIAL SICU/SDU CONSULT: 04-10-25    INDICATION FOR SICU CONSULT:  q1hr abdominal checks, mechanical ventilation     SICU COURSE EVENTS :  04-10 - admitted to SICU service  4/11: Intubated and started on paralytic. Arterial line placed, subclavian TLC placed. Nephrology consulted for oliguria. IR abdominal muscle botox injection performed. TTE performed.   4/12: Additional fluid boluses given; trial fo bumex started, considering CVVH. Weaned off nimbex gtt.  > 220 /hr. Renal U/S w/out hydro.   4/13: Weaned off Levophed. Bumex gtt 2mg/hr > 1mg/hr. Goal net negative 1-1.5L, UOP approx, 200c/hr.   4/14: Remained on bumex gtt for further diuresis, decreased to 0.5 overnight, vent settings weaned. Cr downtrending, LFTs worsening.   4/15: Extubated to BiPAP, transitioned to NC. Dc'd bumex gtt, given 5mg metolazone x 1 and 2mg bumex x 1.   4/16: THONY drain #2 murky/bilious, pending CTAP with PO contrast, hypernatremia, started D5W @ 75cc/hr, persistent hypokalemia   4/17: RTOR for resection of anastomosis for anastomotic leak. Returned intubated, 400/24/80/10, sinus tachy to . Abdomen soft. Was initially on propofol @ 30 and precedex, but propofol weaned off due to hypotension with MAPs in 50s, fentanyl ggt started for acute pain. Remained hypotensive despite fluids, started on Levophed, on 0.05.   4/18: Extubated. HFNC 40L/49%, Levo off since 11 AM   4/19: NGT removed. Started on duonebs. D5W increased to 60cc/hr. 1mg bumex, > 1.5L response  4/20: Episode of afib RVR resolved with metoprolol 5mg IVP, cardiology c/s placed, recommending metoprolol 25mg q 12   4/21: PE on CTA, therapeutic heparin gtt started. Started on cardebe gtt,   4/22: Meropenem started per ID.  S/p IR percutaneous placement of a 8.5 [place] drainage catheter into lower abdominal wall fluid collection, yielding 150 mL of bowel appearing fluid. New left radial a-line placed. Off nicarrdipine gtt.   4/23: Switched to therapeutic lovenox. Diet advanced to aubrey clears with ensures.  4/24: advacned to bariatric FLD, downgraded to SDU  4/25: TPN discontinued, diet advanced to full liquid     24Hour Events:  4/25  NIGHT  - no repletions  - resting comfortably, 5L NC, IS 500cc, abdomen soft, nontender, midline vac with good suction, x3 drains serosang, LLQ drain slightly purulent     DAY  -Bumex 1mg IVP, 3.6L output  -Repeat CT before end date of abx  -Diet Full liquid  -Discontinued TPN  -bowel movement    [X] A ten-point review of systems was negative except as expressed in note.  [X ] History was obtained from patient. If unable to participate in their care, history was from review of the chart and collateral sources of information.  ================================================================================        ACTIVE MEDICATIONS  acetaminophen     Tablet .. 650 milliGRAM(s) Oral every 6 hours  albuterol/ipratropium for Nebulization 3 milliLiter(s) Nebulizer every 6 hours  amLODIPine   Tablet 10 milliGRAM(s) Oral daily  chlorhexidine 2% Cloths 1 Application(s) Topical <User Schedule>  enoxaparin Injectable 135 milliGRAM(s) SubCutaneous every 12 hours  insulin lispro (ADMELOG) corrective regimen sliding scale   SubCutaneous Before meals and at bedtime  melatonin 5 milliGRAM(s) Oral at bedtime  meropenem  IVPB      meropenem  IVPB 1000 milliGRAM(s) IV Intermittent every 8 hours  metoprolol tartrate 25 milliGRAM(s) Oral two times a day  pantoprazole    Tablet 40 milliGRAM(s) Oral every 24 hours  traZODone 25 milliGRAM(s) Oral at bedtime     VITALS LAST 24 HOURS   T(F): 97.6 (04-26 @ 04:00), Max: 97.6 (04-26 @ 04:00)  HR: 88 (04-26 @ 04:00) (80 - 100)  BP: --  BP(mean): --  ABP: 148/67 (04-26 @ 04:00) (129/64 - 154/64)  ABP(mean): 91 (04-26 @ 04:00) (83 - 91)  RR: 20 (04-26 @ 04:30) (20 - 29)  SpO2: 98% (04-26 @ 04:30) (95% - 98%)  FLUID STATUS    04-25-25 @ 07:01  -  04-26-25 @ 07:00  --------------------------------------------------------  IN:    Fat Emulsion (Fish Oil &amp; Plant Based) 20% Infusion: 61.8 mL    IV PiggyBack: 50 mL    Oral Fluid: 1752 mL    TPN (Total Parenteral Nutrition): 772 mL  Total IN: 2635.8 mL    OUT:    Bulb (mL): 0 mL    Bulb (mL): 0 mL    Bulb (mL): 0 mL    Bulb (mL): 0 mL    Rectal Tube (mL): 100 mL    VAC (Vacuum Assisted Closure) System (mL): 0 mL    Voided (mL): 3800 mL  Total OUT: 3900 mL    Total NET: -1264.2 mL        MECHANICAL VENT/BLOOD GAS       PHYSICAL EXAM:      a&ox3  follows commands, BLACK  no acute distress  equal chest rise b/l  abdomen soft, obese body habitus  prevena vac in place on suction  right sided THONY drain x2 minimal output serous  left sided THONY drain x1 minimal output serous  midline abdominal IR drain, serous no blood, mild yello tinge  extremities soft  urinary cath in place               NANCY SARGENT   [id number]   [birth year]  64yF  ============================================================   DATE OF INITIAL SICU/SDU CONSULT: 04-10-25    INDICATION FOR SICU CONSULT:  q1hr abdominal checks, mechanical ventilation     SICU COURSE EVENTS :  04-10 - admitted to SICU service  4/11: Intubated and started on paralytic. Arterial line placed, subclavian TLC placed. Nephrology consulted for oliguria. IR abdominal muscle botox injection performed. TTE performed.   4/12: Additional fluid boluses given; trial fo bumex started, considering CVVH. Weaned off nimbex gtt.  > 220 /hr. Renal U/S w/out hydro.   4/13: Weaned off Levophed. Bumex gtt 2mg/hr > 1mg/hr. Goal net negative 1-1.5L, UOP approx, 200c/hr.   4/14: Remained on bumex gtt for further diuresis, decreased to 0.5 overnight, vent settings weaned. Cr downtrending, LFTs worsening.   4/15: Extubated to BiPAP, transitioned to NC. Dc'd bumex gtt, given 5mg metolazone x 1 and 2mg bumex x 1.   4/16: THONY drain #2 murky/bilious, pending CTAP with PO contrast, hypernatremia, started D5W @ 75cc/hr, persistent hypokalemia   4/17: RTOR for resection of anastomosis for anastomotic leak. Returned intubated, 400/24/80/10, sinus tachy to . Abdomen soft. Was initially on propofol @ 30 and precedex, but propofol weaned off due to hypotension with MAPs in 50s, fentanyl ggt started for acute pain. Remained hypotensive despite fluids, started on Levophed, on 0.05.   4/18: Extubated. HFNC 40L/49%, Levo off since 11 AM   4/19: NGT removed. Started on duonebs. D5W increased to 60cc/hr. 1mg bumex, > 1.5L response  4/20: Episode of afib RVR resolved with metoprolol 5mg IVP, cardiology c/s placed, recommending metoprolol 25mg q 12   4/21: PE on CTA, therapeutic heparin gtt started. Started on cardebe gtt,   4/22: Meropenem started per ID.  S/p IR percutaneous placement of a 8.5 [place] drainage catheter into lower abdominal wall fluid collection, yielding 150 mL of bowel appearing fluid. New left radial a-line placed. Off nicarrdipine gtt.   4/23: Switched to therapeutic lovenox. Diet advanced to aubrey clears with ensures.  4/24: advacned to bariatric FLD, downgraded to SDU  4/25: TPN discontinued, diet advanced to full liquid     24Hour Events:  4/25  NIGHT  - no repletions  - resting comfortably, 5L NC, IS 500cc, abdomen soft, nontender, midline vac with good suction, x3 drains serosang, LLQ drain slightly purulent     DAY  -Bumex 1mg IVP, 3.6L output  -Repeat CT before end date of abx  -Diet Full liquid  -Discontinued TPN  -bowel movement    [X] A ten-point review of systems was negative except as expressed in note.  [X ] History was obtained from patient. If unable to participate in their care, history was from review of the chart and collateral sources of information.  ================================================================================        ACTIVE MEDICATIONS  acetaminophen     Tablet .. 650 milliGRAM(s) Oral every 6 hours  albuterol/ipratropium for Nebulization 3 milliLiter(s) Nebulizer every 6 hours  amLODIPine   Tablet 10 milliGRAM(s) Oral daily  chlorhexidine 2% Cloths 1 Application(s) Topical <User Schedule>  enoxaparin Injectable 135 milliGRAM(s) SubCutaneous every 12 hours  insulin lispro (ADMELOG) corrective regimen sliding scale   SubCutaneous Before meals and at bedtime  melatonin 5 milliGRAM(s) Oral at bedtime  meropenem  IVPB      meropenem  IVPB 1000 milliGRAM(s) IV Intermittent every 8 hours  metoprolol tartrate 25 milliGRAM(s) Oral two times a day  pantoprazole    Tablet 40 milliGRAM(s) Oral every 24 hours  traZODone 25 milliGRAM(s) Oral at bedtime     VITALS LAST 24 HOURS   T(F): 97.6 (04-26 @ 04:00), Max: 97.6 (04-26 @ 04:00)  HR: 88 (04-26 @ 04:00) (80 - 100)  BP: --  BP(mean): --  ABP: 148/67 (04-26 @ 04:00) (129/64 - 154/64)  ABP(mean): 91 (04-26 @ 04:00) (83 - 91)  RR: 20 (04-26 @ 04:30) (20 - 29)  SpO2: 98% (04-26 @ 04:30) (95% - 98%)  FLUID STATUS    04-25-25 @ 07:01  -  04-26-25 @ 07:00  --------------------------------------------------------  IN:    Fat Emulsion (Fish Oil &amp; Plant Based) 20% Infusion: 61.8 mL    IV PiggyBack: 50 mL    Oral Fluid: 1752 mL    TPN (Total Parenteral Nutrition): 772 mL  Total IN: 2635.8 mL    OUT:    Bulb (mL): 0 mL    Bulb (mL): 0 mL    Bulb (mL): 0 mL    Bulb (mL): 0 mL    Rectal Tube (mL): 100 mL    VAC (Vacuum Assisted Closure) System (mL): 0 mL    Voided (mL): 3800 mL  Total OUT: 3900 mL    Total NET: -1264.2 mL        MECHANICAL VENT/BLOOD GAS       PHYSICAL EXAM:      a&ox3  follows commands, BLACK  no acute distress  equal chest rise b/l  abdomen soft, obese body habitus  prevena vac in place on suction  right sided THONY drain x2 minimal output serous  left sided THONY drain x1 minimal output serous  midline abdominal IR drain, serous no blood, mild yello tinge  extremities soft  primafit in place

## 2025-04-26 NOTE — PROGRESS NOTE ADULT - SUBJECTIVE AND OBJECTIVE BOX
GENERAL SURGERY PROGRESS NOTE     NANCY SARGENT  [address] [place] [address] day :24d    POD: 9  Procedure: Open repair of ventral hernia using mesh and component separation technique    Small bowel resection    Insertion, arterial line, percutaneous    Exploratory laparotomy      Surgical Attending: Dr. Zimmerman  24 hour events: OOBTC, weaning off TPN, +g, +bm. On NC, mariam drains ss output. wound vac intact.    PHYSICAL EXAM:  GENERAL: A&Ox3, NAD  HEENT: Normocephalic, atraumatic  PULM: bilateral chest rise, saturating well on HFNC  CV: Regular rate and rhythm  ABDOMEN: Abdomen obese, soft, mildly-distended, mildly-tender to deep palpation, MARIAM x 3, LLQ drain with purulent drainage. wound vac intact.  MSK: Moving extremities spontaneously  NEURO: No focal deficits  SKIN: Warm, dry, normal skin color, texture, turgor      T(F): 97 (04-25-25 @ 20:00), Max: 97 (04-25-25 @ 20:00)  HR: 82 (04-25-25 @ 20:00) (74 - 100)  BP: --  ABP: 129/64 (04-25-25 @ 20:00) (119/57 - 154/64)  ABP(mean): 85 (04-25-25 @ 20:00) (77 - 92)  RR: 28 (04-25-25 @ 20:00) (22 - 29)  SpO2: 98% (04-25-25 @ 20:00) (95% - 98%)    IN'S / OUT's:    04-24-25 @ 07:01  -  04-25-25 @ 07:00  --------------------------------------------------------  IN:    Fat Emulsion (Fish Oil &amp; Plant Based) 20% Infusion: 249.6 mL    IV PiggyBack: 150 mL    Oral Fluid: 1361 mL    TPN (Total Parenteral Nutrition): 1608 mL  Total IN: 3368.6 mL    OUT:    Bulb (mL): 7.5 mL    Bulb (mL): 11 mL    Bulb (mL): 19.5 mL    Bulb (mL): 26 mL    Rectal Tube (mL): 400 mL    VAC (Vacuum Assisted Closure) System (mL): 0 mL    Voided (mL): 3500 mL  Total OUT: 3964 mL    Total NET: -595.4 mL      04-25-25 @ 07:01  -  04-26-25 @ 00:39  --------------------------------------------------------  IN:    Fat Emulsion (Fish Oil &amp; Plant Based) 20% Infusion: 61.8 mL    IV PiggyBack: 50 mL    Oral Fluid: 1752 mL    TPN (Total Parenteral Nutrition): 772 mL  Total IN: 2635.8 mL    OUT:    Bulb (mL): 0 mL    Bulb (mL): 0 mL    Bulb (mL): 0 mL    Bulb (mL): 0 mL    Rectal Tube (mL): 100 mL    VAC (Vacuum Assisted Closure) System (mL): 0 mL    Voided (mL): 3800 mL  Total OUT: 3900 mL    Total NET: -1264.2 mL          MEDICATIONS  (STANDING):  acetaminophen     Tablet .. 650 milliGRAM(s) Oral every 6 hours  albuterol/ipratropium for Nebulization 3 milliLiter(s) Nebulizer every 6 hours  amLODIPine   Tablet 10 milliGRAM(s) Oral daily  chlorhexidine 2% Cloths 1 Application(s) Topical <User Schedule>  enoxaparin Injectable 135 milliGRAM(s) SubCutaneous every 12 hours  insulin lispro (ADMELOG) corrective regimen sliding scale   SubCutaneous Before meals and at bedtime  melatonin 5 milliGRAM(s) Oral at bedtime  meropenem  IVPB      meropenem  IVPB 1000 milliGRAM(s) IV Intermittent every 8 hours  metoprolol tartrate 25 milliGRAM(s) Oral two times a day  pantoprazole    Tablet 40 milliGRAM(s) Oral every 24 hours  Parenteral Nutrition - Adult 1 Each (67 mL/Hr) TPN Continuous <Continuous>  traZODone 25 milliGRAM(s) Oral at bedtime    MEDICATIONS  (PRN):  HYDROmorphone  Injectable 0.5 milliGRAM(s) IV Push every 4 hours PRN Severe Pain (7 - 10)      LABS  Labs:  CAPILLARY BLOOD GLUCOSE      POCT Blood Glucose.: 122 mg/dL (25 Apr 2025 20:22)  POCT Blood Glucose.: 143 mg/dL (25 Apr 2025 17:06)  POCT Blood Glucose.: 120 mg/dL (25 Apr 2025 11:29)  POCT Blood Glucose.: 122 mg/dL (25 Apr 2025 06:27)                          8.6    12.77 )-----------( 346      ( 25 Apr 2025 20:19 )             27.0         04-25    140  |  106  |  54[H]  ----------------------------<  115[H]  4.0   |  20  |  1.2      Calcium: 8.3 mg/dL (04-25-25 @ 20:19)      LFTs:     Blood Gas Arterial, Lactate: 1.0 mmol/L (04-23-25 @ 21:36)  Blood Gas Arterial, Lactate: 1.3 mmol/L (04-23-25 @ 11:59)    ABG - ( 23 Apr 2025 21:36 )  pH: 7.53  /  pCO2: 28    /  pO2: 109   / HCO3: 23    / Base Excess: 1.7   /  SaO2: 97.1            ABG - ( 23 Apr 2025 11:59 )  pH: 7.58  /  pCO2: 26    /  pO2: 121   / HCO3: 24    / Base Excess: 3.6   /  SaO2: 97.5            ABG - ( 20 Apr 2025 21:19 )  pH: 7.51  /  pCO2: 29    /  pO2: 137   / HCO3: 23    / Base Excess: 0.6   /  SaO2: 96.6              Coags:     11.20  ----< 0.95    ( 25 Apr 2025 20:19 )     31.2                Urinalysis Basic - ( 25 Apr 2025 20:19 )    Color: x / Appearance: x / SG: x / pH: x  Gluc: 115 mg/dL / Ketone: x  / Bili: x / Urobili: x   Blood: x / Protein: x / Nitrite: x   Leuk Esterase: x / RBC: x / WBC x   Sq Epi: x / Non Sq Epi: x / Bacteria: x            RADIOLOGY & ADDITIONAL TESTS:

## 2025-04-27 LAB
ALBUMIN SERPL ELPH-MCNC: 2.1 G/DL — LOW (ref 3.5–5.2)
ALBUMIN SERPL ELPH-MCNC: 2.5 G/DL — LOW (ref 3.5–5.2)
ALBUMIN SERPL ELPH-MCNC: 2.8 G/DL — LOW (ref 3.5–5.2)
ALP SERPL-CCNC: 101 U/L — SIGNIFICANT CHANGE UP (ref 30–115)
ALP SERPL-CCNC: 84 U/L — SIGNIFICANT CHANGE UP (ref 30–115)
ALP SERPL-CCNC: 94 U/L — SIGNIFICANT CHANGE UP (ref 30–115)
ALT FLD-CCNC: 1617 U/L — HIGH (ref 0–41)
ALT FLD-CCNC: 1718 U/L — HIGH (ref 0–41)
ALT FLD-CCNC: 70 U/L — HIGH (ref 0–41)
ANION GAP SERPL CALC-SCNC: 15 MMOL/L — HIGH (ref 7–14)
ANION GAP SERPL CALC-SCNC: 18 MMOL/L — HIGH (ref 7–14)
ANION GAP SERPL CALC-SCNC: 18 MMOL/L — HIGH (ref 7–14)
ANION GAP SERPL CALC-SCNC: 19 MMOL/L — HIGH (ref 7–14)
ANION GAP SERPL CALC-SCNC: 20 MMOL/L — HIGH (ref 7–14)
APTT BLD: 26.5 SEC — LOW (ref 27–39.2)
APTT BLD: 26.5 SEC — LOW (ref 27–39.2)
APTT BLD: 27.4 SEC — SIGNIFICANT CHANGE UP (ref 27–39.2)
APTT BLD: 36.1 SEC — SIGNIFICANT CHANGE UP (ref 27–39.2)
AST SERPL-CCNC: 1581 U/L — HIGH (ref 0–41)
AST SERPL-CCNC: 1737 U/L — HIGH (ref 0–41)
AST SERPL-CCNC: 44 U/L — HIGH (ref 0–41)
BASOPHILS # BLD AUTO: 0 K/UL — SIGNIFICANT CHANGE UP (ref 0–0.2)
BASOPHILS # BLD AUTO: 0.05 K/UL — SIGNIFICANT CHANGE UP (ref 0–0.2)
BASOPHILS # BLD AUTO: 0.1 K/UL — SIGNIFICANT CHANGE UP (ref 0–0.2)
BASOPHILS # BLD AUTO: 0.16 K/UL — SIGNIFICANT CHANGE UP (ref 0–0.2)
BASOPHILS NFR BLD AUTO: 0 % — SIGNIFICANT CHANGE UP (ref 0–1)
BASOPHILS NFR BLD AUTO: 0.2 % — SIGNIFICANT CHANGE UP (ref 0–1)
BASOPHILS NFR BLD AUTO: 0.4 % — SIGNIFICANT CHANGE UP (ref 0–1)
BASOPHILS NFR BLD AUTO: 0.6 % — SIGNIFICANT CHANGE UP (ref 0–1)
BILIRUB DIRECT SERPL-MCNC: 0.4 MG/DL — HIGH (ref 0–0.3)
BILIRUB DIRECT SERPL-MCNC: 0.5 MG/DL — HIGH (ref 0–0.3)
BILIRUB INDIRECT FLD-MCNC: 0.2 MG/DL — SIGNIFICANT CHANGE UP (ref 0.2–1.2)
BILIRUB INDIRECT FLD-MCNC: 0.2 MG/DL — SIGNIFICANT CHANGE UP (ref 0.2–1.2)
BILIRUB SERPL-MCNC: 0.3 MG/DL — SIGNIFICANT CHANGE UP (ref 0.2–1.2)
BILIRUB SERPL-MCNC: 0.6 MG/DL — SIGNIFICANT CHANGE UP (ref 0.2–1.2)
BILIRUB SERPL-MCNC: 0.7 MG/DL — SIGNIFICANT CHANGE UP (ref 0.2–1.2)
BUN SERPL-MCNC: 42 MG/DL — HIGH (ref 10–20)
BUN SERPL-MCNC: 49 MG/DL — HIGH (ref 10–20)
BUN SERPL-MCNC: 51 MG/DL — HIGH (ref 10–20)
BUN SERPL-MCNC: 55 MG/DL — HIGH (ref 10–20)
BUN SERPL-MCNC: 56 MG/DL — HIGH (ref 10–20)
CALCIUM SERPL-MCNC: 6.9 MG/DL — LOW (ref 8.4–10.5)
CALCIUM SERPL-MCNC: 7.9 MG/DL — LOW (ref 8.4–10.5)
CALCIUM SERPL-MCNC: 7.9 MG/DL — LOW (ref 8.4–10.5)
CALCIUM SERPL-MCNC: 8 MG/DL — LOW (ref 8.4–10.5)
CALCIUM SERPL-MCNC: 9.2 MG/DL — SIGNIFICANT CHANGE UP (ref 8.4–10.5)
CHLORIDE SERPL-SCNC: 108 MMOL/L — SIGNIFICANT CHANGE UP (ref 98–110)
CHLORIDE SERPL-SCNC: 108 MMOL/L — SIGNIFICANT CHANGE UP (ref 98–110)
CHLORIDE SERPL-SCNC: 109 MMOL/L — SIGNIFICANT CHANGE UP (ref 98–110)
CHLORIDE SERPL-SCNC: 109 MMOL/L — SIGNIFICANT CHANGE UP (ref 98–110)
CHLORIDE SERPL-SCNC: 110 MMOL/L — SIGNIFICANT CHANGE UP (ref 98–110)
CO2 SERPL-SCNC: 10 MMOL/L — LOW (ref 17–32)
CO2 SERPL-SCNC: 15 MMOL/L — LOW (ref 17–32)
CO2 SERPL-SCNC: 15 MMOL/L — LOW (ref 17–32)
CO2 SERPL-SCNC: 16 MMOL/L — LOW (ref 17–32)
CO2 SERPL-SCNC: 17 MMOL/L — SIGNIFICANT CHANGE UP (ref 17–32)
CREAT SERPL-MCNC: 1.3 MG/DL — SIGNIFICANT CHANGE UP (ref 0.7–1.5)
CREAT SERPL-MCNC: 1.9 MG/DL — HIGH (ref 0.7–1.5)
CREAT SERPL-MCNC: 2.1 MG/DL — HIGH (ref 0.7–1.5)
CREAT SERPL-MCNC: 2.3 MG/DL — HIGH (ref 0.7–1.5)
CREAT SERPL-MCNC: 2.6 MG/DL — HIGH (ref 0.7–1.5)
CULTURE RESULTS: SIGNIFICANT CHANGE UP
EGFR: 20 ML/MIN/1.73M2 — LOW
EGFR: 20 ML/MIN/1.73M2 — LOW
EGFR: 23 ML/MIN/1.73M2 — LOW
EGFR: 23 ML/MIN/1.73M2 — LOW
EGFR: 26 ML/MIN/1.73M2 — LOW
EGFR: 26 ML/MIN/1.73M2 — LOW
EGFR: 29 ML/MIN/1.73M2 — LOW
EGFR: 29 ML/MIN/1.73M2 — LOW
EGFR: 46 ML/MIN/1.73M2 — LOW
EGFR: 46 ML/MIN/1.73M2 — LOW
EOSINOPHIL # BLD AUTO: 0 K/UL — SIGNIFICANT CHANGE UP (ref 0–0.7)
EOSINOPHIL NFR BLD AUTO: 0 % — SIGNIFICANT CHANGE UP (ref 0–8)
FIBRINOGEN PPP-MCNC: 325 MG/DL — SIGNIFICANT CHANGE UP (ref 200–435)
GAS PNL BLDA: SIGNIFICANT CHANGE UP
GLUCOSE BLDC GLUCOMTR-MCNC: 123 MG/DL — HIGH (ref 70–99)
GLUCOSE BLDC GLUCOMTR-MCNC: 154 MG/DL — HIGH (ref 70–99)
GLUCOSE BLDC GLUCOMTR-MCNC: 156 MG/DL — HIGH (ref 70–99)
GLUCOSE BLDC GLUCOMTR-MCNC: 191 MG/DL — HIGH (ref 70–99)
GLUCOSE BLDC GLUCOMTR-MCNC: 229 MG/DL — HIGH (ref 70–99)
GLUCOSE BLDC GLUCOMTR-MCNC: 280 MG/DL — HIGH (ref 70–99)
GLUCOSE SERPL-MCNC: 187 MG/DL — HIGH (ref 70–99)
GLUCOSE SERPL-MCNC: 191 MG/DL — HIGH (ref 70–99)
GLUCOSE SERPL-MCNC: 202 MG/DL — HIGH (ref 70–99)
GLUCOSE SERPL-MCNC: 215 MG/DL — HIGH (ref 70–99)
GLUCOSE SERPL-MCNC: 365 MG/DL — HIGH (ref 70–99)
HCT VFR BLD CALC: 19.2 % — LOW (ref 37–47)
HCT VFR BLD CALC: 23.5 % — LOW (ref 37–47)
HCT VFR BLD CALC: 27.6 % — LOW (ref 37–47)
HCT VFR BLD CALC: 28.4 % — LOW (ref 37–47)
HCT VFR BLD CALC: 32.6 % — LOW (ref 37–47)
HGB BLD-MCNC: 10.3 G/DL — LOW (ref 12–16)
HGB BLD-MCNC: 5.6 G/DL — CRITICAL LOW (ref 12–16)
HGB BLD-MCNC: 7.9 G/DL — LOW (ref 12–16)
HGB BLD-MCNC: 9 G/DL — LOW (ref 12–16)
HGB BLD-MCNC: 9.2 G/DL — LOW (ref 12–16)
IMM GRANULOCYTES NFR BLD AUTO: 6.8 % — HIGH (ref 0.1–0.3)
IMM GRANULOCYTES NFR BLD AUTO: 7.9 % — HIGH (ref 0.1–0.3)
IMM GRANULOCYTES NFR BLD AUTO: 9.2 % — HIGH (ref 0.1–0.3)
INR BLD: 1.01 RATIO — SIGNIFICANT CHANGE UP (ref 0.65–1.3)
INR BLD: 1.13 RATIO — SIGNIFICANT CHANGE UP (ref 0.65–1.3)
INR BLD: 1.15 RATIO — SIGNIFICANT CHANGE UP (ref 0.65–1.3)
INR BLD: 1.2 RATIO — SIGNIFICANT CHANGE UP (ref 0.65–1.3)
LYMPHOCYTES # BLD AUTO: 1.56 K/UL — SIGNIFICANT CHANGE UP (ref 1.2–3.4)
LYMPHOCYTES # BLD AUTO: 1.73 K/UL — SIGNIFICANT CHANGE UP (ref 1.2–3.4)
LYMPHOCYTES # BLD AUTO: 10.4 % — LOW (ref 20.5–51.1)
LYMPHOCYTES # BLD AUTO: 2 K/UL — SIGNIFICANT CHANGE UP (ref 1.2–3.4)
LYMPHOCYTES # BLD AUTO: 2.08 K/UL — SIGNIFICANT CHANGE UP (ref 1.2–3.4)
LYMPHOCYTES # BLD AUTO: 6.3 % — LOW (ref 20.5–51.1)
LYMPHOCYTES # BLD AUTO: 6.8 % — LOW (ref 20.5–51.1)
LYMPHOCYTES # BLD AUTO: 8.9 % — LOW (ref 20.5–51.1)
MAGNESIUM SERPL-MCNC: 2.1 MG/DL — SIGNIFICANT CHANGE UP (ref 1.8–2.4)
MAGNESIUM SERPL-MCNC: 2.3 MG/DL — SIGNIFICANT CHANGE UP (ref 1.8–2.4)
MAGNESIUM SERPL-MCNC: 2.6 MG/DL — HIGH (ref 1.8–2.4)
MCHC RBC-ENTMCNC: 29.2 G/DL — LOW (ref 32–37)
MCHC RBC-ENTMCNC: 30 PG — SIGNIFICANT CHANGE UP (ref 27–31)
MCHC RBC-ENTMCNC: 30.3 PG — SIGNIFICANT CHANGE UP (ref 27–31)
MCHC RBC-ENTMCNC: 30.4 PG — SIGNIFICANT CHANGE UP (ref 27–31)
MCHC RBC-ENTMCNC: 30.7 PG — SIGNIFICANT CHANGE UP (ref 27–31)
MCHC RBC-ENTMCNC: 30.8 PG — SIGNIFICANT CHANGE UP (ref 27–31)
MCHC RBC-ENTMCNC: 31.6 G/DL — LOW (ref 32–37)
MCHC RBC-ENTMCNC: 31.7 G/DL — LOW (ref 32–37)
MCHC RBC-ENTMCNC: 33.3 G/DL — SIGNIFICANT CHANGE UP (ref 32–37)
MCHC RBC-ENTMCNC: 33.6 G/DL — SIGNIFICANT CHANGE UP (ref 32–37)
MCV RBC AUTO: 105.5 FL — HIGH (ref 81–99)
MCV RBC AUTO: 91.1 FL — SIGNIFICANT CHANGE UP (ref 81–99)
MCV RBC AUTO: 91.4 FL — SIGNIFICANT CHANGE UP (ref 81–99)
MCV RBC AUTO: 95 FL — SIGNIFICANT CHANGE UP (ref 81–99)
MCV RBC AUTO: 95.6 FL — SIGNIFICANT CHANGE UP (ref 81–99)
MONOCYTES # BLD AUTO: 0.91 K/UL — HIGH (ref 0.1–0.6)
MONOCYTES # BLD AUTO: 1.11 K/UL — HIGH (ref 0.1–0.6)
MONOCYTES # BLD AUTO: 1.99 K/UL — HIGH (ref 0.1–0.6)
MONOCYTES # BLD AUTO: 2.57 K/UL — HIGH (ref 0.1–0.6)
MONOCYTES NFR BLD AUTO: 4.8 % — SIGNIFICANT CHANGE UP (ref 1.7–9.3)
MONOCYTES NFR BLD AUTO: 6.1 % — SIGNIFICANT CHANGE UP (ref 1.7–9.3)
MONOCYTES NFR BLD AUTO: 7.8 % — SIGNIFICANT CHANGE UP (ref 1.7–9.3)
MONOCYTES NFR BLD AUTO: 8.1 % — SIGNIFICANT CHANGE UP (ref 1.7–9.3)
NEUTROPHILS # BLD AUTO: 10.94 K/UL — HIGH (ref 1.4–6.5)
NEUTROPHILS # BLD AUTO: 18.2 K/UL — HIGH (ref 1.4–6.5)
NEUTROPHILS # BLD AUTO: 19.75 K/UL — HIGH (ref 1.4–6.5)
NEUTROPHILS # BLD AUTO: 24.15 K/UL — HIGH (ref 1.4–6.5)
NEUTROPHILS NFR BLD AUTO: 70.4 % — SIGNIFICANT CHANGE UP (ref 42.2–75.2)
NEUTROPHILS NFR BLD AUTO: 76.2 % — HIGH (ref 42.2–75.2)
NEUTROPHILS NFR BLD AUTO: 78 % — HIGH (ref 42.2–75.2)
NEUTROPHILS NFR BLD AUTO: 78 % — HIGH (ref 42.2–75.2)
NRBC BLD AUTO-RTO: 0 /100 WBCS — SIGNIFICANT CHANGE UP (ref 0–0)
NRBC BLD AUTO-RTO: 1 /100 WBCS — HIGH (ref 0–0)
NRBC BLD AUTO-RTO: 2 /100 WBCS — HIGH (ref 0–0)
NRBC BLD AUTO-RTO: 3 /100 WBCS — HIGH (ref 0–0)
PHOSPHATE SERPL-MCNC: 10.1 MG/DL — HIGH (ref 2.1–4.9)
PHOSPHATE SERPL-MCNC: 6.1 MG/DL — HIGH (ref 2.1–4.9)
PHOSPHATE SERPL-MCNC: 8.1 MG/DL — HIGH (ref 2.1–4.9)
PHOSPHATE SERPL-MCNC: 8.6 MG/DL — HIGH (ref 2.1–4.9)
PHOSPHATE SERPL-MCNC: 9.1 MG/DL — HIGH (ref 2.1–4.9)
PLATELET # BLD AUTO: 288 K/UL — SIGNIFICANT CHANGE UP (ref 130–400)
PLATELET # BLD AUTO: 292 K/UL — SIGNIFICANT CHANGE UP (ref 130–400)
PLATELET # BLD AUTO: 318 K/UL — SIGNIFICANT CHANGE UP (ref 130–400)
PLATELET # BLD AUTO: 387 K/UL — SIGNIFICANT CHANGE UP (ref 130–400)
PLATELET # BLD AUTO: 417 K/UL — HIGH (ref 130–400)
PMV BLD: 11.2 FL — HIGH (ref 7.4–10.4)
PMV BLD: 11.5 FL — HIGH (ref 7.4–10.4)
PMV BLD: 11.5 FL — HIGH (ref 7.4–10.4)
PMV BLD: 11.6 FL — HIGH (ref 7.4–10.4)
PMV BLD: 11.8 FL — HIGH (ref 7.4–10.4)
POTASSIUM SERPL-MCNC: 4.5 MMOL/L — SIGNIFICANT CHANGE UP (ref 3.5–5)
POTASSIUM SERPL-MCNC: 5.1 MMOL/L — HIGH (ref 3.5–5)
POTASSIUM SERPL-MCNC: 5.2 MMOL/L — HIGH (ref 3.5–5)
POTASSIUM SERPL-MCNC: 5.3 MMOL/L — HIGH (ref 3.5–5)
POTASSIUM SERPL-MCNC: 5.8 MMOL/L — HIGH (ref 3.5–5)
POTASSIUM SERPL-SCNC: 4.5 MMOL/L — SIGNIFICANT CHANGE UP (ref 3.5–5)
POTASSIUM SERPL-SCNC: 5.1 MMOL/L — HIGH (ref 3.5–5)
POTASSIUM SERPL-SCNC: 5.2 MMOL/L — HIGH (ref 3.5–5)
POTASSIUM SERPL-SCNC: 5.3 MMOL/L — HIGH (ref 3.5–5)
POTASSIUM SERPL-SCNC: 5.8 MMOL/L — HIGH (ref 3.5–5)
PROT SERPL-MCNC: 3.9 G/DL — LOW (ref 6–8)
PROT SERPL-MCNC: 4.5 G/DL — LOW (ref 6–8)
PROT SERPL-MCNC: 5.1 G/DL — LOW (ref 6–8)
PROTHROM AB SERPL-ACNC: 11.9 SEC — SIGNIFICANT CHANGE UP (ref 9.95–12.87)
PROTHROM AB SERPL-ACNC: 13.4 SEC — HIGH (ref 9.95–12.87)
PROTHROM AB SERPL-ACNC: 13.6 SEC — HIGH (ref 9.95–12.87)
PROTHROM AB SERPL-ACNC: 14.2 SEC — HIGH (ref 9.95–12.87)
RBC # BLD: 1.82 M/UL — LOW (ref 4.2–5.4)
RBC # BLD: 2.57 M/UL — LOW (ref 4.2–5.4)
RBC # BLD: 2.97 M/UL — LOW (ref 4.2–5.4)
RBC # BLD: 3.03 M/UL — LOW (ref 4.2–5.4)
RBC # BLD: 3.43 M/UL — LOW (ref 4.2–5.4)
RBC # FLD: 15 % — HIGH (ref 11.5–14.5)
RBC # FLD: 16.5 % — HIGH (ref 11.5–14.5)
RBC # FLD: 17.4 % — HIGH (ref 11.5–14.5)
RBC # FLD: 17.6 % — HIGH (ref 11.5–14.5)
RBC # FLD: 17.9 % — HIGH (ref 11.5–14.5)
SODIUM SERPL-SCNC: 138 MMOL/L — SIGNIFICANT CHANGE UP (ref 135–146)
SODIUM SERPL-SCNC: 141 MMOL/L — SIGNIFICANT CHANGE UP (ref 135–146)
SODIUM SERPL-SCNC: 142 MMOL/L — SIGNIFICANT CHANGE UP (ref 135–146)
SODIUM SERPL-SCNC: 143 MMOL/L — SIGNIFICANT CHANGE UP (ref 135–146)
SODIUM SERPL-SCNC: 143 MMOL/L — SIGNIFICANT CHANGE UP (ref 135–146)
SPECIMEN SOURCE: SIGNIFICANT CHANGE UP
TROPONIN T, HIGH SENSITIVITY RESULT: 125 NG/L — CRITICAL HIGH (ref 6–13)
TROPONIN T, HIGH SENSITIVITY RESULT: 288 NG/L — CRITICAL HIGH (ref 6–13)
UFH PPP CHRO-ACNC: 0.74 IU/ML — HIGH (ref 0.3–0.7)
WBC # BLD: 14.99 K/UL — HIGH (ref 4.8–10.8)
WBC # BLD: 23.34 K/UL — HIGH (ref 4.8–10.8)
WBC # BLD: 25.36 K/UL — HIGH (ref 4.8–10.8)
WBC # BLD: 30.76 K/UL — HIGH (ref 4.8–10.8)
WBC # BLD: 31.69 K/UL — HIGH (ref 4.8–10.8)
WBC # FLD AUTO: 14.99 K/UL — HIGH (ref 4.8–10.8)
WBC # FLD AUTO: 23.34 K/UL — HIGH (ref 4.8–10.8)
WBC # FLD AUTO: 25.36 K/UL — HIGH (ref 4.8–10.8)
WBC # FLD AUTO: 30.76 K/UL — HIGH (ref 4.8–10.8)
WBC # FLD AUTO: 31.69 K/UL — HIGH (ref 4.8–10.8)

## 2025-04-27 PROCEDURE — 49618 RPR AA HRN RCR > 10 NCR/STRN: CPT | Mod: 78

## 2025-04-27 PROCEDURE — 99292 CRITICAL CARE ADDL 30 MIN: CPT | Mod: 57,24

## 2025-04-27 PROCEDURE — 36246 INS CATH ABD/L-EXT ART 2ND: CPT | Mod: RT

## 2025-04-27 PROCEDURE — 76937 US GUIDE VASCULAR ACCESS: CPT | Mod: 26

## 2025-04-27 PROCEDURE — 74174 CTA ABD&PLVS W/CONTRAST: CPT | Mod: 26

## 2025-04-27 PROCEDURE — 99291 CRITICAL CARE FIRST HOUR: CPT | Mod: 57,24

## 2025-04-27 PROCEDURE — 75726 ARTERY X-RAYS ABDOMEN: CPT | Mod: 26

## 2025-04-27 PROCEDURE — 36248 INS CATH ABD/L-EXT ART ADDL: CPT | Mod: 59

## 2025-04-27 PROCEDURE — 70450 CT HEAD/BRAIN W/O DYE: CPT | Mod: 26

## 2025-04-27 PROCEDURE — 71045 X-RAY EXAM CHEST 1 VIEW: CPT | Mod: 26,76

## 2025-04-27 PROCEDURE — 49002 REOPENING OF ABDOMEN: CPT | Mod: 82,59

## 2025-04-27 PROCEDURE — 49618 RPR AA HRN RCR > 10 NCR/STRN: CPT | Mod: 82

## 2025-04-27 PROCEDURE — 71275 CT ANGIOGRAPHY CHEST: CPT | Mod: 26

## 2025-04-27 PROCEDURE — 49002 REOPENING OF ABDOMEN: CPT | Mod: 78,59

## 2025-04-27 PROCEDURE — 75774 ARTERY X-RAY EACH VESSEL: CPT | Mod: 26

## 2025-04-27 PROCEDURE — 74176 CT ABD & PELVIS W/O CONTRAST: CPT | Mod: 26,59

## 2025-04-27 RX ORDER — CALCIUM CHLORIDE 100 MG/ML
1000 INJECTION, SOLUTION INTRAVENOUS ONCE
Refills: 0 | Status: COMPLETED | OUTPATIENT
Start: 2025-04-27 | End: 2025-04-27

## 2025-04-27 RX ORDER — DEXMEDETOMIDINE HYDROCHLORIDE IN SODIUM CHLORIDE 4 UG/ML
0.2 INJECTION INTRAVENOUS
Qty: 400 | Refills: 0 | Status: DISCONTINUED | OUTPATIENT
Start: 2025-04-27 | End: 2025-04-27

## 2025-04-27 RX ORDER — ALBUTEROL SULFATE 2.5 MG/3ML
2 VIAL, NEBULIZER (ML) INHALATION EVERY 6 HOURS
Refills: 0 | Status: DISCONTINUED | OUTPATIENT
Start: 2025-04-27 | End: 2025-04-30

## 2025-04-27 RX ORDER — SODIUM BICARBONATE 1 MEQ/ML
50 SYRINGE (ML) INTRAVENOUS ONCE
Refills: 0 | Status: COMPLETED | OUTPATIENT
Start: 2025-04-27 | End: 2025-04-27

## 2025-04-27 RX ORDER — CALCIUM GLUCONATE 20 MG/ML
1 INJECTION, SOLUTION INTRAVENOUS ONCE
Refills: 0 | Status: COMPLETED | OUTPATIENT
Start: 2025-04-27 | End: 2025-04-27

## 2025-04-27 RX ORDER — SODIUM CHLORIDE 9 G/1000ML
1000 INJECTION, SOLUTION INTRAVENOUS
Refills: 0 | Status: DISCONTINUED | OUTPATIENT
Start: 2025-04-27 | End: 2025-04-29

## 2025-04-27 RX ORDER — PHENYLEPHRINE HCL IN 0.9% NACL 0.5 MG/5ML
0.1 SYRINGE (ML) INTRAVENOUS
Qty: 40 | Refills: 0 | Status: DISCONTINUED | OUTPATIENT
Start: 2025-04-27 | End: 2025-04-27

## 2025-04-27 RX ORDER — SODIUM BICARBONATE 1 MEQ/ML
50 SYRINGE (ML) INTRAVENOUS ONCE
Refills: 0 | Status: DISCONTINUED | OUTPATIENT
Start: 2025-04-27 | End: 2025-04-27

## 2025-04-27 RX ORDER — INSULIN LISPRO 100 U/ML
INJECTION, SOLUTION INTRAVENOUS; SUBCUTANEOUS EVERY 6 HOURS
Refills: 0 | Status: DISCONTINUED | OUTPATIENT
Start: 2025-04-27 | End: 2025-05-05

## 2025-04-27 RX ORDER — NOREPINEPHRINE BITARTRATE 8 MG
0.05 SOLUTION INTRAVENOUS
Qty: 32 | Refills: 0 | Status: DISCONTINUED | OUTPATIENT
Start: 2025-04-27 | End: 2025-04-27

## 2025-04-27 RX ORDER — TRANEXAMIC ACID 1000 MG/10
1000 AMPUL (ML) INTRAVENOUS ONCE
Refills: 0 | Status: COMPLETED | OUTPATIENT
Start: 2025-04-27 | End: 2025-04-27

## 2025-04-27 RX ORDER — MIDAZOLAM IN 0.9 % SOD.CHLORID 1 MG/ML
2 PLASTIC BAG, INJECTION (ML) INTRAVENOUS ONCE
Refills: 0 | Status: DISCONTINUED | OUTPATIENT
Start: 2025-04-27 | End: 2025-04-27

## 2025-04-27 RX ORDER — CALCIUM GLUCONATE 20 MG/ML
2 INJECTION, SOLUTION INTRAVENOUS ONCE
Refills: 0 | Status: COMPLETED | OUTPATIENT
Start: 2025-04-27 | End: 2025-04-27

## 2025-04-27 RX ORDER — FENTANYL CITRATE-0.9 % NACL/PF 100MCG/2ML
25 SYRINGE (ML) INTRAVENOUS ONCE
Refills: 0 | Status: DISCONTINUED | OUTPATIENT
Start: 2025-04-27 | End: 2025-04-27

## 2025-04-27 RX ORDER — SODIUM BICARBONATE 1 MEQ/ML
50 SYRINGE (ML) INTRAVENOUS
Refills: 0 | Status: COMPLETED | OUTPATIENT
Start: 2025-04-27 | End: 2025-04-27

## 2025-04-27 RX ORDER — PROPOFOL 10 MG/ML
10 INJECTION, EMULSION INTRAVENOUS
Qty: 1000 | Refills: 0 | Status: DISCONTINUED | OUTPATIENT
Start: 2025-04-27 | End: 2025-04-30

## 2025-04-27 RX ORDER — FENTANYL CITRATE-0.9 % NACL/PF 100MCG/2ML
50 SYRINGE (ML) INTRAVENOUS ONCE
Refills: 0 | Status: DISCONTINUED | OUTPATIENT
Start: 2025-04-27 | End: 2025-04-27

## 2025-04-27 RX ORDER — NOREPINEPHRINE BITARTRATE 8 MG
0.05 SOLUTION INTRAVENOUS
Qty: 16 | Refills: 0 | Status: DISCONTINUED | OUTPATIENT
Start: 2025-04-27 | End: 2025-04-30

## 2025-04-27 RX ORDER — CALCIUM GLUCONATE 20 MG/ML
1 INJECTION, SOLUTION INTRAVENOUS ONCE
Refills: 0 | Status: DISCONTINUED | OUTPATIENT
Start: 2025-04-27 | End: 2025-04-27

## 2025-04-27 RX ORDER — FENTANYL CITRATE-0.9 % NACL/PF 100MCG/2ML
0.5 SYRINGE (ML) INTRAVENOUS
Qty: 2500 | Refills: 0 | Status: DISCONTINUED | OUTPATIENT
Start: 2025-04-27 | End: 2025-04-28

## 2025-04-27 RX ORDER — FENTANYL CITRATE-0.9 % NACL/PF 100MCG/2ML
25 SYRINGE (ML) INTRAVENOUS EVERY 4 HOURS
Refills: 0 | Status: DISCONTINUED | OUTPATIENT
Start: 2025-04-27 | End: 2025-04-27

## 2025-04-27 RX ORDER — SODIUM CHLORIDE 9 G/1000ML
500 INJECTION, SOLUTION INTRAVENOUS ONCE
Refills: 0 | Status: COMPLETED | OUTPATIENT
Start: 2025-04-27 | End: 2025-04-27

## 2025-04-27 RX ORDER — PHENYLEPHRINE HCL IN 0.9% NACL 0.5 MG/5ML
0.1 SYRINGE (ML) INTRAVENOUS
Qty: 160 | Refills: 0 | Status: DISCONTINUED | OUTPATIENT
Start: 2025-04-27 | End: 2025-04-27

## 2025-04-27 RX ORDER — VASOPRESSIN 20 [USP'U]/ML
0.04 INJECTION INTRAVENOUS
Qty: 40 | Refills: 0 | Status: DISCONTINUED | OUTPATIENT
Start: 2025-04-27 | End: 2025-04-30

## 2025-04-27 RX ORDER — DEXTROSE 50 % IN WATER 50 %
50 SYRINGE (ML) INTRAVENOUS ONCE
Refills: 0 | Status: COMPLETED | OUTPATIENT
Start: 2025-04-27 | End: 2025-04-27

## 2025-04-27 RX ORDER — IOHEXOL 350 MG/ML
30 INJECTION, SOLUTION INTRAVENOUS ONCE
Refills: 0 | Status: COMPLETED | OUTPATIENT
Start: 2025-04-27 | End: 2025-04-27

## 2025-04-27 RX ORDER — FENTANYL CITRATE-0.9 % NACL/PF 100MCG/2ML
0.5 SYRINGE (ML) INTRAVENOUS
Qty: 5000 | Refills: 0 | Status: DISCONTINUED | OUTPATIENT
Start: 2025-04-27 | End: 2025-04-27

## 2025-04-27 RX ORDER — NOREPINEPHRINE BITARTRATE 8 MG
0.05 SOLUTION INTRAVENOUS
Qty: 16 | Refills: 0 | Status: DISCONTINUED | OUTPATIENT
Start: 2025-04-27 | End: 2025-04-27

## 2025-04-27 RX ORDER — SODIUM BICARBONATE 1 MEQ/ML
0.13 SYRINGE (ML) INTRAVENOUS
Qty: 150 | Refills: 0 | Status: DISCONTINUED | OUTPATIENT
Start: 2025-04-27 | End: 2025-04-27

## 2025-04-27 RX ORDER — CALCIUM CHLORIDE 100 MG/ML
1 INJECTION, SOLUTION INTRAVENOUS ONCE
Refills: 0 | Status: DISCONTINUED | OUTPATIENT
Start: 2025-04-27 | End: 2025-04-27

## 2025-04-27 RX ORDER — CALCIUM GLUCONATE 20 MG/ML
2 INJECTION, SOLUTION INTRAVENOUS
Refills: 0 | Status: DISCONTINUED | OUTPATIENT
Start: 2025-04-27 | End: 2025-04-27

## 2025-04-27 RX ORDER — KETOROLAC TROMETHAMINE 30 MG/ML
15 INJECTION, SOLUTION INTRAMUSCULAR; INTRAVENOUS ONCE
Refills: 0 | Status: DISCONTINUED | OUTPATIENT
Start: 2025-04-27 | End: 2025-04-27

## 2025-04-27 RX ADMIN — CALCIUM CHLORIDE 1000 MILLIGRAM(S): 100 INJECTION, SOLUTION INTRAVENOUS at 19:58

## 2025-04-27 RX ADMIN — Medication 220 MILLIGRAM(S): at 13:33

## 2025-04-27 RX ADMIN — Medication 6.78 MICROGRAM(S)/KG/HR: at 04:13

## 2025-04-27 RX ADMIN — CALCIUM GLUCONATE 100 GRAM(S): 20 INJECTION, SOLUTION INTRAVENOUS at 14:09

## 2025-04-27 RX ADMIN — INSULIN LISPRO 2: 100 INJECTION, SOLUTION INTRAVENOUS; SUBCUTANEOUS at 08:22

## 2025-04-27 RX ADMIN — Medication 10 UNIT(S): at 04:03

## 2025-04-27 RX ADMIN — INSULIN LISPRO 4: 100 INJECTION, SOLUTION INTRAVENOUS; SUBCUTANEOUS at 17:27

## 2025-04-27 RX ADMIN — Medication 50 MILLIEQUIVALENT(S): at 04:12

## 2025-04-27 RX ADMIN — Medication 10 UNIT(S): at 20:01

## 2025-04-27 RX ADMIN — Medication 15 MILLILITER(S): at 17:22

## 2025-04-27 RX ADMIN — Medication 120 MEQ/KG/HR: at 04:12

## 2025-04-27 RX ADMIN — Medication 6.78 MICROGRAM(S)/KG/HR: at 17:21

## 2025-04-27 RX ADMIN — Medication 50 MILLIEQUIVALENT(S): at 02:35

## 2025-04-27 RX ADMIN — Medication 5 UNIT(S): at 13:11

## 2025-04-27 RX ADMIN — Medication 120 MEQ/KG/HR: at 02:33

## 2025-04-27 RX ADMIN — Medication 50 MILLIEQUIVALENT(S): at 18:52

## 2025-04-27 RX ADMIN — PROPOFOL 8.14 MICROGRAM(S)/KG/MIN: 10 INJECTION, EMULSION INTRAVENOUS at 02:33

## 2025-04-27 RX ADMIN — MEROPENEM 100 MILLIGRAM(S): 1 INJECTION INTRAVENOUS at 14:09

## 2025-04-27 RX ADMIN — Medication 25 MICROGRAM(S): at 01:57

## 2025-04-27 RX ADMIN — PROPOFOL 8.14 MICROGRAM(S)/KG/MIN: 10 INJECTION, EMULSION INTRAVENOUS at 17:21

## 2025-04-27 RX ADMIN — PROPOFOL 8.14 MICROGRAM(S)/KG/MIN: 10 INJECTION, EMULSION INTRAVENOUS at 04:13

## 2025-04-27 RX ADMIN — IOHEXOL 30 MILLILITER(S): 350 INJECTION, SOLUTION INTRAVENOUS at 08:22

## 2025-04-27 RX ADMIN — Medication 40 MILLIGRAM(S): at 17:21

## 2025-04-27 RX ADMIN — CALCIUM GLUCONATE 200 GRAM(S): 20 INJECTION, SOLUTION INTRAVENOUS at 18:58

## 2025-04-27 RX ADMIN — SODIUM CHLORIDE 120 MILLILITER(S): 9 INJECTION, SOLUTION INTRAVENOUS at 14:10

## 2025-04-27 RX ADMIN — Medication 50 MILLIEQUIVALENT(S): at 02:34

## 2025-04-27 RX ADMIN — Medication 15 MILLILITER(S): at 06:32

## 2025-04-27 RX ADMIN — Medication 1 APPLICATION(S): at 06:51

## 2025-04-27 RX ADMIN — CALCIUM GLUCONATE 200 GRAM(S): 20 INJECTION, SOLUTION INTRAVENOUS at 03:53

## 2025-04-27 RX ADMIN — VASOPRESSIN 6 UNIT(S)/MIN: 20 INJECTION INTRAVENOUS at 13:33

## 2025-04-27 RX ADMIN — Medication 50 MILLILITER(S): at 19:58

## 2025-04-27 RX ADMIN — NOREPINEPHRINE BITARTRATE 6.36 MICROGRAM(S)/KG/MIN: 8 SOLUTION at 17:28

## 2025-04-27 RX ADMIN — KETOROLAC TROMETHAMINE 15 MILLIGRAM(S): 30 INJECTION, SOLUTION INTRAMUSCULAR; INTRAVENOUS at 00:52

## 2025-04-27 RX ADMIN — IPRATROPIUM BROMIDE AND ALBUTEROL SULFATE 3 MILLILITER(S): .5; 2.5 SOLUTION RESPIRATORY (INHALATION) at 01:06

## 2025-04-27 RX ADMIN — VASOPRESSIN 6 UNIT(S)/MIN: 20 INJECTION INTRAVENOUS at 08:05

## 2025-04-27 RX ADMIN — Medication 50 MILLILITER(S): at 13:13

## 2025-04-27 RX ADMIN — PROPOFOL 8.14 MICROGRAM(S)/KG/MIN: 10 INJECTION, EMULSION INTRAVENOUS at 13:34

## 2025-04-27 RX ADMIN — CALCIUM GLUCONATE 200 GRAM(S): 20 INJECTION, SOLUTION INTRAVENOUS at 06:51

## 2025-04-27 RX ADMIN — Medication 6.78 MICROGRAM(S)/KG/HR: at 02:33

## 2025-04-27 RX ADMIN — MEROPENEM 100 MILLIGRAM(S): 1 INJECTION INTRAVENOUS at 06:33

## 2025-04-27 RX ADMIN — SODIUM CHLORIDE 1000 MILLILITER(S): 9 INJECTION, SOLUTION INTRAVENOUS at 19:09

## 2025-04-27 NOTE — PROGRESS NOTE ADULT - ASSESSMENT
64y F w/ PMHx of  Morbid obesity, LOUIS, large complex ventral hernia s/p repair sbr and loss of domain c/b post op intubation, hypotension requiring vasopressors, anastomotic leakage s/p RTOR and reanastomosis, now doing better post op from a respiratory status, however, still tenuous. She has a rising leukocytosis and an abdominal fluid collection which was drained by IR on 4/22, Zosyn broadened to meropenem, and has a newly diagnosed DVT/PE which she is currently on a LVX 135BID.     PLAN:   - Cancel DG   - wean off O2   - NPO    - TPN   - Closely monitor THONY drain outputs and character of fluid   - Keep incisional vac in place   - Monitor bowel function   - Monitor for fevers   - Continue full AC    - Rest of care per SICU 64y F w/ PMHx of  Morbid obesity, LOUIS, large complex ventral hernia s/p repair sbr and loss of domain c/b post op intubation, hypotension requiring vasopressors, anastomotic leakage s/p RTOR and reanastomosis, now doing better post op from a respiratory status, however, still tenuous. She has a rising leukocytosis and an abdominal fluid collection which was drained by IR on 4/22, Zosyn broadened to meropenem, and has a newly diagnosed DVT/PE which she is currently on a LVX 135BID.     PLAN:   - F/u Ct scans stroke protocol and CT/AP   - Monitor drain output quality   - Cancel DG   - wean off O2   - NPO    - TPN   - Keep incisional vac in place   - Monitor bowel function   - Monitor for fevers   - Continue full AC    - Rest of care per SICU

## 2025-04-27 NOTE — PROCEDURE NOTE - NSINFORMCONSENT_GEN_A_CORE

## 2025-04-27 NOTE — PROGRESS NOTE ADULT - ASSESSMENT
ASSESSMENT & PLAN:  64F PMHx HTN, morbid obesity s/p 2001 Abby-en-Y gastric bypass who presented on 4/3 with incarcerated ventral hernia with SBO    4/10 open ventral hernia repair, small bowel resection, and primary fascial closure with Dr. Lema. Admitted to SICU for Q1 abdominal checks and hemodynamic monitoring.   4/17 takeback for exploratory laparotomy, repair of anastomotic leak  4/22 8Fr pigtail into lower abdominal wall collection,150cc of foul appearing material aspirated, Attached to THONY bulb      NEUROLOGICAL:  #Acute pain    - fentanyl infusion  #Sedation    - propofol infusion  #AMS    HCT 4/27: awaiting final read  #Sleep hygiene  - HOLDING Trazodone 25mg QPM  - HOLDING Melatonin 5mg QPM    RESPIRATORY:   #Acute hypoxic respiratory failure  - Extubated 4/18, reintubated on 4/27 for AMS and agonal breathing    ET 7.5mm  LL 23cm    RR (machine): 28, TV (machine): 450, FiO2: 50, PEEP: 8  04-27 @ 06:26--7.23 / 36 / 119 / 15 / [id number] 98.2 /  iCal 1.07 /Lactate 5.4   04-27 @ 03:28--7.15 / 37 / 381 / 13 / [id number] 98.8 /  iCal 1.03 /Lactate 7.8   #Right distal main pulmonary embolus  - Lovenox 135mg Q12 holding secondary to acute anemia  - Echo 4/22/25: EF 70 to 75%.  - B/L LE Venous duplex 4/21-RIGHT PT DVT, no LEFT DVT  #Intermittent diuresis    -last diuresis 4/25 1mg bumex  #PMHx LOUIS on CPAP@ home   #Activity increase as tolerated    CARDS:   #Acute hypotension 4/27, lactic acidosis (lactate 9, pH 7.15)  - Levophed infusion  - phenylephrine infusion   -Vasopressin gtt     -sodium bicarbonate infusion  w/ 150meq  #Supraventricular tachycardia, Non-sustained ventricular tachycardia, Ventricular Premature Depolarization    - Cardiology - Metoprolol 25mg Q12 HOLDING    - follows with Dr. Lopez  #PMHx of HTN  - Amlodipine 10mg QD  HOLDING  - HCTZ 25mg QD HOLDING  - Losartan 100mg daily HOLDING  - EKG- NSR  - TTE 4/11: EF of 56%. G1DD.     GASTROINTESTINAL/NUTRITION:   #4/10 open ventral hernia repair, small bowel resection, ileoileal anastomosis and primary fascial closure, 2 THONY Right  #4/17 RTOR for exploratory laparotomy, repair of anastomotic leak at previous ileoileal anastomosis, resected, new ileoileal anastomosis created, 1 THONY Left (total 3)  #4/22 8Fr pigtail into lower abdominal wall collection,150cc of foul appearing material aspirated, Attached to THONY bulb    CT 4/27- f/u final read    CT 4/27 CT w/ oral contrast    - aspiration precaut      ions, HOB 30    -Intermittent abdominal vac change, last 4/26 PM, bilious drainage noted  #Anterior abdominal wall collection  - 4/22: s/p IR percutaneous placement of a 8.5 Georgian drainage catheter into lower abdominal wall fluid collection, yielding 150 mL of foul appearing fluid.  #SBO s/p ventral hernia repair with SBR  #Diet, NPO  - TPN discontinued 4/25, to be reordered for 4/27 PM due to STRICT NPO  #GI Prophylaxis/hx GERD  - Pantoprazole 40mg IV (home rx)  #Bowel regimen  - Holding  - Last BM 4/26  - Dignishield in place     /RENAL:   #urine output in critically ill  - An replaced 4/27  - Strict I/Os  #FUNMI; oliguric, resolving   - Nephrology consulted and following > hold off on CVVH recall  - Renal u/s> no hydronephrosis. 2-3cm anechoic cyst on right kidney     [04-27 @ 02:10] BUN/Cr  42/1.3  -->, [04-27 @ 06:30] BUN/Cr  49/1.9  -->  [04-27 @ 06:30]Na  142 // K  4.5 // Mg  2.3 // Phos  8.6  [04-27 @ 02:10]Na  138 // K  5.2 // Mg  2.6 // Phos  6.1  #Lactic acidosis       HEME/ONC:   #acute anemia     -last transfusion 4/27 with 2uPRBC  #Acute right pulmonary embolus    -Lovenox 135mg q12 HOLDING  #B/L UE Venous Duplex 4/26/27 - thrombophlebitis  #B/L LE Venous Duplex 4/21- right PT DVT    -Lovenox 135mg q12  - concern for malabsorption of Eliquis given small bowel resection per pharmacy  - vascular surgery consulted - AC with heparin drip (upon discharge transition to Eliquis 10 mg po BID x 7 days then 5 mg po BID for at least 6 months)  T&S expires: 4/27    Labs: Hb/Hct:  5.6/19.2  (04-27 @ 02:10)  -->,  10.3/32.6  (04-27 @ 06:30)  -->                      Plts:  292  -->,  417  -->                 PTT/INR:  36.1/1.01  --->     ID:  #Intraabdominal anastomotic leak, fluid collection  #ID Consult    Continue meropenem 1g Q8 (4/22 - )   WBC continues to improve     -  Although Clx no growth, will keep meropenem for now ,will plan to keep until repeat CT to re-evaluate abdomina fluid collections   #Culture    OR 4/1 - E Coli    OR cx: few E.coli, few bacteroides, rare clostridium    4/13 Blood Cx: NGTF    4/14 tracheal aspirate: no growth     4/17 body fluid cx rare e. coli     4/22 Blood - NGTD f/u final    4/22 Abdominal - Negative FINAL  WBC- 9.92  --->>,  14.99  --->>,  31.69  --->>  Temp trend- 24hrs T(F): 96.8 (04-27 @ 06:45), Max: 98 (04-26 @ 20:00)  meropenem  IVPB 1000 every 8 hours    ENDOCRINE:  #Glycemic monitoring  - Q6 FSG while NPO  - ISS  - A1C 5.8%     MSK:  #Activity- increase as tolerated     SKIN:  #DTI screening negative   - Continue to monitor daily skin changes    LINES/DRAINS:  PIV    RLQ THONY x1    LUQ THONY x1    Midline Abdominal IR THONY x1    Right IJ TLC (4/17 -     Prevena Vac Midline    Right Femoral Arterial Line (4/27 -    ADVANCED DIRECTIVES:  Full Code  HCP- Daughter  (Shavonne Moss)  INDICATION FOR SICU/SDU: Intestinal obstruction  DISPO: SICU    Pending discussion with SICU attending Dr. Lane   ASSESSMENT & PLAN:  64F PMHx HTN, morbid obesity s/p 2001 Abby-en-Y gastric bypass who presented on 4/3 with incarcerated ventral hernia with SBO    4/10 open ventral hernia repair, small bowel resection, and primary fascial closure with Dr. Lema. Admitted to SICU for Q1 abdominal checks and hemodynamic monitoring.   4/17 takeback for exploratory laparotomy, repair of anastomotic leak  4/22 8Fr pigtail into lower abdominal wall collection,150cc of foul appearing material aspirated, Attached to THONY bulb      NEUROLOGICAL:  #Acute pain    - fentanyl infusion  #Sedation    - propofol infusion  #AMS    HCT 4/27: awaiting final read  #Sleep hygiene  - HOLDING Trazodone 25mg QPM  - HOLDING Melatonin 5mg QPM    RESPIRATORY:   #Acute hypoxic respiratory failure  - Extubated 4/18, reintubated on 4/27 for AMS and agonal breathing    ET 7.5mm  LL 23cm    RR (machine): 28, TV (machine): 450, FiO2: 50, PEEP: 8  04-27 @ 06:26--7.23 / 36 / 119 / 15 / [id number] 98.2 /  iCal 1.07 /Lactate 5.4   04-27 @ 03:28--7.15 / 37 / 381 / 13 / [id number] 98.8 /  iCal 1.03 /Lactate 7.8   #Right distal main pulmonary embolus  - Lovenox 135mg Q12 holding secondary to acute anemia  - Echo 4/22/25: EF 70 to 75%.  - B/L LE Venous duplex 4/21-RIGHT PT DVT, no LEFT DVT  #Intermittent diuresis    -last diuresis 4/25 1mg bumex  #PMHx [place] on CPAP@ home   #Activity increase as tolerated    CARDS:   #Acute hypotension 4/27, lactic acidosis (lactate 9, pH 7.15)  - Levophed infusion  - phenylephrine infusion   -Vasopressin gtt     -sodium bicarbonate infusion  w/ 150meq  #Supraventricular tachycardia, Non-sustained ventricular tachycardia, Ventricular Premature Depolarization    - Cardiology - Metoprolol 25mg Q12 HOLDING    - follows with Dr. Lopez  #PMHx of HTN  - Amlodipine 10mg QD  HOLDING  - HCTZ 25mg QD HOLDING  - Losartan 100mg daily HOLDING  - EKG- NSR  - TTE 4/11: EF of 56%. G1DD.     GASTROINTESTINAL/NUTRITION:   #4/10 open ventral hernia repair, small bowel resection, ileoileal anastomosis and primary fascial closure, 2 THONY Right  #4/17 RTOR for exploratory laparotomy, repair of anastomotic leak at previous ileoileal anastomosis, resected, new ileoileal anastomosis created, 1 THONY Left (total 3)  #4/22 8Fr pigtail into lower abdominal wall collection,150cc of foul appearing material aspirated, Attached to THONY bulb    CT 4/27- f/u final read    CT 4/27 CTA w/ oral contrast pending    - aspiration precautions, HOB 30    -Intermittent abdominal vac change, last 4/26 PM, bilious drainage noted    -RUQ THONY removed 4/26    -LUQ THONY at ileoileo anstamosis: 225cc    -RLQ THONY in pelvis: 20cc    -IR THONY abdominal wall: 20cc  #Anterior abdominal wall collection  - 4/22: s/p IR percutaneous placement of a 8.5 Yemeni drainage catheter into lower abdominal wall fluid collection, yielding 150 mL of foul appearing fluid.  #SBO s/p ventral hernia repair with SBR  #Diet, NPO  - TPN discontinued 4/25, to be reordered for 4/27 PM due to STRICT NPO  #GI Prophylaxis/hx GERD  - Pantoprazole 40mg IV (home rx)  #Bowel regimen  - Holding  - Last BM 4/26  - Dignishield in place     /RENAL:   #urine output in critically ill  - An replaced 4/27    negative 600cc/24hrs    positive 5.7L/LOS  #FUNMI; oliguric, resolving     UO 20cc/hr  - Nephrology consulted and following > hold off on CVVH recall  - Renal u/s> no hydronephrosis. 2-3cm anechoic cyst on right kidney     [04-27 @ 02:10] BUN/Cr  42/1.3  -->, [04-27 @ 06:30] BUN/Cr  49/1.9  -->  [04-27 @ 06:30]Na  142 // K  4.5 // Mg  2.3 // Phos  8.6  [04-27 @ 02:10]Na  138 // K  5.2 // Mg  2.6 // Phos  6.1  #Lactic acidosis    -sodium bicarbonate gtt     HEME/ONC:   #acute anemia     -last transfusion 4/27 with 2uPRBC  #Acute right pulmonary embolus    -Lovenox 135mg q12 HOLDING  #B/L UE Venous Duplex 4/26/27 - thrombophlebitis  #B/L LE Venous Duplex 4/21- right PT DVT    -Lovenox 135mg q12  - concern for malabsorption of Eliquis given small bowel resection per pharmacy  - vascular surgery consulted - AC with heparin drip (upon discharge transition to Eliquis 10 mg po BID x 7 days then 5 mg po BID for at least 6 months)  T&S expires: 4/27    Labs: Hb/Hct:  5.6/19.2  (04-27 @ 02:10)  -->,  10.3/32.6  (04-27 @ 06:30)  -->                      Plts:  292  -->,  417  -->                 PTT/INR:  36.1/1.01  --->     ID:  #Intraabdominal anastomotic leak, fluid collection  #ID Consult    Continue meropenem 1g Q8 (4/22 - )   WBC continues to improve     -  Although Clx no growth, will keep meropenem for now ,will plan to keep until repeat CT to re-evaluate abdomina fluid collections   #Culture    OR 4/1 - E Coli    OR cx: few E.coli, few bacteroides, rare clostridium    4/13 Blood Cx: NGTF    4/14 tracheal aspirate: no growth     4/17 body fluid cx rare e. coli     4/22 Blood - NGTD f/u final    4/22 Abdominal - Negative FINAL  WBC- 9.92  --->>,  14.99  --->>,  31.69  --->>  Temp trend- 24hrs T(F): 96.8 (04-27 @ 06:45), Max: 98 (04-26 @ 20:00)  meropenem  IVPB 1000 every 8 hours    ENDOCRINE:  #Glycemic monitoring  - Q6 FSG while NPO  - ISS  - A1C 5.8%     MSK:  #Activity- increase as tolerated     SKIN:  #DTI screening negative   - Continue to monitor daily skin changes    LINES/DRAINS:  PIV    RLQ Pelvix THONY x1    LUQ Ileoileo anastamosis THONY x1    Midline Abdominal IR THONY x1    Right IJ TLC (4/17 -     Prevena Vac Midline    Right Femoral Arterial Line (4/27 -    ADVANCED DIRECTIVES:  Full Code  HCP- Daughter  (Shavonne Moss)  INDICATION FOR SICU/SDU: Intestinal obstruction      DISPO: SICU

## 2025-04-27 NOTE — CHART NOTE - NSCHARTNOTEFT_GEN_A_CORE
PACU ANESTHESIA ADMISSION NOTE      Procedure: Open repair of ventral hernia using mesh and component separation technique    Small bowel resection    Insertion, arterial line, percutaneous    Exploratory laparotomy    Insertion of arterial line with imaging guidance      Post op diagnosis:  Incarcerated ventral hernia    SBO (small bowel obstruction)    Perforated viscus        ___x_  Intubated  TV:___425___       Rate: __18____      FiO2: __1.0____    ____  Patent Airway    ____  Full return of protective reflexes    __x__  Full recovery from anesthesia / back to baseline status    Vitals:  T(C): 36.2 (04-27-25 @ 13:42), Max: 36.7 (04-26-25 @ 20:00)  HR: 105 (04-27-25 @ 14:45) (75 - 105)  BP: 104/54 (04-27-25 @ 14:45) (92/55 - 163/62)  RR: 28 (04-27-25 @ 14:15) (23 - 40)  SpO2: 99% (04-27-25 @ 14:45) (80% - 100%)    Mental Status:  ___ Awake   _____ Alert   _____ Drowsy   __x___ Sedated    Nausea/Vomiting:  __x__ NO  ______Yes,   See Post - Op Orders          Pain Scale (0-10):  _0____    Treatment: ____ None    __x__ See Post - Op/PCA Orders    Post - Operative Fluids:   ____ Oral   __x__ See Post - Op Orders    Plan: Discharge:   ____Home       _____Floor     _____Critical Care    ___x__  Other:_________________    Comments: Patient had smooth intraoperative event, no anesthesia complication.  PACU Vital signs: HR:95            BP:    125    /  65        RR:  14           O2 Sat:   100    %     Temp 97.5f

## 2025-04-27 NOTE — CHART NOTE - NSCHARTNOTEFT_GEN_A_CORE
Approximately 12:30am alerted by nurses that patient was anxious and complaining of abdominal pain. Patient evaluated at bedside and noted to by tachypneic on 5L NC, A&Ox3 however patient was restless and stated "I feel delirious." Abdomen was soft, nontender and nondistended, midline dressing in place and x3 abdominal drains unchanged. Patient was offered duoneb treatment to facilitate in breathing to which patient refused. Patient also refused noninvasive BP measurement. Patient was given IV toradol for abdominal pain.    Approximately 1:20am alerted by nurses that patient is unresponsive, eyes rolled to the back of her head, with agonal breathing. Patient not responding to sternal rub. Decision made to intubate. Femoral pulse palpable. Bradycardic to 38, 1mg atropine given with improvement to 120s. Unable to assess BP. Emergent right femoral arterial line placed with BP readings 40s/20s. Levophed started and 1L NS bolus given. Patient consistently remained with palpable pulses throughout event.     Stat labs, ABG, and CXR ordered. Lactic acidosis to 9 with pH 7.15, patient received 2 amps of sodium bicarbonate with infusion started. Hgb 6, 2u PRBC ordered. Patient sedated with propofol and fentanyl. Levophed and phenylephrine on.     Pending urgent CT head and abdomen ordered once patient further stabilized.     SICU attending Dr. Lane notified and made aware of event in real time    Patient's daughter Shavonne called and notified in real time of event. Patient remains full code. Blood consent obtained and placed in chart. Approximately 12:30am alerted by nurses that patient was anxious and complaining of abdominal pain. Patient evaluated at bedside and noted to by tachypneic on 5L NC, A&Ox3 however patient was restless and stated "I feel delirious." Abdomen was soft, nontender and nondistended, midline dressing in place and x3 abdominal drains unchanged. Patient was offered duoneb treatment to facilitate in breathing to which patient refused. Patient also refused noninvasive BP measurement. Patient was given IV toradol for abdominal pain.    Approximately 1:20am alerted by nurses that patient is unresponsive, eyes rolled to the back of her head, with agonal breathing. Patient not responding to sternal rub. Right pupil 6mm, left pupil 4mm. Decision made to intubate. Femoral pulse palpable. Bradycardic to 38, 1mg atropine given with improvement to 120s. Unable to assess BP. Emergent right femoral arterial line placed with BP readings 40s/20s. Levophed started and 1L NS bolus given. Patient consistently remained with palpable pulses throughout event.     Stat labs, ABG, and CXR ordered. Lactic acidosis to 9 with pH 7.15, patient received 2 amps of sodium bicarbonate with infusion started. Hgb 6, 2u PRBC ordered. Patient sedated with propofol and fentanyl. Levophed and phenylephrine on. B/L pupils equal at 3mm and sluggish.    Pending urgent CT head and abdomen ordered once patient further stabilized.     SICU attending Dr. Lane notified and made aware of event in real time    Patient's daughter Shavonne called and notified in real time of event. Patient remains full code. Blood consent obtained and placed in chart. Approximately 12:30am alerted by nurses that patient was anxious and complaining of abdominal pain. Patient evaluated at bedside and noted to by tachypneic on 5L NC, A&Ox3 however patient was restless and stated "I feel delirious." Abdomen was soft, nontender and nondistended, midline dressing in place and x3 abdominal drains unchanged. Patient was offered duoneb treatment to facilitate in breathing to which patient refused. Patient also refused noninvasive BP measurement. Patient was given IV toradol for abdominal pain.    Approximately 1:20am alerted by nurses that patient is unresponsive, eyes rolled to the back of her head, with agonal breathing. Patient not responding to sternal rub. Right pupil 6mm, left pupil 4mm. Decision made to intubate. Femoral pulse palpable. Bradycardic to 38, 1mg atropine given with improvement to 120s. Unable to assess BP. Emergent right femoral arterial line placed with BP readings 40s/20s. Levophed started and 1L NS bolus given. Patient consistently remained with palpable pulses throughout event.     Stat labs, ABG, and CXR ordered. Lactic acidosis to 9 with pH 7.15, patient received 2 amps of sodium bicarbonate with infusion started. Hgb 6, 2u PRBC ordered. Patient sedated with propofol and fentanyl. Levophed and phenylephrine on. B/L pupils equal at 3mm and sluggish.    Pending urgent CT head and abdomen ordered once patient further stabilized.     SICU attending Dr. Lane notified and made aware of event in real time    Patient's daughter Shavonne and pt's  Humphrey called and notified in real time of event. Patient remains full code. Blood consent obtained and placed in chart.

## 2025-04-27 NOTE — PROGRESS NOTE ADULT - SUBJECTIVE AND OBJECTIVE BOX
SURGERY PROGRESS NOTE    Patient: NANCY SARGENT , 64y (62)Female   MRN: [id number]  Location: [address]  Visit: 25 Inpatient  Date: 25 @ 03:24    Hospital day :25d    POD: 10  Procedure: Open repair of ventral hernia using mesh and component separation technique    Small bowel resection    Insertion, arterial line, percutaneous    Exploratory laparotomy    24 hour events: OOBTC, +g, +bm. Incisional VAC placed yesterday with bilious output. On NC, RUQ drain removed yesterday. Thony drains ss output. wound vac intact.    PHYSICAL EXAM:  GENERAL: A&Ox3, NAD  HEENT: Normocephalic, atraumatic  PULM: bilateral chest rise, saturating well on HFNC  CV: Regular rate and rhythm  ABDOMEN: Abdomen obese, soft, mildly-distended, mildly-tender to deep palpation, THONY x 2, LLQ drain with purulent drainage. wound vac intact.  MSK: Moving extremities spontaneously  NEURO: No focal deficits  SKIN: Warm, dry, normal skin color, texture, turgor    PAST MEDICAL & SURGICAL HISTORY:  Gastric bypass status for obesity      LOUIS (obstructive sleep apnea)      S/P gastric bypass      S/P       S/P small bowel resection      History of total bilateral knee replacement (TKR)      H/O colonoscopy            Vitals:   T(F): 98 (25 @ 20:00), Max: 98 (25 @ 20:00)  HR: 103 (25 @ 02:40)  BP: 133/65 (25 @ 20:00)  RR: 25 (25 @ 01:00)  SpO2: 100% (25 @ 01:00)  Mode: AC/ CMV (Assist Control/ Continuous Mandatory Ventilation), RR (machine): 28, TV (machine): 450, FiO2: 100, PEEP: 8, ITime: 1, MAP: 14, PIP: 31    Diet, NPO      Fluids:     I & O's:    25 @ 07:01  -  25 @ 07:00  --------------------------------------------------------  IN:    Fat Emulsion (Fish Oil &amp; Plant Based) 20% Infusion: 61.8 mL    IV PiggyBack: 50 mL    Oral Fluid: 1852 mL    TPN (Total Parenteral Nutrition): 772 mL  Total IN: 2735.8 mL    OUT:    Bulb (mL): 5 mL    Bulb (mL): 10 mL    Bulb (mL): 5 mL    Bulb (mL): 5 mL    Rectal Tube (mL): 300 mL    VAC (Vacuum Assisted Closure) System (mL): 0 mL    Voided (mL): 4300 mL  Total OUT: 4625 mL    Total NET: -1889.2 mL          MEDICATIONS  (STANDING):  albuterol/ipratropium for Nebulization 3 milliLiter(s) Nebulizer every 6 hours  calcium gluconate IVPB 2 Gram(s) IV Intermittent once  chlorhexidine 0.12% Liquid 15 milliLiter(s) Oral Mucosa every 12 hours  chlorhexidine 2% Cloths 1 Application(s) Topical <User Schedule>  enoxaparin Injectable 135 milliGRAM(s) SubCutaneous every 12 hours  famotidine Injectable 20 milliGRAM(s) IV Push daily  fentaNYL   Infusion. 0.5 MICROgram(s)/kG/Hr (6.78 mL/Hr) IV Continuous <Continuous>  insulin lispro (ADMELOG) corrective regimen sliding scale   SubCutaneous every 6 hours  meropenem  IVPB      meropenem  IVPB 1000 milliGRAM(s) IV Intermittent every 8 hours  norepinephrine Infusion 0.05 MICROgram(s)/kG/Min (6.36 mL/Hr) IV Continuous <Continuous>  phenylephrine    Infusion 0.1 MICROgram(s)/kG/Min (2.54 mL/Hr) IV Continuous <Continuous>  propofol Infusion 10 MICROgram(s)/kG/Min (8.14 mL/Hr) IV Continuous <Continuous>  sodium bicarbonate  Infusion 0.133 mEq/kG/Hr (120 mL/Hr) IV Continuous <Continuous>    MEDICATIONS  (PRN):      DVT PROPHYLAXIS: enoxaparin Injectable 135 milliGRAM(s) SubCutaneous every 12 hours    GI PROPHYLAXIS:   ANTICOAGULATION:   ANTIBIOTICS:  meropenem  IVPB    meropenem  IVPB 1000 milliGRAM(s)            LAB/STUDIES:  Labs:  CAPILLARY BLOOD GLUCOSE      POCT Blood Glucose.: 229 mg/dL (2025 01:20)  POCT Blood Glucose.: 108 mg/dL (2025 20:53)  POCT Blood Glucose.: 109 mg/dL (2025 15:29)  POCT Blood Glucose.: 112 mg/dL (2025 12:04)  POCT Blood Glucose.: 123 mg/dL (2025 07:40)                          5.6    14.99 )-----------( 292      ( 2025 02:10 )             19.2       Auto Immature Granulocyte %: 5.2 % (25 @ 21:55)        138  |  109  |  42[H]  ----------------------------<  365[H]  5.2[H]   |  10[L]  |  1.3      Calcium: 6.9 mg/dL (25 @ 02:10)      LFTs:             3.9  | 0.3  | 44       ------------------[84      ( 2025 02:10 )  2.1  | x    | 70          Lipase:x      Amylase:x         Blood Gas Arterial, Lactate: 9.5 mmol/L (25 @ 02:08)    ABG - ( 2025 02:08 )  pH: 7.15  /  pCO2: 26    /  pO2: 186   / HCO3: 9     / Base Excess: -18.2 /  SaO2: 96.5            ABG - ( 2025 21:36 )  pH: 7.53  /  pCO2: 28    /  pO2: 109   / HCO3: 23    / Base Excess: 1.7   /  SaO2: 97.1            ABG - ( 2025 11:59 )  pH: 7.58  /  pCO2: 26    /  pO2: 121   / HCO3: 24    / Base Excess: 3.6   /  SaO2: 97.5              Coags:     11.90  ----< 1.01    ( 2025 02:10 )     36.1                Urinalysis Basic - ( 2025 02:10 )    Color: x / Appearance: x / SG: x / pH: x  Gluc: 365 mg/dL / Ketone: x  / Bili: x / Urobili: x   Blood: x / Protein: x / Nitrite: x   Leuk Esterase: x / RBC: x / WBC x   Sq Epi: x / Non Sq Epi: x / Bacteria: x             SURGERY PROGRESS NOTE    Patient: NANCY SARGENT , 64y (62)Female   MRN: [id number]  Location: [place] [address] [address]  Visit: 25 Inpatient  Date: 25 @ 03:24    Hospital day :25d    POD: 10  Procedure: Open repair of ventral hernia using mesh and component separation technique    Small bowel resection    Insertion, arterial line, percutaneous    Exploratory laparotomy    24 hour events: OOBTC, +g, +bm. Incisional VAC placed yesterday with bilious output. On NC, RUQ drain removed yesterday. Thony drains ss output. wound vac intact. Over night rapid response called when patient unresponsive, bradycardic and with agonal breathing. Patient was intubated and tube repositioned, started on vasopressive support. C xray with no pneumothorax. Found to be acidotic and received bicarbonate. Sedated with propofol and fentanyl.     PHYSICAL EXAM:  GENERAL: A&Ox3, NAD  HEENT: Normocephalic, atraumatic  PULM: bilateral chest rise, mechanical ventilation  CV: Regular rate and rhythm  ABDOMEN: Abdomen obese, soft, mildly-distended, THONY x 2, LLQ drain with sanguinous output, midline IR drain murky, and RLQ drain ss. wound vac intact.  MSK: Moving extremities spontaneously  NEURO: No focal deficits  SKIN: Warm, dry, normal skin color, texture, turgor    PAST MEDICAL & SURGICAL HISTORY:  Gastric bypass status for obesity      LOUIS (obstructive sleep apnea)      S/P gastric bypass      S/P       S/P small bowel resection      History of total bilateral knee replacement (TKR)      H/O colonoscopy            Vitals:   T(F): 98 (25 @ 20:00), Max: 98 (25 @ 20:00)  HR: 103 (25 @ 02:40)  BP: 133/65 (25 @ 20:00)  RR: 25 (25 @ 01:00)  SpO2: 100% (25 @ 01:00)  Mode: AC/ CMV (Assist Control/ Continuous Mandatory Ventilation), RR (machine): 28, TV (machine): 450, FiO2: 100, PEEP: 8, ITime: 1, MAP: 14, PIP: 31    Diet, NPO      Fluids:     I & O's:    25 @ 07:01  -  25 @ 07:00  --------------------------------------------------------  IN:    Fat Emulsion (Fish Oil &amp; Plant Based) 20% Infusion: 61.8 mL    IV PiggyBack: 50 mL    Oral Fluid: 1852 mL    TPN (Total Parenteral Nutrition): 772 mL  Total IN: 2735.8 mL    OUT:    Bulb (mL): 5 mL    Bulb (mL): 10 mL    Bulb (mL): 5 mL    Bulb (mL): 5 mL    Rectal Tube (mL): 300 mL    VAC (Vacuum Assisted Closure) System (mL): 0 mL    Voided (mL): 4300 mL  Total OUT: 4625 mL    Total NET: -1889.2 mL          MEDICATIONS  (STANDING):  albuterol/ipratropium for Nebulization 3 milliLiter(s) Nebulizer every 6 hours  calcium gluconate IVPB 2 Gram(s) IV Intermittent once  chlorhexidine 0.12% Liquid 15 milliLiter(s) Oral Mucosa every 12 hours  chlorhexidine 2% Cloths 1 Application(s) Topical <User Schedule>  enoxaparin Injectable 135 milliGRAM(s) SubCutaneous every 12 hours  famotidine Injectable 20 milliGRAM(s) IV Push daily  fentaNYL   Infusion. 0.5 MICROgram(s)/kG/Hr (6.78 mL/Hr) IV Continuous <Continuous>  insulin lispro (ADMELOG) corrective regimen sliding scale   SubCutaneous every 6 hours  meropenem  IVPB      meropenem  IVPB 1000 milliGRAM(s) IV Intermittent every 8 hours  norepinephrine Infusion 0.05 MICROgram(s)/kG/Min (6.36 mL/Hr) IV Continuous <Continuous>  phenylephrine    Infusion 0.1 MICROgram(s)/kG/Min (2.54 mL/Hr) IV Continuous <Continuous>  propofol Infusion 10 MICROgram(s)/kG/Min (8.14 mL/Hr) IV Continuous <Continuous>  sodium bicarbonate  Infusion 0.133 mEq/kG/Hr (120 mL/Hr) IV Continuous <Continuous>    MEDICATIONS  (PRN):      DVT PROPHYLAXIS: enoxaparin Injectable 135 milliGRAM(s) SubCutaneous every 12 hours    GI PROPHYLAXIS:   ANTICOAGULATION:   ANTIBIOTICS:  meropenem  IVPB    meropenem  IVPB 1000 milliGRAM(s)            LAB/STUDIES:  Labs:  CAPILLARY BLOOD GLUCOSE      POCT Blood Glucose.: 229 mg/dL (2025 01:20)  POCT Blood Glucose.: 108 mg/dL (2025 20:53)  POCT Blood Glucose.: 109 mg/dL (2025 15:29)  POCT Blood Glucose.: 112 mg/dL (2025 12:04)  POCT Blood Glucose.: 123 mg/dL (2025 07:40)                          5.6    14.99 )-----------( 292      ( 2025 02:10 )             19.2       Auto Immature Granulocyte %: 5.2 % (25 @ 21:55)        138  |  109  |  42[H]  ----------------------------<  365[H]  5.2[H]   |  10[L]  |  1.3      Calcium: 6.9 mg/dL (25 @ 02:10)      LFTs:             3.9  | 0.3  | 44       ------------------[84      ( 2025 02:10 )  2.1  | x    | 70          Lipase:x      Amylase:x         Blood Gas Arterial, Lactate: 9.5 mmol/L (25 @ 02:08)    ABG - ( 2025 02:08 )  pH: 7.15  /  pCO2: 26    /  pO2: 186   / HCO3: 9     / Base Excess: -18.2 /  SaO2: 96.5            ABG - ( 2025 21:36 )  pH: 7.53  /  pCO2: 28    /  pO2: 109   / HCO3: 23    / Base Excess: 1.7   /  SaO2: 97.1            ABG - ( 2025 11:59 )  pH: 7.58  /  pCO2: 26    /  pO2: 121   / HCO3: 24    / Base Excess: 3.6   /  SaO2: 97.5              Coags:     11.90  ----< 1.01    ( 2025 02:10 )     36.1                Urinalysis Basic - ( 2025 02:10 )    Color: x / Appearance: x / SG: x / pH: x  Gluc: 365 mg/dL / Ketone: x  / Bili: x / Urobili: x   Blood: x / Protein: x / Nitrite: x   Leuk Esterase: x / RBC: x / WBC x   Sq Epi: x / Non Sq Epi: x / Bacteria: x

## 2025-04-27 NOTE — CHART NOTE - NSCHARTNOTEFT_GEN_A_CORE
ICU ANESTHESIA ADMISSION NOTE      Procedure: SMA Angiogram        __x__  Intubated    ____  Patent Airway    ____  Full return of protective reflexes    ____  Full recovery from anesthesia / back to baseline status    Vitals:  T(C): 36.2 (04-27-25 @ 13:42), Max: 36.7 (04-26-25 @ 20:00)  HR: 105 (04-27-25 @ 14:45) (75 - 105)  BP: 104/54 (04-27-25 @ 14:45) (92/55 - 163/62)  RR: 28 (04-27-25 @ 14:15) (23 - 40)  SpO2: 99% (04-27-25 @ 14:45) (80% - 100%)    Mental Status:  ____ Awake   _____ Alert   _____ Drowsy   __x___ Sedated    Nausea/Vomiting:  __x__ NO  ______Yes,   See Post - Op Orders          Pain Scale (0-10):  ___0__    Treatment: ____ None    __x__ See Post - Op/PCA Orders    Post - Operative Fluids:   ____ Oral   __x__ See Post - Op Orders    Plan: Discharge:   ____Home       _____Floor     __x___Critical Care    _____  Other:_________________    Comments: Patient had smooth intraoperative event, no anesthesia complication.

## 2025-04-27 NOTE — CHART NOTE - NSCHARTNOTEFT_GEN_A_CORE
Spoke with  spouse, daughter and son in law bedside.   -IR team negative finding of active bleed, no embolization required  -Will determine anticoagulation, DVT prophylaxis based on labs, clinical status  -Remains on levophed and vasopressin to maintain blood pressure  -Risk of further bleeding vs worsening of pulmonary embolism addressed  -If unable to liberate off ventilator, patient may require a tracheostomy  -Patient to remain FULL CODE  All questions and concerns were addressed and answered. SICU Team and attending made aware of all updates.

## 2025-04-27 NOTE — PROGRESS NOTE ADULT - SUBJECTIVE AND OBJECTIVE BOX
[facility]    [address] [facility] [address]    Phone:  [phone]       Thank You for choosing us for your health care visit. We are glad to serve you and happy to provide you with this summary of your visit. Please help us to ensure we have accurate records. If you find anything that needs to be changed, please let our staff know as soon as possible.          Your Demographic Information     Patient Name Sex     Abilio Valle Male 1967       Ethnic Group Patient Race    Not of  or  Origin White      Your Visit Details     Date & Time Provider Department    3/17/2017 10:20 AM Soila Rubio, NP [facility]      Conditions Discussed Today or Order-Related Diagnoses        Comments    Acute recurrent sinusitis, unspecified location    -  Primary       Your Vitals Were     BP Pulse Temp Resp Height Weight    138/86 82 97.7 °F (36.5 °C) (Tympanic) 97 5' 10\" (1.778 m) 220 lb (99.8 kg)    BMI Smoking Status                31.57 kg/m2 Former Smoker          Medications Prescribed or Re-Ordered Today     fluticasone (FLONASE) 50 MCG/ACT nasal spray    Sig - Route: Spray 2 sprays in each nostril daily. - Nasal    Class: Eprescribe    Pharm[facility]: [facility] [address] #: [phone]    amoxicillin (AMOXIL) 500 MG capsule    Sig - Route: Take 1 capsule by mouth 3 times daily for 7 days. - Oral    Class: Eprescribe    Pharmacy: [facility] [address] #: [phone]      Your Current Medications Are        Disp Refills Start End    fluticasone (FLONASE) 50 MCG/ACT nasal spray 16 g 2 3/17/2017     Sig - Route: Spray 2 sprays in each nostril daily. - Nasal    Class: Eprescribe    amoxicillin (AMOXIL) 500 MG capsule 21 capsule 0 3/17/2017 3/24/2017    Sig - Route: Take 1 capsule by mouth 3 times daily for 7 days. -  Interventional Radiology Follow- Up Note      64y Female s/p soft tissue drainage on 4/22/25  in Interventional Radiology with me       O/N: Lactic acidosis, hem shock, intraperitoneal bleed    Vitals: T(F): 97.1 (04-27-25 @ 12:00), Max: 98 (04-26-25 @ 20:00)  HR: 95 (04-27-25 @ 13:42) (75 - 103)  BP: 135/70 (04-27-25 @ 13:42) (133/65 - 135/77)  RR: 25 (04-27-25 @ 13:42) (23 - 28)  SpO2: 99% (04-27-25 @ 13:42) (80% - 100%)  Wt(kg): --    LABS:                        9.0    30.76 )-----------( 387      ( 27 Apr 2025 10:34 )             28.4     04-27    143  |  108  |  51[H]  ----------------------------<  191[H]  5.8[H]   |  15[L]  |  2.1[H]    Ca    8.0[L]      27 Apr 2025 10:34  Phos  10.1     04-27  Mg     2.3     04-27    TPro  3.9[L]  /  Alb  2.1[L]  /  TBili  0.3  /  DBili  x   /  AST  44[H]  /  ALT  70[H]  /  AlkPhos  84  04-27    PT/INR - ( 27 Apr 2025 12:30 )   PT: 13.40 sec;   INR: 1.13 ratio         PTT - ( 27 Apr 2025 12:30 )  PTT:27.4 sec        Impression: 64y Female admitted with Intestinal obstruction    CTA positive for active bleeding at anastamotic site.    Plan:  Emergent mesenteric angioembolization.  Consent obtained from spouse.     Please call Interventional Radiology [phone] (or TEAMS) with any questions, concerns, or issues regarding above.      Oral    Class: Eprescribe    hydrochlorothiazide (HYDRODIURIL) 25 MG tablet 90 tablet 2 1/26/2017     Sig - Route: Take 1 tablet by mouth daily. - Oral    Class: Eprescribe    lisinopril (ZESTRIL) 20 MG tablet 30 tablet 6 1/18/2017     Sig - Route: Take 1 tablet by mouth daily. - Oral    Class: Eprescribe    fluticasone (FLOVENT HFA) 110 MCG/ACT inhaler 1 Inhaler 0 12/12/2016     Sig - Route: Inhale 2 puffs into the lungs 2 times daily. - Inhalation    Class: Sample    Notes to Pharmacy: Lot [id number]  Exp Apr 17    rosuvastatin (CRESTOR) 5 MG tablet 30 tablet 11 8/3/2016     Sig - Route: Take 1 tablet by mouth daily. - Oral    Class: Eprescribe    EPINEPHrine (EPIPEN 2-AMBER) 0.3 MG/0.3ML Solution Auto-injector 2 each 0 6/16/2016     Sig - Route: Inject 0.3 mLs into the muscle as needed (for allergic reaction). - Intramuscular    Class: Eprescribe    albuterol (PROAIR RESPICLICK) 108 (90 BASE) MCG/ACT inhaler 1 Inhaler 1 6/10/2016     Sig - Route: Inhale 2 puffs into the lungs every 4 hours as needed for Wheezing. - Inhalation    Class: Eprescribe    Co-Enzyme Q-10 100 MG Cap 30 capsule  11/10/2015     Class: Historical Med    Route: Oral    mometasone (ELOCON) 0.1 % cream 45 g 1 3/25/2015     Sig - Route: Apply 1 application topically daily. - Topical    Class: Eprescribe    Omega-3 Fatty Acids (FISH OIL) 500 MG capsule 90 capsule 11 4/25/2013     Sig - Route: Take 1,500 mg by mouth daily. - Oral    Class: Script Not Printed    Ascorbic Acid (VITAMIN C) 100 MG tablet        Sig - Route: Take 100 mg by mouth daily. - Oral    Class: Historical Med      Allergies     Bee Sting SWELLING      Immunizations History as of 3/17/2017     Name Date    Hep A/Hep B 3/14/2013 11:20 AM, 12/13/2012    Hepatitis B Adult 3/20/2014 10:16 AM    Influenza 10/28/2015, 11/5/2002    Influenza A novel H1N1 11/16/2009    Lyme Disease 3/29/2002, 2/12/2002    Tdap 9/25/2009      Problem List as of 3/17/2017     Essential hypertension, benign     Other and unspecified hyperlipidemia    Knee pain, right            Patient Instructions     None

## 2025-04-27 NOTE — PROGRESS NOTE ADULT - SUBJECTIVE AND OBJECTIVE BOX
NANCY SARGENT   [id number]   [birth year]  64yF  ============================================================   DATE OF INITIAL SICU/SDU CONSULT: 04-10-25    INDICATION FOR SICU CONSULT:  q1hr abdominal checks, mechanical ventilation    SICU COURSE EVENTS :  04-10 - admitted to SICU service  4/11: Intubated and started on paralytic. Arterial line placed, subclavian TLC placed. Nephrology consulted for oliguria. IR abdominal muscle botox injection performed. TTE performed.   4/12: Additional fluid boluses given; trial fo bumex started, considering CVVH. Weaned off nimbex gtt.  > 220 /hr. Renal U/S w/out hydro.   4/13: Weaned off Levophed. Bumex gtt 2mg/hr > 1mg/hr. Goal net negative 1-1.5L, UOP approx, 200c/hr.   4/14: Remained on bumex gtt for further diuresis, decreased to 0.5 overnight, vent settings weaned. Cr downtrending, LFTs worsening.   4/15: Extubated to BiPAP, transitioned to NC. Dc'd bumex gtt, given 5mg metolazone x 1 and 2mg bumex x 1.   4/16: THONY drain #2 murky/bilious, pending CTAP with PO contrast, hypernatremia, started D5W @ 75cc/hr, persistent hypokalemia   4/17: RTOR for resection of anastomosis for anastomotic leak. Returned intubated, 400/24/80/10, sinus tachy to . Abdomen soft. Was initially on propofol @ 30 and precedex, but propofol weaned off due to hypotension with MAPs in 50s, fentanyl ggt started for acute pain. Remained hypotensive despite fluids, started on Levophed, on 0.05.   4/18: Extubated. HFNC 40L/49%, Levo off since 11 AM   4/19: NGT removed. Started on duonebs. D5W increased to 60cc/hr. 1mg bumex, > 1.5L response  4/20: Episode of afib RVR resolved with metoprolol 5mg IVP, cardiology c/s placed, recommending metoprolol 25mg q 12   4/21: PE on CTA, therapeutic heparin gtt started. Started on cardebe gtt,   4/22: Meropenem started per ID.  S/p IR percutaneous placement of a 8.5 [place] drainage catheter into lower abdominal wall fluid collection, yielding 150 mL of bowel appearing fluid. New left radial a-line placed. Off nicarrdipine gtt.   4/23: Switched to therapeutic lovenox. Diet advanced to aubrey clears with ensures.  4/24: advacned to bariatric FLD, downgraded to SDU  4/25: TPN discontinued, diet advanced to full liquid , Right cephalic DVT prelim  4/26: Right UE VA Duplex final no DVT, thromboplebitis, Arterial line removed, 2L NC, midline vac changed, noted to have bilious drainage, RUQ drain pulled, upgraded to SICU status, plan to reorder TPN for 4/27 PM, diet changed to full liquid    24Hour Events:  4/26  NIGHT  - Vac changed and RUQ drain pulled by primary  - HR 88, /65, 4L NC, soft abd, midline black vac sponge, L abd drain with sanguinous output, R drain with SS output, IR drain wtih serous output, plan for HFNC tonight, pulling 500cc on IS  - Primary: bilious drainage when changing vac, Dr. Lmea wants DG cancelled, TPN restarted tomorrow evening (4/27), change diet to full liquid  - 1g mag and 20 KCL repleted  - LUE duplex prelim with superficial thrombophlebitis, no DVT    DAY  - surgery to change prevena vac, d/c 2 out of 4 THONY drains  - d/c arterial line   -maintain TLC due to poor access,   - IR consult for PICC vs midline for outpatient antibiotics  - primary team to f/u with Dr. Snow regarding imaging plan since ID will use that to determine course duration  - PT for OOB  - Dignishield in place    [X] A ten-point review of systems was negative except as expressed in note.  [X ] History was obtained from patient. If unable to participate in their care, history was from review of the chart and collateral sources of information.  ================================================================================  ASSESSMENT & PLAN:  64F PMHx HTN, morbid obesity s/p 2001 Abby-en-Y gastric bypass who presented on 4/3 with incarcerated ventral hernia with SBO. S/p 4/10 open ventral hernia repair, small bowel resection, and primary fascial closure with Dr. Lema. Admitted to SICU for Q1 abdominal checks and hemodynamic monitoring. 4/17 takeback for exploratory laparotomy, repair of anastomotic leak, 4/22 IR-guided 8Fr pigtail into lower abdominal wall collection,150cc of foul appearing material aspirated, Attached to THONY bulb.    NEUROLOGICAL:  #Acute pain  - Acetaminophen Q6   #Sleep hygiene  - Trazodone 25mg QPM  - Melatonin 5mg QPM    RESPIRATORY:   #Right distal main pulmonary embolus  - Lovenox 135mg Q12  - Echo 4/22/25: EF 70 to 75%.  - B/L LE Venous duplex 4/21-RIGHT PT DVT, no LEFT DVT  #Acute hypoxic respiratory failure  - Extubated 4/18  - Weaned from HFNC to 5L on 4/25, HFNC overnight  - Duonebs Q6  #Intermittent diuresis  #PMHx [place] on CPAP@ home   #Activity   - Activity- increase as tolerated     CARDS:   #Acute hypotension in setting of SIRS, resolved   #Supraventricular tachycardia, Non-sustained ventricular tachycardia, Ventricular Premature Depolarization    Cardiology - Metoprolol 25mg Q12   #PMHx of HTN  - Amlodipine 10mg QD   - HCTZ 25mg QD HOLDING  - Losartan 100mg daily HOLDING  - EKG- NSR  - TTE 4/11: EF of 56%. G1DD.     GASTROINTESTINAL/NUTRITION:   #4/10 open ventral hernia repair, small bowel resection, ileoileo anstamosis and primary fascial closure, 2 THONY Right  #4/17 takeback for exploratory laparotomy, repair of anastomotic leak at previsou ileoileo anastamosis, resected, new ileoileo anastamosis created, 1 THONY Left (total 3)  #4/22 8Fr pigtail into lower abdominal wall collection,150cc of foul appearing material aspirated, Attached to THONY bulb    CT 4/21 :right distal main PE, no heart strain, abdominal wall 9.7*6.2cm fluid collection, no oral contrast leak    - aspiration precautions, HOB 30  - 4/26 vac dressing changed at bedside by primary team, bilious drainage noted, pt switched from regular diet to full liquid, TPN to be ordered for 4/27 PM, f/u primary team regarding CTAP  #Anterior abdominal wall collection  - 4/22: s/p IR percutaneous placement of a 8.5 [place]tarian drainage catheter into lower abdominal wall fluid collection, yielding 150 mL of foul appearing fluid.  #SBO s/p ventral hernia repair with SBR  #Diet, Full Liquid diet  - Approx 2L oral intake  - TPN discontinued 4/25, to be reordered for 4/27 PM  #GI Prophylaxis/hx GERD  - Home pantoprazole 40mg PO QD  #Bowel regimen  - Holding  - Last BM 4/26  - Dignishield in place     /RENAL:   #urine output in critically ill  - Voiding spontaneously   #FUNMI; oliguric, resolving   - Nephrology consulted and following > hold off on CVVH  - Renal u/s> no hydronephrosis. 2-3cm anechoic cyst on right kidney     Labs:   BUN/Cr- 54/1.2  -->,  43/1.1  -->  [04-26 @ 21:55]Na  140 // K  3.9 // Mg  1.9 // Phos  3.9  [04-25 @ 20:19]Na  140 // K  4.0 // Mg  2.4 // Phos  4.2     HEME/ONC:   #Acute right pulmonary embolus  #RUE Venous Duplex 4/26-right cephalic DVT  #B/L LE Venous Duplex 4/21- right PT DVT    -Lovenox 135mg q12  - concern for malabsorption of Eliquis given small bowel resection per pharmacy  - vascular surgery consulted - AC with heparin drip (upon discharge transition to Eliquis 10 mg po BID x 7 days then 5 mg po BID for at least 6 months)  T&S expires: 4/27    Labs: Hb/Hct:  8.6/27.0  (04-25 @ 20:19)  -->,  8.3/26.6  (04-26 @ 21:55)  -->                      Plts:  346  -->,  325  -->                 PTT/INR:        ID:  #Intraabdominal anastamotic leak, fluid collection  - Continue meropenem 1g Q8 (4/22 - )  - WBC continues to improve     -  Although Clx no growth, will keep meropenem for now      - Will plan to keep until repeat CT to re-evaluate abdomina fluid collections   #Culture    OR 4/1 - E Coli    OR cx: few E.coli, few bacteroides, rare clostridium    4/13 Blood Cx: NGTF    4/14 tracheal aspirate: no growth     4/17 body fluid cx rare e. coli     4/22 Blood - NGTD f/u final    4/22 Abdominal - Negative FINAL  WBC- 13.68  --->>,  11.11  --->>,  12.77  --->>, 9.92  Temp trend- 24hrs T(F): 97.2 (04-26 @ 08:00), Max: 97.6 (04-26 @ 04:00)  meropenem  IVPB 1000 every 8 hours    ENDOCRINE:  #Glycemic monitoring  - ISS ACHS  - A1C 5.8%     MSK:  #Activity- increase as tolerated     SKIN:  #DTI screening negative   - Continue to monitor daily skin changes    LINES/DRAINS:  PIV, THONY drain x 2, IR drain x 1 Prevena Vac midline, RIJ TLC    ADVANCED DIRECTIVES:  Full Code  HCP- Daughter  (Shavonne Moss)  INDICATION FOR SICU/SDU: Intestinal obstruction  DISPO: SICU    Pending discussion with SICU attending Dr. Lane NANCY SARGENT   [id number]   62  64yF  ============================================================   DATE OF INITIAL SICU/SDU CONSULT: 04-10-25    INDICATION FOR SICU CONSULT:  q1hr abdominal checks, mechanical ventilation    SICU COURSE EVENTS :  04-10 - admitted to SICU service  : Intubated and started on paralytic. Arterial line placed, subclavian TLC placed. Nephrology consulted for oliguria. IR abdominal muscle botox injection performed. TTE performed.   : Additional fluid boluses given; trial fo bumex started, considering CVVH. Weaned off nimbex gtt.  > 220 /hr. Renal U/S w/out hydro.   : Weaned off Levophed. Bumex gtt 2mg/hr > 1mg/hr. Goal net negative 1-1.5L, UOP approx, 200c/hr.   : Remained on bumex gtt for further diuresis, decreased to 0.5 overnight, vent settings weaned. Cr downtrending, LFTs worsening.   4/15: Extubated to BiPAP, transitioned to NC. Dc'd bumex gtt, given 5mg metolazone x 1 and 2mg bumex x 1.   : THONY drain #2 murky/bilious, pending CTAP with PO contrast, hypernatremia, started D5W @ 75cc/hr, persistent hypokalemia   : RTOR for resection of anastomosis for anastomotic leak. Returned intubated, 400/24/80/10, sinus tachy to . Abdomen soft. Was initially on propofol @ 30 and precedex, but propofol weaned off due to hypotension with MAPs in 50s, fentanyl ggt started for acute pain. Remained hypotensive despite fluids, started on Levophed, on 0.05.   : Extubated. HFNC 40L/49%, Levo off since 11 AM   : NGT removed. Started on duonebs. D5W increased to 60cc/hr. 1mg bumex, > 1.5L response  : Episode of afib RVR resolved with metoprolol 5mg IVP, cardiology c/s placed, recommending metoprolol 25mg q 12   : PE on CTA, therapeutic heparin gtt started. Started on cardebe gtt,   : Meropenem started per ID.  S/p IR percutaneous placement of a 8.5 Trinidadian drainage catheter into lower abdominal wall fluid collection, yielding 150 mL of bowel appearing fluid. New left radial a-line placed. Off nicarrdipine gtt.   : Switched to therapeutic lovenox. Diet advanced to aubrey clears with ensures.  : advacned to bariatric FLD, downgraded to SDU  : TPN discontinued, diet advanced to full liquid , Right cephalic DVT prelim  : Right UE VA Duplex final no DVT, thrombophlebitis, Arterial line removed, 2L NC, midline vac changed, noted to have bilious drainage, RUQ drain pulled, upgraded to SICU status, plan to reorder TPN for  PM, diet changed to full liquid. 1:20 AM noted to be unresponsive, palpable femoral pulse, decision made to intubate, R femoral arterial line placed, started on levophed, amauri, bicarb amp x 2, bicarb gtt, propofol, 2U pRBC for hgb 5.9, plan for CTH and CTAP when stabilized     24Hour Events:    NIGHT  - Vac changed and RUQ drain pulled by primary  - HR 88, /65, 4L NC, soft abd, midline black vac sponge, L abd drain with sanguinous output, R drain with SS output, IR drain wtih serous output, plan for HFNC tonight, pulling 500cc on IS  - Primary: bilious drainage when changing vac, Dr. Lema wants DG cancelled, TPN restarted tomorrow evening (), change diet to full liquid  - 1g mag and 20 KCL repleted  - LUE duplex prelim with superficial thrombophlebitis, no DVT    DAY  - surgery to change prevena vac, d/c 2 out of 4 THONY drains  - d/c arterial line   -maintain TLC due to poor access,   - IR consult for PICC vs midline for outpatient antibiotics  - primary team to f/u with Dr. Snow regarding imaging plan since ID will use that to determine course duration  - PT for OOB  - Dignishield in place    [X] A ten-point review of systems was negative except as expressed in note.  [X ] History was obtained from patient. If unable to participate in their care, history was from review of the chart and collateral sources of information.  ================================================================================  ASSESSMENT & PLAN:  64F PMHx HTN, morbid obesity s/p  Abby-en-Y gastric bypass who presented on 4/3 with incarcerated ventral hernia with SBO. S/p 4/10 open ventral hernia repair, small bowel resection, and primary fascial closure with Dr. Lema. Admitted to SICU for Q1 abdominal checks and hemodynamic monitoring.  takeback for exploratory laparotomy, repair of anastomotic leak,  IR-guided 8Fr pigtail into lower abdominal wall collection,150cc of foul appearing material aspirated, Attached to THONY bulb.    NEUROLOGICAL:  #Acute pain  - Acetaminophen Q6   #Sleep hygiene  - Trazodone 25mg QPM  - Melatonin 5mg QPM  # early AM AMS, intubated and sedated  - F/u CTH when stable for scanner    RESPIRATORY:   #Right distal main pulmonary embolus  - Lovenox 135mg Q12  - Echo 25: EF 70 to 75%.  - B/L LE Venous duplex -RIGHT PT DVT, no LEFT DVT  #Acute hypoxic respiratory failure  - Extubated   - Weaned from HFNC to 5L on , HFNC overnight  - Duonebs Q6  #Intermittent diuresis  #PMHx [place] on CPAP@ home   #Activity   - Activity- increase as tolerated   # early AM AMS, agonal breathing, reintubated   - Vent settings: 450/28/100/8  -  2AM AB.15/26/186/9/-18.2    CARDS:   #Acute hypotension in setting of SIRS, resolved   #Supraventricular tachycardia, Non-sustained ventricular tachycardia, Ventricular Premature Depolarization    Cardiology - Metoprolol 25mg Q12   #PMHx of HTN  - Amlodipine 10mg QD   - HCTZ 25mg QD HOLDING  - Losartan 100mg daily HOLDING  - EKG- NSR  - TTE : EF of 56%. G1DD.   #Acute hypotension , lactic acidosis (lactate 9, pH 7.15)  - Levophed, amauri gtt  - S/p sodium bicarb amp x 2, bicarb gtt running  - R femoral arterial line placed   - Wean pressors as tolerated    GASTROINTESTINAL/NUTRITION:   #4/10 open ventral hernia repair, small bowel resection, ileoileal anastomosis and primary fascial closure, 2 THONY Right  # takeback for exploratory laparotomy, repair of anastomotic leak at previous ileoileal anastomosis, resected, new ileoileal anastomosis created, 1 THONY Left (total 3)  # 8Fr pigtail into lower abdominal wall collection,150cc of foul appearing material aspirated, Attached to THONY bulb    CT  :right distal main PE, no heart strain, abdominal wall 9.7*6.2cm fluid collection, no oral contrast leak    - aspiration precautions, HOB 30  -  vac dressing changed at bedside by primary team, bilious drainage noted, pt switched from regular diet to full liquid, TPN to be ordered for  PM, f/u primary team regarding CTAP >  early AM reintubated, NPO, abdomen distended with increased THONY drain sanguinous output   - F/u CTAP when stable for scan  #Anterior abdominal wall collection  - : s/p IR percutaneous placement of a 8.5 Trinidadian drainage catheter into lower abdominal wall fluid collection, yielding 150 mL of foul appearing fluid.  #SBO s/p ventral hernia repair with SBR  #Diet, NPO  - TPN discontinued , to be reordered for  PM  #GI Prophylaxis/hx GERD  - Home pantoprazole 40mg PO QD HOLDING  #Bowel regimen  - Holding  - Last BM   - Dignishield in place     /RENAL:   #urine output in critically ill  - An replaced   - Strict I/Os  #FUNMI; oliguric, resolving   - Nephrology consulted and following > hold off on CVVH  - Renal u/s> no hydronephrosis. 2-3cm anechoic cyst on right kidney     Labs:   BUN/Cr- 54/1.2  -->,  43/1.1  -->  [ @ 21:55]Na  140 // K  3.9 // Mg  1.9 // Phos  3.9  [ @ 20:19]Na  140 // K  4.0 // Mg  2.4 // Phos  4.2  #Lactic acidosis  -  2AM ABG lactate 9.5     - Sodium bicarbonate amp x 2, gtt     HEME/ONC:   #Acute right pulmonary embolus  #RUE Venous Duplex -right cephalic DVT  #B/L LE Venous Duplex - right PT DVT    -Lovenox 135mg q12  - concern for malabsorption of Eliquis given small bowel resection per pharmacy  - vascular surgery consulted - AC with heparin drip (upon discharge transition to Eliquis 10 mg po BID x 7 days then 5 mg po BID for at least 6 months)  T&S expires:     Labs: Hb/Hct:  8.6/27.0  ( @ 20:19)  -->,  8.3/26.6  ( @ 21:55)  -->                      Plts:  346  -->,  325  -->                 PTT/INR:        ID:  #Intraabdominal anastomotic leak, fluid collection  - Continue meropenem 1g Q8 ( - )  - WBC continues to improve     -  Although Clx no growth, will keep meropenem for now      - Will plan to keep until repeat CT to re-evaluate abdomina fluid collections   #Culture    OR  - E Coli    OR cx: few E.coli, few bacteroides, rare clostridium     Blood Cx: NGTF     tracheal aspirate: no growth      body fluid cx rare e. coli      Blood - NGTD f/u final     Abdominal - Negative FINAL  WBC- 13.68  --->>,  11.11  --->>,  12.77  --->>, 9.92  Temp trend- 24hrs T(F): 97.2 ( @ 08:00), Max: 97.6 ( @ 04:00)  meropenem  IVPB 1000 every 8 hours    ENDOCRINE:  #Glycemic monitoring  - Q6 FSG while NPO  - ISS  - A1C 5.8%     MSK:  #Activity- increase as tolerated     SKIN:  #DTI screening negative   - Continue to monitor daily skin changes    LINES/DRAINS:  PIV, THONY drain x 2, IR drain x 1 Prevena Vac midline, RIJ TLC    ADVANCED DIRECTIVES:  Full Code  HCP- Daughter  (Shavonne Moss)  INDICATION FOR SICU/SDU: Intestinal obstruction  DISPO: SICU    Pending discussion with SICU attending Dr. Lane NANCY SARGENT   [id number]   62  64yF  ============================================================   DATE OF INITIAL SICU/SDU CONSULT: 04-10-25    INDICATION FOR SICU CONSULT:  q1hr abdominal checks, mechanical ventilation    SICU COURSE EVENTS :  04-10 - admitted to SICU service  : Intubated and started on paralytic. Arterial line placed, subclavian TLC placed. Nephrology consulted for oliguria. IR abdominal muscle botox injection performed. TTE performed.   : Additional fluid boluses given; trial fo bumex started, considering CVVH. Weaned off nimbex gtt.  > 220 /hr. Renal U/S w/out hydro.   : Weaned off Levophed. Bumex gtt 2mg/hr > 1mg/hr. Goal net negative 1-1.5L, UOP approx, 200c/hr.   : Remained on bumex gtt for further diuresis, decreased to 0.5 overnight, vent settings weaned. Cr downtrending, LFTs worsening.   4/15: Extubated to BiPAP, transitioned to NC. Dc'd bumex gtt, given 5mg metolazone x 1 and 2mg bumex x 1.   : THONY drain #2 murky/bilious, pending CTAP with PO contrast, hypernatremia, started D5W @ 75cc/hr, persistent hypokalemia   : RTOR for resection of anastomosis for anastomotic leak. Returned intubated, 400/24/80/10, sinus tachy to . Abdomen soft. Was initially on propofol @ 30 and precedex, but propofol weaned off due to hypotension with MAPs in 50s, fentanyl ggt started for acute pain. Remained hypotensive despite fluids, started on Levophed, on 0.05.   : Extubated. HFNC 40L/49%, Levo off since 11 AM   : NGT removed. Started on duonebs. D5W increased to 60cc/hr. 1mg bumex, > 1.5L response  : Episode of afib RVR resolved with metoprolol 5mg IVP, cardiology c/s placed, recommending metoprolol 25mg q 12   : PE on CTA, therapeutic heparin gtt started. Started on cardebe gtt,   : Meropenem started per ID.  S/p IR percutaneous placement of a 8.5 [place]ym[place] drainage catheter into lower abdominal wall fluid collection, yielding 150 mL of bowel appearing fluid. New left radial a-line placed. Off nicarrdipine gtt.   : Switched to therapeutic lovenox. Diet advanced to aubrey clears with ensures.  : advacned to bariatric FLD, downgraded to SDU  : TPN discontinued, diet advanced to full liquid , Right cephalic DVT prelim  : Right UE VA Duplex final no DVT, thrombophlebitis, Arterial line removed, 2L NC, midline vac changed, noted to have bilious drainage, RUQ drain pulled, upgraded to SICU status, plan to reorder TPN for  PM, diet changed to full liquid. 1:20 AM noted to be unresponsive, palpable femoral pulse, decision made to intubate, R femoral arterial line placed, started on levophed, amauri, bicarb amp x 2, bicarb gtt, propofol, 2U pRBC for hgb 5.9, plan for CTH and CTAP when stabilized     24Hour Events:    NIGHT  - Vac changed and RUQ drain pulled by primary  - HR 88, /65, 4L NC, soft abd, midline black vac sponge, L abd drain with sanguinous output, R drain with SS output, IR drain wtih serous output, plan for HFNC tonight, pulling 500cc on IS  - Primary: bilious drainage when changing vac, Dr. Lema wants DG cancelled, TPN restarted tomorrow evening (), change diet to full liquid  - 1g mag and 20 KCL repleted  - LUE duplex prelim with superficial thrombophlebitis, no DVT    DAY  - surgery to change prevena vac, d/c 2 out of 4 THONY drains  - d/c arterial line   -maintain TLC due to poor access,   - IR consult for PICC vs midline for outpatient antibiotics  - primary team to f/u with Dr. Snow regarding imaging plan since ID will use that to determine course duration  - PT for OOB  - Dignishield in place    [X] A ten-point review of systems was negative except as expressed in note.  [X ] History was obtained from patient. If unable to participate in their care, history was from review of the chart and collateral sources of information.  ================================================================================  ASSESSMENT & PLAN:  64F PMHx HTN, morbid obesity s/p  Abby-en-Y gastric bypass who presented on 4/3 with incarcerated ventral hernia with SBO. S/p 4/10 open ventral hernia repair, small bowel resection, and primary fascial closure with Dr. Lema. Admitted to SICU for Q1 abdominal checks and hemodynamic monitoring.  takeback for exploratory laparotomy, repair of anastomotic leak,  IR-guided 8Fr pigtail into lower abdominal wall collection,150cc of foul appearing material aspirated, Attached to THONY bulb.    NEUROLOGICAL:  #Acute pain  - Acetaminophen Q6   #Sleep hygiene  - Trazodone 25mg QPM  - Melatonin 5mg QPM  # early AM AMS, intubated and sedated  - F/u CTH when stable for scanner    RESPIRATORY:   #Right distal main pulmonary embolus  - Lovenox 135mg Q12  - Echo 25: EF 70 to 75%.  - B/L LE Venous duplex -RIGHT PT DVT, no LEFT DVT  #Acute hypoxic respiratory failure  - Extubated   - Weaned from HFNC to 5L on , HFNC overnight  - Duonebs Q6  #Intermittent diuresis  #PMHx [place] on CPAP@ home   #Activity   - Activity- increase as tolerated   # early AM AMS, agonal breathing, reintubated   - Vent settings: 450/28/100/8  -  2AM AB.15/26/186/9/-18.2    CARDS:   #Acute hypotension in setting of SIRS, resolved   #Supraventricular tachycardia, Non-sustained ventricular tachycardia, Ventricular Premature Depolarization    Cardiology - Metoprolol 25mg Q12   #PMHx of HTN  - Amlodipine 10mg QD   - HCTZ 25mg QD HOLDING  - Losartan 100mg daily HOLDING  - EKG- NSR  - TTE : EF of 56%. G1DD.   #Acute hypotension , lactic acidosis (lactate 9, pH 7.15)  - Levophed, amauri gtt  - S/p sodium bicarb amp x 2, bicarb gtt running  - R femoral arterial line placed   - Wean pressors as tolerated    GASTROINTESTINAL/NUTRITION:   #4/10 open ventral hernia repair, small bowel resection, ileoileal anastomosis and primary fascial closure, 2 THONY Right  # takeback for exploratory laparotomy, repair of anastomotic leak at previous ileoileal anastomosis, resected, new ileoileal anastomosis created, 1 THONY Left (total 3)  # 8Fr pigtail into lower abdominal wall collection,150cc of foul appearing material aspirated, Attached to THONY bulb    CT  :right distal main PE, no heart strain, abdominal wall 9.7*6.2cm fluid collection, no oral contrast leak    - aspiration precautions, HOB 30  -  vac dressing changed at bedside by primary team, bilious drainage noted, pt switched from regular diet to full liquid, TPN to be ordered for  PM, f/u primary team regarding CTAP >  early AM reintubated, NPO, abdomen distended with increased THONY drain sanguinous output   - F/u CTAP when stable for scan  #Anterior abdominal wall collection  - : s/p IR percutaneous placement of a 8.5 Cymro drainage catheter into lower abdominal wall fluid collection, yielding 150 mL of foul appearing fluid.  #SBO s/p ventral hernia repair with SBR  #Diet, NPO  - TPN discontinued , to be reordered for  PM  #GI Prophylaxis/hx GERD  - Home pantoprazole 40mg PO QD HOLDING  #Bowel regimen  - Holding  - Last BM   - Dignishield in place     /RENAL:   #urine output in critically ill  - An replaced   - Strict I/Os  #FUNMI; oliguric, resolving   - Nephrology consulted and following > hold off on CVVH  - Renal u/s> no hydronephrosis. 2-3cm anechoic cyst on right kidney     Labs:   BUN/Cr- 43/1.1  -->,  42/1.3  -->  [ @ 02:10]Na  138 // K  5.2 // Mg  2.6 // Phos  6.1  [ @ 21:55]Na  140 // K  3.9 // Mg  1.9 // Phos  3.9  #Lactic acidosis  -  2AM ABG lactate 9.5     - Sodium bicarbonate amp x 2, gtt     HEME/ONC:   #Acute right pulmonary embolus  #RUE Venous Duplex -right cephalic DVT  #B/L LE Venous Duplex - right PT DVT    -Lovenox 135mg q12  - concern for malabsorption of Eliquis given small bowel resection per pharmacy  - vascular surgery consulted - AC with heparin drip (upon discharge transition to Eliquis 10 mg po BID x 7 days then 5 mg po BID for at least 6 months)  #Anemia  -  AM hgb 5.6 from 8.3, given 2U pRBC  - F/u post-transfusion CBC  T&S expires:     Labs: Hb/Hct:  8.3/26.6  ( @ 21:55)  -->,  5.6/19.2  ( @ 02:10)  -->                      Plts:  325  -->,  292  -->                 PTT/INR:  36.1/1.01  --->       ID:  #Intraabdominal anastomotic leak, fluid collection  - Continue meropenem 1g Q8 ( - )  - WBC continues to improve     -  Although Clx no growth, will keep meropenem for now      - Will plan to keep until repeat CT to re-evaluate abdomina fluid collections   #Culture    OR  - E Coli    OR cx: few E.coli, few bacteroides, rare clostridium     Blood Cx: NGTF     tracheal aspirate: no growth      body fluid cx rare e. coli      Blood - NGTD f/u final     Abdominal - Negative FINAL  WBC- 13.68  --->>,  11.11  --->>,  12.77  --->>, 9.92  Temp trend- 24hrs T(F): 97.2 ( @ 08:00), Max: 97.6 ( @ 04:00)  meropenem  IVPB 1000 every 8 hours    ENDOCRINE:  #Glycemic monitoring  - Q6 FSG while NPO  - ISS  - A1C 5.8%     MSK:  #Activity- increase as tolerated     SKIN:  #DTI screening negative   - Continue to monitor daily skin changes    LINES/DRAINS:  PIV, THONY drain x 2, IR drain x 1 Prevena Vac midline, RIJ TLC    ADVANCED DIRECTIVES:  Full Code  HCP- Daughter  (Shavonne Moss)  INDICATION FOR SICU/SDU: Intestinal obstruction  DISPO: SICU    Pending discussion with SICU attending Dr. Lane NANCY SAGRENT   [id number]   [birth year]  64yF  ============================================================   DATE OF INITIAL SICU/SDU CONSULT: 04-10-25    INDICATION FOR SICU CONSULT:  q1hr abdominal checks, mechanical ventilation    SICU COURSE EVENTS :  04-10 - admitted to SICU service  : Intubated and started on paralytic. Arterial line placed, subclavian TLC placed. Nephrology consulted for oliguria. IR abdominal muscle botox injection performed. TTE performed.   : Additional fluid boluses given; trial fo bumex started, considering CVVH. Weaned off nimbex gtt.  > 220 /hr. Renal U/S w/out hydro.   : Weaned off Levophed. Bumex gtt 2mg/hr > 1mg/hr. Goal net negative 1-1.5L, UOP approx, 200c/hr.   : Remained on bumex gtt for further diuresis, decreased to 0.5 overnight, vent settings weaned. Cr downtrending, LFTs worsening.   4/15: Extubated to BiPAP, transitioned to NC. Dc'd bumex gtt, given 5mg metolazone x 1 and 2mg bumex x 1.   : THONY drain #2 murky/bilious, pending CTAP with PO contrast, hypernatremia, started D5W @ 75cc/hr, persistent hypokalemia   : RTOR for resection of anastomosis for anastomotic leak. Returned intubated, 400/24/80/10, sinus tachy to . Abdomen soft. Was initially on propofol @ 30 and precedex, but propofol weaned off due to hypotension with MAPs in 50s, fentanyl ggt started for acute pain. Remained hypotensive despite fluids, started on Levophed, on 0.05.   : Extubated. HFNC 40L/49%, Levo off since 11 AM   : NGT removed. Started on duonebs. D5W increased to 60cc/hr. 1mg bumex, > 1.5L response  : Episode of afib RVR resolved with metoprolol 5mg IVP, cardiology c/s placed, recommending metoprolol 25mg q 12   : PE on CTA, therapeutic heparin gtt started. Started on cardebe gtt,   : Meropenem started per ID.  S/p IR percutaneous placement of a 8.5 [place] drainage catheter into lower abdominal wall fluid collection, yielding 150 mL of bowel appearing fluid. New left radial a-line placed. Off nicarrdipine gtt.   : Switched to therapeutic lovenox. Diet advanced to aubrey clears with ensures.  : advacned to bariatric FLD, downgraded to SDU  : TPN discontinued, diet advanced to full liquid , Right cephalic DVT prelim  : Right UE VA Duplex final no DVT, thrombophlebitis, Arterial line removed, 2L NC, midline vac changed, noted to have bilious drainage, RUQ drain pulled, upgraded to SICU status, plan to reorder TPN for  PM, diet changed to full liquid. 1:20 AM noted to be unresponsive, palpable femoral pulse, decision made to intubate, R femoral arterial line placed, started on levophed, amauri, bicarb amp x 2, bicarb gtt, propofol, 2U pRBC for hgb 5.9, plan for CTH and CTAP when stabilized     24Hour Events:    NIGHT  - Vac changed and RUQ drain pulled by primary  - HR 88, /65, 4L NC, soft abd, midline black vac sponge, L abd drain with sanguinous output, R drain with SS output, IR drain wtih serous output, plan for HFNC tonight, pulling 500cc on IS  - Primary: bilious drainage when changing vac, Dr. Lema wants DG cancelled, TPN restarted tomorrow evening (), change diet to full liquid  - 1g mag and 20 KCL repleted  - LUE duplex prelim with superficial thrombophlebitis, no DVT    DAY  - surgery to change prevena vac, d/c 2 out of 4 THONY drains  - d/c arterial line   -maintain TLC due to poor access,   - IR consult for PICC vs midline for outpatient antibiotics  - primary team to f/u with Dr. Snow regarding imaging plan since ID will use that to determine course duration  - PT for OOB  - Dignishield in place    [X] A ten-point review of systems was negative except as expressed in note.  [X ] History was obtained from patient. If unable to participate in their care, history was from review of the chart and collateral sources of information.  ================================================================================  ASSESSMENT & PLAN:  64F PMHx HTN, morbid obesity s/p  Abby-en-Y gastric bypass who presented on 4/3 with incarcerated ventral hernia with SBO. S/p 4/10 open ventral hernia repair, small bowel resection, and primary fascial closure with Dr. Lema. Admitted to SICU for Q1 abdominal checks and hemodynamic monitoring.  takeback for exploratory laparotomy, repair of anastomotic leak,  IR-guided 8Fr pigtail into lower abdominal wall collection,150cc of foul appearing material aspirated, Attached to THONY bulb.    NEUROLOGICAL:  #AMS, intubated and sedated  #Acute pain  - Fentanyl gtt  #Sedation  - Propofol gtt  #AMS  - Pending CT head  #Sleep hygiene  - HOLDING Trazodone 25mg QPM  - HOLDING Melatonin 5mg QPM    RESPIRATORY:   #Right distal main pulmonary embolus  - Lovenox 135mg Q12  - Echo 25: EF 70 to 75%.  - B/L LE Venous duplex -RIGHT PT DVT, no LEFT DVT  #Acute hypoxic respiratory failure  - Extubated , reintubated  AM for AMS and agonal breathing  - Vent settings: 450/28/100/8  -  2AM AB.15/26/186/9/-18.2    - 3:30AM AB.15/37/381/13/-15.0, lactate 7.8 from 9.5  #Intermittent diuresis  #PMHx [place] on CPAP@ home   #Activity   - Activity- increase as tolerated     CARDS:   #Acute hypotension in setting of SIRS, resolved   #Supraventricular tachycardia, Non-sustained ventricular tachycardia, Ventricular Premature Depolarization    Cardiology - Metoprolol 25mg Q12   #PMHx of HTN  - Amlodipine 10mg QD HOLDING  - HCTZ 25mg QD HOLDING  - Losartan 100mg daily HOLDING  - EKG- NSR  - TTE : EF of 56%. G1DD.   #Acute hypotension , lactic acidosis (lactate 9, pH 7.15)  - Levophed, amauri gtt  - S/p sodium bicarb amp x 2, bicarb gtt running  - R femoral arterial line placed   - Wean pressors as tolerated    GASTROINTESTINAL/NUTRITION:   #4/10 open ventral hernia repair, small bowel resection, ileoileal anastomosis and primary fascial closure, 2 THONY Right  # takeback for exploratory laparotomy, repair of anastomotic leak at previous ileoileal anastomosis, resected, new ileoileal anastomosis created, 1 THONY Left (total 3)  # 8Fr pigtail into lower abdominal wall collection,150cc of foul appearing material aspirated, Attached to THONY bulb    CT  :right distal main PE, no heart strain, abdominal wall 9.7*6.2cm fluid collection, no oral contrast leak    - aspiration precautions, HOB 30  -  vac dressing changed at bedside by primary team, bilious drainage noted, pt switched from regular diet to full liquid, TPN to be ordered for  PM, f/u primary team regarding CTAP   -  early AM reintubated, NPO, abdomen distended with increased THONY drain sanguinous output   - F/u CTAP when stable for scan  #Anterior abdominal wall collection  - : s/p IR percutaneous placement of a 8.5 [place]an drainage catheter into lower abdominal wall fluid collection, yielding 150 mL of foul appearing fluid.  #SBO s/p ventral hernia repair with SBR  #Diet, NPO  - TPN discontinued , to be reordered for  PM  #GI Prophylaxis/hx GERD  - Home pantoprazole 40mg PO QD HOLDING  #Bowel regimen  - Holding  - Last BM   - Dignishield in place     /RENAL:   #urine output in critically ill  - An replaced   - Strict I/Os  #FUNMI; oliguric, resolving   - Nephrology consulted and following > hold off on CVVH  - Renal u/s> no hydronephrosis. 2-3cm anechoic cyst on right kidney     Labs:   BUN/Cr- 43/1.1  -->,  42/1.3  -->  [ @ 02:10]Na  138 // K  5.2 // Mg  2.6 // Phos  6.1  [ @ 21:55]Na  140 // K  3.9 // Mg  1.9 // Phos  3.9  #Lactic acidosis  -  2AM ABG lactate 9.5     - Sodium bicarbonate amp x 2, gtt     HEME/ONC:   #Acute right pulmonary embolus  #RUE Venous Duplex -right cephalic DVT  #B/L LE Venous Duplex - right PT DVT    -Lovenox 135mg q12  - concern for malabsorption of Eliquis given small bowel resection per pharmacy  - vascular surgery consulted - AC with heparin drip (upon discharge transition to Eliquis 10 mg po BID x 7 days then 5 mg po BID for at least 6 months)  #Anemia  -  AM hgb 5.6 from 8.3, given 2U pRBC  - F/u post-transfusion CBC  T&S expires:     Labs: Hb/Hct:  8.3/26.6  ( @ 21:55)  -->,  5.6/19.2  ( @ 02:10)  -->                      Plts:  325  -->,  292  -->                 PTT/INR:  36.1/1.01  --->       ID:  #Intraabdominal anastomotic leak, fluid collection  - Continue meropenem 1g Q8 ( - )  - WBC continues to improve     -  Although Clx no growth, will keep meropenem for now      - Will plan to keep until repeat CT to re-evaluate abdomina fluid collections   #Culture    OR  - E Coli    OR cx: few E.coli, few bacteroides, rare clostridium     Blood Cx: NGTF     tracheal aspirate: no growth      body fluid cx rare e. coli      Blood - NGTD f/u final     Abdominal - Negative FINAL  WBC- 13.68  --->>,  11.11  --->>,  12.77  --->>, 9.92  Temp trend- 24hrs T(F): 97.2 ( @ 08:00), Max: 97.6 ( @ 04:00)  meropenem  IVPB 1000 every 8 hours    ENDOCRINE:  #Glycemic monitoring  - Q6 FSG while NPO  - ISS  - A1C 5.8%     MSK:  #Activity- increase as tolerated     SKIN:  #DTI screening negative   - Continue to monitor daily skin changes    LINES/DRAINS:  PIV, THONY drain x 2, IR drain x 1 Prevena Vac midline, RIJ TLC    ADVANCED DIRECTIVES:  Full Code  HCP- Daughter  (Shavonne Moss)  INDICATION FOR SICU/SDU: Intestinal obstruction  DISPO: SICU    Pending discussion with SICU attending Dr. Lane NANCY SARGENT   [id number]   [birth year]  64yF  ============================================================   DATE OF INITIAL SICU/SDU CONSULT: 04-10-25    INDICATION FOR SICU CONSULT:  q1hr abdominal checks, mechanical ventilation    SICU COURSE EVENTS :  04-10 - admitted to SICU service  4/11: Intubated and started on paralytic. Arterial line placed, subclavian TLC placed. Nephrology consulted for oliguria. IR abdominal muscle botox injection performed. TTE performed.   4/12: Additional fluid boluses given; trial fo bumex started, considering CVVH. Weaned off nimbex gtt.  > 220 /hr. Renal U/S w/out hydro.   4/13: Weaned off Levophed. Bumex gtt 2mg/hr > 1mg/hr. Goal net negative 1-1.5L, UOP approx, 200c/hr.   4/14: Remained on bumex gtt for further diuresis, decreased to 0.5 overnight, vent settings weaned. Cr downtrending, LFTs worsening.   4/15: Extubated to BiPAP, transitioned to NC. Dc'd bumex gtt, given 5mg metolazone x 1 and 2mg bumex x 1.   4/16: THONY drain #2 murky/bilious, pending CTAP with PO contrast, hypernatremia, started D5W @ 75cc/hr, persistent hypokalemia   4/17: RTOR for resection of anastomosis for anastomotic leak. Returned intubated, 400/24/80/10, sinus tachy to . Abdomen soft. Was initially on propofol @ 30 and precedex, but propofol weaned off due to hypotension with MAPs in 50s, fentanyl ggt started for acute pain. Remained hypotensive despite fluids, started on Levophed, on 0.05.   4/18: Extubated. HFNC 40L/49%, Levo off since 11 AM   4/19: NGT removed. Started on duonebs. D5W increased to 60cc/hr. 1mg bumex, > 1.5L response  4/20: Episode of afib RVR resolved with metoprolol 5mg IVP, cardiology c/s placed, recommending metoprolol 25mg q 12   4/21: PE on CTA, therapeutic heparin gtt started. Started on cardebe gtt,   4/22: Meropenem started per ID.  S/p IR percutaneous placement of a 8.5 English drainage catheter into lower abdominal wall fluid collection, yielding 150 mL of bowel appearing fluid. New left radial a-line placed. Off nicarrdipine gtt.   4/23: Switched to therapeutic lovenox. Diet advanced to aubrey clears with ensures.  4/24: advacned to bariatric FLD, downgraded to SDU  4/25: TPN discontinued, diet advanced to full liquid , Right cephalic DVT prelim  4/26: Right UE VA Duplex final no DVT, thrombophlebitis, Arterial line removed, 2L NC, midline vac changed, noted to have bilious drainage, RUQ drain pulled, upgraded to SICU status, plan to reorder TPN for 4/27 PM, diet changed to full liquid. 1:20 AM noted to be unresponsive, palpable femoral pulse, decision made to intubate, R femoral arterial line placed, started on levophed, amauri, bicarb amp x 2, bicarb gtt, propofol, 2U pRBC for hgb 5.9, plan for CTH and CTAP when stabilized     24Hour Events:  4/26  NIGHT  - Vac changed and RUQ drain pulled by primary  - HR 88, /65, 4L NC, soft abd, midline black vac sponge, L abd drain with sanguinous output, R drain with SS output, IR drain wtih serous output, plan for HFNC tonight, pulling 500cc on IS  - Primary: bilious drainage when changing vac, Dr. Lema wants DG cancelled, TPN restarted tomorrow evening (4/27), change diet to full liquid  - 1g mag and 20 KCL repleted  - LUE duplex prelim with superficial thrombophlebitis, no DVT    DAY  - surgery to change prevena vac, d/c 2 out of 4 THONY drains  - d/c arterial line   -maintain TLC due to poor access,   - IR consult for PICC vs midline for outpatient antibiotics  - primary team to f/u with Dr. Snow regarding imaging plan since ID will use that to determine course duration  - PT for OOB  - Dignishield in place    [X] A ten-point review of systems was negative except as expressed in note.  [X ] History was obtained from patient. If unable to participate in their care, history was from review of the chart and collateral sources of information.  ================================================================================      ACTIVE MEDICATIONS  albuterol/ipratropium for Nebulization 3 milliLiter(s) Nebulizer every 6 hours  chlorhexidine 0.12% Liquid 15 milliLiter(s) Oral Mucosa every 12 hours  chlorhexidine 2% Cloths 1 Application(s) Topical <User Schedule>  famotidine Injectable 20 milliGRAM(s) IV Push daily  fentaNYL   Infusion. 0.5 MICROgram(s)/kG/Hr IV Continuous <Continuous>  insulin lispro (ADMELOG) corrective regimen sliding scale   SubCutaneous every 6 hours  iohexol 300 mG (iodine)/mL Oral Solution 30 milliLiter(s) Oral once  meropenem  IVPB      meropenem  IVPB 1000 milliGRAM(s) IV Intermittent every 8 hours  norepinephrine Infusion 0.05 MICROgram(s)/kG/Min IV Continuous <Continuous>  phenylephrine    Infusion 0.1 MICROgram(s)/kG/Min IV Continuous <Continuous>  propofol Infusion 10 MICROgram(s)/kG/Min IV Continuous <Continuous>  sodium bicarbonate  Infusion 0.133 mEq/kG/Hr IV Continuous <Continuous>  vasopressin Infusion. 0.04 Unit(s)/Min IV Continuous <Continuous>     VITALS LAST 24 HOURS   T(F): 95.9 (04-27 @ 03:45), Max: 98 (04-26 @ 20:00)  HR: 93 (04-27 @ 04:30) (75 - 103)  BP: 135/70 (04-27 @ 00:00) (133/65 - 135/77)  BP(mean): 90 (04-27 @ 00:00) (90 - 99)  ABP: 141/62 (04-27 @ 04:30) (12/8 - 185/72)  ABP(mean): 76 (04-27 @ 04:30) (9 - 94)  RR: 25 (04-27 @ 01:00) (23 - 28)  SpO2: 90% (04-27 @ 04:30) (80% - 100%)  FLUID STATUS    04-26-25 @ 07:01  -  04-27-25 @ 07:00  --------------------------------------------------------  IN:    FentaNYL: 40.5 mL    IV PiggyBack: 100 mL    IV PiggyBack: 100 mL    Norepinephrine: 101.7 mL    Oral Fluid: 170 mL    Phenylephrine: 12.7 mL    Propofol: 85.5 mL    Sodium Bicarbonate: 150 mL  Total IN: 760.4 mL    OUT:    Bulb (mL): 25 mL    Bulb (mL): 0 mL    Bulb (mL): 0 mL    Bulb (mL): 0 mL    Rectal Tube (mL): 400 mL    VAC (Vacuum Assisted Closure) System (mL): 0 mL    Voided (mL): 1500 mL  Total OUT: 1925 mL    Total NET: -1164.6 mL        MECHANICAL VENT/BLOOD GAS  RR (machine): 28, TV (machine): 450, FiO2: 50, PEEP: 8  04-27 @ 06:26--7.23 / 36 / 119 / 15 / [id number] 98.2 /  iCal 1.07 /Lactate 5.4   04-27 @ 03:28--7.15 / 37 / 381 / 13 / [id number] 98.8 /  iCal 1.03 /Lactate 7.8      PHYSICAL EXAM:             NANCY SARGENT   [id number]   [birth year]  64yF  ============================================================   DATE OF INITIAL SICU/SDU CONSULT: 04-10-25    INDICATION FOR SICU CONSULT:  q1hr abdominal checks, mechanical ventilation    SICU COURSE EVENTS :  04-10 - admitted to SICU service  4/11: Intubated and started on paralytic. Arterial line placed, subclavian TLC placed. Nephrology consulted for oliguria. IR abdominal muscle botox injection performed. TTE performed.   4/12: Additional fluid boluses given; trial fo bumex started, considering CVVH. Weaned off nimbex gtt.  > 220 /hr. Renal U/S w/out hydro.   4/13: Weaned off Levophed. Bumex gtt 2mg/hr > 1mg/hr. Goal net negative 1-1.5L, UOP approx, 200c/hr.   4/14: Remained on bumex gtt for further diuresis, decreased to 0.5 overnight, vent settings weaned. Cr downtrending, LFTs worsening.   4/15: Extubated to BiPAP, transitioned to NC. Dc'd bumex gtt, given 5mg metolazone x 1 and 2mg bumex x 1.   4/16: THONY drain #2 murky/bilious, pending CTAP with PO contrast, hypernatremia, started D5W @ 75cc/hr, persistent hypokalemia   4/17: RTOR for resection of anastomosis for anastomotic leak. Returned intubated, 400/24/80/10, sinus tachy to . Abdomen soft. Was initially on propofol @ 30 and precedex, but propofol weaned off due to hypotension with MAPs in 50s, fentanyl ggt started for acute pain. Remained hypotensive despite fluids, started on Levophed, on 0.05.   4/18: Extubated. HFNC 40L/49%, Levo off since 11 AM   4/19: NGT removed. Started on duonebs. D5W increased to 60cc/hr. 1mg bumex, > 1.5L response  4/20: Episode of afib RVR resolved with metoprolol 5mg IVP, cardiology c/s placed, recommending metoprolol 25mg q 12   4/21: PE on CTA, therapeutic heparin gtt started. Started on cardebe gtt,   4/22: Meropenem started per ID.  S/p IR percutaneous placement of a 8.5 [place]ian drainage catheter into lower abdominal wall fluid collection, yielding 150 mL of bowel appearing fluid. New left radial a-line placed. Off nicarrdipine gtt.   4/23: Switched to therapeutic lovenox. Diet advanced to aubrey clears with ensures.  4/24: advacned to bariatric FLD, downgraded to SDU  4/25: TPN discontinued, diet advanced to full liquid , Right cephalic DVT prelim  4/26: Right UE VA Duplex final no DVT, thrombophlebitis, Arterial line removed, 2L NC, midline vac changed, noted to have bilious drainage, RUQ drain pulled, upgraded to SICU status, plan to reorder TPN for 4/27 PM, diet changed to full liquid. 1:20 AM noted to be unresponsive, palpable femoral pulse, decision made to intubate, R femoral arterial line placed, started on levophed, amauri, bicarb amp x 2, bicarb gtt, propofol, 2U pRBC for hgb 5.9, plan for CTH and CTAP when stabilized     24Hour Events:  4/26  NIGHT  - Vac changed and RUQ drain pulled by primary  - HR 88, /65, 4L NC, soft abd, midline black vac sponge, L abd drain with sanguinous output, R drain with SS output, IR drain wtih serous output, plan for HFNC tonight, pulling 500cc on IS  - Primary: bilious drainage when changing vac, Dr. Lema wants DG cancelled, TPN restarted tomorrow evening (4/27), change diet to full liquid  - 1g mag and 20 KCL repleted  - LUE duplex prelim with superficial thrombophlebitis, no DVT    DAY  - surgery to change prevena vac, d/c 2 out of 4 THONY drains  - d/c arterial line   -maintain TLC due to poor access,   - IR consult for PICC vs midline for outpatient antibiotics  - primary team to f/u with Dr. Snow regarding imaging plan since ID will use that to determine course duration  - PT for OOB  - Dignishield in place    [X] A ten-point review of systems was negative except as expressed in note.  [X ] History was obtained from patient. If unable to participate in their care, history was from review of the chart and collateral sources of information.  ================================================================================      ACTIVE MEDICATIONS  albuterol/ipratropium for Nebulization 3 milliLiter(s) Nebulizer every 6 hours  chlorhexidine 0.12% Liquid 15 milliLiter(s) Oral Mucosa every 12 hours  chlorhexidine 2% Cloths 1 Application(s) Topical <User Schedule>  famotidine Injectable 20 milliGRAM(s) IV Push daily  fentaNYL   Infusion. 0.5 MICROgram(s)/kG/Hr IV Continuous <Continuous>  insulin lispro (ADMELOG) corrective regimen sliding scale   SubCutaneous every 6 hours  iohexol 300 mG (iodine)/mL Oral Solution 30 milliLiter(s) Oral once  meropenem  IVPB      meropenem  IVPB 1000 milliGRAM(s) IV Intermittent every 8 hours  norepinephrine Infusion 0.05 MICROgram(s)/kG/Min IV Continuous <Continuous>  phenylephrine    Infusion 0.1 MICROgram(s)/kG/Min IV Continuous <Continuous>  propofol Infusion 10 MICROgram(s)/kG/Min IV Continuous <Continuous>  sodium bicarbonate  Infusion 0.133 mEq/kG/Hr IV Continuous <Continuous>  vasopressin Infusion. 0.04 Unit(s)/Min IV Continuous <Continuous>     VITALS LAST 24 HOURS   T(F): 95.9 (04-27 @ 03:45), Max: 98 (04-26 @ 20:00)  HR: 93 (04-27 @ 04:30) (75 - 103)  BP: 135/70 (04-27 @ 00:00) (133/65 - 135/77)  BP(mean): 90 (04-27 @ 00:00) (90 - 99)  ABP: 141/62 (04-27 @ 04:30) (12/8 - 185/72)  ABP(mean): 76 (04-27 @ 04:30) (9 - 94)  RR: 25 (04-27 @ 01:00) (23 - 28)  SpO2: 90% (04-27 @ 04:30) (80% - 100%)  FLUID STATUS    04-26-25 @ 07:01  -  04-27-25 @ 07:00  --------------------------------------------------------  IN:    FentaNYL: 40.5 mL    IV PiggyBack: 100 mL    IV PiggyBack: 100 mL    Norepinephrine: 101.7 mL    Oral Fluid: 170 mL    Phenylephrine: 12.7 mL    Propofol: 85.5 mL    Sodium Bicarbonate: 150 mL  Total IN: 760.4 mL    OUT:    Bulb (mL): 25 mL    Bulb (mL): 0 mL    Bulb (mL): 0 mL    Bulb (mL): 0 mL    Rectal Tube (mL): 400 mL    VAC (Vacuum Assisted Closure) System (mL): 0 mL    Voided (mL): 1500 mL  Total OUT: 1925 mL    Total NET: -1164.6 mL        MECHANICAL VENT/BLOOD GAS  RR (machine): 28, TV (machine): 450, FiO2: 50, PEEP: 8  04-27 @ 06:26--7.23 / 36 / 119 / 15 / [id number] 98.2 /  iCal 1.07 /Lactate 5.4   04-27 @ 03:28--7.15 / 37 / 381 / 13 / [id number] 98.8 /  iCal 1.03 /Lactate 7.8      PHYSICAL EXAM:      intubated  awake, alert, following commands, moving all 4 extremities  b/l pupils reactive  right 4mm left 3mm  brain stem reflexes intact  abdomen soft  Left THONY off suction, bloody output  right  LQ THONY suction, serous drainage  abdominal IR drain THONY purulent, minimal output, non bloody  right IJ TLC, right femoral arterial line  indwelling An catheter in place

## 2025-04-27 NOTE — PROCEDURE NOTE - NSPROCNAME_GEN_A_CORE
Interventional Radiology
Arterial Puncture/Cannulation
Interventional Radiology
Midline Insertion
Interventional Radiology
Central Line Insertion
Arterial Puncture/Cannulation

## 2025-04-27 NOTE — CHART NOTE - NSCHARTNOTEFT_GEN_A_CORE
NANCY SARGENT   [id number] (1960)      Patient turned over by Dr Lane from critical care with concern for ongoing hemorrhage in the abdominal cavity while being anticoagulated for a PE. She underwent blood transfusion and was noted to have no change in hemoglobin from 9 to 9.2 despite a non therapeutic visceral angiogram. The patient is noted to require higher doses of vasopressors and worsening lactic acidosis. Concern for ongoing hemorrhage relayed to the primary surgical service.    T(C): 36.3 (04-27), Max: 36.7 (04-26)  HR: 97 (04-27) (75 - 105)  BP: 124/57 (04-27) (91/55 - 163/62)  BP(mean): 82 (04-27) (69 - 94)  ABP: 110/43 (04-27) (12/8 - 185/72)  ABP(mean): 56 (04-27) (9 - 94)  RR: 28 (04-27) (23 - 40)  SpO2: 99% (04-27) (80% - 100%)    Physical Exam:  abdomen soft  left sided THONY drain bloody  Prevena vac on suction, non bloody suction  indwelling pierce catheter    RR (machine): 28, TV (machine): 450, FiO2: 40, PEEP: 8  04-27 @ 18:21--7.28 / 32 / 83 / 15 / [id number] 97.6 /  iCal 1.03 /Lactate 4.2   04-27 @ 13:44--7.31 / 35 / 138 / 18 / [id number] 98.8 /  iCal 1.00 /Lactate 2.0     Labs: Hb/Hct:  9.0/28.4  (04-27 @ 10:34)  -->,  9.2/27.6  (04-27 @ 17:00)  -->                      Plts:  387  -->,  318  -->                 PTT/INR:  27.4/1.13  --->,  26.5/1.15  --->       LFT 04-27 @ 17:00  Alb: 2.8   / Pro: 5.1   / ALK PHOS: 101   / ALT: 1617  / AST: 1581  / DBili 0.5   / TBili 0.7       LFT 04-27 @ 02:10  Alb: 2.1   / Pro: 3.9   / ALK PHOS: 84    / ALT: 70    / AST: 44    / DBili **    / TBili 0.3         A/P  -Concern about ongoing hemorrhage despite negative angio because of worsening shock  -Left THONY bulb placed back on suction, with bloody output noted  -Shock liver with elevated ALT/AST  -Lactate elevated 4.2, previously 2,   -Bicarbonate gtt  -Hg still at 9 with 2u of PRBC 1plt 1uFFP  -Vasopressor levophed .38 -> .50 continues to increase to maintain   -Events discussed with Dr. Snow and family.   -Plan for return to OR for emergent re-exploration for hemorrhage control NANCY SARGENT   [id number] (1960)      Patient turned over by Dr Lane from critical care with concern for ongoing hemorrhage in the abdominal cavity while being anticoagulated for a PE. She underwent blood transfusion and was noted to have no change in hemoglobin from 9 to 9.2 despite a non therapeutic visceral angiogram. The patient is noted to require higher doses of vasopressors and worsening lactic acidosis. Concern for ongoing hemorrhage relayed to the primary surgical service.    T(C): 36.3 (04-27), Max: 36.7 (04-26)  HR: 97 (04-27) (75 - 105)  BP: 124/57 (04-27) (91/55 - 163/62)  BP(mean): 82 (04-27) (69 - 94)  ABP: 110/43 (04-27) (12/8 - 185/72)  ABP(mean): 56 (04-27) (9 - 94)  RR: 28 (04-27) (23 - 40)  SpO2: 99% (04-27) (80% - 100%)    Physical Exam:  abdomen soft  left sided THONY drain bloody  Prevena vac on suction, non bloody suction  indwelling pierce catheter    RR (machine): 28, TV (machine): 450, FiO2: 40, PEEP: 8  04-27 @ 18:21--7.28 / 32 / 83 / 15 / Ozt07Bse 97.6 /  iCal 1.03 /Lactate 4.2   04-27 @ 13:44--7.31 / 35 / 138 / 18 / [id number] 98.8 /  iCal 1.00 /Lactate 2.0     Labs: Hb/Hct:  9.0/28.4  (04-27 @ 10:34)  -->,  9.2/27.6  (04-27 @ 17:00)  -->                      Plts:  387  -->,  318  -->                 PTT/INR:  27.4/1.13  --->,  26.5/1.15  --->       LFT 04-27 @ 17:00  Alb: 2.8   / Pro: 5.1   / ALK PHOS: 101   / ALT: 1617  / AST: 1581  / DBili 0.5   / TBili 0.7       LFT 04-27 @ 02:10  Alb: 2.1   / Pro: 3.9   / ALK PHOS: 84    / ALT: 70    / AST: 44    / DBili **    / TBili 0.3         A/P  -Concern about ongoing hemorrhage despite negative angio because of worsening shock  -Left THONY bulb placed back on suction, with bloody output noted  -Shock liver with elevated ALT/AST  -Lactate elevated 4.2, previously 2,   -Bicarbonate gtt  -Hg still at 9 with 2u of PRBC 1plt 1uFFP  -Vasopressor levophed .38 -> .50 continues to increase to maintain   -Events discussed with Dr. Snow and family.   -Plan for return to OR for emergent re-exploration for hemorrhage control    This patient has a high probability of sudden, clinically significant deterioration, which requires the highest level of physician preparedness to intervene urgently. I managed/supervised life or organ supporting interventions that required frequent physician assessment. I devoted my full attention in the ICU to the direct care of this patient for the period of time indicated below. Time I spent with the family or surrogate(s) is included only if the patient was incapable of providing the necessary information or participating in medical decision making. Time devoted to teaching and to any procedures I billed separately is not included.     Patient examined and evaluated at the bedside with SICU team. I performed a medically appropriate exam. Pertinent findings are detailed below. Vital signs, laboratory work, and imaging reviewed.     critical care time 120 min  Werner Johnson MD, FACS  Trauma/EGS and SICU Attending

## 2025-04-27 NOTE — PROCEDURE NOTE - NSICDXPROCEDURE_GEN_ALL_CORE_FT
PROCEDURES:  Insertion of arterial line with imaging guidance 27-Apr-2025 03:27:08  Samuel Gonzalez  
PROCEDURES:  Insertion, arterial line, percutaneous 11-Apr-2025 11:01:53  Destiny Garcia  

## 2025-04-27 NOTE — PROCEDURE NOTE - PROCEDURE FINDINGS AND DETAILS
US guided intra abdominal muscle botox injection -300 units in 150 ml of normal saline
8fr pigtail into lower abdominal wall collection.    150cc of foul appearing material aspirated and sent for C & S    Attatched to THONY bulb    Monitor output daily.
No active bleed

## 2025-04-27 NOTE — CHART NOTE - NSCHARTNOTEFT_GEN_A_CORE
ROSETTANANCY   [id number] (1960)    Event:  s/p IR angiogram of SMA  no active extravasation seen  Time: 19mins  LR 250cc  50 of rocuronium      T(C): 36.3 (04-27), Max: 36.7 (04-26)  HR: 97 (04-27) (75 - 105)  BP: 124/57 (04-27) (91/55 - 163/62)  BP(mean): 82 (04-27) (69 - 94)  ABP: 110/43 (04-27) (12/8 - 185/72)  ABP(mean): 56 (04-27) (9 - 94)  RR: 28 (04-27) (23 - 40)  SpO2: 99% (04-27) (80% - 100%)    Physical Exam:  abdomen soft  left sided THONY drain bloody  Prevena vac on suction, non bloody suction  indwelling pierce catheter    RR (machine): 28, TV (machine): 450, FiO2: 40, PEEP: 8  04-27 @ 18:21--7.28 / 32 / 83 / 15 / Sgf21Cxt 97.6 /  iCal 1.03 /Lactate 4.2   04-27 @ 13:44--7.31 / 35 / 138 / 18 / Ubi06Pij 98.8 /  iCal 1.00 /Lactate 2.0     Labs: Hb/Hct:  9.0/28.4  (04-27 @ 10:34)  -->,  9.2/27.6  (04-27 @ 17:00)  -->                      Plts:  387  -->,  318  -->                 PTT/INR:  27.4/1.13  --->,  26.5/1.15  --->         A/P  -Trend troponin  -Left THONY bulb placed back on suction  -STAT  -Hg still at 9 with 2u of PRBC  -Vasopressor levophed .38 -> .50  -Case discussed with Dr. Snow and Dr. Johnson, plan for return to OR NANCY SARGENT   [id number] (1960)    Event:  s/p IR angiogram of SMA  no active extravasation seen  Time: 19mins  LR 250cc  50 of rocuronium      T(C): 36.3 (04-27), Max: 36.7 (04-26)  HR: 97 (04-27) (75 - 105)  BP: 124/57 (04-27) (91/55 - 163/62)  BP(mean): 82 (04-27) (69 - 94)  ABP: 110/43 (04-27) (12/8 - 185/72)  ABP(mean): 56 (04-27) (9 - 94)  RR: 28 (04-27) (23 - 40)  SpO2: 99% (04-27) (80% - 100%)    Physical Exam:  abdomen soft  left sided THONY drain bloody  Prevena vac on suction, non bloody suction  indwelling pierce catheter    RR (machine): 28, TV (machine): 450, FiO2: 40, PEEP: 8  04-27 @ 18:21--7.28 / 32 / 83 / 15 / Qet90Hqv 97.6 /  iCal 1.03 /Lactate 4.2   04-27 @ 13:44--7.31 / 35 / 138 / 18 / Afw26Tkb 98.8 /  iCal 1.00 /Lactate 2.0     Labs: Hb/Hct:  9.0/28.4  (04-27 @ 10:34)  -->,  9.2/27.6  (04-27 @ 17:00)  -->                      Plts:  387  -->,  318  -->                 PTT/INR:  27.4/1.13  --->,  26.5/1.15  --->       LFT 04-27 @ 17:00  Alb: 2.8   / Pro: 5.1   / ALK PHOS: 101   / ALT: 1617  / AST: 1581  / DBili 0.5   / TBili 0.7       LFT 04-27 @ 02:10  Alb: 2.1   / Pro: 3.9   / ALK PHOS: 84    / ALT: 70    / AST: 44    / DBili **    / TBili 0.3           A/P  -Trend troponin  -Left THONY bulb placed back on suction, decreased output compared to AM  -Shock liver with elevated ALT/AST  -Lactate elevated 4.2, previously 2, most likely secondary to stoppage of bicarbonate gtt/shock liver/active bleed  -Bicarbonate gtt  -Hg still at 9 with 2u of PRBC 1plt 1uFFP  -Vasopressor levophed .38 -> .50 continues to increase to maintain   -Case discussed with Dr. Snow and Dr. Johnson, plan for return to OR for exploration

## 2025-04-27 NOTE — PHARMACOTHERAPY INTERVENTION NOTE - INTERVENTION TYPE RECOOMEND
Dose Optimization/Non-renal Dose Adjustment
Dose Adjustment - Renal Dose
Dose Optimization/Non-renal Dose Adjustment
IV to PO
Dose Optimization/Non-renal Dose Adjustment

## 2025-04-27 NOTE — PROCEDURE NOTE - PROCEDURE DATE TIME, MLM
11-Apr-2025 13:30
22-Apr-2025 15:21
22-Apr-2025 03:42
11-Apr-2025 14:25
27-Apr-2025 15:41
22-Apr-2025 17:45
11-Apr-2025 10:59
09-Apr-2025 19:20
18-Apr-2025 05:42

## 2025-04-28 ENCOUNTER — RESULT REVIEW (OUTPATIENT)
Age: 65
End: 2025-04-28

## 2025-04-28 LAB
ALBUMIN SERPL ELPH-MCNC: 2.7 G/DL — LOW (ref 3.5–5.2)
ALBUMIN SERPL ELPH-MCNC: 3.2 G/DL — LOW (ref 3.5–5.2)
ALP SERPL-CCNC: 101 U/L — SIGNIFICANT CHANGE UP (ref 30–115)
ALP SERPL-CCNC: 87 U/L — SIGNIFICANT CHANGE UP (ref 30–115)
ALT FLD-CCNC: 1133 U/L — HIGH (ref 0–41)
ALT FLD-CCNC: 1746 U/L — HIGH (ref 0–41)
ANION GAP SERPL CALC-SCNC: 15 MMOL/L — HIGH (ref 7–14)
ANION GAP SERPL CALC-SCNC: 16 MMOL/L — HIGH (ref 7–14)
ANION GAP SERPL CALC-SCNC: 17 MMOL/L — HIGH (ref 7–14)
APTT BLD: 26 SEC — LOW (ref 27–39.2)
AST SERPL-CCNC: 1837 U/L — HIGH (ref 0–41)
AST SERPL-CCNC: 691 U/L — HIGH (ref 0–41)
BASOPHILS # BLD AUTO: 0.03 K/UL — SIGNIFICANT CHANGE UP (ref 0–0.2)
BASOPHILS # BLD AUTO: 0.17 K/UL — SIGNIFICANT CHANGE UP (ref 0–0.2)
BASOPHILS NFR BLD AUTO: 0.2 % — SIGNIFICANT CHANGE UP (ref 0–1)
BASOPHILS NFR BLD AUTO: 0.7 % — SIGNIFICANT CHANGE UP (ref 0–1)
BILIRUB DIRECT SERPL-MCNC: 0.3 MG/DL — SIGNIFICANT CHANGE UP (ref 0–0.3)
BILIRUB DIRECT SERPL-MCNC: 0.5 MG/DL — HIGH (ref 0–0.3)
BILIRUB INDIRECT FLD-MCNC: 0.1 MG/DL — LOW (ref 0.2–1.2)
BILIRUB INDIRECT FLD-MCNC: 0.2 MG/DL — SIGNIFICANT CHANGE UP (ref 0.2–1.2)
BILIRUB SERPL-MCNC: 0.4 MG/DL — SIGNIFICANT CHANGE UP (ref 0.2–1.2)
BILIRUB SERPL-MCNC: 0.7 MG/DL — SIGNIFICANT CHANGE UP (ref 0.2–1.2)
BUN SERPL-MCNC: 56 MG/DL — HIGH (ref 10–20)
BUN SERPL-MCNC: 58 MG/DL — HIGH (ref 10–20)
BUN SERPL-MCNC: 65 MG/DL — CRITICAL HIGH (ref 10–20)
C DIFF GDH STL QL: NEGATIVE — SIGNIFICANT CHANGE UP
C DIFF GDH STL QL: SIGNIFICANT CHANGE UP
CALCIUM SERPL-MCNC: 8 MG/DL — LOW (ref 8.4–10.5)
CALCIUM SERPL-MCNC: 8.3 MG/DL — LOW (ref 8.4–10.5)
CALCIUM SERPL-MCNC: 8.4 MG/DL — SIGNIFICANT CHANGE UP (ref 8.4–10.5)
CHLORIDE SERPL-SCNC: 107 MMOL/L — SIGNIFICANT CHANGE UP (ref 98–110)
CHLORIDE SERPL-SCNC: 107 MMOL/L — SIGNIFICANT CHANGE UP (ref 98–110)
CHLORIDE SERPL-SCNC: 108 MMOL/L — SIGNIFICANT CHANGE UP (ref 98–110)
CO2 SERPL-SCNC: 19 MMOL/L — SIGNIFICANT CHANGE UP (ref 17–32)
CO2 SERPL-SCNC: 19 MMOL/L — SIGNIFICANT CHANGE UP (ref 17–32)
CO2 SERPL-SCNC: 20 MMOL/L — SIGNIFICANT CHANGE UP (ref 17–32)
CREAT SERPL-MCNC: 2.5 MG/DL — HIGH (ref 0.7–1.5)
CREAT SERPL-MCNC: 2.7 MG/DL — HIGH (ref 0.7–1.5)
CREAT SERPL-MCNC: 2.9 MG/DL — HIGH (ref 0.7–1.5)
EGFR: 18 ML/MIN/1.73M2 — LOW
EGFR: 18 ML/MIN/1.73M2 — LOW
EGFR: 19 ML/MIN/1.73M2 — LOW
EGFR: 19 ML/MIN/1.73M2 — LOW
EGFR: 21 ML/MIN/1.73M2 — LOW
EGFR: 21 ML/MIN/1.73M2 — LOW
EOSINOPHIL # BLD AUTO: 0 K/UL — SIGNIFICANT CHANGE UP (ref 0–0.7)
EOSINOPHIL # BLD AUTO: 0 K/UL — SIGNIFICANT CHANGE UP (ref 0–0.7)
EOSINOPHIL NFR BLD AUTO: 0 % — SIGNIFICANT CHANGE UP (ref 0–8)
EOSINOPHIL NFR BLD AUTO: 0 % — SIGNIFICANT CHANGE UP (ref 0–8)
GAS PNL BLDA: SIGNIFICANT CHANGE UP
GAS PNL BLDA: SIGNIFICANT CHANGE UP
GLUCOSE BLDC GLUCOMTR-MCNC: 106 MG/DL — HIGH (ref 70–99)
GLUCOSE BLDC GLUCOMTR-MCNC: 135 MG/DL — HIGH (ref 70–99)
GLUCOSE BLDC GLUCOMTR-MCNC: 154 MG/DL — HIGH (ref 70–99)
GLUCOSE BLDC GLUCOMTR-MCNC: 154 MG/DL — HIGH (ref 70–99)
GLUCOSE BLDC GLUCOMTR-MCNC: 164 MG/DL — HIGH (ref 70–99)
GLUCOSE BLDC GLUCOMTR-MCNC: 85 MG/DL — SIGNIFICANT CHANGE UP (ref 70–99)
GLUCOSE SERPL-MCNC: 138 MG/DL — HIGH (ref 70–99)
GLUCOSE SERPL-MCNC: 160 MG/DL — HIGH (ref 70–99)
GLUCOSE SERPL-MCNC: 193 MG/DL — HIGH (ref 70–99)
HCT VFR BLD CALC: 20.2 % — LOW (ref 37–47)
HCT VFR BLD CALC: 21.4 % — LOW (ref 37–47)
HCT VFR BLD CALC: 27.1 % — LOW (ref 37–47)
HCT VFR BLD CALC: 28.8 % — LOW (ref 37–47)
HGB BLD-MCNC: 7 G/DL — LOW (ref 12–16)
HGB BLD-MCNC: 7.4 G/DL — LOW (ref 12–16)
HGB BLD-MCNC: 9.4 G/DL — LOW (ref 12–16)
HGB BLD-MCNC: 9.8 G/DL — LOW (ref 12–16)
IMM GRANULOCYTES NFR BLD AUTO: 2.9 % — HIGH (ref 0.1–0.3)
IMM GRANULOCYTES NFR BLD AUTO: 7.2 % — HIGH (ref 0.1–0.3)
INR BLD: 1.15 RATIO — SIGNIFICANT CHANGE UP (ref 0.65–1.3)
LYMPHOCYTES # BLD AUTO: 0.87 K/UL — LOW (ref 1.2–3.4)
LYMPHOCYTES # BLD AUTO: 1.91 K/UL — SIGNIFICANT CHANGE UP (ref 1.2–3.4)
LYMPHOCYTES # BLD AUTO: 5.2 % — LOW (ref 20.5–51.1)
LYMPHOCYTES # BLD AUTO: 7.7 % — LOW (ref 20.5–51.1)
MAGNESIUM SERPL-MCNC: 2 MG/DL — SIGNIFICANT CHANGE UP (ref 1.8–2.4)
MAGNESIUM SERPL-MCNC: 2.1 MG/DL — SIGNIFICANT CHANGE UP (ref 1.8–2.4)
MAGNESIUM SERPL-MCNC: 2.1 MG/DL — SIGNIFICANT CHANGE UP (ref 1.8–2.4)
MCHC RBC-ENTMCNC: 30.8 PG — SIGNIFICANT CHANGE UP (ref 27–31)
MCHC RBC-ENTMCNC: 31 PG — SIGNIFICANT CHANGE UP (ref 27–31)
MCHC RBC-ENTMCNC: 31 PG — SIGNIFICANT CHANGE UP (ref 27–31)
MCHC RBC-ENTMCNC: 31.1 PG — HIGH (ref 27–31)
MCHC RBC-ENTMCNC: 34 G/DL — SIGNIFICANT CHANGE UP (ref 32–37)
MCHC RBC-ENTMCNC: 34.6 G/DL — SIGNIFICANT CHANGE UP (ref 32–37)
MCHC RBC-ENTMCNC: 34.7 G/DL — SIGNIFICANT CHANGE UP (ref 32–37)
MCHC RBC-ENTMCNC: 34.7 G/DL — SIGNIFICANT CHANGE UP (ref 32–37)
MCV RBC AUTO: 89 FL — SIGNIFICANT CHANGE UP (ref 81–99)
MCV RBC AUTO: 89.4 FL — SIGNIFICANT CHANGE UP (ref 81–99)
MCV RBC AUTO: 89.5 FL — SIGNIFICANT CHANGE UP (ref 81–99)
MCV RBC AUTO: 91.4 FL — SIGNIFICANT CHANGE UP (ref 81–99)
MONOCYTES # BLD AUTO: 0.89 K/UL — HIGH (ref 0.1–0.6)
MONOCYTES # BLD AUTO: 1.42 K/UL — HIGH (ref 0.1–0.6)
MONOCYTES NFR BLD AUTO: 5.3 % — SIGNIFICANT CHANGE UP (ref 1.7–9.3)
MONOCYTES NFR BLD AUTO: 5.7 % — SIGNIFICANT CHANGE UP (ref 1.7–9.3)
NEUTROPHILS # BLD AUTO: 14.43 K/UL — HIGH (ref 1.4–6.5)
NEUTROPHILS # BLD AUTO: 19.64 K/UL — HIGH (ref 1.4–6.5)
NEUTROPHILS NFR BLD AUTO: 78.7 % — HIGH (ref 42.2–75.2)
NEUTROPHILS NFR BLD AUTO: 86.4 % — HIGH (ref 42.2–75.2)
NRBC BLD AUTO-RTO: 1 /100 WBCS — HIGH (ref 0–0)
NRBC BLD AUTO-RTO: 1 /100 WBCS — HIGH (ref 0–0)
NRBC BLD AUTO-RTO: 2 /100 WBCS — HIGH (ref 0–0)
NRBC BLD AUTO-RTO: 4 /100 WBCS — HIGH (ref 0–0)
PHOSPHATE SERPL-MCNC: 7.5 MG/DL — HIGH (ref 2.1–4.9)
PHOSPHATE SERPL-MCNC: 7.9 MG/DL — HIGH (ref 2.1–4.9)
PHOSPHATE SERPL-MCNC: 9.6 MG/DL — HIGH (ref 2.1–4.9)
PLATELET # BLD AUTO: 147 K/UL — SIGNIFICANT CHANGE UP (ref 130–400)
PLATELET # BLD AUTO: 152 K/UL — SIGNIFICANT CHANGE UP (ref 130–400)
PLATELET # BLD AUTO: 218 K/UL — SIGNIFICANT CHANGE UP (ref 130–400)
PLATELET # BLD AUTO: 232 K/UL — SIGNIFICANT CHANGE UP (ref 130–400)
PMV BLD: 11.6 FL — HIGH (ref 7.4–10.4)
PMV BLD: 11.6 FL — HIGH (ref 7.4–10.4)
PMV BLD: 11.7 FL — HIGH (ref 7.4–10.4)
PMV BLD: 11.9 FL — HIGH (ref 7.4–10.4)
POTASSIUM SERPL-MCNC: 4.6 MMOL/L — SIGNIFICANT CHANGE UP (ref 3.5–5)
POTASSIUM SERPL-MCNC: 4.7 MMOL/L — SIGNIFICANT CHANGE UP (ref 3.5–5)
POTASSIUM SERPL-MCNC: 5.3 MMOL/L — HIGH (ref 3.5–5)
POTASSIUM SERPL-SCNC: 4.6 MMOL/L — SIGNIFICANT CHANGE UP (ref 3.5–5)
POTASSIUM SERPL-SCNC: 4.7 MMOL/L — SIGNIFICANT CHANGE UP (ref 3.5–5)
POTASSIUM SERPL-SCNC: 5.3 MMOL/L — HIGH (ref 3.5–5)
PROT SERPL-MCNC: 4.5 G/DL — LOW (ref 6–8)
PROT SERPL-MCNC: 4.8 G/DL — LOW (ref 6–8)
PROTHROM AB SERPL-ACNC: 13.6 SEC — HIGH (ref 9.95–12.87)
RBC # BLD: 2.27 M/UL — LOW (ref 4.2–5.4)
RBC # BLD: 2.39 M/UL — LOW (ref 4.2–5.4)
RBC # BLD: 3.03 M/UL — LOW (ref 4.2–5.4)
RBC # BLD: 3.15 M/UL — LOW (ref 4.2–5.4)
RBC # FLD: 16.4 % — HIGH (ref 11.5–14.5)
RBC # FLD: 16.4 % — HIGH (ref 11.5–14.5)
RBC # FLD: 17.2 % — HIGH (ref 11.5–14.5)
RBC # FLD: 17.3 % — HIGH (ref 11.5–14.5)
SODIUM SERPL-SCNC: 142 MMOL/L — SIGNIFICANT CHANGE UP (ref 135–146)
SODIUM SERPL-SCNC: 142 MMOL/L — SIGNIFICANT CHANGE UP (ref 135–146)
SODIUM SERPL-SCNC: 144 MMOL/L — SIGNIFICANT CHANGE UP (ref 135–146)
TROPONIN T, HIGH SENSITIVITY RESULT: 176 NG/L — CRITICAL HIGH (ref 6–13)
WBC # BLD: 14.29 K/UL — HIGH (ref 4.8–10.8)
WBC # BLD: 16.71 K/UL — HIGH (ref 4.8–10.8)
WBC # BLD: 24.67 K/UL — HIGH (ref 4.8–10.8)
WBC # BLD: 24.93 K/UL — HIGH (ref 4.8–10.8)
WBC # FLD AUTO: 14.29 K/UL — HIGH (ref 4.8–10.8)
WBC # FLD AUTO: 16.71 K/UL — HIGH (ref 4.8–10.8)
WBC # FLD AUTO: 24.67 K/UL — HIGH (ref 4.8–10.8)
WBC # FLD AUTO: 24.93 K/UL — HIGH (ref 4.8–10.8)

## 2025-04-28 PROCEDURE — 93970 EXTREMITY STUDY: CPT | Mod: 26

## 2025-04-28 PROCEDURE — 93306 TTE W/DOPPLER COMPLETE: CPT | Mod: 26

## 2025-04-28 PROCEDURE — 71045 X-RAY EXAM CHEST 1 VIEW: CPT | Mod: 26

## 2025-04-28 PROCEDURE — 99291 CRITICAL CARE FIRST HOUR: CPT | Mod: 24,25

## 2025-04-28 RX ORDER — DEXMEDETOMIDINE HYDROCHLORIDE IN SODIUM CHLORIDE 4 UG/ML
0.2 INJECTION INTRAVENOUS
Qty: 400 | Refills: 0 | Status: DISCONTINUED | OUTPATIENT
Start: 2025-04-28 | End: 2025-05-05

## 2025-04-28 RX ORDER — DEXTROSE 50 % IN WATER 50 %
50 SYRINGE (ML) INTRAVENOUS ONCE
Refills: 0 | Status: COMPLETED | OUTPATIENT
Start: 2025-04-28 | End: 2025-04-28

## 2025-04-28 RX ORDER — SODIUM BICARBONATE 1 MEQ/ML
50 SYRINGE (ML) INTRAVENOUS ONCE
Refills: 0 | Status: COMPLETED | OUTPATIENT
Start: 2025-04-28 | End: 2025-04-28

## 2025-04-28 RX ORDER — FENTANYL CITRATE-0.9 % NACL/PF 100MCG/2ML
0.5 SYRINGE (ML) INTRAVENOUS
Qty: 2500 | Refills: 0 | Status: DISCONTINUED | OUTPATIENT
Start: 2025-04-28 | End: 2025-04-30

## 2025-04-28 RX ORDER — FENTANYL CITRATE-0.9 % NACL/PF 100MCG/2ML
12.5 SYRINGE (ML) INTRAVENOUS ONCE
Refills: 0 | Status: DISCONTINUED | OUTPATIENT
Start: 2025-04-28 | End: 2025-04-28

## 2025-04-28 RX ORDER — HEPARIN SODIUM 1000 [USP'U]/ML
7500 INJECTION INTRAVENOUS; SUBCUTANEOUS EVERY 8 HOURS
Refills: 0 | Status: DISCONTINUED | OUTPATIENT
Start: 2025-04-28 | End: 2025-04-30

## 2025-04-28 RX ORDER — CALCIUM GLUCONATE 20 MG/ML
2 INJECTION, SOLUTION INTRAVENOUS ONCE
Refills: 0 | Status: COMPLETED | OUTPATIENT
Start: 2025-04-28 | End: 2025-04-28

## 2025-04-28 RX ADMIN — SODIUM CHLORIDE 120 MILLILITER(S): 9 INJECTION, SOLUTION INTRAVENOUS at 21:39

## 2025-04-28 RX ADMIN — Medication 40 MILLIGRAM(S): at 05:17

## 2025-04-28 RX ADMIN — HEPARIN SODIUM 7500 UNIT(S): 1000 INJECTION INTRAVENOUS; SUBCUTANEOUS at 21:35

## 2025-04-28 RX ADMIN — PROPOFOL 8.14 MICROGRAM(S)/KG/MIN: 10 INJECTION, EMULSION INTRAVENOUS at 18:55

## 2025-04-28 RX ADMIN — INSULIN LISPRO 2: 100 INJECTION, SOLUTION INTRAVENOUS; SUBCUTANEOUS at 11:39

## 2025-04-28 RX ADMIN — SODIUM CHLORIDE 120 MILLILITER(S): 9 INJECTION, SOLUTION INTRAVENOUS at 01:12

## 2025-04-28 RX ADMIN — PROPOFOL 8.14 MICROGRAM(S)/KG/MIN: 10 INJECTION, EMULSION INTRAVENOUS at 01:11

## 2025-04-28 RX ADMIN — Medication 12.5 MICROGRAM(S): at 15:28

## 2025-04-28 RX ADMIN — VASOPRESSIN 6 UNIT(S)/MIN: 20 INJECTION INTRAVENOUS at 21:38

## 2025-04-28 RX ADMIN — Medication 6.78 MICROGRAM(S)/KG/HR: at 18:55

## 2025-04-28 RX ADMIN — NOREPINEPHRINE BITARTRATE 6.36 MICROGRAM(S)/KG/MIN: 8 SOLUTION at 01:14

## 2025-04-28 RX ADMIN — MEROPENEM 100 MILLIGRAM(S): 1 INJECTION INTRAVENOUS at 13:00

## 2025-04-28 RX ADMIN — SODIUM CHLORIDE 120 MILLILITER(S): 9 INJECTION, SOLUTION INTRAVENOUS at 15:02

## 2025-04-28 RX ADMIN — VASOPRESSIN 6 UNIT(S)/MIN: 20 INJECTION INTRAVENOUS at 01:11

## 2025-04-28 RX ADMIN — Medication 6.78 MICROGRAM(S)/KG/HR: at 01:11

## 2025-04-28 RX ADMIN — DEXMEDETOMIDINE HYDROCHLORIDE IN SODIUM CHLORIDE 6.78 MICROGRAM(S)/KG/HR: 4 INJECTION INTRAVENOUS at 22:41

## 2025-04-28 RX ADMIN — NOREPINEPHRINE BITARTRATE 6.36 MICROGRAM(S)/KG/MIN: 8 SOLUTION at 07:32

## 2025-04-28 RX ADMIN — CALCIUM GLUCONATE 200 GRAM(S): 20 INJECTION, SOLUTION INTRAVENOUS at 01:44

## 2025-04-28 RX ADMIN — Medication 40 MILLIGRAM(S): at 17:48

## 2025-04-28 RX ADMIN — DEXMEDETOMIDINE HYDROCHLORIDE IN SODIUM CHLORIDE 6.78 MICROGRAM(S)/KG/HR: 4 INJECTION INTRAVENOUS at 15:02

## 2025-04-28 RX ADMIN — Medication 50 MILLILITER(S): at 01:44

## 2025-04-28 RX ADMIN — PROPOFOL 8.14 MICROGRAM(S)/KG/MIN: 10 INJECTION, EMULSION INTRAVENOUS at 07:33

## 2025-04-28 RX ADMIN — MEROPENEM 100 MILLIGRAM(S): 1 INJECTION INTRAVENOUS at 05:18

## 2025-04-28 RX ADMIN — MEROPENEM 100 MILLIGRAM(S): 1 INJECTION INTRAVENOUS at 21:36

## 2025-04-28 RX ADMIN — PROPOFOL 8.14 MICROGRAM(S)/KG/MIN: 10 INJECTION, EMULSION INTRAVENOUS at 12:26

## 2025-04-28 RX ADMIN — Medication 1 APPLICATION(S): at 05:17

## 2025-04-28 RX ADMIN — Medication 50 MILLIEQUIVALENT(S): at 01:14

## 2025-04-28 RX ADMIN — Medication 15 MILLILITER(S): at 17:47

## 2025-04-28 RX ADMIN — Medication 6.78 MICROGRAM(S)/KG/HR: at 22:44

## 2025-04-28 RX ADMIN — INSULIN LISPRO 2: 100 INJECTION, SOLUTION INTRAVENOUS; SUBCUTANEOUS at 18:00

## 2025-04-28 RX ADMIN — HEPARIN SODIUM 7500 UNIT(S): 1000 INJECTION INTRAVENOUS; SUBCUTANEOUS at 13:04

## 2025-04-28 RX ADMIN — Medication 12.5 MICROGRAM(S): at 16:00

## 2025-04-28 RX ADMIN — Medication 5 UNIT(S): at 01:44

## 2025-04-28 RX ADMIN — PROPOFOL 8.14 MICROGRAM(S)/KG/MIN: 10 INJECTION, EMULSION INTRAVENOUS at 21:39

## 2025-04-28 RX ADMIN — Medication 15 MILLILITER(S): at 05:17

## 2025-04-28 NOTE — PROGRESS NOTE ADULT - SUBJECTIVE AND OBJECTIVE BOX
GENERAL SURGERY PROGRESS NOTE    Patient: NANCY SARGENT , 64y (62)Female   MRN: [id number]  Location: [place] [address] A  Visit: 25 Inpatient  Date: 25 @ 00:56    Procedure: Open repair of ventral hernia using mesh and component separation technique  Small bowel resection  Insertion, arterial line, percutaneous  Exploratory laparotomy    24 HOURS EVENTS:   During the day, CTA with oral contrast was obtained redemonstrating a right distal main pulmonary embolus with segmental extension. An arterial blush was seen in the mesentery anterior to the abdominal drainage catheter with venous pooling, suggestive of possible extravasation. There was also a large volume of complex fluid in the pelvis and along the left paracolic gutter, compatible with blood products. TPN was restarted and a C. diff culture was sent. The patient received 2 units of PRBCs, 2 units of FFP, 1 unit of platelets, and 1 gram of TXA. A left upper extremity duplex showed superficial thrombophlebitis of the left cephalic vein with no evidence of DVT. The LUQ THONY drain outputted 310 mL of noni blood, prompting a stat IR consult for embolization. A preliminary CT head was negative. Troponin gudelia to 288 from 125. The patient received 1 gram of calcium chloride, 1 amp of bicarbonate, and for a potassium level of 5.3, 10 units of insulin with D50 were administered.  Overnight, the patient remained on Levo at 0.5 mcg/kg/min and Vaso at 0.04 units/min with a MAP of 56. Sedation was maintained with fentanyl at 0.5 mcg/kg/hr and propofol at 20 mcg/kg/min. Ventilator settings were 40/8. Pupils were sluggishly reactive, right greater than left. The abdomen was distended with a midline VAC dressing in place, with right-sided petechiae noted. The LUQ THONY drain continued to have sanguineous output, the middle drain had serous output, and the RLQ drain had serosanguinous output. Hemodynamics showed an SVI of 35, SVR of 494, and a CI of 3.5. The patient was taken emergently back to the OR with Dr. Lema NOW S/P ex lap, evacuation of hemoperitoneum, and abdominal washout. Had to get down to the pelvis and eventually found a pocket that had about 1000cc of blood old and clots. also found a small mesenteric defect that we repaired that correlated to the bleed on the CT.    PAST MEDICAL & SURGICAL HISTORY:  Gastric bypass status for obesity      LOUIS (obstructive sleep apnea)      S/P gastric bypass      S/P       S/P small bowel resection      History of total bilateral knee replacement (TKR)      H/O colonoscopy            Vitals:   T(F): 97.7 (25 @ 20:00), Max: 97.7 (25 @ 20:00)  HR: 88 (25 @ 00:32)  BP: 124/57 (25 @ 20:59)  RR: 28 (25 @ 20:59)  SpO2: 97% (25 @ 00:32)  Mode: AC/ CMV (Assist Control/ Continuous Mandatory Ventilation), RR (machine): 28, TV (machine): 450, FiO2: 60, PEEP: 8, ITime: 1, MAP: 16, PIP: 29    Diet, NPO      Fluids: lactated ringers.: Solution, 1000 milliLiter(s) infuse at 120 mL/Hr      I & O's:    25 @ 07:01  -  25 @ 07:00  --------------------------------------------------------  IN:    FentaNYL: 54 mL    IV PiggyBack: 300 mL    IV PiggyBack: 150 mL    Norepinephrine: 101.7 mL    Oral Fluid: 170 mL    Phenylephrine: 50.8 mL    PRBCs (Packed Red Blood Cells): 714 mL    Propofol: 106 mL    Sodium Bicarbonate: 200 mL  Total IN: 1846.5 mL    OUT:    Bulb (mL): 0 mL    Bulb (mL): 20 mL    Bulb (mL): 20 mL    Bulb (mL): 225 mL    Indwelling Catheter - Urethral (mL): 60 mL    Rectal Tube (mL): 400 mL    VAC (Vacuum Assisted Closure) System (mL): 0 mL    Voided (mL): 1700 mL  Total OUT: 2425 mL    Total NET: -578.5 mL        MEDICATIONS  (STANDING):  albuterol    90 MICROgram(s) HFA Inhaler 2 Puff(s) Inhalation every 6 hours  chlorhexidine 0.12% Liquid 15 milliLiter(s) Oral Mucosa every 12 hours  chlorhexidine 2% Cloths 1 Application(s) Topical <User Schedule>  fentaNYL   Infusion. 0.5 MICROgram(s)/kG/Hr (6.78 mL/Hr) IV Continuous <Continuous>  insulin lispro (ADMELOG) corrective regimen sliding scale   SubCutaneous every 6 hours  lactated ringers. 1000 milliLiter(s) (120 mL/Hr) IV Continuous <Continuous>  meropenem  IVPB      meropenem  IVPB 1000 milliGRAM(s) IV Intermittent every 8 hours  norepinephrine Infusion 0.05 MICROgram(s)/kG/Min (6.36 mL/Hr) IV Continuous <Continuous>  pantoprazole  Injectable 40 milliGRAM(s) IV Push every 12 hours  propofol Infusion 10 MICROgram(s)/kG/Min (8.14 mL/Hr) IV Continuous <Continuous>  sodium bicarbonate  Injectable 50 milliEquivalent(s) IV Push once  vasopressin Infusion. 0.04 Unit(s)/Min (6 mL/Hr) IV Continuous <Continuous>    MEDICATIONS  (PRN):      DVT PROPHYLAXIS:   GI PROPHYLAXIS: pantoprazole  Injectable 40 milliGRAM(s) IV Push every 12 hours    ANTICOAGULATION:   ANTIBIOTICS:  meropenem  IVPB    meropenem  IVPB 1000 milliGRAM(s)        Isolation Precautions:     Isolation Type: CONTACT;  Indication for Isolation: Clostridium difficile    Additional Instructions:  Contact Isolation (25 @ 09:36)      LAB/STUDIES:  Labs:  CAPILLARY BLOOD GLUCOSE      POCT Blood Glucose.: 106 mg/dL (2025 00:14)  POCT Blood Glucose.: 123 mg/dL (2025 20:04)  POCT Blood Glucose.: 191 mg/dL (2025 17:03)  POCT Blood Glucose.: 156 mg/dL (2025 12:45)  POCT Blood Glucose.: 154 mg/dL (2025 08:08)  POCT Blood Glucose.: 280 mg/dL (2025 04:01)  POCT Blood Glucose.: 229 mg/dL (2025 01:20)                          7.9    23.34 )-----------( 288      ( 2025 20:56 )             23.5       Auto Immature Granulocyte %: 7.9 % (25 @ 20:56)  Auto Immature Granulocyte %: 6.8 % (25 @ 17:00)  Auto Immature Granulocyte %: 9.2 % (25 @ 06:30)        143  |  110  |  56[H]  ----------------------------<  215[H]  5.1[H]   |  15[L]  |  2.6[H]      Calcium: 9.2 mg/dL (25 @ 20:56)      LFTs:             4.5  | 0.6  | 1737     ------------------[94      ( 2025 20:56 )  2.5  | 0.4  | 1718        Lipase:x      Amylase:x         Blood Gas Arterial, Lactate: 1.3 mmol/L (25 @ 00:17)  Blood Gas Arterial, Lactate: 3.7 mmol/L (25 @ 20:37)  Blood Gas Arterial, Lactate: 4.2 mmol/L (25 @ 18:21)  Blood Gas Arterial, Lactate: 2.0 mmol/L (25 @ 13:44)  Blood Gas Arterial, Lactate: 5.4 mmol/L (25 @ 06:26)  Blood Gas Arterial, Lactate: 7.8 mmol/L (25 @ 03:28)  Blood Gas Arterial, Lactate: 9.5 mmol/L (25 @ 02:08)    ABG - ( 2025 00:17 )  pH: 7.26  /  pCO2: 44    /  pO2: 52    / HCO3: 20    / Base Excess: -7.2  /  SaO2: 87.5            ABG - ( 2025 20:37 )  pH: 7.30  /  pCO2: 33    /  pO2: 93    / HCO3: 16    / Base Excess: -9.2  /  SaO2: 99.1            ABG - ( 2025 18:21 )  pH: 7.28  /  pCO2: 32    /  pO2: 83    / HCO3: 15    / Base Excess: -10.7 /  SaO2: 97.6              Coags:     14.20  ----< 1.20    ( 2025 20:56 )     26.5                Urinalysis Basic - ( 2025 20:56 )    Color: x / Appearance: x / SG: x / pH: x  Gluc: 215 mg/dL / Ketone: x  / Bili: x / Urobili: x   Blood: x / Protein: x / Nitrite: x   Leuk Esterase: x / RBC: x / WBC x   Sq Epi: x / Non Sq Epi: x / Bacteria: x          ASSESSMENT  64y F w/ PMHx of  Morbid obesity, LOUIS, large complex ventral hernia s/p repair sbr and loss of domain c/b post op intubation, hypotension requiring vasopressors, anastomotic leakage s/p RTOR and reanastomosis, now doing better post op from a respiratory status, however, still tenuous. She has a rising leukocytosis and an abdominal fluid collection which was drained by IR on , Zosyn broadened to meropenem, and has a newly diagnosed DVT/PE which she is currently on a LVX 135BID S/P RTOR for ex lap        PLAN:  - Hold AC  - Monitor drain output quality  - TPN  - HD monitoring  - Monitor bowel function  - Monitor for fevers  - Rest of care per SICU GENERAL SURGERY PROGRESS NOTE    Patient: NANCY SARGENT , 64y (62)Female   MRN: [id number]  Location: [place] [address] A  Visit: 25 Inpatient  Date: 25 @ 00:56    Procedure: Open repair of ventral hernia using mesh and component separation technique  Small bowel resection  Insertion, arterial line, percutaneous  Exploratory laparotomy    24 HOURS EVENTS:   During the day, CTA with oral contrast was obtained redemonstrating a right distal main pulmonary embolus with segmental extension. An arterial blush was seen in the mesentery anterior to the abdominal drainage catheter with venous pooling, suggestive of possible extravasation. There was also a large volume of complex fluid in the pelvis and along the left paracolic gutter, compatible with blood products. TPN was restarted and a C. diff culture was sent. The patient received 2 units of PRBCs, 2 units of FFP, 1 unit of platelets, and 1 gram of TXA. A left upper extremity duplex showed superficial thrombophlebitis of the left cephalic vein with no evidence of DVT. The LUQ THONY drain outputted 310 mL of noni blood, prompting a stat IR consult for embolization. A preliminary CT head was negative. Troponin gudelia to 288 from 125. The patient received 1 gram of calcium chloride, 1 amp of bicarbonate, and for a potassium level of 5.3, 10 units of insulin with D50 were administered.  Overnight, the patient remained on Levo at 0.5 mcg/kg/min and Vaso at 0.04 units/min with a MAP of 56. Sedation was maintained with fentanyl at 0.5 mcg/kg/hr and propofol at 20 mcg/kg/min. Ventilator settings were 40/8. Pupils were sluggishly reactive, right greater than left. The abdomen was distended with a midline VAC dressing in place, with right-sided petechiae noted. The LUQ THONY drain continued to have sanguineous output, the middle drain had serous output, and the RLQ drain had serosanguinous output. Hemodynamics showed an SVI of 35, SVR of 494, and a CI of 3.5. The patient was taken emergently back to the OR with Dr. Lema NOW S/P ex lap, evacuation of hemoperitoneum, and abdominal washout. Had to get down to the pelvis and eventually found a pocket that had about 1000cc of blood old and clots. also found a small mesenteric defect that we repaired that correlated to the bleed on the CT. Patient was seen for the Postop check was still on the MVC 60/8. On levo 0.24 and vaso 0.04. On propofol and fentanyl. She is responsive to stimuli. 3 New THONY drains with SS output.     PAST MEDICAL & SURGICAL HISTORY:  Gastric bypass status for obesity      LOUIS (obstructive sleep apnea)      S/P gastric bypass      S/P       S/P small bowel resection      History of total bilateral knee replacement (TKR)      H/O colonoscopy            Vitals:   T(F): 97.7 (25 @ 20:00), Max: 97.7 (25 @ 20:00)  HR: 88 (25 @ 00:32)  BP: 124/57 (25 @ 20:59)  RR: 28 (25 @ 20:59)  SpO2: 97% (25 @ 00:32)  Mode: AC/ CMV (Assist Control/ Continuous Mandatory Ventilation), RR (machine): 28, TV (machine): 450, FiO2: 60, PEEP: 8, ITime: 1, MAP: 16, PIP: 29    Diet, NPO      Fluids: lactated ringers.: Solution, 1000 milliLiter(s) infuse at 120 mL/Hr      I & O's:    25 @ 07:01  -  25 @ 07:00  --------------------------------------------------------  IN:    FentaNYL: 54 mL    IV PiggyBack: 300 mL    IV PiggyBack: 150 mL    Norepinephrine: 101.7 mL    Oral Fluid: 170 mL    Phenylephrine: 50.8 mL    PRBCs (Packed Red Blood Cells): 714 mL    Propofol: 106 mL    Sodium Bicarbonate: 200 mL  Total IN: 1846.5 mL    OUT:    Bulb (mL): 0 mL    Bulb (mL): 20 mL    Bulb (mL): 20 mL    Bulb (mL): 225 mL    Indwelling Catheter - Urethral (mL): 60 mL    Rectal Tube (mL): 400 mL    VAC (Vacuum Assisted Closure) System (mL): 0 mL    Voided (mL): 1700 mL  Total OUT: 2425 mL    Total NET: -578.5 mL      Physical Examination:  General Appearance: GCS 3T, on propofol and fentanyl infusion, no paralytic reversal, b/l pupils 4mm and sluggish reactivity, no corneal reflex, + cough reflex  Heart: RRR  Lungs: b/l rhonchi, intubated  Abdomen:  abdomen soft, less distended, midline prevena vac in place, x 3 drains in place all serosanguinous, right pelvic drain appears more sanguinous.   MSK/Extremities: cool to touch, edematous  Skin: Warm, dry. No jaundice.       MEDICATIONS  (STANDING):  albuterol    90 MICROgram(s) HFA Inhaler 2 Puff(s) Inhalation every 6 hours  chlorhexidine 0.12% Liquid 15 milliLiter(s) Oral Mucosa every 12 hours  chlorhexidine 2% Cloths 1 Application(s) Topical <User Schedule>  fentaNYL   Infusion. 0.5 MICROgram(s)/kG/Hr (6.78 mL/Hr) IV Continuous <Continuous>  insulin lispro (ADMELOG) corrective regimen sliding scale   SubCutaneous every 6 hours  lactated ringers. 1000 milliLiter(s) (120 mL/Hr) IV Continuous <Continuous>  meropenem  IVPB      meropenem  IVPB 1000 milliGRAM(s) IV Intermittent every 8 hours  norepinephrine Infusion 0.05 MICROgram(s)/kG/Min (6.36 mL/Hr) IV Continuous <Continuous>  pantoprazole  Injectable 40 milliGRAM(s) IV Push every 12 hours  propofol Infusion 10 MICROgram(s)/kG/Min (8.14 mL/Hr) IV Continuous <Continuous>  sodium bicarbonate  Injectable 50 milliEquivalent(s) IV Push once  vasopressin Infusion. 0.04 Unit(s)/Min (6 mL/Hr) IV Continuous <Continuous>    MEDICATIONS  (PRN):      DVT PROPHYLAXIS:   GI PROPHYLAXIS: pantoprazole  Injectable 40 milliGRAM(s) IV Push every 12 hours    ANTICOAGULATION:   ANTIBIOTICS:  meropenem  IVPB    meropenem  IVPB 1000 milliGRAM(s)        Isolation Precautions:     Isolation Type: CONTACT;  Indication for Isolation: Clostridium difficile    Additional Instructions:  Contact Isolation (25 @ 09:36)      LAB/STUDIES:  Labs:  CAPILLARY BLOOD GLUCOSE      POCT Blood Glucose.: 106 mg/dL (2025 00:14)  POCT Blood Glucose.: 123 mg/dL (2025 20:04)  POCT Blood Glucose.: 191 mg/dL (2025 17:03)  POCT Blood Glucose.: 156 mg/dL (2025 12:45)  POCT Blood Glucose.: 154 mg/dL (2025 08:08)  POCT Blood Glucose.: 280 mg/dL (2025 04:01)  POCT Blood Glucose.: 229 mg/dL (2025 01:20)                          7.9    23.34 )-----------( 288      ( 2025 20:56 )             23.5       Auto Immature Granulocyte %: 7.9 % (25 @ 20:56)  Auto Immature Granulocyte %: 6.8 % (25 @ 17:00)  Auto Immature Granulocyte %: 9.2 % (25 @ 06:30)        143  |  110  |  56[H]  ----------------------------<  215[H]  5.1[H]   |  15[L]  |  2.6[H]      Calcium: 9.2 mg/dL (25 @ 20:56)      LFTs:             4.5  | 0.6  | 1737     ------------------[94      ( 2025 20:56 )  2.5  | 0.4  | 1718        Lipase:x      Amylase:x         Blood Gas Arterial, Lactate: 1.3 mmol/L (25 @ 00:17)  Blood Gas Arterial, Lactate: 3.7 mmol/L (25 @ 20:37)  Blood Gas Arterial, Lactate: 4.2 mmol/L (25 @ 18:21)  Blood Gas Arterial, Lactate: 2.0 mmol/L (25 @ 13:44)  Blood Gas Arterial, Lactate: 5.4 mmol/L (25 @ 06:26)  Blood Gas Arterial, Lactate: 7.8 mmol/L (25 @ 03:28)  Blood Gas Arterial, Lactate: 9.5 mmol/L (04-27-25 @ 02:08)    ABG - ( 2025 00:17 )  pH: 7.26  /  pCO2: 44    /  pO2: 52    / HCO3: 20    / Base Excess: -7.2  /  SaO2: 87.5            ABG - ( 2025 20:37 )  pH: 7.30  /  pCO2: 33    /  pO2: 93    / HCO3: 16    / Base Excess: -9.2  /  SaO2: 99.1            ABG - ( 2025 18:21 )  pH: 7.28  /  pCO2: 32    /  pO2: 83    / HCO3: 15    / Base Excess: -10.7 /  SaO2: 97.6              Coags:     14.20  ----< 1.20    ( 2025 20:56 )     26.5                Urinalysis Basic - ( 2025 20:56 )    Color: x / Appearance: x / SG: x / pH: x  Gluc: 215 mg/dL / Ketone: x  / Bili: x / Urobili: x   Blood: x / Protein: x / Nitrite: x   Leuk Esterase: x / RBC: x / WBC x   Sq Epi: x / Non Sq Epi: x / Bacteria: x          ASSESSMENT  64y F w/ PMHx of  Morbid obesity, LOUIS, large complex ventral hernia s/p repair sbr and loss of domain c/b post op intubation, hypotension requiring vasopressors, anastomotic leakage s/p RTOR and reanastomosis, now doing better post op from a respiratory status, however, still tenuous. She has a rising leukocytosis and an abdominal fluid collection which was drained by IR on , Zosyn broadened to meropenem, and has a newly diagnosed DVT/PE which she is currently on a LVX 135BID S/P RTOR for ex lap        PLAN:  - Hold AC  - Monitor drain output quality  - TPN  - HD monitoring  - Monitor bowel function  - Monitor for fevers  - Rest of care per SICU

## 2025-04-28 NOTE — CONSULT NOTE ADULT - ASSESSMENT
This is a 63 y/o F w/ MHx HTN, morbid obesity s/p 2001 Abby-en-Y gastric bypass who presented on 4/3 with incarcerated ventral hernia with SBO. S/p 4/10 open ventral hernia repair, small bowel resection, and primary fascial closure with Dr. Lema. Admitted to SICU for Q1 abdominal checks and hemodynamic monitoring. S/p 4/17 takeback for exploratory laparotomy, repair of anastomotic leak. IR performed Abdominal muscle botox 4/11, placement of 8.5Fr drainage catheter into lower abd wall for collection drainage on 4/22. CTA chest 4/27 found redemonstrated rt distal main pulmonary artery extending to segmental branches with no rt heart strain, same as 4/21. Lower extremity Duplex on 4/21 found rt posterior tibial vein DVT, no left lower extremity DVT. Upper extremity duplex on 4/26 found superficial thrombophelbitis of b/l cephalic veins. s/p SMA angio 4/27 by Kallie for concerning signs of extravasation on CT ab/pel 4/27-multiple branches catheterized- no active bleed. Taken by surgery for ex-lap 4/28 O/N, evacuation of old clots and blood from retroperitoneum and pelvis approximately 1L. IR consulted for IVC filter placement.    Patient is noted to be intubated, on two pressors at this time. Recommend repeat lower extremity duplex ultrasound to assess for further venous thrombus. Will schedule patient for IVC filter placement based on results of ultrasound, and once patient is more stable/extubated.    Please call Interventional Radiology with questions or concerns:   M-[phone]: [facility]_ir on Teams or Spectra [phone]   All other hours: [phone]    This is a 65 y/o F w/ MHx HTN, morbid obesity s/p 2001 Abby-en-Y gastric bypass who presented on 4/3 with incarcerated ventral hernia with SBO. S/p 4/10 open ventral hernia repair, small bowel resection, and primary fascial closure with Dr. Lema. Admitted to SICU for Q1 abdominal checks and hemodynamic monitoring. S/p 4/17 takeback for exploratory laparotomy, repair of anastomotic leak. IR performed Abdominal muscle botox 4/11, placement of 8.5Fr drainage catheter into lower abd wall for collection drainage on 4/22. CTA chest 4/27 found redemonstrated rt distal main pulmonary artery extending to segmental branches with no rt heart strain, same as 4/21. Lower extremity Duplex on 4/21 found rt posterior tibial vein DVT, no left lower extremity DVT. Upper extremity duplex on 4/26 found superficial thrombophelbitis of b/l cephalic veins. s/p SMA angio 4/27 by Kallie for concerning signs of extravasation on CT ab/pel 4/27-multiple branches catheterized- no active bleed. Taken by surgery for ex-lap 4/28 O/N, evacuation of old clots and blood from retroperitoneum and pelvis approximately 1L. IR consulted for IVC filter placement.    Patient is noted to be intubated, on two pressors at this time. Recommend repeat lower extremity duplex ultrasound (only had Posterior tibial clot one week ago). Will schedule patient for IVC filter placement based on results of ultrasound, and once patient is more stable.    Please call Interventional Radiology with questions or concerns:   M-[phone]: [facility]_ir on Teams or Spectra [phone]   All other hours: [phone]

## 2025-04-28 NOTE — BRIEF OPERATIVE NOTE - NSICDXBRIEFPOSTOP_GEN_ALL_CORE_FT
POST-OP DIAGNOSIS:  SBO (small bowel obstruction) 10-Apr-2025 16:18:35  Danish Crowe  Incarcerated ventral hernia 10-Apr-2025 16:18:31  Danish Crowe  
POST-OP DIAGNOSIS:  Hemoperitoneum 28-Apr-2025 00:23:19  Janell See  
POST-OP DIAGNOSIS:  Perforated viscus 17-Apr-2025 18:43:42  Janell See

## 2025-04-28 NOTE — CHART NOTE - NSCHARTNOTEFT_GEN_A_CORE
PACU ANESTHESIA ADMISSION NOTE      Procedure: Ex-lap; removal of blood clots; control of intra-abdominal hemorrhage      Post op diagnosis:  intra-abdominal hemorrhage        __x__  Intubated  TV:___550___       Rate: ___12___      FiO2: __100%____    ____  Patent Airway    ____  Full return of protective reflexes    ____  Full recovery from anesthesia / back to baseline     Vitals:   T:   97.4F        R:      12            BP:   120/56               Sat:   97%                P: 91      Mental Status:  ____ Awake   _____ Alert   _____ Drowsy   ___x__ Sedated    Nausea/Vomiting:  _x___ NO  ______Yes,   See Post - Op Orders          Pain Scale (0-10):  _____    Treatment: ____ None    __x__ See Post - Op/PCA Orders    Post - Operative Fluids:   ____ Oral   _x___ See Post - Op Orders    Plan: Discharge:   ____Home       _____Floor     _x____Critical Care    _____  Other:_________________    Comments: Patient tolerated procedure well, no anesthesia related complications. Pt transported to SICU bed 3 via bed, on monitors, OETT-ambu bag ventilation, VSS. Reports given at bedside to SICU PA/ RN. Further pt care endorsed to SICU.

## 2025-04-28 NOTE — CONSULT NOTE ADULT - SUBJECTIVE AND OBJECTIVE BOX
INTERVENTIONAL RADIOLOGY CONSULT:     HPI:  The patient is a 64-year-old female with a past medical history of high blood pressure and gastric bypass surgery in , presenting with two days of sharp abdominal pain and one episode of non-bloody, non-bilious vomiting. She has a prior history of small bowel obstruction in , , and most recently in , all of which resolved with conservative management. Her current symptoms are similar in nature. A computed tomography scan of the abdomen and pelvis with oral and intravenous contrast revealed multiple ventral abdominal wall hernias containing both obstructed and non-obstructed bowel loops. A complex small bowel obstruction is present, involving three hernias on the right side of the abdomen, with dilated, fluid-filled loops of small bowel, trace fluid between loops, and a collapsed segment of bowel beyond a hernia in the right lower quadrant. These findings are consistent with a recurrent small bowel obstruction. The hernia was non reducible at bedside. (2025 20:41)      PAST MEDICAL & SURGICAL HISTORY:  Gastric bypass status for obesity      LOUIS (obstructive sleep apnea)      S/P gastric bypass      S/P       S/P small bowel resection      History of total bilateral knee replacement (TKR)      H/O colonoscopy            MEDICATIONS  (STANDING):  albuterol    90 MICROgram(s) HFA Inhaler 2 Puff(s) Inhalation every 6 hours  chlorhexidine 0.12% Liquid 15 milliLiter(s) Oral Mucosa every 12 hours  chlorhexidine 2% Cloths 1 Application(s) Topical <User Schedule>  fentaNYL   Infusion 0.5 MICROgram(s)/kG/Hr (6.78 mL/Hr) IV Continuous <Continuous>  heparin   Injectable 7500 Unit(s) SubCutaneous every 8 hours  insulin lispro (ADMELOG) corrective regimen sliding scale   SubCutaneous every 6 hours  lactated ringers. 1000 milliLiter(s) (120 mL/Hr) IV Continuous <Continuous>  meropenem  IVPB      meropenem  IVPB 1000 milliGRAM(s) IV Intermittent every 8 hours  norepinephrine Infusion 0.05 MICROgram(s)/kG/Min (6.36 mL/Hr) IV Continuous <Continuous>  pantoprazole  Injectable 40 milliGRAM(s) IV Push every 12 hours  propofol Infusion 10 MICROgram(s)/kG/Min (8.14 mL/Hr) IV Continuous <Continuous>  vasopressin Infusion. 0.04 Unit(s)/Min (6 mL/Hr) IV Continuous <Continuous>    MEDICATIONS  (PRN):      Allergies    No Known Allergies    Intolerances          FAMILY HISTORY:      Physical Exam:   Vital Signs Last 24 Hrs  T(C): 37 (2025 08:00), Max: 37 (2025 08:00)  T(F): 98.6 (2025 08:00), Max: 98.6 (2025 08:00)  HR: 98 (2025 09:15) (84 - 116)  BP: 112/71 (2025 08:00) (91/55 - 163/62)  BP(mean): 85 (2025 08:00) (59 - 94)  RR: 28 (2025 09:15) (12 - 41)  SpO2: 98% (:15) (79% - 100%)    Parameters below as of 2025 07:00  Patient On (Oxygen Delivery Method): ventilator    O2 Concentration (%): 60      Labs:                         9.4     )-----------( 218      ( 2025 04:00 )             27.1     04    144  |  108  |  58[H]  ----------------------------<  193[H]  4.7   |  19  |  2.7[H]    Ca    8.4      2025 04:00  Phos  7.9       Mg     2.0         TPro  4.8[L]  /  Alb  3.2[L]  /  TBili  0.7  /  DBili  0.5[H]  /  AST  1837[H]  /  ALT  1746[H]  /  AlkPhos  87  28    PT/INR - ( 2025 00:20 )   PT: 13.60 sec;   INR: 1.15 ratio         PTT - ( 2025 00:20 )  PTT:26.0 sec    Pertinent labs:                      9.4     )-----------( 218      ( 2025 04:00 )             27.1       04-28    144  |  108  |  58[H]  ----------------------------<  193[H]  4.7   |  19  |  2.7[H]    Ca    8.4      2025 04:00  Phos  7.9       Mg     2.0         TPro  4.8[L]  /  Alb  3.2[L]  /  TBili  0.7  /  DBili  0.5[H]  /  AST  1837[H]  /  ALT  1746[H]  /  AlkPhos  87        PT/INR - ( 2025 00:20 )   PT: 13.60 sec;   INR: 1.15 ratio         PTT - ( 2025 00:20 )  PTT:26.0 sec    Radiology & Additional Studies:     Radiology imaging reviewed.

## 2025-04-28 NOTE — PROGRESS NOTE ADULT - ATTENDING COMMENTS
This patient has a high probability of sudden, clinically significant deterioration, which requires the highest level of physician preparedness to intervene urgently. I managed/supervised life or organ supporting interventions that required frequent physician assessment. I devoted my full attention in the ICU to the direct care of this patient for the period of time indicated below. Time I spent with the family or surrogate(s) is included only if the patient was incapable of providing the necessary information or participating in medical decision making. Time devoted to teaching and to any procedures I billed separately is not included.     Patient examined and evaluated at the bedside with SICU team. I performed a medically appropriate exam. Pertinent findings are detailed below. Vital signs, laboratory work, and imaging reviewed.     Neuro: sedated but follows commands off sedation  #Postoperative Pain - fentanyl infusion @ 1, propofol infusion @ 20 --> wean propofol as able for RASS 0 to -1    Respiratory: respirations even and unlabored on mechanical ventilation  CXR reviewed and interpreted by me -   Mode: AC/ CMV (Assist Control/ Continuous Mandatory Ventilation), RR (machine): 28, TV (machine): 450, FiO2: 60, PEEP: 8, ITime: 1, MAP: 15, PIP: 25  ABG - ( 28 Apr 2025 05:03 )  pH, Arterial: 7.41  pH, Blood: x     /  pCO2: 35    /  pO2: 77    / HCO3: 22    / Base Excess: -2.0  /  SaO2: 97.6    #Hypoxic respiratory failure - PEEP increased to 10, repeat ABG, attempt SBT today on CPAP  #Pulmonary embolism/Venous thrombosis - holding therapeutic anticoagulation, IR consulted for evaluation for IVC filter, SQH 7500 units Q8H due to BMI > 50    CV: monitor hemodynamics, MAP goal > 65  #Hemorrhagic shock - trend hemoglobin, vaso infusion @ 0.04, norepinephrine @ 0.2, consider methylene blue if vasopressor requirement increases    GI: THONY drains serosanguinous, midline wound vac  ( 28 Apr 2025 00:20 )  Alb: 3.2 g/dL / Pro: 4.8 g/dL / AlkPhos: 87 U/L / ALT: 1746 U/L / AST: 1837 U/L / GGT:x     / Tbili 0.7 mg/dL/ Dbili 0.5 mg/dL / Indbili 0.2 mg/dL  #Nutrition - Protein-calorie malnutrition - acute illness, difficulty with oral intake due to GI condition - unable to start nutrition through GI tract per primary surgery team, restart TPN  #Ppx - protonix 40mg IV BID (Hx of gastric bypass)    Renal: Continue I&Os monitoring, replete electrolytes as needed  04-28    144  |  108  |  58[H]  ----------------------------<  193[H]  4.7   |  19  |  2.7[H]    Ca 8.4; Mg 2.0; Phos 7.9[H]    UOP - OUT: 225 mL    I/O - IN: 6733.9 mL / OUT: 1010 mL / NET: 5723.9 mL    An catheter - hourly I/O in critically ill patient  IVF - LR @ 120 cc/h  #Acute kidney injury - re-consult nephrology, IVC < 2 cm on ultrasound so will hold off on giving lasix for now, trend creatinine, monitor potassium    Heme: continue to evaluate for acute blood loss anemia- trend Hg/Hct                         9.4    24.67 )-----------( 218      ( 28 Apr 2025 04:00 )             27.1     PT/INR/PTT - ( 28 Apr 2025 00:20 )   PT: 13.60 sec; INR: 1.15 ratio; PTT 26.0 sec    Anticoagulation - SQH 7500 units Q8H    ID: trend WBC, monitor for fevers  #Leukocytosis/Abdominal collection - meropenem 1g Q8H, follow up ID recs for duration given surgery overnight    Endocrine: Prevent and treat hyperglycemia with insulin as needed    PV: follow pulse exam    MSK: OOB and mobilize when able, PT eval when extubated    Skin: decub precautions    Lines: THONY draines x 3, R IJ TLC, L radial arterial line, abdominal wound vac  DVT Prophylaxis: SQH 7500 units Q8H  Stress Ulcer Prophylaxis: Protonix 40mg BID   Disposition: SICU - hemorrhagic shock/hypotension requiring vasopressor infusion, hypoxic respiratory failure, acute kidney injury    Blaise Allen MD  Trauma/Acute Care Surgery/Surgical Critical Care Attending This patient has a high probability of sudden, clinically significant deterioration, which requires the highest level of physician preparedness to intervene urgently. I managed/supervised life or organ supporting interventions that required frequent physician assessment. I devoted my full attention in the ICU to the direct care of this patient for the period of time indicated below. Time I spent with the family or surrogate(s) is included only if the patient was incapable of providing the necessary information or participating in medical decision making. Time devoted to teaching and to any procedures I billed separately is not included.     Patient examined and evaluated at the bedside with SICU team. I performed a medically appropriate exam. Pertinent findings are detailed below. Vital signs, laboratory work, and imaging reviewed.     Neuro: sedated but follows commands off sedation  #Postoperative Pain - fentanyl infusion @ 1, propofol infusion @ 20 --> wean propofol as able for RASS 0 to -1    Respiratory: respirations even and unlabored on mechanical ventilation  CXR reviewed and interpreted by me -   Mode: AC/ CMV (Assist Control/ Continuous Mandatory Ventilation), RR (machine): 28, TV (machine): 450, FiO2: 60, PEEP: 8, ITime: 1, MAP: 15, PIP: 25  ABG - ( 28 Apr 2025 05:03 )  pH, Arterial: 7.41  pH, Blood: x     /  pCO2: 35    /  pO2: 77    / HCO3: 22    / Base Excess: -2.0  /  SaO2: 97.6    #Hypoxic respiratory failure - PEEP increased to 10, repeat ABG, attempt SBT today on CPAP  #Pulmonary embolism/Venous thrombosis - holding therapeutic anticoagulation, IR consulted for evaluation for IVC filter, SQH 7500 units Q8H due to BMI > 50    CV: monitor hemodynamics, SBP goal > 100  #Hemorrhagic shock - trend hemoglobin, vaso infusion @ 0.04, norepinephrine @ 0.2, consider methylene blue if vasopressor requirement increases, wean norepi as able before vaso    GI: THONY drains serosanguinous, midline wound vac  ( 28 Apr 2025 00:20 )  Alb: 3.2 g/dL / Pro: 4.8 g/dL / AlkPhos: 87 U/L / ALT: 1746 U/L / AST: 1837 U/L / GGT:x     / Tbili 0.7 mg/dL/ Dbili 0.5 mg/dL / Indbili 0.2 mg/dL  #Nutrition - Protein-calorie malnutrition - acute illness, difficulty with oral intake due to GI condition - unable to start nutrition through GI tract per primary surgery team, restart TPN  #Ppx - protonix 40mg IV BID (Hx of gastric bypass)    Renal: Continue I&Os monitoring, replete electrolytes as needed  04-28    144  |  108  |  58[H]  ----------------------------<  193[H]  4.7   |  19  |  2.7[H]    Ca 8.4; Mg 2.0; Phos 7.9[H]    UOP - OUT: 225 mL    I/O - IN: 6733.9 mL / OUT: 1010 mL / NET: 5723.9 mL    Na catheter - hourly I/O in critically ill patient  IVF - LR @ 120 cc/h  #Acute kidney injury - re-consult nephrology, IVC < 2 cm on ultrasound so will hold off on giving lasix for now, trend creatinine, monitor potassium    Heme: continue to evaluate for acute blood loss anemia- trend Hg/Hct                         9.4    24.67 )-----------( 218      ( 28 Apr 2025 04:00 )             27.1     PT/INR/PTT - ( 28 Apr 2025 00:20 )   PT: 13.60 sec; INR: 1.15 ratio; PTT 26.0 sec    Anticoagulation - SQH 7500 units Q8H    ID: trend WBC, monitor for fevers  #Leukocytosis/Abdominal collection - meropenem 1g Q8H, follow up ID recs for duration given surgery overnight    Endocrine: Prevent and treat hyperglycemia with insulin as needed    PV: follow pulse exam    MSK: OOB and mobilize when able, PT eval when extubated    Skin: decub precautions    Lines: THONY draines x 3, R IJ TLC, L radial arterial line, abdominal wound vac  DVT Prophylaxis: SQH 7500 units Q8H  Stress Ulcer Prophylaxis: Protonix 40mg BID   Disposition: SICU - hemorrhagic shock/hypotension requiring vasopressor infusion, hypoxic respiratory failure, acute kidney injury    Blaise Allen MD  Trauma/Acute Care Surgery/Surgical Critical Care Attending

## 2025-04-28 NOTE — BRIEF OPERATIVE NOTE - NSICDXBRIEFPREOP_GEN_ALL_CORE_FT
PRE-OP DIAGNOSIS:  Perforated viscus 17-Apr-2025 18:43:35  Janell See  
PRE-OP DIAGNOSIS:  Incarcerated ventral hernia 10-Apr-2025 16:18:21  Danish Crowe  SBO (small bowel obstruction) 10-Apr-2025 16:18:26  Danish Crowe  
PRE-OP DIAGNOSIS:  Hemoperitoneum 28-Apr-2025 00:23:13  Janell See

## 2025-04-28 NOTE — PROGRESS NOTE ADULT - SUBJECTIVE AND OBJECTIVE BOX
VASCULAR SURGERY PROGRESS NOTE    Events of past 24 hours: Vascular re-called by SICU for possible IVC filter placement. OVer the weekend, patient coded, taken to OR as level 1 for ex lap, abdominal washout, hemoperitoneum evacuation. CTA after code showed PE is now segmental. AC being held postoperatively. Currently still intubated, vent settings 60/10, levo at 0.21, vaso 0.04      ROS otherwise negative except per subjective and HPI      PAST MEDICAL & SURGICAL HISTORY:  Gastric bypass status for obesity      LOUIS (obstructive sleep apnea)      S/P gastric bypass      S/P       S/P small bowel resection      History of total bilateral knee replacement (TKR)      H/O colonoscopy        Vital Signs Last 24 Hrs  T(C): 37 (2025 08:00), Max: 37 (2025 08:00)  T(F): 98.6 (2025 08:00), Max: 98.6 (2025 08:00)  HR: 100 (2025 08:15) (84 - 116)  BP: 112/71 (2025 08:00) (91/55 - 163/62)  BP(mean): 85 (2025 08:00) (59 - 94)  RR: 16 (2025 08:15) (12 - 41)  SpO2: 98% (2025 08:15) (79% - 100%)    Parameters below as of 2025 07:00  Patient On (Oxygen Delivery Method): ventilator    O2 Concentration (%): 60    Pain (0-10):            Pain Control Adequate: [] YES [] N    Diet:    I&O's Detail    2025 07:01  -  2025 07:00  --------------------------------------------------------  IN:    FentaNYL: 6.8 mL    FentaNYL: 183.1 mL    IV PiggyBack: 100 mL    IV PiggyBack: 250 mL    IV PiggyBack: 100 mL    Lactated Ringers: 1920 mL    Lactated Ringers Bolus: 500 mL    Norepinephrine: 212.3 mL    Norepinephrine: 1014.4 mL    Phenylephrine: 15.3 mL    Phenylephrine: 40.6 mL    Plasma: 332 mL    Platelets - Single Donor: 229 mL    PRBCs (Packed Red Blood Cells): 600 mL    Propofol: 378.5 mL    Sodium Bicarbonate: 720 mL    Vasopressin (Organ Donation): 132 mL  Total IN: 6733.9 mL    OUT:    Bulb (mL): 390 mL    Bulb (mL): 30 mL    Bulb (mL): 30 mL    Bulb (mL): 80 mL    Bulb (mL): 120 mL    Bulb (mL): 85 mL    Indwelling Catheter - Urethral (mL): 225 mL    Rectal Tube (mL): 50 mL    VAC (Vacuum Assisted Closure) System (mL): 0 mL  Total OUT: 1010 mL    Total NET: 5723.9 mL      2025 07:01  -  2025 09:23  --------------------------------------------------------  IN:    FentaNYL: 13.6 mL    Lactated Ringers: 240 mL    Norepinephrine: 25.4 mL    Propofol: 16.3 mL    Vasopressin (Organ Donation): 6 mL  Total IN: 301.3 mL    OUT:    Bulb (mL): 12 mL    Bulb (mL): 25 mL    Bulb (mL): 12 mL    Indwelling Catheter - Urethral (mL): 30 mL    VAC (Vacuum Assisted Closure) System (mL): 0 mL  Total OUT: 79 mL    Total NET: 222.3 mL      PHYSICAL EXAM    Appearance: intubated	  Cardiovascular: HDS on pressors  Respiratory: intubated, settings 60/10  Gastrointestinal:  abdomen soft, less distended, midline prevena vac in place, x 3 drains in place all serosanguinous, right pelvic drain appears more sanguinous.   Skin: No rashes, No ecchymoses, No cyanosis  Extremities: L DP heard on DOppler, no R DP on Doppler      MEDICATIONS:   MEDICATIONS  (STANDING):  albuterol    90 MICROgram(s) HFA Inhaler 2 Puff(s) Inhalation every 6 hours  chlorhexidine 0.12% Liquid 15 milliLiter(s) Oral Mucosa every 12 hours  chlorhexidine 2% Cloths 1 Application(s) Topical <User Schedule>  fentaNYL    Injectable 12.5 MICROGram(s) IV Push once  fentaNYL   Infusion 0.5 MICROgram(s)/kG/Hr (6.78 mL/Hr) IV Continuous <Continuous>  insulin lispro (ADMELOG) corrective regimen sliding scale   SubCutaneous every 6 hours  lactated ringers. 1000 milliLiter(s) (120 mL/Hr) IV Continuous <Continuous>  meropenem  IVPB      meropenem  IVPB 1000 milliGRAM(s) IV Intermittent every 8 hours  norepinephrine Infusion 0.05 MICROgram(s)/kG/Min (6.36 mL/Hr) IV Continuous <Continuous>  pantoprazole  Injectable 40 milliGRAM(s) IV Push every 12 hours  propofol Infusion 10 MICROgram(s)/kG/Min (8.14 mL/Hr) IV Continuous <Continuous>  vasopressin Infusion. 0.04 Unit(s)/Min (6 mL/Hr) IV Continuous <Continuous>    LAB/STUDIES:                        9.4    24.67 )-----------( 218      ( 2025 04:00 )             27.1         144  |  108  |  58[H]  ----------------------------<  193[H]  4.7   |  19  |  2.7[H]    Ca    8.4      2025 04:00  Phos  7.9       Mg     2.0         TPro  4.8[L]  /  Alb  3.2[L]  /  TBili  0.7  /  DBili  0.5[H]  /  AST  1837[H]  /  ALT  1746[H]  /  AlkPhos  87      PT/INR - ( 2025 00:20 )   PT: 13.60 sec;   INR: 1.15 ratio         PTT - ( 2025 00:20 )  PTT:26.0 sec  LIVER FUNCTIONS - ( 2025 00:20 )  Alb: 3.2 g/dL / Pro: 4.8 g/dL / ALK PHOS: 87 U/L / ALT: 1746 U/L / AST: 1837 U/L / GGT: x           ABG - ( 2025 05:03 )  pH, Arterial: 7.41  pH, Blood: x     /  pCO2: 35    /  pO2: 77    / HCO3: 22    / Base Excess: -2.0  /  SaO2: 97.6     IMAGING:  < from: CT Angio Chest Aorta w/wo IV Cont (25 @ 11:39) >    IMPRESSION:    Within limitations of streak artifact and suboptimal contrast timing:    Redemonstrated right distal main pulmonary embolus with segmental   extension.    There is arterial blush in the mesentery anterior to abdominal drainage   catheter with venous pooling, which may reflect extravasation    Redemonstrated large volume complex fluid in the pelvis and along the   left paracolic gutter, compatible with blood products.    Stable postoperative and additional findings as above.

## 2025-04-28 NOTE — PROGRESS NOTE ADULT - SUBJECTIVE AND OBJECTIVE BOX
NANCY SARGENT   [id number]   [birth year]  64yF  ============================================================   DATE OF INITIAL SICU/SDU CONSULT: 04-10-25    INDICATION FOR SICU CONSULT:  q1hr abdominal checks, mechanical ventilation    SICU COURSE EVENTS :  04-10 - admitted to SICU service  4/11: Intubated and started on paralytic. Arterial line placed, subclavian TLC placed. Nephrology consulted for oliguria. IR abdominal muscle botox injection performed. TTE performed.   4/12: Additional fluid boluses given; trial fo bumex started, considering CVVH. Weaned off nimbex gtt.  > 220 /hr. Renal U/S w/out hydro.   4/13: Weaned off Levophed. Bumex gtt 2mg/hr > 1mg/hr. Goal net negative 1-1.5L, UOP approx, 200c/hr.   4/14: Remained on bumex gtt for further diuresis, decreased to 0.5 overnight, vent settings weaned. Cr downtrending, LFTs worsening.   4/15: Extubated to BiPAP, transitioned to NC. Dc'd bumex gtt, given 5mg metolazone x 1 and 2mg bumex x 1.   4/16: THONY drain #2 murky/bilious, pending CTAP with PO contrast, hypernatremia, started D5W @ 75cc/hr, persistent hypokalemia   4/17: RTOR for resection of anastomosis for anastomotic leak. Returned intubated, 400/24/80/10, sinus tachy to . Abdomen soft. Was initially on propofol @ 30 and precedex, but propofol weaned off due to hypotension with MAPs in 50s, fentanyl ggt started for acute pain. Remained hypotensive despite fluids, started on Levophed, on 0.05.   4/18: Extubated. HFNC 40L/49%, Levo off since 11 AM   4/19: NGT removed. Started on duonebs. D5W increased to 60cc/hr. 1mg bumex, > 1.5L response  4/20: Episode of afib RVR resolved with metoprolol 5mg IVP, cardiology c/s placed, recommending metoprolol 25mg q 12   4/21: PE on CTA, therapeutic heparin gtt started. Started on cardebe gtt,   4/22: Meropenem started per ID.  S/p IR percutaneous placement of a 8.5 [place] drainage catheter into lower abdominal wall fluid collection, yielding 150 mL of bowel appearing fluid. New left radial a-line placed. Off nicarrdipine gtt.   4/23: Switched to therapeutic lovenox. Diet advanced to aubrey clears with ensures.  4/24: advacned to bariatric FLD, downgraded to SDU  4/25: TPN discontinued, diet advanced to full liquid , Right cephalic DVT prelim  4/26: Right UE VA Duplex final no DVT, thromboplebitis, Arterial line removed, 2L NC, midline vac changed, noted to have bilious drainage, RUQ drain pulled, upgraded to SICU status, plan to reorder TPN for 4/27 PM, diet changed to full liquid. 1:20 AM noted to be unresponsive, palpable femoral pulse, decision made to intubate, R femoral arterial line placed, started on levophed, amauri, bicarb amp x 2, bicarb gtt, propofol, 2U pRBC for hgb 5.9, plan for CTH and CTAP when stabilized   4/27: 2uPRBC 1uFFP 1pkPlt, TXA 1g, IR for embolization due to active extravasation seen on CTA, no active bleed, no embolization, Return to OR for re-exploration and evacuation of old clot    24Hour Events:      [X] A ten-point review of systems was negative except as expressed in note.  [X ] History was obtained from patient. If unable to participate in their care, history was from review of the chart and collateral sources of information.  ================================================================================   NANCY SARGENT   [id number]   [birth year]  64yF  ============================================================   DATE OF INITIAL SICU/SDU CONSULT: 04-10-25    INDICATION FOR SICU CONSULT:  q1hr abdominal checks, mechanical ventilation    SICU COURSE EVENTS :  04-10 - admitted to SICU service  : Intubated and started on paralytic. Arterial line placed, subclavian TLC placed. Nephrology consulted for oliguria. IR abdominal muscle botox injection performed. TTE performed.   : Additional fluid boluses given; trial fo bumex started, considering CVVH. Weaned off nimbex gtt.  > 220 /hr. Renal U/S w/out hydro.   : Weaned off Levophed. Bumex gtt 2mg/hr > 1mg/hr. Goal net negative 1-1.5L, UOP approx, 200c/hr.   : Remained on bumex gtt for further diuresis, decreased to 0.5 overnight, vent settings weaned. Cr downtrending, LFTs worsening.   4/15: Extubated to BiPAP, transitioned to NC. Dc'd bumex gtt, given 5mg metolazone x 1 and 2mg bumex x 1.   : THONY drain #2 murky/bilious, pending CTAP with PO contrast, hypernatremia, started D5W @ 75cc/hr, persistent hypokalemia   : RTOR for resection of anastomosis for anastomotic leak. Returned intubated, 400/24/80/10, sinus tachy to . Abdomen soft. Was initially on propofol @ 30 and precedex, but propofol weaned off due to hypotension with MAPs in 50s, fentanyl ggt started for acute pain. Remained hypotensive despite fluids, started on Levophed, on 0.05.   : Extubated. HFNC 40L/49%, Levo off since 11 AM   : NGT removed. Started on duonebs. D5W increased to 60cc/hr. 1mg bumex, > 1.5L response  : Episode of afib RVR resolved with metoprolol 5mg IVP, cardiology c/s placed, recommending metoprolol 25mg q 12   : PE on CTA, therapeutic heparin gtt started. Started on cardebe gtt,   : Meropenem started per ID.  S/p IR percutaneous placement of a 8.5 [place] drainage catheter into lower abdominal wall fluid collection, yielding 150 mL of bowel appearing fluid. New left radial a-line placed. Off nicarrdipine gtt.   : Switched to therapeutic lovenox. Diet advanced to aubrey clears with ensures.  : advacned to bariatric FLD, downgraded to SDU  : TPN discontinued, diet advanced to full liquid , Right cephalic DVT prelim  : Right UE VA Duplex final no DVT, thromboplebitis, Arterial line removed, 2L NC, midline vac changed, noted to have bilious drainage, RUQ drain pulled, upgraded to SICU status, plan to reorder TPN for  PM, diet changed to full liquid. 1:20 AM noted to be unresponsive, palpable femoral pulse, decision made to intubate, R femoral arterial line placed, started on levophed, amauri, bicarb amp x 2, bicarb gtt, propofol, 2U pRBC for hgb 5.9, plan for CTH and CTAP when stabilized   : 2uPRBC 1uFFP 1pkPlt, TXA 1g, IR for embolization due to active extravasation seen on CTA, no active bleed, no embolization, Return to OR for re-exploration and evacuation of old clot    24Hour Events:    NIGHT  -Levo - .5, Vaso - .04, MAP 56, fentanyl .5mcg, propofol 20, Vent 40%/PEEP8   -Pupils sluggishly reactive, R>L, abdomen distended, midline vac dressing in place with right sided petechiae noted, LUQ THONY drain with sang output, middle serous, RLQ serosang  -SVI 35, , CI 3.5  - postop abg mildly acidotic > 1 amp bicarb, lactate 1.3 -> 7.41/35/77/22  - K 5.3 > treated -> 5.1, 5.3 after receiving blood products in OR > K now 4.7  - phos 9.1 -> 9.3 -> 7.9, consider starting phoslo  - postop ekg with NSR, nml intervals, no peaked T waves  - awake, following commands.   - c diff neg    DAY  - labs at 12  - CTA w/ oral constrast, then IR c/s for possible IVC filter and embolization  - CTA C/AP: Redemonstrated right distal main pulmonary embolus with segmental extension. There is arterial blush in the mesentery anterior to abdominal drainage catheter with venous pooling, which may reflect extravasation. Redemonstrated large volume complex fluid in the pelvis and along the left paracolic gutter, compatible with blood products.  - restart TPN  - c diff culture  - Giving 2uprbc, 2uFFP, 1u Platelet, 1g TXA   - LUE duplex: Superficial thrombophlebitis of the left cephalic vein, no DVT  - THONY drain w/ 310mL of noni blood --> stat IR consult for embolization  - CT head prelim negative  - trop 288 from 125  - 1g Calcium chloride  - 1amp of bicarb  - K 5.3 --> 10u insulin, D50    [X] A ten-point review of systems was negative except as expressed in note.  [X ] History was obtained from patient. If unable to participate in their care, history was from review of the chart and collateral sources of information.  ================================================================================  Daily Height in cm: 160.02 (2025 20:59)    Daily Weight in k (2025 06:00)    Diet, NPO (25 @ 02:46)      CURRENT MEDS:  Neurologic Medications  fentaNYL    Injectable 12.5 MICROGram(s) IV Push once  fentaNYL   Infusion 0.5 MICROgram(s)/kG/Hr IV Continuous <Continuous>  propofol Infusion 10 MICROgram(s)/kG/Min IV Continuous <Continuous>    Respiratory Medications  albuterol    90 MICROgram(s) HFA Inhaler 2 Puff(s) Inhalation every 6 hours    Cardiovascular Medications  norepinephrine Infusion 0.05 MICROgram(s)/kG/Min IV Continuous <Continuous>    Gastrointestinal Medications  lactated ringers. 1000 milliLiter(s) IV Continuous <Continuous>  pantoprazole  Injectable 40 milliGRAM(s) IV Push every 12 hours    Genitourinary Medications    Hematologic/Oncologic Medications    Antimicrobial/Immunologic Medications  meropenem  IVPB      meropenem  IVPB 1000 milliGRAM(s) IV Intermittent every 8 hours    Endocrine/Metabolic Medications  insulin lispro (ADMELOG) corrective regimen sliding scale   SubCutaneous every 6 hours  vasopressin Infusion. 0.04 Unit(s)/Min IV Continuous <Continuous>    Topical/Other Medications  chlorhexidine 0.12% Liquid 15 milliLiter(s) Oral Mucosa every 12 hours  chlorhexidine 2% Cloths 1 Application(s) Topical <User Schedule>    ICU Vital Signs Last 24 Hrs  T(C): 37 (2025 08:00), Max: 37 (2025 08:00)  T(F): 98.6 (2025 08:00), Max: 98.6 (2025 08:00)  HR: 100 (2025 07:00) (84 - 109)  BP: 109/41 (2025 07:00) (91/55 - 163/62)  BP(mean): 59 (2025 07:00) (59 - 94)  ABP: 114/54 (2025 07:00) (85/42 - 171/64)  ABP(mean): 69 (2025 07:00) (51 - 101)  RR: 18 (2025 07:00) (12 - 40)  SpO2: 98% (2025 07:00) (79% - 100%)    O2 Parameters below as of 2025 07:00  Patient On (Oxygen Delivery Method): ventilator    O2 Concentration (%): 60    Mode: AC/ CMV (Assist Control/ Continuous Mandatory Ventilation)  RR (machine): 28  TV (machine): 450  FiO2: 60  PEEP: 10  ITime: 1  MAP: 15  PIP: 25    ABG - ( 2025 05:03 )  pH, Arterial: 7.41  pH, Blood: x     /  pCO2: 35    /  pO2: 77    / HCO3: 22    / Base Excess: -2.0  /  SaO2: 97.6                I&O's Summary    2025 07:  -  2025 07:00  --------------------------------------------------------  IN: 6733.9 mL / OUT: 1010 mL / NET: 5723.9 mL      I&O's Detail    2025 07:  -  2025 07:00  --------------------------------------------------------  IN:    FentaNYL: 6.8 mL    FentaNYL: 183.1 mL    IV PiggyBack: 100 mL    IV PiggyBack: 250 mL    IV PiggyBack: 100 mL    Lactated Ringers: 1920 mL    Lactated Ringers Bolus: 500 mL    Norepinephrine: 212.3 mL    Norepinephrine: 1014.4 mL    Phenylephrine: 15.3 mL    Phenylephrine: 40.6 mL    Plasma: 332 mL    Platelets - Single Donor: 229 mL    PRBCs (Packed Red Blood Cells): 600 mL    Propofol: 378.5 mL    Sodium Bicarbonate: 720 mL    Vasopressin (Organ Donation): 132 mL  Total IN: 6733.9 mL    OUT:    Bulb (mL): 390 mL    Bulb (mL): 30 mL    Bulb (mL): 30 mL    Bulb (mL): 80 mL    Bulb (mL): 120 mL    Bulb (mL): 85 mL    Indwelling Catheter - Urethral (mL): 225 mL    Rectal Tube (mL): 50 mL    VAC (Vacuum Assisted Closure) System (mL): 0 mL  Total OUT: 1010 mL    Total NET: 5723.9 mL    PHYSICAL EXAM:  GENERAL: Following commands, agitated  HEENT: Normocephalic, atraumatic, OGT in place  PULM: Mechanically ventilated at 450/28/60/10  CV: Sinus tachycardia  ABDOMEN: Abdomen soft, obese, mildly-distended, midline vac holding suction, THONY drains x 3 with SS output   : An in place, draining clear yellow urine  MSK: Moving extremities spontaneously  SKIN: Warm, dry, normal skin color, texture, turgor    LABS:  CAPILLARY BLOOD GLUCOSE  POCT Blood Glucose.: 135 mg/dL (2025 05:28)  POCT Blood Glucose.: 85 mg/dL (2025 01:40)  POCT Blood Glucose.: 106 mg/dL (2025 00:14)  POCT Blood Glucose.: 123 mg/dL (2025 20:04)  POCT Blood Glucose.: 191 mg/dL (2025 17:03)  POCT Blood Glucose.: 156 mg/dL (2025 12:45)  POCT Blood Glucose.: 154 mg/dL (2025 08:08)                        9.4    24.67 )-----------( 218      ( 2025 04:00 )             27.1           144  |  108  |  58[H]  ----------------------------<  193[H]  4.7   |  19  |  2.7[H]    Ca    8.4      2025 04:00  Phos  7.9       Mg     2.0         TPro  4.8[L]  /  Alb  3.2[L]  /  TBili  0.7  /  DBili  0.5[H]  /  AST  1837[H]  /  ALT  1746[H]  /  AlkPhos  87        PT/INR - ( 2025 00:20 )   PT: 13.60 sec;   INR: 1.15 ratio      PTT - ( 2025 00:20 )  PTT:26.0 sec    Urinalysis Basic - ( 2025 04:00 )    Color: x / Appearance: x / SG: x / pH: x  Gluc: 193 mg/dL / Ketone: x  / Bili: x / Urobili: x   Blood: x / Protein: x / Nitrite: x   Leuk Esterase: x / RBC: x / WBC x   Sq Epi: x / Non Sq Epi: x / Bacteria: x NANCY SARGENT   [id number]   [birth year]  64yF  ============================================================   DATE OF INITIAL SICU/SDU CONSULT: 04-10-25    INDICATION FOR SICU CONSULT:  q1hr abdominal checks, mechanical ventilation    SICU COURSE EVENTS :  04-10 - admitted to SICU service  : Intubated and started on paralytic. Arterial line placed, subclavian TLC placed. Nephrology consulted for oliguria. IR abdominal muscle botox injection performed. TTE performed.   : Additional fluid boluses given; trial fo bumex started, considering CVVH. Weaned off nimbex gtt.  > 220 /hr. Renal U/S w/out hydro.   : Weaned off Levophed. Bumex gtt 2mg/hr > 1mg/hr. Goal net negative 1-1.5L, UOP approx, 200c/hr.   : Remained on bumex gtt for further diuresis, decreased to 0.5 overnight, vent settings weaned. Cr downtrending, LFTs worsening.   4/15: Extubated to BiPAP, transitioned to NC. Dc'd bumex gtt, given 5mg metolazone x 1 and 2mg bumex x 1.   : THONY drain #2 murky/bilious, pending CTAP with PO contrast, hypernatremia, started D5W @ 75cc/hr, persistent hypokalemia   : RTOR for resection of anastomosis for anastomotic leak. Returned intubated, 400/24/80/10, sinus tachy to . Abdomen soft. Was initially on propofol @ 30 and precedex, but propofol weaned off due to hypotension with MAPs in 50s, fentanyl ggt started for acute pain. Remained hypotensive despite fluids, started on Levophed, on 0.05.   : Extubated. HFNC 40L/49%, Levo off since 11 AM   : NGT removed. Started on duonebs. D5W increased to 60cc/hr. 1mg bumex, > 1.5L response  : Episode of afib RVR resolved with metoprolol 5mg IVP, cardiology c/s placed, recommending metoprolol 25mg q 12   : PE on CTA, therapeutic heparin gtt started. Started on cardebe gtt,   : Meropenem started per ID.  S/p IR percutaneous placement of a 8.5 [place] drainage catheter into lower abdominal wall fluid collection, yielding 150 mL of bowel appearing fluid. New left radial a-line placed. Off nicarrdipine gtt.   : Switched to therapeutic lovenox. Diet advanced to aubrey clears with ensures.  : advacned to bariatric FLD, downgraded to SDU  : TPN discontinued, diet advanced to full liquid , Right cephalic DVT prelim  : Right UE VA Duplex final no DVT, thromboplebitis, Arterial line removed, 2L NC, midline vac changed, noted to have bilious drainage, RUQ drain pulled, upgraded to SICU status, plan to reorder TPN for  PM, diet changed to full liquid. 1:20 AM noted to be unresponsive, palpable femoral pulse, decision made to intubate, R femoral arterial line placed, started on levophed, amauri, bicarb amp x 2, bicarb gtt, propofol, 2U pRBC for hgb 5.9, plan for CTH and CTAP when stabilized   : 2uPRBC 1uFFP 1pkPlt, TXA 1g, IR for embolization due to active extravasation seen on CTA, no active bleed, no embolization, Return to OR for re-exploration and evacuation of old clot    24Hour Events:    NIGHT  -Levo - .5, Vaso - .04, MAP 56, fentanyl .5mcg, propofol 20, Vent 40%/PEEP8   -Pupils sluggishly reactive, R>L, abdomen distended, midline vac dressing in place with right sided petechiae noted, LUQ THONY drain with sang output, middle serous, RLQ serosang  -SVI 35, , CI 3.5  - postop abg mildly acidotic > 1 amp bicarb, lactate 1.3 -> 7.41/35/77/22  - K 5.3 > treated -> 5.1, 5.3 after receiving blood products in OR > K now 4.7  - phos 9.1 -> 9.3 -> 7.9, consider starting phoslo  - postop ekg with NSR, nml intervals, no peaked T waves  - awake, following commands.   - c diff neg    DAY  - labs at 12  - CTA w/ oral constrast, then IR c/s for possible IVC filter and embolization  - CTA C/AP: Redemonstrated right distal main pulmonary embolus with segmental extension. There is arterial blush in the mesentery anterior to abdominal drainage catheter with venous pooling, which may reflect extravasation. Redemonstrated large volume complex fluid in the pelvis and along the left paracolic gutter, compatible with blood products.  - restart TPN  - c diff culture  - Giving 2uprbc, 2uFFP, 1u Platelet, 1g TXA   - LUE duplex: Superficial thrombophlebitis of the left cephalic vein, no DVT  - THONY drain w/ 310mL of noni blood --> stat IR consult for embolization  - CT head prelim negative  - trop 288 from 125  - 1g Calcium chloride  - 1amp of bicarb  - K 5.3 --> 10u insulin, D50    [X] A ten-point review of systems was negative except as expressed in note.  [X ] History was obtained from patient. If unable to participate in their care, history was from review of the chart and collateral sources of information.  ================================================================================  Daily Height in cm: 160.02 (2025 20:59)    Daily Weight in k (2025 06:00)    Diet, NPO (25 @ 02:46)      CURRENT MEDS:  Neurologic Medications  fentaNYL    Injectable 12.5 MICROGram(s) IV Push once  fentaNYL   Infusion 0.5 MICROgram(s)/kG/Hr IV Continuous <Continuous>  propofol Infusion 10 MICROgram(s)/kG/Min IV Continuous <Continuous>    Respiratory Medications  albuterol    90 MICROgram(s) HFA Inhaler 2 Puff(s) Inhalation every 6 hours    Cardiovascular Medications  norepinephrine Infusion 0.05 MICROgram(s)/kG/Min IV Continuous <Continuous>    Gastrointestinal Medications  lactated ringers. 1000 milliLiter(s) IV Continuous <Continuous>  pantoprazole  Injectable 40 milliGRAM(s) IV Push every 12 hours    Genitourinary Medications    Hematologic/Oncologic Medications    Antimicrobial/Immunologic Medications  meropenem  IVPB      meropenem  IVPB 1000 milliGRAM(s) IV Intermittent every 8 hours    Endocrine/Metabolic Medications  insulin lispro (ADMELOG) corrective regimen sliding scale   SubCutaneous every 6 hours  vasopressin Infusion. 0.04 Unit(s)/Min IV Continuous <Continuous>    Topical/Other Medications  chlorhexidine 0.12% Liquid 15 milliLiter(s) Oral Mucosa every 12 hours  chlorhexidine 2% Cloths 1 Application(s) Topical <User Schedule>    ICU Vital Signs Last 24 Hrs  T(C): 37 (2025 08:00), Max: 37 (2025 08:00)  T(F): 98.6 (2025 08:00), Max: 98.6 (2025 08:00)  HR: 100 (2025 07:00) (84 - 109)  BP: 109/41 (2025 07:00) (91/55 - 163/62)  BP(mean): 59 (2025 07:00) (59 - 94)  ABP: 114/54 (2025 07:00) (85/42 - 171/64)  ABP(mean): 69 (2025 07:00) (51 - 101)  RR: 18 (2025 07:00) (12 - 40)  SpO2: 98% (2025 07:00) (79% - 100%)    O2 Parameters below as of 2025 07:00  Patient On (Oxygen Delivery Method): ventilator    O2 Concentration (%): 60    Mode: AC/ CMV (Assist Control/ Continuous Mandatory Ventilation)  RR (machine): 28  TV (machine): 450  FiO2: 60  PEEP: 10  ITime: 1  MAP: 15  PIP: 25    ABG - ( 2025 05:03 )  pH, Arterial: 7.41  pH, Blood: x     /  pCO2: 35    /  pO2: 77    / HCO3: 22    / Base Excess: -2.0  /  SaO2: 97.6      I&O's Summary    2025 07:  -  2025 07:00  --------------------------------------------------------  IN: 6733.9 mL / OUT: 1010 mL / NET: 5723.9 mL      I&O's Detail    2025 07:  -  2025 07:00  --------------------------------------------------------  IN:    FentaNYL: 6.8 mL    FentaNYL: 183.1 mL    IV PiggyBack: 100 mL    IV PiggyBack: 250 mL    IV PiggyBack: 100 mL    Lactated Ringers: 1920 mL    Lactated Ringers Bolus: 500 mL    Norepinephrine: 212.3 mL    Norepinephrine: 1014.4 mL    Phenylephrine: 15.3 mL    Phenylephrine: 40.6 mL    Plasma: 332 mL    Platelets - Single Donor: 229 mL    PRBCs (Packed Red Blood Cells): 600 mL    Propofol: 378.5 mL    Sodium Bicarbonate: 720 mL    Vasopressin (Organ Donation): 132 mL  Total IN: 6733.9 mL    OUT:    Bulb (mL): 390 mL    Bulb (mL): 30 mL    Bulb (mL): 30 mL    Bulb (mL): 80 mL    Bulb (mL): 120 mL    Bulb (mL): 85 mL    Indwelling Catheter - Urethral (mL): 225 mL    Rectal Tube (mL): 50 mL    VAC (Vacuum Assisted Closure) System (mL): 0 mL  Total OUT: 1010 mL    Total NET: 5723.9 mL    PHYSICAL EXAM:  GENERAL: Following commands, agitated  HEENT: Normocephalic, atraumatic, OGT in place  PULM: Mechanically ventilated at 450/28/60/10  CV: Sinus tachycardia  ABDOMEN: Abdomen soft, obese, mildly-distended, midline vac holding suction, THONY drains x 3 with SS output   : An in place, draining clear yellow urine  MSK: Moving extremities spontaneously  SKIN: Warm, dry, normal skin color, texture, turgor    LABS:  CAPILLARY BLOOD GLUCOSE  POCT Blood Glucose.: 135 mg/dL (2025 05:28)  POCT Blood Glucose.: 85 mg/dL (2025 01:40)  POCT Blood Glucose.: 106 mg/dL (2025 00:14)  POCT Blood Glucose.: 123 mg/dL (2025 20:04)  POCT Blood Glucose.: 191 mg/dL (2025 17:03)  POCT Blood Glucose.: 156 mg/dL (2025 12:45)  POCT Blood Glucose.: 154 mg/dL (2025 08:08)                        9.4    24.67 )-----------( 218      ( 2025 04:00 )             27.1           144  |  108  |  58[H]  ----------------------------<  193[H]  4.7   |  19  |  2.7[H]    Ca    8.4      2025 04:00  Phos  7.9       Mg     2.0         TPro  4.8[L]  /  Alb  3.2[L]  /  TBili  0.7  /  DBili  0.5[H]  /  AST  1837[H]  /  ALT  1746[H]  /  AlkPhos  87        PT/INR - ( 2025 00:20 )   PT: 13.60 sec;   INR: 1.15 ratio      PTT - ( 2025 00:20 )  PTT:26.0 sec    Urinalysis Basic - ( 2025 04:00 )    Color: x / Appearance: x / SG: x / pH: x  Gluc: 193 mg/dL / Ketone: x  / Bili: x / Urobili: x   Blood: x / Protein: x / Nitrite: x   Leuk Esterase: x / RBC: x / WBC x   Sq Epi: x / Non Sq Epi: x / Bacteria: x

## 2025-04-28 NOTE — PROGRESS NOTE ADULT - ATTENDING COMMENTS
Pt examined  labs and images reviewed  pt with supermorbid obesity and complex hernia with sbo  previously treated without surgery   passing flatus awaiting bowel movement but was having  more pain taken to OR urgently   4/10 s/p BHUMI , Bowel Resection / Anastamosis / Primary repair of Fascia / hernia sac  overnight - renal failure / respiratory compromise  was intubated / paralysed/ botox  now on bumex - tapering   4/17:  s/p BHUMI , Bowel Resection / Anastamosis / Primary repair of Fascia / hernia sac  4/22 : IR drain placed - subcutaneous collection  - purulent   4/27/28: massive bleeding - CT shows blood clot , CTA - showed bleeding at mesentry , but resuscitation not able to catch up   was taken to OR - no active bleeding , blood clots 1 liter evacuated  continue tpn   will continue dvt prophylaxis    impression : extremely high risk - supermorbid obesity and complex hernia with partial sbo  npo   hold anticoagulation  SICU

## 2025-04-28 NOTE — CHART NOTE - NSCHARTNOTEFT_GEN_A_CORE
Post Operative Note  Patient: NANCY SARGENT 64y (1960) Female   MRN: [id number]  Location: [address]  Visit: 04-03-25 Inpatient  Date: 04-28-25 @ 00:13    OR:  Dur: 2hrs 19mins  EBL: 1L  IVF: 700cc crystalloid, 5% albumin 500cc  UOP: 50cc via pierce  - 2g cefotetan  - hypotensive upon induction > 2u PRBC, 1 amp sodium bicarbonate for acidosis on abg    Ex lap and irrigation, evacuation of clots in retroperitoneal region. Identified site of previous mesenteric bleed (no active bleed), closed with sutures. x3 19Fr beatrice drains in place: right pelvis, left pelvis, and subcutaneous. New prevena vac placed, fascia and skin closed. Right femoral arterial line removed. New left radial arterial line placed. Patient remained on propofol, fentanyl, levophed, and vasopressin infusions.     Subjective:   Nausea: no, Vomiting: no, Ambulating: no, Flatus: no  Pain Assessment: intubated and sedated    Objective:  Vitals: T(F): 97.7 (04-27-25 @ 20:00), Max: 97.7 (04-27-25 @ 20:00)  HR: 93 (04-27-25 @ 20:59)  BP: 124/57 (04-27-25 @ 20:59) (91/55 - 163/62)  RR: 28 (04-27-25 @ 20:59)  SpO2: 100% (04-27-25 @ 20:59)  Vent Settings: Mode: standby    In:   04-26-25 @ 07:01  -  04-27-25 @ 07:00  --------------------------------------------------------  IN: 1846.5 mL    04-27-25 @ 07:01  -  04-28-25 @ 00:13  --------------------------------------------------------  IN: 5091.9 mL      IV Fluids: lactated ringers. 1000 milliLiter(s) (120 mL/Hr) IV Continuous <Continuous>      Out:   04-26-25 @ 07:01  -  04-27-25 @ 07:00  --------------------------------------------------------  OUT: 2425 mL    04-27-25 @ 07:01  -  04-28-25 @ 00:13  --------------------------------------------------------  OUT: 475 mL      EBL:   04-26-25 @ 07:01  -  04-27-25 @ 07:00  --------------------------------------------------------  OUT: 0 mL    04-27-25 @ 07:01  -  04-28-25 @ 00:13  --------------------------------------------------------  OUT: 0 mL      Voided Urine:   04-26-25 @ 07:01  -  04-27-25 @ 07:00  --------------------------------------------------------  OUT: 2425 mL    04-27-25 @ 07:01  -  04-28-25 @ 00:13  --------------------------------------------------------  OUT: 475 mL      Pierce Catheter: no     04-26-25 @ 07:01  -  04-27-25 @ 07:00  --------------------------------------------------------  OUT: 265 mL    04-27-25 @ 07:01  -  04-28-25 @ 00:13  --------------------------------------------------------  OUT: 450 mL    Physical Examination:  General Appearance: GCS 3T, on propofol and fentanyl infusion, no paralytic reversal   Heart: RRR  Lungs: b/l rhonchi, intubated  Abdomen:  abdomen soft, less distended, midline prevena vac in place, x 3 drains in place all serosanguinous, right pelvic drain appears more sanguinous.   MSK/Extremities: cool to touch, edematous  Skin: Warm, dry. No jaundice.     Medications: [Standing]  albuterol    90 MICROgram(s) HFA Inhaler 2 Puff(s) Inhalation every 6 hours  chlorhexidine 0.12% Liquid 15 milliLiter(s) Oral Mucosa every 12 hours  chlorhexidine 2% Cloths 1 Application(s) Topical <User Schedule>  fentaNYL   Infusion. 0.5 MICROgram(s)/kG/Hr IV Continuous <Continuous>  insulin lispro (ADMELOG) corrective regimen sliding scale   SubCutaneous every 6 hours  lactated ringers. 1000 milliLiter(s) IV Continuous <Continuous>  meropenem  IVPB      meropenem  IVPB 1000 milliGRAM(s) IV Intermittent every 8 hours  norepinephrine Infusion 0.05 MICROgram(s)/kG/Min IV Continuous <Continuous>  pantoprazole  Injectable 40 milliGRAM(s) IV Push every 12 hours  propofol Infusion 10 MICROgram(s)/kG/Min IV Continuous <Continuous>  vasopressin Infusion. 0.04 Unit(s)/Min IV Continuous <Continuous>    Medications: [PRN]  albuterol    90 MICROgram(s) HFA Inhaler 2 Puff(s) Inhalation every 6 hours  chlorhexidine 0.12% Liquid 15 milliLiter(s) Oral Mucosa every 12 hours  chlorhexidine 2% Cloths 1 Application(s) Topical <User Schedule>  fentaNYL   Infusion. 0.5 MICROgram(s)/kG/Hr IV Continuous <Continuous>  insulin lispro (ADMELOG) corrective regimen sliding scale   SubCutaneous every 6 hours  lactated ringers. 1000 milliLiter(s) IV Continuous <Continuous>  meropenem  IVPB      meropenem  IVPB 1000 milliGRAM(s) IV Intermittent every 8 hours  norepinephrine Infusion 0.05 MICROgram(s)/kG/Min IV Continuous <Continuous>  pantoprazole  Injectable 40 milliGRAM(s) IV Push every 12 hours  propofol Infusion 10 MICROgram(s)/kG/Min IV Continuous <Continuous>  vasopressin Infusion. 0.04 Unit(s)/Min IV Continuous <Continuous>    Labs:                        7.9    23.34 )-----------( 288      ( 27 Apr 2025 20:56 )             23.5     04-27    143  |  110  |  56[H]  ----------------------------<  215[H]  5.1[H]   |  15[L]  |  2.6[H]    Ca    9.2      27 Apr 2025 20:56  Phos  9.1     04-27  Mg     2.1     04-27    TPro  4.5[L]  /  Alb  2.5[L]  /  TBili  0.6  /  DBili  0.4[H]  /  AST  1737[H]  /  ALT  1718[H]  /  AlkPhos  94  04-27    PT/INR - ( 27 Apr 2025 20:56 )   PT: 14.20 sec;   INR: 1.20 ratio         PTT - ( 27 Apr 2025 20:56 )  PTT:26.5 sec      Imaging:  f/u postop cxr    Assessment:  64yFemale patient S/P NANCY SARGENT    Plan:  - f/u postop labs and abg  - f/u postop cxr  - Monitor vitals  - Monitor for bowel function  - Continue Antibiotics  - Monitor wound and dressing for changes, redress as needed.    Date/Time: 04-28-25 @ 00:13 Post Operative Note  Patient: NANCY SARGENT 64y (1960) Female   MRN: [id number]  Location: [address]  Visit: 04-03-25 Inpatient  Date: 04-28-25 @ 00:13    OR:  Dur: 2hrs 19mins  EBL: 1L  IVF: 700cc crystalloid, 5% albumin 500cc  UOP: 50cc via pierce  - 2g cefotetan  - hypotensive upon induction > 2u PRBC, 1 amp sodium bicarbonate for acidosis on abg    Ex lap and irrigation, evacuation of clots in retroperitoneal region. Identified site of previous mesenteric bleed (no active bleed), closed with sutures. x3 19Fr beatrice drains in place: right pelvis, left pelvis, and subcutaneous. New prevena vac placed, fascia and skin closed. Right femoral arterial line removed. New left radial arterial line placed. Patient remained on propofol, fentanyl, levophed, and vasopressin infusions.     Subjective:   Nausea: no, Vomiting: no, Ambulating: no, Flatus: no  Pain Assessment: intubated and sedated    Objective:  Vitals: T(F): 97.7 (04-27-25 @ 20:00), Max: 97.7 (04-27-25 @ 20:00)  HR: 93 (04-27-25 @ 20:59)  BP: 124/57 (04-27-25 @ 20:59) (91/55 - 163/62)  RR: 28 (04-27-25 @ 20:59)  SpO2: 100% (04-27-25 @ 20:59)  Vent Settings: Mode: standby    In:   04-26-25 @ 07:01  -  04-27-25 @ 07:00  --------------------------------------------------------  IN: 1846.5 mL    04-27-25 @ 07:01  -  04-28-25 @ 00:13  --------------------------------------------------------  IN: 5091.9 mL      IV Fluids: lactated ringers. 1000 milliLiter(s) (120 mL/Hr) IV Continuous <Continuous>      Out:   04-26-25 @ 07:01  -  04-27-25 @ 07:00  --------------------------------------------------------  OUT: 2425 mL    04-27-25 @ 07:01  -  04-28-25 @ 00:13  --------------------------------------------------------  OUT: 475 mL      EBL:   04-26-25 @ 07:01  -  04-27-25 @ 07:00  --------------------------------------------------------  OUT: 0 mL    04-27-25 @ 07:01  -  04-28-25 @ 00:13  --------------------------------------------------------  OUT: 0 mL      Voided Urine:   04-26-25 @ 07:01  -  04-27-25 @ 07:00  --------------------------------------------------------  OUT: 2425 mL    04-27-25 @ 07:01  -  04-28-25 @ 00:13  --------------------------------------------------------  OUT: 475 mL      Pierce Catheter: no     04-26-25 @ 07:01  -  04-27-25 @ 07:00  --------------------------------------------------------  OUT: 265 mL    04-27-25 @ 07:01  -  04-28-25 @ 00:13  --------------------------------------------------------  OUT: 450 mL    Physical Examination:  General Appearance: GCS 3T, on propofol and fentanyl infusion, no paralytic reversal, b/l pupils 4mm and sluggish reactivity, no corneal reflex, + cough reflex  Heart: RRR  Lungs: b/l rhonchi, intubated  Abdomen:  abdomen soft, less distended, midline prevena vac in place, x 3 drains in place all serosanguinous, right pelvic drain appears more sanguinous.   MSK/Extremities: cool to touch, edematous  Skin: Warm, dry. No jaundice.     Medications: [Standing]  albuterol    90 MICROgram(s) HFA Inhaler 2 Puff(s) Inhalation every 6 hours  chlorhexidine 0.12% Liquid 15 milliLiter(s) Oral Mucosa every 12 hours  chlorhexidine 2% Cloths 1 Application(s) Topical <User Schedule>  fentaNYL   Infusion. 0.5 MICROgram(s)/kG/Hr IV Continuous <Continuous>  insulin lispro (ADMELOG) corrective regimen sliding scale   SubCutaneous every 6 hours  lactated ringers. 1000 milliLiter(s) IV Continuous <Continuous>  meropenem  IVPB      meropenem  IVPB 1000 milliGRAM(s) IV Intermittent every 8 hours  norepinephrine Infusion 0.05 MICROgram(s)/kG/Min IV Continuous <Continuous>  pantoprazole  Injectable 40 milliGRAM(s) IV Push every 12 hours  propofol Infusion 10 MICROgram(s)/kG/Min IV Continuous <Continuous>  vasopressin Infusion. 0.04 Unit(s)/Min IV Continuous <Continuous>    Medications: [PRN]  albuterol    90 MICROgram(s) HFA Inhaler 2 Puff(s) Inhalation every 6 hours  chlorhexidine 0.12% Liquid 15 milliLiter(s) Oral Mucosa every 12 hours  chlorhexidine 2% Cloths 1 Application(s) Topical <User Schedule>  fentaNYL   Infusion. 0.5 MICROgram(s)/kG/Hr IV Continuous <Continuous>  insulin lispro (ADMELOG) corrective regimen sliding scale   SubCutaneous every 6 hours  lactated ringers. 1000 milliLiter(s) IV Continuous <Continuous>  meropenem  IVPB      meropenem  IVPB 1000 milliGRAM(s) IV Intermittent every 8 hours  norepinephrine Infusion 0.05 MICROgram(s)/kG/Min IV Continuous <Continuous>  pantoprazole  Injectable 40 milliGRAM(s) IV Push every 12 hours  propofol Infusion 10 MICROgram(s)/kG/Min IV Continuous <Continuous>  vasopressin Infusion. 0.04 Unit(s)/Min IV Continuous <Continuous>    Labs:                        7.9    23.34 )-----------( 288      ( 27 Apr 2025 20:56 )             23.5     04-27    143  |  110  |  56[H]  ----------------------------<  215[H]  5.1[H]   |  15[L]  |  2.6[H]    Ca    9.2      27 Apr 2025 20:56  Phos  9.1     04-27  Mg     2.1     04-27    TPro  4.5[L]  /  Alb  2.5[L]  /  TBili  0.6  /  DBili  0.4[H]  /  AST  1737[H]  /  ALT  1718[H]  /  AlkPhos  94  04-27    PT/INR - ( 27 Apr 2025 20:56 )   PT: 14.20 sec;   INR: 1.20 ratio         PTT - ( 27 Apr 2025 20:56 )  PTT:26.5 sec      Imaging:  f/u postop cxr    Assessment:  64yFemale patient S/P NANCY SARGENT    Plan:  - f/u postop labs and abg  - f/u postop cxr  - Monitor vitals  - Monitor for bowel function  - Continue Antibiotics  - Monitor wound and dressing for changes, redress as needed.    Date/Time: 04-28-25 @ 00:13

## 2025-04-28 NOTE — BRIEF OPERATIVE NOTE - NSICDXBRIEFPROCEDURE_GEN_ALL_CORE_FT
PROCEDURES:  Open repair of ventral hernia using mesh and component separation technique 10-Apr-2025 16:17:24  Danish Crowe  Small bowel resection 10-Apr-2025 16:19:54  Danish Crowe  Exploratory laparotomy 17-Apr-2025 18:43:21  Janell See  
PROCEDURES:  Open repair of ventral hernia using mesh and component separation technique 10-Apr-2025 16:17:24  Danish Crowe  Small bowel resection 10-Apr-2025 16:19:54  Danish Crowe  
PROCEDURES:  Exploratory laparotomy 17-Apr-2025 18:43:21  Janell See  Abdominal washout 28-Apr-2025 00:21:19  Janell See  Evacuation of hemoperitoneum 28-Apr-2025 00:22:35  Janell See

## 2025-04-28 NOTE — BRIEF OPERATIVE NOTE - OPERATION/FINDINGS
Laparoscopic converted to open repair of large ventral hernia, incarcerated small bowel with two areas of perforation requiring SBR.  THONY drain placed in pelvis, underlay Vicryl mesh placed and fascia closed primarily.
exploratory laparotomy, opened previous incision found Vicryl mesh intact. excised mesh. once peritoneum was entered, discovery of bilious contents in abdomen approx 500cc. significant adhesions and small bowel distention, found to have anastomotic leak at staple line of end to side ileoileostomy. resected proximal and distal measuring about 30cm. creation of new ileoileostomy, side to side, antiperistaltic, using EndoGIA stapler, tan loads. common enterotomy closed with PDS. placement of new 10Fr THONY drain from left abdomen to near anastomosis, repair of ventral hernia using 2 layered Vicryl meshes [id number], fixed to fascia and peritoneum with Vicryl suture. thickened peritoneum closed overtop of Vicryl using #2 PDS Quill running, with interrupted 0 vicryl internal retention sutures. scarpas closed and skin approximated with deep dermal interrupted vicryl and steri strips with placement of prevena. 
re-exploration of abdominal cavity. evacuation of old clots and blood from retroperitoneum and pelvis approximately 1L, area of mesentery identified from CT with small injury but no active bleeding, oversewed area with vicryl and closed small mesenteric defect. vistaseal. placed 2 drains in pelvis, 19Fr Oliver drains (r and l). closed fascia with #2 PDO Quill suture, a third 19Fr Oliver drain was placed in the subq, closed skin with interrupted vicryl and staples and placed prevena-type vac on top.

## 2025-04-28 NOTE — CHART NOTE - NSCHARTNOTEFT_GEN_A_CORE
GI NUTRITION SUPPORT TEAM  -  PROGRESS NOTE     Interval Events:        REVIEW OF SYSTEMS:  Negative except as noted above.     VITALS:  T(F): 98.6 (04-28 @ 08:00), Max: 98.6 (04-28 @ 08:00)  HR: 98 (04-28 @ 09:15) (88 - 116)  BP: 112/71 (04-28 @ 08:00) (109/41 - 127/51)  RR: 28 (04-28 @ 09:15) (12 - 41)  SpO2: 98% (04-28 @ 09:15) (79% - 100%)    HEIGHT/WEIGHT/BMI:   Height (cm): 160 (04-27)  Weight (kg): 135.6 (04-27)  BMI (kg/m2): 53 (04-27)    I/Os:     04-27-25 @ 07:01  -  04-28-25 @ 07:00  --------------------------------------------------------  IN:    FentaNYL: 6.8 mL    FentaNYL: 183.1 mL    IV PiggyBack: 100 mL    IV PiggyBack: 250 mL    IV PiggyBack: 100 mL    Lactated Ringers: 1920 mL    Lactated Ringers Bolus: 500 mL    Norepinephrine: 212.3 mL    Norepinephrine: 1014.4 mL    Phenylephrine: 15.3 mL    Phenylephrine: 40.6 mL    Plasma: 332 mL    Platelets - Single Donor: 229 mL    PRBCs (Packed Red Blood Cells): 600 mL    Propofol: 378.5 mL    Sodium Bicarbonate: 720 mL    Vasopressin (Organ Donation): 132 mL  Total IN: 6733.9 mL    OUT:    Bulb (mL): 390 mL    Bulb (mL): 30 mL    Bulb (mL): 30 mL    Bulb (mL): 80 mL    Bulb (mL): 120 mL    Bulb (mL): 85 mL    Indwelling Catheter - Urethral (mL): 225 mL    Rectal Tube (mL): 50 mL    VAC (Vacuum Assisted Closure) System (mL): 0 mL  Total OUT: 1010 mL    Total NET: 5723.9 mL    MEDICATIONS:   albuterol    90 MICROgram(s) HFA Inhaler 2 Puff(s) Inhalation every 6 hours  chlorhexidine 0.12% Liquid 15 milliLiter(s) Oral Mucosa every 12 hours  chlorhexidine 2% Cloths 1 Application(s) Topical <User Schedule>  fentaNYL    Injectable 12.5 MICROGram(s) IV Push once  fentaNYL   Infusion 0.5 MICROgram(s)/kG/Hr (6.78 mL/Hr) IV Continuous <Continuous>  heparin   Injectable 7500 Unit(s) SubCutaneous every 8 hours  insulin lispro (ADMELOG) corrective regimen sliding scale   SubCutaneous every 6 hours  lactated ringers. 1000 milliLiter(s) (120 mL/Hr) IV Continuous <Continuous>  meropenem  IVPB      meropenem  IVPB 1000 milliGRAM(s) IV Intermittent every 8 hours  norepinephrine Infusion 0.05 MICROgram(s)/kG/Min (6.36 mL/Hr) IV Continuous <Continuous>  pantoprazole  Injectable 40 milliGRAM(s) IV Push every 12 hours  propofol Infusion 10 MICROgram(s)/kG/Min (8.14 mL/Hr) IV Continuous <Continuous>  vasopressin Infusion. 0.04 Unit(s)/Min (6 mL/Hr) IV Continuous <Continuous>    LABS:                         9.4    24.67 )-----------( 218      ( 28 Apr 2025 04:00 )             27.1     144  |  108  |  58[H]  ----------------------------<  193[H]          (04-28-25 @ 04:00)  4.7   |  19  |  2.7[H]    Ca    8.4          (04-28-25 @ 04:00)  Phos  7.9         (04-28-25 @ 04:00)  Mg     2.0         (04-28-25 @ 04:00)    TPro  4.8[L]  /  Alb  3.2[L]  /  TBili  0.7  /  DBili  0.5[H]  /  AST  1837[H]  /  ALT  1746[H]  /  AlkPhos  87       04-28-25 @ 00:20    Triglycerides, Serum: 232 mg/dL (04-20 @ 04:59)    Vitamin D, 25-Hydroxy: 13 ng/mL (04-19 @ 05:08)    Folate: 17.3 ng/mL (04-19 @ 05:08)    Vitamin B12, Serum: 1417 pg/mL (04-19 @ 05:08)    Zinc Level, Plasma: 54 ug/dL (04-19 @ 05:08)    Blood Glucose (Past 24 hours):  135 mg/dL (04-28 @ 05:28)  85 mg/dL (04-28 @ 01:40)  106 mg/dL (04-28 @ 00:14)  123 mg/dL (04-27 @ 20:04)  191 mg/dL (04-27 @ 17:03)  156 mg/dL (04-27 @ 12:45)    DIET:   Diet, NPO (04-27-25 @ 02:46) [Active]        RADIOLOGY:   < from: CT Angio Abdomen and Pelvis w/ Oral Cont and w/ IV Cont (04.27.25 @ 11:39) >    ACC: [id number] EXAM:  CT ANGIO ABD PELV OC (W)AW IC   ORDERED BY: JANNET GHOSH     ACC: [id number] EXAM:  CT ANGIO CHEST AORTA WAWIC   ORDERED BY: DAVID HARDIN     PROCEDURE DATE:  04/27/2025      INTERPRETATION:  CTA chest with IV contrast,CT abdomen and pelvis with   IV contrast    CLINICAL HISTORY/REASON FOR EXAM: Tachypnea, abdominal pain, drop in   hemoglobin    TECHNIQUE: CTA chest with IV contrast, CT abdomen and pelvis without and   with with IV contrast in arterial and venous phases with   (gastrointestinal bleeding protocol). Performed. Initial CTA images of   chest obtained with IV contrast administration. Subsequently, CT images   of abdomen and pelvis obtained with IV contrast administration. Oral   contrast was not administered. Reformatted images in the coronal and   sagittal planes were acquired. 3D (MIP images) generated.    COMPARISON: CTA chest 4/21/2025, CT abdomen and pelvis 4/27/2025    QUALITY STATEMENT: Patient upper extremities causing streak artifact   inherently degrades image quality and limits this exam.    FINDINGS:    CHEST:    Images are degraded by streak artifact and suboptimal contrast bolus   timing.    PULMONARY EMBOLUS: Redemonstrated right distal main pulmonary embolism   extending intothe segmental branches. No definite evidence of acute   right heart strain.    LUNGS, PLEURA, AIRWAYS: Stable bilateral lower lobe consolidative   opacities.    THORACIC NODES: Stable.    MEDIASTINUM/GREAT VESSELS: Stable.    Multinodular thyroid goiter. Endotracheal tube with tip above akin.   Nasogastric tube which appears to terminate within the stomach.    ABDOMEN/PELVIS:    HEPATOBILIARY: Stable.    SPLEEN: Stable.    PANCREAS: Stable 2.0 cm pancreatic head nodularity.    ADRENAL GLANDS: Stable bilateral adrenal gland thickening. Stable right   adrenal gland myelolipoma.    KIDNEYS: Stable.    ABDOMINOPELVIC NODES: Stable.    PELVIC ORGANS: Stable.    PERITONEUM/MESENTERY/BOWEL: Oral contrast filling the stomach. There is   arterial blush in the mesentery anterior to abdominal drainage catheter   with venous pooling, which may reflect extravasation (, ). No   bowel obstruction.  Redemonstrated large volume complex fluid in the   pelvis and along the left paracolic gutter (). Post gastric bypass.   Post ventral hernia repair and small bowel resection. Redemonstrated   drainage catheter adjacent to site of postoperative changes in the   anterior abdominal wall.    BONES/SOFT TISSUES: Stable.      IMPRESSION:    Within limitations of streak artifact and suboptimal contrast timing:    Redemonstrated right distal main pulmonary embolus with segmental   extension.    There is arterial blush in the mesentery anterior to abdominal drainage   catheter with venous pooling, which may reflect extravasation    Redemonstrated large volume complex fluid in the pelvis and along the   left paracolic gutter, compatible with blood products.    Stable postoperative and additional findings as above.      Dr. Cristian Swartz discussed preliminary findings with JANNET GHOSH PA   on 4/27/2025 1:25 PM with readback.  < end of copied text >      ASSESSMENT  64y F w/ PMHx of HTN and gastric bypass surgery in 2001 presented with abdominal distention and CT showing incarcerated ventral hernia with SBO. Taken to OR 4/10 s/p open repair of ventral hernia using mesh and component separation technique, small bowel resection and primary fascial closure. RTOR 4/17 for anastomotic leak s/p ex lap, 30 cm small bowel resection, creation of new side to side ileoileostomy, ventral hernia repair w/ mesh.     - recurrent SBO, s/p repair 4/10 then anastomotic leak s/p repair 4/17   - FUNMI   - high risk for malnutrition, prolonged NPO X 15d, TPN initiated 4/17  - class III obesity, BMI 53  - hypertriglyceridemia  - hyperglycemia   - hypokalemia  - vitamin D deficiency     Est nutrient needs: IBW 52.3kg, 8961-6423 kcals (22-25kcals/kg IBW ASPEN/CCM guidelines for BMI>50), 105-131g protein (2-2.5g/kg IBW ASPEN/CCM guidelines for BMI >40)       PLAN  - restart TPN tonight- 115g AA, 165g dextrose @ 85ml/hr (no lipids tonight, has been receiving propofol)  - re-eval IVF's when TPN begins  - daily am bmp, phos, mg while on PN- no K or phos in tonight's TPN  - glycemic control- 18U RI in TPN (decrease from previous dose to account for decreased eGFR)  - supplement vitamin D in therapeutic doses when able    - will follow GI NUTRITION SUPPORT TEAM  -  PROGRESS NOTE     Interval Events:    RTOR this am s/p re-exploration and evacuation of old clots (~1000mL) from retroperitoneum and pelvis with identification and repair of small mesenteric defect, midline prevena vac placed. On vent, sedated with fentanyl, propofol. Requiring levophed, vasopressin. MAP 60-70's. Rectal tube 50mL watery output. NPO with LR @ 120ml/hr     REVIEW OF SYSTEMS:  Negative except as noted above.     VITALS:  T(F): 98.6 (04-28 @ 08:00), Max: 98.6 (04-28 @ 08:00)  HR: 98 (04-28 @ 09:15) (88 - 116)  BP: 112/71 (04-28 @ 08:00) (109/41 - 127/51)  RR: 28 (04-28 @ 09:15) (12 - 41)  SpO2: 98% (04-28 @ 09:15) (79% - 100%)    HEIGHT/WEIGHT/BMI:   Height (cm): 160 (04-27)  Weight (kg): 135.6 (04-27)  BMI (kg/m2): 53 (04-27)    I/Os:     04-27-25 @ 07:01  -  04-28-25 @ 07:00  --------------------------------------------------------  IN:    FentaNYL: 6.8 mL    FentaNYL: 183.1 mL    IV PiggyBack: 100 mL    IV PiggyBack: 250 mL    IV PiggyBack: 100 mL    Lactated Ringers: 1920 mL    Lactated Ringers Bolus: 500 mL    Norepinephrine: 212.3 mL    Norepinephrine: 1014.4 mL    Phenylephrine: 15.3 mL    Phenylephrine: 40.6 mL    Plasma: 332 mL    Platelets - Single Donor: 229 mL    PRBCs (Packed Red Blood Cells): 600 mL    Propofol: 378.5 mL    Sodium Bicarbonate: 720 mL    Vasopressin (Organ Donation): 132 mL  Total IN: 6733.9 mL    OUT:    Bulb (mL): 390 mL    Bulb (mL): 30 mL    Bulb (mL): 30 mL    Bulb (mL): 80 mL    Bulb (mL): 120 mL    Bulb (mL): 85 mL    Indwelling Catheter - Urethral (mL): 225 mL    Rectal Tube (mL): 50 mL    VAC (Vacuum Assisted Closure) System (mL): 0 mL  Total OUT: 1010 mL    Total NET: 5723.9 mL    MEDICATIONS:   albuterol    90 MICROgram(s) HFA Inhaler 2 Puff(s) Inhalation every 6 hours  chlorhexidine 0.12% Liquid 15 milliLiter(s) Oral Mucosa every 12 hours  chlorhexidine 2% Cloths 1 Application(s) Topical <User Schedule>  fentaNYL    Injectable 12.5 MICROGram(s) IV Push once  fentaNYL   Infusion 0.5 MICROgram(s)/kG/Hr (6.78 mL/Hr) IV Continuous <Continuous>  heparin   Injectable 7500 Unit(s) SubCutaneous every 8 hours  insulin lispro (ADMELOG) corrective regimen sliding scale   SubCutaneous every 6 hours  lactated ringers. 1000 milliLiter(s) (120 mL/Hr) IV Continuous <Continuous>  meropenem  IVPB      meropenem  IVPB 1000 milliGRAM(s) IV Intermittent every 8 hours  norepinephrine Infusion 0.05 MICROgram(s)/kG/Min (6.36 mL/Hr) IV Continuous <Continuous>  pantoprazole  Injectable 40 milliGRAM(s) IV Push every 12 hours  propofol Infusion 10 MICROgram(s)/kG/Min (8.14 mL/Hr) IV Continuous <Continuous>  vasopressin Infusion. 0.04 Unit(s)/Min (6 mL/Hr) IV Continuous <Continuous>    LABS:                         9.4    24.67 )-----------( 218      ( 28 Apr 2025 04:00 )             27.1     144  |  108  |  58[H]  ----------------------------<  193[H]          (04-28-25 @ 04:00)  4.7   |  19  |  2.7[H]    Ca    8.4          (04-28-25 @ 04:00)  Phos  7.9         (04-28-25 @ 04:00)  Mg     2.0         (04-28-25 @ 04:00)    TPro  4.8[L]  /  Alb  3.2[L]  /  TBili  0.7  /  DBili  0.5[H]  /  AST  1837[H]  /  ALT  1746[H]  /  AlkPhos  87       04-28-25 @ 00:20    Triglycerides, Serum: 232 mg/dL (04-20 @ 04:59)    Vitamin D, 25-Hydroxy: 13 ng/mL (04-19 @ 05:08)    Folate: 17.3 ng/mL (04-19 @ 05:08)    Vitamin B12, Serum: 1417 pg/mL (04-19 @ 05:08)    Zinc Level, Plasma: 54 ug/dL (04-19 @ 05:08)    Blood Glucose (Past 24 hours):  135 mg/dL (04-28 @ 05:28)  85 mg/dL (04-28 @ 01:40)  106 mg/dL (04-28 @ 00:14)  123 mg/dL (04-27 @ 20:04)  191 mg/dL (04-27 @ 17:03)  156 mg/dL (04-27 @ 12:45)    DIET:   Diet, NPO (04-27-25 @ 02:46) [Active]      RADIOLOGY:   < from: CT Angio Abdomen and Pelvis w/ Oral Cont and w/ IV Cont (04.27.25 @ 11:39) >    ACC: [id number] EXAM:  CT ANGIO ABD PELV OC (W)AW IC   ORDERED BY: JANNET GHOSH     ACC: [id number] EXAM:  CT ANGIO CHEST AORTA WAWIC   ORDERED BY: DAVID HARDIN     PROCEDURE DATE:  04/27/2025      INTERPRETATION:  CTA chest with IV contrast,CT abdomen and pelvis with   IV contrast    CLINICAL HISTORY/REASON FOR EXAM: Tachypnea, abdominal pain, drop in   hemoglobin    TECHNIQUE: CTA chest with IV contrast, CT abdomen and pelvis without and   with with IV contrast in arterial and venous phases with   (gastrointestinal bleeding protocol). Performed. Initial CTA images of   chest obtained with IV contrast administration. Subsequently, CT images   of abdomen and pelvis obtained with IV contrast administration. Oral   contrast was not administered. Reformatted images in the coronal and   sagittal planes were acquired. 3D (MIP images) generated.    COMPARISON: CTA chest 4/21/2025, CT abdomen and pelvis 4/27/2025    QUALITY STATEMENT: Patient upper extremities causing streak artifact   inherently degrades image quality and limits this exam.    FINDINGS:    CHEST:    Images are degraded by streak artifact and suboptimal contrast bolus   timing.    PULMONARY EMBOLUS: Redemonstrated right distal main pulmonary embolism   extending intothe segmental branches. No definite evidence of acute   right heart strain.    LUNGS, PLEURA, AIRWAYS: Stable bilateral lower lobe consolidative   opacities.    THORACIC NODES: Stable.    MEDIASTINUM/GREAT VESSELS: Stable.    Multinodular thyroid goiter. Endotracheal tube with tip above akin.   Nasogastric tube which appears to terminate within the stomach.    ABDOMEN/PELVIS:    HEPATOBILIARY: Stable.    SPLEEN: Stable.    PANCREAS: Stable 2.0 cm pancreatic head nodularity.    ADRENAL GLANDS: Stable bilateral adrenal gland thickening. Stable right   adrenal gland myelolipoma.    KIDNEYS: Stable.    ABDOMINOPELVIC NODES: Stable.    PELVIC ORGANS: Stable.    PERITONEUM/MESENTERY/BOWEL: Oral contrast filling the stomach. There is   arterial blush in the mesentery anterior to abdominal drainage catheter   with venous pooling, which may reflect extravasation (, ). No   bowel obstruction.  Redemonstrated large volume complex fluid in the   pelvis and along the left paracolic gutter (). Post gastric bypass.   Post ventral hernia repair and small bowel resection. Redemonstrated   drainage catheter adjacent to site of postoperative changes in the   anterior abdominal wall.    BONES/SOFT TISSUES: Stable.      IMPRESSION:    Within limitations of streak artifact and suboptimal contrast timing:    Redemonstrated right distal main pulmonary embolus with segmental   extension.    There is arterial blush in the mesentery anterior to abdominal drainage   catheter with venous pooling, which may reflect extravasation    Redemonstrated large volume complex fluid in the pelvis and along the   left paracolic gutter, compatible with blood products.    Stable postoperative and additional findings as above.      Dr. Cristian Swartz discussed preliminary findings with JANNET GHOSH PA   on 4/27/2025 1:25 PM with readback.  < end of copied text >      ASSESSMENT  64y F w/ PMHx of HTN and gastric bypass surgery in 2001 presented with abdominal distention and CT showing incarcerated ventral hernia with SBO. Taken to OR 4/10 s/p open repair of ventral hernia using mesh and component separation technique, small bowel resection and primary fascial closure. RTOR 4/17 for anastomotic leak s/p ex lap, 30 cm small bowel resection, creation of new side to side ileoileostomy, ventral hernia repair w/ mesh.     - recurrent SBO, s/p repair 4/10 then anastomotic leak s/p repair 4/17, RTOR 4/28 s/p re-exploration and evacuation of old clots from retroperitoneum and pelvis, midline prevena vac placed  - abdominal wall fluid collection s/p IR placed pigtail drain 4/22  - FUNMI   - right distal main pulmonary embolus with segmental extension  - high risk for malnutrition, prolonged NPO X 15d, TPN 4/17-4/25  - class III obesity, BMI 53  - hypertriglyceridemia  - hyperglycemia   - hypokalemia, resolved   - vitamin D deficiency     Est nutrient needs: IBW 52.3kg, 5867-0009 kcals (22-25kcals/kg IBW ASPEN/CCM guidelines for BMI>50), 105-131g protein (2-2.5g/kg IBW ASPEN/CCM guidelines for BMI >40)       PLAN  - restart TPN tonight if new central access placed vs cont LR  - consider starting enteral feeds with Pivot 1.5 @ 10ml/hr (if MAP remains >60-65)  - glycemic control  - supplement vitamin D in therapeutic doses when able    - will follow  d/w Dr. Lema, SICU team

## 2025-04-28 NOTE — PROGRESS NOTE ADULT - ASSESSMENT
64F PMHx HTN, morbid obesity s/p 2001 Abby-en-Y gastric bypass who presented on 4/3 with incarcerated ventral hernia with SBO    4/10 open ventral hernia repair, small bowel resection, and primary fascial closure with Dr. Lema. Admitted to SICU for Q1 abdominal checks and hemodynamic monitoring.   4/17 takeback for exploratory laparotomy, repair of anastomotic leak  4/22 8Fr pigtail into lower abdominal wall collection,150cc of foul appearing material aspirated, Attached to THONY bulb  4/27 RTOR for re-exploration and evacuation of old clots from retroperitoneum and pelvis and placement of 3 new beatrice drains    NEUROLOGICAL:  #Acute pain    - fentanyl infusion  #Sedation    - propofol infusion  #AMS    HCT 4/27: negative  #Sleep hygiene  - HOLDING Trazodone 25mg QPM  - HOLDING Melatonin 5mg QPM    RESPIRATORY:   #Acute hypoxic respiratory failure  - Extubated 4/18, reintubated on 4/27 for AMS and agonal breathing    ET 7.5mm  LL 23cm   - vent settings: /28/40/8  #Right distal main pulmonary embolus with segmental extension  - HOLDING Lovenox 135mg Q12 secondary to acute anemia  - Echo 4/22/25: EF 70 to 75%.  - B/L LE Venous duplex 4/21-RIGHT PT DVT, no LEFT DVT  #Intermittent diuresis    -last diuresis 4/25 1mg bumex  #PMHx [place] on CPAP@ home   #Activity increase as tolerated    CARDS:   #acute hemorraghic shock    - Levophed infusion    - Vasopressin infusion     - 4/27: 2u PRBC 1plt 1uFFP, 1g TXA    - OFF sodium bicarbonate infusion on 4/27  #Supraventricular tachycardia, Non-sustained ventricular tachycardia, Ventricular Premature Depolarization    - Cardiology (Dr. Lopez) - Metoprolol 25mg Q12 HOLDING    #PMHx of HTN  - Amlodipine 10mg QD  HOLDING  - HCTZ 25mg QD HOLDING  - Losartan 100mg daily HOLDING  - EKG- NSR  - TTE 4/11: EF of 56%. G1DD.     GASTROINTESTINAL/NUTRITION:   #4/10 open ventral hernia repair, small bowel resection, ileoileal anastomosis and primary fascial closure, 2 THONY Right  #4/17 RTOR for exploratory laparotomy, repair of anastomotic leak at previous ileoileal anastomosis, resected, new ileoileal anastomosis created, 1 THONY Left (total 3)    - Intermittent abdominal vac change, last 4/26 PM, bilious drainage noted    - REMOVED 4/26 RUQ THONY    - REMOVED 4/28 LUQ THONY at ileoileo anstamosis, RLQ THONY in pelvis, IR THONY abdominal wall  #4/22: s/p IR percutaneous placement of a 8.5 [place] Islander drainage catheter into lower abdominal wall fluid collection, yielding 150 mL of foul appearing fluid.  #4/27: Acute retroperitoneal  CTA: mesenteric arterial blush noted with extravasation    - s/p IR without finding of any bleeding, no embolization required  #4/27 RTOR for re-exploration and evacuation of old clots from retroperitoneum and pelvis    - new midline prevena vac in place    - x3 new 19Fr beatrice drains in place: 1. Right pelvis, 2. Left pelvis, 3. Subcutaenous    #Diet, NPO    - OGT in place to suction    - TPN discontinued 4/25    - aspiration precautions, HOB 30  #GI Prophylaxis/hx GERD  - Pantoprazole 40mg IV BID (home rx)  #Bowel regimen  - Holding  - Last BM 4/26  - Dignishield in place     /RENAL:   #urine output in critically ill  - An replaced 4/27  - LR @ 120cc/hr  #FUNMI; oliguric  - Nephrology consulted and following > hold off on CVVH recall  - Renal u/s> no hydronephrosis. 2-3cm anechoic cyst on right kidney   #Lactic acidosis    - OFF sodium bicarbonate infusion    Labs:          BUN/Cr- 56/2.6  -->,  56/2.5  -->          [04-28 @ 00:20]Na  142 // K  5.3 // Mg  2.1 // Phos  9.6  #Hyperkalemia    - treated with insulin 5 units, Dextrose 50 and 2g calcium gluconate  - f/u AM labs     HEME/ONC:   #acute anemia     -last transfusion 4/27  #Acute right pulmonary embolus    -Lovenox 135mg q12 HOLDING  #B/L UE Venous Duplex 4/26/27 - thrombophlebitis  #B/L LE Venous Duplex 4/21- right PT DVT  - concern for malabsorption of Eliquis given small bowel resection per pharmacy  - vascular surgery consulted - AC with heparin drip (upon discharge transition to Eliquis 10 mg po BID x 7 days then 5 mg po BID for at least 6 months)  #DVT prophylaxis    - HOLDING due to acute anemia    - LLE SCD only    Labs: Hb/Hct:  7.9/23.5  -->2u PRBC intraop >  9.8/28.8  -->                      Plts:  288  -->,  232  -->                 PTT/INR:  26.5/1.20  --->,  26.0/1.15  --->     T&S expires: 4/30    ID:  #Intraabdominal anastomotic leak, fluid collection  #ID Consult     - Continue meropenem 1g Q8 (4/22 - )  #Culture    OR 4/1 - E Coli    OR cx: few E.coli, few bacteroides, rare clostridium    4/13 Blood Cx: NGTF    4/14 tracheal aspirate: no growth     4/17 body fluid cx rare e. coli     4/22 Blood cx: negative    4/22 Abdominal - Negative FINAL    4/27 C diff stool panel: pending  WBC- 25.36  --->>,  23.34  --->>,  24.93  --->>  Temp trend- 24hrs T(F): 97.7 (04-27 @ 20:00), Max: 97.7 (04-27 @ 20:00)    ENDOCRINE:  #Glycemic monitoring  - Q6 FSG while NPO  - ISS  - A1C 5.8%     MSK:  #Activity- increase as tolerated     SKIN:  #DTI screening negative   - Continue to monitor daily skin changes    LINES/DRAINS:  PIV    RLQ Pelvix THONY x1    LUQ Ileoileo anastamosis THONY x1    Midline Abdominal IR THONY x1    Right IJ TLC (4/17 -     Prevena Vac Midline    Right Femoral Arterial Line (4/27 -    ADVANCED DIRECTIVES:  Full Code  Emergency - Daughter  (Shavonne Moss)  INDICATION FOR SICU/SDU: Intestinal obstruction    DISPO: SICU 64F PMHx HTN, morbid obesity s/p 2001 Abby-en-Y gastric bypass who presented on 4/3 with incarcerated ventral hernia with SBO    4/10 open ventral hernia repair, small bowel resection, and primary fascial closure with Dr. Lema. Admitted to SICU for Q1 abdominal checks and hemodynamic monitoring.   4/17 takeback for exploratory laparotomy, repair of anastomotic leak  4/22 8Fr pigtail into lower abdominal wall collection,150cc of foul appearing material aspirated, Attached to THONY bulb  4/27 RTOR for re-exploration and evacuation of old clots from retroperitoneum and pelvis and placement of 3 new beatrice drains    NEUROLOGICAL:  #Acute pain    - fentanyl infusion  #Sedation    - propofol infusion  #AMS    HCT 4/27: negative  #Sleep hygiene  - HOLDING Trazodone 25mg QPM  - HOLDING Melatonin 5mg QPM    RESPIRATORY:   #Acute hypoxic respiratory failure  - Extubated 4/18, reintubated on 4/27 for AMS and agonal breathing    ET 7.5mm  LL 23cm   - vent settings: /28/40/8  #Right distal main pulmonary embolus with segmental extension  - HOLDING Lovenox 135mg Q12 secondary to acute anemia  - Echo 4/22/25: EF 70 to 75%.  - B/L LE Venous duplex 4/21-RIGHT PT DVT, no LEFT DVT  #Intermittent diuresis    -last diuresis 4/25 1mg bumex  #PMHx [place] on CPAP@ home   #Activity increase as tolerated    CARDS:   #acute hemorraghic shock    - Levophed infusion    - Vasopressin infusion     - 4/27: 2u PRBC 1plt 1uFFP, 1g TXA    - OFF sodium bicarbonate infusion on 4/27  #Supraventricular tachycardia, Non-sustained ventricular tachycardia, Ventricular Premature Depolarization    - Cardiology (Dr. Lopez) - Metoprolol 25mg Q12 HOLDING    #PMHx of HTN  - Amlodipine 10mg QD  HOLDING  - HCTZ 25mg QD HOLDING  - Losartan 100mg daily HOLDING  - EKG- NSR  - TTE 4/11: EF of 56%. G1DD.     GASTROINTESTINAL/NUTRITION:   #4/10 open ventral hernia repair, small bowel resection, ileoileal anastomosis and primary fascial closure, 2 THONY Right  #4/17 RTOR for exploratory laparotomy, repair of anastomotic leak at previous ileoileal anastomosis, resected, new ileoileal anastomosis created, 1 THONY Left (total 3)    - Intermittent abdominal vac change, last 4/26 PM, bilious drainage noted    - REMOVED 4/26 RUQ THONY    - REMOVED 4/28 LUQ THONY at ileoileo anstamosis, RLQ THONY in pelvis, IR THONY abdominal wall  #4/22: s/p IR percutaneous placement of a 8.5 [place]ese drainage catheter into lower abdominal wall fluid collection, yielding 150 mL of foul appearing fluid.  #4/27: Acute retroperitoneal  CTA: mesenteric arterial blush noted with extravasation    - s/p IR without finding of any bleeding, no embolization required  #4/27 RTOR for re-exploration and evacuation of old clots from retroperitoneum and pelvis    - new midline prevena vac in place    - x3 new 19Fr beatrice drains in place: 1. Right pelvis, 2. Left pelvis, 3. Subcutaenous    #Diet, NPO    - OGT in place to suction    - TPN discontinued 4/25    - aspiration precautions, HOB 30  #GI Prophylaxis/hx GERD  - Pantoprazole 40mg IV BID (home rx)  #Bowel regimen  - Holding  - Last BM 4/26  - Dignishield in place     /RENAL:   #urine output in critically ill  - An replaced 4/27  - LR @ 120cc/hr  #FUNMI; oliguric  - Nephrology consulted and following > hold off on CVVH recall  - Renal u/s> no hydronephrosis. 2-3cm anechoic cyst on right kidney   #Lactic acidosis    - OFF sodium bicarbonate infusion    Labs:          BUN/Cr- 56/2.6  -->,  56/2.5  -->          [04-28 @ 00:20]Na  142 // K  5.3 // Mg  2.1 // Phos  9.6  #Hyperkalemia    - treated with insulin 5 units, Dextrose 50 and 2g calcium gluconate  - f/u AM labs     HEME/ONC:   #acute anemia     -last transfusion 4/27  #Acute right pulmonary embolus    -Lovenox 135mg q12 HOLDING  #B/L UE Venous Duplex 4/26/27 - thrombophlebitis  #B/L LE Venous Duplex 4/21- right PT DVT  - concern for malabsorption of Eliquis given small bowel resection per pharmacy  - vascular surgery consulted - AC with heparin drip (upon discharge transition to Eliquis 10 mg po BID x 7 days then 5 mg po BID for at least 6 months)  #DVT prophylaxis    - HOLDING due to acute anemia    - LLE SCD only    Labs: Hb/Hct:  7.9/23.5  -->2u PRBC intraop >  9.8/28.8  -->                      Plts:  288  -->,  232  -->                 PTT/INR:  26.5/1.20  --->,  26.0/1.15  --->     T&S expires: 4/30    ID:  #Intraabdominal anastomotic leak, fluid collection  #ID Consult     - Continue meropenem 1g Q8 (4/22 - )  #Culture    OR 4/1 - E Coli    OR cx: few E.coli, few bacteroides, rare clostridium    4/13 Blood Cx: NGTF    4/14 tracheal aspirate: no growth     4/17 body fluid cx rare e. coli     4/22 Blood cx: negative    4/22 Abdominal - Negative FINAL    4/27 C diff stool panel: pending  WBC- 25.36  --->>,  23.34  --->>,  24.93  --->>  Temp trend- 24hrs T(F): 97.7 (04-27 @ 20:00), Max: 97.7 (04-27 @ 20:00)    ENDOCRINE:  #Glycemic monitoring  - Q6 FSG while NPO  - ISS  - A1C 5.8%     MSK:  #Activity- increase as tolerated     SKIN:  #DTI screening negative   - Continue to monitor daily skin changes    LINES/DRAINS:  PIV    RLQ pelvis beatrice drain (4/28 -     LLQ pelvis beatrice drain (4/28 -     Midline subtaneous beatrice drain (4/28 -     Right IJ TLC (4/17 -     Prevena Vac Midline     Left radial arterial line (4/28 -     Discontinued lines:    Right Femoral Arterial Line (4/27 - 4/28)      ADVANCED DIRECTIVES:  Full Code  Emergency - Daughter  (Shavonne Moss)  INDICATION FOR SICU/SDU: Intestinal obstruction    DISPO: SICU 64F PMHx HTN, morbid obesity s/p 2001 Abby-en-Y gastric bypass who presented on 4/3 with incarcerated ventral hernia with SBO    4/10 open ventral hernia repair, small bowel resection, and primary fascial closure with Dr. Lema. Admitted to SICU for Q1 abdominal checks and hemodynamic monitoring.   4/17 takeback for exploratory laparotomy, repair of anastomotic leak  4/22 8Fr pigtail into lower abdominal wall collection,150cc of foul appearing material aspirated, Attached to THONY bulb  4/27 RTOR for re-exploration and evacuation of old clots from retroperitoneum and pelvis and placement of 3 new beatrice drains    NEUROLOGICAL:  #Acute pain    - fentanyl infusion  #Sedation    - propofol infusion > transition to precedex   #AMS    HCT 4/27: negative  #Sleep hygiene  - HOLDING Trazodone 25mg QPM  - HOLDING Melatonin 5mg QPM    RESPIRATORY:   #Acute hypoxic respiratory failure  - Extubated 4/18, reintubated on 4/27 for AMS and agonal breathing    ET 7.5mm  LL 23cm   - Vent settings: /28/40/10  #Right distal main pulmonary embolus with segmental extension  - HOLDING Lovenox 135mg Q12 secondary to acute anemia  - Echo 4/22/25: EF 70 to 75%.  - B/L LE Venous duplex 4/21-RIGHT PT DVT, no LEFT DVT  #Intermittent diuresis    -last diuresis 4/25 1mg bumex  #PMHx [place] on CPAP@ home   #Activity increase as tolerated    CARDS:   #acute hemorraghic shock    - Levophed infusion    - Vasopressin infusion     - 4/27: 2u PRBC 1plt 1uFFP, 1g TXA    - OFF sodium bicarbonate infusion on 4/27  #Supraventricular tachycardia, Non-sustained ventricular tachycardia, Ventricular Premature Depolarization    - Cardiology (Dr. Lopez) - Metoprolol 25mg Q12 HOLDING    #PMHx of HTN  - Amlodipine 10mg QD  HOLDING  - HCTZ 25mg QD HOLDING  - Losartan 100mg daily HOLDING  - EKG- NSR  - TTE 4/11: EF of 56%. G1DD.     GASTROINTESTINAL/NUTRITION:   #4/10 open ventral hernia repair, small bowel resection, ileoileal anastomosis and primary fascial closure, 2 THONY Right  #4/17 RTOR for exploratory laparotomy, repair of anastomotic leak at previous ileoileal anastomosis, resected, new ileoileal anastomosis created, 1 THONY Left (total 3)    - Intermittent abdominal vac change, last 4/26 PM, bilious drainage noted    - REMOVED 4/26 RUQ THONY    - REMOVED 4/28 LUQ THONY at ileoileo anstamosis, RLQ THONY in pelvis, IR THONY abdominal wall  #4/22: s/p IR percutaneous placement of a 8.5 [place] drainage catheter into lower abdominal wall fluid collection, yielding 150 mL of foul appearing fluid.  #4/27: Acute retroperitoneal  CTA: mesenteric arterial blush noted with extravasation    - s/p IR without finding of any bleeding, no embolization required  #4/27 RTOR for re-exploration and evacuation of old clots from retroperitoneum and pelvis    - new midline prevena vac in place    - x3 new 19Fr beatrice drains in place: 1. Right pelvis, 2. Left pelvis, 3. Subcutaneous     - 700cc crystalloid, 2U pRBC, 1 AMP bicarb, 500cc 5% albumin, U/O 50cc, EBL 1000cc  #Diet, NPO    - OGT in place to suction    - TPN discontinued 4/25, to be restarted 4/28 evening    - aspiration precautions, HOB 30  #GI Prophylaxis/hx GERD  - Pantoprazole 40mg IV BID (home rx)  #Bowel regimen  - Holding  - Last BM 4/26  - Dignishield in place     /RENAL:   #urine output in critically ill  - An replaced 4/27  - LR @ 120cc/hr  #FUNMI; oliguric  - Nephrology consulted and following > hold off on CVVH recall  - Renal u/s> no hydronephrosis. 2-3cm anechoic cyst on right kidney   #Lactic acidosis    - OFF sodium bicarbonate infusion    Labs:   BUN/Cr- 56/2.5  -->,  58/2.7  -->  [04-28 @ 04:00]Na  144 // K  4.7 // Mg  2.0 // Phos  7.9  [04-28 @ 00:20]Na  142 // K  5.3 // Mg  2.1 // Phos  9.6  #Hyperkalemia    - treated with insulin 5 units, Dextrose 50 and 2g calcium gluconate  - F/u 11AM BMP     HEME/ONC:   #acute anemia     -last transfusion 4/27  #Acute right pulmonary embolus    -Lovenox 135mg q12 HOLDING  #B/L UE Venous Duplex 4/26/27 - thrombophlebitis  #B/L LE Venous Duplex 4/21- right PT DVT  - concern for malabsorption of Eliquis given small bowel resection per pharmacy  - vascular surgery consulted - AC with heparin drip (upon discharge transition to Eliquis 10 mg po BID x 7 days then 5 mg po BID for at least 6 months)  #DVT prophylaxis    - HOLDING due to acute anemia    - LLE SCD only    Labs: Hb/Hct:  7.9/23.5  -->2u PRBC intraop >  9.8/28.8  -->                      Plts:  288  -->,  232  -->                 PTT/INR:  26.5/1.20  --->,  26.0/1.15  --->     T&S expires: 4/30    ID:  #Intraabdominal anastomotic leak, fluid collection  #ID Consult     - Continue meropenem 1g Q8 (4/22 - )  #Culture    OR 4/1 - E Coli    OR cx: few E.coli, few bacteroides, rare clostridium    4/13 Blood Cx: NGTF    4/14 tracheal aspirate: no growth     4/17 body fluid cx rare e. coli     4/22 Blood cx: negative    4/22 Abdominal - Negative FINAL    4/27 C diff stool panel: pending  WBC- 25.36  --->>,  23.34  --->>,  24.93  --->>  Temp trend- 24hrs T(F): 97.7 (04-27 @ 20:00), Max: 97.7 (04-27 @ 20:00)    ENDOCRINE:  #Glycemic monitoring  - Q6 FSG while NPO  - ISS  - A1C 5.8%     MSK:  #Activity- increase as tolerated     SKIN:  #DTI screening negative   - Continue to monitor daily skin changes    LINES/DRAINS:  PIV    RLQ pelvis beatrice drain (4/28 -     LLQ pelvis beatrice drain (4/28 -     Midline subtaneous beatrice drain (4/28 -     Right IJ TLC (4/17 -     Prevena Vac Midline     Left radial arterial line (4/28 -     Discontinued lines:    Right Femoral Arterial Line (4/27 - 4/28)      ADVANCED DIRECTIVES:  Full Code  Emergency - Daughter  (Shavonne Moss)  INDICATION FOR SICU/SDU: Intestinal obstruction    DISPO: SICU    Pending discussion with SICU attending Dr. Allen 64F PMHx HTN, morbid obesity s/p 2001 Abby-en-Y gastric bypass who presented on 4/3 with incarcerated ventral hernia with SBO    4/10 open ventral hernia repair, small bowel resection, and primary fascial closure with Dr. Lema. Admitted to SICU for Q1 abdominal checks and hemodynamic monitoring.   4/17 takeback for exploratory laparotomy, repair of anastomotic leak  4/22 8Fr pigtail into lower abdominal wall collection,150cc of foul appearing material aspirated, Attached to THONY bulb  4/27 RTOR for re-exploration and evacuation of old clots from retroperitoneum and pelvis and placement of 3 new beatrice drains    NEUROLOGICAL:  #Acute pain    - fentanyl infusion  #Sedation    - propofol infusion > transition to precedex   #AMS    HCT 4/27: negative  #Sleep hygiene  - HOLDING Trazodone 25mg QPM  - HOLDING Melatonin 5mg QPM    RESPIRATORY:   #Acute hypoxic respiratory failure  - Extubated 4/18, reintubated on 4/27 for AMS and agonal breathing    ET 7.5mm  LL 23cm   - Vent settings: /28/40/10  #Right distal main pulmonary embolus with segmental extension  - HOLDING Lovenox 135mg Q12 secondary to acute anemia  - Echo 4/22/25: EF 70 to 75%.  - B/L LE Venous duplex 4/21-RIGHT PT DVT, no LEFT DVT  #Intermittent diuresis    -last diuresis 4/25 1mg bumex  #PMHx [place] on CPAP@ home   #Activity increase as tolerated    CARDS:   #acute hemorraghic shock    - Levophed infusion    - Vasopressin infusion     - 4/27: 2u PRBC 1plt 1uFFP, 1g TXA    - OFF sodium bicarbonate infusion on 4/27  #Supraventricular tachycardia, Non-sustained ventricular tachycardia, Ventricular Premature Depolarization    - Cardiology (Dr. Lopez) - Metoprolol 25mg Q12 HOLDING    #PMHx of HTN  - Amlodipine 10mg QD  HOLDING  - HCTZ 25mg QD HOLDING  - Losartan 100mg daily HOLDING  - EKG- NSR  - TTE 4/11: EF of 56%. G1DD.     GASTROINTESTINAL/NUTRITION:   #4/10 open ventral hernia repair, small bowel resection, ileoileal anastomosis and primary fascial closure, 2 THONY Right  #4/17 RTOR for exploratory laparotomy, repair of anastomotic leak at previous ileoileal anastomosis, resected, new ileoileal anastomosis created, 1 THONY Left (total 3)    - Intermittent abdominal vac change, last 4/26 PM, bilious drainage noted    - REMOVED 4/26 RUQ THONY    - REMOVED 4/28 LUQ THONY at ileoileo anstamosis, RLQ THONY in pelvis, IR THONY abdominal wall  #4/22: s/p IR percutaneous placement of a 8.5 [place] drainage catheter into lower abdominal wall fluid collection, yielding 150 mL of foul appearing fluid.  #4/27: Acute retroperitoneal  CTA: mesenteric arterial blush noted with extravasation    - s/p IR without finding of any bleeding, no embolization required  #4/27 RTOR for re-exploration and evacuation of old clots from retroperitoneum and pelvis    - new midline prevena vac in place    - x3 new 19Fr beatrice drains in place: 1. Right pelvis, 2. Left pelvis, 3. Subcutaneous     - 700cc crystalloid, 2U pRBC, 1 AMP bicarb, 500cc 5% albumin, U/O 50cc, EBL 1000cc  #Diet, NPO    - OGT in place to suction    - TPN discontinued 4/25, to be restarted 4/28 evening    - aspiration precautions, HOB 30  #GI Prophylaxis/hx GERD  - Pantoprazole 40mg IV BID (home rx)  #Bowel regimen  - Holding  - Last BM 4/26  - Dignishield in place     /RENAL:   #urine output in critically ill  - An replaced 4/27  - LR @ 120cc/hr  #FUNMI; oliguric  - Nephrology consulted and following > hold off on CVVH recall  - Renal u/s> no hydronephrosis. 2-3cm anechoic cyst on right kidney   #Lactic acidosis    - OFF sodium bicarbonate infusion    Labs:   BUN/Cr- 56/2.5  -->,  58/2.7  -->  [04-28 @ 04:00]Na  144 // K  4.7 // Mg  2.0 // Phos  7.9  [04-28 @ 00:20]Na  142 // K  5.3 // Mg  2.1 // Phos  9.6  #Hyperkalemia    - treated with insulin 5 units, Dextrose 50 and 2g calcium gluconate     HEME/ONC:   #acute anemia     -last transfusion 4/27  #Acute right pulmonary embolus    -Lovenox 135mg q12 HOLDING  #B/L UE Venous Duplex 4/26/27 - thrombophlebitis  #B/L LE Venous Duplex 4/21- right PT DVT  - concern for malabsorption of Eliquis given small bowel resection per pharmacy  - vascular surgery consulted - AC with heparin drip (upon discharge transition to Eliquis 10 mg po BID x 7 days then 5 mg po BID for at least 6 months)  #DVT prophylaxis    - HOLDING due to acute anemia > okay to start HSQ per primary  - Vascular surgery consulted for IVC filter     - LLE SCD only    Labs: Hb/Hct:  7.9/23.5  -->2u PRBC intraop >  9.8/28.8  -->                      Plts:  288  -->,  232  -->                 PTT/INR:  26.5/1.20  --->,  26.0/1.15  --->     T&S expires: 4/30    ID:  #Intraabdominal anastomotic leak, fluid collection  #ID Consult     - Continue meropenem 1g Q8 (4/22 - )  #Culture    OR 4/1 - E Coli    OR cx: few E.coli, few bacteroides, rare clostridium    4/13 Blood Cx: NGTF    4/14 tracheal aspirate: no growth     4/17 body fluid cx rare e. coli     4/22 Blood cx: negative    4/22 Abdominal - Negative FINAL    4/27 C diff stool panel: pending  WBC- 25.36  --->>,  23.34  --->>,  24.93  --->>  Temp trend- 24hrs T(F): 97.7 (04-27 @ 20:00), Max: 97.7 (04-27 @ 20:00)    ENDOCRINE:  #Glycemic monitoring  - Q6 FSG while NPO  - ISS  - A1C 5.8%     MSK:  #Activity- increase as tolerated     SKIN:  #DTI screening negative   - Continue to monitor daily skin changes    LINES/DRAINS:  PIV    RLQ pelvis beatrice drain (4/28 -     LLQ pelvis beatrice drain (4/28 -     Midline subtaneous beatrice drain (4/28 -     Right IJ TLC (4/17 -     Prevena Vac Midline     Left radial arterial line (4/28 -     Discontinued lines:    Right Femoral Arterial Line (4/27 - 4/28)      ADVANCED DIRECTIVES:  Full Code  Emergency - Daughter  (Shavonne Moss)  INDICATION FOR SICU/SDU: Intestinal obstruction    DISPO: SICU    Pending discussion with SICU attending Dr. Allen 64F PMHx HTN, morbid obesity s/p 2001 Abby-en-Y gastric bypass who presented on 4/3 with incarcerated ventral hernia with SBO    4/10 open ventral hernia repair, small bowel resection, and primary fascial closure with Dr. Lema. Admitted to SICU for Q1 abdominal checks and hemodynamic monitoring.   4/17 takeback for exploratory laparotomy, repair of anastomotic leak  4/22 8Fr pigtail into lower abdominal wall collection,150cc of foul appearing material aspirated, Attached to THONY bulb  4/27 RTOR for re-exploration and evacuation of old clots from retroperitoneum and pelvis and placement of 3 new beatrice drains    NEUROLOGICAL:  #Acute pain    - fentanyl infusion  #Sedation    - propofol infusion > transition to precedex   #AMS    HCT 4/27: negative  #Sleep hygiene  - HOLDING Trazodone 25mg QPM  - HOLDING Melatonin 5mg QPM    RESPIRATORY:   #Acute hypoxic respiratory failure  - Extubated 4/18, reintubated on 4/27 for AMS and agonal breathing    ET 7.5mm  LL 23cm   - Vent settings: /28/40/10  #Right distal main pulmonary embolus with segmental extension  - HOLDING Lovenox 135mg Q12 secondary to acute anemia  - Echo 4/22/25: EF 70 to 75%.  - B/L LE Venous duplex 4/21-RIGHT PT DVT, no LEFT DVT  #Intermittent diuresis    -last diuresis 4/25 1mg bumex  #PMHx [place] on CPAP@ home   #Activity increase as tolerated    CARDS:   #acute hemorraghic shock    - Levophed infusion    - Vasopressin infusion     - 4/27: 2u PRBC 1plt 1uFFP, 1g TXA    - OFF sodium bicarbonate infusion on 4/27  #Supraventricular tachycardia, Non-sustained ventricular tachycardia, Ventricular Premature Depolarization    - Cardiology (Dr. Lopez) - Metoprolol 25mg Q12 HOLDING    #PMHx of HTN  - Amlodipine 10mg QD  HOLDING  - HCTZ 25mg QD HOLDING  - Losartan 100mg daily HOLDING  - EKG- NSR  - TTE 4/11: EF of 56%. G1DD.     GASTROINTESTINAL/NUTRITION:   #4/10 open ventral hernia repair, small bowel resection, ileoileal anastomosis and primary fascial closure, 2 THONY Right  #4/17 RTOR for exploratory laparotomy, repair of anastomotic leak at previous ileoileal anastomosis, resected, new ileoileal anastomosis created, 1 THONY Left (total 3)    - Intermittent abdominal vac change, last 4/26 PM, bilious drainage noted    - REMOVED 4/26 RUQ THONY    - REMOVED 4/28 LUQ THONY at ileoileo anstamosis, RLQ THONY in pelvis, IR THONY abdominal wall  #4/22: s/p IR percutaneous placement of a 8.5 Indonesian drainage catheter into lower abdominal wall fluid collection, yielding 150 mL of foul appearing fluid.  #4/27: Acute retroperitoneal  CTA: mesenteric arterial blush noted with extravasation    - s/p IR without finding of any bleeding, no embolization required  #4/27 RTOR for re-exploration and evacuation of old clots from retroperitoneum and pelvis    - new midline prevena vac in place    - x3 new 19Fr beatrice drains in place: 1. Right pelvis, 2. Left pelvis, 3. Subcutaneous     - 700cc crystalloid, 2U pRBC, 1 AMP bicarb, 500cc 5% albumin, U/O 50cc, EBL 1000cc  #Diet, NPO    - OGT in place to suction    - TPN discontinued 4/25, to be restarted 4/28 evening    - aspiration precautions, HOB 30  #GI Prophylaxis/hx GERD  - Pantoprazole 40mg IV BID (home rx)  #Bowel regimen  - Holding  - Last BM 4/26  - Dignishield in place     /RENAL:   #urine output in critically ill  - An replaced 4/27  - LR @ 120cc/hr  #FUNMI; oliguric  - Nephrology consulted and following > hold off on CVVH recall  - Renal u/s> no hydronephrosis. 2-3cm anechoic cyst on right kidney   #Lactic acidosis    - OFF sodium bicarbonate infusion    Labs:   BUN/Cr- 56/2.5  -->,  58/2.7  -->  [04-28 @ 04:00]Na  144 // K  4.7 // Mg  2.0 // Phos  7.9  [04-28 @ 00:20]Na  142 // K  5.3 // Mg  2.1 // Phos  9.6  #Hyperkalemia    - treated with insulin 5 units, Dextrose 50 and 2g calcium gluconate     HEME/ONC:   #acute anemia     -last transfusion 4/27  #Acute right pulmonary embolus    -Lovenox 135mg q12 HOLDING  #B/L UE Venous Duplex 4/26/27 - thrombophlebitis  #B/L LE Venous Duplex 4/21- right PT DVT  - concern for malabsorption of Eliquis given small bowel resection per pharmacy  - vascular surgery consulted - AC with heparin drip (upon discharge transition to Eliquis 10 mg po BID x 7 days then 5 mg po BID for at least 6 months)  #DVT prophylaxis    - HOLDING due to acute anemia > okay to start HSQ per primary  - Vascular surgery consulted for IVC filter     - LLE SCD only    Labs: Hb/Hct:  7.9/23.5  -->2u PRBC intraop >  9.8/28.8  -->                      Plts:  288  -->,  232  -->                 PTT/INR:  26.5/1.20  --->,  26.0/1.15  --->     T&S expires: 4/30    ID:  #Intraabdominal anastomotic leak, fluid collection  #ID Consult     - Continue meropenem 1g Q8 (4/22 - )  #Culture    OR 4/1 - E Coli    OR cx: few E.coli, few bacteroides, rare clostridium    4/13 Blood Cx: NGTF    4/14 tracheal aspirate: no growth     4/17 body fluid cx rare e. coli     4/22 Blood cx: negative    4/22 Abdominal - Negative FINAL    4/27 C diff stool panel: pending  WBC- 25.36  --->>,  23.34  --->>,  24.93  --->>  Temp trend- 24hrs T(F): 97.7 (04-27 @ 20:00), Max: 97.7 (04-27 @ 20:00)    ENDOCRINE:  #Glycemic monitoring  - Q6 FSG while NPO  - ISS  - A1C 5.8%     MSK:  #Activity- increase as tolerated     SKIN:  #DTI screening negative   - Continue to monitor daily skin changes    LINES/DRAINS:  PIV    RLQ pelvis beatrice drain (4/28 -     LLQ pelvis beatrice drain (4/28 -     Midline subtaneous beatrice drain (4/28 -     Right IJ TLC (4/17 -     Vac Midline     Left radial arterial line (4/28 -     Discontinued lines:    Right Femoral Arterial Line (4/27 - 4/28)      ADVANCED DIRECTIVES:  Full Code  Emergency - Daughter  (Shavonne Moss)  INDICATION FOR SICU/SDU: Intestinal obstruction    DISPO: SICU    Pending discussion with SICU attending Dr. Allen

## 2025-04-28 NOTE — PROGRESS NOTE ADULT - ASSESSMENT
64y F w/ PMHx of HTN and gastric bypass surgery in 2001 presents with abdominal distention. CT with incarcerated ventral hernia with SBO. S/p Open repair of ventral hernia using mesh and component separation technique, Small bowel resection. 4/17 s/p ex lap, 30 cm sbr, creation of new side to side ileoileostomy, ventral hernia repair w/ mesh  Tachycardia work up included CTA performed for tachypnea and tachycardia which showed R main -> lobar pulm thrombosis/embolism, no R heart strain. Initiated on Heparin drip  Vascular surgery called after venous dplx showed Right posterior tibial vein DVT  Re-called 4/28 after CT showed expansion of PE, requesting IVC filter placement    Plan:  - Recommend IR consult for IVC filter, already following  - AC per primary team when medically appropriate  - Follow up with vascular surgery as outpt in 1 months after pt is discharged

## 2025-04-28 NOTE — BRIEF OPERATIVE NOTE - SPECIMENS
Spoken with pt regarding INR and dosing instructions. Dahlia  
small bowel resection, hernia contents and sac
1.) Peritoneal culture, 2.) Previous small bowel anastomosis
none

## 2025-04-28 NOTE — BRIEF OPERATIVE NOTE - BRIEF OP NOTE DRAINS
1. 19Fr Oliver- R pelvis  2. 19Fr Oliver- L pelvis  3. 19Fr Oliver- subq
New Left 10Fr Beatrice drain (#3)  previous right 10Fr beatrice drains left in place 
10 THONY drain

## 2025-04-29 LAB
ANION GAP SERPL CALC-SCNC: 16 MMOL/L — HIGH (ref 7–14)
BASOPHILS # BLD AUTO: 0.01 K/UL — SIGNIFICANT CHANGE UP (ref 0–0.2)
BASOPHILS # BLD AUTO: 0.02 K/UL — SIGNIFICANT CHANGE UP (ref 0–0.2)
BASOPHILS NFR BLD AUTO: 0.1 % — SIGNIFICANT CHANGE UP (ref 0–1)
BASOPHILS NFR BLD AUTO: 0.1 % — SIGNIFICANT CHANGE UP (ref 0–1)
BUN SERPL-MCNC: 68 MG/DL — CRITICAL HIGH (ref 10–20)
CALCIUM SERPL-MCNC: 7.6 MG/DL — LOW (ref 8.4–10.5)
CHLORIDE SERPL-SCNC: 108 MMOL/L — SIGNIFICANT CHANGE UP (ref 98–110)
CO2 SERPL-SCNC: 20 MMOL/L — SIGNIFICANT CHANGE UP (ref 17–32)
CREAT SERPL-MCNC: 2.7 MG/DL — HIGH (ref 0.7–1.5)
EGFR: 19 ML/MIN/1.73M2 — LOW
EGFR: 19 ML/MIN/1.73M2 — LOW
EOSINOPHIL # BLD AUTO: 0 K/UL — SIGNIFICANT CHANGE UP (ref 0–0.7)
EOSINOPHIL # BLD AUTO: 0 K/UL — SIGNIFICANT CHANGE UP (ref 0–0.7)
EOSINOPHIL NFR BLD AUTO: 0 % — SIGNIFICANT CHANGE UP (ref 0–8)
EOSINOPHIL NFR BLD AUTO: 0 % — SIGNIFICANT CHANGE UP (ref 0–8)
GAS PNL BLDA: SIGNIFICANT CHANGE UP
GLUCOSE BLDC GLUCOMTR-MCNC: 146 MG/DL — HIGH (ref 70–99)
GLUCOSE BLDC GLUCOMTR-MCNC: 160 MG/DL — HIGH (ref 70–99)
GLUCOSE BLDC GLUCOMTR-MCNC: 161 MG/DL — HIGH (ref 70–99)
GLUCOSE SERPL-MCNC: 152 MG/DL — HIGH (ref 70–99)
HCT VFR BLD CALC: 19.7 % — LOW (ref 37–47)
HCT VFR BLD CALC: 19.8 % — LOW (ref 37–47)
HCT VFR BLD CALC: 21.4 % — LOW (ref 37–47)
HCT VFR BLD CALC: 22.3 % — LOW (ref 37–47)
HGB BLD-MCNC: 6.8 G/DL — CRITICAL LOW (ref 12–16)
HGB BLD-MCNC: 6.9 G/DL — CRITICAL LOW (ref 12–16)
HGB BLD-MCNC: 7.4 G/DL — LOW (ref 12–16)
HGB BLD-MCNC: 7.7 G/DL — LOW (ref 12–16)
IMM GRANULOCYTES NFR BLD AUTO: 1.9 % — HIGH (ref 0.1–0.3)
IMM GRANULOCYTES NFR BLD AUTO: 2.7 % — HIGH (ref 0.1–0.3)
LYMPHOCYTES # BLD AUTO: 0.5 K/UL — LOW (ref 1.2–3.4)
LYMPHOCYTES # BLD AUTO: 0.72 K/UL — LOW (ref 1.2–3.4)
LYMPHOCYTES # BLD AUTO: 5.3 % — LOW (ref 20.5–51.1)
LYMPHOCYTES # BLD AUTO: 5.3 % — LOW (ref 20.5–51.1)
MAGNESIUM SERPL-MCNC: 2.1 MG/DL — SIGNIFICANT CHANGE UP (ref 1.8–2.4)
MCHC RBC-ENTMCNC: 30.8 PG — SIGNIFICANT CHANGE UP (ref 27–31)
MCHC RBC-ENTMCNC: 30.9 PG — SIGNIFICANT CHANGE UP (ref 27–31)
MCHC RBC-ENTMCNC: 30.9 PG — SIGNIFICANT CHANGE UP (ref 27–31)
MCHC RBC-ENTMCNC: 31 PG — SIGNIFICANT CHANGE UP (ref 27–31)
MCHC RBC-ENTMCNC: 34.5 G/DL — SIGNIFICANT CHANGE UP (ref 32–37)
MCHC RBC-ENTMCNC: 34.5 G/DL — SIGNIFICANT CHANGE UP (ref 32–37)
MCHC RBC-ENTMCNC: 34.6 G/DL — SIGNIFICANT CHANGE UP (ref 32–37)
MCHC RBC-ENTMCNC: 34.8 G/DL — SIGNIFICANT CHANGE UP (ref 32–37)
MCV RBC AUTO: 88.8 FL — SIGNIFICANT CHANGE UP (ref 81–99)
MCV RBC AUTO: 89.2 FL — SIGNIFICANT CHANGE UP (ref 81–99)
MCV RBC AUTO: 89.5 FL — SIGNIFICANT CHANGE UP (ref 81–99)
MCV RBC AUTO: 89.5 FL — SIGNIFICANT CHANGE UP (ref 81–99)
MONOCYTES # BLD AUTO: 0.4 K/UL — SIGNIFICANT CHANGE UP (ref 0.1–0.6)
MONOCYTES # BLD AUTO: 0.63 K/UL — HIGH (ref 0.1–0.6)
MONOCYTES NFR BLD AUTO: 4.3 % — SIGNIFICANT CHANGE UP (ref 1.7–9.3)
MONOCYTES NFR BLD AUTO: 4.6 % — SIGNIFICANT CHANGE UP (ref 1.7–9.3)
NEUTROPHILS # BLD AUTO: 11.84 K/UL — HIGH (ref 1.4–6.5)
NEUTROPHILS # BLD AUTO: 8.3 K/UL — HIGH (ref 1.4–6.5)
NEUTROPHILS NFR BLD AUTO: 87.3 % — HIGH (ref 42.2–75.2)
NEUTROPHILS NFR BLD AUTO: 88.4 % — HIGH (ref 42.2–75.2)
NRBC BLD AUTO-RTO: 0 /100 WBCS — SIGNIFICANT CHANGE UP (ref 0–0)
PHOSPHATE SERPL-MCNC: 6.2 MG/DL — HIGH (ref 2.1–4.9)
PLATELET # BLD AUTO: 107 K/UL — LOW (ref 130–400)
PLATELET # BLD AUTO: 108 K/UL — LOW (ref 130–400)
PLATELET # BLD AUTO: 111 K/UL — LOW (ref 130–400)
PLATELET # BLD AUTO: 142 K/UL — SIGNIFICANT CHANGE UP (ref 130–400)
PMV BLD: 11.2 FL — HIGH (ref 7.4–10.4)
PMV BLD: 11.5 FL — HIGH (ref 7.4–10.4)
PMV BLD: 11.6 FL — HIGH (ref 7.4–10.4)
PMV BLD: 11.8 FL — HIGH (ref 7.4–10.4)
POTASSIUM SERPL-MCNC: 4.1 MMOL/L — SIGNIFICANT CHANGE UP (ref 3.5–5)
POTASSIUM SERPL-SCNC: 4.1 MMOL/L — SIGNIFICANT CHANGE UP (ref 3.5–5)
RBC # BLD: 2.2 M/UL — LOW (ref 4.2–5.4)
RBC # BLD: 2.23 M/UL — LOW (ref 4.2–5.4)
RBC # BLD: 2.39 M/UL — LOW (ref 4.2–5.4)
RBC # BLD: 2.5 M/UL — LOW (ref 4.2–5.4)
RBC # FLD: 15.9 % — HIGH (ref 11.5–14.5)
RBC # FLD: 16 % — HIGH (ref 11.5–14.5)
RBC # FLD: 16.4 % — HIGH (ref 11.5–14.5)
RBC # FLD: 17.3 % — HIGH (ref 11.5–14.5)
SODIUM SERPL-SCNC: 144 MMOL/L — SIGNIFICANT CHANGE UP (ref 135–146)
WBC # BLD: 10.1 K/UL — SIGNIFICANT CHANGE UP (ref 4.8–10.8)
WBC # BLD: 13.58 K/UL — HIGH (ref 4.8–10.8)
WBC # BLD: 8.98 K/UL — SIGNIFICANT CHANGE UP (ref 4.8–10.8)
WBC # BLD: 9.39 K/UL — SIGNIFICANT CHANGE UP (ref 4.8–10.8)
WBC # FLD AUTO: 10.1 K/UL — SIGNIFICANT CHANGE UP (ref 4.8–10.8)
WBC # FLD AUTO: 13.58 K/UL — HIGH (ref 4.8–10.8)
WBC # FLD AUTO: 8.98 K/UL — SIGNIFICANT CHANGE UP (ref 4.8–10.8)
WBC # FLD AUTO: 9.39 K/UL — SIGNIFICANT CHANGE UP (ref 4.8–10.8)

## 2025-04-29 PROCEDURE — 99291 CRITICAL CARE FIRST HOUR: CPT | Mod: 24

## 2025-04-29 PROCEDURE — 71045 X-RAY EXAM CHEST 1 VIEW: CPT | Mod: 26

## 2025-04-29 RX ORDER — MEROPENEM 1 G/30ML
1000 INJECTION INTRAVENOUS EVERY 8 HOURS
Refills: 0 | Status: DISCONTINUED | OUTPATIENT
Start: 2025-04-29 | End: 2025-04-29

## 2025-04-29 RX ORDER — CALCIUM GLUCONATE 20 MG/ML
2 INJECTION, SOLUTION INTRAVENOUS ONCE
Refills: 0 | Status: COMPLETED | OUTPATIENT
Start: 2025-04-29 | End: 2025-04-29

## 2025-04-29 RX ORDER — CALCIUM GLUCONATE 20 MG/ML
1 INJECTION, SOLUTION INTRAVENOUS ONCE
Refills: 0 | Status: COMPLETED | OUTPATIENT
Start: 2025-04-29 | End: 2025-04-29

## 2025-04-29 RX ORDER — MEROPENEM 1 G/30ML
1000 INJECTION INTRAVENOUS EVERY 12 HOURS
Refills: 0 | Status: DISCONTINUED | OUTPATIENT
Start: 2025-04-29 | End: 2025-05-02

## 2025-04-29 RX ADMIN — MEROPENEM 100 MILLIGRAM(S): 1 INJECTION INTRAVENOUS at 13:18

## 2025-04-29 RX ADMIN — INSULIN LISPRO 2: 100 INJECTION, SOLUTION INTRAVENOUS; SUBCUTANEOUS at 00:00

## 2025-04-29 RX ADMIN — PROPOFOL 8.14 MICROGRAM(S)/KG/MIN: 10 INJECTION, EMULSION INTRAVENOUS at 06:06

## 2025-04-29 RX ADMIN — INSULIN LISPRO 2: 100 INJECTION, SOLUTION INTRAVENOUS; SUBCUTANEOUS at 12:01

## 2025-04-29 RX ADMIN — Medication 1 APPLICATION(S): at 05:25

## 2025-04-29 RX ADMIN — NOREPINEPHRINE BITARTRATE 6.36 MICROGRAM(S)/KG/MIN: 8 SOLUTION at 21:16

## 2025-04-29 RX ADMIN — CALCIUM GLUCONATE 200 GRAM(S): 20 INJECTION, SOLUTION INTRAVENOUS at 06:46

## 2025-04-29 RX ADMIN — CALCIUM GLUCONATE 100 GRAM(S): 20 INJECTION, SOLUTION INTRAVENOUS at 07:06

## 2025-04-29 RX ADMIN — Medication 40 MILLIGRAM(S): at 05:25

## 2025-04-29 RX ADMIN — VASOPRESSIN 6 UNIT(S)/MIN: 20 INJECTION INTRAVENOUS at 11:03

## 2025-04-29 RX ADMIN — DEXMEDETOMIDINE HYDROCHLORIDE IN SODIUM CHLORIDE 6.78 MICROGRAM(S)/KG/HR: 4 INJECTION INTRAVENOUS at 21:15

## 2025-04-29 RX ADMIN — HEPARIN SODIUM 7500 UNIT(S): 1000 INJECTION INTRAVENOUS; SUBCUTANEOUS at 13:18

## 2025-04-29 RX ADMIN — VASOPRESSIN 6 UNIT(S)/MIN: 20 INJECTION INTRAVENOUS at 21:16

## 2025-04-29 RX ADMIN — Medication 15 MILLILITER(S): at 05:25

## 2025-04-29 RX ADMIN — DEXMEDETOMIDINE HYDROCHLORIDE IN SODIUM CHLORIDE 6.78 MICROGRAM(S)/KG/HR: 4 INJECTION INTRAVENOUS at 10:56

## 2025-04-29 RX ADMIN — HEPARIN SODIUM 7500 UNIT(S): 1000 INJECTION INTRAVENOUS; SUBCUTANEOUS at 05:27

## 2025-04-29 RX ADMIN — Medication 15 MILLILITER(S): at 17:56

## 2025-04-29 RX ADMIN — Medication 40 MILLIGRAM(S): at 17:56

## 2025-04-29 RX ADMIN — SODIUM CHLORIDE 120 MILLILITER(S): 9 INJECTION, SOLUTION INTRAVENOUS at 05:26

## 2025-04-29 RX ADMIN — HEPARIN SODIUM 7500 UNIT(S): 1000 INJECTION INTRAVENOUS; SUBCUTANEOUS at 21:15

## 2025-04-29 RX ADMIN — INSULIN LISPRO 2: 100 INJECTION, SOLUTION INTRAVENOUS; SUBCUTANEOUS at 05:25

## 2025-04-29 RX ADMIN — MEROPENEM 100 MILLIGRAM(S): 1 INJECTION INTRAVENOUS at 05:26

## 2025-04-29 NOTE — PROGRESS NOTE ADULT - ATTENDING COMMENTS
This patient has a high probability of sudden, clinically significant deterioration, which requires the highest level of physician preparedness to intervene urgently. I managed/supervised life or organ supporting interventions that required frequent physician assessment. I devoted my full attention in the ICU to the direct care of this patient for the period of time indicated below. Time I spent with the family or surrogate(s) is included only if the patient was incapable of providing the necessary information or participating in medical decision making. Time devoted to teaching and to any procedures I billed separately is not included.     Patient examined and evaluated at the bedside with SICU team. I performed a medically appropriate exam. Pertinent findings are detailed below. Vital signs, laboratory work, and imaging reviewed.     Neuro: sedated but following commands  #Postoperative Pain - fentanyl infusion @ 0.25    Respiratory: respirations even and unlabored on mechanical ventilation  CXR reviewed and interpreted by me - increased congestion R side  Mode: AC/ CMV (Assist Control/ Continuous Mandatory Ventilation), RR (machine): 28, TV (machine): 450, FiO2: 50, PEEP: 10, ITime: 1, MAP: 16, PIP: 25  ABG - ( 29 Apr 2025 04:53 )  pH, Arterial: 7.40  pH, Blood: x     /  pCO2: 32    /  pO2: 88    / HCO3: 20    / Base Excess: -4.6  /  SaO2: 99.1    #Hypoxic respiratory failure - PEEP increased to 10, decrease FiO2 to 40%, repeat ABG, attempt SBT today on CPAP  #Pulmonary embolism/Venous thrombosis - holding therapeutic anticoagulation, IR consulted for evaluation for IVC filter, plan to repeat lower extremity venosu duplex SQH 7500 units Q8H due to BMI > 50    CV: monitor hemodynamics, MAP goal > 65  #Hemorrhagic shock - trend hemoglobin, vaso infusion @ 0.04, norepinephrine @ 0.05 --> pressors downtrending, persistent hypotension partially due to sedation    GI: THONY drains serosanguinous, midline wound vac  ( 28 Apr 2025 22:25 )  Alb: 2.7 g/dL / Pro: 4.5 g/dL / AlkPhos: 101 U/L / ALT: 1133 U/L / AST: 691 U/L / GGT:x     / Tbili 0.4 mg/dL/ Dbili 0.3 mg/dL / Indbili 0.1 mg/dL  #Nutrition - Protein-calorie malnutrition - acute illness, poor nutritional intake, fluid retention - primary surgery team ok with starting tube feeds, start nepro TF @ 20 cc/h  #Ppx - protonix 40mg IV BID (Hx of gastric bypass)    Renal: Continue I&Os monitoring, replete electrolytes as needed  04-28    142  |  107  |  65[HH]  ----------------------------<  138[H]  4.6   |  20  |  2.9[H]    Ca 8.0[L]; Mg 2.1; Phos 7.5[H]    UOP - OUT: 1145 mL  I/O - IN: 4551.8 mL / OUT: 1754 mL / NET: 2797.8 mL    An catheter - hourly I/O in critically ill patient  IVF - LR @ 120 cc/h  #Acute kidney injury - re-consult nephrology, trend creatinine, monitor potassium, UOP adequate  #Hypocalcemia - 3g calcium gluconate IVPB given for ionized calcium 1.06  #Hyperphosphatemia - trend for now, unable to crush phosphate binders    Heme: continue to evaluate for acute blood loss anemia- trend Hg/Hct                         6.8    13.58 )-----------( 142      ( 29 Apr 2025 04:55 )             19.7     PT/INR/PTT - ( 28 Apr 2025 00:20 )   PT: 13.60 sec; INR: 1.15 ratio; PTT 26.0 sec    #Anemia - 1 unit pRBC for hemoglobin 6.8, repeat CBC post transfusion     Anticoagulation - SQH 7500 units Q8H for BMI > 50    ID: trend WBC, monitor for fevers  #Leukocytosis/Abdominal collection - meropenem 1g Q8H, follow up ID recs for duration given surgical findings    Endocrine: Prevent and treat hyperglycemia with insulin as needed    PV: follow pulse exam    MSK: OOB and mobilize when able, PT eval when extubated    Skin: decub precautions    Lines: THONY drains x 3, R IJ TLC, L radial arterial line, abdominal wound vac, An, dignishield, ETT, OGT  DVT Prophylaxis: SQH 7500 units Q8H  Stress Ulcer Prophylaxis: Protonix 40mg BID   Disposition: SICU - hemorrhagic shock/hypotension requiring vasopressor infusion, hypoxic respiratory failure, acute kidney injury      Blaise Allen MD  Trauma/Acute Care Surgery/Surgical Critical Care Attending This patient has a high probability of sudden, clinically significant deterioration, which requires the highest level of physician preparedness to intervene urgently. I managed/supervised life or organ supporting interventions that required frequent physician assessment. I devoted my full attention in the ICU to the direct care of this patient for the period of time indicated below. Time I spent with the family or surrogate(s) is included only if the patient was incapable of providing the necessary information or participating in medical decision making. Time devoted to teaching and to any procedures I billed separately is not included.     Patient examined and evaluated at the bedside with SICU team. I performed a medically appropriate exam. Pertinent findings are detailed below. Vital signs, laboratory work, and imaging reviewed.     Neuro: sedated but following commands  #Postoperative Pain - fentanyl infusion @ 0.25    Respiratory: respirations even and unlabored on mechanical ventilation  CXR reviewed and interpreted by me - increased congestion R side  Mode: AC/ CMV (Assist Control/ Continuous Mandatory Ventilation), RR (machine): 28, TV (machine): 450, FiO2: 50, PEEP: 10, ITime: 1, MAP: 16, PIP: 25  ABG - ( 29 Apr 2025 04:53 )  pH, Arterial: 7.40  pH, Blood: x     /  pCO2: 32    /  pO2: 88    / HCO3: 20    / Base Excess: -4.6  /  SaO2: 99.1    #Hypoxic respiratory failure - decrease FiO2 to 40%, attempt SBT today on pressure support  #Pulmonary embolism/Venous thrombosis - holding therapeutic anticoagulation, IR consulted for evaluation for IVC filter, plan to repeat lower extremity venous duplex, SQH 7500 units Q8H due to BMI > 50    CV: monitor hemodynamics, MAP goal > 65  #Hemorrhagic shock - trend hemoglobin, vaso infusion @ 0.04, norepinephrine @ 0.05 --> pressors downtrending, persistent hypotension partially due to sedation    GI: THONY drains serosanguinous, midline wound vac  ( 28 Apr 2025 22:25 )  Alb: 2.7 g/dL / Pro: 4.5 g/dL / AlkPhos: 101 U/L / ALT: 1133 U/L / AST: 691 U/L / GGT:x     / Tbili 0.4 mg/dL/ Dbili 0.3 mg/dL / Indbili 0.1 mg/dL  #Nutrition - Protein-calorie malnutrition - acute illness, poor nutritional intake, fluid retention - primary surgery team ok with starting tube feeds, start nepro TF @ 20 cc/h  #Ppx - protonix 40mg IV BID (Hx of gastric bypass)    Renal: Continue I&Os monitoring, replete electrolytes as needed  04-28    142  |  107  |  65[HH]  ----------------------------<  138[H]  4.6   |  20  |  2.9[H]    Ca 8.0[L]; Mg 2.1; Phos 7.5[H]    UOP - OUT: 1145 mL  I/O - IN: 4551.8 mL / OUT: 1754 mL / NET: 2797.8 mL    An catheter - hourly I/O in critically ill patient  IVF - LR @ 120 cc/h --> D/C IVF  #Acute kidney injury - re-consult nephrology, trend creatinine, monitor potassium, UOP adequate  #Hypocalcemia - 3g calcium gluconate IVPB given for ionized calcium 1.06  #Hyperphosphatemia - trend for now, unable to crush phosphate binders    Heme: continue to evaluate for acute blood loss anemia- trend Hg/Hct                         6.8    13.58 )-----------( 142      ( 29 Apr 2025 04:55 )             19.7     PT/INR/PTT - ( 28 Apr 2025 00:20 )   PT: 13.60 sec; INR: 1.15 ratio; PTT 26.0 sec    #Anemia - 1 unit pRBC for hemoglobin 6.8, repeat CBC post transfusion     Anticoagulation - SQH 7500 units Q8H for BMI > 50    ID: trend WBC, monitor for fevers  #Leukocytosis/Abdominal collection - meropenem 1g Q8H, follow up ID recs for duration given surgical findings    Endocrine: Prevent and treat hyperglycemia with insulin as needed    PV: follow pulse exam    MSK: OOB and mobilize when able, PT eval when extubated    Skin: decub precautions    Lines: THONY drains x 3, R IJ TLC, L radial arterial line, abdominal wound vac, An, dignishield, ETT, OGT  DVT Prophylaxis: SQH 7500 units Q8H  Stress Ulcer Prophylaxis: Protonix 40mg BID   Disposition: SICU - hemorrhagic shock/hypotension requiring vasopressor infusion, hypoxic respiratory failure, acute kidney injury      Blaise Allen MD  Trauma/Acute Care Surgery/Surgical Critical Care Attending

## 2025-04-29 NOTE — PROGRESS NOTE ADULT - SUBJECTIVE AND OBJECTIVE BOX
SURGERY PROGRESS NOTE    24 HR EVENTS: Patient intubated and ventilated 50/10. Requiring levo 0.08 and vasopressin 0.04. THONY drains x3 SS fluid. Troponin 176 from 288 (downtrending). Hgb 7.4 from 7.0 (from 9.4 prior to that).    OBJECTIVE:  Vital Signs Last 24 Hrs  T(C): 36.9 (29 Apr 2025 00:00), Max: 37.6 (28 Apr 2025 20:00)  T(F): 98.4 (29 Apr 2025 00:00), Max: 99.6 (28 Apr 2025 20:00)  HR: 92 (29 Apr 2025 01:00) (88 - 126)  BP: 112/71 (28 Apr 2025 08:00) (109/41 - 127/51)  BP(mean): 85 (28 Apr 2025 08:00) (59 - 92)  RR: 28 (29 Apr 2025 01:00) (12 - 41)  SpO2: 97% (29 Apr 2025 01:00) (92% - 100%)    Parameters below as of 29 Apr 2025 01:00  Patient On (Oxygen Delivery Method): ventilator    O2 Concentration (%): 50    I&O's Summary    27 Apr 2025 07:01  -  28 Apr 2025 07:00  --------------------------------------------------------  IN: 6733.9 mL / OUT: 1010 mL / NET: 5723.9 mL    28 Apr 2025 07:01  -  29 Apr 2025 01:32  --------------------------------------------------------  IN: 3273 mL / OUT: 1194 mL / NET: 2079 mL        Physical Examination:  General Appearance: Sedated, intubated  Heart: RRR  Lungs: intubated, ventilated 5/10  Abdomen: Abdomen soft, somewhat distended, midline prevena vac in place, x 3 drains in place- all serosanguinous, right pelvic drain appears most sanguinous of 3 drains.  MSK/Extremities: cool to touch, edematous  Skin: Warm, dry. No jaundice.     LABS:                        7.4    16.71 )-----------( 152      ( 28 Apr 2025 22:25 )             21.4     04-28    142  |  107  |  65[HH]  ----------------------------<  138[H]  4.6   |  20  |  2.9[H]    Ca    8.0[L]      28 Apr 2025 20:53  Phos  7.5     04-28  Mg     2.1     04-28    TPro  4.5[L]  /  Alb  2.7[L]  /  TBili  0.4  /  DBili  0.3  /  AST  691[H]  /  ALT  1133[H]  /  AlkPhos  101  04-28    PT/INR - ( 28 Apr 2025 00:20 )   PT: 13.60 sec;   INR: 1.15 ratio         PTT - ( 28 Apr 2025 00:20 )  PTT:26.0 sec  Urinalysis Basic - ( 28 Apr 2025 20:53 )    Color: x / Appearance: x / SG: x / pH: x  Gluc: 138 mg/dL / Ketone: x  / Bili: x / Urobili: x   Blood: x / Protein: x / Nitrite: x   Leuk Esterase: x / RBC: x / WBC x   Sq Epi: x / Non Sq Epi: x / Bacteria: x        RADIOLOGY & ADDITIONAL STUDIES:

## 2025-04-29 NOTE — PROGRESS NOTE ADULT - ASSESSMENT
ASSESSMENT  64y F w/ PMHx of  Morbid obesity, LOUIS, large complex ventral hernia s/p repair sbr and loss of domain c/b post op intubation, hypotension requiring vasopressors, anastomotic leakage s/p RTOR and reanastomosis, now doing better post op from a respiratory status, however, still tenuous. She has a rising leukocytosis and an abdominal fluid collection which was drained by IR on 4/22, Zosyn broadened to meropenem, and has a newly diagnosed DVT/PE which she is currently on a LVX 135BID S/P RTOR for ex lap      PLAN:  - Monitor drain output quality  - TPN  - HD monitoring  - Monitor bowel function  - Monitor for fevers  - Rest of care per SICU

## 2025-04-29 NOTE — PROGRESS NOTE ADULT - ATTENDING COMMENTS
Pt examined  labs and images reviewed  pt with supermorbid obesity and complex hernia with sbo  previously treated without surgery   passing flatus awaiting bowel movement but was having  more pain taken to OR urgently   4/10 s/p BHUMI , Bowel Resection / Anastamosis / Primary repair of Fascia / hernia sac  overnight - renal failure / respiratory compromise  was intubated / paralysed/ botox  now on bumex - tapering   4/17:  s/p BHUMI , Bowel Resection / Anastamosis / Primary repair of Fascia / hernia sac  4/22 : IR drain placed - subcutaneous collection  - purulent   4/27:w/p exlap - no active bleeding , blood clots 1 liter evacuated  gradualy improving  mariam drain sero sang  will sarahi need more blood transfusion - before she stablized    will continue dvt prophylaxis    impression : extremely high risk - supermorbid obesity and complex hernia with partial sbo  npo   hold anticoagulation  SICU

## 2025-04-29 NOTE — PROGRESS NOTE ADULT - ASSESSMENT
Patient is a 64F PMHx HTN, morbid obesity s/p 2001 Abby-en-Y gastric bypass who presented on 4/3 with incarcerated ventral hernia with SBO. S/p open ventral hernia repair, small bowel resection, and primary fascial closure with Dr. Lema on 4/10. Currently admitted to SICU for monitoring. Nephrology consulted for FUNMI and oliguria.  # FUNMI most likely ATN   # resp failure on MV  # PE   #  incarcerated ventral hernia with SBO S/p open ventral hernia repair, small bowel resection, and primary fascial closure  # hypernatremia   s/p ex-lap, creation of new side to side ileoileostomy, ventral hernia repair w/ mesh    nephrology signed off/ cr  down to 1.3 recalled for FUNMI    FUNMI / ATN in nature  non oliguric   on 2 pressors   cr trending up   ph noted / no binders yet   decrease meropenem q 12    adjust all meds to GFR   will need blood tx  / IR notes appreciated   check ck level    no acute indication for RRT   will follow

## 2025-04-29 NOTE — PROGRESS NOTE ADULT - ASSESSMENT
ASSESSMENT & PLAN:  64F PMHx HTN, morbid obesity s/p 2001 Abby-en-Y gastric bypass who presented on 4/3 with incarcerated ventral hernia with SBO    4/10 open ventral hernia repair, small bowel resection, and primary fascial closure with Dr. Lema. Admitted to SICU for Q1 abdominal checks and hemodynamic monitoring.   4/17 takeback for exploratory laparotomy, repair of anastomotic leak  4/22 8Fr pigtail into lower abdominal wall collection,150cc of foul appearing material aspirated, Attached to THONY bulb  4/27 RTOR for re-exploration and evacuation of old clots from retroperitoneum and pelvis and placement of 3 new beatrice drains  4/28: IR consulted for IVC filter     NEUROLOGICAL:  #Acute pain    - fentanyl infusion  #Sedation    - propofol infusion  -precedex infusion  #AMS    HCT 4/27: negative  #Sleep hygiene  - HOLDING Trazodone 25mg QPM  - HOLDING Melatonin 5mg QPM    RESPIRATORY:   #Acute hypoxic respiratory failure  - Extubated 4/18, reintubated on 4/27 for AMS and agonal breathing    ET 7.5mm  LL 23cm  RR (machine): 28, TV (machine): 450, FiO2: 50, PEEP: 10  F/U AM ABG    #Right distal main pulmonary embolus with segmental extension  - HOLDING Lovenox 135mg Q12 secondary to acute anemia  - Echo 4/22/25: EF 70 to 75%.  - B/L LE Venous duplex 4/21-RIGHT PT DVT, no LEFT DVT  -B/L LE Venous duplex 4/28-PRELIM No evidence of deep venous thrombosis in either lower extremity. Limited   exam due to body habitus  #Intermittent diuresis    -last diuresis 4/25 1mg bumex  #PMHx [place] on CPAP@ home   #Activity increase as tolerated    CARDS:   #acute hemorraghic shock    - Levophed infusion    - Vasopressin infusion     - 4/27: 2u PRBC 1plt 1uFFP, 1g TXA    - OFF sodium bicarbonate infusion on 4/27  #Supraventricular tachycardia, Non-sustained ventricular tachycardia, Ventricular Premature Depolarization    - Cardiology (Dr. Lopez) - Metoprolol 25mg Q12 HOLDING    #PMHx of HTN  - Amlodipine 10mg QD  HOLDING  - HCTZ 25mg QD HOLDING  - Losartan 100mg daily HOLDING  - EKG- NSR  - TTE 4/11: EF of 56%. G1DD.     GASTROINTESTINAL/NUTRITION:   #4/10 open ventral hernia repair, small bowel resection, ileoileal anastomosis and primary fascial closure, 2 THONY Right  #4/17 RTOR for exploratory laparotomy, repair of anastomotic leak at previous ileoileal anastomosis, resected, new ileoileal anastomosis created, 1 THONY Left (total 3)    - Intermittent abdominal vac change, last 4/26 PM, bilious drainage noted    - REMOVED 4/26 RUQ THONY    - REMOVED 4/28 LUQ THONY at ileo-ileal anstamosis, RLQ THONY in pelvis, IR THONY abdominal wall  #4/22: s/p IR percutaneous placement of a 8.5 [place]an drainage catheter into lower abdominal wall fluid collection, yielding 150 mL of foul appearing fluid.  #4/27: Acute retroperitoneal  CTA: mesenteric arterial blush noted with extravasation    - s/p IR without finding of any bleeding, no embolization required  #4/27 RTOR for re-exploration and evacuation of old clots from retroperitoneum and pelvis    - new midline prevena vac in place    - x3 new 19Fr beatrice drains in place: 1. Right pelvis, 2. Left pelvis, 3. Subcutaenous    #Diet, NPO    - OGT in place to suction    - TPN discontinued 4/25    - aspiration precautions, HOB 30  #GI Prophylaxis/hx GERD  - Pantoprazole 40mg IV BID (home rx)  #Bowel regimen  - Holding  - Last BM 4/26  - Dignishield in place     /RENAL:   #urine output in critically ill  - An replaced 4/27  - LR @ 120cc/hr  #FUNMI; oliguric  - Nephrology consulted and following > hold off on CVVH recall  - Renal u/s> no hydronephrosis. 2-3cm anechoic cyst on right kidney   #Lactic acidosis    - OFF sodium bicarbonate infusion    Labs:          BUN/Cr- 58/2.7  -->,  65/2.9  -->          [04-28 @ 20:53]Na  142 // K  4.6 // Mg  2.1 // Phos  7.5  [04-28 @ 04:00]Na  144 // K  4.7 // Mg  2.0 // Phos  7.9    HEME/ONC:   #acute anemia     -last transfusion 4/27  #Acute right pulmonary embolus    -Lovenox 135mg q12 HOLDING  #B/L UE Venous Duplex 4/26/27 - thrombophlebitis  #B/L LE Venous Duplex 4/21- right PT DVT  - concern for malabsorption of Eliquis given small bowel resection per pharmacy  - vascular surgery consulted - AC with heparin drip (upon discharge transition to Eliquis 10 mg po BID x 7 days then 5 mg po BID for at least 6 months)  #B/L LE Venous duplex 4/28 prelim negative   #DVT prophylaxis  -heparin subq    - LLE SCD only    Labs: Hb/Hct:  7.0/20.2  -->,  7.4/21.4  -->                      Plts:  147  -->,  152  -->                 PTT/INR:      T&S expires: 4/30    ID:  #Intraabdominal anastomotic leak, fluid collection  #ID Consult     - Continue meropenem 1g Q8 (4/22 - )  #Culture    OR 4/1 - E Coli    OR cx: few E.coli, few bacteroides, rare clostridium    4/13 Blood Cx: NGTF    4/14 tracheal aspirate: no growth     4/17 body fluid cx rare e. coli     4/22 Blood cx: negative    4/22 Abdominal - Negative FINAL    4/27 C diff stool panel: negative  WBC- 24.67  --->>,  14.29  --->>,  16.71  --->>  Temp trend- 24hrs T(F): 98.4 (04-29 @ 00:00), Max: 99.6 (04-28 @ 20:00)  Antibiotics-meropenem  IVPB          ENDOCRINE:  #Glycemic monitoring  - Q6 FSG while NPO  - ISS  - A1C 5.8%     MSK:  #Activity- increase as tolerated     SKIN:  #DTI screening negative 4/28  - Continue to monitor daily skin changes    LINES/DRAINS:  PIV    RLQ pelvis beatrice drain (4/28 -     LLQ pelvis beatrice drain (4/28 -     Midline subtaneous beatrice drain (4/28 -     Right IJ TLC (4/17 -     Prevena Vac Midline     Left radial arterial line (4/28 -     Discontinued lines:    Right Femoral Arterial Line (4/27 - 4/28)    ADVANCED DIRECTIVES:  Full Code  Emergency - Daughter  (Shavonne Moss)  INDICATION FOR SICU/SDU: Intestinal obstruction    DISPO: SICU   ASSESSMENT & PLAN:  64F PMHx HTN, morbid obesity s/p 2001 Abby-en-Y gastric bypass who presented on 4/3 with incarcerated ventral hernia with SBO    4/10 open ventral hernia repair, small bowel resection, and primary fascial closure with Dr. Lema. Admitted to SICU for Q1 abdominal checks and hemodynamic monitoring.   4/17 takeback for exploratory laparotomy, repair of anastomotic leak  4/22 8Fr pigtail into lower abdominal wall collection,150cc of foul appearing material aspirated, Attached to THONY bulb  4/27 RTOR for re-exploration and evacuation of old clots from retroperitoneum and pelvis and placement of 3 new beatrice drains  4/28: IR consulted for IVC filter     NEUROLOGICAL:  #Acute pain    - fentanyl infusion  #Sedation    - propofol infusion  -precedex infusion  #AMS    HCT 4/27: negative  #Sleep hygiene  - HOLDING Trazodone 25mg QPM  - HOLDING Melatonin 5mg QPM    RESPIRATORY:   #Acute hypoxic respiratory failure  - Extubated 4/18, reintubated on 4/27 for AMS and agonal breathing    ET 7.5mm  LL 23cm  RR (machine): 28, TV (machine): 450, FiO2: 50, PEEP: 10  04-29 @ 04:53--7.40 / 32 / 88 / 20 / 99.1  O2 99.1  Lac 0.9        #Right distal main pulmonary embolus with segmental extension  - HOLDING Lovenox 135mg Q12 secondary to acute anemia  - Echo 4/22/25: EF 70 to 75%.  - B/L LE Venous duplex 4/21-RIGHT PT DVT, no LEFT DVT  -B/L LE Venous duplex 4/28-PRELIM No evidence of deep venous thrombosis in either lower extremity. Limited   exam due to body habitus  #Intermittent diuresis    -last diuresis 4/25 1mg bumex  #PMHx [place] on CPAP@ home   #Activity increase as tolerated    CARDS:   #acute hemorraghic shock    - Levophed infusion    - Vasopressin infusion     - 4/27: 2u PRBC 1plt 1uFFP, 1g TXA    - OFF sodium bicarbonate infusion on 4/27  #Supraventricular tachycardia, Non-sustained ventricular tachycardia, Ventricular Premature Depolarization    - Cardiology (Dr. Lopez) - Metoprolol 25mg Q12 HOLDING    #PMHx of HTN  - Amlodipine 10mg QD  HOLDING  - HCTZ 25mg QD HOLDING  - Losartan 100mg daily HOLDING  - EKG- NSR  - TTE 4/11: EF of 56%. G1DD.     GASTROINTESTINAL/NUTRITION:   #4/10 open ventral hernia repair, small bowel resection, ileoileal anastomosis and primary fascial closure, 2 THONY Right  #4/17 RTOR for exploratory laparotomy, repair of anastomotic leak at previous ileoileal anastomosis, resected, new ileoileal anastomosis created, 1 THONY Left (total 3)    - Intermittent abdominal vac change, last 4/26 PM, bilious drainage noted    - REMOVED 4/26 RUQ THONY    - REMOVED 4/28 LUQ THONY at ileo-ileal anstamosis, RLQ THONY in pelvis, IR THONY abdominal wall  #4/22: s/p IR percutaneous placement of a 8.5 [place]i drainage catheter into lower abdominal wall fluid collection, yielding 150 mL of foul appearing fluid.  #4/27: Acute retroperitoneal  CTA: mesenteric arterial blush noted with extravasation    - s/p IR without finding of any bleeding, no embolization required  #4/27 RTOR for re-exploration and evacuation of old clots from retroperitoneum and pelvis    - new midline prevena vac in place    - x3 new 19Fr beatrice drains in place: 1. Right pelvis, 2. Left pelvis, 3. Subcutaenous    #Diet, NPO    - OGT in place to suction    - TPN discontinued 4/25    - aspiration precautions, HOB 30  #GI Prophylaxis/hx GERD  - Pantoprazole 40mg IV BID (home rx)  #Bowel regimen  - Holding  - Last BM 4/26  - Dignishield in place     /RENAL:   #urine output in critically ill  - An replaced 4/27  - LR @ 120cc/hr  #FUNMI; oliguric  - Nephrology consulted and following > hold off on CVVH recall  - Renal u/s> no hydronephrosis. 2-3cm anechoic cyst on right kidney   #Lactic acidosis    - OFF sodium bicarbonate infusion    Labs:          BUN/Cr- 58/2.7  -->,  65/2.9  -->          [04-28 @ 20:53]Na  142 // K  4.6 // Mg  2.1 // Phos  7.5  [04-28 @ 04:00]Na  144 // K  4.7 // Mg  2.0 // Phos  7.9    HEME/ONC:   #acute anemia     -last transfusion 4/29, 1 U PRBC, f/u post transfusion  #Acute right pulmonary embolus    -Lovenox 135mg q12 HOLDING  #B/L UE Venous Duplex 4/26/27 - thrombophlebitis  #B/L LE Venous Duplex 4/21- right PT DVT  - concern for malabsorption of Eliquis given small bowel resection per pharmacy  - vascular surgery consulted - AC with heparin drip (upon discharge transition to Eliquis 10 mg po BID x 7 days then 5 mg po BID for at least 6 months)  #B/L LE Venous duplex 4/28 prelim negative   #DVT prophylaxis  -heparin subq    - LLE SCD only    Labs: Hb/Hct:  7.4/21.4  -->,  6.8/19.7  --> 1 U PRBC-->                     Plts:  152  -->,  142  -->                 PTT/INR:        T&S expires: 5/2    ID:  #Intraabdominal anastomotic leak, fluid collection  #ID Consult     - Continue meropenem 1g Q8 (4/22 - )  #Culture    OR 4/1 - E Coli    OR cx: few E.coli, few bacteroides, rare clostridium    4/13 Blood Cx: NGTF    4/14 tracheal aspirate: no growth     4/17 body fluid cx rare e. coli     4/22 Blood cx: negative    4/22 Abdominal - Negative FINAL    4/27 C diff stool panel: negative  WBC- 14.29  --->>,  16.71  --->>,  13.58  --->>  Temp trend- 24hrs T(F): 98.5 (04-29 @ 04:00), Max: 99.6 (04-28 @ 20:00)          ENDOCRINE:  #Glycemic monitoring  - Q6 FSG while NPO  - ISS  - A1C 5.8%     MSK:  #Activity- increase as tolerated     SKIN:  #DTI screening negative 4/28  - Continue to monitor daily skin changes    LINES/DRAINS:  PIV    RLQ pelvis beatrice drain (4/28 -     LLQ pelvis beatrice drain (4/28 -     Midline subtaneous beatrice drain (4/28 -     Right IJ TLC (4/17 -     Prevena Vac Midline     Left radial arterial line (4/28 -     Discontinued lines:    Right Femoral Arterial Line (4/27 - 4/28)    ADVANCED DIRECTIVES:  Full Code  Emergency - Daughter  (Shavonne Moss)  INDICATION FOR SICU/SDU: Intestinal obstruction    DISPO: SICU

## 2025-04-29 NOTE — PROGRESS NOTE ADULT - SUBJECTIVE AND OBJECTIVE BOX
seen and examined  24 h events noted   intubated/ventilated   on 2 pressors         PAST HISTORY  --------------------------------------------------------------------------------  No significant changes to PMH, PSH, FHx, SHx, unless otherwise noted    ALLERGIES & MEDICATIONS  --------------------------------------------------------------------------------  Allergies    No Known Allergies    Intolerances      Standing Inpatient Medications  albuterol    90 MICROgram(s) HFA Inhaler 2 Puff(s) Inhalation every 6 hours  chlorhexidine 0.12% Liquid 15 milliLiter(s) Oral Mucosa every 12 hours  chlorhexidine 2% Cloths 1 Application(s) Topical <User Schedule>  dexMEDEtomidine Infusion 0.2 MICROgram(s)/kG/Hr IV Continuous <Continuous>  fentaNYL   Infusion 0.5 MICROgram(s)/kG/Hr IV Continuous <Continuous>  heparin   Injectable 7500 Unit(s) SubCutaneous every 8 hours  insulin lispro (ADMELOG) corrective regimen sliding scale   SubCutaneous every 6 hours  meropenem  IVPB      meropenem  IVPB 1000 milliGRAM(s) IV Intermittent every 8 hours  norepinephrine Infusion 0.05 MICROgram(s)/kG/Min IV Continuous <Continuous>  pantoprazole  Injectable 40 milliGRAM(s) IV Push every 12 hours  propofol Infusion 10 MICROgram(s)/kG/Min IV Continuous <Continuous>  vasopressin Infusion. 0.04 Unit(s)/Min IV Continuous <Continuous>          VITALS/PHYSICAL EXAM  --------------------------------------------------------------------------------  T(C): 36.7 (04-29-25 @ 08:00), Max: 37.6 (04-28-25 @ 20:00)  HR: 74 (04-29-25 @ 09:00) (67 - 126)  BP: --  RR: 28 (04-29-25 @ 09:00) (13 - 34)  SpO2: 100% (04-29-25 @ 09:00) (92% - 100%)  Wt(kg): --  Height (cm): 160 (04-27-25 @ 20:59)  Weight (kg): 135.6 (04-27-25 @ 20:59)  BMI (kg/m2): 53 (04-27-25 @ 20:59)  BSA (m2): 2.29 (04-27-25 @ 20:59)      04-28-25 @ 07:01  -  04-29-25 @ 07:00  --------------------------------------------------------  IN: 4551.8 mL / OUT: 1754 mL / NET: 2797.8 mL    04-29-25 @ 07:01  -  04-29-25 @ 09:29  --------------------------------------------------------  IN: 308.3 mL / OUT: 95 mL / NET: 213.3 mL      Physical Exam:  	Gen: intubated/ventilated   	Pulm: B/L shannon   	CV:  S1S2; no rub  	Abd: +distended  	    LABS/STUDIES  --------------------------------------------------------------------------------              6.8    13.58 >-----------<  142      [04-29-25 @ 04:55]              19.7     142  |  107  |  65  ----------------------------<  138      [04-28-25 @ 20:53]  4.6   |  20  |  2.9        Ca     8.0     [04-28-25 @ 20:53]      Mg     2.1     [04-28-25 @ 20:53]      Phos  7.5     [04-28-25 @ 20:53]    TPro  4.5  /  Alb  2.7  /  TBili  0.4  /  DBili  0.3  /  AST  691  /  ALT  1133  /  AlkPhos  101  [04-28-25 @ 22:25]    PT/INR: PT 13.60, INR 1.15       [04-28-25 @ 00:20]  PTT: 26.0       [04-28-25 @ 00:20]      Creatinine Trend:  SCr 2.9 [04-28 @ 20:53]  SCr 2.7 [04-28 @ 04:00]  SCr 2.5 [04-28 @ 00:20]  SCr 2.6 [04-27 @ 20:56]  SCr 2.3 [04-27 @ 17:00]    Urinalysis - [04-28-25 @ 20:53]      Color  / Appearance  / SG  / pH       Gluc 138 / Ketone   / Bili  / Urobili        Blood  / Protein  / Leuk Est  / Nitrite       RBC  / WBC  / Hyaline  / Gran  / Sq Epi  / Non Sq Epi  / Bacteria       Vitamin D (25OH) 13      [04-19-25 @ 05:08]  Lipid: chol --, , HDL --, LDL --      [04-20-25 @ 04:59]

## 2025-04-29 NOTE — PROGRESS NOTE ADULT - SUBJECTIVE AND OBJECTIVE BOX
NANCY SARGENT   [id number]   [birth year]  64yF  ============================================================   DATE OF INITIAL SICU/SDU CONSULT: 04-10-25    INDICATION FOR SICU CONSULT:  q1hr abdominal checks, mechanical ventilation    SICU COURSE EVENTS :  04-10 - admitted to SICU service  4/11: Intubated and started on paralytic. Arterial line placed, subclavian TLC placed. Nephrology consulted for oliguria. IR abdominal muscle botox injection performed. TTE performed.   4/12: Additional fluid boluses given; trial fo bumex started, considering CVVH. Weaned off nimbex gtt.  > 220 /hr. Renal U/S w/out hydro.   4/13: Weaned off Levophed. Bumex gtt 2mg/hr > 1mg/hr. Goal net negative 1-1.5L, UOP approx, 200c/hr.   4/14: Remained on bumex gtt for further diuresis, decreased to 0.5 overnight, vent settings weaned. Cr downtrending, LFTs worsening.   4/15: Extubated to BiPAP, transitioned to NC. Dc'd bumex gtt, given 5mg metolazone x 1 and 2mg bumex x 1.   4/16: THONY drain #2 murky/bilious, pending CTAP with PO contrast, hypernatremia, started D5W @ 75cc/hr, persistent hypokalemia   4/17: RTOR for resection of anastomosis for anastomotic leak. Returned intubated, 400/24/80/10, sinus tachy to . Abdomen soft. Was initially on propofol @ 30 and precedex, but propofol weaned off due to hypotension with MAPs in 50s, fentanyl ggt started for acute pain. Remained hypotensive despite fluids, started on Levophed, on 0.05.   4/18: Extubated. HFNC 40L/49%, Levo off since 11 AM   4/19: NGT removed. Started on duonebs. D5W increased to 60cc/hr. 1mg bumex, > 1.5L response  4/20: Episode of afib RVR resolved with metoprolol 5mg IVP, cardiology c/s placed, recommending metoprolol 25mg q 12   4/21: PE on CTA, therapeutic heparin gtt started. Started on cardebe gtt,   4/22: Meropenem started per ID.  S/p IR percutaneous placement of a 8.5 Syrian drainage catheter into lower abdominal wall fluid collection, yielding 150 mL of bowel appearing fluid. New left radial a-line placed. Off nicarrdipine gtt.   4/23: Switched to therapeutic lovenox. Diet advanced to aubrey clears with ensures.  4/24: advacned to bariatric FLD, downgraded to SDU  4/25: TPN discontinued, diet advanced to full liquid , Right cephalic DVT prelim  4/26: Right UE VA Duplex final no DVT, thromboplebitis, Arterial line removed, 2L NC, midline vac changed, noted to have bilious drainage, RUQ drain pulled, upgraded to SICU status, plan to reorder TPN for 4/27 PM, diet changed to full liquid. 1:20 AM noted to be unresponsive, palpable femoral pulse, decision made to intubate, R femoral arterial line placed, started on levophed, amauri, bicarb amp x 2, bicarb gtt, propofol, 2U pRBC for hgb 5.9, plan for CTH and CTAP when stabilized   4/27: 2uPRBC 1uFFP 1pkPlt, TXA 1g, IR for embolization due to active extravasation seen on CTA, no active bleed, no embolization, Return to OR for re-exploration and evacuation of old clot  4/28: IR consulted for IVC filter and PICC. LE duplex ordered, negative. Precedex added overnight for agitation    24Hour Events:  4/28  NIGHT  -PM rounds: propofol - 20mcg/kg/min, fentanyl - .5mcg/kg/hr, levophed .09mcg/kg/min, vasopressin .04U/min, following commands, -abdomen soft, non tender, dignishield in place, THONY Drains 1 (R pelvic) - 50, 2 (L pelvic) -10, 3 (SC) - 25 - ss, more sang than serous.  -hgb 9.4 -> 7.0, repeat 7.4, no acute hemodynamic changes, repeat in AM  -LFTs and troponin peaked  -hemosphere parameters WNL - CI 3.1, SVR 9.2, SVRI 2290  -pt anxious appearing and tachycardic to 110s, sinus rhythm, precedex started .1mcg/kg/hr with improvement of HR and agitation    DAY  - F/u SBT  - Possible transition from prop to precedex > switched to precedex  - IR consult for IVC filter & PICC: want LE duplex US, will schedule for IVC filter based on US and once pt is more stable/extubated  - HSQ  - Consider methylene blue if pressor requirements increasing  - Blood culture  - No TPN tonight  - re-call nephro  - Duplex: prelim negative  - Echo: EF 55%  - Pt not tolerating vent on precedex, switched back to prop, fentanyl restarted       [X] A ten-point review of systems was negative except as expressed in note.  [X ] History was obtained from patient. If unable to participate in their care, history was from review of the chart and collateral sources of information.  ================================================================================   NANCY SARGENT   [id number]   [birth year]  64yF  ============================================================   DATE OF INITIAL SICU/SDU CONSULT: 04-10-25    INDICATION FOR SICU CONSULT:  q1hr abdominal checks, mechanical ventilation    SICU COURSE EVENTS :  04-10 - admitted to SICU service  4/11: Intubated and started on paralytic. Arterial line placed, subclavian TLC placed. Nephrology consulted for oliguria. IR abdominal muscle botox injection performed. TTE performed.   4/12: Additional fluid boluses given; trial fo bumex started, considering CVVH. Weaned off nimbex gtt.  > 220 /hr. Renal U/S w/out hydro.   4/13: Weaned off Levophed. Bumex gtt 2mg/hr > 1mg/hr. Goal net negative 1-1.5L, UOP approx, 200c/hr.   4/14: Remained on bumex gtt for further diuresis, decreased to 0.5 overnight, vent settings weaned. Cr downtrending, LFTs worsening.   4/15: Extubated to BiPAP, transitioned to NC. Dc'd bumex gtt, given 5mg metolazone x 1 and 2mg bumex x 1.   4/16: THONY drain #2 murky/bilious, pending CTAP with PO contrast, hypernatremia, started D5W @ 75cc/hr, persistent hypokalemia   4/17: RTOR for resection of anastomosis for anastomotic leak. Returned intubated, 400/24/80/10, sinus tachy to . Abdomen soft. Was initially on propofol @ 30 and precedex, but propofol weaned off due to hypotension with MAPs in 50s, fentanyl ggt started for acute pain. Remained hypotensive despite fluids, started on Levophed, on 0.05.   4/18: Extubated. HFNC 40L/49%, Levo off since 11 AM   4/19: NGT removed. Started on duonebs. D5W increased to 60cc/hr. 1mg bumex, > 1.5L response  4/20: Episode of afib RVR resolved with metoprolol 5mg IVP, cardiology c/s placed, recommending metoprolol 25mg q 12   4/21: PE on CTA, therapeutic heparin gtt started. Started on cardebe gtt,   4/22: Meropenem started per ID.  S/p IR percutaneous placement of a 8.5 Montenegrin drainage catheter into lower abdominal wall fluid collection, yielding 150 mL of bowel appearing fluid. New left radial a-line placed. Off nicarrdipine gtt.   4/23: Switched to therapeutic lovenox. Diet advanced to aubrey clears with ensures.  4/24: advacned to bariatric FLD, downgraded to SDU  4/25: TPN discontinued, diet advanced to full liquid , Right cephalic DVT prelim  4/26: Right UE VA Duplex final no DVT, thromboplebitis, Arterial line removed, 2L NC, midline vac changed, noted to have bilious drainage, RUQ drain pulled, upgraded to SICU status, plan to reorder TPN for 4/27 PM, diet changed to full liquid. 1:20 AM noted to be unresponsive, palpable femoral pulse, decision made to intubate, R femoral arterial line placed, started on levophed, amauri, bicarb amp x 2, bicarb gtt, propofol, 2U pRBC for hgb 5.9, plan for CTH and CTAP when stabilized   4/27: 2uPRBC 1uFFP 1pkPlt, TXA 1g, IR for embolization due to active extravasation seen on CTA, no active bleed, no embolization, Return to OR for re-exploration and evacuation of old clot  4/28: IR consulted for IVC filter and PICC. LE duplex ordered, negative. Precedex added overnight for agitation. 1 U PRBC for Hgb 6.8 overnight    24Hour Events:  4/28  NIGHT  -PM rounds: propofol - 20mcg/kg/min, fentanyl - .5mcg/kg/hr, levophed .09mcg/kg/min, vasopressin .04U/min, following commands, -abdomen soft, non tender, dignishield in place, THONY Drains 1 (R pelvic) - 50, 2 (L pelvic) -10, 3 (SC) - 25 - ss, more sang than serous.  -hgb 9.4 -> 7.0, repeat 7.4, no acute hemodynamic changes, repeat in AM  -LFTs and troponin peaked  -hemosphere parameters WNL - CI 3.1, SVR 9.2, SVRI 2290  -pt anxious appearing and tachycardic to 110s, sinus rhythm, precedex started .1mcg/kg/hr with improvement of HR and agitation  -3g Ca   -AM Hgb 6.8--> 1 U PRBC       [X] A ten-point review of systems was negative except as expressed in note.  [X ] History was obtained from patient. If unable to participate in their care, history was from review of the chart and collateral sources of information.  ================================================================================     NANCY SARGENT   [id number]   [birth year]  64yF  ============================================================   DATE OF INITIAL SICU/SDU CONSULT: 04-10-25    INDICATION FOR SICU CONSULT:  q1hr abdominal checks, mechanical ventilation    SICU COURSE EVENTS :  04-10 - admitted to SICU service  : Intubated and started on paralytic. Arterial line placed, subclavian TLC placed. Nephrology consulted for oliguria. IR abdominal muscle botox injection performed. TTE performed.   : Additional fluid boluses given; trial fo bumex started, considering CVVH. Weaned off nimbex gtt.  > 220 /hr. Renal U/S w/out hydro.   : Weaned off Levophed. Bumex gtt 2mg/hr > 1mg/hr. Goal net negative 1-1.5L, UOP approx, 200c/hr.   : Remained on bumex gtt for further diuresis, decreased to 0.5 overnight, vent settings weaned. Cr downtrending, LFTs worsening.   4/15: Extubated to BiPAP, transitioned to NC. Dc'd bumex gtt, given 5mg metolazone x 1 and 2mg bumex x 1.   : THONY drain #2 murky/bilious, pending CTAP with PO contrast, hypernatremia, started D5W @ 75cc/hr, persistent hypokalemia   : RTOR for resection of anastomosis for anastomotic leak. Returned intubated, 400/24/80/10, sinus tachy to . Abdomen soft. Was initially on propofol @ 30 and precedex, but propofol weaned off due to hypotension with MAPs in 50s, fentanyl ggt started for acute pain. Remained hypotensive despite fluids, started on Levophed, on 0.05.   : Extubated. HFNC 40L/49%, Levo off since 11 AM   : NGT removed. Started on duonebs. D5W increased to 60cc/hr. 1mg bumex, > 1.5L response  : Episode of afib RVR resolved with metoprolol 5mg IVP, cardiology c/s placed, recommending metoprolol 25mg q 12   : PE on CTA, therapeutic heparin gtt started. Started on cardebe gtt,   : Meropenem started per ID.  S/p IR percutaneous placement of a 8.5 Lithuanian drainage catheter into lower abdominal wall fluid collection, yielding 150 mL of bowel appearing fluid. New left radial a-line placed. Off nicarrdipine gtt.   : Switched to therapeutic lovenox. Diet advanced to aubrey clears with ensures.  : advacned to bariatric FLD, downgraded to SDU  : TPN discontinued, diet advanced to full liquid , Right cephalic DVT prelim  : Right UE VA Duplex final no DVT, thromboplebitis, Arterial line removed, 2L NC, midline vac changed, noted to have bilious drainage, RUQ drain pulled, upgraded to SICU status, plan to reorder TPN for  PM, diet changed to full liquid. 1:20 AM noted to be unresponsive, palpable femoral pulse, decision made to intubate, R femoral arterial line placed, started on levophed, amauri, bicarb amp x 2, bicarb gtt, propofol, 2U pRBC for hgb 5.9, plan for CTH and CTAP when stabilized   : 2uPRBC 1uFFP 1pkPlt, TXA 1g, IR for embolization due to active extravasation seen on CTA, no active bleed, no embolization, Return to OR for re-exploration and evacuation of old clot  : IR consulted for IVC filter and PICC. LE duplex ordered, negative. Precedex added overnight for agitation. 1 U PRBC for Hgb 6.8 overnight    24Hour Events:    NIGHT  -PM rounds: propofol - 20mcg/kg/min, fentanyl - .5mcg/kg/hr, levophed .09mcg/kg/min, vasopressin .04U/min, following commands, -abdomen soft, non tender, dignishield in place, THONY Drains 1 (R pelvic) - 50, 2 (L pelvic) -10, 3 (SC) - 25 - ss, more sang than serous.  -hgb 9.4 -> 7.0, repeat 7.4, no acute hemodynamic changes, repeat in AM  -LFTs and troponin peaked  -hemosphere parameters WNL - CI 3.1, SVR 9.2, SVRI 2290  -pt anxious appearing and tachycardic to 110s, sinus rhythm, precedex started .1mcg/kg/hr with improvement of HR and agitation  -3g Ca   -AM Hgb 6.8--> 1 U PRBC     [X] A ten-point review of systems was negative except as expressed in note.  [X ] History was obtained from patient. If unable to participate in their care, history was from review of the chart and collateral sources of information.  ================================================================================  Daily     Daily Weight in k (2025 04:00)    Diet, NPO with Tube Feed:   Tube Feeding Modality: Orogastric  Nepro with Carb Steady (NEPRORTH)  Total Volume for 24 Hours (mL): 480  Continuous  Starting Tube Feed Rate mL per Hour: 10     Every 4 hours  Until Goal Tube Feed Rate (mL per Hour): 20  Tube Feed Duration (in Hours): 24  Tube Feed Start Time: 09:00 (25 @ 09:08)    CURRENT MEDS:  Neurologic Medications  dexMEDEtomidine Infusion 0.2 MICROgram(s)/kG/Hr IV Continuous <Continuous>  fentaNYL   Infusion 0.5 MICROgram(s)/kG/Hr IV Continuous <Continuous>  propofol Infusion 10 MICROgram(s)/kG/Min IV Continuous <Continuous>    Respiratory Medications  albuterol    90 MICROgram(s) HFA Inhaler 2 Puff(s) Inhalation every 6 hours    Cardiovascular Medications  norepinephrine Infusion 0.05 MICROgram(s)/kG/Min IV Continuous <Continuous>    Gastrointestinal Medications  pantoprazole  Injectable 40 milliGRAM(s) IV Push every 12 hours    Genitourinary Medications    Hematologic/Oncologic Medications  heparin   Injectable 7500 Unit(s) SubCutaneous every 8 hours    Antimicrobial/Immunologic Medications  meropenem  IVPB      meropenem  IVPB 1000 milliGRAM(s) IV Intermittent every 8 hours    Endocrine/Metabolic Medications  insulin lispro (ADMELOG) corrective regimen sliding scale   SubCutaneous every 6 hours  vasopressin Infusion. 0.04 Unit(s)/Min IV Continuous <Continuous>    Topical/Other Medications  chlorhexidine 0.12% Liquid 15 milliLiter(s) Oral Mucosa every 12 hours  chlorhexidine 2% Cloths 1 Application(s) Topical <User Schedule>    ICU Vital Signs Last 24 Hrs  T(C): 36.7 (2025 08:00), Max: 37.6 (2025 20:00)  T(F): 98 (2025 08:00), Max: 99.6 (2025 20:00)  HR: 72 (2025 11:15) (67 - 126)  BP: 94/52 (2025 10:00) (94/52 - 94/52)  BP(mean): 70 (2025 10:00) (70 - 70)  ABP: 135/68 (2025 11:15) (93/44 - 161/68)  ABP(mean): 86 (2025 11:15) (56 - 92)  RR: 28 (2025 11:15) (14 - 34)  SpO2: 98% (2025 11:15) (92% - 100%)    O2 Parameters below as of 2025 09:00    O2 Concentration (%): 40    PVRI: --    Mode: AC/ CMV (Assist Control/ Continuous Mandatory Ventilation)  RR (machine): 28  TV (machine): 450  FiO2: 50  PEEP: 10  ITime: 1  MAP: 16  PIP: 25    ABG - ( 2025 04:53 )  pH, Arterial: 7.40  pH, Blood: x     /  pCO2: 32    /  pO2: 88    / HCO3: 20    / Base Excess: -4.6  /  SaO2: 99.1      I&O's Summary    2025 07:01  -  2025 07:00  --------------------------------------------------------  IN: 4551.8 mL / OUT: 1754 mL / NET: 2797.8 mL    2025 07:01  -  2025 12:04  --------------------------------------------------------  IN: 386 mL / OUT: 315 mL / NET: 71 mL      I&O's Detail    2025 07:  -  2025 07:00  --------------------------------------------------------  IN:    Dexmedetomidine: 57.8 mL    FentaNYL: 189.8 mL    IV PiggyBack: 150 mL    IV PiggyBack: 100 mL    Lactated Ringers: 2880 mL    Norepinephrine: 355.3 mL    PRBCs (Packed Red Blood Cells): 300 mL    Propofol: 374.9 mL    Vasopressin (Organ Donation): 144 mL  Total IN: 4551.8 mL    OUT:    Bulb (mL): 220 mL    Bulb (mL): 97 mL    Bulb (mL): 142 mL    Indwelling Catheter - Urethral (mL): 1145 mL    Rectal Tube (mL): 150 mL    VAC (Vacuum Assisted Closure) System (mL): 0 mL  Total OUT: 1754 mL    Total NET: 2797.8 mL      2025 07:  -  2025 12:04  --------------------------------------------------------  IN:    Dexmedetomidine: 42.5 mL    FentaNYL: 20.4 mL    Lactated Ringers: 240 mL    Nepro: 10 mL    Norepinephrine: 26.8 mL    Propofol: 16.3 mL    Vasopressin (Organ Donation): 30 mL  Total IN: 386 mL    OUT:    Indwelling Catheter - Urethral (mL): 315 mL  Total OUT: 315 mL    Total NET: 71 mL    PHYSICAL EXAM:  GENERAL: 11T, restless  HEENT: Normocephalic, atraumatic  PULM: Vent at 450/28/50/10  CV: Regular rate and rhythm, precedex at 0.25, propofol off, fentanyl 0.5, levo 0.05, vaso 0.04  ABDOMEN: Abdomen obese, soft, non-distended, vac holding suction, drains x 3 with SS output  : An in place  MSK: Moving extremities spontaneously  SKIN: Warm, dry, normal skin color, texture, turgor    LABS:  CAPILLARY BLOOD GLUCOSE  POCT Blood Glucose.: 160 mg/dL (2025 11:37)  POCT Blood Glucose.: 161 mg/dL (2025 05:13)  POCT Blood Glucose.: 164 mg/dL (2025 23:55)  POCT Blood Glucose.: 154 mg/dL (2025 17:53)                      6.8    13.58 )-----------( 142      ( 2025 04:55 )             19.7           142  |  107  |  65[HH]  ----------------------------<  138[H]  4.6   |  20  |  2.9[H]    Ca    8.0[L]      2025 20:53  Phos  7.5       Mg     2.1         TPro  4.5[L]  /  Alb  2.7[L]  /  TBili  0.4  /  DBili  0.3  /  AST  691[H]  /  ALT  1133[H]  /  AlkPhos  101  -      PT/INR - ( 2025 00:20 )   PT: 13.60 sec;   INR: 1.15 ratio       PTT - ( 2025 00:20 )  PTT:26.0 sec    Urinalysis Basic - ( 2025 20:53 )    Color: x / Appearance: x / SG: x / pH: x  Gluc: 138 mg/dL / Ketone: x  / Bili: x / Urobili: x   Blood: x / Protein: x / Nitrite: x   Leuk Esterase: x / RBC: x / WBC x   Sq Epi: x / Non Sq Epi: x / Bacteria: x

## 2025-04-30 LAB
APTT BLD: 30 SEC — SIGNIFICANT CHANGE UP (ref 27–39.2)
GAS PNL BLDA: SIGNIFICANT CHANGE UP
GLUCOSE BLDC GLUCOMTR-MCNC: 157 MG/DL — HIGH (ref 70–99)
GLUCOSE BLDC GLUCOMTR-MCNC: 170 MG/DL — HIGH (ref 70–99)
GLUCOSE BLDC GLUCOMTR-MCNC: 184 MG/DL — HIGH (ref 70–99)
GLUCOSE BLDC GLUCOMTR-MCNC: 97 MG/DL — SIGNIFICANT CHANGE UP (ref 70–99)
HCT VFR BLD CALC: 25.9 % — LOW (ref 37–47)
HGB BLD-MCNC: 8.8 G/DL — LOW (ref 12–16)
MCHC RBC-ENTMCNC: 31 PG — SIGNIFICANT CHANGE UP (ref 27–31)
MCHC RBC-ENTMCNC: 34 G/DL — SIGNIFICANT CHANGE UP (ref 32–37)
MCV RBC AUTO: 91.2 FL — SIGNIFICANT CHANGE UP (ref 81–99)
NRBC BLD AUTO-RTO: 0 /100 WBCS — SIGNIFICANT CHANGE UP (ref 0–0)
PLATELET # BLD AUTO: 98 K/UL — LOW (ref 130–400)
PMV BLD: 11.7 FL — HIGH (ref 7.4–10.4)
RBC # BLD: 2.84 M/UL — LOW (ref 4.2–5.4)
RBC # FLD: 16.3 % — HIGH (ref 11.5–14.5)
WBC # BLD: 10 K/UL — SIGNIFICANT CHANGE UP (ref 4.8–10.8)
WBC # FLD AUTO: 10 K/UL — SIGNIFICANT CHANGE UP (ref 4.8–10.8)

## 2025-04-30 PROCEDURE — 71045 X-RAY EXAM CHEST 1 VIEW: CPT | Mod: 26

## 2025-04-30 PROCEDURE — 99291 CRITICAL CARE FIRST HOUR: CPT | Mod: 24

## 2025-04-30 RX ORDER — CALCIUM GLUCONATE 20 MG/ML
2 INJECTION, SOLUTION INTRAVENOUS ONCE
Refills: 0 | Status: COMPLETED | OUTPATIENT
Start: 2025-04-30 | End: 2025-04-30

## 2025-04-30 RX ORDER — ACETAMINOPHEN 500 MG/5ML
1000 LIQUID (ML) ORAL ONCE
Refills: 0 | Status: COMPLETED | OUTPATIENT
Start: 2025-04-30 | End: 2025-04-30

## 2025-04-30 RX ORDER — HYDROXYZINE HYDROCHLORIDE 25 MG/1
25 TABLET, FILM COATED ORAL ONCE
Refills: 0 | Status: COMPLETED | OUTPATIENT
Start: 2025-04-30 | End: 2025-04-30

## 2025-04-30 RX ORDER — HYDROMORPHONE/SOD CHLOR,ISO/PF 2 MG/10 ML
0.25 SYRINGE (ML) INJECTION EVERY 4 HOURS
Refills: 0 | Status: DISCONTINUED | OUTPATIENT
Start: 2025-04-30 | End: 2025-05-03

## 2025-04-30 RX ORDER — ACETAMINOPHEN 500 MG/5ML
1000 LIQUID (ML) ORAL ONCE
Refills: 0 | Status: COMPLETED | OUTPATIENT
Start: 2025-05-01 | End: 2025-05-01

## 2025-04-30 RX ORDER — IPRATROPIUM BROMIDE AND ALBUTEROL SULFATE .5; 2.5 MG/3ML; MG/3ML
3 SOLUTION RESPIRATORY (INHALATION) EVERY 6 HOURS
Refills: 0 | Status: DISCONTINUED | OUTPATIENT
Start: 2025-04-30 | End: 2025-05-23

## 2025-04-30 RX ORDER — HEPARIN SODIUM 1000 [USP'U]/ML
1000 INJECTION INTRAVENOUS; SUBCUTANEOUS
Qty: 25000 | Refills: 0 | Status: DISCONTINUED | OUTPATIENT
Start: 2025-04-30 | End: 2025-05-01

## 2025-04-30 RX ORDER — CALCIUM GLUCONATE 20 MG/ML
2 INJECTION, SOLUTION INTRAVENOUS ONCE
Refills: 0 | Status: DISCONTINUED | OUTPATIENT
Start: 2025-04-30 | End: 2025-04-30

## 2025-04-30 RX ORDER — BUMETANIDE 1 MG/1
2 TABLET ORAL ONCE
Refills: 0 | Status: COMPLETED | OUTPATIENT
Start: 2025-04-30 | End: 2025-04-30

## 2025-04-30 RX ADMIN — Medication 1 APPLICATION(S): at 05:26

## 2025-04-30 RX ADMIN — DEXMEDETOMIDINE HYDROCHLORIDE IN SODIUM CHLORIDE 6.78 MICROGRAM(S)/KG/HR: 4 INJECTION INTRAVENOUS at 20:00

## 2025-04-30 RX ADMIN — Medication 40 MILLIGRAM(S): at 05:26

## 2025-04-30 RX ADMIN — IPRATROPIUM BROMIDE AND ALBUTEROL SULFATE 3 MILLILITER(S): .5; 2.5 SOLUTION RESPIRATORY (INHALATION) at 19:56

## 2025-04-30 RX ADMIN — Medication 400 MILLIGRAM(S): at 20:00

## 2025-04-30 RX ADMIN — MEROPENEM 100 MILLIGRAM(S): 1 INJECTION INTRAVENOUS at 00:07

## 2025-04-30 RX ADMIN — DEXMEDETOMIDINE HYDROCHLORIDE IN SODIUM CHLORIDE 6.78 MICROGRAM(S)/KG/HR: 4 INJECTION INTRAVENOUS at 01:29

## 2025-04-30 RX ADMIN — Medication 40 MILLIGRAM(S): at 17:25

## 2025-04-30 RX ADMIN — INSULIN LISPRO 2: 100 INJECTION, SOLUTION INTRAVENOUS; SUBCUTANEOUS at 11:06

## 2025-04-30 RX ADMIN — CALCIUM GLUCONATE 200 GRAM(S): 20 INJECTION, SOLUTION INTRAVENOUS at 16:01

## 2025-04-30 RX ADMIN — INSULIN LISPRO 2: 100 INJECTION, SOLUTION INTRAVENOUS; SUBCUTANEOUS at 00:13

## 2025-04-30 RX ADMIN — INSULIN LISPRO 4: 100 INJECTION, SOLUTION INTRAVENOUS; SUBCUTANEOUS at 05:26

## 2025-04-30 RX ADMIN — DEXMEDETOMIDINE HYDROCHLORIDE IN SODIUM CHLORIDE 6.78 MICROGRAM(S)/KG/HR: 4 INJECTION INTRAVENOUS at 22:31

## 2025-04-30 RX ADMIN — HEPARIN SODIUM 10 UNIT(S)/HR: 1000 INJECTION INTRAVENOUS; SUBCUTANEOUS at 10:45

## 2025-04-30 RX ADMIN — Medication 1000 MILLIGRAM(S): at 20:48

## 2025-04-30 RX ADMIN — BUMETANIDE 2 MILLIGRAM(S): 1 TABLET ORAL at 16:00

## 2025-04-30 RX ADMIN — Medication 15 MILLILITER(S): at 05:26

## 2025-04-30 RX ADMIN — CALCIUM GLUCONATE 200 GRAM(S): 20 INJECTION, SOLUTION INTRAVENOUS at 10:47

## 2025-04-30 RX ADMIN — MEROPENEM 100 MILLIGRAM(S): 1 INJECTION INTRAVENOUS at 11:05

## 2025-04-30 RX ADMIN — HEPARIN SODIUM 7500 UNIT(S): 1000 INJECTION INTRAVENOUS; SUBCUTANEOUS at 05:27

## 2025-04-30 NOTE — PROGRESS NOTE ADULT - ASSESSMENT
Patient is a 64F PMHx HTN, morbid obesity s/p 2001 Abby-en-Y gastric bypass who presented on 4/3 with incarcerated ventral hernia with SBO. S/p open ventral hernia repair, small bowel resection, and primary fascial closure with Dr. Lema on 4/10. Currently admitted to SICU for monitoring. Nephrology consulted for FUNMI and oliguria.  # FUNMI most likely ATN   # resp failure on MV  # PE   #  incarcerated ventral hernia with SBO S/p open ventral hernia repair, small bowel resection, and primary fascial closure  # hypernatremia   s/p ex-lap, creation of new side to side ileoileostomy, ventral hernia repair w/ mesh    nephrology signed off/ cr  down to 1.3 recalled for FUNMI    FUNMI / ATN in nature  non oliguric   on  pressors   cr trending down   ph noted / no binders yet   corrected calcium in the mid 8 / replete vit D   IR notes appreciated  follow ck level    no acute indication for RRT   will follow

## 2025-04-30 NOTE — PROGRESS NOTE ADULT - ATTENDING COMMENTS
This patient has a high probability of sudden, clinically significant deterioration, which requires the highest level of physician preparedness to intervene urgently. I managed/supervised life or organ supporting interventions that required frequent physician assessment. I devoted my full attention in the ICU to the direct care of this patient for the period of time indicated below. Time I spent with the family or surrogate(s) is included only if the patient was incapable of providing the necessary information or participating in medical decision making. Time devoted to teaching and to any procedures I billed separately is not included.     Patient examined and evaluated at the bedside with SICU team. I performed a medically appropriate exam. Pertinent findings are detailed below. Vital signs, laboratory work, and imaging reviewed.     Neuro: arousable, following commands, more calm today  #Postoperative Pain - precedex infusion @ 1.2, fentanyl infusion @ 0.25    Respiratory: respirations even and unlabored on mechanical ventilation  Mode: AC/ CMV (Assist Control/ Continuous Mandatory Ventilation), RR (machine): 25, TV (machine): 450, FiO2: 40, PEEP: 10, ITime: 1, MAP: 17, PIP: 27  ABG - ( 30 Apr 2025 06:42 )  pH, Arterial: 7.45  pH, Blood: x     /  pCO2: 28    /  pO2: 132   / HCO3: 20    / Base Excess: -3.8  /  SaO2: 100.0   #Hypoxic respiratory failure - decrease PEEP to 8, attempt SBT today on pressure support - lasted 30 minutes yesterday  #Pulmonary embolism/Venous thrombosis - repeat lower extremity venous duplex negative for DVT, transition to heparin infusion    CV: monitor hemodynamics, MAP goal > 65  #Hemorrhagic shock - trend hemoglobin, vaso infusion @ 0.04 - norepi off, pressors downtrending, persistent hypotension partially due to sedation    GI: THONY drains serosanguinous, midline wound vac  ( 28 Apr 2025 22:25 )  Alb: 2.7 g/dL / Pro: 4.5 g/dL / AlkPhos: 101 U/L / ALT: 1133 U/L / AST: 691 U/L / GGT:x     / Tbili 0.4 mg/dL/ Dbili 0.3 mg/dL / Indbili 0.1 mg/dL  #Nutrition - Protein-calorie malnutrition - acute illness, poor nutritional intake, fluid retention - primary surgery team ok with advancing tube feeds   #Ppx - protonix 40mg IV BID (Hx of gastric bypass)    Renal: Continue I&Os monitoring, replete electrolytes as needed  04-29    144  |  108  |  68[HH]  ----------------------------<  152[H]  4.1   |  20  |  2.7[H]    Ca 7.6[L]; Mg 2.1; Phos 6.2[H]    UOP - OUT: 1540 mL    I/O - IN: 1707.9 mL / OUT: 1747.5 mL / NET: -39.6 mL    An catheter - hourly I/O in critically ill patient  #Hypocalcemia - give 2g calcium gluconate IVPB for ionized calcium 1.10  #Hyperphosphatemia - trend for now, unable to crush phosphate binder, phos trending down  #Acute kidney injury - trend creatinine, monitor potassium, UOP adequate for now    Heme: continue to evaluate for acute blood loss anemia- trend Hg/Hct                         7.7    9.39  )-----------( 107      ( 29 Apr 2025 22:52 )             22.3     Anticoagulation - starting heparin infusion and titrate to therapeutic ptt  #Anemia - trend hemoglobin    ID: trend WBC, monitor for fevers  #Leukocytosis/Abdominal collection - meropenem 1g Q8H, follow up ID recs for duration    Endocrine: Prevent and treat hyperglycemia with insulin as needed    PV: follow pulse exam    MSK: OOB and mobilize when able, PT eval when extubated    Skin: decub precautions    Lines: THONY drains x 3, R IJ TLC, L radial arterial line, abdominal wound vac, An, dignishield, ETT, OGT  DVT Prophylaxis: heparin infusion  Stress Ulcer Prophylaxis: Protonix 40mg BID  Disposition: SICU - hemorrhagic shock/hypotension requiring vasopressor infusion, hypoxic respiratory failure, acute kidney injury    Blaise Allen MD  Trauma/Acute Care Surgery/Surgical Critical Care Attending This patient has a high probability of sudden, clinically significant deterioration, which requires the highest level of physician preparedness to intervene urgently. I managed/supervised life or organ supporting interventions that required frequent physician assessment. I devoted my full attention in the ICU to the direct care of this patient for the period of time indicated below. Time I spent with the family or surrogate(s) is included only if the patient was incapable of providing the necessary information or participating in medical decision making. Time devoted to teaching and to any procedures I billed separately is not included.     Patient examined and evaluated at the bedside with SICU team. I performed a medically appropriate exam. Pertinent findings are detailed below. Vital signs, laboratory work, and imaging reviewed.     Neuro: arousable, following commands, more calm today  #Postoperative Pain - precedex infusion @ 1.2, fentanyl infusion @ 0.25    Respiratory: respirations even and unlabored on mechanical ventilation  Mode: AC/ CMV (Assist Control/ Continuous Mandatory Ventilation), RR (machine): 25, TV (machine): 450, FiO2: 40, PEEP: 10, ITime: 1, MAP: 17, PIP: 27  ABG - ( 30 Apr 2025 06:42 )  pH, Arterial: 7.45  pH, Blood: x     /  pCO2: 28    /  pO2: 132   / HCO3: 20    / Base Excess: -3.8  /  SaO2: 100.0   #Hypoxic respiratory failure - decrease PEEP to 8, attempt SBT today on pressure support - lasted 30 minutes yesterday  #Pulmonary embolism/Venous thrombosis - repeat lower extremity venous duplex negative for DVT, transition to heparin infusion    CV: monitor hemodynamics, MAP goal > 65  #Hemorrhagic shock - trend hemoglobin, vaso infusion @ 0.04 - norepi off, pressors downtrending, persistent hypotension partially due to sedation    GI: THONY drains serosanguinous, midline wound vac  ( 28 Apr 2025 22:25 )  Alb: 2.7 g/dL / Pro: 4.5 g/dL / AlkPhos: 101 U/L / ALT: 1133 U/L / AST: 691 U/L / GGT:x     / Tbili 0.4 mg/dL/ Dbili 0.3 mg/dL / Indbili 0.1 mg/dL  #Nutrition - Protein-calorie malnutrition - acute illness, poor nutritional intake, fluid retention - primary surgery team ok with advancing tube feeds   #Ppx - protonix 40mg IV BID (Hx of gastric bypass)    Renal: Continue I&Os monitoring, replete electrolytes as needed  04-29    144  |  108  |  68[HH]  ----------------------------<  152[H]  4.1   |  20  |  2.7[H]    Ca 7.6[L]; Mg 2.1; Phos 6.2[H]    UOP - OUT: 1540 mL    I/O - IN: 1707.9 mL / OUT: 1747.5 mL / NET: -39.6 mL    An catheter - hourly I/O in critically ill patient  #Hypocalcemia - give 2g calcium gluconate IVPB for ionized calcium 1.10  #Hyperphosphatemia - trend for now, unable to crush phosphate binder, phos trending down  #Acute kidney injury - trend creatinine, monitor potassium, UOP adequate for now    Heme: continue to evaluate for acute blood loss anemia- trend Hg/Hct                         7.7    9.39  )-----------( 107      ( 29 Apr 2025 22:52 )             22.3     Anticoagulation - starting heparin infusion and titrate to therapeutic ptt  #Anemia - trend hemoglobin    ID: trend WBC, monitor for fevers  #Leukocytosis/Abdominal collection - meropenem 1g Q8H, follow up ID recs for duration    Endocrine: Prevent and treat hyperglycemia with insulin as needed    PV: follow pulse exam    MSK: OOB and mobilize when able, PT eval when extubated    Skin: decub precautions    Lines: THONY drains x 3, R IJ TLC, L radial arterial line, abdominal wound vac, An, dignishield, ETT, OGT  DVT Prophylaxis: heparin infusion  Stress Ulcer Prophylaxis: Protonix 40mg BID  Disposition: SICU - hemorrhagic shock/hypotension requiring vasopressor infusion, hypoxic respiratory failure, acute kidney injury    RSBI 40s-50s, NIF -24, SBT ABG appropriate --> extubated to nasal cannula, will have HFNC available if needed    Blaise Allen MD  Trauma/Acute Care Surgery/Surgical Critical Care Attending

## 2025-04-30 NOTE — PROGRESS NOTE ADULT - ASSESSMENT
ASSESSMENT & PLAN:  64F PMHx HTN, morbid obesity s/p 2001 Abby-en-Y gastric bypass who presented on 4/3 with incarcerated ventral hernia with SBO    4/10 open ventral hernia repair, small bowel resection, and primary fascial closure with Dr. Lema. Admitted to SICU for Q1 abdominal checks and hemodynamic monitoring.   4/17 takeback for exploratory laparotomy, repair of anastomotic leak  4/22 8Fr pigtail into lower abdominal wall collection,150cc of foul appearing material aspirated, Attached to THONY bulb  4/27 RTOR for re-exploration and evacuation of old clots from retroperitoneum and pelvis and placement of 3 new beatrice drains  4/28: IR consulted for IVC filter     NEUROLOGICAL:  #Acute pain    - fentanyl infusion  #Sedation    - propofol infusion  -precedex infusion  #AMS    HCT 4/27: negative  #Sleep hygiene  - HOLDING Trazodone 25mg QPM  - HOLDING Melatonin 5mg QPM    RESPIRATORY:   #Acute hypoxic respiratory failure  - Extubated 4/18, reintubated on 4/27 for AMS and agonal breathing    ET 7.5mm  LL 23cm  RR (machine): 25, TV (machine): 450, FiO2: 40, PEEP: 10  04-30 @ 06:42--7.45 / 28 / 132 / 20 / 100.0  O2 100.0  Lac 0.8    04-30 @ 05:08--7.46 / 26 / 112 / 18 / 98.8  O2 98.8  Lac 0.8    #Right distal main pulmonary embolus with segmental extension  - HOLDING Lovenox 135mg Q12 secondary to acute anemia  - Echo 4/22/25: EF 70 to 75%.  - B/L LE Venous duplex 4/21-RIGHT PT DVT, no LEFT DVT  -B/L LE Venous duplex 4/28-PRELIM No evidence of deep venous thrombosis in either lower extremity. Limited   exam due to body habitus  #Intermittent diuresis    -last diuresis 4/25 1mg bumex  #PMHx L[place] on CPAP@ home   #Activity increase as tolerated    CARDS:   #acute hemorraghic shock    - Levophed infusion    - Vasopressin infusion     - 4/27: 2u PRBC 1plt 1uFFP, 1g TXA    - OFF sodium bicarbonate infusion on 4/27  #Supraventricular tachycardia, Non-sustained ventricular tachycardia, Ventricular Premature Depolarization    - Cardiology (Dr. Lopez) - Metoprolol 25mg Q12 HOLDING    #PMHx of HTN  - Amlodipine 10mg QD  HOLDING  - HCTZ 25mg QD HOLDING  - Losartan 100mg daily HOLDING  - EKG- NSR  - TTE 4/11: EF of 56%. G1DD.     GASTROINTESTINAL/NUTRITION:   #4/10 open ventral hernia repair, small bowel resection, ileoileal anastomosis and primary fascial closure, 2 THONY Right  #4/17 RTOR for exploratory laparotomy, repair of anastomotic leak at previous ileoileal anastomosis, resected, new ileoileal anastomosis created, 1 THONY Left (total 3)    - Intermittent abdominal vac change, last 4/26 PM, bilious drainage noted    - REMOVED 4/26 RUQ THONY    - REMOVED 4/28 LUQ THONY at ileo-ileal anstamosis, RLQ THONY in pelvis, IR THONY abdominal wall  #4/22: s/p IR percutaneous placement of a 8.5 Yoruba drainage catheter into lower abdominal wall fluid collection, yielding 150 mL of foul appearing fluid.  #4/27: Acute retroperitoneal  CTA: mesenteric arterial blush noted with extravasation    - s/p IR without finding of any bleeding, no embolization required  #4/27 RTOR for re-exploration and evacuation of old clots from retroperitoneum and pelvis    - new midline prevena vac in place    - x3 new 19Fr beatrice drains in place: 1. Right pelvis, 2. Left pelvis, 3. Subcutaenous    #Diet, NPO    - OGT in place to suction    - TPN discontinued 4/25    - aspiration precautions, HOB 30  #GI Prophylaxis/hx GERD  - Pantoprazole 40mg IV BID (home rx)  #Bowel regimen  - Holding  - Last BM 4/26  - Dignishield in place     /RENAL:   #urine output in critically ill  - An replaced 4/27  - LR @ 120cc/hr  #FUNMI; oliguric  - Nephrology consulted and following > hold off on CVVH recall  - Renal u/s> no hydronephrosis. 2-3cm anechoic cyst on right kidney   #Lactic acidosis    - OFF sodium bicarbonate infusion    Labs:          BUN/Cr- 65/2.9  -->,  68/2.7  -->          [04-29 @ 20:15]Na  144 // K  4.1 // Mg  2.1 // Phos  6.2  [04-28 @ 20:53]Na  142 // K  4.6 // Mg  2.1 // Phos  7.5      - Nephro: check CK, no indication of RRT, decrease meropenem to Q12    HEME/ONC:   #acute anemia     -last transfusion 4/29, 1 U PRBC, f/u post transfusion  #Acute right pulmonary embolus    -Lovenox 135mg q12 HOLDING  #B/L UE Venous Duplex 4/26/27 - thrombophlebitis  #B/L LE Venous Duplex 4/21- right PT DVT  - concern for malabsorption of Eliquis given small bowel resection per pharmacy  - vascular surgery consulted - AC with heparin drip (upon discharge transition to Eliquis 10 mg po BID x 7 days then 5 mg po BID for at least 6 months)  #B/L LE Venous duplex 4/28 prelim negative   #DVT prophylaxis  -heparin subq    - LLE SCD only    Labs: Hb/Hct:  7.4/21.4  -->,  7.7/22.3  -->                      Plts:  111  -->,  107  -->                 PTT/INR:        T&S expires: 5/2    ID:  #Intraabdominal anastomotic leak, fluid collection  #ID Consult     - Continue meropenem 1g Q8 (4/22 - ), switched from Q8 to Q12  #Culture    OR 4/1 - E Coli    OR cx: few E.coli, few bacteroides, rare clostridium    4/13 Blood Cx: NGTF    4/14 tracheal aspirate: no growth     4/17 body fluid cx rare e. coli     4/22 Blood cx: negative    4/22 Abdominal - Negative FINAL    4/27 C diff stool panel: negative  WBC- 8.98  --->>,  10.10  --->>,  9.39  --->>  Temp trend- 24hrs T(F): 97.8 (04-30 @ 04:00), Max: 98.4 (04-29 @ 20:00)  Antibiotics-meropenem  IVPB 1000 every 12 hours    ENDOCRINE:  #Glycemic monitoring  - Q6 FSG while NPO  - ISS  - A1C 5.8%     MSK:  #Activity- increase as tolerated     SKIN:  #DTI screening negative 4/28  - Continue to monitor daily skin changes    LINES/DRAINS:  PIV    RLQ pelvis beatrice drain (4/28 -     LLQ pelvis beatrice drain (4/28 -     Midline subtaneous beatrice drain (4/28 -     Right IJ TLC (4/17 -     Prevena Vac Midline     Left radial arterial line (4/28 -     Discontinued lines:    Right Femoral Arterial Line (4/27 - 4/28)    ADVANCED DIRECTIVES:  Full Code  Emergency - Daughter  (Shavonne Moss)  INDICATION FOR SICU/SDU: Intestinal obstruction    DISPO: SICU    Discussed with SICU attending Dr. Allen ASSESSMENT & PLAN:  64F PMHx HTN, morbid obesity s/p 2001 Abby-en-Y gastric bypass who presented on 4/3 with incarcerated ventral hernia with SBO    4/10 open ventral hernia repair, small bowel resection, and primary fascial closure with Dr. Lema. Admitted to SICU for Q1 abdominal checks and hemodynamic monitoring.   4/17 takeback for exploratory laparotomy, repair of anastomotic leak  4/22 8Fr pigtail into lower abdominal wall collection,150cc of foul appearing material aspirated, Attached to THONY bulb  4/27 RTOR for re-exploration and evacuation of old clots from retroperitoneum and pelvis and placement of 3 new beatrice drains  4/28: IR consulted for IVC filter     NEUROLOGICAL:  #Acute pain  - Fentanyl infusion  #Sedation  - Precedex infusion  #AMS    HCT 4/27: negative  #Sleep hygiene  - HOLDING Trazodone 25mg QPM  - HOLDING Melatonin 5mg QPM    RESPIRATORY:   #Acute hypoxic respiratory failure  - Extubated 4/18, reintubated on 4/27 for AMS and agonal breathing    ET 7.5mm  LL 23cm  RR (machine): 25, TV (machine): 450, FiO2: 40, PEEP: 10  04-30 @ 06:42--7.45 / 28 / 132 / 20 / 100.0  O2 100.0  Lac 0.8    04-30 @ 05:08--7.46 / 26 / 112 / 18 / 98.8  O2 98.8  Lac 0.8    #Right distal main pulmonary embolus with segmental extension  - HOLDING Lovenox 135mg Q12 secondary to acute anemia  - Echo 4/22/25: EF 70 to 75%.  - B/L LE Venous duplex 4/21-RIGHT PT DVT, no LEFT DVT  -B/L LE Venous duplex 4/28-PRELIM No evidence of deep venous thrombosis in either lower extremity. Limited   exam due to body habitus  #Intermittent diuresis  - Last diuresis 4/25 1mg bumex  #PMHx [place] on CPAP@ home   #Activity increase as tolerated    CARDS:   #acute hemorraghic shock    - Levophed infusion > off    - Vasopressin infusion     - 4/27: 2u PRBC 1plt 1uFFP, 1g TXA    - OFF sodium bicarbonate infusion on 4/27  #Supraventricular tachycardia, Non-sustained ventricular tachycardia, Ventricular Premature Depolarization    - Cardiology (Dr. Lopez) - Metoprolol 25mg Q12 HOLDING    #PMHx of HTN  - Amlodipine 10mg QD  HOLDING  - HCTZ 25mg QD HOLDING  - Losartan 100mg daily HOLDING  - EKG- NSR  - TTE 4/11: EF of 56%. G1DD.     GASTROINTESTINAL/NUTRITION:   #4/10 open ventral hernia repair, small bowel resection, ileoileal anastomosis and primary fascial closure, 2 THONY Right  #4/17 RTOR for exploratory laparotomy, repair of anastomotic leak at previous ileoileal anastomosis, resected, new ileoileal anastomosis created, 1 THONY Left (total 3)    - Intermittent abdominal vac change, last 4/26 PM, bilious drainage noted    - REMOVED 4/26 RUQ THONY    - REMOVED 4/28 LUQ THONY at ileo-ileal anstamosis, RLQ THONY in pelvis, IR THONY abdominal wall  #4/22: s/p IR percutaneous placement of a 8.5 Cuban drainage catheter into lower abdominal wall fluid collection, yielding 150 mL of foul appearing fluid.  #4/27: Acute retroperitoneal  CTA: mesenteric arterial blush noted with extravasation    - s/p IR without finding of any bleeding, no embolization required  #4/27 RTOR for re-exploration and evacuation of old clots from retroperitoneum and pelvis    - new midline prevena vac in place    - x3 new 19Fr beatrice drains in place: 1. Right pelvis, 2. Left pelvis, 3. Subcutaenous  #Diet, NPO    - OGT in place to suction    - TPN discontinued 4/25    - aspiration precautions, HOB 30  #GI Prophylaxis/hx GERD  - Pantoprazole 40mg IV BID (home rx)  #Bowel regimen  - Holding  - Last BM 4/26  - Dignishield in place, nursing to flush    /RENAL:   #urine output in critically ill  - An replaced 4/27  - LR @ 120cc/hr  #FUNMI; oliguric  - Nephrology consulted and following > hold off on CVVH recall  - Renal u/s> no hydronephrosis. 2-3cm anechoic cyst on right kidney   #Lactic acidosis    - OFF sodium bicarbonate infusion    Labs:          BUN/Cr- 65/2.9  -->,  68/2.7  -->          [04-29 @ 20:15]Na  144 // K  4.1 // Mg  2.1 // Phos  6.2  [04-28 @ 20:53]Na  142 // K  4.6 // Mg  2.1 // Phos  7.5  - Nephro: check CK, no indication of RRT, decrease meropenem to Q12    HEME/ONC:   #acute anemia     -last transfusion 4/29, 1 U PRBC, f/u post transfusion  #Acute right pulmonary embolus    -Lovenox 135mg q12 HOLDING  #B/L UE Venous Duplex 4/26/27 - thrombophlebitis  #B/L LE Venous Duplex 4/21- right PT DVT  - concern for malabsorption of Eliquis given small bowel resection per pharmacy  - vascular surgery consulted - AC with heparin drip (upon discharge transition to Eliquis 10 mg po BID x 7 days then 5 mg po BID for at least 6 months)  #B/L LE Venous duplex 4/28 prelim negative   #DVT prophylaxis  -heparin subq    - LLE SCD only    Labs: Hb/Hct:  7.4/21.4  -->,  7.7/22.3  -->                      Plts:  111  -->,  107  -->                 PTT/INR:      T&S expires: 5/2    ID:  #Intraabdominal anastomotic leak, fluid collection  #ID Consult     - Continue meropenem 1g Q8 (4/22 - ), switched from Q8 to Q12  #Culture    OR 4/1 - E Coli    OR cx: few E.coli, few bacteroides, rare clostridium    4/13 Blood Cx: NGTF    4/14 tracheal aspirate: no growth     4/17 body fluid cx rare e. coli     4/22 Blood cx: negative    4/22 Abdominal - Negative FINAL    4/27 C diff stool panel: negative  WBC- 8.98  --->>,  10.10  --->>,  9.39  --->>  Temp trend- 24hrs T(F): 97.8 (04-30 @ 04:00), Max: 98.4 (04-29 @ 20:00)  Antibiotics-meropenem  IVPB 1000 every 12 hours    ENDOCRINE:  #Glycemic monitoring  - Q6 FSG while NPO  - ISS  - A1C 5.8%     MSK:  #Activity- increase as tolerated     SKIN:  #DTI screening negative 4/28  - Continue to monitor daily skin changes    LINES/DRAINS:  PIV    RLQ pelvis beatrice drain (4/28 -     LLQ pelvis beatrice drain (4/28 -     Midline subtaneous beatrice drain (4/28 -     Right IJ TLC (4/17 -     Prevena Vac Midline     Left radial arterial line (4/28 -     Discontinued lines:    Right Femoral Arterial Line (4/27 - 4/28)    ADVANCED DIRECTIVES:  Full Code  Emergency - Daughter  (Shavonne Moss)  INDICATION FOR SICU/SDU: Intestinal obstruction    DISPO: SICU    Discussed with SICU attending Dr. Allen

## 2025-04-30 NOTE — CHART NOTE - NSCHARTNOTEFT_GEN_A_CORE
Results of 4/28 lower extremity duplex study reviewed, shows "No evidence of deep venous thrombosis in either lower extremity. Limited exam due to body habitus." No acute IR intervention at this time.     Please call Interventional Radiology with questions or concerns:   M-[phone]: [facility]_ir on Teams or [employer] [phone]   All other hours: [phone] No

## 2025-04-30 NOTE — PROGRESS NOTE ADULT - SUBJECTIVE AND OBJECTIVE BOX
seen and examined   24 h events noted   intubated/ventilated         PAST HISTORY  --------------------------------------------------------------------------------  No significant changes to PMH, PSH, FHx, SHx, unless otherwise noted    ALLERGIES & MEDICATIONS  --------------------------------------------------------------------------------  Allergies    No Known Allergies    Intolerances      Standing Inpatient Medications  albuterol    90 MICROgram(s) HFA Inhaler 2 Puff(s) Inhalation every 6 hours  calcium gluconate IVPB 2 Gram(s) IV Intermittent once  chlorhexidine 0.12% Liquid 15 milliLiter(s) Oral Mucosa every 12 hours  chlorhexidine 2% Cloths 1 Application(s) Topical <User Schedule>  dexMEDEtomidine Infusion 0.2 MICROgram(s)/kG/Hr IV Continuous <Continuous>  fentaNYL   Infusion 0.5 MICROgram(s)/kG/Hr IV Continuous <Continuous>  heparin  Infusion 1000 Unit(s)/Hr IV Continuous <Continuous>  insulin lispro (ADMELOG) corrective regimen sliding scale   SubCutaneous every 6 hours  meropenem  IVPB 1000 milliGRAM(s) IV Intermittent every 12 hours  norepinephrine Infusion 0.05 MICROgram(s)/kG/Min IV Continuous <Continuous>  pantoprazole  Injectable 40 milliGRAM(s) IV Push every 12 hours  vasopressin Infusion. 0.04 Unit(s)/Min IV Continuous <Continuous>        VITALS/PHYSICAL EXAM  --------------------------------------------------------------------------------  T(C): 36.8 (04-30-25 @ 08:00), Max: 36.9 (04-29-25 @ 20:00)  HR: 65 (04-30-25 @ 10:04) (61 - 98)  BP: 97/58 (04-29-25 @ 14:00) (97/58 - 103/56)  RR: 25 (04-30-25 @ 10:00) (10 - 34)  SpO2: 100% (04-30-25 @ 10:04) (96% - 100%)  Wt(kg): --    C    04-29-25 @ 07:01  -  04-30-25 @ 07:00  --------------------------------------------------------  IN: 1707.9 mL / OUT: 1747.5 mL / NET: -39.6 mL    04-30-25 @ 07:01  -  04-30-25 @ 10:41  --------------------------------------------------------  IN: 134 mL / OUT: 230 mL / NET: -96 mL      Physical Exam:  	Gen: intubated/ventilated   	Pulm: B/L shannon  	CV:  S1S2; no rub  	Abd: +distended  	LE:  edema      LABS/STUDIES  --------------------------------------------------------------------------------              7.7    9.39  >-----------<  107      [04-29-25 @ 22:52]              22.3     144  |  108  |  68  ----------------------------<  152      [04-29-25 @ 20:15]  4.1   |  20  |  2.7        Ca     7.6     [04-29-25 @ 20:15]      Mg     2.1     [04-29-25 @ 20:15]      Phos  6.2     [04-29-25 @ 20:15]    TPro  4.5  /  Alb  2.7  /  TBili  0.4  /  DBili  0.3  /  AST  691  /  ALT  1133  /  AlkPhos  101  [04-28-25 @ 22:25]          Creatinine Trend:  SCr 2.7 [04-29 @ 20:15]  SCr 2.9 [04-28 @ 20:53]  SCr 2.7 [04-28 @ 04:00]  SCr 2.5 [04-28 @ 00:20]  SCr 2.6 [04-27 @ 20:56]    Urinalysis - [04-29-25 @ 20:15]      Color  / Appearance  / SG  / pH       Gluc 152 / Ketone   / Bili  / Urobili        Blood  / Protein  / Leuk Est  / Nitrite       RBC  / WBC  / Hyaline  / Gran  / Sq Epi  / Non Sq Epi  / Bacteria       Vitamin D (25OH) 13      [04-19-25 @ 05:08]  Lipid: chol --, , HDL --, LDL --      [04-20-25 @ 04:59]

## 2025-04-30 NOTE — PROGRESS NOTE ADULT - SUBJECTIVE AND OBJECTIVE BOX
GENERAL SURGERY PROGRESS NOTE    Patient: NANCY SARGENT , 64y (62)Female   MRN: [id number]  Location: [place] [address] A  Visit: 25 Inpatient  Date: 25 @ 07:20    Hospital Day #: 28  Post-Op Day #: 13    Procedure/Dx/Injuries:    Events of past 24 hours:  -abdomen soft, non-tender, THONY drain x 3, serosanginous, more sanginous than serous  -hgb 7.4 mid transfusion; post transfusion Hgb 7.7  -off of levophed at 1AM  - Started TF @20  - BLE duplex: no evidence of DVT in either LE, BLE sequentials           PAST MEDICAL & SURGICAL HISTORY:  Gastric bypass status for obesity      LOUIS (obstructive sleep apnea)      S/P gastric bypass      S/P       S/P small bowel resection      History of total bilateral knee replacement (TKR)      H/O colonoscopy            Vitals:   T(F): 97.8 (25 @ 04:00), Max: 98.4 (25 @ 20:00)  HR: 64 (25 @ 06:00)  BP: 97/58 (25 @ 14:00)  RR: 25 (25 @ 06:00)  SpO2: 99% (25 @ 06:00)  Mode: AC/ CMV (Assist Control/ Continuous Mandatory Ventilation), RR (machine): 25, TV (machine): 450, FiO2: 40, PEEP: 10, ITime: 1, MAP: 17, PIP: 27    Diet, NPO with Tube Feed:   Tube Feeding Modality: Orogastric  Nepro with Carb Steady (NEPRORTH)  Total Volume for 24 Hours (mL): 480  Continuous  Starting Tube Feed Rate mL per Hour: 10     Every 4 hours  Until Goal Tube Feed Rate (mL per Hour): 20  Tube Feed Duration (in Hours): 24  Tube Feed Start Time: 09:00      Fluids:     I & O's:    25 @ 07:01  -  25 @ 07:00  --------------------------------------------------------  IN:    Dexmedetomidine: 380 mL    FentaNYL: 78.2 mL    IV PiggyBack: 50 mL    Lactated Ringers: 240 mL    Nepro: 360 mL    Norepinephrine: 39.6 mL    PRBCs (Packed Red Blood Cells): 300 mL    Propofol: 16.3 mL    Vasopressin (Organ Donation): 144 mL  Total IN: 1608.1 mL    OUT:    Bulb (mL): 90 mL    Bulb (mL): 32.5 mL    Bulb (mL): 65 mL    Indwelling Catheter - Urethral (mL): 1380 mL    Rectal Tube (mL): 20 mL    VAC (Vacuum Assisted Closure) System (mL): 0 mL  Total OUT: 1587.5 mL    Total NET: 20.6 mL        Physical Examination:  General Appearance: Sedated, intubated  Heart: RRR  Lungs: intubated, ventilated 40/10  Abdomen: Abdomen soft, somewhat distended, midline prevena vac in place, x 3 drains in place- all serosanguinous, right pelvic drain appears most sanguinous of 3 drains.  MSK/Extremities: cool to touch, edematous  Skin: Warm, dry. No jaundice.     MEDICATIONS  (STANDING):  albuterol    90 MICROgram(s) HFA Inhaler 2 Puff(s) Inhalation every 6 hours  chlorhexidine 0.12% Liquid 15 milliLiter(s) Oral Mucosa every 12 hours  chlorhexidine 2% Cloths 1 Application(s) Topical <User Schedule>  dexMEDEtomidine Infusion 0.2 MICROgram(s)/kG/Hr (6.78 mL/Hr) IV Continuous <Continuous>  fentaNYL   Infusion 0.5 MICROgram(s)/kG/Hr (6.78 mL/Hr) IV Continuous <Continuous>  heparin   Injectable 7500 Unit(s) SubCutaneous every 8 hours  insulin lispro (ADMELOG) corrective regimen sliding scale   SubCutaneous every 6 hours  meropenem  IVPB 1000 milliGRAM(s) IV Intermittent every 12 hours  norepinephrine Infusion 0.05 MICROgram(s)/kG/Min (6.36 mL/Hr) IV Continuous <Continuous>  pantoprazole  Injectable 40 milliGRAM(s) IV Push every 12 hours  propofol Infusion 10 MICROgram(s)/kG/Min (8.14 mL/Hr) IV Continuous <Continuous>  vasopressin Infusion. 0.04 Unit(s)/Min (6 mL/Hr) IV Continuous <Continuous>    MEDICATIONS  (PRN):      DVT PROPHYLAXIS: heparin   Injectable 7500 Unit(s) SubCutaneous every 8 hours    GI PROPHYLAXIS: pantoprazole  Injectable 40 milliGRAM(s) IV Push every 12 hours    ANTICOAGULATION:   ANTIBIOTICS:  meropenem  IVPB 1000 milliGRAM(s)            LAB/STUDIES:  Labs:  CAPILLARY BLOOD GLUCOSE      POCT Blood Glucose.: 184 mg/dL (2025 04:59)  POCT Blood Glucose.: 170 mg/dL (2025 00:10)  POCT Blood Glucose.: 146 mg/dL (2025 17:41)  POCT Blood Glucose.: 160 mg/dL (2025 11:37)                          7.7    9.39  )-----------( 107      ( 2025 22:52 )             22.3       Auto Immature Granulocyte %: 1.9 % (25 @ 22:52)        144  |  108  |  68[HH]  ----------------------------<  152[H]  4.1   |  20  |  2.7[H]      Calcium: 7.6 mg/dL (25 @ 20:15)      LFTs:             4.5  | 0.4  | 691      ------------------[101     ( 2025 22:25 )  2.7  | 0.3  | 1133        Lipase:x      Amylase:x         Blood Gas Arterial, Lactate: 0.8 mmol/L (25 @ 06:42)  Blood Gas Arterial, Lactate: 0.8 mmol/L (25 @ 05:08)  Blood Gas Arterial, Lactate: 0.9 mmol/L (25 @ 04:53)  Blood Gas Arterial, Lactate: 1.2 mmol/L (25 @ 05:03)  Blood Gas Arterial, Lactate: 1.3 mmol/L (25 @ 00:17)  Blood Gas Arterial, Lactate: 3.7 mmol/L (25 @ 20:37)  Blood Gas Arterial, Lactate: 4.2 mmol/L (25 @ 18:21)  Blood Gas Arterial, Lactate: 2.0 mmol/L (25 @ 13:44)    ABG - ( 2025 06:42 )  pH: 7.45  /  pCO2: 28    /  pO2: 132   / HCO3: 20    / Base Excess: -3.8  /  SaO2: 100.0           ABG - ( 2025 05:08 )  pH: 7.46  /  pCO2: 26    /  pO2: 112   / HCO3: 18    / Base Excess: -4.3  /  SaO2: 98.8            ABG - ( 2025 04:53 )  pH: 7.40  /  pCO2: 32    /  pO2: 88    / HCO3: 20    / Base Excess: -4.6  /  SaO2: 99.1              Coags:            Urinalysis Basic - ( 2025 20:15 )    Color: x / Appearance: x / SG: x / pH: x  Gluc: 152 mg/dL / Ketone: x  / Bili: x / Urobili: x   Blood: x / Protein: x / Nitrite: x   Leuk Esterase: x / RBC: x / WBC x   Sq Epi: x / Non Sq Epi: x / Bacteria: x        Culture - Blood (collected 2025 12:10)  Source: Blood None  Preliminary Report (2025 22:01):    No growth at 24 hours        ASSESSMENT  64y F w/ PMHx of  Morbid obesity, LOUIS, large complex ventral hernia s/p repair sbr and loss of domain c/b post op intubation, hypotension requiring vasopressors, anastomotic leakage s/p RTOR and reanastomosis, now doing better post op from a respiratory status, however, still tenuous. She has a rising leukocytosis and an abdominal fluid collection which was drained by IR on , Zosyn broadened to meropenem, and has a newly diagnosed DVT/PE which she is currently on a LVX 135BID S/P RTOR for ex lap      PLAN:  - Monitor drain output quality  - TF  - HD monitoring  - Monitor bowel function  - Monitor for fevers  - Rest of care per SICU      SURGERY SPECTRA: [id number]

## 2025-04-30 NOTE — PROGRESS NOTE ADULT - ATTENDING COMMENTS
Pt examined  labs and images reviewed  pt with supermorbid obesity and complex hernia with sbo  previously treated without surgery   passing flatus awaiting bowel movement but was having  more pain taken to OR urgently   4/10 : s/p BHUMI , Bowel Resection / Anastamosis / Primary repair of Fascia / hernia sac  overnight - renal failure / respiratory compromise  was intubated / paralysed/ botox  now on bumex - tapering   4/17 :  s/p BHUMI , Bowel Resection / Anastamosis / Primary repair of Fascia / hernia sac  4/22 : IR drain placed - subcutaneous collection  - purulent   4/27 :w/p exlap - no active bleeding , blood clots 1 liter evacuated  gradually improving  mariam drain sero sang  may need more blood transfusion - before she stabilized  will start tube feeds and taper tpn when reaches goal  will continue dvt prophylaxis    impression : extremely high risk - supermorbid obesity and complex hernia with partial sbo  tube feeds   hold anticoagulation - will consider starting low dose heparin drip  SICU

## 2025-04-30 NOTE — PROGRESS NOTE ADULT - SUBJECTIVE AND OBJECTIVE BOX
NANCY SARGENT   [id number]   [birth year]  64yF  ============================================================   DATE OF INITIAL SICU/SDU CONSULT: 04-10-25    INDICATION FOR SICU CONSULT:  q1hr abdominal checks, mechanical ventilation    SICU COURSE EVENTS :  04-10 - admitted to SICU service  4/11: Intubated and started on paralytic. Arterial line placed, subclavian TLC placed. Nephrology consulted for oliguria. IR abdominal muscle botox injection performed. TTE performed.   4/12: Additional fluid boluses given; trial fo bumex started, considering CVVH. Weaned off nimbex gtt.  > 220 /hr. Renal U/S w/out hydro.   4/13: Weaned off Levophed. Bumex gtt 2mg/hr > 1mg/hr. Goal net negative 1-1.5L, UOP approx, 200c/hr.   4/14: Remained on bumex gtt for further diuresis, decreased to 0.5 overnight, vent settings weaned. Cr downtrending, LFTs worsening.   4/15: Extubated to BiPAP, transitioned to NC. Dc'd bumex gtt, given 5mg metolazone x 1 and 2mg bumex x 1.   4/16: THONY drain #2 murky/bilious, pending CTAP with PO contrast, hypernatremia, started D5W @ 75cc/hr, persistent hypokalemia   4/17: RTOR for resection of anastomosis for anastomotic leak. Returned intubated, 400/24/80/10, sinus tachy to . Abdomen soft. Was initially on propofol @ 30 and precedex, but propofol weaned off due to hypotension with MAPs in 50s, fentanyl ggt started for acute pain. Remained hypotensive despite fluids, started on Levophed, on 0.05.   4/18: Extubated. HFNC 40L/49%, Levo off since 11 AM   4/19: NGT removed. Started on duonebs. D5W increased to 60cc/hr. 1mg bumex, > 1.5L response  4/20: Episode of afib RVR resolved with metoprolol 5mg IVP, cardiology c/s placed, recommending metoprolol 25mg q 12   4/21: PE on CTA, therapeutic heparin gtt started. Started on cardebe gtt,   4/22: Meropenem started per ID.  S/p IR percutaneous placement of a 8.5 Martiniquais drainage catheter into lower abdominal wall fluid collection, yielding 150 mL of bowel appearing fluid. New left radial a-line placed. Off nicarrdipine gtt.   4/23: Switched to therapeutic lovenox. Diet advanced to aubrey clears with ensures.  4/24: advacned to bariatric FLD, downgraded to SDU  4/25: TPN discontinued, diet advanced to full liquid , Right cephalic DVT prelim  4/26: Right UE VA Duplex final no DVT, thromboplebitis, Arterial line removed, 2L NC, midline vac changed, noted to have bilious drainage, RUQ drain pulled, upgraded to SICU status, plan to reorder TPN for 4/27 PM, diet changed to full liquid. 1:20 AM noted to be unresponsive, palpable femoral pulse, decision made to intubate, R femoral arterial line placed, started on levophed, amauri, bicarb amp x 2, bicarb gtt, propofol, 2U pRBC for hgb 5.9, plan for CTH and CTAP when stabilized   4/27: 2uPRBC 1uFFP 1pkPlt, TXA 1g, IR for embolization due to active extravasation seen on CTA, no active bleed, no embolization, Return to OR for re-exploration and evacuation of old clot  4/28: IR consulted for IVC filter and PICC. LE duplex ordered, negative. Precedex added overnight for agitation. 1 U PRBC for Hgb 6.8 overnight  4/29: TF started at 20cc/hr. Meropenem decreased to Q 12 hours.     24Hour Events:  4/29  NIGHT  -abdomen soft, non-tender, THONY drain x 3, serosanginous, more sanginous than serous  -hgb 7.4 mid transfusion; post transfusion Hgb 7.7  -off of levophed at 1AM  -2g Ca   -RR decreased to 25 based on AM ABG pH 7.46, CO2 29, patient sleeping and not overbreathing vent at time ABG was drawn, f/u 7AM repeat     DAY  - Start TF @20  - Re-call ID  - Unable to crush Phoslo, will continue to trend Phos  - IVL  - BLE duplex: no evidence of DVT in either LE, BLE sequentials   - Nephro: check CK, no indication for RRT, decrease meropenem to Q12  - ID: reorder meropenem  - 6.8 -> 6.9 after 1u PRBC, another unit ordered     [X] A ten-point review of systems was negative except as expressed in note.  [X ] History was obtained from patient. If unable to participate in their care, history was from review of the chart and collateral sources of information.  ================================================================================   NANCY SARGENT   [id number]   [birth year]  64yF  ============================================================   DATE OF INITIAL SICU/SDU CONSULT: 04-10-25    INDICATION FOR SICU CONSULT:  q1hr abdominal checks, mechanical ventilation    SICU COURSE EVENTS :  04-10 - admitted to SICU service  : Intubated and started on paralytic. Arterial line placed, subclavian TLC placed. Nephrology consulted for oliguria. IR abdominal muscle botox injection performed. TTE performed.   : Additional fluid boluses given; trial fo bumex started, considering CVVH. Weaned off nimbex gtt.  > 220 /hr. Renal U/S w/out hydro.   : Weaned off Levophed. Bumex gtt 2mg/hr > 1mg/hr. Goal net negative 1-1.5L, UOP approx, 200c/hr.   : Remained on bumex gtt for further diuresis, decreased to 0.5 overnight, vent settings weaned. Cr downtrending, LFTs worsening.   4/15: Extubated to BiPAP, transitioned to NC. Dc'd bumex gtt, given 5mg metolazone x 1 and 2mg bumex x 1.   : THONY drain #2 murky/bilious, pending CTAP with PO contrast, hypernatremia, started D5W @ 75cc/hr, persistent hypokalemia   : RTOR for resection of anastomosis for anastomotic leak. Returned intubated, 400/24/80/10, sinus tachy to . Abdomen soft. Was initially on propofol @ 30 and precedex, but propofol weaned off due to hypotension with MAPs in 50s, fentanyl ggt started for acute pain. Remained hypotensive despite fluids, started on Levophed, on 0.05.   : Extubated. HFNC 40L/49%, Levo off since 11 AM   : NGT removed. Started on duonebs. D5W increased to 60cc/hr. 1mg bumex, > 1.5L response  : Episode of afib RVR resolved with metoprolol 5mg IVP, cardiology c/s placed, recommending metoprolol 25mg q 12   : PE on CTA, therapeutic heparin gtt started. Started on cardebe gtt,   : Meropenem started per ID.  S/p IR percutaneous placement of a 8.5 Bangladeshi drainage catheter into lower abdominal wall fluid collection, yielding 150 mL of bowel appearing fluid. New left radial a-line placed. Off nicarrdipine gtt.   : Switched to therapeutic lovenox. Diet advanced to aubrey clears with ensures.  : advacned to bariatric FLD, downgraded to SDU  : TPN discontinued, diet advanced to full liquid , Right cephalic DVT prelim  : Right UE VA Duplex final no DVT, thromboplebitis, Arterial line removed, 2L NC, midline vac changed, noted to have bilious drainage, RUQ drain pulled, upgraded to SICU status, plan to reorder TPN for  PM, diet changed to full liquid. 1:20 AM noted to be unresponsive, palpable femoral pulse, decision made to intubate, R femoral arterial line placed, started on levophed, amauri, bicarb amp x 2, bicarb gtt, propofol, 2U pRBC for hgb 5.9, plan for CTH and CTAP when stabilized   : 2uPRBC 1uFFP 1pkPlt, TXA 1g, IR for embolization due to active extravasation seen on CTA, no active bleed, no embolization, Return to OR for re-exploration and evacuation of old clot  : IR consulted for IVC filter and PICC. LE duplex ordered, negative. Precedex added overnight for agitation. 1 U PRBC for Hgb 6.8 overnight  : TF started at 20cc/hr. Meropenem decreased to Q 12 hours.     24Hour Events:    NIGHT  -abdomen soft, non-tender, THONY drain x 3, serosanginous, more sanginous than serous  -hgb 7.4 mid transfusion; post transfusion Hgb 7.7  -off of levophed at 1AM  -2g Ca   -RR decreased to 25 based on AM ABG pH 7.46, CO2 29, patient sleeping and not overbreathing vent at time ABG was drawn, f/u 7AM repeat     DAY  - Start TF @20  - Re-call ID  - Unable to crush Phoslo, will continue to trend Phos  - IVL  - BLE duplex: no evidence of DVT in either LE, BLE sequentials   - Nephro: check CK, no indication for RRT, decrease meropenem to Q12  - ID: reorder meropenem  - 6.8 -> 6.9 after 1u PRBC, another unit ordered     [X] A ten-point review of systems was negative except as expressed in note.  [X ] History was obtained from patient. If unable to participate in their care, history was from review of the chart and collateral sources of information.  ================================================================================  Daily     Daily Weight in k (2025 04:00)    Diet, NPO with Tube Feed:   Tube Feeding Modality: Orogastric  Nepro with Carb Steady (NEPRORTH)  Total Volume for 24 Hours (mL): 480  Continuous  Starting Tube Feed Rate mL per Hour: 10     Every 4 hours  Until Goal Tube Feed Rate (mL per Hour): 20  Tube Feed Duration (in Hours): 24  Tube Feed Start Time: 09:00 (25 @ 09:08)      CURRENT MEDS:  Neurologic Medications  dexMEDEtomidine Infusion 0.2 MICROgram(s)/kG/Hr IV Continuous <Continuous>  fentaNYL   Infusion 0.5 MICROgram(s)/kG/Hr IV Continuous <Continuous>  propofol Infusion 10 MICROgram(s)/kG/Min IV Continuous <Continuous>    Respiratory Medications  albuterol    90 MICROgram(s) HFA Inhaler 2 Puff(s) Inhalation every 6 hours    Cardiovascular Medications  norepinephrine Infusion 0.05 MICROgram(s)/kG/Min IV Continuous <Continuous>    Gastrointestinal Medications  pantoprazole  Injectable 40 milliGRAM(s) IV Push every 12 hours    Genitourinary Medications    Hematologic/Oncologic Medications  heparin   Injectable 7500 Unit(s) SubCutaneous every 8 hours    Antimicrobial/Immunologic Medications  meropenem  IVPB 1000 milliGRAM(s) IV Intermittent every 12 hours    Endocrine/Metabolic Medications  insulin lispro (ADMELOG) corrective regimen sliding scale   SubCutaneous every 6 hours  vasopressin Infusion. 0.04 Unit(s)/Min IV Continuous <Continuous>    Topical/Other Medications  chlorhexidine 0.12% Liquid 15 milliLiter(s) Oral Mucosa every 12 hours  chlorhexidine 2% Cloths 1 Application(s) Topical <User Schedule>      ICU Vital Signs Last 24 Hrs  T(C): 36.8 (2025 08:00), Max: 36.9 (2025 20:00)  T(F): 98.2 (2025 08:00), Max: 98.4 (2025 20:00)  HR: 61 (2025 08:00) (61 - 98)  BP: 97/58 (2025 14:00) (94/52 - 103/56)  BP(mean): 72 (2025 14:00) (70 - 75)  ABP: 122/64 (2025 08:00) (94/48 - 157/76)  ABP(mean): 83 (2025 08:00) (60 - 102)  RR: 10 (2025 08:00) (10 - 34)  SpO2: 99% (2025 08:00) (96% - 100%)    O2 Parameters below as of 2025 07:00  Patient On (Oxygen Delivery Method): ventilator    O2 Concentration (%): 40        Adult Advanced Hemodynamics Last 24 Hrs  CVP(mm Hg): --  CVP(cm H2O): --  CO: --  CI: --  PA: --  PA(mean): --  PCWP: --  SVR: --  SVRI: --  PVR: --  PVRI: --    Mode: AC/ CMV (Assist Control/ Continuous Mandatory Ventilation)  RR (machine): 25  TV (machine): 450  FiO2: 40  PEEP: 10  ITime: 1  MAP: 17  PIP: 27    ABG - ( 2025 06:42 )  pH, Arterial: 7.45  pH, Blood: x     /  pCO2: 28    /  pO2: 132   / HCO3: 20    / Base Excess: -3.8  /  SaO2: 100.0               I&O's Summary    2025 07:01  -  2025 07:00  --------------------------------------------------------  IN: 1707.9 mL / OUT: 1747.5 mL / NET: -39.6 mL      I&O's Detail    2025 07:01  -  2025 07:00  --------------------------------------------------------  IN:    Dexmedetomidine: 473 mL    FentaNYL: 85 mL    IV PiggyBack: 50 mL    Lactated Ringers: 240 mL    Nepro: 360 mL    Norepinephrine: 39.6 mL    PRBCs (Packed Red Blood Cells): 300 mL    Propofol: 16.3 mL    Vasopressin (Organ Donation): 144 mL  Total IN: 1707.9 mL    OUT:    Bulb (mL): 90 mL    Bulb (mL): 32.5 mL    Bulb (mL): 65 mL    Indwelling Catheter - Urethral (mL): 1540 mL    Rectal Tube (mL): 20 mL    VAC (Vacuum Assisted Closure) System (mL): 0 mL  Total OUT: 1747.5 mL    Total NET: -39.6 mL    PHYSICAL EXAM:  GENERAL: 11T  HEENT: Normocephalic, atraumatic  PULM: Vent at 450/28/50/10  CV: Regular rate and rhythm, precedex at 1.2, propofol off, fentanyl 0.5, levo off, vaso 0.04  ABDOMEN: Abdomen obese, soft, non-distended, vac holding suction, drains x 3 with SS output, dignishield in place  : An in place  MSK: Moving extremities spontaneously  SKIN: Warm, dry, normal skin color, texture, turgor    LABS:  CAPILLARY BLOOD GLUCOSE  POCT Blood Glucose.: 184 mg/dL (2025 04:59)  POCT Blood Glucose.: 170 mg/dL (2025 00:10)  POCT Blood Glucose.: 146 mg/dL (2025 17:41)  POCT Blood Glucose.: 160 mg/dL (2025 11:37)                      7.7    9.39  )-----------( 107      ( 2025 22:52 )             22.3           144  |  108  |  68[HH]  ----------------------------<  152[H]  4.1   |  20  |  2.7[H]    Ca    7.6[L]      2025 20:15  Phos  6.2       Mg     2.1         TPro  4.5[L]  /  Alb  2.7[L]  /  TBili  0.4  /  DBili  0.3  /  AST  691[H]  /  ALT  1133[H]  /  AlkPhos  101        Urinalysis Basic - ( 2025 20:15 )    Color: x / Appearance: x / SG: x / pH: x  Gluc: 152 mg/dL / Ketone: x  / Bili: x / Urobili: x   Blood: x / Protein: x / Nitrite: x   Leuk Esterase: x / RBC: x / WBC x   Sq Epi: x / Non Sq Epi: x / Bacteria: x    Culture - Blood (collected 2025 12:10)  Source: Blood None  Preliminary Report (2025 22:01):    No growth at 24 hours

## 2025-05-01 LAB
ANION GAP SERPL CALC-SCNC: 15 MMOL/L — HIGH (ref 7–14)
ANION GAP SERPL CALC-SCNC: 17 MMOL/L — HIGH (ref 7–14)
APTT BLD: 40.1 SEC — HIGH (ref 27–39.2)
APTT BLD: 46.1 SEC — HIGH (ref 27–39.2)
APTT BLD: 54.5 SEC — HIGH (ref 27–39.2)
BUN SERPL-MCNC: 67 MG/DL — CRITICAL HIGH (ref 10–20)
BUN SERPL-MCNC: 69 MG/DL — CRITICAL HIGH (ref 10–20)
BUN SERPL-MCNC: 74 MG/DL — CRITICAL HIGH (ref 10–20)
CALCIUM SERPL-MCNC: 7.6 MG/DL — LOW (ref 8.4–10.5)
CALCIUM SERPL-MCNC: 7.7 MG/DL — LOW (ref 8.4–10.5)
CALCIUM SERPL-MCNC: 8.1 MG/DL — LOW (ref 8.4–10.5)
CHLORIDE SERPL-SCNC: 110 MMOL/L — SIGNIFICANT CHANGE UP (ref 98–110)
CHLORIDE SERPL-SCNC: 111 MMOL/L — HIGH (ref 98–110)
CHLORIDE SERPL-SCNC: 111 MMOL/L — HIGH (ref 98–110)
CO2 SERPL-SCNC: 19 MMOL/L — SIGNIFICANT CHANGE UP (ref 17–32)
CO2 SERPL-SCNC: 20 MMOL/L — SIGNIFICANT CHANGE UP (ref 17–32)
CO2 SERPL-SCNC: 21 MMOL/L — SIGNIFICANT CHANGE UP (ref 17–32)
CREAT SERPL-MCNC: 1.8 MG/DL — HIGH (ref 0.7–1.5)
CREAT SERPL-MCNC: 1.9 MG/DL — HIGH (ref 0.7–1.5)
CREAT SERPL-MCNC: 2.3 MG/DL — HIGH (ref 0.7–1.5)
EGFR: 23 ML/MIN/1.73M2 — LOW
EGFR: 23 ML/MIN/1.73M2 — LOW
EGFR: 29 ML/MIN/1.73M2 — LOW
EGFR: 29 ML/MIN/1.73M2 — LOW
EGFR: 31 ML/MIN/1.73M2 — LOW
EGFR: 31 ML/MIN/1.73M2 — LOW
GAS PNL BLDA: SIGNIFICANT CHANGE UP
GAS PNL BLDA: SIGNIFICANT CHANGE UP
GLUCOSE BLDC GLUCOMTR-MCNC: 104 MG/DL — HIGH (ref 70–99)
GLUCOSE BLDC GLUCOMTR-MCNC: 111 MG/DL — HIGH (ref 70–99)
GLUCOSE BLDC GLUCOMTR-MCNC: 113 MG/DL — HIGH (ref 70–99)
GLUCOSE BLDC GLUCOMTR-MCNC: 120 MG/DL — HIGH (ref 70–99)
GLUCOSE BLDC GLUCOMTR-MCNC: 126 MG/DL — HIGH (ref 70–99)
GLUCOSE SERPL-MCNC: 112 MG/DL — HIGH (ref 70–99)
GLUCOSE SERPL-MCNC: 117 MG/DL — HIGH (ref 70–99)
GLUCOSE SERPL-MCNC: 119 MG/DL — HIGH (ref 70–99)
HCT VFR BLD CALC: 25.3 % — LOW (ref 37–47)
HGB BLD-MCNC: 8.4 G/DL — LOW (ref 12–16)
MAGNESIUM SERPL-MCNC: 1.9 MG/DL — SIGNIFICANT CHANGE UP (ref 1.8–2.4)
MAGNESIUM SERPL-MCNC: 2 MG/DL — SIGNIFICANT CHANGE UP (ref 1.8–2.4)
MCHC RBC-ENTMCNC: 30.9 PG — SIGNIFICANT CHANGE UP (ref 27–31)
MCHC RBC-ENTMCNC: 33.2 G/DL — SIGNIFICANT CHANGE UP (ref 32–37)
MCV RBC AUTO: 93 FL — SIGNIFICANT CHANGE UP (ref 81–99)
NRBC BLD AUTO-RTO: 0 /100 WBCS — SIGNIFICANT CHANGE UP (ref 0–0)
PHOSPHATE SERPL-MCNC: 5.4 MG/DL — HIGH (ref 2.1–4.9)
PHOSPHATE SERPL-MCNC: 6.3 MG/DL — HIGH (ref 2.1–4.9)
PLATELET # BLD AUTO: 93 K/UL — LOW (ref 130–400)
PMV BLD: 11.5 FL — HIGH (ref 7.4–10.4)
POTASSIUM SERPL-MCNC: 3.4 MMOL/L — LOW (ref 3.5–5)
POTASSIUM SERPL-MCNC: 3.5 MMOL/L — SIGNIFICANT CHANGE UP (ref 3.5–5)
POTASSIUM SERPL-MCNC: 3.5 MMOL/L — SIGNIFICANT CHANGE UP (ref 3.5–5)
POTASSIUM SERPL-SCNC: 3.4 MMOL/L — LOW (ref 3.5–5)
POTASSIUM SERPL-SCNC: 3.5 MMOL/L — SIGNIFICANT CHANGE UP (ref 3.5–5)
POTASSIUM SERPL-SCNC: 3.5 MMOL/L — SIGNIFICANT CHANGE UP (ref 3.5–5)
RBC # BLD: 2.72 M/UL — LOW (ref 4.2–5.4)
RBC # FLD: 16.1 % — HIGH (ref 11.5–14.5)
SODIUM SERPL-SCNC: 146 MMOL/L — SIGNIFICANT CHANGE UP (ref 135–146)
SODIUM SERPL-SCNC: 148 MMOL/L — HIGH (ref 135–146)
SODIUM SERPL-SCNC: 151 MMOL/L — HIGH (ref 135–146)
WBC # BLD: 8.86 K/UL — SIGNIFICANT CHANGE UP (ref 4.8–10.8)
WBC # FLD AUTO: 8.86 K/UL — SIGNIFICANT CHANGE UP (ref 4.8–10.8)

## 2025-05-01 PROCEDURE — 99291 CRITICAL CARE FIRST HOUR: CPT | Mod: 24

## 2025-05-01 PROCEDURE — 71045 X-RAY EXAM CHEST 1 VIEW: CPT | Mod: 26

## 2025-05-01 RX ORDER — CALCIUM GLUCONATE 20 MG/ML
2 INJECTION, SOLUTION INTRAVENOUS
Refills: 0 | Status: COMPLETED | OUTPATIENT
Start: 2025-05-01 | End: 2025-05-01

## 2025-05-01 RX ORDER — BUMETANIDE 1 MG/1
2 TABLET ORAL ONCE
Refills: 0 | Status: COMPLETED | OUTPATIENT
Start: 2025-05-01 | End: 2025-05-01

## 2025-05-01 RX ORDER — NICARDIPINE HCL 30 MG
2.5 CAPSULE ORAL
Qty: 40 | Refills: 0 | Status: DISCONTINUED | OUTPATIENT
Start: 2025-05-01 | End: 2025-05-01

## 2025-05-01 RX ORDER — MAGNESIUM SULFATE 500 MG/ML
1 SYRINGE (ML) INJECTION ONCE
Refills: 0 | Status: COMPLETED | OUTPATIENT
Start: 2025-05-01 | End: 2025-05-01

## 2025-05-01 RX ORDER — LABETALOL HYDROCHLORIDE 200 MG/1
10 TABLET, FILM COATED ORAL ONCE
Refills: 0 | Status: COMPLETED | OUTPATIENT
Start: 2025-05-01 | End: 2025-05-01

## 2025-05-01 RX ORDER — AMLODIPINE BESYLATE 10 MG/1
10 TABLET ORAL DAILY
Refills: 0 | Status: DISCONTINUED | OUTPATIENT
Start: 2025-05-01 | End: 2025-05-02

## 2025-05-01 RX ORDER — HEPARIN SODIUM 1000 [USP'U]/ML
1600 INJECTION INTRAVENOUS; SUBCUTANEOUS
Qty: 25000 | Refills: 0 | Status: DISCONTINUED | OUTPATIENT
Start: 2025-05-01 | End: 2025-05-02

## 2025-05-01 RX ORDER — HEPARIN SODIUM 1000 [USP'U]/ML
1600 INJECTION INTRAVENOUS; SUBCUTANEOUS
Qty: 25000 | Refills: 0 | Status: DISCONTINUED | OUTPATIENT
Start: 2025-05-01 | End: 2025-05-01

## 2025-05-01 RX ADMIN — Medication 1 APPLICATION(S): at 06:34

## 2025-05-01 RX ADMIN — Medication 50 GRAM(S): at 01:15

## 2025-05-01 RX ADMIN — Medication 100 MILLIEQUIVALENT(S): at 19:04

## 2025-05-01 RX ADMIN — Medication 1000 MILLIGRAM(S): at 02:30

## 2025-05-01 RX ADMIN — Medication 0.25 MILLIGRAM(S): at 04:00

## 2025-05-01 RX ADMIN — DEXMEDETOMIDINE HYDROCHLORIDE IN SODIUM CHLORIDE 6.78 MICROGRAM(S)/KG/HR: 4 INJECTION INTRAVENOUS at 02:28

## 2025-05-01 RX ADMIN — IPRATROPIUM BROMIDE AND ALBUTEROL SULFATE 3 MILLILITER(S): .5; 2.5 SOLUTION RESPIRATORY (INHALATION) at 14:07

## 2025-05-01 RX ADMIN — IPRATROPIUM BROMIDE AND ALBUTEROL SULFATE 3 MILLILITER(S): .5; 2.5 SOLUTION RESPIRATORY (INHALATION) at 07:56

## 2025-05-01 RX ADMIN — CALCIUM GLUCONATE 200 GRAM(S): 20 INJECTION, SOLUTION INTRAVENOUS at 02:25

## 2025-05-01 RX ADMIN — IPRATROPIUM BROMIDE AND ALBUTEROL SULFATE 3 MILLILITER(S): .5; 2.5 SOLUTION RESPIRATORY (INHALATION) at 02:05

## 2025-05-01 RX ADMIN — DEXMEDETOMIDINE HYDROCHLORIDE IN SODIUM CHLORIDE 6.78 MICROGRAM(S)/KG/HR: 4 INJECTION INTRAVENOUS at 06:30

## 2025-05-01 RX ADMIN — Medication 40 MILLIGRAM(S): at 06:53

## 2025-05-01 RX ADMIN — HYDROXYZINE HYDROCHLORIDE 25 MILLIGRAM(S): 25 TABLET, FILM COATED ORAL at 00:16

## 2025-05-01 RX ADMIN — HEPARIN SODIUM 16 UNIT(S)/HR: 1000 INJECTION INTRAVENOUS; SUBCUTANEOUS at 06:30

## 2025-05-01 RX ADMIN — Medication 50 MILLIEQUIVALENT(S): at 12:04

## 2025-05-01 RX ADMIN — CALCIUM GLUCONATE 200 GRAM(S): 20 INJECTION, SOLUTION INTRAVENOUS at 03:00

## 2025-05-01 RX ADMIN — MEROPENEM 100 MILLIGRAM(S): 1 INJECTION INTRAVENOUS at 23:30

## 2025-05-01 RX ADMIN — MEROPENEM 100 MILLIGRAM(S): 1 INJECTION INTRAVENOUS at 12:03

## 2025-05-01 RX ADMIN — IPRATROPIUM BROMIDE AND ALBUTEROL SULFATE 3 MILLILITER(S): .5; 2.5 SOLUTION RESPIRATORY (INHALATION) at 19:56

## 2025-05-01 RX ADMIN — Medication 50 MILLIEQUIVALENT(S): at 02:26

## 2025-05-01 RX ADMIN — BUMETANIDE 2 MILLIGRAM(S): 1 TABLET ORAL at 12:04

## 2025-05-01 RX ADMIN — LABETALOL HYDROCHLORIDE 10 MILLIGRAM(S): 200 TABLET, FILM COATED ORAL at 04:26

## 2025-05-01 RX ADMIN — Medication 50 MILLIEQUIVALENT(S): at 01:19

## 2025-05-01 RX ADMIN — Medication 0.25 MILLIGRAM(S): at 03:14

## 2025-05-01 RX ADMIN — Medication 100 MILLIEQUIVALENT(S): at 17:57

## 2025-05-01 RX ADMIN — Medication 12.5 MG/HR: at 04:35

## 2025-05-01 RX ADMIN — AMLODIPINE BESYLATE 10 MILLIGRAM(S): 10 TABLET ORAL at 18:27

## 2025-05-01 RX ADMIN — MEROPENEM 100 MILLIGRAM(S): 1 INJECTION INTRAVENOUS at 00:18

## 2025-05-01 RX ADMIN — DEXMEDETOMIDINE HYDROCHLORIDE IN SODIUM CHLORIDE 6.78 MICROGRAM(S)/KG/HR: 4 INJECTION INTRAVENOUS at 12:04

## 2025-05-01 RX ADMIN — Medication 50 MILLIEQUIVALENT(S): at 09:12

## 2025-05-01 RX ADMIN — Medication 50 MILLIEQUIVALENT(S): at 10:31

## 2025-05-01 RX ADMIN — DEXMEDETOMIDINE HYDROCHLORIDE IN SODIUM CHLORIDE 6.78 MICROGRAM(S)/KG/HR: 4 INJECTION INTRAVENOUS at 00:31

## 2025-05-01 RX ADMIN — Medication 40 MILLIGRAM(S): at 17:57

## 2025-05-01 RX ADMIN — Medication 400 MILLIGRAM(S): at 01:32

## 2025-05-01 RX ADMIN — Medication 50 MILLIEQUIVALENT(S): at 03:30

## 2025-05-01 NOTE — PROGRESS NOTE ADULT - ASSESSMENT
Patient is a 64F PMHx HTN, morbid obesity s/p 2001 Abby-en-Y gastric bypass who presented on 4/3 with incarcerated ventral hernia with SBO. S/p open ventral hernia repair, small bowel resection, and primary fascial closure with Dr. Lema on 4/10. Currently admitted to SICU for monitoring. Nephrology consulted for FUNMI and oliguria.  # FUNMI most likely ATN   # resp failure on MV  # PE   #  incarcerated ventral hernia with SBO S/p open ventral hernia repair, small bowel resection, and primary fascial closure  # hypernatremia   s/p ex-lap, creation of new side to side ileoileostomy, ventral hernia repair w/ mesh    nephrology signed off/ cr  down to 1.3 recalled for FUNMI    FUNMI / ATN in nature  non oliguric   cr trending down   ph noted / no binders yet   follow platelet count   corrected calcium in the mid 8 / replete vit D   followed by ID / on melissa   follow ck level   will sign off recall as needed

## 2025-05-01 NOTE — PROGRESS NOTE ADULT - ATTENDING COMMENTS
Pt examined  labs and images reviewed  pt with supermorbid obesity and complex hernia with sbo  previously treated without surgery   passing flatus awaiting bowel movement but was having  more pain taken to OR urgently   4/10 : s/p BHUMI , Bowel Resection / Anastamosis / Primary repair of Fascia / hernia sac  overnight - renal failure / respiratory compromise  was intubated / paralysed/ botox  now on bumex - tapering   4/17 :  s/p BHUMI , Bowel Resection / Anastamosis / Primary repair of Fascia / hernia sac  4/22 : IR drain placed - subcutaneous collection  - purulent   4/27 : S/p exlap - no active bleeding , blood clots 1 liter evacuated  gradually improving  mariam drain sero sang  on oral food and taper tpn when reaches goal  on heparin drip     impression : extremely high risk - supermorbid obesity and complex hernia with partial sbo  tube feeds   heparin drip  SICU

## 2025-05-01 NOTE — PROGRESS NOTE ADULT - ASSESSMENT
ASSESSMENT  64-year-old female with a past medical history of high blood pressure and gastric bypass surgery in 2001, presenting with two days of sharp abdominal pain and one episode of non-bloody, non-bilious vomiting. She has a prior history of small bowel obstruction in 2006, 2008, and most recently in 2020, all of which resolved with conservative management    IMPRESSION  #SBO 2/2 Complex Incarcerated Ventral Hernia, s/p small bowel resection and ventral hernia repair 4/10   - OR Cx 4/10 - B frag, Clostridium species, E coli   #RTOR for Anastomotic Leak, SBR, Creation of New Ileoileostomy, Ventral Hernia Repair - 4/17 4/28 BCX NGTD     4/22 fluid cx NG   < from: CT Angio Abdomen and Pelvis w/ Oral Cont and w/ IV Cont (04.27.25 @ 11:39) >  Redemonstrated large volume complex fluid in the pelvis and along the left paracolic gutter, compatible with blood products.  - s/p OR for anastomatic leak/SBR/Creation of new ileostomy 4/17 - Wound Cx E coli   - s/p drainage of anterior fluid collection 4/22 -- 150cc of foul appearing material aspirated with THONY drain in place - CX NG   #Pulmonary Embolism   - CT Abdomen and Pelvis w/ Oral Cont and w/ IV Cont (04.21.25 @ 18:11): Evidence of a right distal main pulmonary embolus extending to right  lower lobar and segmental pulmonary arteries: Examination limited by  streak artifact; no definite right heart strain .  #Leukocytosis   #Super Morbid Obesity BMI (kg/m2): 53  #Abx allergy: No Known Allergies  #FUNMI Creatinine: 2.3 mg/dL (05-01-25 @ 00:00) crcl calculated 34     RECOMMENDATIONS  - Send baseline ESR CRP   - continue meropenem 1g q 12 hours  - Repeat CT showing Redemonstrated large volume complex fluid in the pelvis and along the left paracolic gutter, compatible with blood products. 4/22 IR cx NG (but concerning for purulent appearing fluid)  - Midline x ertapenem 1g q24h IV on D/C x 4 weeks E/D 5/19   - Please order weekly CBC, CMP, ESR/CRP  - ID follow-up with Dr. Adal Lucero for Telehealth. We will call the patient between 8:00-4:30      [id number]       [phone]       Fax [phone]     If any questions, please text or call on Microsoft Teams  Please continue to update ID with any pertinent new clinical, laboratory or radiographic findings

## 2025-05-01 NOTE — PROGRESS NOTE ADULT - SUBJECTIVE AND OBJECTIVE BOX
seen and examined  24 h events noted   on BIPAP         PAST HISTORY  --------------------------------------------------------------------------------  No significant changes to PMH, PSH, FHx, SHx, unless otherwise noted    ALLERGIES & MEDICATIONS  --------------------------------------------------------------------------------  Allergies    No Known Allergies    Intolerances      Standing Inpatient Medications  albuterol/ipratropium for Nebulization 3 milliLiter(s) Nebulizer every 6 hours  chlorhexidine 2% Cloths 1 Application(s) Topical <User Schedule>  dexMEDEtomidine Infusion 0.2 MICROgram(s)/kG/Hr IV Continuous <Continuous>  heparin  Infusion 1600 Unit(s)/Hr IV Continuous <Continuous>  insulin lispro (ADMELOG) corrective regimen sliding scale   SubCutaneous every 6 hours  meropenem  IVPB 1000 milliGRAM(s) IV Intermittent every 12 hours  pantoprazole  Injectable 40 milliGRAM(s) IV Push every 12 hours    PRN Inpatient Medications  HYDROmorphone  Injectable 0.25 milliGRAM(s) IV Push every 4 hours PRN          VITALS/PHYSICAL EXAM  --------------------------------------------------------------------------------  T(C): 35.9 (05-01-25 @ 12:00), Max: 37.6 (04-30-25 @ 16:00)  HR: 62 (05-01-25 @ 11:00) (59 - 122)  BP: --  RR: 12 (05-01-25 @ 11:00) (12 - 42)  SpO2: 98% (05-01-25 @ 11:00) (84% - 100%)  Wt(kg): --        04-30-25 @ 07:01  -  05-01-25 @ 07:00  --------------------------------------------------------  IN: 1332.7 mL / OUT: 2850 mL / NET: -1517.3 mL    05-01-25 @ 07:01  -  05-01-25 @ 12:55  --------------------------------------------------------  IN: 440 mL / OUT: 692.5 mL / NET: -252.5 mL      Physical Exam:  	Gen: bipap  	Pulm: B/L shannon   	CV: S1S2; no rub  	Abd: +distended  	LE:  edema      LABS/STUDIES  --------------------------------------------------------------------------------              8.4    8.86  >-----------<  93       [05-01-25 @ 00:00]              25.3     146  |  111  |  69  ----------------------------<  112      [05-01-25 @ 06:50]  3.4   |  20  |  1.9        Ca     8.1     [05-01-25 @ 06:50]      Mg     1.9     [05-01-25 @ 00:00]      Phos  6.3     [05-01-25 @ 00:00]        PTT: 46.1       [05-01-25 @ 06:50]      Creatinine Trend:  SCr 1.9 [05-01 @ 06:50]  SCr 2.3 [05-01 @ 00:00]  SCr 2.7 [04-29 @ 20:15]  SCr 2.9 [04-28 @ 20:53]  SCr 2.7 [04-28 @ 04:00]    Urinalysis - [05-01-25 @ 06:50]      Color  / Appearance  / SG  / pH       Gluc 112 / Ketone   / Bili  / Urobili        Blood  / Protein  / Leuk Est  / Nitrite       RBC  / WBC  / Hyaline  / Gran  / Sq Epi  / Non Sq Epi  / Bacteria       Vitamin D (25OH) 13      [04-19-25 @ 05:08]  Lipid: chol --, , HDL --, LDL --      [04-20-25 @ 04:59]

## 2025-05-01 NOTE — PROGRESS NOTE ADULT - ATTENDING COMMENTS
This patient has a high probability of sudden, clinically significant deterioration, which requires the highest level of physician preparedness to intervene urgently. I managed/supervised life or organ supporting interventions that required frequent physician assessment. I devoted my full attention in the ICU to the direct care of this patient for the period of time indicated below. Time I spent with the family or surrogate(s) is included only if the patient was incapable of providing the necessary information or participating in medical decision making. Time devoted to teaching and to any procedures I billed separately is not included.     Patient examined and evaluated at the bedside with SICU team. I performed a medically appropriate exam. Pertinent findings are detailed below. Vital signs, laboratory work, and imaging reviewed.     Neuro:  awake, alert  #Anxiety disorder - precedex @ 1.5    Respiratory: respirations even and unlabored on BiPAP 12/6 @ 50% FiO2  CXR reviewed and interpreted by me - bilateral low lung volumes  Mode: CPAP with PS, FiO2: 40, PEEP: 8, PS: 8, ITime: 0.95, MAP: 10, PIP: 17  ABG - ( 01 May 2025 04:34 )  pH, Arterial: 7.44  pH, Blood: x     /  pCO2: 32    /  pO2: 131   / HCO3: 22    / Base Excess: -2.1  /  SaO2: 98.3    #Hypoxic respiratory failure - required HFNC yesterday and then tachypneic this morning, transitioned to BiPAP, duonebs, give 2mg IV bumex today  #Pulmonary embolism/Venous thrombosis -  heparin infusion    CV: monitor hemodynamics, MAP goal > 65    GI: THONY drains serosanguinous, midline wound vac  #Nutrition - Protein-calorie malnutrition - acute illness, poor nutritional intake, fluid retention - primary surgery team OK with starting diet, will attempt diet when off BiPAP  #Ppx - protonix 40mg IV BID (Hx of gastric bypass)    Renal: Continue I&Os monitoring, replete electrolytes as needed  05-01    146  |  111[H]  |  69[HH]  ----------------------------<  112[H]  3.4[L]   |  20  |  1.9[H]    Ca 8.1[L]; Mg x ; Phos x     UOP - OUT: 2820 mL  I/O - IN: 1332.7 mL / OUT: 2850 mL / NET: -1517.3 mL    #Hypokalemia - 60mEq KCl IVPB given  #Acute kidney injury - trend creatinine, monitor potassium, UOP adequate for now, creatinine trending down with diureses, give additional 2mg IV bumex    An catheter - hourly I/O in critically ill patient    Heme: continue to evaluate for acute blood loss anemia- trend Hg/Hct                         8.4    8.86  )-----------( 93       ( 01 May 2025 00:00 )             25.3     PT/INR/PTT - ( 01 May 2025 06:50 )   PT: x    ; INR: x    ; PTT 46.1 sec    Anticoagulation - heparin gtt    ID: trend WBC, monitor for fevers  #Abdominal collection - meropenem 1g Q8H per ID    Endocrine: Prevent and treat hyperglycemia with insulin as needed    PV: follow pulse exam    MSK: OOB and mobilize as able, PT eval    Skin: decub precautions    Lines: THONY drains x 3, R IJ TLC, L radial arterialline, abdominal woudn vac, An, dignishield  DVT Prophylaxis: heparin infusion  Stress Ulcer Prophylaxis: protonix 40mg BID  Disposition: SICU - hypoxic respiratory failure, acute kidney injury    Blaise Allen MD  Trauma/Acute Care Surgery/Surgical Critical Care Attending

## 2025-05-01 NOTE — PROGRESS NOTE ADULT - SUBJECTIVE AND OBJECTIVE BOX
GENERAL SURGERY PROGRESS NOTE    Patient: NANCY SARGENT , 64y (62)Female   MRN: [id number]  Location: [address]  Visit: 25 Inpatient  Date: 25 @ 01:09    Hospital Day #: 29  Post-Op Day #: 14    Events of past 24 hours:  - A&O x 3, on 60L/60% HFNC, precedex @ 1.5, hep gtt @ 13U, abdomen soft, non-tender, THONY drain x 3, serosanginous, more sanginous than serous  - slightly disoriented on reassessment agitated, yelling likely ICU delirium, given 25mg atarax  - PTT 40 > increased heparin to 1600u > next PTT 7am    PAST MEDICAL & SURGICAL HISTORY:  Gastric bypass status for obesity      LOUIS (obstructive sleep apnea)      S/P gastric bypass      S/P       S/P small bowel resection      History of total bilateral knee replacement (TKR)      H/O colonoscopy            Vitals:   T(F): 99.6 (25 @ 16:00), Max: 99.6 (25 @ 12:00)  HR: 83 (25 @ 00:00)  BP: --  RR: 23 (25 @ 00:00)  SpO2: 96% (25 @ 23:00)  Mode: CPAP with PS, FiO2: 40, PEEP: 8, PS: 8, ITime: 0.95, MAP: 10, PIP: 17    Diet, NPO      Fluids:     I & O's:    25 @ 07:01  -  25 @ 07:00  --------------------------------------------------------  IN:    Dexmedetomidine: 473 mL    FentaNYL: 85 mL    IV PiggyBack: 50 mL    Lactated Ringers: 240 mL    Nepro: 360 mL    Norepinephrine: 39.6 mL    PRBCs (Packed Red Blood Cells): 300 mL    Propofol: 16.3 mL    Vasopressin (Organ Donation): 144 mL  Total IN: 1707.9 mL    OUT:    Bulb (mL): 90 mL    Bulb (mL): 32.5 mL    Bulb (mL): 65 mL    Indwelling Catheter - Urethral (mL): 1540 mL    Rectal Tube (mL): 20 mL    VAC (Vacuum Assisted Closure) System (mL): 0 mL  Total OUT: 1747.5 mL    Total NET: -39.6 mL          Physical Examination:  General Appearance: Sedated, intubated  Heart: RRR  Lungs: HFNC 60/60  Abdomen: Abdomen soft, somewhat distended, midline prevena vac in place, x 3 drains in place- all serosanguinous, right pelvic drain appears most sanguinous of 3 drains.  MSK/Extremities: cool to touch, edematous  Skin: Warm, dry. No jaundice.       MEDICATIONS  (STANDING):  acetaminophen   IVPB .. 1000 milliGRAM(s) IV Intermittent once  acetaminophen   IVPB .. 1000 milliGRAM(s) IV Intermittent once  albuterol/ipratropium for Nebulization 3 milliLiter(s) Nebulizer every 6 hours  chlorhexidine 2% Cloths 1 Application(s) Topical <User Schedule>  dexMEDEtomidine Infusion 0.2 MICROgram(s)/kG/Hr (6.78 mL/Hr) IV Continuous <Continuous>  heparin  Infusion 1600 Unit(s)/Hr (16 mL/Hr) IV Continuous <Continuous>  insulin lispro (ADMELOG) corrective regimen sliding scale   SubCutaneous every 6 hours  magnesium sulfate  IVPB 1 Gram(s) IV Intermittent once  meropenem  IVPB 1000 milliGRAM(s) IV Intermittent every 12 hours  pantoprazole  Injectable 40 milliGRAM(s) IV Push every 12 hours  potassium chloride  20 mEq/100 mL IVPB 20 milliEquivalent(s) IV Intermittent every 2 hours    MEDICATIONS  (PRN):  HYDROmorphone  Injectable 0.25 milliGRAM(s) IV Push every 4 hours PRN Severe Pain (7 - 10)      DVT PROPHYLAXIS: heparin  Infusion 1600 Unit(s)/Hr IV Continuous <Continuous>    GI PROPHYLAXIS: pantoprazole  Injectable 40 milliGRAM(s) IV Push every 12 hours    ANTICOAGULATION:   ANTIBIOTICS:  meropenem  IVPB 1000 milliGRAM(s)            LAB/STUDIES:  Labs:  CAPILLARY BLOOD GLUCOSE      POCT Blood Glucose.: 120 mg/dL (01 May 2025 00:14)  POCT Blood Glucose.: 97 mg/dL (2025 17:02)  POCT Blood Glucose.: 157 mg/dL (2025 11:03)  POCT Blood Glucose.: 184 mg/dL (2025 04:59)                          8.4    8.86  )-----------( 93       ( 01 May 2025 00:00 )             25.3             148[H]  |  110  |  74[HH]  ----------------------------<  117[H]  3.5   |  21  |  2.3[H]      Calcium: 7.7 mg/dL (25 @ 00:00)      LFTs:     Blood Gas Arterial, Lactate: 0.7 mmol/L (25 @ 15:23)  Blood Gas Arterial, Lactate: 0.8 mmol/L (25 @ 11:00)  Blood Gas Arterial, Lactate: 0.8 mmol/L (25 @ 06:42)  Blood Gas Arterial, Lactate: 0.8 mmol/L (25 @ 05:08)  Blood Gas Arterial, Lactate: 0.9 mmol/L (25 @ 04:53)  Blood Gas Arterial, Lactate: 1.2 mmol/L (25 @ 05:03)    ABG - ( 2025 15:23 )  pH: 7.42  /  pCO2: 32    /  pO2: 70    / HCO3: 21    / Base Excess: -3.0  /  SaO2: 94.8            ABG - ( 2025 11:00 )  pH: 7.46  /  pCO2: 32    /  pO2: 89    / HCO3: 23    / Base Excess: -0.3  /  SaO2: 98.0            ABG - ( 2025 06:42 )  pH: 7.45  /  pCO2: 28    /  pO2: 132   / HCO3: 20    / Base Excess: -3.8  /  SaO2: 100.0             Coags:     x      ----< x       ( 01 May 2025 00:00 )     40.1                Urinalysis Basic - ( 01 May 2025 00:00 )    Color: x / Appearance: x / SG: x / pH: x  Gluc: 117 mg/dL / Ketone: x  / Bili: x / Urobili: x   Blood: x / Protein: x / Nitrite: x   Leuk Esterase: x / RBC: x / WBC x   Sq Epi: x / Non Sq Epi: x / Bacteria: x        Culture - Blood (collected 2025 12:10)  Source: Blood None  Preliminary Report (2025 22:01):    No growth at 48 Hours        ASSESSMENT  64y F w/ PMHx of  Morbid obesity, LOUIS, large complex ventral hernia s/p repair sbr and loss of domain c/b post op intubation, hypotension requiring vasopressors, anastomotic leakage s/p RTOR and reanastomosis, now doing better post op from a respiratory status, however, still tenuous. She has a rising leukocytosis and an abdominal fluid collection which was drained by IR on , Zosyn broadened to meropenem, and has a newly diagnosed DVT/PE which she is currently on a LVX 135BID S/P RTOR for ex lap      PLAN:  - Monitor drain output quality  - Wean of HFNC as tolerated  - TF  - HD monitoring  - Monitor bowel function  - Monitor for fevers  - Rest of care per SICU      SURGERY SPECTRA: [id number]

## 2025-05-01 NOTE — PHARMACOTHERAPY INTERVENTION NOTE - COMMENTS
Patient CrCl 34 ml/min, recommend to increase dose of piperacillin-tazobactam to 3.375 g IV q8h 
BMI: 54.9 - recommend to increase heparin prophylaxis to 7500 mg SQ q 8h 
NANCY SARGENT 64y Female    Assessed patient medications for high-risk medications:    Benzodiazepines  Opioids  High-anticholinergic medications  Sedatives/ sleep medications  Muscle relaxants  Tricyclic antidepressants  Antipsychotics    albuterol/ipratropium for Nebulization 3 milliLiter(s) Nebulizer every 6 hours  chlorhexidine 2% Cloths 1 Application(s) Topical <User Schedule>  dexMEDEtomidine Infusion 0.2 MICROgram(s)/kG/Hr IV Continuous <Continuous>  heparin  Infusion 1600 Unit(s)/Hr IV Continuous <Continuous>  HYDROmorphone  Injectable 0.25 milliGRAM(s) IV Push every 4 hours PRN  insulin lispro (ADMELOG) corrective regimen sliding scale   SubCutaneous every 6 hours  meropenem  IVPB 1000 milliGRAM(s) IV Intermittent every 12 hours  pantoprazole  Injectable 40 milliGRAM(s) IV Push every 12 hours      [     ] Team informed of high risk medications   [     ] None of the above medications identified.   [  x    ] Discussed with prescriber and no interventions at this time  - patient is currently on hydromorphone IV push for pain due to NPO, still requiring doses at this time, no side effects identified and will continue to monitor     Discussed with prescriber, if appropriate, to consider:   [      ]HOLDING   [      ]REDUCING  [      ]CONTINUE  
Recommend to switch pantoprazole iv to po
BMI is 54.9 - recommend to increase dose to zosyn to 4.5 g IV q8h 
calcium chloride 1 mg  ordered  clarified with pa did she mean  1 gm ?  ., --clarified    1 gm

## 2025-05-01 NOTE — PROGRESS NOTE ADULT - SUBJECTIVE AND OBJECTIVE BOX
NANCY SARGENT   [id number]   [birth year]  64yF  ============================================================   DATE OF INITIAL SICU/SDU CONSULT: 04-10-25    INDICATION FOR SICU CONSULT:  q1hr abdominal checks, mechanical ventilation    SICU COURSE EVENTS :  04-10 - admitted to SICU service  4/11: Intubated and started on paralytic. Arterial line placed, subclavian TLC placed. Nephrology consulted for oliguria. IR abdominal muscle botox injection performed. TTE performed.   4/12: Additional fluid boluses given; trial fo bumex started, considering CVVH. Weaned off nimbex gtt.  > 220 /hr. Renal U/S w/out hydro.   4/13: Weaned off Levophed. Bumex gtt 2mg/hr > 1mg/hr. Goal net negative 1-1.5L, UOP approx, 200c/hr.   4/14: Remained on bumex gtt for further diuresis, decreased to 0.5 overnight, vent settings weaned. Cr downtrending, LFTs worsening.   4/15: Extubated to BiPAP, transitioned to NC. Dc'd bumex gtt, given 5mg metolazone x 1 and 2mg bumex x 1.   4/16: THONY drain #2 murky/bilious, pending CTAP with PO contrast, hypernatremia, started D5W @ 75cc/hr, persistent hypokalemia   4/17: RTOR for resection of anastomosis for anastomotic leak. Returned intubated, 400/24/80/10, sinus tachy to . Abdomen soft. Was initially on propofol @ 30 and precedex, but propofol weaned off due to hypotension with MAPs in 50s, fentanyl ggt started for acute pain. Remained hypotensive despite fluids, started on Levophed, on 0.05.   4/18: Extubated. HFNC 40L/49%, Levo off since 11 AM   4/19: NGT removed. Started on duonebs. D5W increased to 60cc/hr. 1mg bumex, > 1.5L response  4/20: Episode of afib RVR resolved with metoprolol 5mg IVP, cardiology c/s placed, recommending metoprolol 25mg q 12   4/21: PE on CTA, therapeutic heparin gtt started. Started on cardebe gtt,   4/22: Meropenem started per ID.  S/p IR percutaneous placement of a 8.5 Citizen of [place] drainage catheter into lower abdominal wall fluid collection, yielding 150 mL of bowel appearing fluid. New left radial a-line placed. Off nicarrdipine gtt.   4/23: Switched to therapeutic lovenox. Diet advanced to aubrey clears with ensures.  4/24: advacned to bariatric FLD, downgraded to SDU  4/25: TPN discontinued, diet advanced to full liquid , Right cephalic DVT prelim  4/26: Right UE VA Duplex final no DVT, thromboplebitis, Arterial line removed, 2L NC, midline vac changed, noted to have bilious drainage, RUQ drain pulled, upgraded to SICU status, plan to reorder TPN for 4/27 PM, diet changed to full liquid. 1:20 AM noted to be unresponsive, palpable femoral pulse, decision made to intubate, R femoral arterial line placed, started on levophed, amauri, bicarb amp x 2, bicarb gtt, propofol, 2U pRBC for hgb 5.9, plan for CTH and CTAP when stabilized   4/27: 2uPRBC 1uFFP 1pkPlt, TXA 1g, IR for embolization due to active extravasation seen on CTA, no active bleed, no embolization, Return to OR for re-exploration and evacuation of old clot  4/28: IR consulted for IVC filter and PICC. LE duplex ordered, negative. Precedex added overnight for agitation. 1 U PRBC for Hgb 6.8 overnight  4/29: TF started at 20cc/hr. Meropenem decreased to Q 12 hours.   4/30: Extubated to 5L NC, then placed on HFNC. Heparin gtt restarted.     24Hour Events:  4/30  NIGHT  -A&O x 3, on 60L/60% HFNC, precedex @ 1.5, hep gtt @ 13U, abdomen soft, non-tender, THONY drain x 3, serosanginous, more sanginous than serous  -slightly disoriented on reassesment, agitated, yelling likely ICU delirium, given 25mg atarax  - PTT 40 > increased heparin to 1600u > next PTT 7am  - 60meq KCL, 1g mag repleted  - 4g calcium  - PaO2 60s > increased to 100% high flow, remains on 60L    Day  decrease peep 8, SBT  heparin gtt @ 1000U, PTT @ 1600 > PTT 30. heparin increased to 1300U, next PTT 1130pm  2g Ca x2  - Extubated to 5L NC at 14:00, pending bedside swallow > no issues swallowing but out of breath, keeping NPO  - 15:00 ABG  - Bumex 2mg x 1  - Switched to HFNC 60/60  - CK requested by nephro    [X] A ten-point review of systems was negative except as expressed in note.  [X ] History was obtained from patient. If unable to participate in their care, history was from review of the chart and collateral sources of information.  ================================================================================     NANCY SARGENT   [id number]   [birth year]  64yF  ============================================================   DATE OF INITIAL SICU/SDU CONSULT: 04-10-25    INDICATION FOR SICU CONSULT:  q1hr abdominal checks, mechanical ventilation    SICU COURSE EVENTS :  04-10 - admitted to SICU service  4/11: Intubated and started on paralytic. Arterial line placed, subclavian TLC placed. Nephrology consulted for oliguria. IR abdominal muscle botox injection performed. TTE performed.   4/12: Additional fluid boluses given; trial fo bumex started, considering CVVH. Weaned off nimbex gtt.  > 220 /hr. Renal U/S w/out hydro.   4/13: Weaned off Levophed. Bumex gtt 2mg/hr > 1mg/hr. Goal net negative 1-1.5L, UOP approx, 200c/hr.   4/14: Remained on bumex gtt for further diuresis, decreased to 0.5 overnight, vent settings weaned. Cr downtrending, LFTs worsening.   4/15: Extubated to BiPAP, transitioned to NC. Dc'd bumex gtt, given 5mg metolazone x 1 and 2mg bumex x 1.   4/16: THONY drain #2 murky/bilious, pending CTAP with PO contrast, hypernatremia, started D5W @ 75cc/hr, persistent hypokalemia   4/17: RTOR for resection of anastomosis for anastomotic leak. Returned intubated, 400/24/80/10, sinus tachy to . Abdomen soft. Was initially on propofol @ 30 and precedex, but propofol weaned off due to hypotension with MAPs in 50s, fentanyl ggt started for acute pain. Remained hypotensive despite fluids, started on Levophed, on 0.05.   4/18: Extubated. HFNC 40L/49%, Levo off since 11 AM   4/19: NGT removed. Started on duonebs. D5W increased to 60cc/hr. 1mg bumex, > 1.5L response  4/20: Episode of afib RVR resolved with metoprolol 5mg IVP, cardiology c/s placed, recommending metoprolol 25mg q 12   4/21: PE on CTA, therapeutic heparin gtt started. Started on cardebe gtt,   4/22: Meropenem started per ID.  S/p IR percutaneous placement of a 8.5 [place] drainage catheter into lower abdominal wall fluid collection, yielding 150 mL of bowel appearing fluid. New left radial a-line placed. Off nicarrdipine gtt.   4/23: Switched to therapeutic lovenox. Diet advanced to aubrey clears with ensures.  4/24: advacned to bariatric FLD, downgraded to SDU  4/25: TPN discontinued, diet advanced to full liquid , Right cephalic DVT prelim  4/26: Right UE VA Duplex final no DVT, thromboplebitis, Arterial line removed, 2L NC, midline vac changed, noted to have bilious drainage, RUQ drain pulled, upgraded to SICU status, plan to reorder TPN for 4/27 PM, diet changed to full liquid. 1:20 AM noted to be unresponsive, palpable femoral pulse, decision made to intubate, R femoral arterial line placed, started on levophed, amauri, bicarb amp x 2, bicarb gtt, propofol, 2U pRBC for hgb 5.9, plan for CTH and CTAP when stabilized   4/27: 2uPRBC 1uFFP 1pkPlt, TXA 1g, IR for embolization due to active extravasation seen on CTA, no active bleed, no embolization, Return to OR for re-exploration and evacuation of old clot  4/28: IR consulted for IVC filter and PICC. LE duplex ordered, negative. Precedex added overnight for agitation. 1 U PRBC for Hgb 6.8 overnight  4/29: TF started at 20cc/hr. Meropenem decreased to Q 12 hours.   4/30: Extubated to 5L NC, then placed on HFNC. Heparin gtt restarted.     24Hour Events:  4/30  NIGHT  -A&O x 3, on 60L/60% HFNC, precedex @ 1.5, hep gtt @ 13U, abdomen soft, non-tender, THONY drain x 3, serosanginous, more sanginous than serous  -slightly disoriented on reassesment, agitated, yelling likely ICU delirium, given 25mg atarax  - PTT 40 > increased heparin to 1600u > next PTT 7am  - 60meq KCL, 1g mag repleted  - 4g calcium  - PaO2 60s > increased to 100% high flow, remains on 60L  -hypertensive to 200s--> 10mg labetalol x 1, cardene gtt started    Day  decrease peep 8, SBT  heparin gtt @ 1000U, PTT @ 1600 > PTT 30. heparin increased to 1300U, next PTT 1130pm  2g Ca x2  - Extubated to 5L NC at 14:00, pending bedside swallow > no issues swallowing but out of breath, keeping NPO  - 15:00 ABG  - Bumex 2mg x 1  - Switched to HFNC 60/60  - CK requested by nephro    [X] A ten-point review of systems was negative except as expressed in note.  [X ] History was obtained from patient. If unable to participate in their care, history was from review of the chart and collateral sources of information.  ================================================================================     NANCY SARGENT   [id number]   [birth year]  64yF  ============================================================   DATE OF INITIAL SICU/SDU CONSULT: 04-10-25    INDICATION FOR SICU CONSULT:  q1hr abdominal checks, mechanical ventilation    SICU COURSE EVENTS :  04-10 - admitted to SICU service  4/11: Intubated and started on paralytic. Arterial line placed, subclavian TLC placed. Nephrology consulted for oliguria. IR abdominal muscle botox injection performed. TTE performed.   4/12: Additional fluid boluses given; trial fo bumex started, considering CVVH. Weaned off nimbex gtt.  > 220 /hr. Renal U/S w/out hydro.   4/13: Weaned off Levophed. Bumex gtt 2mg/hr > 1mg/hr. Goal net negative 1-1.5L, UOP approx, 200c/hr.   4/14: Remained on bumex gtt for further diuresis, decreased to 0.5 overnight, vent settings weaned. Cr downtrending, LFTs worsening.   4/15: Extubated to BiPAP, transitioned to NC. Dc'd bumex gtt, given 5mg metolazone x 1 and 2mg bumex x 1.   4/16: THONY drain #2 murky/bilious, pending CTAP with PO contrast, hypernatremia, started D5W @ 75cc/hr, persistent hypokalemia   4/17: RTOR for resection of anastomosis for anastomotic leak. Returned intubated, 400/24/80/10, sinus tachy to . Abdomen soft. Was initially on propofol @ 30 and precedex, but propofol weaned off due to hypotension with MAPs in 50s, fentanyl ggt started for acute pain. Remained hypotensive despite fluids, started on Levophed, on 0.05.   4/18: Extubated. HFNC 40L/49%, Levo off since 11 AM   4/19: NGT removed. Started on duonebs. D5W increased to 60cc/hr. 1mg bumex, > 1.5L response  4/20: Episode of afib RVR resolved with metoprolol 5mg IVP, cardiology c/s placed, recommending metoprolol 25mg q 12   4/21: PE on CTA, therapeutic heparin gtt started. Started on cardebe gtt,   4/22: Meropenem started per ID.  S/p IR percutaneous placement of a 8.5 [place] drainage catheter into lower abdominal wall fluid collection, yielding 150 mL of bowel appearing fluid. New left radial a-line placed. Off nicarrdipine gtt.   4/23: Switched to therapeutic lovenox. Diet advanced to aubrey clears with ensures.  4/24: advacned to bariatric FLD, downgraded to SDU  4/25: TPN discontinued, diet advanced to full liquid , Right cephalic DVT prelim  4/26: Right UE VA Duplex final no DVT, thromboplebitis, Arterial line removed, 2L NC, midline vac changed, noted to have bilious drainage, RUQ drain pulled, upgraded to SICU status, plan to reorder TPN for 4/27 PM, diet changed to full liquid. 1:20 AM noted to be unresponsive, palpable femoral pulse, decision made to intubate, R femoral arterial line placed, started on levophed, amauri, bicarb amp x 2, bicarb gtt, propofol, 2U pRBC for hgb 5.9, plan for CTH and CTAP when stabilized   4/27: 2uPRBC 1uFFP 1pkPlt, TXA 1g, IR for embolization due to active extravasation seen on CTA, no active bleed, no embolization, Return to OR for re-exploration and evacuation of old clot  4/28: IR consulted for IVC filter and PICC. LE duplex ordered, negative. Precedex added overnight for agitation. 1 U PRBC for Hgb 6.8 overnight  4/29: TF started at 20cc/hr. Meropenem decreased to Q 12 hours.   4/30: Extubated to 5L NC, then placed on HFNC. Heparin gtt restarted.     24Hour Events:  4/30  NIGHT  -A&O x 3, on 60L/60% HFNC, precedex @ 1.5, hep gtt @ 13U, abdomen soft, non-tender, THONY drain x 3, serosanginous, more sanginous than serous  -slightly disoriented on reassesment, agitated, yelling likely ICU delirium, given 25mg atarax  - PTT 40 > increased heparin to 1600u > next PTT 7am  - 60meq KCL, 1g mag repleted  - 4g calcium  - PaO2 60s > increased to 100% high flow, remains on 60L  -hypertensive to 200s--> 10mg labetalol x 1, cardene gtt started  - persistently hypoxic and tachypneic despite HFNC > placed on bipap      Day  decrease peep 8, SBT  heparin gtt @ 1000U, PTT @ 1600 > PTT 30. heparin increased to 1300U, next PTT 1130pm  2g Ca x2  - Extubated to 5L NC at 14:00, pending bedside swallow > no issues swallowing but out of breath, keeping NPO  - 15:00 ABG  - Bumex 2mg x 1  - Switched to HFNC 60/60  - CK requested by nephro    [X] A ten-point review of systems was negative except as expressed in note.  [X ] History was obtained from patient. If unable to participate in their care, history was from review of the chart and collateral sources of information.  ================================================================================     NANCY SARGENT   [id number]   [birth year]  64yF  ============================================================   DATE OF INITIAL SICU/SDU CONSULT: 04-10-25    INDICATION FOR SICU CONSULT:  q1hr abdominal checks, mechanical ventilation    SICU COURSE EVENTS :  04-10 - admitted to SICU service  4/11: Intubated and started on paralytic. Arterial line placed, subclavian TLC placed. Nephrology consulted for oliguria. IR abdominal muscle botox injection performed. TTE performed.   4/12: Additional fluid boluses given; trial fo bumex started, considering CVVH. Weaned off nimbex gtt.  > 220 /hr. Renal U/S w/out hydro.   4/13: Weaned off Levophed. Bumex gtt 2mg/hr > 1mg/hr. Goal net negative 1-1.5L, UOP approx, 200c/hr.   4/14: Remained on bumex gtt for further diuresis, decreased to 0.5 overnight, vent settings weaned. Cr downtrending, LFTs worsening.   4/15: Extubated to BiPAP, transitioned to NC. Dc'd bumex gtt, given 5mg metolazone x 1 and 2mg bumex x 1.   4/16: THONY drain #2 murky/bilious, pending CTAP with PO contrast, hypernatremia, started D5W @ 75cc/hr, persistent hypokalemia   4/17: RTOR for resection of anastomosis for anastomotic leak. Returned intubated, 400/24/80/10, sinus tachy to . Abdomen soft. Was initially on propofol @ 30 and precedex, but propofol weaned off due to hypotension with MAPs in 50s, fentanyl ggt started for acute pain. Remained hypotensive despite fluids, started on Levophed, on 0.05.   4/18: Extubated. HFNC 40L/49%, Levo off since 11 AM   4/19: NGT removed. Started on duonebs. D5W increased to 60cc/hr. 1mg bumex, > 1.5L response  4/20: Episode of afib RVR resolved with metoprolol 5mg IVP, cardiology c/s placed, recommending metoprolol 25mg q 12   4/21: PE on CTA, therapeutic heparin gtt started. Started on cardebe gtt,   4/22: Meropenem started per ID.  S/p IR percutaneous placement of a 8.5 [place] drainage catheter into lower abdominal wall fluid collection, yielding 150 mL of bowel appearing fluid. New left radial a-line placed. Off nicarrdipine gtt.   4/23: Switched to therapeutic lovenox. Diet advanced to aubrey clears with ensures.  4/24: advacned to bariatric FLD, downgraded to SDU  4/25: TPN discontinued, diet advanced to full liquid , Right cephalic DVT prelim  4/26: Right UE VA Duplex final no DVT, thromboplebitis, Arterial line removed, 2L NC, midline vac changed, noted to have bilious drainage, RUQ drain pulled, upgraded to SICU status, plan to reorder TPN for 4/27 PM, diet changed to full liquid. 1:20 AM noted to be unresponsive, palpable femoral pulse, decision made to intubate, R femoral arterial line placed, started on levophed, amauri, bicarb amp x 2, bicarb gtt, propofol, 2U pRBC for hgb 5.9, plan for CTH and CTAP when stabilized   4/27: 2uPRBC 1uFFP 1pkPlt, TXA 1g, IR for embolization due to active extravasation seen on CTA, no active bleed, no embolization, Return to OR for re-exploration and evacuation of old clot  4/28: IR consulted for IVC filter and PICC. LE duplex ordered, negative. Precedex added overnight for agitation. 1 U PRBC for Hgb 6.8 overnight  4/29: TF started at 20cc/hr. Meropenem decreased to Q 12 hours.   4/30: Extubated to 5L NC, then placed on HFNC. Heparin gtt restarted.     24Hour Events:  4/30  NIGHT  -A&O x 3, on 60L/60% HFNC, precedex @ 1.5, hep gtt @ 13U, abdomen soft, non-tender, THONY drain x 3, serosanginous, more sanginous than serous  -slightly disoriented on reassesment, agitated, yelling likely ICU delirium, given 25mg atarax  - PTT 40 > increased heparin to 1600u > next PTT 7am  - 60meq KCL, 1g mag repleted  - 4g calcium  - PaO2 60s > increased to 100% high flow, remains on 60L  -hypertensive to 200s--> 10mg labetalol x 1, cardene gtt started  - persistently hypoxic and tachypneic despite HFNC > placed on bipap    Day  decrease peep 8, SBT  heparin gtt @ 1000U, PTT @ 1600 > PTT 30. heparin increased to 1300U, next PTT 1130pm  2g Ca x2  - Extubated to 5L NC at 14:00, pending bedside swallow > no issues swallowing but out of breath, keeping NPO  - 15:00 ABG  - Bumex 2mg x 1  - Switched to HFNC 60/60  - CK requested by nephro    [X] A ten-point review of systems was negative except as expressed in note.  [X ] History was obtained from patient. If unable to participate in their care, history was from review of the chart and collateral sources of information.  ================================================================================  Daily     Diet, NPO (04-30-25 @ 15:46)    CURRENT MEDS:  Neurologic Medications  acetaminophen   IVPB .. 1000 milliGRAM(s) IV Intermittent once  dexMEDEtomidine Infusion 0.2 MICROgram(s)/kG/Hr IV Continuous <Continuous>  HYDROmorphone  Injectable 0.25 milliGRAM(s) IV Push every 4 hours PRN Severe Pain (7 - 10)    Respiratory Medications  albuterol/ipratropium for Nebulization 3 milliLiter(s) Nebulizer every 6 hours    Cardiovascular Medications  niCARdipine Infusion 2.5 mG/Hr IV Continuous <Continuous>    Gastrointestinal Medications  pantoprazole  Injectable 40 milliGRAM(s) IV Push every 12 hours    Genitourinary Medications    Hematologic/Oncologic Medications  heparin  Infusion 1600 Unit(s)/Hr IV Continuous <Continuous>    Antimicrobial/Immunologic Medications  meropenem  IVPB 1000 milliGRAM(s) IV Intermittent every 12 hours    Endocrine/Metabolic Medications  insulin lispro (ADMELOG) corrective regimen sliding scale   SubCutaneous every 6 hours    Topical/Other Medications  chlorhexidine 2% Cloths 1 Application(s) Topical <User Schedule>    ICU Vital Signs Last 24 Hrs  T(C): 36.3 (01 May 2025 08:00), Max: 37.6 (30 Apr 2025 12:00)  T(F): 97.3 (01 May 2025 08:00), Max: 99.6 (30 Apr 2025 12:00)  HR: 74 (01 May 2025 07:00) (61 - 122)  BP: --  BP(mean): --  ABP: 142/59 (01 May 2025 07:00) (113/48 - 197/85)  ABP(mean): 81 (01 May 2025 07:00) (64 - 116)  RR: 19 (01 May 2025 07:00) (17 - 42)  SpO2: 97% (01 May 2025 07:00) (84% - 100%)    O2 Parameters below as of 01 May 2025 07:00  Patient On (Oxygen Delivery Method): BiPAP/CPAP    O2 Concentration (%): 80    Mode: CPAP with PS  FiO2: 40  PEEP: 8  PS: 8  ITime: 0.95  MAP: 10  PIP: 17    ABG - ( 01 May 2025 04:34 )  pH, Arterial: 7.44  pH, Blood: x     /  pCO2: 32    /  pO2: 131   / HCO3: 22    / Base Excess: -2.1  /  SaO2: 98.3      I&O's Summary    30 Apr 2025 07:01  -  01 May 2025 07:00  --------------------------------------------------------  IN: 1332.7 mL / OUT: 2850 mL / NET: -1517.3 mL    I&O's Detail    30 Apr 2025 07:01  -  01 May 2025 07:00  --------------------------------------------------------  IN:    Dexmedetomidine: 901.7 mL    Heparin: 187 mL    Heparin: 96 mL    IV PiggyBack: 100 mL    Vasopressin (Organ Donation): 48 mL  Total IN: 1332.7 mL    OUT:    Bulb (mL): 10 mL    Bulb (mL): 20 mL    Bulb (mL): 0 mL    FentaNYL: 0 mL    Indwelling Catheter - Urethral (mL): 2820 mL    Nepro: 0 mL    Rectal Tube (mL): 0 mL    VAC (Vacuum Assisted Closure) System (mL): 0 mL  Total OUT: 2850 mL    Total NET: -1517.3 mL    PHYSICAL EXAM:  GENERAL: A&Ox3, mildly agitated, precedex at 1.5  HEENT: Normocephalic, atraumatic  PULM: BiPAP at 12/6 on 100%  CV: Regular rate and rhythm, cardene gtt off  ABDOMEN: Abdomen obese, soft, non-distended, vac holding suction, drains x 3 with SS output, dignishield in place  : An in place  MSK: Moving extremities spontaneously  SKIN: Warm, dry, normal skin color, texture, turgor    LABS:  CAPILLARY BLOOD GLUCOSE  POCT Blood Glucose.: 113 mg/dL (01 May 2025 06:45)  POCT Blood Glucose.: 120 mg/dL (01 May 2025 00:14)  POCT Blood Glucose.: 97 mg/dL (30 Apr 2025 17:02)  POCT Blood Glucose.: 157 mg/dL (30 Apr 2025 11:03)                        8.4    8.86  )-----------( 93       ( 01 May 2025 00:00 )             25.3       05-01    148[H]  |  110  |  74[HH]  ----------------------------<  117[H]  3.5   |  21  |  2.3[H]    Ca    7.7[L]      01 May 2025 00:00  Phos  6.3     05-01  Mg     1.9     05-01    PTT - ( 01 May 2025 06:50 )  PTT:46.1 sec    Urinalysis Basic - ( 01 May 2025 00:00 )    Color: x / Appearance: x / SG: x / pH: x  Gluc: 117 mg/dL / Ketone: x  / Bili: x / Urobili: x   Blood: x / Protein: x / Nitrite: x   Leuk Esterase: x / RBC: x / WBC x   Sq Epi: x / Non Sq Epi: x / Bacteria: x    Culture - Blood (collected 28 Apr 2025 12:10)  Source: Blood None  Preliminary Report (30 Apr 2025 22:01):    No growth at 48 Hours

## 2025-05-01 NOTE — PROGRESS NOTE ADULT - SUBJECTIVE AND OBJECTIVE BOX
NANCY SARGENT  64y, Female  Allergy: No Known Allergies      LOS  28d    CHIEF COMPLAINT: bowel obstruction (22 Apr 2025 08:33)      INTERVAL EVENTS/HPI  - No acute events overnight  - T(F): , Max: 99.6 (04-30-25 @ 12:00)  - Tolerating medication  - WBC Count: 8.86 (05-01-25 @ 00:00)  WBC Count: 10.00 (04-30-25 @ 16:20)     - Creatinine: 2.3 (05-01-25 @ 00:00)  Creatinine: 2.7 (04-29-25 @ 20:15)       ROS  ***    VITALS:  T(F): 97.5, Max: 99.6 (04-30-25 @ 12:00)  HR: 79  BP: --  RR: 17Vital Signs Last 24 Hrs  T(C): 36.4 (01 May 2025 04:00), Max: 37.6 (30 Apr 2025 12:00)  T(F): 97.5 (01 May 2025 04:00), Max: 99.6 (30 Apr 2025 12:00)  HR: 79 (01 May 2025 06:00) (61 - 122)  BP: --  BP(mean): --  RR: 17 (01 May 2025 06:00) (10 - 42)  SpO2: 95% (01 May 2025 06:00) (84% - 100%)    Parameters below as of 01 May 2025 06:00  Patient On (Oxygen Delivery Method): BiPAP/CPAP    O2 Concentration (%): 50    PHYSICAL EXAM:  ***    FH: Non-contributory  Social Hx: Non-contributory    TESTS & MEASUREMENTS:                        8.4    8.86  )-----------( 93       ( 01 May 2025 00:00 )             25.3     05-01    148[H]  |  110  |  74[HH]  ----------------------------<  117[H]  3.5   |  21  |  2.3[H]    Ca    7.7[L]      01 May 2025 00:00  Phos  6.3     05-01  Mg     1.9     05-01          Urinalysis Basic - ( 01 May 2025 00:00 )    Color: x / Appearance: x / SG: x / pH: x  Gluc: 117 mg/dL / Ketone: x  / Bili: x / Urobili: x   Blood: x / Protein: x / Nitrite: x   Leuk Esterase: x / RBC: x / WBC x   Sq Epi: x / Non Sq Epi: x / Bacteria: x        Culture - Blood (collected 04-28-25 @ 12:10)  Source: Blood None  Preliminary Report (04-30-25 @ 22:01):    No growth at 48 Hours    Culture - Blood (collected 04-22-25 @ 17:47)  Source: Blood Blood  Final Report (04-27-25 @ 23:00):    No growth at 5 days    Culture - Body Fluid with Gram Stain (collected 04-22-25 @ 15:00)  Source: Abdominal Fl Abdominal Fluid  Gram Stain (04-23-25 @ 06:50):    polymorphonuclear leukocytes seen    No organisms seen    by cytocentrifuge  Final Report (04-27-25 @ 18:48):    No growth at 5 days    Culture - Blood (collected 04-22-25 @ 13:16)  Source: Blood Blood-Peripheral  Final Report (04-27-25 @ 23:00):    No growth at 5 days    Culture - Body Fluid with Gram Stain (collected 04-17-25 @ 15:20)  Source: Body Fluid None  Gram Stain (04-19-25 @ 12:15):    No polymorphonuclear leukocytes seen    No organisms seen  Final Report (04-23-25 @ 10:23):    Rare Escherichia coli    Rare Corynebacterium tuberculostearicum "Susceptibilities not performed"  Organism: Escherichia coli (04-21-25 @ 13:35)  Organism: Escherichia coli (04-21-25 @ 13:35)      -  Levofloxacin: S <=0.5      -  Tobramycin: S <=2      -  Aztreonam: S <=4      -  Gentamicin: S <=2      -  Cefepime: S <=2      -  Cefazolin: S <=2      -  Piperacillin/Tazobactam: S <=8      -  Ciprofloxacin: S <=0.25      -  Imipenem: S <=1      -  Ceftriaxone: S <=1      -  Ampicillin: S <=8 These ampicillin results predict results for amoxicillin      Method Type: LOUISE      -  Meropenem: S <=1      -  Ampicillin/Sulbactam: S <=4/2      -  Cefoxitin: S <=8      -  Amoxicillin/Clavulanic Acid: S <=8/4      -  Trimethoprim/Sulfamethoxazole: S <=0.5/9.5      -  Ertapenem: S <=0.5    Culture - Sputum (collected 04-14-25 @ 20:15)  Source: Trach Asp Tracheal Aspirate  Gram Stain (04-15-25 @ 11:15):    Rare polymorphonuclear leukocytes per low power field    No Squamous epithelial cells per low power field    No organisms seen per oil power field  Final Report (04-16-25 @ 21:32):    No growth    Culture - Blood (collected 04-13-25 @ 07:01)  Source: Blood Blood-Peripheral  Final Report (04-18-25 @ 12:01):    No growth at 5 days    Culture - Acid Fast - Other w/Smear (collected 04-10-25 @ 18:00)  Source: Other Peritoneal Fluid #2  Preliminary Report (04-26-25 @ 23:06):    No acid-fast bacilli isolated at 2 weeks.    Culture - Fungal, Other (collected 04-10-25 @ 18:00)  Source: Other Peritoneal Fluid #2  Preliminary Report (04-26-25 @ 23:02):    No fungus isolated at 2 weeks.    Culture - Wound Aerobic/Anaerobic (collected 04-10-25 @ 10:30)  Source: Surgical Swab Peritoneal Fluid #1  Final Report (04-13-25 @ 13:48):    Few Escherichia coli    Few Bacteroides fragilis "Susceptibilities not performed"  Organism: Escherichia coli (04-13-25 @ 13:48)  Organism: Escherichia coli (04-13-25 @ 13:48)      -  Levofloxacin: S <=0.5      -  Tobramycin: S <=2      -  Aztreonam: S <=4      -  Gentamicin: S <=2      -  Cefazolin: S <=2      -  Cefepime: S <=2      -  Piperacillin/Tazobactam: S <=8      -  Ciprofloxacin: S <=0.25      -  Imipenem: S <=1      -  Ceftriaxone: S <=1      -  Ampicillin: S <=8 These ampicillin results predict results for amoxicillin      Method Type: LOUISE      -  Meropenem: S <=1      -  Ampicillin/Sulbactam: S <=4/2      -  Cefoxitin: S <=8      -  Amoxicillin/Clavulanic Acid: S <=8/4      -  Trimethoprim/Sulfamethoxazole: S <=0.5/9.5      -  Ertapenem: S <=0.5    Culture - Wound Aerobic/Anaerobic (collected 04-10-25 @ 10:30)  Source: Surgical Swab Peritoneal Fluid #1  Final Report (04-13-25 @ 14:15):    Rare Bacteroides fragilis "Susceptibilities not performed"    Rare Clostridium clostridioforme "Susceptibilities not performed"    Culture - Acid Fast - Other w/Smear (collected 04-10-25 @ 10:30)  Source: Other Peritoneal Fluid #1  Preliminary Report (04-26-25 @ 23:06):    No acid-fast bacilli isolated at 2 weeks.    Culture - Fungal, Other (collected 04-10-25 @ 10:30)  Source: Other Peritoneal Fluid #1  Preliminary Report (04-26-25 @ 23:02):    No fungus isolated at 2 weeks.    Urinalysis with Rflx Culture (collected 04-03-25 @ 20:06)    Culture - Urine (collected 04-03-25 @ 20:06)  Source: Clean Catch None  Final Report (04-06-25 @ 14:03):    50,000 - 99,000 CFU/mL Escherichia coli    <10,000 CFU/ml Normal Urogenital andra present  Organism: Escherichia coli (04-06-25 @ 14:03)  Organism: Escherichia coli (04-06-25 @ 14:03)      -  Levofloxacin: S <=0.5      -  Tobramycin: S <=2      -  Nitrofurantoin: S <=32 Should not be used to treat pyelonephritis      -  Aztreonam: S <=4      -  Gentamicin: S <=2      -  Cefazolin: S <=2 For uncomplicated UTI with K. pneumoniae, E. coli, or P. mirablis: LUOISE <=16 is sensitive and LOUISE >=32 is resistant. This also predicts results for oral agents cefaclor, cefdinir, cefpodoxime, cefprozil, cefuroxime axetil, cephalexin and locarbef for uncomplicated UTI. Note that some isolates may be susceptible to these agents while testing resistant to cefazolin.      -  Cefepime: S <=2      -  Piperacillin/Tazobactam: S <=8      -  Ciprofloxacin: S <=0.25      -  Imipenem: S <=1      -  Ceftriaxone: S <=1      -  Ampicillin: S <=8 These ampicillin results predict results for amoxicillin      Method Type: LOUISE      -  Meropenem: S <=1      -  Ampicillin/Sulbactam: S <=4/2      -  Cefoxitin: S <=8      -  Cefuroxime: S <=4      -  Amoxicillin/Clavulanic Acid: S <=8/4      -  Trimethoprim/Sulfamethoxazole: S <=0.5/9.5      -  Ertapenem: S <=0.5            INFECTIOUS DISEASES TESTING  MRSA PCR Result.: Negative (04-11-25 @ 00:40)  strept    INFLAMMATORY MARKERS      RADIOLOGY & ADDITIONAL TESTS:  I have personally reviewed the last available Chest xray  CXR  Xray Chest 1 View- PORTABLE-Urgent:   ACC: [id number] EXAM:  XR CHEST PORTABLE URGENT 1V   ORDERED BY: DARREL ARRIAGA     PROCEDURE DATE:  04/30/2025          INTERPRETATION:  CLINICAL HISTORY: Respiratory distress    COMPARISON: Prior day.    TECHNIQUE: Frontal portable, low lung volumes    FINDINGS:    Support devices: Patient is intubated. Enteric catheter extends below the   hemidiaphragm. Telemetry leads are seen. Right neck central catheter.    Cardiac/mediastinum/hilum: Stable.    Lung parenchyma/Pleura: Atelectasis.    Skeleton/soft tissues: Stable.      IMPRESSION:    Atelectasis. Low lung volumes. Stable.    Support devices as described.    --- End of Report ---            CATARINO MCMANUS MD; Attending Interventional Radiologist  This document has been electronically signed. Apr 30 2025 10:51AM (04-30-25 @ 10:35)      CT      CARDIOLOGY TESTING      MEDICATIONS  acetaminophen   IVPB .. 1000 IV Intermittent once  albuterol/ipratropium for Nebulization 3 Nebulizer every 6 hours  chlorhexidine 2% Cloths 1 Topical <User Schedule>  dexMEDEtomidine Infusion 0.2 IV Continuous <Continuous>  heparin  Infusion 1600 IV Continuous <Continuous>  insulin lispro (ADMELOG) corrective regimen sliding scale  SubCutaneous every 6 hours  meropenem  IVPB 1000 IV Intermittent every 12 hours  niCARdipine Infusion 2.5 IV Continuous <Continuous>  pantoprazole  Injectable 40 IV Push every 12 hours      WEIGHT  Weight (kg): 135.6 (04-27-25 @ 20:59)  Creatinine: 2.3 mg/dL (05-01-25 @ 00:00)      ANTIBIOTICS:  meropenem  IVPB 1000 milliGRAM(s) IV Intermittent every 12 hours      All available historical records have been reviewed       NANCY SARGENT  64y, Female  Allergy: No Known Allergies      LOS  28d    CHIEF COMPLAINT: bowel obstruction (22 Apr 2025 08:33)      INTERVAL EVENTS/HPI  - No acute events overnight, family at bedside  - T(F): , Max: 99.6 (04-30-25 @ 12:00)  - Tolerating medication  - WBC Count: 8.86 (05-01-25 @ 00:00)  WBC Count: 10.00 (04-30-25 @ 16:20)     - Creatinine: 2.3 (05-01-25 @ 00:00)  Creatinine: 2.7 (04-29-25 @ 20:15)       ROS  unable to obtain history secondary to patient's mental status and/or sedation     VITALS:  T(F): 97.5, Max: 99.6 (04-30-25 @ 12:00)  HR: 79  BP: --  RR: 17Vital Signs Last 24 Hrs  T(C): 36.4 (01 May 2025 04:00), Max: 37.6 (30 Apr 2025 12:00)  T(F): 97.5 (01 May 2025 04:00), Max: 99.6 (30 Apr 2025 12:00)  HR: 79 (01 May 2025 06:00) (61 - 122)  BP: --  BP(mean): --  RR: 17 (01 May 2025 06:00) (10 - 42)  SpO2: 95% (01 May 2025 06:00) (84% - 100%)    Parameters below as of 01 May 2025 06:00  Patient On (Oxygen Delivery Method): BiPAP/CPAP    O2 Concentration (%): 50    PHYSICAL EXAM:  Gen: chronically ill appearing lethargic, obese on BIPAP  HEENT: Normocephalic, atraumatic  Neck: supple, no lymphadenopathy  CV: Regular rate & regular rhythm  Lungs: decreased BS at bases, no fremitus  Abdomen: abethra, THONY x 3 serosanguinous fluid   Ext: Warm, well perfused  Neuro: non focal,   Skin: no rash, no erythema  Lines: no phlebitis     FH: Non-contributory  Social Hx: Non-contributory    TESTS & MEASUREMENTS:                        8.4    8.86  )-----------( 93       ( 01 May 2025 00:00 )             25.3     05-01    148[H]  |  110  |  74[HH]  ----------------------------<  117[H]  3.5   |  21  |  2.3[H]    Ca    7.7[L]      01 May 2025 00:00  Phos  6.3     05-01  Mg     1.9     05-01          Urinalysis Basic - ( 01 May 2025 00:00 )    Color: x / Appearance: x / SG: x / pH: x  Gluc: 117 mg/dL / Ketone: x  / Bili: x / Urobili: x   Blood: x / Protein: x / Nitrite: x   Leuk Esterase: x / RBC: x / WBC x   Sq Epi: x / Non Sq Epi: x / Bacteria: x        Culture - Blood (collected 04-28-25 @ 12:10)  Source: Blood None  Preliminary Report (04-30-25 @ 22:01):    No growth at 48 Hours    Culture - Blood (collected 04-22-25 @ 17:47)  Source: Blood Blood  Final Report (04-27-25 @ 23:00):    No growth at 5 days    Culture - Body Fluid with Gram Stain (collected 04-22-25 @ 15:00)  Source: Abdominal Fl Abdominal Fluid  Gram Stain (04-23-25 @ 06:50):    polymorphonuclear leukocytes seen    No organisms seen    by cytocentrifuge  Final Report (04-27-25 @ 18:48):    No growth at 5 days    Culture - Blood (collected 04-22-25 @ 13:16)  Source: Blood Blood-Peripheral  Final Report (04-27-25 @ 23:00):    No growth at 5 days    Culture - Body Fluid with Gram Stain (collected 04-17-25 @ 15:20)  Source: Body Fluid None  Gram Stain (04-19-25 @ 12:15):    No polymorphonuclear leukocytes seen    No organisms seen  Final Report (04-23-25 @ 10:23):    Rare Escherichia coli    Rare Corynebacterium tuberculostearicum "Susceptibilities not performed"  Organism: Escherichia coli (04-21-25 @ 13:35)  Organism: Escherichia coli (04-21-25 @ 13:35)      -  Levofloxacin: S <=0.5      -  Tobramycin: S <=2      -  Aztreonam: S <=4      -  Gentamicin: S <=2      -  Cefepime: S <=2      -  Cefazolin: S <=2      -  Piperacillin/Tazobactam: S <=8      -  Ciprofloxacin: S <=0.25      -  Imipenem: S <=1      -  Ceftriaxone: S <=1      -  Ampicillin: S <=8 These ampicillin results predict results for amoxicillin      Method Type: LOUISE      -  Meropenem: S <=1      -  Ampicillin/Sulbactam: S <=4/2      -  Cefoxitin: S <=8      -  Amoxicillin/Clavulanic Acid: S <=8/4      -  Trimethoprim/Sulfamethoxazole: S <=0.5/9.5      -  Ertapenem: S <=0.5    Culture - Sputum (collected 04-14-25 @ 20:15)  Source: Trach Asp Tracheal Aspirate  Gram Stain (04-15-25 @ 11:15):    Rare polymorphonuclear leukocytes per low power field    No Squamous epithelial cells per low power field    No organisms seen per oil power field  Final Report (04-16-25 @ 21:32):    No growth    Culture - Blood (collected 04-13-25 @ 07:01)  Source: Blood Blood-Peripheral  Final Report (04-18-25 @ 12:01):    No growth at 5 days    Culture - Acid Fast - Other w/Smear (collected 04-10-25 @ 18:00)  Source: Other Peritoneal Fluid #2  Preliminary Report (04-26-25 @ 23:06):    No acid-fast bacilli isolated at 2 weeks.    Culture - Fungal, Other (collected 04-10-25 @ 18:00)  Source: Other Peritoneal Fluid #2  Preliminary Report (04-26-25 @ 23:02):    No fungus isolated at 2 weeks.    Culture - Wound Aerobic/Anaerobic (collected 04-10-25 @ 10:30)  Source: Surgical Swab Peritoneal Fluid #1  Final Report (04-13-25 @ 13:48):    Few Escherichia coli    Few Bacteroides fragilis "Susceptibilities not performed"  Organism: Escherichia coli (04-13-25 @ 13:48)  Organism: Escherichia coli (04-13-25 @ 13:48)      -  Levofloxacin: S <=0.5      -  Tobramycin: S <=2      -  Aztreonam: S <=4      -  Gentamicin: S <=2      -  Cefazolin: S <=2      -  Cefepime: S <=2      -  Piperacillin/Tazobactam: S <=8      -  Ciprofloxacin: S <=0.25      -  Imipenem: S <=1      -  Ceftriaxone: S <=1      -  Ampicillin: S <=8 These ampicillin results predict results for amoxicillin      Method Type: LOUISE      -  Meropenem: S <=1      -  Ampicillin/Sulbactam: S <=4/2      -  Cefoxitin: S <=8      -  Amoxicillin/Clavulanic Acid: S <=8/4      -  Trimethoprim/Sulfamethoxazole: S <=0.5/9.5      -  Ertapenem: S <=0.5    Culture - Wound Aerobic/Anaerobic (collected 04-10-25 @ 10:30)  Source: Surgical Swab Peritoneal Fluid #1  Final Report (04-13-25 @ 14:15):    Rare Bacteroides fragilis "Susceptibilities not performed"    Rare Clostridium clostridioforme "Susceptibilities not performed"    Culture - Acid Fast - Other w/Smear (collected 04-10-25 @ 10:30)  Source: Other Peritoneal Fluid #1  Preliminary Report (04-26-25 @ 23:06):    No acid-fast bacilli isolated at 2 weeks.    Culture - Fungal, Other (collected 04-10-25 @ 10:30)  Source: Other Peritoneal Fluid #1  Preliminary Report (04-26-25 @ 23:02):    No fungus isolated at 2 weeks.    Urinalysis with Rflx Culture (collected 04-03-25 @ 20:06)    Culture - Urine (collected 04-03-25 @ 20:06)  Source: Clean Catch None  Final Report (04-06-25 @ 14:03):    50,000 - 99,000 CFU/mL Escherichia coli    <10,000 CFU/ml Normal Urogenital andra present  Organism: Escherichia coli (04-06-25 @ 14:03)  Organism: Escherichia coli (04-06-25 @ 14:03)      -  Levofloxacin: S <=0.5      -  Tobramycin: S <=2      -  Nitrofurantoin: S <=32 Should not be used to treat pyelonephritis      -  Aztreonam: S <=4      -  Gentamicin: S <=2      -  Cefazolin: S <=2 For uncomplicated UTI with K. pneumoniae, E. coli, or P. mirablis: LOUISE <=16 is sensitive and OLUISE >=32 is resistant. This also predicts results for oral agents cefaclor, cefdinir, cefpodoxime, cefprozil, cefuroxime axetil, cephalexin and locarbef for uncomplicated UTI. Note that some isolates may be susceptible to these agents while testing resistant to cefazolin.      -  Cefepime: S <=2      -  Piperacillin/Tazobactam: S <=8      -  Ciprofloxacin: S <=0.25      -  Imipenem: S <=1      -  Ceftriaxone: S <=1      -  Ampicillin: S <=8 These ampicillin results predict results for amoxicillin      Method Type: LOUISE      -  Meropenem: S <=1      -  Ampicillin/Sulbactam: S <=4/2      -  Cefoxitin: S <=8      -  Cefuroxime: S <=4      -  Amoxicillin/Clavulanic Acid: S <=8/4      -  Trimethoprim/Sulfamethoxazole: S <=0.5/9.5      -  Ertapenem: S <=0.5            INFECTIOUS DISEASES TESTING  MRSA PCR Result.: Negative (04-11-25 @ 00:40)  strept    INFLAMMATORY MARKERS      RADIOLOGY & ADDITIONAL TESTS:  I have personally reviewed the last available Chest xray  CXR  Xray Chest 1 View- PORTABLE-Urgent:   ACC: [id number] EXAM:  XR CHEST PORTABLE URGENT 1V   ORDERED BY: DARREL ARRIAGA     PROCEDURE DATE:  04/30/2025          INTERPRETATION:  CLINICAL HISTORY: Respiratory distress    COMPARISON: Prior day.    TECHNIQUE: Frontal portable, low lung volumes    FINDINGS:    Support devices: Patient is intubated. Enteric catheter extends below the   hemidiaphragm. Telemetry leads are seen. Right neck central catheter.    Cardiac/mediastinum/hilum: Stable.    Lung parenchyma/Pleura: Atelectasis.    Skeleton/soft tissues: Stable.      IMPRESSION:    Atelectasis. Low lung volumes. Stable.    Support devices as described.    --- End of Report ---            CATARINO MCMANUS MD; Attending Interventional Radiologist  This document has been electronically signed. Apr 30 2025 10:51AM (04-30-25 @ 10:35)      CT      CARDIOLOGY TESTING      MEDICATIONS  acetaminophen   IVPB .. 1000 IV Intermittent once  albuterol/ipratropium for Nebulization 3 Nebulizer every 6 hours  chlorhexidine 2% Cloths 1 Topical <User Schedule>  dexMEDEtomidine Infusion 0.2 IV Continuous <Continuous>  heparin  Infusion 1600 IV Continuous <Continuous>  insulin lispro (ADMELOG) corrective regimen sliding scale  SubCutaneous every 6 hours  meropenem  IVPB 1000 IV Intermittent every 12 hours  niCARdipine Infusion 2.5 IV Continuous <Continuous>  pantoprazole  Injectable 40 IV Push every 12 hours      WEIGHT  Weight (kg): 135.6 (04-27-25 @ 20:59)  Creatinine: 2.3 mg/dL (05-01-25 @ 00:00)      ANTIBIOTICS:  meropenem  IVPB 1000 milliGRAM(s) IV Intermittent every 12 hours      All available historical records have been reviewed

## 2025-05-01 NOTE — PROGRESS NOTE ADULT - ASSESSMENT
ASSESSMENT & PLAN:  64F PMHx HTN, morbid obesity s/p 2001 Abby-en-Y gastric bypass who presented on 4/3 with incarcerated ventral hernia with SBO    4/10 open ventral hernia repair, small bowel resection, and primary fascial closure with Dr. Lema. Admitted to SICU for Q1 abdominal checks and hemodynamic monitoring.   4/17 takeback for exploratory laparotomy, repair of anastomotic leak  4/22 8Fr pigtail into lower abdominal wall collection,150cc of foul appearing material aspirated, Attached to THONY bulb  4/27 RTOR for re-exploration and evacuation of old clots from retroperitoneum and pelvis and placement of 3 new beatrice drains  4/28: IR consulted for IVC filter     NEUROLOGICAL:  #Acute pain  - Acetaminophen Q6, dilaudid 0.25mg Q4 PRN (severe pain)  #Sedation/agitation  - Precedex infusion, wean as tolerated  -atarax 25mg x 1 given overnight  #AMS    HCT 4/27: negative  #Sleep hygiene  - HOLDING Trazodone 25mg QPM  - HOLDING Melatonin 5mg QPM    RESPIRATORY:   #Acute hypoxic respiratory failure  - Extubated 4/18, reintubated on 4/27 for AMS and agonal breathing, extubated 4/30  -on HFNC 60L/100%; f/u AM ABG  05-01 @ 01:56--7.44 / 32 / 61 / 22 / 92.3  O2 92.3  Lac 0.7    04-30 @ 15:23--7.42 / 32 / 70 / 21 / 94.8  O2 94.8  Lac 0.7      #Right distal main pulmonary embolus with segmental extension  - HOLDING Lovenox 135mg Q12 secondary to acute anemia  - Echo 4/22/25: EF 70 to 75%.  - B/L LE Venous duplex 4/21-RIGHT PT DVT, no LEFT DVT  -B/L LE Venous duplex 4/28-No evidence of deep venous thrombosis in either lower extremity. Limited   exam due to body habitus  -no IVC filter needed at this time per IR  #Intermittent diuresis  - Last diuresis 4/30 2mg Bumex  #PMHx [place] on CPAP@ home   #Activity increase as tolerated    CARDS:   #acute hemorraghic shock    -off levophed and vasopressin since 4/30    - 4/27: 2u PRBC 1plt 1uFFP, 1g TXA    - OFF sodium bicarbonate infusion on 4/27  #Supraventricular tachycardia, Non-sustained ventricular tachycardia, Ventricular Premature Depolarization    - Cardiology (Dr. Lopez) - Metoprolol 25mg Q12 HOLDING    #PMHx of HTN  - Amlodipine 10mg QD  HOLDING  - HCTZ 25mg QD HOLDING  - Losartan 100mg daily HOLDING  - EKG- NSR  - TTE 4/11: EF of 56%. G1DD.     GASTROINTESTINAL/NUTRITION:   #4/10 open ventral hernia repair, small bowel resection, ileoileal anastomosis and primary fascial closure, 2 THONY Right  #4/17 RTOR for exploratory laparotomy, repair of anastomotic leak at previous ileoileal anastomosis, resected, new ileoileal anastomosis created, 1 THONY Left (total 3)    - Intermittent abdominal vac change, last 4/26 PM, bilious drainage noted    - REMOVED 4/26 RUQ THONY    - REMOVED 4/28 LUQ THONY at ileo-ileal anstamosis, RLQ THONY in pelvis, IR THONY abdominal wall  #4/22: s/p IR percutaneous placement of a 8.5 [place]ian drainage catheter into lower abdominal wall fluid collection, yielding 150 mL of foul appearing fluid.  #4/27: Acute retroperitoneal  CTA: mesenteric arterial blush noted with extravasation    - s/p IR without finding of any bleeding, no embolization required  #4/27 RTOR for re-exploration and evacuation of old clots from retroperitoneum and pelvis    - new midline prevena vac in place    - x3 new 19Fr beatrice drains in place: 1. Right pelvis, 2. Left pelvis, 3. Subcutaenous  #Diet, NPO    - OGT in place to suction    - TPN discontinued 4/25    - aspiration precautions, HOB 30  #GI Prophylaxis/hx GERD  - Pantoprazole 40mg IV BID (home rx)  #Bowel regimen  - Holding  - Last BM 4/30  - Dignishield in place, nursing to flush    /RENAL:   #urine output in critically ill  - An replaced 4/27  - LR @ 120cc/hr  #FUNMI; oliguric  - Nephrology consulted and following > hold off on CVVH recall  - Renal u/s> no hydronephrosis. 2-3cm anechoic cyst on right kidney   #Lactic acidosis    - OFF sodium bicarbonate infusion    Labs:          BUN/Cr- 68/2.7  -->,  74/2.3  -->          [05-01 @ 00:00]Na  148 // K  3.5 // Mg  1.9 // Phos  6.3  #hypomagnesemia-repleted with 1gm magnesium sulfate  #hypokalemia-repleted with 60mEq KCl   - Nephro: check CK, no indication of RRT, decrease meropenem to Q12    HEME/ONC:   #acute anemia     -last transfusion 4/29, 1 U PRBC  #Acute right pulmonary embolus    -heparin gtt  @ 16U--> f/u 7AM PTT   #B/L UE Venous Duplex 4/26/27 - thrombophlebitis  #B/L LE Venous Duplex 4/21- right PT DVT  - concern for malabsorption of Eliquis given small bowel resection per pharmacy  - vascular surgery consulted - AC with heparin drip (upon discharge transition to Eliquis 10 mg po BID x 7 days then 5 mg po BID for at least 6 months)  #B/L LE Venous duplex 4/28 prelim negative   #DVT prophylaxis (mechanical)    - LLE SCD only    Labs: Hb/Hct:  8.8/25.9  -->,  8.4/25.3  -->                      Plts:  98  -->,  93  -->                 PTT/INR:  30.0/--  --->,  40.1/--  --->     T&S expires: 5/2    ID:  #Intraabdominal anastomotic leak, fluid collection  #ID Consult     - Continue meropenem 1g Q8 (4/22 - ), switched from Q8 to Q12  #Culture    OR 4/1 - E Coli    OR cx: few E.coli, few bacteroides, rare clostridium    4/13 Blood Cx: NGTF    4/14 tracheal aspirate: no growth     4/17 body fluid cx rare e. coli     4/22 Blood cx: negative    4/22 Abdominal - Negative FINAL    4/27 C diff stool panel: negative    WBC- 9.39  --->>,  10.00  --->>,  8.86  --->>  Temp trend- 24hrs T(F): 99.4 (05-01 @ 00:00), Max: 99.6 (04-30 @ 12:00)    ENDOCRINE:  #Glycemic monitoring  - Q6 FSG while NPO  - ISS  - A1C 5.8%     MSK:  #Activity- increase as tolerated     SKIN:  #DTI screening negative 4/28  - Continue to monitor daily skin changes    LINES/DRAINS:  PIV    RLQ pelvis beatrice drain (4/28 -     LLQ pelvis beatrice drain (4/28 -     Midline subtaneous beatrice drain (4/28 -     Right IJ TLC (4/17 -     Prevena Vac Midline     Left radial arterial line (4/28 -     Discontinued lines:    Right Femoral Arterial Line (4/27 - 4/28)    ADVANCED DIRECTIVES:  Full Code  Emergency - Daughter  (Shavonne Moss)  INDICATION FOR SICU/SDU: Intestinal obstruction    DISPO: SICU    To be Discussed with SICU attending Dr. Allen ASSESSMENT & PLAN:  64F PMHx HTN, morbid obesity s/p 2001 Abby-en-Y gastric bypass who presented on 4/3 with incarcerated ventral hernia with SBO    4/10 open ventral hernia repair, small bowel resection, and primary fascial closure with Dr. Lema. Admitted to SICU for Q1 abdominal checks and hemodynamic monitoring.   4/17 takeback for exploratory laparotomy, repair of anastomotic leak  4/22 8Fr pigtail into lower abdominal wall collection,150cc of foul appearing material aspirated, Attached to THONY bulb  4/27 RTOR for re-exploration and evacuation of old clots from retroperitoneum and pelvis and placement of 3 new beatrice drains  4/28: IR consulted for IVC filter     NEUROLOGICAL:  #Acute pain  - Acetaminophen Q6, dilaudid 0.25mg Q4 PRN (severe pain)  #Sedation/agitation  - Precedex infusion, wean as tolerated  -atarax 25mg x 1 given overnight  #AMS    HCT 4/27: negative  #Sleep hygiene  - HOLDING Trazodone 25mg QPM  - HOLDING Melatonin 5mg QPM    RESPIRATORY:   #Acute hypoxic respiratory failure  - Extubated 4/18, reintubated on 4/27 for AMS and agonal breathing, extubated 4/30  -on HFNC 60L/100%; f/u AM ABG  05-01 @ 01:56--7.44 / 32 / 61 / 22 / 92.3  O2 92.3  Lac 0.7    04-30 @ 15:23--7.42 / 32 / 70 / 21 / 94.8  O2 94.8  Lac 0.7      #Right distal main pulmonary embolus with segmental extension  - HOLDING Lovenox 135mg Q12 secondary to acute anemia  - Echo 4/22/25: EF 70 to 75%.  - B/L LE Venous duplex 4/21-RIGHT PT DVT, no LEFT DVT  -B/L LE Venous duplex 4/28-No evidence of deep venous thrombosis in either lower extremity. Limited   exam due to body habitus  -no IVC filter needed at this time per IR  #Intermittent diuresis  - Last diuresis 4/30 2mg Bumex  #PMHx [place] on CPAP@ home   #Activity increase as tolerated    CARDS:   #hypertensive urgency  -10mg labetalol x 1 for SBP 190s  -nicardipine gtt started   #acute hemorraghic shock    -off levophed and vasopressin since 4/30    - 4/27: 2u PRBC 1plt 1uFFP, 1g TXA    - OFF sodium bicarbonate infusion on 4/27  #Supraventricular tachycardia, Non-sustained ventricular tachycardia, Ventricular Premature Depolarization    - Cardiology (Dr. Lopez) - Metoprolol 25mg Q12 HOLDING    #PMHx of HTN  - Amlodipine 10mg QD  HOLDING  - HCTZ 25mg QD HOLDING  - Losartan 100mg daily HOLDING  - EKG- NSR  - TTE 4/11: EF of 56%. G1DD.     GASTROINTESTINAL/NUTRITION:   #4/10 open ventral hernia repair, small bowel resection, ileoileal anastomosis and primary fascial closure, 2 THONY Right  #4/17 RTOR for exploratory laparotomy, repair of anastomotic leak at previous ileoileal anastomosis, resected, new ileoileal anastomosis created, 1 THONY Left (total 3)    - Intermittent abdominal vac change, last 4/26 PM, bilious drainage noted    - REMOVED 4/26 RUQ THONY    - REMOVED 4/28 LUQ THONY at ileo-ileal anstamosis, RLQ THONY in pelvis, IR THONY abdominal wall  #4/22: s/p IR percutaneous placement of a 8.5 Indonesian drainage catheter into lower abdominal wall fluid collection, yielding 150 mL of foul appearing fluid.  #4/27: Acute retroperitoneal  CTA: mesenteric arterial blush noted with extravasation    - s/p IR without finding of any bleeding, no embolization required  #4/27 RTOR for re-exploration and evacuation of old clots from retroperitoneum and pelvis    - new midline prevena vac in place    - x3 new 19Fr beatrice drains in place: 1. Right pelvis, 2. Left pelvis, 3. Subcutaenous  #Diet, NPO    - OGT in place to suction    - TPN discontinued 4/25    - aspiration precautions, HOB 30  #GI Prophylaxis/hx GERD  - Pantoprazole 40mg IV BID (home rx)  #Bowel regimen  - Holding  - Last BM 4/30  - Dignishield in place, nursing to flush    /RENAL:   #urine output in critically ill  - An replaced 4/27  - LR @ 120cc/hr  #FUNMI; oliguric  - Nephrology consulted and following > hold off on CVVH recall  - Renal u/s> no hydronephrosis. 2-3cm anechoic cyst on right kidney   #Lactic acidosis    - OFF sodium bicarbonate infusion    Labs:          BUN/Cr- 68/2.7  -->,  74/2.3  -->          [05-01 @ 00:00]Na  148 // K  3.5 // Mg  1.9 // Phos  6.3  #hypomagnesemia-repleted with 1gm magnesium sulfate  #hypokalemia-repleted with 60mEq KCl   - Nephro: check CK, no indication of RRT, decrease meropenem to Q12    HEME/ONC:   #acute anemia     -last transfusion 4/29, 1 U PRBC  #Acute right pulmonary embolus    -heparin gtt  @ 16U--> f/u 7AM PTT   #B/L UE Venous Duplex 4/26/27 - thrombophlebitis  #B/L LE Venous Duplex 4/21- right PT DVT  - concern for malabsorption of Eliquis given small bowel resection per pharmacy  - vascular surgery consulted - AC with heparin drip (upon discharge transition to Eliquis 10 mg po BID x 7 days then 5 mg po BID for at least 6 months)  #B/L LE Venous duplex 4/28 prelim negative   #DVT prophylaxis (mechanical)    - LLE SCD only    Labs: Hb/Hct:  8.8/25.9  -->,  8.4/25.3  -->                      Plts:  98  -->,  93  -->                 PTT/INR:  30.0/--  --->,  40.1/--  --->     T&S expires: 5/2    ID:  #Intraabdominal anastomotic leak, fluid collection  #ID Consult     - Continue meropenem 1g Q8 (4/22 - ), switched from Q8 to Q12  #Culture    OR 4/1 - E Coli    OR cx: few E.coli, few bacteroides, rare clostridium    4/13 Blood Cx: NGTF    4/14 tracheal aspirate: no growth     4/17 body fluid cx rare e. coli     4/22 Blood cx: negative    4/22 Abdominal - Negative FINAL    4/27 C diff stool panel: negative    WBC- 9.39  --->>,  10.00  --->>,  8.86  --->>  Temp trend- 24hrs T(F): 99.4 (05-01 @ 00:00), Max: 99.6 (04-30 @ 12:00)    ENDOCRINE:  #Glycemic monitoring  - Q6 FSG while NPO  - ISS  - A1C 5.8%     MSK:  #Activity- increase as tolerated     SKIN:  #DTI screening negative 4/28  - Continue to monitor daily skin changes    LINES/DRAINS:  PIV    RLQ pelvis beatrice drain (4/28 -     LLQ pelvis beatrice drain (4/28 -     Midline subtaneous beatrice drain (4/28 -     Right IJ TLC (4/17 -     Prevena Vac Midline     Left radial arterial line (4/28 -     Discontinued lines:    Right Femoral Arterial Line (4/27 - 4/28)    ADVANCED DIRECTIVES:  Full Code  Emergency - Daughter  (Shavonne Moss)  INDICATION FOR SICU/SDU: Intestinal obstruction    DISPO: SICU    To be Discussed with SICU attending Dr. Allen ASSESSMENT & PLAN:  64F PMHx HTN, morbid obesity s/p 2001 Abby-en-Y gastric bypass who presented on 4/3 with incarcerated ventral hernia with SBO    4/10 open ventral hernia repair, small bowel resection, and primary fascial closure with Dr. Lema. Admitted to SICU for Q1 abdominal checks and hemodynamic monitoring.   4/17 takeback for exploratory laparotomy, repair of anastomotic leak  4/22 8Fr pigtail into lower abdominal wall collection,150cc of foul appearing material aspirated, Attached to THONY bulb  4/27 RTOR for re-exploration and evacuation of old clots from retroperitoneum and pelvis and placement of 3 new beatrice drains  4/28: IR consulted for IVC filter     NEUROLOGICAL:  #Acute pain  - Acetaminophen Q6, dilaudid 0.25mg Q4 PRN (severe pain)  #Sedation/agitation  - Precedex infusion, wean as tolerated  -atarax 25mg x 1 given overnight  #AMS    HCT 4/27: negative  #Sleep hygiene  - HOLDING Trazodone 25mg QPM  - HOLDING Melatonin 5mg QPM    RESPIRATORY:   #Acute hypoxic respiratory failure  - Extubated 4/18, reintubated on 4/27 for AMS and agonal breathing, extubated 4/30  -on HFNC 60L/100%; remained tachypneic and hypoxic to 88%, transitioned to BiPAP  05-01 @ 04:34--7.44 / 32 / 131 / 22 / 98.3  O2 98.3  Lac 0.8    05-01 @ 01:56--7.44 / 32 / 61 / 22 / 92.3  O2 92.3  Lac 0.7        #Right distal main pulmonary embolus with segmental extension  - HOLDING Lovenox 135mg Q12 secondary to acute anemia  - Echo 4/22/25: EF 70 to 75%.  - B/L LE Venous duplex 4/21-RIGHT PT DVT, no LEFT DVT  -B/L LE Venous duplex 4/28-No evidence of deep venous thrombosis in either lower extremity. Limited   exam due to body habitus  -no IVC filter needed at this time per IR  #Intermittent diuresis  - Last diuresis 4/30 2mg Bumex  #PMHx [place] on CPAP@ home   #Activity increase as tolerated    CARDS:   #hypertensive urgency  -10mg labetalol x 1 for SBP 190s  -nicardipine gtt started   #acute hemorraghic shock    -off levophed and vasopressin since 4/30    - 4/27: 2u PRBC 1plt 1uFFP, 1g TXA    - OFF sodium bicarbonate infusion on 4/27  #Supraventricular tachycardia, Non-sustained ventricular tachycardia, Ventricular Premature Depolarization    - Cardiology (Dr. Lopez) - Metoprolol 25mg Q12 HOLDING    #PMHx of HTN  - Amlodipine 10mg QD  HOLDING  - HCTZ 25mg QD HOLDING  - Losartan 100mg daily HOLDING  - EKG- NSR  - TTE 4/11: EF of 56%. G1DD.     GASTROINTESTINAL/NUTRITION:   #4/10 open ventral hernia repair, small bowel resection, ileoileal anastomosis and primary fascial closure, 2 THONY Right  #4/17 RTOR for exploratory laparotomy, repair of anastomotic leak at previous ileoileal anastomosis, resected, new ileoileal anastomosis created, 1 THONY Left (total 3)    - Intermittent abdominal vac change, last 4/26 PM, bilious drainage noted    - REMOVED 4/26 RUQ THONY    - REMOVED 4/28 LUQ THONY at ileo-ileal anstamosis, RLQ THONY in pelvis, IR THONY abdominal wall  #4/22: s/p IR percutaneous placement of a 8.5 Yakut drainage catheter into lower abdominal wall fluid collection, yielding 150 mL of foul appearing fluid.  #4/27: Acute retroperitoneal  CTA: mesenteric arterial blush noted with extravasation    - s/p IR without finding of any bleeding, no embolization required  #4/27 RTOR for re-exploration and evacuation of old clots from retroperitoneum and pelvis    - new midline prevena vac in place    - x3 new 19Fr ebatrice drains in place: 1. Right pelvis, 2. Left pelvis, 3. Subcutaenous  #Diet, NPO    - OGT in place to suction    - TPN discontinued 4/25    - aspiration precautions, HOB 30  #GI Prophylaxis/hx GERD  - Pantoprazole 40mg IV BID (home rx)  #Bowel regimen  - Holding  - Last BM 4/30  - Dignishield in place, nursing to flush    /RENAL:   #urine output in critically ill  - An replaced 4/27  - LR @ 120cc/hr  #FUNMI; oliguric  - Nephrology consulted and following > hold off on CVVH recall  - Renal u/s> no hydronephrosis. 2-3cm anechoic cyst on right kidney   #Lactic acidosis    - OFF sodium bicarbonate infusion    Labs:          BUN/Cr- 68/2.7  -->,  74/2.3  -->          [05-01 @ 00:00]Na  148 // K  3.5 // Mg  1.9 // Phos  6.3  #hypomagnesemia-repleted with 1gm magnesium sulfate  #hypokalemia-repleted with 60mEq KCl   - Nephro: check CK, no indication of RRT, decrease meropenem to Q12    HEME/ONC:   #acute anemia     -last transfusion 4/29, 1 U PRBC  #Acute right pulmonary embolus    -heparin gtt  @ 16U--> f/u 7AM PTT   #B/L UE Venous Duplex 4/26/27 - thrombophlebitis  #B/L LE Venous Duplex 4/21- right PT DVT  - concern for malabsorption of Eliquis given small bowel resection per pharmacy  - vascular surgery consulted - AC with heparin drip (upon discharge transition to Eliquis 10 mg po BID x 7 days then 5 mg po BID for at least 6 months)  #B/L LE Venous duplex 4/28 prelim negative   #DVT prophylaxis (mechanical)    - LLE SCD only    Labs: Hb/Hct:  8.8/25.9  -->,  8.4/25.3  -->                      Plts:  98  -->,  93  -->                 PTT/INR:  30.0/--  --->,  40.1/--  --->     T&S expires: 5/2    ID:  #Intraabdominal anastomotic leak, fluid collection  #ID Consult     - Continue meropenem 1g Q8 (4/22 - ), switched from Q8 to Q12  #Culture    OR 4/1 - E Coli    OR cx: few E.coli, few bacteroides, rare clostridium    4/13 Blood Cx: NGTF    4/14 tracheal aspirate: no growth     4/17 body fluid cx rare e. coli     4/22 Blood cx: negative    4/22 Abdominal - Negative FINAL    4/27 C diff stool panel: negative    WBC- 9.39  --->>,  10.00  --->>,  8.86  --->>  Temp trend- 24hrs T(F): 99.4 (05-01 @ 00:00), Max: 99.6 (04-30 @ 12:00)    ENDOCRINE:  #Glycemic monitoring  - Q6 FSG while NPO  - ISS  - A1C 5.8%     MSK:  #Activity- increase as tolerated     SKIN:  #DTI screening negative 4/28  - Continue to monitor daily skin changes    LINES/DRAINS:  PIV    RLQ pelvis beatrice drain (4/28 -     LLQ pelvis beatrice drain (4/28 -     Midline subtaneous beatrice drain (4/28 -     Right IJ TLC (4/17 -     Prevena Vac Midline     Left radial arterial line (4/28 -     Discontinued lines:    Right Femoral Arterial Line (4/27 - 4/28)    ADVANCED DIRECTIVES:  Full Code  Emergency - Daughter  (Shavonne Moss)  INDICATION FOR SICU/SDU: Intestinal obstruction    DISPO: SICU    To be Discussed with SICU attending Dr. Allen ASSESSMENT & PLAN:  64F PMHx HTN, morbid obesity s/p 2001 Abby-en-Y gastric bypass who presented on 4/3 with incarcerated ventral hernia with SBO    4/10 open ventral hernia repair, small bowel resection, and primary fascial closure with Dr. Lema. Admitted to SICU for Q1 abdominal checks and hemodynamic monitoring.   4/17 takeback for exploratory laparotomy, repair of anastomotic leak  4/22 8Fr pigtail into lower abdominal wall collection,150cc of foul appearing material aspirated, Attached to THONY bulb  4/27 RTOR for re-exploration and evacuation of old clots from retroperitoneum and pelvis and placement of 3 new beatrice drains  4/28: IR consulted for IVC filter     NEUROLOGICAL:  #Acute pain  - Acetaminophen Q6, dilaudid 0.25mg Q4 PRN (severe pain)  #Sedation/agitation  - Precedex infusion, wean as tolerated  -atarax 25mg x 1 given overnight  #AMS    HCT 4/27: negative  #Sleep hygiene  - HOLDING Trazodone 25mg QPM  - HOLDING Melatonin 5mg QPM    RESPIRATORY:   #Acute hypoxic respiratory failure  - Extubated 4/18, reintubated on 4/27 for AMS and agonal breathing, extubated 4/30  -on HFNC 60L/100%; remained tachypneic and hypoxic to 88%, transitioned to BiPAP 12/6 at 100%  05-01 @ 04:34--7.44 / 32 / 131 / 22 / 98.3  O2 98.3  Lac 0.8    05-01 @ 01:56--7.44 / 32 / 61 / 22 / 92.3  O2 92.3  Lac 0.7    #Right distal main pulmonary embolus with segmental extension  - HOLDING Lovenox 135mg Q12 secondary to acute anemia  - 4/30 started back on heparin gtt, currently at 1800  - Echo 4/22/25: EF 70 to 75%.  - B/L LE Venous duplex 4/21-RIGHT PT DVT, no LEFT DVT  -B/L LE Venous duplex 4/28-No evidence of deep venous thrombosis in either lower extremity. Limited   exam due to body habitus  - No IVC filter needed at this time per IR  #Intermittent diuresis  - Last diuresis 4/30 2mg Bumex  #PMHx [place] on CPAP@ home   #Activity increase as tolerated    CARDS:   #Hypertensive urgency, resolved  -10mg labetalol x 1 for SBP 190s  - Nicardipine gtt started, off since 5/1 6AM  #Acute hemorraghic shock, resolved  - Off levophed and vasopressin since 4/30  - 4/27: 2u PRBC 1plt 1uFFP, 1g TXA  - OFF sodium bicarbonate infusion on 4/27  #Supraventricular tachycardia, Non-sustained ventricular tachycardia, Ventricular Premature Depolarization  - Cardiology (Dr. Lopez) - Metoprolol 25mg Q12 HOLDING  #PMHx of HTN  - Amlodipine 10mg QD  HOLDING  - HCTZ 25mg QD HOLDING  - Losartan 100mg daily HOLDING  - EKG- NSR  - TTE 4/11: EF of 56%. G1DD.     GASTROINTESTINAL/NUTRITION:   #4/10 open ventral hernia repair, small bowel resection, ileoileal anastomosis and primary fascial closure, 2 THONY Right  #4/17 RTOR for exploratory laparotomy, repair of anastomotic leak at previous ileoileal anastomosis, resected, new ileoileal anastomosis created, 1 THONY Left (total 3)  - Intermittent abdominal vac change, last 4/26 PM, bilious drainage noted  - REMOVED 4/26 RUQ THONY  - REMOVED 4/28 LUQ THONY at ileo-ileal anastomosis, RLQ THONY in pelvis, IR THONY abdominal wall  #4/22: s/p IR percutaneous placement of a 8.5 Cambodian drainage catheter into lower abdominal wall fluid collection, yielding 150 mL of foul appearing fluid.  #4/27: Acute retroperitoneal  CTA: mesenteric arterial blush noted with extravasation    - s/p IR without finding of any bleeding, no embolization required  #4/27 RTOR for re-exploration and evacuation of old clots from retroperitoneum and pelvis  - New midline wound vac in place  - x3 new 19Fr beatrice drains in place: 1. Right pelvis, 2. Left pelvis, 3. Subcutaneous  #Diet, NPO  - TPN discontinued 4/25  - Aspiration precautions, HOB 30  #GI Prophylaxis/hx GERD  - Pantoprazole 40mg IV BID (home rx)  #Bowel regimen  - Holding  - Last BM 4/30  - Dignishield in place, nursing to flush    /RENAL:   #Urine output in critically ill  - An replaced 4/27  - LR @ 120cc/hr  #FUNMI; oliguric  - Nephrology consulted and following > hold off on CVVH recall  - Renal u/s> no hydronephrosis. 2-3cm anechoic cyst on right kidney   #Lactic acidosis    - OFF sodium bicarbonate infusion    Labs:          BUN/Cr- 68/2.7  -->,  74/2.3  -->          [05-01 @ 00:00]Na  148 // K  3.5 // Mg  1.9 // Phos  6.3  #hypomagnesemia-repleted with 1gm magnesium sulfate  #hypokalemia-repleted with 60mEq KCl   - Nephro: check CK, no indication of RRT, decrease meropenem to Q12    HEME/ONC:   #acute anemia     -last transfusion 4/29, 1 U PRBC  #Acute right pulmonary embolus    -heparin gtt  @ 16U--> f/u 7AM PTT   #B/L UE Venous Duplex 4/26/27 - thrombophlebitis  #B/L LE Venous Duplex 4/21- right PT DVT  - concern for malabsorption of Eliquis given small bowel resection per pharmacy  - vascular surgery consulted - AC with heparin drip (upon discharge transition to Eliquis 10 mg po BID x 7 days then 5 mg po BID for at least 6 months)  #B/L LE Venous duplex 4/28 prelim negative   #DVT prophylaxis (mechanical)    - LLE SCD only    Labs: Hb/Hct:  8.8/25.9  -->,  8.4/25.3  -->                      Plts:  98  -->,  93  -->                 PTT/INR:  30.0/--  --->,  40.1/--  --->     T&S expires: 5/2    ID:  #Intraabdominal anastomotic leak, fluid collection  #ID Consult  - Send baseline ESR CRP, continue meropenem 1g Q12  - Midline x ertanpenem 1g Q24 on discharge x 4 weeks (EOT 5/19)  - F/u with Dr. Perdue on Tuesdays via Telehealth   #Culture    OR 4/1 - E Coli    OR cx: few E.coli, few bacteroides, rare clostridium    4/13 Blood Cx: NGTF    4/14 tracheal aspirate: no growth     4/17 body fluid cx rare e. coli     4/22 Blood cx: negative    4/22 Abdominal - Negative FINAL    4/27 C diff stool panel: negative    WBC- 9.39  --->>,  10.00  --->>,  8.86  --->>  Temp trend- 24hrs T(F): 99.4 (05-01 @ 00:00), Max: 99.6 (04-30 @ 12:00)    ENDOCRINE:  #Glycemic monitoring  - Q6 FSG while NPO  - ISS  - A1C 5.8%     MSK:  #Activity- increase as tolerated     SKIN:  #DTI screening negative 4/28  - Continue to monitor daily skin changes    LINES/DRAINS:  PIV    RLQ pelvis beatrice drain (4/28 -     LLQ pelvis beatrice drain (4/28 -     Midline subtaneous beatrice drain (4/28 -     Right IJ TLC (4/17 -     Prevena Vac Midline     Left radial arterial line (4/28 -     Discontinued lines:    Right Femoral Arterial Line (4/27 - 4/28)    ADVANCED DIRECTIVES:  Full Code  Emergency - Daughter  (Shavonne Moss)  INDICATION FOR SICU/SDU: Intestinal obstruction    DISPO: SICU    Pending discussion with SICU attending Dr. Allen

## 2025-05-02 LAB
ALBUMIN SERPL ELPH-MCNC: 2.5 G/DL — LOW (ref 3.5–5.2)
ALP SERPL-CCNC: 119 U/L — HIGH (ref 30–115)
ALT FLD-CCNC: 332 U/L — HIGH (ref 0–41)
ANION GAP SERPL CALC-SCNC: 13 MMOL/L — SIGNIFICANT CHANGE UP (ref 7–14)
ANION GAP SERPL CALC-SCNC: 16 MMOL/L — HIGH (ref 7–14)
ANION GAP SERPL CALC-SCNC: 16 MMOL/L — HIGH (ref 7–14)
APTT BLD: 22.7 SEC — CRITICAL LOW (ref 27–39.2)
APTT BLD: 64.3 SEC — HIGH (ref 27–39.2)
APTT BLD: 70.3 SEC — CRITICAL HIGH (ref 27–39.2)
AST SERPL-CCNC: 70 U/L — HIGH (ref 0–41)
BASOPHILS # BLD AUTO: 0.06 K/UL — SIGNIFICANT CHANGE UP (ref 0–0.2)
BASOPHILS NFR BLD AUTO: 0.9 % — SIGNIFICANT CHANGE UP (ref 0–1)
BILIRUB DIRECT SERPL-MCNC: 0.6 MG/DL — HIGH (ref 0–0.3)
BILIRUB INDIRECT FLD-MCNC: 0.3 MG/DL — SIGNIFICANT CHANGE UP (ref 0.2–1.2)
BILIRUB SERPL-MCNC: 0.9 MG/DL — SIGNIFICANT CHANGE UP (ref 0.2–1.2)
BUN SERPL-MCNC: 42 MG/DL — HIGH (ref 10–20)
BUN SERPL-MCNC: 47 MG/DL — HIGH (ref 10–20)
BUN SERPL-MCNC: 58 MG/DL — HIGH (ref 10–20)
CALCIUM SERPL-MCNC: 7.1 MG/DL — LOW (ref 8.4–10.5)
CALCIUM SERPL-MCNC: 7.3 MG/DL — LOW (ref 8.4–10.5)
CALCIUM SERPL-MCNC: 7.5 MG/DL — LOW (ref 8.4–10.5)
CHLORIDE SERPL-SCNC: 104 MMOL/L — SIGNIFICANT CHANGE UP (ref 98–110)
CHLORIDE SERPL-SCNC: 105 MMOL/L — SIGNIFICANT CHANGE UP (ref 98–110)
CHLORIDE SERPL-SCNC: 109 MMOL/L — SIGNIFICANT CHANGE UP (ref 98–110)
CO2 SERPL-SCNC: 21 MMOL/L — SIGNIFICANT CHANGE UP (ref 17–32)
CO2 SERPL-SCNC: 22 MMOL/L — SIGNIFICANT CHANGE UP (ref 17–32)
CO2 SERPL-SCNC: 22 MMOL/L — SIGNIFICANT CHANGE UP (ref 17–32)
CREAT SERPL-MCNC: 1 MG/DL — SIGNIFICANT CHANGE UP (ref 0.7–1.5)
CREAT SERPL-MCNC: 1.1 MG/DL — SIGNIFICANT CHANGE UP (ref 0.7–1.5)
CREAT SERPL-MCNC: 1.5 MG/DL — SIGNIFICANT CHANGE UP (ref 0.7–1.5)
EGFR: 39 ML/MIN/1.73M2 — LOW
EGFR: 39 ML/MIN/1.73M2 — LOW
EGFR: 56 ML/MIN/1.73M2 — LOW
EGFR: 56 ML/MIN/1.73M2 — LOW
EGFR: 63 ML/MIN/1.73M2 — SIGNIFICANT CHANGE UP
EGFR: 63 ML/MIN/1.73M2 — SIGNIFICANT CHANGE UP
EOSINOPHIL # BLD AUTO: 0.05 K/UL — SIGNIFICANT CHANGE UP (ref 0–0.7)
EOSINOPHIL NFR BLD AUTO: 0.8 % — SIGNIFICANT CHANGE UP (ref 0–8)
GAS PNL BLDA: SIGNIFICANT CHANGE UP
GLUCOSE BLDC GLUCOMTR-MCNC: 109 MG/DL — HIGH (ref 70–99)
GLUCOSE BLDC GLUCOMTR-MCNC: 111 MG/DL — HIGH (ref 70–99)
GLUCOSE BLDC GLUCOMTR-MCNC: 130 MG/DL — HIGH (ref 70–99)
GLUCOSE BLDC GLUCOMTR-MCNC: 147 MG/DL — HIGH (ref 70–99)
GLUCOSE SERPL-MCNC: 121 MG/DL — HIGH (ref 70–99)
GLUCOSE SERPL-MCNC: 139 MG/DL — HIGH (ref 70–99)
GLUCOSE SERPL-MCNC: 97 MG/DL — SIGNIFICANT CHANGE UP (ref 70–99)
HCT VFR BLD CALC: 24.1 % — LOW (ref 37–47)
HCT VFR BLD CALC: 24.1 % — LOW (ref 37–47)
HCT VFR BLD CALC: 28.9 % — LOW (ref 37–47)
HGB BLD-MCNC: 7.9 G/DL — LOW (ref 12–16)
HGB BLD-MCNC: 8 G/DL — LOW (ref 12–16)
HGB BLD-MCNC: 9.8 G/DL — LOW (ref 12–16)
INR BLD: 0.97 RATIO — SIGNIFICANT CHANGE UP (ref 0.65–1.3)
LYMPHOCYTES # BLD AUTO: 0.29 K/UL — LOW (ref 1.2–3.4)
LYMPHOCYTES # BLD AUTO: 4.3 % — LOW (ref 20.5–51.1)
MAGNESIUM SERPL-MCNC: 1.6 MG/DL — LOW (ref 1.8–2.4)
MAGNESIUM SERPL-MCNC: 1.7 MG/DL — LOW (ref 1.8–2.4)
MAGNESIUM SERPL-MCNC: 2 MG/DL — SIGNIFICANT CHANGE UP (ref 1.8–2.4)
MCHC RBC-ENTMCNC: 30.7 PG — SIGNIFICANT CHANGE UP (ref 27–31)
MCHC RBC-ENTMCNC: 30.9 PG — SIGNIFICANT CHANGE UP (ref 27–31)
MCHC RBC-ENTMCNC: 31 PG — SIGNIFICANT CHANGE UP (ref 27–31)
MCHC RBC-ENTMCNC: 32.8 G/DL — SIGNIFICANT CHANGE UP (ref 32–37)
MCHC RBC-ENTMCNC: 33.2 G/DL — SIGNIFICANT CHANGE UP (ref 32–37)
MCHC RBC-ENTMCNC: 33.9 G/DL — SIGNIFICANT CHANGE UP (ref 32–37)
MCV RBC AUTO: 91.2 FL — SIGNIFICANT CHANGE UP (ref 81–99)
MCV RBC AUTO: 93.4 FL — SIGNIFICANT CHANGE UP (ref 81–99)
MCV RBC AUTO: 93.8 FL — SIGNIFICANT CHANGE UP (ref 81–99)
MONOCYTES # BLD AUTO: 0.35 K/UL — SIGNIFICANT CHANGE UP (ref 0.1–0.6)
MONOCYTES NFR BLD AUTO: 5.1 % — SIGNIFICANT CHANGE UP (ref 1.7–9.3)
NEUTROPHILS # BLD AUTO: 5.79 K/UL — SIGNIFICANT CHANGE UP (ref 1.4–6.5)
NEUTROPHILS NFR BLD AUTO: 85.5 % — HIGH (ref 42.2–75.2)
NRBC BLD AUTO-RTO: 0 /100 WBCS — SIGNIFICANT CHANGE UP (ref 0–0)
NRBC BLD AUTO-RTO: 0 /100 WBCS — SIGNIFICANT CHANGE UP (ref 0–0)
PHOSPHATE SERPL-MCNC: 3.2 MG/DL — SIGNIFICANT CHANGE UP (ref 2.1–4.9)
PHOSPHATE SERPL-MCNC: 3.8 MG/DL — SIGNIFICANT CHANGE UP (ref 2.1–4.9)
PHOSPHATE SERPL-MCNC: 4.6 MG/DL — SIGNIFICANT CHANGE UP (ref 2.1–4.9)
PLATELET # BLD AUTO: 91 K/UL — LOW (ref 130–400)
PLATELET # BLD AUTO: 96 K/UL — LOW (ref 130–400)
PLATELET # BLD AUTO: 98 K/UL — LOW (ref 130–400)
PMV BLD: 11 FL — HIGH (ref 7.4–10.4)
PMV BLD: 11.4 FL — HIGH (ref 7.4–10.4)
PMV BLD: 11.8 FL — HIGH (ref 7.4–10.4)
POTASSIUM SERPL-MCNC: 3.3 MMOL/L — LOW (ref 3.5–5)
POTASSIUM SERPL-MCNC: 3.4 MMOL/L — LOW (ref 3.5–5)
POTASSIUM SERPL-MCNC: 3.6 MMOL/L — SIGNIFICANT CHANGE UP (ref 3.5–5)
POTASSIUM SERPL-SCNC: 3.3 MMOL/L — LOW (ref 3.5–5)
POTASSIUM SERPL-SCNC: 3.4 MMOL/L — LOW (ref 3.5–5)
POTASSIUM SERPL-SCNC: 3.6 MMOL/L — SIGNIFICANT CHANGE UP (ref 3.5–5)
PROT SERPL-MCNC: 4.8 G/DL — LOW (ref 6–8)
PROTHROM AB SERPL-ACNC: 11.4 SEC — SIGNIFICANT CHANGE UP (ref 9.95–12.87)
RBC # BLD: 2.57 M/UL — LOW (ref 4.2–5.4)
RBC # BLD: 2.58 M/UL — LOW (ref 4.2–5.4)
RBC # BLD: 3.17 M/UL — LOW (ref 4.2–5.4)
RBC # FLD: 15.6 % — HIGH (ref 11.5–14.5)
RBC # FLD: 15.9 % — HIGH (ref 11.5–14.5)
RBC # FLD: 15.9 % — HIGH (ref 11.5–14.5)
SODIUM SERPL-SCNC: 139 MMOL/L — SIGNIFICANT CHANGE UP (ref 135–146)
SODIUM SERPL-SCNC: 143 MMOL/L — SIGNIFICANT CHANGE UP (ref 135–146)
SODIUM SERPL-SCNC: 146 MMOL/L — SIGNIFICANT CHANGE UP (ref 135–146)
WBC # BLD: 5.97 K/UL — SIGNIFICANT CHANGE UP (ref 4.8–10.8)
WBC # BLD: 6.77 K/UL — SIGNIFICANT CHANGE UP (ref 4.8–10.8)
WBC # BLD: 7.4 K/UL — SIGNIFICANT CHANGE UP (ref 4.8–10.8)
WBC # FLD AUTO: 5.97 K/UL — SIGNIFICANT CHANGE UP (ref 4.8–10.8)
WBC # FLD AUTO: 6.77 K/UL — SIGNIFICANT CHANGE UP (ref 4.8–10.8)
WBC # FLD AUTO: 7.4 K/UL — SIGNIFICANT CHANGE UP (ref 4.8–10.8)

## 2025-05-02 PROCEDURE — 99291 CRITICAL CARE FIRST HOUR: CPT | Mod: 24

## 2025-05-02 PROCEDURE — 71045 X-RAY EXAM CHEST 1 VIEW: CPT | Mod: 26

## 2025-05-02 RX ORDER — ENOXAPARIN SODIUM 100 MG/ML
40 INJECTION SUBCUTANEOUS EVERY 12 HOURS
Refills: 0 | Status: DISCONTINUED | OUTPATIENT
Start: 2025-05-02 | End: 2025-05-12

## 2025-05-02 RX ORDER — BUMETANIDE 1 MG/1
1 TABLET ORAL ONCE
Refills: 0 | Status: COMPLETED | OUTPATIENT
Start: 2025-05-02 | End: 2025-05-02

## 2025-05-02 RX ORDER — AMLODIPINE BESYLATE 10 MG/1
10 TABLET ORAL DAILY
Refills: 0 | Status: DISCONTINUED | OUTPATIENT
Start: 2025-05-02 | End: 2025-05-23

## 2025-05-02 RX ORDER — CALCIUM GLUCONATE 20 MG/ML
2 INJECTION, SOLUTION INTRAVENOUS ONCE
Refills: 0 | Status: COMPLETED | OUTPATIENT
Start: 2025-05-02 | End: 2025-05-02

## 2025-05-02 RX ORDER — MAGNESIUM SULFATE 500 MG/ML
3 SYRINGE (ML) INJECTION ONCE
Refills: 0 | Status: DISCONTINUED | OUTPATIENT
Start: 2025-05-02 | End: 2025-05-02

## 2025-05-02 RX ORDER — MEROPENEM 1 G/30ML
1000 INJECTION INTRAVENOUS EVERY 8 HOURS
Refills: 0 | Status: DISCONTINUED | OUTPATIENT
Start: 2025-05-02 | End: 2025-05-06

## 2025-05-02 RX ORDER — MELATONIN 5 MG
5 TABLET ORAL AT BEDTIME
Refills: 0 | Status: DISCONTINUED | OUTPATIENT
Start: 2025-05-02 | End: 2025-05-04

## 2025-05-02 RX ORDER — MAGNESIUM SULFATE 500 MG/ML
2 SYRINGE (ML) INJECTION ONCE
Refills: 0 | Status: COMPLETED | OUTPATIENT
Start: 2025-05-02 | End: 2025-05-02

## 2025-05-02 RX ORDER — MAGNESIUM SULFATE 500 MG/ML
2 SYRINGE (ML) INJECTION
Refills: 0 | Status: COMPLETED | OUTPATIENT
Start: 2025-05-02 | End: 2025-05-02

## 2025-05-02 RX ADMIN — BUMETANIDE 1 MILLIGRAM(S): 1 TABLET ORAL at 11:04

## 2025-05-02 RX ADMIN — ENOXAPARIN SODIUM 40 MILLIGRAM(S): 100 INJECTION SUBCUTANEOUS at 20:20

## 2025-05-02 RX ADMIN — Medication 50 MILLIEQUIVALENT(S): at 03:46

## 2025-05-02 RX ADMIN — Medication 40 MILLIGRAM(S): at 17:31

## 2025-05-02 RX ADMIN — IPRATROPIUM BROMIDE AND ALBUTEROL SULFATE 3 MILLILITER(S): .5; 2.5 SOLUTION RESPIRATORY (INHALATION) at 01:14

## 2025-05-02 RX ADMIN — Medication 100 MILLIEQUIVALENT(S): at 16:09

## 2025-05-02 RX ADMIN — Medication 50 MILLIEQUIVALENT(S): at 20:20

## 2025-05-02 RX ADMIN — CALCIUM GLUCONATE 200 GRAM(S): 20 INJECTION, SOLUTION INTRAVENOUS at 12:33

## 2025-05-02 RX ADMIN — Medication 1 APPLICATION(S): at 05:18

## 2025-05-02 RX ADMIN — Medication 0.25 MILLIGRAM(S): at 09:35

## 2025-05-02 RX ADMIN — AMLODIPINE BESYLATE 10 MILLIGRAM(S): 10 TABLET ORAL at 17:40

## 2025-05-02 RX ADMIN — Medication 25 GRAM(S): at 03:46

## 2025-05-02 RX ADMIN — Medication 10 MILLIGRAM(S): at 13:33

## 2025-05-02 RX ADMIN — Medication 100 MILLIEQUIVALENT(S): at 17:30

## 2025-05-02 RX ADMIN — MEROPENEM 100 MILLIGRAM(S): 1 INJECTION INTRAVENOUS at 20:21

## 2025-05-02 RX ADMIN — IPRATROPIUM BROMIDE AND ALBUTEROL SULFATE 3 MILLILITER(S): .5; 2.5 SOLUTION RESPIRATORY (INHALATION) at 19:43

## 2025-05-02 RX ADMIN — Medication 25 GRAM(S): at 20:20

## 2025-05-02 RX ADMIN — CALCIUM GLUCONATE 200 GRAM(S): 20 INJECTION, SOLUTION INTRAVENOUS at 06:07

## 2025-05-02 RX ADMIN — IPRATROPIUM BROMIDE AND ALBUTEROL SULFATE 3 MILLILITER(S): .5; 2.5 SOLUTION RESPIRATORY (INHALATION) at 15:14

## 2025-05-02 RX ADMIN — Medication 0.25 MILLIGRAM(S): at 09:20

## 2025-05-02 RX ADMIN — IPRATROPIUM BROMIDE AND ALBUTEROL SULFATE 3 MILLILITER(S): .5; 2.5 SOLUTION RESPIRATORY (INHALATION) at 08:42

## 2025-05-02 RX ADMIN — Medication 50 MILLIEQUIVALENT(S): at 06:07

## 2025-05-02 RX ADMIN — Medication 50 MILLIEQUIVALENT(S): at 02:19

## 2025-05-02 RX ADMIN — Medication 100 MILLIEQUIVALENT(S): at 14:49

## 2025-05-02 RX ADMIN — Medication 5 MILLIGRAM(S): at 21:27

## 2025-05-02 RX ADMIN — Medication 25 GRAM(S): at 02:19

## 2025-05-02 RX ADMIN — Medication 40 MILLIGRAM(S): at 06:07

## 2025-05-02 RX ADMIN — MEROPENEM 100 MILLIGRAM(S): 1 INJECTION INTRAVENOUS at 11:07

## 2025-05-02 NOTE — PROGRESS NOTE ADULT - ASSESSMENT
ASSESSMENT & PLAN:  64F PMHx HTN, morbid obesity s/p 2001 Abby-en-Y gastric bypass who presented on 4/3 with incarcerated ventral hernia with SBO    4/10 open ventral hernia repair, small bowel resection, and primary fascial closure with Dr. Lema. Admitted to SICU for Q1 abdominal checks and hemodynamic monitoring.   4/17 takeback for exploratory laparotomy, repair of anastomotic leak  4/22 8Fr pigtail into lower abdominal wall collection,150cc of foul appearing material aspirated, Attached to THONY bulb  4/27 RTOR for re-exploration and evacuation of old clots from retroperitoneum and pelvis and placement of 3 new beatrice drains  4/28: IR consulted for IVC filter     NEUROLOGICAL:  #Acute pain  - Acetaminophen Q6, dilaudid 0.25mg Q4 PRN (severe pain)  #Sedation/agitation  - Precedex infusion, wean as tolerated  #AMS    HCT 4/27: negative  #Sleep hygiene  - HOLDING Trazodone 25mg QPM  - HOLDING Melatonin 5mg QPM    RESPIRATORY:   #Acute hypoxic respiratory failure  - Extubated 4/18, reintubated on 4/27 for AMS and agonal breathing, extubated 4/30  -on HFNC 60L/100%; remained tachypneic and hypoxic to 88%, transitioned to BiPAP 12/6 at 100%  AM ABG:   #Right distal main pulmonary embolus with segmental extension  - Heparin gtt, currently at 1900u/hr  - B/L LE Venous duplex 4/21-RIGHT PT DVT, no LEFT DVT  -B/L LE Venous duplex 4/28-No evidence of deep venous thrombosis in either lower extremity. Limited   exam due to body habitus  - No IVC filter needed at this time per IR  #Intermittent diuresis  - Last diuresis 5/1 2mg Bumex  #PMHx LOUIS on CPAP@ home   #Activity increase as tolerated    CARDS:   #Hypertensive urgency, resolved  -10mg labetalol x 1 for SBP 190s  - Nicardipine gtt started, off since 5/1 6AM  #Acute hemorraghic shock, resolved  - Off levophed and vasopressin since 4/30  - 4/27: 2u PRBC 1plt 1uFFP, 1g TXA  - OFF sodium bicarbonate infusion on 4/27  #Supraventricular tachycardia, Non-sustained ventricular tachycardia, Ventricular Premature Depolarization  - Cardiology (Dr. Lopez) - Metoprolol 25mg Q12 HOLDING  #PMHx of HTN  - Amlodipine 10mg QD restarted   - HCTZ 25mg QD HOLDING  - Losartan 100mg daily HOLDING  - EKG- NSR  - TTE 4/11: EF of 56%. G1DD.   - TTE 4/22: EF 70 to 75%.    GASTROINTESTINAL/NUTRITION:   #4/10 open ventral hernia repair, small bowel resection, ileoileal anastomosis and primary fascial closure, 2 THONY Right  #4/17 RTOR for exploratory laparotomy, repair of anastomotic leak at previous ileoileal anastomosis, resected, new ileoileal anastomosis created, 1 THONY Left (total 3)  - Intermittent abdominal vac change, last 4/26 PM, bilious drainage noted  - REMOVED 4/26 RUQ THONY  - REMOVED 4/28 LUQ THONY at ileo-ileal anastomosis, RLQ THONY in pelvis, IR THONY abdominal wall  #4/22: s/p IR percutaneous placement of a 8.5 Wol[place] drainage catheter into lower abdominal wall fluid collection, yielding 150 mL of foul appearing fluid.  #4/27: Acute retroperitoneal  CTA: mesenteric arterial blush noted with extravasation    - s/p IR without finding of any bleeding, no embolization required  #4/27 RTOR for re-exploration and evacuation of old clots from retroperitoneum and pelvis  - New midline wound vac in place  - x3 new 19Fr beatrice drains in place: 1. Right pelvis, 2. Left pelvis, 3. Subcutaneous  #Diet, aubrey CLD   - TPN discontinued 4/25  - Aspiration precautions, HOB 30  #GI Prophylaxis/hx GERD  - Pantoprazole 40mg IV BID (home rx)  #Bowel regimen  - Holding  - Last BM 5/1  - Dignishield in place, nursing to flush    /RENAL:   #Urine output in critically ill  - An replaced 4/27  - IVL  #FUNMI; oliguric  - Nephrology consulted and following > hold off on CVVH recall  - Renal u/s> no hydronephrosis. 2-3cm anechoic cyst on right kidney   #Lactic acidosis    - OFF sodium bicarbonate infusion  #hypomagnesemia-repleted with 4gm magnesium sulfate  #hypokalemia-repleted with 60mEq KCl   - Nephro: check CK, no indication of RRT, decrease meropenem to Q12    Labs:          BUN/Cr- 67/1.8  -->,  58/1.5  -->          [05-01 @ 23:28]Na  146 // K  3.3 // Mg  1.6 // Phos  4.6    HEME/ONC:   #acute anemia   - Last transfusion 4/29, 1 U PRBC  #Acute right pulmonary embolus  - Heparin gtt  @ 1900U/hr  #B/L UE Venous Duplex 4/26/27 - thrombophlebitis  #B/L LE Venous Duplex 4/21- right PT DVT  - Concern for malabsorption of Eliquis given small bowel resection per pharmacy  - Vascular surgery consulted - AC with heparin drip (upon discharge transition to Eliquis 10 mg po BID x 7 days then 5 mg po BID for at least 6 months)  #B/L LE Venous duplex 4/28 prelim negative   #DVT prophylaxis (mechanical)    - LLE SCD only    Labs: Hb/Hct:  8.4/25.3  -->,  8.0/24.1  -->                      Plts:  93  -->,  96  -->                 PTT/INR:  54.5/--  --->,  70.3/--  --->     T&S expires: 5/2    ID:  #Intraabdominal anastomotic leak, fluid collection  #ID Consult  - Send baseline ESR CRP, continue meropenem 1g Q12  - Midline x ertanpenem 1g Q24 on discharge x 4 weeks (EOT 5/19)  - F/u with Dr. Perdue on Tuesdays via Telehealth   #Culture    OR 4/1 - E Coli    OR cx: few E.coli, few bacteroides, rare clostridium    4/13 Blood Cx: NGTF    4/14 tracheal aspirate: no growth     4/17 body fluid cx rare e. coli     4/22 Blood cx: negative    4/22 Abdominal - Negative FINAL    4/27 C diff stool panel: negative    WBC- 10.00  --->>,  8.86  --->>,  7.40  --->>  Temp trend- 24hrs T(F): 97.3 (05-01 @ 20:00), Max: 97.5 (05-01 @ 04:00)  Current antibiotics-meropenem  IVPB 1000 every 12 hours    ENDOCRINE:  #Glycemic monitoring  - Q6 FSG while NPO  - ISS  - A1C 5.8%     MSK:  #Activity- increase as tolerated     SKIN:  #DTI screening negative 4/28  - Continue to monitor daily skin changes    LINES/DRAINS:  PIV    RLQ pelvis beatrice drain (4/28 -     LLQ pelvis beatrice drain (4/28 -     Midline subtaneous beatrice drain (4/28 -     Right IJ TLC (4/17 -     Prevena Vac Midline     Left radial arterial line (4/28 -     Discontinued lines:    Right Femoral Arterial Line (4/27 - 4/28)    ADVANCED DIRECTIVES:  Full Code  Emergency - Daughter  (Shavonne Moss)  INDICATION FOR SICU/SDU: Intestinal obstruction    DISPO: SICU    Pending discussion with SICU attending Dr. Allen ASSESSMENT & PLAN:  64F PMHx HTN, morbid obesity s/p 2001 Abby-en-Y gastric bypass who presented on 4/3 with incarcerated ventral hernia with SBO    4/10 open ventral hernia repair, small bowel resection, and primary fascial closure with Dr. Lema. Admitted to SICU for Q1 abdominal checks and hemodynamic monitoring.   4/17 takeback for exploratory laparotomy, repair of anastomotic leak  4/22 8Fr pigtail into lower abdominal wall collection,150cc of foul appearing material aspirated, Attached to THONY bulb  4/27 RTOR for re-exploration and evacuation of old clots from retroperitoneum and pelvis and placement of 3 new beatrice drains  4/28: IR consulted for IVC filter     NEUROLOGICAL:  #Acute pain  - Acetaminophen Q6, dilaudid 0.25mg Q4 PRN (severe pain)  #Sedation/agitation  - Precedex infusion, wean as tolerated  #AMS    HCT 4/27: negative  #Sleep hygiene  - HOLDING Trazodone 25mg QPM  - HOLDING Melatonin 5mg QPM    RESPIRATORY:   #Acute hypoxic respiratory failure  - Extubated 4/18, reintubated on 4/27 for AMS and agonal breathing, extubated 4/30  -on HFNC 60L/100%; remained tachypneic and hypoxic to 88%, transitioned to BiPAP 12/6 at 100%, back to HFNC 60/60  AM ABG:   #Right distal main pulmonary embolus with segmental extension  - Heparin gtt, currently at 1900u/hr > stopped 5/2 for sanguinous vac output  - B/L LE Venous duplex 4/21-RIGHT PT DVT, no LEFT DVT  - B/L LE Venous duplex 4/28-No evidence of deep venous thrombosis in either lower extremity. Limited   exam due to body habitus  - No IVC filter needed at this time per IR  #Intermittent diuresis  - Last diuresis 5/1 2mg Bumex  #PMHx LOUIS on CPAP@ home   #Activity increase as tolerated    CARDS:   #Hypertensive urgency, resolved  - Nicardipine gtt started, off since 5/1 6AM  #Acute hemorraghic shock, resolved  - Off levophed and vasopressin since 4/30  - 4/27: 2u PRBC 1plt 1uFFP, 1g TXA  - OFF sodium bicarbonate infusion on 4/27  #Supraventricular tachycardia, Non-sustained ventricular tachycardia, Ventricular Premature Depolarization  - Cardiology (Dr. Obryne) - Metoprolol 25mg Q12 HOLDING  #PMHx of HTN  - Amlodipine 10mg QD restarted   - HCTZ 25mg QD HOLDING  - Losartan 100mg daily HOLDING  - EKG- NSR  - TTE 4/11: EF of 56%. G1DD.   - TTE 4/22: EF 70 to 75%.    GASTROINTESTINAL/NUTRITION:   #4/10 open ventral hernia repair, small bowel resection, ileoileal anastomosis and primary fascial closure, 2 THONY Right  #4/17 RTOR for exploratory laparotomy, repair of anastomotic leak at previous ileoileal anastomosis, resected, new ileoileal anastomosis created, 1 THONY Left (total 3)  - Intermittent abdominal vac change, last 4/27, now with sanguinous output  - REMOVED 4/26 RUQ THONY  - REMOVED 4/28 LUQ THONY at ileo-ileal anastomosis, RLQ THONY in pelvis, IR THONY abdominal wall  #4/22: s/p IR percutaneous placement of a 8.5 [place] drainage catheter into lower abdominal wall fluid collection, yielding 150 mL of foul appearing fluid.  #4/27: Acute retroperitoneal  CTA: mesenteric arterial blush noted with extravasation    - s/p IR without finding of any bleeding, no embolization required  #4/27 RTOR for re-exploration and evacuation of old clots from retroperitoneum and pelvis  - New midline wound vac in place  - x3 new 19Fr beatrice drains in place: 1. Right pelvis, 2. Left pelvis, 3. Subcutaneous  #Diet, aubrey CLD with clear ensure TID  - TPN discontinued 4/25  - Aspiration precautions, HOB 30  #GI Prophylaxis/hx GERD  - Pantoprazole 40mg IV BID (home rx)  #Bowel regimen  - Holding  - Last BM 5/1  - Dignishield in place, nursing to flush    /RENAL:   #Urine output in critically ill  - An replaced 4/27  - IVL  #FUNMI; oliguric  - Nephrology consulted and following > hold off on CVVH recall  - Renal u/s> no hydronephrosis. 2-3cm anechoic cyst on right kidney   #Lactic acidosis    - OFF sodium bicarbonate infusion  #hypomagnesemia-repleted with 4gm magnesium sulfate  #hypokalemia-repleted with 60mEq KCl   - Nephro: check CK, no indication of RRT, decrease meropenem to Q12    Labs:          BUN/Cr- 67/1.8  -->,  58/1.5  -->          [05-01 @ 23:28]Na  146 // K  3.3 // Mg  1.6 // Phos  4.6    HEME/ONC:   #acute anemia   - Last transfusion 4/29, 1 U PRBC  #Acute right pulmonary embolus  - Heparin gtt  @ 1900U/hr > stopped for sanguinous vac output  - 2U pRBC  #B/L UE Venous Duplex 4/26/27 - thrombophlebitis  #B/L LE Venous Duplex 4/21- right PT DVT  - Concern for malabsorption of Eliquis given small bowel resection per pharmacy  - Vascular surgery consulted - AC with heparin drip (upon discharge transition to Eliquis 10 mg po BID x 7 days then 5 mg po BID for at least 6 months)  #B/L LE Venous duplex 4/28 prelim negative   #DVT prophylaxis (mechanical)    - LLE SCD only    Labs: Hb/Hct:  8.4/25.3  -->,  8.0/24.1  -->                      Plts:  93  -->,  96  -->                 PTT/INR:  54.5/--  --->,  70.3/--  --->     T&S expires: 5/2    ID:  #Intraabdominal anastomotic leak, fluid collection  #ID Consult  - Send baseline ESR CRP, continue meropenem 1g Q12  - Midline x ertanpenem 1g Q24 on discharge x 4 weeks (EOT 5/19)  - F/u with Dr. Perdue on Tuesdays via Telehealth   #Culture    OR 4/1 - E Coli    OR cx: few E.coli, few bacteroides, rare clostridium    4/13 Blood Cx: NGTF    4/14 tracheal aspirate: no growth     4/17 body fluid cx rare e. coli     4/22 Blood cx: negative    4/22 Abdominal - Negative FINAL    4/27 C diff stool panel: negative    WBC- 10.00  --->>,  8.86  --->>,  7.40  --->>  Temp trend- 24hrs T(F): 97.3 (05-01 @ 20:00), Max: 97.5 (05-01 @ 04:00)  Current antibiotics-meropenem  IVPB 1000 every 12 hours    ENDOCRINE:  #Glycemic monitoring  - Q6 FSG while NPO  - ISS  - A1C 5.8%     MSK:  #Activity- increase as tolerated     SKIN:  #DTI screening negative 4/28  - Continue to monitor daily skin changes    LINES/DRAINS:  PIV    RLQ pelvis beatrice drain (4/28 -     LLQ pelvis beatrice drain (4/28 -     Midline subtaneous beatrice drain (4/28 -     Right IJ TLC (4/17 -     Prevena Vac Midline     Left radial arterial line (4/28 -     Discontinued lines:    Right Femoral Arterial Line (4/27 - 4/28)    ADVANCED DIRECTIVES:  Full Code  Emergency - Daughter  (Shavonne Moss)  INDICATION FOR SICU/SDU: Intestinal obstruction    DISPO: SICU    Pending discussion with SICU attending Dr. Allen

## 2025-05-02 NOTE — PROGRESS NOTE ADULT - ATTENDING COMMENTS
This patient has a high probability of sudden, clinically significant deterioration, which requires the highest level of physician preparedness to intervene urgently. I managed/supervised life or organ supporting interventions that required frequent physician assessment. I devoted my full attention in the ICU to the direct care of this patient for the period of time indicated below. Time I spent with the family or surrogate(s) is included only if the patient was incapable of providing the necessary information or participating in medical decision making. Time devoted to teaching and to any procedures I billed separately is not included.     Patient examined and evaluated at the bedside with SICU team. I performed a medically appropriate exam. Pertinent findings are detailed below. Vital signs, laboratory work, and imaging reviewed.     Neuro: awake, alert  #Anxiety disorder - precedex infusion @ 0.5, wean as able    Respiratory: respirations even and unlabored on HFNC 60L/60%  CXR reviewed and interpreted by me - improved lung volumes, bilateral small effusions  ABG - ( 02 May 2025 03:54 )  pH, Arterial: 7.47  pH, Blood: x     /  pCO2: 33    /  pO2: 78    / HCO3: 24    / Base Excess: 0.3   /  SaO2: 98.5    #Hypoxic respiratory failure - wean HFNC as able, duonebs, give 1mg IV bumex today  #Pulmonary embolism - holding heparin infusion due to bleeding    CV: monitor hemodynamics, MAP goal > 65  #HTN - amlodipine 10mg oral daily, can give PRNs if SBP sustained above 180    GI: THONY drains serosanguinous, midline wound vac was bleeding this AM, wound opened and found to have bleeding from subcutaneous tissue controlled   ( 01 May 2025 23:28 )  Alb: 2.5 g/dL / Pro: 4.8 g/dL / AlkPhos: 119 U/L / ALT: 332 U/L / AST: 70 U/L / GGT:x     / Tbili 0.9 mg/dL/ Dbili 0.6 mg/dL / Indbili 0.3 mg/dL  #Nutrition - Protein-calorie malnutrition - acute illness, poor nutritional intake, fluid retention - bariatric clear liquid diet with protein supplementation  #Ppx - protonix 40mg IV BID (Hx of gastric bypass)    Renal: Continue I&Os monitoring, replete electrolytes as needed  05-01    146  |  109  |  58[H]  ----------------------------<  97  3.3[L]   |  21  |  1.5    Ca 7.1[L]; Mg 1.6[L]; Phos 4.6      UOP - OUT: 6150 mL    I/O - IN: 2894.4 mL / OUT: 6412.5 mL / NET: -3518.1 mL    An catheter - hourly I/O in critically ill patient  #Hypomagnesemia - 4g magnesium sulfate IVPB   #Hypokalemia - 60mEq KCl IV  #Hypocalcemia - 2g calcium gluconate IVPB given, give additional 2g calcium gluconate IVPB    Heme: continue to evaluate for acute blood loss anemia- trend Hg/Hct                         7.9    5.97  )-----------( 91       ( 02 May 2025 08:20 )             24.1     PT/INR/PTT - ( 02 May 2025 05:55 )   PT: x    ; INR: x    ; PTT 64.3 sec    Anticoagulation - holding in setting of bleeding from subcutaneous tissue  #Thrombocytopenia - no indication for PLT infusion, trend for now  #Acute blood loss anemia - bleeding from midline wound, 1 unit pRBC being given, re-check CBC post-transfusion    ID: trend WBC, monitor for fevers  #Abdominal collection - increase meropenem 1g from Q12H to Q8H    Endocrine: Prevent and treat hyperglycemia with insulin as needed    PV: follow pulse exam    #Physical deconditioning - OOB and mobilize as able, PT eval    Skin: decub precautions    Lines: THONY drains x 3, L radial arterial line, An, dignishield  DVT Prophylaxis: holding DVT ppx  Stress Ulcer Prophylaxis: protonix (hx of gastric bypass)  Disposition: SICU - hypoxic respiratory failure, bleeding, at risk for bleeding    Blaise Allen MD  Trauma/Acute Care Surgery/Surgical Critical Care Attending

## 2025-05-02 NOTE — PROGRESS NOTE ADULT - SUBJECTIVE AND OBJECTIVE BOX
SURGERY PROGRESS NOTE    Patient: NANCY SARGENT , 65y (62)Female   MRN: [id number]  Location: [address]  Visit: 25 Inpatient  Date: 25 @ 07:23    Hospital Day #: 30  Post-Op Day #: 15    Procedure/Dx/Injuries:  Incarcerated ventral hernia w/ SBO  4/10: S/P lap converted to open ventral hernia repair with mesh and small bowel resection  : s/p ex lap, 20 cm, SBR, creation new of side to side ileoileostomy, ventral hernia repair with mesh  : S/P IR placement 8.5 Fr drainage cath  : IR SMA angio; no embolization  : S/P RTOR, ex lap, evacuation of hemoperitoneum, and abdominal washout    Events of past 24 hours:  Tolerating 12 BIBPAP overnight; no BM overnight; blood cultures negative; off pressors    PAST MEDICAL & SURGICAL HISTORY:  Gastric bypass status for obesity      LOUIS (obstructive sleep apnea)      S/P gastric bypass      S/P       S/P small bowel resection      History of total bilateral knee replacement (TKR)      H/O colonoscopy            Vitals:   T(F): 98.5 (25 @ 04:00), Max: 98.5 (25 @ 04:00)  HR: 68 (25 @ 06:00)  BP: --  RR: 17 (25 @ 06:00)  SpO2: 99% (25 @ 06:00)      Diet, Clear Liquid:   Bariatric Clear Liquid (BARICLLIQ)  Supplement Feeding Modality:  Oral  Ensure Clear Cans or Servings Per Day:  1       Frequency:  Three Times a day      Fluids:     I & O's:    25 @ 07:01  -  25 @ 07:00  --------------------------------------------------------  IN:    Dexmedetomidine: 252.4 mL    Heparin: 447 mL    IV PiggyBack: 100 mL    IV PiggyBack: 100 mL    IV PiggyBack: 100 mL    IV PiggyBack: 800 mL    Oral Fluid: 1095 mL  Total IN: 2894.4 mL    OUT:    Bulb (mL): 75 mL    Bulb (mL): 37.5 mL    Bulb (mL): 80 mL    Indwelling Catheter - Urethral (mL): 6050 mL    Rectal Tube (mL): 20 mL    VAC (Vacuum Assisted Closure) System (mL): 0 mL  Total OUT: 6262.5 mL    Total NET: -3368.1 mL        Bowel Movement: : [] YES [X] NO  Flatus: : [] YES [X] NO    PHYSICAL EXAM:  General: NAD, calm and cooperative  HEENT: NCAT  Cardiac: RRR  Respiratory: Comfortable on BIPAP, normal respiratory effort  Abdomen: Soft, non-distended, non-tender, no rebound, no guarding.  Musculoskeletal: Active ROM intact, compartments soft  Vascular: Extremities well perfused  Skin: Warm/dry, normal color, no jaundice  Incision/wound: healing well, midline dressings in place, clean, dry and intact; drain sites clean, scant serosanguinous fluid in all 3 drain bulbs    MEDICATIONS  (STANDING):  albuterol/ipratropium for Nebulization 3 milliLiter(s) Nebulizer every 6 hours  amLODIPine   Tablet 10 milliGRAM(s) Oral daily  chlorhexidine 2% Cloths 1 Application(s) Topical <User Schedule>  dexMEDEtomidine Infusion 0.2 MICROgram(s)/kG/Hr (6.78 mL/Hr) IV Continuous <Continuous>  heparin  Infusion 1600 Unit(s)/Hr (19 mL/Hr) IV Continuous <Continuous>  insulin lispro (ADMELOG) corrective regimen sliding scale   SubCutaneous every 6 hours  meropenem  IVPB 1000 milliGRAM(s) IV Intermittent every 12 hours  pantoprazole  Injectable 40 milliGRAM(s) IV Push every 12 hours  potassium chloride  20 mEq/100 mL IVPB 20 milliEquivalent(s) IV Intermittent every 1 hour    MEDICATIONS  (PRN):  HYDROmorphone  Injectable 0.25 milliGRAM(s) IV Push every 4 hours PRN Severe Pain (7 - 10)      DVT PROPHYLAXIS: heparin  Infusion 1600 Unit(s)/Hr IV Continuous <Continuous>    GI PROPHYLAXIS: pantoprazole  Injectable 40 milliGRAM(s) IV Push every 12 hours    ANTICOAGULATION:   ANTIBIOTICS:  meropenem  IVPB 1000 milliGRAM(s)            LAB/STUDIES:  Labs:  CAPILLARY BLOOD GLUCOSE      POCT Blood Glucose.: 109 mg/dL (02 May 2025 05:44)  POCT Blood Glucose.: 104 mg/dL (01 May 2025 23:26)  POCT Blood Glucose.: 111 mg/dL (01 May 2025 16:59)  POCT Blood Glucose.: 126 mg/dL (01 May 2025 11:53)                          8.0    7.40  )-----------( 96       ( 01 May 2025 23:28 )             24.1             146  |  109  |  58[H]  ----------------------------<  97  3.3[L]   |  21  |  1.5      Magnesium: 1.6 mg/dL (25 @ 23:28)      LFTs:             4.8  | 0.9  | 70       ------------------[119     ( 01 May 2025 23:28 )  2.5  | 0.6  | 332         Lipase:x      Amylase:x         Blood Gas Arterial, Lactate: 0.6 mmol/L (25 @ 03:54)  Blood Gas Arterial, Lactate: 0.8 mmol/L (25 @ 04:34)  Blood Gas Arterial, Lactate: 0.7 mmol/L (25 @ 01:56)  Blood Gas Arterial, Lactate: 0.7 mmol/L (25 @ 15:23)  Blood Gas Arterial, Lactate: 0.8 mmol/L (25 @ 11:00)  Blood Gas Arterial, Lactate: 0.8 mmol/L (25 @ 06:42)  Blood Gas Arterial, Lactate: 0.8 mmol/L (25 @ 05:08)    ABG - ( 02 May 2025 03:54 )  pH: 7.47  /  pCO2: 33    /  pO2: 78    / HCO3: 24    / Base Excess: 0.3   /  SaO2: 98.5            ABG - ( 01 May 2025 04:34 )  pH: 7.44  /  pCO2: 32    /  pO2: 131   / HCO3: 22    / Base Excess: -2.1  /  SaO2: 98.3            ABG - ( 01 May 2025 01:56 )  pH: 7.44  /  pCO2: 32    /  pO2: 61    / HCO3: 22    / Base Excess: -1.6  /  SaO2: 92.3              Coags:     x      ----< x       ( 02 May 2025 05:55 )     64.3                Urinalysis Basic - ( 01 May 2025 23:28 )    Color: x / Appearance: x / SG: x / pH: x  Gluc: 97 mg/dL / Ketone: x  / Bili: x / Urobili: x   Blood: x / Protein: x / Nitrite: x   Leuk Esterase: x / RBC: x / WBC x   Sq Epi: x / Non Sq Epi: x / Bacteria: x      IMAGING:  < from: Xray Chest 1 View- PORTABLE-Routine (Xray Chest 1 View- PORTABLE-Routine in AM.) (25 @ 06:34) >  IMPRESSION:    Cardiomegaly with bilateral opacities.    Low lung volumes leads to magnification of the pulmonary interstitium    ACCESS/ DEVICES:  [X] Peripheral IV  [ ] Central Venous Line	[ ] R	[ ] L	[ ] IJ	[ ] Fem	[ ] SC	Placed:   [ ] Arterial Line		[ ] R	[ ] L	[ ] Fem	[ ] Rad	[ ] Ax	Placed:   [ ] PICC:					[ ] Mediport  [ ] Urinary Catheter,  Date Placed:   [ ] Chest tube: [ ] Right, [ ] Left  [X] THONY/Oliver Drains             SURGERY PROGRESS NOTE    Patient: NANCY SARGENT , 65y (62)Female   MRN: [id number]  Location: [address]  Visit: 25 Inpatient  Date: 25 @ 07:23    Hospital Day #: 30  Post-Op Day #: 15    Procedure/Dx/Injuries:  Incarcerated ventral hernia w/ SBO  4/10: S/P lap converted to open ventral hernia repair with mesh and small bowel resection  : s/p ex lap, 20 cm, SBR, creation new of side to side ileoileostomy, ventral hernia repair with mesh  : S/P IR placement 8.5 Fr drainage cath  : IR SMA angio; no embolization  : S/P RTOR, ex lap, evacuation of hemoperitoneum, and abdominal washout    Events of past 24 hours:  Tolerating 12 BIBPAP overnight; no BM overnight; blood cultures negative; off pressors    PAST MEDICAL & SURGICAL HISTORY:  Gastric bypass status for obesity      LOUIS (obstructive sleep apnea)      S/P gastric bypass      S/P       S/P small bowel resection      History of total bilateral knee replacement (TKR)      H/O colonoscopy            Vitals:   T(F): 98.5 (25 @ 04:00), Max: 98.5 (25 @ 04:00)  HR: 68 (25 @ 06:00)  BP: --  RR: 17 (25 @ 06:00)  SpO2: 99% (25 @ 06:00)      Diet, Clear Liquid:   Bariatric Clear Liquid (BARICLLIQ)  Supplement Feeding Modality:  Oral  Ensure Clear Cans or Servings Per Day:  1       Frequency:  Three Times a day      Fluids:     I & O's:    25 @ 07:01  -  25 @ 07:00  --------------------------------------------------------  IN:    Dexmedetomidine: 252.4 mL    Heparin: 447 mL    IV PiggyBack: 100 mL    IV PiggyBack: 100 mL    IV PiggyBack: 100 mL    IV PiggyBack: 800 mL    Oral Fluid: 1095 mL  Total IN: 2894.4 mL    OUT:    Bulb (mL): 75 mL    Bulb (mL): 37.5 mL    Bulb (mL): 80 mL    Indwelling Catheter - Urethral (mL): 6050 mL    Rectal Tube (mL): 20 mL    VAC (Vacuum Assisted Closure) System (mL): 0 mL  Total OUT: 6262.5 mL    Total NET: -3368.1 mL        Bowel Movement: : [] YES [X] NO  Flatus: : [] YES [X] NO    PHYSICAL EXAM:  General: NAD, calm and cooperative  HEENT: NCAT  Cardiac: RRR  Respiratory: Comfortable on HFNC, normal respiratory effort  Abdomen: Soft, non-distended, non-tender, no rebound, no guarding.  Musculoskeletal: Active ROM intact, compartments soft  Vascular: Extremities well perfused  Skin: Warm/dry, normal color, no jaundice  Incision/wound: healing well, midline dressings in place, clean, dry and intact; drain sites clean, scant serosanguinous fluid in all 3 drain bulbs    MEDICATIONS  (STANDING):  albuterol/ipratropium for Nebulization 3 milliLiter(s) Nebulizer every 6 hours  amLODIPine   Tablet 10 milliGRAM(s) Oral daily  chlorhexidine 2% Cloths 1 Application(s) Topical <User Schedule>  dexMEDEtomidine Infusion 0.2 MICROgram(s)/kG/Hr (6.78 mL/Hr) IV Continuous <Continuous>  heparin  Infusion 1600 Unit(s)/Hr (19 mL/Hr) IV Continuous <Continuous>  insulin lispro (ADMELOG) corrective regimen sliding scale   SubCutaneous every 6 hours  meropenem  IVPB 1000 milliGRAM(s) IV Intermittent every 12 hours  pantoprazole  Injectable 40 milliGRAM(s) IV Push every 12 hours  potassium chloride  20 mEq/100 mL IVPB 20 milliEquivalent(s) IV Intermittent every 1 hour    MEDICATIONS  (PRN):  HYDROmorphone  Injectable 0.25 milliGRAM(s) IV Push every 4 hours PRN Severe Pain (7 - 10)      DVT PROPHYLAXIS: heparin  Infusion 1600 Unit(s)/Hr IV Continuous <Continuous>    GI PROPHYLAXIS: pantoprazole  Injectable 40 milliGRAM(s) IV Push every 12 hours    ANTICOAGULATION:   ANTIBIOTICS:  meropenem  IVPB 1000 milliGRAM(s)            LAB/STUDIES:  Labs:  CAPILLARY BLOOD GLUCOSE      POCT Blood Glucose.: 109 mg/dL (02 May 2025 05:44)  POCT Blood Glucose.: 104 mg/dL (01 May 2025 23:26)  POCT Blood Glucose.: 111 mg/dL (01 May 2025 16:59)  POCT Blood Glucose.: 126 mg/dL (01 May 2025 11:53)                          8.0    7.40  )-----------( 96       ( 01 May 2025 23:28 )             24.1             146  |  109  |  58[H]  ----------------------------<  97  3.3[L]   |  21  |  1.5      Magnesium: 1.6 mg/dL (25 @ 23:28)      LFTs:             4.8  | 0.9  | 70       ------------------[119     ( 01 May 2025 23:28 )  2.5  | 0.6  | 332         Lipase:x      Amylase:x         Blood Gas Arterial, Lactate: 0.6 mmol/L (25 @ 03:54)  Blood Gas Arterial, Lactate: 0.8 mmol/L (25 @ 04:34)  Blood Gas Arterial, Lactate: 0.7 mmol/L (25 @ 01:56)  Blood Gas Arterial, Lactate: 0.7 mmol/L (25 @ 15:23)  Blood Gas Arterial, Lactate: 0.8 mmol/L (25 @ 11:00)  Blood Gas Arterial, Lactate: 0.8 mmol/L (25 @ 06:42)  Blood Gas Arterial, Lactate: 0.8 mmol/L (25 @ 05:08)    ABG - ( 02 May 2025 03:54 )  pH: 7.47  /  pCO2: 33    /  pO2: 78    / HCO3: 24    / Base Excess: 0.3   /  SaO2: 98.5            ABG - ( 01 May 2025 04:34 )  pH: 7.44  /  pCO2: 32    /  pO2: 131   / HCO3: 22    / Base Excess: -2.1  /  SaO2: 98.3            ABG - ( 01 May 2025 01:56 )  pH: 7.44  /  pCO2: 32    /  pO2: 61    / HCO3: 22    / Base Excess: -1.6  /  SaO2: 92.3              Coags:     x      ----< x       ( 02 May 2025 05:55 )     64.3                Urinalysis Basic - ( 01 May 2025 23:28 )    Color: x / Appearance: x / SG: x / pH: x  Gluc: 97 mg/dL / Ketone: x  / Bili: x / Urobili: x   Blood: x / Protein: x / Nitrite: x   Leuk Esterase: x / RBC: x / WBC x   Sq Epi: x / Non Sq Epi: x / Bacteria: x      IMAGING:  < from: Xray Chest 1 View- PORTABLE-Routine (Xray Chest 1 View- PORTABLE-Routine in AM.) (25 @ 06:34) >  IMPRESSION:    Cardiomegaly with bilateral opacities.    Low lung volumes leads to magnification of the pulmonary interstitium    ACCESS/ DEVICES:  [X] Peripheral IV  [ ] Central Venous Line	[ ] R	[ ] L	[ ] IJ	[ ] Fem	[ ] SC	Placed:   [ ] Arterial Line		[ ] R	[ ] L	[ ] Fem	[ ] Rad	[ ] Ax	Placed:   [ ] PICC:					[ ] Mediport  [ ] Urinary Catheter,  Date Placed:   [ ] Chest tube: [ ] Right, [ ] Left  [X] THONY/Oliver Drains

## 2025-05-02 NOTE — PROGRESS NOTE ADULT - ATTENDING COMMENTS
Pt examined  labs and images reviewed  pt with supermorbid obesity and complex hernia with sbo  previously treated without surgery   passing flatus awaiting bowel movement but was having  more pain taken to OR urgently   4/10 : s/p BHUMI , Bowel Resection / Anastamosis / Primary repair of Fascia / hernia sac  overnight - renal failure / respiratory compromise  was intubated / paralysed/ botox  now on bumex - tapering   4/17 :  s/p BHUMI , Bowel Resection / Anastamosis / Primary repair of Fascia / hernia sac  4/22 : IR drain placed - subcutaneous collection  - purulent   4/27 : S/p exlap - no active bleeding , blood clots 1 liter evacuated  5/2: bleeding in the subcute - heparin held - sutured and bleeding stopped.     gradually improving  mariam drain sero sang  on oral food and taper tpn when reaches goal      impression : extremely high risk - supermorbid obesity and complex hernia with partial sbo  tube feeds   continue prophylaxis   will hold ac  ir possible for filter   SICU

## 2025-05-02 NOTE — PROGRESS NOTE ADULT - SUBJECTIVE AND OBJECTIVE BOX
NANCY SARGENT   [id number]   [birth year]  64yF  ============================================================   DATE OF INITIAL SICU/SDU CONSULT: 04-10-25    INDICATION FOR SICU CONSULT:  q1hr abdominal checks, mechanical ventilation    SICU COURSE EVENTS :  04-10 - admitted to SICU service  4/11: Intubated and started on paralytic. Arterial line placed, subclavian TLC placed. Nephrology consulted for oliguria. IR abdominal muscle botox injection performed. TTE performed.   4/12: Additional fluid boluses given; trial fo bumex started, considering CVVH. Weaned off nimbex gtt.  > 220 /hr. Renal U/S w/out hydro.   4/13: Weaned off Levophed. Bumex gtt 2mg/hr > 1mg/hr. Goal net negative 1-1.5L, UOP approx, 200c/hr.   4/14: Remained on bumex gtt for further diuresis, decreased to 0.5 overnight, vent settings weaned. Cr downtrending, LFTs worsening.   4/15: Extubated to BiPAP, transitioned to NC. Dc'd bumex gtt, given 5mg metolazone x 1 and 2mg bumex x 1.   4/16: THONY drain #2 murky/bilious, pending CTAP with PO contrast, hypernatremia, started D5W @ 75cc/hr, persistent hypokalemia   4/17: RTOR for resection of anastomosis for anastomotic leak. Returned intubated, 400/24/80/10, sinus tachy to . Abdomen soft. Was initially on propofol @ 30 and precedex, but propofol weaned off due to hypotension with MAPs in 50s, fentanyl ggt started for acute pain. Remained hypotensive despite fluids, started on Levophed, on 0.05.   4/18: Extubated. HFNC 40L/49%, Levo off since 11 AM   4/19: NGT removed. Started on duonebs. D5W increased to 60cc/hr. 1mg bumex, > 1.5L response  4/20: Episode of afib RVR resolved with metoprolol 5mg IVP, cardiology c/s placed, recommending metoprolol 25mg q 12   4/21: PE on CTA, therapeutic heparin gtt started. Started on cardebe gtt,   4/22: Meropenem started per ID.  S/p IR percutaneous placement of a 8.5 Martiniquais drainage catheter into lower abdominal wall fluid collection, yielding 150 mL of bowel appearing fluid. New left radial a-line placed. Off nicarrdipine gtt.   4/23: Switched to therapeutic lovenox. Diet advanced to aubrey clears with ensures.  4/24: advacned to bariatric FLD, downgraded to SDU  4/25: TPN discontinued, diet advanced to full liquid , Right cephalic DVT prelim  4/26: Right UE VA Duplex final no DVT, thromboplebitis, Arterial line removed, 2L NC, midline vac changed, noted to have bilious drainage, RUQ drain pulled, upgraded to SICU status, plan to reorder TPN for 4/27 PM, diet changed to full liquid. 1:20 AM noted to be unresponsive, palpable femoral pulse, decision made to intubate, R femoral arterial line placed, started on levophed, amauri, bicarb amp x 2, bicarb gtt, propofol, 2U pRBC for hgb 5.9, plan for CTH and CTAP when stabilized   4/27: 2uPRBC 1uFFP 1pkPlt, TXA 1g, IR for embolization due to active extravasation seen on CTA, no active bleed, no embolization, Return to OR for re-exploration and evacuation of old clot  4/28: IR consulted for IVC filter and PICC. LE duplex ordered, negative. Precedex added overnight for agitation. 1 U PRBC for Hgb 6.8 overnight  4/29: TF started at 20cc/hr. Meropenem decreased to Q 12 hours.   4/30: Extubated to 5L NC, then placed on HFNC. Heparin gtt restarted.   5/1: Placed on BiPAP, started on aubrey clears w/ clear ensures       24Hour Events:  5/1  NIGHT   -PM rounds: , NSR HR 90s, saturating 97% on BiPAP 12/14 40%, heparin gtt @ 1900u/hr, abdomen soft, non-tender, THONY drain x2 serosanguinous, THONY x1 serous  -K 3.5, still has 1 K rider running   -22:30 PTT 70.3   -4g magnesium  -60mEq K  -BCx no growth at 72 hrs   -Precedex started for acute agitation   -Am CXR/ABG     DAY  - PTT 46 > hep gtt increased 1800, PTT @2pm  - bumex 2  - per ID > keep on meropenem q12  - 2pm aPTT 54 --> heparin gtt now at 19, 22:30 PTT  - Nephro signed off  - On HFNC  - Passed bedside swallow, started on aubrey clears with clear ensures  - Send Xa level with next PTT   - Started on home amlodipine 10mg QD    [X] A ten-point review of systems was negative except as expressed in note.  [X ] History was obtained from patient. If unable to participate in their care, history was from review of the chart and collateral sources of information.  =====================================================================   NANCY SARGENT   [id number]   [birth year]  64yF  ============================================================   DATE OF INITIAL SICU/SDU CONSULT: 04-10-25    INDICATION FOR SICU CONSULT:  q1hr abdominal checks, mechanical ventilation    SICU COURSE EVENTS :  04-10 - admitted to SICU service  : Intubated and started on paralytic. Arterial line placed, subclavian TLC placed. Nephrology consulted for oliguria. IR abdominal muscle botox injection performed. TTE performed.   : Additional fluid boluses given; trial fo bumex started, considering CVVH. Weaned off nimbex gtt.  > 220 /hr. Renal U/S w/out hydro.   : Weaned off Levophed. Bumex gtt 2mg/hr > 1mg/hr. Goal net negative 1-1.5L, UOP approx, 200c/hr.   : Remained on bumex gtt for further diuresis, decreased to 0.5 overnight, vent settings weaned. Cr downtrending, LFTs worsening.   4/15: Extubated to BiPAP, transitioned to NC. Dc'd bumex gtt, given 5mg metolazone x 1 and 2mg bumex x 1.   : THONY drain #2 murky/bilious, pending CTAP with PO contrast, hypernatremia, started D5W @ 75cc/hr, persistent hypokalemia   : RTOR for resection of anastomosis for anastomotic leak. Returned intubated, 400/24/80/10, sinus tachy to . Abdomen soft. Was initially on propofol @ 30 and precedex, but propofol weaned off due to hypotension with MAPs in 50s, fentanyl ggt started for acute pain. Remained hypotensive despite fluids, started on Levophed, on 0.05.   : Extubated. HFNC 40L/49%, Levo off since 11 AM   : NGT removed. Started on duonebs. D5W increased to 60cc/hr. 1mg bumex, > 1.5L response  : Episode of afib RVR resolved with metoprolol 5mg IVP, cardiology c/s placed, recommending metoprolol 25mg q 12   : PE on CTA, therapeutic heparin gtt started. Started on cardebe gtt,   : Meropenem started per ID.  S/p IR percutaneous placement of a 8.5 Portuguese drainage catheter into lower abdominal wall fluid collection, yielding 150 mL of bowel appearing fluid. New left radial a-line placed. Off nicarrdipine gtt.   : Switched to therapeutic lovenox. Diet advanced to aubrey clears with ensures.  : advacned to bariatric FLD, downgraded to SDU  : TPN discontinued, diet advanced to full liquid , Right cephalic DVT prelim  : Right UE VA Duplex final no DVT, thromboplebitis, Arterial line removed, 2L NC, midline vac changed, noted to have bilious drainage, RUQ drain pulled, upgraded to SICU status, plan to reorder TPN for  PM, diet changed to full liquid. 1:20 AM noted to be unresponsive, palpable femoral pulse, decision made to intubate, R femoral arterial line placed, started on levophed, amauri, bicarb amp x 2, bicarb gtt, propofol, 2U pRBC for hgb 5.9, plan for CTH and CTAP when stabilized   : 2uPRBC 1uFFP 1pkPlt, TXA 1g, IR for embolization due to active extravasation seen on CTA, no active bleed, no embolization, Return to OR for re-exploration and evacuation of old clot  : IR consulted for IVC filter and PICC. LE duplex ordered, negative. Precedex added overnight for agitation. 1 U PRBC for Hgb 6.8 overnight  : TF started at 20cc/hr. Meropenem decreased to Q 12 hours.   : Extubated to 5L NC, then placed on HFNC. Heparin gtt restarted.   : Placed on BiPAP, started on aubrey clears w/ clear ensures     24Hour Events:    NIGHT   -PM rounds: , NSR HR 90s, saturating 97% on BiPAP  40%, heparin gtt @ 1900u/hr, abdomen soft, non-tender, THONY drain x2 serosanguinous, THONY x1 serous  -K 3.5, still has 1 K rider running   -22:30 PTT 70.3   -4g magnesium  -60mEq K  -BCx no growth at 72 hrs   -Precedex started for acute agitation   -Am CXR/ABG     DAY  - PTT 46 > hep gtt increased 1800, PTT @2pm  - bumex 2  - per ID > keep on meropenem q12  - 2pm aPTT 54 --> heparin gtt now at 19, 22:30 PTT  - Nephro signed off  - On HFNC  - Passed bedside swallow, started on aubrey clears with clear ensures  - Send Xa level with next PTT   - Started on home amlodipine 10mg QD    [X] A ten-point review of systems was negative except as expressed in note.  [X ] History was obtained from patient. If unable to participate in their care, history was from review of the chart and collateral sources of information.  =====================================================================  Daily     Daily Weight in k.2 (02 May 2025 06:00)    Diet, Clear Liquid:   Bariatric Clear Liquid (BARICLLIQ)  Supplement Feeding Modality:  Oral  Ensure Clear Cans or Servings Per Day:  1       Frequency:  Three Times a day (25 @ 16:39)    CURRENT MEDS:  Neurologic Medications  dexMEDEtomidine Infusion 0.2 MICROgram(s)/kG/Hr IV Continuous <Continuous>  HYDROmorphone  Injectable 0.25 milliGRAM(s) IV Push every 4 hours PRN Severe Pain (7 - 10)    Respiratory Medications  albuterol/ipratropium for Nebulization 3 milliLiter(s) Nebulizer every 6 hours    Cardiovascular Medications  amLODIPine   Tablet 10 milliGRAM(s) Oral daily    Gastrointestinal Medications  pantoprazole  Injectable 40 milliGRAM(s) IV Push every 12 hours  potassium chloride  20 mEq/100 mL IVPB 20 milliEquivalent(s) IV Intermittent every 1 hour    Genitourinary Medications    Hematologic/Oncologic Medications    Antimicrobial/Immunologic Medications  meropenem  IVPB 1000 milliGRAM(s) IV Intermittent every 12 hours    Endocrine/Metabolic Medications  insulin lispro (ADMELOG) corrective regimen sliding scale   SubCutaneous every 6 hours    Topical/Other Medications  chlorhexidine 2% Cloths 1 Application(s) Topical <User Schedule>    ICU Vital Signs Last 24 Hrs  T(C): 36.1 (02 May 2025 08:00), Max: 36.9 (02 May 2025 04:00)  T(F): 97 (02 May 2025 08:00), Max: 98.5 (02 May 2025 04:00)  HR: 85 (02 May 2025 08:00) (59 - 100)  BP: --  BP(mean): --  ABP: 190/85 (02 May 2025 08:00) (125/56 - 190/85)  ABP(mean): 116 (02 May 2025 08:00) (77 - 117)  RR: 26 (02 May 2025 08:00) (9 - 29)  SpO2: 99% (02 May 2025 08:00) (96% - 100%)    O2 Parameters below as of 01 May 2025 21:00  Patient On (Oxygen Delivery Method): BiPAP/CPAP    ABG - ( 02 May 2025 03:54 )  pH, Arterial: 7.47  pH, Blood: x     /  pCO2: 33    /  pO2: 78    / HCO3: 24    / Base Excess: 0.3   /  SaO2: 98.5      I&O's Summary    01 May 2025 07:01  -  02 May 2025 07:00  --------------------------------------------------------  IN: 2894.4 mL / OUT: 6412.5 mL / NET: -3518.1 mL    02 May 2025 07:01  -  02 May 2025 08:59  --------------------------------------------------------  IN: 313.6 mL / OUT: 775 mL / NET: -461.4 mL    I&O's Detail    01 May 2025 07:01  -  02 May 2025 07:00  --------------------------------------------------------  IN:    Dexmedetomidine: 252.4 mL    Heparin: 447 mL    IV PiggyBack: 100 mL    IV PiggyBack: 100 mL    IV PiggyBack: 100 mL    IV PiggyBack: 800 mL    Oral Fluid: 1095 mL  Total IN: 2894.4 mL    OUT:    Bulb (mL): 75 mL    Bulb (mL): 37.5 mL    Bulb (mL): 80 mL    Indwelling Catheter - Urethral (mL): 6150 mL    Rectal Tube (mL): 70 mL    VAC (Vacuum Assisted Closure) System (mL): 0 mL  Total OUT: 6412.5 mL    Total NET: -3518.1 mL    02 May 2025 07:01  -  02 May 2025 08:59  --------------------------------------------------------  IN:    Dexmedetomidine: 13.6 mL    PRBCs (Packed Red Blood Cells): 300 mL  Total IN: 313.6 mL    OUT:    Heparin: 0 mL    Indwelling Catheter - Urethral (mL): 275 mL    Rectal Tube (mL): 0 mL    VAC (Vacuum Assisted Closure) System (mL): 500 mL  Total OUT: 775 mL    Total NET: -461.4 mL    PHYSICAL EXAM:  GENERAL: A&Ox3  HEENT: Normocephalic, atraumatic  PULM: HFNC at 60/60  CV: Regular rate and rhythm  ABDOMEN: Abdomen obese, soft, non-distended, vac holding suction, drains x 3 with SS output, dignishield in place, vac draining sanguinous output  : An in place  MSK: Moving extremities spontaneously  SKIN: Warm, dry, normal skin color, texture, turgor    LABS:  CAPILLARY BLOOD GLUCOSE  POCT Blood Glucose.: 109 mg/dL (02 May 2025 05:44)  POCT Blood Glucose.: 104 mg/dL (01 May 2025 23:26)  POCT Blood Glucose.: 111 mg/dL (01 May 2025 16:59)  POCT Blood Glucose.: 126 mg/dL (01 May 2025 11:53)                    7.9    5.97  )-----------( 91       ( 02 May 2025 08:20 )             24.1           146  |  109  |  58[H]  ----------------------------<  97  3.3[L]   |  21  |  1.5    Ca    7.1[L]      01 May 2025 23:28  Phos  4.6       Mg     1.6         TPro  4.8[L]  /  Alb  2.5[L]  /  TBili  0.9  /  DBili  0.6[H]  /  AST  70[H]  /  ALT  332[H]  /  AlkPhos  119[H]        PTT - ( 02 May 2025 05:55 )  PTT:64.3 sec    Urinalysis Basic - ( 01 May 2025 23:28 )    Color: x / Appearance: x / SG: x / pH: x  Gluc: 97 mg/dL / Ketone: x  / Bili: x / Urobili: x   Blood: x / Protein: x / Nitrite: x   Leuk Esterase: x / RBC: x / WBC x   Sq Epi: x / Non Sq Epi: x / Bacteria: x

## 2025-05-02 NOTE — PROGRESS NOTE ADULT - ASSESSMENT
ASSESSMENT:  65y F w/ PMHx of  Morbid obesity, L[place], large complex ventral hernia s/p repair sbr and loss of domain c/b post op intubation, hypotension requiring vasopressors, anastomotic leakage s/p RTOR and reanastomosis, now doing better post op from a respiratory status. Comfortable on BIPAP. Off pressors. Blood cultures negATIVE.    PLAN:  - Dressing and wound vac change today  - Wean off of BIPAP  - Monitor for daily output quality  - Monitor bowel functions  - Monitor for fevers  - Rest of care per SICU  Lines/Tubes: SAMSON, Oliver/THONY drains    BLUE TEAM SPECTRA: [id number]

## 2025-05-03 LAB
ANION GAP SERPL CALC-SCNC: 10 MMOL/L — SIGNIFICANT CHANGE UP (ref 7–14)
ANION GAP SERPL CALC-SCNC: 13 MMOL/L — SIGNIFICANT CHANGE UP (ref 7–14)
ANION GAP SERPL CALC-SCNC: 13 MMOL/L — SIGNIFICANT CHANGE UP (ref 7–14)
ANION GAP SERPL CALC-SCNC: 15 MMOL/L — HIGH (ref 7–14)
APTT BLD: 23 SEC — CRITICAL LOW (ref 27–39.2)
BASOPHILS # BLD AUTO: 0.04 K/UL — SIGNIFICANT CHANGE UP (ref 0–0.2)
BASOPHILS NFR BLD AUTO: 0.4 % — SIGNIFICANT CHANGE UP (ref 0–1)
BUN SERPL-MCNC: 34 MG/DL — HIGH (ref 10–20)
BUN SERPL-MCNC: 36 MG/DL — HIGH (ref 10–20)
BUN SERPL-MCNC: 38 MG/DL — HIGH (ref 10–20)
BUN SERPL-MCNC: 42 MG/DL — HIGH (ref 10–20)
CALCIUM SERPL-MCNC: 6.8 MG/DL — LOW (ref 8.4–10.5)
CALCIUM SERPL-MCNC: 7.5 MG/DL — LOW (ref 8.4–10.5)
CALCIUM SERPL-MCNC: 7.7 MG/DL — LOW (ref 8.4–10.5)
CALCIUM SERPL-MCNC: 7.7 MG/DL — LOW (ref 8.4–10.5)
CHLORIDE SERPL-SCNC: 102 MMOL/L — SIGNIFICANT CHANGE UP (ref 98–110)
CHLORIDE SERPL-SCNC: 104 MMOL/L — SIGNIFICANT CHANGE UP (ref 98–110)
CHLORIDE SERPL-SCNC: 105 MMOL/L — SIGNIFICANT CHANGE UP (ref 98–110)
CHLORIDE SERPL-SCNC: 105 MMOL/L — SIGNIFICANT CHANGE UP (ref 98–110)
CO2 SERPL-SCNC: 20 MMOL/L — SIGNIFICANT CHANGE UP (ref 17–32)
CO2 SERPL-SCNC: 22 MMOL/L — SIGNIFICANT CHANGE UP (ref 17–32)
CO2 SERPL-SCNC: 22 MMOL/L — SIGNIFICANT CHANGE UP (ref 17–32)
CO2 SERPL-SCNC: 23 MMOL/L — SIGNIFICANT CHANGE UP (ref 17–32)
CREAT SERPL-MCNC: 0.8 MG/DL — SIGNIFICANT CHANGE UP (ref 0.7–1.5)
CREAT SERPL-MCNC: 0.8 MG/DL — SIGNIFICANT CHANGE UP (ref 0.7–1.5)
CREAT SERPL-MCNC: 0.9 MG/DL — SIGNIFICANT CHANGE UP (ref 0.7–1.5)
CREAT SERPL-MCNC: 1 MG/DL — SIGNIFICANT CHANGE UP (ref 0.7–1.5)
CULTURE RESULTS: SIGNIFICANT CHANGE UP
EGFR: 63 ML/MIN/1.73M2 — SIGNIFICANT CHANGE UP
EGFR: 63 ML/MIN/1.73M2 — SIGNIFICANT CHANGE UP
EGFR: 71 ML/MIN/1.73M2 — SIGNIFICANT CHANGE UP
EGFR: 71 ML/MIN/1.73M2 — SIGNIFICANT CHANGE UP
EGFR: 82 ML/MIN/1.73M2 — SIGNIFICANT CHANGE UP
EOSINOPHIL # BLD AUTO: 0.02 K/UL — SIGNIFICANT CHANGE UP (ref 0–0.7)
EOSINOPHIL NFR BLD AUTO: 0.2 % — SIGNIFICANT CHANGE UP (ref 0–8)
GAS PNL BLDA: SIGNIFICANT CHANGE UP
GLUCOSE BLDC GLUCOMTR-MCNC: 118 MG/DL — HIGH (ref 70–99)
GLUCOSE BLDC GLUCOMTR-MCNC: 128 MG/DL — HIGH (ref 70–99)
GLUCOSE BLDC GLUCOMTR-MCNC: 132 MG/DL — HIGH (ref 70–99)
GLUCOSE BLDC GLUCOMTR-MCNC: 146 MG/DL — HIGH (ref 70–99)
GLUCOSE SERPL-MCNC: 123 MG/DL — HIGH (ref 70–99)
GLUCOSE SERPL-MCNC: 126 MG/DL — HIGH (ref 70–99)
GLUCOSE SERPL-MCNC: 128 MG/DL — HIGH (ref 70–99)
GLUCOSE SERPL-MCNC: 143 MG/DL — HIGH (ref 70–99)
HCT VFR BLD CALC: 25.4 % — LOW (ref 37–47)
HCT VFR BLD CALC: 29.4 % — LOW (ref 37–47)
HGB BLD-MCNC: 8.6 G/DL — LOW (ref 12–16)
HGB BLD-MCNC: 9.8 G/DL — LOW (ref 12–16)
IMM GRANULOCYTES NFR BLD AUTO: 3.4 % — HIGH (ref 0.1–0.3)
INR BLD: 1.01 RATIO — SIGNIFICANT CHANGE UP (ref 0.65–1.3)
LYMPHOCYTES # BLD AUTO: 0.75 K/UL — LOW (ref 1.2–3.4)
LYMPHOCYTES # BLD AUTO: 8.4 % — LOW (ref 20.5–51.1)
MAGNESIUM SERPL-MCNC: 1.8 MG/DL — SIGNIFICANT CHANGE UP (ref 1.8–2.4)
MAGNESIUM SERPL-MCNC: 1.9 MG/DL — SIGNIFICANT CHANGE UP (ref 1.8–2.4)
MAGNESIUM SERPL-MCNC: 2.1 MG/DL — SIGNIFICANT CHANGE UP (ref 1.8–2.4)
MAGNESIUM SERPL-MCNC: 2.1 MG/DL — SIGNIFICANT CHANGE UP (ref 1.8–2.4)
MCHC RBC-ENTMCNC: 30.3 PG — SIGNIFICANT CHANGE UP (ref 27–31)
MCHC RBC-ENTMCNC: 30.7 PG — SIGNIFICANT CHANGE UP (ref 27–31)
MCHC RBC-ENTMCNC: 33.3 G/DL — SIGNIFICANT CHANGE UP (ref 32–37)
MCHC RBC-ENTMCNC: 33.9 G/DL — SIGNIFICANT CHANGE UP (ref 32–37)
MCV RBC AUTO: 90.7 FL — SIGNIFICANT CHANGE UP (ref 81–99)
MCV RBC AUTO: 91 FL — SIGNIFICANT CHANGE UP (ref 81–99)
MONOCYTES # BLD AUTO: 0.53 K/UL — SIGNIFICANT CHANGE UP (ref 0.1–0.6)
MONOCYTES NFR BLD AUTO: 5.9 % — SIGNIFICANT CHANGE UP (ref 1.7–9.3)
NEUTROPHILS # BLD AUTO: 7.29 K/UL — HIGH (ref 1.4–6.5)
NEUTROPHILS NFR BLD AUTO: 81.7 % — HIGH (ref 42.2–75.2)
NRBC BLD AUTO-RTO: 0 /100 WBCS — SIGNIFICANT CHANGE UP (ref 0–0)
NRBC BLD AUTO-RTO: 0 /100 WBCS — SIGNIFICANT CHANGE UP (ref 0–0)
PHOSPHATE SERPL-MCNC: 2.9 MG/DL — SIGNIFICANT CHANGE UP (ref 2.1–4.9)
PHOSPHATE SERPL-MCNC: 3.3 MG/DL — SIGNIFICANT CHANGE UP (ref 2.1–4.9)
PHOSPHATE SERPL-MCNC: 3.4 MG/DL — SIGNIFICANT CHANGE UP (ref 2.1–4.9)
PHOSPHATE SERPL-MCNC: 3.8 MG/DL — SIGNIFICANT CHANGE UP (ref 2.1–4.9)
PLATELET # BLD AUTO: 100 K/UL — LOW (ref 130–400)
PLATELET # BLD AUTO: 136 K/UL — SIGNIFICANT CHANGE UP (ref 130–400)
PMV BLD: 11.2 FL — HIGH (ref 7.4–10.4)
PMV BLD: 11.3 FL — HIGH (ref 7.4–10.4)
POTASSIUM SERPL-MCNC: 3.2 MMOL/L — LOW (ref 3.5–5)
POTASSIUM SERPL-MCNC: 3.2 MMOL/L — LOW (ref 3.5–5)
POTASSIUM SERPL-MCNC: 3.8 MMOL/L — SIGNIFICANT CHANGE UP (ref 3.5–5)
POTASSIUM SERPL-MCNC: 4 MMOL/L — SIGNIFICANT CHANGE UP (ref 3.5–5)
POTASSIUM SERPL-SCNC: 3.2 MMOL/L — LOW (ref 3.5–5)
POTASSIUM SERPL-SCNC: 3.2 MMOL/L — LOW (ref 3.5–5)
POTASSIUM SERPL-SCNC: 3.8 MMOL/L — SIGNIFICANT CHANGE UP (ref 3.5–5)
POTASSIUM SERPL-SCNC: 4 MMOL/L — SIGNIFICANT CHANGE UP (ref 3.5–5)
PROTHROM AB SERPL-ACNC: 11.9 SEC — SIGNIFICANT CHANGE UP (ref 9.95–12.87)
RBC # BLD: 2.8 M/UL — LOW (ref 4.2–5.4)
RBC # BLD: 3.23 M/UL — LOW (ref 4.2–5.4)
RBC # FLD: 15.9 % — HIGH (ref 11.5–14.5)
RBC # FLD: 15.9 % — HIGH (ref 11.5–14.5)
SODIUM SERPL-SCNC: 135 MMOL/L — SIGNIFICANT CHANGE UP (ref 135–146)
SODIUM SERPL-SCNC: 139 MMOL/L — SIGNIFICANT CHANGE UP (ref 135–146)
SODIUM SERPL-SCNC: 140 MMOL/L — SIGNIFICANT CHANGE UP (ref 135–146)
SODIUM SERPL-SCNC: 140 MMOL/L — SIGNIFICANT CHANGE UP (ref 135–146)
SPECIMEN SOURCE: SIGNIFICANT CHANGE UP
WBC # BLD: 6.11 K/UL — SIGNIFICANT CHANGE UP (ref 4.8–10.8)
WBC # BLD: 8.93 K/UL — SIGNIFICANT CHANGE UP (ref 4.8–10.8)
WBC # FLD AUTO: 6.11 K/UL — SIGNIFICANT CHANGE UP (ref 4.8–10.8)
WBC # FLD AUTO: 8.93 K/UL — SIGNIFICANT CHANGE UP (ref 4.8–10.8)

## 2025-05-03 PROCEDURE — 71045 X-RAY EXAM CHEST 1 VIEW: CPT | Mod: 26

## 2025-05-03 PROCEDURE — 99291 CRITICAL CARE FIRST HOUR: CPT | Mod: 24

## 2025-05-03 RX ORDER — MAGNESIUM SULFATE 500 MG/ML
1 SYRINGE (ML) INJECTION ONCE
Refills: 0 | Status: COMPLETED | OUTPATIENT
Start: 2025-05-03 | End: 2025-05-03

## 2025-05-03 RX ORDER — SODIUM PHOSPHATE,DIBASIC DIHYD
15 POWDER (GRAM) MISCELLANEOUS ONCE
Refills: 0 | Status: DISCONTINUED | OUTPATIENT
Start: 2025-05-03 | End: 2025-05-03

## 2025-05-03 RX ORDER — HYDROMORPHONE/SOD CHLOR,ISO/PF 2 MG/10 ML
0.25 SYRINGE (ML) INJECTION EVERY 4 HOURS
Refills: 0 | Status: DISCONTINUED | OUTPATIENT
Start: 2025-05-03 | End: 2025-05-05

## 2025-05-03 RX ORDER — LOSARTAN POTASSIUM 100 MG/1
100 TABLET, FILM COATED ORAL DAILY
Refills: 0 | Status: DISCONTINUED | OUTPATIENT
Start: 2025-05-03 | End: 2025-05-23

## 2025-05-03 RX ORDER — OXYCODONE HYDROCHLORIDE 30 MG/1
10 TABLET ORAL EVERY 6 HOURS
Refills: 0 | Status: DISCONTINUED | OUTPATIENT
Start: 2025-05-03 | End: 2025-05-04

## 2025-05-03 RX ORDER — BUMETANIDE 1 MG/1
1 TABLET ORAL ONCE
Refills: 0 | Status: COMPLETED | OUTPATIENT
Start: 2025-05-03 | End: 2025-05-03

## 2025-05-03 RX ORDER — CALCIUM GLUCONATE 20 MG/ML
2 INJECTION, SOLUTION INTRAVENOUS
Refills: 0 | Status: COMPLETED | OUTPATIENT
Start: 2025-05-03 | End: 2025-05-03

## 2025-05-03 RX ORDER — OXYCODONE HYDROCHLORIDE 30 MG/1
5 TABLET ORAL EVERY 6 HOURS
Refills: 0 | Status: DISCONTINUED | OUTPATIENT
Start: 2025-05-03 | End: 2025-05-04

## 2025-05-03 RX ORDER — SODIUM PHOSPHATE,DIBASIC DIHYD
15 POWDER (GRAM) MISCELLANEOUS ONCE
Refills: 0 | Status: COMPLETED | OUTPATIENT
Start: 2025-05-03 | End: 2025-05-03

## 2025-05-03 RX ORDER — ACETAMINOPHEN 500 MG/5ML
650 LIQUID (ML) ORAL EVERY 6 HOURS
Refills: 0 | Status: DISCONTINUED | OUTPATIENT
Start: 2025-05-03 | End: 2025-05-04

## 2025-05-03 RX ADMIN — Medication 650 MILLIGRAM(S): at 23:17

## 2025-05-03 RX ADMIN — IPRATROPIUM BROMIDE AND ALBUTEROL SULFATE 3 MILLILITER(S): .5; 2.5 SOLUTION RESPIRATORY (INHALATION) at 09:05

## 2025-05-03 RX ADMIN — Medication 5 MILLIGRAM(S): at 21:19

## 2025-05-03 RX ADMIN — Medication 650 MILLIGRAM(S): at 11:40

## 2025-05-03 RX ADMIN — Medication 100 MILLIEQUIVALENT(S): at 23:35

## 2025-05-03 RX ADMIN — Medication 63.75 MILLIMOLE(S): at 18:32

## 2025-05-03 RX ADMIN — MEROPENEM 100 MILLIGRAM(S): 1 INJECTION INTRAVENOUS at 11:12

## 2025-05-03 RX ADMIN — CALCIUM GLUCONATE 100 GRAM(S): 20 INJECTION, SOLUTION INTRAVENOUS at 06:43

## 2025-05-03 RX ADMIN — AMLODIPINE BESYLATE 10 MILLIGRAM(S): 10 TABLET ORAL at 05:42

## 2025-05-03 RX ADMIN — LOSARTAN POTASSIUM 100 MILLIGRAM(S): 100 TABLET, FILM COATED ORAL at 10:04

## 2025-05-03 RX ADMIN — IPRATROPIUM BROMIDE AND ALBUTEROL SULFATE 3 MILLILITER(S): .5; 2.5 SOLUTION RESPIRATORY (INHALATION) at 14:00

## 2025-05-03 RX ADMIN — Medication 50 GRAM(S): at 10:04

## 2025-05-03 RX ADMIN — Medication 100 MILLIEQUIVALENT(S): at 12:15

## 2025-05-03 RX ADMIN — Medication 650 MILLIGRAM(S): at 17:50

## 2025-05-03 RX ADMIN — BUMETANIDE 1 MILLIGRAM(S): 1 TABLET ORAL at 11:11

## 2025-05-03 RX ADMIN — Medication 50 MILLIEQUIVALENT(S): at 09:32

## 2025-05-03 RX ADMIN — CALCIUM GLUCONATE 100 GRAM(S): 20 INJECTION, SOLUTION INTRAVENOUS at 05:41

## 2025-05-03 RX ADMIN — Medication 50 MILLIEQUIVALENT(S): at 07:28

## 2025-05-03 RX ADMIN — Medication 1 APPLICATION(S): at 05:41

## 2025-05-03 RX ADMIN — MEROPENEM 100 MILLIGRAM(S): 1 INJECTION INTRAVENOUS at 20:23

## 2025-05-03 RX ADMIN — MEROPENEM 100 MILLIGRAM(S): 1 INJECTION INTRAVENOUS at 04:15

## 2025-05-03 RX ADMIN — Medication 650 MILLIGRAM(S): at 17:19

## 2025-05-03 RX ADMIN — Medication 650 MILLIGRAM(S): at 11:10

## 2025-05-03 RX ADMIN — Medication 50 GRAM(S): at 11:21

## 2025-05-03 RX ADMIN — IPRATROPIUM BROMIDE AND ALBUTEROL SULFATE 3 MILLILITER(S): .5; 2.5 SOLUTION RESPIRATORY (INHALATION) at 20:25

## 2025-05-03 RX ADMIN — ENOXAPARIN SODIUM 40 MILLIGRAM(S): 100 INJECTION SUBCUTANEOUS at 05:41

## 2025-05-03 RX ADMIN — ENOXAPARIN SODIUM 40 MILLIGRAM(S): 100 INJECTION SUBCUTANEOUS at 17:19

## 2025-05-03 RX ADMIN — Medication 40 MILLIGRAM(S): at 17:19

## 2025-05-03 RX ADMIN — IPRATROPIUM BROMIDE AND ALBUTEROL SULFATE 3 MILLILITER(S): .5; 2.5 SOLUTION RESPIRATORY (INHALATION) at 01:54

## 2025-05-03 RX ADMIN — Medication 100 MILLIEQUIVALENT(S): at 10:04

## 2025-05-03 RX ADMIN — Medication 40 MILLIGRAM(S): at 05:42

## 2025-05-03 RX ADMIN — Medication 100 MILLIEQUIVALENT(S): at 11:10

## 2025-05-03 RX ADMIN — Medication 50 MILLIEQUIVALENT(S): at 08:26

## 2025-05-03 NOTE — PROGRESS NOTE ADULT - ASSESSMENT
ASSESSMENT & PLAN:  64F PMHx HTN, morbid obesity s/p 2001 Abby-en-Y gastric bypass who presented on 4/3 with incarcerated ventral hernia with SBO    4/10 open ventral hernia repair, small bowel resection, and primary fascial closure with Dr. Lema. Admitted to SICU for Q1 abdominal checks and hemodynamic monitoring.   4/17 takeback for exploratory laparotomy, repair of anastomotic leak  4/22 8Fr pigtail into lower abdominal wall collection,150cc of foul appearing material aspirated, Attached to THONY bulb  4/27 RTOR for re-exploration and evacuation of old clots from retroperitoneum and pelvis and placement of 3 new beatrice drains  4/28: IR consulted for IVC filter     NEUROLOGICAL:  #Acute pain  - Acetaminophen Q6, dilaudid 0.25mg Q4 PRN (severe pain)  #Sedation/agitation  - Precedex infusion, wean as tolerated  #AMS    HCT 4/27: negative  #Sleep hygiene  - HOLDING Trazodone 25mg QPM  - HOLDING Melatonin 5mg QPM    RESPIRATORY:   #Acute hypoxic respiratory failure  - Extubated 4/18, reintubated on 4/27 for AMS and agonal breathing, extubated 4/30  -on HFNC 60L/50%    #Right distal main pulmonary embolus with segmental extension  - Heparin gtt, currently at 1900u/hr  - B/L LE Venous duplex 4/21-RIGHT PT DVT, no LEFT DVT  -B/L LE Venous duplex 4/28-No evidence of deep venous thrombosis in either lower extremity. Limited   exam due to body habitus  - No IVC filter needed at this time per IR  #Intermittent diuresis  - Last diuresis 5/2 1mg Bumex  #PMHx LOUIS on CPAP@ home   #Activity increase as tolerated    CARDS:   #Hypertensive urgency, resolved  - intermittent pushes hydralazine   - Nicardipine gtt started, off since 5/1 6AM  #Acute hemorraghic shock, resolved  - Off levophed and vasopressin since 4/30  - 4/27: 2u PRBC 1plt 1uFFP, 1g TXA  - OFF sodium bicarbonate infusion on 4/27  #Supraventricular tachycardia, Non-sustained ventricular tachycardia, Ventricular Premature Depolarization  - Cardiology (Dr. Lopez) - Metoprolol 25mg Q12 HOLDING  #PMHx of HTN  - Amlodipine 10mg QD restarted   - HCTZ 25mg QD HOLDING  - Losartan 100mg daily HOLDING  - EKG- NSR  - TTE 4/11: EF of 56%. G1DD.   - TTE 4/22: EF 70 to 75%.    GASTROINTESTINAL/NUTRITION:   #4/10 open ventral hernia repair, small bowel resection, ileoileal anastomosis and primary fascial closure, 2 THONY Right  #4/17 RTOR for exploratory laparotomy, repair of anastomotic leak at previous ileoileal anastomosis, resected, new ileoileal anastomosis created, 1 THONY Left (total 3)  - Intermittent abdominal vac change, last 4/26 PM, bilious drainage noted  - REMOVED 4/26 RUQ THONY  - REMOVED 4/28 LUQ THONY at ileo-ileal anastomosis, RLQ THONY in pelvis, IR THONY abdominal wall  #4/22: s/p IR percutaneous placement of a 8.5 [place] drainage catheter into lower abdominal wall fluid collection, yielding 150 mL of foul appearing fluid.  #4/27: Acute retroperitoneal  CTA: mesenteric arterial blush noted with extravasation    - s/p IR without finding of any bleeding, no embolization required  #4/27 RTOR for re-exploration and evacuation of old clots from retroperitoneum and pelvis  - New midline wound vac in place > removed on 5/2   - x3 new 19Fr beatrice drains in place: 1. Right pelvis, 2. Left pelvis, 3. Subcutaneous  - 5/2: 500mL sanguinous output from wound vac --> heparin gtt paused, 1u PRBC given. Bedside exploration of midline wound. venous bleed noted, 2 stitches 3-0 vicryl, surgicell, Fibrilar, bleeding controlled at time of dressing placement. Wound vac removed, wet to dry and pressure dressing applied  #Diet, FLD w/ prosource   - TPN discontinued 4/25  - Aspiration precautions, HOB 30  #GI Prophylaxis/hx GERD  - Pantoprazole 40mg IV BID (home rx)  #Bowel regimen  - Holding  - Last BM 5/2  - Dign[place]ield D/C     /RENAL:   #Urine output in critically ill  - An replaced 4/27  - IVL  - Bumex 1mg on 5/2   #FUNMI; oliguric  - Nephrology consulted and following > hold off on CVVH recall  - Renal u/s> no hydronephrosis. 2-3cm anechoic cyst on right kidney   #Lactic acidosis    - OFF sodium bicarbonate infusion  #hypomagnesemia - repleted  #hypokalemia - repleted with 60mEq KCl   - Nephro: check CK, no indication of RRT, decrease meropenem to Q12    Labs:          BUN/Cr- 47/1.0  -->,  42/1.1  -->          [05-02 @ 18:11]Na  139 // K  3.6 // Mg  1.7 // Phos  3.2    HEME/ONC:   #DVT PPX  -LMWH 40mg q12  - LLE SCD only  #acute anemia   - 4/27: 2uPRBC 1uFFP 1pkPlt, TXA 1g, IR for embolization due to active extravasation seen on CTA, no active bleed, no embolization, Return to OR for re-exploration and evacuation of old clot  4/28: IR consulted for IVC filter and PICC. LE duplex ordered, negative. Precedex added overnight for agitation. 1 U PRBC for Hgb 6.8 overnight  - 4/29: 1 U PRBC  - 5/2: 1u PRBC for 500mL sanguinous output from wound vac   #Acute right pulmonary embolus  - Heparin gtt  @ 1900U/hr - discontinued 5/2 2/2 venous bleed from wound vac   #B/L UE Venous Duplex 4/26/27 - thrombophlebitis  #B/L LE Venous Duplex   - 4/21: right PT DVT  - 4/28: negative  - 5/5: repeat pending   - Concern for malabsorption of Eliquis given small bowel resection per pharmacy  - Vascular surgery consulted - AC with heparin drip (upon discharge transition to Eliquis 10 mg po BID x 7 days then 5 mg po BID for at least 6 months)    Labs: Hb/Hct:  7.9/24.1  -->,  9.8/28.9  -->                      Plts:  91  -->,  98  -->                 PTT/INR:  64.3/--  --->,  22.7/0.97  --->       ID:  #Intraabdominal anastomotic leak, fluid collection  #ID Consult  - Send baseline ESR CRP, continue meropenem 1g Q12  - Midline x ertanpenem 1g Q24 on discharge x 4 weeks (EOT 5/19)  - F/u with Dr. Perdue on Tuesdays via Telehealth   #Culture    OR 4/1 - E Coli    OR cx: few E.coli, few bacteroides, rare clostridium    4/13 Blood Cx: NGTF    4/14 tracheal aspirate: no growth     4/17 body fluid cx rare e. coli     4/22 Blood cx: negative    4/22 Abdominal - Negative FINAL    4/27 C diff stool panel: negative    WBC- 7.40  --->>,  5.97  --->>,  6.77  --->>  Temp trend- 24hrs T(F): 98 (05-02 @ 16:00), Max: 98.5 (05-02 @ 04:00)  Current antibiotics-meropenem  IVPB 1000 every 8 hours    ENDOCRINE:  #Glycemic monitoring  - Q6 FSG while NPO  - ISS  - A1C 5.8%     MSK:  #Activity- increase as tolerated     SKIN:  #left forearm skin tear   - Continue to monitor daily skin changes    LINES/DRAINS:  PIV    RLQ pelvis beatrice drain (4/28 -     LLQ pelvis beatrice drain (4/28 -     Midline subtaneous beatrice drain (4/28 -     Right IJ TLC (4/17 -     Prevena Vac Midline (removed 5/2)     Left radial arterial line (4/28 -     Discontinued lines:    Right Femoral Arterial Line (4/27 - 4/28)    ADVANCED DIRECTIVES:  Full Code  Emergency - Daughter  (Shavonne Moss)  INDICATION FOR SICU/SDU: Intestinal obstruction    DISPO: SICU    Pending discussion with SICU attending Dr. Allen   ASSESSMENT & PLAN:  64F PMHx HTN, morbid obesity s/p 2001 Abby-en-Y gastric bypass who presented on 4/3 with incarcerated ventral hernia with SBO    4/10 open ventral hernia repair, small bowel resection, and primary fascial closure with Dr. Lema. Admitted to SICU for Q1 abdominal checks and hemodynamic monitoring.   4/17 takeback for exploratory laparotomy, repair of anastomotic leak  4/22 8Fr pigtail into lower abdominal wall collection,150cc of foul appearing material aspirated, Attached to THONY bulb  4/27 RTOR for re-exploration and evacuation of old clots from retroperitoneum and pelvis and placement of 3 new beatrice drains  4/28: IR consulted for IVC filter     NEUROLOGICAL:  #Acute pain  - Acetaminophen Q6, Dilaudid 0.25mg Q4 PRN (severe pain)  #Sedation/agitation  - Precedex infusion, wean as tolerated  #AMS    HCT 4/27: negative  #Sleep hygiene  - HOLDING Trazodone 25mg QPM  - HOLDING Melatonin 5mg QPM    RESPIRATORY:   #Acute hypoxic respiratory failure  - Extubated 4/18, reintubated on 4/27 for AMS and agonal breathing, extubated 4/30  -on HFNC 40L/40%    #Right distal main pulmonary embolus with segmental extension  - Heparin gtt, currently at 1900u/hr  - B/L LE Venous duplex 4/21-RIGHT PT DVT, no LEFT DVT  -B/L LE Venous duplex 4/28-No evidence of deep venous thrombosis in either lower extremity. Limited   exam due to body habitus  - No IVC filter needed at this time per IR  #Intermittent diuresis  - Last diuresis 5/2 1mg Bumex  #PMHx LOUIS on CPAP@ home   #Activity increase as tolerated    CARDS:   #Hypertensive urgency, resolved  - intermittent pushes hydralazine   - Nicardipine gtt started, off since 5/1 6AM  #Acute hemorraghic shock, resolved  - Off levophed and vasopressin since 4/30  - 4/27: 2u PRBC 1plt 1uFFP, 1g TXA  - OFF sodium bicarbonate infusion on 4/27  #Supraventricular tachycardia, Non-sustained ventricular tachycardia, Ventricular Premature Depolarization  - Cardiology (Dr. Lopez) - Metoprolol 25mg Q12 HOLDING  #PMHx of HTN  - Amlodipine 10mg QD restarted   - HCTZ 25mg QD HOLDING  - Losartan 100mg daily HOLDING  - EKG- NSR  - TTE 4/11: EF of 56%. G1DD.   - TTE 4/22: EF 70 to 75%.    GASTROINTESTINAL/NUTRITION:   #4/10 open ventral hernia repair, small bowel resection, ileoileal anastomosis and primary fascial closure, 2 THONY Right  #4/17 RTOR for exploratory laparotomy, repair of anastomotic leak at previous ileoileal anastomosis, resected, new ileoileal anastomosis created, 1 THONY Left (total 3)  - Intermittent abdominal vac change, last 4/26 PM, bilious drainage noted  - REMOVED 4/26 RUQ THONY  - REMOVED 4/28 LUQ THONY at ileo-ileal anastomosis, RLQ THONY in pelvis, IR THONY abdominal wall  #4/22: s/p IR percutaneous placement of a 8.5 [place] drainage catheter into lower abdominal wall fluid collection, yielding 150 mL of foul appearing fluid.  #4/27: Acute retroperitoneal  CTA: mesenteric arterial blush noted with extravasation    - s/p IR without finding of any bleeding, no embolization required  #4/27 RTOR for re-exploration and evacuation of old clots from retroperitoneum and pelvis  - New midline wound vac in place > removed on 5/2   - x3 new 19Fr beatrice drains in place: 1. Right pelvis, 2. Left pelvis, 3. Subcutaneous  - 5/2: 500mL sanguinous output from wound vac --> heparin gtt paused, 1u PRBC given. Bedside exploration of midline wound. venous bleed noted, 2 stitches 3-0 vicryl, surgicell, Fibrilar, bleeding controlled at time of dressing placement. Wound vac removed, wet to dry and pressure dressing applied  #Diet, FLD w/ prosource   - TPN discontinued 4/25  - Aspiration precautions, HOB 30  #GI Prophylaxis/hx GERD  - Pantoprazole 40mg IV BID (home rx)  #Bowel regimen  - Holding  - Last BM 5/3    /RENAL:   #Urine output in critically ill  - An replaced 4/27  - IVL  - Bumex 1mg on 5/2   #FUNMI; oliguric  - Nephrology consulted and following > hold off on CVVH recall  - Renal u/s> no hydronephrosis. 2-3cm anechoic cyst on right kidney   #Lactic acidosis    - OFF sodium bicarbonate infusion  #hypomagnesemia - repleted  #hypokalemia - repleted with 60mEq KCl   - Nephro: check CK, no indication of RRT, decrease meropenem to Q12    Labs:   BUN/Cr- 42/1.1  -->,  42/1.0  -->  [05-03 @ 05:15]Na  139 // K  3.2 // Mg  1.9 // Phos  3.4  [05-02 @ 18:11]Na  139 // K  3.6 // Mg  1.7 // Phos  3.2    HEME/ONC:   #DVT PPX  -LMWH 40mg q12  - LLE SCD only  #acute anemia   - 4/27: 2uPRBC 1uFFP 1pkPlt, TXA 1g, IR for embolization due to active extravasation seen on CTA, no active bleed, no embolization, Return to OR for re-exploration and evacuation of old clot  4/28: IR consulted for IVC filter and PICC. LE duplex ordered, negative. Precedex added overnight for agitation. 1 U PRBC for Hgb 6.8 overnight  - 4/29: 1 U PRBC  - 5/2: 1u PRBC for 500mL sanguinous output from wound vac   #Acute right pulmonary embolus  - Heparin gtt  @ 1900U/hr - discontinued 5/2 2/2 venous bleed from wound vac   #B/L UE Venous Duplex 4/26/27 - thrombophlebitis  #B/L LE Venous Duplex   - 4/21: right PT DVT  - 4/28: negative  - 5/5: repeat pending   - Concern for malabsorption of Eliquis given small bowel resection per pharmacy  - Vascular surgery consulted - AC with heparin drip (upon discharge transition to Eliquis 10 mg po BID x 7 days then 5 mg po BID for at least 6 months)    Labs: Hb/Hct:  9.8/28.9  (05-02 @ 12:40)  -->,  8.6/25.4  (05-03 @ 05:15)  -->                      Plts:  98  -->,  100  -->                 PTT/INR:  22.7/0.97  --->,  23.0/1.01  --->         ID:  #Intraabdominal anastomotic leak, fluid collection  #ID Consult  - Send baseline ESR CRP, continue meropenem 1g Q12  - Midline x ertanpenem 1g Q24 on discharge x 4 weeks (EOT 5/19)  - F/u with Dr. Perdue on Tuesdays via Telehealth   #Culture    OR 4/1 - E Coli    OR cx: few E.coli, few bacteroides, rare clostridium    4/13 Blood Cx: NGTF    4/14 tracheal aspirate: no growth     4/17 body fluid cx rare e. coli     4/22 Blood cx: negative    4/22 Abdominal - Negative FINAL    4/27 C diff stool panel: negative  WBC- 5.97  --->>,  6.77  --->>,  6.11  --->>  Temp trend- 24hrs T(F): 96.5 (05-03 @ 08:00), Max: 98 (05-02 @ 16:00)  Current antibiotics-meropenem  IVPB 1000 every 8 hours    ENDOCRINE:  #Glycemic monitoring  - Q6 FSG while NPO  - ISS  - A1C 5.8%     MSK:  #Activity- increase as tolerated     SKIN:  #left forearm skin tear   - Continue to monitor daily skin changes    LINES/DRAINS:  PIV    RLQ pelvis beatrice drain (4/28 -     LLQ pelvis beatrice drain (4/28 -     Midline subtaneous beatrice drain (4/28 -     Right IJ TLC (4/17 -     Prevena Vac Midline (removed 5/2)     Left radial arterial line (4/28 -     Discontinued lines:    Right Femoral Arterial Line (4/27 - 4/28)    ADVANCED DIRECTIVES:  Full Code  Emergency - Daughter  (Shavonnejillian Moss)  INDICATION FOR SICU/SDU: Intestinal obstruction    DISPO: SICU    Pending discussion with SICU attending Dr. Allen

## 2025-05-03 NOTE — PROGRESS NOTE ADULT - ASSESSMENT
65y F w/ PMHx of  Morbid obesity, LOUIS, large complex ventral hernia s/p repair sbr and loss of domain c/b post op intubation, hypotension requiring vasopressors, anastomotic leakage s/p RTOR and reanastomosis, now doing better post op from a respiratory status. Comfortable on BIPAP. Off pressors. Blood cultures negATIVE.    PLAN:  - Daily wet to dry dressing changes, f/u bleeding recurrence  - Wean off of HFNC as tolerated  - Monitor for daily output quality on drains  - Rest of care per SICU    Lines/Tubes: Oliver DAVIES/THONY drains    BLUE TEAM SPECTRA: [id number]

## 2025-05-03 NOTE — PROGRESS NOTE ADULT - ATTENDING COMMENTS
This patient has a high probability of sudden, clinically significant deterioration, which requires the highest level of physician preparedness to intervene urgently. I managed/supervised life or organ supporting interventions that required frequent physician assessment. I devoted my full attention in the ICU to the direct care of this patient for the period of time indicated below. Time I spent with the family or surrogate(s) is included only if the patient was incapable of providing the necessary information or participating in medical decision making. Time devoted to teaching and to any procedures I billed separately is not included.     Patient examined and evaluated at the bedside with SICU team. I performed a medically appropriate exam. Pertinent findings are detailed below. Vital signs, laboratory work, and imaging reviewed.     Neuro:  awake, alert  #Postoperative pain - currently just on dilaudid 0.25mg IV Q4H PRN severe pain --> start acetaminophen 650mg Q6H, liquid oxycodone 5/10mg Q4H PRN mod/severe, dilaudid 0.25mg IV Q6H PRN breakthrough pain    Respiratory: respirations even and unlabored on HFNC 40L/40%  CXR reviewed and interpreted by me - slightly improved bilateral effusions  ABG - ( 03 May 2025 05:09 )  pH, Arterial: 7.49  pH, Blood: x     /  pCO2: 32    /  pO2: 95    / HCO3: 24    / Base Excess: 1.3   /  SaO2: 98.3    #Hypoxic respiratory failure - wean HFNC as able, duonebs, give additional 1mg IV bumex today  #Pulmonary embolism - holding heparin infusion due to bleeding, started lovenox 40mg BID due to BMI > 50    CV: monitor hemodynamics, MAP goal > 65  #HTN - amlodipine 10mg oral daily, start losartan 100mg daily    GI: THONY drains serosanguinous, midline dressing intact  #Nutrition - Protein-calorie malnutrition - acute illness, poor nutritional intake, fluid retention - advance diet to bariatric soft/pureed diet with protein supplementation  #Ppx - protonix 40mg IV BID (hx of gastric bypass)    Renal: Continue I&Os monitoring, replete electrolytes as needed  05-03    139  |  104  |  42[H]  ----------------------------<  143[H]  3.2[L]   |  22  |  1.0    Ca 6.8[L]; Mg 1.9; Phos 3.4    UOP - OUT: 4030 mL    I/O - IN: 1043.2 mL / OUT: 4702 mL / NET: -3658.8 mL    An catheter - hourly I/O in critically ill patient, plan to remove after diuresis  #Hypokalemia - 60mEq KCl IVPB given  #Hypocalcemia - 4g calcium gluconate IVPB  #Electrolyte depletion - give 2g magnesium sulfate IVPB    Heme: continue to evaluate for acute blood loss anemia- trend Hg/Hct                         8.6    6.11  )-----------( 100      ( 03 May 2025 05:15 )             25.4     PT/INR/PTT - ( 03 May 2025 05:15 )   PT: 11.90 sec; INR: 1.01 ratio; PTT 23.0 sec    Anticoagulation - lovenox 40mg BID  #Acute blood loss anemia - trend hemoglobin, no indication for transfusion at present  #Thrombocytopenia - no indication for PLT infusion, trend for now    ID: trend WBC, monitor for fevers  #Abdominal collection - meropenem 1g IV Q8H per ID    Endocrine: Prevent and treat hyperglycemia with insulin as needed    PV: follow pulse exam    #Physical deconditioning - OOB and mobilize as able, PT eval    Skin: decub precautions    Lines: R IJ TLC, THONY drains x 3, L radial arterial line, An  DVT Prophylaxis: lovenox 40mg BID  Stress Ulcer Prophylaxis: protonix (hx of gastric bypass)  Disposition: SICU - hypoxic respiratory failure    Blaise Allen MD  Trauma/Acute Care Surgery/Surgical Critical Care Attending This patient has a high probability of sudden, clinically significant deterioration, which requires the highest level of physician preparedness to intervene urgently. I managed/supervised life or organ supporting interventions that required frequent physician assessment. I devoted my full attention in the ICU to the direct care of this patient for the period of time indicated below. Time I spent with the family or surrogate(s) is included only if the patient was incapable of providing the necessary information or participating in medical decision making. Time devoted to teaching and to any procedures I billed separately is not included.     Patient examined and evaluated at the bedside with SICU team. I performed a medically appropriate exam. Pertinent findings are detailed below. Vital signs, laboratory work, and imaging reviewed.     Neuro:  awake, alert  #Postoperative pain - currently just on dilaudid 0.25mg IV Q4H PRN severe pain --> start acetaminophen 650mg Q6H, liquid oxycodone 5/10mg Q4H PRN mod/severe, dilaudid 0.25mg IV Q6H PRN breakthrough pain    Respiratory: respirations even and unlabored on HFNC 40L/40%  CXR reviewed and interpreted by me - slightly improved bilateral effusions  ABG - ( 03 May 2025 05:09 )  pH, Arterial: 7.49  pH, Blood: x     /  pCO2: 32    /  pO2: 95    / HCO3: 24    / Base Excess: 1.3   /  SaO2: 98.3    #Hypoxic respiratory failure - wean HFNC as able, duonebs, give additional 1mg IV bumex today  #Pulmonary embolism - holding heparin infusion due to bleeding, started lovenox 40mg BID due to BMI > 50    CV: monitor hemodynamics, MAP goal > 65  #HTN - amlodipine 10mg oral daily, start losartan 100mg daily    GI: THONY drains serosanguinous, midline dressing intact  #Nutrition - Protein-calorie malnutrition - acute illness, poor nutritional intake, fluid retention - advance diet to bariatric soft/pureed diet with protein supplementation  #Ppx - protonix 40mg IV BID (hx of gastric bypass)    Renal: Continue I&Os monitoring, replete electrolytes as needed  05-03    139  |  104  |  42[H]  ----------------------------<  143[H]  3.2[L]   |  22  |  1.0    Ca 6.8[L]; Mg 1.9; Phos 3.4    UOP - OUT: 4030 mL    I/O - IN: 1043.2 mL / OUT: 4702 mL / NET: -3658.8 mL    An catheter - hourly I/O in critically ill patient, plan to remove after diuresis  #Hypokalemia - 60mEq KCl IVPB given  #Hypocalcemia - 4g calcium gluconate IVPB  #Electrolyte depletion - give 2g magnesium sulfate IVPB    Heme: continue to evaluate for acute blood loss anemia- trend Hg/Hct                         8.6    6.11  )-----------( 100      ( 03 May 2025 05:15 )             25.4     PT/INR/PTT - ( 03 May 2025 05:15 )   PT: 11.90 sec; INR: 1.01 ratio; PTT 23.0 sec    Anticoagulation - lovenox 40mg BID  #Acute blood loss anemia - trend hemoglobin, no indication for transfusion at present, hemoglobin goal > 7.0  #Thrombocytopenia - no indication for PLT infusion, trend for now    ID: trend WBC, monitor for fevers  #Abdominal collection - meropenem 1g IV Q8H per ID    Endocrine: Prevent and treat hyperglycemia with insulin as needed    PV: follow pulse exam    #Physical deconditioning - OOB and mobilize as able, PT eval    Skin: decub precautions    Lines: R IJ TLC, THONY drains x 3, L radial arterial line, An  DVT Prophylaxis: lovenox 40mg BID  Stress Ulcer Prophylaxis: protonix (hx of gastric bypass)  Disposition: SICU - hypoxic respiratory failure    Blaise Allen MD  Trauma/Acute Care Surgery/Surgical Critical Care Attending

## 2025-05-03 NOTE — PROGRESS NOTE ADULT - SUBJECTIVE AND OBJECTIVE BOX
GENERAL SURGERY PROGRESS NOTE     NANCY SARGENT  [address] [place] [address] day :31d    POD: 16d  Procedure: Open repair of ventral hernia using mesh and component separation technique    Small bowel resection    Insertion, arterial line, percutaneous    Exploratory laparotomy    Insertion of arterial line with imaging guidance    Abdominal washout    Evacuation of hemoperitoneum      Surgical Attending: Dr. Lilly  24 hour events: wound vac noted to have increased bloody output. Switched dressing to wet to dry packing and added surgicell. No bleeding noted overnight. Hgb trend stable.    PHYSICAL EXAM:  General: NAD, calm and cooperative  HEENT: NCAT  Cardiac: RRR  Respiratory: Comfortable on HFNC, normal respiratory effort  Abdomen: Soft, non-distended, non-tender, no rebound, no guarding.  Musculoskeletal: Active ROM intact, compartments soft  Vascular: Extremities well perfused  Skin: Warm/dry, normal color, no jaundice  Incision/wound: healing well, midline dressings in place, clean, dry and intact; drain sites clean, scant serosanguinous fluid in all 3 drain bulbs      T(F): 98 (05-02-25 @ 16:00), Max: 98.5 (05-02-25 @ 04:00)  HR: 67 (05-03-25 @ 00:00) (67 - 108)  BP: 119/59 (05-02-25 @ 15:00) (119/59 - 123/92)  ABP: 138/61 (05-03-25 @ 00:00) (125/61 - 190/85)  ABP(mean): 83 (05-03-25 @ 00:00) (78 - 118)  RR: 19 (05-03-25 @ 00:00) (15 - 34)  SpO2: 99% (05-03-25 @ 00:00) (92% - 100%)    IN'S / OUT's:    05-01-25 @ 07:01  -  05-02-25 @ 07:00  --------------------------------------------------------  IN:    Dexmedetomidine: 252.4 mL    Heparin: 447 mL    IV PiggyBack: 100 mL    IV PiggyBack: 100 mL    IV PiggyBack: 100 mL    IV PiggyBack: 800 mL    Oral Fluid: 1095 mL  Total IN: 2894.4 mL    OUT:    Bulb (mL): 75 mL    Bulb (mL): 37.5 mL    Bulb (mL): 80 mL    Indwelling Catheter - Urethral (mL): 6150 mL    Rectal Tube (mL): 70 mL    VAC (Vacuum Assisted Closure) System (mL): 0 mL  Total OUT: 6412.5 mL    Total NET: -3518.1 mL      05-02-25 @ 07:01  -  05-03-25 @ 01:20  --------------------------------------------------------  IN:    Dexmedetomidine: 274.6 mL    IV PiggyBack: 50 mL    IV PiggyBack: 100 mL    IV PiggyBack: 50 mL    PRBCs (Packed Red Blood Cells): 300 mL  Total IN: 774.6 mL    OUT:    Bulb (mL): 20 mL    Bulb (mL): 35 mL    Bulb (mL): 40 mL    Heparin: 0 mL    Indwelling Catheter - Urethral (mL): 2970 mL    Rectal Tube (mL): 50 mL    VAC (Vacuum Assisted Closure) System (mL): 500 mL  Total OUT: 3615 mL    Total NET: -2840.4 mL          MEDICATIONS  (STANDING):  albuterol/ipratropium for Nebulization 3 milliLiter(s) Nebulizer every 6 hours  amLODIPine   Tablet 10 milliGRAM(s) Oral daily  chlorhexidine 2% Cloths 1 Application(s) Topical <User Schedule>  dexMEDEtomidine Infusion 0.2 MICROgram(s)/kG/Hr (6.78 mL/Hr) IV Continuous <Continuous>  enoxaparin Injectable 40 milliGRAM(s) SubCutaneous every 12 hours  insulin lispro (ADMELOG) corrective regimen sliding scale   SubCutaneous every 6 hours  melatonin 5 milliGRAM(s) Oral at bedtime  meropenem  IVPB 1000 milliGRAM(s) IV Intermittent every 8 hours  pantoprazole  Injectable 40 milliGRAM(s) IV Push every 12 hours    MEDICATIONS  (PRN):  HYDROmorphone  Injectable 0.25 milliGRAM(s) IV Push every 4 hours PRN Severe Pain (7 - 10)      LABS  Labs:  CAPILLARY BLOOD GLUCOSE      POCT Blood Glucose.: 130 mg/dL (02 May 2025 23:26)  POCT Blood Glucose.: 147 mg/dL (02 May 2025 16:47)  POCT Blood Glucose.: 111 mg/dL (02 May 2025 11:03)  POCT Blood Glucose.: 109 mg/dL (02 May 2025 05:44)                          9.8    6.77  )-----------( 98       ( 02 May 2025 12:40 )             28.9         05-02    139  |  104  |  42[H]  ----------------------------<  139[H]  3.6   |  22  |  1.1      Calcium: 7.5 mg/dL (05-02-25 @ 18:11)      LFTs:             4.8  | 0.9  | 70       ------------------[119     ( 01 May 2025 23:28 )  2.5  | 0.6  | 332         Lipase:x      Amylase:x         Blood Gas Arterial, Lactate: 0.6 mmol/L (05-02-25 @ 03:54)  Blood Gas Arterial, Lactate: 0.8 mmol/L (05-01-25 @ 04:34)  Blood Gas Arterial, Lactate: 0.7 mmol/L (05-01-25 @ 01:56)  Blood Gas Arterial, Lactate: 0.7 mmol/L (04-30-25 @ 15:23)  Blood Gas Arterial, Lactate: 0.8 mmol/L (04-30-25 @ 11:00)  Blood Gas Arterial, Lactate: 0.8 mmol/L (04-30-25 @ 06:42)  Blood Gas Arterial, Lactate: 0.8 mmol/L (04-30-25 @ 05:08)    ABG - ( 02 May 2025 03:54 )  pH: 7.47  /  pCO2: 33    /  pO2: 78    / HCO3: 24    / Base Excess: 0.3   /  SaO2: 98.5            ABG - ( 01 May 2025 04:34 )  pH: 7.44  /  pCO2: 32    /  pO2: 131   / HCO3: 22    / Base Excess: -2.1  /  SaO2: 98.3            ABG - ( 01 May 2025 01:56 )  pH: 7.44  /  pCO2: 32    /  pO2: 61    / HCO3: 22    / Base Excess: -1.6  /  SaO2: 92.3              Coags:     11.40  ----< 0.97    ( 02 May 2025 14:30 )     22.7                Urinalysis Basic - ( 02 May 2025 18:11 )    Color: x / Appearance: x / SG: x / pH: x  Gluc: 139 mg/dL / Ketone: x  / Bili: x / Urobili: x   Blood: x / Protein: x / Nitrite: x   Leuk Esterase: x / RBC: x / WBC x   Sq Epi: x / Non Sq Epi: x / Bacteria: x            RADIOLOGY & ADDITIONAL TESTS:

## 2025-05-03 NOTE — PROGRESS NOTE ADULT - SUBJECTIVE AND OBJECTIVE BOX
NANCY SARGENT   [id number]   [birth year]  64yF  ============================================================   DATE OF INITIAL SICU/SDU CONSULT: 04-10-25    INDICATION FOR SICU CONSULT:  q1hr abdominal checks, mechanical ventilation    SICU COURSE EVENTS :  04-10 - admitted to SICU service  4/11: Intubated and started on paralytic. Arterial line placed, subclavian TLC placed. Nephrology consulted for oliguria. IR abdominal muscle botox injection performed. TTE performed.   4/12: Additional fluid boluses given; trial fo bumex started, considering CVVH. Weaned off nimbex gtt.  > 220 /hr. Renal U/S w/out hydro.   4/13: Weaned off Levophed. Bumex gtt 2mg/hr > 1mg/hr. Goal net negative 1-1.5L, UOP approx, 200c/hr.   4/14: Remained on bumex gtt for further diuresis, decreased to 0.5 overnight, vent settings weaned. Cr downtrending, LFTs worsening.   4/15: Extubated to BiPAP, transitioned to NC. Dc'd bumex gtt, given 5mg metolazone x 1 and 2mg bumex x 1.   4/16: THONY drain #2 murky/bilious, pending CTAP with PO contrast, hypernatremia, started D5W @ 75cc/hr, persistent hypokalemia   4/17: RTOR for resection of anastomosis for anastomotic leak. Returned intubated, 400/24/80/10, sinus tachy to . Abdomen soft. Was initially on propofol @ 30 and precedex, but propofol weaned off due to hypotension with MAPs in 50s, fentanyl ggt started for acute pain. Remained hypotensive despite fluids, started on Levophed, on 0.05.   4/18: Extubated. HFNC 40L/49%, Levo off since 11 AM   4/19: NGT removed. Started on duonebs. D5W increased to 60cc/hr. 1mg bumex, > 1.5L response  4/20: Episode of afib RVR resolved with metoprolol 5mg IVP, cardiology c/s placed, recommending metoprolol 25mg q 12   4/21: PE on CTA, therapeutic heparin gtt started. Started on cardebe gtt,   4/22: Meropenem started per ID.  S/p IR percutaneous placement of a 8.5 Nigerian drainage catheter into lower abdominal wall fluid collection, yielding 150 mL of bowel appearing fluid. New left radial a-line placed. Off nicarrdipine gtt.   4/23: Switched to therapeutic lovenox. Diet advanced to villa clears with ensures.  4/24: advacned to bariatric FLD, downgraded to SDU  4/25: TPN discontinued, diet advanced to full liquid , Right cephalic DVT prelim  4/26: Right UE VA Duplex final no DVT, thromboplebitis, Arterial line removed, 2L NC, midline vac changed, noted to have bilious drainage, RUQ drain pulled, upgraded to SICU status, plan to reorder TPN for 4/27 PM, diet changed to full liquid. 1:20 AM noted to be unresponsive, palpable femoral pulse, decision made to intubate, R femoral arterial line placed, started on levophed, amauri, bicarb amp x 2, bicarb gtt, propofol, 2U pRBC for hgb 5.9, plan for CTH and CTAP when stabilized   4/27: 2uPRBC 1uFFP 1pkPlt, TXA 1g, IR for embolization due to active extravasation seen on CTA, no active bleed, no embolization, Return to OR for re-exploration and evacuation of old clot  4/28: IR consulted for IVC filter and PICC. LE duplex ordered, negative. Precedex added overnight for agitation. 1 U PRBC for Hgb 6.8 overnight  4/29: TF started at 20cc/hr. Meropenem decreased to Q 12 hours.   4/30: Extubated to 5L NC, then placed on HFNC. Heparin gtt restarted.   5/1: Placed on BiPAP, started on villa clears w/ clear ensures       24Hour Events:  5/2  NIGHT   -PM rounds - HR 80s, SBP 160s, abdomen soft, no signs of bleeding, THONY x3 serosanguinous   -23:30 labs   -BCx no growth 4 days  -AM CBC   -AM CXR    DAY  - 500mL sanguinous output from wound vac --> heparin gtt paused, 1u PRBC given.  - bedside exploration of midline wound. venous bleed noted, 2 stitches 3-0 vicryl, surgicell, Fibrilar, bleeding controlled at time of dressing placement. wet to dry and pressure dressing applied. family updated by Dr. Johnson.   - 1mg bumex, rpt BMP at 11am  - rpt DVT sono next monday  - DVT PPx tonight if wound output remains low  - PRN antihypertensives  - If Cr continues to downtrend, d/c pierce  - 2g Ca  - rpt CBC after 1uPRBC > 9.8, cancelled second unit  - increase meropenem to q8h  - HFNC to 60L/50%  - attempt to remove TLC if able to get access  - d/c dignishield   - advanced to Villa fulls w/ Prosource  - KCl x 3  - Hydral 10mg x 1 for SBP 180s, additional 10mg for SBP in 180-190s  - Lovenox 40 BID  -magnesium repleted    [X] A ten-point review of systems was negative except as expressed in note.  [X ] History was obtained from patient. If unable to participate in their care, history was from review of the chart and collateral sources of information.  =====================================================================   NANCY SARGENT   [id number]   [birth year]  64yF  ============================================================   DATE OF INITIAL SICU/SDU CONSULT: 04-10-25    INDICATION FOR SICU CONSULT:  q1hr abdominal checks, mechanical ventilation    SICU COURSE EVENTS :  04-10 - admitted to SICU service  4/11: Intubated and started on paralytic. Arterial line placed, subclavian TLC placed. Nephrology consulted for oliguria. IR abdominal muscle botox injection performed. TTE performed.   4/12: Additional fluid boluses given; trial fo bumex started, considering CVVH. Weaned off nimbex gtt.  > 220 /hr. Renal U/S w/out hydro.   4/13: Weaned off Levophed. Bumex gtt 2mg/hr > 1mg/hr. Goal net negative 1-1.5L, UOP approx, 200c/hr.   4/14: Remained on bumex gtt for further diuresis, decreased to 0.5 overnight, vent settings weaned. Cr downtrending, LFTs worsening.   4/15: Extubated to BiPAP, transitioned to NC. Dc'd bumex gtt, given 5mg metolazone x 1 and 2mg bumex x 1.   4/16: THONY drain #2 murky/bilious, pending CTAP with PO contrast, hypernatremia, started D5W @ 75cc/hr, persistent hypokalemia   4/17: RTOR for resection of anastomosis for anastomotic leak. Returned intubated, 400/24/80/10, sinus tachy to . Abdomen soft. Was initially on propofol @ 30 and precedex, but propofol weaned off due to hypotension with MAPs in 50s, fentanyl ggt started for acute pain. Remained hypotensive despite fluids, started on Levophed, on 0.05.   4/18: Extubated. HFNC 40L/49%, Levo off since 11 AM   4/19: NGT removed. Started on duonebs. D5W increased to 60cc/hr. 1mg bumex, > 1.5L response  4/20: Episode of afib RVR resolved with metoprolol 5mg IVP, cardiology c/s placed, recommending metoprolol 25mg q 12   4/21: PE on CTA, therapeutic heparin gtt started. Started on cardebe gtt,   4/22: Meropenem started per ID.  S/p IR percutaneous placement of a 8.5 [place]ian drainage catheter into lower abdominal wall fluid collection, yielding 150 mL of bowel appearing fluid. New left radial a-line placed. Off nicarrdipine gtt.   4/23: Switched to therapeutic lovenox. Diet advanced to villa clears with ensures.  4/24: advacned to bariatric FLD, downgraded to SDU  4/25: TPN discontinued, diet advanced to full liquid , Right cephalic DVT prelim  4/26: Right UE VA Duplex final no DVT, thromboplebitis, Arterial line removed, 2L NC, midline vac changed, noted to have bilious drainage, RUQ drain pulled, upgraded to SICU status, plan to reorder TPN for 4/27 PM, diet changed to full liquid. 1:20 AM noted to be unresponsive, palpable femoral pulse, decision made to intubate, R femoral arterial line placed, started on levophed, amauri, bicarb amp x 2, bicarb gtt, propofol, 2U pRBC for hgb 5.9, plan for CTH and CTAP when stabilized   4/27: 2uPRBC 1uFFP 1pkPlt, TXA 1g, IR for embolization due to active extravasation seen on CTA, no active bleed, no embolization, Return to OR for re-exploration and evacuation of old clot  4/28: IR consulted for IVC filter and PICC. LE duplex ordered, negative. Precedex added overnight for agitation. 1 U PRBC for Hgb 6.8 overnight  4/29: TF started at 20cc/hr. Meropenem decreased to Q 12 hours.   4/30: Extubated to 5L NC, then placed on HFNC. Heparin gtt restarted.   5/1: Placed on BiPAP, started on villa clears w/ clear ensures       24Hour Events:  5/2  NIGHT   -PM rounds - HR 80s, SBP 160s, abdomen soft, no signs of bleeding, THONY x3 serosanguinous   -23:30 labs   -BCx no growth 4 days  -AM CBC   -AM CXR    DAY  - 500mL sanguinous output from wound vac --> heparin gtt paused, 1u PRBC given.  - bedside exploration of midline wound. venous bleed noted, 2 stitches 3-0 vicryl, surgicell, Fibrilar, bleeding controlled at time of dressing placement. wet to dry and pressure dressing applied. family updated by Dr. Johnson.   - 1mg bumex, rpt BMP at 11am  - rpt DVT sono next monday  - DVT PPx tonight if wound output remains low  - PRN antihypertensives  - If Cr continues to downtrend, d/c pierce  - 2g Ca  - rpt CBC after 1uPRBC > 9.8, cancelled second unit  - increase meropenem to q8h  - HFNC to 60L/50%  - attempt to remove TLC if able to get access  - d/c dignishield   - advanced to [place] fulls w/ Prosource  - KCl x 3  - Hydral 10mg x 1 for SBP 180s, additional 10mg for SBP in 180-190s  - Lovenox 40 BID  -magnesium repleted    [X] A ten-point review of systems was negative except as expressed in note.  [X ] History was obtained from patient. If unable to participate in their care, history was from review of the chart and collateral sources of information.  =====================================================================  Daily     Daily     Diet, Full Liquid:   Beneprotein ([facility] Only)     Qty per Day:  3  Prosource Gelatein 20 Sugar Free     Qty per Day:  2  Supplement Feeding Modality:  Oral  Ensure Max Cans or Servings Per Day:  3       Frequency:  Three Times a day (05-02-25 @ 10:15)      CURRENT MEDS:  Neurologic Medications  dexMEDEtomidine Infusion 0.2 MICROgram(s)/kG/Hr IV Continuous <Continuous>  HYDROmorphone  Injectable 0.25 milliGRAM(s) IV Push every 4 hours PRN Severe Pain (7 - 10)  melatonin 5 milliGRAM(s) Oral at bedtime    Respiratory Medications  albuterol/ipratropium for Nebulization 3 milliLiter(s) Nebulizer every 6 hours    Cardiovascular Medications  amLODIPine   Tablet 10 milliGRAM(s) Oral daily    Gastrointestinal Medications  pantoprazole  Injectable 40 milliGRAM(s) IV Push every 12 hours  potassium chloride  20 mEq/100 mL IVPB 20 milliEquivalent(s) IV Intermittent every 2 hours    Genitourinary Medications    Hematologic/Oncologic Medications  enoxaparin Injectable 40 milliGRAM(s) SubCutaneous every 12 hours    Antimicrobial/Immunologic Medications  meropenem  IVPB 1000 milliGRAM(s) IV Intermittent every 8 hours    Endocrine/Metabolic Medications  insulin lispro (ADMELOG) corrective regimen sliding scale   SubCutaneous every 6 hours    Topical/Other Medications  chlorhexidine 2% Cloths 1 Application(s) Topical <User Schedule>      ICU Vital Signs Last 24 Hrs  T(C): 35.8 (03 May 2025 08:00), Max: 36.7 (02 May 2025 16:00)  T(F): 96.5 (03 May 2025 08:00), Max: 98 (02 May 2025 16:00)  HR: 72 (03 May 2025 08:00) (60 - 108)  BP: 126/66 (03 May 2025 07:30) (119/59 - 126/66)  BP(mean): 81 (02 May 2025 15:00) (81 - 102)  ABP: 152/72 (03 May 2025 08:00) (107/52 - 184/80)  ABP(mean): 96 (03 May 2025 08:00) (66 - 118)  RR: 24 (03 May 2025 08:00) (16 - 34)  SpO2: 97% (03 May 2025 08:00) (92% - 100%)    O2 Parameters below as of 03 May 2025 08:00  Patient On (Oxygen Delivery Method): nasal cannula, high flow      ABG - ( 03 May 2025 05:09 )  pH, Arterial: 7.49  pH, Blood: x     /  pCO2: 32    /  pO2: 95    / HCO3: 24    / Base Excess: 1.3   /  SaO2: 98.3          I&O's Summary    02 May 2025 07:01  -  03 May 2025 07:00  --------------------------------------------------------  IN: 1043.2 mL / OUT: 4702 mL / NET: -3658.8 mL    03 May 2025 07:01  -  03 May 2025 08:41  --------------------------------------------------------  IN: 200 mL / OUT: 120 mL / NET: 80 mL      I&O's Detail    02 May 2025 07:01  -  03 May 2025 07:00  --------------------------------------------------------  IN:    Dexmedetomidine: 393.2 mL    IV PiggyBack: 100 mL    IV PiggyBack: 50 mL    IV PiggyBack: 100 mL    IV PiggyBack: 100 mL    PRBCs (Packed Red Blood Cells): 300 mL  Total IN: 1043.2 mL    OUT:    Bulb (mL): 22 mL    Bulb (mL): 45 mL    Bulb (mL): 55 mL    Heparin: 0 mL    Indwelling Catheter - Urethral (mL): 4030 mL    Rectal Tube (mL): 50 mL    VAC (Vacuum Assisted Closure) System (mL): 500 mL  Total OUT: 4702 mL    Total NET: -3658.8 mL      03 May 2025 07:01  -  03 May 2025 08:41  --------------------------------------------------------  IN:    IV PiggyBack: 200 mL  Total IN: 200 mL    OUT:    Dexmedetomidine: 0 mL    Indwelling Catheter - Urethral (mL): 120 mL  Total OUT: 120 mL    Total NET: 80 mL      PHYSICAL EXAM:    General/Neuro: alert & oriented x 3, no focal deficits  Lungs: Normal expansion/effort. On HFNC 40L/40%  Cardiovascular : S1, S2.   GI: Abdomen soft, Non-tender, Non-distended.  THONY drain x 3 with serosanguinous output  Extremities: Extremities warm, pink, well-perfused.   Derm: Good skin turgor, no skin breakdown.    : Pierce catheter in place.        LABS:  CAPILLARY BLOOD GLUCOSE      POCT Blood Glucose.: 128 mg/dL (03 May 2025 05:49)  POCT Blood Glucose.: 130 mg/dL (02 May 2025 23:26)  POCT Blood Glucose.: 147 mg/dL (02 May 2025 16:47)  POCT Blood Glucose.: 111 mg/dL (02 May 2025 11:03)                          8.6    6.11  )-----------( 100      ( 03 May 2025 05:15 )             25.4       05-03    139  |  104  |  42[H]  ----------------------------<  143[H]  3.2[L]   |  22  |  1.0    Ca    6.8[L]      03 May 2025 05:15  Phos  3.4     05-03  Mg     1.9     05-03    TPro  4.8[L]  /  Alb  2.5[L]  /  TBili  0.9  /  DBili  0.6[H]  /  AST  70[H]  /  ALT  332[H]  /  AlkPhos  119[H]  05-01      PT/INR - ( 03 May 2025 05:15 )   PT: 11.90 sec;   INR: 1.01 ratio         PTT - ( 03 May 2025 05:15 )  PTT:23.0 sec      Urinalysis Basic - ( 03 May 2025 05:15 )    Color: x / Appearance: x / SG: x / pH: x  Gluc: 143 mg/dL / Ketone: x  / Bili: x / Urobili: x   Blood: x / Protein: x / Nitrite: x   Leuk Esterase: x / RBC: x / WBC x   Sq Epi: x / Non Sq Epi: x / Bacteria: x

## 2025-05-04 LAB
ANION GAP SERPL CALC-SCNC: 12 MMOL/L — SIGNIFICANT CHANGE UP (ref 7–14)
ANION GAP SERPL CALC-SCNC: 13 MMOL/L — SIGNIFICANT CHANGE UP (ref 7–14)
ANION GAP SERPL CALC-SCNC: 15 MMOL/L — HIGH (ref 7–14)
BUN SERPL-MCNC: 27 MG/DL — HIGH (ref 10–20)
BUN SERPL-MCNC: 28 MG/DL — HIGH (ref 10–20)
BUN SERPL-MCNC: 30 MG/DL — HIGH (ref 10–20)
CALCIUM SERPL-MCNC: 7.3 MG/DL — LOW (ref 8.4–10.5)
CALCIUM SERPL-MCNC: 7.7 MG/DL — LOW (ref 8.4–10.5)
CALCIUM SERPL-MCNC: 7.7 MG/DL — LOW (ref 8.4–10.5)
CHLORIDE SERPL-SCNC: 106 MMOL/L — SIGNIFICANT CHANGE UP (ref 98–110)
CHLORIDE SERPL-SCNC: 108 MMOL/L — SIGNIFICANT CHANGE UP (ref 98–110)
CHLORIDE SERPL-SCNC: 108 MMOL/L — SIGNIFICANT CHANGE UP (ref 98–110)
CO2 SERPL-SCNC: 22 MMOL/L — SIGNIFICANT CHANGE UP (ref 17–32)
CO2 SERPL-SCNC: 23 MMOL/L — SIGNIFICANT CHANGE UP (ref 17–32)
CO2 SERPL-SCNC: 23 MMOL/L — SIGNIFICANT CHANGE UP (ref 17–32)
CREAT SERPL-MCNC: 0.7 MG/DL — SIGNIFICANT CHANGE UP (ref 0.7–1.5)
EGFR: 97 ML/MIN/1.73M2 — SIGNIFICANT CHANGE UP
GLUCOSE BLDC GLUCOMTR-MCNC: 114 MG/DL — HIGH (ref 70–99)
GLUCOSE BLDC GLUCOMTR-MCNC: 118 MG/DL — HIGH (ref 70–99)
GLUCOSE BLDC GLUCOMTR-MCNC: 95 MG/DL — SIGNIFICANT CHANGE UP (ref 70–99)
GLUCOSE BLDC GLUCOMTR-MCNC: 99 MG/DL — SIGNIFICANT CHANGE UP (ref 70–99)
GLUCOSE SERPL-MCNC: 109 MG/DL — HIGH (ref 70–99)
GLUCOSE SERPL-MCNC: 109 MG/DL — HIGH (ref 70–99)
GLUCOSE SERPL-MCNC: 98 MG/DL — SIGNIFICANT CHANGE UP (ref 70–99)
HCT VFR BLD CALC: 29.7 % — LOW (ref 37–47)
HGB BLD-MCNC: 9.8 G/DL — LOW (ref 12–16)
MAGNESIUM SERPL-MCNC: 1.6 MG/DL — LOW (ref 1.8–2.4)
MAGNESIUM SERPL-MCNC: 2.3 MG/DL — SIGNIFICANT CHANGE UP (ref 1.8–2.4)
MCHC RBC-ENTMCNC: 30.5 PG — SIGNIFICANT CHANGE UP (ref 27–31)
MCHC RBC-ENTMCNC: 33 G/DL — SIGNIFICANT CHANGE UP (ref 32–37)
MCV RBC AUTO: 92.5 FL — SIGNIFICANT CHANGE UP (ref 81–99)
NRBC BLD AUTO-RTO: 0 /100 WBCS — SIGNIFICANT CHANGE UP (ref 0–0)
PHOSPHATE SERPL-MCNC: 3 MG/DL — SIGNIFICANT CHANGE UP (ref 2.1–4.9)
PHOSPHATE SERPL-MCNC: 3 MG/DL — SIGNIFICANT CHANGE UP (ref 2.1–4.9)
PLATELET # BLD AUTO: 160 K/UL — SIGNIFICANT CHANGE UP (ref 130–400)
PMV BLD: 11 FL — HIGH (ref 7.4–10.4)
POTASSIUM SERPL-MCNC: 3.6 MMOL/L — SIGNIFICANT CHANGE UP (ref 3.5–5)
POTASSIUM SERPL-MCNC: 3.6 MMOL/L — SIGNIFICANT CHANGE UP (ref 3.5–5)
POTASSIUM SERPL-MCNC: 4 MMOL/L — SIGNIFICANT CHANGE UP (ref 3.5–5)
POTASSIUM SERPL-SCNC: 3.6 MMOL/L — SIGNIFICANT CHANGE UP (ref 3.5–5)
POTASSIUM SERPL-SCNC: 3.6 MMOL/L — SIGNIFICANT CHANGE UP (ref 3.5–5)
POTASSIUM SERPL-SCNC: 4 MMOL/L — SIGNIFICANT CHANGE UP (ref 3.5–5)
RBC # BLD: 3.21 M/UL — LOW (ref 4.2–5.4)
RBC # FLD: 16.1 % — HIGH (ref 11.5–14.5)
SODIUM SERPL-SCNC: 141 MMOL/L — SIGNIFICANT CHANGE UP (ref 135–146)
SODIUM SERPL-SCNC: 143 MMOL/L — SIGNIFICANT CHANGE UP (ref 135–146)
SODIUM SERPL-SCNC: 146 MMOL/L — SIGNIFICANT CHANGE UP (ref 135–146)
WBC # BLD: 9.07 K/UL — SIGNIFICANT CHANGE UP (ref 4.8–10.8)
WBC # FLD AUTO: 9.07 K/UL — SIGNIFICANT CHANGE UP (ref 4.8–10.8)

## 2025-05-04 PROCEDURE — 99291 CRITICAL CARE FIRST HOUR: CPT | Mod: 24

## 2025-05-04 PROCEDURE — 71045 X-RAY EXAM CHEST 1 VIEW: CPT | Mod: 26

## 2025-05-04 RX ORDER — MELATONIN 5 MG
5 TABLET ORAL AT BEDTIME
Refills: 0 | Status: DISCONTINUED | OUTPATIENT
Start: 2025-05-04 | End: 2025-05-04

## 2025-05-04 RX ORDER — OXYCODONE HYDROCHLORIDE 30 MG/1
5 TABLET ORAL EVERY 6 HOURS
Refills: 0 | Status: DISCONTINUED | OUTPATIENT
Start: 2025-05-04 | End: 2025-05-06

## 2025-05-04 RX ORDER — BUMETANIDE 1 MG/1
1 TABLET ORAL ONCE
Refills: 0 | Status: COMPLETED | OUTPATIENT
Start: 2025-05-04 | End: 2025-05-04

## 2025-05-04 RX ORDER — ACETAMINOPHEN 500 MG/5ML
650 LIQUID (ML) ORAL EVERY 6 HOURS
Refills: 0 | Status: DISCONTINUED | OUTPATIENT
Start: 2025-05-04 | End: 2025-05-05

## 2025-05-04 RX ORDER — OXYCODONE HYDROCHLORIDE 30 MG/1
10 TABLET ORAL EVERY 6 HOURS
Refills: 0 | Status: DISCONTINUED | OUTPATIENT
Start: 2025-05-04 | End: 2025-05-06

## 2025-05-04 RX ORDER — MAGNESIUM SULFATE 500 MG/ML
2 SYRINGE (ML) INJECTION ONCE
Refills: 0 | Status: COMPLETED | OUTPATIENT
Start: 2025-05-04 | End: 2025-05-04

## 2025-05-04 RX ORDER — MAGNESIUM SULFATE 500 MG/ML
1 SYRINGE (ML) INJECTION ONCE
Refills: 0 | Status: COMPLETED | OUTPATIENT
Start: 2025-05-04 | End: 2025-05-04

## 2025-05-04 RX ORDER — MELATONIN 5 MG
10 TABLET ORAL AT BEDTIME
Refills: 0 | Status: DISCONTINUED | OUTPATIENT
Start: 2025-05-04 | End: 2025-05-23

## 2025-05-04 RX ORDER — LABETALOL HYDROCHLORIDE 200 MG/1
10 TABLET, FILM COATED ORAL ONCE
Refills: 0 | Status: COMPLETED | OUTPATIENT
Start: 2025-05-04 | End: 2025-05-04

## 2025-05-04 RX ADMIN — Medication 40 MILLIGRAM(S): at 17:24

## 2025-05-04 RX ADMIN — Medication 1 APPLICATION(S): at 05:02

## 2025-05-04 RX ADMIN — Medication 50 MILLIEQUIVALENT(S): at 21:38

## 2025-05-04 RX ADMIN — Medication 50 MILLIEQUIVALENT(S): at 18:17

## 2025-05-04 RX ADMIN — Medication 650 MILLIGRAM(S): at 05:01

## 2025-05-04 RX ADMIN — Medication 50 GRAM(S): at 19:30

## 2025-05-04 RX ADMIN — Medication 650 MILLIGRAM(S): at 11:48

## 2025-05-04 RX ADMIN — MEROPENEM 100 MILLIGRAM(S): 1 INJECTION INTRAVENOUS at 04:31

## 2025-05-04 RX ADMIN — Medication 50 MILLIEQUIVALENT(S): at 23:35

## 2025-05-04 RX ADMIN — ENOXAPARIN SODIUM 40 MILLIGRAM(S): 100 INJECTION SUBCUTANEOUS at 05:02

## 2025-05-04 RX ADMIN — Medication 5 MILLIGRAM(S): at 02:51

## 2025-05-04 RX ADMIN — MEROPENEM 100 MILLIGRAM(S): 1 INJECTION INTRAVENOUS at 21:38

## 2025-05-04 RX ADMIN — Medication 50 MILLIEQUIVALENT(S): at 22:00

## 2025-05-04 RX ADMIN — Medication 100 MILLIEQUIVALENT(S): at 03:02

## 2025-05-04 RX ADMIN — Medication 650 MILLIGRAM(S): at 12:30

## 2025-05-04 RX ADMIN — AMLODIPINE BESYLATE 10 MILLIGRAM(S): 10 TABLET ORAL at 05:01

## 2025-05-04 RX ADMIN — IPRATROPIUM BROMIDE AND ALBUTEROL SULFATE 3 MILLILITER(S): .5; 2.5 SOLUTION RESPIRATORY (INHALATION) at 07:49

## 2025-05-04 RX ADMIN — IPRATROPIUM BROMIDE AND ALBUTEROL SULFATE 3 MILLILITER(S): .5; 2.5 SOLUTION RESPIRATORY (INHALATION) at 14:42

## 2025-05-04 RX ADMIN — Medication 5 MILLIGRAM(S): at 21:39

## 2025-05-04 RX ADMIN — Medication 50 MILLIEQUIVALENT(S): at 02:30

## 2025-05-04 RX ADMIN — Medication 650 MILLIGRAM(S): at 23:34

## 2025-05-04 RX ADMIN — Medication 5 MILLIGRAM(S): at 22:00

## 2025-05-04 RX ADMIN — LABETALOL HYDROCHLORIDE 10 MILLIGRAM(S): 200 TABLET, FILM COATED ORAL at 08:48

## 2025-05-04 RX ADMIN — Medication 100 MILLIEQUIVALENT(S): at 02:05

## 2025-05-04 RX ADMIN — MEROPENEM 100 MILLIGRAM(S): 1 INJECTION INTRAVENOUS at 11:48

## 2025-05-04 RX ADMIN — IPRATROPIUM BROMIDE AND ALBUTEROL SULFATE 3 MILLILITER(S): .5; 2.5 SOLUTION RESPIRATORY (INHALATION) at 20:02

## 2025-05-04 RX ADMIN — Medication 40 MILLIGRAM(S): at 05:01

## 2025-05-04 RX ADMIN — LOSARTAN POTASSIUM 100 MILLIGRAM(S): 100 TABLET, FILM COATED ORAL at 05:01

## 2025-05-04 RX ADMIN — Medication 25 GRAM(S): at 18:17

## 2025-05-04 RX ADMIN — ENOXAPARIN SODIUM 40 MILLIGRAM(S): 100 INJECTION SUBCUTANEOUS at 17:24

## 2025-05-04 RX ADMIN — IPRATROPIUM BROMIDE AND ALBUTEROL SULFATE 3 MILLILITER(S): .5; 2.5 SOLUTION RESPIRATORY (INHALATION) at 02:39

## 2025-05-04 RX ADMIN — BUMETANIDE 1 MILLIGRAM(S): 1 TABLET ORAL at 10:26

## 2025-05-04 NOTE — PROGRESS NOTE ADULT - ATTENDING COMMENTS
This patient has a high probability of sudden, clinically significant deterioration, which requires the highest level of physician preparedness to intervene urgently. I managed/supervised life or organ supporting interventions that required frequent physician assessment. I devoted my full attention in the ICU to the direct care of this patient for the period of time indicated below. Time I spent with the family or surrogate(s) is included only if the patient was incapable of providing the necessary information or participating in medical decision making. Time devoted to teaching and to any procedures I billed separately is not included.     Patient examined and evaluated at the bedside with SICU team. I performed a medically appropriate exam. Pertinent findings are detailed below. Vital signs, laboratory work, and imaging reviewed.     Neuro:  awake, alert  #Postoperative pain -  acetaminophen 650mg Q6H, oxycodone 5/10mg Q4H PRN mod/severe, dilaudid 0.25mg IV Q6H PRN breakthrough pain    Respiratory: respirations even and unlabored on 6L NC  CXR reviewed and interpreted by me - mildly increased congestion  #Hypoxic respiratory failure - wean HFNC as able, saida, give 1mg bumex today  #Pulmonary embolism - holding heparin infusion due to bleeding, lovenox 40mg BID due to BMI > 50, repeat lower extremity duplex    CV: monitor hemodynamics, MAP goal > 65  #HTN - amlodipine 10mg oral daily, losartan 100mg daily, given labetalol 10mg for SBP > 200 (hypertensive urgency), SBP now 160    GI: THONY drains serosanguinous, midline dressing intact  #Nutrition - Protein-calorie malnutrition - acute illness, poor nutritional intake, fluid retention - advance diet to bariatric soft/pureed diet with protein supplementation  #Ppx - protonix 40mg IV BID (hx of gastric bypass)    Renal: Continue I&Os monitoring, replete electrolytes as needed  05-04    146  |  108  |  30[H]  ----------------------------<  98  4.0   |  23  |  0.7    Ca 7.7[L]; Mg x ; Phos x     UOP - OUT: 3920 mL    I/O - IN: 1850 mL / OUT: 4117 mL / NET: -2267 mL    An catheter - removed, TOV  #Hypokalemia - given 60mEq KCl IV, repeat potassium 4.0    Heme: continue to evaluate for acute blood loss anemia- trend Hg/Hct                         9.8    8.93  )-----------( 136      ( 03 May 2025 19:10 )             29.4     PT/INR/PTT - ( 03 May 2025 05:15 )   PT: 11.90 sec; INR: 1.01 ratio; PTT 23.0 sec    Anticoagulation - lovenox 40mg BID   #Acute blood loss anemia - trend hemoglobin, no indication for transfusion at present, hemoglobin goal > 7.0  #Thrombocytopenia - no indication for PLT infusion, trend for now    ID: trend WBC, monitor for fevers  #Abdominal collection - meropenem 1g IV Q8H per ID    Endocrine: Prevent and treat hyperglycemia with insulin as needed    PV: follow pulse exam    #Physical deconditioning - OOB and mobilize as able, PT eval    Skin: decub precautions    Lines: R IJ TLC, THONY drains x 3, L radial arterial line, An  DVT Prophylaxis: lovenox 40mg BID  Stress Ulcer Prophylaxis: protonix (hx of gastric bypass)  Disposition: SICU - hypoxic respiratory failure, hypertensive urgency, at risk for respiratory distress    Blaise Allen MD  Trauma/Acute Care Surgery/Surgical Critical Care Attending

## 2025-05-04 NOTE — PROGRESS NOTE ADULT - ASSESSMENT
ASSESSMENT & PLAN:  64F PMHx HTN, morbid obesity s/p 2001 Abby-en-Y gastric bypass who presented on 4/3 with incarcerated ventral hernia with SBO    4/10 open ventral hernia repair, small bowel resection, and primary fascial closure with Dr. Lema. Admitted to SICU for Q1 abdominal checks and hemodynamic monitoring.   4/17 takeback for exploratory laparotomy, repair of anastomotic leak  4/22 8Fr pigtail into lower abdominal wall collection,150cc of foul appearing material aspirated, Attached to THONY bulb  4/27 RTOR for re-exploration and evacuation of old clots from retroperitoneum and pelvis and placement of 3 new beatrice drains  4/28: IR consulted for IVC filter     NEUROLOGICAL:  #Acute pain  - Acetaminophen Q6 solution, Oxycodone solution  #Sedation/agitation  - Precedex infusion overnight, wean as tolerated  #AMS    HCT 4/27: negative  #Sleep hygiene  - HOLDING Trazodone 25mg QPM  - HOLDING Melatonin 5mg QPM    RESPIRATORY:   #Acute hypoxic respiratory failure  - Extubated 4/18, reintubated on 4/27 for AMS and agonal breathing, extubated 4/30  - Currently on HFNC 40L/40%    #Right distal main pulmonary embolus with segmental extension  - Heparin gtt off since 5/3  - B/L LE Venous duplex 4/21-RIGHT PT DVT, no LEFT DVT  -B/L LE Venous duplex 4/28-No evidence of deep venous thrombosis in either lower extremity. Limited   exam due to body habitus  - No IVC filter needed at this time per IR  #Intermittent diuresis  - Last diuresis 5/3 1mg Bumex  #PMHx LOUIS on CPAP@ home   #Activity increase as tolerated    CARDS:   #Hypertensive urgency, resolved  - intermittent pushes hydralazine   - Nicardipine gtt started, off since 5/1 6AM  #Acute hemorraghic shock, resolved  - Off levophed and vasopressin since 4/30  - 4/27: 2u PRBC 1plt 1uFFP, 1g TXA  - OFF sodium bicarbonate infusion on 4/27  #Supraventricular tachycardia, Non-sustained ventricular tachycardia, Ventricular Premature Depolarization  - Cardiology (Dr. Lopez) - Metoprolol 25mg Q12 HOLDING  #PMHx of HTN  - Amlodipine 10mg QD restarted   - Losartan 100mg qd  - HCTZ 25mg QD HOLDING  - EKG- NSR  - TTE 4/11: EF of 56%. G1DD.   - TTE 4/22: EF 70 to 75%.    GASTROINTESTINAL/NUTRITION:   #4/10 open ventral hernia repair, small bowel resection, ileoileal anastomosis and primary fascial closure, 2 THONY Right  #4/17 RTOR for exploratory laparotomy, repair of anastomotic leak at previous ileoileal anastomosis, resected, new ileoileal anastomosis created, 1 THONY Left (total 3)  - Intermittent abdominal vac change, last 4/26 PM, bilious drainage noted  - REMOVED 4/26 RUQ THONY  - REMOVED 4/28 LUQ THONY at ileo-ileal anastomosis, RLQ THONY in pelvis, IR THONY abdominal wall  #4/22: s/p IR percutaneous placement of a 8.5 [place] drainage catheter into lower abdominal wall fluid collection, yielding 150 mL of foul appearing fluid.  #4/27: Acute retroperitoneal  CTA: mesenteric arterial blush noted with extravasation    - s/p IR without finding of any bleeding, no embolization required  #4/27 RTOR for re-exploration and evacuation of old clots from retroperitoneum and pelvis  - New midline wound vac in place > removed on 5/2   - x3 new 19Fr beatrice drains in place: 1. Right pelvis, 2. Left pelvis, 3. Subcutaneous  - 5/2: 500mL sanguinous output from wound vac --> heparin gtt paused, 1u PRBC given. Bedside exploration of midline wound. venous bleed noted, 2 stitches 3-0 vicryl, surgicell, Fibrilar, bleeding controlled at time of dressing placement. Wound vac removed, wet to dry and pressure dressing applied  #Diet, Pureed diet with prosource and ensures  - TPN discontinued 4/25  - Aspiration precautions, HOB 30  #GI Prophylaxis/hx GERD  - Pantoprazole 40mg IV BID (home rx)  #Bowel regimen  - Holding  - Last BM 5/3  - Dignishield replaced 5/3    /RENAL:   #Urine output in critically ill  - An replaced 4/27  - IVL  - Bumex 1mg on 5/3   #FUNMI; oliguric  - Nephrology consulted and following > hold off on CVVH recall  - Renal u/s> no hydronephrosis. 2-3cm anechoic cyst on right kidney   #Lactic acidosis    - OFF sodium bicarbonate infusion  #hypokalemia - repleted with 60mEq KCl   - Nephro: check CK, no indication of RRT, decrease meropenem to Q12    Labs:          BUN/Cr- 36/0.8  -->,  34/0.9  -->          [05-03 @ 19:10]Na  140 // K  3.2 // Mg  2.1 // Phos  3.8    HEME/ONC:   #DVT PPX  -LMWH 40mg q12  - LLE SCD only  #acute anemia   - 4/27: 2uPRBC 1uFFP 1pkPlt, TXA 1g, IR for embolization due to active extravasation seen on CTA, no active bleed, no embolization, Return to OR for re-exploration and evacuation of old clot  4/28: IR consulted for IVC filter and PICC. LE duplex ordered, negative. Precedex added overnight for agitation. 1 U PRBC for Hgb 6.8 overnight  - 4/29: 1 U PRBC  - 5/2: 1u PRBC for 500mL sanguinous output from wound vac   #Acute right pulmonary embolus  - Heparin gtt  @ 1900U/hr - discontinued 5/2 2/2 venous bleed from wound vac   #B/L UE Venous Duplex 4/26/27 - thrombophlebitis  #B/L LE Venous Duplex   - 4/21: right PT DVT  - 4/28: negative  - 5/5: repeat pending   - Concern for malabsorption of Eliquis given small bowel resection per pharmacy  - Vascular surgery consulted - AC with heparin drip (upon discharge transition to Eliquis 10 mg po BID x 7 days then 5 mg po BID for at least 6 months)    Labs: Hb/Hct:  8.6/25.4  -->,  9.8/29.4  -->                      Plts:  100  -->,  136  -->                 PTT/INR:  23.0/1.01  --->       ID:  #Intraabdominal anastomotic leak, fluid collection  #ID Consult  - Send baseline ESR CRP, continue meropenem 1g Q12  - Midline x ertanpenem 1g Q24 on discharge x 4 weeks (EOT 5/19)  - F/u with Dr. Perdue on Tuesdays via Telehealth   #Culture    OR 4/1 - E Coli    OR cx: few E.coli, few bacteroides, rare clostridium    4/13 Blood Cx: NGTF    4/14 tracheal aspirate: no growth     4/17 body fluid cx rare e. coli     4/22 Blood cx: negative    4/22 Abdominal - Negative FINAL    4/27 C diff stool panel: negative    WBC- 6.77  --->>,  6.11  --->>,  8.93  --->>  Temp trend- 24hrs T(F): 96.8 (05-03 @ 20:00), Max: 97.7 (05-03 @ 04:00)  Current antibiotics-meropenem  IVPB 1000 every 8 hours    ENDOCRINE:  #Glycemic monitoring  - Q6 FSG while NPO  - ISS  - A1C 5.8%     MSK:  #Activity- increase as tolerated     SKIN:  #left forearm skin tear   - Continue to monitor daily skin changes    LINES/DRAINS:  PIV    RLQ pelvis beatrice drain (4/28 -     LLQ pelvis beatrice drain (4/28 -     Midline subtaneous beatrice drain (4/28 -     Right IJ TLC (4/17 -     Prevena Vac Midline (removed 5/2)     Left radial arterial line (4/28 -     Discontinued lines:    Right Femoral Arterial Line (4/27 - 4/28)    ADVANCED DIRECTIVES:  Full Code  Emergency - Daughter  ([facility])  INDICATION FOR SICU/SDU: Intestinal obstruction    DISPO: SICU    Pending discussion with SICU attending Dr. Allen ASSESSMENT & PLAN:  64F PMHx HTN, morbid obesity s/p 2001 Abby-en-Y gastric bypass who presented on 4/3 with incarcerated ventral hernia with SBO    4/10 open ventral hernia repair, small bowel resection, and primary fascial closure with Dr. Lema. Admitted to SICU for Q1 abdominal checks and hemodynamic monitoring.   4/17 takeback for exploratory laparotomy, repair of anastomotic leak  4/22 8Fr pigtail into lower abdominal wall collection,150cc of foul appearing material aspirated, Attached to THONY bulb  4/27 RTOR for re-exploration and evacuation of old clots from retroperitoneum and pelvis and placement of 3 new beatirce drains  4/28: IR consulted for IVC filter     NEUROLOGICAL:  #Acute pain  - Acetaminophen Q6 , Oxycodone   - Dilaudid for breakthrough  #Sedation/agitation  - Precedex infusion overnight PRN, wean as tolerated  #AMS- resolved    HCT 4/27: negative  #Sleep hygiene  - HOLDING Trazodone 25mg QPM  - HOLDING Melatonin 5mg QPM    RESPIRATORY:   #Acute hypoxic respiratory failure  - Extubated 4/18, reintubated on 4/27 for AMS and agonal breathing, extubated 4/30  - Currently alternating between HFNC 40L/40% and 6 L NC- SPO2- 100%    #Right distal main pulmonary embolus with segmental extension  - Heparin gtt off since 5/3  - B/L LE Venous duplex 4/21-RIGHT PT DVT, no LEFT DVT  -B/L LE Venous duplex 4/28-No evidence of deep venous thrombosis in either lower extremity. Limited   exam due to body habitus  - No IVC filter needed at this time per IR  - repeat surveillance venous duplex scheduled for 5/5/2025  #Intermittent diuresis  - Last diuresis 5/3 1mg Bumex  - diuresis today Bumex 1 mg - 5/4  #PMHx [place] on CPAP@ home   #Activity increase as tolerated    CARDS:   #Hypertensive urgency, resolved  - intermittent pushes hydralazine   - Nicardipine gtt started, off since 5/1 6AM  #Acute hemorraghic shock, resolved  - Off levophed and vasopressin since 4/30  - 4/27: 2u PRBC 1plt 1uFFP, 1g TXA  - OFF sodium bicarbonate infusion on 4/27  #Supraventricular tachycardia, Non-sustained ventricular tachycardia, Ventricular Premature Depolarization  - Cardiology (Dr. Lopez) - Metoprolol 25mg Q12 HOLDING  #PMHx of HTN  - Amlodipine 10mg QD restarted   - Losartan 100mg qd  - HCTZ 25mg QD HOLDING  - EKG- NSR  - TTE 4/11: EF of 56%. G1DD.   - TTE 4/22: EF 70 to 75%.    GASTROINTESTINAL/NUTRITION:   #4/10 open ventral hernia repair, small bowel resection, ileoileal anastomosis and primary fascial closure, 2 THONY Right  #4/17 RTOR for exploratory laparotomy, repair of anastomotic leak at previous ileoileal anastomosis, resected, new ileoileal anastomosis created, 1 THONY Left (total 3)  - Intermittent abdominal vac change, last 4/26 PM, bilious drainage noted  - REMOVED 4/26 RUQ THONY  - REMOVED 4/28 LUQ THONY at ileo-ileal anastomosis, RLQ THONY in pelvis, IR THONY abdominal wall  #4/22: s/p IR percutaneous placement of a 8.5 [place] drainage catheter into lower abdominal wall fluid collection, yielding 150 mL of foul appearing fluid.  #4/27: Acute retroperitoneal  CTA: mesenteric arterial blush noted with extravasation    - s/p IR without finding of any bleeding, no embolization required  #4/27 RTOR for re-exploration and evacuation of old clots from retroperitoneum and pelvis  - New midline wound vac in place > removed on 5/2   - x3 new 19Fr beatrice drains in place: 1. Right pelvis, 2. Left pelvis, 3. Subcutaneous  - 5/2: 500mL sanguinous output from wound vac --> heparin gtt paused, 1u PRBC given. Bedside exploration of midline wound. venous bleed noted, 2 stitches 3-0 vicryl, surgicell, Fibrilar, bleeding controlled at time of dressing placement. Wound vac removed, wet to dry and pressure dressing applied  #Diet, Pureed diet with prosource and ensures  - TPN discontinued 4/25  - Aspiration precautions, HOB 30  #GI Prophylaxis/hx GERD  - Pantoprazole 40mg IV BID (home rx)  #Bowel regimen  - Holding  - Last BM 5/3  - Dignishield replaced 5/3    /RENAL:   #Urine output in critically ill  - An replaced 4/27, removed 5/4  - IVL  - Bumex 1mg on 5/3   #FUNMI; oliguric  - Nephrology consulted and following > hold off on CVVH recall  - Renal u/s> no hydronephrosis. 2-3cm anechoic cyst on right kidney   #Lactic acidosis    - OFF sodium bicarbonate infusion  #hypokalemia - repleted with 60mEq KCl   - Nephro: check CK, no indication of RRT, decrease meropenem to Q12    Labs:          BUN/Cr- 36/0.8  -->,  34/0.9  -->          [05-03 @ 19:10]Na  140 // K  3.2 // Mg  2.1 // Phos  3.8    HEME/ONC:   #DVT PPX  -LMWH 40mg q12  - LLE SCD only  #acute anemia   - 4/27: 2uPRBC 1uFFP 1pkPlt, TXA 1g, IR for embolization due to active extravasation seen on CTA, no active bleed, no embolization, Return to OR for re-exploration and evacuation of old clot  4/28: IR consulted for IVC filter and PICC. LE duplex ordered, negative. Precedex added overnight for agitation. 1 U PRBC for Hgb 6.8 overnight  - 4/29: 1 U PRBC  - 5/2: 1u PRBC for 500mL sanguinous output from wound vac   #Acute right pulmonary embolus  - Heparin gtt  @ 1900U/hr - discontinued 5/2 2/2 venous bleed from wound vac   #B/L UE Venous Duplex 4/26/27 - thrombophlebitis  #B/L LE Venous Duplex   - 4/21: right PT DVT  - 4/28: negative  - 5/5: repeat pending   - Concern for malabsorption of Eliquis given small bowel resection per pharmacy  - Vascular surgery consulted - AC with heparin drip (upon discharge transition to Eliquis 10 mg po BID x 7 days then 5 mg po BID for at least 6 months)    Labs: Hb/Hct:  8.6/25.4  -->,  9.8/29.4  -->                      Plts:  100  -->,  136  -->                 PTT/INR:  23.0/1.01  --->       ID:  #Intraabdominal anastomotic leak, fluid collection  #ID Consult  - Send baseline ESR CRP, continue meropenem 1g Q12  - Midline x ertanpenem 1g Q24 on discharge x 4 weeks (EOT 5/19)  - F/u with Dr. Perdue on Tuesdays via Telehealth   #Culture    OR 4/1 - E Coli    OR cx: few E.coli, few bacteroides, rare clostridium    4/13 Blood Cx: NGTF    4/14 tracheal aspirate: no growth     4/17 body fluid cx rare e. coli     4/22 Blood cx: negative    4/22 Abdominal - Negative FINAL    4/27 C diff stool panel: negative    WBC- 6.77  --->>,  6.11  --->>,  8.93  --->>  Temp trend- 24hrs T(F): 96.8 (05-03 @ 20:00), Max: 97.7 (05-03 @ 04:00)  Current antibiotics-meropenem  IVPB 1000 every 8 hours    ENDOCRINE:  #Glycemic monitoring  - Q6 FSG while NPO  - ISS  - A1C 5.8%     MSK:  #Activity- increase as tolerated     SKIN:  #left forearm skin tear   - Continue to monitor daily skin changes    LINES/DRAINS:  PIV    RLQ pelvis beatrice drain (4/28 -     LLQ pelvis beatrice drain (4/28 -     Midline subtaneous beatrice drain (4/28 -     Right IJ TLC (4/17 -     Prevena Vac Midline (removed 5/2)     Left radial arterial line (4/28 -     Discontinued lines:    Right Femoral Arterial Line (4/27 - 4/28)    ADVANCED DIRECTIVES:  Full Code  Emergency - Daughter  ([facility])  INDICATION FOR SICU/SDU: Intestinal obstruction    DISPO: SICU    Case discussed with Dr. Allen

## 2025-05-04 NOTE — PROGRESS NOTE ADULT - SUBJECTIVE AND OBJECTIVE BOX
SURGERY DAILY PROGRESS NOTE    24 HR EVENTS: Patient awake and alert. On HFNC. THONY x3 in place with SS fluid. Midline dressing C/D/I. Patient afebrile, HDS, saturating well on HFNC.      OBJECTIVE:  Vital Signs Last 24 Hrs  T(C): 36.9 (04 May 2025 00:00), Max: 36.9 (04 May 2025 00:00)  T(F): 98.5 (04 May 2025 00:00), Max: 98.5 (04 May 2025 00:00)  HR: 88 (04 May 2025 03:00) (60 - 103)  BP: 125/68 (03 May 2025 08:05) (125/68 - 126/66)  BP(mean): 8 (03 May 2025 08:05) (8 - 8)  RR: 20 (04 May 2025 03:00) (18 - 35)  SpO2: 99% (04 May 2025 03:00) (92% - 100%)    Parameters below as of 04 May 2025 01:00  Patient On (Oxygen Delivery Method): nasal cannula, high flow        I&O's Summary    02 May 2025 07:01  -  03 May 2025 07:00  --------------------------------------------------------  IN: 1043.2 mL / OUT: 4702 mL / NET: -3658.8 mL    03 May 2025 07:01  -  04 May 2025 04:01  --------------------------------------------------------  IN: 1800 mL / OUT: 3787 mL / NET: -1987 mL        PHYSICAL EXAM:  General: NAD, calm and cooperative  HEENT: NCAT  Cardiac: RRR  Respiratory: Comfortable on HFNC, normal respiratory effort  Abdomen: Soft, non-distended, non-tender, no rebound, no guarding.  Musculoskeletal: Active ROM intact, compartments soft  Vascular: Extremities well perfused  Skin: Warm/dry, normal color, no jaundice  Incision/wound: healing well, midline dressings in place, clean, dry and intact; drain sites clean, serosanguinous fluid in all 3 drain bulbs    LABS:                        9.8    8.93  )-----------( 136      ( 03 May 2025 19:10 )             29.4     05-03    140  |  105  |  34[H]  ----------------------------<  128[H]  3.2[L]   |  20  |  0.9    Ca    7.5[L]      03 May 2025 19:10  Phos  3.8     05-03  Mg     2.1     05-03      PT/INR - ( 03 May 2025 05:15 )   PT: 11.90 sec;   INR: 1.01 ratio         PTT - ( 03 May 2025 05:15 )  PTT:23.0 sec  Urinalysis Basic - ( 03 May 2025 19:10 )    Color: x / Appearance: x / SG: x / pH: x  Gluc: 128 mg/dL / Ketone: x  / Bili: x / Urobili: x   Blood: x / Protein: x / Nitrite: x   Leuk Esterase: x / RBC: x / WBC x   Sq Epi: x / Non Sq Epi: x / Bacteria: x        RADIOLOGY & ADDITIONAL STUDIES:

## 2025-05-04 NOTE — PROGRESS NOTE ADULT - SUBJECTIVE AND OBJECTIVE BOX
NANCY SARGENT   [id number]   [birth year]  64yF  ============================================================   DATE OF INITIAL SICU/SDU CONSULT: 04-10-25    INDICATION FOR SICU CONSULT:  q1hr abdominal checks, mechanical ventilation    SICU COURSE EVENTS :  04-10 - admitted to SICU service  4/11: Intubated and started on paralytic. Arterial line placed, subclavian TLC placed. Nephrology consulted for oliguria. IR abdominal muscle botox injection performed. TTE performed.   4/12: Additional fluid boluses given; trial fo bumex started, considering CVVH. Weaned off nimbex gtt.  > 220 /hr. Renal U/S w/out hydro.   4/13: Weaned off Levophed. Bumex gtt 2mg/hr > 1mg/hr. Goal net negative 1-1.5L, UOP approx, 200c/hr.   4/14: Remained on bumex gtt for further diuresis, decreased to 0.5 overnight, vent settings weaned. Cr downtrending, LFTs worsening.   4/15: Extubated to BiPAP, transitioned to NC. Dc'd bumex gtt, given 5mg metolazone x 1 and 2mg bumex x 1.   4/16: THONY drain #2 murky/bilious, pending CTAP with PO contrast, hypernatremia, started D5W @ 75cc/hr, persistent hypokalemia   4/17: RTOR for resection of anastomosis for anastomotic leak. Returned intubated, 400/24/80/10, sinus tachy to . Abdomen soft. Was initially on propofol @ 30 and precedex, but propofol weaned off due to hypotension with MAPs in 50s, fentanyl ggt started for acute pain. Remained hypotensive despite fluids, started on Levophed, on 0.05.   4/18: Extubated. HFNC 40L/49%, Levo off since 11 AM   4/19: NGT removed. Started on duonebs. D5W increased to 60cc/hr. 1mg bumex, > 1.5L response  4/20: Episode of afib RVR resolved with metoprolol 5mg IVP, cardiology c/s placed, recommending metoprolol 25mg q 12   4/21: PE on CTA, therapeutic heparin gtt started. Started on cardebe gtt,   4/22: Meropenem started per ID.  S/p IR percutaneous placement of a 8.5 [place] drainage catheter into lower abdominal wall fluid collection, yielding 150 mL of bowel appearing fluid. New left radial a-line placed. Off nicarrdipine gtt.   4/23: Switched to therapeutic lovenox. Diet advanced to aubrey clears with ensures.  4/24: advacned to bariatric FLD, downgraded to SDU  4/25: TPN discontinued, diet advanced to full liquid , Right cephalic DVT prelim  4/26: Right UE VA Duplex final no DVT, thromboplebitis, Arterial line removed, 2L NC, midline vac changed, noted to have bilious drainage, RUQ drain pulled, upgraded to SICU status, plan to reorder TPN for 4/27 PM, diet changed to full liquid. 1:20 AM noted to be unresponsive, palpable femoral pulse, decision made to intubate, R femoral arterial line placed, started on levophed, amauri, bicarb amp x 2, bicarb gtt, propofol, 2U pRBC for hgb 5.9, plan for CTH and CTAP when stabilized   4/27: 2uPRBC 1uFFP 1pkPlt, TXA 1g, IR for embolization due to active extravasation seen on CTA, no active bleed, no embolization, Return to OR for re-exploration and evacuation of old clot  4/28: IR consulted for IVC filter and PICC. LE duplex ordered, negative. Precedex added overnight for agitation. 1 U PRBC for Hgb 6.8 overnight  4/29: TF started at 20cc/hr. Meropenem decreased to Q 12 hours.   4/30: Extubated to 5L NC, then placed on HFNC. Heparin gtt restarted.   5/1: Placed on BiPAP, started on aubrey clears w/ clear ensures       24Hour Events:  5/3  NIGHT   -PM rounds: SBP 140s, NSR HR 90s, saturating 98% on 40L/40%, f/o chest heaviness stat EKG, not SOB, THONY drains 1 and 3 serosanguinous, THONY drain #2 serous, no abdominal pain, no nausea  -EKG no signs of ischemia   -60mEq K  -AM CXR    DAY  -Tylenol solution  -Diet bariatric pureed/soft with protein supplements  -Liquid oxycodone 5/10 with dilaudid q6 for breakthrough   -HFNC 40L/40%  -D/c pierce at midnight  -Bumex 1mg  - Dignisheild replaced for 3eps liquid BM  - 15Naphos    [X] A ten-point review of systems was negative except as expressed in note.  [X ] History was obtained from patient. If unable to participate in their care, history was from review of the chart and collateral sources of information.  =====================================================================       NANCY SARGENT   [id number]   [birth year]  64yF  ============================================================   DATE OF INITIAL SICU/SDU CONSULT: 04-10-25    INDICATION FOR SICU CONSULT:  q1hr abdominal checks, mechanical ventilation    SICU COURSE EVENTS :  04-10 - admitted to SICU service  4/11: Intubated and started on paralytic. Arterial line placed, subclavian TLC placed. Nephrology consulted for oliguria. IR abdominal muscle botox injection performed. TTE performed.   4/12: Additional fluid boluses given; trial fo bumex started, considering CVVH. Weaned off nimbex gtt.  > 220 /hr. Renal U/S w/out hydro.   4/13: Weaned off Levophed. Bumex gtt 2mg/hr > 1mg/hr. Goal net negative 1-1.5L, UOP approx, 200c/hr.   4/14: Remained on bumex gtt for further diuresis, decreased to 0.5 overnight, vent settings weaned. Cr downtrending, LFTs worsening.   4/15: Extubated to BiPAP, transitioned to NC. Dc'd bumex gtt, given 5mg metolazone x 1 and 2mg bumex x 1.   4/16: THONY drain #2 murky/bilious, pending CTAP with PO contrast, hypernatremia, started D5W @ 75cc/hr, persistent hypokalemia   4/17: RTOR for resection of anastomosis for anastomotic leak. Returned intubated, 400/24/80/10, sinus tachy to . Abdomen soft. Was initially on propofol @ 30 and precedex, but propofol weaned off due to hypotension with MAPs in 50s, fentanyl ggt started for acute pain. Remained hypotensive despite fluids, started on Levophed, on 0.05.   4/18: Extubated. HFNC 40L/49%, Levo off since 11 AM   4/19: NGT removed. Started on duonebs. D5W increased to 60cc/hr. 1mg bumex, > 1.5L response  4/20: Episode of afib RVR resolved with metoprolol 5mg IVP, cardiology c/s placed, recommending metoprolol 25mg q 12   4/21: PE on CTA, therapeutic heparin gtt started. Started on cardebe gtt,   4/22: Meropenem started per ID.  S/p IR percutaneous placement of a 8.5 [place]ales[place] drainage catheter into lower abdominal wall fluid collection, yielding 150 mL of bowel appearing fluid. New left radial a-line placed. Off nicarrdipine gtt.   4/23: Switched to therapeutic lovenox. Diet advanced to aubrey clears with ensures.  4/24: advanced to bariatric FLD, downgraded to SDU  4/25: TPN discontinued, diet advanced to full liquid , Right cephalic DVT prelim  4/26: Right UE VA Duplex final no DVT, thrombophlebitis, Arterial line removed, 2L NC, midline vac changed, noted to have bilious drainage, RUQ drain pulled, upgraded to SICU status, plan to reorder TPN for 4/27 PM, diet changed to full liquid. 1:20 AM noted to be unresponsive, palpable femoral pulse, decision made to intubate, R femoral arterial line placed, started on levophed, amauri, bicarb amp x 2, bicarb gtt, propofol, 2U pRBC for hgb 5.9, plan for CTH and CTAP when stabilized   4/27: 2uPRBC 1uFFP 1pkPlt, TXA 1g, IR for embolization due to active extravasation seen on CTA, no active bleed, no embolization, Return to OR for re-exploration and evacuation of old clot  4/28: IR consulted for IVC filter and PICC. LE duplex ordered, negative. Precedex added overnight for agitation. 1 U PRBC for Hgb 6.8 overnight  4/29: TF started at 20cc/hr. Meropenem decreased to Q 12 hours.   4/30: Extubated to 5L NC, then placed on HFNC. Heparin gtt restarted.   5/1: Placed on BiPAP, started on aubrye clears w/ clear ensures       24Hour Events:  5/3  NIGHT   -PM rounds: SBP 140s, NSR HR 90s, saturating 98% on 40L/40%, f/o chest heaviness stat EKG, not SOB, THONY drains 1 and 3 serosanguinous, THONY drain #2 serous, no abdominal pain, no nausea  -EKG no signs of ischemia   -60mEq K  -AM CXR    DAY  -Tylenol solution  -Diet bariatric pureed/soft with protein supplements  -Liquid oxycodone 5/10 with dilaudid q6 for breakthrough   -HFNC 40L/40%  -D/c pierce at midnight  -Bumex 1mg  - Dignisheild replaced for 3eps liquid BM  - 15Naphos    Physical Exam:  Neuro- alert and oriented x 3  Resp- fine crackles decreased breath sounds at the bases  Cardiac- S1 S2, RRR, no murmur  Abd- obese abd, soft, NT, midline wound with dressing intact. wound is clean- healing, no erythema or drainage  MSK- distal pulses intact, + pitting edema of the LE,     [X] A ten-point review of systems was negative except as expressed in note.  [X ] History was obtained from patient. If unable to participate in their care, history was from review of the chart and collateral sources of information.  =====================================================================

## 2025-05-04 NOTE — PROGRESS NOTE ADULT - ASSESSMENT
ASSESSMENT:  65y F w/ PMHx of  Morbid obesity, LOUIS, large complex ventral hernia s/p repair sbr and loss of domain c/b post op intubation, hypotension requiring vasopressors, anastomotic leakage s/p RTOR and reanastomosis, now doing better post op from a respiratory status. Comfortable on BIPAP. Off pressors. Blood cultures negATIVE.    PLAN:  - Daily wet to dry dressing changes, f/u bleeding recurrence  - Wean off of HFNC as tolerated  - Monitor for daily output quality on drains  - Rest of care per SICU      BLUE TEAM SPECTRA: [id number]

## 2025-05-05 LAB
ALBUMIN SERPL ELPH-MCNC: 2.8 G/DL — LOW (ref 3.5–5.2)
ALP SERPL-CCNC: 169 U/L — HIGH (ref 30–115)
ALT FLD-CCNC: 80 U/L — HIGH (ref 0–41)
ANION GAP SERPL CALC-SCNC: 11 MMOL/L — SIGNIFICANT CHANGE UP (ref 7–14)
AST SERPL-CCNC: 27 U/L — SIGNIFICANT CHANGE UP (ref 0–41)
BILIRUB DIRECT SERPL-MCNC: 0.4 MG/DL — HIGH (ref 0–0.3)
BILIRUB INDIRECT FLD-MCNC: 0.3 MG/DL — SIGNIFICANT CHANGE UP (ref 0.2–1.2)
BILIRUB SERPL-MCNC: 0.7 MG/DL — SIGNIFICANT CHANGE UP (ref 0.2–1.2)
BLD GP AB SCN SERPL QL: SIGNIFICANT CHANGE UP
BUN SERPL-MCNC: 21 MG/DL — HIGH (ref 10–20)
CALCIUM SERPL-MCNC: 7.4 MG/DL — LOW (ref 8.4–10.5)
CHLORIDE SERPL-SCNC: 105 MMOL/L — SIGNIFICANT CHANGE UP (ref 98–110)
CO2 SERPL-SCNC: 24 MMOL/L — SIGNIFICANT CHANGE UP (ref 17–32)
CREAT SERPL-MCNC: 0.7 MG/DL — SIGNIFICANT CHANGE UP (ref 0.7–1.5)
EGFR: 97 ML/MIN/1.73M2 — SIGNIFICANT CHANGE UP
EGFR: 97 ML/MIN/1.73M2 — SIGNIFICANT CHANGE UP
FACT X ACT/NOR PPP: 69 % — LOW (ref 70–170)
GLUCOSE BLDC GLUCOMTR-MCNC: 105 MG/DL — HIGH (ref 70–99)
GLUCOSE BLDC GLUCOMTR-MCNC: 115 MG/DL — HIGH (ref 70–99)
GLUCOSE BLDC GLUCOMTR-MCNC: 116 MG/DL — HIGH (ref 70–99)
GLUCOSE BLDC GLUCOMTR-MCNC: 96 MG/DL — SIGNIFICANT CHANGE UP (ref 70–99)
GLUCOSE SERPL-MCNC: 114 MG/DL — HIGH (ref 70–99)
HCT VFR BLD CALC: 29.9 % — LOW (ref 37–47)
HGB BLD-MCNC: 9.7 G/DL — LOW (ref 12–16)
MAGNESIUM SERPL-MCNC: 1.7 MG/DL — LOW (ref 1.8–2.4)
MCHC RBC-ENTMCNC: 30.8 PG — SIGNIFICANT CHANGE UP (ref 27–31)
MCHC RBC-ENTMCNC: 32.4 G/DL — SIGNIFICANT CHANGE UP (ref 32–37)
MCV RBC AUTO: 94.9 FL — SIGNIFICANT CHANGE UP (ref 81–99)
NRBC BLD AUTO-RTO: 0 /100 WBCS — SIGNIFICANT CHANGE UP (ref 0–0)
PHOSPHATE SERPL-MCNC: 2.5 MG/DL — SIGNIFICANT CHANGE UP (ref 2.1–4.9)
PLATELET # BLD AUTO: 188 K/UL — SIGNIFICANT CHANGE UP (ref 130–400)
PMV BLD: 10.7 FL — HIGH (ref 7.4–10.4)
POTASSIUM SERPL-MCNC: 3.6 MMOL/L — SIGNIFICANT CHANGE UP (ref 3.5–5)
POTASSIUM SERPL-SCNC: 3.6 MMOL/L — SIGNIFICANT CHANGE UP (ref 3.5–5)
PROT SERPL-MCNC: 5.1 G/DL — LOW (ref 6–8)
RBC # BLD: 3.15 M/UL — LOW (ref 4.2–5.4)
RBC # FLD: 16.8 % — HIGH (ref 11.5–14.5)
SODIUM SERPL-SCNC: 140 MMOL/L — SIGNIFICANT CHANGE UP (ref 135–146)
WBC # BLD: 8.38 K/UL — SIGNIFICANT CHANGE UP (ref 4.8–10.8)
WBC # FLD AUTO: 8.38 K/UL — SIGNIFICANT CHANGE UP (ref 4.8–10.8)

## 2025-05-05 PROCEDURE — 99291 CRITICAL CARE FIRST HOUR: CPT

## 2025-05-05 PROCEDURE — 93970 EXTREMITY STUDY: CPT | Mod: 26

## 2025-05-05 PROCEDURE — 71045 X-RAY EXAM CHEST 1 VIEW: CPT | Mod: 26

## 2025-05-05 RX ORDER — BUMETANIDE 1 MG/1
1 TABLET ORAL ONCE
Refills: 0 | Status: COMPLETED | OUTPATIENT
Start: 2025-05-05 | End: 2025-05-05

## 2025-05-05 RX ORDER — ACETAMINOPHEN 500 MG/5ML
650 LIQUID (ML) ORAL EVERY 6 HOURS
Refills: 0 | Status: DISCONTINUED | OUTPATIENT
Start: 2025-05-05 | End: 2025-05-23

## 2025-05-05 RX ORDER — MAGNESIUM SULFATE 500 MG/ML
2 SYRINGE (ML) INJECTION ONCE
Refills: 0 | Status: COMPLETED | OUTPATIENT
Start: 2025-05-05 | End: 2025-05-05

## 2025-05-05 RX ORDER — POTASSIUM PHOSPHATE, MONOBASIC POTASSIUM PHOSPHATE, DIBASIC INJECTION, 236; 224 MG/ML; MG/ML
30 SOLUTION, CONCENTRATE INTRAVENOUS ONCE
Refills: 0 | Status: COMPLETED | OUTPATIENT
Start: 2025-05-05 | End: 2025-05-05

## 2025-05-05 RX ORDER — CYANOCOBALAMIN 1000 UG/ML
1000 INJECTION INTRAMUSCULAR; SUBCUTANEOUS DAILY
Refills: 0 | Status: DISCONTINUED | OUTPATIENT
Start: 2025-05-05 | End: 2025-05-23

## 2025-05-05 RX ORDER — INSULIN LISPRO 100 U/ML
INJECTION, SOLUTION INTRAVENOUS; SUBCUTANEOUS
Refills: 0 | Status: DISCONTINUED | OUTPATIENT
Start: 2025-05-05 | End: 2025-05-13

## 2025-05-05 RX ORDER — LABETALOL HYDROCHLORIDE 200 MG/1
10 TABLET, FILM COATED ORAL ONCE
Refills: 0 | Status: COMPLETED | OUTPATIENT
Start: 2025-05-05 | End: 2025-05-05

## 2025-05-05 RX ADMIN — IPRATROPIUM BROMIDE AND ALBUTEROL SULFATE 3 MILLILITER(S): .5; 2.5 SOLUTION RESPIRATORY (INHALATION) at 15:13

## 2025-05-05 RX ADMIN — Medication 1 APPLICATION(S): at 05:11

## 2025-05-05 RX ADMIN — Medication 650 MILLIGRAM(S): at 05:10

## 2025-05-05 RX ADMIN — AMLODIPINE BESYLATE 10 MILLIGRAM(S): 10 TABLET ORAL at 05:10

## 2025-05-05 RX ADMIN — ENOXAPARIN SODIUM 40 MILLIGRAM(S): 100 INJECTION SUBCUTANEOUS at 05:10

## 2025-05-05 RX ADMIN — Medication 40 MILLIGRAM(S): at 06:00

## 2025-05-05 RX ADMIN — MEROPENEM 100 MILLIGRAM(S): 1 INJECTION INTRAVENOUS at 12:37

## 2025-05-05 RX ADMIN — CYANOCOBALAMIN 1000 MICROGRAM(S): 1000 INJECTION INTRAMUSCULAR; SUBCUTANEOUS at 17:29

## 2025-05-05 RX ADMIN — IPRATROPIUM BROMIDE AND ALBUTEROL SULFATE 3 MILLILITER(S): .5; 2.5 SOLUTION RESPIRATORY (INHALATION) at 01:03

## 2025-05-05 RX ADMIN — Medication 40 MILLIGRAM(S): at 17:30

## 2025-05-05 RX ADMIN — IPRATROPIUM BROMIDE AND ALBUTEROL SULFATE 3 MILLILITER(S): .5; 2.5 SOLUTION RESPIRATORY (INHALATION) at 08:19

## 2025-05-05 RX ADMIN — ENOXAPARIN SODIUM 40 MILLIGRAM(S): 100 INJECTION SUBCUTANEOUS at 17:30

## 2025-05-05 RX ADMIN — MEROPENEM 100 MILLIGRAM(S): 1 INJECTION INTRAVENOUS at 05:03

## 2025-05-05 RX ADMIN — Medication 10 MILLIGRAM(S): at 21:12

## 2025-05-05 RX ADMIN — LOSARTAN POTASSIUM 100 MILLIGRAM(S): 100 TABLET, FILM COATED ORAL at 05:10

## 2025-05-05 RX ADMIN — LABETALOL HYDROCHLORIDE 10 MILLIGRAM(S): 200 TABLET, FILM COATED ORAL at 09:43

## 2025-05-05 RX ADMIN — BUMETANIDE 1 MILLIGRAM(S): 1 TABLET ORAL at 12:36

## 2025-05-05 RX ADMIN — MEROPENEM 100 MILLIGRAM(S): 1 INJECTION INTRAVENOUS at 20:38

## 2025-05-05 RX ADMIN — POTASSIUM PHOSPHATE, MONOBASIC POTASSIUM PHOSPHATE, DIBASIC INJECTION, 83.33 MILLIMOLE(S): 236; 224 SOLUTION, CONCENTRATE INTRAVENOUS at 23:45

## 2025-05-05 RX ADMIN — IPRATROPIUM BROMIDE AND ALBUTEROL SULFATE 3 MILLILITER(S): .5; 2.5 SOLUTION RESPIRATORY (INHALATION) at 21:01

## 2025-05-05 RX ADMIN — Medication 650 MILLIGRAM(S): at 07:18

## 2025-05-05 RX ADMIN — Medication 25 GRAM(S): at 22:09

## 2025-05-05 NOTE — PROGRESS NOTE ADULT - SUBJECTIVE AND OBJECTIVE BOX
SURGERY PROGRESS NOTE    Patient: NANCY SARGENT , 65y (62)Female   MRN: [id number]  Location: [address]  Visit: 25 Inpatient  Date: 25 @ 07:51    Hospital Day #: 33  POD 8    Events of past 24 hours: No acute events over night. On a 6LNC on evaluation. Is passing gas and BM. WTD dressing in midline healing well. All 3 JPs serosanguinous.     PHYSICAL EXAM:  General: NAD, calm and cooperative  HEENT: NCAT  Cardiac: RRR  Respiratory: Comfortable on HFNC, normal respiratory effort  Abdomen: Soft, non-distended, non-tender, no rebound, no guarding.  Musculoskeletal: Active ROM intact, compartments soft  Vascular: Extremities well perfused  Skin: Warm/dry, normal color, no jaundice  Incision/wound: healing well, midline dressings in place, clean, dry and intact; drain sites clean, serosanguinous fluid in all 3 drain bulbs    PAST MEDICAL & SURGICAL HISTORY:  Gastric bypass status for obesity      LOUIS (obstructive sleep apnea)      S/P gastric bypass      S/P       S/P small bowel resection      History of total bilateral knee replacement (TKR)      H/O colonoscopy            Vitals:   T(F): 97 (25 @ 04:00), Max: 98.2 (25 @ 00:00)  HR: 93 (25 @ 07:00)  BP: --  RR: 24 (25 @ 07:00)  SpO2: 97% (25 @ 07:00)          Fluids:     I & O's:    25 @ 07:01  -  25 @ 07:00  --------------------------------------------------------  IN:    IV PiggyBack: 300 mL    IV PiggyBack: 150 mL    IV PiggyBack: 150 mL    Oral Fluid: 570 mL  Total IN: 1170 mL    OUT:    Bulb (mL): 100 mL    Bulb (mL): 25 mL    Bulb (mL): 45 mL    Rectal Tube (mL): 350 mL    Voided (mL): 3850 mL  Total OUT: 4370 mL    Total NET: -3200 mL          MEDICATIONS  (STANDING):  acetaminophen     Tablet .. 650 milliGRAM(s) Oral every 6 hours  albuterol/ipratropium for Nebulization 3 milliLiter(s) Nebulizer every 6 hours  amLODIPine   Tablet 10 milliGRAM(s) Oral daily  chlorhexidine 2% Cloths 1 Application(s) Topical <User Schedule>  enoxaparin Injectable 40 milliGRAM(s) SubCutaneous every 12 hours  insulin lispro (ADMELOG) corrective regimen sliding scale   SubCutaneous Before meals and at bedtime  losartan 100 milliGRAM(s) Oral daily  melatonin 10 milliGRAM(s) Oral at bedtime  meropenem  IVPB 1000 milliGRAM(s) IV Intermittent every 8 hours  pantoprazole  Injectable 40 milliGRAM(s) IV Push every 12 hours    MEDICATIONS  (PRN):  HYDROmorphone  Injectable 0.25 milliGRAM(s) IV Push every 4 hours PRN breakthrough  oxyCODONE    IR 5 milliGRAM(s) Oral every 6 hours PRN Moderate Pain (4 - 6)  oxyCODONE    IR 10 milliGRAM(s) Oral every 6 hours PRN Severe Pain (7 - 10)      DVT PROPHYLAXIS: enoxaparin Injectable 40 milliGRAM(s) SubCutaneous every 12 hours    GI PROPHYLAXIS: pantoprazole  Injectable 40 milliGRAM(s) IV Push every 12 hours    ANTICOAGULATION:   ANTIBIOTICS:  meropenem  IVPB 1000 milliGRAM(s)            LAB/STUDIES:  Labs:  CAPILLARY BLOOD GLUCOSE      POCT Blood Glucose.: 105 mg/dL (05 May 2025 06:48)  POCT Blood Glucose.: 99 mg/dL (04 May 2025 23:26)  POCT Blood Glucose.: 114 mg/dL (04 May 2025 17:28)  POCT Blood Glucose.: 118 mg/dL (04 May 2025 11:43)                          9.8    9.07  )-----------( 160      ( 04 May 2025 20:54 )             29.7             143  |  108  |  27[H]  ----------------------------<  109[H]  3.6   |  22  |  0.7      Calcium: 7.3 mg/dL (25 @ 20:54)      LFTs:     Blood Gas Arterial, Lactate: 1.5 mmol/L (25 @ 05:09)    ABG - ( 03 May 2025 05:09 )  pH: 7.49  /  pCO2: 32    /  pO2: 95    / HCO3: 24    / Base Excess: 1.3   /  SaO2: 98.3            ABG - ( 02 May 2025 03:54 )  pH: 7.47  /  pCO2: 33    /  pO2: 78    / HCO3: 24    / Base Excess: 0.3   /  SaO2: 98.5            ABG - ( 01 May 2025 04:34 )  pH: 7.44  /  pCO2: 32    /  pO2: 131   / HCO3: 22    / Base Excess: -2.1  /  SaO2: 98.3              Coags:            Urinalysis Basic - ( 04 May 2025 20:54 )    Color: x / Appearance: x / SG: x / pH: x  Gluc: 109 mg/dL / Ketone: x  / Bili: x / Urobili: x   Blood: x / Protein: x / Nitrite: x   Leuk Esterase: x / RBC: x / WBC x   Sq Epi: x / Non Sq Epi: x / Bacteria: x

## 2025-05-05 NOTE — PROGRESS NOTE ADULT - CRITICAL CARE ATTENDING COMMENT
Critical Care: [id number]   This patient has a high probability of sudden, clinically significant deterioration, which requires the highest level of physician preparedness to intervene urgently. I managed/supervised life or organ supporting interventions that required frequent physician assessment. I have reviewed and agree with note above. I devoted my full attention to the direct care of this patient for the period of time indicated, including reviewing relevant labs and imaging, discussing treatment plan with the SICU team, nurses, and primary team at the time of multidisciplinary rounds, and reviewing findings with patient and family. This does not include time devoted to teaching any trainees and to performing any procedures.    NANCY SARGENT 64y Female admitted to SICU with incarcerated ventral hernia with SBO now s/p open VHR, SBR with postop respiratory failure and nonoliguric ARF secondary to septic shock    Issues we are addressing today:    Neuro: minimizing pain meds, home meds as possible, delirium precautions, sleep meds as needed at night  CV: optimize fluid status, increasing home antihypertensives  Respiratory: continue to wean support as possible, bipap and HFNC as tolerated  Nutrition: soft, advance as tolerated  Renal: monitor Is &Os, repeat diuresis today  ID: abx d/c today   Lines: d/c a-line today  Heme: continue to evaluate for acute blood loss anemia   Endocrine: Prevent and treat hyperglycemia with insulin as needed    PV: follow up LE DVT US  Skin: decub precautions  DVT Prophylaxis   Stress Gastritis Prophylaxis   Mobility: PT/OT, OOBTC    The above note is NOT written at the time of rounds and will reflect all changes throughout management of the patient for the day note is written for.    Chi Bermudez MD, D.PRATEEK.

## 2025-05-05 NOTE — PHYSICAL THERAPY INITIAL EVALUATION ADULT - LEVEL OF INDEPENDENCE: SUPINE/SIT, REHAB EVAL
due to significant weakness; increasing pain/unable to perform
dependent (less than 25% patients effort)

## 2025-05-05 NOTE — PHYSICAL THERAPY INITIAL EVALUATION ADULT - PRECAUTIONS/LIMITATIONS, REHAB EVAL
cardiac precautions/fall precautions/surgical precautions
2 L via NC/fall precautions/oxygen therapy device and L/min

## 2025-05-05 NOTE — PROGRESS NOTE ADULT - NUTRITIONAL ASSESSMENT
65y F w/ PMHx of  Morbid obesity, LOUIS, large complex ventral hernia s/p repair sbr and loss of domain c/b post op intubation, hypotension requiring vasopressors, anastomotic leakage s/p RTOR and reanastomosis, now doing better post op from a respiratory status. Comfortable on BIPAP. Off pressors. Blood cultures negATIVE.    PLAN:  - Daily wet to dry dressing changes, f/u bleeding recurrence  - Wean off of HFNC as tolerated O2 as tolerated  - Remove RUQ drain  - Make tylenol PRN   - Add B12 to regimen   - Monitor for daily output quality on drains  - Rest of care per SICU      BLUE TEAM SPECTRA: [id number]

## 2025-05-05 NOTE — PROGRESS NOTE ADULT - ASSESSMENT
ASSESSMENT & PLAN:  64F PMHx HTN, morbid obesity s/p 2001 Abby-en-Y gastric bypass who presented on 4/3 with incarcerated ventral hernia with SBO    4/10 open ventral hernia repair, small bowel resection, and primary fascial closure with Dr. Lema. Admitted to SICU for Q1 abdominal checks and hemodynamic monitoring.   4/17 takeback for exploratory laparotomy, repair of anastomotic leak  4/22 8Fr pigtail into lower abdominal wall collection,150cc of foul appearing material aspirated, Attached to THONY bulb  4/27 RTOR for re-exploration and evacuation of old clots from retroperitoneum and pelvis and placement of 3 new beatrice drains  4/28: IR consulted for IVC filter     NEUROLOGICAL:  #Acute pain  - Acetaminophen Q6   - Oxycodone 5mg q6 prn moderate pain  - Oxycodone 10mg q6 prn severe pain   - dilaudid 0.25mg q4 prn breakthrough pain   #Sedation/agitation  - Precedex no longer required  #AMS    HCT 4/27: negative  #Sleep hygiene  - HOLDING Trazodone 25mg QPM  - Melatonin 10mg QPM    RESPIRATORY:   #Acute hypoxic respiratory failure  - Extubated 4/18, reintubated on 4/27 for AMS and agonal breathing, extubated 4/30  - Currently on 6L NC   #Right distal main pulmonary embolus with segmental extension  - Heparin gtt off since 5/3  - B/L LE Venous duplex 4/21-RIGHT PT DVT, no LEFT DVT  - B/L LE Venous duplex 4/28-No evidence of deep venous thrombosis in either lower extremity. Limited exam due to body habitus  [  ] Pending repeat duplex today 5/5   - No IVC filter needed at this time per IR  #Intermittent diuresis  - Last diuresis 5/4 1mg Bumex  #PMHx LOUIS on CPAP@ home   #Activity increase as tolerated    CARDS:   #Hypertensive urgency, resolved  - intermittent pushes hydralazine   - Nicardipine gtt started, off since 5/1 6AM  #Acute hemorraghic shock, resolved  - Off levophed and vasopressin since 4/30  - 4/27: 2u PRBC 1plt 1uFFP, 1g TXA  - OFF sodium bicarbonate infusion on 4/27  #Supraventricular tachycardia, Non-sustained ventricular tachycardia, Ventricular Premature Depolarization  - Cardiology (Dr. Lopez) - Metoprolol 25mg Q12 HOLDING  #PMHx of HTN  - Amlodipine 10mg QD restarted   - Losartan 100mg qd  - HCTZ 25mg QD HOLDING  - EKG- NSR  - TTE 4/11: EF of 56%. G1DD.   - TTE 4/22: EF 70 to 75%.    GASTROINTESTINAL/NUTRITION:   #4/10 open ventral hernia repair, small bowel resection, ileoileal anastomosis and primary fascial closure, 2 THONY Right  #4/17 RTOR for exploratory laparotomy, repair of anastomotic leak at previous ileoileal anastomosis, resected, new ileoileal anastomosis created, 1 THONY Left (total 3)  - Intermittent abdominal vac change, last 4/26 PM, bilious drainage noted  - REMOVED 4/26 RUQ THONY  - REMOVED 4/28 LUQ THONY at ileo-ileal anastomosis, RLQ THONY in pelvis, IR THONY abdominal wall  #4/22: s/p IR percutaneous placement of a 8.5 [place] drainage catheter into lower abdominal wall fluid collection, yielding 150 mL of foul appearing fluid.  #4/27: Acute retroperitoneal CTA: mesenteric arterial blush noted with extravasation    - s/p IR without finding of any bleeding, no embolization required  #4/27 RTOR for re-exploration and evacuation of old clots from retroperitoneum and pelvis  - New midline wound vac in place > removed on 5/2   - x3 new 19Fr beatrice drains in place: 1. Right pelvis, 2. Left pelvis, 3. Subcutaneous  - 5/2: 500mL sanguinous output from wound vac --> heparin gtt paused, 1u PRBC given. Bedside exploration of midline wound. venous bleed noted, 2 stitches 3-0 vicryl, surgicell, Fibrilar, bleeding controlled at time of dressing placement. Wound vac removed, wet to dry and pressure dressing applied  #Diet, Pureed diet with prosource and ensures  - TPN discontinued 4/25  - Aspiration precautions, HOB 30  #GI Prophylaxis/hx GERD  - Pantoprazole 40mg IV BID (home rx)  #Bowel regimen  - Holding  - Last BM 5/3  - Dignishield replaced 5/3    /RENAL:   #Urine output in critically ill  - An replaced 4/27 > removed 5/4 passed TOV   - IVL  - Bumex 1mg on 5/3   #FUNMI; oliguric  - Nephrology consulted and following > hold off on CVVH recall  - Renal u/s> no hydronephrosis. 2-3cm anechoic cyst on right kidney   #Lactic acidosis    - OFF sodium bicarbonate infusion  #hypokalemia - repleted with 60mEq KCl   - Nephro: check CK, no indication of RRT, decrease meropenem to Q12    Labs:          BUN/Cr- 28/0.7  -->,  27/0.7  -->          [05-04 @ 20:54]Na  143 // K  3.6 // Mg  2.3 // Phos  3.0  [05-04 @ 17:13]Na  141 // K  3.6 // Mg  1.6 // Phos  3.0      HEME/ONC:   #DVT PPX  -LMWH 40mg q12  - LLE SCD only  #acute anemia   - 4/27: 2uPRBC 1uFFP 1pkPlt, TXA 1g, IR for embolization due to active extravasation seen on CTA, no active bleed, no embolization, Return to OR for re-exploration and evacuation of old clot  4/28: IR consulted for IVC filter and PICC. LE duplex ordered, negative. Precedex added overnight for agitation. 1 U PRBC for Hgb 6.8 overnight  - 4/29: 1 U PRBC  - 5/2: 1u PRBC for 500mL sanguinous output from wound vac   #Acute right pulmonary embolus  - Heparin gtt  @ 1900U/hr - discontinued 5/2 2/2 venous bleed from wound vac   #B/L UE Venous Duplex 4/26/27 - thrombophlebitis  #B/L LE Venous Duplex   - 4/21: right PT DVT  - 4/28: negative  - 5/5: repeat pending   - Concern for malabsorption of Eliquis given small bowel resection per pharmacy  - Vascular surgery consulted - AC with heparin drip (upon discharge transition to Eliquis 10 mg po BID x 7 days then 5 mg po BID for at least 6 months    Labs: Hb/Hct:  9.8/29.4  -->,  9.8/29.7  -->                      Plts:  136  -->,  160  -->                 PTT/INR:        ID:  #Intraabdominal anastomotic leak, fluid collection  #ID Consult  - Send baseline ESR CRP, continue meropenem 1g Q12  - Midline x Ertapenem 1g Q24 on discharge x 4 weeks (EOT 5/19)  - F/u with Dr. Perdue on Tuesdays via Telehealth   #Culture    OR 4/1 - E Coli    OR cx: few E.coli, few bacteroides, rare clostridium    4/13 Blood Cx: NGTF    4/14 tracheal aspirate: no growth     4/17 body fluid cx rare e. coli     4/22 Blood cx: negative    4/22 Abdominal - Negative FINAL    4/27 C diff stool panel: negative  WBC- 6.11  --->>,  8.93  --->>,  9.07  --->>  Temp trend- 24hrs T(F): 98.2 (05-05 @ 00:00), Max: 98.2 (05-05 @ 00:00)  Current antibiotics-meropenem  IVPB 1000 every 8 hours    ENDOCRINE:  #Glycemic monitoring  - Q6 FSG while NPO  - ISS  - A1C 5.8%     MSK:  #Activity- increase as tolerated     SKIN:  #left forearm skin tear   - Continue to monitor daily skin changes    LINES/DRAINS:  PIV    RLQ pelvis beatrice drain (4/28 -     LLQ pelvis beatrice drain (4/28 -     Midline subtaneous beatrice drain (4/28 -     Right IJ TLC (4/17 -     Prevena Vac Midline (removed 5/2)     Left radial arterial line (4/28 -     Discontinued lines:    Right Femoral Arterial Line (4/27 - 4/28)    ADVANCED DIRECTIVES:  Full Code  Emergency - Daughter  (Shavonne Ajay)  INDICATION FOR SICU/SDU: Intestinal obstruction    DISPO: SICU    Pending discussion with SICU attending Dr. Bermudez   ASSESSMENT & PLAN:  64F PMHx HTN, morbid obesity s/p 2001 Abby-en-Y gastric bypass who presented on 4/3 with incarcerated ventral hernia with SBO    4/10 open ventral hernia repair, small bowel resection, and primary fascial closure with Dr. Lema. Admitted to SICU for Q1 abdominal checks and hemodynamic monitoring.   4/17 takeback for exploratory laparotomy, repair of anastomotic leak  4/22 8Fr pigtail into lower abdominal wall collection,150cc of foul appearing material aspirated, Attached to THONY bulb  4/27 RTOR for re-exploration and evacuation of old clots from retroperitoneum and pelvis and placement of 3 new beatrice drains  4/28: IR consulted for IVC filter     NEUROLOGICAL:  #Acute pain  - Acetaminophen Q6   - Oxycodone 5mg q6 prn moderate pain  - Oxycodone 10mg q6 prn severe pain   - dilaudid 0.25mg q4 prn breakthrough pain   #Sedation/agitation  - Precedex no longer required  #AMS    HCT 4/27: negative  #Sleep hygiene  - HOLDING Trazodone 25mg QPM  - Melatonin 10mg QPM    RESPIRATORY:   #Acute hypoxic respiratory failure  - Extubated 4/18, reintubated on 4/27 for AMS and agonal breathing, extubated 4/30  - Currently on 6L NC   - duoneb treatment Q6  #Right distal main pulmonary embolus with segmental extension  - Heparin gtt off since 5/3  - No IVC filter needed at this time per IR  #Intermittent diuresis  - Last diuresis 5/4 1mg Bumex  #PMHx LOUIS on CPAP@ home   #Activity increase as tolerated    CARDS:   #Hypertensive urgency, resolved  - Off Nicardipine gtt since 5/1  #Acute hemorraghic shock, resolved  - Off levophed and vasopressin since 4/30  - 4/27: 2u PRBC 1plt 1uFFP, 1g TXA  - OFF sodium bicarbonate infusion on 4/27  #Supraventricular tachycardia, Non-sustained ventricular tachycardia, Ventricular Premature Depolarization  - Cardiology (Dr. Lopez) - Metoprolol 25mg Q12 HOLDING  #PMHx of HTN  - continue Amlodipine 10mg QD  - continue Losartan 100mg qd  - HCTZ 25mg QD HOLDING  - EKG- NSR  - TTE 4/11: EF of 56%. G1DD.   - TTE 4/22: EF 70 to 75%.    GASTROINTESTINAL/NUTRITION:   #4/10 open ventral hernia repair, small bowel resection, ileoileal anastomosis and primary fascial closure, 2 THONY Right  #4/17 RTOR for exploratory laparotomy, repair of anastomotic leak at previous ileoileal anastomosis, resected, new ileoileal anastomosis created, 1 THONY Left (total 3)  - Intermittent abdominal vac change, last 4/26 PM, bilious drainage noted  - REMOVED 4/26 RUQ THONY  - REMOVED 4/28 LUQ THONY at ileo-ileal anastomosis, RLQ THONY in pelvis, IR THONY abdominal wall  #4/22: s/p IR percutaneous placement of a 8.5 [place] drainage catheter into lower abdominal wall fluid collection, yielding 150 mL of foul appearing fluid.  #4/27: Acute retroperitoneal CTA: mesenteric arterial blush noted with extravasation    - s/p IR without finding of any bleeding, no embolization required  #4/27 RTOR for re-exploration and evacuation of old clots from retroperitoneum and pelvis  - New midline wound vac in place > removed on 5/2   - x3 new 19Fr beatrice drains in place: 1. Right pelvis, 2. Left pelvis, 3. Subcutaneous  - 5/2: 500mL sanguinous output from wound vac --> heparin gtt paused, 1u PRBC given. Bedside exploration of midline wound. venous bleed noted, 2 stitches 3-0 vicryl, surgicell, Fibrilar, bleeding controlled at time of dressing placement. Wound vac removed, wet to dry and pressure dressing applied  #Diet, Soft and bite sized DASH  - TPN discontinued 4/25  - Aspiration precautions, HOB 30  #GI Prophylaxis/hx GERD  - Pantoprazole 40mg IV BID (home rx)  #Bowel regimen  - Holding  - Last BM 5/3  - Dignishield replaced 5/3    /RENAL:   #Urine output in critically ill  - An replaced 4/27 > removed 5/4 passed TOV   - IVL  - Bumex 1mg on 5/3   #FUNMI; oliguric  - Nephrology consulted and following > hold off on CVVH recall  - Renal u/s> no hydronephrosis. 2-3cm anechoic cyst on right kidney   #Lactic acidosis    - OFF sodium bicarbonate infusion  #hypokalemia - repleted with 60mEq KCl   - Nephro: check CK, no indication of RRT, decrease meropenem to Q12    Labs:          BUN/Cr- 28/0.7  -->,  27/0.7  -->          [05-04 @ 20:54]Na  143 // K  3.6 // Mg  2.3 // Phos  3.0  #Hypokalemia    - 60meq potassium chloride replenished    HEME/ONC:   #DVT PPX  -LMWH 40mg q12  - SCD B/L  #acute anemia   - 4/27: 2uPRBC 1uFFP 1pkPlt, TXA 1g, IR for embolization due to active extravasation seen on CTA, no active bleed, no embolization, Return to OR for re-exploration and evacuation of old clot  - 4/28: IR consulted for IVC filter and PICC. LE duplex ordered, negative.  - 5/2: 1u PRBC for 500mL sanguinous output from wound vac   #Acute right pulmonary embolus  - Therapeutic Heparin gtt discontinued 5/2 secondary to venous bleed from wound vac   #B/L UE Venous Duplex 4/26/27 - thrombophlebitis  #B/L LE Venous Duplex   - B/L LE Venous duplex 4/21-RIGHT PT DVT, no LEFT DVT  - B/L LE Venous duplex 4/28-No evidence of deep venous thrombosis in either lower extremity. Limited exam due to body habitus  [  ] Pending repeat duplex today 5/5   - Concern for malabsorption of Eliquis given small bowel resection per pharmacy  - Vascular surgery consulted - AC with heparin drip (upon discharge transition to Eliquis 10 mg po BID x 7 days then 5 mg po BID for at least 6 months      Labs: Hb/Hct:  9.8/29.4  -->,  9.8/29.7  -->                      Plts:  136  -->,  160  -->                 PTT/INR:      Type and screen: expires 5/5    ID:  #Intraabdominal anastomotic leak, fluid collection  #ID Consult  - continue meropenem 1g Q12  - Midline x Ertapenem 1g Q24 on discharge x 4 weeks (EOT 5/19)  - F/u with Dr. Perdeu on Tuesdays via Telehealth   #Culture    OR 4/1 - E Coli    OR cx: few E.coli, few bacteroides, rare clostridium    4/13 Blood Cx: NGTF    4/14 tracheal aspirate: no growth     4/17 body fluid cx rare e. coli     4/22 Blood cx: negative    4/22 Abdominal - Negative FINAL    4/27 C diff stool panel: negative    4/28 blood culture: negative  WBC- 6.11  --->>,  8.93  --->>,  9.07  --->>  Temp trend- 24hrs T(F): 98.2 (05-05 @ 00:00), Max: 98.2 (05-05 @ 00:00)    ENDOCRINE:  #Glycemic monitoring  - ACHS fingersticks  - ISS  - A1C 5.8%     MSK:  #Activity- increase as tolerated     SKIN:  #left forearm skin tear   - Continue to monitor daily skin changes    LINES/DRAINS:  PIV    RLQ pelvis beatrice drain (4/28 -     LLQ pelvis beatrice drain (4/28 -     Midline subtaneous beatrice drain (4/28 -     Right IJ TLC (4/17 -     Prevena Vac Midline (removed 5/2)     Left radial arterial line (4/28 -     Discontinued lines:    Right Femoral Arterial Line (4/27 - 4/28)    ADVANCED DIRECTIVES:  Full Code  Emergency - Daughter  (Shavonne Moss)  INDICATION FOR SICU/SDU: Intestinal obstruction    DISPO: SICU    Pending discussion with SICU attending Dr. Bermudez ASSESSMENT & PLAN:  64F PMHx HTN, morbid obesity s/p 2001 Abby-en-Y gastric bypass who presented on 4/3 with incarcerated ventral hernia with SBO    4/10 open ventral hernia repair, small bowel resection, and primary fascial closure with Dr. Lema. Admitted to SICU for Q1 abdominal checks and hemodynamic monitoring.   4/17 takeback for exploratory laparotomy, repair of anastomotic leak  4/22 8Fr pigtail into lower abdominal wall collection,150cc of foul appearing material aspirated, Attached to THONY bulb  4/27 RTOR for re-exploration and evacuation of old clots from retroperitoneum and pelvis and placement of 3 new beatrice drains  4/28: IR consulted for IVC filter     NEUROLOGICAL:  #Acute pain  - Acetaminophen Q6   - Oxycodone 5mg q6 prn moderate pain  - Oxycodone 10mg q6 prn severe pain   #Sedation/agitation  - Precedex no longer required  #AMS    HCT 4/27: negative  #Sleep hygiene  - HOLDING Trazodone 25mg QPM  - Melatonin 10mg QPM    RESPIRATORY:   #Acute hypoxic respiratory failure  - Extubated 4/18, reintubated on 4/27 for AMS and agonal breathing, extubated 4/30  - Currently on 6L NC   - duoneb treatment Q6  #Right distal main pulmonary embolus with segmental extension  - Heparin gtt off since 5/3  - No IVC filter needed at this time per IR  #Intermittent diuresis  - Last diuresis 5/4 1mg Bumex  #PMHx LOUIS on CPAP@ home   #Activity increase as tolerated    CARDS:   #Hypertensive urgency, resolved  - Off Nicardipine gtt since 5/1  #Acute hemorrhaghic shock, resolved  - Off levophed and vasopressin since 4/30  - 4/27: 2u PRBC 1plt 1uFFP, 1g TXA  - OFF sodium bicarbonate infusion on 4/27  #Supraventricular tachycardia, Non-sustained ventricular tachycardia, Ventricular Premature Depolarization  - Cardiology (Dr. Lopez) - Metoprolol 25mg Q12 HOLDING  #PMHx of HTN  - continue Amlodipine 10mg QD  - continue Losartan 100mg qd  - HCTZ 25mg QD HOLDING  - EKG- NSR  - TTE 4/11: EF of 56%. G1DD.   - TTE 4/22: EF 70 to 75%.    GASTROINTESTINAL/NUTRITION:   #4/10 open ventral hernia repair, small bowel resection, ileoileal anastomosis and primary fascial closure, 2 THONY Right  #4/17 RTOR for exploratory laparotomy, repair of anastomotic leak at previous ileoileal anastomosis, resected, new ileoileal anastomosis created, 1 THONY Left (total 3)  - Intermittent abdominal vac change, last 4/26 PM, bilious drainage noted  - REMOVED 4/26 RUQ THONY  - REMOVED 4/28 LUQ THONY at ileo-ileal anastomosis, RLQ THONY in pelvis, IR THONY abdominal wall  #4/22: s/p IR percutaneous placement of a 8.5 [place] drainage catheter into lower abdominal wall fluid collection, yielding 150 mL of foul appearing fluid.  #4/27: Acute retroperitoneal CTA: mesenteric arterial blush noted with extravasation    - s/p IR without finding of any bleeding, no embolization required  #4/27 RTOR for re-exploration and evacuation of old clots from retroperitoneum and pelvis  - New midline wound vac in place > removed on 5/2   - x3 new 19Fr beatrice drains in place: 1. Right pelvis, 2. Left pelvis, 3. Subcutaneous  - 5/2: 500mL sanguinous output from wound vac --> heparin gtt paused, 1u PRBC given. Bedside exploration of midline wound. venous bleed noted, 2 stitches 3-0 vicryl, surgicell, Fibrilar, bleeding controlled at time of dressing placement. Wound vac removed, wet to dry and pressure dressing applied  #Diet: DASH, pahse 3 bariatric with ensures  - TPN discontinued 4/25  - Aspiration precautions, HOB 30  #GI Prophylaxis/hx GERD  - Pantoprazole 40mg PO BID (home rx)  #Bowel regimen  - Holding  - Last BM 5/5  - Dignishield replaced 5/3    /RENAL:   #Urine output in critically ill  - An replaced 4/27 > removed 5/4 passed TOV   - IVL  - Bumex 1mg daily  #FUNMI; oliguric  - Nephrology consulted and following > hold off on CVVH recall  - Renal u/s> no hydronephrosis. 2-3cm anechoic cyst on right kidney   #Lactic acidosis    - OFF sodium bicarbonate infusion  - Nephro: check CK, no indication of RRT, decrease meropenem to Q12    Labs:          BUN/Cr- 28/0.7  -->,  27/0.7  -->          [05-04 @ 20:54]Na  143 // K  3.6 // Mg  2.3 // Phos  3.0  #Hypokalemia    - 60meq potassium chloride replenished    HEME/ONC:   #DVT PPX  - LMWH 40mg q12  - SCD B/L  #acute anemia   - 4/27: 2uPRBC 1uFFP 1pkPlt, TXA 1g, IR for embolization due to active extravasation seen on CTA, no active bleed, no embolization, Return to OR for re-exploration and evacuation of old clot  - 4/28: IR consulted for IVC filter and PICC. LE duplex ordered, negative.  - 5/2: 1u PRBC for 500mL sanguinous output from wound vac   #Acute right pulmonary embolus  - Therapeutic Heparin gtt discontinued 5/2 secondary to venous bleed from wound vac   #B/L UE Venous Duplex 4/26/27 - thrombophlebitis  #B/L LE Venous Duplex   - B/L LE Venous duplex 4/21-RIGHT PT DVT, no LEFT DVT  - B/L LE Venous duplex 4/28-No evidence of deep venous thrombosis in either lower extremity. Limited exam due to body habitus  [  ] Pending repeat duplex today 5/5   - Concern for malabsorption of Eliquis given small bowel resection per pharmacy  - Vascular surgery consulted - AC with heparin drip (upon discharge transition to Eliquis 10 mg po BID x 7 days then 5 mg po BID for at least 6 months      Labs: Hb/Hct:  9.8/29.4  -->,  9.8/29.7  -->                      Plts:  136  -->,  160  -->                 PTT/INR:      Type and screen: expires 5/5    ID:  #Intraabdominal anastomotic leak, fluid collection  #ID Consult  - continue meropenem 1g Q12  - Midline x Ertapenem 1g Q24 on discharge x 4 weeks (EOT 5/19)  - F/u with Dr. Perdue on Tuesdays via Telehealth   #Culture    OR 4/1 - E Coli    OR cx: few E.coli, few bacteroides, rare clostridium    4/13 Blood Cx: NGTF    4/14 tracheal aspirate: no growth     4/17 body fluid cx rare e. coli     4/22 Blood cx: negative    4/22 Abdominal - Negative FINAL    4/27 C diff stool panel: negative    4/28 blood culture: negative  WBC- 6.11  --->>,  8.93  --->>,  9.07  --->>  Temp trend- 24hrs T(F): 98.2 (05-05 @ 00:00), Max: 98.2 (05-05 @ 00:00)    ENDOCRINE:  #Glycemic monitoring  - ACHS fingersticks  - ISS  - A1C 5.8%     MSK:  #Activity- increase as tolerated     SKIN:  #left forearm skin tear   - Continue to monitor daily skin changes    LINES/DRAINS:  PIV    RLQ pelvis beatrice drain (4/28 -     LLQ pelvis beatrice drain (4/28 -     Midline subtaneous beatrice drain (4/28 -     Right IJ TLC (4/17 -     Discontinued lines:    Right Femoral Arterial Line (4/27 - 4/28)    Prevena Vac Midline (removed 5/2)     Left radial arterial line (4/28 - 5/5)    ADVANCED DIRECTIVES:  Full Code  Emergency - Daughter  (Shavonne Moss)  INDICATION FOR SICU/SDU: Intestinal obstruction    DISPO: SICU    Case discussed with SICU attending Dr. Bermudez

## 2025-05-05 NOTE — PHYSICAL THERAPY INITIAL EVALUATION ADULT - ADDITIONAL COMMENTS
Pt resides with  in a private house using 10 steps to enter and 15 to access 2nd floor. Pt used to be independent with overall functional mobility, able to ambulate using no AD at baseline
Pt was fully independent prior to admission, no assistive device

## 2025-05-05 NOTE — PHYSICAL THERAPY INITIAL EVALUATION ADULT - TRANSFER TRAINING, PT EVAL
Pt will transfer from bed to chair and reverse using RW with Mod A to facilitate return to PLOF.
Increase transfers to  min assist using a rolling walker by d/c.

## 2025-05-05 NOTE — PHYSICAL THERAPY INITIAL EVALUATION ADULT - MD ORDER
Surgical procedures as follow:   4/17:resection of anastomosis for anastomotic leak  4/10: open ventral hernia repair, small bowel resection, and primary fascial closure  4/28: ex lap, abdominal washout

## 2025-05-05 NOTE — PHYSICAL THERAPY INITIAL EVALUATION ADULT - GENERAL OBSERVATIONS, REHAB EVAL
As per medical round, Pt is on hold today pending CT. PT will follow up when appropriate
Pt currently intubated/sedated, unable to participate. PT will follow up when appropriate.
Pt still on hold, + BIpap, lightly sedated, unable to participate. PT will follow up.
PT IE 13:30-14:10. chart reviewed. Pt received semi-greenwood at B/S, alert, oriented, able to follow one step instructions and agreeable to PT evaluation.  + IV, + O2 2 L via NC, + wound vac, + foly, VSS, denies pain or discomfort, NAD.
[id number] am ( eval), PT returned 1310 pm at bedside (pt however declined to be out of bed, with use of mev=chanical lift).   Chart reviewed. Pt. seen semirecline in bed , in No apparent distress , + telemetry , IV lock , left radial A line, Prima fit device , abdominal binder, + staples on mid abdomen, + THONY drain X 3, rectal tube.  Pt. alert and oriented X 3 . Pt's  family present at bedside .

## 2025-05-05 NOTE — PHYSICAL THERAPY INITIAL EVALUATION ADULT - PERTINENT HX OF CURRENT PROBLEM, REHAB EVAL
65y F w/ PMHx of  Morbid obesity, LOUIS, large complex ventral hernia s/p repair sbr and loss of domain c/b post op intubation, hypotension requiring vasopressors, anastomotic leakage s/p RTOR and reanastomosis, now doing better post op from a respiratory status. Comfortable on BIPAP. Off pressors. Blood cultures negative.     Pt. referred to PT for eval and tx.

## 2025-05-05 NOTE — PHYSICAL THERAPY INITIAL EVALUATION ADULT - DID THE PATIENT HAVE SURGERY?
yes
Open repair of ventral hernia using mesh and component separation technique, Small bowel resection/yes

## 2025-05-05 NOTE — PHYSICAL THERAPY INITIAL EVALUATION ADULT - LEVEL OF INDEPENDENCE: SIT/SUPINE, REHAB EVAL
unable to perform
participate in sitting at EOB, able to maintain sitting unsupported for 1-2 min, + B/L LE/UE AAROM. VSS./dependent (less than 25% patients effort)

## 2025-05-05 NOTE — PROGRESS NOTE ADULT - SUBJECTIVE AND OBJECTIVE BOX
NANCY SARGENT   [id number]   [birth year]  64yF  ============================================================   DATE OF INITIAL SICU/SDU CONSULT: 04-10-25    INDICATION FOR SICU CONSULT:  q1hr abdominal checks, mechanical ventilation    SICU COURSE EVENTS :  04-10 - admitted to SICU service  4/11: Intubated and started on paralytic. Arterial line placed, subclavian TLC placed. Nephrology consulted for oliguria. IR abdominal muscle botox injection performed. TTE performed.   4/12: Additional fluid boluses given; trial fo bumex started, considering CVVH. Weaned off nimbex gtt.  > 220 /hr. Renal U/S w/out hydro.   4/13: Weaned off Levophed. Bumex gtt 2mg/hr > 1mg/hr. Goal net negative 1-1.5L, UOP approx, 200c/hr.   4/14: Remained on bumex gtt for further diuresis, decreased to 0.5 overnight, vent settings weaned. Cr downtrending, LFTs worsening.   4/15: Extubated to BiPAP, transitioned to NC. Dc'd bumex gtt, given 5mg metolazone x 1 and 2mg bumex x 1.   4/16: THONY drain #2 murky/bilious, pending CTAP with PO contrast, hypernatremia, started D5W @ 75cc/hr, persistent hypokalemia   4/17: RTOR for resection of anastomosis for anastomotic leak. Returned intubated, 400/24/80/10, sinus tachy to . Abdomen soft. Was initially on propofol @ 30 and precedex, but propofol weaned off due to hypotension with MAPs in 50s, fentanyl ggt started for acute pain. Remained hypotensive despite fluids, started on Levophed, on 0.05.   4/18: Extubated. HFNC 40L/49%, Levo off since 11 AM   4/19: NGT removed. Started on duonebs. D5W increased to 60cc/hr. 1mg bumex, > 1.5L response  4/20: Episode of afib RVR resolved with metoprolol 5mg IVP, cardiology c/s placed, recommending metoprolol 25mg q 12   4/21: PE on CTA, therapeutic heparin gtt started. Started on cardebe gtt,   4/22: Meropenem started per ID.  S/p IR percutaneous placement of a 8.5 Fijian drainage catheter into lower abdominal wall fluid collection, yielding 150 mL of bowel appearing fluid. New left radial a-line placed. Off nicarrdipine gtt.   4/23: Switched to therapeutic lovenox. Diet advanced to aubrey clears with ensures.  4/24: advacned to bariatric FLD, downgraded to SDU  4/25: TPN discontinued, diet advanced to full liquid , Right cephalic DVT prelim  4/26: Right UE VA Duplex final no DVT, thromboplebitis, Arterial line removed, 2L NC, midline vac changed, noted to have bilious drainage, RUQ drain pulled, upgraded to SICU status, plan to reorder TPN for 4/27 PM, diet changed to full liquid. 1:20 AM noted to be unresponsive, palpable femoral pulse, decision made to intubate, R femoral arterial line placed, started on levophed, amauri, bicarb amp x 2, bicarb gtt, propofol, 2U pRBC for hgb 5.9, plan for CTH and CTAP when stabilized   4/27: 2uPRBC 1uFFP 1pkPlt, TXA 1g, IR for embolization due to active extravasation seen on CTA, no active bleed, no embolization, Return to OR for re-exploration and evacuation of old clot  4/28: IR consulted for IVC filter and PICC. LE duplex ordered, negative. Precedex added overnight for agitation. 1 U PRBC for Hgb 6.8 overnight  4/29: TF started at 20cc/hr. Meropenem decreased to Q 12 hours.   4/30: Extubated to 5L NC, then placed on HFNC. Heparin gtt restarted.   5/1: Placed on BiPAP, started on aubrey clears w/ clear ensures   5/4: passed TOV. On 6L NC. Bumex 1mg.       24Hour Events:  5/4  NIGHT   -PM rounds: Comfortable, SBP 130s, saturating 96% on NC 6L, NSR HR 90s, abdomen non-distended, THONY drain 1 and 3 serosanguinous, THONY #2 serous, non-tender  -K repleted   -AM CXR    DAY  -SBP 200s, labetalol 10 IVP  -6L NC  -1mg bumex IVP; 4PM BMP  -Repeat DVT sono 5/5  -passed TOV      [X] A ten-point review of systems was negative except as expressed in note.  [X ] History was obtained from patient. If unable to participate in their care, history was from review of the chart and collateral sources of information.  =====================================================================   NANCY SARGENT   [id number]   [birth year]  64yF  ============================================================   DATE OF INITIAL SICU/SDU CONSULT: 04-10-25    INDICATION FOR SICU CONSULT:  q1hr abdominal checks, mechanical ventilation    SICU COURSE EVENTS :  04-10 - admitted to SICU service  4/11: Intubated and started on paralytic. Arterial line placed, subclavian TLC placed. Nephrology consulted for oliguria. IR abdominal muscle botox injection performed. TTE performed.   4/12: Additional fluid boluses given; trial fo bumex started, considering CVVH. Weaned off nimbex gtt.  > 220 /hr. Renal U/S w/out hydro.   4/13: Weaned off Levophed. Bumex gtt 2mg/hr > 1mg/hr. Goal net negative 1-1.5L, UOP approx, 200c/hr.   4/14: Remained on bumex gtt for further diuresis, decreased to 0.5 overnight, vent settings weaned. Cr downtrending, LFTs worsening.   4/15: Extubated to BiPAP, transitioned to NC. Dc'd bumex gtt, given 5mg metolazone x 1 and 2mg bumex x 1.   4/16: THONY drain #2 murky/bilious, pending CTAP with PO contrast, hypernatremia, started D5W @ 75cc/hr, persistent hypokalemia   4/17: RTOR for resection of anastomosis for anastomotic leak. Returned intubated, 400/24/80/10, sinus tachy to . Abdomen soft. Was initially on propofol @ 30 and precedex, but propofol weaned off due to hypotension with MAPs in 50s, fentanyl ggt started for acute pain. Remained hypotensive despite fluids, started on Levophed, on 0.05.   4/18: Extubated. HFNC 40L/49%, Levo off since 11 AM   4/19: NGT removed. Started on duonebs. D5W increased to 60cc/hr. 1mg bumex, > 1.5L response  4/20: Episode of afib RVR resolved with metoprolol 5mg IVP, cardiology c/s placed, recommending metoprolol 25mg q 12   4/21: PE on CTA, therapeutic heparin gtt started. Started on cardebe gtt,   4/22: Meropenem started per ID.  S/p IR percutaneous placement of a 8.5 Monegasque drainage catheter into lower abdominal wall fluid collection, yielding 150 mL of bowel appearing fluid. New left radial a-line placed. Off nicarrdipine gtt.   4/23: Switched to therapeutic lovenox. Diet advanced to aubrey clears with ensures.  4/24: advacned to bariatric FLD, downgraded to SDU  4/25: TPN discontinued, diet advanced to full liquid , Right cephalic DVT prelim  4/26: Right UE VA Duplex final no DVT, thromboplebitis, Arterial line removed, 2L NC, midline vac changed, noted to have bilious drainage, RUQ drain pulled, upgraded to SICU status, plan to reorder TPN for 4/27 PM, diet changed to full liquid. 1:20 AM noted to be unresponsive, palpable femoral pulse, decision made to intubate, R femoral arterial line placed, started on levophed, amauri, bicarb amp x 2, bicarb gtt, propofol, 2U pRBC for hgb 5.9, plan for CTH and CTAP when stabilized   4/27: 2uPRBC 1uFFP 1pkPlt, TXA 1g, IR for embolization due to active extravasation seen on CTA, no active bleed, no embolization, Return to OR for re-exploration and evacuation of old clot  4/28: IR consulted for IVC filter and PICC. LE duplex ordered, negative. Precedex added overnight for agitation. 1 U PRBC for Hgb 6.8 overnight  4/29: TF started at 20cc/hr. Meropenem decreased to Q 12 hours.   4/30: Extubated to 5L NC, then placed on HFNC. Heparin gtt restarted.   5/1: Placed on BiPAP, started on aubrey clears w/ clear ensures   5/4: passed TOV. On 6L NC. Bumex 1mg.       24Hour Events:  5/4  NIGHT   -PM rounds: Comfortable, SBP 130s, saturating 96% on NC 6L, NSR HR 90s, abdomen non-distended, THONY drain 1 and 3 serosanguinous, THONY #2 serous, non-tender  -K repleted   -AM CXR    DAY  -SBP 200s, labetalol 10 IVP  -6L NC  -1mg bumex IVP; 4PM BMP  -Repeat DVT sono 5/5  -passed TOV      [X] A ten-point review of systems was negative except as expressed in note.  [X ] History was obtained from patient. If unable to participate in their care, history was from review of the chart and collateral sources of information.  =====================================================================  Daily     Daily     CURRENT MEDS:  Neurologic Medications  acetaminophen     Tablet .. 650 milliGRAM(s) Oral every 6 hours  HYDROmorphone  Injectable 0.25 milliGRAM(s) IV Push every 4 hours PRN breakthrough  melatonin 10 milliGRAM(s) Oral at bedtime  oxyCODONE    IR 5 milliGRAM(s) Oral every 6 hours PRN Moderate Pain (4 - 6)  oxyCODONE    IR 10 milliGRAM(s) Oral every 6 hours PRN Severe Pain (7 - 10)    Respiratory Medications  albuterol/ipratropium for Nebulization 3 milliLiter(s) Nebulizer every 6 hours    Cardiovascular Medications  amLODIPine   Tablet 10 milliGRAM(s) Oral daily  losartan 100 milliGRAM(s) Oral daily    Gastrointestinal Medications  pantoprazole  Injectable 40 milliGRAM(s) IV Push every 12 hours    Genitourinary Medications    Hematologic/Oncologic Medications  enoxaparin Injectable 40 milliGRAM(s) SubCutaneous every 12 hours    Antimicrobial/Immunologic Medications  meropenem  IVPB 1000 milliGRAM(s) IV Intermittent every 8 hours    Endocrine/Metabolic Medications  insulin lispro (ADMELOG) corrective regimen sliding scale   SubCutaneous Before meals and at bedtime    Topical/Other Medications  chlorhexidine 2% Cloths 1 Application(s) Topical <User Schedule>    ICU Vital Signs Last 24 Hrs  T(C): 36.6 (05 May 2025 08:00), Max: 36.8 (05 May 2025 00:00)  T(F): 97.8 (05 May 2025 08:00), Max: 98.2 (05 May 2025 00:00)  HR: 101 (05 May 2025 08:00) (80 - 101)  BP: --  BP(mean): --  ABP: 189/93 (05 May 2025 08:00) (123/59 - 189/93)  ABP(mean): 121 (05 May 2025 08:00) (79 - 121)  RR: 29 (05 May 2025 08:00) (23 - 32)  SpO2: 97% (05 May 2025 08:00) (96% - 100%)    O2 Parameters below as of 05 May 2025 04:00  Patient On (Oxygen Delivery Method): nasal cannula  O2 Flow (L/min): 6            I&O's Summary    04 May 2025 07:01  -  05 May 2025 07:00  --------------------------------------------------------  IN: 1170 mL / OUT: 4370 mL / NET: -3200 mL      I&O's Detail    04 May 2025 07:01  -  05 May 2025 07:00  --------------------------------------------------------  IN:    IV PiggyBack: 300 mL    IV PiggyBack: 150 mL    IV PiggyBack: 150 mL    Oral Fluid: 570 mL  Total IN: 1170 mL    OUT:    Bulb (mL): 100 mL    Bulb (mL): 25 mL    Bulb (mL): 45 mL    Rectal Tube (mL): 350 mL    Voided (mL): 3850 mL  Total OUT: 4370 mL    Total NET: -3200 mL      PHYSICAL EXAM:     a&ox3  follows commands, BLACK  no acute distress  equal chest rise b/l, clear breath sounds, on 6L NC, IS 500cc  abdomen soft, nontender, midline staples intact, x3 drains with serosangunious output  extremities soft  rectal tube in place

## 2025-05-05 NOTE — PHYSICAL THERAPY INITIAL EVALUATION ADULT - BALANCE TRAINING, PT EVAL
Increase standing balance to Fair  by d/c.
increase S/D standing balance by 1/2 grade by discharge to promote safety awareness and decrease risk of falling.

## 2025-05-05 NOTE — PHYSICAL THERAPY INITIAL EVALUATION ADULT - GAIT TRAINING, PT EVAL
Pt will ambulate using RW or least restrictive AD for 20 ft with Mod A by discharge to facilitate return to PLOF.
Increase ambulation using a rolling walker, X 40-50 feet    with   min assist by d/c.

## 2025-05-05 NOTE — PHYSICAL THERAPY INITIAL EVALUATION ADULT - DIAGNOSIS, PT EVAL
generalized weakness S/P LAP CONVERTED TO OPEN VENTRAL HERNIA REPAIR W/ MESH AND SMALL BOWEL RESECTION
Rehab of Deconditioning ; Multiple abdominal surgeries ( see above), morbid obesity

## 2025-05-05 NOTE — PROGRESS NOTE ADULT - ATTENDING COMMENTS
Pt examined  labs and images reviewed  pt with supermorbid obesity and complex hernia with sbo  previously treated without surgery   passing flatus awaiting bowel movement but was having  more pain taken to OR urgently   4/10 : s/p BHUMI , Bowel Resection / Anastamosis / Primary repair of Fascia / hernia sac  overnight - renal failure / respiratory compromise  was intubated / paralysed/ botox  now on bumex - tapering   4/17 :  s/p BHUMI , Bowel Resection / Anastamosis / Primary repair of Fascia / hernia sac  4/22 : IR drain placed - subcutaneous collection  - purulent   4/27 : S/p exlap - no active bleeding , blood clots 1 liter evacuated  5/2: bleeding in the subcute - heparin held - sutured and bleeding stopped.     gradually improving  respiratory improving  mariam drain sero sang  on oral food and taper tpn when reaches goal  changing dressing wet to dry     impression : extremely high risk - supermorbid obesity and complex hernia with partial sbo  tube feeds   continue prophylaxis   will hold ac  ir possible for filter - on hold as there is no dvt  RUQ - intra peritoneal drain removed      SICU

## 2025-05-05 NOTE — PHYSICAL THERAPY INITIAL EVALUATION ADULT - NSACTIVITYREC_GEN_A_PT
PT provided AAROM ex on B/L lower extremities , mild resistive ex on the right UE. Pt's daughter is an NP, ( Shavonne) present at bedside.

## 2025-05-05 NOTE — PHYSICAL THERAPY INITIAL EVALUATION ADULT - BED MOBILITY TRAINING, PT EVAL
Increase bed mobility to CG assist by d/c.
Pt will participate in supine to sit and reverse using side rails with Mod A by discharge to facilitate return to PLOF.

## 2025-05-05 NOTE — PHYSICAL THERAPY INITIAL EVALUATION ADULT - IMPAIRMENTS FOUND, PT EVAL
aerobic capacity/endurance/gait, locomotion, and balance/muscle strength/ventilation and respiration/gas exchange
gait, locomotion, and balance/muscle strength

## 2025-05-06 LAB
ANION GAP SERPL CALC-SCNC: 10 MMOL/L — SIGNIFICANT CHANGE UP (ref 7–14)
ANION GAP SERPL CALC-SCNC: 12 MMOL/L — SIGNIFICANT CHANGE UP (ref 7–14)
ANION GAP SERPL CALC-SCNC: 7 MMOL/L — SIGNIFICANT CHANGE UP (ref 7–14)
BASOPHILS # BLD AUTO: 0.04 K/UL — SIGNIFICANT CHANGE UP (ref 0–0.2)
BASOPHILS NFR BLD AUTO: 0.4 % — SIGNIFICANT CHANGE UP (ref 0–1)
BUN SERPL-MCNC: 20 MG/DL — SIGNIFICANT CHANGE UP (ref 10–20)
BUN SERPL-MCNC: 21 MG/DL — HIGH (ref 10–20)
BUN SERPL-MCNC: 21 MG/DL — HIGH (ref 10–20)
CALCIUM SERPL-MCNC: 7.5 MG/DL — LOW (ref 8.4–10.5)
CALCIUM SERPL-MCNC: 7.5 MG/DL — LOW (ref 8.4–10.5)
CALCIUM SERPL-MCNC: 7.8 MG/DL — LOW (ref 8.4–10.5)
CHLORIDE SERPL-SCNC: 106 MMOL/L — SIGNIFICANT CHANGE UP (ref 98–110)
CHLORIDE SERPL-SCNC: 106 MMOL/L — SIGNIFICANT CHANGE UP (ref 98–110)
CHLORIDE SERPL-SCNC: 108 MMOL/L — SIGNIFICANT CHANGE UP (ref 98–110)
CO2 SERPL-SCNC: 22 MMOL/L — SIGNIFICANT CHANGE UP (ref 17–32)
CO2 SERPL-SCNC: 24 MMOL/L — SIGNIFICANT CHANGE UP (ref 17–32)
CO2 SERPL-SCNC: 26 MMOL/L — SIGNIFICANT CHANGE UP (ref 17–32)
CREAT SERPL-MCNC: 0.6 MG/DL — LOW (ref 0.7–1.5)
CREAT SERPL-MCNC: 0.6 MG/DL — LOW (ref 0.7–1.5)
CREAT SERPL-MCNC: 0.7 MG/DL — SIGNIFICANT CHANGE UP (ref 0.7–1.5)
EGFR: 100 ML/MIN/1.73M2 — SIGNIFICANT CHANGE UP
EGFR: 97 ML/MIN/1.73M2 — SIGNIFICANT CHANGE UP
EGFR: 97 ML/MIN/1.73M2 — SIGNIFICANT CHANGE UP
EOSINOPHIL # BLD AUTO: 0.07 K/UL — SIGNIFICANT CHANGE UP (ref 0–0.7)
EOSINOPHIL NFR BLD AUTO: 0.7 % — SIGNIFICANT CHANGE UP (ref 0–8)
GLUCOSE BLDC GLUCOMTR-MCNC: 110 MG/DL — HIGH (ref 70–99)
GLUCOSE BLDC GLUCOMTR-MCNC: 114 MG/DL — HIGH (ref 70–99)
GLUCOSE BLDC GLUCOMTR-MCNC: 120 MG/DL — HIGH (ref 70–99)
GLUCOSE BLDC GLUCOMTR-MCNC: 199 MG/DL — HIGH (ref 70–99)
GLUCOSE SERPL-MCNC: 105 MG/DL — HIGH (ref 70–99)
GLUCOSE SERPL-MCNC: 115 MG/DL — HIGH (ref 70–99)
GLUCOSE SERPL-MCNC: 123 MG/DL — HIGH (ref 70–99)
HCT VFR BLD CALC: 29.6 % — LOW (ref 37–47)
HGB BLD-MCNC: 9.8 G/DL — LOW (ref 12–16)
IMM GRANULOCYTES NFR BLD AUTO: 2.5 % — HIGH (ref 0.1–0.3)
LYMPHOCYTES # BLD AUTO: 1.13 K/UL — LOW (ref 1.2–3.4)
LYMPHOCYTES # BLD AUTO: 11.9 % — LOW (ref 20.5–51.1)
MAGNESIUM SERPL-MCNC: 1.6 MG/DL — LOW (ref 1.8–2.4)
MCHC RBC-ENTMCNC: 30.9 PG — SIGNIFICANT CHANGE UP (ref 27–31)
MCHC RBC-ENTMCNC: 33.1 G/DL — SIGNIFICANT CHANGE UP (ref 32–37)
MCV RBC AUTO: 93.4 FL — SIGNIFICANT CHANGE UP (ref 81–99)
MONOCYTES # BLD AUTO: 0.61 K/UL — HIGH (ref 0.1–0.6)
MONOCYTES NFR BLD AUTO: 6.4 % — SIGNIFICANT CHANGE UP (ref 1.7–9.3)
NEUTROPHILS # BLD AUTO: 7.4 K/UL — HIGH (ref 1.4–6.5)
NEUTROPHILS NFR BLD AUTO: 78.1 % — HIGH (ref 42.2–75.2)
NRBC BLD AUTO-RTO: 0 /100 WBCS — SIGNIFICANT CHANGE UP (ref 0–0)
PHOSPHATE SERPL-MCNC: 2.6 MG/DL — SIGNIFICANT CHANGE UP (ref 2.1–4.9)
PLATELET # BLD AUTO: 238 K/UL — SIGNIFICANT CHANGE UP (ref 130–400)
PMV BLD: 10.5 FL — HIGH (ref 7.4–10.4)
POTASSIUM SERPL-MCNC: 3.4 MMOL/L — LOW (ref 3.5–5)
POTASSIUM SERPL-MCNC: 3.7 MMOL/L — SIGNIFICANT CHANGE UP (ref 3.5–5)
POTASSIUM SERPL-MCNC: 4 MMOL/L — SIGNIFICANT CHANGE UP (ref 3.5–5)
POTASSIUM SERPL-SCNC: 3.4 MMOL/L — LOW (ref 3.5–5)
POTASSIUM SERPL-SCNC: 3.7 MMOL/L — SIGNIFICANT CHANGE UP (ref 3.5–5)
POTASSIUM SERPL-SCNC: 4 MMOL/L — SIGNIFICANT CHANGE UP (ref 3.5–5)
RBC # BLD: 3.17 M/UL — LOW (ref 4.2–5.4)
RBC # FLD: 16.8 % — HIGH (ref 11.5–14.5)
SODIUM SERPL-SCNC: 140 MMOL/L — SIGNIFICANT CHANGE UP (ref 135–146)
SODIUM SERPL-SCNC: 140 MMOL/L — SIGNIFICANT CHANGE UP (ref 135–146)
SODIUM SERPL-SCNC: 141 MMOL/L — SIGNIFICANT CHANGE UP (ref 135–146)
WBC # BLD: 9.49 K/UL — SIGNIFICANT CHANGE UP (ref 4.8–10.8)
WBC # FLD AUTO: 9.49 K/UL — SIGNIFICANT CHANGE UP (ref 4.8–10.8)

## 2025-05-06 PROCEDURE — 99291 CRITICAL CARE FIRST HOUR: CPT

## 2025-05-06 RX ORDER — SOD PHOS DI, MONO/K PHOS MONO 250 MG
1 TABLET ORAL ONCE
Refills: 0 | Status: COMPLETED | OUTPATIENT
Start: 2025-05-06 | End: 2025-05-06

## 2025-05-06 RX ORDER — PSYLLIUM SEED (WITH DEXTROSE)
1 POWDER (GRAM) ORAL DAILY
Refills: 0 | Status: COMPLETED | OUTPATIENT
Start: 2025-05-06 | End: 2025-05-08

## 2025-05-06 RX ORDER — POTASSIUM PHOSPHATE, MONOBASIC POTASSIUM PHOSPHATE, DIBASIC INJECTION, 236; 224 MG/ML; MG/ML
15 SOLUTION, CONCENTRATE INTRAVENOUS ONCE
Refills: 0 | Status: DISCONTINUED | OUTPATIENT
Start: 2025-05-06 | End: 2025-05-06

## 2025-05-06 RX ORDER — BUMETANIDE 1 MG/1
1 TABLET ORAL ONCE
Refills: 0 | Status: COMPLETED | OUTPATIENT
Start: 2025-05-06 | End: 2025-05-06

## 2025-05-06 RX ORDER — LACTOBACILLUS ACIDOPHILUS/PECT 75 MM-100
1 CAPSULE ORAL DAILY
Refills: 0 | Status: DISCONTINUED | OUTPATIENT
Start: 2025-05-06 | End: 2025-05-23

## 2025-05-06 RX ORDER — B1/B2/B3/B5/B6/B12/VIT C/FOLIC 500-0.5 MG
1 TABLET ORAL DAILY
Refills: 0 | Status: DISCONTINUED | OUTPATIENT
Start: 2025-05-06 | End: 2025-05-08

## 2025-05-06 RX ORDER — MAGNESIUM SULFATE 500 MG/ML
2 SYRINGE (ML) INJECTION
Refills: 0 | Status: COMPLETED | OUTPATIENT
Start: 2025-05-06 | End: 2025-05-07

## 2025-05-06 RX ADMIN — ENOXAPARIN SODIUM 40 MILLIGRAM(S): 100 INJECTION SUBCUTANEOUS at 06:18

## 2025-05-06 RX ADMIN — Medication 100 MILLIEQUIVALENT(S): at 06:17

## 2025-05-06 RX ADMIN — ENOXAPARIN SODIUM 40 MILLIGRAM(S): 100 INJECTION SUBCUTANEOUS at 18:02

## 2025-05-06 RX ADMIN — IPRATROPIUM BROMIDE AND ALBUTEROL SULFATE 3 MILLILITER(S): .5; 2.5 SOLUTION RESPIRATORY (INHALATION) at 01:29

## 2025-05-06 RX ADMIN — Medication 1 PACKET(S): at 13:50

## 2025-05-06 RX ADMIN — IPRATROPIUM BROMIDE AND ALBUTEROL SULFATE 3 MILLILITER(S): .5; 2.5 SOLUTION RESPIRATORY (INHALATION) at 20:08

## 2025-05-06 RX ADMIN — MEROPENEM 100 MILLIGRAM(S): 1 INJECTION INTRAVENOUS at 04:52

## 2025-05-06 RX ADMIN — MEROPENEM 100 MILLIGRAM(S): 1 INJECTION INTRAVENOUS at 12:17

## 2025-05-06 RX ADMIN — Medication 40 MILLIGRAM(S): at 06:18

## 2025-05-06 RX ADMIN — CYANOCOBALAMIN 1000 MICROGRAM(S): 1000 INJECTION INTRAMUSCULAR; SUBCUTANEOUS at 12:17

## 2025-05-06 RX ADMIN — Medication 25 GRAM(S): at 23:34

## 2025-05-06 RX ADMIN — Medication 100 MILLIEQUIVALENT(S): at 07:47

## 2025-05-06 RX ADMIN — IPRATROPIUM BROMIDE AND ALBUTEROL SULFATE 3 MILLILITER(S): .5; 2.5 SOLUTION RESPIRATORY (INHALATION) at 13:20

## 2025-05-06 RX ADMIN — IPRATROPIUM BROMIDE AND ALBUTEROL SULFATE 3 MILLILITER(S): .5; 2.5 SOLUTION RESPIRATORY (INHALATION) at 08:27

## 2025-05-06 RX ADMIN — Medication 100 MILLIEQUIVALENT(S): at 08:52

## 2025-05-06 RX ADMIN — LOSARTAN POTASSIUM 100 MILLIGRAM(S): 100 TABLET, FILM COATED ORAL at 06:18

## 2025-05-06 RX ADMIN — BUMETANIDE 1 MILLIGRAM(S): 1 TABLET ORAL at 13:51

## 2025-05-06 RX ADMIN — Medication 1 TABLET(S): at 12:17

## 2025-05-06 RX ADMIN — Medication 10 MILLIGRAM(S): at 22:48

## 2025-05-06 RX ADMIN — INSULIN LISPRO 4: 100 INJECTION, SOLUTION INTRAVENOUS; SUBCUTANEOUS at 12:19

## 2025-05-06 RX ADMIN — AMLODIPINE BESYLATE 10 MILLIGRAM(S): 10 TABLET ORAL at 06:18

## 2025-05-06 RX ADMIN — Medication 1 APPLICATION(S): at 06:18

## 2025-05-06 RX ADMIN — POTASSIUM PHOSPHATE, MONOBASIC POTASSIUM PHOSPHATE, DIBASIC INJECTION, 10.63 MILLIMOLE(S): 236; 224 SOLUTION, CONCENTRATE INTRAVENOUS at 23:19

## 2025-05-06 RX ADMIN — Medication 40 MILLIEQUIVALENT(S): at 12:16

## 2025-05-06 NOTE — PROGRESS NOTE ADULT - ASSESSMENT
ASSESSMENT & PLAN:  64F PMHx HTN, morbid obesity s/p 2001 Abby-en-Y gastric bypass who presented on 4/3 with incarcerated ventral hernia with SBO    4/10 open ventral hernia repair, small bowel resection, and primary fascial closure with Dr. Lema. Admitted to SICU for Q1 abdominal checks and hemodynamic monitoring.   4/17 takeback for exploratory laparotomy, repair of anastomotic leak  4/22 8Fr pigtail into lower abdominal wall collection,150cc of foul appearing material aspirated, Attached to THONY bulb  4/27 RTOR for re-exploration and evacuation of old clots from retroperitoneum and pelvis and placement of 3 new beatrice drains  4/28: IR consulted for IVC filter     NEUROLOGICAL:  #Acute pain  - Acetaminophen Q6   - Oxycodone 5mg q6 prn moderate pain  - Oxycodone 10mg q6 prn severe pain   #Agitation - resolved  - Precedex no longer required  #AMS    HCT 4/27: negative  #Sleep hygiene  - HOLDING Trazodone 25mg QPM  - Melatonin 10mg QPM    RESPIRATORY:   #Acute hypoxic respiratory failure  - Extubated 4/18, reintubated on 4/27 for AMS and agonal breathing, extubated 4/30  - Currently on 4L NC   - Cpap @ night  - duoneb treatment Q6  #Right distal main pulmonary embolus with segmental extension  - Heparin gtt off since 5/3  - No IVC filter needed at this time per IR  #Intermittent diuresis  - Last diuresis 5/5 1mg Bumex  #PMHx LOUIS on CPAP@ home   #Activity increase as tolerated    CARDS:   #Hypertensive urgency, resolved  - Off Nicardipine gtt since 5/1  #Acute hemorrhagic shock, resolved  - Off levophed and vasopressin since 4/30  - 4/27: 2u PRBC 1plt 1uFFP, 1g TXA  - OFF sodium bicarbonate infusion on 4/27  #Supraventricular tachycardia, Non-sustained ventricular tachycardia, Ventricular Premature Depolarization  - Cardiology (Dr. Lopez) - Metoprolol 25mg Q12 HOLDING  #PMHx of HTN  - continue Amlodipine 10mg QD  - continue Losartan 100mg qd  - HCTZ 25mg QD HOLDING  - EKG- NSR  - TTE 4/11: EF of 56%. G1DD.   - TTE 4/22: EF 70 to 75%.    GASTROINTESTINAL/NUTRITION:   #4/10 open ventral hernia repair, small bowel resection, ileoileal anastomosis and primary fascial closure, 2 THONY Right  #4/17 RTOR for exploratory laparotomy, repair of anastomotic leak at previous ileoileal anastomosis, resected, new ileoileal anastomosis created, 1 THONY Left (total 3)  - Intermittent abdominal vac change, last 4/26 PM, bilious drainage noted  - REMOVED 4/26 RUQ THONY  - REMOVED 4/28 LUQ THONY at ileo-ileal anastomosis, RLQ THONY in pelvis, IR THONY abdominal wall  #4/22: s/p IR percutaneous placement of a 8.5 Ivorian drainage catheter into lower abdominal wall fluid collection, yielding 150 mL of foul appearing fluid.  #4/27: Acute retroperitoneal CTA: mesenteric arterial blush noted with extravasation    - s/p IR without finding of any bleeding, no embolization required  #4/27 RTOR for re-exploration and evacuation of old clots from retroperitoneum and pelvis  - New midline wound vac in place > removed on 5/2   - x3 new 19Fr beatrice drains in place: 1. Right pelvis, 2. Left pelvis, 3. Subcutaneous (RUQ drain d/c'd 5/6)  - 5/2: 500mL sanguinous output from wound vac --> heparin gtt paused, 1u PRBC given. Bedside exploration of midline wound. venous bleed noted, 2 stitches 3-0 vicryl, surgicell, Fibrilar, bleeding controlled at time of dressing placement. Wound vac removed, wet to dry and pressure dressing applied  #Diet: DASH, pahse 3 bariatric with ensures  - on vitamin B12 supplement  - Aspiration precautions, HOB 30  #GI Prophylaxis/hx GERD  - Pantoprazole 40mg PO BID (home rx)  #Bowel regimen  - Holding  - Last BM 5/5  - Dignishield replaced 5/3    /RENAL:   #Urine output in critically ill  - IVL  - Bumex 1mg daily  #FUNMI; oliguric - resolved  - Renal u/s> no hydronephrosis. 2-3cm anechoic cyst on right kidney   #Lactic acidosis    - OFF sodium bicarbonate infusion  - Nephro: check CK, no indication of RRT, decrease meropenem to Q12    Labs:   BUN/Cr- 27/0.7  -->,  21/0.7  -->  [05-05 @ 20:14]Na  140 // K  3.6 // Mg  1.7 // Phos  2.5  [05-04 @ 20:54]Na  143 // K  3.6 // Mg  2.3 // Phos  3.0    HEME/ONC:   #DVT PPX  - LMWH 40mg q12  - SCD B/L  #acute anemia   - 4/27: 2uPRBC 1uFFP 1pkPlt, TXA 1g, IR for embolization due to active extravasation seen on CTA, no active bleed, no embolization, Return to OR for re-exploration and evacuation of old clot  - 4/28: IR consulted for IVC filter and PICC. LE duplex ordered, negative.  - 5/2: 1u PRBC for 500mL sanguinous output from wound vac   #Acute right pulmonary embolus  - Therapeutic Heparin gtt discontinued 5/2 secondary to venous bleed from wound vac   #B/L UE Venous Duplex 4/26/27 - thrombophlebitis  - B/L LE Venous duplex 4/21-RIGHT PT DVT, no LEFT DVT  - B/L LE Venous duplex 4/28-No evidence of deep venous thrombosis in either lower extremity.   [  ] Pending repeat duplex read 5/5  - Concern for malabsorption of Eliquis given small bowel resection per pharmacy  - Vascular surgery consulted - AC with heparin drip (upon discharge transition to Eliquis 10 mg po BID x 7 days then 5 mg po BID for at least 6 months    Labs: Hb/Hct:  9.8/29.7  (05-04 @ 20:54)  -->,  9.7/29.9  (05-05 @ 20:14)  -->                      Plts:  160  -->,  188  -->       ID:  #Intraabdominal anastomotic leak, fluid collection  #ID Consult  - continue meropenem 1g Q12  - Midline x Ertapenem 1g Q24 on discharge x 4 weeks (EOT 5/19)  - F/u with Dr. Perdue on Tuesdays via Telehealth   #Culture    OR 4/1 - E Coli    OR cx: few E.coli, few bacteroides, rare clostridium    4/13 Blood Cx: NGTF    4/14 tracheal aspirate: no growth     4/17 body fluid cx rare e. coli     4/22 Blood cx: negative    4/22 Abdominal - Negative FINAL    4/27 C diff stool panel: negative    4/28 blood culture: negative  WBC- 8.93  --->>,  9.07  --->>,  8.38  --->>  Temp trend- 24hrs T(F): 97.8 (05-06 @ 00:00), Max: 97.8 (05-05 @ 08:00)  Current antibiotics-meropenem  IVPB 1000 every 8 hours    ENDOCRINE:  #Glycemic monitoring  - ACHS fingersticks  - ISS  - A1C 5.8%     MSK:  #Activity- increase as tolerated    SKIN:  #left forearm skin tear   - Continue to monitor daily skin changes    LINES/DRAINS:  PIV    LLQ pelvis beatrice drain (4/28 -     Midline subtaneous beatrice drain (4/28 -     Right IJ TLC (4/17 -     Discontinued lines:    Right Femoral Arterial Line (4/27 - 4/28)    Prevena Vac Midline (removed 5/2)     Left radial arterial line (4/28 - 5/5)    RLQ pelvis beatrice drain (4/28 - 5/5)    ADVANCED DIRECTIVES:  Full Code  Emergency - Daughter  (Shavonne Moss)  INDICATION FOR SICU/SDU: Intestinal obstruction    DISPO: SICU    Case discussed with SICU attending Dr. Bermudez ASSESSMENT & PLAN:  64F PMHx HTN, morbid obesity s/p 2001 Abby-en-Y gastric bypass who presented on 4/3 with incarcerated ventral hernia with SBO    4/10 open ventral hernia repair, small bowel resection, and primary fascial closure with Dr. Lema. Admitted to SICU for Q1 abdominal checks and hemodynamic monitoring.   4/17 takeback for exploratory laparotomy, repair of anastomotic leak  4/22 8Fr pigtail into lower abdominal wall collection,150cc of foul appearing material aspirated, Attached to THONY bulb  4/27 RTOR for re-exploration and evacuation of old clots from retroperitoneum and pelvis and placement of 3 new beatrice drains  4/28: IR consulted for IVC filter     NEUROLOGICAL:  #Acute pain  - Acetaminophen Q6   - Oxycodone 5mg q6 prn moderate pain  - Oxycodone 10mg q6 prn severe pain   #Agitation - resolved  - Precedex no longer required  #AMS    HCT 4/27: negative  #Sleep hygiene  - HOLDING Trazodone 25mg QPM  - Melatonin 10mg QPM    RESPIRATORY:   #Acute hypoxic respiratory failure  - Extubated 4/18, reintubated on 4/27 for AMS and agonal breathing, extubated 4/30  - Currently on 3L NC   - CPAP @ night  - duoneb treatment Q6  #Right distal main pulmonary embolus with segmental extension  - Heparin gtt off since 5/3  - No IVC filter needed at this time per IR  #Intermittent diuresis  - Last diuresis 5/5 1mg Bumex  #PMHx LOUIS on CPAP@ home   #Activity increase as tolerated    CARDS:   #Hypertensive urgency, resolved  - Off Nicardipine gtt since 5/1  #Acute hemorrhagic shock, resolved  - Off levophed and vasopressin since 4/30  - 4/27: 2u PRBC 1plt 1uFFP, 1g TXA  - OFF sodium bicarbonate infusion on 4/27  #Supraventricular tachycardia, Non-sustained ventricular tachycardia, Ventricular Premature Depolarization  - Cardiology (Dr. Lopez) - Metoprolol 25mg Q12 HOLDING  #PMHx of HTN  - continue Amlodipine 10mg QD  - continue Losartan 100mg qd  - HCTZ 25mg QD HOLDING  - EKG- NSR  - TTE 4/11: EF of 56%. G1DD.   - TTE 4/22: EF 70 to 75%.    GASTROINTESTINAL/NUTRITION:   #4/10 open ventral hernia repair, small bowel resection, ileoileal anastomosis and primary fascial closure, 2 THONY Right  #4/17 RTOR for exploratory laparotomy, repair of anastomotic leak at previous ileoileal anastomosis, resected, new ileoileal anastomosis created, 1 THONY Left (total 3)  - Intermittent abdominal vac change, last 4/26 PM, bilious drainage noted  - REMOVED 4/26 RUQ THONY  - REMOVED 4/28 LUQ THONY at ileo-ileal anastomosis, RLQ THONY in pelvis, IR THONY abdominal wall  #4/22: s/p IR percutaneous placement of a 8.5 Nigerien drainage catheter into lower abdominal wall fluid collection, yielding 150 mL of foul appearing fluid.  #4/27: Acute retroperitoneal CTA: mesenteric arterial blush noted with extravasation    - s/p IR without finding of any bleeding, no embolization required  #4/27 RTOR for re-exploration and evacuation of old clots from retroperitoneum and pelvis  - New midline wound vac in place > removed on 5/2   - x3 new 19Fr beatrice drains in place: 1. Right pelvis, 2. Left pelvis, 3. Subcutaneous (RUQ drain d/c'd 5/6)  - 5/2: 500mL sanguinous output from wound vac --> heparin gtt paused, 1u PRBC given. Bedside exploration of midline wound. venous bleed noted, 2 stitches 3-0 vicryl, surgicell, Fibrilar, bleeding controlled at time of dressing placement. Wound vac removed, wet to dry and pressure dressing applied  #Diet: DASH with ensure clears  - on vitamin B12 supplement  - Aspiration precautions, HOB 30  #GI Prophylaxis/hx GERD  - Pantoprazole 40mg PO BID (home rx)  #Bowel regimen  - Holding  - Last BM 5/5  - Dignishield replaced 5/3    /RENAL:   #Urine output in critically ill  - IVL  - Bumex 1mg IVP daily  #FUNMI; oliguric - resolved  - Renal u/s> no hydronephrosis. 2-3cm anechoic cyst on right kidney   #Lactic acidosis    - OFF sodium bicarbonate infusion  - Nephro: check CK, no indication of RRT, decrease meropenem to Q12    Labs:          BUN/Cr- 21/0.7  -->,  21/0.6  -->          [05-06 @ 05:00]Na  141 // K  3.4 // Mg  -- // Phos  --  [05-05 @ 20:14]Na  140 // K  3.6 // Mg  1.7 // Phos  2.5    HEME/ONC:   #DVT PPX  - LMWH 40mg q12  - SCD B/L  #acute anemia   - 4/27: 2uPRBC 1uFFP 1pkPlt, TXA 1g, IR for embolization due to active extravasation seen on CTA, no active bleed, no embolization, Return to OR for re-exploration and evacuation of old clot  - 4/28: IR consulted for IVC filter and PICC. LE duplex ordered, negative.  - 5/2: 1u PRBC for 500mL sanguinous output from wound vac   #Acute right pulmonary embolus  - Therapeutic Heparin gtt discontinued 5/2 secondary to venous bleed from wound vac   #B/L UE Venous Duplex 4/26/27 - thrombophlebitis  - B/L LE Venous duplex 4/21-RIGHT PT DVT, no LEFT DVT  - B/L LE Venous duplex 4/28-No evidence of deep venous thrombosis in either lower extremity.   [  ] Pending repeat duplex read 5/5  - Concern for malabsorption of Eliquis given small bowel resection per pharmacy  - Vascular surgery consulted - AC with heparin drip (upon discharge transition to Eliquis 10 mg po BID x 7 days then 5 mg po BID for at least 6 months    Labs: Hb/Hct:  9.8/29.7  (05-04 @ 20:54)  -->,  9.7/29.9  (05-05 @ 20:14)  -->                      Plts:  160  -->,  188  -->       ID:  #Intraabdominal anastomotic leak, fluid collection  #ID Consult  - continue meropenem 1g Q12  - Midline x Ertapenem 1g Q24 on discharge x 4 weeks (EOT 5/19)  - F/u with Dr. Perdue on Tuesdays via Telehealth   #Culture    OR 4/1 - E Coli    OR cx: few E.coli, few bacteroides, rare clostridium    4/13 Blood Cx: NGTF    4/14 tracheal aspirate: no growth     4/17 body fluid cx rare e. coli     4/22 Blood cx: negative    4/22 Abdominal - Negative FINAL    4/27 C diff stool panel: negative    4/28 blood culture: negative  WBC- 8.93  --->>,  9.07  --->>,  8.38  --->>  Temp trend- 24hrs T(F): 97.8 (05-06 @ 00:00), Max: 97.8 (05-05 @ 08:00)  Current antibiotics-meropenem  IVPB 1000 every 8 hours    ENDOCRINE:  #Glycemic monitoring  - ACHS fingersticks  - ISS  - A1C 5.8%     MSK:  #Activity- increase as tolerated    SKIN:  #left forearm skin tear   - Continue to monitor daily skin changes    LINES/DRAINS:  PIV    LLQ pelvis beatrice drain (4/28 -     Midline subcutaneous beatrice drain (4/28 -     Right IJ TLC (4/17 -     Discontinued lines:    Right Femoral Arterial Line (4/27 - 4/28)    Prevena Vac Midline (removed 5/2)     Left radial arterial line (4/28 - 5/5)    RLQ pelvis beatrice drain (4/28 - 5/5)    ADVANCED DIRECTIVES:  Full Code  Emergency - Daughter  (Shavonne Moss)  INDICATION FOR SICU/SDU: Intestinal obstruction    DISPO: SICU    Case discussed with SICU attending Dr. Bermudez

## 2025-05-06 NOTE — PROGRESS NOTE ADULT - SUBJECTIVE AND OBJECTIVE BOX
Patient is a 65y old  Female who presents with a chief complaint of bowel obstruction       HPI:  The patient is a 64-year-old female with a past medical history of high blood pressure and gastric bypass surgery in 2001, presenting with two days of sharp abdominal pain and one episode of non-bloody, non-bilious vomiting. She has a prior history of small bowel obstruction in 2006, 2008, and most recently in 2020, all of which resolved with conservative management. Her current symptoms are similar in nature. A computed tomography scan of the abdomen and pelvis with oral and intravenous contrast revealed multiple ventral abdominal wall hernias containing both obstructed and non-obstructed bowel loops. A complex small bowel obstruction is present, involving three hernias on the right side of the abdomen, with dilated, fluid-filled loops of small bowel, trace fluid between loops, and a collapsed segment of bowel beyond a hernia in the right lower quadrant. These findings are consistent with a recurrent small bowel obstruction. The hernia was non reducible at bedside.     4/10 open ventral hernia repair, small bowel resection, and primary fascial closure with Dr. Lema. Admitted to SICU for Q1 abdominal checks and hemodynamic monitoring.   4/17 take back for exploratory laparotomy, repair of anastomotic leak  4/22 8Fr pigtail into lower abdominal wall collection,150cc of foul appearing material aspirated, Attached to THONY bulb  4/27 RTOR for re-exploration and evacuation of old clots from retroperitoneum and pelvis and placement of 3 new beatrice drains  4/28: IR consulted for IVC filter     #Acute hypoxic respiratory failure  - Extubated 4/18, reintubated on 4/27 for AMS and agonal breathing, extubated 4/30  - Currently on 3L NC   - CPAP @ night  - duoneb treatment Q6          PHYSICAL EXAM    Vital Signs Last 24 Hrs  T(C): 36.3 (06 May 2025 08:00), Max: 36.6 (06 May 2025 00:00)  T(F): 97.4 (06 May 2025 08:00), Max: 97.8 (06 May 2025 00:00)  HR: 98 (06 May 2025 09:00) (82 - 105)  BP: 149/76 (06 May 2025 09:00) (109/67 - 149/83)  BP(mean): 104 (06 May 2025 09:00) (84 - 113)  RR: 25 (06 May 2025 09:00) (17 - 31)  SpO2: 96% (06 May 2025 09:00) (91% - 98%)    Parameters below as of 06 May 2025 06:00  Patient On (Oxygen Delivery Method): nasal cannula  O2 Flow (L/min): 4      Constitutional - NAD  Chest - CTA  Cardiovascular - S1S2+  Abdomen -  Soft  Extremities -  No calf tenderness   Function : bed mobility dependent / transfer unable       acetaminophen     Tablet .. 650 milliGRAM(s) Oral every 6 hours PRN  albuterol/ipratropium for Nebulization 3 milliLiter(s) Nebulizer every 6 hours  amLODIPine   Tablet 10 milliGRAM(s) Oral daily  chlorhexidine 2% Cloths 1 Application(s) Topical <User Schedule>  cyanocobalamin 1000 MICROGram(s) Oral daily  enoxaparin Injectable 40 milliGRAM(s) SubCutaneous every 12 hours  insulin lispro (ADMELOG) corrective regimen sliding scale   SubCutaneous Before meals and at bedtime  lactobacillus acidophilus 1 Tablet(s) Oral daily  losartan 100 milliGRAM(s) Oral daily  melatonin 10 milliGRAM(s) Oral at bedtime  meropenem  IVPB 1000 milliGRAM(s) IV Intermittent every 8 hours  multivitamin 1 Tablet(s) Oral daily  potassium chloride   Powder 40 milliEquivalent(s) Oral once  psyllium Powder 1 Packet(s) Oral daily      RECENT LABS/IMAGING                        9.7    8.38  )-----------( 188      ( 05 May 2025 20:14 )             29.9     05-06    141  |  108  |  21[H]  ----------------------------<  105[H]  3.4[L]   |  26  |  0.6[L]    Ca    7.8[L]      06 May 2025 05:00  Phos  2.5     05-05  Mg     1.7     05-05    TPro  5.1[L]  /  Alb  2.8[L]  /  TBili  0.7  /  DBili  0.4[H]  /  AST  27  /  ALT  80[H]  /  AlkPhos  169[H]  05-05      Urinalysis Basic - ( 06 May 2025 05:00 )    Color: x / Appearance: x / SG: x / pH: x  Gluc: 105 mg/dL / Ketone: x  / Bili: x / Urobili: x   Blood: x / Protein: x / Nitrite: x   Leuk Esterase: x / RBC: x / WBC x   Sq Epi: x / Non Sq Epi: x / Bacteria: x

## 2025-05-06 NOTE — PROGRESS NOTE ADULT - ASSESSMENT
Imp: Rehab of debilitation / incarcerated ventral hernia, S/P LAP CONVERTED TO OPEN VENTRAL HERNIA REPAIR W/ MESH AND SMALL BOWEL RESECTION, hospital course complicated by anastomotic leak requiring repair, on long term IV antibiotic, evacuation of retroperitoneal and pelvic clots,  respiratory failure requiring reintubation and later extubated, PE  / HTN and gastric bypass surgery in 2001    Plan: continue bedside therapy as tolerated            encourage OOB            limited therapy tolerance            will follow

## 2025-05-06 NOTE — PROGRESS NOTE ADULT - CRITICAL CARE ATTENDING COMMENT
Critical Care: [id number]   This patient has a high probability of sudden, clinically significant deterioration, which requires the highest level of physician preparedness to intervene urgently. I managed/supervised life or organ supporting interventions that required frequent physician assessment. I have reviewed and agree with note above. I devoted my full attention to the direct care of this patient for the period of time indicated, including reviewing relevant labs and imaging, discussing treatment plan with the SICU team, nurses, and primary team at the time of multidisciplinary rounds, and reviewing findings with patient and family. This does not include time devoted to teaching any trainees and to performing any procedures.    NANCY SARGENT 64y Female admitted to SICU with incarcerated ventral hernia with SBO now s/p open VHR, SBR with postop respiratory failure and nonoliguric ARF secondary to septic shock    Issues we are addressing today:    Neuro: minimizing pain meds, home meds as possible, delirium precautions, sleep meds as needed at night  CV: optimize fluid status, home antihypertensives  Respiratory: continue to wean support as possible, bipap and HFNC as tolerated  Nutrition: diet as tolerated  Renal: monitor Is &Os, continue diuresis today  ID: abx d/c today   Lines: d/c a-line today, keep TLC until adequate IV access  Heme: continue to evaluate for acute blood loss anemia   Endocrine: Prevent and treat hyperglycemia with insulin as needed    PV: follow up LE DVT US  Skin: decub precautions  DVT Prophylaxis   Stress Gastritis Prophylaxis   Mobility: PT/OT, OOBTC    safe for transfer to SDU    The above note is NOT written at the time of rounds and will reflect all changes throughout management of the patient for the day note is written for.    Chi Bermudez MD, D.PRATEEK.

## 2025-05-06 NOTE — PROGRESS NOTE ADULT - ASSESSMENT
65y F w/ PMHx of  Morbid obesity, LOUIS, large complex ventral hernia s/p repair sbr and loss of domain c/b post op intubation, hypotension requiring vasopressors, anastomotic leakage s/p RTOR and reanastomosis, now doing better post op from a respiratory status. Comfortable on BIPAP. Off pressors. Blood cultures negative.    PLAN:  - Regular diet  - Encourage mobilization  - Daily wet to dry dressing changes, f/u bleeding recurrence  - Wean off O2 as tolerated  - Monitor for daily output quality on drains  - Rest of care per SICU    BLUE TEAM SPECTRA: [id number]

## 2025-05-06 NOTE — PROGRESS NOTE ADULT - SUBJECTIVE AND OBJECTIVE BOX
NACNY SARGENT   [id number]   [birth year]  64yF  ============================================================   DATE OF INITIAL SICU/SDU CONSULT: 04-10-25    INDICATION FOR SICU CONSULT:  q1hr abdominal checks, mechanical ventilation    SICU COURSE EVENTS :  04-10 - admitted to SICU service  4/11: Intubated and started on paralytic. Arterial line placed, subclavian TLC placed. Nephrology consulted for oliguria. IR abdominal muscle botox injection performed. TTE performed.   4/12: Additional fluid boluses given; trial fo bumex started, considering CVVH. Weaned off nimbex gtt.  > 220 /hr. Renal U/S w/out hydro.   4/13: Weaned off Levophed. Bumex gtt 2mg/hr > 1mg/hr. Goal net negative 1-1.5L, UOP approx, 200c/hr.   4/14: Remained on bumex gtt for further diuresis, decreased to 0.5 overnight, vent settings weaned. Cr downtrending, LFTs worsening.   4/15: Extubated to BiPAP, transitioned to NC. Dc'd bumex gtt, given 5mg metolazone x 1 and 2mg bumex x 1.   4/16: THONY drain #2 murky/bilious, pending CTAP with PO contrast, hypernatremia, started D5W @ 75cc/hr, persistent hypokalemia   4/17: RTOR for resection of anastomosis for anastomotic leak. Returned intubated, 400/24/80/10, sinus tachy to . Abdomen soft. Was initially on propofol @ 30 and precedex, but propofol weaned off due to hypotension with MAPs in 50s, fentanyl ggt started for acute pain. Remained hypotensive despite fluids, started on Levophed, on 0.05.   4/18: Extubated. HFNC 40L/49%, Levo off since 11 AM   4/19: NGT removed. Started on duonebs. D5W increased to 60cc/hr. 1mg bumex, > 1.5L response  4/20: Episode of afib RVR resolved with metoprolol 5mg IVP, cardiology c/s placed, recommending metoprolol 25mg q 12   4/21: PE on CTA, therapeutic heparin gtt started. Started on cardebe gtt,   4/22: Meropenem started per ID.  S/p IR percutaneous placement of a 8.5 [place] drainage catheter into lower abdominal wall fluid collection, yielding 150 mL of bowel appearing fluid. New left radial a-line placed. Off nicarrdipine gtt.   4/23: Switched to therapeutic lovenox. Diet advanced to aubrey clears with ensures.  4/24: advacned to bariatric FLD, downgraded to SDU  4/25: TPN discontinued, diet advanced to full liquid , Right cephalic DVT prelim  4/26: Right UE VA Duplex final no DVT, thromboplebitis, Arterial line removed, 2L NC, midline vac changed, noted to have bilious drainage, RUQ drain pulled, upgraded to SICU status, plan to reorder TPN for 4/27 PM, diet changed to full liquid. 1:20 AM noted to be unresponsive, palpable femoral pulse, decision made to intubate, R femoral arterial line placed, started on levophed, amauri, bicarb amp x 2, bicarb gtt, propofol, 2U pRBC for hgb 5.9, plan for CTH and CTAP when stabilized   4/27: 2uPRBC 1uFFP 1pkPlt, TXA 1g, IR for embolization due to active extravasation seen on CTA, no active bleed, no embolization, Return to OR for re-exploration and evacuation of old clot  4/28: IR consulted for IVC filter and PICC. LE duplex ordered, negative. Precedex added overnight for agitation. 1 U PRBC for Hgb 6.8 overnight  4/29: TF started at 20cc/hr. Meropenem decreased to Q 12 hours.   4/30: Extubated to 5L NC, then placed on HFNC. Heparin gtt restarted.   5/1: Placed on BiPAP, started on aubrey clears w/ clear ensures   5/4: passed TOV. On 6L NC. Bumex 1mg.   5/5: diuresis with bumex 1mg, RLQ drain removed    24Hour Events:  5/5  NIGHT  -PM rounds: SB 140s, HR 86. On home CPAP overnight. THONY x 2 with SS output  - 2g Mag  - 30Kphos, AM BMP ordered    DAY  - f/u LE duplex sono  - labetalol 10 x1 for hypertension to 180s > resolved  - d/c dilaudid  - trazadone PRN for sleep hygiene  - bumex 1mg IV  - d/c arterial line  - switch PPI to PO  - advance diet to regular  - added vitamin B12  - surgery to remove RUQ drain  - physiatry consulted > family requesting 4A    [X] A ten-point review of systems was negative except as expressed in note.  [X ] History was obtained from patient. If unable to participate in their care, history was from review of the chart and collateral sources of information.  =====================================================================   NANCY SARGENT   [id number]   [birth year]  64yF  ============================================================   DATE OF INITIAL SICU/SDU CONSULT: 04-10-25    INDICATION FOR SICU CONSULT:  q1hr abdominal checks, mechanical ventilation    SICU COURSE EVENTS :  04-10 - admitted to SICU service  4/11: Intubated and started on paralytic. Arterial line placed, subclavian TLC placed. Nephrology consulted for oliguria. IR abdominal muscle botox injection performed. TTE performed.   4/12: Additional fluid boluses given; trial fo bumex started, considering CVVH. Weaned off nimbex gtt.  > 220 /hr. Renal U/S w/out hydro.   4/13: Weaned off Levophed. Bumex gtt 2mg/hr > 1mg/hr. Goal net negative 1-1.5L, UOP approx, 200c/hr.   4/14: Remained on bumex gtt for further diuresis, decreased to 0.5 overnight, vent settings weaned. Cr downtrending, LFTs worsening.   4/15: Extubated to BiPAP, transitioned to NC. Dc'd bumex gtt, given 5mg metolazone x 1 and 2mg bumex x 1.   4/16: THONY drain #2 murky/bilious, pending CTAP with PO contrast, hypernatremia, started D5W @ 75cc/hr, persistent hypokalemia   4/17: RTOR for resection of anastomosis for anastomotic leak. Returned intubated, 400/24/80/10, sinus tachy to . Abdomen soft. Was initially on propofol @ 30 and precedex, but propofol weaned off due to hypotension with MAPs in 50s, fentanyl ggt started for acute pain. Remained hypotensive despite fluids, started on Levophed, on 0.05.   4/18: Extubated. HFNC 40L/49%, Levo off since 11 AM   4/19: NGT removed. Started on duonebs. D5W increased to 60cc/hr. 1mg bumex, > 1.5L response  4/20: Episode of afib RVR resolved with metoprolol 5mg IVP, cardiology c/s placed, recommending metoprolol 25mg q 12   4/21: PE on CTA, therapeutic heparin gtt started. Started on cardebe gtt,   4/22: Meropenem started per ID.  S/p IR percutaneous placement of a 8.5 [place] drainage catheter into lower abdominal wall fluid collection, yielding 150 mL of bowel appearing fluid. New left radial a-line placed. Off nicarrdipine gtt.   4/23: Switched to therapeutic lovenox. Diet advanced to aubrey clears with ensures.  4/24: advacned to bariatric FLD, downgraded to SDU  4/25: TPN discontinued, diet advanced to full liquid , Right cephalic DVT prelim  4/26: Right UE VA Duplex final no DVT, thromboplebitis, Arterial line removed, 2L NC, midline vac changed, noted to have bilious drainage, RUQ drain pulled, upgraded to SICU status, plan to reorder TPN for 4/27 PM, diet changed to full liquid. 1:20 AM noted to be unresponsive, palpable femoral pulse, decision made to intubate, R femoral arterial line placed, started on levophed, amauri, bicarb amp x 2, bicarb gtt, propofol, 2U pRBC for hgb 5.9, plan for CTH and CTAP when stabilized   4/27: 2uPRBC 1uFFP 1pkPlt, TXA 1g, IR for embolization due to active extravasation seen on CTA, no active bleed, no embolization, Return to OR for re-exploration and evacuation of old clot  4/28: IR consulted for IVC filter and PICC. LE duplex ordered, negative. Precedex added overnight for agitation. 1 U PRBC for Hgb 6.8 overnight  4/29: TF started at 20cc/hr. Meropenem decreased to Q 12 hours.   4/30: Extubated to 5L NC, then placed on HFNC. Heparin gtt restarted.   5/1: Placed on BiPAP, started on aubrey clears w/ clear ensures   5/4: passed TOV. On 6L NC. Bumex 1mg.   5/5: diuresis with bumex 1mg, RLQ drain removed    24Hour Events:  5/5  NIGHT  -PM rounds: SB 140s, HR 86. On home CPAP overnight. THONY x 2 with SS output  - 2g Mag  - 30Kphos, AM BMP ordered    DAY  - f/u LE duplex sono  - labetalol 10 x1 for hypertension to 180s > resolved  - d/c dilaudid  - trazadone PRN for sleep hygiene  - bumex 1mg IV  - d/c arterial line  - switch PPI to PO  - advance diet to regular  - added vitamin B12  - surgery to remove RUQ drain  - physiatry consulted > family requesting 4A    [X] A ten-point review of systems was negative except as expressed in note.  [X ] History was obtained from patient. If unable to participate in their care, history was from review of the chart and collateral sources of information.  =====================================================================  Daily       Diet, DASH/TLC:   Sodium & Cholesterol Restricted  No Carb Prosource TF     Qty per Day:  2  Supplement Feeding Modality:  Oral  Ensure Clear Cans or Servings Per Day:  1       Frequency:  Three Times a day (05-06-25 @ 08:14)      CURRENT MEDS:  Neurologic Medications  acetaminophen     Tablet .. 650 milliGRAM(s) Oral every 6 hours PRN Mild Pain (1 - 3)  melatonin 10 milliGRAM(s) Oral at bedtime  oxyCODONE    IR 5 milliGRAM(s) Oral every 6 hours PRN Moderate Pain (4 - 6)  oxyCODONE    IR 10 milliGRAM(s) Oral every 6 hours PRN Severe Pain (7 - 10)    Respiratory Medications  albuterol/ipratropium for Nebulization 3 milliLiter(s) Nebulizer every 6 hours    Cardiovascular Medications  amLODIPine   Tablet 10 milliGRAM(s) Oral daily  losartan 100 milliGRAM(s) Oral daily    Gastrointestinal Medications  cyanocobalamin 1000 MICROGram(s) Oral daily  pantoprazole    Tablet 40 milliGRAM(s) Oral every 12 hours  potassium chloride  20 mEq/100 mL IVPB 20 milliEquivalent(s) IV Intermittent once    Genitourinary Medications    Hematologic/Oncologic Medications  enoxaparin Injectable 40 milliGRAM(s) SubCutaneous every 12 hours    Antimicrobial/Immunologic Medications  meropenem  IVPB 1000 milliGRAM(s) IV Intermittent every 8 hours    Endocrine/Metabolic Medications  insulin lispro (ADMELOG) corrective regimen sliding scale   SubCutaneous Before meals and at bedtime    Topical/Other Medications  chlorhexidine 2% Cloths 1 Application(s) Topical <User Schedule>      ICU Vital Signs Last 24 Hrs  T(C): 36.4 (06 May 2025 04:00), Max: 36.6 (06 May 2025 00:00)  T(F): 97.5 (06 May 2025 04:00), Max: 97.8 (06 May 2025 00:00)  HR: 91 (06 May 2025 06:00) (82 - 105)  BP: 142/65 (06 May 2025 06:00) (109/67 - 160/91)  BP(mean): 94 (06 May 2025 06:00) (84 - 118)  ABP: 169/68 (05 May 2025 11:00) (150/69 - 193/85)  ABP(mean): 94 (05 May 2025 11:00) (91 - 110)  RR: 23 (06 May 2025 06:00) (17 - 31)  SpO2: 98% (06 May 2025 06:00) (91% - 98%)    O2 Parameters below as of 06 May 2025 06:00  Patient On (Oxygen Delivery Method): nasal cannula  O2 Flow (L/min): 4      I&O's Summary    05 May 2025 07:01  -  06 May 2025 07:00  --------------------------------------------------------  IN: 1670 mL / OUT: 3965 mL / NET: -2295 mL      I&O's Detail    05 May 2025 07:01  -  06 May 2025 07:00  --------------------------------------------------------  IN:    IV PiggyBack: 100 mL    IV PiggyBack: 50 mL    IV PiggyBack: 100 mL    IV PiggyBack: 500 mL    Oral Fluid: 920 mL  Total IN: 1670 mL    OUT:    Bulb (mL): 10 mL    Bulb (mL): 30 mL    Bulb (mL): 25 mL    Rectal Tube (mL): 450 mL    Voided (mL): 3450 mL  Total OUT: 3965 mL    Total NET: -2295 mL    PHYSICAL EXAM:    General/Neuro  RASS:             GCS:  15   = E   / V   / M      Deficits:    alert & oriented x 3, no focal deficits    Lungs:      clear to auscultation, Normal expansion/effort.     Cardiovascular : S1, S2.  Regular rate and rhythm.  B/L Peripheral edema in lower extremities   Cardiac Rhythm: Normal Sinus Rhythm    GI: Abdomen soft, Non-tender, Non-distended.    THONY drains x 2 serosanguinous    Extremities: Extremities warm, pink, well-perfused.     Derm: Good skin turgor, no skin breakdown.      :  No pierce catheter in place.    LABS:  POCT Blood Glucose.: 110 mg/dL (06 May 2025 06:49)  POCT Blood Glucose.: 115 mg/dL (05 May 2025 21:15)  POCT Blood Glucose.: 116 mg/dL (05 May 2025 16:53)  POCT Blood Glucose.: 96 mg/dL (05 May 2025 11:36)                          9.7    8.38  )-----------( 188      ( 05 May 2025 20:14 )             29.9       05-06    141  |  108  |  21[H]  ----------------------------<  105[H]  3.4[L]   |  26  |  0.6[L]    Ca    7.8[L]      06 May 2025 05:00  Phos  2.5     05-05  Mg     1.7     05-05    TPro  5.1[L]  /  Alb  2.8[L]  /  TBili  0.7  /  DBili  0.4[H]  /  AST  27  /  ALT  80[H]  /  AlkPhos  169[H]  05-05        Urinalysis Basic - ( 06 May 2025 05:00 )    Color: x / Appearance: x / SG: x / pH: x  Gluc: 105 mg/dL / Ketone: x  / Bili: x / Urobili: x   Blood: x / Protein: x / Nitrite: x   Leuk Esterase: x / RBC: x / WBC x   Sq Epi: x / Non Sq Epi: x / Bacteria: x

## 2025-05-06 NOTE — PROGRESS NOTE ADULT - ATTENDING COMMENTS
Pt examined  labs and images reviewed  pt with supermorbid obesity and complex hernia with sbo  previously treated without surgery   passing flatus awaiting bowel movement but was having  more pain taken to OR urgently   4/10 : s/p BHUMI , Bowel Resection / Anastamosis / Primary repair of Fascia / hernia sac  overnight - renal failure / respiratory compromise  was intubated / paralysed/ botox  now on bumex - tapering   4/17 :  s/p BHUMI , Bowel Resection / Anastamosis / Primary repair of Fascia / hernia sac  4/22 : IR drain placed - subcutaneous collection  - purulent   4/27 : S/p exlap - no active bleeding , blood clots 1 liter evacuated  5/2: bleeding in the subcute - heparin held - sutured and bleeding stopped.     gradually improving  mariam drain sero sang  on oral food      impression : extremely high risk - supermorbid obesity and complex hernia with partial SBO  tube feeds   continue prophylaxis   will hold ac  IR possible for filter - want to hold of till repeat duplex shows dvt   SICU

## 2025-05-07 LAB
ANION GAP SERPL CALC-SCNC: 11 MMOL/L — SIGNIFICANT CHANGE UP (ref 7–14)
ANION GAP SERPL CALC-SCNC: 11 MMOL/L — SIGNIFICANT CHANGE UP (ref 7–14)
BUN SERPL-MCNC: 18 MG/DL — SIGNIFICANT CHANGE UP (ref 10–20)
BUN SERPL-MCNC: 20 MG/DL — SIGNIFICANT CHANGE UP (ref 10–20)
CALCIUM SERPL-MCNC: 7.5 MG/DL — LOW (ref 8.4–10.5)
CALCIUM SERPL-MCNC: 7.7 MG/DL — LOW (ref 8.4–10.5)
CHLORIDE SERPL-SCNC: 104 MMOL/L — SIGNIFICANT CHANGE UP (ref 98–110)
CHLORIDE SERPL-SCNC: 106 MMOL/L — SIGNIFICANT CHANGE UP (ref 98–110)
CO2 SERPL-SCNC: 23 MMOL/L — SIGNIFICANT CHANGE UP (ref 17–32)
CO2 SERPL-SCNC: 24 MMOL/L — SIGNIFICANT CHANGE UP (ref 17–32)
CREAT SERPL-MCNC: 0.7 MG/DL — SIGNIFICANT CHANGE UP (ref 0.7–1.5)
CREAT SERPL-MCNC: 0.8 MG/DL — SIGNIFICANT CHANGE UP (ref 0.7–1.5)
CRP SERPL-MCNC: 32.8 MG/L — HIGH
EGFR: 82 ML/MIN/1.73M2 — SIGNIFICANT CHANGE UP
EGFR: 82 ML/MIN/1.73M2 — SIGNIFICANT CHANGE UP
EGFR: 97 ML/MIN/1.73M2 — SIGNIFICANT CHANGE UP
EGFR: 97 ML/MIN/1.73M2 — SIGNIFICANT CHANGE UP
ERYTHROCYTE [SEDIMENTATION RATE] IN BLOOD: 40 MM/HR — HIGH (ref 0–20)
GLUCOSE BLDC GLUCOMTR-MCNC: 113 MG/DL — HIGH (ref 70–99)
GLUCOSE BLDC GLUCOMTR-MCNC: 124 MG/DL — HIGH (ref 70–99)
GLUCOSE BLDC GLUCOMTR-MCNC: 125 MG/DL — HIGH (ref 70–99)
GLUCOSE SERPL-MCNC: 104 MG/DL — HIGH (ref 70–99)
GLUCOSE SERPL-MCNC: 109 MG/DL — HIGH (ref 70–99)
HCT VFR BLD CALC: 29.3 % — LOW (ref 37–47)
HGB BLD-MCNC: 9.6 G/DL — LOW (ref 12–16)
MAGNESIUM SERPL-MCNC: 1.9 MG/DL — SIGNIFICANT CHANGE UP (ref 1.8–2.4)
MAGNESIUM SERPL-MCNC: 2.3 MG/DL — SIGNIFICANT CHANGE UP (ref 1.8–2.4)
MCHC RBC-ENTMCNC: 31 PG — SIGNIFICANT CHANGE UP (ref 27–31)
MCHC RBC-ENTMCNC: 32.8 G/DL — SIGNIFICANT CHANGE UP (ref 32–37)
MCV RBC AUTO: 94.5 FL — SIGNIFICANT CHANGE UP (ref 81–99)
NRBC BLD AUTO-RTO: 0 /100 WBCS — SIGNIFICANT CHANGE UP (ref 0–0)
PHOSPHATE SERPL-MCNC: 2.8 MG/DL — SIGNIFICANT CHANGE UP (ref 2.1–4.9)
PHOSPHATE SERPL-MCNC: 3 MG/DL — SIGNIFICANT CHANGE UP (ref 2.1–4.9)
PLATELET # BLD AUTO: 222 K/UL — SIGNIFICANT CHANGE UP (ref 130–400)
PMV BLD: 9.9 FL — SIGNIFICANT CHANGE UP (ref 7.4–10.4)
POTASSIUM SERPL-MCNC: 3.7 MMOL/L — SIGNIFICANT CHANGE UP (ref 3.5–5)
POTASSIUM SERPL-MCNC: 3.9 MMOL/L — SIGNIFICANT CHANGE UP (ref 3.5–5)
POTASSIUM SERPL-SCNC: 3.7 MMOL/L — SIGNIFICANT CHANGE UP (ref 3.5–5)
POTASSIUM SERPL-SCNC: 3.9 MMOL/L — SIGNIFICANT CHANGE UP (ref 3.5–5)
RBC # BLD: 3.1 M/UL — LOW (ref 4.2–5.4)
RBC # FLD: 16.6 % — HIGH (ref 11.5–14.5)
SODIUM SERPL-SCNC: 138 MMOL/L — SIGNIFICANT CHANGE UP (ref 135–146)
SODIUM SERPL-SCNC: 141 MMOL/L — SIGNIFICANT CHANGE UP (ref 135–146)
WBC # BLD: 7.07 K/UL — SIGNIFICANT CHANGE UP (ref 4.8–10.8)
WBC # FLD AUTO: 7.07 K/UL — SIGNIFICANT CHANGE UP (ref 4.8–10.8)

## 2025-05-07 PROCEDURE — 99291 CRITICAL CARE FIRST HOUR: CPT

## 2025-05-07 RX ORDER — POTASSIUM PHOSPHATE, MONOBASIC POTASSIUM PHOSPHATE, DIBASIC INJECTION, 236; 224 MG/ML; MG/ML
15 SOLUTION, CONCENTRATE INTRAVENOUS ONCE
Refills: 0 | Status: COMPLETED | OUTPATIENT
Start: 2025-05-07 | End: 2025-05-08

## 2025-05-07 RX ADMIN — IPRATROPIUM BROMIDE AND ALBUTEROL SULFATE 3 MILLILITER(S): .5; 2.5 SOLUTION RESPIRATORY (INHALATION) at 21:16

## 2025-05-07 RX ADMIN — Medication 1 APPLICATION(S): at 05:23

## 2025-05-07 RX ADMIN — Medication 40 MILLIGRAM(S): at 12:00

## 2025-05-07 RX ADMIN — ENOXAPARIN SODIUM 40 MILLIGRAM(S): 100 INJECTION SUBCUTANEOUS at 17:40

## 2025-05-07 RX ADMIN — Medication 10 MILLIGRAM(S): at 23:47

## 2025-05-07 RX ADMIN — Medication 25 GRAM(S): at 02:00

## 2025-05-07 RX ADMIN — Medication 1 TABLET(S): at 12:00

## 2025-05-07 RX ADMIN — IPRATROPIUM BROMIDE AND ALBUTEROL SULFATE 3 MILLILITER(S): .5; 2.5 SOLUTION RESPIRATORY (INHALATION) at 08:15

## 2025-05-07 RX ADMIN — Medication 20 MILLIEQUIVALENT(S): at 07:22

## 2025-05-07 RX ADMIN — AMLODIPINE BESYLATE 10 MILLIGRAM(S): 10 TABLET ORAL at 05:23

## 2025-05-07 RX ADMIN — Medication 1 PACKET(S): at 05:23

## 2025-05-07 RX ADMIN — CYANOCOBALAMIN 1000 MICROGRAM(S): 1000 INJECTION INTRAMUSCULAR; SUBCUTANEOUS at 12:00

## 2025-05-07 RX ADMIN — IPRATROPIUM BROMIDE AND ALBUTEROL SULFATE 3 MILLILITER(S): .5; 2.5 SOLUTION RESPIRATORY (INHALATION) at 14:16

## 2025-05-07 RX ADMIN — IPRATROPIUM BROMIDE AND ALBUTEROL SULFATE 3 MILLILITER(S): .5; 2.5 SOLUTION RESPIRATORY (INHALATION) at 01:45

## 2025-05-07 RX ADMIN — LOSARTAN POTASSIUM 100 MILLIGRAM(S): 100 TABLET, FILM COATED ORAL at 05:23

## 2025-05-07 RX ADMIN — Medication 1 PACKET(S): at 12:00

## 2025-05-07 RX ADMIN — Medication 20 MILLIEQUIVALENT(S): at 05:23

## 2025-05-07 RX ADMIN — ENOXAPARIN SODIUM 40 MILLIGRAM(S): 100 INJECTION SUBCUTANEOUS at 05:22

## 2025-05-07 NOTE — PROGRESS NOTE ADULT - ASSESSMENT
ASSESSMENT & PLAN:  64F PMHx HTN, morbid obesity s/p 2001 Abby-en-Y gastric bypass who presented on 4/3 with incarcerated ventral hernia with SBO    4/10 open ventral hernia repair, small bowel resection, and primary fascial closure with Dr. Lema. Admitted to SICU for Q1 abdominal checks and hemodynamic monitoring.   4/17 takeback for exploratory laparotomy, repair of anastomotic leak  4/22 8Fr pigtail into lower abdominal wall collection,150cc of foul appearing material aspirated, Attached to THONY bulb  4/27 RTOR for re-exploration and evacuation of old clots from retroperitoneum and pelvis and placement of 3 new beatrice drains  4/28: IR consulted for IVC filter     NEUROLOGICAL:  #Acute pain  - Acetaminophen Q6   - Oxycodone 5mg q6 prn moderate pain  - Oxycodone 10mg q6 prn severe pain   #Sleep hygiene  - HOLDING Trazodone 25mg QPM  - Melatonin 10mg QPM    RESPIRATORY:   #Acute hypoxic respiratory failure  - Extubated 4/18, reintubated on 4/27 for AMS and agonal breathing, extubated 4/30  - Currently on 2L NC   - Home CPAP @ night  - duoneb treatment Q6  #Right distal main pulmonary embolus with segmental extension  - Heparin gtt off since 5/3  - No IVC filter needed at this time per IR  #Intermittent diuresis  - Last diuresis 5/6 1mg Bumex  #PMHx LOUIS on CPAP@ home   #Activity increase as tolerated    CARDS:   #Hypertensive urgency, resolved  - Off Nicardipine gtt since 5/1  #Acute hemorrhagic shock, resolved  - Off levophed and vasopressin since 4/30  - 4/27: 2u PRBC 1plt 1uFFP, 1g TXA  - OFF sodium bicarbonate infusion on 4/27  #Supraventricular tachycardia, Non-sustained ventricular tachycardia, Ventricular Premature Depolarization  - Cardiology (Dr. Lopez) - Metoprolol 25mg Q12 HOLDING  #PMHx of HTN  - continue Amlodipine 10mg QD  - continue Losartan 100mg qd  - HCTZ 25mg QD HOLDING  - EKG- NSR  - TTE 4/11: EF of 56%. G1DD.   - TTE 4/22: EF 70 to 75%.    GASTROINTESTINAL/NUTRITION:   #4/10 open ventral hernia repair, small bowel resection, ileoileal anastomosis and primary fascial closure, 2 THONY Right  #4/17 RTOR for exploratory laparotomy, repair of anastomotic leak at previous ileoileal anastomosis, resected, new ileoileal anastomosis created, 1 THONY Left (total 3)  - Intermittent abdominal vac change, last 4/26 PM, bilious drainage noted  - REMOVED 4/26 RUQ THONY  - REMOVED 4/28 LUQ THONY at ileo-ileal anastomosis, RLQ THONY in pelvis, IR THONY abdominal wall  #4/22: s/p IR percutaneous placement of a 8.5 Serbian drainage catheter into lower abdominal wall fluid collection, yielding 150 mL of foul appearing fluid.  #4/27: Acute retroperitoneal CTA: mesenteric arterial blush noted with extravasation    - s/p IR without finding of any bleeding, no embolization required  #4/27 RTOR for re-exploration and evacuation of old clots from retroperitoneum and pelvis  - New midline wound vac in place > removed on 5/2   - x3 new 19Fr beatrice drains in place: 1. Right pelvis, 2. Left pelvis, 3. Subcutaneous (RUQ drain d/c'd 5/6)  - 5/2: 500mL sanguinous output from wound vac --> heparin gtt paused, 1u PRBC given. Bedside exploration of midline wound. venous bleed noted, 2 stitches 3-0 vicryl, surgicell, Fibrilar, bleeding controlled at time of dressing placement. Wound vac removed, wet to dry and pressure dressing applied  #Diet: DASH with ensure clears  - on vitamin B12 supplement  - multivitamin  - probiotic   - metamucil   - Aspiration precautions, HOB 30  #GI Prophylaxis/hx GERD  - Pantoprazole 40mg PO qD (home rx) discontinued   #Bowel regimen  - Holding  - Last BM 5/6 per rectal tube   - Dignishield replaced 5/3    /RENAL:   #Urine output in critically ill  - IVL  - Bumex 1mg IVP daily  #FUNMI; oliguric - resolved  - Renal u/s> no hydronephrosis. 2-3cm anechoic cyst on right kidney   #Lactic acidosis    - OFF sodium bicarbonate infusion  - Nephro: check CK, no indication of RRT, decrease meropenem to Q12    Labs:  #Hypokalemia - repleted   #Hypomagnesemia - repleted  #Hypophosphatemia - repleted     Labs:          BUN/Cr- 21/0.6  -->,  20/0.7  -->          [05-06 @ 21:29]Na  140 // K  3.7 // Mg  1.6 // Phos  2.6    HEME/ONC:   #DVT PPX  - LMWH 40mg q12  - SCD B/L  #acute anemia   - 4/27: 2uPRBC 1uFFP 1pkPlt, TXA 1g, IR for embolization due to active extravasation seen on CTA, no active bleed, no embolization, Return to OR for re-exploration and evacuation of old clot  - 4/28: IR consulted for IVC filter and PICC. LE duplex ordered, negative.  - 5/2: 1u PRBC for 500mL sanguinous output from wound vac   #Acute right pulmonary embolus  - Therapeutic Heparin gtt discontinued 5/2 secondary to venous bleed from wound vac   #B/L UE Venous Duplex 4/26/27 - thrombophlebitis  - B/L LE Venous duplex 4/21-RIGHT PT DVT, no LEFT DVT  - B/L LE Venous duplex 4/28-No evidence of deep venous thrombosis in either lower extremity.   - B/L duplex 5/5 - negative for DVT B/L  - Concern for malabsorption of Eliquis given small bowel resection per pharmacy  - Vascular surgery consulted - AC with heparin drip (upon discharge transition to Eliquis 10 mg po BID x 7 days then 5 mg po BID for at least 6 months      Labs: Hb/Hct:  9.7/29.9  -->,  9.8/29.6  -->                      Plts:  188  -->,  238  -->                 PTT/INR:        ID:  WBC- 9.07  --->>,  8.38  --->>,  9.49  Culture-  Temp trend- 24hrs T(F): 97.3 (05-06 @ 20:00), Max:   #Intraabdominal anastomotic leak, fluid collection  #ID Consult  - continue meropenem 1g Q12  - trial off antibiotics 5/6 per Dr. Bermudez and Dr. Lema  - Midline x Ertapenem 1g Q24 on discharge x 4 weeks (EOT 5/19)  - F/u with Dr. Perdue on Tuesdays via Telehealth   #Culture    OR 4/1 - E Coli    OR cx: few E.coli, few bacteroides, rare clostridium    4/13 Blood Cx: NGTF    4/14 tracheal aspirate: no growth     4/17 body fluid cx rare e. coli     4/22 Blood cx: negative    4/22 Abdominal - Negative FINAL    4/27 C diff stool panel: negative    4/28 blood culture: negative    ENDOCRINE:  #Glycemic monitoring  - ACHS fingersticks  - ISS  - A1C 5.8%     MSK:  #Activity- increase as tolerated    SKIN:  #left forearm skin tear   - Continue to monitor daily skin changes  #left knee skin tear  #sacral fissure 5/3      LINES/DRAINS:  PIV    LLQ pelvis beatrice drain (4/28 -     Midline subcutaneous beatrice drain (4/28 -     Right IJ TLC (4/17 -     Discontinued lines:    Right Femoral Arterial Line (4/27 - 4/28)    Prevena Vac Midline (removed 5/2)     Left radial arterial line (4/28 - 5/5)    RLQ pelvis beatrice drain (4/28 - 5/5)    ADVANCED DIRECTIVES:  Full Code  Emergency - Daughter  (Shavonne Moss)  INDICATION FOR SICU/SDU: Intestinal obstruction    DISPO: SDU    Case to be discussed with SICU attending Dr. Bermudez   ASSESSMENT & PLAN:  64F PMHx HTN, morbid obesity s/p 2001 Abby-en-Y gastric bypass who presented on 4/3 with incarcerated ventral hernia with SBO    4/10 open ventral hernia repair, small bowel resection, and primary fascial closure with Dr. Lema. Admitted to SICU for Q1 abdominal checks and hemodynamic monitoring.   4/17 takeback for exploratory laparotomy, repair of anastomotic leak  4/22 8Fr pigtail into lower abdominal wall collection,150cc of foul appearing material aspirated, Attached to THONY bulb  4/27 RTOR for re-exploration and evacuation of old clots from retroperitoneum and pelvis and placement of 3 new beatrice drains      NEUROLOGICAL:  #Acute pain    acetaminophen     Tablet .. 650 Oral every 6 hours PRN    melatonin 10 Oral at bedtime  #Sleep hygiene  - HOLDING Trazodone 25mg QPM  - Melatonin 10mg QPM    RESPIRATORY:   #Acute hypoxic respiratory failure, resolved  - Extubated 4/18, reintubated on 4/27 for AMS and agonal breathing, extubated 4/30  - Currently on 1L NC   - Home CPAP @ night  - duoneb treatment Q6  #Right distal main pulmonary embolus with segmental extension  - Heparin gtt off since 5/3->Lovenox 40mg q12  - No IVC filter needed at this time per IR  #Intermittent diuresis for fluid overload  - Last diuresis 5/6 1mg Bumex  #PMHx LOUIS on CPAP@ home   #Activity increase as tolerated    CARDS:   #Hypertensive urgency, resolved  #Acute hemorrhagic shock, resolved  - 4/27: 2u PRBC 1plt 1uFFP, 1g TXA  #Supraventricular tachycardia, Non-sustained ventricular tachycardia, Ventricular Premature Depolarization  - Cardiology (Dr. Lopez) - Metoprolol 25mg Q12 HOLDING  #PMHx of HTN  - continue Amlodipine 10mg QD  - continue Losartan 100mg qd  - HCTZ 25mg QD HOLDING  - EKG- NSR  - TTE 4/22: EF 70 to 75%    GASTROINTESTINAL/NUTRITION:   #4/10 open ventral hernia repair, small bowel resection, ileoileal anastomosis and primary fascial closure, 2 THONY Right  #4/17 RTOR for exploratory laparotomy, repair of anastomotic leak at previous ileoileal anastomosis, resected, new ileoileal anastomosis created, 1 THONY Left (total 3)  #4/22: s/p IR percutaneous placement of a 8.5 Ghanaian drainage catheter into lower abdominal wall fluid collection, yielding 150 mL of foul appearing fluid.  #4/27: Acute retroperitoneal CTA: mesenteric arterial blush noted with extravasation  #4/27 RTOR for re-exploration and evacuation of old clots from retroperitoneum and pelvis    - s/p IR without finding of any bleeding, no embolization required    -5/2 Midline wound vac removed      Right Pelvis THONY 35cc     Left Pelvis THONY 35cc     REMOVED 4/26 RUQ THONY     REMOVED 4/28 LUQ THONY   #Diet: DASH with ensure clears  - on vitamin B12 supplement  - multivitamin  - probiotic   - metamucil   - Aspiration precautions, HOB 30  #GI Prophylaxis/hx GERD  - Pantoprazole 40mg PO qD (prn home rx) discontinued   #Bowel regimen    -last bowel movement 5/6    -holding regimen in setting of diarrhea    /RENAL:   #Urine output in critically ill  - IVL  - Bumex 1mg IVP daily last dose 5/6  Negative 600cc/24hrs  Negative 2.8L/LOS    #FUNMI; oliguric - resolved  - Renal u/s> no hydronephrosis. 2-3cm anechoic cyst on right kidney   #Lactic acidosis resolved  - Nephro: check CK, no indication of RRT, decrease meropenem to Q12  #Hypokalemia - repleted   #Hypomagnesemia - repleted  #Hypophosphatemia - repleted     Labs:          BUN/Cr- 21/0.6  -->,  20/0.7  -->          [05-06 @ 21:29]Na  140 // K  3.7 // Mg  1.6 // Phos  2.6    HEME/ONC:   #DVT PPX  - LMWH 40mg q12  - SCD B/L  #acute anemia   - 4/27: 2uPRBC 1uFFP 1pkPlt, TXA 1g, IR for embolization due to active extravasation seen on CTA, no active bleed, no embolization, Return to OR for re-exploration and evacuation of old clot  - 4/28: IR consulted for IVC filter and PICC. LE duplex ordered, negative.  - 5/2: 1u PRBC for 500mL sanguinous output from wound vac   #Acute right pulmonary embolus  - Therapeutic Heparin gtt discontinued 5/2 secondary to venous bleed from wound vac   #B/L UE Venous Duplex 4/26/27 - thrombophlebitis  - B/L LE Venous duplex 4/21-RIGHT PT DVT, no LEFT DVT  - B/L LE Venous duplex 4/28-No evidence of deep venous thrombosis in either lower extremity.   - B/L duplex 5/5 - negative for DVT B/L  - Concern for malabsorption of Eliquis given small bowel resection per pharmacy  - Vascular surgery consulted - AC with heparin drip (upon discharge transition to Eliquis 10 mg po BID x 7 days then 5 mg po BID for at least 6 months      Labs: Hb/Hct:  9.7/29.9  -->,  9.8/29.6  -->                      Plts:  188  -->,  238  -->                 PTT/INR:        ID:  #Intraabdominal anastomotic leak, fluid collection    -ESR, CRP for 3 days  #ID Consult  - trial off antibiotics 5/6 per Dr. Bermudez and Dr. Lema  - Midline x Ertapenem 1g Q24 on discharge x 4 weeks (EOT 5/19)  - F/u with Dr. Perdue on Tuesdays via Telehealth   #Culture    OR 4/1 - E Coli    OR cx: few E.coli, few bacteroides, rare clostridium    4/13 Blood Cx: NGTF    4/14 tracheal aspirate: no growth     4/17 body fluid cx rare e. coli     4/22 Blood cx: negative    4/22 Abdominal - Negative FINAL    4/27 C diff stool panel: negative    4/28 blood culture: negative  WBC- 9.07  --->>,  8.38  --->>,  9.49  --->>  Temp trend- 24hrs T(F): 98 (05-07 @ 08:00), Max: 98 (05-06 @ 16:00)    ENDOCRINE:  #Glycemic monitoring  - ACHS fingersticks  - ISS  - A1C 5.8%     MSK:  #Activity- increase as tolerated  #Physiatry    -bedside PT, will evaluate for inpatient 4A placement  #PT consult    -rehab facility on discharge 3 to 5x/week   #OT consult    -pending for ROM within bed      SKIN:  #midline incision staples in place f/u w/ primary removal timeline (last OR 4/27)  #left forearm skin tear   - Continue to monitor daily skin changes  #left knee skin tear  #sacral fissure 5/3      LINES/DRAINS:  PIV    LLQ pelvis beatrice drain (4/28 -     Midline subcutaneous beatrice drain (4/28 -     Right IJ TLC (4/17 -     Discontinued lines:    Right Femoral Arterial Line (4/27 - 4/28)    Prevena Vac Midline (removed 5/2)     Left radial arterial line (4/28 - 5/5)    RLQ pelvis beatrice drain (4/28 - 5/5)    ADVANCED DIRECTIVES:  Full Code  Emergency - Daughter  (Shavonne Moss)  INDICATION FOR SICU/SDU: Intestinal obstruction    DISPO: SDU    Case to be discussed with SICU attending Dr. Bermudez

## 2025-05-07 NOTE — PROGRESS NOTE ADULT - CRITICAL CARE ATTENDING COMMENT
Critical Care: [id number]   This patient has a high probability of sudden, clinically significant deterioration, which requires the highest level of physician preparedness to intervene urgently. I managed/supervised life or organ supporting interventions that required frequent physician assessment. I have reviewed and agree with note above. I devoted my full attention to the direct care of this patient for the period of time indicated, including reviewing relevant labs and imaging, discussing treatment plan with the SICU team, nurses, and primary team at the time of multidisciplinary rounds, and reviewing findings with patient and family. This does not include time devoted to teaching any trainees and to performing any procedures.    NANCY SARGENT 64y Female admitted to SICU with incarcerated ventral hernia with SBO now s/p open VHR, SBR with postop respiratory failure and nonoliguric ARF secondary to septic shock    Issues we are addressing today:    Neuro: minimizing pain meds, home meds as possible, delirium precautions, sleep meds as needed at night  CV: optimize fluid status, home antihypertensives  Respiratory: continue to wean support as possible, bipap and HFNC as tolerated  Nutrition: diet as tolerated  Renal: monitor Is &Os   ID: abx held  Lines: get IV access, then d/c TLC  Heme: continue to evaluate for acute blood loss anemia   Endocrine: Prevent and treat hyperglycemia with insulin as needed   Skin: decub precautions  DVT Prophylaxis   Stress Gastritis Prophylaxis   Mobility: PT/OT, OOBTC    safe for transfer out of ICU    The above note is NOT written at the time of rounds and will reflect all changes throughout management of the patient for the day note is written for.    Chi Bermudez MD, D.PRATEEK.

## 2025-05-07 NOTE — CHART NOTE - NSCHARTNOTEFT_GEN_A_CORE
GI NUTRITION SUPPORT TEAM  -  PROGRESS NOTE     Interval Events:        REVIEW OF SYSTEMS:  Negative except as noted above.     VITALS:  T(F): 98 (05-07 @ 08:00), Max: 98 (05-07 @ 04:00)  HR: 101 (05-07 @ 08:00) (83 - 101)  BP: 126/88 (05-07 @ 08:00) (119/56 - 129/61)  RR: 20 (05-07 @ 08:00) (18 - 27)  SpO2: 97% (05-07 @ 08:00) (94% - 97%)    HEIGHT/WEIGHT/BMI:   Height (cm): 160 (04-27)  Weight (kg): 140 (05-05)  BMI (kg/m2): 54.7 (05-05)    PHYSICAL EXAM:   GENERAL: NAD, well-groomed, well-developed  HEENT: Moist mucous membranes, Good dentition, No lesions  ABDOMEN: Soft, Nontender, Nondistended  EXTREMITIES:  No clubbing, cyanosis, or edema  SKIN: warm and well perfused; No obvious rashes or lesions  IV ACCESS:   ENTERAL ACCESS:     I/Os:     05-06-25 @ 07:01  -  05-07-25 @ 07:00  --------------------------------------------------------  IN:    IV PiggyBack: 50 mL    Oral Fluid: 860 mL  Total IN: 910 mL    OUT:    Bulb (mL): 35 mL    Bulb (mL): 35 mL    Rectal Tube (mL): 50 mL    Voided (mL): 1400 mL  Total OUT: 1520 mL    Total NET: -610 mL        MEDICATIONS:   acetaminophen     Tablet .. 650 milliGRAM(s) Oral every 6 hours PRN  albuterol/ipratropium for Nebulization 3 milliLiter(s) Nebulizer every 6 hours  amLODIPine   Tablet 10 milliGRAM(s) Oral daily  chlorhexidine 2% Cloths 1 Application(s) Topical <User Schedule>  cyanocobalamin 1000 MICROGram(s) Oral daily  enoxaparin Injectable 40 milliGRAM(s) SubCutaneous every 12 hours  insulin lispro (ADMELOG) corrective regimen sliding scale   SubCutaneous Before meals and at bedtime  lactobacillus acidophilus 1 Tablet(s) Oral daily  losartan 100 milliGRAM(s) Oral daily  melatonin 10 milliGRAM(s) Oral at bedtime  multivitamin 1 Tablet(s) Oral daily  pantoprazole    Tablet 40 milliGRAM(s) Oral daily  psyllium Powder 1 Packet(s) Oral daily    LABS:                         9.8    9.49  )-----------( 238      ( 06 May 2025 21:29 )             29.6     141  |  106  |  20  ----------------------------<  104[H]          (05-07-25 @ 04:30)  3.9   |  24  |  0.7    Ca    7.5[L]          (05-07-25 @ 04:30)  Phos  3.0         (05-07-25 @ 04:30)  Mg     2.3         (05-07-25 @ 04:30)    TPro  5.1[L]  /  Alb  2.8[L]  /  TBili  0.7  /  DBili  0.4[H]  /  AST  27  /  ALT  80[H]  /  AlkPhos  169[H]       05-05-25 @ 20:14    Triglycerides, Serum: 232 mg/dL (04-20 @ 04:59)    Vitamin D, 25-Hydroxy: 13 ng/mL (04-19 @ 05:08)    Folate: 17.3 ng/mL (04-19 @ 05:08)    Vitamin B12, Serum: 1417 pg/mL (04-19 @ 05:08)    Zinc Level, Plasma: 54 ug/dL (04-19 @ 05:08)    Blood Glucose (Past 24 hours):  124 mg/dL (05-07 @ 11:53)  113 mg/dL (05-07 @ 07:56)  114 mg/dL (05-06 @ 21:42)  120 mg/dL (05-06 @ 17:12)    DIET:   Diet, DASH/TLC:   Sodium & Cholesterol Restricted  Consistent Carbohydrate {No Snacks} (CSTCHO)  No Chocolate  No Carb Prosource TF     Qty per Day:  2  Supplement Feeding Modality:  Oral  Ensure Clear Cans or Servings Per Day:  1       Frequency:  Three Times a day (05-06-25 @ 09:59) [Active]      RADIOLOGY:       ASSESSMENT/PLAN: GI NUTRITION SUPPORT TEAM  -  PROGRESS NOTE     Interval Events:    Comfortable on room air, no complaints. Diet advanced and tolerating well. Reporting good appetite. Started on psyllium powder and lactobacillus, stools more formed today.       REVIEW OF SYSTEMS:  Negative except as noted above.     VITALS:  T(F): 98 (05-07 @ 08:00), Max: 98 (05-07 @ 04:00)  HR: 101 (05-07 @ 08:00) (83 - 101)  BP: 126/88 (05-07 @ 08:00) (119/56 - 129/61)  RR: 20 (05-07 @ 08:00) (18 - 27)  SpO2: 97% (05-07 @ 08:00) (94% - 97%)    HEIGHT/WEIGHT/BMI:   Height (cm): 160 (04-27)  Weight (kg): 140 (05-05)  BMI (kg/m2): 54.7 (05-05)    I/Os:     05-06-25 @ 07:01  -  05-07-25 @ 07:00  --------------------------------------------------------  IN:    IV PiggyBack: 50 mL    Oral Fluid: 860 mL  Total IN: 910 mL    OUT:    Bulb (mL): 35 mL    Bulb (mL): 35 mL    Rectal Tube (mL): 50 mL    Voided (mL): 1400 mL  Total OUT: 1520 mL    Total NET: -610 mL    MEDICATIONS:   acetaminophen     Tablet .. 650 milliGRAM(s) Oral every 6 hours PRN  albuterol/ipratropium for Nebulization 3 milliLiter(s) Nebulizer every 6 hours  amLODIPine   Tablet 10 milliGRAM(s) Oral daily  chlorhexidine 2% Cloths 1 Application(s) Topical <User Schedule>  cyanocobalamin 1000 MICROGram(s) Oral daily  enoxaparin Injectable 40 milliGRAM(s) SubCutaneous every 12 hours  insulin lispro (ADMELOG) corrective regimen sliding scale   SubCutaneous Before meals and at bedtime  lactobacillus acidophilus 1 Tablet(s) Oral daily  losartan 100 milliGRAM(s) Oral daily  melatonin 10 milliGRAM(s) Oral at bedtime  multivitamin 1 Tablet(s) Oral daily  pantoprazole    Tablet 40 milliGRAM(s) Oral daily  psyllium Powder 1 Packet(s) Oral daily    LABS:                         9.8    9.49  )-----------( 238      ( 06 May 2025 21:29 )             29.6     141  |  106  |  20  ----------------------------<  104[H]          (05-07-25 @ 04:30)  3.9   |  24  |  0.7    Ca    7.5[L]          (05-07-25 @ 04:30)  Phos  3.0         (05-07-25 @ 04:30)  Mg     2.3         (05-07-25 @ 04:30)    TPro  5.1[L]  /  Alb  2.8[L]  /  TBili  0.7  /  DBili  0.4[H]  /  AST  27  /  ALT  80[H]  /  AlkPhos  169[H]       05-05-25 @ 20:14    Triglycerides, Serum: 232 mg/dL (04-20 @ 04:59)  Vitamin D, 25-Hydroxy: 13 ng/mL (04-19 @ 05:08)  Folate: 17.3 ng/mL (04-19 @ 05:08)  Vitamin B12, Serum: 1417 pg/mL (04-19 @ 05:08)  Zinc Level, Plasma: 54 ug/dL (04-19 @ 05:08)    Blood Glucose (Past 24 hours):  124 mg/dL (05-07 @ 11:53)  113 mg/dL (05-07 @ 07:56)  114 mg/dL (05-06 @ 21:42)  120 mg/dL (05-06 @ 17:12)    DIET:   Diet, DASH/TLC:   Sodium & Cholesterol Restricted  Consistent Carbohydrate {No Snacks} (CSTCHO)  No Chocolate  No Carb Prosource TF     Qty per Day:  2  Supplement Feeding Modality:  Oral  Ensure Clear Cans or Servings Per Day:  1       Frequency:  Three Times a day (05-06-25 @ 09:59) [Active]      ASSESSMENT  64y F w/ PMHx of HTN and gastric bypass surgery in 2001 presented with abdominal distention and CT showing incarcerated ventral hernia with SBO. Taken to OR 4/10 s/p open repair of ventral hernia using mesh and component separation technique, small bowel resection and primary fascial closure. RTOR 4/17 for anastomotic leak s/p ex lap, 30 cm small bowel resection, creation of new side to side ileoileostomy, ventral hernia repair w/ mesh.     - recurrent SBO, s/p repair 4/10 then anastomotic leak s/p repair 4/17, RTOR 4/28 s/p re-exploration and evacuation of old clots from retroperitoneum and pelvis, midline prevena vac placed  - abdominal wall fluid collection s/p IR placed pigtail drain 4/22  - FUNMI   - right distal main pulmonary embolus with segmental extension  - high risk for malnutrition, prolonged NPO X 15d, TPN 4/17-4/25  - class III obesity, BMI 53  - hypertriglyceridemia  - hyperglycemia   - hypokalemia, resolved   - vitamin D deficiency     Est nutrient needs: IBW 52.3kg, 1744-3976 kcals (22-25kcals/kg IBW ASPEN/CCM guidelines for BMI>50), 105-131g protein (2-2.5g/kg IBW ASPEN/CCM guidelines for BMI >40)       PLAN  - PO diet as tolerated, prioritize protein intake (d/w pt and family)  - d/c magic cups (no longer indicated) and Ensure clear   - change MV to MV with minerals  - supplement vitamin D- 50,000IU ergocalciferol PO X 2d then give once/week  - monitor BM's (for malabsorption) started on psyllium powder, lactobacillus  - glycemic control  - will follow  d/w Dr. Lema, SICU team

## 2025-05-07 NOTE — CHART NOTE - NSCHARTNOTEFT_GEN_A_CORE
SICU Transfer Note  NANCY SARGENT  65y (1960)  [id number]  Admitted to Hospital:25  HD: 34d    Transfer from: SICU  Transfer to: 4C     [  ]BLUE          [  ]TRAUMA            [  ]GREEN/THORACIC                [  ]ENT/UROLOGY                [  ]NSGY      65y Female HD#34d  SICU CONSULTED FOR:      SICU COURSE:    PAST MEDICAL & SURGICAL HISTORY:  Gastric bypass status for obesity      LOUIS (obstructive sleep apnea)      S/P gastric bypass      S/P       S/P small bowel resection      History of total bilateral knee replacement (TKR)      H/O colonoscopy          Allergies    No Known Allergies    Intolerances      MEDICATIONS  (STANDING):  albuterol/ipratropium for Nebulization 3 milliLiter(s) Nebulizer every 6 hours  amLODIPine   Tablet 10 milliGRAM(s) Oral daily  chlorhexidine 2% Cloths 1 Application(s) Topical <User Schedule>  cyanocobalamin 1000 MICROGram(s) Oral daily  enoxaparin Injectable 40 milliGRAM(s) SubCutaneous every 12 hours  insulin lispro (ADMELOG) corrective regimen sliding scale   SubCutaneous Before meals and at bedtime  lactobacillus acidophilus 1 Tablet(s) Oral daily  losartan 100 milliGRAM(s) Oral daily  melatonin 10 milliGRAM(s) Oral at bedtime  multivitamin 1 Tablet(s) Oral daily  pantoprazole    Tablet 40 milliGRAM(s) Oral daily  psyllium Powder 1 Packet(s) Oral daily    MEDICATIONS  (PRN):  acetaminophen     Tablet .. 650 milliGRAM(s) Oral every 6 hours PRN Mild Pain (1 - 3)    Vital Signs Last 24 Hrs  T(C): 36.7 (07 May 2025 08:00), Max: 36.7 (06 May 2025 16:00)  T(F): 98 (07 May 2025 08:00), Max: 98 (06 May 2025 16:00)  HR: 101 (07 May 2025 08:00) (83 - 112)  BP: 126/88 (07 May 2025 08:00) (114/61 - 142/69)  BP(mean): 100 (07 May 2025 08:00) (78 - 104)  RR: 20 (07 May 2025 08:00) (18 - 33)  SpO2: 97% (07 May 2025 08:00) (94% - 98%)    Parameters below as of 07 May 2025 08:00  Patient On (Oxygen Delivery Method): nasal cannula  O2 Flow (L/min): 2    I&O's Summary    06 May 2025 07:01  -  07 May 2025 07:00  --------------------------------------------------------  IN: 910 mL / OUT: 1520 mL / NET: -610 mL    07 May 2025 07:01  -  07 May 2025 12:59  --------------------------------------------------------  IN: 360 mL / OUT: 0 mL / NET: 360 mL        Assessment & Plan:  ASSESSMENT & PLAN:  64F PMHx HTN, morbid obesity s/p  Abby-en-Y gastric bypass who presented on 4/3 with incarcerated ventral hernia with SBO    4/10 open ventral hernia repair, small bowel resection, and primary fascial closure with Dr. Lema. Admitted to SICU for Q1 abdominal checks and hemodynamic monitoring.    takeback for exploratory laparotomy, repair of anastomotic leak   8Fr pigtail into lower abdominal wall collection,150cc of foul appearing material aspirated, Attached to THONY bulb   RTOR for re-exploration and evacuation of old clots from retroperitoneum and pelvis and placement of 3 new beatrice drains      NEUROLOGICAL:  #Acute pain    acetaminophen     Tablet .. 650 Oral every 6 hours PRN    melatonin 10 Oral at bedtime  #Sleep hygiene  - HOLDING Trazodone 25mg QPM  - Melatonin 10mg QPM    RESPIRATORY:   #Acute hypoxic respiratory failure, resolved  - Extubated , reintubated on  for AMS and agonal breathing, extubated   - Currently on 1L NC   - Home CPAP @ night  - duoneb treatment Q6  #Right distal main pulmonary embolus with segmental extension  - Heparin gtt off since 5/3->Lovenox 40mg q12  - No IVC filter needed at this time per IR  #Intermittent diuresis for fluid overload  - Last diuresis  1mg Bumex  #PMHx LOUIS on CPAP@ home   #Activity increase as tolerated    CARDS:   #Hypertensive urgency, resolved  #Acute hemorrhagic shock, resolved  - : 2u PRBC 1plt 1uFFP, 1g TXA  #Supraventricular tachycardia, Non-sustained ventricular tachycardia, Ventricular Premature Depolarization  - Cardiology (Dr. Lopez) - Metoprolol 25mg Q12 HOLDING  #PMHx of HTN  - continue Amlodipine 10mg QD  - continue Losartan 100mg qd  - HCTZ 25mg QD HOLDING  - EKG- NSR  - TTE : EF 70 to 75%    GASTROINTESTINAL/NUTRITION:   #4/10 open ventral hernia repair, small bowel resection, ileoileal anastomosis and primary fascial closure, 2 THONY Right  # RTOR for exploratory laparotomy, repair of anastomotic leak at previous ileoileal anastomosis, resected, new ileoileal anastomosis created, 1 THONY Left (total 3)  #: s/p IR percutaneous placement of a 8.5 Chinese drainage catheter into lower abdominal wall fluid collection, yielding 150 mL of foul appearing fluid.  #: Acute retroperitoneal CTA: mesenteric arterial blush noted with extravasation  # RTOR for re-exploration and evacuation of old clots from retroperitoneum and pelvis    - s/p IR without finding of any bleeding, no embolization required    - Midline wound vac removed      Right Pelvis THONY 35cc     Left Pelvis THONY 35cc     REMOVED  RUQ THONY     REMOVED  LUQ THONY   #Diet: DASH with ensure clears  - on vitamin B12 supplement  - multivitamin  - probiotic   - metamucil   - Aspiration precautions, HOB 30  #GI Prophylaxis/hx GERD  - Pantoprazole 40mg PO qD (prn home rx) discontinued   #Bowel regimen    -last bowel movement 5/7 AM, formed    -holding regimen in setting of diarrhea, improving    /RENAL:   #Urine output in critically ill  - IVL  - Bumex 1mg IVP daily last dose   Negative 600cc/24hrs  Negative 2.8L/LOS    #FUNMI; oliguric - resolved  - Renal u/s> no hydronephrosis. 2-3cm anechoic cyst on right kidney   #Lactic acidosis resolved  - Nephro: check CK, no indication of RRT, decrease meropenem to Q12  #Hypokalemia - repleted   #Hypomagnesemia - repleted  #Hypophosphatemia - repleted     Labs:          BUN/Cr- 21/0.6  -->,  20/0.7  -->          [05-06 @ 21:29]Na  140 // K  3.7 // Mg  1.6 // Phos  2.6    HEME/ONC:   #DVT PPX  - LMWH 40mg q12  - SCD B/L  #acute anemia   - : 2uPRBC 1uFFP 1pkPlt, TXA 1g, IR for embolization due to active extravasation seen on CTA, no active bleed, no embolization, Return to OR for re-exploration and evacuation of old clot  - : IR consulted for IVC filter and PICC. LE duplex ordered, negative.  - : 1u PRBC for 500mL sanguinous output from wound vac   #Acute right pulmonary embolus  - Therapeutic Heparin gtt discontinued  secondary to venous bleed from wound vac   #B/L UE Venous Duplex 27 - thrombophlebitis  - B/L LE Venous duplex -RIGHT PT DVT, no LEFT DVT  - B/L LE Venous duplex -No evidence of deep venous thrombosis in either lower extremity.   - B/L duplex  - negative for DVT B/L  - Concern for malabsorption of Eliquis given small bowel resection per pharmacy  - Vascular surgery consulted - AC with heparin drip (upon discharge transition to Eliquis 10 mg po BID x 7 days then 5 mg po BID for at least 6 months      Labs: Hb/Hct:  9.7/29.9  -->,  9.8/29.6  -->                      Plts:  188  -->,  238  -->                 PTT/INR:        ID:  #Intraabdominal anastomotic leak, fluid collection    -ESR, CRP for 3 days  #ID Consult  - trial off antibiotics  per Dr. Bermudez and Dr. Lema  - Midline x Ertapenem 1g Q24 on discharge x 4 weeks (EOT )  - F/u with Dr. Perdue on  via Telehealth   #Culture    OR  - E Coli    OR cx: few E.coli, few bacteroides, rare clostridium     Blood Cx: NGTF     tracheal aspirate: no growth      body fluid cx rare e. coli      Blood cx: negative     Abdominal - Negative FINAL     C diff stool panel: negative     blood culture: negative  WBC- 9.07  --->>,  8.38  --->>,  9.49  --->>  Temp trend- 24hrs T(F): 98 ( @ 08:00), Max: 98 ( @ 16:00)    ENDOCRINE:  #Glycemic monitoring  - ACHS fingersticks  - ISS  - A1C 5.8%     MSK:  #Activity- increase as tolerated  #Physiatry    -bedside PT, will evaluate for inpatient 4A placement  #PT consult    -rehab facility on discharge 3 to 5x/week   #OT consult    -pending for ROM within bed      SKIN:  #midline incision staples in place f/u w/ primary removal timeline (last OR )  #left forearm skin tear   - Continue to monitor daily skin changes  #left knee skin tear  #sacral fissure 5/3      LINES/DRAINS:  PIV    LLQ pelvis beatrice drain ( -     Midline subcutaneous beatrice drain ( -     Right IJ TLC ( -     Discontinued lines:    Right Femoral Arterial Line ( - )    Prevena Vac Midline (removed )     Left radial arterial line ( - )    RLQ pelvis beatrice drain ( - )    ADVANCED DIRECTIVES:  Full Code  Emergency - Daughter  (Shavonne Moss)  INDICATION FOR SICU/SDU: Intestinal obstruction      Follow Up:  -8pm routine labs  -f/u ID if antibiotics restarts  -primary team regarding removal of remaining THONY drains  -f/u removal of midline incision staples  -f/u full anticoagulation for PE  Signed out to:  Date:  Time: SICU Transfer Note  NANCY SARGENT  65y (1960)  [id number]  Admitted to Hospital:25  HD: 34d    Transfer from: SICU  Transfer to: 4C     [  ]BLUE          [  ]TRAUMA            [  ]GREEN/THORACIC                [  ]ENT/UROLOGY                [  ]NSGY      65y Female HD#34d  SICU CONSULTED FOR:      SICU COURSE:    PAST MEDICAL & SURGICAL HISTORY:  Gastric bypass status for obesity      LOUIS (obstructive sleep apnea)      S/P gastric bypass      S/P       S/P small bowel resection      History of total bilateral knee replacement (TKR)      H/O colonoscopy          Allergies    No Known Allergies    Intolerances      MEDICATIONS  (STANDING):  albuterol/ipratropium for Nebulization 3 milliLiter(s) Nebulizer every 6 hours  amLODIPine   Tablet 10 milliGRAM(s) Oral daily  chlorhexidine 2% Cloths 1 Application(s) Topical <User Schedule>  cyanocobalamin 1000 MICROGram(s) Oral daily  enoxaparin Injectable 40 milliGRAM(s) SubCutaneous every 12 hours  insulin lispro (ADMELOG) corrective regimen sliding scale   SubCutaneous Before meals and at bedtime  lactobacillus acidophilus 1 Tablet(s) Oral daily  losartan 100 milliGRAM(s) Oral daily  melatonin 10 milliGRAM(s) Oral at bedtime  multivitamin 1 Tablet(s) Oral daily  pantoprazole    Tablet 40 milliGRAM(s) Oral daily  psyllium Powder 1 Packet(s) Oral daily    MEDICATIONS  (PRN):  acetaminophen     Tablet .. 650 milliGRAM(s) Oral every 6 hours PRN Mild Pain (1 - 3)    Vital Signs Last 24 Hrs  T(C): 36.7 (07 May 2025 08:00), Max: 36.7 (06 May 2025 16:00)  T(F): 98 (07 May 2025 08:00), Max: 98 (06 May 2025 16:00)  HR: 101 (07 May 2025 08:00) (83 - 112)  BP: 126/88 (07 May 2025 08:00) (114/61 - 142/69)  BP(mean): 100 (07 May 2025 08:00) (78 - 104)  RR: 20 (07 May 2025 08:00) (18 - 33)  SpO2: 97% (07 May 2025 08:00) (94% - 98%)    Parameters below as of 07 May 2025 08:00  Patient On (Oxygen Delivery Method): nasal cannula  O2 Flow (L/min): 2    I&O's Summary    06 May 2025 07:01  -  07 May 2025 07:00  --------------------------------------------------------  IN: 910 mL / OUT: 1520 mL / NET: -610 mL    07 May 2025 07:01  -  07 May 2025 12:59  --------------------------------------------------------  IN: 360 mL / OUT: 0 mL / NET: 360 mL        Assessment & Plan:  ASSESSMENT & PLAN:  64F PMHx HTN, morbid obesity s/p  Abby-en-Y gastric bypass who presented on 4/3 with incarcerated ventral hernia with SBO    4/10 open ventral hernia repair, small bowel resection, and primary fascial closure with Dr. Lema. Admitted to SICU for Q1 abdominal checks and hemodynamic monitoring.    takeback for exploratory laparotomy, repair of anastomotic leak   8Fr pigtail into lower abdominal wall collection,150cc of foul appearing material aspirated, Attached to THONY bulb   RTOR for re-exploration and evacuation of old clots from retroperitoneum and pelvis and placement of 3 new beatrice drains      NEUROLOGICAL:  #Acute pain    acetaminophen     Tablet .. 650 Oral every 6 hours PRN    melatonin 10 Oral at bedtime  #Sleep hygiene  - HOLDING Trazodone 25mg QPM  - Melatonin 10mg QPM    RESPIRATORY:   #Acute hypoxic respiratory failure, resolved  - Extubated , reintubated on  for AMS and agonal breathing, extubated   - Currently on 1L NC   - Home CPAP @ night  - duoneb treatment Q6  #Right distal main pulmonary embolus with segmental extension  - Heparin gtt off since 5/3->Lovenox 40mg q12  - No IVC filter needed at this time per IR  #Intermittent diuresis for fluid overload  - Last diuresis  1mg Bumex  #PMHx LOUIS on CPAP@ home   #Activity increase as tolerated    CARDS:   #Hypertensive urgency, resolved  #Acute hemorrhagic shock, resolved  - : 2u PRBC 1plt 1uFFP, 1g TXA  #Supraventricular tachycardia, Non-sustained ventricular tachycardia, Ventricular Premature Depolarization  - Cardiology (Dr. Lopez) - Metoprolol 25mg Q12 HOLDING  #PMHx of HTN  - continue Amlodipine 10mg QD  - continue Losartan 100mg qd  - HCTZ 25mg QD HOLDING  - EKG- NSR  - TTE : EF 70 to 75%    GASTROINTESTINAL/NUTRITION:   #4/10 open ventral hernia repair, small bowel resection, ileoileal anastomosis and primary fascial closure, 2 THONY Right  # RTOR for exploratory laparotomy, repair of anastomotic leak at previous ileoileal anastomosis, resected, new ileoileal anastomosis created, 1 THONY Left (total 3)  #: s/p IR percutaneous placement of a 8.5 [place] drainage catheter into lower abdominal wall fluid collection, yielding 150 mL of foul appearing fluid.  #: Acute retroperitoneal CTA: mesenteric arterial blush noted with extravasation  # RTOR for re-exploration and evacuation of old clots from retroperitoneum and pelvis    - s/p IR without finding of any bleeding, no embolization required    - Midline wound vac removed      Right Pelvis THONY 35cc     Left Pelvis THONY 35cc     REMOVED  RUQ THONY     REMOVED  LUQ THONY   #Diet: DASH with ensure clears  - on vitamin B12 supplement  - multivitamin  - probiotic   - metamucil   - Aspiration precautions, HOB 30  #GI Prophylaxis/hx GERD  - Pantoprazole 40mg PO qD (prn home rx) discontinued   #Bowel regimen    -last bowel movement 5/7 AM, formed    -holding regimen in setting of diarrhea, improving    /RENAL:   #Urine output in critically ill  - IVL  - Bumex 1mg IVP daily last dose   Negative 600cc/24hrs  Negative 2.8L/LOS    #FUNMI; oliguric - resolved  - Renal u/s> no hydronephrosis. 2-3cm anechoic cyst on right kidney   #Lactic acidosis resolved  - Nephro: check CK, no indication of RRT, decrease meropenem to Q12  #Hypokalemia - repleted   #Hypomagnesemia - repleted  #Hypophosphatemia - repleted     Labs:          BUN/Cr- 21/0.6  -->,  20/0.7  -->          [05-06 @ 21:29]Na  140 // K  3.7 // Mg  1.6 // Phos  2.6    HEME/ONC:   #DVT PPX  - LMWH 40mg q12  - SCD B/L  #acute anemia   - : 2uPRBC 1uFFP 1pkPlt, TXA 1g, IR for embolization due to active extravasation seen on CTA, no active bleed, no embolization, Return to OR for re-exploration and evacuation of old clot  - : IR consulted for IVC filter and PICC. LE duplex ordered, negative.  - : 1u PRBC for 500mL sanguinous output from wound vac   #Acute right pulmonary embolus  - Therapeutic Heparin gtt discontinued  secondary to venous bleed from wound vac   #B/L UE Venous Duplex 27 - thrombophlebitis  - B/L LE Venous duplex -RIGHT PT DVT, no LEFT DVT  - B/L LE Venous duplex -No evidence of deep venous thrombosis in either lower extremity.   - B/L duplex  - negative for DVT B/L  - Concern for malabsorption of Eliquis given small bowel resection per pharmacy  - Vascular surgery consulted - AC with heparin drip (upon discharge transition to Eliquis 10 mg po BID x 7 days then 5 mg po BID for at least 6 months      Labs: Hb/Hct:  9.7/29.9  -->,  9.8/29.6  -->                      Plts:  188  -->,  238  -->                 PTT/INR:        ID:  #Intraabdominal anastomotic leak, fluid collection    -ESR, CRP for 3 days  #ID Consult  - trial off antibiotics  per Dr. Bermudez and Dr. Lema  - Midline x Ertapenem 1g Q24 on discharge x 4 weeks (EOT )  - F/u with Dr. Perdue on  via Telehealth   #Culture    OR  - E Coli    OR cx: few E.coli, few bacteroides, rare clostridium     Blood Cx: NGTF     tracheal aspirate: no growth      body fluid cx rare e. coli      Blood cx: negative     Abdominal - Negative FINAL     C diff stool panel: negative     blood culture: negative  WBC- 9.07  --->>,  8.38  --->>,  9.49  --->>  Temp trend- 24hrs T(F): 98 ( @ 08:00), Max: 98 ( @ 16:00)    ENDOCRINE:  #Glycemic monitoring  - ACHS fingersticks  - ISS  - A1C 5.8%     MSK:  #Activity- increase as tolerated  #Physiatry    -bedside PT, will evaluate for inpatient 4A placement  #PT consult    -rehab facility on discharge 3 to 5x/week   #OT consult    -pending for ROM within bed      SKIN:  #midline incision staples in place f/u w/ primary removal timeline (last OR )  #left forearm skin tear   - Continue to monitor daily skin changes  #left knee skin tear  #sacral fissure 5/3      LINES/DRAINS:  PIV    LLQ pelvis beatrice drain ( -     Midline subcutaneous beatrice drain ( -     Right IJ TLC ( -     Discontinued lines:    Right Femoral Arterial Line ( - )    Prevena Vac Midline (removed )     Left radial arterial line ( - )    RLQ pelvis beatrice drain ( - )    ADVANCED DIRECTIVES:  Full Code  Emergency - Daughter  (Shavonne Moss)  INDICATION FOR SICU/SDU: Intestinal obstruction      Follow Up:  -8pm routine labs  -f/u ID if antibiotics restarts  -primary team regarding removal of remaining THONY drains  -f/u removal of midline incision staples  -f/u full anticoagulation for PE  Signed out to: Kitty PARR  Date: 2025  Time: 1:15PM SICU Transfer Note  NANCY SARGENT  65y (1960)  [id number]  Admitted to Hospital:25  HD: 34d    Transfer from: SICU  Transfer to:      [  ]BLUE          [  ]TRAUMA            [  ]GREEN/THORACIC                [  ]ENT/UROLOGY                [  ]NSGY      65y Female HD#34d  SICU CONSULTED FOR: mechanical ventilator, vasopressors      SICU COURSE:  04-10 - admitted to SICU service  : Intubated and started on paralytic. Arterial line placed, subclavian TLC placed. Nephrology consulted for oliguria. IR abdominal muscle botox injection performed. TTE performed.   : Additional fluid boluses given; trial fo bumex started, considering CVVH. Weaned off nimbex gtt.  > 220 /hr. Renal U/S w/out hydro.   : Weaned off Levophed. Bumex gtt 2mg/hr > 1mg/hr. Goal net negative 1-1.5L, UOP approx, 200c/hr.   : Remained on bumex gtt for further diuresis, decreased to 0.5 overnight, vent settings weaned. Cr downtrending, LFTs worsening.   4/15: Extubated to BiPAP, transitioned to NC. Dc'd bumex gtt, given 5mg metolazone x 1 and 2mg bumex x 1.   : THONY drain #2 murky/bilious, pending CTAP with PO contrast, hypernatremia, started D5W @ 75cc/hr, persistent hypokalemia   : RTOR for resection of anastomosis for anastomotic leak. Returned intubated, 400/24/80/10, sinus tachy to . Abdomen soft. Was initially on propofol @ 30 and precedex, but propofol weaned off due to hypotension with MAPs in 50s, fentanyl ggt started for acute pain. Remained hypotensive despite fluids, started on Levophed, on 0.05.   : Extubated. HFNC 40L/49%, Levo off since 11 AM   : NGT removed. Started on duonebs. D5W increased to 60cc/hr. 1mg bumex, > 1.5L response  : Episode of afib RVR resolved with metoprolol 5mg IVP, cardiology c/s placed, recommending metoprolol 25mg q 12   : PE on CTA, therapeutic heparin gtt started. Started on cardebe gtt,   : Meropenem started per ID.  S/p IR percutaneous placement of a 8.5 [place] drainage catheter into lower abdominal wall fluid collection, yielding 150 mL of bowel appearing fluid. New left radial a-line placed. Off nicarrdipine gtt.   : Switched to therapeutic lovenox. Diet advanced to aubrey clears with ensures.  : advacned to bariatric FLD, downgraded to SDU  : TPN discontinued, diet advanced to full liquid , Right cephalic DVT prelim  : Right UE VA Duplex final no DVT, thromboplebitis, Arterial line removed, 2L NC, midline vac changed, noted to have bilious drainage, RUQ drain pulled, upgraded to SICU status, plan to reorder TPN for  PM, diet changed to full liquid. 1:20 AM noted to be unresponsive, palpable femoral pulse, decision made to intubate, R femoral arterial line placed, started on levophed, amauri, bicarb amp x 2, bicarb gtt, propofol, 2U pRBC for hgb 5.9, plan for CTH and CTAP when stabilized   : 2uPRBC 1uFFP 1pkPlt, TXA 1g, IR for embolization due to active extravasation seen on CTA, no active bleed, no embolization, Return to OR for re-exploration and evacuation of old clot  : IR consulted for IVC filter and PICC. LE duplex ordered, negative. Precedex added overnight for agitation. 1 U PRBC for Hgb 6.8 overnight  : TF started at 20cc/hr. Meropenem decreased to Q 12 hours.   : Extubated to 5L NC, then placed on HFNC. Heparin gtt restarted.   : Placed on BiPAP, started on aubrey clears w/ clear ensures   : passed TOV. On 6L NC. Bumex 1mg.   : diuresis with bumex 1mg, RLQ drain removed  : Bumex 1mg. D/c meropenem. DG to SDU.   : DG 4C.              PAST MEDICAL & SURGICAL HISTORY:  Gastric bypass status for obesity  LOUIS (obstructive sleep apnea)  S/P gastric bypass  S/P   S/P small bowel resection  History of total bilateral knee replacement (TKR)  H/O colonoscopy 2016    Allergies  No Known Allergies  Intolerances    MEDICATIONS  (STANDING):  albuterol/ipratropium for Nebulization 3 milliLiter(s) Nebulizer every 6 hours  amLODIPine   Tablet 10 milliGRAM(s) Oral daily  chlorhexidine 2% Cloths 1 Application(s) Topical <User Schedule>  cyanocobalamin 1000 MICROGram(s) Oral daily  enoxaparin Injectable 40 milliGRAM(s) SubCutaneous every 12 hours  insulin lispro (ADMELOG) corrective regimen sliding scale   SubCutaneous Before meals and at bedtime  lactobacillus acidophilus 1 Tablet(s) Oral daily  losartan 100 milliGRAM(s) Oral daily  melatonin 10 milliGRAM(s) Oral at bedtime  multivitamin 1 Tablet(s) Oral daily  pantoprazole    Tablet 40 milliGRAM(s) Oral daily  psyllium Powder 1 Packet(s) Oral daily    MEDICATIONS  (PRN):  acetaminophen     Tablet .. 650 milliGRAM(s) Oral every 6 hours PRN Mild Pain (1 - 3)    Vital Signs Last 24 Hrs  T(C): 36.7 (07 May 2025 08:00), Max: 36.7 (06 May 2025 16:00)  T(F): 98 (07 May 2025 08:00), Max: 98 (06 May 2025 16:00)  HR: 101 (07 May 2025 08:00) (83 - 112)  BP: 126/88 (07 May 2025 08:00) (114/61 - 142/69)  BP(mean): 100 (07 May 2025 08:00) (78 - 104)  RR: 20 (07 May 2025 08:00) (18 - 33)  SpO2: 97% (07 May 2025 08:00) (94% - 98%)    Parameters below as of 07 May 2025 08:00  Patient On (Oxygen Delivery Method): nasal cannula  O2 Flow (L/min): 2    I&O's Summary    06 May 2025 07:01  -  07 May 2025 07:00  --------------------------------------------------------  IN: 910 mL / OUT: 1520 mL / NET: -610 mL    07 May 2025 07:01  -  07 May 2025 12:59  --------------------------------------------------------  IN: 360 mL / OUT: 0 mL / NET: 360 mL        Assessment & Plan:  ASSESSMENT & PLAN:  64F PMHx HTN, morbid obesity s/p  Abby-en-Y gastric bypass who presented on 4/3 with incarcerated ventral hernia with SBO    4/10 open ventral hernia repair, small bowel resection, and primary fascial closure with Dr. Lema. Admitted to SICU for Q1 abdominal checks and hemodynamic monitoring.    takeback for exploratory laparotomy, repair of anastomotic leak   8Fr pigtail into lower abdominal wall collection,150cc of foul appearing material aspirated, Attached to THONY bulb   RTOR for re-exploration and evacuation of old clots from retroperitoneum and pelvis and placement of 3 new beatrice drains      NEUROLOGICAL:  #Acute pain    acetaminophen     Tablet .. 650 Oral every 6 hours PRN    melatonin 10 Oral at bedtime  #Sleep hygiene  - HOLDING Trazodone 25mg QPM  - Melatonin 10mg QPM    RESPIRATORY:   #Acute hypoxic respiratory failure, resolved  - Extubated , reintubated on  for AMS and agonal breathing, extubated   - Currently on 1L NC   - Home CPAP @ night  - duoneb treatment Q6  #Right distal main pulmonary embolus with segmental extension  - Heparin gtt off since 5/3->Lovenox 40mg q12  - No IVC filter needed at this time per IR  #Intermittent diuresis for fluid overload  - Last diuresis  1mg Bumex  #PMHx LOUIS on CPAP@ home   #Activity increase as tolerated    CARDS:   #Hypertensive urgency, resolved  #Acute hemorrhagic shock, resolved  - : 2u PRBC 1plt 1uFFP, 1g TXA  #Supraventricular tachycardia, Non-sustained ventricular tachycardia, Ventricular Premature Depolarization  - Cardiology (Dr. Lopez) - Metoprolol 25mg Q12 HOLDING  #PMHx of HTN  - continue Amlodipine 10mg QD  - continue Losartan 100mg qd  - HCTZ 25mg QD HOLDING  - EKG- NSR  - TTE : EF 70 to 75%    GASTROINTESTINAL/NUTRITION:   #4/10 open ventral hernia repair, small bowel resection, ileoileal anastomosis and primary fascial closure, 2 THONY Right  # RTOR for exploratory laparotomy, repair of anastomotic leak at previous ileoileal anastomosis, resected, new ileoileal anastomosis created, 1 THONY Left (total 3)  #: s/p IR percutaneous placement of a 8.5 Slovak drainage catheter into lower abdominal wall fluid collection, yielding 150 mL of foul appearing fluid.  #: Acute retroperitoneal CTA: mesenteric arterial blush noted with extravasation  # RTOR for re-exploration and evacuation of old clots from retroperitoneum and pelvis    - s/p IR without finding of any bleeding, no embolization required    - Midline wound vac removed      Right Pelvis THONY 35cc     Left Pelvis THONY 35cc     REMOVED  RUQ THONY     REMOVED  LUQ THONY   #Diet: DASH with ensure clears  - on vitamin B12 supplement  - multivitamin  - probiotic   - metamucil   - Aspiration precautions, HOB 30  #GI Prophylaxis/hx GERD  - Pantoprazole 40mg PO qD (prn home rx) discontinued   #Bowel regimen    -last bowel movement 5/7 AM, formed    -holding regimen in setting of diarrhea, improving    /RENAL:   #Urine output in critically ill  - IVL  - Bumex 1mg IVP daily last dose   Negative 600cc/24hrs  Negative 2.8L/LOS    #FUNMI; oliguric - resolved  - Renal u/s> no hydronephrosis. 2-3cm anechoic cyst on right kidney   #Lactic acidosis resolved  - Nephro: check CK, no indication of RRT, decrease meropenem to Q12  #Hypokalemia - repleted   #Hypomagnesemia - repleted  #Hypophosphatemia - repleted     Labs:          BUN/Cr- 21/0.6  -->,  20/0.7  -->          [05-06 @ 21:29]Na  140 // K  3.7 // Mg  1.6 // Phos  2.6    HEME/ONC:   #DVT PPX  - LMWH 40mg q12  - SCD B/L  #acute anemia   - : 2uPRBC 1uFFP 1pkPlt, TXA 1g, IR for embolization due to active extravasation seen on CTA, no active bleed, no embolization, Return to OR for re-exploration and evacuation of old clot  - : IR consulted for IVC filter and PICC. LE duplex ordered, negative.  - : 1u PRBC for 500mL sanguinous output from wound vac   #Acute right pulmonary embolus  - Therapeutic Heparin gtt discontinued  secondary to venous bleed from wound vac   #B/L UE Venous Duplex 27 - thrombophlebitis  - B/L LE Venous duplex -RIGHT PT DVT, no LEFT DVT  - B/L LE Venous duplex -No evidence of deep venous thrombosis in either lower extremity.   - B/L duplex  - negative for DVT B/L  - Concern for malabsorption of Eliquis given small bowel resection per pharmacy  - Vascular surgery consulted - AC with heparin drip (upon discharge transition to Eliquis 10 mg po BID x 7 days then 5 mg po BID for at least 6 months      Labs: Hb/Hct:  9.7/29.9  -->,  9.8/29.6  -->                      Plts:  188  -->,  238  -->                 PTT/INR:        ID:  #Intraabdominal anastomotic leak, fluid collection    -ESR, CRP for 3 days  #ID Consult  - trial off antibiotics  per Dr. Bermudez and Dr. Lema  - Midline x Ertapenem 1g Q24 on discharge x 4 weeks (EOT )  - F/u with Dr. Perdue on  via Telehealth   #Culture    OR  - E Coli    OR cx: few E.coli, few bacteroides, rare clostridium     Blood Cx: NGTF     tracheal aspirate: no growth      body fluid cx rare e. coli      Blood cx: negative     Abdominal - Negative FINAL     C diff stool panel: negative     blood culture: negative  WBC- 9.07  --->>,  8.38  --->>,  9.49  --->>  Temp trend- 24hrs T(F): 98 ( @ 08:00), Max: 98 (- @ 16:00)    ENDOCRINE:  #Glycemic monitoring  - ACHS fingersticks  - ISS  - A1C 5.8%     MSK:  #Activity- increase as tolerated  #Physiatry    -bedside PT, will evaluate for inpatient 4A placement  #PT consult    -rehab facility on discharge 3 to 5x/week   #OT consult    -pending for ROM within bed      SKIN:  #midline incision staples in place f/u w/ primary removal timeline (last OR )  #left forearm skin tear   - Continue to monitor daily skin changes  #left knee skin tear  #sacral fissure 5/3      LINES/DRAINS:  PIV    LLQ pelvis beatrice drain ( -     Midline subcutaneous beatrice drain ( -     Right IJ TLC ( -     Discontinued lines:    Right Femoral Arterial Line ( - )    Prevena Vac Midline (removed )     Left radial arterial line ( - )    RLQ pelvis beatrice drain ( - )    ADVANCED DIRECTIVES:  Full Code  Emergency - Daughter  (Shavonne Moss)  INDICATION FOR SICU/SDU: Intestinal obstruction      Follow Up:  -8pm routine labs  -f/u ID if antibiotics restarts  -primary team regarding removal of remaining THONY drains  -f/u removal of midline incision staples  -f/u full anticoagulation for PE  Signed out to: Kitty PARR  Date: 2025  Time: 1:15PM SICU Transfer Note  NANCY SARGENT  65y (1960)  [id number]  Admitted to Hospital:25  HD: 34d    Transfer from: SICU  Transfer to: 4C     [X  ]BLUE          [  ]TRAUMA            [  ]GREEN/THORACIC                [  ]ENT/UROLOGY                [  ]NSGY      65y Female HD#34d  SICU CONSULTED FOR: mechanical ventilator, vasopressors      SICU COURSE:  04-10 - admitted to SICU service  : Intubated and started on paralytic. Arterial line placed, subclavian TLC placed. Nephrology consulted for oliguria. IR abdominal muscle botox injection performed. TTE performed.   : Additional fluid boluses given; trial fo bumex started, considering CVVH. Weaned off nimbex gtt.  > 220 /hr. Renal U/S w/out hydro.   : Weaned off Levophed. Bumex gtt 2mg/hr > 1mg/hr. Goal net negative 1-1.5L, UOP approx, 200c/hr.   : Remained on bumex gtt for further diuresis, decreased to 0.5 overnight, vent settings weaned. Cr downtrending, LFTs worsening.   4/15: Extubated to BiPAP, transitioned to NC. Dc'd bumex gtt, given 5mg metolazone x 1 and 2mg bumex x 1.   : THONY drain #2 murky/bilious, pending CTAP with PO contrast, hypernatremia, started D5W @ 75cc/hr, persistent hypokalemia   : RTOR for resection of anastomosis for anastomotic leak. Returned intubated, 400/24/80/10, sinus tachy to . Abdomen soft. Was initially on propofol @ 30 and precedex, but propofol weaned off due to hypotension with MAPs in 50s, fentanyl ggt started for acute pain. Remained hypotensive despite fluids, started on Levophed, on 0.05.   : Extubated. HFNC 40L/49%, Levo off since 11 AM   : NGT removed. Started on duonebs. D5W increased to 60cc/hr. 1mg bumex, > 1.5L response  : Episode of afib RVR resolved with metoprolol 5mg IVP, cardiology c/s placed, recommending metoprolol 25mg q 12   : PE on CTA, therapeutic heparin gtt started. Started on cardebe gtt,   : Meropenem started per ID.  S/p IR percutaneous placement of a 8.5 Cymro drainage catheter into lower abdominal wall fluid collection, yielding 150 mL of bowel appearing fluid. New left radial a-line placed. Off nicarrdipine gtt.   : Switched to therapeutic lovenox. Diet advanced to aubrey clears with ensures.  : advacned to bariatric FLD, downgraded to SDU  : TPN discontinued, diet advanced to full liquid , Right cephalic DVT prelim  : Right UE VA Duplex final no DVT, thromboplebitis, Arterial line removed, 2L NC, midline vac changed, noted to have bilious drainage, RUQ drain pulled, upgraded to SICU status, plan to reorder TPN for  PM, diet changed to full liquid. 1:20 AM noted to be unresponsive, palpable femoral pulse, decision made to intubate, R femoral arterial line placed, started on levophed, amauri, bicarb amp x 2, bicarb gtt, propofol, 2U pRBC for hgb 5.9, plan for CTH and CTAP when stabilized   : 2uPRBC 1uFFP 1pkPlt, TXA 1g, IR for embolization due to active extravasation seen on CTA, no active bleed, no embolization, Return to OR for re-exploration and evacuation of old clot  : IR consulted for IVC filter and PICC. LE duplex ordered, negative. Precedex added overnight for agitation. 1 U PRBC for Hgb 6.8 overnight  : TF started at 20cc/hr. Meropenem decreased to Q 12 hours.   : Extubated to 5L NC, then placed on HFNC. Heparin gtt restarted.   : Placed on BiPAP, started on aubrey clears w/ clear ensures   : passed TOV. On 6L NC. Bumex 1mg.   : diuresis with bumex 1mg, RLQ drain removed  : Bumex 1mg. D/c meropenem. DG to SDU.   : DG 4C.              PAST MEDICAL & SURGICAL HISTORY:  Gastric bypass status for obesity  LOUIS (obstructive sleep apnea)  S/P gastric bypass  S/P   S/P small bowel resection  History of total bilateral knee replacement (TKR)  H/O colonoscopy 2016    Allergies  No Known Allergies  Intolerances    MEDICATIONS  (STANDING):  albuterol/ipratropium for Nebulization 3 milliLiter(s) Nebulizer every 6 hours  amLODIPine   Tablet 10 milliGRAM(s) Oral daily  chlorhexidine 2% Cloths 1 Application(s) Topical <User Schedule>  cyanocobalamin 1000 MICROGram(s) Oral daily  enoxaparin Injectable 40 milliGRAM(s) SubCutaneous every 12 hours  insulin lispro (ADMELOG) corrective regimen sliding scale   SubCutaneous Before meals and at bedtime  lactobacillus acidophilus 1 Tablet(s) Oral daily  losartan 100 milliGRAM(s) Oral daily  melatonin 10 milliGRAM(s) Oral at bedtime  multivitamin 1 Tablet(s) Oral daily  pantoprazole    Tablet 40 milliGRAM(s) Oral daily  psyllium Powder 1 Packet(s) Oral daily    MEDICATIONS  (PRN):  acetaminophen     Tablet .. 650 milliGRAM(s) Oral every 6 hours PRN Mild Pain (1 - 3)    Vital Signs Last 24 Hrs  T(C): 36.7 (07 May 2025 08:00), Max: 36.7 (06 May 2025 16:00)  T(F): 98 (07 May 2025 08:00), Max: 98 (06 May 2025 16:00)  HR: 101 (07 May 2025 08:00) (83 - 112)  BP: 126/88 (07 May 2025 08:00) (114/61 - 142/69)  BP(mean): 100 (07 May 2025 08:00) (78 - 104)  RR: 20 (07 May 2025 08:00) (18 - 33)  SpO2: 97% (07 May 2025 08:00) (94% - 98%)    Parameters below as of 07 May 2025 08:00  Patient On (Oxygen Delivery Method): nasal cannula  O2 Flow (L/min): 2    I&O's Summary    06 May 2025 07:01  -  07 May 2025 07:00  --------------------------------------------------------  IN: 910 mL / OUT: 1520 mL / NET: -610 mL    07 May 2025 07:01  -  07 May 2025 12:59  --------------------------------------------------------  IN: 360 mL / OUT: 0 mL / NET: 360 mL        Assessment & Plan:  ASSESSMENT & PLAN:  64F PMHx HTN, morbid obesity s/p  Abby-en-Y gastric bypass who presented on 4/3 with incarcerated ventral hernia with SBO    4/10 open ventral hernia repair, small bowel resection, and primary fascial closure with Dr. Lema. Admitted to SICU for Q1 abdominal checks and hemodynamic monitoring.    takeback for exploratory laparotomy, repair of anastomotic leak   8Fr pigtail into lower abdominal wall collection,150cc of foul appearing material aspirated, Attached to THONY bulb   RTOR for re-exploration and evacuation of old clots from retroperitoneum and pelvis and placement of 3 new beatrice drains      NEUROLOGICAL:  #Acute pain    acetaminophen     Tablet .. 650 Oral every 6 hours PRN    melatonin 10 Oral at bedtime  #Sleep hygiene  - HOLDING Trazodone 25mg QPM  - Melatonin 10mg QPM    RESPIRATORY:   #Acute hypoxic respiratory failure, resolved  - Extubated , reintubated on  for AMS and agonal breathing, extubated   - Currently on 1L NC   - Home CPAP @ night  - duoneb treatment Q6  #Right distal main pulmonary embolus with segmental extension  - Heparin gtt off since 5/3->Lovenox 40mg q12  - No IVC filter needed at this time per IR  #Intermittent diuresis for fluid overload  - Last diuresis  1mg Bumex  #PMHx LOUIS on CPAP@ home   #Activity increase as tolerated    CARDS:   #Hypertensive urgency, resolved  #Acute hemorrhagic shock, resolved  - : 2u PRBC 1plt 1uFFP, 1g TXA  #Supraventricular tachycardia, Non-sustained ventricular tachycardia, Ventricular Premature Depolarization  - Cardiology (Dr. Lopez) - Metoprolol 25mg Q12 HOLDING  #PMHx of HTN  - continue Amlodipine 10mg QD  - continue Losartan 100mg qd  - HCTZ 25mg QD HOLDING  - EKG- NSR  - TTE : EF 70 to 75%    GASTROINTESTINAL/NUTRITION:   #4/10 open ventral hernia repair, small bowel resection, ileoileal anastomosis and primary fascial closure, 2 THONY Right  # RTOR for exploratory laparotomy, repair of anastomotic leak at previous ileoileal anastomosis, resected, new ileoileal anastomosis created, 1 THONY Left (total 3)  #: s/p IR percutaneous placement of a 8.5 Cymro drainage catheter into lower abdominal wall fluid collection, yielding 150 mL of foul appearing fluid.  #: Acute retroperitoneal CTA: mesenteric arterial blush noted with extravasation  # RTOR for re-exploration and evacuation of old clots from retroperitoneum and pelvis    - s/p IR without finding of any bleeding, no embolization required    - Midline wound vac removed      Right Pelvis THONY 35cc     Left Pelvis THONY 35cc     REMOVED  RUQ THONY     REMOVED  LUQ THONY   #Diet: DASH with ensure clears  - on vitamin B12 supplement  - multivitamin  - probiotic   - metamucil   - Aspiration precautions, HOB 30  #GI Prophylaxis/hx GERD  - Pantoprazole 40mg PO qD (prn home rx) discontinued   #Bowel regimen    -last bowel movement 5/7 AM, formed    -holding regimen in setting of diarrhea, improving    /RENAL:   #Urine output in critically ill  - IVL  - Bumex 1mg IVP daily last dose   Negative 600cc/24hrs  Negative 2.8L/LOS    #FUNMI; oliguric - resolved  - Renal u/s> no hydronephrosis. 2-3cm anechoic cyst on right kidney   #Lactic acidosis resolved  - Nephro: check CK, no indication of RRT, decrease meropenem to Q12  #Hypokalemia - repleted   #Hypomagnesemia - repleted  #Hypophosphatemia - repleted     Labs:          BUN/Cr- 21/0.6  -->,  20/0.7  -->          [05-06 @ 21:29]Na  140 // K  3.7 // Mg  1.6 // Phos  2.6    HEME/ONC:   #DVT PPX  - LMWH 40mg q12  - SCD B/L  #acute anemia   - : 2uPRBC 1uFFP 1pkPlt, TXA 1g, IR for embolization due to active extravasation seen on CTA, no active bleed, no embolization, Return to OR for re-exploration and evacuation of old clot  - : IR consulted for IVC filter and PICC. LE duplex ordered, negative.  - : 1u PRBC for 500mL sanguinous output from wound vac   #Acute right pulmonary embolus  - Therapeutic Heparin gtt discontinued  secondary to venous bleed from wound vac   #B/L UE Venous Duplex 27 - thrombophlebitis  - B/L LE Venous duplex -RIGHT PT DVT, no LEFT DVT  - B/L LE Venous duplex -No evidence of deep venous thrombosis in either lower extremity.   - B/L duplex  - negative for DVT B/L  - Concern for malabsorption of Eliquis given small bowel resection per pharmacy  - Vascular surgery consulted - AC with heparin drip (upon discharge transition to Eliquis 10 mg po BID x 7 days then 5 mg po BID for at least 6 months      Labs: Hb/Hct:  9.7/29.9  -->,  9.8/29.6  -->                      Plts:  188  -->,  238  -->                 PTT/INR:        ID:  #Intraabdominal anastomotic leak, fluid collection    -ESR, CRP for 3 days  #ID Consult  - trial off antibiotics  per Dr. Bermudez and Dr. Lema  - Midline x Ertapenem 1g Q24 on discharge x 4 weeks (EOT )  - F/u with Dr. Perdue on  via Telehealth   #Culture    OR  - E Coli    OR cx: few E.coli, few bacteroides, rare clostridium     Blood Cx: NGTF     tracheal aspirate: no growth      body fluid cx rare e. coli      Blood cx: negative     Abdominal - Negative FINAL     C diff stool panel: negative     blood culture: negative  WBC- 9.07  --->>,  8.38  --->>,  9.49  --->>  Temp trend- 24hrs T(F): 98 ( @ 08:00), Max: 98 (- @ 16:00)    ENDOCRINE:  #Glycemic monitoring  - ACHS fingersticks  - ISS  - A1C 5.8%     MSK:  #Activity- increase as tolerated  #Physiatry    -bedside PT, will evaluate for inpatient 4A placement, not a candidate as of   #PT consult    -rehab facility on discharge 3 to 5x/week   #OT consult    -pending for ROM within bed    SKIN:  #midline incision staples in place f/u w/ primary removal timeline (last OR )  #left forearm skin tear   - Continue to monitor daily skin changes  #left knee skin tear  #sacral fissure 5/3      LINES/DRAINS:  PIV    LLQ pelvis beatrice drain ( -     Midline subcutaneous beatrice drain ( -     Right IJ TLC ( - to be removed prior to transfer to floor    Discontinued lines:    Right Femoral Arterial Line ( - )    Prevena Vac Midline (removed )     Left radial arterial line ( - )    RLQ pelvis beatrice drain ( - )    ADVANCED DIRECTIVES:  Full Code  Emergency - Daughter  (Shavonne Moss)  INDICATION FOR SICU/SDU: Intestinal obstruction      Follow Up:  -8pm routine labs  -f/u ID if antibiotics restarts  -primary team regarding removal of remaining THONY drains  -f/u removal of midline incision staples  -f/u full anticoagulation for PE    Signed out to: Kitty PARR  Date: 2025  Time: 1:15PM

## 2025-05-07 NOTE — PROGRESS NOTE ADULT - SUBJECTIVE AND OBJECTIVE BOX
NANCY SARGENT   [id number]   [birth year]  64yF  ============================================================   DATE OF INITIAL SICU/SDU CONSULT: 04-10-25    INDICATION FOR SICU CONSULT:  q1hr abdominal checks, mechanical ventilation    SICU COURSE EVENTS :  04-10 - admitted to SICU service  4/11: Intubated and started on paralytic. Arterial line placed, subclavian TLC placed. Nephrology consulted for oliguria. IR abdominal muscle botox injection performed. TTE performed.   4/12: Additional fluid boluses given; trial fo bumex started, considering CVVH. Weaned off nimbex gtt.  > 220 /hr. Renal U/S w/out hydro.   4/13: Weaned off Levophed. Bumex gtt 2mg/hr > 1mg/hr. Goal net negative 1-1.5L, UOP approx, 200c/hr.   4/14: Remained on bumex gtt for further diuresis, decreased to 0.5 overnight, vent settings weaned. Cr downtrending, LFTs worsening.   4/15: Extubated to BiPAP, transitioned to NC. Dc'd bumex gtt, given 5mg metolazone x 1 and 2mg bumex x 1.   4/16: THONY drain #2 murky/bilious, pending CTAP with PO contrast, hypernatremia, started D5W @ 75cc/hr, persistent hypokalemia   4/17: RTOR for resection of anastomosis for anastomotic leak. Returned intubated, 400/24/80/10, sinus tachy to . Abdomen soft. Was initially on propofol @ 30 and precedex, but propofol weaned off due to hypotension with MAPs in 50s, fentanyl ggt started for acute pain. Remained hypotensive despite fluids, started on Levophed, on 0.05.   4/18: Extubated. HFNC 40L/49%, Levo off since 11 AM   4/19: NGT removed. Started on duonebs. D5W increased to 60cc/hr. 1mg bumex, > 1.5L response  4/20: Episode of afib RVR resolved with metoprolol 5mg IVP, cardiology c/s placed, recommending metoprolol 25mg q 12   4/21: PE on CTA, therapeutic heparin gtt started. Started on cardebe gtt,   4/22: Meropenem started per ID.  S/p IR percutaneous placement of a 8.5 [place] Island[place] drainage catheter into lower abdominal wall fluid collection, yielding 150 mL of bowel appearing fluid. New left radial a-line placed. Off nicarrdipine gtt.   4/23: Switched to therapeutic lovenox. Diet advanced to aubrey clears with ensures.  4/24: advacned to bariatric FLD, downgraded to SDU  4/25: TPN discontinued, diet advanced to full liquid , Right cephalic DVT prelim  4/26: Right UE VA Duplex final no DVT, thromboplebitis, Arterial line removed, 2L NC, midline vac changed, noted to have bilious drainage, RUQ drain pulled, upgraded to SICU status, plan to reorder TPN for 4/27 PM, diet changed to full liquid. 1:20 AM noted to be unresponsive, palpable femoral pulse, decision made to intubate, R femoral arterial line placed, started on levophed, amauri, bicarb amp x 2, bicarb gtt, propofol, 2U pRBC for hgb 5.9, plan for CTH and CTAP when stabilized   4/27: 2uPRBC 1uFFP 1pkPlt, TXA 1g, IR for embolization due to active extravasation seen on CTA, no active bleed, no embolization, Return to OR for re-exploration and evacuation of old clot  4/28: IR consulted for IVC filter and PICC. LE duplex ordered, negative. Precedex added overnight for agitation. 1 U PRBC for Hgb 6.8 overnight  4/29: TF started at 20cc/hr. Meropenem decreased to Q 12 hours.   4/30: Extubated to 5L NC, then placed on HFNC. Heparin gtt restarted.   5/1: Placed on BiPAP, started on aubrey clears w/ clear ensures   5/4: passed TOV. On 6L NC. Bumex 1mg.   5/5: diuresis with bumex 1mg, RLQ drain removed  5/6: Bumex 1mg. D/c meropenem. DG to SDU.     24Hour Events:  5/6  NIGHT   -PM rounds: Feeling well, SBP 130s, HR , saturating well on 2L NC, THONY drain x2 serosanguinous, abdomen soft, home CPAP at night, stool becoming more solid,   - Mg 2g x 2, KCl powder 20, Kphos powder x 1  -D/G SDU     DAY  -DVT sono: No evidence of deep venous thrombosis in either lower extremity  -D/c protonix  -Multivitamin   - d/c meropenem       [X] A ten-point review of systems was negative except as expressed in note.  [X ] History was obtained from patient. If unable to participate in their care, history was from review of the chart and collateral sources of information.  =====================================================================   NANCY SARGENT   [id number]   [birth year]  64yF  ============================================================   DATE OF INITIAL SICU/SDU CONSULT: 04-10-25    INDICATION FOR SICU CONSULT:  q1hr abdominal checks, mechanical ventilation    SICU COURSE EVENTS :  04-10 - admitted to SICU service  4/11: Intubated and started on paralytic. Arterial line placed, subclavian TLC placed. Nephrology consulted for oliguria. IR abdominal muscle botox injection performed. TTE performed.   4/12: Additional fluid boluses given; trial fo bumex started, considering CVVH. Weaned off nimbex gtt.  > 220 /hr. Renal U/S w/out hydro.   4/13: Weaned off Levophed. Bumex gtt 2mg/hr > 1mg/hr. Goal net negative 1-1.5L, UOP approx, 200c/hr.   4/14: Remained on bumex gtt for further diuresis, decreased to 0.5 overnight, vent settings weaned. Cr downtrending, LFTs worsening.   4/15: Extubated to BiPAP, transitioned to NC. Dc'd bumex gtt, given 5mg metolazone x 1 and 2mg bumex x 1.   4/16: THONY drain #2 murky/bilious, pending CTAP with PO contrast, hypernatremia, started D5W @ 75cc/hr, persistent hypokalemia   4/17: RTOR for resection of anastomosis for anastomotic leak. Returned intubated, 400/24/80/10, sinus tachy to . Abdomen soft. Was initially on propofol @ 30 and precedex, but propofol weaned off due to hypotension with MAPs in 50s, fentanyl ggt started for acute pain. Remained hypotensive despite fluids, started on Levophed, on 0.05.   4/18: Extubated. HFNC 40L/49%, Levo off since 11 AM   4/19: NGT removed. Started on duonebs. D5W increased to 60cc/hr. 1mg bumex, > 1.5L response  4/20: Episode of afib RVR resolved with metoprolol 5mg IVP, cardiology c/s placed, recommending metoprolol 25mg q 12   4/21: PE on CTA, therapeutic heparin gtt started. Started on cardebe gtt,   4/22: Meropenem started per ID.  S/p IR percutaneous placement of a 8.5 [place] drainage catheter into lower abdominal wall fluid collection, yielding 150 mL of bowel appearing fluid. New left radial a-line placed. Off nicarrdipine gtt.   4/23: Switched to therapeutic lovenox. Diet advanced to aubrey clears with ensures.  4/24: advacned to bariatric FLD, downgraded to SDU  4/25: TPN discontinued, diet advanced to full liquid , Right cephalic DVT prelim  4/26: Right UE VA Duplex final no DVT, thromboplebitis, Arterial line removed, 2L NC, midline vac changed, noted to have bilious drainage, RUQ drain pulled, upgraded to SICU status, plan to reorder TPN for 4/27 PM, diet changed to full liquid. 1:20 AM noted to be unresponsive, palpable femoral pulse, decision made to intubate, R femoral arterial line placed, started on levophed, amauri, bicarb amp x 2, bicarb gtt, propofol, 2U pRBC for hgb 5.9, plan for CTH and CTAP when stabilized   4/27: 2uPRBC 1uFFP 1pkPlt, TXA 1g, IR for embolization due to active extravasation seen on CTA, no active bleed, no embolization, Return to OR for re-exploration and evacuation of old clot  4/28: IR consulted for IVC filter and PICC. LE duplex ordered, negative. Precedex added overnight for agitation. 1 U PRBC for Hgb 6.8 overnight  4/29: TF started at 20cc/hr. Meropenem decreased to Q 12 hours.   4/30: Extubated to 5L NC, then placed on HFNC. Heparin gtt restarted.   5/1: Placed on BiPAP, started on aubrey clears w/ clear ensures   5/4: passed TOV. On 6L NC. Bumex 1mg.   5/5: diuresis with bumex 1mg, RLQ drain removed  5/6: Bumex 1mg. D/c meropenem. DG to SDU.     24Hour Events:  5/6  NIGHT   -PM rounds: Feeling well, SBP 130s, HR , saturating well on 2L NC, THONY drain x2 serosanguinous, abdomen soft, home CPAP at night, stool becoming more solid,   - Mg 2g x 2, KCl powder 20, Kphos powder x 1  -D/G SDU     DAY  -DVT sono: No evidence of deep venous thrombosis in either lower extremity  -D/c protonix  -Multivitamin   - d/c meropenem       [X] A ten-point review of systems was negative except as expressed in note.  [X ] History was obtained from patient. If unable to participate in their care, history was from review of the chart and collateral sources of information.  =====================================================================      ACTIVE MEDICATIONS  acetaminophen     Tablet .. 650 milliGRAM(s) Oral every 6 hours PRN Mild Pain (1 - 3)  albuterol/ipratropium for Nebulization 3 milliLiter(s) Nebulizer every 6 hours  amLODIPine   Tablet 10 milliGRAM(s) Oral daily  chlorhexidine 2% Cloths 1 Application(s) Topical <User Schedule>  cyanocobalamin 1000 MICROGram(s) Oral daily  enoxaparin Injectable 40 milliGRAM(s) SubCutaneous every 12 hours  insulin lispro (ADMELOG) corrective regimen sliding scale   SubCutaneous Before meals and at bedtime  lactobacillus acidophilus 1 Tablet(s) Oral daily  losartan 100 milliGRAM(s) Oral daily  melatonin 10 milliGRAM(s) Oral at bedtime  multivitamin 1 Tablet(s) Oral daily  pantoprazole    Tablet 40 milliGRAM(s) Oral daily  psyllium Powder 1 Packet(s) Oral daily     VITALS LAST 24 HOURS   T(F): 98 (05-07 @ 08:00), Max: 98 (05-06 @ 16:00)  HR: 101 (05-07 @ 08:00) (83 - 110)  BP: 126/88 (05-07 @ 08:00) (114/61 - 142/69)  BP(mean): 100 (05-07 @ 08:00) (78 - 104)  ABP: --  ABP(mean): --  RR: 20 (05-07 @ 08:00) (18 - 33)  SpO2: 97% (05-07 @ 08:00) (94% - 98%)  FLUID STATUS    05-06-25 @ 07:01  -  05-07-25 @ 07:00  --------------------------------------------------------  IN:    IV PiggyBack: 50 mL    Oral Fluid: 860 mL  Total IN: 910 mL    OUT:    Bulb (mL): 35 mL    Bulb (mL): 35 mL    Rectal Tube (mL): 50 mL    Voided (mL): 1400 mL  Total OUT: 1520 mL    Total NET: -610 mL      05-07-25 @ 07:01  -  05-07-25 @ 14:27  --------------------------------------------------------  IN:    Oral Fluid: 360 mL  Total IN: 360 mL    OUT:    Bulb (mL): 0 mL    Bulb (mL): 0 mL  Total OUT: 0 mL    Total NET: 360 mL        MECHANICAL VENT/BLOOD GAS       PHYSICAL EXAM:    a&ox3  follows commands, BLACK  no acute distress  equal chest rise b/l  abdomen soft  midline staples, inferior vac in place with black sponge  b/l pelvic THONY drains with serosanguinous output  extremities soft  primafit in place  right IJ TLC

## 2025-05-07 NOTE — PROGRESS NOTE ADULT - ASSESSMENT
ASSESSMENT:  65y F w/ PMHx of  Morbid obesity, LOUIS, large complex ventral hernia s/p repair sbr and loss of domain c/b post op intubation, hypotension requiring vasopressors, anastomotic leakage s/p RTOR and reanastomosis, now doing better post op from a respiratory status. Comfortable on BIPAP. Off pressors. Blood cultures negative.    PLAN:  - Wound vac changes every 48H  - Encourage ambulation   - Continue regular diet   - Pain control   - Pending CM for possible 4A/rehab  - CRP   - Care as per SICU       BLUE TEAM SPECTRA: [id number]

## 2025-05-07 NOTE — PROGRESS NOTE ADULT - SUBJECTIVE AND OBJECTIVE BOX
GENERAL SURGERY PROGRESS NOTE    Patient: NANCY SARGENT , 65y (62)Female   MRN: [id number]  Location: [address]  Visit: 25 Inpatient  Date: 25 @ 11:15    Hospital Day #:35    Events of past 24 hours: DVT sono no evidence of DVT. CPAP overnight. Passing gas and having bowel movements WTD dressing changed to wound vac.     PAST MEDICAL & SURGICAL HISTORY:  Gastric bypass status for obesity      LOUIS (obstructive sleep apnea)      S/P gastric bypass      S/P       S/P small bowel resection      History of total bilateral knee replacement (TKR)      H/O colonoscopy            Vitals:   T(F): 98 (25 @ 08:00), Max: 98 (25 @ 16:00)  HR: 101 (25 @ 08:00)  BP: 126/88 (25 @ 08:00)  RR: 20 (25 @ 08:00)  SpO2: 97% (25 @ 08:00)      Diet, DASH/TLC:   Sodium & Cholesterol Restricted  Consistent Carbohydrate No Snacks (CSTCHO)  No Chocolate  No Carb Prosource TF     Qty per Day:  2  Supplement Feeding Modality:  Oral  Ensure Clear Cans or Servings Per Day:  1       Frequency:  Three Times a day      Fluids:     I & O's:    25 @ 07:01  -  25 @ 07:00  --------------------------------------------------------  IN:    IV PiggyBack: 50 mL    Oral Fluid: 860 mL  Total IN: 910 mL    OUT:    Bulb (mL): 35 mL    Bulb (mL): 35 mL    Rectal Tube (mL): 50 mL    Voided (mL): 1400 mL  Total OUT: 1520 mL    Total NET: -610 mL        PHYSICAL EXAM:  General: NAD, calm and cooperative  HEENT: NCAT  Cardiac: RRR  Respiratory: Comfortable on a NC, normal respiratory effort  Abdomen: Soft, non-distended, non-tender, wound vac in place   Musculoskeletal: Active ROM intact, compartments soft  Vascular: Extremities well perfused  Skin: Warm/dry, normal color, no jaundice    MEDICATIONS  (STANDING):  albuterol/ipratropium for Nebulization 3 milliLiter(s) Nebulizer every 6 hours  amLODIPine   Tablet 10 milliGRAM(s) Oral daily  chlorhexidine 2% Cloths 1 Application(s) Topical <User Schedule>  cyanocobalamin 1000 MICROGram(s) Oral daily  enoxaparin Injectable 40 milliGRAM(s) SubCutaneous every 12 hours  insulin lispro (ADMELOG) corrective regimen sliding scale   SubCutaneous Before meals and at bedtime  lactobacillus acidophilus 1 Tablet(s) Oral daily  losartan 100 milliGRAM(s) Oral daily  melatonin 10 milliGRAM(s) Oral at bedtime  multivitamin 1 Tablet(s) Oral daily  pantoprazole    Tablet 40 milliGRAM(s) Oral daily  psyllium Powder 1 Packet(s) Oral daily    MEDICATIONS  (PRN):  acetaminophen     Tablet .. 650 milliGRAM(s) Oral every 6 hours PRN Mild Pain (1 - 3)      DVT PROPHYLAXIS: enoxaparin Injectable 40 milliGRAM(s) SubCutaneous every 12 hours    GI PROPHYLAXIS: pantoprazole    Tablet 40 milliGRAM(s) Oral daily    ANTICOAGULATION:   ANTIBIOTICS:            LAB/STUDIES:  Labs:  CAPILLARY BLOOD GLUCOSE      POCT Blood Glucose.: 113 mg/dL (07 May 2025 07:56)  POCT Blood Glucose.: 114 mg/dL (06 May 2025 21:42)  POCT Blood Glucose.: 120 mg/dL (06 May 2025 17:12)  POCT Blood Glucose.: 199 mg/dL (06 May 2025 12:16)                          9.8    9.49  )-----------( 238      ( 06 May 2025 21:29 )             29.6       Auto Immature Granulocyte %: 2.5 % (25 @ 21:29)        141  |  106  |  20  ----------------------------<  104[H]  3.9   |  24  |  0.7      Calcium: 7.5 mg/dL (25 @ 04:30)      LFTs:             5.1  | 0.7  | 27       ------------------[169     ( 05 May 2025 20:14 )  2.8  | 0.4  | 80          Lipase:x      Amylase:x           ABG - ( 03 May 2025 05:09 )  pH: 7.49  /  pCO2: 32    /  pO2: 95    / HCO3: 24    / Base Excess: 1.3   /  SaO2: 98.3            ABG - ( 02 May 2025 03:54 )  pH: 7.47  /  pCO2: 33    /  pO2: 78    / HCO3: 24    / Base Excess: 0.3   /  SaO2: 98.5            ABG - ( 01 May 2025 04:34 )  pH: 7.44  /  pCO2: 32    /  pO2: 131   / HCO3: 22    / Base Excess: -2.1  /  SaO2: 98.3              Coags:            Urinalysis Basic - ( 07 May 2025 04:30 )    Color: x / Appearance: x / SG: x / pH: x  Gluc: 104 mg/dL / Ketone: x  / Bili: x / Urobili: x   Blood: x / Protein: x / Nitrite: x   Leuk Esterase: x / RBC: x / WBC x   Sq Epi: x / Non Sq Epi: x / Bacteria: x                IMAGING:

## 2025-05-08 LAB
ANION GAP SERPL CALC-SCNC: 14 MMOL/L — SIGNIFICANT CHANGE UP (ref 7–14)
BASOPHILS # BLD AUTO: 0.04 K/UL — SIGNIFICANT CHANGE UP (ref 0–0.2)
BASOPHILS NFR BLD AUTO: 0.4 % — SIGNIFICANT CHANGE UP (ref 0–1)
BUN SERPL-MCNC: 20 MG/DL — SIGNIFICANT CHANGE UP (ref 10–20)
CALCIUM SERPL-MCNC: 7.8 MG/DL — LOW (ref 8.4–10.5)
CHLORIDE SERPL-SCNC: 104 MMOL/L — SIGNIFICANT CHANGE UP (ref 98–110)
CO2 SERPL-SCNC: 19 MMOL/L — SIGNIFICANT CHANGE UP (ref 17–32)
CREAT SERPL-MCNC: 0.8 MG/DL — SIGNIFICANT CHANGE UP (ref 0.7–1.5)
CRP SERPL-MCNC: 36.1 MG/L — HIGH
EGFR: 82 ML/MIN/1.73M2 — SIGNIFICANT CHANGE UP
EGFR: 82 ML/MIN/1.73M2 — SIGNIFICANT CHANGE UP
EOSINOPHIL # BLD AUTO: 0.05 K/UL — SIGNIFICANT CHANGE UP (ref 0–0.7)
EOSINOPHIL NFR BLD AUTO: 0.6 % — SIGNIFICANT CHANGE UP (ref 0–8)
GLUCOSE BLDC GLUCOMTR-MCNC: 118 MG/DL — HIGH (ref 70–99)
GLUCOSE BLDC GLUCOMTR-MCNC: 119 MG/DL — HIGH (ref 70–99)
GLUCOSE BLDC GLUCOMTR-MCNC: 120 MG/DL — HIGH (ref 70–99)
GLUCOSE BLDC GLUCOMTR-MCNC: 144 MG/DL — HIGH (ref 70–99)
GLUCOSE SERPL-MCNC: 113 MG/DL — HIGH (ref 70–99)
HCT VFR BLD CALC: 29.5 % — LOW (ref 37–47)
HGB BLD-MCNC: 9.7 G/DL — LOW (ref 12–16)
IMM GRANULOCYTES NFR BLD AUTO: 2.3 % — HIGH (ref 0.1–0.3)
LYMPHOCYTES # BLD AUTO: 1.45 K/UL — SIGNIFICANT CHANGE UP (ref 1.2–3.4)
LYMPHOCYTES # BLD AUTO: 16.1 % — LOW (ref 20.5–51.1)
MAGNESIUM SERPL-MCNC: 1.9 MG/DL — SIGNIFICANT CHANGE UP (ref 1.8–2.4)
MCHC RBC-ENTMCNC: 31.2 PG — HIGH (ref 27–31)
MCHC RBC-ENTMCNC: 32.9 G/DL — SIGNIFICANT CHANGE UP (ref 32–37)
MCV RBC AUTO: 94.9 FL — SIGNIFICANT CHANGE UP (ref 81–99)
MONOCYTES # BLD AUTO: 0.62 K/UL — HIGH (ref 0.1–0.6)
MONOCYTES NFR BLD AUTO: 6.9 % — SIGNIFICANT CHANGE UP (ref 1.7–9.3)
NEUTROPHILS # BLD AUTO: 6.63 K/UL — HIGH (ref 1.4–6.5)
NEUTROPHILS NFR BLD AUTO: 73.7 % — SIGNIFICANT CHANGE UP (ref 42.2–75.2)
NRBC BLD AUTO-RTO: 0 /100 WBCS — SIGNIFICANT CHANGE UP (ref 0–0)
PHOSPHATE SERPL-MCNC: 2.6 MG/DL — SIGNIFICANT CHANGE UP (ref 2.1–4.9)
PLATELET # BLD AUTO: 229 K/UL — SIGNIFICANT CHANGE UP (ref 130–400)
PMV BLD: 10.3 FL — SIGNIFICANT CHANGE UP (ref 7.4–10.4)
POTASSIUM SERPL-MCNC: 3.9 MMOL/L — SIGNIFICANT CHANGE UP (ref 3.5–5)
POTASSIUM SERPL-SCNC: 3.9 MMOL/L — SIGNIFICANT CHANGE UP (ref 3.5–5)
RBC # BLD: 3.11 M/UL — LOW (ref 4.2–5.4)
RBC # FLD: 16.5 % — HIGH (ref 11.5–14.5)
SODIUM SERPL-SCNC: 137 MMOL/L — SIGNIFICANT CHANGE UP (ref 135–146)
WBC # BLD: 9 K/UL — SIGNIFICANT CHANGE UP (ref 4.8–10.8)
WBC # FLD AUTO: 9 K/UL — SIGNIFICANT CHANGE UP (ref 4.8–10.8)

## 2025-05-08 RX ORDER — ERGOCALCIFEROL 1.25 MG/1
50000 CAPSULE ORAL
Refills: 0 | Status: DISCONTINUED | OUTPATIENT
Start: 2025-05-12 | End: 2025-05-23

## 2025-05-08 RX ORDER — CYST/ALA/Q10/PHOS.SER/DHA/BROC 100-20-50
1 POWDER (GRAM) ORAL DAILY
Refills: 0 | Status: DISCONTINUED | OUTPATIENT
Start: 2025-05-08 | End: 2025-05-23

## 2025-05-08 RX ORDER — POTASSIUM PHOSPHATE, MONOBASIC POTASSIUM PHOSPHATE, DIBASIC INJECTION, 236; 224 MG/ML; MG/ML
15 SOLUTION, CONCENTRATE INTRAVENOUS ONCE
Refills: 0 | Status: COMPLETED | OUTPATIENT
Start: 2025-05-08 | End: 2025-05-08

## 2025-05-08 RX ORDER — ERGOCALCIFEROL 1.25 MG/1
50000 CAPSULE ORAL DAILY
Refills: 0 | Status: COMPLETED | OUTPATIENT
Start: 2025-05-08 | End: 2025-05-11

## 2025-05-08 RX ADMIN — IPRATROPIUM BROMIDE AND ALBUTEROL SULFATE 3 MILLILITER(S): .5; 2.5 SOLUTION RESPIRATORY (INHALATION) at 13:07

## 2025-05-08 RX ADMIN — INSULIN LISPRO 0: 100 INJECTION, SOLUTION INTRAVENOUS; SUBCUTANEOUS at 21:25

## 2025-05-08 RX ADMIN — IPRATROPIUM BROMIDE AND ALBUTEROL SULFATE 3 MILLILITER(S): .5; 2.5 SOLUTION RESPIRATORY (INHALATION) at 07:18

## 2025-05-08 RX ADMIN — CYANOCOBALAMIN 1000 MICROGRAM(S): 1000 INJECTION INTRAMUSCULAR; SUBCUTANEOUS at 12:09

## 2025-05-08 RX ADMIN — Medication 20 MILLIEQUIVALENT(S): at 00:03

## 2025-05-08 RX ADMIN — Medication 650 MILLIGRAM(S): at 16:30

## 2025-05-08 RX ADMIN — AMLODIPINE BESYLATE 10 MILLIGRAM(S): 10 TABLET ORAL at 06:34

## 2025-05-08 RX ADMIN — POTASSIUM PHOSPHATE, MONOBASIC POTASSIUM PHOSPHATE, DIBASIC INJECTION, 63.75 MILLIMOLE(S): 236; 224 SOLUTION, CONCENTRATE INTRAVENOUS at 02:55

## 2025-05-08 RX ADMIN — ENOXAPARIN SODIUM 40 MILLIGRAM(S): 100 INJECTION SUBCUTANEOUS at 17:45

## 2025-05-08 RX ADMIN — IPRATROPIUM BROMIDE AND ALBUTEROL SULFATE 3 MILLILITER(S): .5; 2.5 SOLUTION RESPIRATORY (INHALATION) at 19:40

## 2025-05-08 RX ADMIN — Medication 1 TABLET(S): at 12:09

## 2025-05-08 RX ADMIN — Medication 650 MILLIGRAM(S): at 17:00

## 2025-05-08 RX ADMIN — LOSARTAN POTASSIUM 100 MILLIGRAM(S): 100 TABLET, FILM COATED ORAL at 06:35

## 2025-05-08 RX ADMIN — Medication 1 TABLET(S): at 12:08

## 2025-05-08 RX ADMIN — Medication 1 APPLICATION(S): at 06:33

## 2025-05-08 RX ADMIN — ENOXAPARIN SODIUM 40 MILLIGRAM(S): 100 INJECTION SUBCUTANEOUS at 06:35

## 2025-05-08 RX ADMIN — Medication 10 MILLIGRAM(S): at 21:24

## 2025-05-08 NOTE — PROGRESS NOTE ADULT - SUBJECTIVE AND OBJECTIVE BOX
GENERAL SURGERY PROGRESS NOTE     NANCY SARGENT  [address] [place] [address] day :36d    POD: 21  Procedure: Open repair of ventral hernia using mesh and component separation technique    Small bowel resection    Insertion, arterial line, percutaneous    Exploratory laparotomy    Insertion of arterial line with imaging guidance    Abdominal washout    Evacuation of hemoperitoneum      Surgical Attending: Rose Lema  24 hour events: Weaned off O2, on 1LNC to RA now, Jps ss outputs. Rectal tube removed. 20 KCl PO, 15 KPhos. Downgraded from SICU    PHYSICAL EXAM:  General: NAD, calm and cooperative  HEENT: NCAT  Cardiac: RRR  Respiratory: Comfortable on a room air, normal respiratory effort  Abdomen: Soft, non-distended, non-tender, wound vac in place, mariam ss outputs x2  Musculoskeletal: Active ROM intact, compartments soft  Vascular: Extremities well perfused  Skin: Warm/dry, normal color, no jaundice      T(F): 98 (05-07-25 @ 23:42), Max: 98.3 (05-07-25 @ 20:00)  HR: 92 (05-07-25 @ 23:42) (83 - 103)  BP: 152/88 (05-07-25 @ 23:42) (119/56 - 152/88)  ABP: --  ABP(mean): --  RR: 18 (05-07-25 @ 23:42) (18 - 31)  SpO2: 94% (05-07-25 @ 23:42) (92% - 97%)    IN'S / OUT's:    05-06-25 @ 07:01  -  05-07-25 @ 07:00  --------------------------------------------------------  IN:    IV PiggyBack: 50 mL    IV PiggyBack: 10.6 mL    Oral Fluid: 860 mL  Total IN: 920.6 mL    OUT:    Bulb (mL): 40 mL    Bulb (mL): 15 mL    Rectal Tube (mL): 50 mL    Voided (mL): 1400 mL  Total OUT: 1505 mL    Total NET: -584.4 mL      05-07-25 @ 07:01  -  05-08-25 @ 00:41  --------------------------------------------------------  IN:    IV PiggyBack: 127.2 mL    Oral Fluid: 1840 mL  Total IN: 1967.2 mL    OUT:    Bulb (mL): 0 mL    Bulb (mL): 0 mL    Voided (mL): 1500 mL  Total OUT: 1500 mL    Total NET: 467.2 mL          MEDICATIONS  (STANDING):  albuterol/ipratropium for Nebulization 3 milliLiter(s) Nebulizer every 6 hours  amLODIPine   Tablet 10 milliGRAM(s) Oral daily  chlorhexidine 2% Cloths 1 Application(s) Topical <User Schedule>  cyanocobalamin 1000 MICROGram(s) Oral daily  enoxaparin Injectable 40 milliGRAM(s) SubCutaneous every 12 hours  insulin lispro (ADMELOG) corrective regimen sliding scale   SubCutaneous Before meals and at bedtime  lactobacillus acidophilus 1 Tablet(s) Oral daily  losartan 100 milliGRAM(s) Oral daily  melatonin 10 milliGRAM(s) Oral at bedtime  multivitamin 1 Tablet(s) Oral daily  potassium phosphate IVPB 15 milliMole(s) IV Intermittent once  psyllium Powder 1 Packet(s) Oral daily    MEDICATIONS  (PRN):  acetaminophen     Tablet .. 650 milliGRAM(s) Oral every 6 hours PRN Mild Pain (1 - 3)      LABS  Labs:  CAPILLARY BLOOD GLUCOSE      POCT Blood Glucose.: 125 mg/dL (07 May 2025 16:46)  POCT Blood Glucose.: 124 mg/dL (07 May 2025 11:53)  POCT Blood Glucose.: 113 mg/dL (07 May 2025 07:56)                          9.6    7.07  )-----------( 222      ( 07 May 2025 21:35 )             29.3         05-07    138  |  104  |  18  ----------------------------<  109[H]  3.7   |  23  |  0.8      Calcium: 7.7 mg/dL (05-07-25 @ 21:35)      LFTs:       ABG - ( 03 May 2025 05:09 )  pH: 7.49  /  pCO2: 32    /  pO2: 95    / HCO3: 24    / Base Excess: 1.3   /  SaO2: 98.3            ABG - ( 02 May 2025 03:54 )  pH: 7.47  /  pCO2: 33    /  pO2: 78    / HCO3: 24    / Base Excess: 0.3   /  SaO2: 98.5            ABG - ( 01 May 2025 04:34 )  pH: 7.44  /  pCO2: 32    /  pO2: 131   / HCO3: 22    / Base Excess: -2.1  /  SaO2: 98.3              Coags:            Urinalysis Basic - ( 07 May 2025 21:35 )    Color: x / Appearance: x / SG: x / pH: x  Gluc: 109 mg/dL / Ketone: x  / Bili: x / Urobili: x   Blood: x / Protein: x / Nitrite: x   Leuk Esterase: x / RBC: x / WBC x   Sq Epi: x / Non Sq Epi: x / Bacteria: x            RADIOLOGY & ADDITIONAL TESTS:

## 2025-05-08 NOTE — PROGRESS NOTE ADULT - ASSESSMENT
65y F w/ PMHx of  Morbid obesity, LOUIS, large complex ventral hernia s/p repair sbr and loss of domain c/b post op intubation, hypotension requiring vasopressors, anastomotic leakage s/p RTOR and reanastomosis, now doing better post op from a respiratory status. Comfortable on BIPAP. Off pressors. Blood cultures negative.    PLAN:  - Wound vac changes every 48H  - monitor mariam outputs, currently ss  - Encourage ambulation   - Continue regular diet   - Pain control   - Pending CM for possible 4A/rehab  - 5/7: CRP 32.8, ESR: 40

## 2025-05-09 LAB
GLUCOSE BLDC GLUCOMTR-MCNC: 102 MG/DL — HIGH (ref 70–99)
GLUCOSE BLDC GLUCOMTR-MCNC: 112 MG/DL — HIGH (ref 70–99)
GLUCOSE BLDC GLUCOMTR-MCNC: 117 MG/DL — HIGH (ref 70–99)
GLUCOSE BLDC GLUCOMTR-MCNC: 119 MG/DL — HIGH (ref 70–99)

## 2025-05-09 RX ADMIN — POTASSIUM PHOSPHATE, MONOBASIC POTASSIUM PHOSPHATE, DIBASIC INJECTION, 63.75 MILLIMOLE(S): 236; 224 SOLUTION, CONCENTRATE INTRAVENOUS at 05:30

## 2025-05-09 RX ADMIN — Medication 1 TABLET(S): at 11:49

## 2025-05-09 RX ADMIN — ERGOCALCIFEROL 50000 UNIT(S): 1.25 CAPSULE ORAL at 11:50

## 2025-05-09 RX ADMIN — Medication 1 APPLICATION(S): at 05:28

## 2025-05-09 RX ADMIN — Medication 1 TABLET(S): at 11:50

## 2025-05-09 RX ADMIN — ENOXAPARIN SODIUM 40 MILLIGRAM(S): 100 INJECTION SUBCUTANEOUS at 05:27

## 2025-05-09 RX ADMIN — IPRATROPIUM BROMIDE AND ALBUTEROL SULFATE 3 MILLILITER(S): .5; 2.5 SOLUTION RESPIRATORY (INHALATION) at 13:27

## 2025-05-09 RX ADMIN — AMLODIPINE BESYLATE 10 MILLIGRAM(S): 10 TABLET ORAL at 05:27

## 2025-05-09 RX ADMIN — Medication 10 MILLIGRAM(S): at 21:29

## 2025-05-09 RX ADMIN — IPRATROPIUM BROMIDE AND ALBUTEROL SULFATE 3 MILLILITER(S): .5; 2.5 SOLUTION RESPIRATORY (INHALATION) at 08:01

## 2025-05-09 RX ADMIN — IPRATROPIUM BROMIDE AND ALBUTEROL SULFATE 3 MILLILITER(S): .5; 2.5 SOLUTION RESPIRATORY (INHALATION) at 20:45

## 2025-05-09 RX ADMIN — LOSARTAN POTASSIUM 100 MILLIGRAM(S): 100 TABLET, FILM COATED ORAL at 05:27

## 2025-05-09 RX ADMIN — CYANOCOBALAMIN 1000 MICROGRAM(S): 1000 INJECTION INTRAMUSCULAR; SUBCUTANEOUS at 11:50

## 2025-05-09 RX ADMIN — Medication 650 MILLIGRAM(S): at 14:06

## 2025-05-09 RX ADMIN — ENOXAPARIN SODIUM 40 MILLIGRAM(S): 100 INJECTION SUBCUTANEOUS at 18:00

## 2025-05-09 RX ADMIN — INSULIN LISPRO 0: 100 INJECTION, SOLUTION INTRAVENOUS; SUBCUTANEOUS at 21:55

## 2025-05-09 NOTE — CONSULT NOTE ADULT - ASSESSMENT
63 y/o F PMHx high blood pressure and gastric bypass surgery in 2001, presenting with two days of sharp abdominal pain and one episode of non-bloody, non-bilious vomiting. She has a prior history of small bowel obstruction in 2006, 2008, and most recently in 2020, all of which resolved with conservative management. Admitted for SBO s/p   s/p bowel resection on 4.10.25 course complicated by septic shock and respiratory    65 y/o F PMHx high blood pressure and gastric bypass surgery in 2001, presenting with two days of sharp abdominal pain and one episode of non-bloody, non-bilious vomiting. She has a prior history of small bowel obstruction in 2006, 2008, and most recently in 2020, all of which resolved with conservative management. Admitted for SBO s/p   s/p bowel resection on 4.10.25 course complicated by septic shock 2/2 anastomosis leakage and respiratory failure requiring intubation s/p OR again on 4.17.25 with reanastomosis.  On 4.21.25, she developed right distal main PE with segmental extension. She was started on heparin drip.  On 4.27 she developed abdominal pain, tachypnea, Hb dropped to 5.8, CT showed evidence of extravasation, she was taken back to OR on 4.28.25 for abdominal washout and evacuation of hemoperitoneum  Restarted on lovenox on 5.2.25, Hb remains stable                 63 y/o F PMHx high blood pressure and gastric bypass surgery in 2001, presenting with two days of sharp abdominal pain and one episode of non-bloody, non-bilious vomiting. She has a prior history of small bowel obstruction in 2006, 2008, and most recently in 2020, all of which resolved with conservative management. Admitted for SBO s/p   s/p bowel resection on 4.10.25 course complicated by septic shock 2/2 anastomosis leakage and respiratory failure requiring intubation s/p OR again on 4.17.25 with reanastomosis.  On 4.21.25, she developed right distal main PE with segmental extension. Duplex positive for Rt posterior tibial vein DVT. She was started on heparin drip.  On 4.27 she developed abdominal pain, tachypnea, Hb dropped to 5.8, CT showed evidence of extravasation, she was taken back to OR on 4.28.25 for abdominal washout and evacuation of hemoperitoneum  Restarted on prophylactic dose of lovenox on 5.2.25, Hb remains stable  Denies any history of prior VTE      Plan:  Patient with new likely provoked DVT/PE in post-op setting. She was started on AC with heparin however unfortunately developed life threatening bleed that lead to another surgery. This is unlikely related to underlying thrombophilia  Decision to restart AC would be based on risk versus benefit discussion between the patient and the surgical team.  If AC is to be restarted, recommend starting on heparin drip, monitor for bleeding and if Hb remains stable and no evidence of active bleed can be transitioned to DOAC on discharges.  In the event of bleeding recurrence on heparin, can consider placing IVD filter   Patient can follow up outpatient for thrombophilia workup

## 2025-05-09 NOTE — PROGRESS NOTE ADULT - SUBJECTIVE AND OBJECTIVE BOX
GENERAL SURGERY PROGRESS NOTE    Patient: NANCY SARGENT , 65y (62)Female   MRN: [id number]  Location: [address]  Visit: 25 Inpatient  Date: 25 @ 01:13    Hospital Day #:  Post-Op Day #:    Procedure/Dx/Injuries:    Events of past 24 hours:    PAST MEDICAL & SURGICAL HISTORY:  Gastric bypass status for obesity      LOUIS (obstructive sleep apnea)      S/P gastric bypass      S/P       S/P small bowel resection      History of total bilateral knee replacement (TKR)      H/O colonoscopy            Vitals:   T(F): 98.1 (25 @ 23:46), Max: 98.3 (25 @ 20:00)  HR: 91 (25 @ 23:46)  BP: 135/85 (25 @ 23:46)  RR: 18 (25 @ 23:46)  SpO2: 96% (25 @ 23:46)      Diet, DASH/TLC:   Sodium & Cholesterol Restricted  Consistent Carbohydrate No Snacks (CSTCHO)  No Chocolate  No Carb Prosource TF     Qty per Day:  2  Supplement Feeding Modality:  Oral  Ensure Clear Cans or Servings Per Day:  1       Frequency:  Three Times a day      Fluids:     I & O's:    25 @ 07:01  -  25 @ 07:00  --------------------------------------------------------  IN:    IV PiggyBack: 382.4 mL    Oral Fluid: 1840 mL  Total IN: 2222.4 mL    OUT:    Bulb (mL): 0 mL    Bulb (mL): 0 mL    Voided (mL): 1500 mL  Total OUT: 1500 mL    Total NET: 722.4 mL    PHYSICAL EXAM:  General: NAD, calm and cooperative  HEENT: NCAT  Cardiac: RRR  Respiratory: Comfortable on a room air, normal respiratory effort  Abdomen: Soft, non-distended, non-tender, wound vac in place, mariam ss output  Musculoskeletal: Active ROM intact, compartments soft  Vascular: Extremities well perfused  Skin: Warm/dry, normal color, no jaundice    MEDICATIONS  (STANDING):  albuterol/ipratropium for Nebulization 3 milliLiter(s) Nebulizer every 6 hours  amLODIPine   Tablet 10 milliGRAM(s) Oral daily  chlorhexidine 2% Cloths 1 Application(s) Topical <User Schedule>  cyanocobalamin 1000 MICROGram(s) Oral daily  enoxaparin Injectable 40 milliGRAM(s) SubCutaneous every 12 hours  ergocalciferol 34358 Unit(s) Oral daily  insulin lispro (ADMELOG) corrective regimen sliding scale   SubCutaneous Before meals and at bedtime  lactobacillus acidophilus 1 Tablet(s) Oral daily  losartan 100 milliGRAM(s) Oral daily  melatonin 10 milliGRAM(s) Oral at bedtime  multivitamin/minerals 1 Tablet(s) Oral daily  potassium phosphate IVPB 15 milliMole(s) IV Intermittent once    MEDICATIONS  (PRN):  acetaminophen     Tablet .. 650 milliGRAM(s) Oral every 6 hours PRN Mild Pain (1 - 3)      DVT PROPHYLAXIS: enoxaparin Injectable 40 milliGRAM(s) SubCutaneous every 12 hours    LAB/STUDIES:  Labs:  CAPILLARY BLOOD GLUCOSE      POCT Blood Glucose.: 120 mg/dL (08 May 2025 21:01)  POCT Blood Glucose.: 118 mg/dL (08 May 2025 17:19)  POCT Blood Glucose.: 144 mg/dL (08 May 2025 11:55)  POCT Blood Glucose.: 119 mg/dL (08 May 2025 07:45)                          9.7    9.00  )-----------( 229      ( 08 May 2025 20:00 )             29.5       Auto Immature Granulocyte %: 2.3 % (25 @ 20:00)        137  |  104  |  20  ----------------------------<  113[H]  3.9   |  19  |  0.8      Calcium: 7.8 mg/dL (25 @ 20:00)      LFTs:       ABG - ( 03 May 2025 05:09 )  pH: 7.49  /  pCO2: 32    /  pO2: 95    / HCO3: 24    / Base Excess: 1.3   /  SaO2: 98.3            ABG - ( 02 May 2025 03:54 )  pH: 7.47  /  pCO2: 33    /  pO2: 78    / HCO3: 24    / Base Excess: 0.3   /  SaO2: 98.5   GENERAL SURGERY PROGRESS NOTE    Patient: NANCY SARGENT , 65y (62)Female   MRN: [id number]  Location: [address]  Visit: 25 Inpatient  Date: 25 @ 01:13    Events of past 24 hours: patient seen and examined at bedside. L MARIAM removed. Central line removed. R MARIAM w/ SS output    PAST MEDICAL & SURGICAL HISTORY:  Gastric bypass status for obesity      LOUIS (obstructive sleep apnea)      S/P gastric bypass      S/P       S/P small bowel resection      History of total bilateral knee replacement (TKR)      H/O colonoscopy            Vitals:   T(F): 98.1 (25 @ 23:46), Max: 98.3 (25 @ 20:00)  HR: 91 (25 @ 23:46)  BP: 135/85 (25 @ 23:46)  RR: 18 (25 @ 23:46)  SpO2: 96% (25 @ 23:46)      Diet, DASH/TLC:   Sodium & Cholesterol Restricted  Consistent Carbohydrate No Snacks (CSTCHO)  No Chocolate  No Carb Prosource TF     Qty per Day:  2  Supplement Feeding Modality:  Oral  Ensure Clear Cans or Servings Per Day:  1       Frequency:  Three Times a day      Fluids:     I & O's:    25 @ 07:01  -  25 @ 07:00  --------------------------------------------------------  IN:    IV PiggyBack: 382.4 mL    Oral Fluid: 1840 mL  Total IN: 2222.4 mL    OUT:    Bulb (mL): 0 mL    Bulb (mL): 0 mL    Voided (mL): 1500 mL  Total OUT: 1500 mL    Total NET: 722.4 mL    PHYSICAL EXAM:  General: NAD, calm and cooperative  HEENT: NCAT  Cardiac: RRR  Respiratory: Comfortable on a room air, normal respiratory effort  Abdomen: Soft, non-distended, non-tender, wound vac in place, mariam ss output  Musculoskeletal: Active ROM intact, compartments soft  Vascular: Extremities well perfused  Skin: Warm/dry, normal color, no jaundice    MEDICATIONS  (STANDING):  albuterol/ipratropium for Nebulization 3 milliLiter(s) Nebulizer every 6 hours  amLODIPine   Tablet 10 milliGRAM(s) Oral daily  chlorhexidine 2% Cloths 1 Application(s) Topical <User Schedule>  cyanocobalamin 1000 MICROGram(s) Oral daily  enoxaparin Injectable 40 milliGRAM(s) SubCutaneous every 12 hours  ergocalciferol 25935 Unit(s) Oral daily  insulin lispro (ADMELOG) corrective regimen sliding scale   SubCutaneous Before meals and at bedtime  lactobacillus acidophilus 1 Tablet(s) Oral daily  losartan 100 milliGRAM(s) Oral daily  melatonin 10 milliGRAM(s) Oral at bedtime  multivitamin/minerals 1 Tablet(s) Oral daily  potassium phosphate IVPB 15 milliMole(s) IV Intermittent once    MEDICATIONS  (PRN):  acetaminophen     Tablet .. 650 milliGRAM(s) Oral every 6 hours PRN Mild Pain (1 - 3)      DVT PROPHYLAXIS: enoxaparin Injectable 40 milliGRAM(s) SubCutaneous every 12 hours    LAB/STUDIES:  Labs:  CAPILLARY BLOOD GLUCOSE      POCT Blood Glucose.: 120 mg/dL (08 May 2025 21:01)  POCT Blood Glucose.: 118 mg/dL (08 May 2025 17:19)  POCT Blood Glucose.: 144 mg/dL (08 May 2025 11:55)  POCT Blood Glucose.: 119 mg/dL (08 May 2025 07:45)                          9.7    9.00  )-----------( 229      ( 08 May 2025 20:00 )             29.5       Auto Immature Granulocyte %: 2.3 % (25 @ 20:00)        137  |  104  |  20  ----------------------------<  113[H]  3.9   |  19  |  0.8      Calcium: 7.8 mg/dL (25 @ 20:00)      LFTs:       ABG - ( 03 May 2025 05:09 )  pH: 7.49  /  pCO2: 32    /  pO2: 95    / HCO3: 24    / Base Excess: 1.3   /  SaO2: 98.3            ABG - ( 02 May 2025 03:54 )  pH: 7.47  /  pCO2: 33    /  pO2: 78    / HCO3: 24    / Base Excess: 0.3   /  SaO2: 98.5

## 2025-05-09 NOTE — CONSULT NOTE ADULT - SUBJECTIVE AND OBJECTIVE BOX
Hematology Consult Note    HPI:  The patient is a 64-year-old female with a past medical history of high blood pressure and gastric bypass surgery in , presenting with two days of sharp abdominal pain and one episode of non-bloody, non-bilious vomiting. She has a prior history of small bowel obstruction in , , and most recently in , all of which resolved with conservative management. Her current symptoms are similar in nature. A computed tomography scan of the abdomen and pelvis with oral and intravenous contrast revealed multiple ventral abdominal wall hernias containing both obstructed and non-obstructed bowel loops. A complex small bowel obstruction is present, involving three hernias on the right side of the abdomen, with dilated, fluid-filled loops of small bowel, trace fluid between loops, and a collapsed segment of bowel beyond a hernia in the right lower quadrant. These findings are consistent with a recurrent small bowel obstruction. The hernia was non reducible at bedside. (2025 20:41)      Allergies    No Known Allergies    Intolerances        MEDICATIONS  (STANDING):  albuterol/ipratropium for Nebulization 3 milliLiter(s) Nebulizer every 6 hours  amLODIPine   Tablet 10 milliGRAM(s) Oral daily  chlorhexidine 2% Cloths 1 Application(s) Topical <User Schedule>  cyanocobalamin 1000 MICROGram(s) Oral daily  enoxaparin Injectable 40 milliGRAM(s) SubCutaneous every 12 hours  ergocalciferol 94679 Unit(s) Oral daily  insulin lispro (ADMELOG) corrective regimen sliding scale   SubCutaneous Before meals and at bedtime  lactobacillus acidophilus 1 Tablet(s) Oral daily  losartan 100 milliGRAM(s) Oral daily  melatonin 10 milliGRAM(s) Oral at bedtime  multivitamin/minerals 1 Tablet(s) Oral daily  pantoprazole    Tablet 40 milliGRAM(s) Oral before breakfast    MEDICATIONS  (PRN):  acetaminophen     Tablet .. 650 milliGRAM(s) Oral every 6 hours PRN Mild Pain (1 - 3)      PAST MEDICAL & SURGICAL HISTORY:  Gastric bypass status for obesity      LOUIS (obstructive sleep apnea)      S/P gastric bypass      S/P       S/P small bowel resection      History of total bilateral knee replacement (TKR)      H/O colonoscopy            FAMILY HISTORY:      SOCIAL HISTORY: No EtOH, no tobacco    REVIEW OF SYSTEMS:    CONSTITUTIONAL: No weakness, fevers or chills  EYES/ENT: No visual changes;  No vertigo or throat pain   NECK: No pain or stiffness  RESPIRATORY: No cough, wheezing, hemoptysis; No shortness of breath  CARDIOVASCULAR: No chest pain or palpitations  GASTROINTESTINAL: No abdominal or epigastric pain. No nausea, vomiting, or hematemesis; No diarrhea or constipation. No melena or hematochezia.  GENITOURINARY: No dysuria, frequency or hematuria  NEUROLOGICAL: No numbness or weakness  SKIN: No itching, burning, rashes, or lesions   All other review of systems is negative unless indicated above.        T(F): 97.7 (25 @ 08:49), Max: 98.3 (25 @ 20:00)  HR: 95 (25 @ 09:10)  BP: 129/84 (25 @ 09:10)  RR: 18 (25 @ 09:10)  SpO2: 95% (25 @ 09:10)  Wt(kg): --    GENERAL: NAD, well-developed  HEAD:  Atraumatic, Normocephalic  EYES: EOMI, PERRLA, conjunctiva and sclera clear  NECK: Supple, No JVD  CHEST/LUNG: Clear to auscultation bilaterally; No wheeze  HEART: Regular rate and rhythm; No murmurs, rubs, or gallops  ABDOMEN: Soft, Nontender, Nondistended; Bowel sounds present  EXTREMITIES:  2+ Peripheral Pulses, No clubbing, cyanosis, or edema  NEUROLOGY: non-focal  SKIN: No rashes or lesions                          9.7    9.00  )-----------( 229      ( 08 May 2025 20:00 )             29.5           137  |  104  |  20  ----------------------------<  113[H]  3.9   |  19  |  0.8    Ca    7.8[L]      08 May 2025 20:00  Phos  2.6     -  Mg     1.9             Magnesium: 1.9 mg/dL ( @ 20:00)  Phosphorus: 2.6 mg/dL ( @ 20:00)       Hematology Consult Note    HPI:  The patient is a 64-year-old female with a past medical history of high blood pressure and gastric bypass surgery in , presenting with two days of sharp abdominal pain and one episode of non-bloody, non-bilious vomiting. She has a prior history of small bowel obstruction in , , and most recently in , all of which resolved with conservative management. Her current symptoms are similar in nature. A computed tomography scan of the abdomen and pelvis with oral and intravenous contrast revealed multiple ventral abdominal wall hernias containing both obstructed and non-obstructed bowel loops. A complex small bowel obstruction is present, involving three hernias on the right side of the abdomen, with dilated, fluid-filled loops of small bowel, trace fluid between loops, and a collapsed segment of bowel beyond a hernia in the right lower quadrant. These findings are consistent with a recurrent small bowel obstruction. The hernia was non reducible at bedside. (2025 20:41)      Allergies    No Known Allergies    Intolerances        MEDICATIONS  (STANDING):  albuterol/ipratropium for Nebulization 3 milliLiter(s) Nebulizer every 6 hours  amLODIPine   Tablet 10 milliGRAM(s) Oral daily  chlorhexidine 2% Cloths 1 Application(s) Topical <User Schedule>  cyanocobalamin 1000 MICROGram(s) Oral daily  enoxaparin Injectable 40 milliGRAM(s) SubCutaneous every 12 hours  ergocalciferol 80710 Unit(s) Oral daily  insulin lispro (ADMELOG) corrective regimen sliding scale   SubCutaneous Before meals and at bedtime  lactobacillus acidophilus 1 Tablet(s) Oral daily  losartan 100 milliGRAM(s) Oral daily  melatonin 10 milliGRAM(s) Oral at bedtime  multivitamin/minerals 1 Tablet(s) Oral daily  pantoprazole    Tablet 40 milliGRAM(s) Oral before breakfast    MEDICATIONS  (PRN):  acetaminophen     Tablet .. 650 milliGRAM(s) Oral every 6 hours PRN Mild Pain (1 - 3)      PAST MEDICAL & SURGICAL HISTORY:  Gastric bypass status for obesity      LOUIS (obstructive sleep apnea)      S/P gastric bypass      S/P       S/P small bowel resection      History of total bilateral knee replacement (TKR)      H/O colonoscopy            FAMILY HISTORY:      SOCIAL HISTORY: No EtOH, no tobacco    REVIEW OF SYSTEMS:    CONSTITUTIONAL: No weakness, fevers or chills  EYES/ENT: No visual changes;  No vertigo or throat pain   NECK: No pain or stiffness  RESPIRATORY: No cough, wheezing, hemoptysis; No shortness of breath  CARDIOVASCULAR: No chest pain or palpitations  GASTROINTESTINAL: No abdominal or epigastric pain. No nausea, vomiting, or hematemesis; No diarrhea or constipation. No melena or hematochezia.  GENITOURINARY: No dysuria, frequency or hematuria  NEUROLOGICAL: No numbness or weakness  SKIN: No itching, burning, rashes, or lesions   All other review of systems is negative unless indicated above.        T(F): 97.7 (25 @ 08:49), Max: 98.3 (25 @ 20:00)  HR: 95 (25 @ 09:10)  BP: 129/84 (25 @ 09:10)  RR: 18 (25 @ 09:10)  SpO2: 95% (25 @ 09:10)  Wt(kg): --    GENERAL: NAD, well-developed  HEAD:  Atraumatic, Normocephalic  EYES: EOMI, PERRLA, conjunctiva and sclera clear  NECK: Supple, No JVD  CHEST/LUNG: Clear to auscultation bilaterally; No wheeze  HEART: Regular rate and rhythm; No murmurs, rubs, or gallops  ABDOMEN: Soft, Nontender, Nondistended; Bowel sounds present  EXTREMITIES:  2+ Peripheral Pulses, No clubbing, cyanosis, or edema  NEUROLOGY: non-focal  SKIN: No rashes or lesions                          9.7    9.00  )-----------( 229      ( 08 May 2025 20:00 )             29.5           137  |  104  |  20  ----------------------------<  113[H]  3.9   |  19  |  0.8    Ca    7.8[L]      08 May 2025 20:00  Phos  2.6     -  Mg     1.9             Magnesium: 1.9 mg/dL ( @ 20:00)  Phosphorus: 2.6 mg/dL ( @ 20:00)      < from: CT Angio Chest PE Protocol w/ IV Cont (25 @ 18:11) >  Evidence of a right distal main pulmonary embolus extending to right   lower lobar and segmental pulmonary arteries: Examination limited by   streak artifact; no definite right heart strain .      < end of copied text >    < from: CT Angio Abdomen and Pelvis w/ Oral Cont and w/ IV Cont (25 @ 11:39) >  Within limitations of streak artifact and suboptimal contrast timing:    Redemonstrated right distal main pulmonary embolus with segmental   extension.    There is arterial blush in the mesentery anterior to abdominal drainage   catheter with venous pooling, which may reflect extravasation    Redemonstrated large volume complex fluid in the pelvis and along the   left paracolic gutter, compatible with blood products.    Stable postoperative and additional findings as above.      < end of copied text >   Hematology Consult Note    HPI:  The patient is a 64-year-old female with a past medical history of high blood pressure and gastric bypass surgery in , presenting with two days of sharp abdominal pain and one episode of non-bloody, non-bilious vomiting. She has a prior history of small bowel obstruction in , , and most recently in , all of which resolved with conservative management. Her current symptoms are similar in nature. A computed tomography scan of the abdomen and pelvis with oral and intravenous contrast revealed multiple ventral abdominal wall hernias containing both obstructed and non-obstructed bowel loops. A complex small bowel obstruction is present, involving three hernias on the right side of the abdomen, with dilated, fluid-filled loops of small bowel, trace fluid between loops, and a collapsed segment of bowel beyond a hernia in the right lower quadrant. These findings are consistent with a recurrent small bowel obstruction. The hernia was non reducible at bedside. (2025 20:41)      Allergies    No Known Allergies    Intolerances        MEDICATIONS  (STANDING):  albuterol/ipratropium for Nebulization 3 milliLiter(s) Nebulizer every 6 hours  amLODIPine   Tablet 10 milliGRAM(s) Oral daily  chlorhexidine 2% Cloths 1 Application(s) Topical <User Schedule>  cyanocobalamin 1000 MICROGram(s) Oral daily  enoxaparin Injectable 40 milliGRAM(s) SubCutaneous every 12 hours  ergocalciferol 05432 Unit(s) Oral daily  insulin lispro (ADMELOG) corrective regimen sliding scale   SubCutaneous Before meals and at bedtime  lactobacillus acidophilus 1 Tablet(s) Oral daily  losartan 100 milliGRAM(s) Oral daily  melatonin 10 milliGRAM(s) Oral at bedtime  multivitamin/minerals 1 Tablet(s) Oral daily  pantoprazole    Tablet 40 milliGRAM(s) Oral before breakfast    MEDICATIONS  (PRN):  acetaminophen     Tablet .. 650 milliGRAM(s) Oral every 6 hours PRN Mild Pain (1 - 3)      PAST MEDICAL & SURGICAL HISTORY:  Gastric bypass status for obesity      LOUIS (obstructive sleep apnea)      S/P gastric bypass      S/P       S/P small bowel resection      History of total bilateral knee replacement (TKR)      H/O colonoscopy            FAMILY HISTORY:      SOCIAL HISTORY: No EtOH, no tobacco    REVIEW OF SYSTEMS:    CONSTITUTIONAL: No weakness, fevers or chills  EYES/ENT: No visual changes;  No vertigo or throat pain   NECK: No pain or stiffness  RESPIRATORY: No cough, wheezing, hemoptysis; No shortness of breath  CARDIOVASCULAR: No chest pain or palpitations  GASTROINTESTINAL: No abdominal or epigastric pain. No nausea, vomiting, or hematemesis; No diarrhea or constipation. No melena or hematochezia.  GENITOURINARY: No dysuria, frequency or hematuria  NEUROLOGICAL: No numbness or weakness  SKIN: No itching, burning, rashes, or lesions   All other review of systems is negative unless indicated above.        T(F): 97.7 (25 @ 08:49), Max: 98.3 (25 @ 20:00)  HR: 95 (25 @ 09:10)  BP: 129/84 (25 @ 09:10)  RR: 18 (25 @ 09:10)  SpO2: 95% (25 @ 09:10)  Wt(kg): --    GENERAL: NAD, well-developed  HEAD:  Atraumatic, Normocephalic  EYES: EOMI, PERRLA, conjunctiva and sclera clear  NECK: Supple, No JVD  CHEST/LUNG: Clear to auscultation bilaterally; No wheeze  HEART: Regular rate and rhythm; No murmurs, rubs, or gallops  ABDOMEN: Soft, Nontender, Nondistended; Bowel sounds present  EXTREMITIES:  2+ Peripheral Pulses, No clubbing, cyanosis, or edema  NEUROLOGY: non-focal  SKIN: No rashes or lesions                          9.7    9.00  )-----------( 229      ( 08 May 2025 20:00 )             29.5           137  |  104  |  20  ----------------------------<  113[H]  3.9   |  19  |  0.8    Ca    7.8[L]      08 May 2025 20:00  Phos  2.6     -  Mg     1.9             Magnesium: 1.9 mg/dL ( @ 20:00)  Phosphorus: 2.6 mg/dL ( @ 20:00)      < from: CT Angio Chest PE Protocol w/ IV Cont (25 @ 18:11) >  Evidence of a right distal main pulmonary embolus extending to right   lower lobar and segmental pulmonary arteries: Examination limited by   streak artifact; no definite right heart strain .      < end of copied text >    < from: CT Angio Abdomen and Pelvis w/ Oral Cont and w/ IV Cont (25 @ 11:39) >  Within limitations of streak artifact and suboptimal contrast timing:    Redemonstrated right distal main pulmonary embolus with segmental   extension.    There is arterial blush in the mesentery anterior to abdominal drainage   catheter with venous pooling, which may reflect extravasation    Redemonstrated large volume complex fluid in the pelvis and along the   left paracolic gutter, compatible with blood products.    Stable postoperative and additional findings as above.      < end of copied text >  < from: [facility] Duplex Lower Ext Vein Scan, Bilat (25 @ 22:40) >  Right posterior tibial vein DVT  No evidence of DVT in the left lower extremity      < end of copied text >

## 2025-05-09 NOTE — PROGRESS NOTE ADULT - ASSESSMENT
65y F w/ PMHx of  Morbid obesity, LOUIS, large complex ventral hernia s/p repair sbr and loss of domain c/b post op intubation, hypotension requiring vasopressors, anastomotic leakage s/p RTOR and reanastomosis, now doing better post op from a respiratory status. Comfortable on BIPAP. Off pressors. Blood cultures negative.    PLAN:  - Wound vac changes every 48H  - monitor mariam output, currently ss  - Encourage ambulation   - Continue regular diet   - Pain control   - Pending CM for rehab  - 5/7: CRP 32.8-->36.1, ESR: 40  - f/u hem c/s  - f/u nutrition c/s

## 2025-05-10 LAB
CULTURE RESULTS: SIGNIFICANT CHANGE UP
CULTURE RESULTS: SIGNIFICANT CHANGE UP
GLUCOSE BLDC GLUCOMTR-MCNC: 104 MG/DL — HIGH (ref 70–99)
GLUCOSE BLDC GLUCOMTR-MCNC: 110 MG/DL — HIGH (ref 70–99)
GLUCOSE BLDC GLUCOMTR-MCNC: 141 MG/DL — HIGH (ref 70–99)
GLUCOSE BLDC GLUCOMTR-MCNC: 152 MG/DL — HIGH (ref 70–99)
SPECIMEN SOURCE: SIGNIFICANT CHANGE UP
SPECIMEN SOURCE: SIGNIFICANT CHANGE UP

## 2025-05-10 PROCEDURE — 71045 X-RAY EXAM CHEST 1 VIEW: CPT | Mod: 26

## 2025-05-10 RX ORDER — BUMETANIDE 1 MG/1
1 TABLET ORAL ONCE
Refills: 0 | Status: DISCONTINUED | OUTPATIENT
Start: 2025-05-10 | End: 2025-05-10

## 2025-05-10 RX ADMIN — CYANOCOBALAMIN 1000 MICROGRAM(S): 1000 INJECTION INTRAMUSCULAR; SUBCUTANEOUS at 12:02

## 2025-05-10 RX ADMIN — ENOXAPARIN SODIUM 40 MILLIGRAM(S): 100 INJECTION SUBCUTANEOUS at 05:55

## 2025-05-10 RX ADMIN — Medication 650 MILLIGRAM(S): at 12:02

## 2025-05-10 RX ADMIN — Medication 1 TABLET(S): at 12:02

## 2025-05-10 RX ADMIN — IPRATROPIUM BROMIDE AND ALBUTEROL SULFATE 3 MILLILITER(S): .5; 2.5 SOLUTION RESPIRATORY (INHALATION) at 07:24

## 2025-05-10 RX ADMIN — ENOXAPARIN SODIUM 40 MILLIGRAM(S): 100 INJECTION SUBCUTANEOUS at 17:41

## 2025-05-10 RX ADMIN — INSULIN LISPRO 2: 100 INJECTION, SOLUTION INTRAVENOUS; SUBCUTANEOUS at 08:27

## 2025-05-10 RX ADMIN — Medication 40 MILLIGRAM(S): at 05:55

## 2025-05-10 RX ADMIN — IPRATROPIUM BROMIDE AND ALBUTEROL SULFATE 3 MILLILITER(S): .5; 2.5 SOLUTION RESPIRATORY (INHALATION) at 20:29

## 2025-05-10 RX ADMIN — IPRATROPIUM BROMIDE AND ALBUTEROL SULFATE 3 MILLILITER(S): .5; 2.5 SOLUTION RESPIRATORY (INHALATION) at 01:10

## 2025-05-10 RX ADMIN — IPRATROPIUM BROMIDE AND ALBUTEROL SULFATE 3 MILLILITER(S): .5; 2.5 SOLUTION RESPIRATORY (INHALATION) at 13:05

## 2025-05-10 RX ADMIN — AMLODIPINE BESYLATE 10 MILLIGRAM(S): 10 TABLET ORAL at 05:55

## 2025-05-10 RX ADMIN — Medication 1 APPLICATION(S): at 05:56

## 2025-05-10 RX ADMIN — Medication 10 MILLIGRAM(S): at 21:21

## 2025-05-10 RX ADMIN — LOSARTAN POTASSIUM 100 MILLIGRAM(S): 100 TABLET, FILM COATED ORAL at 05:55

## 2025-05-10 NOTE — PROGRESS NOTE ADULT - SUBJECTIVE AND OBJECTIVE BOX
SURGERY PROGRESS NOTE    Patient: NANCY SARGENT , 65y (62)Female   MRN: [id number]  Location: [address]  Visit: 25 Inpatient  Date: 05-10-25 @ 00:20    Hospital day :37d    POD: 22  Procedure: Open repair of ventral hernia using mesh and component separation technique    Small bowel resection    Insertion, arterial line, percutaneous    Exploratory laparotomy    Insertion of arterial line with imaging guidance    Abdominal washout    Evacuation of hemoperitoneum    24h events: Yesterday evaluated by hematology who recommended to weight the risk/benefit of anticoagulation. Continued to work with PT, however is only able to transfer from the bed to the chair . Last THONY removed yesterday, woundvac in place. Medically ready for discharge, pending CM placement for rehab.    PHYSICAL EXAM:  General: NAD, calm and cooperative  HEENT: NCAT  Cardiac: RRR  Respiratory: Comfortable on a room air, normal respiratory effort  Abdomen: Soft, non-distended, non-tender, wound vac in place  Musculoskeletal: Active ROM intact, compartments soft  Skin: Warm/dry, normal color      PAST MEDICAL & SURGICAL HISTORY:  Gastric bypass status for obesity      LOUIS (obstructive sleep apnea)      S/P gastric bypass      S/P       S/P small bowel resection      History of total bilateral knee replacement (TKR)      H/O colonoscopy            Vitals:   T(F): 98.4 (25 @ 23:32), Max: 98.4 (25 @ 23:32)  HR: 78 (25 @ 23:32)  BP: 113/72 (25 @ 23:32)  RR: 18 (25 @ 23:32)  SpO2: 96% (25 @ 23:32)      Diet, DASH/TLC:   Sodium & Cholesterol Restricted  Consistent Carbohydrate No Snacks (CSTCHO)  No Chocolate  No Carb Prosource TF     Qty per Day:  2  Supplement Feeding Modality:  Oral  Ensure Clear Cans or Servings Per Day:  1       Frequency:  Three Times a day      Fluids:     I & O's:    25 @ 07:01  -  25 @ 07:00  --------------------------------------------------------  IN:  Total IN: 0 mL    OUT:    Bulb (mL): 65 mL    Voided (mL): 1750 mL  Total OUT: 1815 mL    Total NET: -1815 mL          MEDICATIONS  (STANDING):  albuterol/ipratropium for Nebulization 3 milliLiter(s) Nebulizer every 6 hours  amLODIPine   Tablet 10 milliGRAM(s) Oral daily  chlorhexidine 2% Cloths 1 Application(s) Topical <User Schedule>  cyanocobalamin 1000 MICROGram(s) Oral daily  enoxaparin Injectable 40 milliGRAM(s) SubCutaneous every 12 hours  ergocalciferol 43700 Unit(s) Oral daily  insulin lispro (ADMELOG) corrective regimen sliding scale   SubCutaneous Before meals and at bedtime  lactobacillus acidophilus 1 Tablet(s) Oral daily  losartan 100 milliGRAM(s) Oral daily  melatonin 10 milliGRAM(s) Oral at bedtime  multivitamin/minerals 1 Tablet(s) Oral daily  pantoprazole    Tablet 40 milliGRAM(s) Oral before breakfast    MEDICATIONS  (PRN):  acetaminophen     Tablet .. 650 milliGRAM(s) Oral every 6 hours PRN Mild Pain (1 - 3)      DVT PROPHYLAXIS: enoxaparin Injectable 40 milliGRAM(s) SubCutaneous every 12 hours    GI PROPHYLAXIS: pantoprazole    Tablet 40 milliGRAM(s) Oral before breakfast    ANTICOAGULATION:   ANTIBIOTICS:            LAB/STUDIES:  Labs:  CAPILLARY BLOOD GLUCOSE      POCT Blood Glucose.: 117 mg/dL (09 May 2025 21:14)  POCT Blood Glucose.: 119 mg/dL (09 May 2025 17:03)  POCT Blood Glucose.: 112 mg/dL (09 May 2025 11:44)  POCT Blood Glucose.: 102 mg/dL (09 May 2025 08:18)                          9.7    9.00  )-----------( 229      ( 08 May 2025 20:00 )             29.5         05-08    137  |  104  |  20  ----------------------------<  113[H]  3.9   |  19  |  0.8          LFTs:       ABG - ( 03 May 2025 05:09 )  pH: 7.49  /  pCO2: 32    /  pO2: 95    / HCO3: 24    / Base Excess: 1.3   /  SaO2: 98.3              Coags:            Urinalysis Basic - ( 08 May 2025 20:00 )    Color: x / Appearance: x / SG: x / pH: x  Gluc: 113 mg/dL / Ketone: x  / Bili: x / Urobili: x   Blood: x / Protein: x / Nitrite: x   Leuk Esterase: x / RBC: x / WBC x   Sq Epi: x / Non Sq Epi: x / Bacteria: x

## 2025-05-10 NOTE — PROGRESS NOTE ADULT - ASSESSMENT
65y F w/ PMHx of  Morbid obesity, LOUIS, large complex ventral hernia s/p repair sbr and loss of domain c/b post op intubation, hypotension requiring vasopressors, anastomotic leakage s/p RTOR and reanastomosis, now doing better post op from a respiratory status. Comfortable on BIPAP. Off pressors. Blood cultures negative.    PLAN:  - f/u plans for anticoagulation   - Wound vac changes MWF  - Encourage ambulation   - Continue regular diet   - Pain control   - Pending CM for rehab

## 2025-05-11 LAB
ANION GAP SERPL CALC-SCNC: 13 MMOL/L — SIGNIFICANT CHANGE UP (ref 7–14)
BASOPHILS # BLD AUTO: 0.02 K/UL — SIGNIFICANT CHANGE UP (ref 0–0.2)
BASOPHILS NFR BLD AUTO: 0.3 % — SIGNIFICANT CHANGE UP (ref 0–1)
BUN SERPL-MCNC: 16 MG/DL — SIGNIFICANT CHANGE UP (ref 10–20)
CALCIUM SERPL-MCNC: 7.7 MG/DL — LOW (ref 8.4–10.5)
CHLORIDE SERPL-SCNC: 107 MMOL/L — SIGNIFICANT CHANGE UP (ref 98–110)
CO2 SERPL-SCNC: 21 MMOL/L — SIGNIFICANT CHANGE UP (ref 17–32)
CREAT SERPL-MCNC: 0.8 MG/DL — SIGNIFICANT CHANGE UP (ref 0.7–1.5)
CRP SERPL-MCNC: 37 MG/L — HIGH
EGFR: 82 ML/MIN/1.73M2 — SIGNIFICANT CHANGE UP
EGFR: 82 ML/MIN/1.73M2 — SIGNIFICANT CHANGE UP
EOSINOPHIL # BLD AUTO: 0.05 K/UL — SIGNIFICANT CHANGE UP (ref 0–0.7)
EOSINOPHIL NFR BLD AUTO: 0.7 % — SIGNIFICANT CHANGE UP (ref 0–8)
GLUCOSE BLDC GLUCOMTR-MCNC: 116 MG/DL — HIGH (ref 70–99)
GLUCOSE BLDC GLUCOMTR-MCNC: 122 MG/DL — HIGH (ref 70–99)
GLUCOSE BLDC GLUCOMTR-MCNC: 125 MG/DL — HIGH (ref 70–99)
GLUCOSE BLDC GLUCOMTR-MCNC: 129 MG/DL — HIGH (ref 70–99)
GLUCOSE SERPL-MCNC: 120 MG/DL — HIGH (ref 70–99)
HCT VFR BLD CALC: 26.6 % — LOW (ref 37–47)
HGB BLD-MCNC: 8.7 G/DL — LOW (ref 12–16)
IMM GRANULOCYTES NFR BLD AUTO: 2.5 % — HIGH (ref 0.1–0.3)
LYMPHOCYTES # BLD AUTO: 1.28 K/UL — SIGNIFICANT CHANGE UP (ref 1.2–3.4)
LYMPHOCYTES # BLD AUTO: 17.9 % — LOW (ref 20.5–51.1)
MAGNESIUM SERPL-MCNC: 1.5 MG/DL — LOW (ref 1.8–2.4)
MCHC RBC-ENTMCNC: 31.1 PG — HIGH (ref 27–31)
MCHC RBC-ENTMCNC: 32.7 G/DL — SIGNIFICANT CHANGE UP (ref 32–37)
MCV RBC AUTO: 95 FL — SIGNIFICANT CHANGE UP (ref 81–99)
MONOCYTES # BLD AUTO: 0.44 K/UL — SIGNIFICANT CHANGE UP (ref 0.1–0.6)
MONOCYTES NFR BLD AUTO: 6.1 % — SIGNIFICANT CHANGE UP (ref 1.7–9.3)
NEUTROPHILS # BLD AUTO: 5.19 K/UL — SIGNIFICANT CHANGE UP (ref 1.4–6.5)
NEUTROPHILS NFR BLD AUTO: 72.5 % — SIGNIFICANT CHANGE UP (ref 42.2–75.2)
NRBC BLD AUTO-RTO: 0 /100 WBCS — SIGNIFICANT CHANGE UP (ref 0–0)
NT-PROBNP SERPL-SCNC: 929 PG/ML — HIGH (ref 0–300)
PHOSPHATE SERPL-MCNC: 2.6 MG/DL — SIGNIFICANT CHANGE UP (ref 2.1–4.9)
PLATELET # BLD AUTO: 244 K/UL — SIGNIFICANT CHANGE UP (ref 130–400)
PMV BLD: 9.9 FL — SIGNIFICANT CHANGE UP (ref 7.4–10.4)
POTASSIUM SERPL-MCNC: 3.8 MMOL/L — SIGNIFICANT CHANGE UP (ref 3.5–5)
POTASSIUM SERPL-SCNC: 3.8 MMOL/L — SIGNIFICANT CHANGE UP (ref 3.5–5)
RBC # BLD: 2.8 M/UL — LOW (ref 4.2–5.4)
RBC # FLD: 16.7 % — HIGH (ref 11.5–14.5)
SODIUM SERPL-SCNC: 141 MMOL/L — SIGNIFICANT CHANGE UP (ref 135–146)
WBC # BLD: 7.16 K/UL — SIGNIFICANT CHANGE UP (ref 4.8–10.8)
WBC # FLD AUTO: 7.16 K/UL — SIGNIFICANT CHANGE UP (ref 4.8–10.8)

## 2025-05-11 RX ORDER — FUROSEMIDE 10 MG/ML
20 INJECTION INTRAMUSCULAR; INTRAVENOUS ONCE
Refills: 0 | Status: COMPLETED | OUTPATIENT
Start: 2025-05-11 | End: 2025-05-11

## 2025-05-11 RX ORDER — ONDANSETRON HCL/PF 4 MG/2 ML
4 VIAL (ML) INJECTION
Refills: 0 | Status: DISCONTINUED | OUTPATIENT
Start: 2025-05-11 | End: 2025-05-11

## 2025-05-11 RX ADMIN — Medication 40 MILLIGRAM(S): at 05:22

## 2025-05-11 RX ADMIN — Medication 10 MILLIGRAM(S): at 21:46

## 2025-05-11 RX ADMIN — IPRATROPIUM BROMIDE AND ALBUTEROL SULFATE 3 MILLILITER(S): .5; 2.5 SOLUTION RESPIRATORY (INHALATION) at 13:03

## 2025-05-11 RX ADMIN — CYANOCOBALAMIN 1000 MICROGRAM(S): 1000 INJECTION INTRAMUSCULAR; SUBCUTANEOUS at 12:01

## 2025-05-11 RX ADMIN — LOSARTAN POTASSIUM 100 MILLIGRAM(S): 100 TABLET, FILM COATED ORAL at 05:22

## 2025-05-11 RX ADMIN — Medication 1 APPLICATION(S): at 05:22

## 2025-05-11 RX ADMIN — ENOXAPARIN SODIUM 40 MILLIGRAM(S): 100 INJECTION SUBCUTANEOUS at 05:22

## 2025-05-11 RX ADMIN — Medication 1 TABLET(S): at 12:01

## 2025-05-11 RX ADMIN — Medication 1 TABLET(S): at 12:00

## 2025-05-11 RX ADMIN — AMLODIPINE BESYLATE 10 MILLIGRAM(S): 10 TABLET ORAL at 05:22

## 2025-05-11 RX ADMIN — IPRATROPIUM BROMIDE AND ALBUTEROL SULFATE 3 MILLILITER(S): .5; 2.5 SOLUTION RESPIRATORY (INHALATION) at 19:30

## 2025-05-11 RX ADMIN — ENOXAPARIN SODIUM 40 MILLIGRAM(S): 100 INJECTION SUBCUTANEOUS at 18:34

## 2025-05-11 RX ADMIN — FUROSEMIDE 20 MILLIGRAM(S): 10 INJECTION INTRAMUSCULAR; INTRAVENOUS at 12:00

## 2025-05-11 RX ADMIN — IPRATROPIUM BROMIDE AND ALBUTEROL SULFATE 3 MILLILITER(S): .5; 2.5 SOLUTION RESPIRATORY (INHALATION) at 01:30

## 2025-05-11 RX ADMIN — IPRATROPIUM BROMIDE AND ALBUTEROL SULFATE 3 MILLILITER(S): .5; 2.5 SOLUTION RESPIRATORY (INHALATION) at 07:15

## 2025-05-11 NOTE — DISCHARGE NOTE PROVIDER - NSDCCPCAREPLAN_GEN_ALL_CORE_FT
PRINCIPAL DISCHARGE DIAGNOSIS  Diagnosis: Small bowel obstruction  Assessment and Plan of Treatment: You presented with a complex small bowel obstruction involving three hernias on the right side of the abdomen, with dilated, fluid-filled loops of small bowel, trace fluid between loops, and a collapsed segment of bowel beyond a hernia in the right lower quadrant. These findings were consistent with a recurrent small bowel obstruction. You underwent laparoscopic converted to open repair of large ventral hernia, incarcerated small bowel with two areas of perforation requiring small bowel resection. You went back to the OR for resection of anastomosis for anastomotic leak. You were then found to have an abdominal collection for which you had an IR drain placed. Your hospital course was complicated by blood clots in your legs and a pulmonary embolism. You were started on anticoagulation and had a significant abdominal bleed for which you went back to the operating room.   Continue your regular diet.  You have a wound vac in place, which should be changed Monday, wednesday and friday.   Pain : Take over the counter tylenol as needed for pain   Activity : Please avoid heavy lifting (anything over 10 pounds) for at least 6 weeks.   Follow up : Call to schedule a follow up appointment in 2 weeks, [phone]  Please follow up with vascular surgery regarding the clots you were found to have. Please continue Eliquis 5mg twice a day.   Please follow up with your primary care physician regarding coordination of your care and your new medications.  Please hold ypur wegovy for 1 week postoperatively.     PRINCIPAL DISCHARGE DIAGNOSIS  Diagnosis: Small bowel obstruction  Assessment and Plan of Treatment: You presented with a complex small bowel obstruction involving three hernias on the right side of the abdomen, with dilated, fluid-filled loops of small bowel, trace fluid between loops, and a collapsed segment of bowel beyond a hernia in the right lower quadrant. These findings were consistent with a recurrent small bowel obstruction. You underwent laparoscopic converted to open repair of large ventral hernia, incarcerated small bowel with two areas of perforation requiring small bowel resection. You went back to the OR for resection of anastomosis for anastomotic leak. You were then found to have an abdominal collection for which you had an IR drain placed. Your hospital course was complicated by blood clots in your legs and a pulmonary embolism. You were started on anticoagulation and had a significant abdominal bleed for which you went back to the operating room.   Continue your regular diet.  You have a wound vac in place, which should be changed Monday, wednesday and friday. Please use adaptic on the skin where the staples are with a black foam wound vac over and a wound vac over the deeper inferior wound. Bridge the wound vac foams together to ensure a solid seal.   Pain : Take over the counter tylenol as needed for pain   Activity : Please avoid heavy lifting (anything over 10 pounds) for at least 6 weeks.   Follow up : Call to schedule a follow up appointment in 2 weeks, [phone]  Please follow up with vascular surgery regarding the clots you were found to have. Please continue Eliquis 5mg twice a day.   Please follow up with your primary care physician regarding coordination of your care and your new medications.  Please hold ypur wegovy for 1 week postoperatively.     PRINCIPAL DISCHARGE DIAGNOSIS  Diagnosis: Small bowel obstruction  Assessment and Plan of Treatment: You presented with a complex small bowel obstruction involving three hernias on the right side of the abdomen, with dilated, fluid-filled loops of small bowel, trace fluid between loops, and a collapsed segment of bowel beyond a hernia in the right lower quadrant. These findings were consistent with a recurrent small bowel obstruction. You underwent laparoscopic converted to open repair of large ventral hernia, incarcerated small bowel with two areas of perforation requiring small bowel resection. You went back to the OR for resection of anastomosis for anastomotic leak. You were then found to have an abdominal collection for which you had an IR drain placed. Your hospital course was complicated by blood clots in your legs and a pulmonary embolism. You were started on anticoagulation and had a significant abdominal bleed for which you went back to the operating room.   Continue your regular diet.  You have a wound vac in place, which should be changed Monday, wednesday and friday. Please use adaptic on the skin where the staples are with a black foam wound vac over and a wound vac over the deeper inferior wound. Bridge the wound vac foams together to ensure a solid seal.   Pain : Take over the counter tylenol as needed for pain   Activity : Please avoid heavy lifting (anything over 10 pounds) for at least 6 weeks.   Follow up : Call to schedule a follow up appointment in 2 weeks, [phone]  Please follow up with vascular surgery regarding the clots you were found to have. Please continue Eliquis 5mg twice a day.   Please follow up with your primary care physician regarding coordination of your care and your new medications.  Please hold your wegovy for 1 week postoperatively.

## 2025-05-11 NOTE — DISCHARGE NOTE PROVIDER - NSDCFUADDINST_GEN_ALL_CORE_FT
Follow up with Dr Lema in 1 week call office for appointment   Follow up with vascular surgery call office for appointment   Follow up with your PMD    no strenuous activity  keep wound clean and dry  continue wound vac  resume home medications    if experience fever, shortness of breath, chest pain, dizziness, vomiting, abdominal pain , bleeding or drainage from wound call MD or return to ED

## 2025-05-11 NOTE — DISCHARGE NOTE PROVIDER - NSDCFUSCHEDAPPT_GEN_ALL_CORE_FT
Erica Abbott  [facility]  GENSUR [address]  Scheduled Appointment: 07/25/2025

## 2025-05-11 NOTE — DISCHARGE NOTE PROVIDER - CARE PROVIDER_API CALL
Rose Lema  Surgery  [address] [place] [address]  Phone: [phone]  Fax: [phone]  Follow Up Time: 2 weeks   Rose Lema  Surgery  [address] [place] [address]  Phone: [phone]  Fax: [phone]  Follow Up Time: 2 weeks    Addi Mora  Vascular Surgery  [address] [place] [address], [address]  [place] [address]  Phone: [phone]  Fax: [phone]  Follow Up Time: 2 weeks

## 2025-05-11 NOTE — DISCHARGE NOTE PROVIDER - HOSPITAL COURSE
64-year-old female with a past medical history HTN, morbid obesity, LOUIS (home CPAP), PSHx 2001 Abby-en-Y gastric bypass who presented 4/3 with two days of sharp abdominal pain and one episode of non-bloody, non-bilious vomiting. She had a prior history of small bowel obstruction in 2006, 2008, and most recently in 2020, all of which resolved with conservative management. CT of the abdomen and pelvis with oral and IV contrast revealed multiple ventral abdominal wall hernias containing both obstructed and non-obstructed bowel loops. A complex small bowel obstruction was present, involving three hernias on the right side of the abdomen, with dilated, fluid-filled loops of small bowel, trace fluid between loops, and a collapsed segment of bowel beyond a hernia in the right lower quadrant. These findings were consistent with a recurrent small bowel obstruction. The hernia was non reducible at bedside. Patient was admitted to surgery, made NPO and planned for surgery. Plastic surgery was consulted for abdominal reconstruction. IR was consulted for preop botox injections; no acute intervention. Patient initially declined NGT and was booked for surgery on 4/8. On the day of surgery, she declined surgery and was rebooked for 4/10. She underwent laparoscopic converted to open repair of large ventral hernia, incarcerated small bowel with two areas of perforation requiring SBR.  THONY drain placed in pelvis, underlay. Vicryl mesh placed and fascia closed primarily. Postoperatively, she was admitted to SICU for abdominal exams and monitoring for compartment syndrome. She was brought to recovery extubated, placed on BiPAP.     SICU course:   4/10: admitted to SICU service  4/11: Intubated and started on paralytic. Arterial line placed, subclavian TLC placed. Nephrology consulted for oliguria; rec bumex 2mg IV BID. IR abdominal muscle botox injection performed. TTE performed.   4/12: Additional fluid boluses given; trial fo bumex started, considering CVVH. Weaned off nimbex gtt.  > 220 /hr. Renal U/S w/out hydro.   4/13: Weaned off Levophed. Bumex gtt 2mg/hr > 1mg/hr. Goal net negative 1-1.5L, UOP approx, 200c/hr.   4/14: Remained on bumex gtt for further diuresis, decreased to 0.5 overnight, vent settings weaned. Cr downtrending, LFTs worsening.   4/15: Extubated to BiPAP, transitioned to NC. Dc'd bumex gtt, given 5mg metolazone x 1 and 2mg bumex x 1.   4/16: THONY drain #2 murky/bilious, pending CTAP with PO contrast, hypernatremia, started D5W @ 75cc/hr, persistent hypokalemia   4/17: RTOR for resection of anastomosis for anastomotic leak. Returned intubated, 400/24/80/10, sinus tachy to . Abdomen soft. Was initially on propofol @ 30 and precedex, but propofol weaned off due to hypotension with MAPs in 50s, fentanyl ggt started for acute pain. Remained hypotensive despite fluids, started on Levophed, on 0.05.   4/18: Extubated. HFNC 40L/49%, Levo off since 11 AM   4/19: NGT removed. Started on duonebs. D5W increased to 60cc/hr. 1mg bumex, > 1.5L response  4/20: Episode of afib RVR resolved with metoprolol 5mg IVP, cardiology c/s placed, recommending metoprolol 25mg q 12   4/21: PE on CTA, therapeutic heparin gtt started. Started on cardebe gtt,   4/22: Meropenem started per ID.  S/p IR percutaneous placement of a 8.5 [place] drainage catheter into lower abdominal wall fluid collection, yielding 150 mL of bowel appearing fluid. New left radial a-line placed. Off nicarrdipine gtt.   4/23: Switched to therapeutic lovenox. Diet advanced to aubrey clears with ensures.  4/24: advacned to bariatric FLD, downgraded to SDU  4/25: TPN discontinued, diet advanced to full liquid , Right cephalic DVT prelim  4/26: Right UE VA Duplex final no DVT, thromboplebitis, Arterial line removed, 2L NC, midline vac changed, noted to have bilious drainage, RUQ drain pulled, upgraded to SICU status, plan to reorder TPN for 4/27 PM, diet changed to full liquid. 1:20 AM noted to be unresponsive, palpable femoral pulse, decision made to intubate, R femoral arterial line placed, started on levophed, amauri, bicarb amp x 2, bicarb gtt, propofol, 2U pRBC for hgb 5.9, plan for CTH and CTAP when stabilized   4/27: 2uPRBC 1uFFP 1pkPlt, TXA 1g, IR for embolization due to active extravasation seen on CTA, no active bleed, no embolization, Return to OR for re-exploration and evacuation of old clot  4/28: IR consulted for IVC filter and PICC. LE duplex ordered, negative. Precedex added overnight for agitation. 1 U PRBC for Hgb 6.8 overnight  4/29: TF started at 20cc/hr. Meropenem decreased to Q 12 hours.   4/30: Extubated to 5L NC, then placed on HFNC. Heparin gtt restarted.   5/1: Placed on BiPAP, started on aubrey clears w/ clear ensures   5/4: passed TOV. On 6L NC. Bumex 1mg.   5/5: diuresis with bumex 1mg, RLQ drain removed  5/6: Bumex 1mg. D/c meropenem. DG to SDU.   5/7: downgraded to 4C, weaned off O2, on 1LNC to RA, JPs with serosanguinous outputs. Rectal tube removed.     Floor course:   5/8: Left THONY removed. Central line removed. R THONY with serosanguinous output  5/9: evaluated by hematology who recommended to weight the risk/benefit of anticoagulation. Continued to work with PT, however is only able to transfer from the bed to the chair . Last THONY removed  5/10: +g, +bm, did not ambulate much, only OOBTC. Noted to have increased swelling in UE and LE b/l.  5/11: 20mg IV lasix    **INCOMPLETE**       64-year-old female with a past medical history HTN, morbid obesity, LOUIS (home CPAP), PSHx 2001 Abby-en-Y gastric bypass who presented 4/3 with two days of sharp abdominal pain and one episode of non-bloody, non-bilious vomiting. She had a prior history of small bowel obstruction in 2006, 2008, and most recently in 2020, all of which resolved with conservative management. CT of the abdomen and pelvis with oral and IV contrast revealed multiple ventral abdominal wall hernias containing both obstructed and non-obstructed bowel loops. A complex small bowel obstruction was present, involving three hernias on the right side of the abdomen, with dilated, fluid-filled loops of small bowel, trace fluid between loops, and a collapsed segment of bowel beyond a hernia in the right lower quadrant. These findings were consistent with a recurrent small bowel obstruction. The hernia was non reducible at bedside. Patient was admitted to surgery, made NPO and planned for surgery. Plastic surgery was consulted for abdominal reconstruction. IR was consulted for preop botox injections; no acute intervention. Patient initially declined NGT and was booked for surgery on 4/8. On the day of surgery, she declined surgery and was rebooked for 4/10. She underwent laparoscopic converted to open repair of large ventral hernia, incarcerated small bowel with two areas of perforation requiring SBR.  THONY drain placed in pelvis, underlay. Vicryl mesh placed and fascia closed primarily. Postoperatively, she was admitted to SICU for abdominal exams and monitoring for compartment syndrome. She was brought to recovery extubated, placed on BiPAP.     SICU course:   4/10: admitted to SICU service  4/11: Intubated and started on paralytic. Arterial line placed, subclavian TLC placed. Nephrology consulted for oliguria; rec bumex 2mg IV BID. IR abdominal muscle botox injection performed. TTE performed.   4/12: Additional fluid boluses given; trial fo bumex started, considering CVVH. Weaned off nimbex gtt.  > 220 /hr. Renal U/S w/out hydro.   4/13: Weaned off Levophed. Bumex gtt 2mg/hr > 1mg/hr. Goal net negative 1-1.5L, UOP approx, 200c/hr.   4/14: Remained on bumex gtt for further diuresis, decreased to 0.5 overnight, vent settings weaned. Cr downtrending, LFTs worsening.   4/15: Extubated to BiPAP, transitioned to NC. Dc'd bumex gtt, given 5mg metolazone x 1 and 2mg bumex x 1.   4/16: THONY drain #2 murky/bilious, pending CTAP with PO contrast, hypernatremia, started D5W @ 75cc/hr, persistent hypokalemia   4/17: RTOR for resection of anastomosis for anastomotic leak. Returned intubated, 400/24/80/10, sinus tachy to . Abdomen soft. Was initially on propofol @ 30 and precedex, but propofol weaned off due to hypotension with MAPs in 50s, fentanyl ggt started for acute pain. Remained hypotensive despite fluids, started on Levophed, on 0.05.   4/18: Extubated. HFNC 40L/49%, Levo off since 11 AM   4/19: NGT removed. Started on duonebs. D5W increased to 60cc/hr. 1mg bumex, > 1.5L response  4/20: Episode of afib RVR resolved with metoprolol 5mg IVP, cardiology c/s placed, recommending metoprolol 25mg q 12   4/21: PE on CTA, therapeutic heparin gtt started. Started on cardene gtt,   4/22: Meropenem started per ID.  S/p IR percutaneous placement of a 8.5 [place] drainage catheter into lower abdominal wall fluid collection, yielding 150 mL of bowel appearing fluid. New left radial a-line placed. Off nicarrdipine gtt.   4/23: Switched to therapeutic lovenox. Diet advanced to bariatric clears with ensures.  4/24: advanced to bariatric FLD, downgraded to SDU  4/25: TPN discontinued, diet advanced to full liquid , Right cephalic DVT prelim  4/26: Right UE VA Duplex final no DVT, thrombophlebitis, Arterial line removed, 2L NC, midline vac changed, noted to have bilious drainage, RUQ drain pulled, upgraded to SICU status, plan to reorder TPN for 4/27 PM, diet changed to full liquid. 1:20 AM noted to be unresponsive, palpable femoral pulse, decision made to intubate, R femoral arterial line placed, started on levophed, amauri, bicarb amp x 2, bicarb gtt, propofol, 2U pRBC for hgb 5.9, plan for CTH and CTAP when stabilized   4/27: 2uPRBC 1uFFP 1pkPlt, TXA 1g, IR for embolization due to active extravasation seen on CTA, no active bleed, no embolization, Return to OR for re-exploration and evacuation of old clot  4/28:  CT showed expansion of PE. IR consulted for IVC filter and PICC. Vascular recalled for IVC filter. LE duplex ordered, negative. Precedex added overnight for agitation. 1 U PRBC for Hgb 6.8 overnight  4/29: TF started at 20cc/hr. Meropenem decreased to Q 12 hours.   4/30: Extubated to 5L NC, then placed on HFNC. Heparin gtt restarted.   5/1: Placed on BiPAP, started on aubrey clears w/ clear ensures   5/4: passed TOV. On 6L NC. Bumex 1mg.   5/5: diuresis with bumex 1mg, RLQ drain removed  5/6: Bumex 1mg. D/c meropenem. DG to SDU.   5/7: downgraded to 4C, weaned off O2, on 1LNC to RA, JPs with serosanguinous outputs. Rectal tube removed.     Floor course:   5/8: Left HTONY removed. Central line removed. R THONY with serosanguinous output  5/9: evaluated by hematology who recommended to weight the risk/benefit of anticoagulation. Continued to work with PT, however is only able to transfer from the bed to the chair . Last THONY removed  5/10: +g, +bm, did not ambulate much, only OOBTC. Noted to have increased swelling in UE and LE b/l.  5/11: 20mg IV lasix   5/12: venous duplex showed right femoral DVT and left common femoral DVT, vascular and IR recalled for IVC filter. Reupgraded to SICU for hemodynamic monitoring after restarting anticoagulation  5/13: IR for IVC filter, unable to place due to anatomy. Started on 80mg lovenox BID, planned to transition to Eliquis tomorrow.   5/14: Started on Eliquis  5/15: Remained hemodynamically stable, hemoglobin stable. Downgraded to the floor. Low grade tachycardia to 100s over night, self resolved. Echo was done with EF of 60-65%.   5/16-5/19: patient remained hemodynamically stable, afebrile. Pain was controlled. She is now medically cleared for discharge pending disposition to a facility.    **INCOMPLETE**     64-year-old female with a past medical history HTN, morbid obesity, LOUIS (home CPAP), PSHx 2001 Abby-en-Y gastric bypass who presented 4/3 with two days of sharp abdominal pain and one episode of non-bloody, non-bilious vomiting. She had a prior history of small bowel obstruction in 2006, 2008, and most recently in 2020, all of which resolved with conservative management. CT of the abdomen and pelvis with oral and IV contrast revealed multiple ventral abdominal wall hernias containing both obstructed and non-obstructed bowel loops. A complex small bowel obstruction was present, involving three hernias on the right side of the abdomen, with dilated, fluid-filled loops of small bowel, trace fluid between loops, and a collapsed segment of bowel beyond a hernia in the right lower quadrant. These findings were consistent with a recurrent small bowel obstruction. The hernia was non reducible at bedside. Patient was admitted to surgery, made NPO and planned for surgery. Plastic surgery was consulted for abdominal reconstruction. IR was consulted for preop botox injections; no acute intervention. Patient initially declined NGT and was booked for surgery on 4/8. On the day of surgery, she declined surgery and was rebooked for 4/10. She underwent laparoscopic converted to open repair of large ventral hernia, incarcerated small bowel with two areas of perforation requiring SBR.  THONY drain placed in pelvis, underlay. Vicryl mesh placed and fascia closed primarily. Postoperatively, she was admitted to SICU for abdominal exams and monitoring for compartment syndrome. She was brought to recovery extubated, placed on BiPAP.     SICU course:   4/10: admitted to SICU service  4/11: Intubated and started on paralytic. Arterial line placed, subclavian TLC placed. Nephrology consulted for oliguria; rec bumex 2mg IV BID. IR abdominal muscle botox injection performed. TTE performed.   4/12: Additional fluid boluses given; trial fo bumex started, considering CVVH. Weaned off nimbex gtt.  > 220 /hr. Renal U/S w/out hydro.   4/13: Weaned off Levophed. Bumex gtt 2mg/hr > 1mg/hr. Goal net negative 1-1.5L, UOP approx, 200c/hr.   4/14: Remained on bumex gtt for further diuresis, decreased to 0.5 overnight, vent settings weaned. Cr downtrending, LFTs worsening.   4/15: Extubated to BiPAP, transitioned to NC. Dc'd bumex gtt, given 5mg metolazone x 1 and 2mg bumex x 1.   4/16: THONY drain #2 murky/bilious, pending CTAP with PO contrast, hypernatremia, started D5W @ 75cc/hr, persistent hypokalemia   4/17: RTOR for resection of anastomosis for anastomotic leak. Returned intubated, 400/24/80/10, sinus tachy to . Abdomen soft. Was initially on propofol @ 30 and precedex, but propofol weaned off due to hypotension with MAPs in 50s, fentanyl ggt started for acute pain. Remained hypotensive despite fluids, started on Levophed, on 0.05.   4/18: Extubated. HFNC 40L/49%, Levo off since 11 AM   4/19: NGT removed. Started on duonebs. D5W increased to 60cc/hr. 1mg bumex, > 1.5L response  4/20: Episode of afib RVR resolved with metoprolol 5mg IVP, cardiology c/s placed, recommending metoprolol 25mg q 12   4/21: PE on CTA, therapeutic heparin gtt started. Started on cardene gtt,   4/22: Meropenem started per ID.  S/p IR percutaneous placement of a 8.5 [place] drainage catheter into lower abdominal wall fluid collection, yielding 150 mL of bowel appearing fluid. New left radial a-line placed. Off nicarrdipine gtt.   4/23: Switched to therapeutic lovenox. Diet advanced to bariatric clears with ensures.  4/24: advanced to bariatric FLD, downgraded to SDU  4/25: TPN discontinued, diet advanced to full liquid , Right cephalic DVT prelim  4/26: Right UE VA Duplex final no DVT, thrombophlebitis, Arterial line removed, 2L NC, midline vac changed, noted to have bilious drainage, RUQ drain pulled, upgraded to SICU status, plan to reorder TPN for 4/27 PM, diet changed to full liquid. 1:20 AM noted to be unresponsive, palpable femoral pulse, decision made to intubate, R femoral arterial line placed, started on levophed, amauri, bicarb amp x 2, bicarb gtt, propofol, 2U pRBC for hgb 5.9, plan for CTH and CTAP when stabilized   4/27: 2uPRBC 1uFFP 1pkPlt, TXA 1g, IR for embolization due to active extravasation seen on CTA, no active bleed, no embolization, Return to OR for re-exploration and evacuation of old clot  4/28:  CT showed expansion of PE. IR consulted for IVC filter and PICC. Vascular recalled for IVC filter. LE duplex ordered, negative. Precedex added overnight for agitation. 1 U PRBC for Hgb 6.8 overnight  4/29: TF started at 20cc/hr. Meropenem decreased to Q 12 hours.   4/30: Extubated to 5L NC, then placed on HFNC. Heparin gtt restarted.   5/1: Placed on BiPAP, started on aubrey clears w/ clear ensures   5/4: passed TOV. On 6L NC. Bumex 1mg.   5/5: diuresis with bumex 1mg, RLQ drain removed  5/6: Bumex 1mg. D/c meropenem. DG to SDU.   5/7: downgraded to 4C, weaned off O2, on 1LNC to RA, JPs with serosanguinous outputs. Rectal tube removed.     Floor course:   5/8: Left THONY removed. Central line removed. R THONY with serosanguinous output  5/9: evaluated by hematology who recommended to weight the risk/benefit of anticoagulation. Continued to work with PT, however is only able to transfer from the bed to the chair . Last THONY removed  5/10: +g, +bm, did not ambulate much, only OOBTC. Noted to have increased swelling in UE and LE b/l.  5/11: 20mg IV lasix   5/12: venous duplex showed right femoral DVT and left common femoral DVT, vascular and IR recalled for IVC filter. Reupgraded to SICU for hemodynamic monitoring after restarting anticoagulation  5/13: IR for IVC filter, unable to place due to anatomy. Started on 80mg lovenox BID, planned to transition to Eliquis tomorrow.   5/14: Started on Eliquis  5/15: Remained hemodynamically stable, hemoglobin stable. Downgraded to the floor. Low grade tachycardia to 100s over night, self resolved. Echo was done with EF of 60-65%.   5/16-5/19: patient remained hemodynamically stable, afebrile. Pain was controlled. She is now medically cleared for discharge pending disposition to a facility.     64-year-old female with a past medical history HTN, morbid obesity, LOUIS (home CPAP), PSHx 2001 Abby-en-Y gastric bypass who presented 4/3 with two days of sharp abdominal pain and one episode of non-bloody, non-bilious vomiting. She had a prior history of small bowel obstruction in 2006, 2008, and most recently in 2020, all of which resolved with conservative management. CT of the abdomen and pelvis with oral and IV contrast revealed multiple ventral abdominal wall hernias containing both obstructed and non-obstructed bowel loops. A complex small bowel obstruction was present, involving three hernias on the right side of the abdomen, with dilated, fluid-filled loops of small bowel, trace fluid between loops, and a collapsed segment of bowel beyond a hernia in the right lower quadrant. These findings were consistent with a recurrent small bowel obstruction. The hernia was non reducible at bedside. Patient was admitted to surgery, made NPO and planned for surgery. Plastic surgery was consulted for abdominal reconstruction. IR was consulted for preop botox injections; no acute intervention. Patient initially declined NGT and was booked for surgery on 4/8. On the day of surgery, she declined surgery and was rebooked for 4/10. She underwent laparoscopic converted to open repair of large ventral hernia, incarcerated small bowel with two areas of perforation requiring SBR.  THONY drain placed in pelvis, underlay. Vicryl mesh placed and fascia closed primarily. Postoperatively, she was admitted to SICU for abdominal exams and monitoring for compartment syndrome. She was brought to recovery extubated, placed on BiPAP.     SICU course:   4/10: admitted to SICU service  4/11: Intubated and started on paralytic. Arterial line placed, subclavian TLC placed. Nephrology consulted for oliguria; rec bumex 2mg IV BID. IR abdominal muscle botox injection performed. TTE performed.   4/12: Additional fluid boluses given; trial fo bumex started, considering CVVH. Weaned off nimbex gtt.  > 220 /hr. Renal U/S w/out hydro.   4/13: Weaned off Levophed. Bumex gtt 2mg/hr > 1mg/hr. Goal net negative 1-1.5L, UOP approx, 200c/hr.   4/14: Remained on bumex gtt for further diuresis, decreased to 0.5 overnight, vent settings weaned. Cr downtrending, LFTs worsening.   4/15: Extubated to BiPAP, transitioned to NC. Dc'd bumex gtt, given 5mg metolazone x 1 and 2mg bumex x 1.   4/16: THONY drain #2 murky/bilious, pending CTAP with PO contrast, hypernatremia, started D5W @ 75cc/hr, persistent hypokalemia   4/17: RTOR for resection of anastomosis for anastomotic leak. Returned intubated, 400/24/80/10, sinus tachy to . Abdomen soft. Was initially on propofol @ 30 and precedex, but propofol weaned off due to hypotension with MAPs in 50s, fentanyl ggt started for acute pain. Remained hypotensive despite fluids, started on Levophed, on 0.05.   4/18: Extubated. HFNC 40L/49%, Levo off since 11 AM   4/19: NGT removed. Started on duonebs. D5W increased to 60cc/hr. 1mg bumex, > 1.5L response  4/20: Episode of afib RVR resolved with metoprolol 5mg IVP, cardiology c/s placed, recommending metoprolol 25mg q 12   4/21: PE on CTA, therapeutic heparin gtt started. Started on cardene gtt,   4/22: Meropenem started per ID.  S/p IR percutaneous placement of a 8.5 [place] drainage catheter into lower abdominal wall fluid collection, yielding 150 mL of bowel appearing fluid. New left radial a-line placed. Off nicarrdipine gtt.   4/23: Switched to therapeutic lovenox. Diet advanced to bariatric clears with ensures.  4/24: advanced to bariatric FLD, downgraded to SDU  4/25: TPN discontinued, diet advanced to full liquid , Right cephalic DVT prelim  4/26: Right UE VA Duplex final no DVT, thrombophlebitis, Arterial line removed, 2L NC, midline vac changed, noted to have bilious drainage, RUQ drain pulled, upgraded to SICU status, plan to reorder TPN for 4/27 PM, diet changed to full liquid. 1:20 AM noted to be unresponsive, palpable femoral pulse, decision made to intubate, R femoral arterial line placed, started on levophed, amauri, bicarb amp x 2, bicarb gtt, propofol, 2U pRBC for hgb 5.9, plan for CTH and CTAP when stabilized   4/27: 2uPRBC 1uFFP 1pkPlt, TXA 1g, IR for embolization due to active extravasation seen on CTA, no active bleed, no embolization, Return to OR for re-exploration and evacuation of old clot  4/28:  CT showed expansion of PE. IR consulted for IVC filter and PICC. Vascular recalled for IVC filter. LE duplex ordered, negative. Precedex added overnight for agitation. 1 U PRBC for Hgb 6.8 overnight  4/29: TF started at 20cc/hr. Meropenem decreased to Q 12 hours.   4/30: Extubated to 5L NC, then placed on HFNC. Heparin gtt restarted.   5/1: Placed on BiPAP, started on aubrey clears w/ clear ensures   5/4: passed TOV. On 6L NC. Bumex 1mg.   5/5: diuresis with bumex 1mg, RLQ drain removed  5/6: Bumex 1mg. D/c meropenem. DG to SDU.   5/7: downgraded to 4C, weaned off O2, on 1LNC to RA, JPs with serosanguinous outputs. Rectal tube removed.     Floor course:   5/8: Left THONY removed. Central line removed. R THONY with serosanguinous output  5/9: evaluated by hematology who recommended to weight the risk/benefit of anticoagulation. Continued to work with PT, however is only able to transfer from the bed to the chair . Last THONY removed  5/10: +g, +bm, did not ambulate much, only OOBTC. Noted to have increased swelling in UE and LE b/l.  5/11: 20mg IV lasix   5/12: venous duplex showed right femoral DVT and left common femoral DVT, vascular and IR recalled for IVC filter. Reupgraded to SICU for hemodynamic monitoring after restarting anticoagulation  5/13: IR for IVC filter, unable to place due to anatomy. Started on 80mg lovenox BID, planned to transition to Eliquis tomorrow.   5/14: Started on Eliquis  5/15: Remained hemodynamically stable, hemoglobin stable. Downgraded to the floor. Low grade tachycardia to 100s over night, self resolved. Echo was done with EF of 60-65%.   5/16-5/23: patient remained hemodynamically stable, afebrile. Pain was controlled. She is now medically cleared for discharge to LTAC .

## 2025-05-11 NOTE — DISCHARGE NOTE PROVIDER - CARE PROVIDERS DIRECT ADDRESSES
,[email or web address]

## 2025-05-11 NOTE — DISCHARGE NOTE PROVIDER - NSDCMRMEDTOKEN_GEN_ALL_CORE_FT
amLODIPine 10 mg oral tablet: 1 tab(s) orally once a day  cyanocobalamin 2500 mcg sublingual tablet: 1 tab(s) sublingually once a day  hydroCHLOROthiazide 25 mg oral tablet: 1 tab(s) orally once a day  losartan 100 mg oral tablet: 1 tab(s) orally once a day  omeprazole 40 mg oral delayed release capsule: 1 cap(s) orally once a day as needed for  heartburn  Wegovy (2.4 mg dose) subcutaneous solution: 2.4 milligram(s) subcutaneously   acetaminophen 325 mg oral tablet: 2 tab(s) orally every 6 hours As needed Mild Pain (1 - 3)  amLODIPine 10 mg oral tablet: 1 tab(s) orally once a day  apixaban 5 mg oral tablet: 1 tab(s) orally every 12 hours  cyanocobalamin 2500 mcg sublingual tablet: 1 tab(s) sublingually once a day  hydroCHLOROthiazide 25 mg oral tablet: 1 tab(s) orally once a day  lactobacillus acidophilus oral capsule: 1 cap(s) orally once a day  losartan 100 mg oral tablet: 1 tab(s) orally once a day  Multiple Vitamins with Minerals oral tablet: 1 tab(s) orally once a day  omeprazole 40 mg oral delayed release capsule: 1 cap(s) orally once a day as needed for  heartburn  Wegovy (2.4 mg dose) subcutaneous solution: 2.4 milligram(s) subcutaneously

## 2025-05-11 NOTE — DISCHARGE NOTE PROVIDER - PROVIDER TOKENS
PROVIDER:[TOKEN:[[id number]],FOLLOWUP:[2 weeks]] PROVIDER:[TOKEN:[[id number]],FOLLOWUP:[2 weeks]],PROVIDER:[TOKEN:[[id number]],FOLLOWUP:[2 weeks]]

## 2025-05-11 NOTE — PROGRESS NOTE ADULT - ASSESSMENT
65y F w/ PMHx of  Morbid obesity, LOUIS, large complex ventral hernia s/p repair sbr and loss of domain c/b post op intubation, hypotension requiring vasopressors, anastomotic leakage s/p RTOR and reanastomosis, now doing better post op from a respiratory status. Comfortable on BIPAP. Off pressors. Blood cultures negative.    PLAN:  - Heme/onc: If AC is to be restarted, recommend starting on heparin drip, monitor for bleeding and if Hb remains stable and no evidence of active bleed can be transitioned to DOAC on discharges. In the event of bleeding recurrence on heparin, can consider placing IVC filter. Patient can follow up outpatient for thrombophilia workup  - Wound vac changes MWF  - diuresis prn  - Encourage ambulation  - Continue regular diet  - Pain control  - Pending CM for rehab

## 2025-05-11 NOTE — PROGRESS NOTE ADULT - SUBJECTIVE AND OBJECTIVE BOX
GENERAL SURGERY PROGRESS NOTE     NANCY SARGENT  [address] [place] [address] day :39d    POD: 24  Procedure: Open repair of ventral hernia using mesh and component separation technique    Small bowel resection    Insertion, arterial line, percutaneous    Exploratory laparotomy    Insertion of arterial line with imaging guidance    Abdominal washout    Evacuation of hemoperitoneum    24 hour events: +g, +bm, did not ambulate much, only OOBTC. Noted to have increased swelling in UE and LE b/l.    PHYSICAL EXAM:  General: NAD, calm and cooperative  HEENT: NCAT  Cardiac: RRR  Respiratory: Comfortable on a room air, normal respiratory effort  Abdomen: Soft, non-distended, non-tender, wound vac in place  Musculoskeletal: Active ROM intact, compartments soft  Skin: Warm/dry, normal color      T(F): 97.3 (05-10-25 @ 23:40), Max: 97.6 (05-10-25 @ 08:25)  HR: 98 (05-11-25 @ 05:30) (95 - 100)  BP: 156/88 (05-11-25 @ 05:30) (114/79 - 156/88)  ABP: --  ABP(mean): --  RR: 18 (05-11-25 @ 05:30) (18 - 18)  SpO2: 97% (05-11-25 @ 05:30) (95% - 98%)    IN'S / OUT's:    05-10-25 @ 07:01  -  05-11-25 @ 07:00  --------------------------------------------------------  IN:  Total IN: 0 mL    OUT:    VAC (Vacuum Assisted Closure) System (mL): 0 mL    Voided (mL): 900 mL  Total OUT: 900 mL    Total NET: -900 mL          MEDICATIONS  (STANDING):  albuterol/ipratropium for Nebulization 3 milliLiter(s) Nebulizer every 6 hours  amLODIPine   Tablet 10 milliGRAM(s) Oral daily  chlorhexidine 2% Cloths 1 Application(s) Topical <User Schedule>  cyanocobalamin 1000 MICROGram(s) Oral daily  enoxaparin Injectable 40 milliGRAM(s) SubCutaneous every 12 hours  ergocalciferol 06307 Unit(s) Oral daily  insulin lispro (ADMELOG) corrective regimen sliding scale   SubCutaneous Before meals and at bedtime  lactobacillus acidophilus 1 Tablet(s) Oral daily  losartan 100 milliGRAM(s) Oral daily  melatonin 10 milliGRAM(s) Oral at bedtime  multivitamin/minerals 1 Tablet(s) Oral daily  pantoprazole    Tablet 40 milliGRAM(s) Oral before breakfast    MEDICATIONS  (PRN):  acetaminophen     Tablet .. 650 milliGRAM(s) Oral every 6 hours PRN Mild Pain (1 - 3)      LABS  Labs:  CAPILLARY BLOOD GLUCOSE      POCT Blood Glucose.: 104 mg/dL (10 May 2025 21:19)  POCT Blood Glucose.: 141 mg/dL (10 May 2025 17:02)  POCT Blood Glucose.: 110 mg/dL (10 May 2025 12:04)  POCT Blood Glucose.: 152 mg/dL (10 May 2025 07:45)                  LFTs:         Coags:                    RADIOLOGY & ADDITIONAL TESTS:

## 2025-05-12 LAB
ANION GAP SERPL CALC-SCNC: 12 MMOL/L — SIGNIFICANT CHANGE UP (ref 7–14)
APTT BLD: 28.3 SEC — SIGNIFICANT CHANGE UP (ref 27–39.2)
BASOPHILS # BLD AUTO: 0.03 K/UL — SIGNIFICANT CHANGE UP (ref 0–0.2)
BASOPHILS NFR BLD AUTO: 0.4 % — SIGNIFICANT CHANGE UP (ref 0–1)
BUN SERPL-MCNC: 18 MG/DL — SIGNIFICANT CHANGE UP (ref 10–20)
CALCIUM SERPL-MCNC: 7.9 MG/DL — LOW (ref 8.4–10.5)
CHLORIDE SERPL-SCNC: 107 MMOL/L — SIGNIFICANT CHANGE UP (ref 98–110)
CO2 SERPL-SCNC: 20 MMOL/L — SIGNIFICANT CHANGE UP (ref 17–32)
CREAT SERPL-MCNC: 0.7 MG/DL — SIGNIFICANT CHANGE UP (ref 0.7–1.5)
EGFR: 97 ML/MIN/1.73M2 — SIGNIFICANT CHANGE UP
EGFR: 97 ML/MIN/1.73M2 — SIGNIFICANT CHANGE UP
EOSINOPHIL # BLD AUTO: 0.04 K/UL — SIGNIFICANT CHANGE UP (ref 0–0.7)
EOSINOPHIL NFR BLD AUTO: 0.5 % — SIGNIFICANT CHANGE UP (ref 0–8)
GLUCOSE BLDC GLUCOMTR-MCNC: 120 MG/DL — HIGH (ref 70–99)
GLUCOSE BLDC GLUCOMTR-MCNC: 134 MG/DL — HIGH (ref 70–99)
GLUCOSE BLDC GLUCOMTR-MCNC: 137 MG/DL — HIGH (ref 70–99)
GLUCOSE BLDC GLUCOMTR-MCNC: 150 MG/DL — HIGH (ref 70–99)
GLUCOSE SERPL-MCNC: 121 MG/DL — HIGH (ref 70–99)
HCT VFR BLD CALC: 27.4 % — LOW (ref 37–47)
HGB BLD-MCNC: 8.8 G/DL — LOW (ref 12–16)
IMM GRANULOCYTES NFR BLD AUTO: 3.2 % — HIGH (ref 0.1–0.3)
INR BLD: 0.96 RATIO — SIGNIFICANT CHANGE UP (ref 0.65–1.3)
LYMPHOCYTES # BLD AUTO: 1.11 K/UL — LOW (ref 1.2–3.4)
LYMPHOCYTES # BLD AUTO: 15.2 % — LOW (ref 20.5–51.1)
MAGNESIUM SERPL-MCNC: 1.8 MG/DL — SIGNIFICANT CHANGE UP (ref 1.8–2.4)
MCHC RBC-ENTMCNC: 30.8 PG — SIGNIFICANT CHANGE UP (ref 27–31)
MCHC RBC-ENTMCNC: 32.1 G/DL — SIGNIFICANT CHANGE UP (ref 32–37)
MCV RBC AUTO: 95.8 FL — SIGNIFICANT CHANGE UP (ref 81–99)
MONOCYTES # BLD AUTO: 0.44 K/UL — SIGNIFICANT CHANGE UP (ref 0.1–0.6)
MONOCYTES NFR BLD AUTO: 6 % — SIGNIFICANT CHANGE UP (ref 1.7–9.3)
NEUTROPHILS # BLD AUTO: 5.45 K/UL — SIGNIFICANT CHANGE UP (ref 1.4–6.5)
NEUTROPHILS NFR BLD AUTO: 74.7 % — SIGNIFICANT CHANGE UP (ref 42.2–75.2)
NRBC BLD AUTO-RTO: 0 /100 WBCS — SIGNIFICANT CHANGE UP (ref 0–0)
PHOSPHATE SERPL-MCNC: 2.5 MG/DL — SIGNIFICANT CHANGE UP (ref 2.1–4.9)
PLATELET # BLD AUTO: 252 K/UL — SIGNIFICANT CHANGE UP (ref 130–400)
PMV BLD: 9.8 FL — SIGNIFICANT CHANGE UP (ref 7.4–10.4)
POTASSIUM SERPL-MCNC: 3.8 MMOL/L — SIGNIFICANT CHANGE UP (ref 3.5–5)
POTASSIUM SERPL-SCNC: 3.8 MMOL/L — SIGNIFICANT CHANGE UP (ref 3.5–5)
PROTHROM AB SERPL-ACNC: 11.3 SEC — SIGNIFICANT CHANGE UP (ref 9.95–12.87)
RBC # BLD: 2.86 M/UL — LOW (ref 4.2–5.4)
RBC # FLD: 16.6 % — HIGH (ref 11.5–14.5)
SODIUM SERPL-SCNC: 139 MMOL/L — SIGNIFICANT CHANGE UP (ref 135–146)
WBC # BLD: 7.3 K/UL — SIGNIFICANT CHANGE UP (ref 4.8–10.8)
WBC # FLD AUTO: 7.3 K/UL — SIGNIFICANT CHANGE UP (ref 4.8–10.8)

## 2025-05-12 PROCEDURE — 99232 SBSQ HOSP IP/OBS MODERATE 35: CPT

## 2025-05-12 PROCEDURE — 93970 EXTREMITY STUDY: CPT | Mod: 26

## 2025-05-12 PROCEDURE — 71045 X-RAY EXAM CHEST 1 VIEW: CPT | Mod: 26

## 2025-05-12 RX ORDER — MAGNESIUM SULFATE 500 MG/ML
2 SYRINGE (ML) INJECTION ONCE
Refills: 0 | Status: COMPLETED | OUTPATIENT
Start: 2025-05-12 | End: 2025-05-12

## 2025-05-12 RX ORDER — ENOXAPARIN SODIUM 100 MG/ML
60 INJECTION SUBCUTANEOUS EVERY 12 HOURS
Refills: 0 | Status: DISCONTINUED | OUTPATIENT
Start: 2025-05-12 | End: 2025-05-13

## 2025-05-12 RX ORDER — POTASSIUM PHOSPHATE, MONOBASIC POTASSIUM PHOSPHATE, DIBASIC INJECTION, 236; 224 MG/ML; MG/ML
15 SOLUTION, CONCENTRATE INTRAVENOUS ONCE
Refills: 0 | Status: COMPLETED | OUTPATIENT
Start: 2025-05-12 | End: 2025-05-12

## 2025-05-12 RX ORDER — MAGNESIUM SULFATE 500 MG/ML
2 SYRINGE (ML) INJECTION
Refills: 0 | Status: COMPLETED | OUTPATIENT
Start: 2025-05-12 | End: 2025-05-12

## 2025-05-12 RX ADMIN — ENOXAPARIN SODIUM 60 MILLIGRAM(S): 100 INJECTION SUBCUTANEOUS at 17:09

## 2025-05-12 RX ADMIN — Medication 25 GRAM(S): at 03:32

## 2025-05-12 RX ADMIN — Medication 1 TABLET(S): at 12:25

## 2025-05-12 RX ADMIN — Medication 25 GRAM(S): at 21:44

## 2025-05-12 RX ADMIN — Medication 10 MILLIGRAM(S): at 21:43

## 2025-05-12 RX ADMIN — LOSARTAN POTASSIUM 100 MILLIGRAM(S): 100 TABLET, FILM COATED ORAL at 05:51

## 2025-05-12 RX ADMIN — IPRATROPIUM BROMIDE AND ALBUTEROL SULFATE 3 MILLILITER(S): .5; 2.5 SOLUTION RESPIRATORY (INHALATION) at 02:56

## 2025-05-12 RX ADMIN — POTASSIUM PHOSPHATE, MONOBASIC POTASSIUM PHOSPHATE, DIBASIC INJECTION, 63.75 MILLIMOLE(S): 236; 224 SOLUTION, CONCENTRATE INTRAVENOUS at 01:33

## 2025-05-12 RX ADMIN — CYANOCOBALAMIN 1000 MICROGRAM(S): 1000 INJECTION INTRAMUSCULAR; SUBCUTANEOUS at 12:25

## 2025-05-12 RX ADMIN — ENOXAPARIN SODIUM 40 MILLIGRAM(S): 100 INJECTION SUBCUTANEOUS at 05:50

## 2025-05-12 RX ADMIN — INSULIN LISPRO 0: 100 INJECTION, SOLUTION INTRAVENOUS; SUBCUTANEOUS at 12:25

## 2025-05-12 RX ADMIN — Medication 1 APPLICATION(S): at 06:01

## 2025-05-12 RX ADMIN — Medication 40 MILLIGRAM(S): at 05:51

## 2025-05-12 RX ADMIN — Medication 25 GRAM(S): at 01:33

## 2025-05-12 RX ADMIN — IPRATROPIUM BROMIDE AND ALBUTEROL SULFATE 3 MILLILITER(S): .5; 2.5 SOLUTION RESPIRATORY (INHALATION) at 19:39

## 2025-05-12 RX ADMIN — Medication 1 TABLET(S): at 12:24

## 2025-05-12 RX ADMIN — IPRATROPIUM BROMIDE AND ALBUTEROL SULFATE 3 MILLILITER(S): .5; 2.5 SOLUTION RESPIRATORY (INHALATION) at 14:33

## 2025-05-12 RX ADMIN — POTASSIUM PHOSPHATE, MONOBASIC POTASSIUM PHOSPHATE, DIBASIC INJECTION, 63.75 MILLIMOLE(S): 236; 224 SOLUTION, CONCENTRATE INTRAVENOUS at 21:55

## 2025-05-12 RX ADMIN — AMLODIPINE BESYLATE 10 MILLIGRAM(S): 10 TABLET ORAL at 05:51

## 2025-05-12 NOTE — PROGRESS NOTE ADULT - ATTENDING COMMENTS
Discussed pt with primary team.       Visited pt at bedside in the SICU. Pt's family at bedside during visit. Reports feeling well. Feels hungry. Denies any nausea/vomiting. Denies any fever/chills. Frustrated about her clots in the legs.    PE:  General; female, in bed, comfortable  Head; NC/AT  Heart; RRR  Lungs; even chest rise, no accessory muscle use  Abdomen: soft, obese, non-tender, vac in place, good seal  MSK: b/l LE edema appreciated    A/P:  65 F with an extensive medical/surgical history, well known to SICU team, with recent development of b/l LE DVTs (left common femoral vein, right femoral vein) with segmental/subsegmental PEs (right distal main PE with segmental extension).   - Upgraded to SICU to resume therapeutic a/c in setting of b/l LE DVT and PE  - Lovenox 60 received on floor, to receive another dose in SICU  - Will monitor hemodynamics closely  - Pending IR for placement of IVC filter on 5/13/25  - Tylenol for pain  - Continue diet  - PT/OT  - Midline vac changes MWF  - Continue amlodipine/losartan   - On sliding scale, goal CBG <180  - Will discuss transition to eliquis post-IVC filter placement

## 2025-05-12 NOTE — PROGRESS NOTE ADULT - SUBJECTIVE AND OBJECTIVE BOX
NANCY SARGENT   [id number]   [birth year]  64yF  ============================================================   DATE OF INITIAL SICU/SDU CONSULT: 04-10-25    INDICATION FOR SICU CONSULT:  q1hr abdominal checks, mechanical ventilation    SICU COURSE EVENTS :  04-10 - admitted to SICU service  4/11: Intubated and started on paralytic. Arterial line placed, subclavian TLC placed. Nephrology consulted for oliguria. IR abdominal muscle botox injection performed. TTE performed.   4/12: Additional fluid boluses given; trial fo bumex started, considering CVVH. Weaned off nimbex gtt.  > 220 /hr. Renal U/S w/out hydro.   4/13: Weaned off Levophed. Bumex gtt 2mg/hr > 1mg/hr. Goal net negative 1-1.5L, UOP approx, 200c/hr.   4/14: Remained on bumex gtt for further diuresis, decreased to 0.5 overnight, vent settings weaned. Cr downtrending, LFTs worsening.   4/15: Extubated to BiPAP, transitioned to NC. Dc'd bumex gtt, given 5mg metolazone x 1 and 2mg bumex x 1.   4/16: THONY drain #2 murky/bilious, pending CTAP with PO contrast, hypernatremia, started D5W @ 75cc/hr, persistent hypokalemia   4/17: RTOR for resection of anastomosis for anastomotic leak. Returned intubated, 400/24/80/10, sinus tachy to . Abdomen soft. Was initially on propofol @ 30 and precedex, but propofol weaned off due to hypotension with MAPs in 50s, fentanyl ggt started for acute pain. Remained hypotensive despite fluids, started on Levophed, on 0.05.   4/18: Extubated. HFNC 40L/49%, Levo off since 11 AM   4/19: NGT removed. Started on duonebs. D5W increased to 60cc/hr. 1mg bumex, > 1.5L response  4/20: Episode of afib RVR resolved with metoprolol 5mg IVP, cardiology c/s placed, recommending metoprolol 25mg q 12   4/21: PE on CTA, therapeutic heparin gtt started. Started on cardebe gtt,   4/22: Meropenem started per ID.  S/p IR percutaneous placement of a 8.5 Greenlandic drainage catheter into lower abdominal wall fluid collection, yielding 150 mL of bowel appearing fluid. New left radial a-line placed. Off nicarrdipine gtt.   4/23: Switched to therapeutic lovenox. Diet advanced to aubrey clears with ensures.  4/24: advacned to bariatric FLD, downgraded to SDU  4/25: TPN discontinued, diet advanced to full liquid , Right cephalic DVT prelim  4/26: Right UE VA Duplex final no DVT, thromboplebitis, Arterial line removed, 2L NC, midline vac changed, noted to have bilious drainage, RUQ drain pulled, upgraded to SICU status, plan to reorder TPN for 4/27 PM, diet changed to full liquid. 1:20 AM noted to be unresponsive, palpable femoral pulse, decision made to intubate, R femoral arterial line placed, started on levophed, amauri, bicarb amp x 2, bicarb gtt, propofol, 2U pRBC for hgb 5.9, plan for CTH and CTAP when stabilized   4/27: 2uPRBC 1uFFP 1pkPlt, TXA 1g, IR for embolization due to active extravasation seen on CTA, no active bleed, no embolization, Return to OR for re-exploration and evacuation of old clot  4/28: IR consulted for IVC filter and PICC. LE duplex ordered, negative. Precedex added overnight for agitation. 1 U PRBC for Hgb 6.8 overnight  4/29: TF started at 20cc/hr. Meropenem decreased to Q 12 hours.   4/30: Extubated to 5L NC, then placed on HFNC. Heparin gtt restarted.   5/1: Placed on BiPAP, started on aubrey clears w/ clear ensures   5/4: passed TOV. On 6L NC. Bumex 1mg.   5/5: diuresis with bumex 1mg, RLQ drain removed  5/6: Bumex 1mg. D/c meropenem. DG to SDU.   5/7: DG 4C.  5/12: reupgrade for need for anticoagulation.     24Hour Events:  No acute events overnight. Pt tolerating diet without any nausea or vomiting. She reports passing gas and BM.

## 2025-05-12 NOTE — PROGRESS NOTE ADULT - ASSESSMENT
64y F w/ PMHx of HTN and gastric bypass surgery in 2001 presents with abdominal distention. CT with incarcerated ventral hernia with SBO. S/p Open repair of ventral hernia using mesh and component separation technique, Small bowel resection. 4/17 s/p ex lap, 30 cm sbr, creation of new side to side ileoileostomy, ventral hernia repair w/ mesh  Tachycardia work up included CTA performed for tachypnea and tachycardia which showed R main -> lobar pulm thrombosis/embolism, no R heart strain. Initiated on Heparin drip  Vascular surgery initially called after venous dplx showed Right posterior tibial vein DVT. Pt bled on full AC.   Re-called 4/28 after CT showed expansion of PE. Remained on DVT prophylaxis. Today 5/12 venous dplx showed right femoral DVT and left common femoral DVT   The team is requesting IVC filter placement        Plan:  - I reviewed labs  - I reviewed radiology imagings  - I personally visualized the imagings  - I discussed the findings and imagings with Dr. Plaza:  - scheduled for IVC filter insertion on Thursday 5/15  - pre-op on Wed, needs active type and screen, coags, CBC/BMP/Mg    Any questions, call [phone]

## 2025-05-12 NOTE — PROGRESS NOTE ADULT - ASSESSMENT
ASSESSMENT & PLAN:  64F PMHx HTN, morbid obesity s/p 2001 Abby-en-Y gastric bypass who presented on 4/3 with incarcerated ventral hernia with SBO    4/10 open ventral hernia repair, small bowel resection, and primary fascial closure with Dr. Lema. Admitted to SICU for Q1 abdominal checks and hemodynamic monitoring.   4/17 takeback for exploratory laparotomy, repair of anastomotic leak  4/22 8Fr pigtail into lower abdominal wall collection,150cc of foul appearing material aspirated, Attached to THONY bulb  4/27 RTOR for re-exploration and evacuation of old clots from retroperitoneum and pelvis and placement of 3 new beatrice drains      NEUROLOGICAL:  #Acute pain    acetaminophen     Tablet .. 650 Oral every 6 hours PRN mild pain   #Sleep hygiene  - HOLDING Trazodone 25mg QPM  - Melatonin 10mg QPM    RESPIRATORY:   #Acute hypoxic respiratory failure, resolved  - Extubated 4/18, reintubated on 4/27 for AMS and agonal breathing, extubated 4/30  - Currently on RA  - Home CPAP @ night  - duoneb treatment Q6  #Right distal main pulmonary embolus with segmental extension/B/L DVT   - currently on lovenox 60mg q12   - IVC filter placement tomorrow with IR   #Intermittent diuresis for fluid overload  - Last diuresis 5/11- lasix 20  #PMHx LOUIS on CPAP@ home   #Activity increase as tolerated    CARDS:   #Hypertensive urgency, resolved  #Acute hemorrhagic shock, resolved  - 4/27: 2u PRBC 1plt 1uFFP, 1g TXA  #Supraventricular tachycardia, Non-sustained ventricular tachycardia, Ventricular Premature Depolarization  - Cardiology (Dr. Lopez) - Metoprolol 25mg Q12 HOLDING  #PMHx of HTN  - continue Amlodipine 10mg QD  - continue Losartan 100mg qd  - HCTZ 25mg QD HOLDING  - EKG- NSR  - TTE 4/22: EF 70 to 75%    GASTROINTESTINAL/NUTRITION:   #4/10 open ventral hernia repair, small bowel resection, ileoileal anastomosis and primary fascial closure, 2 THONY Right  #4/17 RTOR for exploratory laparotomy, repair of anastomotic leak at previous ileoileal anastomosis, resected, new ileoileal anastomosis created, 1 THONY Left (total 3)  #4/22: s/p IR percutaneous placement of a 8.5 Bengali drainage catheter into lower abdominal wall fluid collection, yielding 150 mL of foul appearing fluid.  #4/27: Acute retroperitoneal CTA: mesenteric arterial blush noted with extravasation  #4/27 RTOR for re-exploration and evacuation of old clots from retroperitoneum and pelvis    - s/p IR without finding of any bleeding, no embolization required    -5/2 Midline wound vac removed   #Diet: DASH with ensure clears  - on vitamin B12 supplement  - multivitamin  - probiotic   - Aspiration precautions, HOB 30  #GI Prophylaxis/hx GERD  - Pantoprazole 40mg PO qD (prn home rx)   #Bowel regimen    -last bowel movement 5/12    /RENAL:   #Urine output in critically ill  - IVL  - Lasix 20mg IVP 5/11  #FUNMI; oliguric - resolved  - Renal u/s> no hydronephrosis. 2-3cm anechoic cyst on right kidney   #Lactic acidosis resolved  - Nephro: check CK, no indication of RRT, decrease meropenem to Q12    Labs:          BUN/Cr- 16/0.8  -->          [05-11 @ 21:42]Na  141 // K  3.8 // Mg  1.5 // Phos  2.6    HEME/ONC:   #DVT PPX  - LMWH 40mg q12  - SCD B/L  #acute anemia   - 4/27: 2uPRBC 1uFFP 1pkPlt, TXA 1g, IR for embolization due to active extravasation seen on CTA, no active bleed, no embolization, Return to OR for re-exploration and evacuation of old clot  - 4/28: IR consulted for IVC filter and PICC. LE duplex ordered, negative.  - 5/2: 1u PRBC for 500mL sanguinous output from wound vac   #Acute right pulmonary embolus  - Therapeutic Heparin gtt discontinued 5/2 secondary to venous bleed from wound vac   #B/L UE Venous Duplex 4/26/27 - thrombophlebitis  - B/L LE Venous duplex 4/21-RIGHT PT DVT, no LEFT DVT  - B/L LE Venous duplex 4/28-No evidence of deep venous thrombosis in either lower extremity.   - B/L duplex 5/5 - negative for DVT B/L  - B/L Duplex 5/12: DVT in the right femoral vein. DVT in left common femoral vein.  - Plan for IVC filter with IR tomorrow 5/13  - Start Lovenox 60mg q12 with plan to advance to full AC   - Vascular surgery consulted - AC with heparin drip (upon discharge transition to Eliquis 10 mg po BID x 7 days then 5 mg po BID for at least 6 months      Labs: Hb/Hct:  8.7/26.6  -->                    Plts:  244  -->                 PTT/INR:         ID:  #Intraabdominal anastomotic leak, fluid collection    -ESR, CRP for 3 days  #ID Consult  - trial off antibiotics 5/6 per Dr. Bermudez and Dr. Lema  - Midline x Ertapenem 1g Q24 on discharge x 4 weeks (EOT 5/19)  - F/u with Dr. Perdue on Tuesdays via Telehealth   #Culture    OR 4/1 - E Coli    OR cx: few E.coli, few bacteroides, rare clostridium    4/13 Blood Cx: NGTF    4/14 tracheal aspirate: no growth     4/17 body fluid cx rare e. coli     4/22 Blood cx: negative    4/22 Abdominal - Negative FINAL    4/27 C diff stool panel: negative    4/28 blood culture: negative  WBC- 7.16  --->>  Temp trend- 24hrs T(F): 98.4 (05-12 @ 16:00), Max: 98.6 (05-11 @ 23:42)      ENDOCRINE:  #Glycemic monitoring  - ACHS fingersticks  - ISS  - A1C 5.8%     MSK:  #Activity- increase as tolerated  #Physiatry    -bedside PT, will evaluate for inpatient 4A placement, not a candidate as of 5/7  #PT consult    -rehab facility on discharge 3 to 5x/week   #OT consult    -pending for ROM within bed    SKIN:  #midline incision staples in place f/u w/ primary removal timeline (last OR 4/27)  #left forearm skin tear   - Continue to monitor daily skin changes  #left knee skin tear  #sacral fissure 5/3      LINES/DRAINS:  PIV    LLQ pelvis beatrice drain (4/28 -     Midline subcutaneous beatrice drain (4/28 -     Right IJ TLC (4/17 - to be removed prior to transfer to floor    Discontinued lines:    Right Femoral Arterial Line (4/27 - 4/28)    Prevena Vac Midline (removed 5/2)     Left radial arterial line (4/28 - 5/5)    RLQ pelvis beatrice drain (4/28 - 5/5)    ADVANCED DIRECTIVES:  Full Code  Emergency - Daughter  (Shavonne Moss)  INDICATION FOR SICU/SDU: Intestinal obstruction    DISPO: SICU - discussed with Dr. Alva

## 2025-05-12 NOTE — PROGRESS NOTE ADULT - SUBJECTIVE AND OBJECTIVE BOX
GENERAL SURGERY PROGRESS NOTE     NANCY SARGENT  [address] [place] [address] day :40d    POD:25d  Procedure: Open repair of ventral hernia using mesh and component separation technique    Small bowel resection    Insertion, arterial line, percutaneous    Exploratory laparotomy    Insertion of arterial line with imaging guidance    Abdominal washout    Evacuation of hemoperitoneum      Surgical Attending: Rose Lema  Overnight events: No acute events overnight. Pt tolerating diet without any nausea or vomiting. She reports passing gas and BM.     T(F): 98.6 (05-11-25 @ 23:42), Max: 98.6 (05-11-25 @ 23:42)  HR: 98 (05-11-25 @ 23:42) (98 - 105)  BP: 130/83 (05-12-25 @ 04:06) (130/79 - 134/82)  ABP: --  ABP(mean): --  RR: 18 (05-11-25 @ 23:42) (18 - 18)  SpO2: 97% (05-11-25 @ 23:42) (97% - 98%)    IN'S / OUT's:    05-11-25 @ 07:01  -  05-12-25 @ 07:00  --------------------------------------------------------  IN:    IV PiggyBack: 100 mL    IV PiggyBack: 250 mL  Total IN: 350 mL    OUT:    VAC (Vacuum Assisted Closure) System (mL): 0 mL    Voided (mL): 1200 mL  Total OUT: 1200 mL    Total NET: -850 mL          PHYSICAL EXAM:  GENERAL: NAD  CHEST/LUNG: equal chest rise b/l, non labored breathing  HEART: Regular rate and rhythm  ABDOMEN: Soft, Nontender, Nondistended; wound vac in place with good seal  EXTREMITIES:  No clubbing, cyanosis, or edema      LABS  Labs:  CAPILLARY BLOOD GLUCOSE      POCT Blood Glucose.: 125 mg/dL (11 May 2025 20:33)  POCT Blood Glucose.: 122 mg/dL (11 May 2025 18:32)  POCT Blood Glucose.: 129 mg/dL (11 May 2025 11:13)  POCT Blood Glucose.: 116 mg/dL (11 May 2025 07:58)                          8.7    7.16  )-----------( 244      ( 11 May 2025 21:42 )             26.6       Auto Immature Granulocyte %: 2.5 % (05-11-25 @ 21:42)    05-11    141  |  107  |  16  ----------------------------<  120[H]  3.8   |  21  |  0.8      Calcium: 7.7 mg/dL (05-11-25 @ 21:42)      LFTs:         Coags:            Urinalysis Basic - ( 11 May 2025 21:42 )    Color: x / Appearance: x / SG: x / pH: x  Gluc: 120 mg/dL / Ketone: x  / Bili: x / Urobili: x   Blood: x / Protein: x / Nitrite: x   Leuk Esterase: x / RBC: x / WBC x   Sq Epi: x / Non Sq Epi: x / Bacteria: x            RADIOLOGY & ADDITIONAL TESTS:      A/P:  NANCY SARGENT is a 65y F w/ PMHx of  Morbid obesity, LOUIS, large complex ventral hernia s/p repair sbr and loss of domain c/b post op intubation, hypotension requiring vasopressors, anastomotic leakage s/p RTOR and reanastomosis, now doing better post op from a respiratory status. Comfortable on BIPAP. Off pressors. Blood cultures negative.      PLAN:   - monitor wound vac output quality and quantity   - wound vac changes M/W/F  - daily labs, replete electrolytes as needed   - multi modal pain control  - encourage ambulation and IS as tolerated   - diuresis PRN  - continue Lovenox  - GI ppx  - F/u CM/SW for dispo planning        #DVT ppx: enoxaparin Injectable 40 milliGRAM(s) SubCutaneous every 12 hours    #GI ppx: pantoprazole    Tablet 40 milliGRAM(s) Oral before breakfast    Disposition:  4C    Above plan to be discussed with Attending Surgeon  ***  , patient, patient family, and rest of health care team    Blue Spectra [phone]  TAP (Trauma, Acute care, Pediatrics) Spectra [phone]  Green Spectra [phone]  Vascular [phone]

## 2025-05-12 NOTE — CONSULT NOTE ADULT - NS_IRCONSULTTYPE_GEN_ALL_CORE
Non Face-to-Face

## 2025-05-12 NOTE — PROGRESS NOTE ADULT - SUBJECTIVE AND OBJECTIVE BOX
VASCULAR SURGERY PROGRESS NOTE    CC: large ventral hernia  Hospital Day # 40      Events of past 24 hours: seen and examined          ROS otherwise negative except per subjective and HPI      PAST MEDICAL & SURGICAL HISTORY:  Gastric bypass status for obesity      LOUIS (obstructive sleep apnea)      S/P gastric bypass      S/P       S/P small bowel resection      History of total bilateral knee replacement (TKR)      H/O colonoscopy            Vital Signs Last 24 Hrs  T(C): 36.4 (12 May 2025 08:44), Max: 37 (11 May 2025 23:42)  T(F): 97.5 (12 May 2025 08:44), Max: 98.6 (11 May 2025 23:42)  HR: 98 (12 May 2025 08:44) (98 - 98)  BP: 146/85 (12 May 2025 08:44) (130/79 - 146/85)  BP(mean): --  RR: 19 (12 May 2025 08:44) (18 - 19)  SpO2: 97% (11 May 2025 23:42) (97% - 97%)    Parameters below as of 11 May 2025 23:42  Patient On (Oxygen Delivery Method): BiPAP/CPAP        Pain (0-10):            Pain Control Adequate: [] YES [] N    Diet:    I&O's Detail    11 May 2025 07:01  -  12 May 2025 07:00  --------------------------------------------------------  IN:    IV PiggyBack: 100 mL    IV PiggyBack: 250 mL  Total IN: 350 mL    OUT:    VAC (Vacuum Assisted Closure) System (mL): 0 mL    Voided (mL): 1200 mL  Total OUT: 1200 mL    Total NET: -850 mL      12 May 2025 07:01  -  12 May 2025 09:31  --------------------------------------------------------  IN:  Total IN: 0 mL    OUT:    Voided (mL): 1200 mL  Total OUT: 1200 mL    Total NET: -1200 mL      PHYSICAL EXAM    Appearance: Normal	  HEENT:   Normal oral mucosa, PERRL, EOMI	  Neck: Supple, - JVD;   Cardiovascular: Normal S1 S2, No JVD, No murmurs,   Respiratory: Lungs clear to auscultation, No Rales, Rhonchi, Wheezing	  Gastrointestinal:  Soft, Non-tender, + BS	+ wound vac in place  Skin: No rashes, No ecchymoses, No cyanosis  Extremities: Normal range of motion, No clubbing, cyanosis or edema  Neurologic: Non-focal  Psychiatry: A & O x 3, Mood & affect appropriate        MEDICATIONS:   MEDICATIONS  (STANDING):  albuterol/ipratropium for Nebulization 3 milliLiter(s) Nebulizer every 6 hours  amLODIPine   Tablet 10 milliGRAM(s) Oral daily  chlorhexidine 2% Cloths 1 Application(s) Topical <User Schedule>  cyanocobalamin 1000 MICROGram(s) Oral daily  enoxaparin Injectable 60 milliGRAM(s) SubCutaneous every 12 hours  ergocalciferol 03576 Unit(s) Oral every week  insulin lispro (ADMELOG) corrective regimen sliding scale   SubCutaneous Before meals and at bedtime  lactobacillus acidophilus 1 Tablet(s) Oral daily  losartan 100 milliGRAM(s) Oral daily  melatonin 10 milliGRAM(s) Oral at bedtime  multivitamin/minerals 1 Tablet(s) Oral daily  pantoprazole    Tablet 40 milliGRAM(s) Oral before breakfast    MEDICATIONS  (PRN):  acetaminophen     Tablet .. 650 milliGRAM(s) Oral every 6 hours PRN Mild Pain (1 - 3)      DVT PROPHYLAXIS: [] YES [x] NO  GI PROPHYLAXIS: [x] YES [] NO  ANTIPLATELETS: [] YES [x] NO  ANTICOAGULATION: [x] YES [] NO  ANTIBIOTICS: [] YES [x] NO    LAB/STUDIES:                        8.7    7.16  )-----------( 244      ( 11 May 2025 21:42 )             26.6         141  |  107  |  16  ----------------------------<  120[H]  3.8   |  21  |  0.8    Ca    7.7[L]      11 May 2025 21:42  Phos  2.6       Mg     1.5                 Urinalysis Basic - ( 11 May 2025 21:42 )    Color: x / Appearance: x / SG: x / pH: x  Gluc: 120 mg/dL / Ketone: x  / Bili: x / Urobili: x   Blood: x / Protein: x / Nitrite: x   Leuk Esterase: x / RBC: x / WBC x   Sq Epi: x / Non Sq Epi: x / Bacteria: x                    IMAGING:     VA Duplex Lower Ext Vein Scan, Bilat (05.25 @ 10:52) >  No evidence of deep venous thrombosis in either lower extremity.    VA Duplex Lower Ext Vein Scan, Bilat (.25 @ 14:35) >  No evidence of deep venous thrombosis in either lower extremity. Limited   exam due to body habitus      VA Duplex Upper Ext Vein Scan, Left (.25 @ 22:54) >  No evidence of left upper extremity deep venous thrombosis. Superficial   thrombophlebitis of the left cephalic vein.      VA Duplex Lower Ext Vein Scan, Bilat (.25 @ 22:40) >  Right posterior tibial vein DVT  No evidence of DVT in the left lower extremity      CT Angio Abdomen and Pelvis w/ Oral Cont and w/ IV Cont (.25 @ 11:39) >  Within limitations of streak artifact and suboptimal contrast timing:    Redemonstrated right distal main pulmonary embolus with segmental   extension.    There is arterial blush in the mesentery anterior to abdominal drainage   catheter with venous pooling, which may reflect extravasation    Redemonstrated large volume complex fluid in the pelvis and along the   left paracolic gutter, compatible with blood products.    Stable postoperative and additional findings as above.

## 2025-05-13 LAB
ANION GAP SERPL CALC-SCNC: 12 MMOL/L — SIGNIFICANT CHANGE UP (ref 7–14)
BASOPHILS # BLD AUTO: 0.02 K/UL — SIGNIFICANT CHANGE UP (ref 0–0.2)
BASOPHILS NFR BLD AUTO: 0.4 % — SIGNIFICANT CHANGE UP (ref 0–1)
BUN SERPL-MCNC: 17 MG/DL — SIGNIFICANT CHANGE UP (ref 10–20)
CALCIUM SERPL-MCNC: 7.7 MG/DL — LOW (ref 8.4–10.5)
CHLORIDE SERPL-SCNC: 108 MMOL/L — SIGNIFICANT CHANGE UP (ref 98–110)
CO2 SERPL-SCNC: 21 MMOL/L — SIGNIFICANT CHANGE UP (ref 17–32)
CREAT SERPL-MCNC: 0.7 MG/DL — SIGNIFICANT CHANGE UP (ref 0.7–1.5)
EGFR: 97 ML/MIN/1.73M2 — SIGNIFICANT CHANGE UP
EGFR: 97 ML/MIN/1.73M2 — SIGNIFICANT CHANGE UP
EOSINOPHIL # BLD AUTO: 0.02 K/UL — SIGNIFICANT CHANGE UP (ref 0–0.7)
EOSINOPHIL NFR BLD AUTO: 0.4 % — SIGNIFICANT CHANGE UP (ref 0–8)
GLUCOSE BLDC GLUCOMTR-MCNC: 107 MG/DL — HIGH (ref 70–99)
GLUCOSE BLDC GLUCOMTR-MCNC: 108 MG/DL — HIGH (ref 70–99)
GLUCOSE BLDC GLUCOMTR-MCNC: 117 MG/DL — HIGH (ref 70–99)
GLUCOSE SERPL-MCNC: 109 MG/DL — HIGH (ref 70–99)
HCT VFR BLD CALC: 26.7 % — LOW (ref 37–47)
HGB BLD-MCNC: 8.6 G/DL — LOW (ref 12–16)
IMM GRANULOCYTES NFR BLD AUTO: 2.4 % — HIGH (ref 0.1–0.3)
LYMPHOCYTES # BLD AUTO: 0.87 K/UL — LOW (ref 1.2–3.4)
LYMPHOCYTES # BLD AUTO: 16.1 % — LOW (ref 20.5–51.1)
MAGNESIUM SERPL-MCNC: 1.9 MG/DL — SIGNIFICANT CHANGE UP (ref 1.8–2.4)
MCHC RBC-ENTMCNC: 30.7 PG — SIGNIFICANT CHANGE UP (ref 27–31)
MCHC RBC-ENTMCNC: 32.2 G/DL — SIGNIFICANT CHANGE UP (ref 32–37)
MCV RBC AUTO: 95.4 FL — SIGNIFICANT CHANGE UP (ref 81–99)
MONOCYTES # BLD AUTO: 0.37 K/UL — SIGNIFICANT CHANGE UP (ref 0.1–0.6)
MONOCYTES NFR BLD AUTO: 6.9 % — SIGNIFICANT CHANGE UP (ref 1.7–9.3)
NEUTROPHILS # BLD AUTO: 3.99 K/UL — SIGNIFICANT CHANGE UP (ref 1.4–6.5)
NEUTROPHILS NFR BLD AUTO: 73.8 % — SIGNIFICANT CHANGE UP (ref 42.2–75.2)
NRBC BLD AUTO-RTO: 0 /100 WBCS — SIGNIFICANT CHANGE UP (ref 0–0)
PHOSPHATE SERPL-MCNC: 3.2 MG/DL — SIGNIFICANT CHANGE UP (ref 2.1–4.9)
PLATELET # BLD AUTO: 223 K/UL — SIGNIFICANT CHANGE UP (ref 130–400)
PMV BLD: 9.2 FL — SIGNIFICANT CHANGE UP (ref 7.4–10.4)
POTASSIUM SERPL-MCNC: 3.5 MMOL/L — SIGNIFICANT CHANGE UP (ref 3.5–5)
POTASSIUM SERPL-SCNC: 3.5 MMOL/L — SIGNIFICANT CHANGE UP (ref 3.5–5)
RBC # BLD: 2.8 M/UL — LOW (ref 4.2–5.4)
RBC # FLD: 16.7 % — HIGH (ref 11.5–14.5)
SODIUM SERPL-SCNC: 141 MMOL/L — SIGNIFICANT CHANGE UP (ref 135–146)
WBC # BLD: 5.4 K/UL — SIGNIFICANT CHANGE UP (ref 4.8–10.8)
WBC # FLD AUTO: 5.4 K/UL — SIGNIFICANT CHANGE UP (ref 4.8–10.8)

## 2025-05-13 PROCEDURE — 37191 INS ENDOVAS VENA CAVA FILTR: CPT

## 2025-05-13 PROCEDURE — 99223 1ST HOSP IP/OBS HIGH 75: CPT

## 2025-05-13 PROCEDURE — 99152 MOD SED SAME PHYS/QHP 5/>YRS: CPT

## 2025-05-13 PROCEDURE — 99232 SBSQ HOSP IP/OBS MODERATE 35: CPT

## 2025-05-13 RX ORDER — MAGNESIUM SULFATE 500 MG/ML
1 SYRINGE (ML) INJECTION ONCE
Refills: 0 | Status: COMPLETED | OUTPATIENT
Start: 2025-05-13 | End: 2025-05-13

## 2025-05-13 RX ORDER — INSULIN LISPRO 100 U/ML
INJECTION, SOLUTION INTRAVENOUS; SUBCUTANEOUS EVERY 6 HOURS
Refills: 0 | Status: DISCONTINUED | OUTPATIENT
Start: 2025-05-13 | End: 2025-05-13

## 2025-05-13 RX ORDER — FUROSEMIDE 10 MG/ML
20 INJECTION INTRAMUSCULAR; INTRAVENOUS ONCE
Refills: 0 | Status: COMPLETED | OUTPATIENT
Start: 2025-05-13 | End: 2025-05-13

## 2025-05-13 RX ORDER — LABETALOL HYDROCHLORIDE 200 MG/1
5 TABLET, FILM COATED ORAL ONCE
Refills: 0 | Status: COMPLETED | OUTPATIENT
Start: 2025-05-13 | End: 2025-05-13

## 2025-05-13 RX ORDER — INSULIN LISPRO 100 U/ML
INJECTION, SOLUTION INTRAVENOUS; SUBCUTANEOUS
Refills: 0 | Status: DISCONTINUED | OUTPATIENT
Start: 2025-05-13 | End: 2025-05-18

## 2025-05-13 RX ORDER — ENOXAPARIN SODIUM 100 MG/ML
80 INJECTION SUBCUTANEOUS EVERY 12 HOURS
Refills: 0 | Status: DISCONTINUED | OUTPATIENT
Start: 2025-05-13 | End: 2025-05-14

## 2025-05-13 RX ADMIN — ENOXAPARIN SODIUM 60 MILLIGRAM(S): 100 INJECTION SUBCUTANEOUS at 05:23

## 2025-05-13 RX ADMIN — IPRATROPIUM BROMIDE AND ALBUTEROL SULFATE 3 MILLILITER(S): .5; 2.5 SOLUTION RESPIRATORY (INHALATION) at 13:27

## 2025-05-13 RX ADMIN — IPRATROPIUM BROMIDE AND ALBUTEROL SULFATE 3 MILLILITER(S): .5; 2.5 SOLUTION RESPIRATORY (INHALATION) at 08:37

## 2025-05-13 RX ADMIN — Medication 1 APPLICATION(S): at 05:42

## 2025-05-13 RX ADMIN — AMLODIPINE BESYLATE 10 MILLIGRAM(S): 10 TABLET ORAL at 05:22

## 2025-05-13 RX ADMIN — Medication 10 MILLIGRAM(S): at 21:42

## 2025-05-13 RX ADMIN — Medication 40 MILLIGRAM(S): at 06:06

## 2025-05-13 RX ADMIN — CYANOCOBALAMIN 1000 MICROGRAM(S): 1000 INJECTION INTRAMUSCULAR; SUBCUTANEOUS at 11:06

## 2025-05-13 RX ADMIN — Medication 1 TABLET(S): at 11:06

## 2025-05-13 RX ADMIN — FUROSEMIDE 20 MILLIGRAM(S): 10 INJECTION INTRAMUSCULAR; INTRAVENOUS at 10:56

## 2025-05-13 RX ADMIN — ENOXAPARIN SODIUM 80 MILLIGRAM(S): 100 INJECTION SUBCUTANEOUS at 21:42

## 2025-05-13 RX ADMIN — LABETALOL HYDROCHLORIDE 5 MILLIGRAM(S): 200 TABLET, FILM COATED ORAL at 21:51

## 2025-05-13 RX ADMIN — IPRATROPIUM BROMIDE AND ALBUTEROL SULFATE 3 MILLILITER(S): .5; 2.5 SOLUTION RESPIRATORY (INHALATION) at 21:25

## 2025-05-13 RX ADMIN — IPRATROPIUM BROMIDE AND ALBUTEROL SULFATE 3 MILLILITER(S): .5; 2.5 SOLUTION RESPIRATORY (INHALATION) at 02:29

## 2025-05-13 RX ADMIN — LOSARTAN POTASSIUM 100 MILLIGRAM(S): 100 TABLET, FILM COATED ORAL at 05:22

## 2025-05-13 NOTE — PROGRESS NOTE ADULT - ATTENDING COMMENTS
Visited pt at bedside in the SICU. Pt's daughter and  at bedside during AM visit. Discussed US findings and CT findings showing PE. Daughter concerned about right heart strain given PE. Explained that patient does not have heart strain on prior CT and clinically is stable. Daughter concerned about possibility of bleeding on therapeutic anticoagulation. Explained that patient got upgraded to monitor hemodynamics more closely.     PE:  General; female, in bed, comfortable  Head; NC/AT  Heart; RRR  Lungs; even chest rise, no accessory muscle use  Abdomen: soft, obese, non-tender, vac in place, good seal  MSK: b/l LE edema appreciated    A/P:  65 F with an extensive medical/surgical history, well known to SICU team, with recent development of b/l LE DVTs (left common femoral vein, right femoral vein) with segmental/subsegmental PEs (right distal main PE with segmental extension).   - IR today for IVC filter  - Will order an ECHO  - Will have pulmonology eval patient   - Tylenol for pain  - NPO at this time; will resume diet post-op  - PT/OT  - Midline vac changes MWF  - Continue amlodipine/losartan   - On sliding scale, goal CBG <180  - Will discuss transition to eliquis post-IVC filter placement

## 2025-05-13 NOTE — CONSULT NOTE ADULT - ASSESSMENT
Impression:  acute bilateral lower extremity DVT  subacute segmental PE  intraabdominal bleeding s/p washout  SBO s/p repair  HO LOUIS    Plan:  CTA chest reviewed with segmental PE on 4/27  bilateral acute DVTs on lower extremity duplex  plan for IVC filter today  discussed risks and benefits of resuming full dose ac with family at bedside         Impression:    Bilateral lower extremity DVT  Segmental PE  Intraabdominal bleeding s/p washout  SBO s/p repair  HO LOUIS no CPAP     Plan:  CTA chest reviewed with segmental PE on 4/27  Bilateral acute DVTs on lower extremity duplex  Patient is scheduled IVC filter today  Can start AC when it is deemed to do so   Continue CPAP during sleep   HOB at 45 degrees  Continue general pulmonary post operative care

## 2025-05-13 NOTE — PROGRESS NOTE ADULT - SUBJECTIVE AND OBJECTIVE BOX
SURGERY PROGRESS NOTE    Patient: NANCY SARGENT , 65y (62)Female   MRN: [id number]  Location: [address]  Visit: 25 Inpatient  Date: 25 @ 10:33    Hospital day :41d  POD:26d    Procedure: Open repair of ventral hernia using mesh and component separation technique  Small bowel resection  Insertion, arterial line, percutaneous  Exploratory laparotmy  Insertion of arterial line with imaging guidance  Abdominal washout  Evacuation of hemoperitoneum    Overnight events: Patient upgraded to SICU for monitoring of bleeding due to increasing doses of lovenox. Pending IVC filter IR today vs Vascular surgery tomorrow. THONY remains serosanguinous. Hb stable over night. No acute events overnight. Pt tolerating diet without any nausea or vomiting. She reports passing gas and BM.     PHYSICAL EXAM:  GENERAL: NAD  CHEST/LUNG: equal chest rise b/l, non labored breathing  HEART: Regular rate and rhythm  ABDOMEN: Soft, Nontender, Nondistended; wound vac in place with good seal  EXTREMITIES:  No clubbing, cyanosis, or edema      PAST MEDICAL & SURGICAL HISTORY:  Gastric bypass status for obesity      LOUIS (obstructive sleep apnea)      S/P gastric bypass      S/P       S/P small bowel resection      History of total bilateral knee replacement (TKR)      H/O colonoscopy            Vitals:   T(F): 96.7 (25 @ 08:00), Max: 98.4 (25 @ 16:00)  HR: 87 (25 @ 08:00)  BP: 132/89 (25 @ 08:00)  RR: 20 (25 @ 08:00)  SpO2: 96% (25 @ 08:00)      Diet, DASH/TLC:   Sodium & Cholesterol Restricted  Diet, NPO after Midnight:      NPO Start Date: 12-May-2025,   NPO Start Time: 23:59      Fluids:     I & O's:    25 @ 07:01  -  25 @ 07:00  --------------------------------------------------------  IN:    IV PiggyBack: 250 mL    IV PiggyBack: 50 mL  Total IN: 300 mL    OUT:    Voided (mL): 3050 mL  Total OUT: 3050 mL    Total NET: -2750 mL          MEDICATIONS  (STANDING):  albuterol/ipratropium for Nebulization 3 milliLiter(s) Nebulizer every 6 hours  amLODIPine   Tablet 10 milliGRAM(s) Oral daily  chlorhexidine 2% Cloths 1 Application(s) Topical <User Schedule>  cyanocobalamin 1000 MICROGram(s) Oral daily  enoxaparin Injectable 60 milliGRAM(s) SubCutaneous every 12 hours  ergocalciferol 81732 Unit(s) Oral every week  furosemide   Injectable 20 milliGRAM(s) IV Push once  insulin lispro (ADMELOG) corrective regimen sliding scale   SubCutaneous every 6 hours  lactobacillus acidophilus 1 Tablet(s) Oral daily  losartan 100 milliGRAM(s) Oral daily  melatonin 10 milliGRAM(s) Oral at bedtime  multivitamin/minerals 1 Tablet(s) Oral daily  pantoprazole    Tablet 40 milliGRAM(s) Oral before breakfast    MEDICATIONS  (PRN):  acetaminophen     Tablet .. 650 milliGRAM(s) Oral every 6 hours PRN Mild Pain (1 - 3)      DVT PROPHYLAXIS: enoxaparin Injectable 60 milliGRAM(s) SubCutaneous every 12 hours    GI PROPHYLAXIS: pantoprazole    Tablet 40 milliGRAM(s) Oral before breakfast    ANTICOAGULATION:   ANTIBIOTICS:            LAB/STUDIES:  Labs:  CAPILLARY BLOOD GLUCOSE      POCT Blood Glucose.: 107 mg/dL (13 May 2025 08:59)  POCT Blood Glucose.: 120 mg/dL (12 May 2025 21:45)  POCT Blood Glucose.: 150 mg/dL (12 May 2025 16:45)  POCT Blood Glucose.: 137 mg/dL (12 May 2025 11:32)                          8.8    7.30  )-----------( 252      ( 12 May 2025 20:20 )             27.4       Auto Immature Granulocyte %: 3.2 % (25 @ 20:20)        139  |  107  |  18  ----------------------------<  121[H]  3.8   |  20  |  0.7      Calcium: 7.9 mg/dL (25 @ 20:20)      LFTs:         Coags:     11.30  ----< 0.96    ( 12 May 2025 20:20 )     28.3                Urinalysis Basic - ( 12 May 2025 20:20 )    Color: x / Appearance: x / SG: x / pH: x  Gluc: 121 mg/dL / Ketone: x  / Bili: x / Urobili: x   Blood: x / Protein: x / Nitrite: x   Leuk Esterase: x / RBC: x / WBC x   Sq Epi: x / Non Sq Epi: x / Bacteria: x

## 2025-05-13 NOTE — PROGRESS NOTE ADULT - ASSESSMENT
65y F w/ PMHx of  Morbid obesity, LOUIS, large complex ventral hernia s/p repair sbr and loss of domain c/b post op intubation, hypotension requiring vasopressors, DVT, anastomotic leakage s/p RTOR and reanastomosis, now doing better post op from a respiratory status. Comfortable on BIPAP. Off pressors. Blood cultures negative. The last time AC was started on the patient she had increased bleeding and went to the OR for hematoma washout. Has been off AC, however repeat duplex with B/L LE DVTs. Now pending IVC filter placement.     PLAN:   - monitor wound vac output quality and quantity   - wound vac changes M/W/F  - multi modal pain control  - encourage ambulation and IS as tolerated   - f/u IVC filter placement.   - SICU monitoring while on AC  - F/u CM/SW for dispo planning    [id number]

## 2025-05-13 NOTE — CONSULT NOTE ADULT - SUBJECTIVE AND OBJECTIVE BOX
Patient is a 65y old  Female who presents with a chief complaint of SBO from ventral hernia  (11 May 2025 13:14)      HPI:  The patient is a 64-year-old female with a past medical history of high blood pressure and gastric bypass surgery in , presenting with two days of sharp abdominal pain and one episode of non-bloody, non-bilious vomiting. She has a prior history of small bowel obstruction in , , and most recently in , all of which resolved with conservative management. Her current symptoms are similar in nature. A computed tomography scan of the abdomen and pelvis with oral and intravenous contrast revealed multiple ventral abdominal wall hernias containing both obstructed and non-obstructed bowel loops. A complex small bowel obstruction is present, involving three hernias on the right side of the abdomen, with dilated, fluid-filled loops of small bowel, trace fluid between loops, and a collapsed segment of bowel beyond a hernia in the right lower quadrant. These findings are consistent with a recurrent small bowel obstruction. The hernia was non reducible at bedside. (2025 20:41), patient had prolonged hospital course complicated by PE, the GI bleed while on ac, now currently on ra, extubated.       PAST MEDICAL & SURGICAL HISTORY:  Gastric bypass status for obesity      LOUIS (obstructive sleep apnea)      S/P gastric bypass      S/P       S/P small bowel resection      History of total bilateral knee replacement (TKR)      H/O colonoscopy            SOCIAL HX:   Smoking   non smoker                      ETOH          none                  Other    FAMILY HISTORY:  .  No cardiovascular or pulmonary family history         Allergies    No Known Allergies    Intolerances          PHYSICAL EXAM  Vital Signs Last 24 Hrs  T(C): 36.4 (13 May 2025 12:00), Max: 36.9 (12 May 2025 16:00)  T(F): 97.6 (13 May 2025 12:00), Max: 98.4 (12 May 2025 16:00)  HR: 97 (13 May 2025 13:00) (82 - 104)  BP: 128/86 (13 May 2025 13:00) (128/60 - 158/93)  BP(mean): 102 (13 May 2025 13:00) (86 - 121)  RR: 17 (13 May 2025 13:00) (17 - 20)  SpO2: 99% (13 May 2025 13:00) (93% - 100%)    Parameters below as of 13 May 2025 08:00  Patient On (Oxygen Delivery Method): room air        CONSTITUTIONAL:  obese female laying in bed in NAD    ENT:   Airway patent,   No thrush  cpap in place    EYES:   Clear bilaterally,   pupils equal,        CARDIAC:   Normal rate,   regular rhythm.    edema bilateral le.       RESPIRATORY:   No wheezing   Normal chest expansion  Not tachypneic,  No use of accessory muscles    GASTROINTESTINAL:  Abdomen soft, non-tender,   No guarding,   Positive BS    MUSCULOSKELETAL:   No clubbing, cyanosis    NEUROLOGICAL:   Alert and oriented        SKIN:   Skin normal color for race,   No evidence of rash.             LABS:                          8.8    7.30  )-----------( 252      ( 12 May 2025 20:20 )             27.4                                               05-12    139  |  107  |  18  ----------------------------<  121[H]  3.8   |  20  |  0.7    Ca    7.9[L]      12 May 2025 20:20  Phos  2.5     05-12  Mg     1.8     05-12        PT/INR - ( 12 May 2025 20:20 )   PT: 11.30 sec;   INR: 0.96 ratio         PTT - ( 12 May 2025 20:20 )  PTT:28.3 sec                                       Urinalysis Basic - ( 12 May 2025 20:20 )    Color: x / Appearance: x / SG: x / pH: x  Gluc: 121 mg/dL / Ketone: x  / Bili: x / Urobili: x   Blood: x / Protein: x / Nitrite: x   Leuk Esterase: x / RBC: x / WBC x   Sq Epi: x / Non Sq Epi: x / Bacteria: x                                                                                                                                         MEDICATIONS  (STANDING):  albuterol/ipratropium for Nebulization 3 milliLiter(s) Nebulizer every 6 hours  amLODIPine   Tablet 10 milliGRAM(s) Oral daily  chlorhexidine 2% Cloths 1 Application(s) Topical <User Schedule>  cyanocobalamin 1000 MICROGram(s) Oral daily  enoxaparin Injectable 60 milliGRAM(s) SubCutaneous every 12 hours  ergocalciferol 18905 Unit(s) Oral every week  insulin lispro (ADMELOG) corrective regimen sliding scale   SubCutaneous every 6 hours  lactobacillus acidophilus 1 Tablet(s) Oral daily  losartan 100 milliGRAM(s) Oral daily  melatonin 10 milliGRAM(s) Oral at bedtime  multivitamin/minerals 1 Tablet(s) Oral daily  pantoprazole    Tablet 40 milliGRAM(s) Oral before breakfast    MEDICATIONS  (PRN):  acetaminophen     Tablet .. 650 milliGRAM(s) Oral every 6 hours PRN Mild Pain (1 - 3)      X-Rays noted

## 2025-05-13 NOTE — PROGRESS NOTE ADULT - ASSESSMENT
ASSESSMENT & PLAN:  64F PMHx HTN, morbid obesity s/p 2001 Abby-en-Y gastric bypass who presented on 4/3 with incarcerated ventral hernia with SBO    4/10 open ventral hernia repair, small bowel resection, and primary fascial closure with Dr. Lema. Admitted to SICU for Q1 abdominal checks and hemodynamic monitoring.   4/17 takeback for exploratory laparotomy, repair of anastomotic leak  4/22 8Fr pigtail into lower abdominal wall collection,150cc of foul appearing material aspirated, Attached to THONY bulb  4/27 RTOR for re-exploration and evacuation of old clots from retroperitoneum and pelvis and placement of 3 new beatrice drains      NEUROLOGICAL:  #Acute pain    acetaminophen     Tablet .. 650 Oral every 6 hours PRN mild pain   #Sleep hygiene  - HOLDING Trazodone 25mg QPM  - Melatonin 10mg QPM    RESPIRATORY:   #Acute hypoxic respiratory failure, resolved  - Extubated 4/18, reintubated on 4/27 for AMS and agonal breathing, extubated 4/30  - Currently on RA  - Home CPAP @ night  - duoneb treatment Q6  #Right distal main pulmonary embolus with segmental extension/B/L DVT   - currently on lovenox 60mg q12   - IVC filter placement tomorrow with IR   #Intermittent diuresis for fluid overload  - Last diuresis 5/11- lasix 20  #PMHx LOUIS on CPAP@ home   #Activity increase as tolerated    CARDS:   #Hypertensive urgency, resolved  #Acute hemorrhagic shock, resolved  - 4/27: 2u PRBC 1plt 1uFFP, 1g TXA  #Supraventricular tachycardia, Non-sustained ventricular tachycardia, Ventricular Premature Depolarization  - Cardiology (Dr. Steven) - Metoprolol 25mg Q12 HOLDING  #PMHx of HTN  - continue Amlodipine 10mg QD  - continue Losartan 100mg qd  - HCTZ 25mg QD HOLDING  - EKG- NSR  - TTE 4/22: EF 70 to 75%    GASTROINTESTINAL/NUTRITION:   #4/10 open ventral hernia repair, small bowel resection, ileoileal anastomosis and primary fascial closure, 2 THONY Right  #4/17 RTOR for exploratory laparotomy, repair of anastomotic leak at previous ileoileal anastomosis, resected, new ileoileal anastomosis created, 1 THONY Left (total 3)  #4/22: s/p IR percutaneous placement of a 8.5 [place] drainage catheter into lower abdominal wall fluid collection, yielding 150 mL of foul appearing fluid.  #4/27: Acute retroperitoneal CTA: mesenteric arterial blush noted with extravasation  #4/27 RTOR for re-exploration and evacuation of old clots from retroperitoneum and pelvis    - s/p IR without finding of any bleeding, no embolization required    -5/2 Midline wound vac removed   #Diet: DASH with ensure clears  - on vitamin B12 supplement  - multivitamin  - probiotic   - Aspiration precautions, HOB 30  #GI Prophylaxis/hx GERD  - Pantoprazole 40mg PO qD (prn home rx)   #Bowel regimen    -last bowel movement 5/12    /RENAL:   #Urine output in critically ill  - IVL  - Lasix 20mg IVP 5/11  #FUNMI; oliguric - resolved  - Renal u/s> no hydronephrosis. 2-3cm anechoic cyst on right kidney   #Lactic acidosis - resolved  - Nephro: check CK, no indication of RRT, decrease meropenem to Q12      Labs:          BUN/Cr -  16/0.8  -->,  18/0.7  -->          [05-12 @ 20:20]Na  139 // K  3.8 // Mg  1.8 // Phos  2.5  [05-11 @ 21:42]Na  141 // K  3.8 // Mg  1.5 // Phos  2.6    HEME/ONC:   #DVT PPX  - LMWH 60mg q12  - SCD B/L  #acute anemia   - 4/27: 2uPRBC 1uFFP 1pkPlt, TXA 1g, IR for embolization due to active extravasation seen on CTA, no active bleed, no embolization, Return to OR for re-exploration and evacuation of old clot  - 4/28: IR consulted for IVC filter and PICC. LE duplex ordered, negative.  - 5/2: 1u PRBC for 500mL sanguinous output from wound vac   #Acute right pulmonary embolus  - Therapeutic Heparin gtt discontinued 5/2 secondary to venous bleed from wound vac   #B/L UE Venous Duplex 4/26/27 - thrombophlebitis  - B/L LE Venous duplex 4/21-RIGHT PT DVT, no LEFT DVT  - B/L LE Venous duplex 4/28-No evidence of deep venous thrombosis in either lower extremity.   - B/L duplex 5/5 - negative for DVT B/L  - B/L Duplex 5/12: DVT in the right femoral vein. DVT in left common femoral vein.  - Plan for IVC filter with IR tomorrow 5/13  - Start Lovenox 60mg q12 with plan to advance to full AC   - Vascular surgery consulted - AC with heparin drip (upon discharge transition to Eliquis 10 mg po BID x 7 days then 5 mg po BID for at least 6 months        Labs: Hgb/Hct:  8.7/26.6  (05-11 @ 21:42)  -->,  8.8/27.4  (05-12 @ 20:20)  -->                      Platelets:  244  -->,  252  -->                 PTT/INR:  28.3/0.96  --->          ID:  #Intraabdominal anastomotic leak, fluid collection    -ESR, CRP for 3 days  #ID Consult  - trial off antibiotics 5/6 per Dr. Bermudez and Dr. Lema  - Midline x Ertapenem 1g Q24 on discharge x 4 weeks (EOT 5/19)  - F/u with Dr. Perdue on Tuesdays via Telehealth   #Culture    OR 4/1 - E Coli    OR cx: few E.coli, few bacteroides, rare clostridium    4/13 Blood Cx: NGTF    4/14 tracheal aspirate: no growth     4/17 body fluid cx rare e. coli     4/22 Blood cx: negative    4/22 Abdominal - Negative FINAL    4/27 C diff stool panel: negative    4/28 blood culture: negative    WBC -  7.16  --->>,  7.30  --->>  Temp trend - 24hrs T(F): 97.6 (05-12 @ 20:00), Max: 98.4 (05-12 @ 16:00)      ENDOCRINE:  #Glycemic monitoring  - ACHS fingersticks  - ISS  - A1C 5.8%     MSK:  #Activity- increase as tolerated  #Physiatry    -bedside PT, will evaluate for inpatient 4A placement, not a candidate as of 5/7  #PT consult    -rehab facility on discharge 3 to 5x/week   #OT consult    -pending for ROM within bed    SKIN:  #midline incision staples in place f/u w/ primary removal timeline (last OR 4/27)  #left forearm skin tear   - Continue to monitor daily skin changes  #left knee skin tear  #sacral fissure 5/3      LINES/DRAINS:  PIV    LLQ pelvis beatrice drain (4/28 -     Midline subcutaneous beatrice drain (4/28 -     Right IJ TLC (4/17 - to be removed prior to transfer to floor    Discontinued lines:    Right Femoral Arterial Line (4/27 - 4/28)    Prevena Vac Midline (removed 5/2)     Left radial arterial line (4/28 - 5/5)    RLQ pelvis beatrice drain (4/28 - 5/5)    ADVANCED DIRECTIVES:  Full Code  Emergency - Daughter  (Shavonne Moss)  INDICATION FOR SICU/SDU: Intestinal obstruction    DISPO: SICU - discussed with Dr. Alva  ASSESSMENT & PLAN:  64F PMHx HTN, morbid obesity s/p 2001 Abby-en-Y gastric bypass who presented on 4/3 with incarcerated ventral hernia with SBO. Patient downgraded to the floor. found to have PEs with b/l DVT, upgraded to SICU 5/13 for close monitoring before and after IVC filter placement and while patient restarts anticoagulation      NEUROLOGICAL:  #Acute pain    acetaminophen     Tablet .650 Oral every 6 hours PRN mild pain   #Sleep hygiene  - HOLDING Trazodone 25mg QPM  - Melatonin 10mg QPM    RESPIRATORY:   #Acute hypoxic respiratory failure, resolved  - Extubated 4/18, reintubated on 4/27 for AMS and agonal breathing, extubated 4/30  - Currently on RA  - Home CPAP @ night  - duoneb treatment Q6  #Right distal main pulmonary embolus with segmental extension/B/L DVT   - currently on lovenox 60mg q12, uptitrate per surgery, heme/onc recommendations  - IVC filter placement today with IR   #Intermittent diuresis for fluid overload  - Last diuresis 5/11- lasix 20  - will give 20mg lasix post IVC filter for diuresis and able to tolerate  #PMHx LOUIS on CPAP@ home   #Activity increase as tolerated    CARDS:   #Hypertensive urgency, resolved  #Acute hemorrhagic shock, resolved  - 4/27: 2u PRBC 1plt 1uFFP, 1g TXA  #Supraventricular tachycardia, Non-sustained ventricular tachycardia, Ventricular Premature Depolarization  - Cardiology (Dr. Steven) - Metoprolol 25mg Q12 HOLDING  #PMHx of HTN  - continue Amlodipine 10mg QD  - continue Losartan 100mg qd  - HCTZ 25mg QD HOLDING  - EKG- NSR  - TTE 4/22: EF 70 to 75%    GASTROINTESTINAL/NUTRITION:   #4/10 open ventral hernia repair, small bowel resection, ileoileal anastomosis and primary fascial closure, 2 THONY Right  #4/17 RTOR for exploratory laparotomy, repair of anastomotic leak at previous ileoileal anastomosis, resected, new ileoileal anastomosis created, 1 THONY Left (total 3)  #4/22: s/p IR percutaneous placement of a 8.5 [place] drainage catheter into lower abdominal wall fluid collection, yielding 150 mL of foul appearing fluid.  #4/27: Acute retroperitoneal CTA: mesenteric arterial blush noted with extravasation  #4/27 RTOR for re-exploration and evacuation of old clots from retroperitoneum and pelvis    - s/p IR without finding of any bleeding, no embolization required    -5/2 Midline wound vac removed   #Diet: NPO for IR today, post procedure resume DASH with ensure clears  - on vitamin B12 supplement  - multivitamin  - probiotic   - Aspiration precautions, HOB 30  #GI Prophylaxis/hx GERD  - Pantoprazole 40mg PO qD (prn home rx)   #Bowel regimen    -last bowel movement 5/12    /RENAL:   #Urine output in critically ill  - IVL  - Lasix 20mg IVP 5/11  #FUNMI; oliguric - resolved  - Renal u/s> no hydronephrosis. 2-3cm anechoic cyst on right kidney   #Lactic acidosis - resolved  - Nephro: check CK, no indication of RRT, decrease meropenem to Q12      Labs:   BUN/Cr- 16/0.8  -->,  18/0.7  -->    [05-12 @ 20:20]Na  139 // K  3.8 // Mg  1.8 // Phos  2.5      HEME/ONC:   #DVT PPX  - LMWH 60mg q12  - SCD B/L  #acute anemia   - 4/27: 2uPRBC 1uFFP 1pkPlt, TXA 1g, IR for embolization due to active extravasation seen on CTA, no active bleed, no embolization, Return to OR for re-exploration and evacuation of old clot  - 4/28: IR consulted for IVC filter and PICC. LE duplex ordered, negative.  - 5/2: 1u PRBC for 500mL sanguinous output from wound vac   #Acute right pulmonary embolus  - Therapeutic Heparin gtt discontinued 5/2 secondary to venous bleed from wound vac   #B/L UE Venous Duplex 4/26/27 - thrombophlebitis  - B/L LE Venous duplex 4/21-RIGHT PT DVT, no LEFT DVT  - B/L LE Venous duplex 4/28-No evidence of deep venous thrombosis in either lower extremity.   - B/L duplex 5/5 - negative for DVT B/L  - B/L Duplex 5/12: DVT in the right femoral vein. DVT in left common femoral vein.  - Plan for IVC filter with IR tomorrow 5/13  - Start Lovenox 60mg q12 with plan to advance to full AC   - Vascular surgery consulted - AC with heparin drip (upon discharge transition to Eliquis 10 mg po BID x 7 days then 5 mg po BID for at least 6 months        Labs: Hb/Hct:  8.7/26.6  (05-11 @ 21:42)  -->,  8.8/27.4  (05-12 @ 20:20)  -->                      Plts:  244  -->,  252  -->                 PTT/INR:  28.3/0.96  --->        ID:  #Intraabdominal anastomotic leak, fluid collection    -ESR, CRP for 3 days  #ID Consult  - trial off antibiotics 5/6 per Dr. Bermudez and Dr. Lema  - Midline x Ertapenem 1g Q24 on discharge x 4 weeks (EOT 5/19)  - F/u with Dr. Perdue on Tuesdays via Telehealth   #Culture    OR 4/1 - E Coli    OR cx: few E.coli, few bacteroides, rare clostridium    4/13 Blood Cx: NGTF    4/14 tracheal aspirate: no growth     4/17 body fluid cx rare e. coli     4/22 Blood cx: negative    4/22 Abdominal - Negative FINAL    4/27 C diff stool panel: negative    4/28 blood culture: negative    WBC- 7.16  --->>,  7.30  --->>  Temp trend- 24hrs T(F): 97.2 (05-13 @ 14:30), Max: 97.8 (05-12 @ 18:30)  Current antibiotics- none      ENDOCRINE:  #Glycemic monitoring  - ACHS fingersticks  - ISS  - A1C 5.8%     MSK:  #Activity- increase as tolerated  #Physiatry    -bedside PT, will evaluate for inpatient 4A placement, not a candidate as of 5/7  #PT consult    -rehab facility on discharge 3 to 5x/week   #OT consult    -pending for ROM within bed    SKIN:  #midline incision staples in place f/u w/ primary removal timeline (last OR 4/27)  #left forearm skin tear   - Continue to monitor daily skin changes  #left knee skin tear  #sacral fissure 5/3      LINES/DRAINS:  PIV    LLQ pelvis beatrice drain (4/28 -     Midline subcutaneous beatrice drain (4/28 -     Right IJ TLC (4/17 - to be removed prior to transfer to floor    Discontinued lines:    Right Femoral Arterial Line (4/27 - 4/28)    Prevena Vac Midline (removed 5/2)     Left radial arterial line (4/28 - 5/5)    RLQ pelvis beatrice drain (4/28 - 5/5)    ADVANCED DIRECTIVES:  Full Code  Emergency - Daughter  (Shavonne Moss)  INDICATION FOR SICU/SDU: Intestinal obstruction    DISPO: SICU - discussed with Dr. Alva

## 2025-05-13 NOTE — PROGRESS NOTE ADULT - ASSESSMENT
Imp: Rehab of debilitation / incarcerated ventral hernia, S/P LAP CONVERTED TO OPEN VENTRAL HERNIA REPAIR W/ MESH AND SMALL BOWEL RESECTION, hospital course complicated by anastomotic leak requiring repair, on long term IV antibiotic, evacuation of retroperitoneal and pelvic clots,  respiratory failure requiring reintubation and later extubated, PE, pending IVC filter  / HTN and gastric bypass surgery in 2001    Plan: continue bedside therapy as tolerated           limited therapy tolerance / poor endurance           currently not a 4a rehab candidate           spoke with patient and family at bedside and they understood

## 2025-05-13 NOTE — PROGRESS NOTE ADULT - SUBJECTIVE AND OBJECTIVE BOX
INTERVENTIONAL RADIOLOGY BRIEF-OPERATIVE NOTE    Procedure:  Inferior vena cavagram    Pre-Op Diagnosis:  H/o PE and DVT    Post-Op Diagnosis:  Same    Attending:  Oseas  Resident:  [place]    Anesthesia (type):  [ ] General Anesthesia  [X] Sedation-- Versed, 1 mg and Fentanyl, 50 mcg, iv  [ ] Spinal Anesthesia  [X] Local/Regional-- 1% Lidocaine, SQ, right groin    Total Face-to-Face Sedation Time:  31 minutes    Contrast:  50 cc Visipaque-320, iv    Estimated Blood Loss:  5 cc    Condition:   [ ] Critical  [ ] Serious  [ ] Fair   [X] Good    Findings/Follow up Plan of Care:  Patient has a retroaortic left renal vein which drains to the caudal aspect of the IVC, only centimeters above the iliac vein bifurcation, leading to a rather small potential landing zone for optimal IVC filter placement.  Even if a filter can be precisely placed there, this location could be potential issue when it comes time to retrieve it, as the distance from the jugular vein access is  an important factor.  In light of these findings, filter not placed.  Patient tolerated fairly well otherwise.    Specimens Removed:  None    Implants:  None    Complications:  None immediate    Disposition:  Back to SICU.      Please call Interventional Radiology [phone] with any questions, concerns, or issues.

## 2025-05-13 NOTE — PROGRESS NOTE ADULT - SUBJECTIVE AND OBJECTIVE BOX
Patient is a 65y old  Female who presents with a chief complaint of SBO from ventral hernia        HPI:  The patient is a 64-year-old female with a past medical history of high blood pressure and gastric bypass surgery in 2001, presenting with two days of sharp abdominal pain and one episode of non-bloody, non-bilious vomiting. She has a prior history of small bowel obstruction in 2006, 2008, and most recently in 2020, all of which resolved with conservative management. Her current symptoms are similar in nature. A computed tomography scan of the abdomen and pelvis with oral and intravenous contrast revealed multiple ventral abdominal wall hernias containing both obstructed and non-obstructed bowel loops. A complex small bowel obstruction is present, involving three hernias on the right side of the abdomen, with dilated, fluid-filled loops of small bowel, trace fluid between loops, and a collapsed segment of bowel beyond a hernia in the right lower quadrant. These findings are consistent with a recurrent small bowel obstruction. The hernia was non reducible at bedside.     Large complex ventral hernia s/p repair sbr and loss of domain c/b post op intubation, hypotension requiring vasopressors, DVT, anastomotic leakage s/p RTOR and reanastomosis, now doing better post op from a respiratory status. Comfortable on BIPAP. Off pressors. Blood cultures negative. The last time AC was started on the patient she had increased bleeding and went to the OR for hematoma washout. Has been off AC, however repeat duplex with B/L LE DVTs. Now pending IVC filter placement.         PHYSICAL EXAM    Vital Signs Last 24 Hrs  T(C): 36.2 (13 May 2025 14:30), Max: 36.9 (12 May 2025 16:00)  T(F): 97.2 (13 May 2025 14:30), Max: 98.4 (12 May 2025 16:00)  HR: 85 (13 May 2025 14:30) (82 - 104)  BP: 165/93 (13 May 2025 14:30) (128/60 - 165/93)  BP(mean): 96 (13 May 2025 14:00) (86 - 121)  RR: 18 (13 May 2025 14:30) (17 - 20)  SpO2: 97% (13 May 2025 14:30) (93% - 100%)    Parameters below as of 13 May 2025 08:00  Patient On (Oxygen Delivery Method): room air        Constitutional - NAD  Chest - CTA  Cardiovascular - S1S2+  Abdomen -  Soft  Extremities -  No calf tenderness   Function : bed  mobility and transfer max A       acetaminophen     Tablet .. 650 milliGRAM(s) Oral every 6 hours PRN  albuterol/ipratropium for Nebulization 3 milliLiter(s) Nebulizer every 6 hours  amLODIPine   Tablet 10 milliGRAM(s) Oral daily  chlorhexidine 2% Cloths 1 Application(s) Topical <User Schedule>  cyanocobalamin 1000 MICROGram(s) Oral daily  enoxaparin Injectable 60 milliGRAM(s) SubCutaneous every 12 hours  ergocalciferol 45958 Unit(s) Oral every week  insulin lispro (ADMELOG) corrective regimen sliding scale   SubCutaneous every 6 hours  lactobacillus acidophilus 1 Tablet(s) Oral daily  losartan 100 milliGRAM(s) Oral daily  melatonin 10 milliGRAM(s) Oral at bedtime  multivitamin/minerals 1 Tablet(s) Oral daily  pantoprazole    Tablet 40 milliGRAM(s) Oral before breakfast      RECENT LABS/IMAGING                        8.8    7.30  )-----------( 252      ( 12 May 2025 20:20 )             27.4     05-12    139  |  107  |  18  ----------------------------<  121[H]  3.8   |  20  |  0.7    Ca    7.9[L]      12 May 2025 20:20  Phos  2.5     05-12  Mg     1.8     05-12      PT/INR - ( 12 May 2025 20:20 )   PT: 11.30 sec;   INR: 0.96 ratio         PTT - ( 12 May 2025 20:20 )  PTT:28.3 sec  Urinalysis Basic - ( 12 May 2025 20:20 )    Color: x / Appearance: x / SG: x / pH: x  Gluc: 121 mg/dL / Ketone: x  / Bili: x / Urobili: x   Blood: x / Protein: x / Nitrite: x   Leuk Esterase: x / RBC: x / WBC x   Sq Epi: x / Non Sq Epi: x / Bacteria: x

## 2025-05-14 ENCOUNTER — RESULT REVIEW (OUTPATIENT)
Age: 65
End: 2025-05-14

## 2025-05-14 LAB
FACT X ACT/NOR PPP: 103 % — SIGNIFICANT CHANGE UP (ref 70–170)
GLUCOSE BLDC GLUCOMTR-MCNC: 144 MG/DL — HIGH (ref 70–99)
GLUCOSE BLDC GLUCOMTR-MCNC: 156 MG/DL — HIGH (ref 70–99)
GLUCOSE BLDC GLUCOMTR-MCNC: 161 MG/DL — HIGH (ref 70–99)
HCT VFR BLD CALC: 25.6 % — LOW (ref 37–47)
HGB BLD-MCNC: 8.3 G/DL — LOW (ref 12–16)
MCHC RBC-ENTMCNC: 30.9 PG — SIGNIFICANT CHANGE UP (ref 27–31)
MCHC RBC-ENTMCNC: 32.4 G/DL — SIGNIFICANT CHANGE UP (ref 32–37)
MCV RBC AUTO: 95.2 FL — SIGNIFICANT CHANGE UP (ref 81–99)
NRBC BLD AUTO-RTO: 0 /100 WBCS — SIGNIFICANT CHANGE UP (ref 0–0)
PLATELET # BLD AUTO: 199 K/UL — SIGNIFICANT CHANGE UP (ref 130–400)
PMV BLD: 9.2 FL — SIGNIFICANT CHANGE UP (ref 7.4–10.4)
RBC # BLD: 2.69 M/UL — LOW (ref 4.2–5.4)
RBC # FLD: 16.9 % — HIGH (ref 11.5–14.5)
WBC # BLD: 5.1 K/UL — SIGNIFICANT CHANGE UP (ref 4.8–10.8)
WBC # FLD AUTO: 5.1 K/UL — SIGNIFICANT CHANGE UP (ref 4.8–10.8)

## 2025-05-14 PROCEDURE — 99232 SBSQ HOSP IP/OBS MODERATE 35: CPT | Mod: 24

## 2025-05-14 PROCEDURE — 93306 TTE W/DOPPLER COMPLETE: CPT | Mod: 26

## 2025-05-14 RX ORDER — APIXABAN 2.5 MG/1
10 TABLET, FILM COATED ORAL EVERY 12 HOURS
Refills: 0 | Status: DISCONTINUED | OUTPATIENT
Start: 2025-05-14 | End: 2025-05-14

## 2025-05-14 RX ORDER — APIXABAN 2.5 MG/1
5 TABLET, FILM COATED ORAL EVERY 12 HOURS
Refills: 0 | Status: DISCONTINUED | OUTPATIENT
Start: 2025-05-14 | End: 2025-05-14

## 2025-05-14 RX ORDER — LABETALOL HYDROCHLORIDE 200 MG/1
5 TABLET, FILM COATED ORAL ONCE
Refills: 0 | Status: COMPLETED | OUTPATIENT
Start: 2025-05-14 | End: 2025-05-14

## 2025-05-14 RX ORDER — SODIUM CHLORIDE 0.65 %
1 AEROSOL, SPRAY (ML) NASAL DAILY
Refills: 0 | Status: DISCONTINUED | OUTPATIENT
Start: 2025-05-14 | End: 2025-05-23

## 2025-05-14 RX ORDER — APIXABAN 2.5 MG/1
5 TABLET, FILM COATED ORAL EVERY 12 HOURS
Refills: 0 | Status: DISCONTINUED | OUTPATIENT
Start: 2025-05-14 | End: 2025-05-23

## 2025-05-14 RX ADMIN — APIXABAN 5 MILLIGRAM(S): 2.5 TABLET, FILM COATED ORAL at 16:53

## 2025-05-14 RX ADMIN — CYANOCOBALAMIN 1000 MICROGRAM(S): 1000 INJECTION INTRAMUSCULAR; SUBCUTANEOUS at 11:22

## 2025-05-14 RX ADMIN — Medication 100 GRAM(S): at 01:08

## 2025-05-14 RX ADMIN — Medication 50 MILLIEQUIVALENT(S): at 01:08

## 2025-05-14 RX ADMIN — IPRATROPIUM BROMIDE AND ALBUTEROL SULFATE 3 MILLILITER(S): .5; 2.5 SOLUTION RESPIRATORY (INHALATION) at 08:36

## 2025-05-14 RX ADMIN — Medication 10 MILLIGRAM(S): at 21:07

## 2025-05-14 RX ADMIN — IPRATROPIUM BROMIDE AND ALBUTEROL SULFATE 3 MILLILITER(S): .5; 2.5 SOLUTION RESPIRATORY (INHALATION) at 20:06

## 2025-05-14 RX ADMIN — Medication 1 APPLICATION(S): at 21:24

## 2025-05-14 RX ADMIN — IPRATROPIUM BROMIDE AND ALBUTEROL SULFATE 3 MILLILITER(S): .5; 2.5 SOLUTION RESPIRATORY (INHALATION) at 13:22

## 2025-05-14 RX ADMIN — ENOXAPARIN SODIUM 80 MILLIGRAM(S): 100 INJECTION SUBCUTANEOUS at 08:18

## 2025-05-14 RX ADMIN — AMLODIPINE BESYLATE 10 MILLIGRAM(S): 10 TABLET ORAL at 08:19

## 2025-05-14 RX ADMIN — LOSARTAN POTASSIUM 100 MILLIGRAM(S): 100 TABLET, FILM COATED ORAL at 08:20

## 2025-05-14 RX ADMIN — LABETALOL HYDROCHLORIDE 5 MILLIGRAM(S): 200 TABLET, FILM COATED ORAL at 11:46

## 2025-05-14 RX ADMIN — INSULIN LISPRO 2: 100 INJECTION, SOLUTION INTRAVENOUS; SUBCUTANEOUS at 16:54

## 2025-05-14 RX ADMIN — Medication 40 MILLIGRAM(S): at 08:20

## 2025-05-14 RX ADMIN — INSULIN LISPRO 2: 100 INJECTION, SOLUTION INTRAVENOUS; SUBCUTANEOUS at 11:45

## 2025-05-14 RX ADMIN — Medication 1 TABLET(S): at 11:21

## 2025-05-14 RX ADMIN — Medication 50 MILLIEQUIVALENT(S): at 03:41

## 2025-05-14 NOTE — PROGRESS NOTE ADULT - ASSESSMENT
Assessment and Plan:   ASSESSMENT & PLAN:  64F PMHx HTN, morbid obesity s/p 2001 Abby-en-Y gastric bypass who presented on 4/3 with incarcerated ventral hernia with SBO. Patient downgraded to the floor. found to have PEs with b/l DVT, upgraded to SICU 5/13 for close monitoring before and after IVC filter placement and while patient restarts anticoagulation      NEUROLOGICAL:  #Acute pain    acetaminophen     Tablet .650 Oral every 6 hours PRN mild pain   #Sleep hygiene  - HOLDING Trazodone 25mg QPM  - Melatonin 10mg QPM    RESPIRATORY:   #Acute hypoxic respiratory failure, resolved  - Extubated 4/18, reintubated on 4/27 for AMS and agonal breathing, extubated 4/30  - Currently on RA  - Home CPAP @ night  - duoneb treatment Q6  #Right distal main pulmonary embolus with segmental extension/B/L DVT   - currently on lovenox 60mg q12, uptitrate per surgery, heme/onc recommendations  - IVC filter placement today with IR   #Intermittent diuresis for fluid overload  - Last diuresis 5/11- lasix 20  - will give 20mg lasix post IVC filter for diuresis and able to tolerate  #PMHx LOUIS on CPAP@ home   #Activity increase as tolerated    CARDS:   #Hypertensive urgency, resolved  #Acute hemorrhagic shock, resolved  - 4/27: 2u PRBC 1plt 1uFFP, 1g TXA  #Supraventricular tachycardia, Non-sustained ventricular tachycardia, Ventricular Premature Depolarization  - Cardiology (Dr. Steven) - Metoprolol 25mg Q12 HOLDING  #PMHx of HTN  - labetalol 5mg x1, 5/13  - continue Amlodipine 10mg QD  - continue Losartan 100mg qd  - HCTZ 25mg QD HOLDING  - EKG- NSR  - TTE 4/22: EF 70 to 75%    GASTROINTESTINAL/NUTRITION:   #4/10 open ventral hernia repair, small bowel resection, ileoileal anastomosis and primary fascial closure, 2 THONY Right  #4/17 RTOR for exploratory laparotomy, repair of anastomotic leak at previous ileoileal anastomosis, resected, new ileoileal anastomosis created, 1 THONY Left (total 3)  #4/22: s/p IR percutaneous placement of a 8.5 Japanese drainage catheter into lower abdominal wall fluid collection, yielding 150 mL of foul appearing fluid.  #4/27: Acute retroperitoneal CTA: mesenteric arterial blush noted with extravasation  #4/27 RTOR for re-exploration and evacuation of old clots from retroperitoneum and pelvis    - s/p IR without finding of any bleeding, no embolization required    -5/2 Midline wound vac removed   #Diet: NPO for IR today, post procedure resume DASH with ensure clears  - on vitamin B12 supplement  - multivitamin  - probiotic   - Aspiration precautions, HOB 30  #GI Prophylaxis/hx GERD  - Pantoprazole 40mg PO qD (prn home rx)   #Bowel regimen    -last bowel movement 5/12    /RENAL:   #Urine output in critically ill  - IVL  - Lasix 20mg IVP 5/11  #FUNMI; oliguric - resolved  - Renal u/s> no hydronephrosis. 2-3cm anechoic cyst on right kidney   #Lactic acidosis - resolved  - Nephro: check CK, no indication of RRT, decrease meropenem to Q12  #hypokalemia, hypomagnesemia  - 1g Mg, 40mEq KCl      Labs:          BUN/Cr -  18/0.7  -->,  17/0.7  -->          [05-13 @ 20:00]Na  141 // K  3.5 // Mg  1.9 // Phos  3.2  [05-12 @ 20:20]Na  139 // K  3.8 // Mg  1.8 // Phos  2.5    HEME/ONC:   #DVT PPX  - LMWH 60mg q12  - SCD B/L  #acute anemia   - 4/27: 2uPRBC 1uFFP 1pkPlt, TXA 1g, IR for embolization due to active extravasation seen on CTA, no active bleed, no embolization, Return to OR for re-exploration and evacuation of old clot  - 4/28: IR consulted for IVC filter and PICC. LE duplex ordered, negative.  - 5/2: 1u PRBC for 500mL sanguinous output from wound vac   #Acute right pulmonary embolus  - Therapeutic Heparin gtt discontinued 5/2 secondary to venous bleed from wound vac   #B/L UE Venous Duplex 4/26/27 - thrombophlebitis  - B/L LE Venous duplex 4/21-RIGHT PT DVT, no LEFT DVT  - B/L LE Venous duplex 4/28-No evidence of deep venous thrombosis in either lower extremity.   - B/L duplex 5/5 - negative for DVT B/L  - B/L Duplex 5/12: DVT in the right femoral vein. DVT in left common femoral vein.  - Vascular surgery consulted - AC with heparin drip (upon discharge transition to Eliquis 10 mg po BID x 7 days then 5 mg po BID for at least 6 months   - Attempted IVC on 5/13, unable to place due to anatomy. Plan to start 80mg BID lovenox, then transition to Eliquis 5/14  - Rpt CBC in AM for Hgb monitoring      Labs: Hgb/Hct:  8.8/27.4  (05-12 @ 20:20)  -->,  8.6/26.7  (05-13 @ 20:00)  -->                      Platelets:  252  -->,  223  -->                 PTT/INR:         ID:  #Intraabdominal anastomotic leak, fluid collection    -ESR, CRP for 3 days  #ID Consult  - trial off antibiotics 5/6 per Dr. Bermudez and Dr. Lema  - Midline x Ertapenem 1g Q24 on discharge x 4 weeks (EOT 5/19)  - F/u with Dr. Perdue on Tuesdays via Telehealth   #Culture    OR 4/1 - E Coli    OR cx: few E.coli, few bacteroides, rare clostridium    4/13 Blood Cx: NGTF    4/14 tracheal aspirate: no growth     4/17 body fluid cx rare e. coli     4/22 Blood cx: negative    4/22 Abdominal - Negative FINAL    4/27 C diff stool panel: negative    4/28 blood culture: negative    WBC -  7.16  --->>,  7.30  --->>,  5.40  --->>  Temp trend - 24hrs T(F): 97.7 (05-13 @ 19:10), Max: 97.7 (05-13 @ 19:10)    ENDOCRINE:  #Glycemic monitoring  - ACHS fingersticks  - ISS  - A1C 5.8%     MSK:  #Activity- increase as tolerated  #Physiatry    -bedside PT, will evaluate for inpatient 4A placement, not a candidate as of 5/7  #PT consult    -rehab facility on discharge 3 to 5x/week   #OT consult    -pending for ROM within bed    SKIN:  #midline incision staples in place f/u w/ primary removal timeline (last OR 4/27)  #left forearm skin tear   - Continue to monitor daily skin changes  #left knee skin tear  #sacral fissure 5/3      LINES/DRAINS:  PIV    LLQ pelvis beatrice drain (4/28 -     Midline subcutaneous beatrice drain (4/28 -     Right IJ TLC (4/17 - d/c'd)    Discontinued lines:    Right Femoral Arterial Line (4/27 - 4/28)    Prevena Vac Midline (removed 5/2)     Left radial arterial line (4/28 - 5/5)    RLQ pelvis beatrice drain (4/28 - 5/5)    ADVANCED DIRECTIVES:  Full Code  Emergency - Daughter  (Shavonne Moss)  INDICATION FOR SICU/SDU: Intestinal obstruction    DISPO: SICU -  Case to be discussed with Dr. Alva    Assessment and Plan:   ASSESSMENT & PLAN:  64F PMHx HTN, morbid obesity s/p 2001 Abby-en-Y gastric bypass who presented on 4/3 with incarcerated ventral hernia with SBO. Patient downgraded to the floor. found to have PEs with b/l DVT, upgraded to SICU 5/13 for close monitoring before and after IVC filter placement and while patient restarts anticoagulation      NEUROLOGICAL:  #Acute pain    acetaminophen     Tablet .650 Oral every 6 hours PRN mild pain   #Sleep hygiene  - HOLDING Trazodone 25mg QPM  - Melatonin 10mg QPM    RESPIRATORY:   #Acute hypoxic respiratory failure, resolved  - Extubated 4/18, reintubated on 4/27 for AMS and agonal breathing, extubated 4/30  - Currently on RA  - Home CPAP @ night  - duoneb treatment Q6  #Right distal main pulmonary embolus with segmental extension/B/L DVT   - currently on lovenox 80mg q12  - IVC filter placement aborted 5/13 due to difficult anatomy  #Intermittent diuresis for fluid overload  - Last diuresis 5/11- lasix 20    #PMHx [place] on CPAP@ home   #Activity increase as tolerated    CARDS:   #Hypertensive urgency, resolved  #Acute hemorrhagic shock, resolved  - 4/27: 2u PRBC 1plt 1uFFP, 1g TXA  #Supraventricular tachycardia, Non-sustained ventricular tachycardia, Ventricular Premature Depolarization  - Cardiology (Dr. Steven) - Metoprolol 25mg Q12 HOLDING  #PMHx of HTN  - labetalol 5mg x1, 5/14  - continue Amlodipine 10mg QD  - continue Losartan 100mg qd  - HCTZ 25mg QD HOLDING  - EKG- NSR  - TTE 4/22: EF 70 to 75%    GASTROINTESTINAL/NUTRITION:   #4/10 open ventral hernia repair, small bowel resection, ileoileal anastomosis and primary fascial closure, 2 THONY Right  #4/17 RTOR for exploratory laparotomy, repair of anastomotic leak at previous ileoileal anastomosis, resected, new ileoileal anastomosis created, 1 THONY Left (total 3)  #4/22: s/p IR percutaneous placement of a 8.5 Slovak drainage catheter into lower abdominal wall fluid collection, yielding 150 mL of foul appearing fluid.  #4/27: Acute retroperitoneal CTA: mesenteric arterial blush noted with extravasation  #4/27 RTOR for re-exploration and evacuation of old clots from retroperitoneum and pelvis    - s/p IR without finding of any bleeding, no embolization required    -5/2 Midline wound vac removed   #Diet: DASH with ensure clears  - on vitamin B12 supplement  - multivitamin  - probiotic   - Aspiration precautions, HOB 30  #GI Prophylaxis/hx GERD  - Pantoprazole 40mg PO qD (prn home rx)   #Bowel regimen    -last bowel movement 5/12    /RENAL:   #Urine output in critically ill  - IVL  - Lasix 20mg IVP 5/14  #FUNMI; oliguric - resolved  - Renal u/s> no hydronephrosis. 2-3cm anechoic cyst on right kidney   #Lactic acidosis - resolved  #hypokalemia, hypomagnesemia  - 1g Mg, 40mEq KCl    Labs:   BUN/Cr- 18/0.7  -->,  17/0.7  -->  [05-13 @ 20:00]Na  141 // K  3.5 // Mg  1.9 // Phos  3.2  [05-12 @ 20:20]Na  139 // K  3.8 // Mg  1.8 // Phos  2.5    HEME/ONC:   #DVT PPX  - LMWH 80mg q12  - SCD B/L  #acute anemia   - 4/27: 2uPRBC 1uFFP 1pkPlt, TXA 1g, IR for embolization due to active extravasation seen on CTA, no active bleed, no embolization, Return to OR for re-exploration and evacuation of old clot  - 4/28: IR consulted for IVC filter and PICC. LE duplex ordered, negative.  - 5/2: 1u PRBC for 500mL sanguinous output from wound vac   #Acute right pulmonary embolus  - Therapeutic Heparin gtt discontinued 5/2 secondary to venous bleed from wound vac   #B/L UE Venous Duplex 4/26/27 - thrombophlebitis  - B/L LE Venous duplex 4/21-RIGHT PT DVT, no LEFT DVT  - B/L LE Venous duplex 4/28-No evidence of deep venous thrombosis in either lower extremity.   - B/L duplex 5/5 - negative for DVT B/L  - B/L Duplex 5/12: DVT in the right femoral vein. DVT in left common femoral vein.  - Vascular surgery consulted - AC with heparin drip (upon discharge transition to Eliquis 10 mg po BID x 7 days then 5 mg po BID for at least 6 months   - Attempted IVC on 5/13, unable to place due to anatomy. Plan to start 80mg BID lovenox, then transition to Eliquis 5/14  - Rpt CBC in AM for Hgb monitoring    Labs: Hb/Hct:  8.6/26.7  (05-13 @ 20:00)  -->,  8.3/25.6  (05-14 @ 05:01)  -->                      Plts:  223  -->,  199  -->         ID:  #Intraabdominal anastomotic leak, fluid collection    -ESR, CRP for 3 days  #ID Consult  - trial off antibiotics 5/6 per Dr. Bermudez and Dr. Lema  - Midline x Ertapenem 1g Q24 on discharge x 4 weeks (EOT 5/19)  - F/u with Dr. Perdue on Tuesdays via Telehealth   #Culture    OR 4/1 - E Coli    OR cx: few E.coli, few bacteroides, rare clostridium    4/13 Blood Cx: NGTF    4/14 tracheal aspirate: no growth     4/17 body fluid cx rare e. coli     4/22 Blood cx: negative    4/22 Abdominal - Negative FINAL    4/27 C diff stool panel: negative    4/28 blood culture: negative  WBC- 7.30  --->>,  5.40  --->>,  5.10  --->>  Temp trend- 24hrs T(F): 97.7 (05-13 @ 19:10), Max: 97.7 (05-13 @ 19:10)  Current antibiotics- none    ENDOCRINE:  #Glycemic monitoring  - ACHS fingersticks  - ISS  - A1C 5.8%     MSK:  #Activity- increase as tolerated  #Physiatry    -bedside PT, will evaluate for inpatient 4A placement, not a candidate as of 5/7  #PT consult    -rehab facility on discharge 3 to 5x/week   #OT consult    -pending for ROM within bed    SKIN:  #midline incision staples in place f/u w/ primary removal timeline (last OR 4/27)  #left forearm skin tear   - Continue to monitor daily skin changes  #left knee skin tear  #sacral fissure 5/3      LINES/DRAINS:  PIV    LLQ pelvis beatrice drain (4/28 -     Midline subcutaneous beatrice drain (4/28 -     Right IJ TLC (4/17 - d/c'd)    Discontinued lines:    Right Femoral Arterial Line (4/27 - 4/28)    Prevena Vac Midline (removed 5/2)     Left radial arterial line (4/28 - 5/5)    RLQ pelvis beatrice drain (4/28 - 5/5)    ADVANCED DIRECTIVES:  Full Code  Emergency - Daughter  (Shavonne Moss)  INDICATION FOR SICU/SDU: Intestinal obstruction    DISPO: SICU -  Case to be discussed with Dr. Alva

## 2025-05-14 NOTE — PROGRESS NOTE ADULT - ATTENDING COMMENTS
Visited pt at bedside in the SICU. Pt's daughter and  at bedside during AM visit. Daughter concerned that IR unable to place IVC filter based on tortuous anatomy.     PE:  General; female, in bed, comfortable  Head; NC/AT  Heart; RRR  Lungs; even chest rise, no accessory muscle use  Abdomen: soft, obese, non-tender, vac in place, good seal  MSK: b/l LE edema appreciated    A/P:  65 F with an extensive medical/surgical history, well known to SICU team, with recent development of b/l LE DVTs (left common femoral vein, right femoral vein) with segmental/subsegmental PEs (right distal main PE with segmental extension).   - Will start eliquis today 5 BID (received AM dose of lovenox)  - Will trend H/H  - Appreciate pulmonology eval   - Tylenol for pain  - Continue diet   - PT/OT  - Midline vac changes MWF  - Continue amlodipine/losartan   - On sliding scale, goal CBG <180

## 2025-05-14 NOTE — PROGRESS NOTE ADULT - SUBJECTIVE AND OBJECTIVE BOX
NANCY SARGENT   [id number]   [birth year]  64yF  ============================================================   DATE OF INITIAL SICU/SDU CONSULT: 04-10-25    INDICATION FOR SICU CONSULT:  hemodynamic monitoring in patient with high risk of bleeding while restarting anticoagulation    SICU COURSE EVENTS :  04-10 - admitted to SICU service  4/11: Intubated and started on paralytic. Arterial line placed, subclavian TLC placed. Nephrology consulted for oliguria. IR abdominal muscle botox injection performed. TTE performed.   4/12: Additional fluid boluses given; trial fo bumex started, considering CVVH. Weaned off nimbex gtt.  > 220 /hr. Renal U/S w/out hydro.   4/13: Weaned off Levophed. Bumex gtt 2mg/hr > 1mg/hr. Goal net negative 1-1.5L, UOP approx, 200c/hr.   4/14: Remained on bumex gtt for further diuresis, decreased to 0.5 overnight, vent settings weaned. Cr downtrending, LFTs worsening.   4/15: Extubated to BiPAP, transitioned to NC. Dc'd bumex gtt, given 5mg metolazone x 1 and 2mg bumex x 1.   4/16: THONY drain #2 murky/bilious, pending CTAP with PO contrast, hypernatremia, started D5W @ 75cc/hr, persistent hypokalemia   4/17: RTOR for resection of anastomosis for anastomotic leak. Returned intubated, 400/24/80/10, sinus tachy to . Abdomen soft. Was initially on propofol @ 30 and precedex, but propofol weaned off due to hypotension with MAPs in 50s, fentanyl ggt started for acute pain. Remained hypotensive despite fluids, started on Levophed, on 0.05.   4/18: Extubated. HFNC 40L/49%, Levo off since 11 AM   4/19: NGT removed. Started on duonebs. D5W increased to 60cc/hr. 1mg bumex, > 1.5L response  4/20: Episode of afib RVR resolved with metoprolol 5mg IVP, cardiology c/s placed, recommending metoprolol 25mg q 12   4/21: PE on CTA, therapeutic heparin gtt started. Started on cardebe gtt,   4/22: Meropenem started per ID.  S/p IR percutaneous placement of a 8.5 Arabic drainage catheter into lower abdominal wall fluid collection, yielding 150 mL of bowel appearing fluid. New left radial a-line placed. Off nicarrdipine gtt.   4/23: Switched to therapeutic lovenox. Diet advanced to aubrey clears with ensures.  4/24: advacned to bariatric FLD, downgraded to SDU  4/25: TPN discontinued, diet advanced to full liquid , Right cephalic DVT prelim  4/26: Right UE VA Duplex final no DVT, thromboplebitis, Arterial line removed, 2L NC, midline vac changed, noted to have bilious drainage, RUQ drain pulled, upgraded to SICU status, plan to reorder TPN for 4/27 PM, diet changed to full liquid. 1:20 AM noted to be unresponsive, palpable femoral pulse, decision made to intubate, R femoral arterial line placed, started on levophed, amauri, bicarb amp x 2, bicarb gtt, propofol, 2U pRBC for hgb 5.9, plan for CTH and CTAP when stabilized   4/27: 2uPRBC 1uFFP 1pkPlt, TXA 1g, IR for embolization due to active extravasation seen on CTA, no active bleed, no embolization, Return to OR for re-exploration and evacuation of old clot  4/28: IR consulted for IVC filter and PICC. LE duplex ordered, negative. Precedex added overnight for agitation. 1 U PRBC for Hgb 6.8 overnight  4/29: TF started at 20cc/hr. Meropenem decreased to Q 12 hours.   4/30: Extubated to 5L NC, then placed on HFNC. Heparin gtt restarted.   5/1: Placed on BiPAP, started on aubrey clears w/ clear ensures   5/4: passed TOV. On 6L NC. Bumex 1mg.   5/5: diuresis with bumex 1mg, RLQ drain removed  5/6: Bumex 1mg. D/c meropenem. DG to SDU.   5/7: DG 4C.  5/12: reupgrade to SICU for hemodynamic monitoring after restarting anticoagulation  5/13: IR for IVC filter, unable to place due to anatomy. Started on 80mg lovenox BID, transitioning to Eliquis tomorrow.     24Hour Events:  5/13  NIGHT  - IR unable to place IVC filter due to anatomy   - lovenox 80bid STAT and then plan for Eliquis tomorrow   - PM rounds: HR 100s, SBP 170s  - TTE ordered   - SBP 160s-170s --> labetalol 5mg x 1  - 1g Mg, 40mEq KCl    DAY  - pending IVC filter  - Lasix 20  - SDU after procedure  - echo after IR  - pulm consult    [X] A ten-point review of systems was negative except as expressed in note.  [X ] History was obtained from patient. If unable to participate in their care, history was from review of the chart and collateral sources of information.  =====================================================================   NANCY SARGENT   [id number]   [birth year]  64yF  ============================================================   DATE OF INITIAL SICU/SDU CONSULT: 04-10-25    INDICATION FOR SICU CONSULT:  hemodynamic monitoring in patient with high risk of bleeding while restarting anticoagulation    SICU COURSE EVENTS :  04-10 - admitted to SICU service  4/11: Intubated and started on paralytic. Arterial line placed, subclavian TLC placed. Nephrology consulted for oliguria. IR abdominal muscle botox injection performed. TTE performed.   4/12: Additional fluid boluses given; trial fo bumex started, considering CVVH. Weaned off nimbex gtt.  > 220 /hr. Renal U/S w/out hydro.   4/13: Weaned off Levophed. Bumex gtt 2mg/hr > 1mg/hr. Goal net negative 1-1.5L, UOP approx, 200c/hr.   4/14: Remained on bumex gtt for further diuresis, decreased to 0.5 overnight, vent settings weaned. Cr downtrending, LFTs worsening.   4/15: Extubated to BiPAP, transitioned to NC. Dc'd bumex gtt, given 5mg metolazone x 1 and 2mg bumex x 1.   4/16: THONY drain #2 murky/bilious, pending CTAP with PO contrast, hypernatremia, started D5W @ 75cc/hr, persistent hypokalemia   4/17: RTOR for resection of anastomosis for anastomotic leak. Returned intubated, 400/24/80/10, sinus tachy to . Abdomen soft. Was initially on propofol @ 30 and precedex, but propofol weaned off due to hypotension with MAPs in 50s, fentanyl ggt started for acute pain. Remained hypotensive despite fluids, started on Levophed, on 0.05.   4/18: Extubated. HFNC 40L/49%, Levo off since 11 AM   4/19: NGT removed. Started on duonebs. D5W increased to 60cc/hr. 1mg bumex, > 1.5L response  4/20: Episode of afib RVR resolved with metoprolol 5mg IVP, cardiology c/s placed, recommending metoprolol 25mg q 12   4/21: PE on CTA, therapeutic heparin gtt started. Started on cardebe gtt,   4/22: Meropenem started per ID.  S/p IR percutaneous placement of a 8.5 Yoruba drainage catheter into lower abdominal wall fluid collection, yielding 150 mL of bowel appearing fluid. New left radial a-line placed. Off nicarrdipine gtt.   4/23: Switched to therapeutic lovenox. Diet advanced to aubrey clears with ensures.  4/24: advacned to bariatric FLD, downgraded to SDU  4/25: TPN discontinued, diet advanced to full liquid , Right cephalic DVT prelim  4/26: Right UE VA Duplex final no DVT, thromboplebitis, Arterial line removed, 2L NC, midline vac changed, noted to have bilious drainage, RUQ drain pulled, upgraded to SICU status, plan to reorder TPN for 4/27 PM, diet changed to full liquid. 1:20 AM noted to be unresponsive, palpable femoral pulse, decision made to intubate, R femoral arterial line placed, started on levophed, amauri, bicarb amp x 2, bicarb gtt, propofol, 2U pRBC for hgb 5.9, plan for CTH and CTAP when stabilized   4/27: 2uPRBC 1uFFP 1pkPlt, TXA 1g, IR for embolization due to active extravasation seen on CTA, no active bleed, no embolization, Return to OR for re-exploration and evacuation of old clot  4/28: IR consulted for IVC filter and PICC. LE duplex ordered, negative. Precedex added overnight for agitation. 1 U PRBC for Hgb 6.8 overnight  4/29: TF started at 20cc/hr. Meropenem decreased to Q 12 hours.   4/30: Extubated to 5L NC, then placed on HFNC. Heparin gtt restarted.   5/1: Placed on BiPAP, started on aubrey clears w/ clear ensures   5/4: passed TOV. On 6L NC. Bumex 1mg.   5/5: diuresis with bumex 1mg, RLQ drain removed  5/6: Bumex 1mg. D/c meropenem. DG to SDU.   5/7: DG 4C.  5/12: reupgrade to SICU for hemodynamic monitoring after restarting anticoagulation  5/13: IR for IVC filter, unable to place due to anatomy. Started on 80mg lovenox BID, transitioning to Eliquis tomorrow.     24Hour Events:  5/13  NIGHT  - IR unable to place IVC filter due to anatomy   - lovenox 80bid STAT and then plan for Eliquis tomorrow   - PM rounds: HR 100s, SBP 170s  - TTE ordered   - SBP 160s-170s --> labetalol 5mg x 1  - 1g Mg, 40mEq KCl    DAY  - pending IVC filter  - Lasix 20  - SDU after procedure  - echo after IR  - pulm consult    [X] A ten-point review of systems was negative except as expressed in note.  [X ] History was obtained from patient. If unable to participate in their care, history was from review of the chart and collateral sources of information.  =====================================================================  Daily     Daily     Diet, DASH/TLC:   Sodium & Cholesterol Restricted (05-12-25 @ 12:45)      CURRENT MEDS:  Neurologic Medications  acetaminophen     Tablet .. 650 milliGRAM(s) Oral every 6 hours PRN Mild Pain (1 - 3)  melatonin 10 milliGRAM(s) Oral at bedtime    Respiratory Medications  albuterol/ipratropium for Nebulization 3 milliLiter(s) Nebulizer every 6 hours    Cardiovascular Medications  amLODIPine   Tablet 10 milliGRAM(s) Oral daily  losartan 100 milliGRAM(s) Oral daily    Gastrointestinal Medications  cyanocobalamin 1000 MICROGram(s) Oral daily  ergocalciferol 56985 Unit(s) Oral every week  multivitamin/minerals 1 Tablet(s) Oral daily  pantoprazole    Tablet 40 milliGRAM(s) Oral before breakfast    Genitourinary Medications    Hematologic/Oncologic Medications  enoxaparin Injectable 80 milliGRAM(s) SubCutaneous every 12 hours    Antimicrobial/Immunologic Medications    Endocrine/Metabolic Medications  insulin lispro (ADMELOG) corrective regimen sliding scale   SubCutaneous Before meals and at bedtime    Topical/Other Medications  chlorhexidine 2% Cloths 1 Application(s) Topical <User Schedule>  lactobacillus acidophilus 1 Tablet(s) Oral daily    ICU Vital Signs Last 24 Hrs  T(C): 36.5 (13 May 2025 19:10), Max: 36.5 (13 May 2025 19:10)  T(F): 97.7 (13 May 2025 19:10), Max: 97.7 (13 May 2025 19:10)  HR: 83 (14 May 2025 07:00) (83 - 106)  BP: 125/62 (14 May 2025 07:00) (117/66 - 174/91)  BP(mean): 86 (14 May 2025 07:00) (83 - 121)  RR: 20 (14 May 2025 06:00) (17 - 20)  SpO2: 97% (14 May 2025 07:00) (94% - 99%)    O2 Parameters below as of 14 May 2025 06:00  Patient On (Oxygen Delivery Method): BiPAP/CPAP      I&O's Summary    13 May 2025 07:01  -  14 May 2025 07:00  --------------------------------------------------------  IN: 200 mL / OUT: 3300 mL / NET: -3100 mL      I&O's Detail    13 May 2025 07:01  -  14 May 2025 07:00  --------------------------------------------------------  IN:    IV PiggyBack: 100 mL    IV PiggyBack: 100 mL  Total IN: 200 mL    OUT:    Voided (mL): 3300 mL  Total OUT: 3300 mL    Total NET: -3100 mL    PHYSICAL EXAM:    General/Neuro: alert & oriented x 3, no focal deficits  Lungs: Normal expansion/effort.   Cardiovascular : S1, S2.    GI: Abdomen soft, Non-tender, Non-distended. Midline incision healing appropriately.   Extremities: Extremities warm/ dry. Right groin site soft, c/d/i.  Derm: Good skin turgor, no skin breakdown.    : Voiding    LABS:  CAPILLARY BLOOD GLUCOSE      POCT Blood Glucose.: 108 mg/dL (13 May 2025 22:46)  POCT Blood Glucose.: 117 mg/dL (13 May 2025 11:16)  POCT Blood Glucose.: 107 mg/dL (13 May 2025 08:59)                          8.3    5.10  )-----------( 199      ( 14 May 2025 05:01 )             25.6       05-13    141  |  108  |  17  ----------------------------<  109[H]  3.5   |  21  |  0.7    Ca    7.7[L]      13 May 2025 20:00  Phos  3.2     05-13  Mg     1.9     05-13        PT/INR - ( 12 May 2025 20:20 )   PT: 11.30 sec;   INR: 0.96 ratio       PTT - ( 12 May 2025 20:20 )  PTT:28.3 sec    Urinalysis Basic - ( 13 May 2025 20:00 )    Color: x / Appearance: x / SG: x / pH: x  Gluc: 109 mg/dL / Ketone: x  / Bili: x / Urobili: x   Blood: x / Protein: x / Nitrite: x   Leuk Esterase: x / RBC: x / WBC x   Sq Epi: x / Non Sq Epi: x / Bacteria: x NANCY SARGENT   [id number]   [birth year]  64yF  ============================================================   DATE OF INITIAL SICU/SDU CONSULT: 04-10-25    INDICATION FOR SICU CONSULT:  hemodynamic monitoring in patient with high risk of bleeding while restarting anticoagulation    SICU COURSE EVENTS :  04-10 - admitted to SICU service  4/11: Intubated and started on paralytic. Arterial line placed, subclavian TLC placed. Nephrology consulted for oliguria. IR abdominal muscle botox injection performed. TTE performed.   4/12: Additional fluid boluses given; trial fo bumex started, considering CVVH. Weaned off nimbex gtt.  > 220 /hr. Renal U/S w/out hydro.   4/13: Weaned off Levophed. Bumex gtt 2mg/hr > 1mg/hr. Goal net negative 1-1.5L, UOP approx, 200c/hr.   4/14: Remained on bumex gtt for further diuresis, decreased to 0.5 overnight, vent settings weaned. Cr downtrending, LFTs worsening.   4/15: Extubated to BiPAP, transitioned to NC. Dc'd bumex gtt, given 5mg metolazone x 1 and 2mg bumex x 1.   4/16: THONY drain #2 murky/bilious, pending CTAP with PO contrast, hypernatremia, started D5W @ 75cc/hr, persistent hypokalemia   4/17: RTOR for resection of anastomosis for anastomotic leak. Returned intubated, 400/24/80/10, sinus tachy to . Abdomen soft. Was initially on propofol @ 30 and precedex, but propofol weaned off due to hypotension with MAPs in 50s, fentanyl ggt started for acute pain. Remained hypotensive despite fluids, started on Levophed, on 0.05.   4/18: Extubated. HFNC 40L/49%, Levo off since 11 AM   4/19: NGT removed. Started on duonebs. D5W increased to 60cc/hr. 1mg bumex, > 1.5L response  4/20: Episode of afib RVR resolved with metoprolol 5mg IVP, cardiology c/s placed, recommending metoprolol 25mg q 12   4/21: PE on CTA, therapeutic heparin gtt started. Started on cardebe gtt,   4/22: Meropenem started per ID.  S/p IR percutaneous placement of a 8.5 Thai drainage catheter into lower abdominal wall fluid collection, yielding 150 mL of bowel appearing fluid. New left radial a-line placed. Off nicarrdipine gtt.   4/23: Switched to therapeutic lovenox. Diet advanced to aubrey clears with ensures.  4/24: advacned to bariatric FLD, downgraded to SDU  4/25: TPN discontinued, diet advanced to full liquid , Right cephalic DVT prelim  4/26: Right UE VA Duplex final no DVT, thromboplebitis, Arterial line removed, 2L NC, midline vac changed, noted to have bilious drainage, RUQ drain pulled, upgraded to SICU status, plan to reorder TPN for 4/27 PM, diet changed to full liquid. 1:20 AM noted to be unresponsive, palpable femoral pulse, decision made to intubate, R femoral arterial line placed, started on levophed, amauri, bicarb amp x 2, bicarb gtt, propofol, 2U pRBC for hgb 5.9, plan for CTH and CTAP when stabilized   4/27: 2uPRBC 1uFFP 1pkPlt, TXA 1g, IR for embolization due to active extravasation seen on CTA, no active bleed, no embolization, Return to OR for re-exploration and evacuation of old clot  4/28: IR consulted for IVC filter and PICC. LE duplex ordered, negative. Precedex added overnight for agitation. 1 U PRBC for Hgb 6.8 overnight  4/29: TF started at 20cc/hr. Meropenem decreased to Q 12 hours.   4/30: Extubated to 5L NC, then placed on HFNC. Heparin gtt restarted.   5/1: Placed on BiPAP, started on aubrey clears w/ clear ensures   5/4: passed TOV. On 6L NC. Bumex 1mg.   5/5: diuresis with bumex 1mg, RLQ drain removed  5/6: Bumex 1mg. D/c meropenem. DG to SDU.   5/7: DG 4C.  5/12: reupgrade to SICU for hemodynamic monitoring after restarting anticoagulation  5/13: IR for IVC filter, unable to place due to anatomy. Started on 80mg lovenox BID, transitioning to Eliquis tomorrow.     24Hour Events:  5/13  NIGHT  - IR unable to place IVC filter due to anatomy   - lovenox 80bid STAT and then plan for Eliquis tomorrow   - PM rounds: HR 100s, SBP 170s  - TTE ordered   - SBP 160s-170s --> labetalol 5mg x 1  - 1g Mg, 40mEq KCl    DAY  - pending IVC filter  - Lasix 20  - SDU after procedure  - echo after IR  - pulm consult    [X] A ten-point review of systems was negative except as expressed in note.  [X ] History was obtained from patient. If unable to participate in their care, history was from review of the chart and collateral sources of information.  =====================================================================  Daily     Daily     Diet, DASH/TLC:   Sodium & Cholesterol Restricted (05-12-25 @ 12:45)      CURRENT MEDS:  Neurologic Medications  acetaminophen     Tablet .. 650 milliGRAM(s) Oral every 6 hours PRN Mild Pain (1 - 3)  melatonin 10 milliGRAM(s) Oral at bedtime    Respiratory Medications  albuterol/ipratropium for Nebulization 3 milliLiter(s) Nebulizer every 6 hours    Cardiovascular Medications  amLODIPine   Tablet 10 milliGRAM(s) Oral daily  losartan 100 milliGRAM(s) Oral daily    Gastrointestinal Medications  cyanocobalamin 1000 MICROGram(s) Oral daily  ergocalciferol 56840 Unit(s) Oral every week  multivitamin/minerals 1 Tablet(s) Oral daily  pantoprazole    Tablet 40 milliGRAM(s) Oral before breakfast    Genitourinary Medications    Hematologic/Oncologic Medications  enoxaparin Injectable 80 milliGRAM(s) SubCutaneous every 12 hours    Antimicrobial/Immunologic Medications    Endocrine/Metabolic Medications  insulin lispro (ADMELOG) corrective regimen sliding scale   SubCutaneous Before meals and at bedtime    Topical/Other Medications  chlorhexidine 2% Cloths 1 Application(s) Topical <User Schedule>  lactobacillus acidophilus 1 Tablet(s) Oral daily    ICU Vital Signs Last 24 Hrs  T(C): 36.5 (13 May 2025 19:10), Max: 36.5 (13 May 2025 19:10)  T(F): 97.7 (13 May 2025 19:10), Max: 97.7 (13 May 2025 19:10)  HR: 83 (14 May 2025 07:00) (83 - 106)  BP: 125/62 (14 May 2025 07:00) (117/66 - 174/91)  BP(mean): 86 (14 May 2025 07:00) (83 - 121)  RR: 20 (14 May 2025 06:00) (17 - 20)  SpO2: 97% (14 May 2025 07:00) (94% - 99%)    O2 Parameters below as of 14 May 2025 06:00  Patient On (Oxygen Delivery Method): BiPAP/CPAP      I&O's Summary    13 May 2025 07:01  -  14 May 2025 07:00  --------------------------------------------------------  IN: 200 mL / OUT: 3300 mL / NET: -3100 mL      I&O's Detail    13 May 2025 07:01  -  14 May 2025 07:00  --------------------------------------------------------  IN:    IV PiggyBack: 100 mL    IV PiggyBack: 100 mL  Total IN: 200 mL    OUT:    Voided (mL): 3300 mL  Total OUT: 3300 mL    Total NET: -3100 mL    PHYSICAL EXAM:    General/Neuro: alert & oriented x 3, no focal deficits  Lungs: Normal expansion/effort.   Cardiovascular : S1, S2.    GI: Abdomen soft, Non-tender, Non-distended. Woundvac in place  Extremities: Extremities warm/ dry. Right groin site soft, c/d/i.  Derm: Good skin turgor, no skin breakdown.    : Voiding    LABS:  CAPILLARY BLOOD GLUCOSE      POCT Blood Glucose.: 108 mg/dL (13 May 2025 22:46)  POCT Blood Glucose.: 117 mg/dL (13 May 2025 11:16)  POCT Blood Glucose.: 107 mg/dL (13 May 2025 08:59)                          8.3    5.10  )-----------( 199      ( 14 May 2025 05:01 )             25.6       05-13    141  |  108  |  17  ----------------------------<  109[H]  3.5   |  21  |  0.7    Ca    7.7[L]      13 May 2025 20:00  Phos  3.2     05-13  Mg     1.9     05-13        PT/INR - ( 12 May 2025 20:20 )   PT: 11.30 sec;   INR: 0.96 ratio       PTT - ( 12 May 2025 20:20 )  PTT:28.3 sec    Urinalysis Basic - ( 13 May 2025 20:00 )    Color: x / Appearance: x / SG: x / pH: x  Gluc: 109 mg/dL / Ketone: x  / Bili: x / Urobili: x   Blood: x / Protein: x / Nitrite: x   Leuk Esterase: x / RBC: x / WBC x   Sq Epi: x / Non Sq Epi: x / Bacteria: x

## 2025-05-14 NOTE — PROGRESS NOTE ADULT - SUBJECTIVE AND OBJECTIVE BOX
GENERAL SURGERY PROGRESS NOTE    Patient: NANCY SARGENT , 65y (62)Female   MRN: [id number]  Location: [address]  Visit: 25 Inpatient  Date: 25 @ 07:02    Hospital Day #: 42  Post-Op Day #: 27      Events of past 24 hours: IR unable to place IVC filter. increased lovenox to 80 BID    PAST MEDICAL & SURGICAL HISTORY:  Gastric bypass status for obesity      LOUIS (obstructive sleep apnea)      S/P gastric bypass      S/P       S/P small bowel resection      History of total bilateral knee replacement (TKR)      H/O colonoscopy            Vitals:   T(F): 97.7 (25 @ 19:10), Max: 97.7 (25 @ 19:10)  HR: 92 (25 @ 06:00)  BP: 130/73 (25 @ 06:00)  RR: 20 (25 @ 06:00)  SpO2: 98% (25 @ 06:00)      Diet, DASH/TLC:   Sodium & Cholesterol Restricted      Fluids:     I & O's:    25 @ 07:01  -  25 @ 07:00  --------------------------------------------------------  IN:    IV PiggyBack: 100 mL    IV PiggyBack: 100 mL  Total IN: 200 mL    OUT:    Voided (mL): 2500 mL  Total OUT: 2500 mL    Total NET: -2300 mL      PHYSICAL EXAM:  General: NAD,   HEENT: NCAT,  Cardiac: RRR   Respiratory: Normal respiratory effort,   Abdomen: Soft, non-distended, non-tender, wound vac midline   Skin: Warm/dry, normal color, no jaundice    MEDICATIONS  (STANDING):  albuterol/ipratropium for Nebulization 3 milliLiter(s) Nebulizer every 6 hours  amLODIPine   Tablet 10 milliGRAM(s) Oral daily  chlorhexidine 2% Cloths 1 Application(s) Topical <User Schedule>  cyanocobalamin 1000 MICROGram(s) Oral daily  enoxaparin Injectable 80 milliGRAM(s) SubCutaneous every 12 hours  ergocalciferol 92744 Unit(s) Oral every week  insulin lispro (ADMELOG) corrective regimen sliding scale   SubCutaneous Before meals and at bedtime  lactobacillus acidophilus 1 Tablet(s) Oral daily  losartan 100 milliGRAM(s) Oral daily  melatonin 10 milliGRAM(s) Oral at bedtime  multivitamin/minerals 1 Tablet(s) Oral daily  pantoprazole    Tablet 40 milliGRAM(s) Oral before breakfast    MEDICATIONS  (PRN):  acetaminophen     Tablet .. 650 milliGRAM(s) Oral every 6 hours PRN Mild Pain (1 - 3)      DVT PROPHYLAXIS: enoxaparin Injectable 80 milliGRAM(s) SubCutaneous every 12 hours    GI PROPHYLAXIS: pantoprazole    Tablet 40 milliGRAM(s) Oral before breakfast    ANTICOAGULATION:   ANTIBIOTICS:            LAB/STUDIES:  Labs:  CAPILLARY BLOOD GLUCOSE      POCT Blood Glucose.: 108 mg/dL (13 May 2025 22:46)  POCT Blood Glucose.: 117 mg/dL (13 May 2025 11:16)  POCT Blood Glucose.: 107 mg/dL (13 May 2025 08:59)                          8.3    5.10  )-----------( 199      ( 14 May 2025 05:01 )             25.6       Auto Immature Granulocyte %: 2.4 % (25 @ 20:00)        141  |  108  |  17  ----------------------------<  109[H]  3.5   |  21  |  0.7      Calcium: 7.7 mg/dL (25 @ 20:00)      LFTs:         Coags:     11.30  ----< 0.96    ( 12 May 2025 20:20 )     28.3                Urinalysis Basic - ( 13 May 2025 20:00 )    Color: x / Appearance: x / SG: x / pH: x  Gluc: 109 mg/dL / Ketone: x  / Bili: x / Urobili: x   Blood: x / Protein: x / Nitrite: x   Leuk Esterase: x / RBC: x / WBC x   Sq Epi: x / Non Sq Epi: x / Bacteria: x                IMAGING:

## 2025-05-14 NOTE — PROGRESS NOTE ADULT - ASSESSMENT
ASSESSMENT:   65y F w/ PMHx of  Morbid obesity, LOUIS, large complex ventral hernia s/p repair sbr and loss of domain c/b post op intubation, hypotension requiring vasopressors, DVT, anastomotic leakage s/p RTOR and reanastomosis, now doing better post op from a respiratory status. Comfortable on BIPAP. Off pressors. Blood cultures negative. The last time AC was started on the patient she had increased bleeding and went to the OR for hematoma washout. Has been off AC, however repeat duplex with B/L LE DVTs. Now pending IVC filter placement.       PLAN:  - Wound vac change M/W/F  - Encourage ambulation   - PT  - Vascular surgery for possible IVC filter   - Monitor hgb, transfuse as needed   - Care as per SICU       BLUE TEAM SPECTRA: [id number]

## 2025-05-15 LAB
ANION GAP SERPL CALC-SCNC: 10 MMOL/L — SIGNIFICANT CHANGE UP (ref 7–14)
APTT BLD: 25.5 SEC — LOW (ref 27–39.2)
BUN SERPL-MCNC: 14 MG/DL — SIGNIFICANT CHANGE UP (ref 10–20)
CALCIUM SERPL-MCNC: 7.8 MG/DL — LOW (ref 8.4–10.5)
CHLORIDE SERPL-SCNC: 108 MMOL/L — SIGNIFICANT CHANGE UP (ref 98–110)
CO2 SERPL-SCNC: 22 MMOL/L — SIGNIFICANT CHANGE UP (ref 17–32)
CREAT SERPL-MCNC: 0.7 MG/DL — SIGNIFICANT CHANGE UP (ref 0.7–1.5)
EGFR: 97 ML/MIN/1.73M2 — SIGNIFICANT CHANGE UP
EGFR: 97 ML/MIN/1.73M2 — SIGNIFICANT CHANGE UP
GLUCOSE BLDC GLUCOMTR-MCNC: 129 MG/DL — HIGH (ref 70–99)
GLUCOSE BLDC GLUCOMTR-MCNC: 137 MG/DL — HIGH (ref 70–99)
GLUCOSE BLDC GLUCOMTR-MCNC: 138 MG/DL — HIGH (ref 70–99)
GLUCOSE BLDC GLUCOMTR-MCNC: 177 MG/DL — HIGH (ref 70–99)
GLUCOSE SERPL-MCNC: 108 MG/DL — HIGH (ref 70–99)
HCT VFR BLD CALC: 26.6 % — LOW (ref 37–47)
HCT VFR BLD CALC: 27.4 % — LOW (ref 37–47)
HGB BLD-MCNC: 8.6 G/DL — LOW (ref 12–16)
HGB BLD-MCNC: 8.8 G/DL — LOW (ref 12–16)
INR BLD: 0.96 RATIO — SIGNIFICANT CHANGE UP (ref 0.65–1.3)
LMWH PPP CHRO-ACNC: 0.37 IU/ML — LOW (ref 0.5–1.1)
MAGNESIUM SERPL-MCNC: 1.7 MG/DL — LOW (ref 1.8–2.4)
MCHC RBC-ENTMCNC: 30.8 PG — SIGNIFICANT CHANGE UP (ref 27–31)
MCHC RBC-ENTMCNC: 30.9 PG — SIGNIFICANT CHANGE UP (ref 27–31)
MCHC RBC-ENTMCNC: 32.1 G/DL — SIGNIFICANT CHANGE UP (ref 32–37)
MCHC RBC-ENTMCNC: 32.3 G/DL — SIGNIFICANT CHANGE UP (ref 32–37)
MCV RBC AUTO: 95.7 FL — SIGNIFICANT CHANGE UP (ref 81–99)
MCV RBC AUTO: 95.8 FL — SIGNIFICANT CHANGE UP (ref 81–99)
NRBC BLD AUTO-RTO: 0 /100 WBCS — SIGNIFICANT CHANGE UP (ref 0–0)
NRBC BLD AUTO-RTO: 0 /100 WBCS — SIGNIFICANT CHANGE UP (ref 0–0)
PHOSPHATE SERPL-MCNC: 2.7 MG/DL — SIGNIFICANT CHANGE UP (ref 2.1–4.9)
PLATELET # BLD AUTO: 204 K/UL — SIGNIFICANT CHANGE UP (ref 130–400)
PLATELET # BLD AUTO: 214 K/UL — SIGNIFICANT CHANGE UP (ref 130–400)
PMV BLD: 9.4 FL — SIGNIFICANT CHANGE UP (ref 7.4–10.4)
PMV BLD: 9.5 FL — SIGNIFICANT CHANGE UP (ref 7.4–10.4)
POTASSIUM SERPL-MCNC: 3.6 MMOL/L — SIGNIFICANT CHANGE UP (ref 3.5–5)
POTASSIUM SERPL-SCNC: 3.6 MMOL/L — SIGNIFICANT CHANGE UP (ref 3.5–5)
PROTHROM AB SERPL-ACNC: 11.3 SEC — SIGNIFICANT CHANGE UP (ref 9.95–12.87)
RBC # BLD: 2.78 M/UL — LOW (ref 4.2–5.4)
RBC # BLD: 2.86 M/UL — LOW (ref 4.2–5.4)
RBC # FLD: 16.6 % — HIGH (ref 11.5–14.5)
RBC # FLD: 16.6 % — HIGH (ref 11.5–14.5)
SODIUM SERPL-SCNC: 140 MMOL/L — SIGNIFICANT CHANGE UP (ref 135–146)
WBC # BLD: 5.43 K/UL — SIGNIFICANT CHANGE UP (ref 4.8–10.8)
WBC # BLD: 5.57 K/UL — SIGNIFICANT CHANGE UP (ref 4.8–10.8)
WBC # FLD AUTO: 5.43 K/UL — SIGNIFICANT CHANGE UP (ref 4.8–10.8)
WBC # FLD AUTO: 5.57 K/UL — SIGNIFICANT CHANGE UP (ref 4.8–10.8)

## 2025-05-15 PROCEDURE — 99232 SBSQ HOSP IP/OBS MODERATE 35: CPT | Mod: 57

## 2025-05-15 PROCEDURE — 93010 ELECTROCARDIOGRAM REPORT: CPT

## 2025-05-15 RX ORDER — POTASSIUM PHOSPHATE, MONOBASIC POTASSIUM PHOSPHATE, DIBASIC INJECTION, 236; 224 MG/ML; MG/ML
30 SOLUTION, CONCENTRATE INTRAVENOUS ONCE
Refills: 0 | Status: COMPLETED | OUTPATIENT
Start: 2025-05-15 | End: 2025-05-15

## 2025-05-15 RX ORDER — MAGNESIUM SULFATE 500 MG/ML
2 SYRINGE (ML) INJECTION ONCE
Refills: 0 | Status: COMPLETED | OUTPATIENT
Start: 2025-05-15 | End: 2025-05-15

## 2025-05-15 RX ADMIN — APIXABAN 5 MILLIGRAM(S): 2.5 TABLET, FILM COATED ORAL at 05:07

## 2025-05-15 RX ADMIN — Medication 25 GRAM(S): at 05:06

## 2025-05-15 RX ADMIN — INSULIN LISPRO 2: 100 INJECTION, SOLUTION INTRAVENOUS; SUBCUTANEOUS at 12:03

## 2025-05-15 RX ADMIN — POTASSIUM PHOSPHATE, MONOBASIC POTASSIUM PHOSPHATE, DIBASIC INJECTION, 83.33 MILLIMOLE(S): 236; 224 SOLUTION, CONCENTRATE INTRAVENOUS at 06:32

## 2025-05-15 RX ADMIN — APIXABAN 5 MILLIGRAM(S): 2.5 TABLET, FILM COATED ORAL at 16:37

## 2025-05-15 RX ADMIN — Medication 1 TABLET(S): at 12:00

## 2025-05-15 RX ADMIN — Medication 1 APPLICATION(S): at 05:09

## 2025-05-15 RX ADMIN — Medication 40 MILLIGRAM(S): at 05:07

## 2025-05-15 RX ADMIN — IPRATROPIUM BROMIDE AND ALBUTEROL SULFATE 3 MILLILITER(S): .5; 2.5 SOLUTION RESPIRATORY (INHALATION) at 08:04

## 2025-05-15 RX ADMIN — AMLODIPINE BESYLATE 10 MILLIGRAM(S): 10 TABLET ORAL at 05:07

## 2025-05-15 RX ADMIN — IPRATROPIUM BROMIDE AND ALBUTEROL SULFATE 3 MILLILITER(S): .5; 2.5 SOLUTION RESPIRATORY (INHALATION) at 19:33

## 2025-05-15 RX ADMIN — IPRATROPIUM BROMIDE AND ALBUTEROL SULFATE 3 MILLILITER(S): .5; 2.5 SOLUTION RESPIRATORY (INHALATION) at 13:36

## 2025-05-15 RX ADMIN — Medication 650 MILLIGRAM(S): at 15:40

## 2025-05-15 RX ADMIN — Medication 650 MILLIGRAM(S): at 16:10

## 2025-05-15 RX ADMIN — CYANOCOBALAMIN 1000 MICROGRAM(S): 1000 INJECTION INTRAMUSCULAR; SUBCUTANEOUS at 12:03

## 2025-05-15 RX ADMIN — LOSARTAN POTASSIUM 100 MILLIGRAM(S): 100 TABLET, FILM COATED ORAL at 05:08

## 2025-05-15 NOTE — PROGRESS NOTE ADULT - ASSESSMENT
ASSESSMENT:   65y F w/ PMHx of  Morbid obesity, LOUIS, large complex ventral hernia s/p repair sbr and loss of domain c/b post op intubation, hypotension requiring vasopressors, DVT, anastomotic leakage s/p RTOR and reanastomosis.      PLAN:  - Patient DG today  - remove primafit   - Wound vac change M/W/F  - Encourage ambulation   - PT  - Vascular surgery will not place IVC filter complicated anatomy   - ID recs for Midline x ertapenem 1g q24h IV on D/C x 4 weeks E/D 5/19  - patient started on eliquis 5 mg bid  - Monitor hgb, transfuse as needed     BLUE TEAM SPECTRA: [id number]

## 2025-05-15 NOTE — PROGRESS NOTE ADULT - ATTENDING COMMENTS
Visited pt at bedside in the SICU. Reports feeling ok. Denies any abdominal pain. Denies any nausea/vomiting. Denies any fever/chills.     PE:  General; female, in bed, comfortable  Head; NC/AT  Heart; RRR  Lungs; even chest rise, no accessory muscle use  Abdomen: soft, obese, non-tender, vac in place, good seal  MSK: b/l LE edema appreciated    A/P:  65 F with an extensive medical/surgical history, well known to SICU team, with recent development of b/l LE DVTs (left common femoral vein, right femoral vein) with segmental/subsegmental PEs (right distal main PE with segmental extension).   - Continue eliquis today  - H/H has been stable; safe to downgrade to 4 C with telemetry monitoring   - Tylenol for pain  - Continue diet   - PT/OT  - Midline vac changes MWF  - Continue amlodipine/losartan   - On sliding scale, goal CBG <180 .

## 2025-05-15 NOTE — CHART NOTE - NSCHARTNOTEFT_GEN_A_CORE
PACU ANESTHESIA ADMISSION NOTE      Procedure: Open repair of ventral hernia using mesh and component separation technique    Small bowel resection    Insertion, arterial line, percutaneous    Exploratory laparotomy    Insertion of arterial line with imaging guidance    Abdominal washout    Evacuation of hemoperitoneum      Post op diagnosis:  Incarcerated ventral hernia    SBO (small bowel obstruction)    Perforated viscus    Hemoperitoneum        ____  Intubated  TV:______       Rate: ______      FiO2: ______    __x__  Patent Airway    __x__  Full return of protective reflexes    ____  Full recovery from anesthesia / back to baseline status    Vitals:  T(C): 36.3 (05-15-25 @ 00:00), Max: 36.6 (05-14-25 @ 08:00)  HR: 89 (05-15-25 @ 02:00) (83 - 109)  BP: 128/66 (05-15-25 @ 02:00) (117/66 - 177/101)  RR: 20 (05-15-25 @ 02:00) (18 - 20)  SpO2: 95% (05-15-25 @ 02:00) (93% - 98%)    Mental Status:  __x__ Awake   ___x__ Alert   ___x__ Drowsy   _____ Sedated    Nausea/Vomiting:  __x__ NO  ______Yes,   See Post - Op Orders          Pain Scale (0-10):  _____    Treatment: ____ None    __x__ See Post - Op/PCA Orders    Post - Operative Fluids:   ____ Oral   __x__ See Post - Op Orders    Plan: Discharge:   ____Home       _____Floor     _____Critical Care    _____  Other:_________________    Comments: Patient had smooth intraoperative event, no anesthesia complication.  PACU Vital signs: HR:            BP:        /          RR:             O2 Sat:       %     Temp

## 2025-05-15 NOTE — PROGRESS NOTE ADULT - SUBJECTIVE AND OBJECTIVE BOX
GENERAL SURGERY PROGRESS NOTE    Patient: NANCY SARGENT , 65y (62)Female   MRN: [id number]  Location: [address]  Visit: 25 Inpatient  Date: 05-15-25 @ 13:46    Hospital Day #: 43  Post-Op Day #: 28    Procedure/Dx/Injuries: S/P RTOR, EX LAP, EVACUATION OF HEMOPERITONEUM, AND ABDOMINAL WASHOUT     Events of past 24 hours: Patient was seen and examined bedside. Patient was downgraded from SICU. Patient had a soft brown bowel movement this morning no blood. Overnight patient has an episode of hematuria and is now voiding clear. Vascular is not doing an IVC filter and patient is started on eliquis 5 mg. Low grade  tachycardia to 102s over night. Echo was done with EF of 60-65%.     PAST MEDICAL & SURGICAL HISTORY:  Gastric bypass status for obesity  LOUIS (obstructive sleep apnea)  S/P gastric bypass  S/P   S/P small bowel resection  History of total bilateral knee replacement (TKR)  H/O colonoscopy,     Vitals:   T(F): 97.9 (05-15-25 @ 12:00), Max: 97.9 (05-15-25 @ 04:00)  HR: 110 (05-15-25 @ 12:00)  BP: 142/88 (05-15-25 @ 12:00)  RR: 20 (05-15-25 @ 12:00)  SpO2: 95% (05-15-25 @ 12:00)    Diet, DASH/TLC:   Sodium & Cholesterol Restricted    I & O's:    25 @ 07:01  -  05-15-25 @ 07:00  --------------------------------------------------------  IN:    IV PiggyBack: 50 mL    Oral Fluid: 950 mL  Total IN: 1000 mL    OUT:    Voided (mL): 2100 mL  Total OUT: 2100 mL    Total NET: -1100 mL    PHYSICAL EXAM:  General: NAD  Cardiac: RRR   Respiratory: normal respiratory effort  Abdomen: Soft, non-distended, non-tender, no rebound, no guarding. Wound vac midline in place.    MEDICATIONS  (STANDING):  albuterol/ipratropium for Nebulization 3 milliLiter(s) Nebulizer every 6 hours  amLODIPine   Tablet 10 milliGRAM(s) Oral daily  apixaban 5 milliGRAM(s) Oral every 12 hours  chlorhexidine 2% Cloths 1 Application(s) Topical <User Schedule>  cyanocobalamin 1000 MICROGram(s) Oral daily  ergocalciferol 25438 Unit(s) Oral every week  insulin lispro (ADMELOG) corrective regimen sliding scale   SubCutaneous Before meals and at bedtime  lactobacillus acidophilus 1 Tablet(s) Oral daily  losartan 100 milliGRAM(s) Oral daily  melatonin 10 milliGRAM(s) Oral at bedtime  multivitamin/minerals 1 Tablet(s) Oral daily  pantoprazole    Tablet 40 milliGRAM(s) Oral before breakfast    MEDICATIONS  (PRN):  acetaminophen     Tablet .. 650 milliGRAM(s) Oral every 6 hours PRN Mild Pain (1 - 3)  sodium chloride 0.65% Nasal 1 Spray(s) Both Nostrils daily PRN Nasal Congestion    GI PROPHYLAXIS: pantoprazole    Tablet 40 milliGRAM(s) Oral before breakfast    LAB/STUDIES:    CAPILLARY BLOOD GLUCOSE    POCT Blood Glucose.: 177 mg/dL (15 May 2025 11:42)  POCT Blood Glucose.: 129 mg/dL (15 May 2025 07:50)  POCT Blood Glucose.: 144 mg/dL (14 May 2025 21:30)  POCT Blood Glucose.: 161 mg/dL (14 May 2025 16:52)                          8.6    5.43  )-----------( 214      ( 15 May 2025 01:38 )             26.6       05-15    140  |  108  |  14  ----------------------------<  108[H]  3.6   |  22  |  0.7      Calcium: 7.8 mg/dL (05-15- @ 01:38)    Coags:     11.30  ----< 0.96    ( 15 May 2025 01:38 )     25.5      Urinalysis Basic - ( 15 May 2025 01:38 )    Color: x / Appearance: x / SG: x / pH: x  Gluc: 108 mg/dL / Ketone: x  / Bili: x / Urobili: x   Blood: x / Protein: x / Nitrite: x   Leuk Esterase: x / RBC: x / WBC x   Sq Epi: x / Non Sq Epi: x / Bacteria: x

## 2025-05-15 NOTE — PROGRESS NOTE ADULT - ATTENDING SUPERVISION STATEMENT
Resident

## 2025-05-15 NOTE — PROGRESS NOTE ADULT - ASSESSMENT
ASSESSMENT & PLAN:  64F PMHx HTN, morbid obesity s/p 2001 Abby-en-Y gastric bypass who presented on 4/3 with incarcerated ventral hernia with SBO. Patient downgraded to the floor. found to have PEs with b/l DVT, upgraded to SICU 5/13 for close monitoring before and after IVC filter placement and while patient restarts anticoagulation      NEUROLOGICAL:  #Acute pain    acetaminophen Tablet 650 Oral every 6 hours PRN mild pain   #Sleep hygiene  - HOLDING Trazodone 25mg QPM  - Melatonin 10mg QPM    RESPIRATORY:   #Acute hypoxic respiratory failure, resolved  - Extubated 4/18, reintubated on 4/27 for AMS and agonal breathing, extubated 4/30  - Currently on RA  - Home CPAP @ night  - duoneb treatment Q6  #Right distal main pulmonary embolus with segmental extension/B/L DVT   - currently on lovenox 60mg q12, uptitrate per surgery, heme/onc recommendations  - IVC filter placement aborted due to difficult anatomy 5/13  #Intermittent diuresis for fluid overload  - Last diuresis 5/11- lasix 20  - will give 20mg lasix post IVC filter for diuresis and able to tolerate  #PMHx LOUIS on CPAP@ home   #Activity increase as tolerated    CARDS:   #Hypertensive urgency, resolved  #Acute hemorrhagic shock, resolved  - 4/27: 2u PRBC 1plt 1uFFP, 1g TXA  #Supraventricular tachycardia, Non-sustained ventricular tachycardia, Ventricular Premature Depolarization  - Cardiology (Dr. Steven) - Metoprolol 25mg Q12 HOLDING  #PMHx of HTN  - continue Amlodipine 10mg QD  - continue Losartan 100mg qd  - HCTZ 25mg QD HOLDING  - EKG- NSR  - TTE 4/22: EF 70 to 75%  - TTE 5/14: EF 60-65%    GASTROINTESTINAL/NUTRITION:   #4/10 open ventral hernia repair, small bowel resection, ileoileal anastomosis and primary fascial closure, 2 THONY Right  #4/17 RTOR for exploratory laparotomy, repair of anastomotic leak at previous ileoileal anastomosis, resected, new ileoileal anastomosis created, 1 THONY Left (total 3)  #4/22: s/p IR percutaneous placement of a 8.5 [place] drainage catheter into lower abdominal wall fluid collection, yielding 150 mL of foul appearing fluid.  #4/27: Acute retroperitoneal CTA: mesenteric arterial blush noted with extravasation  #4/27 RTOR for re-exploration and evacuation of old clots from retroperitoneum and pelvis    - s/p IR without finding of any bleeding, no embolization required    -5/2 Midline wound vac removed   #Diet: NPO for IR today, post procedure resume DASH with ensure clears  - on vitamin B12 supplement  - multivitamin  - probiotic   - Aspiration precautions, HOB 30  #GI Prophylaxis/hx GERD  - Pantoprazole 40mg PO qD (prn home rx)   #Bowel regimen    -last bowel movement 5/12    /RENAL:   #Urine output in critically ill  - IVL  - Lasix 20mg IVP 5/11  #FUNMI; oliguric - resolved  - Renal u/s> no hydronephrosis. 2-3cm anechoic cyst on right kidney   #Lactic acidosis - resolved  - Nephro: check CK, no indication of RRT, decrease meropenem to Q12    Labs:          BUN/Cr- 17/0.7  -->          [05-13 @ 20:00]Na  141 // K  3.5 // Mg  1.9 // Phos  3.2      HEME/ONC:   #DVT PPX  - S/p LVX 80mg x2; Starting Eliquis 5mg BID today  - SCD B/L  #acute anemia   - 4/27: 2uPRBC 1uFFP 1pkPlt, TXA 1g, IR for embolization due to active extravasation seen on CTA, no active bleed, no embolization, Return to OR for re-exploration and evacuation of old clot  - 4/28: IR consulted for IVC filter and PICC. LE duplex ordered, negative.  - 5/2: 1u PRBC for 500mL sanguinous output from wound vac   #Acute right pulmonary embolus  - Therapeutic Heparin gtt discontinued 5/2 secondary to venous bleed from wound vac   #B/L UE Venous Duplex 4/26/27 - thrombophlebitis  - B/L LE Venous duplex 4/21-RIGHT PT DVT, no LEFT DVT  - B/L Duplex 5/12: DVT in the right femoral vein. DVT in left common femoral vein.  - Plan for IVC filter with IR tomorrow 5/13  - Start Lovenox 60mg q12 with plan to advance to full AC   - Vascular surgery consulted - AC with heparin drip (upon discharge transition to Eliquis 10 mg po BID x 7 days then 5 mg po BID for at least 6 months      Labs: Hb/Hct:  8.6/26.7  -->,  8.3/25.6  -->                      Plts:  223  -->,  199  -->                 PTT/INR:          ID:  #Intraabdominal anastomotic leak, fluid collection    -ESR, CRP for 3 days  #ID Consult  - trial off antibiotics 5/6 per Dr. Bermudez and Dr. Lema  - Midline x Ertapenem 1g Q24 on discharge x 4 weeks (EOT 5/19)  - F/u with Dr. Perdue on Tuesdays via Telehealth   #Culture    OR 4/1 - E Coli    OR cx: few E.coli, few bacteroides, rare clostridium    4/13 Blood Cx: NGTF    4/14 tracheal aspirate: no growth     4/17 body fluid cx rare e. coli     4/22 Blood cx: negative    4/22 Abdominal - Negative FINAL    4/27 C diff stool panel: negative    4/28 blood culture: negative  WBC- 7.30  --->>,  5.40  --->>,  5.10  --->>  Temp trend- 24hrs T(F): 97.1 (05-14 @ 20:00), Max: 97.8 (05-14 @ 08:00)  Current antibiotics-n/a      ENDOCRINE:  #Glycemic monitoring  - ACHS fingersticks  - ISS  - A1C 5.8%     MSK:  #Activity- increase as tolerated  #Physiatry    -bedside PT, will evaluate for inpatient 4A placement, not a candidate as of 5/7  #PT consult    -rehab facility on discharge 3 to 5x/week   #OT consult    -pending for ROM within bed    SKIN:  #midline incision staples in place f/u w/ primary removal timeline (last OR 4/27)  #left forearm skin tear   - Continue to monitor daily skin changes  #left knee skin tear  #sacral fissure 5/3      LINES/DRAINS:  PIV    LLQ pelvis beatrice drain (4/28 -     Midline subcutaneous beatrice drain (4/28 -     Right IJ TLC (4/17 - to be removed prior to transfer to floor    Discontinued lines:    Right Femoral Arterial Line (4/27 - 4/28)    Prevena Vac Midline (removed 5/2)     Left radial arterial line (4/28 - 5/5)    RLQ pelvis beatrice drain (4/28 - 5/5)    ADVANCED DIRECTIVES:  Full Code  Emergency - Daughter  (Shavonne Moss)  INDICATION FOR SICU/SDU: Intestinal obstruction    DISPO: SICU - discussed with Dr. lAva    ASSESSMENT & PLAN:  64F PMHx HTN, morbid obesity s/p 2001 Abby-en-Y gastric bypass who presented on 4/3 with incarcerated ventral hernia with SBO. Patient downgraded to the floor. found to have PEs with b/l DVT, upgraded to SICU 5/13 for close monitoring before and after IVC filter placement and while patient restarts anticoagulation      NEUROLOGICAL:  #Acute pain    acetaminophen Tablet 650 Oral every 6 hours PRN mild pain   #Sleep hygiene  - HOLDING Trazodone 25mg QPM  - Melatonin 10mg QPM    RESPIRATORY:   #Acute hypoxic respiratory failure, resolved  - Extubated 4/18, reintubated on 4/27 for AMS and agonal breathing, extubated 4/30  - Currently on RA  - Home CPAP @ night  - duoneb treatment Q6  #Right distal main pulmonary embolus with segmental extension/B/L DVT   - currently on lovenox 60mg q12, uptitrate per surgery, heme/onc recommendations  - IVC filter placement aborted due to difficult anatomy 5/13  #Intermittent diuresis for fluid overload  - Last diuresis 5/11- lasix 20  - will give 20mg lasix post IVC filter for diuresis and able to tolerate  #PMHx [place] on CPAP@ home   #Activity increase as tolerated    CARDS:   #Hypertensive urgency, resolved  #Acute hemorrhagic shock, resolved  - 4/27: 2u PRBC 1plt 1uFFP, 1g TXA  #Supraventricular tachycardia, Non-sustained ventricular tachycardia, Ventricular Premature Depolarization  - Cardiology (Dr. Steven) - Metoprolol 25mg Q12 HOLDING  #PMHx of HTN  - continue Amlodipine 10mg QD  - continue Losartan 100mg qd  - HCTZ 25mg QD HOLDING  - EKG- NSR  - TTE 4/22: EF 70 to 75%  - TTE 5/14: EF 60-65%    GASTROINTESTINAL/NUTRITION:   #4/10 open ventral hernia repair, small bowel resection, ileoileal anastomosis and primary fascial closure, 2 THOYN Right  #4/17 RTOR for exploratory laparotomy, repair of anastomotic leak at previous ileoileal anastomosis, resected, new ileoileal anastomosis created, 1 THONY Left (total 3)  #4/22: s/p IR percutaneous placement of a 8.5 [place] drainage catheter into lower abdominal wall fluid collection, yielding 150 mL of foul appearing fluid.  #4/27: Acute retroperitoneal CTA: mesenteric arterial blush noted with extravasation  #4/27 RTOR for re-exploration and evacuation of old clots from retroperitoneum and pelvis    - s/p IR without finding of any bleeding, no embolization required    -5/2 Midline wound vac removed   #Diet: NPO for IR today, post procedure resume DASH with ensure clears  - on vitamin B12 supplement  - multivitamin  - probiotic   - Aspiration precautions, HOB 30  #GI Prophylaxis/hx GERD  - Pantoprazole 40mg PO qD (prn home rx)   #Bowel regimen    -last bowel movement 5/12    /RENAL:   #Urine output in critically ill  - IVL  - Lasix 20mg IVP 5/11  #FUNMI; oliguric - resolved  - Renal u/s> no hydronephrosis. 2-3cm anechoic cyst on right kidney   #Lactic acidosis - resolved  - Nephro: check CK, no indication of RRT, decrease meropenem to Q12    Labs:          BUN/Cr- 17/0.7  -->          [05-13 @ 20:00]Na  141 // K  3.5 // Mg  1.9 // Phos  3.2      HEME/ONC:   #DVT PPX  - S/p LVX 80mg x2; Starting Eliquis 5mg BID today  - SCD B/L  #acute anemia   - 4/27: 2uPRBC 1uFFP 1pkPlt, TXA 1g, IR for embolization due to active extravasation seen on CTA, no active bleed, no embolization, Return to OR for re-exploration and evacuation of old clot  - 4/28: IR consulted for IVC filter and PICC. LE duplex ordered, negative.  - 5/2: 1u PRBC for 500mL sanguinous output from wound vac   #Acute right pulmonary embolus  - Therapeutic Heparin gtt discontinued 5/2 secondary to venous bleed from wound vac   #B/L UE Venous Duplex 4/26/27 - thrombophlebitis  - B/L LE Venous duplex 4/21-RIGHT PT DVT, no LEFT DVT  - B/L Duplex 5/12: DVT in the right femoral vein. DVT in left common femoral vein.  - Plan for IVC filter with IR tomorrow 5/13  - Start Lovenox 60mg q12 with plan to advance to full AC   - Vascular surgery consulted - AC with heparin drip (upon discharge transition to Eliquis 10 mg po BID x 7 days then 5 mg po BID for at least 6 months      Labs: Hb/Hct:  8.6/26.7  -->,  8.3/25.6  -->                      Plts:  223  -->,  199  -->                 PTT/INR:          ID:  #Intraabdominal anastomotic leak, fluid collection    -ESR, CRP for 3 days  #ID Consult  - trial off antibiotics 5/6 per Dr. Bermudez and Dr. Lema  - Midline x Ertapenem 1g Q24 on discharge x 4 weeks (EOT 5/19)  - F/u with Dr. Perdue on Tuesdays via Telehealth   #Culture    OR 4/1 - E Coli    OR cx: few E.coli, few bacteroides, rare clostridium    4/13 Blood Cx: NGTF    4/14 tracheal aspirate: no growth     4/17 body fluid cx rare e. coli     4/22 Blood cx: negative    4/22 Abdominal - Negative FINAL    4/27 C diff stool panel: negative    4/28 blood culture: negative  WBC- 7.30  --->>,  5.40  --->>,  5.10  --->>  Temp trend- 24hrs T(F): 97.1 (05-14 @ 20:00), Max: 97.8 (05-14 @ 08:00)  Current antibiotics-n/a      ENDOCRINE:  #Glycemic monitoring  - ACHS fingersticks  - ISS  - A1C 5.8%     MSK:  #Activity- increase as tolerated  #Physiatry    -bedside PT, will evaluate for inpatient 4A placement, not a candidate as of 5/7  #PT consult    -rehab facility on discharge 3 to 5x/week   #OT consult    -pending for ROM within bed    SKIN:  #midline incision staples in place f/u w/ primary removal timeline (last OR 4/27)  #left forearm skin tear   - Continue to monitor daily skin changes  #left knee skin tear  #sacral fissure 5/3      LINES/DRAINS:  PIV    LLQ pelvis beatrice drain (4/28 -     Midline subcutaneous beatrice drain (4/28 -     Discontinued lines:    Right Femoral Arterial Line (4/27 - 4/28)    Prevena Vac Midline (removed 5/2)     Left radial arterial line (4/28 - 5/5)    RLQ pelvis beatrice drain (4/28 - 5/5)    ADVANCED DIRECTIVES:  Full Code  Emergency - Daughter  (Shavonne Ajay)  INDICATION FOR SICU/SDU: Intestinal obstruction    DISPO: SICU - discussed with Dr. Alva    ASSESSMENT & PLAN:  64F PMHx HTN, morbid obesity s/p 2001 Abby-en-Y gastric bypass who presented on 4/3 with incarcerated ventral hernia with SBO. Patient downgraded to the floor. found to have PEs with b/l DVT, upgraded to SICU 5/13 for close monitoring before and after IVC filter placement and while patient restarts anticoagulation      NEUROLOGICAL:  #Acute pain    acetaminophen Tablet 650 Oral every 6 hours PRN mild pain   #Sleep hygiene  - HOLDING Trazodone 25mg QPM  - Melatonin 10mg QPM    RESPIRATORY:   #Acute hypoxic respiratory failure, resolved  - Extubated 4/18, reintubated on 4/27 for AMS and agonal breathing, extubated 4/30  - Currently on RA  - Home CPAP @ night  - duoneb treatment Q6  #Right distal main pulmonary embolus with segmental extension/B/L DVT   - currently on lovenox 60mg q12, uptitrate per surgery, heme/onc recommendations  - IVC filter placement aborted due to difficult anatomy 5/13  #Intermittent diuresis for fluid overload  - Last diuresis 5/13- lasix 20  #PMHx [place] on CPAP@ home   #Activity increase as tolerated    CARDS:   #Hypertensive urgency, resolved  #Acute hemorrhagic shock, resolved  - 4/27: 2u PRBC 1plt 1uFFP, 1g TXA  #Supraventricular tachycardia, Non-sustained ventricular tachycardia, Ventricular Premature Depolarization  - Cardiology (Dr. Steven) - Metoprolol 25mg Q12 HOLDING  #PMHx of HTN  - continue Amlodipine 10mg QD  - continue Losartan 100mg qd  - HCTZ 25mg QD HOLDING  - EKG- NSR  - TTE 4/22: EF 70 to 75%  - TTE 5/14: EF 60-65%    GASTROINTESTINAL/NUTRITION:   #4/10 open ventral hernia repair, small bowel resection, ileoileal anastomosis and primary fascial closure, 2 THONY Right  #4/17 RTOR for exploratory laparotomy, repair of anastomotic leak at previous ileoileal anastomosis, resected, new ileoileal anastomosis created, 1 THONY Left (total 3)  #4/22: s/p IR percutaneous placement of a 8.5 [place] drainage catheter into lower abdominal wall fluid collection, yielding 150 mL of foul appearing fluid.  #4/27: Acute retroperitoneal CTA: mesenteric arterial blush noted with extravasation  #4/27 RTOR for re-exploration and evacuation of old clots from retroperitoneum and pelvis    - s/p IR without finding of any bleeding, no embolization required    -5/2 Midline wound vac removed   #Diet: NPO for IR today, post procedure resume DASH with ensure clears  - on vitamin B12 supplement  - multivitamin  - probiotic   - Aspiration precautions, HOB 30  #GI Prophylaxis/hx GERD  - Pantoprazole 40mg PO qD (prn home rx)   #Bowel regimen    -last bowel movement 5/12    /RENAL:   #Urine output in critically ill  - IVL  - Lasix 20mg IVP 5/11  #FUNMI; oliguric - resolved  - Renal u/s> no hydronephrosis. 2-3cm anechoic cyst on right kidney   #Lactic acidosis - resolved  - Nephro: check CK, no indication of RRT, decrease meropenem to Q12    Labs:          BUN/Cr- 17/0.7  -->          [05-13 @ 20:00]Na  141 // K  3.5 // Mg  1.9 // Phos  3.2      HEME/ONC:   #DVT PPX  - S/p LVX 80mg x2; started Eliquis 5mg BID 5/14  - SCD B/L  #acute anemia   - 4/27: 2uPRBC 1uFFP 1pkPlt, TXA 1g, IR for embolization due to active extravasation seen on CTA, no active bleed, no embolization, Return to OR for re-exploration and evacuation of old clot  - 4/28: IR consulted for IVC filter and PICC. LE duplex ordered, negative.  - 5/2: 1u PRBC for 500mL sanguinous output from wound vac   #Acute right pulmonary embolus  - Therapeutic Heparin gtt discontinued 5/2 secondary to venous bleed from wound vac   #B/L UE Venous Duplex 4/26/27 - thrombophlebitis  - B/L LE Venous duplex 4/21-RIGHT PT DVT, no LEFT DVT  - B/L Duplex 5/12: DVT in the right femoral vein. DVT in left common femoral vein.  - Plan for IVC filter with IR tomorrow 5/13  - Start Lovenox 60mg q12 with plan to advance to full AC   - Vascular surgery consulted - AC with heparin drip (upon discharge transition to Eliquis 10 mg po BID x 7 days then 5 mg po BID for at least 6 months      Labs: Hb/Hct:  8.6/26.7  -->,  8.3/25.6  -->                      Plts:  223  -->,  199  -->                 PTT/INR:          ID:  #Intraabdominal anastomotic leak, fluid collection    -ESR, CRP for 3 days  #ID Consult  - trial off antibiotics 5/6 per Dr. Bermudez and Dr. Lema  - Midline x Ertapenem 1g Q24 on discharge x 4 weeks (EOT 5/19)  - F/u with Dr. Perdue on Tuesdays via Telehealth   #Culture    OR 4/1 - E Coli    OR cx: few E.coli, few bacteroides, rare clostridium    4/13 Blood Cx: NGTF    4/14 tracheal aspirate: no growth     4/17 body fluid cx rare e. coli     4/22 Blood cx: negative    4/22 Abdominal - Negative FINAL    4/27 C diff stool panel: negative    4/28 blood culture: negative  WBC- 7.30  --->>,  5.40  --->>,  5.10  --->>  Temp trend- 24hrs T(F): 97.1 (05-14 @ 20:00), Max: 97.8 (05-14 @ 08:00)  Current antibiotics-n/a      ENDOCRINE:  #Glycemic monitoring  - ACHS fingersticks  - ISS  - A1C 5.8%     MSK:  #Activity- increase as tolerated  #Physiatry    -bedside PT, will evaluate for inpatient 4A placement, not a candidate as of 5/7  #PT consult    -rehab facility on discharge 3 to 5x/week   #OT consult    -pending for ROM within bed    SKIN:  #midline incision staples in place f/u w/ primary removal timeline (last OR 4/27)  #left forearm skin tear   - Continue to monitor daily skin changes  #left knee skin tear  #sacral fissure 5/3      LINES/DRAINS:  PIV    LLQ pelvis beatrice drain (4/28 -     Midline subcutaneous beatrice drain (4/28 -     Discontinued lines:    Right Femoral Arterial Line (4/27 - 4/28)    Prevena Vac Midline (removed 5/2)     Left radial arterial line (4/28 - 5/5)    RLQ pelvis beatrice drain (4/28 - 5/5)    ADVANCED DIRECTIVES:  Full Code  Emergency - Daughter  (Shavonne Moss)  INDICATION FOR SICU/SDU: Intestinal obstruction    DISPO: SICU - discussed with Dr. Alva

## 2025-05-15 NOTE — CHART NOTE - NSCHARTNOTEFT_GEN_A_CORE
SICU Transfer Note-***IN PROGRESS**    NANCY SARGENT  65y (1960)  [id number]      Transfer from: SICU  Transfer to: Surgery-4C      SICU COURSE:  SICU COURSE EVENTS :  04-10 - admitted to SICU service  : Intubated and started on paralytic. Arterial line placed, subclavian TLC placed. Nephrology consulted for oliguria. IR abdominal muscle botox injection performed. TTE performed.   : Additional fluid boluses given; trial fo bumex started, considering CVVH. Weaned off nimbex gtt.  > 220 /hr. Renal U/S w/out hydro.   : Weaned off Levophed. Bumex gtt 2mg/hr > 1mg/hr. Goal net negative 1-1.5L, UOP approx, 200c/hr.   : Remained on bumex gtt for further diuresis, decreased to 0.5 overnight, vent settings weaned. Cr downtrending, LFTs worsening.   4/15: Extubated to BiPAP, transitioned to NC. Dc'd bumex gtt, given 5mg metolazone x 1 and 2mg bumex x 1.   : THONY drain #2 murky/bilious, pending CTAP with PO contrast, hypernatremia, started D5W @ 75cc/hr, persistent hypokalemia   : RTOR for resection of anastomosis for anastomotic leak. Returned intubated, 400/24/80/10, sinus tachy to . Abdomen soft. Was initially on propofol @ 30 and precedex, but propofol weaned off due to hypotension with MAPs in 50s, fentanyl ggt started for acute pain. Remained hypotensive despite fluids, started on Levophed, on 0.05.   : Extubated. HFNC 40L/49%, Levo off since 11 AM   : NGT removed. Started on duonebs. D5W increased to 60cc/hr. 1mg bumex, > 1.5L response  : Episode of afib RVR resolved with metoprolol 5mg IVP, cardiology c/s placed, recommending metoprolol 25mg q 12   : PE on CTA, therapeutic heparin gtt started. Started on cardebe gtt,   : Meropenem started per ID.  S/p IR percutaneous placement of a 8.5 Monegasque drainage catheter into lower abdominal wall fluid collection, yielding 150 mL of bowel appearing fluid. New left radial a-line placed. Off nicarrdipine gtt.   : Switched to therapeutic lovenox. Diet advanced to aubrey clears with ensures.  : advacned to bariatric FLD, downgraded to SDU  : TPN discontinued, diet advanced to full liquid , Right cephalic DVT prelim  : Right UE VA Duplex final no DVT, thromboplebitis, Arterial line removed, 2L NC, midline vac changed, noted to have bilious drainage, RUQ drain pulled, upgraded to SICU status, plan to reorder TPN for  PM, diet changed to full liquid. 1:20 AM noted to be unresponsive, palpable femoral pulse, decision made to intubate, R femoral arterial line placed, started on levophed, amuari, bicarb amp x 2, bicarb gtt, propofol, 2U pRBC for hgb 5.9, plan for CTH and CTAP when stabilized   : 2uPRBC 1uFFP 1pkPlt, TXA 1g, IR for embolization due to active extravasation seen on CTA, no active bleed, no embolization, Return to OR for re-exploration and evacuation of old clot  : IR consulted for IVC filter and PICC. LE duplex ordered, negative. Precedex added overnight for agitation. 1 U PRBC for Hgb 6.8 overnight  : TF started at 20cc/hr. Meropenem decreased to Q 12 hours.   : Extubated to 5L NC, then placed on HFNC. Heparin gtt restarted.   : Placed on BiPAP, started on aubrey clears w/ clear ensures   : passed TOV. On 6L NC. Bumex 1mg.   : diuresis with bumex 1mg, RLQ drain removed  : Bumex 1mg. D/c meropenem. DG to SDU.   : DG 4C.  : reupgrade to SICU for hemodynamic monitoring after restarting anticoagulation  : IR for IVC filter, unable to place due to anatomy. Started on 80mg lovenox BID, transitioning to Eliquis tomorrow.   : Started on Eliquis  5/15: DGRAMIREZ, approved by Dr. Alva                              PAST MEDICAL & SURGICAL HISTORY:  Gastric bypass status for obesity      LOUIS (obstructive sleep apnea)      S/P gastric bypass      S/P       S/P small bowel resection      History of total bilateral knee replacement (TKR)      H/O colonoscopy          Allergies    No Known Allergies    Intolerances      MEDICATIONS  (STANDING):  albuterol/ipratropium for Nebulization 3 milliLiter(s) Nebulizer every 6 hours  amLODIPine   Tablet 10 milliGRAM(s) Oral daily  apixaban 5 milliGRAM(s) Oral every 12 hours  chlorhexidine 2% Cloths 1 Application(s) Topical <User Schedule>  cyanocobalamin 1000 MICROGram(s) Oral daily  ergocalciferol 53968 Unit(s) Oral every week  insulin lispro (ADMELOG) corrective regimen sliding scale   SubCutaneous Before meals and at bedtime  lactobacillus acidophilus 1 Tablet(s) Oral daily  losartan 100 milliGRAM(s) Oral daily  melatonin 10 milliGRAM(s) Oral at bedtime  multivitamin/minerals 1 Tablet(s) Oral daily  pantoprazole    Tablet 40 milliGRAM(s) Oral before breakfast    MEDICATIONS  (PRN):  acetaminophen     Tablet .. 650 milliGRAM(s) Oral every 6 hours PRN Mild Pain (1 - 3)  sodium chloride 0.65% Nasal 1 Spray(s) Both Nostrils daily PRN Nasal Congestion      Vital Signs Last 24 Hrs  T(C): 36.2 (15 May 2025 08:00), Max: 36.6 (15 May 2025 04:00)  T(F): 97.2 (15 May 2025 08:00), Max: 97.9 (15 May 2025 04:00)  HR: 108 (15 May 2025 08:00) (89 - 109)  BP: 138/80 (15 May 2025 08:00) (128/66 - 169/81)  BP(mean): 104 (15 May 2025 08:00) (89 - 130)  RR: 20 (15 May 2025 08:00) (18 - 20)  SpO2: 94% (15 May 2025 08:00) (93% - 98%)    Parameters below as of 15 May 2025 08:00  Patient On (Oxygen Delivery Method): room air      I&O's Summary    14 May 2025 07:01  -  15 May 2025 07:00  --------------------------------------------------------  IN: 1000 mL / OUT: 2100 mL / NET: -1100 mL    15 May 2025 07:01  -  15 May 2025 10:13  --------------------------------------------------------  IN: 0 mL / OUT: 300 mL / NET: -300 mL        LABS  LABS:                        8.6    5.43  )-----------( 214      ( 15 May 2025 01:38 )             26.6       05-15    140  |  108  |  14  ----------------------------<  108[H]  3.6   |  22  |  0.7    Ca    7.8[L]      15 May 2025 01:38  Phos  2.7     05-15  Mg     1.7     05-15        PT/INR - ( 15 May 2025 01:38 )   PT: 11.30 sec;   INR: 0.96 ratio         PTT - ( 15 May 2025 01:38 )  PTT:25.5 sec            Pro-Brain Natriuretic Peptide: 929 pg/mL (25 @ 21:42)                  Assessment & Plan:  ASSESSMENT & PLAN:  64F PMHx HTN, morbid obesity s/p  Abby-en-Y gastric bypass who presented on 4/3 with incarcerated ventral hernia with SBO. Patient downgraded to the floor. found to have PEs with b/l DVT, upgraded to SICU  for close monitoring before and after IVC filter placement and while patient restarts anticoagulation      NEUROLOGICAL:  #Acute pain    acetaminophen Tablet 650 Oral every 6 hours PRN mild pain   #Sleep hygiene  - HOLDING Trazodone 25mg QPM  - Melatonin 10mg QPM    RESPIRATORY:   #Acute hypoxic respiratory failure, resolved  - Extubated , reintubated on  for AMS and agonal breathing, extubated   - Currently on RA  - Home CPAP @ night  - duoneb treatment Q6  #Right distal main pulmonary embolus with segmental extension/B/L DVT   - currently on lovenox 60mg q12, uptitrate per surgery, heme/onc recommendations  - IVC filter placement aborted due to difficult anatomy   #Intermittent diuresis for fluid overload  - Last diuresis - lasix 20  #PMHx LOUIS on CPAP@ home   #Activity increase as tolerated    CARDS:   #Hypertensive urgency, resolved  #Acute hemorrhagic shock, resolved  - : 2u PRBC 1plt 1uFFP, 1g TXA  #Supraventricular tachycardia, Non-sustained ventricular tachycardia, Ventricular Premature Depolarization  - Cardiology (Dr. Steven) - Metoprolol 25mg Q12 HOLDING  #PMHx of HTN  - continue Amlodipine 10mg QD  - continue Losartan 100mg qd  - HCTZ 25mg QD HOLDING  - EKG- NSR  - TTE : EF 70 to 75%  - TTE : EF 60-65%    GASTROINTESTINAL/NUTRITION:   #4/10 open ventral hernia repair, small bowel resection, ileoileal anastomosis and primary fascial closure, 2 THONY Right  # RTOR for exploratory laparotomy, repair of anastomotic leak at previous ileoileal anastomosis, resected, new ileoileal anastomosis created, 1 THONY Left (total 3)  #: s/p IR percutaneous placement of a 8.5 Monegasque drainage catheter into lower abdominal wall fluid collection, yielding 150 mL of foul appearing fluid.  #: Acute retroperitoneal CTA: mesenteric arterial blush noted with extravasation  # RTOR for re-exploration and evacuation of old clots from retroperitoneum and pelvis    - s/p IR without finding of any bleeding, no embolization required    - Midline wound vac removed   #Diet: DASH/TLC  - on vitamin B12 supplement  - multivitamin  - probiotic   - Aspiration precautions, HOB 30  #GI Prophylaxis/hx GERD  - Pantoprazole 40mg PO qD (prn home rx)   #Bowel regimen    -last bowel movement     /RENAL:   #Urine output in critically ill  - IVL  - Lasix 20mg IVP   #FUNMI; oliguric - resolved  - Renal u/s> no hydronephrosis. 2-3cm anechoic cyst on right kidney   #Lactic acidosis - resolved  - Nephro: check CK, no indication of RRT, decrease meropenem to Q12  WBC- 5.40  --->>,  5.10  --->>,  5.43  --->>  Temp trend- 24hrs T(F): 97.2 (-15 @ 08:00), Max: 97.9 (05-15 @ 04:00)        HEME/ONC:   #DVT PPX  - S/p LVX 80mg x2; started Eliquis 5mg BID   - SCD B/L  #acute anemia   - : 2uPRBC 1uFFP 1pkPlt, TXA 1g, IR for embolization due to active extravasation seen on CTA, no active bleed, no embolization, Return to OR for re-exploration and evacuation of old clot  - : IR consulted for IVC filter and PICC. LE duplex ordered, negative.  - : 1u PRBC for 500mL sanguinous output from wound vac   #Acute right pulmonary embolus  - Therapeutic Heparin gtt discontinued  secondary to venous bleed from wound vac   #B/L UE Venous Duplex 27 - thrombophlebitis  - B/L LE Venous duplex -RIGHT PT DVT, no LEFT DVT  - B/L Duplex : DVT in the right femoral vein. DVT in left common femoral vein.  - Plan for IVC filter with IR tomorrow   - Start Lovenox 60mg q12 with plan to advance to full AC   - Vascular surgery consulted - AC with heparin drip (upon discharge transition to Eliquis 10 mg po BID x 7 days then 5 mg po BID for at least 6 months      Labs: Hb/Hct:  8.3/25.6  -->,  8.6/26.6  -->                      Plts:  199  -->,  214  -->                 PTT/INR:  25.5/0.96  --->          ID:  #Intraabdominal anastomotic leak, fluid collection    -ESR, CRP for 3 days  #ID Consult  - trial off antibiotics  per Dr. Bermudez and Dr. Lema  - Midline x Ertapenem 1g Q24 on discharge x 4 weeks (EOT )  - F/u with Dr. Perdue on  via Telehealth   #Culture    OR  - E Coli    OR cx: few E.coli, few bacteroides, rare clostridium     Blood Cx: NGTF     tracheal aspirate: no growth      body fluid cx rare e. coli      Blood cx: negative     Abdominal - Negative FINAL     C diff stool panel: negative     blood culture: negative  WBC- 5.40  --->>,  5.10  --->>,  5.43  --->>  Temp trend- 24hrs T(F): 97.2 (05-15 @ 08:00), Max: 97.9 (05-15 @ 04:00)        ENDOCRINE:  #Glycemic monitoring  - ACHS fingersticks  - ISS  - A1C 5.8%     MSK:  #Activity- increase as tolerated  #Physiatry    -bedside PT, will evaluate for inpatient 4A placement, not a candidate as of   #PT consult    -rehab facility on discharge 3 to 5x/week   #OT consult    -pending for ROM within bed    SKIN:  #midline incision staples in place f/u w/ primary removal timeline (last OR )  #left forearm skin tear   - Continue to monitor daily skin changes  #left knee skin tear  #sacral fissure 5/3      LINES/DRAINS:  PIV    LLQ pelvis beatrice drain ( -     Midline subcutaneous beatrice drain ( -     Discontinued lines:    Right Femoral Arterial Line ( - )    Prevena Vac Midline (removed )     Left radial arterial line ( - )    RLQ pelvis beatrice drain ( - )    ADVANCED DIRECTIVES:  Full Code  Emergency - Daughter  (Shavonne Moss)  INDICATION FOR SICU/SDU: Intestinal obstruction    DISPO: Floor- discussed with Dr. Alva             Follow Up:  - labs  -f/u changing eliquis to 10mg BID   -Dispo planning        Signed out to:  Date:  Time: SICU Transfer Note-***IN PROGRESS**    NANCY SARGENT  65y (1960)  [id number]      Transfer from: SICU  Transfer to: Surgery-4C      SICU COURSE:  SICU COURSE EVENTS :  04-10 - admitted to SICU service  : Intubated and started on paralytic. Arterial line placed, subclavian TLC placed. Nephrology consulted for oliguria. IR abdominal muscle botox injection performed. TTE performed.   : Additional fluid boluses given; trial fo bumex started, considering CVVH. Weaned off nimbex gtt.  > 220 /hr. Renal U/S w/out hydro.   : Weaned off Levophed. Bumex gtt 2mg/hr > 1mg/hr. Goal net negative 1-1.5L, UOP approx, 200c/hr.   : Remained on bumex gtt for further diuresis, decreased to 0.5 overnight, vent settings weaned. Cr downtrending, LFTs worsening.   4/15: Extubated to BiPAP, transitioned to NC. Dc'd bumex gtt, given 5mg metolazone x 1 and 2mg bumex x 1.   : THONY drain #2 murky/bilious, pending CTAP with PO contrast, hypernatremia, started D5W @ 75cc/hr, persistent hypokalemia   : RTOR for resection of anastomosis for anastomotic leak. Returned intubated, 400/24/80/10, sinus tachy to . Abdomen soft. Was initially on propofol @ 30 and precedex, but propofol weaned off due to hypotension with MAPs in 50s, fentanyl ggt started for acute pain. Remained hypotensive despite fluids, started on Levophed, on 0.05.   : Extubated. HFNC 40L/49%, Levo off since 11 AM   : NGT removed. Started on duonebs. D5W increased to 60cc/hr. 1mg bumex, > 1.5L response  : Episode of afib RVR resolved with metoprolol 5mg IVP, cardiology c/s placed, recommending metoprolol 25mg q 12   : PE on CTA, therapeutic heparin gtt started. Started on cardebe gtt,   : Meropenem started per ID.  S/p IR percutaneous placement of a 8.5 Armenian drainage catheter into lower abdominal wall fluid collection, yielding 150 mL of bowel appearing fluid. New left radial a-line placed. Off nicarrdipine gtt.   : Switched to therapeutic lovenox. Diet advanced to aubrey clears with ensures.  : advacned to bariatric FLD, downgraded to SDU  : TPN discontinued, diet advanced to full liquid , Right cephalic DVT prelim  : Right UE VA Duplex final no DVT, thromboplebitis, Arterial line removed, 2L NC, midline vac changed, noted to have bilious drainage, RUQ drain pulled, upgraded to SICU status, plan to reorder TPN for  PM, diet changed to full liquid. 1:20 AM noted to be unresponsive, palpable femoral pulse, decision made to intubate, R femoral arterial line placed, started on levophed, amauri, bicarb amp x 2, bicarb gtt, propofol, 2U pRBC for hgb 5.9, plan for CTH and CTAP when stabilized   : 2uPRBC 1uFFP 1pkPlt, TXA 1g, IR for embolization due to active extravasation seen on CTA, no active bleed, no embolization, Return to OR for re-exploration and evacuation of old clot  : IR consulted for IVC filter and PICC. LE duplex ordered, negative. Precedex added overnight for agitation. 1 U PRBC for Hgb 6.8 overnight  : TF started at 20cc/hr. Meropenem decreased to Q 12 hours.   : Extubated to 5L NC, then placed on HFNC. Heparin gtt restarted.   : Placed on BiPAP, started on aubrey clears w/ clear ensures   : passed TOV. On 6L NC. Bumex 1mg.   : diuresis with bumex 1mg, RLQ drain removed  : Bumex 1mg. D/c meropenem. DG to SDU.   : DG 4C.  : reupgrade to SICU for hemodynamic monitoring after restarting anticoagulation  : IR for IVC filter, unable to place due to anatomy. Started on 80mg lovenox BID, transitioning to Eliquis tomorrow.   : Started on Eliquis  5/15: DGRAMIREZ, approved by Dr. Alva                              PAST MEDICAL & SURGICAL HISTORY:  Gastric bypass status for obesity      LOUIS (obstructive sleep apnea)      S/P gastric bypass      S/P       S/P small bowel resection      History of total bilateral knee replacement (TKR)      H/O colonoscopy          Allergies    No Known Allergies    Intolerances      MEDICATIONS  (STANDING):  albuterol/ipratropium for Nebulization 3 milliLiter(s) Nebulizer every 6 hours  amLODIPine   Tablet 10 milliGRAM(s) Oral daily  apixaban 5 milliGRAM(s) Oral every 12 hours  chlorhexidine 2% Cloths 1 Application(s) Topical <User Schedule>  cyanocobalamin 1000 MICROGram(s) Oral daily  ergocalciferol 84555 Unit(s) Oral every week  insulin lispro (ADMELOG) corrective regimen sliding scale   SubCutaneous Before meals and at bedtime  lactobacillus acidophilus 1 Tablet(s) Oral daily  losartan 100 milliGRAM(s) Oral daily  melatonin 10 milliGRAM(s) Oral at bedtime  multivitamin/minerals 1 Tablet(s) Oral daily  pantoprazole    Tablet 40 milliGRAM(s) Oral before breakfast    MEDICATIONS  (PRN):  acetaminophen     Tablet .. 650 milliGRAM(s) Oral every 6 hours PRN Mild Pain (1 - 3)  sodium chloride 0.65% Nasal 1 Spray(s) Both Nostrils daily PRN Nasal Congestion      Vital Signs Last 24 Hrs  T(C): 36.2 (15 May 2025 08:00), Max: 36.6 (15 May 2025 04:00)  T(F): 97.2 (15 May 2025 08:00), Max: 97.9 (15 May 2025 04:00)  HR: 108 (15 May 2025 08:00) (89 - 109)  BP: 138/80 (15 May 2025 08:00) (128/66 - 169/81)  BP(mean): 104 (15 May 2025 08:00) (89 - 130)  RR: 20 (15 May 2025 08:00) (18 - 20)  SpO2: 94% (15 May 2025 08:00) (93% - 98%)    Parameters below as of 15 May 2025 08:00  Patient On (Oxygen Delivery Method): room air      I&O's Summary    14 May 2025 07:01  -  15 May 2025 07:00  --------------------------------------------------------  IN: 1000 mL / OUT: 2100 mL / NET: -1100 mL    15 May 2025 07:01  -  15 May 2025 10:13  --------------------------------------------------------  IN: 0 mL / OUT: 300 mL / NET: -300 mL        LABS  LABS:                        8.6    5.43  )-----------( 214      ( 15 May 2025 01:38 )             26.6       05-15    140  |  108  |  14  ----------------------------<  108[H]  3.6   |  22  |  0.7    Ca    7.8[L]      15 May 2025 01:38  Phos  2.7     05-15  Mg     1.7     05-15        PT/INR - ( 15 May 2025 01:38 )   PT: 11.30 sec;   INR: 0.96 ratio         PTT - ( 15 May 2025 01:38 )  PTT:25.5 sec            Pro-Brain Natriuretic Peptide: 929 pg/mL (25 @ 21:42)                  Assessment & Plan:  ASSESSMENT & PLAN:  64F PMHx HTN, morbid obesity s/p  Abby-en-Y gastric bypass who presented on 4/3 with incarcerated ventral hernia with SBO. Patient downgraded to the floor. found to have PEs with b/l DVT, upgraded to SICU  for close monitoring before and after IVC filter placement and while patient restarts anticoagulation      NEUROLOGICAL:  #Acute pain    acetaminophen Tablet 650 Oral every 6 hours PRN mild pain   #Sleep hygiene  - HOLDING Trazodone 25mg QPM  - Melatonin 10mg QPM    RESPIRATORY:   #Acute hypoxic respiratory failure, resolved  - Extubated , reintubated on  for AMS and agonal breathing, extubated   - Currently on RA  - Home CPAP @ night  - duoneb treatment Q6  #Right distal main pulmonary embolus with segmental extension/B/L DVT   - currently on lovenox 60mg q12, uptitrate per surgery, heme/onc recommendations  - IVC filter placement aborted due to difficult anatomy   #Intermittent diuresis for fluid overload  - Last diuresis - lasix 20  #PMHx LOUIS on CPAP@ home   #Activity increase as tolerated    CARDS:   #Hypertensive urgency, resolved  #Acute hemorrhagic shock, resolved  - : 2u PRBC 1plt 1uFFP, 1g TXA  #Supraventricular tachycardia, Non-sustained ventricular tachycardia, Ventricular Premature Depolarization  - Cardiology (Dr. Steven) - Metoprolol 25mg Q12 HOLDING  #PMHx of HTN  - continue Amlodipine 10mg QD  - continue Losartan 100mg qd  - HCTZ 25mg QD HOLDING  - EKG- NSR  - TTE : EF 70 to 75%  - TTE : EF 60-65%    GASTROINTESTINAL/NUTRITION:   #4/10 open ventral hernia repair, small bowel resection, ileoileal anastomosis and primary fascial closure, 2 THONY Right  # RTOR for exploratory laparotomy, repair of anastomotic leak at previous ileoileal anastomosis, resected, new ileoileal anastomosis created, 1 THONY Left (total 3)  #: s/p IR percutaneous placement of a 8.5 Armenian drainage catheter into lower abdominal wall fluid collection, yielding 150 mL of foul appearing fluid.  #: Acute retroperitoneal CTA: mesenteric arterial blush noted with extravasation  # RTOR for re-exploration and evacuation of old clots from retroperitoneum and pelvis    - s/p IR without finding of any bleeding, no embolization required    - Midline wound vac removed   #Diet: DASH/TLC  - on vitamin B12 supplement  - multivitamin  - probiotic   - Aspiration precautions, HOB 30  #GI Prophylaxis/hx GERD  - Pantoprazole 40mg PO qD (prn home rx)   #Bowel regimen    -last bowel movement     /RENAL:   #Urine output in critically ill  - IVL  - Lasix 20mg IVP   #FUNMI; oliguric - resolved  - Renal u/s> no hydronephrosis. 2-3cm anechoic cyst on right kidney   #Lactic acidosis - resolved  - Nephro: check CK, no indication of RRT, decrease meropenem to Q12  WBC- 5.40  --->>,  5.10  --->>,  5.43  --->>  Temp trend- 24hrs T(F): 97.2 (05-15 @ 08:00), Max: 97.9 (05-15 @ 04:00)        HEME/ONC:   #DVT PPX  - S/p LVX 80mg x2; started Eliquis 5mg BID   - SCD B/L  #acute anemia   - : 2uPRBC 1uFFP 1pkPlt, TXA 1g, IR for embolization due to active extravasation seen on CTA, no active bleed, no embolization, Return to OR for re-exploration and evacuation of old clot  - : IR consulted for IVC filter and PICC. LE duplex ordered, negative.  - : 1u PRBC for 500mL sanguinous output from wound vac   #Acute right pulmonary embolus  - Therapeutic Heparin gtt discontinued  secondary to venous bleed from wound vac   #B/L UE Venous Duplex 27 - thrombophlebitis  - B/L LE Venous duplex -RIGHT PT DVT, no LEFT DVT  - B/L Duplex : DVT in the right femoral vein. DVT in left common femoral vein.  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  Even if a filter can be precisely placed there, this location could be potential issue when it comes time to retrieve it, as the distance from the jugular vein access is  an important factor.  In light of these findings, filter not placed.  - Vascular surgery consulted - AC with heparin drip (upon discharge transition to Eliquis 10 mg po BID x 7 days then 5 mg po BID for at least 6 months      Labs: Hb/Hct:  8.3/25.6  -->,  8.6/26.6  -->                      Plts:  199  -->,  214  -->                 PTT/INR:  25.5/0.96  --->          ID:  #Intraabdominal anastomotic leak, fluid collection    -ESR, CRP for 3 days  #ID Consult  - trial off antibiotics  per Dr. Bermudez and Dr. Lema  - Midline x Ertapenem 1g Q24 on discharge x 4 weeks (EOT )  - F/u with Dr. Perdue on  via Telehealth   #Culture    OR  - E Coli    OR cx: few E.coli, few bacteroides, rare clostridium     Blood Cx: NGTF     tracheal aspirate: no growth      body fluid cx rare e. coli      Blood cx: negative     Abdominal - Negative FINAL     C diff stool panel: negative     blood culture: negative  WBC- 5.40  --->>,  5.10  --->>,  5.43  --->>  Temp trend- 24hrs T(F): 97.2 (05-15 @ 08:00), Max: 97.9 (05-15 @ 04:00)        ENDOCRINE:  #Glycemic monitoring  - ACHS fingersticks  - ISS  - A1C 5.8%     MSK:  #Activity- increase as tolerated  #Physiatry    -bedside PT, will evaluate for inpatient 4A placement, not a candidate as of   #PT consult    -rehab facility on discharge 3 to 5x/week   #OT consult    -pending for ROM within bed    SKIN:  #midline incision staples in place f/u w/ primary removal timeline (last OR )  #left forearm skin tear   - Continue to monitor daily skin changes  #left knee skin tear  #sacral fissure 5/3      LINES/DRAINS:  PIV    LLQ pelvis beatrice drain ( -     Midline subcutaneous beatrice drain ( -     Discontinued lines:    Right Femoral Arterial Line ( - )    Prevena Vac Midline (removed )     Left radial arterial line ( - )    RLQ pelvis beatrice drain ( - )    ADVANCED DIRECTIVES:  Full Code  Emergency - Daughter  (Shaovnne Moss)  INDICATION FOR SICU/SDU: Intestinal obstruction    DISPO: Floor- discussed with Dr. Alva             Follow Up:  - labs  -f/u changing eliquis to 10mg BID   -Midline x Ertapenem 1g Q24 on discharge x 4 weeks   -Dispo planning        Signed out to:  Date:  Time: SICU Transfer Note-    NANCY SARGENT  65y (1960)  [id number]      Transfer from: SICU  Transfer to: Surgery-4C      SICU COURSE:  SICU COURSE EVENTS :  04-10 - admitted to SICU service  : Intubated and started on paralytic. Arterial line placed, subclavian TLC placed. Nephrology consulted for oliguria. IR abdominal muscle botox injection performed. TTE performed.   : Additional fluid boluses given; trial fo bumex started, considering CVVH. Weaned off nimbex gtt.  > 220 /hr. Renal U/S w/out hydro.   : Weaned off Levophed. Bumex gtt 2mg/hr > 1mg/hr. Goal net negative 1-1.5L, UOP approx, 200c/hr.   : Remained on bumex gtt for further diuresis, decreased to 0.5 overnight, vent settings weaned. Cr downtrending, LFTs worsening.   4/15: Extubated to BiPAP, transitioned to NC. Dc'd bumex gtt, given 5mg metolazone x 1 and 2mg bumex x 1.   : THONY drain #2 murky/bilious, pending CTAP with PO contrast, hypernatremia, started D5W @ 75cc/hr, persistent hypokalemia   : RTOR for resection of anastomosis for anastomotic leak. Returned intubated, 400/24/80/10, sinus tachy to . Abdomen soft. Was initially on propofol @ 30 and precedex, but propofol weaned off due to hypotension with MAPs in 50s, fentanyl ggt started for acute pain. Remained hypotensive despite fluids, started on Levophed, on 0.05.   : Extubated. HFNC 40L/49%, Levo off since 11 AM   : NGT removed. Started on duonebs. D5W increased to 60cc/hr. 1mg bumex, > 1.5L response  : Episode of afib RVR resolved with metoprolol 5mg IVP, cardiology c/s placed, recommending metoprolol 25mg q 12   : PE on CTA, therapeutic heparin gtt started. Started on cardene gtt,   : Meropenem started per ID.  S/p IR percutaneous placement of a 8.5 [place]ar[place] drainage catheter into lower abdominal wall fluid collection, yielding 150 mL of bowel appearing fluid. New left radial a-line placed. Off nicardipine gtt.   : Switched to therapeutic lovenox. Diet advanced to aubrey clears with ensures.  : advacned to bariatric FLD, downgraded to SDU  : TPN discontinued, diet advanced to full liquid , Right cephalic DVT prelim  : Right UE VA Duplex final no DVT, thromboplebitis, Arterial line removed, 2L NC, midline vac changed, noted to have bilious drainage, RUQ drain pulled, upgraded to SICU status, plan to reorder TPN for  PM, diet changed to full liquid. 1:20 AM noted to be unresponsive, palpable femoral pulse, decision made to intubate, R femoral arterial line placed, started on levophed, amauri, bicarb amp x 2, bicarb gtt, propofol, 2U pRBC for hgb 5.9, plan for CTH and CTAP when stabilized   : 2uPRBC 1uFFP 1pkPlt, TXA 1g, IR for embolization due to active extravasation seen on CTA, no active bleed, no embolization, Return to OR for re-exploration and evacuation of old clot  : IR consulted for IVC filter and PICC. LE duplex ordered, negative. Precedex added overnight for agitation. 1 U PRBC for Hgb 6.8 overnight  : TF started at 20cc/hr. Meropenem decreased to Q 12 hours.   : Extubated to 5L NC, then placed on HFNC. Heparin gtt restarted.   : Placed on BiPAP, started on aubrey clears w/ clear ensures   : passed TOV. On 6L NC. Bumex 1mg.   : diuresis with bumex 1mg, RLQ drain removed  : Bumex 1mg. D/c meropenem. DG to SDU.   : DG 4C.  : reupgrade to SICU for hemodynamic monitoring after restarting anticoagulation  : IR for IVC filter, unable to place due to anatomy. Started on 80mg lovenox BID, transitioning to Eliquis tomorrow.   : Started on Eliquis  5/15: DGTF, approved by Dr. Alva                              PAST MEDICAL & SURGICAL HISTORY:  Gastric bypass status for obesity      LOUIS (obstructive sleep apnea)      S/P gastric bypass      S/P       S/P small bowel resection      History of total bilateral knee replacement (TKR)      H/O colonoscopy          Allergies    No Known Allergies    Intolerances      MEDICATIONS  (STANDING):  albuterol/ipratropium for Nebulization 3 milliLiter(s) Nebulizer every 6 hours  amLODIPine   Tablet 10 milliGRAM(s) Oral daily  apixaban 5 milliGRAM(s) Oral every 12 hours  chlorhexidine 2% Cloths 1 Application(s) Topical <User Schedule>  cyanocobalamin 1000 MICROGram(s) Oral daily  ergocalciferol 55667 Unit(s) Oral every week  insulin lispro (ADMELOG) corrective regimen sliding scale   SubCutaneous Before meals and at bedtime  lactobacillus acidophilus 1 Tablet(s) Oral daily  losartan 100 milliGRAM(s) Oral daily  melatonin 10 milliGRAM(s) Oral at bedtime  multivitamin/minerals 1 Tablet(s) Oral daily  pantoprazole    Tablet 40 milliGRAM(s) Oral before breakfast    MEDICATIONS  (PRN):  acetaminophen     Tablet .. 650 milliGRAM(s) Oral every 6 hours PRN Mild Pain (1 - 3)  sodium chloride 0.65% Nasal 1 Spray(s) Both Nostrils daily PRN Nasal Congestion      Vital Signs Last 24 Hrs  T(C): 36.2 (15 May 2025 08:00), Max: 36.6 (15 May 2025 04:00)  T(F): 97.2 (15 May 2025 08:00), Max: 97.9 (15 May 2025 04:00)  HR: 108 (15 May 2025 08:00) (89 - 109)  BP: 138/80 (15 May 2025 08:00) (128/66 - 169/81)  BP(mean): 104 (15 May 2025 08:00) (89 - 130)  RR: 20 (15 May 2025 08:00) (18 - 20)  SpO2: 94% (15 May 2025 08:00) (93% - 98%)    Parameters below as of 15 May 2025 08:00  Patient On (Oxygen Delivery Method): room air      I&O's Summary    14 May 2025 07:01  -  15 May 2025 07:00  --------------------------------------------------------  IN: 1000 mL / OUT: 2100 mL / NET: -1100 mL    15 May 2025 07:01  -  15 May 2025 10:13  --------------------------------------------------------  IN: 0 mL / OUT: 300 mL / NET: -300 mL        LABS  LABS:                        8.6    5.43  )-----------( 214      ( 15 May 2025 01:38 )             26.6       05-15    140  |  108  |  14  ----------------------------<  108[H]  3.6   |  22  |  0.7    Ca    7.8[L]      15 May 2025 01:38  Phos  2.7     05-15  Mg     1.7     05-15        PT/INR - ( 15 May 2025 01:38 )   PT: 11.30 sec;   INR: 0.96 ratio         PTT - ( 15 May 2025 01:38 )  PTT:25.5 sec            Pro-Brain Natriuretic Peptide: 929 pg/mL (25 @ 21:42)                  Assessment & Plan:  ASSESSMENT & PLAN:  64F PMHx HTN, morbid obesity s/p  Abby-en-Y gastric bypass who presented on 4/3 with incarcerated ventral hernia with SBO. Patient downgraded to the floor. found to have PEs with b/l DVT, upgraded to SICU  for close monitoring before and after IVC filter placement and while patient restarts anticoagulation      NEUROLOGICAL:  #Acute pain    acetaminophen Tablet 650 Oral every 6 hours PRN mild pain   #Sleep hygiene  - HOLDING Trazodone 25mg QPM  - Melatonin 10mg QPM    RESPIRATORY:   #Acute hypoxic respiratory failure, resolved  - Extubated , reintubated on  for AMS and agonal breathing, extubated   - Currently on RA  - Home CPAP @ night  - duoneb treatment Q6  #Right distal main pulmonary embolus with segmental extension/B/L DVT   - currently on eliquis 5mg BID  - IVC filter placement aborted due to difficult anatomy   #Intermittent diuresis for fluid overload  - Last diuresis - lasix 20  #PMHx LOUIS on CPAP@ home   #Activity increase as tolerated    CARDS:   #Hypertensive urgency, resolved  -previously requiring nicardipine gtt  #Acute hemorrhagic shock, resolved  - : 2u PRBC 1plt 1uFFP, 1g TXA  #Supraventricular tachycardia, Non-sustained ventricular tachycardia, Ventricular Premature Depolarization  - Cardiology (Dr. Steven) - Metoprolol 25mg Q12 HOLDING  #PMHx of HTN  - continue Amlodipine 10mg QD  - continue Losartan 100mg qd  - HCTZ 25mg QD HOLDING  - EKG- NSR  - TTE : EF 70 to 75%  - TTE : EF 60-65%    GASTROINTESTINAL/NUTRITION:   #4/10 open ventral hernia repair, small bowel resection, ileoileal anastomosis and primary fascial closure, 2 THONY Right  # RTOR for exploratory laparotomy, repair of anastomotic leak at previous ileoileal anastomosis, resected, new ileoileal anastomosis created, 1 THONY Left (total 3)  #: s/p IR percutaneous placement of a 8.5 [place]ar[place]uan drainage catheter into lower abdominal wall fluid collection, yielding 150 mL of foul appearing fluid.  #: Acute retroperitoneal CTA: mesenteric arterial blush noted with extravasation  # RTOR for re-exploration and evacuation of old clots from retroperitoneum and pelvis    - s/p IR without finding of any bleeding, no embolization required  -M/W/F Vac changes  #Diet: DASH/TLC  - on vitamin B12 supplement  - multivitamin  - probiotic   - Aspiration precautions, HOB 30  #GI Prophylaxis/hx GERD  - Pantoprazole 40mg PO qD (prn home rx)   #Bowel regimen    -last bowel movement     /RENAL:   #Urine output in critically ill  - IVL  - Lasix 20mg IVP   #FUNMI; oliguric - resolved  - Renal u/s> no hydronephrosis. 2-3cm anechoic cyst on right kidney   #Lactic acidosis - resolved  - Nephro: check CK, no indication of RRT, decrease meropenem to Q12  WBC- 5.40  --->>,  5.10  --->>,  5.43  --->>  Temp trend- 24hrs T(F): 97.2 (05-15 @ 08:00), Max: 97.9 (05-15 @ 04:00)        HEME/ONC:   #DVT PPX  - S/p LVX 80mg x2; started Eliquis 5mg BID   - SCD B/L  #acute anemia   - : 2uPRBC 1uFFP 1pkPlt, TXA 1g, IR for embolization due to active extravasation seen on CTA, no active bleed, no embolization, Return to OR for re-exploration and evacuation of old clot  - : IR consulted for IVC filter and PICC. LE duplex ordered, negative.  - : 1u PRBC for 500mL sanguinous output from wound vac   #Acute right pulmonary embolus  - Therapeutic Heparin gtt discontinued  secondary to venous bleed from wound vac   #B/L UE Venous Duplex 27 - thrombophlebitis  - B/L LE Venous duplex -RIGHT PT DVT, no LEFT DVT  - B/L Duplex : DVT in the right femoral vein. DVT in left common femoral vein.  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  Even if a filter can be precisely placed there, this location could be potential issue when it comes time to retrieve it, as the distance from the jugular vein access is  an important factor.  In light of these findings, filter not placed.  - Vascular surgery consulted - AC with heparin drip (upon discharge transition to Eliquis 10 mg po BID x 7 days then 5 mg po BID for at least 6 months      Labs: Hb/Hct:  8.3/25.6  -->,  8.6/26.6  -->                      Plts:  199  -->,  214  -->                 PTT/INR:  25.5/0.96  --->          ID:  #Intraabdominal anastomotic leak, fluid collection    -ESR, CRP for 3 days  #ID Consult  - trial off antibiotics  per Dr. Bermudez and Dr. Lema  - Midline x Ertapenem 1g Q24 on discharge x 4 weeks (EOT )  - F/u with Dr. Perdue on  via Telehealth   #Culture    OR  - E Coli    OR cx: few E.coli, few bacteroides, rare clostridium     Blood Cx: NGTF     tracheal aspirate: no growth      body fluid cx rare e. coli      Blood cx: negative     Abdominal - Negative FINAL     C diff stool panel: negative     blood culture: negative  WBC- 5.40  --->>,  5.10  --->>,  5.43  --->>  Temp trend- 24hrs T(F): 97.2 (05-15 @ 08:00), Max: 97.9 (05-15 @ 04:00)        ENDOCRINE:  #Glycemic monitoring  - ACHS fingersticks  - ISS  - A1C 5.8%     MSK:  #Activity- increase as tolerated  #Physiatry    -bedside PT, will evaluate for inpatient 4A placement, not a candidate as of   #PT consult    -rehab facility on discharge 3 to 5x/week   #OT consult    -pending for ROM within bed    SKIN:  #midline incision staples in place f/u w/ primary removal timeline (last OR )  #left forearm skin tear   - Continue to monitor daily skin changes  #left knee skin tear  #sacral fissure 5/3      LINES/DRAINS:  PIV    LLQ pelvis beatrice drain ( -     Midline subcutaneous beatrice drain ( -     Discontinued lines:    Right Femoral Arterial Line ( - )    Prevena Vac Midline (removed )     Left radial arterial line ( - )    RLQ pelvis beatrice drain ( - )    ADVANCED DIRECTIVES:  Full Code  Emergency - Daughter  (Shavonne Moss)  INDICATION FOR SICU/SDU: Intestinal obstruction    DISPO: Floor- discussed with Dr. Alva             Follow Up:  - labs  -f/u changing eliquis to 10mg BID   -Midline x Ertapenem 1g Q24 on discharge x 4 weeks   -Dispo planning        Signed out to: David Becerra PA-C, blue team  Date: 5/15/25  Time: 12:22 SICU Transfer Note-    NANCY SARGENT  65y (1960)  [id number]      Transfer from: SICU  Transfer to: Surgery-4C      SICU COURSE:  SICU COURSE EVENTS :  04-10 - admitted to SICU service  : Intubated and started on paralytic. Arterial line placed, subclavian TLC placed. Nephrology consulted for oliguria. IR abdominal muscle botox injection performed. TTE performed.   : Additional fluid boluses given; trial fo bumex started, considering CVVH. Weaned off nimbex gtt.  > 220 /hr. Renal U/S w/out hydro.   : Weaned off Levophed. Bumex gtt 2mg/hr > 1mg/hr. Goal net negative 1-1.5L, UOP approx, 200c/hr.   : Remained on bumex gtt for further diuresis, decreased to 0.5 overnight, vent settings weaned. Cr downtrending, LFTs worsening.   4/15: Extubated to BiPAP, transitioned to NC. Dc'd bumex gtt, given 5mg metolazone x 1 and 2mg bumex x 1.   : THONY drain #2 murky/bilious, pending CTAP with PO contrast, hypernatremia, started D5W @ 75cc/hr, persistent hypokalemia   : RTOR for resection of anastomosis for anastomotic leak. Returned intubated, 400/24/80/10, sinus tachy to . Abdomen soft. Was initially on propofol @ 30 and precedex, but propofol weaned off due to hypotension with MAPs in 50s, fentanyl ggt started for acute pain. Remained hypotensive despite fluids, started on Levophed, on 0.05.   : Extubated. HFNC 40L/49%, Levo off since 11 AM   : NGT removed. Started on duonebs. D5W increased to 60cc/hr. 1mg bumex, > 1.5L response  : Episode of afib RVR resolved with metoprolol 5mg IVP, cardiology c/s placed, recommending metoprolol 25mg q 12   : PE on CTA, therapeutic heparin gtt started. Started on cardene gtt,   : Meropenem started per ID.  S/p IR percutaneous placement of a 8.5 Macedonian drainage catheter into lower abdominal wall fluid collection, yielding 150 mL of bowel appearing fluid. New left radial a-line placed. Off nicardipine gtt.   : Switched to therapeutic lovenox. Diet advanced to aubrey clears with ensures.  : advacned to bariatric FLD, downgraded to SDU  : TPN discontinued, diet advanced to full liquid , Right cephalic DVT prelim  : Right UE VA Duplex final no DVT, thromboplebitis, Arterial line removed, 2L NC, midline vac changed, noted to have bilious drainage, RUQ drain pulled, upgraded to SICU status, plan to reorder TPN for  PM, diet changed to full liquid. 1:20 AM noted to be unresponsive, palpable femoral pulse, decision made to intubate, R femoral arterial line placed, started on levophed, amauri, bicarb amp x 2, bicarb gtt, propofol, 2U pRBC for hgb 5.9, plan for CTH and CTAP when stabilized   : 2uPRBC 1uFFP 1pkPlt, TXA 1g, IR for embolization due to active extravasation seen on CTA, no active bleed, no embolization, Return to OR for re-exploration and evacuation of old clot  : IR consulted for IVC filter and PICC. LE duplex ordered, negative. Precedex added overnight for agitation. 1 U PRBC for Hgb 6.8 overnight  : TF started at 20cc/hr. Meropenem decreased to Q 12 hours.   : Extubated to 5L NC, then placed on HFNC. Heparin gtt restarted.   : Placed on BiPAP, started on aubrey clears w/ clear ensures   : passed TOV. On 6L NC. Bumex 1mg.   : diuresis with bumex 1mg, RLQ drain removed  : Bumex 1mg. D/c meropenem. DG to SDU.   : DG 4C.  : reupgrade to SICU for hemodynamic monitoring after restarting anticoagulation  : IR for IVC filter, unable to place due to anatomy. Started on 80mg lovenox BID, transitioning to Eliquis tomorrow.   : Started on Eliquis  5/15: DGTF, approved by Dr. Alva                              PAST MEDICAL & SURGICAL HISTORY:  Gastric bypass status for obesity      LOUIS (obstructive sleep apnea)      S/P gastric bypass      S/P       S/P small bowel resection      History of total bilateral knee replacement (TKR)      H/O colonoscopy          Allergies    No Known Allergies    Intolerances      MEDICATIONS  (STANDING):  albuterol/ipratropium for Nebulization 3 milliLiter(s) Nebulizer every 6 hours  amLODIPine   Tablet 10 milliGRAM(s) Oral daily  apixaban 5 milliGRAM(s) Oral every 12 hours  chlorhexidine 2% Cloths 1 Application(s) Topical <User Schedule>  cyanocobalamin 1000 MICROGram(s) Oral daily  ergocalciferol 58027 Unit(s) Oral every week  insulin lispro (ADMELOG) corrective regimen sliding scale   SubCutaneous Before meals and at bedtime  lactobacillus acidophilus 1 Tablet(s) Oral daily  losartan 100 milliGRAM(s) Oral daily  melatonin 10 milliGRAM(s) Oral at bedtime  multivitamin/minerals 1 Tablet(s) Oral daily  pantoprazole    Tablet 40 milliGRAM(s) Oral before breakfast    MEDICATIONS  (PRN):  acetaminophen     Tablet .. 650 milliGRAM(s) Oral every 6 hours PRN Mild Pain (1 - 3)  sodium chloride 0.65% Nasal 1 Spray(s) Both Nostrils daily PRN Nasal Congestion      Vital Signs Last 24 Hrs  T(C): 36.2 (15 May 2025 08:00), Max: 36.6 (15 May 2025 04:00)  T(F): 97.2 (15 May 2025 08:00), Max: 97.9 (15 May 2025 04:00)  HR: 108 (15 May 2025 08:00) (89 - 109)  BP: 138/80 (15 May 2025 08:00) (128/66 - 169/81)  BP(mean): 104 (15 May 2025 08:00) (89 - 130)  RR: 20 (15 May 2025 08:00) (18 - 20)  SpO2: 94% (15 May 2025 08:00) (93% - 98%)    Parameters below as of 15 May 2025 08:00  Patient On (Oxygen Delivery Method): room air      I&O's Summary    14 May 2025 07:01  -  15 May 2025 07:00  --------------------------------------------------------  IN: 1000 mL / OUT: 2100 mL / NET: -1100 mL    15 May 2025 07:01  -  15 May 2025 10:13  --------------------------------------------------------  IN: 0 mL / OUT: 300 mL / NET: -300 mL        LABS  LABS:                        8.6    5.43  )-----------( 214      ( 15 May 2025 01:38 )             26.6       05-15    140  |  108  |  14  ----------------------------<  108[H]  3.6   |  22  |  0.7    Ca    7.8[L]      15 May 2025 01:38  Phos  2.7     05-15  Mg     1.7     05-15        PT/INR - ( 15 May 2025 01:38 )   PT: 11.30 sec;   INR: 0.96 ratio         PTT - ( 15 May 2025 01:38 )  PTT:25.5 sec            Pro-Brain Natriuretic Peptide: 929 pg/mL (25 @ 21:42)                  Assessment & Plan:  ASSESSMENT & PLAN:  64F PMHx HTN, morbid obesity s/p  Abby-en-Y gastric bypass who presented on 4/3 with incarcerated ventral hernia with SBO. Patient downgraded to the floor. found to have PEs with b/l DVT, upgraded to SICU  for close monitoring before and after IVC filter placement and while patient restarts anticoagulation      NEUROLOGICAL:  #Acute pain    acetaminophen Tablet 650 Oral every 6 hours PRN mild pain   #Sleep hygiene  - HOLDING Trazodone 25mg QPM  - Melatonin 10mg QPM    RESPIRATORY:   #Acute hypoxic respiratory failure, resolved  - Extubated , reintubated on  for AMS and agonal breathing, extubated   - Currently on RA  - Home CPAP @ night  - duoneb treatment Q6  #Right distal main pulmonary embolus with segmental extension/B/L DVT   - currently on eliquis 5mg BID  - IVC filter placement aborted due to difficult anatomy   #Intermittent diuresis for fluid overload  - Last diuresis - lasix 20  #PMHx LOUIS on CPAP@ home   #Activity increase as tolerated    CARDS:   #Hypertensive urgency, resolved  -previously requiring nicardipine gtt  #Acute hemorrhagic shock, resolved  - : 2u PRBC 1plt 1uFFP, 1g TXA  #Supraventricular tachycardia, Non-sustained ventricular tachycardia, Ventricular Premature Depolarization  - Cardiology (Dr. Steven) - Metoprolol 25mg Q12 HOLDING  #PMHx of HTN  - continue Amlodipine 10mg QD  - continue Losartan 100mg qd  - HCTZ 25mg QD HOLDING  - EKG- NSR  - TTE : EF 70 to 75%  - TTE : EF 60-65%    GASTROINTESTINAL/NUTRITION:   #4/10 open ventral hernia repair, small bowel resection, ileoileal anastomosis and primary fascial closure, 2 THONY Right  # RTOR for exploratory laparotomy, repair of anastomotic leak at previous ileoileal anastomosis, resected, new ileoileal anastomosis created, 1 THONY Left (total 3)  #: s/p IR percutaneous placement of a 8.5 Macedonian drainage catheter into lower abdominal wall fluid collection, yielding 150 mL of foul appearing fluid.  #: Acute retroperitoneal CTA: mesenteric arterial blush noted with extravasation  # RTOR for re-exploration and evacuation of old clots from retroperitoneum and pelvis    - s/p IR without finding of any bleeding, no embolization required  -M/W/F Vac changes  #Diet: DASH/TLC  - on vitamin B12 supplement  - multivitamin  - probiotic   - Aspiration precautions, HOB 30  #GI Prophylaxis/hx GERD  - Pantoprazole 40mg PO qD (prn home rx)   #Bowel regimen    -last bowel movement     /RENAL:   #Urine output in critically ill  - IVL  - Lasix 20mg IVP   #FUNMI; oliguric - resolved  - Renal u/s> no hydronephrosis. 2-3cm anechoic cyst on right kidney   #Lactic acidosis - resolved  - Nephro: check CK, no indication of RRT, decrease meropenem to Q12  WBC- 5.40  --->>,  5.10  --->>,  5.43  --->>  Temp trend- 24hrs T(F): 97.2 (05-15 @ 08:00), Max: 97.9 (05-15 @ 04:00)        HEME/ONC:   #DVT PPX  - S/p LVX 80mg x2; started Eliquis 5mg BID   - SCD B/L  #acute anemia   - : 2uPRBC 1uFFP 1pkPlt, TXA 1g, IR for embolization due to active extravasation seen on CTA, no active bleed, no embolization, Return to OR for re-exploration and evacuation of old clot  - : IR consulted for IVC filter and PICC. LE duplex ordered, negative.  - : 1u PRBC for 500mL sanguinous output from wound vac   #Acute right pulmonary embolus  - Therapeutic Heparin gtt discontinued  secondary to venous bleed from wound vac   #B/L UE Venous Duplex 27 - thrombophlebitis  - B/L LE Venous duplex -RIGHT PT DVT, no LEFT DVT  - B/L Duplex : DVT in the right femoral vein. DVT in left common femoral vein.  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  Even if a filter can be precisely placed there, this location could be potential issue when it comes time to retrieve it, as the distance from the jugular vein access is  an important factor.  In light of these findings, filter not placed.  - Vascular surgery consulted - AC with heparin drip (upon discharge transition to Eliquis 10 mg po BID x 7 days then 5 mg po BID for at least 6 months      Labs: Hb/Hct:  8.3/25.6  -->,  8.6/26.6  -->                      Plts:  199  -->,  214  -->                 PTT/INR:  25.5/0.96  --->          ID:  #Intraabdominal anastomotic leak, fluid collection    -ESR, CRP for 3 days  #ID Consult  - trial off antibiotics  per Dr. Bermudez and Dr. Lema  - Midline x Ertapenem 1g Q24 on discharge x 4 weeks (EOT )  - F/u with Dr. Perdue on  via Telehealth   #Culture    OR  - E Coli    OR cx: few E.coli, few bacteroides, rare clostridium     Blood Cx: NGTF     tracheal aspirate: no growth      body fluid cx rare e. coli      Blood cx: negative     Abdominal - Negative FINAL     C diff stool panel: negative     blood culture: negative  WBC- 5.40  --->>,  5.10  --->>,  5.43  --->>  Temp trend- 24hrs T(F): 97.2 (05-15 @ 08:00), Max: 97.9 (05-15 @ 04:00)        ENDOCRINE:  #Glycemic monitoring  - ACHS fingersticks  - ISS  - A1C 5.8%     MSK:  #Activity- increase as tolerated  #Physiatry    -bedside PT, will evaluate for inpatient 4A placement, not a candidate as of   #PT consult    -rehab facility on discharge 3 to 5x/week   #OT consult    -pending for ROM within bed    SKIN:  #midline incision staples in place f/u w/ primary removal timeline (last OR )  #left forearm skin tear   - Continue to monitor daily skin changes  #left knee skin tear  #sacral fissure 5/3      LINES/DRAINS:  PIV    LLQ pelvis beatrice drain ( -     Midline subcutaneous beatrice drain ( -     Discontinued lines:    Right Femoral Arterial Line ( - )    Prevena Vac Midline (removed )     Left radial arterial line ( - )    RLQ pelvis beatrice drain ( - )    ADVANCED DIRECTIVES:  Full Code  Emergency - Daughter  (Shavonne Moss)  INDICATION FOR SICU/SDU: Intestinal obstruction    DISPO: Floor- discussed with Dr. Alva           Follow Up:  - labs  -f/u changing eliquis to 10mg BID   -Midline x Ertapenem 1g Q24 on discharge x 4 weeks   -Dispo planning        Signed out to: David Becerra PA-C, blue team  Date: 5/15/25  Time: 12:22

## 2025-05-15 NOTE — PROGRESS NOTE ADULT - SUBJECTIVE AND OBJECTIVE BOX
NANCY SARGENT   [id number]   [birth year]  64yF  ============================================================   DATE OF INITIAL SICU/SDU CONSULT: 04-10-25    INDICATION FOR SICU CONSULT:  hemodynamic monitoring in patient with high risk of bleeding while restarting anticoagulation    SICU COURSE EVENTS :  04-10 - admitted to SICU service  4/11: Intubated and started on paralytic. Arterial line placed, subclavian TLC placed. Nephrology consulted for oliguria. IR abdominal muscle botox injection performed. TTE performed.   4/12: Additional fluid boluses given; trial fo bumex started, considering CVVH. Weaned off nimbex gtt.  > 220 /hr. Renal U/S w/out hydro.   4/13: Weaned off Levophed. Bumex gtt 2mg/hr > 1mg/hr. Goal net negative 1-1.5L, UOP approx, 200c/hr.   4/14: Remained on bumex gtt for further diuresis, decreased to 0.5 overnight, vent settings weaned. Cr downtrending, LFTs worsening.   4/15: Extubated to BiPAP, transitioned to NC. Dc'd bumex gtt, given 5mg metolazone x 1 and 2mg bumex x 1.   4/16: THONY drain #2 murky/bilious, pending CTAP with PO contrast, hypernatremia, started D5W @ 75cc/hr, persistent hypokalemia   4/17: RTOR for resection of anastomosis for anastomotic leak. Returned intubated, 400/24/80/10, sinus tachy to . Abdomen soft. Was initially on propofol @ 30 and precedex, but propofol weaned off due to hypotension with MAPs in 50s, fentanyl ggt started for acute pain. Remained hypotensive despite fluids, started on Levophed, on 0.05.   4/18: Extubated. HFNC 40L/49%, Levo off since 11 AM   4/19: NGT removed. Started on duonebs. D5W increased to 60cc/hr. 1mg bumex, > 1.5L response  4/20: Episode of afib RVR resolved with metoprolol 5mg IVP, cardiology c/s placed, recommending metoprolol 25mg q 12   4/21: PE on CTA, therapeutic heparin gtt started. Started on cardebe gtt,   4/22: Meropenem started per ID.  S/p IR percutaneous placement of a 8.5 Hebrew drainage catheter into lower abdominal wall fluid collection, yielding 150 mL of bowel appearing fluid. New left radial a-line placed. Off nicarrdipine gtt.   4/23: Switched to therapeutic lovenox. Diet advanced to aubrey clears with ensures.  4/24: advacned to bariatric FLD, downgraded to SDU  4/25: TPN discontinued, diet advanced to full liquid , Right cephalic DVT prelim  4/26: Right UE VA Duplex final no DVT, thromboplebitis, Arterial line removed, 2L NC, midline vac changed, noted to have bilious drainage, RUQ drain pulled, upgraded to SICU status, plan to reorder TPN for 4/27 PM, diet changed to full liquid. 1:20 AM noted to be unresponsive, palpable femoral pulse, decision made to intubate, R femoral arterial line placed, started on levophed, amauri, bicarb amp x 2, bicarb gtt, propofol, 2U pRBC for hgb 5.9, plan for CTH and CTAP when stabilized   4/27: 2uPRBC 1uFFP 1pkPlt, TXA 1g, IR for embolization due to active extravasation seen on CTA, no active bleed, no embolization, Return to OR for re-exploration and evacuation of old clot  4/28: IR consulted for IVC filter and PICC. LE duplex ordered, negative. Precedex added overnight for agitation. 1 U PRBC for Hgb 6.8 overnight  4/29: TF started at 20cc/hr. Meropenem decreased to Q 12 hours.   4/30: Extubated to 5L NC, then placed on HFNC. Heparin gtt restarted.   5/1: Placed on BiPAP, started on aubrey clears w/ clear ensures   5/4: passed TOV. On 6L NC. Bumex 1mg.   5/5: diuresis with bumex 1mg, RLQ drain removed  5/6: Bumex 1mg. D/c meropenem. DG to SDU.   5/7: DG 4C.  5/12: reupgrade to SICU for hemodynamic monitoring after restarting anticoagulation  5/13: IR for IVC filter, unable to place due to anatomy. Started on 80mg lovenox BID, transitioning to Eliquis tomorrow.   5/14: Started on Eliquis    24Hour Events:    [X] A ten-point review of systems was negative except as expressed in note.  [X ] History was obtained from patient. If unable to participate in their care, history was from review of the chart and collateral sources of information.  =====================================================================   NANCY SARGENT   [id number]   [birth year]  64yF  ============================================================   DATE OF INITIAL SICU/SDU CONSULT: 04-10-25    INDICATION FOR SICU CONSULT:  hemodynamic monitoring in patient with high risk of bleeding while restarting anticoagulation    SICU COURSE EVENTS :  04-10 - admitted to SICU service  4/11: Intubated and started on paralytic. Arterial line placed, subclavian TLC placed. Nephrology consulted for oliguria. IR abdominal muscle botox injection performed. TTE performed.   4/12: Additional fluid boluses given; trial fo bumex started, considering CVVH. Weaned off nimbex gtt.  > 220 /hr. Renal U/S w/out hydro.   4/13: Weaned off Levophed. Bumex gtt 2mg/hr > 1mg/hr. Goal net negative 1-1.5L, UOP approx, 200c/hr.   4/14: Remained on bumex gtt for further diuresis, decreased to 0.5 overnight, vent settings weaned. Cr downtrending, LFTs worsening.   4/15: Extubated to BiPAP, transitioned to NC. Dc'd bumex gtt, given 5mg metolazone x 1 and 2mg bumex x 1.   4/16: THONY drain #2 murky/bilious, pending CTAP with PO contrast, hypernatremia, started D5W @ 75cc/hr, persistent hypokalemia   4/17: RTOR for resection of anastomosis for anastomotic leak. Returned intubated, 400/24/80/10, sinus tachy to . Abdomen soft. Was initially on propofol @ 30 and precedex, but propofol weaned off due to hypotension with MAPs in 50s, fentanyl ggt started for acute pain. Remained hypotensive despite fluids, started on Levophed, on 0.05.   4/18: Extubated. HFNC 40L/49%, Levo off since 11 AM   4/19: NGT removed. Started on duonebs. D5W increased to 60cc/hr. 1mg bumex, > 1.5L response  4/20: Episode of afib RVR resolved with metoprolol 5mg IVP, cardiology c/s placed, recommending metoprolol 25mg q 12   4/21: PE on CTA, therapeutic heparin gtt started. Started on cardebe gtt,   4/22: Meropenem started per ID.  S/p IR percutaneous placement of a 8.5 Yoruba drainage catheter into lower abdominal wall fluid collection, yielding 150 mL of bowel appearing fluid. New left radial a-line placed. Off nicarrdipine gtt.   4/23: Switched to therapeutic lovenox. Diet advanced to aubrey clears with ensures.  4/24: advanced to bariatric FLD, downgraded to SDU  4/25: TPN discontinued, diet advanced to full liquid , Right cephalic DVT prelim  4/26: Right UE VA Duplex final no DVT, thromboplebitis, Arterial line removed, 2L NC, midline vac changed, noted to have bilious drainage, RUQ drain pulled, upgraded to SICU status, plan to reorder TPN for 4/27 PM, diet changed to full liquid. 1:20 AM noted to be unresponsive, palpable femoral pulse, decision made to intubate, R femoral arterial line placed, started on levophed, amauri, bicarb amp x 2, bicarb gtt, propofol, 2U pRBC for hgb 5.9, plan for CTH and CTAP when stabilized   4/27: 2uPRBC 1uFFP 1pkPlt, TXA 1g, IR for embolization due to active extravasation seen on CTA, no active bleed, no embolization, Return to OR for re-exploration and evacuation of old clot  4/28: IR consulted for IVC filter and PICC. LE duplex ordered, negative. Precedex added overnight for agitation. 1 U PRBC for Hgb 6.8 overnight  4/29: TF started at 20cc/hr. Meropenem decreased to Q 12 hours.   4/30: Extubated to 5L NC, then placed on HFNC. Heparin gtt restarted.   5/1: Placed on BiPAP, started on aubrey clears w/ clear ensures   5/4: passed TOV. On 6L NC. Bumex 1mg.   5/5: diuresis with bumex 1mg, RLQ drain removed  5/6: Bumex 1mg. D/c meropenem. DG to SDU.   5/7: DG 4C.  5/12: reupgrade to SICU for hemodynamic monitoring after restarting anticoagulation  5/13: IR for IVC filter, unable to place due to anatomy. Started on 80mg lovenox BID, transitioning to Eliquis tomorrow.   5/14: Started on Eliquis    24Hour Events:    [X] A ten-point review of systems was negative except as expressed in note.  [X ] History was obtained from patient. If unable to participate in their care, history was from review of the chart and collateral sources of information.  =====================================================================   NANCY SARGENT   [id number]   [birth year]  64yF  ============================================================   DATE OF INITIAL SICU/SDU CONSULT: 04-10-25    INDICATION FOR SICU CONSULT:  hemodynamic monitoring in patient with high risk of bleeding while restarting anticoagulation    SICU COURSE EVENTS :  04-10 - admitted to SICU service  4/11: Intubated and started on paralytic. Arterial line placed, subclavian TLC placed. Nephrology consulted for oliguria. IR abdominal muscle botox injection performed. TTE performed.   4/12: Additional fluid boluses given; trial fo bumex started, considering CVVH. Weaned off nimbex gtt.  > 220 /hr. Renal U/S w/out hydro.   4/13: Weaned off Levophed. Bumex gtt 2mg/hr > 1mg/hr. Goal net negative 1-1.5L, UOP approx, 200c/hr.   4/14: Remained on bumex gtt for further diuresis, decreased to 0.5 overnight, vent settings weaned. Cr downtrending, LFTs worsening.   4/15: Extubated to BiPAP, transitioned to NC. Dc'd bumex gtt, given 5mg metolazone x 1 and 2mg bumex x 1.   4/16: THONY drain #2 murky/bilious, pending CTAP with PO contrast, hypernatremia, started D5W @ 75cc/hr, persistent hypokalemia   4/17: RTOR for resection of anastomosis for anastomotic leak. Returned intubated, 400/24/80/10, sinus tachy to . Abdomen soft. Was initially on propofol @ 30 and precedex, but propofol weaned off due to hypotension with MAPs in 50s, fentanyl ggt started for acute pain. Remained hypotensive despite fluids, started on Levophed, on 0.05.   4/18: Extubated. HFNC 40L/49%, Levo off since 11 AM   4/19: NGT removed. Started on duonebs. D5W increased to 60cc/hr. 1mg bumex, > 1.5L response  4/20: Episode of afib RVR resolved with metoprolol 5mg IVP, cardiology c/s placed, recommending metoprolol 25mg q 12   4/21: PE on CTA, therapeutic heparin gtt started. Started on cardebe gtt,   4/22: Meropenem started per ID.  S/p IR percutaneous placement of a 8.5 Divehi drainage catheter into lower abdominal wall fluid collection, yielding 150 mL of bowel appearing fluid. New left radial a-line placed. Off nicarrdipine gtt.   4/23: Switched to therapeutic lovenox. Diet advanced to aubrey clears with ensures.  4/24: advanced to bariatric FLD, downgraded to SDU  4/25: TPN discontinued, diet advanced to full liquid , Right cephalic DVT prelim  4/26: Right UE VA Duplex final no DVT, thromboplebitis, Arterial line removed, 2L NC, midline vac changed, noted to have bilious drainage, RUQ drain pulled, upgraded to SICU status, plan to reorder TPN for 4/27 PM, diet changed to full liquid. 1:20 AM noted to be unresponsive, palpable femoral pulse, decision made to intubate, R femoral arterial line placed, started on levophed, amauri, bicarb amp x 2, bicarb gtt, propofol, 2U pRBC for hgb 5.9, plan for CTH and CTAP when stabilized   4/27: 2uPRBC 1uFFP 1pkPlt, TXA 1g, IR for embolization due to active extravasation seen on CTA, no active bleed, no embolization, Return to OR for re-exploration and evacuation of old clot  4/28: IR consulted for IVC filter and PICC. LE duplex ordered, negative. Precedex added overnight for agitation. 1 U PRBC for Hgb 6.8 overnight  4/29: TF started at 20cc/hr. Meropenem decreased to Q 12 hours.   4/30: Extubated to 5L NC, then placed on HFNC. Heparin gtt restarted.   5/1: Placed on BiPAP, started on aubrey clears w/ clear ensures   5/4: passed TOV. On 6L NC. Bumex 1mg.   5/5: diuresis with bumex 1mg, RLQ drain removed  5/6: Bumex 1mg. D/c meropenem. DG to SDU.   5/7: DG 4C.  5/12: reupgrade to SICU for hemodynamic monitoring after restarting anticoagulation  5/13: IR for IVC filter, unable to place due to anatomy. Started on 80mg lovenox BID, transitioning to Eliquis tomorrow.   5/14: Started on Eliquis    24Hour Events:  5/14  NIGHT  -A+Ox3  -Minimal blood in urine during day, now voiding clear   -1L on IS  -Low grade tachycardic to 102, normotensive  - Hgb stable 8.6  - 2g Mg, 30 KPO4    5/14  DAY  - Eliquis 5mg BID to start at 4PM. Consider 10mg tmrow  - Echo EF 60-65%    [X] A ten-point review of systems was negative except as expressed in note.  [X ] History was obtained from patient. If unable to participate in their care, history was from review of the chart and collateral sources of information.  =====================================================================  Daily     Daily     Diet, DASH/TLC:   Sodium & Cholesterol Restricted (05-12-25 @ 12:45)      CURRENT MEDS:  Neurologic Medications  acetaminophen     Tablet .. 650 milliGRAM(s) Oral every 6 hours PRN Mild Pain (1 - 3)  melatonin 10 milliGRAM(s) Oral at bedtime    Respiratory Medications  albuterol/ipratropium for Nebulization 3 milliLiter(s) Nebulizer every 6 hours    Cardiovascular Medications  amLODIPine   Tablet 10 milliGRAM(s) Oral daily  losartan 100 milliGRAM(s) Oral daily    Gastrointestinal Medications  cyanocobalamin 1000 MICROGram(s) Oral daily  ergocalciferol 75917 Unit(s) Oral every week  multivitamin/minerals 1 Tablet(s) Oral daily  pantoprazole    Tablet 40 milliGRAM(s) Oral before breakfast    Genitourinary Medications    Hematologic/Oncologic Medications  apixaban 5 milliGRAM(s) Oral every 12 hours    Antimicrobial/Immunologic Medications    Endocrine/Metabolic Medications  insulin lispro (ADMELOG) corrective regimen sliding scale   SubCutaneous Before meals and at bedtime    Topical/Other Medications  chlorhexidine 2% Cloths 1 Application(s) Topical <User Schedule>  lactobacillus acidophilus 1 Tablet(s) Oral daily  sodium chloride 0.65% Nasal 1 Spray(s) Both Nostrils daily PRN Nasal Congestion      ICU Vital Signs Last 24 Hrs  T(C): 36.2 (15 May 2025 08:00), Max: 36.6 (15 May 2025 04:00)  T(F): 97.2 (15 May 2025 08:00), Max: 97.9 (15 May 2025 04:00)  HR: 108 (15 May 2025 08:00) (89 - 109)  BP: 138/80 (15 May 2025 08:00) (128/66 - 170/94)  BP(mean): 104 (15 May 2025 08:00) (89 - 130)  ABP: --  ABP(mean): --  RR: 20 (15 May 2025 08:00) (18 - 20)  SpO2: 94% (15 May 2025 08:00) (93% - 98%)    O2 Parameters below as of 15 May 2025 08:00  Patient On (Oxygen Delivery Method): room air                    I&O's Summary    14 May 2025 07:01  -  15 May 2025 07:00  --------------------------------------------------------  IN: 1000 mL / OUT: 2100 mL / NET: -1100 mL      I&O's Detail    14 May 2025 07:01  -  15 May 2025 07:00  --------------------------------------------------------  IN:    IV PiggyBack: 50 mL    Oral Fluid: 950 mL  Total IN: 1000 mL    OUT:    Voided (mL): 2100 mL  Total OUT: 2100 mL    Total NET: -1100 mL          PHYSICAL EXAM:    General/Neuro: A&O x 3, no focal deficits.    Lungs: Saturating well on RA. 750 IS. clear to auscultation, Normal expansion/effort.     Cardiovascular : S1, S2.  Sinus tachycardia to 108bpm.    GI: Abdomen soft, Non-tender, Non-distended.  Wound vac in place.    Extremities: Extremities warm, pink, well-perfused.    Derm: Good skin turgor, no skin breakdown.      : Voiding.      CXR:     LABS:  CAPILLARY BLOOD GLUCOSE      POCT Blood Glucose.: 129 mg/dL (15 May 2025 07:50)  POCT Blood Glucose.: 144 mg/dL (14 May 2025 21:30)  POCT Blood Glucose.: 161 mg/dL (14 May 2025 16:52)  POCT Blood Glucose.: 156 mg/dL (14 May 2025 11:24)                          8.6    5.43  )-----------( 214      ( 15 May 2025 01:38 )             26.6       05-15    140  |  108  |  14  ----------------------------<  108[H]  3.6   |  22  |  0.7    Ca    7.8[L]      15 May 2025 01:38  Phos  2.7     05-15  Mg     1.7     05-15        PT/INR - ( 15 May 2025 01:38 )   PT: 11.30 sec;   INR: 0.96 ratio         PTT - ( 15 May 2025 01:38 )  PTT:25.5 sec      Urinalysis Basic - ( 15 May 2025 01:38 )    Color: x / Appearance: x / SG: x / pH: x  Gluc: 108 mg/dL / Ketone: x  / Bili: x / Urobili: x   Blood: x / Protein: x / Nitrite: x   Leuk Esterase: x / RBC: x / WBC x   Sq Epi: x / Non Sq Epi: x / Bacteria: x

## 2025-05-16 LAB
ANION GAP SERPL CALC-SCNC: 12 MMOL/L — SIGNIFICANT CHANGE UP (ref 7–14)
ANION GAP SERPL CALC-SCNC: 14 MMOL/L — SIGNIFICANT CHANGE UP (ref 7–14)
BASOPHILS # BLD AUTO: 0.04 K/UL — SIGNIFICANT CHANGE UP (ref 0–0.2)
BASOPHILS NFR BLD AUTO: 0.7 % — SIGNIFICANT CHANGE UP (ref 0–1)
BUN SERPL-MCNC: 14 MG/DL — SIGNIFICANT CHANGE UP (ref 10–20)
BUN SERPL-MCNC: 15 MG/DL — SIGNIFICANT CHANGE UP (ref 10–20)
CALCIUM SERPL-MCNC: 7.7 MG/DL — LOW (ref 8.4–10.5)
CALCIUM SERPL-MCNC: 7.8 MG/DL — LOW (ref 8.4–10.5)
CHLORIDE SERPL-SCNC: 110 MMOL/L — SIGNIFICANT CHANGE UP (ref 98–110)
CHLORIDE SERPL-SCNC: 110 MMOL/L — SIGNIFICANT CHANGE UP (ref 98–110)
CO2 SERPL-SCNC: 18 MMOL/L — SIGNIFICANT CHANGE UP (ref 17–32)
CO2 SERPL-SCNC: 21 MMOL/L — SIGNIFICANT CHANGE UP (ref 17–32)
CREAT SERPL-MCNC: 0.8 MG/DL — SIGNIFICANT CHANGE UP (ref 0.7–1.5)
CREAT SERPL-MCNC: 0.8 MG/DL — SIGNIFICANT CHANGE UP (ref 0.7–1.5)
EGFR: 82 ML/MIN/1.73M2 — SIGNIFICANT CHANGE UP
EOSINOPHIL # BLD AUTO: 0.07 K/UL — SIGNIFICANT CHANGE UP (ref 0–0.7)
EOSINOPHIL NFR BLD AUTO: 1.2 % — SIGNIFICANT CHANGE UP (ref 0–8)
GLUCOSE BLDC GLUCOMTR-MCNC: 113 MG/DL — HIGH (ref 70–99)
GLUCOSE BLDC GLUCOMTR-MCNC: 114 MG/DL — HIGH (ref 70–99)
GLUCOSE BLDC GLUCOMTR-MCNC: 132 MG/DL — HIGH (ref 70–99)
GLUCOSE BLDC GLUCOMTR-MCNC: 145 MG/DL — HIGH (ref 70–99)
GLUCOSE SERPL-MCNC: 122 MG/DL — HIGH (ref 70–99)
GLUCOSE SERPL-MCNC: 98 MG/DL — SIGNIFICANT CHANGE UP (ref 70–99)
HCT VFR BLD CALC: 27.2 % — LOW (ref 37–47)
HGB BLD-MCNC: 8.7 G/DL — LOW (ref 12–16)
IMM GRANULOCYTES NFR BLD AUTO: 2.4 % — HIGH (ref 0.1–0.3)
LYMPHOCYTES # BLD AUTO: 1.46 K/UL — SIGNIFICANT CHANGE UP (ref 1.2–3.4)
LYMPHOCYTES # BLD AUTO: 24.7 % — SIGNIFICANT CHANGE UP (ref 20.5–51.1)
MAGNESIUM SERPL-MCNC: 1.7 MG/DL — LOW (ref 1.8–2.4)
MAGNESIUM SERPL-MCNC: 1.9 MG/DL — SIGNIFICANT CHANGE UP (ref 1.8–2.4)
MCHC RBC-ENTMCNC: 30.2 PG — SIGNIFICANT CHANGE UP (ref 27–31)
MCHC RBC-ENTMCNC: 32 G/DL — SIGNIFICANT CHANGE UP (ref 32–37)
MCV RBC AUTO: 94.4 FL — SIGNIFICANT CHANGE UP (ref 81–99)
MONOCYTES # BLD AUTO: 0.49 K/UL — SIGNIFICANT CHANGE UP (ref 0.1–0.6)
MONOCYTES NFR BLD AUTO: 8.3 % — SIGNIFICANT CHANGE UP (ref 1.7–9.3)
NEUTROPHILS # BLD AUTO: 3.72 K/UL — SIGNIFICANT CHANGE UP (ref 1.4–6.5)
NEUTROPHILS NFR BLD AUTO: 62.7 % — SIGNIFICANT CHANGE UP (ref 42.2–75.2)
NRBC BLD AUTO-RTO: 0 /100 WBCS — SIGNIFICANT CHANGE UP (ref 0–0)
PHOSPHATE SERPL-MCNC: 2.5 MG/DL — SIGNIFICANT CHANGE UP (ref 2.1–4.9)
PHOSPHATE SERPL-MCNC: 3.5 MG/DL — SIGNIFICANT CHANGE UP (ref 2.1–4.9)
PLATELET # BLD AUTO: 199 K/UL — SIGNIFICANT CHANGE UP (ref 130–400)
PMV BLD: 9.3 FL — SIGNIFICANT CHANGE UP (ref 7.4–10.4)
POTASSIUM SERPL-MCNC: 3.5 MMOL/L — SIGNIFICANT CHANGE UP (ref 3.5–5)
POTASSIUM SERPL-MCNC: 3.7 MMOL/L — SIGNIFICANT CHANGE UP (ref 3.5–5)
POTASSIUM SERPL-SCNC: 3.5 MMOL/L — SIGNIFICANT CHANGE UP (ref 3.5–5)
POTASSIUM SERPL-SCNC: 3.7 MMOL/L — SIGNIFICANT CHANGE UP (ref 3.5–5)
RBC # BLD: 2.88 M/UL — LOW (ref 4.2–5.4)
RBC # FLD: 16.6 % — HIGH (ref 11.5–14.5)
SODIUM SERPL-SCNC: 142 MMOL/L — SIGNIFICANT CHANGE UP (ref 135–146)
SODIUM SERPL-SCNC: 143 MMOL/L — SIGNIFICANT CHANGE UP (ref 135–146)
WBC # BLD: 5.92 K/UL — SIGNIFICANT CHANGE UP (ref 4.8–10.8)
WBC # FLD AUTO: 5.92 K/UL — SIGNIFICANT CHANGE UP (ref 4.8–10.8)

## 2025-05-16 RX ADMIN — IPRATROPIUM BROMIDE AND ALBUTEROL SULFATE 3 MILLILITER(S): .5; 2.5 SOLUTION RESPIRATORY (INHALATION) at 02:10

## 2025-05-16 RX ADMIN — Medication 1 TABLET(S): at 11:14

## 2025-05-16 RX ADMIN — Medication 40 MILLIGRAM(S): at 05:56

## 2025-05-16 RX ADMIN — IPRATROPIUM BROMIDE AND ALBUTEROL SULFATE 3 MILLILITER(S): .5; 2.5 SOLUTION RESPIRATORY (INHALATION) at 07:50

## 2025-05-16 RX ADMIN — CYANOCOBALAMIN 1000 MICROGRAM(S): 1000 INJECTION INTRAMUSCULAR; SUBCUTANEOUS at 11:15

## 2025-05-16 RX ADMIN — APIXABAN 5 MILLIGRAM(S): 2.5 TABLET, FILM COATED ORAL at 17:23

## 2025-05-16 RX ADMIN — APIXABAN 5 MILLIGRAM(S): 2.5 TABLET, FILM COATED ORAL at 05:56

## 2025-05-16 RX ADMIN — Medication 40 MILLIEQUIVALENT(S): at 06:28

## 2025-05-16 RX ADMIN — LOSARTAN POTASSIUM 100 MILLIGRAM(S): 100 TABLET, FILM COATED ORAL at 05:57

## 2025-05-16 RX ADMIN — IPRATROPIUM BROMIDE AND ALBUTEROL SULFATE 3 MILLILITER(S): .5; 2.5 SOLUTION RESPIRATORY (INHALATION) at 13:59

## 2025-05-16 RX ADMIN — AMLODIPINE BESYLATE 10 MILLIGRAM(S): 10 TABLET ORAL at 05:56

## 2025-05-16 RX ADMIN — IPRATROPIUM BROMIDE AND ALBUTEROL SULFATE 3 MILLILITER(S): .5; 2.5 SOLUTION RESPIRATORY (INHALATION) at 20:07

## 2025-05-16 RX ADMIN — Medication 1 APPLICATION(S): at 05:56

## 2025-05-16 NOTE — CHART NOTE - NSCHARTNOTEFT_GEN_A_CORE
Currently we are working on a discharge plan with patient.  patient is no able to participate in the intensive physical therapy at an acute rehabilitation level.  patient will benefit from getting physical therapy and occupational therapy on a sub acute rehabilitation level as well with   benefit from the wound care that she needs that patient can get at a sub acute rehabilitation level prior to returning to the community

## 2025-05-16 NOTE — CHART NOTE - NSCHARTNOTEFT_GEN_A_CORE
GI NUTRITION SUPPORT TEAM  -  PROGRESS NOTE     Interval Events:    No complaints, tolerating PO diet well. Having BM's, no diarrhea.     REVIEW OF SYSTEMS:  Negative except as noted above.     VITALS:  T(F): 98.3 (05-16 @ 11:00), Max: 98.3 (05-16 @ 11:00)  HR: 100 (05-16 @ 11:00) (91 - 100)  BP: 120/68 (05-16 @ 11:00) (119/77 - 127/71)  RR: 18 (05-16 @ 11:00) (18 - 18)  SpO2: 97% (05-16 @ 11:00) (94% - 98%)    HEIGHT/WEIGHT/BMI:   Height (cm): 160 (05-15)  Weight (kg): 137.8 (05-15)  BMI (kg/m2): 53.8 (05-15)    I/Os:     05-15-25 @ 07:01  -  05-16-25 @ 07:00  --------------------------------------------------------  IN:    Oral Fluid: 680 mL  Total IN: 680 mL    OUT:    Voided (mL): 1600 mL  Total OUT: 1600 mL    Total NET: -920 mL    MEDICATIONS:   acetaminophen     Tablet .. 650 milliGRAM(s) Oral every 6 hours PRN  albuterol/ipratropium for Nebulization 3 milliLiter(s) Nebulizer every 6 hours  amLODIPine   Tablet 10 milliGRAM(s) Oral daily  apixaban 5 milliGRAM(s) Oral every 12 hours  chlorhexidine 2% Cloths 1 Application(s) Topical <User Schedule>  cyanocobalamin 1000 MICROGram(s) Oral daily  ergocalciferol 09220 Unit(s) Oral every week  insulin lispro (ADMELOG) corrective regimen sliding scale   SubCutaneous Before meals and at bedtime  lactobacillus acidophilus 1 Tablet(s) Oral daily  losartan 100 milliGRAM(s) Oral daily  melatonin 10 milliGRAM(s) Oral at bedtime  multivitamin/minerals 1 Tablet(s) Oral daily  pantoprazole    Tablet 40 milliGRAM(s) Oral before breakfast  sodium chloride 0.65% Nasal 1 Spray(s) Both Nostrils daily PRN    LABS:                         8.8    5.57  )-----------( 204      ( 15 May 2025 21:37 )             27.4     142  |  110  |  15  ----------------------------<  98          (05-15-25 @ 21:37)  3.7   |  18  |  0.8    Ca    7.8[L]          (05-15-25 @ 21:37)  Phos  3.5         (05-15-25 @ 21:37)  Mg     1.9         (05-15-25 @ 21:37)    Triglycerides, Serum: 232 mg/dL (04-20 @ 04:59)    Vitamin D, 25-Hydroxy: 13 ng/mL (04-19 @ 05:08)  Folate: 17.3 ng/mL (04-19 @ 05:08)  Vitamin B12, Serum: 1417 pg/mL (04-19 @ 05:08)  Zinc Level, Plasma: 54 ug/dL (04-19 @ 05:08)    Blood Glucose (Past 24 hours):  145 mg/dL (05-16 @ 07:24)  138 mg/dL (05-15 @ 20:58)  137 mg/dL (05-15 @ 16:27)  177 mg/dL (05-15 @ 11:42)    DIET:   Diet, DASH/TLC:   Sodium & Cholesterol Restricted (05-12-25 @ 12:45) [Active]    ASSESSMENT  64y F w/ PMHx of HTN and gastric bypass surgery in 2001 presented with abdominal distention and CT showing incarcerated ventral hernia with SBO. Taken to OR 4/10 s/p open repair of ventral hernia using mesh and component separation technique, small bowel resection and primary fascial closure. RTOR 4/17 for anastomotic leak s/p ex lap, 30 cm small bowel resection, creation of new side to side ileoileostomy, ventral hernia repair w/ mesh.     - recurrent SBO, s/p repair 4/10 then anastomotic leak s/p repair 4/17, RTOR 4/28 s/p re-exploration and evacuation of old clots from retroperitoneum and pelvis, midline prevena vac placed  - abdominal wall fluid collection s/p IR placed pigtail drain 4/22  - FUNMI   - right distal main pulmonary embolus with segmental extension  - high risk for malnutrition, prolonged NPO X 15d, TPN 4/17-4/25  - class III obesity, BMI 53  - hypertriglyceridemia  - hyperglycemia   - hypokalemia, resolved   - vitamin D deficiency     Est nutrient needs: IBW 52.3kg, 0275-8878 kcals (22-25kcals/kg IBW ASPEN/CCM guidelines for BMI>50), 105-131g protein (2-2.5g/kg IBW ASPEN/CCM guidelines for BMI >40)       PLAN  - PO diet- No conc sweet Dash diet, prioritize protein intake    - cont MV to MV with minerals, cont 50,000IU ergocalciferol weekly (given Mon)  - monitor BM's (concern for malabsorption)    - on 5/20 repeat 25-oh vitamin D, zinc and get iron studies    - glycemic control  - will follow

## 2025-05-16 NOTE — PROGRESS NOTE ADULT - SUBJECTIVE AND OBJECTIVE BOX
GENERAL SURGERY PROGRESS NOTE     NNACY SARGENT  [address] [place] [address] day :44d    POD:29d  Procedure: Open repair of ventral hernia using mesh and component separation technique    Small bowel resection    Insertion, arterial line, percutaneous    Exploratory laparotomy    Insertion of arterial line with imaging guidance    Abdominal washout    Evacuation of hemoperitoneum      Surgical Attending: Rose Lema  24 hour events: Downgraded from SICU, monitoring tachycardia, OOB to stand for 3 minutes, +g, +bm, wound vac intact.    PHYSICAL EXAM:  General: NAD  Cardiac: RRR   Respiratory: normal respiratory effort  Abdomen: Soft, non-distended, non-tender, no rebound, no guarding. Wound vac midline in place.      T(F): 97.5 (05-15-25 @ 23:32), Max: 99.1 (05-15-25 @ 21:00)  HR: 96 (05-15-25 @ 23:32) (89 - 110)  BP: 119/77 (05-15-25 @ 23:32) (119/77 - 169/81)  ABP: --  ABP(mean): --  RR: 18 (05-15-25 @ 23:32) (18 - 20)  SpO2: 94% (05-15-25 @ 23:32) (93% - 96%)    IN'S / OUT's:    05-14-25 @ 07:01  -  05-15-25 @ 07:00  --------------------------------------------------------  IN:    IV PiggyBack: 50 mL    Oral Fluid: 950 mL  Total IN: 1000 mL    OUT:    Voided (mL): 2100 mL  Total OUT: 2100 mL    Total NET: -1100 mL      05-15-25 @ 07:01  -  05-16-25 @ 00:24  --------------------------------------------------------  IN:    Oral Fluid: 680 mL  Total IN: 680 mL    OUT:    Voided (mL): 1600 mL  Total OUT: 1600 mL    Total NET: -920 mL          MEDICATIONS  (STANDING):  albuterol/ipratropium for Nebulization 3 milliLiter(s) Nebulizer every 6 hours  amLODIPine   Tablet 10 milliGRAM(s) Oral daily  apixaban 5 milliGRAM(s) Oral every 12 hours  chlorhexidine 2% Cloths 1 Application(s) Topical <User Schedule>  cyanocobalamin 1000 MICROGram(s) Oral daily  ergocalciferol 27279 Unit(s) Oral every week  insulin lispro (ADMELOG) corrective regimen sliding scale   SubCutaneous Before meals and at bedtime  lactobacillus acidophilus 1 Tablet(s) Oral daily  losartan 100 milliGRAM(s) Oral daily  melatonin 10 milliGRAM(s) Oral at bedtime  multivitamin/minerals 1 Tablet(s) Oral daily  pantoprazole    Tablet 40 milliGRAM(s) Oral before breakfast    MEDICATIONS  (PRN):  acetaminophen     Tablet .. 650 milliGRAM(s) Oral every 6 hours PRN Mild Pain (1 - 3)  sodium chloride 0.65% Nasal 1 Spray(s) Both Nostrils daily PRN Nasal Congestion      LABS  Labs:  CAPILLARY BLOOD GLUCOSE      POCT Blood Glucose.: 138 mg/dL (15 May 2025 20:58)  POCT Blood Glucose.: 137 mg/dL (15 May 2025 16:27)  POCT Blood Glucose.: 177 mg/dL (15 May 2025 11:42)  POCT Blood Glucose.: 129 mg/dL (15 May 2025 07:50)                          8.8    5.57  )-----------( 204      ( 15 May 2025 21:37 )             27.4         05-15    142  |  110  |  15  ----------------------------<  98  3.7   |  18  |  0.8      Calcium: 7.8 mg/dL (05-15-25 @ 21:37)      LFTs:         Coags:     11.30  ----< 0.96    ( 15 May 2025 01:38 )     25.5                Urinalysis Basic - ( 15 May 2025 21:37 )    Color: x / Appearance: x / SG: x / pH: x  Gluc: 98 mg/dL / Ketone: x  / Bili: x / Urobili: x   Blood: x / Protein: x / Nitrite: x   Leuk Esterase: x / RBC: x / WBC x   Sq Epi: x / Non Sq Epi: x / Bacteria: x            RADIOLOGY & ADDITIONAL TESTS:

## 2025-05-16 NOTE — PROGRESS NOTE ADULT - ASSESSMENT
65y F w/ PMHx of  Morbid obesity, LOUIS, large complex ventral hernia s/p repair sbr and loss of domain c/b post op intubation, hypotension requiring vasopressors, DVT, anastomotic leakage s/p RTOR and reanastomosis.      PLAN:  - Wound vac change M/W/F  - Encourage ambulation   - PT: rehab  - Vascular surgery will not place IVC filter complicated anatomy   - ID recs for Midline x ertapenem 1g q24h IV on D/C x 4 weeks E/D 5/19  - patient on eliquis 5 mg bid  - Monitor hgb, transfuse as needed     BLUE TEAM SPECTRA: [id number]

## 2025-05-17 LAB
GLUCOSE BLDC GLUCOMTR-MCNC: 108 MG/DL — HIGH (ref 70–99)
GLUCOSE BLDC GLUCOMTR-MCNC: 118 MG/DL — HIGH (ref 70–99)
GLUCOSE BLDC GLUCOMTR-MCNC: 123 MG/DL — HIGH (ref 70–99)
GLUCOSE BLDC GLUCOMTR-MCNC: 133 MG/DL — HIGH (ref 70–99)

## 2025-05-17 RX ORDER — MAGNESIUM SULFATE 500 MG/ML
2 SYRINGE (ML) INJECTION ONCE
Refills: 0 | Status: COMPLETED | OUTPATIENT
Start: 2025-05-17 | End: 2025-05-17

## 2025-05-17 RX ORDER — POTASSIUM PHOSPHATE, MONOBASIC POTASSIUM PHOSPHATE, DIBASIC INJECTION, 236; 224 MG/ML; MG/ML
30 SOLUTION, CONCENTRATE INTRAVENOUS ONCE
Refills: 0 | Status: COMPLETED | OUTPATIENT
Start: 2025-05-17 | End: 2025-05-17

## 2025-05-17 RX ADMIN — Medication 20 MILLIEQUIVALENT(S): at 05:20

## 2025-05-17 RX ADMIN — Medication 1 TABLET(S): at 11:38

## 2025-05-17 RX ADMIN — Medication 1 APPLICATION(S): at 05:53

## 2025-05-17 RX ADMIN — CYANOCOBALAMIN 1000 MICROGRAM(S): 1000 INJECTION INTRAMUSCULAR; SUBCUTANEOUS at 11:38

## 2025-05-17 RX ADMIN — IPRATROPIUM BROMIDE AND ALBUTEROL SULFATE 3 MILLILITER(S): .5; 2.5 SOLUTION RESPIRATORY (INHALATION) at 08:23

## 2025-05-17 RX ADMIN — AMLODIPINE BESYLATE 10 MILLIGRAM(S): 10 TABLET ORAL at 05:20

## 2025-05-17 RX ADMIN — POTASSIUM PHOSPHATE, MONOBASIC POTASSIUM PHOSPHATE, DIBASIC INJECTION, 83.33 MILLIMOLE(S): 236; 224 SOLUTION, CONCENTRATE INTRAVENOUS at 01:08

## 2025-05-17 RX ADMIN — LOSARTAN POTASSIUM 100 MILLIGRAM(S): 100 TABLET, FILM COATED ORAL at 05:21

## 2025-05-17 RX ADMIN — Medication 40 MILLIGRAM(S): at 05:21

## 2025-05-17 RX ADMIN — Medication 25 GRAM(S): at 00:15

## 2025-05-17 RX ADMIN — Medication 10 MILLIGRAM(S): at 21:32

## 2025-05-17 RX ADMIN — APIXABAN 5 MILLIGRAM(S): 2.5 TABLET, FILM COATED ORAL at 05:20

## 2025-05-17 RX ADMIN — APIXABAN 5 MILLIGRAM(S): 2.5 TABLET, FILM COATED ORAL at 16:35

## 2025-05-17 RX ADMIN — IPRATROPIUM BROMIDE AND ALBUTEROL SULFATE 3 MILLILITER(S): .5; 2.5 SOLUTION RESPIRATORY (INHALATION) at 19:59

## 2025-05-17 RX ADMIN — IPRATROPIUM BROMIDE AND ALBUTEROL SULFATE 3 MILLILITER(S): .5; 2.5 SOLUTION RESPIRATORY (INHALATION) at 14:29

## 2025-05-17 NOTE — PROGRESS NOTE ADULT - SUBJECTIVE AND OBJECTIVE BOX
GENERAL SURGERY PROGRESS NOTE     Patient: NANCY SARGENT , 65y ([birth year])Female   MRN: [id number]  Location: [address]  Visit: 04-03-25 Inpatient  Date: 05-17-25 @ 02:29        Admitted :04-03-25 (44d)  LOS: 44d    Procedure/Dx/Injuries: Incarcerated ventral hernia    SBO (small bowel obstruction)    Perforated viscus    Hemoperitoneum    Small bowel obstruction    Open repair of ventral hernia using mesh and component separation technique    Small bowel resection    Insertion, arterial line, percutaneous    Exploratory laparotomy    Insertion of arterial line with imaging guidance    Abdominal washout    Evacuation of hemoperitoneum         Events of past 24 hours: Patient denies nausea or vomiting. +G, +BM  >>> <<<>>> <<<>>> <<<>>> <<<>>> <<<>>> <<<>>> <<<>>> <<<>>> <<<>>> <<<    Vitals:   T(F): 97.5 (05-16-25 @ 23:00), Max: 98.3 (05-16-25 @ 11:00)  HR: 93 (05-16-25 @ 23:00)  BP: 115/75 (05-16-25 @ 23:00)  RR: 18 (05-16-25 @ 23:00)  SpO2: 96% (05-16-25 @ 23:00)      PHYSICAL EXAM:  1General: NAD  Cardiac: RRR   Respiratory: normal respiratory effort  Abdomen: Soft, non-distended, non-tender, no rebound, no guarding. Wound vac midline in place.    >>> <<<>>> <<<>>> <<<>>> <<<>>> <<<>>> <<<>>> <<<>>> <<<>>> <<<>>> <<<   Is & Os:   Diet, DASH/TLC:   Sodium & Cholesterol Restricted    Fluids:     05-15-25 @ 07:01  -  05-16-25 @ 07:00  --------------------------------------------------------  IN:    Oral Fluid: 680 mL  Total IN: 680 mL    OUT:    Voided (mL): 1600 mL  Total OUT: 1600 mL    Total NET: -920 mL    >>> <<<>>> <<<>>> <<<>>> <<<>>> <<<>>> <<<>>> <<<>>> <<<>>> <<<>>> <<<    MEDICATIONS  (STANDING):  albuterol/ipratropium for Nebulization 3 milliLiter(s) Nebulizer every 6 hours  amLODIPine   Tablet 10 milliGRAM(s) Oral daily  apixaban 5 milliGRAM(s) Oral every 12 hours  chlorhexidine 2% Cloths 1 Application(s) Topical <User Schedule>  cyanocobalamin 1000 MICROGram(s) Oral daily  ergocalciferol 26041 Unit(s) Oral every week  insulin lispro (ADMELOG) corrective regimen sliding scale   SubCutaneous Before meals and at bedtime  lactobacillus acidophilus 1 Tablet(s) Oral daily  losartan 100 milliGRAM(s) Oral daily  melatonin 10 milliGRAM(s) Oral at bedtime  multivitamin/minerals 1 Tablet(s) Oral daily  pantoprazole    Tablet 40 milliGRAM(s) Oral before breakfast  potassium chloride    Tablet ER 20 milliEquivalent(s) Oral once    MEDICATIONS  (PRN):  acetaminophen     Tablet .. 650 milliGRAM(s) Oral every 6 hours PRN Mild Pain (1 - 3)  sodium chloride 0.65% Nasal 1 Spray(s) Both Nostrils daily PRN Nasal Congestion      DVT PROPHYLAXIS:   GI PROPHYLAXIS: pantoprazole    Tablet 40 milliGRAM(s) Oral before breakfast    ANTICOAGULATION:   ANTIBIOTICS:      >>> <<<>>> <<<>>> <<<>>> <<<>>> <<<>>> <<<>>> <<<>>> <<<>>> <<<>>> <<<        LAB/STUDIES:  Labs:  CAPILLARY BLOOD GLUCOSE      POCT Blood Glucose.: 132 mg/dL (16 May 2025 20:59)  POCT Blood Glucose.: 113 mg/dL (16 May 2025 16:46)  POCT Blood Glucose.: 114 mg/dL (16 May 2025 12:14)  POCT Blood Glucose.: 145 mg/dL (16 May 2025 07:24)                          8.7    5.92  )-----------( 199      ( 16 May 2025 20:00 )             27.2       Auto Immature Granulocyte %: 2.4 % (05-16-25 @ 20:00)    05-16    143  |  110  |  14  ----------------------------<  122[H]  3.5   |  21  |  0.8      Calcium: 7.7 mg/dL (05-16-25 @ 20:00)      LFTs:         Coags:            Urinalysis Basic - ( 16 May 2025 20:00 )    Color: x / Appearance: x / SG: x / pH: x  Gluc: 122 mg/dL / Ketone: x  / Bili: x / Urobili: x   Blood: x / Protein: x / Nitrite: x   Leuk Esterase: x / RBC: x / WBC x   Sq Epi: x / Non Sq Epi: x / Bacteria: x

## 2025-05-17 NOTE — PROGRESS NOTE ADULT - ASSESSMENT
65y F w/ PMHx of  Morbid obesity, LOUIS, large complex ventral hernia s/p repair sbr and loss of domain c/b post op intubation, hypotension requiring vasopressors, DVT, anastomotic leakage s/p RTOR and reanastomosis.      PLAN:  - Wound vac change M/W/F    PLAN:  - Wound vac change M/W/F  - Encourage ambulation   - PT: rehab  - Monitor hgb, transfuse as needed     BLUE TEAM SPECTRA: [id number]     65y F w/ PMHx of  Morbid obesity, LOUIS, large complex ventral hernia s/p repair sbr and loss of domain c/b post op intubation, hypotension requiring vasopressors, DVT, anastomotic leakage s/p RTOR and reanastomosis.        PLAN:  - Wound vac change M/W/F  - Encourage ambulation   - PT: rehab  - Monitor hgb, transfuse as needed     BLUE TEAM SPECTRA: [id number]     65y F w/ PMHx of  Morbid obesity, LOUIS, large complex ventral hernia s/p repair sbr and loss of domain c/b post op intubation, hypotension requiring vasopressors, DVT, anastomotic leakage s/p RTOR and reanastomosis.        PLAN:  - Wound vac change M/W/F  - f/u vascular c/s for possible compression stockings for b/l le pitting edema   - Encourage ambulation   - PT: rehab  - Monitor hgb, transfuse as needed     BLUE TEAM SPECTRA: [id number]

## 2025-05-18 LAB
ANION GAP SERPL CALC-SCNC: 11 MMOL/L — SIGNIFICANT CHANGE UP (ref 7–14)
BUN SERPL-MCNC: 13 MG/DL — SIGNIFICANT CHANGE UP (ref 10–20)
CALCIUM SERPL-MCNC: 7.7 MG/DL — LOW (ref 8.4–10.5)
CHLORIDE SERPL-SCNC: 111 MMOL/L — HIGH (ref 98–110)
CO2 SERPL-SCNC: 20 MMOL/L — SIGNIFICANT CHANGE UP (ref 17–32)
CREAT SERPL-MCNC: 0.7 MG/DL — SIGNIFICANT CHANGE UP (ref 0.7–1.5)
EGFR: 97 ML/MIN/1.73M2 — SIGNIFICANT CHANGE UP
EGFR: 97 ML/MIN/1.73M2 — SIGNIFICANT CHANGE UP
GLUCOSE BLDC GLUCOMTR-MCNC: 127 MG/DL — HIGH (ref 70–99)
GLUCOSE BLDC GLUCOMTR-MCNC: 148 MG/DL — HIGH (ref 70–99)
GLUCOSE BLDC GLUCOMTR-MCNC: 151 MG/DL — HIGH (ref 70–99)
GLUCOSE BLDC GLUCOMTR-MCNC: 268 MG/DL — HIGH (ref 70–99)
GLUCOSE SERPL-MCNC: 115 MG/DL — HIGH (ref 70–99)
HCT VFR BLD CALC: 25.3 % — LOW (ref 37–47)
HGB BLD-MCNC: 8.1 G/DL — LOW (ref 12–16)
MAGNESIUM SERPL-MCNC: 1.6 MG/DL — LOW (ref 1.8–2.4)
MCHC RBC-ENTMCNC: 30.6 PG — SIGNIFICANT CHANGE UP (ref 27–31)
MCHC RBC-ENTMCNC: 32 G/DL — SIGNIFICANT CHANGE UP (ref 32–37)
MCV RBC AUTO: 95.5 FL — SIGNIFICANT CHANGE UP (ref 81–99)
NRBC BLD AUTO-RTO: 0 /100 WBCS — SIGNIFICANT CHANGE UP (ref 0–0)
PHOSPHATE SERPL-MCNC: 2.7 MG/DL — SIGNIFICANT CHANGE UP (ref 2.1–4.9)
PLATELET # BLD AUTO: 173 K/UL — SIGNIFICANT CHANGE UP (ref 130–400)
PMV BLD: 9.4 FL — SIGNIFICANT CHANGE UP (ref 7.4–10.4)
POTASSIUM SERPL-MCNC: 3.7 MMOL/L — SIGNIFICANT CHANGE UP (ref 3.5–5)
POTASSIUM SERPL-SCNC: 3.7 MMOL/L — SIGNIFICANT CHANGE UP (ref 3.5–5)
RBC # BLD: 2.65 M/UL — LOW (ref 4.2–5.4)
RBC # FLD: 16.8 % — HIGH (ref 11.5–14.5)
SODIUM SERPL-SCNC: 142 MMOL/L — SIGNIFICANT CHANGE UP (ref 135–146)
WBC # BLD: 6.18 K/UL — SIGNIFICANT CHANGE UP (ref 4.8–10.8)
WBC # FLD AUTO: 6.18 K/UL — SIGNIFICANT CHANGE UP (ref 4.8–10.8)

## 2025-05-18 RX ORDER — ACETAMINOPHEN 500 MG/5ML
2 LIQUID (ML) ORAL
Qty: 0 | Refills: 0 | DISCHARGE
Start: 2025-05-18

## 2025-05-18 RX ORDER — MAGNESIUM SULFATE 500 MG/ML
2 SYRINGE (ML) INJECTION ONCE
Refills: 0 | Status: COMPLETED | OUTPATIENT
Start: 2025-05-18 | End: 2025-05-18

## 2025-05-18 RX ORDER — APIXABAN 2.5 MG/1
1 TABLET, FILM COATED ORAL
Qty: 28 | Refills: 0
Start: 2025-05-18 | End: 2025-05-31

## 2025-05-18 RX ORDER — CYST/ALA/Q10/PHOS.SER/DHA/BROC 100-20-50
1 POWDER (GRAM) ORAL
Qty: 0 | Refills: 0 | DISCHARGE
Start: 2025-05-18

## 2025-05-18 RX ORDER — LACTOBACILLUS ACIDOPHILUS/PECT 75 MM-100
1 CAPSULE ORAL
Qty: 0 | Refills: 0 | DISCHARGE
Start: 2025-05-18

## 2025-05-18 RX ORDER — POTASSIUM PHOSPHATE, MONOBASIC POTASSIUM PHOSPHATE, DIBASIC INJECTION, 236; 224 MG/ML; MG/ML
15 SOLUTION, CONCENTRATE INTRAVENOUS ONCE
Refills: 0 | Status: COMPLETED | OUTPATIENT
Start: 2025-05-18 | End: 2025-05-19

## 2025-05-18 RX ADMIN — APIXABAN 5 MILLIGRAM(S): 2.5 TABLET, FILM COATED ORAL at 05:34

## 2025-05-18 RX ADMIN — Medication 10 MILLIGRAM(S): at 21:35

## 2025-05-18 RX ADMIN — Medication 1 TABLET(S): at 11:57

## 2025-05-18 RX ADMIN — INSULIN LISPRO 2: 100 INJECTION, SOLUTION INTRAVENOUS; SUBCUTANEOUS at 11:55

## 2025-05-18 RX ADMIN — APIXABAN 5 MILLIGRAM(S): 2.5 TABLET, FILM COATED ORAL at 17:07

## 2025-05-18 RX ADMIN — LOSARTAN POTASSIUM 100 MILLIGRAM(S): 100 TABLET, FILM COATED ORAL at 05:34

## 2025-05-18 RX ADMIN — IPRATROPIUM BROMIDE AND ALBUTEROL SULFATE 3 MILLILITER(S): .5; 2.5 SOLUTION RESPIRATORY (INHALATION) at 21:17

## 2025-05-18 RX ADMIN — IPRATROPIUM BROMIDE AND ALBUTEROL SULFATE 3 MILLILITER(S): .5; 2.5 SOLUTION RESPIRATORY (INHALATION) at 13:47

## 2025-05-18 RX ADMIN — Medication 25 GRAM(S): at 23:34

## 2025-05-18 RX ADMIN — AMLODIPINE BESYLATE 10 MILLIGRAM(S): 10 TABLET ORAL at 05:34

## 2025-05-18 RX ADMIN — CYANOCOBALAMIN 1000 MICROGRAM(S): 1000 INJECTION INTRAMUSCULAR; SUBCUTANEOUS at 11:57

## 2025-05-18 RX ADMIN — IPRATROPIUM BROMIDE AND ALBUTEROL SULFATE 3 MILLILITER(S): .5; 2.5 SOLUTION RESPIRATORY (INHALATION) at 09:03

## 2025-05-18 RX ADMIN — Medication 1 APPLICATION(S): at 05:36

## 2025-05-18 RX ADMIN — Medication 40 MILLIGRAM(S): at 05:36

## 2025-05-18 NOTE — PROGRESS NOTE ADULT - ASSESSMENT
65y F w/ PMHx of  Morbid obesity, LOUIS, large complex ventral hernia s/p repair sbr and loss of domain c/b post op intubation, hypotension requiring vasopressors, DVT, anastomotic leakage s/p RTOR and reanastomosis.        PLAN:  - Wound vac change M/W/F  - Encourage ambulation   - Continue exercises while in bed/chair  - PT: rehab  - Monitor hgb, transfuse as needed     BLUE TEAM SPECTRA: [id number]

## 2025-05-18 NOTE — PROGRESS NOTE ADULT - SUBJECTIVE AND OBJECTIVE BOX
SURGERY PROGRESS NOTE    Patient: NANCY SARGENT , 65y (62)Female   MRN: [id number]  Location: [address]  Visit: 25 Inpatient  Date: 25 @ 00:56    LOS: 46d    Procedure/Dx/Injuries: Incarcerated ventral hernia    SBO (small bowel obstruction)    Perforated viscus    Hemoperitoneum    Small bowel obstruction    Open repair of ventral hernia using mesh and component separation technique    Small bowel resection    Insertion, arterial line, percutaneous    Exploratory laparotomy    Insertion of arterial line with imaging guidance    Abdominal washout    Evacuation of hemoperitoneum      Events of past 24 hours: Patient denies nausea or vomiting. +G, +BM. Woundvac in place. Yesterday patient was OOBTC and was exercising throughout the day in the chair, did arm elevations in multiple directions, kicks, pressed her feet against the floor, etc.     PHYSICAL EXAM:  General: NAD  Cardiac: RRR   Respiratory: normal respiratory effort  Abdomen: Soft, non-distended, non-tender, no rebound, no guarding. Wound vac midline in place.  Extremities: SCDs in place    PAST MEDICAL & SURGICAL HISTORY:  Gastric bypass status for obesity      LOUIS (obstructive sleep apnea)      S/P gastric bypass      S/P       S/P small bowel resection      History of total bilateral knee replacement (TKR)      H/O colonoscopy            Vitals:   T(F): 97.8 (25 @ 16:00), Max: 97.8 (25 @ 16:00)  HR: 105 (25 @ 16:00)  BP: 123/76 (25 @ 16:00)  RR: 18 (25 @ 16:00)  SpO2: 96% (25 @ 16:00)      Diet, DASH/TLC:   Sodium & Cholesterol Restricted      Fluids:     I & O's:    25 @ 07:01  -  25 @ 07:00  --------------------------------------------------------  IN:  Total IN: 0 mL    OUT:    Voided (mL): 800 mL  Total OUT: 800 mL    Total NET: -800 mL          MEDICATIONS  (STANDING):  albuterol/ipratropium for Nebulization 3 milliLiter(s) Nebulizer every 6 hours  amLODIPine   Tablet 10 milliGRAM(s) Oral daily  apixaban 5 milliGRAM(s) Oral every 12 hours  chlorhexidine 2% Cloths 1 Application(s) Topical <User Schedule>  cyanocobalamin 1000 MICROGram(s) Oral daily  ergocalciferol 76908 Unit(s) Oral every week  insulin lispro (ADMELOG) corrective regimen sliding scale   SubCutaneous Before meals and at bedtime  lactobacillus acidophilus 1 Tablet(s) Oral daily  losartan 100 milliGRAM(s) Oral daily  melatonin 10 milliGRAM(s) Oral at bedtime  multivitamin/minerals 1 Tablet(s) Oral daily  pantoprazole    Tablet 40 milliGRAM(s) Oral before breakfast    MEDICATIONS  (PRN):  acetaminophen     Tablet .. 650 milliGRAM(s) Oral every 6 hours PRN Mild Pain (1 - 3)  sodium chloride 0.65% Nasal 1 Spray(s) Both Nostrils daily PRN Nasal Congestion      DVT PROPHYLAXIS:   GI PROPHYLAXIS: pantoprazole    Tablet 40 milliGRAM(s) Oral before breakfast    ANTICOAGULATION:   ANTIBIOTICS:            LAB/STUDIES:  Labs:  CAPILLARY BLOOD GLUCOSE      POCT Blood Glucose.: 133 mg/dL (17 May 2025 21:40)  POCT Blood Glucose.: 123 mg/dL (17 May 2025 17:16)  POCT Blood Glucose.: 118 mg/dL (17 May 2025 12:14)  POCT Blood Glucose.: 108 mg/dL (17 May 2025 06:58)                          8.7    5.92  )-----------( 199      ( 16 May 2025 20:00 )             27.2         05-16    143  |  110  |  14  ----------------------------<  122[H]  3.5   |  21  |  0.8          LFTs:         Coags:            Urinalysis Basic - ( 16 May 2025 20:00 )    Color: x / Appearance: x / SG: x / pH: x  Gluc: 122 mg/dL / Ketone: x  / Bili: x / Urobili: x   Blood: x / Protein: x / Nitrite: x   Leuk Esterase: x / RBC: x / WBC x   Sq Epi: x / Non Sq Epi: x / Bacteria: x

## 2025-05-18 NOTE — PROVIDER CONTACT NOTE (OTHER) - REASON
Patient 
Patient refused blood work
discharge notice
Patient refusal
phlebotomist unable to draw labs

## 2025-05-18 NOTE — PROVIDER CONTACT NOTE (OTHER) - SITUATION
patient is refusing discharge, pt sated he wants to appeal as he does not feel well. pt denies pain.
phlebotomist unable to draw 2000 labs
Patient . Pt has no complaints of pain. Nurse called MD Allred to make aware.
pt was given discharge notice, pt was informed that there was a expected to discharge for may 19, 2025, pt refused to signed paper, RN signed and place document into chart. a copy was given to pt

## 2025-05-18 NOTE — PROVIDER CONTACT NOTE (OTHER) - DATE AND TIME:
04-Apr-2025 20:55
18-May-2025 18:35
09-Apr-2025 14:50
05-Apr-2025 04:41
08-Apr-2025 22:38
Holding RN to OR RN

## 2025-05-18 NOTE — CHART NOTE - NSCHARTNOTEFT_GEN_A_CORE
Vascular surgery recalled for swelling of LE and inquiry or wrapping the legs. Pt LE should be wrapped with ace wrap from toes to thigh.

## 2025-05-18 NOTE — CONSULT NOTE ADULT - SUBJECTIVE AND OBJECTIVE BOX
Podiatry Consult Note    Subjective:  NANCY SARGENT  Seen Bedside 65y Female  .   Patient is a 65y old  Female who presents with a chief complaint of abd pain (13 May 2025 15:06). Podiatry was consulted for toenail debridement.    HPI:  The patient is a 64-year-old female with a past medical history of high blood pressure and gastric bypass surgery in , presenting with two days of sharp abdominal pain and one episode of non-bloody, non-bilious vomiting. She has a prior history of small bowel obstruction in , , and most recently in , all of which resolved with conservative management. Her current symptoms are similar in nature. A computed tomography scan of the abdomen and pelvis with oral and intravenous contrast revealed multiple ventral abdominal wall hernias containing both obstructed and non-obstructed bowel loops. A complex small bowel obstruction is present, involving three hernias on the right side of the abdomen, with dilated, fluid-filled loops of small bowel, trace fluid between loops, and a collapsed segment of bowel beyond a hernia in the right lower quadrant. These findings are consistent with a recurrent small bowel obstruction. The hernia was non reducible at bedside. (2025 20:41)      Past Medical History and Surgical History  PAST MEDICAL & SURGICAL HISTORY:  Gastric bypass status for obesity      LOUIS (obstructive sleep apnea)      S/P gastric bypass      S/P       S/P small bowel resection      History of total bilateral knee replacement (TKR)      H/O colonoscopy             Review of Systems:  [X] Ten point review of systems is otherwise negative except as noted     Objective:  Vital Signs Last 24 Hrs  T(C): 37.1 (18 May 2025 20:00), Max: 37.1 (18 May 2025 20:00)  T(F): 98.7 (18 May 2025 20:00), Max: 98.7 (18 May 2025 20:00)  HR: 107 (18 May 2025 20:00) (100 - 108)  BP: 139/91 (18 May 2025 20:00) (126/82 - 139/91)  BP(mean): 99 (18 May 2025 08:04) (99 - 99)  RR: 18 (18 May 2025 20:00) (18 - 18)  SpO2: 96% (18 May 2025 20:00) (96% - 97%)    Parameters below as of 18 May 2025 20:00  Patient On (Oxygen Delivery Method): room air                        Physical Exam - Lower Extremity Focused:   Derm: Nails slightly elongated, no signs of an open wound, no clinical signs of an infection, skin is supple and well-hydrated  Vascular: DP and PT Pulses Intact, Foot is Warm to Warm to the touch; Capillary Refill Time < 3 Seconds; b/l Edema on dorsum of bilateral feet  Neuro: Protective Sensation Intact  MSK: No Pain On Palpation at lower extremities    Assessment:  Onychodystrophy    Plan:  Chart reviewed and Patient evaluated. All Questions and Concerns Addressed and Answered  Aseptic debridement of nails was performed to hygienic length with the use of sterile nail clippers.  Weight Bearing Status; WBAT   No Sx Debridement.   Patient is stable from Podiatry standpoint and could follow up in the outpatient clinic.   Discussed Plan w/ Dr. Mccoy    Podiatry

## 2025-05-19 PROCEDURE — 99223 1ST HOSP IP/OBS HIGH 75: CPT

## 2025-05-19 RX ADMIN — LOSARTAN POTASSIUM 100 MILLIGRAM(S): 100 TABLET, FILM COATED ORAL at 05:27

## 2025-05-19 RX ADMIN — Medication 650 MILLIGRAM(S): at 15:11

## 2025-05-19 RX ADMIN — Medication 1 TABLET(S): at 11:11

## 2025-05-19 RX ADMIN — IPRATROPIUM BROMIDE AND ALBUTEROL SULFATE 3 MILLILITER(S): .5; 2.5 SOLUTION RESPIRATORY (INHALATION) at 08:14

## 2025-05-19 RX ADMIN — Medication 40 MILLIGRAM(S): at 05:27

## 2025-05-19 RX ADMIN — IPRATROPIUM BROMIDE AND ALBUTEROL SULFATE 3 MILLILITER(S): .5; 2.5 SOLUTION RESPIRATORY (INHALATION) at 14:54

## 2025-05-19 RX ADMIN — POTASSIUM PHOSPHATE, MONOBASIC POTASSIUM PHOSPHATE, DIBASIC INJECTION, 63.75 MILLIMOLE(S): 236; 224 SOLUTION, CONCENTRATE INTRAVENOUS at 00:20

## 2025-05-19 RX ADMIN — APIXABAN 5 MILLIGRAM(S): 2.5 TABLET, FILM COATED ORAL at 17:14

## 2025-05-19 RX ADMIN — CYANOCOBALAMIN 1000 MICROGRAM(S): 1000 INJECTION INTRAMUSCULAR; SUBCUTANEOUS at 11:11

## 2025-05-19 RX ADMIN — APIXABAN 5 MILLIGRAM(S): 2.5 TABLET, FILM COATED ORAL at 05:27

## 2025-05-19 RX ADMIN — Medication 10 MILLIGRAM(S): at 21:01

## 2025-05-19 RX ADMIN — ERGOCALCIFEROL 50000 UNIT(S): 1.25 CAPSULE ORAL at 11:49

## 2025-05-19 RX ADMIN — Medication 1 APPLICATION(S): at 05:27

## 2025-05-19 RX ADMIN — Medication 650 MILLIGRAM(S): at 13:41

## 2025-05-19 RX ADMIN — AMLODIPINE BESYLATE 10 MILLIGRAM(S): 10 TABLET ORAL at 05:27

## 2025-05-19 RX ADMIN — IPRATROPIUM BROMIDE AND ALBUTEROL SULFATE 3 MILLILITER(S): .5; 2.5 SOLUTION RESPIRATORY (INHALATION) at 19:23

## 2025-05-19 NOTE — CONSULT NOTE ADULT - CONSULT REASON
Right PTV DVT, PE
FUNMI and oliguria
Toenail debridement
Abdominal Wall Botox
PE, bleeding
debilitation
PE/DVT
fecal incontinence
Abdominal and hemodynamic monitoring
Abdominal wall botox injection
IVC filter placement
Loss of domain/abdominal reconstruction
Thrombectomy eval, R distal segmental PE
svt
IVC Filter
Leukocytosis
PICC placement

## 2025-05-19 NOTE — PROGRESS NOTE ADULT - SUBJECTIVE AND OBJECTIVE BOX
GENERAL SURGERY PROGRESS NOTE    Patient: NANCY SARGENT , 65y (62)Female   MRN: [id number]  Location: [address]  Visit: 25 Inpatient  Date: 25 @ 08:15    Hospital Day #: 47  Post-Op Day #: 32      PAST MEDICAL & SURGICAL HISTORY:  Gastric bypass status for obesity      LOUIS (obstructive sleep apnea)      S/P gastric bypass      S/P       S/P small bowel resection      History of total bilateral knee replacement (TKR)      H/O colonoscopy            Vitals:   T(F): 98.1 (25 @ 00:41), Max: 98.7 (25 @ 20:00)  HR: 91 (25 @ 00:41)  BP: 129/84 (25 @ 00:41)  RR: 20 (25 @ 00:41)  SpO2: 98% (25 @ 00:41)      Diet, DASH/TLC:   Sodium & Cholesterol Restricted      Fluids:     I & O's:    25 @ 07:01  -  25 @ 07:00  --------------------------------------------------------  IN:  Total IN: 0 mL    OUT:    Voided (mL): 1900 mL  Total OUT: 1900 mL    Total NET: -1900 mL        PHYSICAL EXAM:  General: NAD  Cardiac: RRR   Respiratory: normal respiratory effort  Abdomen: Soft, non-distended, non-tender, no rebound, no guarding. Wound vac midline in place.  Extremities: SCDs in place    MEDICATIONS  (STANDING):  albuterol/ipratropium for Nebulization 3 milliLiter(s) Nebulizer every 6 hours  amLODIPine   Tablet 10 milliGRAM(s) Oral daily  apixaban 5 milliGRAM(s) Oral every 12 hours  chlorhexidine 2% Cloths 1 Application(s) Topical <User Schedule>  cyanocobalamin 1000 MICROGram(s) Oral daily  ergocalciferol 15397 Unit(s) Oral every week  lactobacillus acidophilus 1 Tablet(s) Oral daily  losartan 100 milliGRAM(s) Oral daily  melatonin 10 milliGRAM(s) Oral at bedtime  multivitamin/minerals 1 Tablet(s) Oral daily  pantoprazole    Tablet 40 milliGRAM(s) Oral before breakfast    MEDICATIONS  (PRN):  acetaminophen     Tablet .. 650 milliGRAM(s) Oral every 6 hours PRN Mild Pain (1 - 3)  sodium chloride 0.65% Nasal 1 Spray(s) Both Nostrils daily PRN Nasal Congestion      DVT PROPHYLAXIS:   GI PROPHYLAXIS: pantoprazole    Tablet 40 milliGRAM(s) Oral before breakfast    ANTICOAGULATION:   ANTIBIOTICS:            LAB/STUDIES:  Labs:  CAPILLARY BLOOD GLUCOSE      POCT Blood Glucose.: 127 mg/dL (18 May 2025 16:50)  POCT Blood Glucose.: 268 mg/dL (18 May 2025 16:48)  POCT Blood Glucose.: 151 mg/dL (18 May 2025 11:45)                          8.1    6.18  )-----------( 173      ( 18 May 2025 20:00 )             25.3             142  |  111[H]  |  13  ----------------------------<  115[H]  3.7   |  20  |  0.7      Calcium: 7.7 mg/dL (25 @ 20:00)        Urinalysis Basic - ( 18 May 2025 20:00 )    Color: x / Appearance: x / SG: x / pH: x  Gluc: 115 mg/dL / Ketone: x  / Bili: x / Urobili: x   Blood: x / Protein: x / Nitrite: x   Leuk Esterase: x / RBC: x / WBC x   Sq Epi: x / Non Sq Epi: x / Bacteria: x            ASSESSMENT:  Patient is a 65y F w/ PMHx of  Morbid obesity, LOUIS, large complex ventral hernia s/p repair sbr and loss of domain c/b post op intubation, hypotension requiring vasopressors, DVT, anastomotic leakage s/p RTOR and reanastomosis.        PLAN:  - F/u GI c/s for anal stool leakage  - F/u PT evaluation today  - Wound vac change M/W/F  - Encourage ambulation   - Continue exercises while in bed/chair  - PT: rehab  - Monitor hgb, transfuse as needed     BLUE TEAM SPECTRA: [id number]

## 2025-05-19 NOTE — CONSULT NOTE ADULT - ASSESSMENT
64-year-old female with a past medical history of high blood pressure and gastric bypass surgery in 2001, presenting with two days of sharp abdominal pain and one episode of non-bloody, non-bilious vomiting. She has a prior history of small bowel obstruction in 2006, 2008, and most recently in 2020, all of which resolved with conservative management. CTAP revealed multiple ventral abdominal wall hernias containing both obstructed and non-obstructed bowel loops. A complex small bowel obstruction is present, involving three hernias on the right side of the abdomen, with dilated, fluid-filled loops of small bowel, trace fluid between loops, and a collapsed segment of bowel beyond a hernia in the right lower quadrant. These findings are consistent with a recurrent small bowel obstruction. The hernia was non reducible at bedside.  Patient admitted to surgery for large complex ventral hernia s/p repair sbr and loss of domain c/b post op intubation, hypotension requiring vasopressors, DVT, anastomotic leakage s/p RTOR and reanastomosis.  Had prolonged hospital stay complicated by hypoxic respiratory failure s/p intubation/extubation, shock, SVT, HTN urgency. GI consulted for possible fecal incontinence.     #r/o fecal incontinence   Patient endorsing leaking of gas and small stool   She is not passing large stool unintentionally Not on bowel regimen. Has had 1-3 BMs daily  Previously has not had this issue  Had digney shield during prolonged hospital stay  Denies melena / hematochezia, liquid diarrhea  Last colonoscopy 2020 showed diverticulosis otherwise unremarkable    Rec  Patient would ideally benefit from repeat colonoscopy with anorectal manometry as outpatient   Follows with Dr. Mackey   Monitor BMs   Diet as tolerated   Avoid digney shield   Ambulate as tolerated, follow PT      64-year-old female with a past medical history of high blood pressure and gastric bypass surgery in 2001, presenting with two days of sharp abdominal pain and one episode of non-bloody, non-bilious vomiting. She has a prior history of small bowel obstruction in 2006, 2008, and most recently in 2020, all of which resolved with conservative management. CTAP revealed multiple ventral abdominal wall hernias containing both obstructed and non-obstructed bowel loops. A complex small bowel obstruction is present, involving three hernias on the right side of the abdomen, with dilated, fluid-filled loops of small bowel, trace fluid between loops, and a collapsed segment of bowel beyond a hernia in the right lower quadrant. These findings are consistent with a recurrent small bowel obstruction. The hernia was non reducible at bedside.  Patient admitted to surgery for large complex ventral hernia s/p repair sbr and loss of domain c/b post op intubation, hypotension requiring vasopressors, DVT, anastomotic leakage s/p RTOR and reanastomosis.  Had prolonged hospital stay complicated by hypoxic respiratory failure s/p intubation/extubation, shock, SVT, HTN urgency. GI consulted for possible fecal incontinence.     #r/o fecal incontinence   Patient endorsing leaking of gas and small stool   She is not passing large stool unintentionally Not on bowel regimen. Has had 1-3 BMs daily  Previously has not had this issue  Had digney shield during prolonged hospital stay  Denies melena / hematochezia, liquid diarrhea  Last colonoscopy 2020 showed diverticulosis otherwise unremarkable  Rectal exam performed    Rec  Patient would ideally benefit from repeat colonoscopy with anorectal manometry as outpatient   Follows with Dr. Mackey   Monitor BMs   Diet as tolerated   Avoid digney shield   Ambulate as tolerated, follow PT

## 2025-05-19 NOTE — CONSULT NOTE ADULT - CONSULT REQUESTED BY NAME
Claudia Bee (PA)
Rose Lema
Dr. moreira
surgery
SICU
SICU TEAM
Service
service
4C
Dr. Jovel
Dr Lema
Dr. Lema
Darline Pugh
Jacki Meza)
Muadolphe Ariel
Halle Jovel)
Dr. Rose Lema

## 2025-05-19 NOTE — CONSULT NOTE ADULT - SUBJECTIVE AND OBJECTIVE BOX
Gastroenterology Consultation:    Patient is a 65y old  Female who presents with a chief complaint of abd pain (13 May 2025 15:06)        Admitted on: 25      HPI:  The patient is a 64-year-old female with a past medical history of high blood pressure and gastric bypass surgery in , presenting with two days of sharp abdominal pain and one episode of non-bloody, non-bilious vomiting. She has a prior history of small bowel obstruction in , , and most recently in , all of which resolved with conservative management. Her current symptoms are similar in nature. A computed tomography scan of the abdomen and pelvis with oral and intravenous contrast revealed multiple ventral abdominal wall hernias containing both obstructed and non-obstructed bowel loops. A complex small bowel obstruction is present, involving three hernias on the right side of the abdomen, with dilated, fluid-filled loops of small bowel, trace fluid between loops, and a collapsed segment of bowel beyond a hernia in the right lower quadrant. These findings are consistent with a recurrent small bowel obstruction. The hernia was non reducible at bedside.  Patient admitted to surgery for large complex ventral hernia s/p repair sbr and loss of domain c/b post op intubation, hypotension requiring vasopressors, DVT, anastomotic leakage s/p RTOR and reanastomosis.  Had prolonged hospital stay complicated by hyopoxic respiratory failure s/p intubation/extubation, shock, SVT, HTN urgency. GI consulted for possible fecal incontinence.         Prior EGD:   Normal mucosa in the whole esophagus.    Large gastric pouch with gastric bypass anastomosis, there is a large  anastomotic ulcer with deep crater and a white membrane without bleeding or  visible vessel. At lesser curvature of the mid remaining pouch there is a gastro  enteric fistulae with stenosis,the scope couldn't get through. .    Normal mucosa in the jejunum.     Prior Colonoscopy:  Impressions:    Diverticulum in the transverse colon.    Normal mucosa in the whole colon. (Biopsy).     Plan: Colonoscopy in 10 years, or sooner if clinically indicated.       PAST MEDICAL & SURGICAL HISTORY:  Gastric bypass status for obesity      LOUIS (obstructive sleep apnea)      S/P gastric bypass      S/P       S/P small bowel resection      History of total bilateral knee replacement (TKR)      H/O colonoscopy  2016            FAMILY HISTORY:  nonpertinent    Social History:  Tobacco: denies  Alcohol: denies  Drugs:denies    Home Medications:  acetaminophen 325 mg oral tablet: 2 tab(s) orally every 6 hours As needed Mild Pain (1 - 3) (18 May 2025 16:57)  amLODIPine 10 mg oral tablet: 1 tab(s) orally once a day (2025 22:39)  cyanocobalamin 2500 mcg sublingual tablet: 1 tab(s) sublingually once a day (2025 22:42)  hydroCHLOROthiazide 25 mg oral tablet: 1 tab(s) orally once a day (2025 02:18)  lactobacillus acidophilus oral capsule: 1 cap(s) orally once a day (18 May 2025 17:02)  losartan 100 mg oral tablet: 1 tab(s) orally once a day (2025 22:38)  Multiple Vitamins with Minerals oral tablet: 1 tab(s) orally once a day (18 May 2025 17:02)  omeprazole 40 mg oral delayed release capsule: 1 cap(s) orally once a day as needed for  heartburn (06 May 2025 10:16)  Wegovy (2.4 mg dose) subcutaneous solution: 2.4 milligram(s) subcutaneously (2025 02:38)        MEDICATIONS  (STANDING):  albuterol/ipratropium for Nebulization 3 milliLiter(s) Nebulizer every 6 hours  amLODIPine   Tablet 10 milliGRAM(s) Oral daily  apixaban 5 milliGRAM(s) Oral every 12 hours  chlorhexidine 2% Cloths 1 Application(s) Topical <User Schedule>  cyanocobalamin 1000 MICROGram(s) Oral daily  ergocalciferol 73681 Unit(s) Oral every week  lactobacillus acidophilus 1 Tablet(s) Oral daily  losartan 100 milliGRAM(s) Oral daily  melatonin 10 milliGRAM(s) Oral at bedtime  multivitamin/minerals 1 Tablet(s) Oral daily  pantoprazole    Tablet 40 milliGRAM(s) Oral before breakfast    MEDICATIONS  (PRN):  acetaminophen     Tablet .. 650 milliGRAM(s) Oral every 6 hours PRN Mild Pain (1 - 3)  sodium chloride 0.65% Nasal 1 Spray(s) Both Nostrils daily PRN Nasal Congestion      Allergies  No Known Allergies      Review of Systems:   Constitutional:  No Fever, No Chills  ENT/Mouth:  No Hearing Changes,  No Difficulty Swallowing  Eyes:  No Eye Pain, No Vision Changes  Cardiovascular:  No Chest Pain, No Palpitations  Respiratory:  No Cough, No Dyspnea  Gastrointestinal:  As described in HPI  Musculoskeletal:  No Joint Swelling, No Back Pain  Skin:  No Skin Lesions, No Jaundice  Neuro:  No Syncope, No Dizziness  Heme/Lymph:  No Bruising, No Bleeding.          Physical Examination:  T(C): 36.7 (25 @ 16:29), Max: 37.1 (25 @ 20:00)  HR: 96 (25 @ 16:29) (91 - 107)  BP: 134/85 (25 @ 16:29) (129/84 - 139/91)  RR: 18 (25 @ 16:29) (18 - 20)  SpO2: 95% (25 @ 16:29) (95% - 98%)      25 @ 07:01  -  25 @ 07:00  --------------------------------------------------------  IN: 0 mL / OUT: 2600 mL / NET: -2600 mL    25 @ 07:01  -  25 @ 07:00  --------------------------------------------------------  IN: 0 mL / OUT: 1900 mL / NET: -1900 mL    25 @ 07:01  -  25 @ 19:14  --------------------------------------------------------  IN: 0 mL / OUT: 300 mL / NET: -300 mL          GENERAL: AAOx3, no acute distress.  HEAD:  Atraumatic, Normocephalic  EYES: conjunctiva and sclera clear  NECK: Supple, no JVD or thyromegaly  CHEST/LUNG: Clear to auscultation bilaterally; No wheeze, rhonchi, or rales  HEART: Regular rate and rhythm; normal S1, S2, No murmurs.  ABDOMEN: Soft, nontender, nondistended; Bowel sounds present  NEUROLOGY: No asterixis or tremor.   SKIN: Intact, no jaundice        Data:                        8.1      )-----------( 173      ( 18 May 2025 20:00 )             25.3     Hgb Trend:  8.1  25 @ 20:00  8.7  25 @ 20:00            142  |  111[H]  |  13  ----------------------------<  115[H]  3.7   |  20  |  0.7    Ca    7.7[L]      18 May 2025 20:00  Phos  2.7       Mg     1.6           Liver panel trend:              Radiology:

## 2025-05-19 NOTE — CONSULT NOTE ADULT - ATTENDING COMMENTS
65yo female with complicated course following ventral hernia repair asked to evaluate for possible fecal incontinence. Pt having formed bms with intermittent leakage and anorectal discomfort. Noted to have dignishield previously placed. Recommend close outpt follow up for colonoscopy and anorectal manometry when clinically improved.
Impression:    Bilateral lower extremity DVT  Segmental PE  Intraabdominal bleeding s/p washout  SBO s/p repair  HO LOUIS no CPAP     Plan as outlined above
Discussed case with primary team.  Reviewed imaging and lab work independently.     64 F s/p open ventral hernia repair with mesh, SBR resection. Extubated to BIPAP post-operatively. SICU consulted for post-op care/management with concerns for intra-abdominal hypertension/ACS.    Visited pt in PACU. Reports having abdominal pain. Denies any nausea/vomiting. Denies any fever/chills.     PE:  General; female, in bed, obese, in NAD, BIPAP in place  Head; NC/AT  Heart: RRR  Lungs; even chest rise  Abdomen: soft, obese abdomen, non-tender, two abdominal binders in place, prevena in place, good seal    A/P:  64 F s/p ventral hernia repair with SBR resection, component separation.   - Monitor resp status; send ABG  - Serial abd exams  - Monitor urine output  - DVT PPx  - Keep NPO  - Decrease IVF rate  - Send urine lytes  - Monitor Cr; uptrending   - Tylenol/dilaudid for pain   - F/U lactate level   - Monitor BP
Patient was seen and examined with fellow during inpatient hematology oncology rounds covering service. Agree as note.      Case was discussed with patient, family extensively.  Spent at least 35 minutes discussing the case.  Explained about findings of DVT/PE most likely provoked in postoperative settings.  Patient was treated with anticoagulation. However, developed significant intraabdominal bleeding.  The risk and benefits of anticoagulation were discussed in great details.      Recommend discussing between GI and surgical teams for possible additional evaluation such as EGD/colonoscopy prior to initiation of full dose anticoagulation.  If anticoagulation is to be started, recommend starting on heparin drip (shortest acting anticoagulant) with close monitoring of CBC.  If hemoglobin is stable may transition to DOAC.  Otherwise, consider IVC filter placement.  Please provide hematology clinic follow-up after discharge.
patient seen and examined with renal resident     # FUNMI most likely ATN   # resp failure on MV  #  incarcerated ventral hernia with SBO S/p open ventral hernia repair, small bowel resection, and primary fascial closure    - positive fluid balance   - abdominal compartment syndrome in DD  - suggest Bumex 2 mg IV q12h  - check UA and U lytes U urea   - dose meds to GFR 30 ml /min   - follow strict I and O   - check IP and PTH     no need for RRT   will follow closely

## 2025-05-19 NOTE — CONSULT NOTE ADULT - CONSULT REQUESTED DATE/TIME
11-Apr-2025 09:00
16-Apr-2025 15:25
22-Apr-2025 07:11
07-Apr-2025 07:49
12-May-2025 13:37
18-May-2025 21:33
22-Apr-2025 12:31
10-Apr-2025 18:31
11-Apr-2025 09:36
07-Apr-2025 11:55
19-May-2025 19:14
22-Apr-2025 14:00
28-Apr-2025 08:51
09-May-2025 10:35
16-Apr-2025 08:25
13-May-2025 13:10
22-Apr-2025 00:23

## 2025-05-19 NOTE — CONSULT NOTE ADULT - PROVIDER SPECIALTY LIST ADULT
Cardiology
Intervent Radiology
Podiatry
Gastroenterology
Infectious Disease
Intervent Radiology
Physiatry
Plastic Surgery
Vascular Surgery
Nephrology
Pulmonology
Intervent Radiology
SICU
Heme/Onc
Intervent Radiology

## 2025-05-20 LAB
BASOPHILS # BLD AUTO: 0 K/UL — SIGNIFICANT CHANGE UP (ref 0–0.2)
BASOPHILS NFR BLD AUTO: 0 % — SIGNIFICANT CHANGE UP (ref 0–1)
EOSINOPHIL # BLD AUTO: 0.05 K/UL — SIGNIFICANT CHANGE UP (ref 0–0.7)
EOSINOPHIL NFR BLD AUTO: 0.8 % — SIGNIFICANT CHANGE UP (ref 0–8)
HCT VFR BLD CALC: 25.4 % — LOW (ref 37–47)
HGB BLD-MCNC: 8.1 G/DL — LOW (ref 12–16)
LYMPHOCYTES # BLD AUTO: 1.42 K/UL — SIGNIFICANT CHANGE UP (ref 1.2–3.4)
LYMPHOCYTES # BLD AUTO: 23.5 % — SIGNIFICANT CHANGE UP (ref 20.5–51.1)
MCHC RBC-ENTMCNC: 30.3 PG — SIGNIFICANT CHANGE UP (ref 27–31)
MCHC RBC-ENTMCNC: 31.9 G/DL — LOW (ref 32–37)
MCV RBC AUTO: 95.1 FL — SIGNIFICANT CHANGE UP (ref 81–99)
MONOCYTES # BLD AUTO: 0.32 K/UL — SIGNIFICANT CHANGE UP (ref 0.1–0.6)
MONOCYTES NFR BLD AUTO: 5.2 % — SIGNIFICANT CHANGE UP (ref 1.7–9.3)
NEUTROPHILS # BLD AUTO: 4.16 K/UL — SIGNIFICANT CHANGE UP (ref 1.4–6.5)
NEUTROPHILS NFR BLD AUTO: 68.7 % — SIGNIFICANT CHANGE UP (ref 42.2–75.2)
NRBC BLD AUTO-RTO: SIGNIFICANT CHANGE UP /100 WBCS (ref 0–0)
PLATELET # BLD AUTO: 190 K/UL — SIGNIFICANT CHANGE UP (ref 130–400)
PMV BLD: 9.4 FL — SIGNIFICANT CHANGE UP (ref 7.4–10.4)
RBC # BLD: 2.67 M/UL — LOW (ref 4.2–5.4)
RBC # FLD: 16.9 % — HIGH (ref 11.5–14.5)
WBC # BLD: 6.06 K/UL — SIGNIFICANT CHANGE UP (ref 4.8–10.8)
WBC # FLD AUTO: 6.06 K/UL — SIGNIFICANT CHANGE UP (ref 4.8–10.8)

## 2025-05-20 RX ADMIN — Medication 1 TABLET(S): at 11:44

## 2025-05-20 RX ADMIN — IPRATROPIUM BROMIDE AND ALBUTEROL SULFATE 3 MILLILITER(S): .5; 2.5 SOLUTION RESPIRATORY (INHALATION) at 07:42

## 2025-05-20 RX ADMIN — LOSARTAN POTASSIUM 100 MILLIGRAM(S): 100 TABLET, FILM COATED ORAL at 05:52

## 2025-05-20 RX ADMIN — IPRATROPIUM BROMIDE AND ALBUTEROL SULFATE 3 MILLILITER(S): .5; 2.5 SOLUTION RESPIRATORY (INHALATION) at 13:46

## 2025-05-20 RX ADMIN — Medication 1 APPLICATION(S): at 05:52

## 2025-05-20 RX ADMIN — APIXABAN 5 MILLIGRAM(S): 2.5 TABLET, FILM COATED ORAL at 05:52

## 2025-05-20 RX ADMIN — Medication 40 MILLIGRAM(S): at 05:53

## 2025-05-20 RX ADMIN — Medication 1 TABLET(S): at 11:43

## 2025-05-20 RX ADMIN — IPRATROPIUM BROMIDE AND ALBUTEROL SULFATE 3 MILLILITER(S): .5; 2.5 SOLUTION RESPIRATORY (INHALATION) at 20:32

## 2025-05-20 RX ADMIN — Medication 10 MILLIGRAM(S): at 21:00

## 2025-05-20 RX ADMIN — AMLODIPINE BESYLATE 10 MILLIGRAM(S): 10 TABLET ORAL at 05:52

## 2025-05-20 RX ADMIN — APIXABAN 5 MILLIGRAM(S): 2.5 TABLET, FILM COATED ORAL at 17:04

## 2025-05-20 RX ADMIN — CYANOCOBALAMIN 1000 MICROGRAM(S): 1000 INJECTION INTRAMUSCULAR; SUBCUTANEOUS at 11:44

## 2025-05-20 NOTE — CHART NOTE - NSCHARTNOTEFT_GEN_A_CORE
To whom it may concern:    Ms. Eddy will require a long-terms care facility upon discharge from the hospital for several reasons. Firstly, she is severely deconditioned from the multiple procedures she has undergone during her nearly 7 week hospital stay and will require extensive physical therapy. Secondly, she has an abdominal wound vac that will require long-term care beyond what the patient or any short term rehab facility is capable of.

## 2025-05-20 NOTE — PROGRESS NOTE ADULT - SUBJECTIVE AND OBJECTIVE BOX
Procedure/ diagnosis: incarcerated ventral hernia with SBO.      PAST MEDICAL & SURGICAL HISTORY:  Gastric bypass status for obesity      LOUIS (obstructive sleep apnea)      S/P gastric bypass      S/P       S/P small bowel resection      History of total bilateral knee replacement (TKR)      H/O colonoscopy        24H EVENTS pt tolerated po diet    Vital Signs Last 24 Hrs  T(C): 36.3 (20 May 2025 09:27), Max: 36.9 (20 May 2025 00:07)  T(F): 97.4 (20 May 2025 09:27), Max: 98.4 (20 May 2025 00:07)  HR: 90 (20 May 2025 09:27) (90 - 96)  BP: 141/84 (20 May 2025 09:27) (125/82 - 141/84)  BP(mean): --  RR: 18 (20 May 2025 09:27) (18 - 18)  SpO2: 100% (20 May 2025 09:27) (95% - 100%)    Parameters below as of 20 May 2025 00:07  Patient On (Oxygen Delivery Method): room air            I&O's Detail    19 May 2025 07:01  -  20 May 2025 07:00  --------------------------------------------------------  IN:  Total IN: 0 mL    OUT:    Voided (mL): 800 mL  Total OUT: 800 mL    Total NET: -800 mL      20 May 2025 07:01  -  20 May 2025 12:56  --------------------------------------------------------  IN:  Total IN: 0 mL    OUT:    Voided (mL): 1000 mL  Total OUT: 1000 mL    Total NET: -1000 mL          Urinalysis Basic - ( 18 May 2025 20:00 )    Color: x / Appearance: x / SG: x / pH: x  Gluc: 115 mg/dL / Ketone: x  / Bili: x / Urobili: x   Blood: x / Protein: x / Nitrite: x   Leuk Esterase: x / RBC: x / WBC x   Sq Epi: x / Non Sq Epi: x / Bacteria: x                   8.1    6.18  )-----------( 173      (  @ 20:00 )             25.3                    142   |  111   |  13                 Ca: 7.7    BMP:   ----------------------------< 115    M.6   (25 @ 20:00)             3.7    |  20    | 0.7                Ph: 2.7        MEDICATIONS  (STANDING):  albuterol/ipratropium for Nebulization 3 milliLiter(s) Nebulizer every 6 hours  amLODIPine   Tablet 10 milliGRAM(s) Oral daily  apixaban 5 milliGRAM(s) Oral every 12 hours  chlorhexidine 2% Cloths 1 Application(s) Topical <User Schedule>  cyanocobalamin 1000 MICROGram(s) Oral daily  ergocalciferol 76097 Unit(s) Oral every week  lactobacillus acidophilus 1 Tablet(s) Oral daily  losartan 100 milliGRAM(s) Oral daily  melatonin 10 milliGRAM(s) Oral at bedtime  multivitamin/minerals 1 Tablet(s) Oral daily  pantoprazole    Tablet 40 milliGRAM(s) Oral before breakfast    MEDICATIONS  (PRN):  acetaminophen     Tablet .. 650 milliGRAM(s) Oral every 6 hours PRN Mild Pain (1 - 3)  sodium chloride 0.65% Nasal 1 Spray(s) Both Nostrils daily PRN Nasal Congestion      Diet, DASH/TLC:   Sodium & Cholesterol Restricted (25 @ 12:45)      PHYSICAL EXAM:  General Appearance: pt in NAD  Chest: + air entry bilat  CV: S1, S2  Abdomen: Soft, vac in place  NT no rebound tenderness no guarding   Extremities: perfused  Neuro: A&Ox3

## 2025-05-20 NOTE — PROGRESS NOTE ADULT - ASSESSMENT
A/P 65y F w/ PMHx of  Morbid obesity, LOUIS, large complex ventral hernia s/p repair sbr and loss of domain c/b post op intubation, hypotension requiring vasopressors, DVT, anastomotic leakage s/p RTOR and reanastomosis.    GI recommendations for stool leakage  noted    continue current management  encourage incentive spirometry use and ambulation  f/u PT/ rehab  f/u SW    f/u labs  continue monitoring

## 2025-05-21 RX ORDER — LIDOCAINE HCL/EPINEPHRINE/PF 1 %-1:200K
30 AMPUL (ML) INJECTION ONCE
Refills: 0 | Status: DISCONTINUED | OUTPATIENT
Start: 2025-05-21 | End: 2025-05-23

## 2025-05-21 RX ADMIN — IPRATROPIUM BROMIDE AND ALBUTEROL SULFATE 3 MILLILITER(S): .5; 2.5 SOLUTION RESPIRATORY (INHALATION) at 09:34

## 2025-05-21 RX ADMIN — Medication 1 APPLICATION(S): at 08:39

## 2025-05-21 RX ADMIN — LOSARTAN POTASSIUM 100 MILLIGRAM(S): 100 TABLET, FILM COATED ORAL at 08:38

## 2025-05-21 RX ADMIN — AMLODIPINE BESYLATE 10 MILLIGRAM(S): 10 TABLET ORAL at 08:38

## 2025-05-21 RX ADMIN — IPRATROPIUM BROMIDE AND ALBUTEROL SULFATE 3 MILLILITER(S): .5; 2.5 SOLUTION RESPIRATORY (INHALATION) at 14:12

## 2025-05-21 RX ADMIN — IPRATROPIUM BROMIDE AND ALBUTEROL SULFATE 3 MILLILITER(S): .5; 2.5 SOLUTION RESPIRATORY (INHALATION) at 20:02

## 2025-05-21 RX ADMIN — CYANOCOBALAMIN 1000 MICROGRAM(S): 1000 INJECTION INTRAMUSCULAR; SUBCUTANEOUS at 13:07

## 2025-05-21 RX ADMIN — Medication 1 TABLET(S): at 13:06

## 2025-05-21 RX ADMIN — Medication 1 TABLET(S): at 13:07

## 2025-05-21 RX ADMIN — Medication 40 MILLIGRAM(S): at 08:38

## 2025-05-21 RX ADMIN — APIXABAN 5 MILLIGRAM(S): 2.5 TABLET, FILM COATED ORAL at 19:20

## 2025-05-21 RX ADMIN — APIXABAN 5 MILLIGRAM(S): 2.5 TABLET, FILM COATED ORAL at 08:38

## 2025-05-21 NOTE — PROGRESS NOTE ADULT - SUBJECTIVE AND OBJECTIVE BOX
GENERAL SURGERY PROGRESS NOTE    Patient: NANCY SARGENT , 65y (62)Female   MRN: [id number]  Location: [address]  Visit: 25 Inpatient  Date: 25 @ 04:01    Events of past 24 hours: patient seen and examined at bedside. no acute events overnight. patient and family agreeable to go to [facility], authorization sent.     PAST MEDICAL & SURGICAL HISTORY:  Gastric bypass status for obesity      LOUIS (obstructive sleep apnea)      S/P gastric bypass      S/P       S/P small bowel resection      History of total bilateral knee replacement (TKR)      H/O colonoscopy            Vitals:   T(F): 97.4 (25 @ 00:24), Max: 98.1 (25 @ 16:00)  HR: 93 (25 @ 00:24)  BP: 149/88 (25 @ 00:24)  RR: 18 (25 @ 00:24)  SpO2: 97% (25 @ 00:24)      Diet, DASH/TLC:   Sodium & Cholesterol Restricted      Fluids:     I & O's:    25 @ 07:01  -  25 @ 07:00  --------------------------------------------------------  IN:  Total IN: 0 mL    OUT:    Voided (mL): 800 mL  Total OUT: 800 mL    Total NET: -800 mL        PHYSICAL EXAM:  General Appearance: pt in NAD  Chest: + air entry bilat  CV: S1, S2  Abdomen: Soft, vac in place  NT no rebound tenderness no guarding   Extremities: perfused  Neuro: A&Ox3    MEDICATIONS  (STANDING):  albuterol/ipratropium for Nebulization 3 milliLiter(s) Nebulizer every 6 hours  amLODIPine   Tablet 10 milliGRAM(s) Oral daily  apixaban 5 milliGRAM(s) Oral every 12 hours  chlorhexidine 2% Cloths 1 Application(s) Topical <User Schedule>  cyanocobalamin 1000 MICROGram(s) Oral daily  ergocalciferol 37596 Unit(s) Oral every week  lactobacillus acidophilus 1 Tablet(s) Oral daily  losartan 100 milliGRAM(s) Oral daily  melatonin 10 milliGRAM(s) Oral at bedtime  multivitamin/minerals 1 Tablet(s) Oral daily  pantoprazole    Tablet 40 milliGRAM(s) Oral before breakfast    MEDICATIONS  (PRN):  acetaminophen     Tablet .. 650 milliGRAM(s) Oral every 6 hours PRN Mild Pain (1 - 3)  sodium chloride 0.65% Nasal 1 Spray(s) Both Nostrils daily PRN Nasal Congestion      DVT PROPHYLAXIS:   GI PROPHYLAXIS: pantoprazole    Tablet 40 milliGRAM(s) Oral before breakfast    LAB/STUDIES:  Labs:  CAPILLARY BLOOD GLUCOSE                          8.1    6.06  )-----------( 190      ( 20 May 2025 21:06 )             25.4

## 2025-05-21 NOTE — CHART NOTE - NSCHARTNOTEFT_GEN_A_CORE
GI NUTRITION SUPPORT TEAM  -  PROGRESS NOTE     Interval Events:    d/c to LTACH planned for today. Cont to tolerate PO diet well. Having regular BM's, no diarrhea. c/o stool leakage, outpatient GI f/u planned.     REVIEW OF SYSTEMS:  Negative except as noted above.     VITALS:  T(F): 97.7 (05-21 @ 09:07), Max: 97.7 (05-21 @ 09:07)  HR: 89 (05-21 @ 09:07) (89 - 94)  BP: 163/95 (05-21 @ 09:07) (153/90 - 163/95)  RR: 18 (05-21 @ 09:07) (18 - 18)  SpO2: 97% (05-21 @ 09:07) (97% - 97%)    HEIGHT/WEIGHT/BMI:   Height (cm): 160 (05-15)  Weight (kg): 137.8 (05-15)  BMI (kg/m2): 53.8 (05-15)    I/Os:     05-20-25 @ 07:01  -  05-21-25 @ 07:00  --------------------------------------------------------  IN:  Total IN: 0 mL    OUT:    Voided (mL): 2300 mL  Total OUT: 2300 mL    Total NET: -2300 mL      MEDICATIONS:   acetaminophen     Tablet .. 650 milliGRAM(s) Oral every 6 hours PRN  albuterol/ipratropium for Nebulization 3 milliLiter(s) Nebulizer every 6 hours  amLODIPine   Tablet 10 milliGRAM(s) Oral daily  apixaban 5 milliGRAM(s) Oral every 12 hours  chlorhexidine 2% Cloths 1 Application(s) Topical <User Schedule>  cyanocobalamin 1000 MICROGram(s) Oral daily  ergocalciferol 45207 Unit(s) Oral every week  lactobacillus acidophilus 1 Tablet(s) Oral daily  lidocaine 1%/epinephrine 1:100,000 Inj 30 milliLiter(s) Local Injection once  losartan 100 milliGRAM(s) Oral daily  melatonin 10 milliGRAM(s) Oral at bedtime  multivitamin/minerals 1 Tablet(s) Oral daily  pantoprazole    Tablet 40 milliGRAM(s) Oral before breakfast  sodium chloride 0.65% Nasal 1 Spray(s) Both Nostrils daily PRN    LABS:                         8.1    6.06  )-----------( 190      ( 20 May 2025 21:06 )             25.4     Triglycerides, Serum: 232 mg/dL (04-20 @ 04:59)  Vitamin D, 25-Hydroxy: 13 ng/mL (04-19 @ 05:08)  Folate: 17.3 ng/mL (04-19 @ 05:08)  Vitamin B12, Serum: 1417 pg/mL (04-19 @ 05:08)  Zinc Level, Plasma: 54 ug/dL (04-19 @ 05:08)      DIET:   Diet, DASH/TLC:   Sodium & Cholesterol Restricted (05-12-25 @ 12:45) [Active]        ASSESSMENT  64y F w/ PMHx of HTN and gastric bypass surgery in 2001 presented with abdominal distention and CT showing incarcerated ventral hernia with SBO. Taken to OR 4/10 s/p open repair of ventral hernia using mesh and component separation technique, small bowel resection and primary fascial closure. RTOR 4/17 for anastomotic leak s/p ex lap, 30 cm small bowel resection, creation of new side to side ileoileostomy, ventral hernia repair w/ mesh.     - recurrent SBO, s/p repair 4/10 then anastomotic leak s/p repair 4/17, RTOR 4/28 s/p re-exploration and evacuation of old clots from retroperitoneum and pelvis, midline prevena vac placed  - abdominal wall fluid collection s/p IR placed pigtail drain 4/22  - FUNMI, resolved  - right distal main pulmonary embolus with segmental extension  - high risk for malnutrition, prolonged NPO X 15d, TPN 4/17-4/25  - class III obesity, BMI 53  - hypertriglyceridemia  - hyperglycemia   - hypokalemia, resolved   - vitamin D deficiency   - concern for malabsorption (100cm common channel)    Est nutrient needs: IBW 52.3kg, 8886-0603 kcals (22-25kcals/kg IBW ASPEN/CCM guidelines for BMI>50), 105-131g protein (2-2.5g/kg IBW ASPEN/CCM guidelines for BMI >40)       PLAN  - PO diet- No conc sweet Dash diet, prioritize protein intake    - cont MV to MV with minerals, cont 50,000IU ergocalciferol weekly (given Mon) and vitamin B12 as previously taken upon admission  - monitor BM's   - due for  repeat 25-oh vitamin D, zinc levels on 5/20, would also get iron studies    - glycemic control  - f/u as outpatient   d/w Dr. Lema, surgical team

## 2025-05-22 LAB
ANION GAP SERPL CALC-SCNC: 12 MMOL/L — SIGNIFICANT CHANGE UP (ref 7–14)
BASOPHILS # BLD AUTO: 0.04 K/UL — SIGNIFICANT CHANGE UP (ref 0–0.2)
BASOPHILS NFR BLD AUTO: 0.5 % — SIGNIFICANT CHANGE UP (ref 0–1)
BUN SERPL-MCNC: 11 MG/DL — SIGNIFICANT CHANGE UP (ref 10–20)
CALCIUM SERPL-MCNC: 8.1 MG/DL — LOW (ref 8.4–10.5)
CHLORIDE SERPL-SCNC: 108 MMOL/L — SIGNIFICANT CHANGE UP (ref 98–110)
CO2 SERPL-SCNC: 20 MMOL/L — SIGNIFICANT CHANGE UP (ref 17–32)
CREAT SERPL-MCNC: 0.8 MG/DL — SIGNIFICANT CHANGE UP (ref 0.7–1.5)
EGFR: 82 ML/MIN/1.73M2 — SIGNIFICANT CHANGE UP
EGFR: 82 ML/MIN/1.73M2 — SIGNIFICANT CHANGE UP
EOSINOPHIL # BLD AUTO: 0.09 K/UL — SIGNIFICANT CHANGE UP (ref 0–0.7)
EOSINOPHIL NFR BLD AUTO: 1.2 % — SIGNIFICANT CHANGE UP (ref 0–8)
GLUCOSE SERPL-MCNC: 119 MG/DL — HIGH (ref 70–99)
HCT VFR BLD CALC: 25 % — LOW (ref 37–47)
HGB BLD-MCNC: 8.1 G/DL — LOW (ref 12–16)
IMM GRANULOCYTES NFR BLD AUTO: 2.3 % — HIGH (ref 0.1–0.3)
LYMPHOCYTES # BLD AUTO: 1.98 K/UL — SIGNIFICANT CHANGE UP (ref 1.2–3.4)
LYMPHOCYTES # BLD AUTO: 25.6 % — SIGNIFICANT CHANGE UP (ref 20.5–51.1)
MAGNESIUM SERPL-MCNC: 1.6 MG/DL — LOW (ref 1.8–2.4)
MCHC RBC-ENTMCNC: 30.3 PG — SIGNIFICANT CHANGE UP (ref 27–31)
MCHC RBC-ENTMCNC: 32.4 G/DL — SIGNIFICANT CHANGE UP (ref 32–37)
MCV RBC AUTO: 93.6 FL — SIGNIFICANT CHANGE UP (ref 81–99)
MONOCYTES # BLD AUTO: 0.53 K/UL — SIGNIFICANT CHANGE UP (ref 0.1–0.6)
MONOCYTES NFR BLD AUTO: 6.9 % — SIGNIFICANT CHANGE UP (ref 1.7–9.3)
NEUTROPHILS # BLD AUTO: 4.91 K/UL — SIGNIFICANT CHANGE UP (ref 1.4–6.5)
NEUTROPHILS NFR BLD AUTO: 63.5 % — SIGNIFICANT CHANGE UP (ref 42.2–75.2)
NRBC BLD AUTO-RTO: 0 /100 WBCS — SIGNIFICANT CHANGE UP (ref 0–0)
PHOSPHATE SERPL-MCNC: 2.9 MG/DL — SIGNIFICANT CHANGE UP (ref 2.1–4.9)
PLATELET # BLD AUTO: 196 K/UL — SIGNIFICANT CHANGE UP (ref 130–400)
PMV BLD: 9.4 FL — SIGNIFICANT CHANGE UP (ref 7.4–10.4)
POTASSIUM SERPL-MCNC: 3.5 MMOL/L — SIGNIFICANT CHANGE UP (ref 3.5–5)
POTASSIUM SERPL-SCNC: 3.5 MMOL/L — SIGNIFICANT CHANGE UP (ref 3.5–5)
RBC # BLD: 2.67 M/UL — LOW (ref 4.2–5.4)
RBC # FLD: 16.7 % — HIGH (ref 11.5–14.5)
SODIUM SERPL-SCNC: 140 MMOL/L — SIGNIFICANT CHANGE UP (ref 135–146)
WBC # BLD: 7.73 K/UL — SIGNIFICANT CHANGE UP (ref 4.8–10.8)
WBC # FLD AUTO: 7.73 K/UL — SIGNIFICANT CHANGE UP (ref 4.8–10.8)

## 2025-05-22 RX ORDER — POTASSIUM PHOSPHATE, MONOBASIC POTASSIUM PHOSPHATE, DIBASIC INJECTION, 236; 224 MG/ML; MG/ML
30 SOLUTION, CONCENTRATE INTRAVENOUS ONCE
Refills: 0 | Status: COMPLETED | OUTPATIENT
Start: 2025-05-22 | End: 2025-05-23

## 2025-05-22 RX ORDER — MAGNESIUM SULFATE 500 MG/ML
2 SYRINGE (ML) INJECTION ONCE
Refills: 0 | Status: COMPLETED | OUTPATIENT
Start: 2025-05-22 | End: 2025-05-23

## 2025-05-22 RX ADMIN — IPRATROPIUM BROMIDE AND ALBUTEROL SULFATE 3 MILLILITER(S): .5; 2.5 SOLUTION RESPIRATORY (INHALATION) at 07:34

## 2025-05-22 RX ADMIN — Medication 40 MILLIGRAM(S): at 06:26

## 2025-05-22 RX ADMIN — AMLODIPINE BESYLATE 10 MILLIGRAM(S): 10 TABLET ORAL at 06:26

## 2025-05-22 RX ADMIN — Medication 650 MILLIGRAM(S): at 14:01

## 2025-05-22 RX ADMIN — APIXABAN 5 MILLIGRAM(S): 2.5 TABLET, FILM COATED ORAL at 08:13

## 2025-05-22 RX ADMIN — APIXABAN 5 MILLIGRAM(S): 2.5 TABLET, FILM COATED ORAL at 19:48

## 2025-05-22 RX ADMIN — Medication 1 TABLET(S): at 11:32

## 2025-05-22 RX ADMIN — Medication 650 MILLIGRAM(S): at 14:31

## 2025-05-22 RX ADMIN — IPRATROPIUM BROMIDE AND ALBUTEROL SULFATE 3 MILLILITER(S): .5; 2.5 SOLUTION RESPIRATORY (INHALATION) at 14:27

## 2025-05-22 RX ADMIN — IPRATROPIUM BROMIDE AND ALBUTEROL SULFATE 3 MILLILITER(S): .5; 2.5 SOLUTION RESPIRATORY (INHALATION) at 20:02

## 2025-05-22 RX ADMIN — Medication 1 APPLICATION(S): at 06:28

## 2025-05-22 RX ADMIN — LOSARTAN POTASSIUM 100 MILLIGRAM(S): 100 TABLET, FILM COATED ORAL at 06:26

## 2025-05-22 RX ADMIN — CYANOCOBALAMIN 1000 MICROGRAM(S): 1000 INJECTION INTRAMUSCULAR; SUBCUTANEOUS at 11:32

## 2025-05-22 NOTE — PROGRESS NOTE ADULT - SUBJECTIVE AND OBJECTIVE BOX
GENERAL SURGERY PROGRESS NOTE     Patient: NANCY SARGENT , 65y ([birth year])Female   MRN: [id number]  Location: [address]  Visit: 04-03-25 Inpatient  Date: 05-22-25 @ 00:03        Admitted :04-03-25 (49d)  LOS: 49d    Procedure/Dx/Injuries: Incarcerated ventral hernia    SBO (small bowel obstruction)    Perforated viscus    Hemoperitoneum    Small bowel obstruction    Open repair of ventral hernia using mesh and component separation technique    Small bowel resection    Insertion, arterial line, percutaneous    Exploratory laparotomy    Insertion of arterial line with imaging guidance    Abdominal washout    Evacuation of hemoperitoneum         Events of past 24 hours: Denies nausea, vomiting, +G/+BM. Pending auth to [facility]  >>> <<<>>> <<<>>> <<<>>> <<<>>> <<<>>> <<<>>> <<<>>> <<<>>> <<<>>> <<<    Vitals:   T(F): 97.7 (05-21-25 @ 09:07), Max: 97.7 (05-21-25 @ 09:07)  HR: 89 (05-21-25 @ 09:07)  BP: 163/95 (05-21-25 @ 09:07)  RR: 18 (05-21-25 @ 09:07)  SpO2: 97% (05-21-25 @ 09:07)      PHYSICAL EXAM:  General Appearance: pt in NAD  Chest: symmetric and equal chest rise and fall  CV: S1, S2  Abdomen: Soft, vac in place  NT no rebound tenderness no guarding   Extremities: perfused  Neuro: A&Ox3  >>> <<<>>> <<<>>> <<<>>> <<<>>> <<<>>> <<<>>> <<<>>> <<<>>> <<<>>> <<<   Is & Os:   Diet, DASH/TLC:   Sodium & Cholesterol Restricted    Fluids:     05-20-25 @ 07:01  -  05-21-25 @ 07:00  --------------------------------------------------------  IN:  Total IN: 0 mL    OUT:    Voided (mL): 2300 mL  Total OUT: 2300 mL    Total NET: -2300 mL      >>> <<<>>> <<<>>> <<<>>> <<<>>> <<<>>> <<<>>> <<<>>> <<<>>> <<<>>> <<<    MEDICATIONS  (STANDING):  albuterol/ipratropium for Nebulization 3 milliLiter(s) Nebulizer every 6 hours  amLODIPine   Tablet 10 milliGRAM(s) Oral daily  apixaban 5 milliGRAM(s) Oral every 12 hours  chlorhexidine 2% Cloths 1 Application(s) Topical <User Schedule>  cyanocobalamin 1000 MICROGram(s) Oral daily  ergocalciferol 49578 Unit(s) Oral every week  lactobacillus acidophilus 1 Tablet(s) Oral daily  lidocaine 1%/epinephrine 1:100,000 Inj 30 milliLiter(s) Local Injection once  losartan 100 milliGRAM(s) Oral daily  melatonin 10 milliGRAM(s) Oral at bedtime  multivitamin/minerals 1 Tablet(s) Oral daily  pantoprazole    Tablet 40 milliGRAM(s) Oral before breakfast    MEDICATIONS  (PRN):  acetaminophen     Tablet .. 650 milliGRAM(s) Oral every 6 hours PRN Mild Pain (1 - 3)  sodium chloride 0.65% Nasal 1 Spray(s) Both Nostrils daily PRN Nasal Congestion      DVT PROPHYLAXIS:   GI PROPHYLAXIS: pantoprazole    Tablet 40 milliGRAM(s) Oral before breakfast    ANTICOAGULATION:   ANTIBIOTICS:      >>> <<<>>> <<<>>> <<<>>> <<<>>> <<<>>> <<<>>> <<<>>> <<<>>> <<<>>> <<<        LAB/STUDIES:  Labs:  CAPILLARY BLOOD GLUCOSE                              8.1    6.06  )-----------( 190      ( 20 May 2025 21:06 )             25.4                 LFTs:         Coags:

## 2025-05-22 NOTE — PROGRESS NOTE ADULT - ASSESSMENT
Assessment:  65y F w/ PMHx of  Morbid obesity, LOUIS, large complex ventral hernia s/p repair sbr and loss of domain c/b post op intubation, hypotension requiring vasopressors, DVT, anastomotic leakage s/p RTOR and reanastomosis.    GI recommendations for stool leakage  noted    Plan:  - Follow up dispo to [facility]  - Encourage standing

## 2025-05-22 NOTE — PROCEDURE NOTE - NSPERFORMEDBY_GEN_A_CORE
[facility]  [address]  [facility] [address]  Dept: [phone]  Dept Fax: [phone]    Behavioral Health Services Progress Note      Patient:  Lindsey Francis    :  1953    Date of Service:  2025    The encounter diagnosis was MARGUERITE (generalized anxiety disorder).    58 minutes were spent with the patient in an in-person encounter.    Data and Progress toward goal(s) and objective(s):  Client discussed multiple efforts to maintain relationships with family and friends, and to keep busy. Client said she has been eating okay, supplementing with Ensure, and is trying to start cooking more often. Client cleared out her guest room, and enjoys when people stay with her. Client confirmed that she received an order of protection against her  's son, then he called and her on , so she called his daughter and cousin with updates.    Client reported that last week she experienced her \"first big crying episode\" since the death of her  in November. Client discussed prior losses (her father  in , her sister  in , her mother  in , and a beloved dog  in ). Per client, \"I held it all in. I never grieved for them because I was too busy. I cried more for the dog than for anyone.\" Client has been meeting with a hospice bereavement counselor twice per month, and is exploring options for support groups, including a bereavement group at [employer] and .     In reference to appointment with psychiatrist on , client stated that she took one dose of Escitalopram 5mg but \"It was too strong. It knocked me out, I slept until 3 PM the next day, and I got paranoid.\" Client discontinued the medication after one dose, and wondered if it is available in a smaller dose or if she can cut it in half. Client reported that she has been taking Hydroxyzine 50 mg po PRN for anxiety on a daily basis, and thinks it would be helpful  Myself if she could take it twice a day. Client expressed fear that if she sleeps too deeply, her step-son could come and kill her and no one would know.     Intervention:  Grief/Loss Counseling    Patient continues to be involved in service planning:  YES    Describe above interventions:  Offered reflective listening, validation, and empathic support as client discussed symptoms and stressors. Normalized client's expressions of grief. Affirmed client's utilization of natural supports and wellness strategies. Reviewed psychiatry note and recs. Notified Unit Dike Tracey Cedillo of client's questions and request for follow-up with psychiatrist prior to appointment on 6/16, and Tracey sent a secure chat message to Dr. Ghada Cramer and Anh Perdomo RN.     Patient's response to interventions:  Client consistently presented as engaged, cooperative, and insightful. Client demonstrated positive reaction to feedback, guidance, and interventions as evidenced by active contribution to discussions and activities.     Continue to support patient's efforts and progress towards established treatment plan goals in the following ways:  Help client to learn and use adaptive strategies for addressing stressors, symptoms, and negative thought patterns so that symptoms and stressors may be reduced and therapeutic goals can be met.  Encourage the expression of feelings, validate client's experience, and provide constructive feedback to further the healing, grieving, and coping processes.        Tri Wan LCSW

## 2025-05-22 NOTE — CHART NOTE - NSCHARTNOTEFT_GEN_A_CORE
Dawn Eddy, 64 year old female with PMH of gastric bypass, multiple hernias s/p multiple repairs and small bowel resection, now s/p ex lap, evacuation of hemoperitoneum and abdominal wash out 4/27/25, will require a long term care facility upon discharge. Due to the multiple procedures the patient underwent, and almost 2 month hospital stay, patient requires extensive rehabilitation. Patient also has a midline wound vac in place that she will require assistance for, but unable to care for it herself at home.     Abdominal midline incisional wound measurements:   Depth: 3 cm   Length: 9cm   Width: 2 cm     During hospital stay, Monday, Wednesday, Friday wound vac changes performed with black foam and adaptic, on constant suction pressure 125mmHg. In the event if wound vac fails, a wet to dry dressing (saline soaked kerlix, abdominal pad, and paper tape) should be in place until the replacement of wound vac.     Patient is medically appropriate for discharge. Dawn Eddy, 64 year old female with PMH of gastric bypass, multiple hernias s/p multiple repairs and small bowel resection, now s/p ex lap, evacuation of hemoperitoneum and abdominal wash out 4/27/25, will require a long term care facility upon discharge. Due to the multiple procedures the patient underwent, and almost 2 month hospital stay, patient requires extensive rehabilitation. Patient also has a midline wound vac in place that she will require assistance for, but unable to care for it herself at home. Progression of midline wound with wound vac will take an extensive amount of time, approximately 4 months.     Abdominal midline incisional wound measurements:   Depth: 3 cm   Length: 9cm   Width: 2 cm     During hospital stay, Monday, Wednesday, Friday wound vac changes performed with black foam and adaptic, on constant suction pressure 125mmHg. In the event if wound vac fails, a wet to dry dressing (saline soaked kerlix, abdominal pad, and paper tape) should be in place until the replacement of wound vac.     Patient is medically appropriate for discharge.

## 2025-05-23 ENCOUNTER — NON-APPOINTMENT (OUTPATIENT)
Age: 65
End: 2025-05-23

## 2025-05-23 ENCOUNTER — TRANSCRIPTION ENCOUNTER (OUTPATIENT)
Age: 65
End: 2025-05-23

## 2025-05-23 VITALS
SYSTOLIC BLOOD PRESSURE: 137 MMHG | DIASTOLIC BLOOD PRESSURE: 82 MMHG | TEMPERATURE: 97 F | HEART RATE: 95 BPM | RESPIRATION RATE: 18 BRPM | OXYGEN SATURATION: 97 %

## 2025-05-23 LAB — 24R-OH-CALCIDIOL SERPL-MCNC: 15 NG/ML — SIGNIFICANT CHANGE UP

## 2025-05-23 PROCEDURE — 99232 SBSQ HOSP IP/OBS MODERATE 35: CPT

## 2025-05-23 RX ADMIN — AMLODIPINE BESYLATE 10 MILLIGRAM(S): 10 TABLET ORAL at 06:29

## 2025-05-23 RX ADMIN — Medication 1 TABLET(S): at 11:46

## 2025-05-23 RX ADMIN — Medication 40 MILLIGRAM(S): at 06:29

## 2025-05-23 RX ADMIN — IPRATROPIUM BROMIDE AND ALBUTEROL SULFATE 3 MILLILITER(S): .5; 2.5 SOLUTION RESPIRATORY (INHALATION) at 05:55

## 2025-05-23 RX ADMIN — CYANOCOBALAMIN 1000 MICROGRAM(S): 1000 INJECTION INTRAMUSCULAR; SUBCUTANEOUS at 11:46

## 2025-05-23 RX ADMIN — Medication 1 TABLET(S): at 11:47

## 2025-05-23 RX ADMIN — IPRATROPIUM BROMIDE AND ALBUTEROL SULFATE 3 MILLILITER(S): .5; 2.5 SOLUTION RESPIRATORY (INHALATION) at 07:54

## 2025-05-23 RX ADMIN — POTASSIUM PHOSPHATE, MONOBASIC POTASSIUM PHOSPHATE, DIBASIC INJECTION, 83.33 MILLIMOLE(S): 236; 224 SOLUTION, CONCENTRATE INTRAVENOUS at 06:28

## 2025-05-23 RX ADMIN — APIXABAN 5 MILLIGRAM(S): 2.5 TABLET, FILM COATED ORAL at 07:48

## 2025-05-23 RX ADMIN — IPRATROPIUM BROMIDE AND ALBUTEROL SULFATE 3 MILLILITER(S): .5; 2.5 SOLUTION RESPIRATORY (INHALATION) at 13:38

## 2025-05-23 RX ADMIN — LOSARTAN POTASSIUM 100 MILLIGRAM(S): 100 TABLET, FILM COATED ORAL at 06:31

## 2025-05-23 RX ADMIN — Medication 25 GRAM(S): at 06:35

## 2025-05-23 RX ADMIN — Medication 1 APPLICATION(S): at 06:29

## 2025-05-23 NOTE — DISCHARGE NOTE NURSING/CASE MANAGEMENT/SOCIAL WORK - NSDCPEFALRISK_GEN_ALL_CORE
For information on Fall & Injury Prevention, visit: [email or web address] OR  [email or web address] OR  https://www.cdc.gov/steadi/patient.html

## 2025-05-23 NOTE — CHART NOTE - NSCHARTNOTESELECT_GEN_ALL_CORE
Event Note
Family Discussion/Event Note
Gastroenterology Nutrition Support/Nutrition Services
Gastroenterology Nutrition/Nutrition Services
Interventional Radiology
Interventional Radiology/Event Note
POSTOP/Event Note
PostOp Check
Rehab Recommendation
Transfer Note
Transfer Note
Vascular surgery
4C/Transfer Note
Anesthesia PACU note
Anesthesia Transfer/Event Note
Event Note
Family Discussion/Event Note
Gastroenterology Nutrition Support/Nutrition Services
Gastroenterology Nutrition/Nutrition Services
Gastroenterology Nutrition/Nutrition Services
Interventional Radiology
Interventional Radiology/Event Note
Letter of Medical Necessity for LTAC/Event Note
Post Op/Event Note
Post op check/Event Note
RD Follow Up/Nutrition Services
Transfer Note
pacu transfer/Transfer Note

## 2025-05-23 NOTE — PROGRESS NOTE ADULT - PROVIDER SPECIALTY LIST ADULT
Nephrology
Physiatry
SICU
Surgery
Infectious Disease
Intervent Radiology
Intervent Radiology
Nephrology
SICU
Surgery
Vascular Surgery
Infectious Disease
Nephrology
Nephrology
Physiatry
SICU
Surgery
Vascular Surgery
Surgery

## 2025-05-23 NOTE — PROGRESS NOTE ADULT - SUBJECTIVE AND OBJECTIVE BOX
GENERAL SURGERY PROGRESS NOTE     NANCY SARGENT  [address] [place] [address] day :52d    POD:36  Procedure: Open repair of ventral hernia using mesh and component separation technique    Small bowel resection    Insertion, arterial line, percutaneous    Exploratory laparotomy    Insertion of arterial line with imaging guidance    Abdominal washout    Evacuation of hemoperitoneum      Surgical Attending: Rose Lema  Overnight events: No acute events overnight. Pt is tolerating a diet without any nausea or vomiting. She reports passing gas and BM. She has remained hemodynamically stable and afebrile.     T(F): 97.9 (05-23-25 @ 01:00), Max: 98.2 (05-22-25 @ 21:00)  HR: 99 (05-23-25 @ 01:00) (67 - 99)  BP: 124/63 (05-23-25 @ 01:00) (113/80 - 131/82)  ABP: --  ABP(mean): --  RR: 18 (05-23-25 @ 01:00) (18 - 18)  SpO2: 96% (05-23-25 @ 01:00) (96% - 98%)    IN'S / OUT's:    05-21-25 @ 07:01  -  05-22-25 @ 07:00  --------------------------------------------------------  IN:  Total IN: 0 mL    OUT:    Voided (mL): 2000 mL  Total OUT: 2000 mL    Total NET: -2000 mL      05-22-25 @ 07:01  -  05-23-25 @ 03:04  --------------------------------------------------------  IN:  Total IN: 0 mL    OUT:    Voided (mL): 500 mL  Total OUT: 500 mL    Total NET: -500 mL          PHYSICAL EXAM:  GENERAL: NAD  CHEST/LUNG: equal chest rise b/l, non labored breathing   HEART: Regular rate and rhythm  ABDOMEN: Soft, Nontender, Nondistended; wound vac in place with good seal  EXTREMITIES:  No clubbing, cyanosis, or edema      LABS  Labs:  CAPILLARY BLOOD GLUCOSE                              8.1    7.73  )-----------( 196      ( 22 May 2025 20:00 )             25.0       Auto Immature Granulocyte %: 2.3 % (05-22-25 @ 20:00)    05-22    140  |  108  |  11  ----------------------------<  119[H]  3.5   |  20  |  0.8      Calcium: 8.1 mg/dL (05-22-25 @ 20:00)      LFTs:         Coags:            Urinalysis Basic - ( 22 May 2025 20:00 )    Color: x / Appearance: x / SG: x / pH: x  Gluc: 119 mg/dL / Ketone: x  / Bili: x / Urobili: x   Blood: x / Protein: x / Nitrite: x   Leuk Esterase: x / RBC: x / WBC x   Sq Epi: x / Non Sq Epi: x / Bacteria: x            RADIOLOGY & ADDITIONAL TESTS:      A/P:  NANCY SARGENT is a 65y F w/ PMHx of  Morbid obesity, LOUIS, large complex ventral hernia s/p repair sbr and loss of domain c/b post op intubation, hypotension requiring vasopressors, DVT, anastomotic leakage s/p RTOR and reanastomosis.    GI recommendations for stool leakage  noted        PLAN:   - continue eliquis  - GI ppx  - multi modal pain control  - Wound Vac change MWF  - Encourage activity as tolerated   - f/u dispo planning to [place]        #DVT ppx: apixaban 5 milliGRAM(s) Oral every 12 hours    #GI ppx: pantoprazole    Tablet 40 milliGRAM(s) Oral before breakfast    Disposition:  4C    Above plan to be discussed with Attending Surgeon Dr. Lema  , patient, patient family, and rest of health care team    [place] [phone]

## 2025-05-23 NOTE — CHART NOTE - NSCHARTNOTEFT_GEN_A_CORE
GI NUTRITION SUPPORT TEAM  -  PROGRESS NOTE     Interval Events:    Awaiting d/c to LTACH. Wound vac in place. Tolerating PO diet, prioritizing protein intake. Having regular BM's. Repeat vitamin D level remains low.      REVIEW OF SYSTEMS:  Negative except as noted above.     VITALS:  T(F): 97.4 (05-23 @ 07:50), Max: 97.4 (05-23 @ 07:50)  HR: 95 (05-23 @ 07:50) (95 - 95)  BP: 137/82 (05-23 @ 07:50) (137/82 - 137/82)  RR: 18 (05-23 @ 07:50) (18 - 18)  SpO2: 97% (05-23 @ 07:50) (97% - 97%)    HEIGHT/WEIGHT/BMI:   Height (cm): 160 (05-15)  Weight (kg): 137.8 (05-15)  BMI (kg/m2): 53.8 (05-15)    I/Os:     05-22-25 @ 07:01  -  05-23-25 @ 07:00  --------------------------------------------------------  IN:  Total IN: 0 mL    OUT:    Voided (mL): 2100 mL  Total OUT: 2100 mL    Total NET: -2100 mL    MEDICATIONS:   acetaminophen     Tablet .. 650 milliGRAM(s) Oral every 6 hours PRN  albuterol/ipratropium for Nebulization 3 milliLiter(s) Nebulizer every 6 hours  amLODIPine   Tablet 10 milliGRAM(s) Oral daily  apixaban 5 milliGRAM(s) Oral every 12 hours  chlorhexidine 2% Cloths 1 Application(s) Topical <User Schedule>  cyanocobalamin 1000 MICROGram(s) Oral daily  ergocalciferol 66198 Unit(s) Oral every week  lactobacillus acidophilus 1 Tablet(s) Oral daily  lidocaine 1%/epinephrine 1:100,000 Inj 30 milliLiter(s) Local Injection once  losartan 100 milliGRAM(s) Oral daily  melatonin 10 milliGRAM(s) Oral at bedtime  multivitamin/minerals 1 Tablet(s) Oral daily  pantoprazole    Tablet 40 milliGRAM(s) Oral before breakfast  sodium chloride 0.65% Nasal 1 Spray(s) Both Nostrils daily PRN    LABS:                         8.1    7.73  )-----------( 196      ( 22 May 2025 20:00 )             25.0     140  |  108  |  11  ----------------------------<  119[H]          (05-22-25 @ 20:00)  3.5   |  20  |  0.8    Ca    8.1[L]          (05-22-25 @ 20:00)  Phos  2.9         (05-22-25 @ 20:00)  Mg     1.6         (05-22-25 @ 20:00)    Triglycerides, Serum: 232 mg/dL (04-20 @ 04:59)    Vitamin D, 25-Hydroxy: 15 ng/mL (05-22 @ 20:00)  Vitamin D, 25-Hydroxy: 13: Vitamin D concentrations at 20 ng/mL are adequate for bone health in  97.5% of the healthy population. Pediatric and geriatric individuals may  require higher concentrations. ng/mL (04.19.25 @ 05:08)    Folate: 17.3 ng/mL (04-19 @ 05:08)  Vitamin B12, Serum: 1417 pg/mL (04-19 @ 05:08)  Zinc Level, Plasma: 54 ug/dL (04-19 @ 05:08)    DIET:   Diet, DASH/TLC:   Sodium & Cholesterol Restricted (05-12-25 @ 12:45) [Active]       ASSESSMENT  64y F w/ PMHx of HTN and gastric bypass surgery in 2001 presented with abdominal distention and CT showing incarcerated ventral hernia with SBO. Taken to OR 4/10 s/p open repair of ventral hernia using mesh and component separation technique, small bowel resection and primary fascial closure. RTOR 4/17 for anastomotic leak s/p ex lap, 30 cm small bowel resection, creation of new side to side ileoileostomy, ventral hernia repair w/ mesh.     - recurrent SBO, s/p repair 4/10 then anastomotic leak s/p repair 4/17, RTOR 4/28 s/p re-exploration and evacuation of old clots from retroperitoneum and pelvis, midline prevena vac placed  - abdominal wall fluid collection s/p IR placed pigtail drain 4/22  - FUNMI, resolved  - right distal main pulmonary embolus with segmental extension  - high risk for malnutrition, prolonged NPO X 15d, TPN 4/17-4/25  - class III obesity, BMI 53  - hypertriglyceridemia  - hyperglycemia   - hypokalemia, resolved   - vitamin D deficiency   - concern for malabsorption (100cm common channel)      PLAN  - PO diet- No conc sweets, prioritize protein intake    - cont MV to MV with minerals, increase to 50,000IU ergocalciferol twice weekly (given Mon & Thurs) and vitamin B12 as previously taken upon admission  - monitor BM's   - repeat 25-oh vitamin D 6/17, would also get iron studies    - glycemic control  - f/u as outpatient

## 2025-05-23 NOTE — DISCHARGE NOTE NURSING/CASE MANAGEMENT/SOCIAL WORK - FINANCIAL ASSISTANCE
[facility] provides services at a reduced cost to those who are determined to be eligible through [facility]’s financial assistance program. Information regarding [facility]’s financial assistance program can be found by going to [email or web address] or by calling [phone].

## 2025-05-23 NOTE — DISCHARGE NOTE NURSING/CASE MANAGEMENT/SOCIAL WORK - PATIENT PORTAL LINK FT
You can access the FollowMyHealth Patient Portal offered by [facility] by registering at the following website: [email or web address]. By joining [employer]’s FollowMyHealth portal, you will also be able to view your health information using other applications (apps) compatible with our system.

## 2025-05-25 ENCOUNTER — INPATIENT (INPATIENT)
Facility: HOSPITAL | Age: 65
LOS: 7 days | Discharge: NURSING, REHAB OR LONG-TERM CARE | DRG: 151 | End: 2025-06-02
Attending: SURGERY | Admitting: SURGERY
Payer: COMMERCIAL

## 2025-05-25 VITALS
HEART RATE: 105 BPM | OXYGEN SATURATION: 97 % | DIASTOLIC BLOOD PRESSURE: 85 MMHG | SYSTOLIC BLOOD PRESSURE: 148 MMHG | RESPIRATION RATE: 18 BRPM | HEIGHT: 63 IN | TEMPERATURE: 98 F

## 2025-05-25 DIAGNOSIS — R18.8 OTHER ASCITES: ICD-10-CM

## 2025-05-25 DIAGNOSIS — Z98.84 BARIATRIC SURGERY STATUS: Chronic | ICD-10-CM

## 2025-05-25 DIAGNOSIS — Z16.12 EXTENDED SPECTRUM BETA LACTAMASE (ESBL) RESISTANCE: ICD-10-CM

## 2025-05-25 DIAGNOSIS — E66.01 MORBID (SEVERE) OBESITY DUE TO EXCESS CALORIES: ICD-10-CM

## 2025-05-25 DIAGNOSIS — I97.3 POSTPROCEDURAL HYPERTENSION: ICD-10-CM

## 2025-05-25 DIAGNOSIS — Z98.84 BARIATRIC SURGERY STATUS: ICD-10-CM

## 2025-05-25 DIAGNOSIS — G47.33 OBSTRUCTIVE SLEEP APNEA (ADULT) (PEDIATRIC): ICD-10-CM

## 2025-05-25 DIAGNOSIS — I10 ESSENTIAL (PRIMARY) HYPERTENSION: ICD-10-CM

## 2025-05-25 DIAGNOSIS — E83.42 HYPOMAGNESEMIA: ICD-10-CM

## 2025-05-25 DIAGNOSIS — Z98.891 HISTORY OF UTERINE SCAR FROM PREVIOUS SURGERY: Chronic | ICD-10-CM

## 2025-05-25 DIAGNOSIS — Z96.653 PRESENCE OF ARTIFICIAL KNEE JOINT, BILATERAL: Chronic | ICD-10-CM

## 2025-05-25 DIAGNOSIS — D84.81 IMMUNODEFICIENCY DUE TO CONDITIONS CLASSIFIED ELSEWHERE: ICD-10-CM
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
## 2025-05-25 DIAGNOSIS — Z90.49 ACQUIRED ABSENCE OF OTHER SPECIFIED PARTS OF DIGESTIVE TRACT: ICD-10-CM

## 2025-05-25 DIAGNOSIS — Z53.09 PROCEDURE AND TREATMENT NOT CARRIED OUT BECAUSE OF OTHER CONTRAINDICATION: ICD-10-CM

## 2025-05-25 DIAGNOSIS — Z90.49 ACQUIRED ABSENCE OF OTHER SPECIFIED PARTS OF DIGESTIVE TRACT: Chronic | ICD-10-CM

## 2025-05-25 DIAGNOSIS — Z98.890 OTHER SPECIFIED POSTPROCEDURAL STATES: Chronic | ICD-10-CM

## 2025-05-25 DIAGNOSIS — Z96.653 PRESENCE OF ARTIFICIAL KNEE JOINT, BILATERAL: ICD-10-CM

## 2025-05-25 DIAGNOSIS — R04.0 EPISTAXIS: ICD-10-CM

## 2025-05-25 DIAGNOSIS — B96.20 UNSPECIFIED ESCHERICHIA COLI [E. COLI] AS THE CAUSE OF DISEASES CLASSIFIED ELSEWHERE: ICD-10-CM
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
## 2025-05-25 DIAGNOSIS — Z99.81 DEPENDENCE ON SUPPLEMENTAL OXYGEN: ICD-10-CM

## 2025-05-25 DIAGNOSIS — Z93.2 ILEOSTOMY STATUS: ICD-10-CM

## 2025-05-25 DIAGNOSIS — Z79.01 LONG TERM (CURRENT) USE OF ANTICOAGULANTS: ICD-10-CM

## 2025-05-25 DIAGNOSIS — B95.2 ENTEROCOCCUS AS THE CAUSE OF DISEASES CLASSIFIED ELSEWHERE: ICD-10-CM

## 2025-05-25 DIAGNOSIS — T45.515A ADVERSE EFFECT OF ANTICOAGULANTS, INITIAL ENCOUNTER: ICD-10-CM

## 2025-05-25 DIAGNOSIS — T88.8XXA OTHER SPECIFIED COMPLICATIONS OF SURGICAL AND MEDICAL CARE, NOT ELSEWHERE CLASSIFIED, INITIAL ENCOUNTER: ICD-10-CM

## 2025-05-25 DIAGNOSIS — T81.41XA INFECTION FOLLOWING A PROCEDURE, SUPERFICIAL INCISIONAL SURGICAL SITE, INITIAL ENCOUNTER: ICD-10-CM

## 2025-05-25 DIAGNOSIS — T81.31XA DISRUPTION OF EXTERNAL OPERATION (SURGICAL) WOUND, NOT ELSEWHERE CLASSIFIED, INITIAL ENCOUNTER: ICD-10-CM

## 2025-05-25 LAB
ANION GAP SERPL CALC-SCNC: 11 MMOL/L — SIGNIFICANT CHANGE UP (ref 7–14)
APTT BLD: 28.4 SEC — SIGNIFICANT CHANGE UP (ref 27–39.2)
BASOPHILS # BLD AUTO: 0.04 K/UL — SIGNIFICANT CHANGE UP (ref 0–0.2)
BASOPHILS NFR BLD AUTO: 0.5 % — SIGNIFICANT CHANGE UP (ref 0–1)
BUN SERPL-MCNC: 11 MG/DL — SIGNIFICANT CHANGE UP (ref 10–20)
CALCIUM SERPL-MCNC: 8.5 MG/DL — SIGNIFICANT CHANGE UP (ref 8.4–10.5)
CHLORIDE SERPL-SCNC: 107 MMOL/L — SIGNIFICANT CHANGE UP (ref 98–110)
CO2 SERPL-SCNC: 23 MMOL/L — SIGNIFICANT CHANGE UP (ref 17–32)
CREAT SERPL-MCNC: 0.6 MG/DL — LOW (ref 0.7–1.5)
EGFR: 100 ML/MIN/1.73M2 — SIGNIFICANT CHANGE UP
EGFR: 100 ML/MIN/1.73M2 — SIGNIFICANT CHANGE UP
EOSINOPHIL # BLD AUTO: 0.07 K/UL — SIGNIFICANT CHANGE UP (ref 0–0.7)
EOSINOPHIL NFR BLD AUTO: 0.9 % — SIGNIFICANT CHANGE UP (ref 0–8)
GLUCOSE SERPL-MCNC: 104 MG/DL — HIGH (ref 70–99)
HCT VFR BLD CALC: 29.1 % — LOW (ref 37–47)
HGB BLD-MCNC: 9.1 G/DL — LOW (ref 12–16)
IMM GRANULOCYTES NFR BLD AUTO: 2 % — HIGH (ref 0.1–0.3)
INR BLD: 0.98 RATIO — SIGNIFICANT CHANGE UP (ref 0.65–1.3)
LYMPHOCYTES # BLD AUTO: 2.34 K/UL — SIGNIFICANT CHANGE UP (ref 1.2–3.4)
LYMPHOCYTES # BLD AUTO: 28.9 % — SIGNIFICANT CHANGE UP (ref 20.5–51.1)
MAGNESIUM SERPL-MCNC: 1.6 MG/DL — LOW (ref 1.8–2.4)
MCHC RBC-ENTMCNC: 29.4 PG — SIGNIFICANT CHANGE UP (ref 27–31)
MCHC RBC-ENTMCNC: 31.3 G/DL — LOW (ref 32–37)
MCV RBC AUTO: 94.2 FL — SIGNIFICANT CHANGE UP (ref 81–99)
MONOCYTES # BLD AUTO: 0.55 K/UL — SIGNIFICANT CHANGE UP (ref 0.1–0.6)
MONOCYTES NFR BLD AUTO: 6.8 % — SIGNIFICANT CHANGE UP (ref 1.7–9.3)
NEUTROPHILS # BLD AUTO: 4.95 K/UL — SIGNIFICANT CHANGE UP (ref 1.4–6.5)
NEUTROPHILS NFR BLD AUTO: 60.9 % — SIGNIFICANT CHANGE UP (ref 42.2–75.2)
NRBC BLD AUTO-RTO: 0 /100 WBCS — SIGNIFICANT CHANGE UP (ref 0–0)
PHOSPHATE SERPL-MCNC: 3.2 MG/DL — SIGNIFICANT CHANGE UP (ref 2.1–4.9)
PLATELET # BLD AUTO: 252 K/UL — SIGNIFICANT CHANGE UP (ref 130–400)
PMV BLD: 9.5 FL — SIGNIFICANT CHANGE UP (ref 7.4–10.4)
POTASSIUM SERPL-MCNC: 4.5 MMOL/L — SIGNIFICANT CHANGE UP (ref 3.5–5)
POTASSIUM SERPL-SCNC: 4.5 MMOL/L — SIGNIFICANT CHANGE UP (ref 3.5–5)
PROTHROM AB SERPL-ACNC: 11.5 SEC — SIGNIFICANT CHANGE UP (ref 9.95–12.87)
RBC # BLD: 3.09 M/UL — LOW (ref 4.2–5.4)
RBC # FLD: 16.7 % — HIGH (ref 11.5–14.5)
SODIUM SERPL-SCNC: 141 MMOL/L — SIGNIFICANT CHANGE UP (ref 135–146)
WBC # BLD: 8.11 K/UL — SIGNIFICANT CHANGE UP (ref 4.8–10.8)
WBC # FLD AUTO: 8.11 K/UL — SIGNIFICANT CHANGE UP (ref 4.8–10.8)

## 2025-05-25 PROCEDURE — 36415 COLL VENOUS BLD VENIPUNCTURE: CPT

## 2025-05-25 PROCEDURE — 93010 ELECTROCARDIOGRAM REPORT: CPT

## 2025-05-25 PROCEDURE — 93005 ELECTROCARDIOGRAM TRACING: CPT

## 2025-05-25 PROCEDURE — 99223 1ST HOSP IP/OBS HIGH 75: CPT | Mod: 25

## 2025-05-25 PROCEDURE — 94640 AIRWAY INHALATION TREATMENT: CPT

## 2025-05-25 PROCEDURE — 99285 EMERGENCY DEPT VISIT HI MDM: CPT

## 2025-05-25 PROCEDURE — 86901 BLOOD TYPING SEROLOGIC RH(D): CPT

## 2025-05-25 PROCEDURE — 87205 SMEAR GRAM STAIN: CPT

## 2025-05-25 PROCEDURE — 87186 SC STD MICRODIL/AGAR DIL: CPT

## 2025-05-25 PROCEDURE — 86850 RBC ANTIBODY SCREEN: CPT

## 2025-05-25 PROCEDURE — 86900 BLOOD TYPING SEROLOGIC ABO: CPT

## 2025-05-25 PROCEDURE — 97163 PT EVAL HIGH COMPLEX 45 MIN: CPT | Mod: GP

## 2025-05-25 PROCEDURE — 74176 CT ABD & PELVIS W/O CONTRAST: CPT

## 2025-05-25 PROCEDURE — 97116 GAIT TRAINING THERAPY: CPT | Mod: GP

## 2025-05-25 PROCEDURE — 83735 ASSAY OF MAGNESIUM: CPT

## 2025-05-25 PROCEDURE — 97535 SELF CARE MNGMENT TRAINING: CPT | Mod: GO

## 2025-05-25 PROCEDURE — 97110 THERAPEUTIC EXERCISES: CPT | Mod: GP

## 2025-05-25 PROCEDURE — 97530 THERAPEUTIC ACTIVITIES: CPT | Mod: GP

## 2025-05-25 PROCEDURE — 71045 X-RAY EXAM CHEST 1 VIEW: CPT

## 2025-05-25 PROCEDURE — 85025 COMPLETE CBC W/AUTO DIFF WBC: CPT

## 2025-05-25 PROCEDURE — 87070 CULTURE OTHR SPECIMN AEROBIC: CPT

## 2025-05-25 PROCEDURE — 80048 BASIC METABOLIC PNL TOTAL CA: CPT

## 2025-05-25 PROCEDURE — 71045 X-RAY EXAM CHEST 1 VIEW: CPT | Mod: 26

## 2025-05-25 PROCEDURE — 84100 ASSAY OF PHOSPHORUS: CPT

## 2025-05-25 PROCEDURE — 87077 CULTURE AEROBIC IDENTIFY: CPT

## 2025-05-25 RX ORDER — TIOTROPIUM BROMIDE INHALATION SPRAY 3.12 UG/1
2 SPRAY, METERED RESPIRATORY (INHALATION) DAILY
Refills: 0 | Status: DISCONTINUED | OUTPATIENT
Start: 2025-05-25 | End: 2025-06-02

## 2025-05-25 RX ORDER — OMEPRAZOLE 20 MG/1
1 CAPSULE, DELAYED RELEASE ORAL
Refills: 0 | DISCHARGE

## 2025-05-25 RX ORDER — LOSARTAN POTASSIUM 100 MG/1
1 TABLET, FILM COATED ORAL
Refills: 0 | DISCHARGE

## 2025-05-25 RX ORDER — CYANOCOBALAMIN 1000 UG/ML
1 INJECTION INTRAMUSCULAR; SUBCUTANEOUS
Refills: 0 | DISCHARGE

## 2025-05-25 RX ORDER — LACTOBACILLUS ACIDOPHILUS/PECT 75 MM-100
1 CAPSULE ORAL DAILY
Refills: 0 | Status: DISCONTINUED | OUTPATIENT
Start: 2025-05-25 | End: 2025-06-02

## 2025-05-25 RX ORDER — AMLODIPINE BESYLATE 10 MG/1
10 TABLET ORAL DAILY
Refills: 0 | Status: DISCONTINUED | OUTPATIENT
Start: 2025-05-25 | End: 2025-06-02

## 2025-05-25 RX ORDER — LOSARTAN POTASSIUM 100 MG/1
100 TABLET, FILM COATED ORAL DAILY
Refills: 0 | Status: DISCONTINUED | OUTPATIENT
Start: 2025-05-25 | End: 2025-06-02

## 2025-05-25 RX ORDER — HYDROCHLOROTHIAZIDE 50 MG/1
1 TABLET ORAL
Refills: 0 | DISCHARGE

## 2025-05-25 RX ORDER — ACETAMINOPHEN 500 MG/5ML
650 LIQUID (ML) ORAL EVERY 6 HOURS
Refills: 0 | Status: DISCONTINUED | OUTPATIENT
Start: 2025-05-25 | End: 2025-06-02

## 2025-05-25 RX ORDER — CYANOCOBALAMIN 1000 UG/ML
1000 INJECTION INTRAMUSCULAR; SUBCUTANEOUS DAILY
Refills: 0 | Status: DISCONTINUED | OUTPATIENT
Start: 2025-05-25 | End: 2025-06-02

## 2025-05-25 RX ORDER — AMLODIPINE BESYLATE 10 MG/1
1 TABLET ORAL
Refills: 0 | DISCHARGE

## 2025-05-25 RX ORDER — SEMAGLUTIDE 1 MG/.5ML
2.4 INJECTION, SOLUTION SUBCUTANEOUS
Refills: 0 | DISCHARGE

## 2025-05-25 RX ORDER — MAGNESIUM SULFATE 500 MG/ML
2 SYRINGE (ML) INJECTION ONCE
Refills: 0 | Status: COMPLETED | OUTPATIENT
Start: 2025-05-25 | End: 2025-05-25

## 2025-05-25 RX ORDER — ONDANSETRON HCL/PF 4 MG/2 ML
4 VIAL (ML) INJECTION EVERY 6 HOURS
Refills: 0 | Status: DISCONTINUED | OUTPATIENT
Start: 2025-05-25 | End: 2025-06-02

## 2025-05-25 RX ORDER — ALBUTEROL SULFATE 2.5 MG/3ML
2 VIAL, NEBULIZER (ML) INHALATION EVERY 6 HOURS
Refills: 0 | Status: DISCONTINUED | OUTPATIENT
Start: 2025-05-25 | End: 2025-06-02

## 2025-05-25 RX ORDER — MAGNESIUM OXIDE 400 MG
400 TABLET ORAL ONCE
Refills: 0 | Status: COMPLETED | OUTPATIENT
Start: 2025-05-25 | End: 2025-05-25

## 2025-05-25 RX ORDER — ENOXAPARIN SODIUM 100 MG/ML
40 INJECTION SUBCUTANEOUS EVERY 12 HOURS
Refills: 0 | Status: DISCONTINUED | OUTPATIENT
Start: 2025-05-25 | End: 2025-05-26

## 2025-05-25 RX ORDER — CYST/ALA/Q10/PHOS.SER/DHA/BROC 100-20-50
1 POWDER (GRAM) ORAL DAILY
Refills: 0 | Status: DISCONTINUED | OUTPATIENT
Start: 2025-05-25 | End: 2025-06-02

## 2025-05-25 RX ADMIN — Medication 25 GRAM(S): at 19:31

## 2025-05-25 RX ADMIN — Medication 400 MILLIGRAM(S): at 19:43

## 2025-05-25 RX ADMIN — ENOXAPARIN SODIUM 40 MILLIGRAM(S): 100 INJECTION SUBCUTANEOUS at 21:02

## 2025-05-25 NOTE — ED PROVIDER NOTE - OBJECTIVE STATEMENT
65-year-old female history of gastric bypass, small bowel resection, abdominal washout, PE on Eliquis complaining of elevated blood pressure and nosebleed today.  Patient is currently residing at long-term rehab  facility in  [place] at this time.  Today she had an episode of high blood pressure and nosebleed.  Nose bleed has since stopped.  Family left AMA from rehab facility to come to [facility] to see Dr. Lema.

## 2025-05-25 NOTE — ED ADULT TRIAGE NOTE - BIRTH SEX
FUTURE VISIT INFORMATION      SURGERY INFORMATION:  Date: 2/22/24  [facility]  Surgeon:  Jerome Reddy MD   Anesthesia Type:  MAC  Procedure: EXAM UNDER ANESTHESIA, RECTUM, ANOSCOPY, HIGH RESOLUTION, WITH anal dysplasia FULGURATION, BIOPSIES     RECORDS REQUESTED FROM:         Pertinent Medical History: None      
Female

## 2025-05-25 NOTE — H&P ADULT - NSHPPHYSICALEXAM_GEN_ALL_CORE
T(F): 97.6 (05-25-25 @ 20:35), Max: 97.6 (05-25-25 @ 20:35)  HR: 106 (05-25-25 @ 20:35) (105 - 106)  BP: 136/83 (05-25-25 @ 20:35) (136/83 - 148/85)  RR: 18 (05-25-25 @ 20:35) (18 - 18)  SpO2: 98% (05-25-25 @ 20:35) (97% - 98%)    CONSTITUTIONAL: Well groomed, no apparent distress  EYES: PERRLA and symmetric, EOMI, No conjunctival or scleral injection, non-icteric  ENMT: Oral mucosa with moist membranes. Normal dentition; no pharyngeal injection or exudates  NECK: Supple, symmetric and without tracheal deviation   RESP: No respiratory distress, no use of accessory muscles; CTA b/l, no WRR  CV: RRR, +S1S2, no MRG; no JVD; no peripheral edema  GI: Soft, NT, ND, no rebound, no guarding; no palpable masses; no hepatosplenomegaly; no hernia palpated   - Midline incision clean, dry and intact, some staples still present on lower 1/3 of wound  MSK: No digital clubbing or cyanosis; examination of the extremities without misalignment,   Normal ROM without pain, no spinal tenderness, normal muscle strength/tone  SKIN: No rashes or ulcers noted; no subcutaneous nodules or induration palpable  NEURO: CN II-XII intact; normal reflexes in upper and lower extremities, sensation intact in upper and lower extremities b/l to light touch   PSYCH: Appropriate insight/judgment; A+O x 3, mood and affect appropriate, recent/remote memory intact

## 2025-05-25 NOTE — ED ADULT NURSE NOTE - CHIEF COMPLAINT QUOTE
BIBA from [facility] in [place] ([facility] [place] c/o hypertension and 1 episode of nose bleed. Did not take eliquis this morning. Advise by her surgeon in [facility] to return here for evaluation.

## 2025-05-25 NOTE — H&P ADULT - NSHPLABSRESULTS_GEN_ALL_CORE
LABS  CBC (05-25 @ 18:00)                              9.1[L]                         8.11    )----------------(  252        --    % Neutrophils, --    % Lymphocytes, ANC: --                                  29.1[L]    BMP (05-25 @ 18:00)             141     |  107     |  11    		Ca++ --      Ca 8.5                ---------------------------------( 104[H]		Mg 1.6[L]             4.5     |  23      |  0.6[L]			Ph 3.2         Coags (05-25 @ 18:00)  aPTT 28.4 / INR 0.98 / PT 11.50    --------------------------------------------------------------------------------------------    MICROBIOLOGY  Urinalysis (05-25 @ 18:00):     Color:  / Appearance:  / SG:  / pH:  / Gluc: 104[H] / Ketones:  / Bili:  / Urobili:  / Protein : / Nitrites:  / Leuk.Est:  / RBC:  / WBC:  / Sq Epi:  / Non Sq Epi:  / Bacteria        --------------------------------------------------------------------------------------------    I&O's Summary

## 2025-05-25 NOTE — ED ADULT NURSE NOTE - HIV OFFER
Previously Declined (within the last year)
Simple: Patient demonstrates quick and easy understanding/Straightforward: Basic instructions, no meds, no home treatment

## 2025-05-25 NOTE — PATIENT PROFILE ADULT - FUNCTIONAL ASSESSMENT - BASIC MOBILITY 6.
1-calculated by average/Not able to assess (calculate score using [facility] averaging method)

## 2025-05-25 NOTE — ED PROVIDER NOTE - PHYSICAL EXAMINATION
Gen: Alert, NAD, well appearing  Head: NC, AT, PERRL, EOMI, normal lids  ENT: normal hearing,   Neck: +supple, no tenderness/meningismus,  Pulm: Bilateral BS, normal resp effort, no wheeze/stridor/retractions  CV: RRR  Abd: soft, NT/ND  Mskel: no edema/erythema/cyanosis  Skin: no rash, warm/dry  Neuro: AAOx3, no sensory/motor deficits

## 2025-05-25 NOTE — ED PROVIDER NOTE - ATTENDING APP SHARED VISIT CONTRIBUTION OF CARE
65-year-old female history of gastric bypass, small bowel resection, abdominal washout, PE on Eliquis complaining of elevated blood pressure and nosebleed today.  Patient is currently residing at long-term rehab  facility in  [place] at this time.  Today she had an episode of high blood pressure and nosebleed.  Nose bleed has since stopped.  Family left AMA from rehab facility to come to [facility] to see Dr. Lema. Here no active nasal bleeding, normal posterior OP. Surgery consulted at request of pt.

## 2025-05-25 NOTE — H&P ADULT - ASSESSMENT
64F with a PMHx of HTN, morbid obesity, LOUIS on CPAP, Abby en y gastric bypass, and a recent admission for an incarcerated ventral hernia w/ sbo s/p lap converted to open ventral hernia repair w/ mesh and small bowel resection and s/p ex lap, 30 cm sbr, creation of new side to side ileoileostomy, ventral hernia repair w/ mesh who presents back to the ER from her LTACH facility [facility] after persistent HTN, epistaxis in setting of eliquis use and complex surgical history. Patient states her epistaxis has stopped after self-tamponade for 25 minutes. No complaints regarding midline abdominal ex lap healing incision. She has been tolerating diet, passing regular bowel movements without nausea or vomiting. Other than epistaxis her other complaint is that she claims the LTACH has been neglecting her and that she is not receiving any physical therapy.    PLAN:   - Admit to surgical service under Dr. Lema   - Hold eliquis for now, plan to resume in 24 hours   - Nonurgent ENT consult for epistaxis   - Pain control   - Regular DASH diet   - Monitor I/O   - DVT ppx   - GI ppx   - Hemodynamic monitoring   - Resume home meds as indicated   - PT/Phys Consult    Discussed plan with attending surgeon on call, Dr. Lema  [phone]

## 2025-05-25 NOTE — ED PROVIDER NOTE - CLINICAL SUMMARY MEDICAL DECISION MAKING FREE TEXT BOX
65-year-old female history of gastric bypass, small bowel resection, abdominal washout, PE on Eliquis complaining of elevated blood pressure and nosebleed today.  Patient is currently residing at long-term rehab  facility in  [place] at this time.  Today she had an episode of high blood pressure and nosebleed.  Nose bleed has since stopped.  Family left AMA from rehab facility to come to [facility] to see Dr. Lema. Here no active nasal bleeding, normal posterior OP. Surgery consulted at request of pt who requests admission for monitoring to their service.

## 2025-05-25 NOTE — ED ADULT NURSE NOTE - NSFALLRISKINTERV_ED_ALL_ED

## 2025-05-25 NOTE — ED ADULT TRIAGE NOTE - CHIEF COMPLAINT QUOTE
BIBA from [facility] in [place] ([facility] from the [facility]), c/o hypertension and 1 episode of nose bleed. Did not take eliquis this morning. Advise by her surgeon in [facility] to return here for evaluation.

## 2025-05-25 NOTE — PATIENT PROFILE ADULT - FALL HARM RISK - HARM RISK INTERVENTIONS
Assistance with ambulation/Assistance OOB with selected safe patient handling equipment/Communicate Risk of Fall with Harm to all staff/Discuss with provider need for PT consult/Monitor gait and stability/Provide patient with walking aids - walker, cane, crutches/Reinforce activity limits and safety measures with patient and family/Sit up slowly, dangle for a short time, stand at bedside before walking/Tailored Fall Risk Interventions/Use of alarms - bed, chair and/or voice tab/Visual Cue: Yellow wristband and red socks/Bed in lowest position, wheels locked, appropriate side rails in place/Call bell, personal items and telephone in reach/Instruct patient to call for assistance before getting out of bed or chair/Non-slip footwear when patient is out of bed/[place] to call system/Physically safe environment - no spills, clutter or unnecessary equipment/Purposeful Proactive Rounding/Room/bathroom lighting operational, light cord in reach

## 2025-05-25 NOTE — H&P ADULT - HISTORY OF PRESENT ILLNESS
64F with a PMHx of HTN, morbid obesity, LOUIS on CPAP, Abby en y gastric bypass, and a recent admission for an incarcerated ventral hernia w/ sbo s/p lap converted to open ventral hernia repair w/ mesh and small bowel resection and s/p ex lap, 30 cm sbr, creation of new side to side ileoileostomy, ventral hernia repair w/ mesh who presents back to the ER from her LTACH facility [facility] after persistent HTN, epistaxis in setting of eliquis use and complex surgical history. Patient states her epistaxis has stopped after self-tamponade for 25 minutes. No complaints regarding midline abdominal ex lap healing incision. She has been tolerating diet, passing regular bowel movements without nausea or vomiting. Other than epistaxis her other complaint is that she claims the LTACH has been neglecting her and that she is not receiving any physical therapy.

## 2025-05-26 LAB
ANION GAP SERPL CALC-SCNC: 11 MMOL/L — SIGNIFICANT CHANGE UP (ref 7–14)
BASOPHILS # BLD AUTO: 0.02 K/UL — SIGNIFICANT CHANGE UP (ref 0–0.2)
BASOPHILS NFR BLD AUTO: 0.3 % — SIGNIFICANT CHANGE UP (ref 0–1)
BUN SERPL-MCNC: 18 MG/DL — SIGNIFICANT CHANGE UP (ref 10–20)
CALCIUM SERPL-MCNC: 8.2 MG/DL — LOW (ref 8.4–10.5)
CHLORIDE SERPL-SCNC: 106 MMOL/L — SIGNIFICANT CHANGE UP (ref 98–110)
CO2 SERPL-SCNC: 25 MMOL/L — SIGNIFICANT CHANGE UP (ref 17–32)
CREAT SERPL-MCNC: 0.8 MG/DL — SIGNIFICANT CHANGE UP (ref 0.7–1.5)
EGFR: 82 ML/MIN/1.73M2 — SIGNIFICANT CHANGE UP
EGFR: 82 ML/MIN/1.73M2 — SIGNIFICANT CHANGE UP
EOSINOPHIL # BLD AUTO: 0.07 K/UL — SIGNIFICANT CHANGE UP (ref 0–0.7)
EOSINOPHIL NFR BLD AUTO: 0.9 % — SIGNIFICANT CHANGE UP (ref 0–8)
GLUCOSE SERPL-MCNC: 115 MG/DL — HIGH (ref 70–99)
HCT VFR BLD CALC: 27 % — LOW (ref 37–47)
HGB BLD-MCNC: 8.5 G/DL — LOW (ref 12–16)
IMM GRANULOCYTES NFR BLD AUTO: 2.2 % — HIGH (ref 0.1–0.3)
LYMPHOCYTES # BLD AUTO: 1.83 K/UL — SIGNIFICANT CHANGE UP (ref 1.2–3.4)
LYMPHOCYTES # BLD AUTO: 23.6 % — SIGNIFICANT CHANGE UP (ref 20.5–51.1)
MAGNESIUM SERPL-MCNC: 1.8 MG/DL — SIGNIFICANT CHANGE UP (ref 1.8–2.4)
MCHC RBC-ENTMCNC: 29.7 PG — SIGNIFICANT CHANGE UP (ref 27–31)
MCHC RBC-ENTMCNC: 31.5 G/DL — LOW (ref 32–37)
MCV RBC AUTO: 94.4 FL — SIGNIFICANT CHANGE UP (ref 81–99)
MONOCYTES # BLD AUTO: 0.63 K/UL — HIGH (ref 0.1–0.6)
MONOCYTES NFR BLD AUTO: 8.1 % — SIGNIFICANT CHANGE UP (ref 1.7–9.3)
NEUTROPHILS # BLD AUTO: 5.03 K/UL — SIGNIFICANT CHANGE UP (ref 1.4–6.5)
NEUTROPHILS NFR BLD AUTO: 64.9 % — SIGNIFICANT CHANGE UP (ref 42.2–75.2)
NRBC BLD AUTO-RTO: 0 /100 WBCS — SIGNIFICANT CHANGE UP (ref 0–0)
PHOSPHATE SERPL-MCNC: 3.4 MG/DL — SIGNIFICANT CHANGE UP (ref 2.1–4.9)
PLATELET # BLD AUTO: 248 K/UL — SIGNIFICANT CHANGE UP (ref 130–400)
PMV BLD: 9.5 FL — SIGNIFICANT CHANGE UP (ref 7.4–10.4)
POTASSIUM SERPL-MCNC: 3.8 MMOL/L — SIGNIFICANT CHANGE UP (ref 3.5–5)
POTASSIUM SERPL-SCNC: 3.8 MMOL/L — SIGNIFICANT CHANGE UP (ref 3.5–5)
RBC # BLD: 2.86 M/UL — LOW (ref 4.2–5.4)
RBC # FLD: 16.7 % — HIGH (ref 11.5–14.5)
SODIUM SERPL-SCNC: 142 MMOL/L — SIGNIFICANT CHANGE UP (ref 135–146)
WBC # BLD: 7.75 K/UL — SIGNIFICANT CHANGE UP (ref 4.8–10.8)
WBC # FLD AUTO: 7.75 K/UL — SIGNIFICANT CHANGE UP (ref 4.8–10.8)

## 2025-05-26 PROCEDURE — 99252 IP/OBS CONSLTJ NEW/EST SF 35: CPT

## 2025-05-26 PROCEDURE — 99233 SBSQ HOSP IP/OBS HIGH 50: CPT | Mod: 25

## 2025-05-26 RX ORDER — APIXABAN 2.5 MG/1
5 TABLET, FILM COATED ORAL EVERY 12 HOURS
Refills: 0 | Status: DISCONTINUED | OUTPATIENT
Start: 2025-05-26 | End: 2025-05-27

## 2025-05-26 RX ORDER — AMOXICILLIN AND CLAVULANATE POTASSIUM 500; 125 MG/1; MG/1
1 TABLET, FILM COATED ORAL EVERY 12 HOURS
Refills: 0 | Status: DISCONTINUED | OUTPATIENT
Start: 2025-05-26 | End: 2025-05-27

## 2025-05-26 RX ORDER — MAGNESIUM SULFATE 500 MG/ML
2 SYRINGE (ML) INJECTION ONCE
Refills: 0 | Status: COMPLETED | OUTPATIENT
Start: 2025-05-26 | End: 2025-05-27

## 2025-05-26 RX ADMIN — TIOTROPIUM BROMIDE INHALATION SPRAY 2 PUFF(S): 3.12 SPRAY, METERED RESPIRATORY (INHALATION) at 09:31

## 2025-05-26 RX ADMIN — AMLODIPINE BESYLATE 10 MILLIGRAM(S): 10 TABLET ORAL at 06:10

## 2025-05-26 RX ADMIN — Medication 650 MILLIGRAM(S): at 12:23

## 2025-05-26 RX ADMIN — CYANOCOBALAMIN 1000 MICROGRAM(S): 1000 INJECTION INTRAMUSCULAR; SUBCUTANEOUS at 11:53

## 2025-05-26 RX ADMIN — Medication 650 MILLIGRAM(S): at 11:53

## 2025-05-26 RX ADMIN — Medication 1 TABLET(S): at 11:53

## 2025-05-26 RX ADMIN — APIXABAN 5 MILLIGRAM(S): 2.5 TABLET, FILM COATED ORAL at 09:31

## 2025-05-26 RX ADMIN — LOSARTAN POTASSIUM 100 MILLIGRAM(S): 100 TABLET, FILM COATED ORAL at 06:10

## 2025-05-26 RX ADMIN — AMOXICILLIN AND CLAVULANATE POTASSIUM 1 TABLET(S): 500; 125 TABLET, FILM COATED ORAL at 18:31

## 2025-05-26 RX ADMIN — Medication 40 MILLIGRAM(S): at 06:10

## 2025-05-26 RX ADMIN — APIXABAN 5 MILLIGRAM(S): 2.5 TABLET, FILM COATED ORAL at 20:50

## 2025-05-26 NOTE — CONSULT NOTE ADULT - SUBJECTIVE AND OBJECTIVE BOX
ENT: Pt is a 65y/o F on Eliquis recently discharged s/p multiple abdominal surgeries to [facility] readmitted with persistent HTN and epistaxis that self resolved with pressure. Pt seen and examined at bedside with Surgical team present. Pt denies any further epistaxis since initial episode. Eliquis was held as a result of the epistaxis.    PAST MEDICAL & SURGICAL HISTORY:  Gastric bypass status for obesity  LOUIS (obstructive sleep apnea)  S/P gastric bypass  S/P   S/P small bowel resection  History of total bilateral knee replacement (TKR)  H/O colonoscopy     Allergies  No Known Allergies    MEDICATIONS  (STANDING):  acetaminophen     Tablet .. 650 milliGRAM(s) Oral every 6 hours  amLODIPine   Tablet 10 milliGRAM(s) Oral daily  amoxicillin  875 milliGRAM(s)/clavulanate 1 Tablet(s) Oral every 12 hours  apixaban 5 milliGRAM(s) Oral every 12 hours  cyanocobalamin 1000 MICROGram(s) Oral daily  hydrochlorothiazide 25 milliGRAM(s) Oral daily  lactobacillus acidophilus 1 Tablet(s) Oral daily  losartan 100 milliGRAM(s) Oral daily  multivitamin/minerals 1 Tablet(s) Oral daily  pantoprazole    Tablet 40 milliGRAM(s) Oral before breakfast  tiotropium 2.5 MICROgram(s) Inhaler 2 Puff(s) Inhalation daily    MEDICATIONS  (PRN):  albuterol    90 MICROgram(s) HFA Inhaler 2 Puff(s) Inhalation every 6 hours PRN Shortness of Breath and/or Wheezing  ondansetron Injectable 4 milliGRAM(s) IV Push every 6 hours PRN Nausea    REVIEW OF SYSTEMS   [x] A ten-point review of systems was otherwise negative except as noted.    Vital Signs Last 24 Hrs  T(C): 36.7 (26 May 2025 08:00), Max: 36.7 (26 May 2025 05:59)  T(F): 98 (26 May 2025 08:00), Max: 98 (26 May 2025 05:59)  HR: 105 (26 May 2025 08:00) (96 - 106)  BP: 132/81 (26 May 2025 05:59) (127/84 - 148/85)  RR: 18 (26 May 2025 08:00) (18 - 18)  SpO2: 97% (26 May 2025 05:59) (95% - 98%)    GEN: NAD, awake and alert. No drooling or pooling of secretions. No stridor or stertor. Good vocal quality, no hoarseness.   SKIN: Good color, non diaphoretic.  HEENT: Oral mucosa pink and moist. Nares bilaterally patent. No active or dry bleeding noted.  NECK: Trachea midline.  RESP: No dyspnea, non-labored breathing. No use of accessory muscles.   CARDIO: +S1/S2  ABDO: Soft, NT.  EXT: BLACK x 4    LABS:             9.1    8.11  )-----------( 252      ( 25 May 2025 18:00 )             29.1     141  |  107  |  11  ----------------------------<  104[H]  4.5   |  23  |  0.6[L]    Ca    8.5      25 May 2025 18:00  Phos  3.2     05-25  Mg     1.6     05-25  PT/INR - ( 25 May 2025 18:00 )   PT: 11.50 sec;   INR: 0.98 ratio    PTT - ( 25 May 2025 18:00 )  PTT:28.4 sec

## 2025-05-26 NOTE — PHYSICAL THERAPY INITIAL EVALUATION ADULT - PATIENT/FAMILY/SIGNIFICANT OTHER GOALS STATEMENT, PT EVAL
Pt. reports she is very motivated to participate and progress with rehab so she can return to her PLOF.

## 2025-05-26 NOTE — PROGRESS NOTE ADULT - ASSESSMENT
Assessment:  64F with a PMHx of HTN, morbid obesity, LOUIS on CPAP, Abby en y gastric bypass, and a recent admission for an incarcerated ventral hernia w/ sbo s/p lap converted to open ventral hernia repair w/ mesh and small bowel resection and s/p ex lap, 30 cm sbr, creation of new side to side ileoileostomy, ventral hernia repair w/ mesh who presents back to the ER from her LTACH facility [facility] after persistent HTN, epistaxis in setting of eliquis use and complex surgical history.     Plan:   - Hold eliquis for now and give DVT ppx for 24 hours  - Plan to restart eliquis in 24 hrs  - ENT c/s for epistaxis   - PT/Physiatry consult   - Monitor BP

## 2025-05-26 NOTE — CHART NOTE - NSCHARTNOTEFT_GEN_A_CORE
Upon inspection of midline wound, attending Dr. Lema noted drainage of purulent discharge. 20-30cc drained, culture sent and augmentin started.

## 2025-05-26 NOTE — PHYSICAL THERAPY INITIAL EVALUATION ADULT - LEVEL OF INDEPENDENCE: GAIT, REHAB EVAL
Initiated marching in place 1x5 BLE with min A and RW. Limited due to endurance and CEBALLOS./unable to perform

## 2025-05-26 NOTE — PHYSICAL THERAPY INITIAL EVALUATION ADULT - LEVEL OF INDEPENDENCE: BED TO CHAIR, REHAB EVAL
Education provided regarding safe and appropriate use of magdi stedy for bed to chair and commode transfers.

## 2025-05-26 NOTE — PHYSICAL THERAPY INITIAL EVALUATION ADULT - LEVEL OF INDEPENDENCE: SIT/STAND, REHAB EVAL
4 trials from chair, 30-60 sec per trial, maintaining erect standing with RW with VC/moderate assist (50% patients effort)

## 2025-05-26 NOTE — CONSULT NOTE ADULT - SUBJECTIVE AND OBJECTIVE BOX
HPI:  64F with a PMHx of HTN, morbid obesity, LOUIS on CPAP, Abby en y gastric bypass, and a recent admission for an incarcerated ventral hernia w/ sbo s/p lap converted to open ventral hernia repair w/ mesh and small bowel resection and s/p ex lap, 30 cm sbr, creation of new side to side ileoileostomy, ventral hernia repair w/ mesh who presents back to the ER from her LTACH facility [facility] after persistent HTN, epistaxis in setting of eliquis use and complex surgical history. Patient states her epistaxis has stopped after self-tamponade for 25 minutes. No complaints regarding midline abdominal ex lap healing incision. She has been tolerating diet, passing regular bowel movements without nausea or vomiting. Other than epistaxis her other complaint is that she claims the LTACH has been neglecting her and that she is not receiving any physical therapy. (25 May 2025 20:50)    Patient has been hospitalized since April s/p multiple abdominal surgeries, discharged to [facility]  for severe debility with inability to walk, and now returns 2 days later with Epistaxis. She also states that she is now having purulent discharge from her incision. She would like to be reconsidered for acute rehab at this time and feels that she could make good gains in a short time and be able to return home.      PAST MEDICAL & SURGICAL HISTORY:  Gastric bypass status for obesity      LOUIS (obstructive sleep apnea)      S/P gastric bypass      S/P       S/P small bowel resection      History of total bilateral knee replacement (TKR)      H/O colonoscopy  2016          Hospital Course: as above - just readmitted.    TODAY'S SUBJECTIVE & REVIEW OF SYMPTOMS:     Constitutional WNL   Cardio WNL   Resp WNL   GI per HPI  Heme WNL  Endo WNL  Skin WNL  MSK LE swelling  Neuro WNL  Cognitive WNL  Psych WNL      MEDICATIONS  (STANDING):  acetaminophen     Tablet .. 650 milliGRAM(s) Oral every 6 hours  amLODIPine   Tablet 10 milliGRAM(s) Oral daily  amoxicillin  875 milliGRAM(s)/clavulanate 1 Tablet(s) Oral every 12 hours  apixaban 5 milliGRAM(s) Oral every 12 hours  cyanocobalamin 1000 MICROGram(s) Oral daily  hydrochlorothiazide 25 milliGRAM(s) Oral daily  lactobacillus acidophilus 1 Tablet(s) Oral daily  losartan 100 milliGRAM(s) Oral daily  multivitamin/minerals 1 Tablet(s) Oral daily  pantoprazole    Tablet 40 milliGRAM(s) Oral before breakfast  tiotropium 2.5 MICROgram(s) Inhaler 2 Puff(s) Inhalation daily    MEDICATIONS  (PRN):  albuterol    90 MICROgram(s) HFA Inhaler 2 Puff(s) Inhalation every 6 hours PRN Shortness of Breath and/or Wheezing  ondansetron Injectable 4 milliGRAM(s) IV Push every 6 hours PRN Nausea      FAMILY HISTORY:      Allergies    No Known Allergies    Intolerances        SOCIAL HISTORY: denies    [  ] Etoh  [  ] Smoking  [  ] Substance abuse     Home Environment:  [  ] Home Alone  [ x ] Lives with Family  [  ] Home Health Aid    Dwelling:  [  ] Apartment  [ x ] Private House  [  ] Adult Home  [  ] Skilled Nursing Facility      [  ] Short Term  [  ] Long Term  [ x ] Stairs - 1 flight to enter and 1 flight to bedroom       Elevator [  ]    FUNCTIONAL STATUS PTA: (Check all that apply)  Ambulation: [ x  ]Independent   [   ] Requires Assistance   [  ] Dependent     [  ] Non-Ambulatory       Assistive Device: [  ] SA Cane  [  ]  Q Cane  [  ] Walker  [  ]  Wheelchair  ADL : [x  ] Independent    [   ] Requires Assistance    [  ]  Dependent       Vital Signs Last 24 Hrs  T(C): 36.7 (26 May 2025 08:00), Max: 36.7 (26 May 2025 05:59)  T(F): 98 (26 May 2025 08:00), Max: 98 (26 May 2025 05:59)  HR: 105 (26 May 2025 08:00) (96 - 106)  BP: 132/81 (26 May 2025 05:59) (127/84 - 148/85)  BP(mean): --  RR: 18 (26 May 2025 08:00) (18 - 18)  SpO2: 97% (26 May 2025 05:59) (95% - 98%)    Parameters below as of 26 May 2025 05:59  Patient On (Oxygen Delivery Method): room air          PHYSICAL EXAM: Alert & Oriented X3  GENERAL: NAD, well-groomed, well-developed, morbidly obeses  HEAD:  Atraumatic, Normocephalic  EYES: EOMI, PERRL  NECK: Supple  CHEST/LUNG: Clear to auscultation  HEART: Regular rate and rhythm  ABDOMEN: Soft, obese, abdominal binder and dressings covering incisions  EXTREMITIES:  No clubbing, cyanosis. Generalized BLE edema, improving  PROM:  [ x  ] WFL all extremities  [  ] Abnormal    NERVOUS SYSTEM: mental status WNL  Cranial Nerves 2-12: [ x  ] intact  [ x ] Abnormal: BUE WNL, BLE 2+/5 hip flexion, 4/5 quads, 4-/5 DF   Motor Strength: [   ] WFL all extremities   [  ] Abnormal   Sensation: [ x  ] intact to light touch  [  ] Abnormal   Reflexes: [   ] Symmetric  [  ]  Abnormal N/A    FUNCTIONAL STATUS:  Bed Mobility: [   ]Independent  [  ] Supervision  [ x ] Needs Assistance   [  ] N/A   Transfers: [   ] Independent  [  ] Supervision  [ x ] Needs Assistance  [  ]  N/A   Ambulation: [   ] Independent  [  ] Supervision  [ x ] Needs Assistance  [  ] N/A   ADL: [   ] Independent  [x  ] Requires Assistance  [  ] N/A       LABS:                        9.1    8.11  )-----------( 252      ( 25 May 2025 18:00 )             29.1     05-25    141  |  107  |  11  ----------------------------<  104[H]  4.5   |  23  |  0.6[L]    Ca    8.5      25 May 2025 18:00  Phos  3.2     05-25  Mg     1.6     05-25      PT/INR - ( 25 May 2025 18:00 )   PT: 11.50 sec;   INR: 0.98 ratio         PTT - ( 25 May 2025 18:00 )  PTT:28.4 sec  Urinalysis Basic - ( 25 May 2025 18:00 )

## 2025-05-26 NOTE — PHYSICAL THERAPY INITIAL EVALUATION ADULT - ADDITIONAL COMMENTS
Prior to onset of this illness, pt was fully independent living with spouse in a private house with 10 steps to enter and 12 steps to the bedroom, employed as , 2 adult children

## 2025-05-26 NOTE — CONSULT NOTE ADULT - ASSESSMENT
IMPRESSION: Rehab of debility with proximal LE weakness and marked decline from baseline function, s/p multiple abdominal surgeries with complications. PT and OT evals pending.    PRECAUTIONS: [  ] Cardiac  [  ] Respiratory  [  ] Seizures [  ] Contact Isolation  [  ] Droplet Isolation  [  ] Other    Weight Bearing Status: wbat    RECOMMENDATION:    Out of Bed to Chair     DVT/Decubiti Prophylaxis    REHAB PLAN:     [  x ] Bedside P/T 3-5 times a week   [ x  ]   Bedside O/T  2-3 times a week             [   ] No Rehab Therapy Indicated                   [   ]  Speech Therapy   Conditioning/ROM                                    ADL  Bed Mobility                                               Conditioning/ROM  Transfers                                                     Bed Mobility  Sitting /Standing Balance                         Transfers                                        Gait Training                                               Sitting/Standing Balance  Stair Training [   ]Applicable                    Home equipment Eval                                                                        Splinting  [   ] Only      GOALS:   ADL   [  x ]   Independent                    Transfers  [ x  ] Independent                          Ambulation  [ x  ] Independent     [  x  ] With device                            [   ]  CG                                                         [   ]  CG                                                                  [   ] CG                            [    ] Min A                                                   [   ] Min A                                                              [   ] Min  A          DISCHARGE PLAN:   [   ]  Good candidate for Intensive Rehabilitation/Hospital based-4A [facility]                                             Will tolerate 3hrs Intensive Rehab Daily                                       [    ]  Short Term Rehab in Skilled Nursing Facility                                       [    ]  Home with Outpatient or VN services                                         [  xx  ]  Possible Candidate for Intensive Hospital based Rehab depending on progress with acute rehab therapists and ability to tolerate and benefit from a 3 hour a day acute rehab program vs. STR or return to LTACH. Will follow.

## 2025-05-26 NOTE — PHYSICAL THERAPY INITIAL EVALUATION ADULT - GENERAL OBSERVATIONS, REHAB EVAL
Chart reviewed. Pt. seated in bedside chair upon PT arrival. Agreeable and motivated to participate with PT. No c/o.

## 2025-05-26 NOTE — CONSULT NOTE ADULT - ASSESSMENT
65y/o F on Eliquis with multiple abdominal surgeries here with one episode of self resolving epistaxis.    - Case discussed with Dr. Forte, plan as per attending  - No acute ENT intervention at this time  - Please ensure good blood pressure control as this + Eliquis may have caused the initial insult  - Restart Eliquis if no further bleeding today  - Nasal saline sprays BID  - Recall ENT if pt rebleeds; can trial Afrin sprays + pressure as initial attempt to control bleeding.

## 2025-05-26 NOTE — PHYSICAL THERAPY INITIAL EVALUATION ADULT - PERTINENT HX OF CURRENT PROBLEM, REHAB EVAL
Admitted from LTAC for new onset pus in wound and +epistaxis. PMH significant for recent prolonged admission s/p "incarcerated ventral hernia w/ sbo s/p lap converted to open ventral hernia repair w/ mesh and small bowel resection and s/p ex lap, 30 cm sbr, creation of new side to side ileoileostomy, ventral hernia repair w/ mesh".

## 2025-05-27 LAB
ANION GAP SERPL CALC-SCNC: 15 MMOL/L — HIGH (ref 7–14)
BASOPHILS # BLD AUTO: 0.03 K/UL — SIGNIFICANT CHANGE UP (ref 0–0.2)
BASOPHILS NFR BLD AUTO: 0.4 % — SIGNIFICANT CHANGE UP (ref 0–1)
BLD GP AB SCN SERPL QL: SIGNIFICANT CHANGE UP
BUN SERPL-MCNC: 14 MG/DL — SIGNIFICANT CHANGE UP (ref 10–20)
CALCIUM SERPL-MCNC: 7.9 MG/DL — LOW (ref 8.4–10.5)
CHLORIDE SERPL-SCNC: 105 MMOL/L — SIGNIFICANT CHANGE UP (ref 98–110)
CO2 SERPL-SCNC: 21 MMOL/L — SIGNIFICANT CHANGE UP (ref 17–32)
CREAT SERPL-MCNC: 0.9 MG/DL — SIGNIFICANT CHANGE UP (ref 0.7–1.5)
EGFR: 71 ML/MIN/1.73M2 — SIGNIFICANT CHANGE UP
EGFR: 71 ML/MIN/1.73M2 — SIGNIFICANT CHANGE UP
EOSINOPHIL # BLD AUTO: 0.08 K/UL — SIGNIFICANT CHANGE UP (ref 0–0.7)
EOSINOPHIL NFR BLD AUTO: 1.1 % — SIGNIFICANT CHANGE UP (ref 0–8)
GLUCOSE SERPL-MCNC: 109 MG/DL — HIGH (ref 70–99)
HCT VFR BLD CALC: 25.9 % — LOW (ref 37–47)
HGB BLD-MCNC: 8.2 G/DL — LOW (ref 12–16)
IMM GRANULOCYTES NFR BLD AUTO: 1.5 % — HIGH (ref 0.1–0.3)
LYMPHOCYTES # BLD AUTO: 1.9 K/UL — SIGNIFICANT CHANGE UP (ref 1.2–3.4)
LYMPHOCYTES # BLD AUTO: 25.6 % — SIGNIFICANT CHANGE UP (ref 20.5–51.1)
MAGNESIUM SERPL-MCNC: 1.9 MG/DL — SIGNIFICANT CHANGE UP (ref 1.8–2.4)
MCHC RBC-ENTMCNC: 29.9 PG — SIGNIFICANT CHANGE UP (ref 27–31)
MCHC RBC-ENTMCNC: 31.7 G/DL — LOW (ref 32–37)
MCV RBC AUTO: 94.5 FL — SIGNIFICANT CHANGE UP (ref 81–99)
MONOCYTES # BLD AUTO: 0.51 K/UL — SIGNIFICANT CHANGE UP (ref 0.1–0.6)
MONOCYTES NFR BLD AUTO: 6.9 % — SIGNIFICANT CHANGE UP (ref 1.7–9.3)
NEUTROPHILS # BLD AUTO: 4.78 K/UL — SIGNIFICANT CHANGE UP (ref 1.4–6.5)
NEUTROPHILS NFR BLD AUTO: 64.5 % — SIGNIFICANT CHANGE UP (ref 42.2–75.2)
NRBC BLD AUTO-RTO: 0 /100 WBCS — SIGNIFICANT CHANGE UP (ref 0–0)
PHOSPHATE SERPL-MCNC: 3.8 MG/DL — SIGNIFICANT CHANGE UP (ref 2.1–4.9)
PLATELET # BLD AUTO: 247 K/UL — SIGNIFICANT CHANGE UP (ref 130–400)
PMV BLD: 9.4 FL — SIGNIFICANT CHANGE UP (ref 7.4–10.4)
POTASSIUM SERPL-MCNC: 3.8 MMOL/L — SIGNIFICANT CHANGE UP (ref 3.5–5)
POTASSIUM SERPL-SCNC: 3.8 MMOL/L — SIGNIFICANT CHANGE UP (ref 3.5–5)
RBC # BLD: 2.74 M/UL — LOW (ref 4.2–5.4)
RBC # FLD: 16.9 % — HIGH (ref 11.5–14.5)
SODIUM SERPL-SCNC: 141 MMOL/L — SIGNIFICANT CHANGE UP (ref 135–146)
WBC # BLD: 7.41 K/UL — SIGNIFICANT CHANGE UP (ref 4.8–10.8)
WBC # FLD AUTO: 7.41 K/UL — SIGNIFICANT CHANGE UP (ref 4.8–10.8)

## 2025-05-27 PROCEDURE — 99233 SBSQ HOSP IP/OBS HIGH 50: CPT | Mod: 25

## 2025-05-27 PROCEDURE — 74176 CT ABD & PELVIS W/O CONTRAST: CPT | Mod: 26

## 2025-05-27 RX ORDER — AMPICILLIN SODIUM AND SULBACTAM SODIUM 1; .5 G/1; G/1
INJECTION, POWDER, FOR SOLUTION INTRAMUSCULAR; INTRAVENOUS
Refills: 0 | Status: DISCONTINUED | OUTPATIENT
Start: 2025-05-27 | End: 2025-06-01

## 2025-05-27 RX ORDER — AMPICILLIN SODIUM AND SULBACTAM SODIUM 1; .5 G/1; G/1
3 INJECTION, POWDER, FOR SOLUTION INTRAMUSCULAR; INTRAVENOUS EVERY 6 HOURS
Refills: 0 | Status: DISCONTINUED | OUTPATIENT
Start: 2025-05-27 | End: 2025-06-01

## 2025-05-27 RX ORDER — SODIUM CHLORIDE 9 G/1000ML
1000 INJECTION, SOLUTION INTRAVENOUS
Refills: 0 | Status: DISCONTINUED | OUTPATIENT
Start: 2025-05-27 | End: 2025-06-02

## 2025-05-27 RX ORDER — ENOXAPARIN SODIUM 100 MG/ML
40 INJECTION SUBCUTANEOUS EVERY 12 HOURS
Refills: 0 | Status: COMPLETED | OUTPATIENT
Start: 2025-05-27 | End: 2025-05-28

## 2025-05-27 RX ORDER — MAGNESIUM SULFATE 500 MG/ML
1 SYRINGE (ML) INJECTION ONCE
Refills: 0 | Status: COMPLETED | OUTPATIENT
Start: 2025-05-27 | End: 2025-05-28

## 2025-05-27 RX ORDER — SODIUM CHLORIDE 9 G/1000ML
1000 INJECTION, SOLUTION INTRAVENOUS
Refills: 0 | Status: DISCONTINUED | OUTPATIENT
Start: 2025-05-27 | End: 2025-05-27

## 2025-05-27 RX ORDER — AMPICILLIN SODIUM AND SULBACTAM SODIUM 1; .5 G/1; G/1
3 INJECTION, POWDER, FOR SOLUTION INTRAMUSCULAR; INTRAVENOUS ONCE
Refills: 0 | Status: COMPLETED | OUTPATIENT
Start: 2025-05-27 | End: 2025-05-27

## 2025-05-27 RX ADMIN — TIOTROPIUM BROMIDE INHALATION SPRAY 2 PUFF(S): 3.12 SPRAY, METERED RESPIRATORY (INHALATION) at 14:57

## 2025-05-27 RX ADMIN — AMPICILLIN SODIUM AND SULBACTAM SODIUM 200 GRAM(S): 1; .5 INJECTION, POWDER, FOR SOLUTION INTRAMUSCULAR; INTRAVENOUS at 17:39

## 2025-05-27 RX ADMIN — AMPICILLIN SODIUM AND SULBACTAM SODIUM 200 GRAM(S): 1; .5 INJECTION, POWDER, FOR SOLUTION INTRAMUSCULAR; INTRAVENOUS at 23:55

## 2025-05-27 RX ADMIN — Medication 1 TABLET(S): at 12:13

## 2025-05-27 RX ADMIN — AMLODIPINE BESYLATE 10 MILLIGRAM(S): 10 TABLET ORAL at 05:24

## 2025-05-27 RX ADMIN — Medication 25 GRAM(S): at 01:07

## 2025-05-27 RX ADMIN — APIXABAN 5 MILLIGRAM(S): 2.5 TABLET, FILM COATED ORAL at 12:12

## 2025-05-27 RX ADMIN — LOSARTAN POTASSIUM 100 MILLIGRAM(S): 100 TABLET, FILM COATED ORAL at 05:24

## 2025-05-27 RX ADMIN — AMOXICILLIN AND CLAVULANATE POTASSIUM 1 TABLET(S): 500; 125 TABLET, FILM COATED ORAL at 05:40

## 2025-05-27 RX ADMIN — AMPICILLIN SODIUM AND SULBACTAM SODIUM 200 GRAM(S): 1; .5 INJECTION, POWDER, FOR SOLUTION INTRAMUSCULAR; INTRAVENOUS at 12:23

## 2025-05-27 RX ADMIN — CYANOCOBALAMIN 1000 MICROGRAM(S): 1000 INJECTION INTRAMUSCULAR; SUBCUTANEOUS at 12:12

## 2025-05-27 RX ADMIN — Medication 650 MILLIGRAM(S): at 05:23

## 2025-05-27 RX ADMIN — Medication 40 MILLIGRAM(S): at 05:41

## 2025-05-27 RX ADMIN — ENOXAPARIN SODIUM 40 MILLIGRAM(S): 100 INJECTION SUBCUTANEOUS at 21:59

## 2025-05-27 RX ADMIN — Medication 1 TABLET(S): at 12:12

## 2025-05-27 NOTE — OCCUPATIONAL THERAPY INITIAL EVALUATION ADULT - GENERAL OBSERVATIONS, REHAB EVAL
Pt encountered in bed, + IV locked, + tele,+ prima fit. Pt agreeable to bedside OT assessment may be seen as confirmed with RN. Pt returned to bed as found, + IV locked, + tele, + PT Shawna brooks

## 2025-05-27 NOTE — OCCUPATIONAL THERAPY INITIAL EVALUATION ADULT - TRANSFER TRAINING, PT EVAL
Pt will perform sit<>stand and bed<>chair transfers with Min A using most appropriate AD by discharge

## 2025-05-27 NOTE — OCCUPATIONAL THERAPY INITIAL EVALUATION ADULT - ADDITIONAL COMMENTS
Pt resides in a private house with family, was independent in all needs prior to abdominal surgery. Pt most recently admitted from LTAC where she required substantial assistance with all needs

## 2025-05-27 NOTE — CONSULT NOTE ADULT - SUBJECTIVE AND OBJECTIVE BOX
INTERVENTIONAL RADIOLOGY CONSULT:     Procedure Requested:     HPI:  64F with a PMHx of HTN, morbid obesity, LOUIS on CPAP, Abby en y gastric bypass, and a recent admission for an incarcerated ventral hernia w/ sbo s/p lap converted to open ventral hernia repair w/ mesh and small bowel resection and s/p ex lap, 30 cm sbr, creation of new side to side ileoileostomy, ventral hernia repair w/ mesh who presents back to the ER from her LTACH facility [facility] after persistent HTN, epistaxis in setting of eliquis use and complex surgical history. Patient states her epistaxis has stopped after self-tamponade for 25 minutes. No complaints regarding midline abdominal ex lap healing incision. She has been tolerating diet, passing regular bowel movements without nausea or vomiting. Other than epistaxis her other complaint is that she claims the LTACH has been neglecting her and that she is not receiving any physical therapy. (25 May 2025 20:50)      PAST MEDICAL & SURGICAL HISTORY:  Gastric bypass status for obesity      LOUIS (obstructive sleep apnea)      S/P gastric bypass      S/P       S/P small bowel resection      History of total bilateral knee replacement (TKR)      H/O colonoscopy            MEDICATIONS  (STANDING):  acetaminophen     Tablet .. 650 milliGRAM(s) Oral every 6 hours  amLODIPine   Tablet 10 milliGRAM(s) Oral daily  ampicillin/sulbactam  IVPB      ampicillin/sulbactam  IVPB 3 Gram(s) IV Intermittent once  ampicillin/sulbactam  IVPB 3 Gram(s) IV Intermittent every 6 hours  apixaban 5 milliGRAM(s) Oral every 12 hours  cyanocobalamin 1000 MICROGram(s) Oral daily  hydrochlorothiazide 25 milliGRAM(s) Oral daily  lactated ringers. 1000 milliLiter(s) (100 mL/Hr) IV Continuous <Continuous>  lactobacillus acidophilus 1 Tablet(s) Oral daily  losartan 100 milliGRAM(s) Oral daily  multivitamin/minerals 1 Tablet(s) Oral daily  pantoprazole    Tablet 40 milliGRAM(s) Oral before breakfast  tiotropium 2.5 MICROgram(s) Inhaler 2 Puff(s) Inhalation daily    MEDICATIONS  (PRN):  albuterol    90 MICROgram(s) HFA Inhaler 2 Puff(s) Inhalation every 6 hours PRN Shortness of Breath and/or Wheezing  ondansetron Injectable 4 milliGRAM(s) IV Push every 6 hours PRN Nausea      Allergies    No Known Allergies    Intolerances          FAMILY HISTORY:      Physical Exam:   Vital Signs Last 24 Hrs  T(C): 36.6 (27 May 2025 08:13), Max: 36.8 (26 May 2025 12:01)  T(F): 97.8 (27 May 2025 08:13), Max: 98.2 (26 May 2025 12:01)  HR: 95 (27 May 2025 08:13) (90 - 115)  BP: 133/86 (27 May 2025 08:13) (119/77 - 133/86)  BP(mean): --  RR: 18 (27 May 2025 08:13) (18 - 18)  SpO2: 96% (27 May 2025 08:13) (95% - 97%)    Parameters below as of 27 May 2025 04:25  Patient On (Oxygen Delivery Method): room air          Labs:                         8.5    7.75  )-----------( 248      ( 26 May 2025 20:24 )             27.0     05-26    142  |  106  |  18  ----------------------------<  115[H]  3.8   |  25  |  0.8    Ca    8.2[L]      26 May 2025 20:24  Phos  3.4     05-26  Mg     1.8     05-26      PT/INR - ( 25 May 2025 18:00 )   PT: 11.50 sec;   INR: 0.98 ratio         PTT - ( 25 May 2025 18:00 )  PTT:28.4 sec    Pertinent labs:                      8.5    7.75  )-----------( 248      ( 26 May 2025 20:24 )             27.0       05-26    142  |  106  |  18  ----------------------------<  115[H]  3.8   |  25  |  0.8    Ca    8.2[L]      26 May 2025 20:24  Phos  3.4     05-26  Mg     1.8     05-26        PT/INR - ( 25 May 2025 18:00 )   PT: 11.50 sec;   INR: 0.98 ratio         PTT - ( 25 May 2025 18:00 )  PTT:28.4 sec    Radiology & Additional Studies:     Radiology imaging reviewed.       ASSESSMENT AND PLAN:  -No acute IR interventions.   -No drainable collection on imaging.     Please call Interventional Radiology with questions or concerns:   - M-F [phone]: [phone]   - All other hours: [phone]

## 2025-05-27 NOTE — OCCUPATIONAL THERAPY INITIAL EVALUATION ADULT - PHYSICAL ASSIST/NONPHYSICAL ASSIST: CHAIR TO BED, REHAB EVAL
[facility] - Division of Pulmonary, Critical Care and Sleep Medicine   Please call [phone] between 8am-pm weekdays, [phone] after hours and weekends    Interval Events: No events overnight    REVIEW OF SYSTEMS:  CV: [ ] chest pain   Resp: [ ] cough [ ] shortness of breath   [ ] All other systems negative  [ ] Unable to assess ROS because ________    OBJECTIVE:  ICU Vital Signs Last 24 Hrs  T(C): 36.3 (02 Sep 2019 06:10), Max: 36.9 (01 Sep 2019 20:31)  T(F): 97.4 (02 Sep 2019 06:10), Max: 98.5 (01 Sep 2019 20:31)  HR: 63 (02 Sep 2019 06:10) (63 - 86)  BP: 127/61 (02 Sep 2019 06:10) (110/59 - 130/74)  BP(mean): --  ABP: --  ABP(mean): --  RR: 18 (02 Sep 2019 06:10) (18 - 18)  SpO2: 100% (02 Sep 2019 06:10) (96% - 100%)        09-01 @ 07:01  -  09-02 @ 07:00  --------------------------------------------------------  IN: 720 mL / OUT: 40 mL / NET: 680 mL      CAPILLARY BLOOD GLUCOSE      POCT Blood Glucose.: 206 mg/dL (02 Sep 2019 08:32)      PHYSICAL EXAM:  General: NAD  HEENT: NC/AT  Lymph Nodes: no cervical or supraclavicular lymphadenopathy  Neck: supple  Respiratory:  CTA b/l, no wheezes, crackles or rhonchi  Cardiovascular:  RRR, no m/r/g  Abdomen: soft, NT/ND, +BS  Extremities: no clubbing, cyanosis or edema, warm  Skin: no rash  Neurological: AAOx3, non focal exam  Psychiatry: not anxious appearing, normal affect and mood    HOSPITAL MEDICATIONS:  MEDICATIONS  (STANDING):  ALBUTerol/ipratropium for Nebulization 3 milliLiter(s) Nebulizer every 6 hours  apixaban 5 milliGRAM(s) Oral every 12 hours  atorvastatin 40 milliGRAM(s) Oral at bedtime  buDESOnide    Inhalation Suspension 0.5 milliGRAM(s) Inhalation daily  chlorhexidine 2% Cloths 1 Application(s) Topical daily  dextrose 5%. 1000 milliLiter(s) (50 mL/Hr) IV Continuous <Continuous>  dextrose 50% Injectable 12.5 Gram(s) IV Push once  dextrose 50% Injectable 25 Gram(s) IV Push once  dextrose 50% Injectable 25 Gram(s) IV Push once  digoxin     Tablet 0.25 milliGRAM(s) Oral daily  hydrocortisone 1% Cream 1 Application(s) Topical two times a day  insulin glargine Injectable (LANTUS) 10 Unit(s) SubCutaneous every morning  insulin lispro (HumaLOG) corrective regimen sliding scale   SubCutaneous three times a day before meals  insulin lispro (HumaLOG) corrective regimen sliding scale   SubCutaneous at bedtime  levoFLOXacin  Tablet 500 milliGRAM(s) Oral daily  methylPREDNISolone sodium succinate Injectable 20 milliGRAM(s) IV Push every 12 hours  montelukast 10 milliGRAM(s) Oral daily  neomycin/BACItracin/polymyxin Topical Ointment 1 Application(s) Topical daily  pantoprazole    Tablet 40 milliGRAM(s) Oral before breakfast  tiotropium 18 MICROgram(s) Capsule 1 Capsule(s) Inhalation daily    MEDICATIONS  (PRN):  acetaminophen   Tablet .. 650 milliGRAM(s) Oral every 6 hours PRN Mild Pain (1 - 3), Moderate Pain (4 - 6)  benzocaine 15 mG/menthol 3.6 mG Lozenge 1 Lozenge Oral five times a day PRN Mouth Sores  dextrose 40% Gel 15 Gram(s) Oral once PRN Blood Glucose LESS THAN 70 milliGRAM(s)/deciliter  docusate sodium 200 milliGRAM(s) Oral two times a day PRN Constipation  fluticasone propionate 50 MICROgram(s)/spray Nasal Spray 1 Spray(s) Both Nostrils once PRN nasal congestion  glucagon  Injectable 1 milliGRAM(s) IntraMuscular once PRN Glucose LESS THAN 70 milligrams/deciliter  polyethylene glycol 3350 17 Gram(s) Oral two times a day PRN Constipation  sodium chloride 0.65% Nasal 1 Spray(s) Both Nostrils two times a day PRN Nasal Congestion      LABS:                        12.2   12.5  )-----------( 285      ( 02 Sep 2019 06:55 )             38.0     Hgb Trend: 12.2<--, 12.0<--, 11.4<--, 11.3<--, 11.3<--  09-02    140  |  100  |  20  ----------------------------<  173<H>  4.1   |  28  |  0.68    Ca    9.3      02 Sep 2019 06:55  Phos  4.5     09-02  Mg     2.0     09-02      Creatinine Trend: 0.68<--, 0.64<--, 0.70<--, 0.70<--, 0.62<--, 0.68<--            MICROBIOLOGY:   Culture - Sputum . (08.31.19 @ 17:54)    Gram Stain:   Numerous polymorphonuclear leukocytes per low power field  Rare Squamous epithelial cells per low power field  Few Gram positive cocci in pairs seen per oil power field    Specimen Source: .Sputum    Culture Results:   Normal Respiratory Jessica present      RADIOLOGY:  [x ] Reviewed and interpreted by me [facility] - Division of Pulmonary, Critical Care and Sleep Medicine   Please call [phone] between 8am-pm weekdays, [phone] after hours and weekends    Interval Events: No events overnight- feeling much better today, ambulating with mild KRAMER, still with productive cough.     REVIEW OF SYSTEMS:  CV: [- ] chest pain   Resp: [+ ] cough [- ] shortness of breath   [x ] All other systems negative  [ ] Unable to assess ROS because ________    OBJECTIVE:  ICU Vital Signs Last 24 Hrs  T(C): 36.3 (02 Sep 2019 06:10), Max: 36.9 (01 Sep 2019 20:31)  T(F): 97.4 (02 Sep 2019 06:10), Max: 98.5 (01 Sep 2019 20:31)  HR: 63 (02 Sep 2019 06:10) (63 - 86)  BP: 127/61 (02 Sep 2019 06:10) (110/59 - 130/74)  BP(mean): --  ABP: --  ABP(mean): --  RR: 18 (02 Sep 2019 06:10) (18 - 18)  SpO2: 100% (02 Sep 2019 06:10) (96% - 100%)        09-01 @ 07:01  -  09-02 @ 07:00  --------------------------------------------------------  IN: 720 mL / OUT: 40 mL / NET: 680 mL      CAPILLARY BLOOD GLUCOSE      POCT Blood Glucose.: 206 mg/dL (02 Sep 2019 08:32)      PHYSICAL EXAM:  General: NAD  HEENT: NC/AT  Neck: supple  Respiratory:  CTA b/l, no wheezes, crackles or rhonchi  Cardiovascular:  RRR, no m/r/g  Abdomen: soft, NT/ND, +BS  Extremities: no clubbing, cyanosis or edema, warm  Skin: no rash  Neurological: AAOx3, non focal exam  Psychiatry: not anxious appearing, normal affect and mood    HOSPITAL MEDICATIONS:  MEDICATIONS  (STANDING):  ALBUTerol/ipratropium for Nebulization 3 milliLiter(s) Nebulizer every 6 hours  apixaban 5 milliGRAM(s) Oral every 12 hours  atorvastatin 40 milliGRAM(s) Oral at bedtime  buDESOnide    Inhalation Suspension 0.5 milliGRAM(s) Inhalation daily  chlorhexidine 2% Cloths 1 Application(s) Topical daily  dextrose 5%. 1000 milliLiter(s) (50 mL/Hr) IV Continuous <Continuous>  dextrose 50% Injectable 12.5 Gram(s) IV Push once  dextrose 50% Injectable 25 Gram(s) IV Push once  dextrose 50% Injectable 25 Gram(s) IV Push once  digoxin     Tablet 0.25 milliGRAM(s) Oral daily  hydrocortisone 1% Cream 1 Application(s) Topical two times a day  insulin glargine Injectable (LANTUS) 10 Unit(s) SubCutaneous every morning  insulin lispro (HumaLOG) corrective regimen sliding scale   SubCutaneous three times a day before meals  insulin lispro (HumaLOG) corrective regimen sliding scale   SubCutaneous at bedtime  levoFLOXacin  Tablet 500 milliGRAM(s) Oral daily  methylPREDNISolone sodium succinate Injectable 20 milliGRAM(s) IV Push every 12 hours  montelukast 10 milliGRAM(s) Oral daily  neomycin/BACItracin/polymyxin Topical Ointment 1 Application(s) Topical daily  pantoprazole    Tablet 40 milliGRAM(s) Oral before breakfast  tiotropium 18 MICROgram(s) Capsule 1 Capsule(s) Inhalation daily    MEDICATIONS  (PRN):  acetaminophen   Tablet .. 650 milliGRAM(s) Oral every 6 hours PRN Mild Pain (1 - 3), Moderate Pain (4 - 6)  benzocaine 15 mG/menthol 3.6 mG Lozenge 1 Lozenge Oral five times a day PRN Mouth Sores  dextrose 40% Gel 15 Gram(s) Oral once PRN Blood Glucose LESS THAN 70 milliGRAM(s)/deciliter  docusate sodium 200 milliGRAM(s) Oral two times a day PRN Constipation  fluticasone propionate 50 MICROgram(s)/spray Nasal Spray 1 Spray(s) Both Nostrils once PRN nasal congestion  glucagon  Injectable 1 milliGRAM(s) IntraMuscular once PRN Glucose LESS THAN 70 milligrams/deciliter  polyethylene glycol 3350 17 Gram(s) Oral two times a day PRN Constipation  sodium chloride 0.65% Nasal 1 Spray(s) Both Nostrils two times a day PRN Nasal Congestion      LABS:                        12.2   12.5  )-----------( 285      ( 02 Sep 2019 06:55 )             38.0     Hgb Trend: 12.2<--, 12.0<--, 11.4<--, 11.3<--, 11.3<--  09-02    140  |  100  |  20  ----------------------------<  173<H>  4.1   |  28  |  0.68    Ca    9.3      02 Sep 2019 06:55  Phos  4.5     09-02  Mg     2.0     09-02      Creatinine Trend: 0.68<--, 0.64<--, 0.70<--, 0.70<--, 0.62<--, 0.68<--            MICROBIOLOGY:   Culture - Sputum . (08.31.19 @ 17:54)    Gram Stain:   Numerous polymorphonuclear leukocytes per low power field  Rare Squamous epithelial cells per low power field  Few Gram positive cocci in pairs seen per oil power field    Specimen Source: .Sputum    Culture Results:   Normal Respiratory Jessica present      RADIOLOGY:  [x ] Reviewed and interpreted by me verbal cues/nonverbal cues (demo/gestures)/1 person assist

## 2025-05-27 NOTE — OCCUPATIONAL THERAPY INITIAL EVALUATION ADULT - REHAB POTENTIAL, OT EVAL
good, to achieve stated therapy goals

## 2025-05-27 NOTE — PROGRESS NOTE ADULT - ASSESSMENT
64F with a PMHx of HTN, morbid obesity, LOUIS on CPAP, Abby en y gastric bypass, and a recent admission for an incarcerated ventral hernia w/ sbo s/p lap converted to open ventral hernia repair w/ mesh and small bowel resection and s/p ex lap, 30 cm sbr, creation of new side to side ileoileostomy, ventral hernia repair w/ mesh who presents back to the ER from her LTACH facility [facility] after persistent HTN, epistaxis in setting of eliquis use and complex surgical history.     Plan:   - Eliquis  - ENT: No longer has epistaxis, no intervention. Saline sprays BID.  - PT: Rehab  - Physiatry: Possible 4A  - Monitor B and HR     [id number]

## 2025-05-28 LAB
ANION GAP SERPL CALC-SCNC: 12 MMOL/L — SIGNIFICANT CHANGE UP (ref 7–14)
BASOPHILS # BLD AUTO: 0.02 K/UL — SIGNIFICANT CHANGE UP (ref 0–0.2)
BASOPHILS NFR BLD AUTO: 0.3 % — SIGNIFICANT CHANGE UP (ref 0–1)
BUN SERPL-MCNC: 13 MG/DL — SIGNIFICANT CHANGE UP (ref 10–20)
CALCIUM SERPL-MCNC: 8.1 MG/DL — LOW (ref 8.4–10.5)
CHLORIDE SERPL-SCNC: 105 MMOL/L — SIGNIFICANT CHANGE UP (ref 98–110)
CO2 SERPL-SCNC: 24 MMOL/L — SIGNIFICANT CHANGE UP (ref 17–32)
CREAT SERPL-MCNC: 0.9 MG/DL — SIGNIFICANT CHANGE UP (ref 0.7–1.5)
CULTURE RESULTS: SIGNIFICANT CHANGE UP
CULTURE RESULTS: SIGNIFICANT CHANGE UP
EGFR: 71 ML/MIN/1.73M2 — SIGNIFICANT CHANGE UP
EGFR: 71 ML/MIN/1.73M2 — SIGNIFICANT CHANGE UP
EOSINOPHIL # BLD AUTO: 0.05 K/UL — SIGNIFICANT CHANGE UP (ref 0–0.7)
EOSINOPHIL NFR BLD AUTO: 0.8 % — SIGNIFICANT CHANGE UP (ref 0–8)
GLUCOSE SERPL-MCNC: 117 MG/DL — HIGH (ref 70–99)
HCT VFR BLD CALC: 26.4 % — LOW (ref 37–47)
HGB BLD-MCNC: 8.3 G/DL — LOW (ref 12–16)
IMM GRANULOCYTES NFR BLD AUTO: 1.7 % — HIGH (ref 0.1–0.3)
LYMPHOCYTES # BLD AUTO: 1.69 K/UL — SIGNIFICANT CHANGE UP (ref 1.2–3.4)
LYMPHOCYTES # BLD AUTO: 26.2 % — SIGNIFICANT CHANGE UP (ref 20.5–51.1)
MAGNESIUM SERPL-MCNC: 1.8 MG/DL — SIGNIFICANT CHANGE UP (ref 1.8–2.4)
MCHC RBC-ENTMCNC: 30.1 PG — SIGNIFICANT CHANGE UP (ref 27–31)
MCHC RBC-ENTMCNC: 31.4 G/DL — LOW (ref 32–37)
MCV RBC AUTO: 95.7 FL — SIGNIFICANT CHANGE UP (ref 81–99)
MONOCYTES # BLD AUTO: 0.38 K/UL — SIGNIFICANT CHANGE UP (ref 0.1–0.6)
MONOCYTES NFR BLD AUTO: 5.9 % — SIGNIFICANT CHANGE UP (ref 1.7–9.3)
NEUTROPHILS # BLD AUTO: 4.21 K/UL — SIGNIFICANT CHANGE UP (ref 1.4–6.5)
NEUTROPHILS NFR BLD AUTO: 65.1 % — SIGNIFICANT CHANGE UP (ref 42.2–75.2)
NRBC BLD AUTO-RTO: 0 /100 WBCS — SIGNIFICANT CHANGE UP (ref 0–0)
PHOSPHATE SERPL-MCNC: 3.1 MG/DL — SIGNIFICANT CHANGE UP (ref 2.1–4.9)
PLATELET # BLD AUTO: 257 K/UL — SIGNIFICANT CHANGE UP (ref 130–400)
PMV BLD: 9.7 FL — SIGNIFICANT CHANGE UP (ref 7.4–10.4)
POTASSIUM SERPL-MCNC: 3.7 MMOL/L — SIGNIFICANT CHANGE UP (ref 3.5–5)
POTASSIUM SERPL-SCNC: 3.7 MMOL/L — SIGNIFICANT CHANGE UP (ref 3.5–5)
RBC # BLD: 2.76 M/UL — LOW (ref 4.2–5.4)
RBC # FLD: 16.9 % — HIGH (ref 11.5–14.5)
SODIUM SERPL-SCNC: 141 MMOL/L — SIGNIFICANT CHANGE UP (ref 135–146)
SPECIMEN SOURCE: SIGNIFICANT CHANGE UP
SPECIMEN SOURCE: SIGNIFICANT CHANGE UP
WBC # BLD: 6.46 K/UL — SIGNIFICANT CHANGE UP (ref 4.8–10.8)
WBC # FLD AUTO: 6.46 K/UL — SIGNIFICANT CHANGE UP (ref 4.8–10.8)
ZINC SERPL-MCNC: 86 UG/DL — SIGNIFICANT CHANGE UP (ref 44–115)

## 2025-05-28 PROCEDURE — 99233 SBSQ HOSP IP/OBS HIGH 50: CPT | Mod: 25

## 2025-05-28 RX ORDER — APIXABAN 2.5 MG/1
5 TABLET, FILM COATED ORAL EVERY 12 HOURS
Refills: 0 | Status: DISCONTINUED | OUTPATIENT
Start: 2025-05-28 | End: 2025-06-02

## 2025-05-28 RX ADMIN — CYANOCOBALAMIN 1000 MICROGRAM(S): 1000 INJECTION INTRAMUSCULAR; SUBCUTANEOUS at 11:41

## 2025-05-28 RX ADMIN — TIOTROPIUM BROMIDE INHALATION SPRAY 2 PUFF(S): 3.12 SPRAY, METERED RESPIRATORY (INHALATION) at 17:12

## 2025-05-28 RX ADMIN — LOSARTAN POTASSIUM 100 MILLIGRAM(S): 100 TABLET, FILM COATED ORAL at 05:56

## 2025-05-28 RX ADMIN — Medication 100 GRAM(S): at 00:39

## 2025-05-28 RX ADMIN — Medication 1 TABLET(S): at 11:41

## 2025-05-28 RX ADMIN — AMLODIPINE BESYLATE 10 MILLIGRAM(S): 10 TABLET ORAL at 05:56

## 2025-05-28 RX ADMIN — Medication 20 MILLIEQUIVALENT(S): at 05:56

## 2025-05-28 RX ADMIN — AMPICILLIN SODIUM AND SULBACTAM SODIUM 200 GRAM(S): 1; .5 INJECTION, POWDER, FOR SOLUTION INTRAMUSCULAR; INTRAVENOUS at 05:55

## 2025-05-28 RX ADMIN — Medication 20 MILLIEQUIVALENT(S): at 23:32

## 2025-05-28 RX ADMIN — SODIUM CHLORIDE 100 MILLILITER(S): 9 INJECTION, SOLUTION INTRAVENOUS at 00:43

## 2025-05-28 RX ADMIN — Medication 1 APPLICATION(S): at 05:58

## 2025-05-28 RX ADMIN — AMPICILLIN SODIUM AND SULBACTAM SODIUM 200 GRAM(S): 1; .5 INJECTION, POWDER, FOR SOLUTION INTRAMUSCULAR; INTRAVENOUS at 11:40

## 2025-05-28 RX ADMIN — Medication 1 TABLET(S): at 13:45

## 2025-05-28 RX ADMIN — Medication 40 MILLIGRAM(S): at 05:56

## 2025-05-28 RX ADMIN — APIXABAN 5 MILLIGRAM(S): 2.5 TABLET, FILM COATED ORAL at 17:42

## 2025-05-28 RX ADMIN — ENOXAPARIN SODIUM 40 MILLIGRAM(S): 100 INJECTION SUBCUTANEOUS at 11:50

## 2025-05-28 RX ADMIN — AMPICILLIN SODIUM AND SULBACTAM SODIUM 200 GRAM(S): 1; .5 INJECTION, POWDER, FOR SOLUTION INTRAMUSCULAR; INTRAVENOUS at 17:42

## 2025-05-28 NOTE — PRE PROCEDURE NOTE - PRE PROCEDURE EVALUATION
Vascular & Interventional Radiology Pre-Procedure Note    Procedure Name: ultrasound guided lower abdominal collection drainage    HPI: HPI:  64F with a PMHx of HTN, morbid obesity, LOUIS on CPAP, Abby en y gastric bypass, and a recent admission for an incarcerated ventral hernia w/ sbo s/p lap converted to open ventral hernia repair w/ mesh and small bowel resection and s/p ex lap, 30 cm sbr, creation of new side to side ileoileostomy, ventral hernia repair w/ mesh who presents back to IR for an ultrasound guided lower abdominal collection drainage    Allergies: No Known Allergies    Medications (Abx/Cardiac/Anticoagulation/Blood Products)  amLODIPine   Tablet: 10 milliGRAM(s) Oral (05-28 @ 05:56)  amoxicillin  875 milliGRAM(s)/clavulanate: 1 Tablet(s) Oral (05-27 @ 05:40)  ampicillin/sulbactam  IVPB: 200 mL/Hr IV Intermittent (05-27 @ 12:23)  ampicillin/sulbactam  IVPB: 200 mL/Hr IV Intermittent (05-28 @ 11:40)  apixaban: 5 milliGRAM(s) Oral (05-27 @ 12:12)  enoxaparin Injectable: 40 milliGRAM(s) SubCutaneous (05-28 @ 11:50)  hydrochlorothiazide: 25 milliGRAM(s) Oral (05-28 @ 05:56)  losartan: 100 milliGRAM(s) Oral (05-28 @ 05:56)    Data:    T(C): 36.6  HR: 94  BP: 142/81  RR: 19  SpO2: 98%    Exam  General: NAD, AAO x3, no distress  Chest: breathing comfortably on room air, CTAB  Abdomen: soft, non-tender, non- distended   Extremities: positive pulses bilaterally x4      Labs:   -WBC 7.41 / HgB 8.2 / Hct 25.9 / Plt 247  -Na 141 / Cl 105 / BUN 14 / Glucose 109  -K 3.8 / CO2 21 / Cr 0.9  -ALT -- / Alk Phos -- / T.Bili --  -INR0.98      Consentable: [x ] Yes   [ ] No     Plan:   -65y Female presents for ultrasound guided lower abdominal collection drainage  -Risks/Benefits/alternatives explained with the patient and/or healthcare proxy and witnessed informed consent obtained.

## 2025-05-28 NOTE — PROGRESS NOTE ADULT - ASSESSMENT
A/P 64F with a PMHx of HTN, morbid obesity, LOUIS on CPAP, Abby en y gastric bypass, and a recent admission for an incarcerated ventral hernia w/ sbo s/p lap converted to open ventral hernia repair w/ mesh and small bowel resection and s/p ex lap, 30 cm sbr, creation of new side to side ileoileostomy, ventral hernia repair w/ mesh who presents back to the ER from her LTACH facility [facility] after persistent HTN, epistaxis in setting of eliquis use and complex surgical history.     keep NPO/ IVF  IR for drainage today  hold eliquis  continue dressing change  continue current care  continue monitoring  A/P 64F with a PMHx of HTN, morbid obesity, LOUIS on CPAP, Abby en y gastric bypass, and a recent admission for an incarcerated ventral hernia w/ sbo s/p lap converted to open ventral hernia repair w/ mesh and small bowel resection and s/p ex lap, 30 cm sbr, creation of new side to side ileoileostomy, ventral hernia repair w/ mesh who presents back to the ER from her LTACH facility [facility] after persistent HTN, epistaxis in setting of eliquis use and complex surgical history.     keep NPO/ IVF  IR for drainage today  hold eliquis  continue dressing change  continue current care  Pt/rehab  f/u SW   continue monitoring

## 2025-05-29 LAB
ANION GAP SERPL CALC-SCNC: 16 MMOL/L — HIGH (ref 7–14)
BASOPHILS # BLD AUTO: 0.02 K/UL — SIGNIFICANT CHANGE UP (ref 0–0.2)
BASOPHILS NFR BLD AUTO: 0.3 % — SIGNIFICANT CHANGE UP (ref 0–1)
BUN SERPL-MCNC: 11 MG/DL — SIGNIFICANT CHANGE UP (ref 10–20)
CALCIUM SERPL-MCNC: 8 MG/DL — LOW (ref 8.4–10.5)
CHLORIDE SERPL-SCNC: 106 MMOL/L — SIGNIFICANT CHANGE UP (ref 98–110)
CO2 SERPL-SCNC: 20 MMOL/L — SIGNIFICANT CHANGE UP (ref 17–32)
CREAT SERPL-MCNC: 0.9 MG/DL — SIGNIFICANT CHANGE UP (ref 0.7–1.5)
EGFR: 71 ML/MIN/1.73M2 — SIGNIFICANT CHANGE UP
EGFR: 71 ML/MIN/1.73M2 — SIGNIFICANT CHANGE UP
EOSINOPHIL # BLD AUTO: 0.06 K/UL — SIGNIFICANT CHANGE UP (ref 0–0.7)
EOSINOPHIL NFR BLD AUTO: 0.9 % — SIGNIFICANT CHANGE UP (ref 0–8)
GLUCOSE SERPL-MCNC: 100 MG/DL — HIGH (ref 70–99)
GRAM STN FLD: SIGNIFICANT CHANGE UP
HCT VFR BLD CALC: 26.7 % — LOW (ref 37–47)
HGB BLD-MCNC: 8.2 G/DL — LOW (ref 12–16)
IMM GRANULOCYTES NFR BLD AUTO: 1.4 % — HIGH (ref 0.1–0.3)
LYMPHOCYTES # BLD AUTO: 2 K/UL — SIGNIFICANT CHANGE UP (ref 1.2–3.4)
LYMPHOCYTES # BLD AUTO: 28.4 % — SIGNIFICANT CHANGE UP (ref 20.5–51.1)
MAGNESIUM SERPL-MCNC: 1.9 MG/DL — SIGNIFICANT CHANGE UP (ref 1.8–2.4)
MCHC RBC-ENTMCNC: 29.6 PG — SIGNIFICANT CHANGE UP (ref 27–31)
MCHC RBC-ENTMCNC: 30.7 G/DL — LOW (ref 32–37)
MCV RBC AUTO: 96.4 FL — SIGNIFICANT CHANGE UP (ref 81–99)
MONOCYTES # BLD AUTO: 0.53 K/UL — SIGNIFICANT CHANGE UP (ref 0.1–0.6)
MONOCYTES NFR BLD AUTO: 7.5 % — SIGNIFICANT CHANGE UP (ref 1.7–9.3)
NEUTROPHILS # BLD AUTO: 4.34 K/UL — SIGNIFICANT CHANGE UP (ref 1.4–6.5)
NEUTROPHILS NFR BLD AUTO: 61.5 % — SIGNIFICANT CHANGE UP (ref 42.2–75.2)
NRBC BLD AUTO-RTO: 0 /100 WBCS — SIGNIFICANT CHANGE UP (ref 0–0)
PHOSPHATE SERPL-MCNC: 3.1 MG/DL — SIGNIFICANT CHANGE UP (ref 2.1–4.9)
PLATELET # BLD AUTO: 250 K/UL — SIGNIFICANT CHANGE UP (ref 130–400)
PMV BLD: 9.7 FL — SIGNIFICANT CHANGE UP (ref 7.4–10.4)
POTASSIUM SERPL-MCNC: 3.7 MMOL/L — SIGNIFICANT CHANGE UP (ref 3.5–5)
POTASSIUM SERPL-SCNC: 3.7 MMOL/L — SIGNIFICANT CHANGE UP (ref 3.5–5)
RBC # BLD: 2.77 M/UL — LOW (ref 4.2–5.4)
RBC # FLD: 16.5 % — HIGH (ref 11.5–14.5)
SODIUM SERPL-SCNC: 142 MMOL/L — SIGNIFICANT CHANGE UP (ref 135–146)
SPECIMEN SOURCE: SIGNIFICANT CHANGE UP
WBC # BLD: 7.05 K/UL — SIGNIFICANT CHANGE UP (ref 4.8–10.8)
WBC # FLD AUTO: 7.05 K/UL — SIGNIFICANT CHANGE UP (ref 4.8–10.8)

## 2025-05-29 PROCEDURE — 99233 SBSQ HOSP IP/OBS HIGH 50: CPT | Mod: 25

## 2025-05-29 PROCEDURE — 99233 SBSQ HOSP IP/OBS HIGH 50: CPT

## 2025-05-29 RX ORDER — MAGNESIUM SULFATE 500 MG/ML
2 SYRINGE (ML) INJECTION ONCE
Refills: 0 | Status: COMPLETED | OUTPATIENT
Start: 2025-05-29 | End: 2025-05-29

## 2025-05-29 RX ORDER — MAGNESIUM SULFATE 500 MG/ML
1 SYRINGE (ML) INJECTION ONCE
Refills: 0 | Status: COMPLETED | OUTPATIENT
Start: 2025-05-29 | End: 2025-05-29

## 2025-05-29 RX ORDER — FUROSEMIDE 10 MG/ML
20 INJECTION INTRAMUSCULAR; INTRAVENOUS ONCE
Refills: 0 | Status: COMPLETED | OUTPATIENT
Start: 2025-05-29 | End: 2025-05-29

## 2025-05-29 RX ORDER — ERGOCALCIFEROL 1.25 MG/1
50000 CAPSULE ORAL
Refills: 0 | Status: DISCONTINUED | OUTPATIENT
Start: 2025-05-29 | End: 2025-06-02

## 2025-05-29 RX ADMIN — Medication 40 MILLIGRAM(S): at 05:38

## 2025-05-29 RX ADMIN — AMPICILLIN SODIUM AND SULBACTAM SODIUM 200 GRAM(S): 1; .5 INJECTION, POWDER, FOR SOLUTION INTRAMUSCULAR; INTRAVENOUS at 17:59

## 2025-05-29 RX ADMIN — Medication 12.5 GRAM(S): at 01:20

## 2025-05-29 RX ADMIN — AMPICILLIN SODIUM AND SULBACTAM SODIUM 200 GRAM(S): 1; .5 INJECTION, POWDER, FOR SOLUTION INTRAMUSCULAR; INTRAVENOUS at 12:54

## 2025-05-29 RX ADMIN — Medication 1 APPLICATION(S): at 05:48

## 2025-05-29 RX ADMIN — Medication 100 GRAM(S): at 23:33

## 2025-05-29 RX ADMIN — Medication 1 TABLET(S): at 12:53

## 2025-05-29 RX ADMIN — Medication 20 MILLIEQUIVALENT(S): at 05:37

## 2025-05-29 RX ADMIN — Medication 20 MILLIEQUIVALENT(S): at 23:32

## 2025-05-29 RX ADMIN — APIXABAN 5 MILLIGRAM(S): 2.5 TABLET, FILM COATED ORAL at 17:59

## 2025-05-29 RX ADMIN — AMPICILLIN SODIUM AND SULBACTAM SODIUM 200 GRAM(S): 1; .5 INJECTION, POWDER, FOR SOLUTION INTRAMUSCULAR; INTRAVENOUS at 05:37

## 2025-05-29 RX ADMIN — AMLODIPINE BESYLATE 10 MILLIGRAM(S): 10 TABLET ORAL at 05:37

## 2025-05-29 RX ADMIN — APIXABAN 5 MILLIGRAM(S): 2.5 TABLET, FILM COATED ORAL at 05:37

## 2025-05-29 RX ADMIN — AMPICILLIN SODIUM AND SULBACTAM SODIUM 200 GRAM(S): 1; .5 INJECTION, POWDER, FOR SOLUTION INTRAMUSCULAR; INTRAVENOUS at 23:32

## 2025-05-29 RX ADMIN — AMPICILLIN SODIUM AND SULBACTAM SODIUM 200 GRAM(S): 1; .5 INJECTION, POWDER, FOR SOLUTION INTRAMUSCULAR; INTRAVENOUS at 00:02

## 2025-05-29 RX ADMIN — CYANOCOBALAMIN 1000 MICROGRAM(S): 1000 INJECTION INTRAMUSCULAR; SUBCUTANEOUS at 12:53

## 2025-05-29 RX ADMIN — FUROSEMIDE 20 MILLIGRAM(S): 10 INJECTION INTRAMUSCULAR; INTRAVENOUS at 09:23

## 2025-05-29 RX ADMIN — TIOTROPIUM BROMIDE INHALATION SPRAY 2 PUFF(S): 3.12 SPRAY, METERED RESPIRATORY (INHALATION) at 16:54

## 2025-05-29 RX ADMIN — LOSARTAN POTASSIUM 100 MILLIGRAM(S): 100 TABLET, FILM COATED ORAL at 05:37

## 2025-05-29 NOTE — CHART NOTE - NSCHARTNOTEFT_GEN_A_CORE
GI NUTRITION SUPPORT TEAM  -  CONSULT NOTE     64F with a PMHx of HTN, morbid obesity, LOUIS on CPAP, Abby en y gastric bypass, and a recent admission for an incarcerated ventral hernia w/ sbo s/p lap converted to open ventral hernia repair w/ mesh and small bowel resection and s/p ex lap, 30 cm sbr, creation of new side to side ileoileostomy, ventral hernia repair w/ mesh who presents back to the ER from her LTACH facility [facility] after persistent HTN, epistaxis in setting of eliquis use and complex surgical history. Patient states her epistaxis has stopped after self-tamponade for 25 minutes. No complaints regarding midline abdominal ex lap healing incision. She has been tolerating diet, passing regular bowel movements without nausea or vomiting. Other than epistaxis her other complaint is that she claims the LTACH has been neglecting her and that she is not receiving any physical therapy. (25 May 2025 20:50)    GI NUTRITION SUPPORT NOTE:    Pt well known from previous recent admission. Required course of PN via PICC - 2/2 anastomotic leak. Has been tolerating PO diet well and attempting to prioritize protein intake to aid in wound healing. Not accepting high protein nutritional supplements. Surgery initially concerned with malabsorption with common channel now 100cm.  Vitamin D deficiency present on previous admission, tx with therapeutic dosage and repeat level prior to d/c remained low.     REVIEW OF SYSTEMS:  Negative except as noted above.     PAST MEDICAL/SURGICAL HISTORY:   Gastric bypass status for obesity  LOUIS (obstructive sleep apnea)  S/P gastric bypass  S/P   S/P small bowel resection  History of total bilateral knee replacement (TKR)  H/O colonoscopy      ALLERGIES:  No Known Allergies    VITALS:  T(F): 98.6 (- @ 08:05), Max: 98.6 ( @ 08:05)  HR: 81 ( @ 08:05) (81 - 87)  BP: 116/78 ( @ 08:05) (116/78 - 127/79)  RR: 18 ( @ 08:05) (18 - 18)  SpO2: 97% ( @ 08:05) (97% - 97%)    HEIGHT/WEIGHT/BMI:   Height (cm): 160 (05-25), 160 (05-15)  Weight (kg): 135 (05-25), 137.8 (-15)  BMI (kg/m2): 52.7 (-), 53.8 (-), 53.8 (05-15)    PHYSICAL EXAM:   GENERAL: A&O x 3, NAD    HEENT: moist mucous membranes, no oral lesions  ABDOMEN: soft, nontender, nondistended, +obese, midline dressing  EXTREMITIES:   SKIN: warm and dry  IV ACCESS: peripheral  ENTERAL ACCESS: none    I/Os:     25 @ 07:01  -  25 @ 07:00  --------------------------------------------------------  IN:  Total IN: 0 mL    OUT:    Voided (mL): 2800 mL  Total OUT: 2800 mL    Total NET: -2800 mL      STANDING MEDICATIONS:   acetaminophen     Tablet .. 650 milliGRAM(s) Oral every 6 hours  albuterol    90 MICROgram(s) HFA Inhaler 2 Puff(s) Inhalation every 6 hours PRN  amLODIPine   Tablet 10 milliGRAM(s) Oral daily  ampicillin/sulbactam  IVPB      ampicillin/sulbactam  IVPB 3 Gram(s) IV Intermittent every 6 hours  apixaban 5 milliGRAM(s) Oral every 12 hours  chlorhexidine 2% Cloths 1 Application(s) Topical <User Schedule>  cyanocobalamin 1000 MICROGram(s) Oral daily  hydrochlorothiazide 25 milliGRAM(s) Oral daily  lactated ringers. 1000 milliLiter(s) IV Continuous <Continuous>  lactobacillus acidophilus 1 Tablet(s) Oral daily  losartan 100 milliGRAM(s) Oral daily  multivitamin/minerals 1 Tablet(s) Oral daily  ondansetron Injectable 4 milliGRAM(s) IV Push every 6 hours PRN  pantoprazole    Tablet 40 milliGRAM(s) Oral before breakfast  tiotropium 2.5 MICROgram(s) Inhaler 2 Puff(s) Inhalation daily      LABS:                         8.3    6.46  )-----------( 257      ( 28 May 2025 19:55 )             26.4     141  |  105  |  13  ----------------------------<  117[H]          (25 @ 19:55)  3.7   |  24  |  0.9    Ca    8.1[L]          (25 @ 19:55)  Phos  3.1         (25 @ 19:55)  Mg     1.8         (25 @ 19:55)    Vitamin D, 25-Hydroxy: 15 ng/mL ( @ 20:00)    Folate: 17.3 ng/mL ( @ 05:08)    Vitamin B12, Serum: 1417 pg/mL ( 05:08)    Zinc Level, Plasma: 86 ug/dL ( @ 20:00)     A1c: 5.8 % (25 @ 05:08)      DIET:   Diet, DASH/TLC:   Sodium & Cholesterol Restricted (25 @ 11:11) [Active]      RADIOLOGY:   < from: CT Abdomen and Pelvis No Cont (25 @ 09:42) >    ACC: [id number] EXAM:  CT ABDOMEN AND PELVIS   ORDERED BY: RAYMOND HSU   PROCEDURE DATE:  2025      INTERPRETATION:  CLINICAL INFORMATION: Rule out collection. Patient has   midline incision with drainage.    COMPARISON: CT angiogram of the abdomen and pelvis on 2025.    CONTRAST/COMPLICATIONS:  IV Contrast: NONE  Oral Contrast: NONE  .    PROCEDURE:  CT of the Abdomen and Pelvis was performed.  Sagittal and coronal reformats were performed.    FINDINGS: Limited evaluation without IV contrast.  LOWER CHEST: Atelectatic changes seen at the lung bases similar to prior.    LIVER: Within normal limits.  BILE DUCTS: Normal caliber.  GALLBLADDER: Possible gallstones versus sludge.  SPLEEN: Within normal limits.  PANCREAS: Within normal limits.  ADRENALS: Nodular bilateral adrenal glands are stable.  KIDNEYS/URETERS: Within normal limits.    BLADDER: Within normal limits.  REPRODUCTIVE ORGANS: Unremarkable at CT.    BOWEL: Status post gastric surgery changes with possible Abby-en-Y noted.   No bowel obstruction.  PERITONEUM/RETROPERITONEUM: No free fluid. No free air.  VESSELS: Atherosclerotic changes.  LYMPH NODES: No lymphadenopathy.  ABDOMINAL WALL: Extensive ventral wall postsurgical changes noted with   gas and fluid containing midline collection along the lower abdomen,   measuring approximately 4.8 x 4.5 x 10.2 cm. Fat-containing midline   supraumbilical ventral hernia.  BONES: Degenerative changes.    IMPRESSION:  Extensive ventral wall postsurgical changes noted with gas and fluid   containing midline collection along the lower abdomen, measuring   approximately 4.8 x 4.5 x 10.2 cm.    --- End of Report ---  < end of copied text >      ASSESSMENT  64F with a PMHx of HTN, morbid obesity, LOUIS on CPAP, Abby en y gastric bypass, and a recent admission for an incarcerated ventral hernia w/ sbo s/p lap converted to open ventral hernia repair w/ mesh and small bowel resection and s/p ex lap, 30 cm sbr, creation of new side to side ileoileostomy, ventral hernia repair w/ mesh who presents back to the ER from her LTACH facility [facility] after persistent HTN, epistaxis in setting of eliquis use and complex surgical history.    - abdominal wall collection on CT, not amenable to IR drainage  - hx RYGB with recurrent SBO's s/p repair 4/10/25 then anastomotic leak s/p repair  with 30cm SBR, RTOR  s/p re-exploration and evacuation of old clots from retroperitoneum and pelvis, midline prevena vac placed; hx abdominal wall fluid collection s/p IR placed pigtail drain   - class III obesity, BMI 52.7  - vitamin D deficiency   - concern for malabsorption (100cm common channel)        PLAN  - PO diet as tolerated, prioritize protein intake  - Nam PO twice daily would be beneficial but dislikes all supplements  - restart ergocalciferol 50,000 IU twice per week and repeat 25-oh vitamin D level on   - give multivitamin with minerals PO daily    - cont vitamin B12 supplementation (was on at home prior to previous admission)  - will follow GI NUTRITION SUPPORT TEAM  -  CONSULT NOTE     64F with a PMHx of HTN, morbid obesity, LOUIS on CPAP, Abby en y gastric bypass, and a recent admission for an incarcerated ventral hernia w/ sbo s/p lap converted to open ventral hernia repair w/ mesh and small bowel resection and s/p ex lap, 30 cm sbr, creation of new side to side ileoileostomy, ventral hernia repair w/ mesh who presents back to the ER from her LTACH facility [facility] after persistent HTN, epistaxis in setting of eliquis use and complex surgical history. Patient states her epistaxis has stopped after self-tamponade for 25 minutes. No complaints regarding midline abdominal ex lap healing incision. She has been tolerating diet, passing regular bowel movements without nausea or vomiting. Other than epistaxis her other complaint is that she claims the LTACH has been neglecting her and that she is not receiving any physical therapy. (25 May 2025 20:50)    GI NUTRITION SUPPORT NOTE:    Pt well known from previous recent admission. Required course of PN via PICC - 2/2 anastomotic leak. Has been tolerating PO diet well and attempting to prioritize protein intake to aid in wound healing. Not accepting high protein nutritional supplements but able to eat adequately at meals. No nausea or vomiting. Having BM's. Surgery initially concerned with malabsorption (common channel now 100cm).  Vitamin D deficiency present on previous admission, tx with therapeutic dosage and repeat level prior to d/c remained low.       REVIEW OF SYSTEMS:  Negative except as noted above.     PAST MEDICAL/SURGICAL HISTORY:   Gastric bypass status for obesity  LOUIS (obstructive sleep apnea)  S/P gastric bypass  S/P   S/P small bowel resection  History of total bilateral knee replacement (TKR)  H/O colonoscopy      ALLERGIES:  No Known Allergies    VITALS:  T(F): 98.6 ( @ 08:05), Max: 98.6 (- @ 08:05)  HR: 81 (- @ 08:05) (81 - 87)  BP: 116/78 (- @ 08:05) (116/78 - 127/79)  RR: 18 ( @ 08:05) (18 - 18)  SpO2: 97% ( @ 08:05) (97% - 97%)    HEIGHT/WEIGHT/BMI:   Height (cm): 160 (05-25), 160 (05-15)  Weight (kg): 135 (), 137.8 (-15)  BMI (kg/m2): 52.7 (), 53.8 (), 53.8 (05-15)    PHYSICAL EXAM:   GENERAL: A&O x 3, NAD    HEENT: moist mucous membranes, no oral lesions, good dentition  ABDOMEN: soft, nontender, nondistended, +obese, midline dressing  EXTREMITIES: well perfused, +1 bilat pedal edema  SKIN: warm and dry, no rash or lesions  IV ACCESS: peripheral  ENTERAL ACCESS: none    I/Os:     25 @ 07:01  -  25 @ 07:00  --------------------------------------------------------  IN:  Total IN: 0 mL    OUT:    Voided (mL): 2800 mL  Total OUT: 2800 mL    Total NET: -2800 mL      STANDING MEDICATIONS:   acetaminophen     Tablet .. 650 milliGRAM(s) Oral every 6 hours  albuterol    90 MICROgram(s) HFA Inhaler 2 Puff(s) Inhalation every 6 hours PRN  amLODIPine   Tablet 10 milliGRAM(s) Oral daily  ampicillin/sulbactam  IVPB      ampicillin/sulbactam  IVPB 3 Gram(s) IV Intermittent every 6 hours  apixaban 5 milliGRAM(s) Oral every 12 hours  chlorhexidine 2% Cloths 1 Application(s) Topical <User Schedule>  cyanocobalamin 1000 MICROGram(s) Oral daily  hydrochlorothiazide 25 milliGRAM(s) Oral daily  lactated ringers. 1000 milliLiter(s) IV Continuous <Continuous>  lactobacillus acidophilus 1 Tablet(s) Oral daily  losartan 100 milliGRAM(s) Oral daily  multivitamin/minerals 1 Tablet(s) Oral daily  ondansetron Injectable 4 milliGRAM(s) IV Push every 6 hours PRN  pantoprazole    Tablet 40 milliGRAM(s) Oral before breakfast  tiotropium 2.5 MICROgram(s) Inhaler 2 Puff(s) Inhalation daily      LABS:                         8.3    6.46  )-----------( 257      ( 28 May 2025 19:55 )             26.4     141  |  105  |  13  ----------------------------<  117[H]          (25 @ 19:55)  3.7   |  24  |  0.9    Ca    8.1[L]          (25 @ 19:55)  Phos  3.1         (25 @ 19:55)  Mg     1.8         (25 @ 19:55)    Vitamin D, 25-Hydroxy: 15 ng/mL ( @ 20:00)    Folate: 17.3 ng/mL ( 05:08)    Vitamin B12, Serum: 1417 pg/mL ( 05:08)    Zinc Level, Plasma: 86 ug/dL ( @ 20:00)     A1c: 5.8 % (25 @ 05:08)      DIET:   Diet, DASH/TLC:   Sodium & Cholesterol Restricted (25 @ 11:11) [Active]      RADIOLOGY:   < from: CT Abdomen and Pelvis No Cont (25 @ 09:42) >    ACC: [id number] EXAM:  CT ABDOMEN AND PELVIS   ORDERED BY: RAYMOND HSU   PROCEDURE DATE:  2025      INTERPRETATION:  CLINICAL INFORMATION: Rule out collection. Patient has   midline incision with drainage.    COMPARISON: CT angiogram of the abdomen and pelvis on 2025.    CONTRAST/COMPLICATIONS:  IV Contrast: NONE  Oral Contrast: NONE  .    PROCEDURE:  CT of the Abdomen and Pelvis was performed.  Sagittal and coronal reformats were performed.    FINDINGS: Limited evaluation without IV contrast.  LOWER CHEST: Atelectatic changes seen at the lung bases similar to prior.    LIVER: Within normal limits.  BILE DUCTS: Normal caliber.  GALLBLADDER: Possible gallstones versus sludge.  SPLEEN: Within normal limits.  PANCREAS: Within normal limits.  ADRENALS: Nodular bilateral adrenal glands are stable.  KIDNEYS/URETERS: Within normal limits.    BLADDER: Within normal limits.  REPRODUCTIVE ORGANS: Unremarkable at CT.    BOWEL: Status post gastric surgery changes with possible Abby-en-Y noted.   No bowel obstruction.  PERITONEUM/RETROPERITONEUM: No free fluid. No free air.  VESSELS: Atherosclerotic changes.  LYMPH NODES: No lymphadenopathy.  ABDOMINAL WALL: Extensive ventral wall postsurgical changes noted with   gas and fluid containing midline collection along the lower abdomen,   measuring approximately 4.8 x 4.5 x 10.2 cm. Fat-containing midline   supraumbilical ventral hernia.  BONES: Degenerative changes.    IMPRESSION:  Extensive ventral wall postsurgical changes noted with gas and fluid   containing midline collection along the lower abdomen, measuring   approximately 4.8 x 4.5 x 10.2 cm.    --- End of Report ---  < end of copied text >      ASSESSMENT  64F with a PMHx of HTN, morbid obesity, LOUIS on CPAP, Abby en y gastric bypass, and a recent admission for an incarcerated ventral hernia w/ sbo s/p lap converted to open ventral hernia repair w/ mesh and small bowel resection and s/p ex lap, 30 cm sbr, creation of new side to side ileoileostomy, ventral hernia repair w/ mesh who presents back to the ER from her LTACH facility [facility] after persistent HTN, epistaxis in setting of eliquis use and complex surgical history.    - abdominal wall collection on CT, not amenable to IR drainage  - hx RYGB with recurrent SBO's s/p repair 4/10/25 then anastomotic leak s/p repair  with 30cm SBR, RTOR  s/p re-exploration and evacuation of old clots from retroperitoneum and pelvis, midline prevena vac placed; hx abdominal wall fluid collection s/p IR placed pigtail drain   - class III obesity, BMI 52.7  - vitamin D deficiency   - concern for malabsorption (100cm common channel)    Est nutrient needs: [id number] kcals (22-25kcals/kg IBW), 105-131g protein (2-2.5g/kg IBW), 1ml H2O per kcal      PLAN  - PO diet as tolerated, prioritize protein intake  - Nam PO twice daily would be beneficial but dislikes all supplements  - restart ergocalciferol 50,000 IU twice per week and repeat 25-oh vitamin D level on   - give multivitamin with minerals PO daily    - cont vitamin B12 supplementation (was on at home prior to previous admission)  - will follow

## 2025-05-29 NOTE — PROGRESS NOTE ADULT - ASSESSMENT
A/P 64F with a PMHx of HTN, morbid obesity, LOUIS on CPAP, Abby en y gastric bypass, and a recent admission for an incarcerated ventral hernia w/ sbo s/p lap converted to open ventral hernia repair w/ mesh and small bowel resection and s/p ex lap, 30 cm sbr, creation of new side to side ileoileostomy, ventral hernia repair w/ mesh who presents back to the ER from her LTACH facility [facility] after persistent HTN, epistaxis in setting of eliquis use and complex surgical history.     restarted Eliquis  continue dressing change  continue current care  Pt/rehab  f/u SW   continue monitoring

## 2025-05-30 LAB — GRAM STN FLD: ABNORMAL

## 2025-05-30 PROCEDURE — 99233 SBSQ HOSP IP/OBS HIGH 50: CPT | Mod: 25

## 2025-05-30 RX ADMIN — Medication 1 TABLET(S): at 11:07

## 2025-05-30 RX ADMIN — AMPICILLIN SODIUM AND SULBACTAM SODIUM 200 GRAM(S): 1; .5 INJECTION, POWDER, FOR SOLUTION INTRAMUSCULAR; INTRAVENOUS at 05:56

## 2025-05-30 RX ADMIN — TIOTROPIUM BROMIDE INHALATION SPRAY 2 PUFF(S): 3.12 SPRAY, METERED RESPIRATORY (INHALATION) at 10:00

## 2025-05-30 RX ADMIN — ERGOCALCIFEROL 50000 UNIT(S): 1.25 CAPSULE ORAL at 10:20

## 2025-05-30 RX ADMIN — Medication 1 APPLICATION(S): at 05:58

## 2025-05-30 RX ADMIN — CYANOCOBALAMIN 1000 MICROGRAM(S): 1000 INJECTION INTRAMUSCULAR; SUBCUTANEOUS at 11:07

## 2025-05-30 RX ADMIN — APIXABAN 5 MILLIGRAM(S): 2.5 TABLET, FILM COATED ORAL at 05:58

## 2025-05-30 RX ADMIN — AMPICILLIN SODIUM AND SULBACTAM SODIUM 200 GRAM(S): 1; .5 INJECTION, POWDER, FOR SOLUTION INTRAMUSCULAR; INTRAVENOUS at 11:08

## 2025-05-30 RX ADMIN — LOSARTAN POTASSIUM 100 MILLIGRAM(S): 100 TABLET, FILM COATED ORAL at 05:57

## 2025-05-30 RX ADMIN — AMPICILLIN SODIUM AND SULBACTAM SODIUM 200 GRAM(S): 1; .5 INJECTION, POWDER, FOR SOLUTION INTRAMUSCULAR; INTRAVENOUS at 20:00

## 2025-05-30 RX ADMIN — APIXABAN 5 MILLIGRAM(S): 2.5 TABLET, FILM COATED ORAL at 17:15

## 2025-05-30 RX ADMIN — AMLODIPINE BESYLATE 10 MILLIGRAM(S): 10 TABLET ORAL at 05:57

## 2025-05-30 RX ADMIN — Medication 40 MILLIGRAM(S): at 05:57

## 2025-05-30 NOTE — CONSULT NOTE ADULT - SUBJECTIVE AND OBJECTIVE BOX
- F/u 4 wks, US 13 wk  - PAP wnl 3/23 - f/u 3/26  - Declines GC/CT testing today  Orders:    PREG CNTR PRENATAL PN; Future    US-OB 2ND 3RD TRI COMPLETE; Future    Chlamydia/GC, PCR (Genital/Anal swab); Future     INTERVENTIONAL RADIOLOGY CONSULT:     Procedure Requested: IVC Filter    HPI:  The patient is a 64-year-old female with a past medical history of high blood pressure and gastric bypass surgery in , presenting with two days of sharp abdominal pain and one episode of non-bloody, non-bilious vomiting. She has a prior history of small bowel obstruction in , , and most recently in , all of which resolved with conservative management. Her current symptoms are similar in nature. A computed tomography scan of the abdomen and pelvis with oral and intravenous contrast revealed multiple ventral abdominal wall hernias containing both obstructed and non-obstructed bowel loops. A complex small bowel obstruction is present, involving three hernias on the right side of the abdomen, with dilated, fluid-filled loops of small bowel, trace fluid between loops, and a collapsed segment of bowel beyond a hernia in the right lower quadrant. These findings are consistent with a recurrent small bowel obstruction. The hernia was non reducible at bedside. (2025 20:41)      PAST MEDICAL & SURGICAL HISTORY:  Gastric bypass status for obesity      LOUIS (obstructive sleep apnea)      S/P gastric bypass      S/P       S/P small bowel resection      History of total bilateral knee replacement (TKR)      H/O colonoscopy            MEDICATIONS  (STANDING):  albuterol/ipratropium for Nebulization 3 milliLiter(s) Nebulizer every 6 hours  amLODIPine   Tablet 10 milliGRAM(s) Oral daily  chlorhexidine 2% Cloths 1 Application(s) Topical <User Schedule>  cyanocobalamin 1000 MICROGram(s) Oral daily  enoxaparin Injectable 60 milliGRAM(s) SubCutaneous every 12 hours  ergocalciferol 66824 Unit(s) Oral every week  insulin lispro (ADMELOG) corrective regimen sliding scale   SubCutaneous Before meals and at bedtime  lactobacillus acidophilus 1 Tablet(s) Oral daily  losartan 100 milliGRAM(s) Oral daily  melatonin 10 milliGRAM(s) Oral at bedtime  multivitamin/minerals 1 Tablet(s) Oral daily  pantoprazole    Tablet 40 milliGRAM(s) Oral before breakfast    MEDICATIONS  (PRN):  acetaminophen     Tablet .. 650 milliGRAM(s) Oral every 6 hours PRN Mild Pain (1 - 3)    Allergies    No Known Allergies    Intolerances      FAMILY HISTORY:    Physical Exam:   Vital Signs Last 24 Hrs  T(C): 36.4 (12 May 2025 08:44), Max: 37 (11 May 2025 23:42)  T(F): 97.5 (12 May 2025 08:44), Max: 98.6 (11 May 2025 23:42)  HR: 98 (12 May 2025 08:44) (98 - 98)  BP: 146/85 (12 May 2025 08:44) (130/79 - 146/85)  BP(mean): --  RR: 19 (12 May 2025 08:44) (18 - 19)  SpO2: 97% (11 May 2025 23:42) (97% - 97%)    Parameters below as of 11 May 2025 23:42  Patient On (Oxygen Delivery Method): BiPAP/CPAP    Labs:                         8.7    7.16  )-----------( 244      ( 11 May 2025 21:42 )             26.6     05-11    141  |  107  |  16  ----------------------------<  120[H]  3.8   |  21  |  0.8    Ca    7.7[L]      11 May 2025 21:42  Phos  2.6     05-11  Mg     1.5     05-11          Pertinent labs:                      8.7    7.16  )-----------( 244      ( 11 May 2025 21:42 )             26.6       05-11    141  |  107  |  16  ----------------------------<  120[H]  3.8   |  21  |  0.8    Ca    7.7[L]      11 May 2025 21:42  Phos  2.6     05-11  Mg     1.5     05-11            Radiology & Additional Studies:     Radiology imaging reviewed.       ASSESSMENT AND PLAN:  -Patient is scheduled with vascular surgery on 5/15/25  -No acute IR intervention.       Please call Interventional Radiology with questions or concerns:   - M-F [phone]: [phone]   - All other hours: [phone]   INTERVENTIONAL RADIOLOGY CONSULT:     Procedure Requested: IVC Filter    HPI:  The patient is a 64-year-old female with a past medical history of high blood pressure and gastric bypass surgery in , presenting with two days of sharp abdominal pain and one episode of non-bloody, non-bilious vomiting. She has a prior history of small bowel obstruction in , , and most recently in , all of which resolved with conservative management. Her current symptoms are similar in nature. A computed tomography scan of the abdomen and pelvis with oral and intravenous contrast revealed multiple ventral abdominal wall hernias containing both obstructed and non-obstructed bowel loops. A complex small bowel obstruction is present, involving three hernias on the right side of the abdomen, with dilated, fluid-filled loops of small bowel, trace fluid between loops, and a collapsed segment of bowel beyond a hernia in the right lower quadrant. These findings are consistent with a recurrent small bowel obstruction. The hernia was non reducible at bedside. (2025 20:41)      PAST MEDICAL & SURGICAL HISTORY:  Gastric bypass status for obesity      LOUIS (obstructive sleep apnea)      S/P gastric bypass      S/P       S/P small bowel resection      History of total bilateral knee replacement (TKR)      H/O colonoscopy            MEDICATIONS  (STANDING):  albuterol/ipratropium for Nebulization 3 milliLiter(s) Nebulizer every 6 hours  amLODIPine   Tablet 10 milliGRAM(s) Oral daily  chlorhexidine 2% Cloths 1 Application(s) Topical <User Schedule>  cyanocobalamin 1000 MICROGram(s) Oral daily  enoxaparin Injectable 60 milliGRAM(s) SubCutaneous every 12 hours  ergocalciferol 38493 Unit(s) Oral every week  insulin lispro (ADMELOG) corrective regimen sliding scale   SubCutaneous Before meals and at bedtime  lactobacillus acidophilus 1 Tablet(s) Oral daily  losartan 100 milliGRAM(s) Oral daily  melatonin 10 milliGRAM(s) Oral at bedtime  multivitamin/minerals 1 Tablet(s) Oral daily  pantoprazole    Tablet 40 milliGRAM(s) Oral before breakfast    MEDICATIONS  (PRN):  acetaminophen     Tablet .. 650 milliGRAM(s) Oral every 6 hours PRN Mild Pain (1 - 3)    Allergies    No Known Allergies    Intolerances      FAMILY HISTORY:    Physical Exam:   Vital Signs Last 24 Hrs  T(C): 36.4 (12 May 2025 08:44), Max: 37 (11 May 2025 23:42)  T(F): 97.5 (12 May 2025 08:44), Max: 98.6 (11 May 2025 23:42)  HR: 98 (12 May 2025 08:44) (98 - 98)  BP: 146/85 (12 May 2025 08:44) (130/79 - 146/85)  BP(mean): --  RR: 19 (12 May 2025 08:44) (18 - 19)  SpO2: 97% (11 May 2025 23:42) (97% - 97%)    Parameters below as of 11 May 2025 23:42  Patient On (Oxygen Delivery Method): BiPAP/CPAP    Labs:                         8.7    7.16  )-----------( 244      ( 11 May 2025 21:42 )             26.6     05-11    141  |  107  |  16  ----------------------------<  120[H]  3.8   |  21  |  0.8    Ca    7.7[L]      11 May 2025 21:42  Phos  2.6     05-11  Mg     1.5     05-11          Pertinent labs:                      8.7    7.16  )-----------( 244      ( 11 May 2025 21:42 )             26.6       05-11    141  |  107  |  16  ----------------------------<  120[H]  3.8   |  21  |  0.8    Ca    7.7[L]      11 May 2025 21:42  Phos  2.6     05-11  Mg     1.5     05-11            Radiology & Additional Studies:     Radiology imaging reviewed.       ASSESSMENT AND PLAN:  -Patient will be evaluated and tentatively scheduled for tomorrow 25 for an image guided IVC filter placement.   -Please keep NPO pm  -Please draw CBC/ Coags prior to procedure    Please call Interventional Radiology with questions or concerns:   - M-F [phone]: [phone]   - All other hours: [phone]

## 2025-05-30 NOTE — PROGRESS NOTE ADULT - ASSESSMENT
64F with a PMHx of HTN, morbid obesity, LOUIS on CPAP, Abby en y gastric bypass, and a recent admission for an incarcerated ventral hernia w/ sbo s/p lap converted to open ventral hernia repair w/ mesh and small bowel resection and s/p ex lap, 30 cm sbr, creation of new side to side ileoileostomy, ventral hernia repair w/ mesh who presents back to the ER from her LTACH facility [facility] after persistent HTN, epistaxis in setting of eliquis use and complex surgical history.     Regular diet  Eliquis  continue dressing change daily with vashe  continue current care  Pt/rehab  f/u SW   continue monitoring

## 2025-05-31 LAB
-  AMPICILLIN/SULBACTAM: SIGNIFICANT CHANGE UP
-  AMPICILLIN: SIGNIFICANT CHANGE UP
-  AMPICILLIN: SIGNIFICANT CHANGE UP
-  AZTREONAM: SIGNIFICANT CHANGE UP
-  CEFAZOLIN: SIGNIFICANT CHANGE UP
-  CEFEPIME: SIGNIFICANT CHANGE UP
-  CEFTRIAXONE: SIGNIFICANT CHANGE UP
-  CIPROFLOXACIN: SIGNIFICANT CHANGE UP
-  ERTAPENEM: SIGNIFICANT CHANGE UP
-  GENTAMICIN: SIGNIFICANT CHANGE UP
-  IMIPENEM: SIGNIFICANT CHANGE UP
-  LEVOFLOXACIN: SIGNIFICANT CHANGE UP
-  MEROPENEM: SIGNIFICANT CHANGE UP
-  PIPERACILLIN/TAZOBACTAM: SIGNIFICANT CHANGE UP
-  TIGECYCLINE: SIGNIFICANT CHANGE UP
-  TOBRAMYCIN: SIGNIFICANT CHANGE UP
-  TRIMETHOPRIM/SULFAMETHOXAZOLE: SIGNIFICANT CHANGE UP
-  VANCOMYCIN: SIGNIFICANT CHANGE UP
METHOD TYPE: SIGNIFICANT CHANGE UP
METHOD TYPE: SIGNIFICANT CHANGE UP

## 2025-05-31 RX ADMIN — AMPICILLIN SODIUM AND SULBACTAM SODIUM 200 GRAM(S): 1; .5 INJECTION, POWDER, FOR SOLUTION INTRAMUSCULAR; INTRAVENOUS at 02:05

## 2025-05-31 RX ADMIN — Medication 40 MILLIGRAM(S): at 06:00

## 2025-05-31 RX ADMIN — Medication 1 TABLET(S): at 11:23

## 2025-05-31 RX ADMIN — CYANOCOBALAMIN 1000 MICROGRAM(S): 1000 INJECTION INTRAMUSCULAR; SUBCUTANEOUS at 11:23

## 2025-05-31 RX ADMIN — TIOTROPIUM BROMIDE INHALATION SPRAY 2 PUFF(S): 3.12 SPRAY, METERED RESPIRATORY (INHALATION) at 08:41

## 2025-05-31 RX ADMIN — Medication 1 APPLICATION(S): at 06:01

## 2025-05-31 RX ADMIN — LOSARTAN POTASSIUM 100 MILLIGRAM(S): 100 TABLET, FILM COATED ORAL at 06:00

## 2025-05-31 RX ADMIN — APIXABAN 5 MILLIGRAM(S): 2.5 TABLET, FILM COATED ORAL at 06:00

## 2025-05-31 RX ADMIN — AMPICILLIN SODIUM AND SULBACTAM SODIUM 200 GRAM(S): 1; .5 INJECTION, POWDER, FOR SOLUTION INTRAMUSCULAR; INTRAVENOUS at 21:03

## 2025-05-31 RX ADMIN — APIXABAN 5 MILLIGRAM(S): 2.5 TABLET, FILM COATED ORAL at 17:17

## 2025-05-31 RX ADMIN — AMPICILLIN SODIUM AND SULBACTAM SODIUM 200 GRAM(S): 1; .5 INJECTION, POWDER, FOR SOLUTION INTRAMUSCULAR; INTRAVENOUS at 13:30

## 2025-05-31 RX ADMIN — AMPICILLIN SODIUM AND SULBACTAM SODIUM 200 GRAM(S): 1; .5 INJECTION, POWDER, FOR SOLUTION INTRAMUSCULAR; INTRAVENOUS at 08:36

## 2025-05-31 RX ADMIN — AMLODIPINE BESYLATE 10 MILLIGRAM(S): 10 TABLET ORAL at 06:01

## 2025-05-31 NOTE — PROGRESS NOTE ADULT - ASSESSMENT
64F with a PMHx of HTN, morbid obesity, [place] on CPAP, Abby en y gastric bypass, and a recent admission for an incarcerated ventral hernia w/ sbo s/p lap converted to open ventral hernia repair w/ mesh and small bowel resection and s/p ex lap, 30 cm sbr, creation of new side to side ileoileostomy, ventral hernia repair w/ mesh who presents back to the ER from her LTACH facility [facility] after persistent HTN, epistaxis in setting of eliquis use and complex surgical history.     Regular diet  Eliquis  continue dressing change daily with vashe  continue current care  Pt/rehab  f/u SW   continue monitoring

## 2025-06-01 LAB
ANION GAP SERPL CALC-SCNC: 12 MMOL/L — SIGNIFICANT CHANGE UP (ref 7–14)
BASOPHILS # BLD AUTO: 0.02 K/UL — SIGNIFICANT CHANGE UP (ref 0–0.2)
BASOPHILS NFR BLD AUTO: 0.3 % — SIGNIFICANT CHANGE UP (ref 0–1)
BUN SERPL-MCNC: 14 MG/DL — SIGNIFICANT CHANGE UP (ref 10–20)
CALCIUM SERPL-MCNC: 8.2 MG/DL — LOW (ref 8.4–10.5)
CHLORIDE SERPL-SCNC: 106 MMOL/L — SIGNIFICANT CHANGE UP (ref 98–110)
CO2 SERPL-SCNC: 26 MMOL/L — SIGNIFICANT CHANGE UP (ref 17–32)
CREAT SERPL-MCNC: 0.9 MG/DL — SIGNIFICANT CHANGE UP (ref 0.7–1.5)
EGFR: 71 ML/MIN/1.73M2 — SIGNIFICANT CHANGE UP
EGFR: 71 ML/MIN/1.73M2 — SIGNIFICANT CHANGE UP
EOSINOPHIL # BLD AUTO: 0.05 K/UL — SIGNIFICANT CHANGE UP (ref 0–0.7)
EOSINOPHIL NFR BLD AUTO: 0.8 % — SIGNIFICANT CHANGE UP (ref 0–8)
GLUCOSE SERPL-MCNC: 123 MG/DL — HIGH (ref 70–99)
HCT VFR BLD CALC: 24.7 % — LOW (ref 37–47)
HGB BLD-MCNC: 7.6 G/DL — LOW (ref 12–16)
IMM GRANULOCYTES NFR BLD AUTO: 1.4 % — HIGH (ref 0.1–0.3)
LYMPHOCYTES # BLD AUTO: 1.96 K/UL — SIGNIFICANT CHANGE UP (ref 1.2–3.4)
LYMPHOCYTES # BLD AUTO: 29.8 % — SIGNIFICANT CHANGE UP (ref 20.5–51.1)
MAGNESIUM SERPL-MCNC: 1.6 MG/DL — LOW (ref 1.8–2.4)
MCHC RBC-ENTMCNC: 29.1 PG — SIGNIFICANT CHANGE UP (ref 27–31)
MCHC RBC-ENTMCNC: 30.8 G/DL — LOW (ref 32–37)
MCV RBC AUTO: 94.6 FL — SIGNIFICANT CHANGE UP (ref 81–99)
MONOCYTES # BLD AUTO: 0.48 K/UL — SIGNIFICANT CHANGE UP (ref 0.1–0.6)
MONOCYTES NFR BLD AUTO: 7.3 % — SIGNIFICANT CHANGE UP (ref 1.7–9.3)
NEUTROPHILS # BLD AUTO: 3.97 K/UL — SIGNIFICANT CHANGE UP (ref 1.4–6.5)
NEUTROPHILS NFR BLD AUTO: 60.4 % — SIGNIFICANT CHANGE UP (ref 42.2–75.2)
NRBC BLD AUTO-RTO: 0 /100 WBCS — SIGNIFICANT CHANGE UP (ref 0–0)
PHOSPHATE SERPL-MCNC: 3.3 MG/DL — SIGNIFICANT CHANGE UP (ref 2.1–4.9)
PLATELET # BLD AUTO: 242 K/UL — SIGNIFICANT CHANGE UP (ref 130–400)
PMV BLD: 9.3 FL — SIGNIFICANT CHANGE UP (ref 7.4–10.4)
POTASSIUM SERPL-MCNC: 3.2 MMOL/L — LOW (ref 3.5–5)
POTASSIUM SERPL-SCNC: 3.2 MMOL/L — LOW (ref 3.5–5)
RBC # BLD: 2.61 M/UL — LOW (ref 4.2–5.4)
RBC # FLD: 16.5 % — HIGH (ref 11.5–14.5)
SODIUM SERPL-SCNC: 144 MMOL/L — SIGNIFICANT CHANGE UP (ref 135–146)
WBC # BLD: 6.57 K/UL — SIGNIFICANT CHANGE UP (ref 4.8–10.8)
WBC # FLD AUTO: 6.57 K/UL — SIGNIFICANT CHANGE UP (ref 4.8–10.8)

## 2025-06-01 RX ORDER — MAGNESIUM SULFATE 500 MG/ML
2 SYRINGE (ML) INJECTION ONCE
Refills: 0 | Status: COMPLETED | OUTPATIENT
Start: 2025-06-01 | End: 2025-06-01

## 2025-06-01 RX ORDER — MEROPENEM 1 G/30ML
1000 INJECTION INTRAVENOUS EVERY 8 HOURS
Refills: 0 | Status: DISCONTINUED | OUTPATIENT
Start: 2025-06-01 | End: 2025-06-02

## 2025-06-01 RX ORDER — SODIUM CHLORIDE 0.65 %
1 AEROSOL, SPRAY (ML) NASAL EVERY 4 HOURS
Refills: 0 | Status: DISCONTINUED | OUTPATIENT
Start: 2025-06-01 | End: 2025-06-02

## 2025-06-01 RX ADMIN — AMPICILLIN SODIUM AND SULBACTAM SODIUM 200 GRAM(S): 1; .5 INJECTION, POWDER, FOR SOLUTION INTRAMUSCULAR; INTRAVENOUS at 13:27

## 2025-06-01 RX ADMIN — AMPICILLIN SODIUM AND SULBACTAM SODIUM 200 GRAM(S): 1; .5 INJECTION, POWDER, FOR SOLUTION INTRAMUSCULAR; INTRAVENOUS at 02:35

## 2025-06-01 RX ADMIN — LOSARTAN POTASSIUM 100 MILLIGRAM(S): 100 TABLET, FILM COATED ORAL at 06:00

## 2025-06-01 RX ADMIN — MEROPENEM 100 MILLIGRAM(S): 1 INJECTION INTRAVENOUS at 21:18

## 2025-06-01 RX ADMIN — Medication 40 MILLIGRAM(S): at 06:01

## 2025-06-01 RX ADMIN — TIOTROPIUM BROMIDE INHALATION SPRAY 2 PUFF(S): 3.12 SPRAY, METERED RESPIRATORY (INHALATION) at 12:03

## 2025-06-01 RX ADMIN — CYANOCOBALAMIN 1000 MICROGRAM(S): 1000 INJECTION INTRAMUSCULAR; SUBCUTANEOUS at 12:05

## 2025-06-01 RX ADMIN — APIXABAN 5 MILLIGRAM(S): 2.5 TABLET, FILM COATED ORAL at 17:27

## 2025-06-01 RX ADMIN — Medication 1 SPRAY(S): at 21:23

## 2025-06-01 RX ADMIN — Medication 40 MILLIEQUIVALENT(S): at 23:36

## 2025-06-01 RX ADMIN — Medication 1 TABLET(S): at 12:06

## 2025-06-01 RX ADMIN — AMLODIPINE BESYLATE 10 MILLIGRAM(S): 10 TABLET ORAL at 06:01

## 2025-06-01 RX ADMIN — Medication 12.5 GRAM(S): at 23:36

## 2025-06-01 RX ADMIN — Medication 1 APPLICATION(S): at 06:08

## 2025-06-01 RX ADMIN — AMPICILLIN SODIUM AND SULBACTAM SODIUM 200 GRAM(S): 1; .5 INJECTION, POWDER, FOR SOLUTION INTRAMUSCULAR; INTRAVENOUS at 09:26

## 2025-06-01 RX ADMIN — APIXABAN 5 MILLIGRAM(S): 2.5 TABLET, FILM COATED ORAL at 06:01

## 2025-06-01 RX ADMIN — Medication 1 TABLET(S): at 12:05

## 2025-06-01 NOTE — PROGRESS NOTE ADULT - ASSESSMENT
64F with a PMHx of HTN, morbid obesity, L[place]IS on CPAP, Abby en y gastric bypass, and a recent admission for an incarcerated ventral hernia w/ sbo s/p lap converted to open ventral hernia repair w/ mesh and small bowel resection and s/p ex lap, 30 cm sbr, creation of new side to side ileoileostomy, ventral hernia repair w/ mesh who presents back to the ER from her LTACH facility [facility] after persistent HTN, epistaxis in setting of eliquis use and complex surgical history.     Regular diet  Eliquis  continue dressing change daily with vashe  continue current care  Pt/rehab  f/u SW   continue monitoring  64F with a PMHx of HTN, morbid obesity, LOUIS on CPAP, Abby en y gastric bypass, and a recent admission for an incarcerated ventral hernia w/ sbo s/p lap converted to open ventral hernia repair w/ mesh and small bowel resection and s/p ex lap, 30 cm sbr, creation of new side to side ileoileostomy, ventral hernia repair w/ mesh who presents back to the ER from her LTACH facility [facility] after persistent HTN, epistaxis in setting of eliquis use and complex surgical history.     Regular diet  Eliquis  continue dressing change daily with vashe, 0.25" packing in superior midline incision  f/u wound culture  ID recs appreciated  f/u 8pm labs  c/w IV abx  Pt/rehab  f/u SW   multimodal pain regimen    [phone]

## 2025-06-01 NOTE — CONSULT NOTE ADULT - ASSESSMENT
ASSESSMENT  64F with a PMHx of HTN, morbid obesity, L[place]IS on CPAP, Abby en y gastric bypass, and a recent admission for an incarcerated ventral hernia w/ sbo s/p lap converted to open ventral hernia repair w/ mesh and small bowel resection and s/p ex lap, 30 cm sbr, creation of new side to side ileoileostomy, ventral hernia repair w/ mesh who presents back to the ER from her LTACH facility [facility] after persistent HTN, epistaxis in setting of eliquis use and complex surgical history.       IMPRESSION  #    5/28 WCX   Rare Enterococcus avium   Ampicillin: S <=2     Growth in fluid media only Escherichia coli ESBL  Trimethoprim/Sulfamethoxazole: R >2/38 Ciprofloxacin: R >2    Rare Clostridium perfringens "Susceptibilities not performed"  < from: CT Abdomen and Pelvis No Cont (05.27.25 @ 09:42) >  Extensive ventral wall postsurgical changes noted with gas and fluid containing midline collection along the lower abdomen, measuring   approximately 4.8 x 4.5 x 10.2 cm.  Last seen by ID inpatient 5/1- At that time Repeat CT showing Redemonstrated large volume complex fluid in the pelvis and along the left paracolic gutter, compatible with blood products. 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 All final recommendations to follow after interview and examination of the patient. Please follow recommendations noted below.  - D/C Unasyn--> Meropenem 1g q8h IV   - Send ESR CRP   - Without surgical intervention needs PICC x prolonged IV ABX   - Appreciate IR consult- no drainable collection    If any questions, please send a message or call on [employer] Teams  Please continue to update ID with any pertinent new laboratory, radiographic findings, or change in clinical status ASSESSMENT  64F with a PMHx of HTN, morbid obesity, [place]IS on CPAP, Abby en y gastric bypass, and a recent admission for an incarcerated ventral hernia w/ sbo s/p lap converted to open ventral hernia repair w/ mesh and small bowel resection and s/p ex lap, 30 cm sbr, creation of new side to side ileoileostomy, ventral hernia repair w/ mesh who presents back to the ER from her LTACH facility [facility] after persistent HTN, epistaxis in setting of eliquis use and complex surgical history.       IMPRESSION  #Wound dehiscence and persistent intra-abdominal collection, suspect abscess    5/28 WCX   Rare Enterococcus avium   Ampicillin: S <=2     Growth in fluid media only Escherichia coli ESBL  Trimethoprim/Sulfamethoxazole: R >2/38 Ciprofloxacin: R >2    Rare Clostridium perfringens "Susceptibilities not performed"  < from: CT Abdomen and Pelvis No Cont (05.27.25 @ 09:42) >  Extensive ventral wall postsurgical changes noted with gas and fluid containing midline collection along the lower abdomen, measuring   approximately 4.8 x 4.5 x 10.2 cm.  Last seen by ID inpatient 5/1- At that time Repeat CT showing Redemonstrated large volume complex fluid in the pelvis and along the left paracolic gutter, compatible with blood products. 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

## 2025-06-01 NOTE — CONSULT NOTE ADULT - TIME BILLING
I have personally seen and examined this patient.  I have reviewed all pertinent clinical information and reviewed all relevant imaging and diagnostic studies personally.   I counseled the patient about diagnostic testing and treatment plan.   I discussed my recommendations with the primary team PA and family member daughter on the phone

## 2025-06-01 NOTE — CONSULT NOTE ADULT - SUBJECTIVE AND OBJECTIVE BOX
ROSETTA NANCY  65y, Female  Allergy: No Known Allergies      CHIEF COMPLAINT:   Epistaxis (2025 01:45)      LOS  7d    HPI  HPI:  64F with a PMHx of HTN, morbid obesity, LOUIS on CPAP, Abby en y gastric bypass, and a recent admission for an incarcerated ventral hernia w/ sbo s/p lap converted to open ventral hernia repair w/ mesh and small bowel resection and s/p ex lap, 30 cm sbr, creation of new side to side ileoileostomy, ventral hernia repair w/ mesh who presents back to the ER from her LTACH facility [facility] after persistent HTN, epistaxis in setting of eliquis use and complex surgical history. Patient states her epistaxis has stopped after self-tamponade for 25 minutes. No complaints regarding midline abdominal ex lap healing incision. She has been tolerating diet, passing regular bowel movements without nausea or vomiting. Other than epistaxis her other complaint is that she claims the LTACH has been neglecting her and that she is not receiving any physical therapy. (25 May 2025 20:50)      INFECTIOUS DISEASE HISTORY:  ID consulted for +WCX  Last seen by ID inpatient - At that time Repeat CT showing Redemonstrated large volume complex fluid in the pelvis and along the left paracolic gutter, compatible with blood products.  IR cx NG (but concerning for purulent appearing fluid)  -Discharged with Midline x ertapenem 1g q24h IV on D/C x 4 weeks E/D  WCX   Rare Enterococcus avium   Ampicillin: S <=2     Growth in fluid media only Escherichia coli ESBL  Trimethoprim/Sulfamethoxazole: R >2 Ciprofloxacin: R >2    Rare Clostridium perfringens "Susceptibilities not performed"  < from: CT Abdomen and Pelvis No Cont (25 @ 09:42) >  Extensive ventral wall postsurgical changes noted with gas and fluid containing midline collection along the lower abdomen, measuring   approximately 4.8 x 4.5 x 10.2 cm.     fluid cx   Rare Escherichia coli    Rare Corynebacterium tuberculostearicum "Susceptibilities not performed"    4/10 WCX   Rare Bacteroides fragilis "Susceptibilities not performed"    Rare Clostridium clostridioforme "Susceptibilities not performed"  Few Escherichia coli    Currently ordered for:  ampicillin/sulbactam  IVPB 3 Gram(s) IV Intermittent every 6 hours  ampicillin/sulbactam  IVPB          PMH  PAST MEDICAL & SURGICAL HISTORY:  Gastric bypass status for obesity      LOUIS (obstructive sleep apnea)      S/P gastric bypass      S/P       S/P small bowel resection      History of total bilateral knee replacement (TKR)      H/O colonoscopy  2016          FAMILY HISTORY      SOCIAL HISTORY  Social History:        ROS  ***    VITALS:  T(F): 98.6, Max: 98.6 (25 @ 08:22)  HR: 89  BP: 120/80  RR: 18Vital Signs Last 24 Hrs  T(C): 37 (2025 08:22), Max: 37 (2025 08:22)  T(F): 98.6 (2025 08:22), Max: 98.6 (2025 08:22)  HR: 89 (2025 08:22) (85 - 93)  BP: 120/80 (2025 08:22) (109/65 - 134/79)  BP(mean): --  RR: 18 (2025 08:22) (18 - 18)  SpO2: 97% (2025 08:22) (96% - 97%)    Parameters below as of 2025 08:22  Patient On (Oxygen Delivery Method): room air        PHYSICAL EXAM:  ***    TESTS & MEASUREMENTS:                  Culture - Abscess with Gram Stain (collected 25 @ 16:55)  Source: Abscess abdominal midline wound  Gram Stain (25 @ 21:20):    Few polymorphonuclear leukocytes seen per low power field    No organisms seen per oil power field  Preliminary Report (25 @ 18:06):    Rare Enterococcus avium    Growth in fluid media only Escherichia coli ESBL    Rare Clostridium perfringens "Susceptibilities not performed"  Organism: Enterococcus avium  Escherichia coli ESBL (25 @ 18:04)  Organism: Escherichia coli ESBL (25 @ 18:04)      -  Levofloxacin: R >4      -  Tobramycin: S <=2      -  Aztreonam: R >16      -  Gentamicin: S <=2      -  Cefepime: R >16      -  Cefazolin: R >16      -  Piperacillin/Tazobactam: R <=8      -  Ciprofloxacin: R >2      -  Imipenem: S <=1      -  Ceftriaxone: R >32      -  Ampicillin: R >16 These ampicillin results predict results for amoxicillin      Method Type: LOUISE      -  Meropenem: S <=1      -  Ampicillin/Sulbactam: R <=4/2      -  Trimethoprim/Sulfamethoxazole: R >2/38      -  Tigecycline: S <=2 Interpretations based on FDA breakpoints      -  Ertapenem: S <=0.5  Organism: Enterococcus avium (25 @ 17:07)      -  Vancomycin: S <=0.25      -  Ampicillin: S <=2 Predicts results to ampicillin/sulbactam, amoxacillin-clavulanate and  piperacillin-tazobactam.      Method Type: LOUISE    Culture - Blood (collected 25 @ 12:10)  Source: Blood None  Final Report (25 @ 22:00):    No growth at 5 days    Culture - Blood (collected 25 @ 17:47)  Source: Blood Blood  Final Report (25 @ 23:00):    No growth at 5 days    Culture - Body Fluid with Gram Stain (collected 25 @ 15:00)  Source: Abdominal Fl Abdominal Fluid  Gram Stain (25 @ 06:50):    polymorphonuclear leukocytes seen    No organisms seen    by cytocentrifuge  Final Report (25 @ 18:48):    No growth at 5 days    Culture - Blood (collected 25 @ 13:16)  Source: Blood Blood-Peripheral  Final Report (25 @ 23:00):    No growth at 5 days    Culture - Body Fluid with Gram Stain (collected 25 @ 15:20)  Source: Body Fluid None  Gram Stain (25 @ 12:15):    No polymorphonuclear leukocytes seen    No organisms seen  Final Report (25 @ 10:23):    Rare Escherichia coli    Rare Corynebacterium tuberculostearicum "Susceptibilities not performed"  Organism: Escherichia coli (25 @ 13:35)  Organism: Escherichia coli (25 @ 13:35)      -  Levofloxacin: S <=0.5      -  Tobramycin: S <=2      -  Aztreonam: S <=4      -  Gentamicin: S <=2      -  Cefepime: S <=2      -  Cefazolin: S <=2      -  Piperacillin/Tazobactam: S <=8      -  Ciprofloxacin: S <=0.25      -  Imipenem: S <=1      -  Ceftriaxone: S <=1      -  Ampicillin: S <=8 These ampicillin results predict results for amoxicillin      Method Type: LOUISE      -  Meropenem: S <=1      -  Ampicillin/Sulbactam: S <=4/2      -  Cefoxitin: S <=8      -  Amoxicillin/Clavulanic Acid: S <=8/4      -  Trimethoprim/Sulfamethoxazole: S <=0.5/9.5      -  Ertapenem: S <=0.5    Culture - Sputum (collected 25 @ 20:15)  Source: Trach Asp Tracheal Aspirate  Gram Stain (04-15-25 @ 11:15):    Rare polymorphonuclear leukocytes per low power field    No Squamous epithelial cells per low power field    No organisms seen per oil power field  Final Report (25 @ 21:32):    No growth    Culture - Blood (collected 25 @ 07:01)  Source: Blood Blood-Peripheral  Final Report (25 @ 12:01):    No growth at 5 days    Culture - Acid Fast - Other w/Smear (collected 04-10-25 @ 18:00)  Source: Other Peritoneal Fluid #2  Final Report (25 @ 23:03):    No acid-fast bacilli isolated after 6 weeks.    Culture - Fungal, Other (collected 04-10-25 @ 18:00)  Source: Other Peritoneal Fluid #2  Final Report (05-10-25 @ 23:00):    No fungus isolated at 4 weeks.    Culture - Wound Aerobic/Anaerobic (collected 04-10-25 @ 10:30)  Source: Surgical Swab Peritoneal Fluid #1  Final Report (25 @ 14:15):    Rare Bacteroides fragilis "Susceptibilities not performed"    Rare Clostridium clostridioforme "Susceptibilities not performed"    Culture - Acid Fast - Other w/Smear (collected 04-10-25 @ 10:30)  Source: Other Peritoneal Fluid #1  Final Report (25 @ 23:03):    No acid-fast bacilli isolated after 6 weeks.    Culture - Fungal, Other (collected 04-10-25 @ 10:30)  Source: Other Peritoneal Fluid #1  Final Report (05-10-25 @ 23:00):    No fungus isolated at 4 weeks.    Culture - Wound Aerobic/Anaerobic (collected 04-10-25 @ 10:30)  Source: Surgical Swab Peritoneal Fluid #1  Final Report (25 @ 13:48):    Few Escherichia coli    Few Bacteroides fragilis "Susceptibilities not performed"  Organism: Escherichia coli (25 @ 13:48)  Organism: Escherichia coli (25 @ 13:48)      -  Levofloxacin: S <=0.5      -  Tobramycin: S <=2      -  Aztreonam: S <=4      -  Gentamicin: S <=2      -  Cefazolin: S <=2      -  Cefepime: S <=2      -  Piperacillin/Tazobactam: S <=8      -  Ciprofloxacin: S <=0.25      -  Imipenem: S <=1      -  Ceftriaxone: S <=1      -  Ampicillin: S <=8 These ampicillin results predict results for amoxicillin      Method Type: LOUISE      -  Meropenem: S <=1      -  Ampicillin/Sulbactam: S <=4/2      -  Cefoxitin: S <=8      -  Amoxicillin/Clavulanic Acid: S <=8/4      -  Trimethoprim/Sulfamethoxazole: S <=0.5/9.5      -  Ertapenem: S <=0.5    Urinalysis with Rflx Culture (collected 25 @ 20:06)    Culture - Urine (collected 25 @ 20:06)  Source: Clean Catch None  Final Report (25 @ 14:03):    50,000 - 99,000 CFU/mL Escherichia coli    <10,000 CFU/ml Normal Urogenital andra present  Organism: Escherichia coli (25 @ 14:03)  Organism: Escherichia coli (25 @ 14:03)      -  Levofloxacin: S <=0.5      -  Tobramycin: S <=2      -  Nitrofurantoin: S <=32 Should not be used to treat pyelonephritis      -  Aztreonam: S <=4      -  Gentamicin: S <=2      -  Cefazolin: S <=2 For uncomplicated UTI with K. pneumoniae, E. coli, or P. mirablis: LOUISE <=16 is sensitive and LOUISE >=32 is resistant. This also predicts results for oral agents cefaclor, cefdinir, cefpodoxime, cefprozil, cefuroxime axetil, cephalexin and locarbef for uncomplicated UTI. Note that some isolates may be susceptible to these agents while testing resistant to cefazolin.      -  Cefepime: S <=2      -  Piperacillin/Tazobactam: S <=8      -  Ciprofloxacin: S <=0.25      -  Imipenem: S <=1      -  Ceftriaxone: S <=1      -  Ampicillin: S <=8 These ampicillin results predict results for amoxicillin      Method Type: LOUISE      -  Meropenem: S <=1      -  Ampicillin/Sulbactam: S <=4/2      -  Cefoxitin: S <=8      -  Cefuroxime: S <=4      -  Amoxicillin/Clavulanic Acid: S <=8/4      -  Trimethoprim/Sulfamethoxazole: S <=0.5/9.5      -  Ertapenem: S <=0.5            INFECTIOUS DISEASES TESTING  MRSA PCR Result.: Negative (25 @ 00:40)      INFLAMMATORY MARKERS      RADIOLOGY & ADDITIONAL TESTS:  I have personally reviewed the last Chest xray  CXR      CT      CARDIOLOGY TESTING  12 Lead ECG:   Ventricular Rate 105 BPM    Atrial Rate 105 BPM    P-R Interval 140  <TRUNCATED> (25 @ 21:24)       MEDICATIONS  acetaminophen     Tablet .. 650 Oral every 6 hours  amLODIPine   Tablet 10 Oral daily  ampicillin/sulbactam  IVPB 3 IV Intermittent every 6 hours  ampicillin/sulbactam  IVPB     apixaban 5 Oral every 12 hours  chlorhexidine 2% Cloths 1 Topical <User Schedule>  cyanocobalamin 1000 Oral daily  ergocalciferol 14756 Oral <User Schedule>  hydrochlorothiazide 25 Oral daily  lactated ringers. 1000 IV Continuous <Continuous>  lactobacillus acidophilus 1 Oral daily  losartan 100 Oral daily  multivitamin/minerals 1 Oral daily  pantoprazole    Tablet 40 Oral before breakfast  tiotropium 2.5 MICROgram(s) Inhaler 2 Inhalation daily      ANTIBIOTICS:  ampicillin/sulbactam  IVPB 3 Gram(s) IV Intermittent every 6 hours  ampicillin/sulbactam  IVPB          ALLERGIES:  No Known Allergies           ROSETTA NANCY  65y, Female  Allergy: No Known Allergies      CHIEF COMPLAINT:   Epistaxis (2025 01:45)      LOS  7d    HPI  HPI:  64F with a PMHx of HTN, morbid obesity, LOUIS on CPAP, Abby en y gastric bypass, and a recent admission for an incarcerated ventral hernia w/ sbo s/p lap converted to open ventral hernia repair w/ mesh and small bowel resection and s/p ex lap, 30 cm sbr, creation of new side to side ileoileostomy, ventral hernia repair w/ mesh who presents back to the ER from her LTACH facility [facility] after persistent HTN, epistaxis in setting of eliquis use and complex surgical history. Patient states her epistaxis has stopped after self-tamponade for 25 minutes. No complaints regarding midline abdominal ex lap healing incision. She has been tolerating diet, passing regular bowel movements without nausea or vomiting. Other than epistaxis her other complaint is that she claims the LTACH has been neglecting her and that she is not receiving any physical therapy. (25 May 2025 20:50)      INFECTIOUS DISEASE HISTORY:  ID consulted for +WCX  D/C , returned to ER     Last seen by ID inpatient - At that time Repeat CT showing Redemonstrated large volume complex fluid in the pelvis and along the left paracolic gutter, compatible with blood products.  IR cx NG (but concerning for purulent appearing fluid)  -Per ID note plan was for Midline x ertapenem 1g q24h IV on D/C x 4 weeks E/D  (per chart antibiotics stopped inpatient )       WCX   Rare Enterococcus avium   Ampicillin: S <=2     Growth in fluid media only Escherichia coli ESBL  Trimethoprim/Sulfamethoxazole: R >/ Ciprofloxacin: R >2    Rare Clostridium perfringens "Susceptibilities not performed"  < from: CT Abdomen and Pelvis No Cont (25 @ 09:42) >  Extensive ventral wall postsurgical changes noted with gas and fluid containing midline collection along the lower abdomen, measuring   approximately 4.8 x 4.5 x 10.2 cm.     fluid cx   Rare Escherichia coli    Rare Corynebacterium tuberculostearicum "Susceptibilities not performed"    4/10 WCX   Rare Bacteroides fragilis "Susceptibilities not performed"    Rare Clostridium clostridioforme "Susceptibilities not performed"  Few Escherichia coli    Currently ordered for:  ampicillin/sulbactam  IVPB 3 Gram(s) IV Intermittent every 6 hours  ampicillin/sulbactam  IVPB          PMH  PAST MEDICAL & SURGICAL HISTORY:  Gastric bypass status for obesity      LOUIS (obstructive sleep apnea)      S/P gastric bypass      S/P       S/P small bowel resection      History of total bilateral knee replacement (TKR)      H/O colonoscopy  2016          FAMILY HISTORY      SOCIAL HISTORY  Social History:        ROS  General: Denies rigors, nightsweats  HEENT: Denies headache, rhinorrhea, sore throat, eye pain  CV: Denies CP, palpitations  PULM: Denies wheezing, hemoptysis  GI: Denies hematemesis, hematochezia, melena  : Denies discharge, hematuria  MSK: Denies arthralgias, myalgias  SKIN: Denies rash, lesions  NEURO: Denies paresthesias, weakness  PSYCH: Denies depression, anxiety     VITALS:  T(F): 98.6, Max: 98.6 (25 @ 08:22)  HR: 89  BP: 120/80  RR: 18Vital Signs Last 24 Hrs  T(C): 37 (2025 08:22), Max: 37 (2025 08:22)  T(F): 98.6 (2025 08:22), Max: 98.6 (2025 08:22)  HR: 89 (2025 08:22) (85 - 93)  BP: 120/80 (2025 08:22) (109/65 - 134/79)  BP(mean): --  RR: 18 (2025 08:22) (18 - 18)  SpO2: 97% (2025 08:22) (96% - 97%)    Parameters below as of 2025 08:22  Patient On (Oxygen Delivery Method): room air        PHYSICAL EXAM:  Gen: NAD, resting in bed obese  HEENT: Normocephalic, atraumatic  Neck: supple, no lymphadenopathy  CV: Regular rate & regular rhythm  Lungs: decreased BS at bases, no fremitus  Abdomen: Soft, BS present midline incision with wound dehiscence with drainage  Ext: Warm, well perfused  Neuro: non focal, awake  Skin: no rash, no erythema  Lines: no phlebitis     TESTS & MEASUREMENTS:                  Culture - Abscess with Gram Stain (collected 25 @ 16:55)  Source: Abscess abdominal midline wound  Gram Stain (25 @ 21:20):    Few polymorphonuclear leukocytes seen per low power field    No organisms seen per oil power field  Preliminary Report (25 @ 18:06):    Rare Enterococcus avium    Growth in fluid media only Escherichia coli ESBL    Rare Clostridium perfringens "Susceptibilities not performed"  Organism: Enterococcus avium  Escherichia coli ESBL (25 @ 18:04)  Organism: Escherichia coli ESBL (25 @ 18:04)      -  Levofloxacin: R >4      -  Tobramycin: S <=2      -  Aztreonam: R >16      -  Gentamicin: S <=2      -  Cefepime: R >16      -  Cefazolin: R >16      -  Piperacillin/Tazobactam: R <=8      -  Ciprofloxacin: R >2      -  Imipenem: S <=1      -  Ceftriaxone: R >32      -  Ampicillin: R >16 These ampicillin results predict results for amoxicillin      Method Type: LOUISE      -  Meropenem: S <=1      -  Ampicillin/Sulbactam: R <=4/2      -  Trimethoprim/Sulfamethoxazole: R >2/38      -  Tigecycline: S <=2 Interpretations based on FDA breakpoints      -  Ertapenem: S <=0.5  Organism: Enterococcus avium (25 @ 17:07)      -  Vancomycin: S <=0.25      -  Ampicillin: S <=2 Predicts results to ampicillin/sulbactam, amoxacillin-clavulanate and  piperacillin-tazobactam.      Method Type: LOUISE    Culture - Blood (collected 25 @ 12:10)  Source: Blood None  Final Report (25 @ 22:00):    No growth at 5 days    Culture - Blood (collected 25 @ 17:47)  Source: Blood Blood  Final Report (25 @ 23:00):    No growth at 5 days    Culture - Body Fluid with Gram Stain (collected 25 @ 15:00)  Source: Abdominal Fl Abdominal Fluid  Gram Stain (25 @ 06:50):    polymorphonuclear leukocytes seen    No organisms seen    by cytocentrifuge  Final Report (25 @ 18:48):    No growth at 5 days    Culture - Blood (collected 25 @ 13:16)  Source: Blood Blood-Peripheral  Final Report (25 @ 23:00):    No growth at 5 days    Culture - Body Fluid with Gram Stain (collected 25 @ 15:20)  Source: Body Fluid None  Gram Stain (25 @ 12:15):    No polymorphonuclear leukocytes seen    No organisms seen  Final Report (25 @ 10:23):    Rare Escherichia coli    Rare Corynebacterium tuberculostearicum "Susceptibilities not performed"  Organism: Escherichia coli (25 @ 13:35)  Organism: Escherichia coli (25 @ 13:35)      -  Levofloxacin: S <=0.5      -  Tobramycin: S <=2      -  Aztreonam: S <=4      -  Gentamicin: S <=2      -  Cefepime: S <=2      -  Cefazolin: S <=2      -  Piperacillin/Tazobactam: S <=8      -  Ciprofloxacin: S <=0.25      -  Imipenem: S <=1      -  Ceftriaxone: S <=1      -  Ampicillin: S <=8 These ampicillin results predict results for amoxicillin      Method Type: LOUISE      -  Meropenem: S <=1      -  Ampicillin/Sulbactam: S <=4/2      -  Cefoxitin: S <=8      -  Amoxicillin/Clavulanic Acid: S <=8/4      -  Trimethoprim/Sulfamethoxazole: S <=0.5/9.5      -  Ertapenem: S <=0.5    Culture - Sputum (collected 25 @ 20:15)  Source: Trach Asp Tracheal Aspirate  Gram Stain (04-15-25 @ 11:15):    Rare polymorphonuclear leukocytes per low power field    No Squamous epithelial cells per low power field    No organisms seen per oil power field  Final Report (25 @ 21:32):    No growth    Culture - Blood (collected 25 @ 07:01)  Source: Blood Blood-Peripheral  Final Report (25 @ 12:01):    No growth at 5 days    Culture - Acid Fast - Other w/Smear (collected 04-10-25 @ 18:00)  Source: Other Peritoneal Fluid #2  Final Report (25 @ 23:03):    No acid-fast bacilli isolated after 6 weeks.    Culture - Fungal, Other (collected 04-10-25 @ 18:00)  Source: Other Peritoneal Fluid #2  Final Report (05-10-25 @ 23:00):    No fungus isolated at 4 weeks.    Culture - Wound Aerobic/Anaerobic (collected 04-10-25 @ 10:30)  Source: Surgical Swab Peritoneal Fluid #1  Final Report (25 @ 14:15):    Rare Bacteroides fragilis "Susceptibilities not performed"    Rare Clostridium clostridioforme "Susceptibilities not performed"    Culture - Acid Fast - Other w/Smear (collected 04-10-25 @ 10:30)  Source: Other Peritoneal Fluid #1  Final Report (25 @ 23:03):    No acid-fast bacilli isolated after 6 weeks.    Culture - Fungal, Other (collected 04-10-25 @ 10:30)  Source: Other Peritoneal Fluid #1  Final Report (05-10-25 @ 23:00):    No fungus isolated at 4 weeks.    Culture - Wound Aerobic/Anaerobic (collected 04-10-25 @ 10:30)  Source: Surgical Swab Peritoneal Fluid #1  Final Report (25 @ 13:48):    Few Escherichia coli    Few Bacteroides fragilis "Susceptibilities not performed"  Organism: Escherichia coli (25 @ 13:48)  Organism: Escherichia coli (25 @ 13:48)      -  Levofloxacin: S <=0.5      -  Tobramycin: S <=2      -  Aztreonam: S <=4      -  Gentamicin: S <=2      -  Cefazolin: S <=2      -  Cefepime: S <=2      -  Piperacillin/Tazobactam: S <=8      -  Ciprofloxacin: S <=0.25      -  Imipenem: S <=1      -  Ceftriaxone: S <=1      -  Ampicillin: S <=8 These ampicillin results predict results for amoxicillin      Method Type: LOUISE      -  Meropenem: S <=1      -  Ampicillin/Sulbactam: S <=4/2      -  Cefoxitin: S <=8      -  Amoxicillin/Clavulanic Acid: S <=8/4      -  Trimethoprim/Sulfamethoxazole: S <=0.5/9.5      -  Ertapenem: S <=0.5    Urinalysis with Rflx Culture (collected 25 @ 20:06)    Culture - Urine (collected 25 @ 20:06)  Source: Clean Catch None  Final Report (25 @ 14:03):    50,000 - 99,000 CFU/mL Escherichia coli    <10,000 CFU/ml Normal Urogenital andra present  Organism: Escherichia coli (25 @ 14:03)  Organism: Escherichia coli (25 @ 14:03)      -  Levofloxacin: S <=0.5      -  Tobramycin: S <=2      -  Nitrofurantoin: S <=32 Should not be used to treat pyelonephritis      -  Aztreonam: S <=4      -  Gentamicin: S <=2      -  Cefazolin: S <=2 For uncomplicated UTI with K. pneumoniae, E. coli, or P. mirablis: LOUISE <=16 is sensitive and LOUISE >=32 is resistant. This also predicts results for oral agents cefaclor, cefdinir, cefpodoxime, cefprozil, cefuroxime axetil, cephalexin and locarbef for uncomplicated UTI. Note that some isolates may be susceptible to these agents while testing resistant to cefazolin.      -  Cefepime: S <=2      -  Piperacillin/Tazobactam: S <=8      -  Ciprofloxacin: S <=0.25      -  Imipenem: S <=1      -  Ceftriaxone: S <=1      -  Ampicillin: S <=8 These ampicillin results predict results for amoxicillin      Method Type: LOUISE      -  Meropenem: S <=1      -  Ampicillin/Sulbactam: S <=4/2      -  Cefoxitin: S <=8      -  Cefuroxime: S <=4      -  Amoxicillin/Clavulanic Acid: S <=8/4      -  Trimethoprim/Sulfamethoxazole: S <=0.5/9.5      -  Ertapenem: S <=0.5            INFECTIOUS DISEASES TESTING  MRSA PCR Result.: Negative (25 @ 00:40)      INFLAMMATORY MARKERS      RADIOLOGY & ADDITIONAL TESTS:  I have personally reviewed the last Chest xray  CXR      CT      CARDIOLOGY TESTING  12 Lead ECG:   Ventricular Rate 105 BPM    Atrial Rate 105 BPM    P-R Interval 140  <TRUNCATED> (25 @ 21:24)       MEDICATIONS  acetaminophen     Tablet .. 650 Oral every 6 hours  amLODIPine   Tablet 10 Oral daily  ampicillin/sulbactam  IVPB 3 IV Intermittent every 6 hours  ampicillin/sulbactam  IVPB     apixaban 5 Oral every 12 hours  chlorhexidine 2% Cloths 1 Topical <User Schedule>  cyanocobalamin 1000 Oral daily  ergocalciferol 98669 Oral <User Schedule>  hydrochlorothiazide 25 Oral daily  lactated ringers. 1000 IV Continuous <Continuous>  lactobacillus acidophilus 1 Oral daily  losartan 100 Oral daily  multivitamin/minerals 1 Oral daily  pantoprazole    Tablet 40 Oral before breakfast  tiotropium 2.5 MICROgram(s) Inhaler 2 Inhalation daily      ANTIBIOTICS:  ampicillin/sulbactam  IVPB 3 Gram(s) IV Intermittent every 6 hours  ampicillin/sulbactam  IVPB          ALLERGIES:  No Known Allergies

## 2025-06-02 ENCOUNTER — INPATIENT (INPATIENT)
Facility: HOSPITAL | Age: 65
LOS: 17 days | Discharge: HOME HEALTH | DRG: 948 | End: 2025-06-20
Attending: PHYSICAL MEDICINE & REHABILITATION | Admitting: PHYSICAL MEDICINE & REHABILITATION
Payer: COMMERCIAL

## 2025-06-02 ENCOUNTER — TRANSCRIPTION ENCOUNTER (OUTPATIENT)
Age: 65
End: 2025-06-02

## 2025-06-02 VITALS
OXYGEN SATURATION: 97 % | TEMPERATURE: 98 F | SYSTOLIC BLOOD PRESSURE: 115 MMHG | RESPIRATION RATE: 18 BRPM | HEART RATE: 99 BPM | DIASTOLIC BLOOD PRESSURE: 91 MMHG

## 2025-06-02 VITALS
DIASTOLIC BLOOD PRESSURE: 79 MMHG | HEIGHT: 62 IN | SYSTOLIC BLOOD PRESSURE: 133 MMHG | OXYGEN SATURATION: 97 % | TEMPERATURE: 97 F | WEIGHT: 291.01 LBS | HEART RATE: 106 BPM | RESPIRATION RATE: 18 BRPM

## 2025-06-02 DIAGNOSIS — Z98.84 BARIATRIC SURGERY STATUS: Chronic | ICD-10-CM

## 2025-06-02 DIAGNOSIS — Z98.891 HISTORY OF UTERINE SCAR FROM PREVIOUS SURGERY: Chronic | ICD-10-CM

## 2025-06-02 DIAGNOSIS — Z96.653 PRESENCE OF ARTIFICIAL KNEE JOINT, BILATERAL: Chronic | ICD-10-CM

## 2025-06-02 DIAGNOSIS — Z98.890 OTHER SPECIFIED POSTPROCEDURAL STATES: Chronic | ICD-10-CM

## 2025-06-02 DIAGNOSIS — I26.99 OTHER PULMONARY EMBOLISM WITHOUT ACUTE COR PULMONALE: ICD-10-CM

## 2025-06-02 DIAGNOSIS — Z90.49 ACQUIRED ABSENCE OF OTHER SPECIFIED PARTS OF DIGESTIVE TRACT: Chronic | ICD-10-CM

## 2025-06-02 DIAGNOSIS — R53.81 OTHER MALAISE: ICD-10-CM

## 2025-06-02 LAB
CULTURE RESULTS: ABNORMAL
GRAM STN FLD: ABNORMAL
GRAM STN FLD: SIGNIFICANT CHANGE UP
ORGANISM # SPEC MICROSCOPIC CNT: ABNORMAL
ORGANISM # SPEC MICROSCOPIC CNT: ABNORMAL
ORGANISM # SPEC MICROSCOPIC CNT: SIGNIFICANT CHANGE UP
SPECIMEN SOURCE: SIGNIFICANT CHANGE UP
SPECIMEN SOURCE: SIGNIFICANT CHANGE UP

## 2025-06-02 PROCEDURE — 99232 SBSQ HOSP IP/OBS MODERATE 35: CPT | Mod: 25

## 2025-06-02 PROCEDURE — 82746 ASSAY OF FOLIC ACID SERUM: CPT

## 2025-06-02 PROCEDURE — 97760 ORTHOTIC MGMT&TRAING 1ST ENC: CPT | Mod: GO

## 2025-06-02 PROCEDURE — 74177 CT ABD & PELVIS W/CONTRAST: CPT

## 2025-06-02 PROCEDURE — 86140 C-REACTIVE PROTEIN: CPT

## 2025-06-02 PROCEDURE — 97537 COMMUNITY/WORK REINTEGRATION: CPT | Mod: GO

## 2025-06-02 PROCEDURE — 80048 BASIC METABOLIC PNL TOTAL CA: CPT

## 2025-06-02 PROCEDURE — 36415 COLL VENOUS BLD VENIPUNCTURE: CPT

## 2025-06-02 PROCEDURE — 86850 RBC ANTIBODY SCREEN: CPT

## 2025-06-02 PROCEDURE — 86900 BLOOD TYPING SEROLOGIC ABO: CPT

## 2025-06-02 PROCEDURE — 97116 GAIT TRAINING THERAPY: CPT | Mod: GP

## 2025-06-02 PROCEDURE — 94640 AIRWAY INHALATION TREATMENT: CPT

## 2025-06-02 PROCEDURE — 93005 ELECTROCARDIOGRAM TRACING: CPT

## 2025-06-02 PROCEDURE — 97535 SELF CARE MNGMENT TRAINING: CPT | Mod: GO

## 2025-06-02 PROCEDURE — 97162 PT EVAL MOD COMPLEX 30 MIN: CPT | Mod: GP

## 2025-06-02 PROCEDURE — 82607 VITAMIN B-12: CPT

## 2025-06-02 PROCEDURE — 85652 RBC SED RATE AUTOMATED: CPT

## 2025-06-02 PROCEDURE — 85027 COMPLETE CBC AUTOMATED: CPT

## 2025-06-02 PROCEDURE — 83735 ASSAY OF MAGNESIUM: CPT

## 2025-06-02 PROCEDURE — 82306 VITAMIN D 25 HYDROXY: CPT

## 2025-06-02 PROCEDURE — 86901 BLOOD TYPING SEROLOGIC RH(D): CPT

## 2025-06-02 PROCEDURE — 97530 THERAPEUTIC ACTIVITIES: CPT | Mod: GP

## 2025-06-02 PROCEDURE — 85045 AUTOMATED RETICULOCYTE COUNT: CPT

## 2025-06-02 PROCEDURE — 83540 ASSAY OF IRON: CPT

## 2025-06-02 PROCEDURE — 97110 THERAPEUTIC EXERCISES: CPT | Mod: GO

## 2025-06-02 PROCEDURE — 97112 NEUROMUSCULAR REEDUCATION: CPT | Mod: GP

## 2025-06-02 PROCEDURE — 85025 COMPLETE CBC W/AUTO DIFF WBC: CPT

## 2025-06-02 PROCEDURE — 97140 MANUAL THERAPY 1/> REGIONS: CPT | Mod: GO

## 2025-06-02 PROCEDURE — 83550 IRON BINDING TEST: CPT

## 2025-06-02 PROCEDURE — 80053 COMPREHEN METABOLIC PANEL: CPT

## 2025-06-02 PROCEDURE — 97167 OT EVAL HIGH COMPLEX 60 MIN: CPT | Mod: GO

## 2025-06-02 RX ORDER — ACETAMINOPHEN 500 MG/5ML
650 LIQUID (ML) ORAL EVERY 6 HOURS
Refills: 0 | Status: DISCONTINUED | OUTPATIENT
Start: 2025-06-02 | End: 2025-06-20

## 2025-06-02 RX ORDER — ALBUTEROL SULFATE 2.5 MG/3ML
2 VIAL, NEBULIZER (ML) INHALATION EVERY 6 HOURS
Refills: 0 | Status: DISCONTINUED | OUTPATIENT
Start: 2025-06-02 | End: 2025-06-20

## 2025-06-02 RX ORDER — CYST/ALA/Q10/PHOS.SER/DHA/BROC 100-20-50
1 POWDER (GRAM) ORAL DAILY
Refills: 0 | Status: DISCONTINUED | OUTPATIENT
Start: 2025-06-02 | End: 2025-06-20

## 2025-06-02 RX ORDER — LACTOBACILLUS ACIDOPHILUS/PECT 75 MM-100
1 CAPSULE ORAL DAILY
Refills: 0 | Status: DISCONTINUED | OUTPATIENT
Start: 2025-06-02 | End: 2025-06-20

## 2025-06-02 RX ORDER — TIOTROPIUM BROMIDE INHALATION SPRAY 3.12 UG/1
2 SPRAY, METERED RESPIRATORY (INHALATION) DAILY
Refills: 0 | Status: DISCONTINUED | OUTPATIENT
Start: 2025-06-02 | End: 2025-06-20

## 2025-06-02 RX ORDER — CYANOCOBALAMIN 1000 UG/ML
1000 INJECTION INTRAMUSCULAR; SUBCUTANEOUS DAILY
Refills: 0 | Status: DISCONTINUED | OUTPATIENT
Start: 2025-06-02 | End: 2025-06-20

## 2025-06-02 RX ORDER — ERGOCALCIFEROL 1.25 MG/1
50000 CAPSULE ORAL
Refills: 0 | Status: DISCONTINUED | OUTPATIENT
Start: 2025-06-02 | End: 2025-06-20

## 2025-06-02 RX ORDER — SODIUM CHLORIDE 0.65 %
1 AEROSOL, SPRAY (ML) NASAL EVERY 4 HOURS
Refills: 0 | Status: DISCONTINUED | OUTPATIENT
Start: 2025-06-02 | End: 2025-06-20

## 2025-06-02 RX ORDER — LOSARTAN POTASSIUM 100 MG/1
100 TABLET, FILM COATED ORAL DAILY
Refills: 0 | Status: DISCONTINUED | OUTPATIENT
Start: 2025-06-02 | End: 2025-06-04

## 2025-06-02 RX ORDER — ERTAPENEM SODIUM 1 G/1
1 INJECTION, POWDER, LYOPHILIZED, FOR SOLUTION INTRAMUSCULAR; INTRAVENOUS
Qty: 28 | Refills: 0
Start: 2025-06-02 | End: 2025-06-29

## 2025-06-02 RX ORDER — MEROPENEM 1 G/30ML
1000 INJECTION INTRAVENOUS EVERY 8 HOURS
Refills: 0 | Status: DISCONTINUED | OUTPATIENT
Start: 2025-06-02 | End: 2025-06-05

## 2025-06-02 RX ORDER — ONDANSETRON HCL/PF 4 MG/2 ML
4 VIAL (ML) INJECTION EVERY 6 HOURS
Refills: 0 | Status: DISCONTINUED | OUTPATIENT
Start: 2025-06-02 | End: 2025-06-20

## 2025-06-02 RX ORDER — APIXABAN 2.5 MG/1
5 TABLET, FILM COATED ORAL EVERY 12 HOURS
Refills: 0 | Status: DISCONTINUED | OUTPATIENT
Start: 2025-06-02 | End: 2025-06-20

## 2025-06-02 RX ADMIN — Medication 1 TABLET(S): at 11:25

## 2025-06-02 RX ADMIN — MEROPENEM 100 MILLIGRAM(S): 1 INJECTION INTRAVENOUS at 13:12

## 2025-06-02 RX ADMIN — Medication 40 MILLIGRAM(S): at 05:35

## 2025-06-02 RX ADMIN — CYANOCOBALAMIN 1000 MICROGRAM(S): 1000 INJECTION INTRAMUSCULAR; SUBCUTANEOUS at 11:25

## 2025-06-02 RX ADMIN — LOSARTAN POTASSIUM 100 MILLIGRAM(S): 100 TABLET, FILM COATED ORAL at 05:36

## 2025-06-02 RX ADMIN — Medication 40 MILLIEQUIVALENT(S): at 03:22

## 2025-06-02 RX ADMIN — APIXABAN 5 MILLIGRAM(S): 2.5 TABLET, FILM COATED ORAL at 05:34

## 2025-06-02 RX ADMIN — MEROPENEM 100 MILLIGRAM(S): 1 INJECTION INTRAVENOUS at 21:28

## 2025-06-02 RX ADMIN — AMLODIPINE BESYLATE 10 MILLIGRAM(S): 10 TABLET ORAL at 05:35

## 2025-06-02 RX ADMIN — MEROPENEM 100 MILLIGRAM(S): 1 INJECTION INTRAVENOUS at 05:36

## 2025-06-02 RX ADMIN — Medication 1 APPLICATION(S): at 05:36

## 2025-06-02 RX ADMIN — APIXABAN 5 MILLIGRAM(S): 2.5 TABLET, FILM COATED ORAL at 17:38

## 2025-06-02 NOTE — PROGRESS NOTE ADULT - ASSESSMENT
64F with a PMHx of HTN, morbid obesity, LOUIS on CPAP, Abby en y gastric bypass, and a recent admission for an incarcerated ventral hernia w/ sbo s/p lap converted to open ventral hernia repair w/ mesh and small bowel resection and s/p ex lap, 30 cm sbr, creation of new side to side ileoileostomy, ventral hernia repair w/ mesh who presents back to the ER from her LTACH facility [facility] after persistent HTN, epistaxis in setting of eliquis use and complex surgical history.     Regular diet  Eliquis  continue dressing change daily with vashe, 0.25" packing in superior midline incision  f/u wound culture  ID recs appreciated  c/w IV abx  Pt/rehab  f/u SW   multimodal pain regimen    [phone]

## 2025-06-02 NOTE — H&P ADULT - NSHPREVIEWOFSYSTEMS_GEN_ALL_CORE
Review of Systems: Constitutional:    [   ] WNL           [   ] poor appetite   [   ] insomnia   [ x  ] tired   Cardio:                [  x ] WNL           [   ] CP   [   ] CEBALLOS   [   ] palpitations               Resp:                   [  x ] WNL           [   ] SOB   [   ] cough   [   ] wheezing   GI:                        [  x ] WNL           [   ] constipation   [   ] diarrhea   [   ] abdominal pain   [   ] nausea   [   ] emesis                                :                      [  x ] WNL           [   ] RODRIGUES  [   ] dysuria   [   ] difficulty voiding             Endo:                   [  x ] WNL          [   ] polyuria   [   ] temperature intolerance                 Skin:                     [   ] WNL          [   ] pain   [ x  ] wound-incision at abd site, c/d/i  [   ] rash   MSK:                    [   ] WNL          [   ] muscle pain   [ x  ] joint pain/ stiffness   [   ] muscle tenderness   [   ] swelling   Neuro:                 [   ] WNL          [   ] HA   [  ] change in vision   [   ] tremor   [ x  ] weakness   [   ]dysphagia              Cognitive:           [ x  ] WNL           [   ] occasional confusion      Psych:                  [  x ] WNL           [   ] hallucinations   [   ]agitation   [   ] delusion   [   ]depression

## 2025-06-02 NOTE — DISCHARGE NOTE PROVIDER - CARE PROVIDER_API CALL
Rose Lema  Surgery  [address] [place] [id number]  Phone: [phone]  Fax: [phone]  Follow Up Time: 2 weeks

## 2025-06-02 NOTE — DISCHARGE NOTE NURSING/CASE MANAGEMENT/SOCIAL WORK - FINANCIAL ASSISTANCE
[facility] provides services at a reduced cost to those who are determined to be eligible through [facility]’s financial assistance program. Information regarding [facility]’s financial assistance program can be found by going to [email or web address] or by calling [phone].

## 2025-06-02 NOTE — PROGRESS NOTE ADULT - SUBJECTIVE AND OBJECTIVE BOX
GENERAL SURGERY PROGRESS NOTE     Patient: NANCY SARGENT , 65y ([birth year])Female   MRN: [id number]  Location: [address]  Visit: 05-25-25 Inpatient  Date: 05-31-25 @ 03:44        Admitted :05-25-25 (6d)  LOS: 6d    Procedure/Dx/Injuries: Nosebleed         Events of past 24 hours: Denies nausea, vomiting, +G/+BM  >>> <<<>>> <<<>>> <<<>>> <<<>>> <<<>>> <<<>>> <<<>>> <<<>>> <<<>>> <<<    Vitals:   T(F): 97.7 (05-31-25 @ 00:09), Max: 98.5 (05-30-25 @ 21:00)  HR: 91 (05-31-25 @ 00:09)  BP: 116/82 (05-31-25 @ 00:09)  RR: 18 (05-31-25 @ 00:09)  SpO2: 97% (05-31-25 @ 00:09)      PHYSICAL EXAM:  General Appearance: NAD  HEENT: Normocephalic, atraumatic  Heart: s1, s2,    Lungs: No increased work of breathing or accessory muscle use  Abdomen:  Soft, nontender, nondistended.   MSK/Extremities: Warm & well-perfused.   Skin: Warm, dry. No jaundice.   >>> <<<>>> <<<>>> <<<>>> <<<>>> <<<>>> <<<>>> <<<>>> <<<>>> <<<>>> <<<   Is & Os:   Diet, DASH/TLC:   Sodium & Cholesterol Restricted    Fluids:     05-29-25 @ 07:01  -  05-30-25 @ 07:00  --------------------------------------------------------  IN:    IV PiggyBack: 100 mL    IV PiggyBack: 200 mL  Total IN: 300 mL    OUT:    Voided (mL): 600 mL  Total OUT: 600 mL    Total NET: -300 mL      >>> <<<>>> <<<>>> <<<>>> <<<>>> <<<>>> <<<>>> <<<>>> <<<>>> <<<>>> <<<    MEDICATIONS  (STANDING):  acetaminophen     Tablet .. 650 milliGRAM(s) Oral every 6 hours  amLODIPine   Tablet 10 milliGRAM(s) Oral daily  ampicillin/sulbactam  IVPB      ampicillin/sulbactam  IVPB 3 Gram(s) IV Intermittent every 6 hours  apixaban 5 milliGRAM(s) Oral every 12 hours  chlorhexidine 2% Cloths 1 Application(s) Topical <User Schedule>  cyanocobalamin 1000 MICROGram(s) Oral daily  ergocalciferol 01318 Unit(s) Oral <User Schedule>  hydrochlorothiazide 25 milliGRAM(s) Oral daily  lactated ringers. 1000 milliLiter(s) (100 mL/Hr) IV Continuous <Continuous>  lactobacillus acidophilus 1 Tablet(s) Oral daily  losartan 100 milliGRAM(s) Oral daily  multivitamin/minerals 1 Tablet(s) Oral daily  pantoprazole    Tablet 40 milliGRAM(s) Oral before breakfast  tiotropium 2.5 MICROgram(s) Inhaler 2 Puff(s) Inhalation daily    MEDICATIONS  (PRN):  albuterol    90 MICROgram(s) HFA Inhaler 2 Puff(s) Inhalation every 6 hours PRN Shortness of Breath and/or Wheezing  ondansetron Injectable 4 milliGRAM(s) IV Push every 6 hours PRN Nausea      DVT PROPHYLAXIS:   GI PROPHYLAXIS: pantoprazole    Tablet 40 milliGRAM(s) Oral before breakfast    ANTICOAGULATION:   ANTIBIOTICS:  ampicillin/sulbactam  IVPB    ampicillin/sulbactam  IVPB 3 Gram(s)      >>> <<<>>> <<<>>> <<<>>> <<<>>> <<<>>> <<<>>> <<<>>> <<<>>> <<<>>> <<<        LAB/STUDIES:  Labs:  CAPILLARY BLOOD GLUCOSE                              8.2    7.05  )-----------( 250      ( 29 May 2025 20:00 )             26.7         05-29    142  |  106  |  11  ----------------------------<  100[H]  3.7   |  20  |  0.9          LFTs:         Coags:            Urinalysis Basic - ( 29 May 2025 20:00 )    Color: x / Appearance: x / SG: x / pH: x  Gluc: 100 mg/dL / Ketone: x  / Bili: x / Urobili: x   Blood: x / Protein: x / Nitrite: x   Leuk Esterase: x / RBC: x / WBC x   Sq Epi: x / Non Sq Epi: x / Bacteria: x        Culture - Abscess with Gram Stain (collected 28 May 2025 16:55)  Source: Abscess abdominal midline wound  Gram Stain (30 May 2025 21:20):    Few polymorphonuclear leukocytes seen per low power field    No organisms seen per oil power field  Preliminary Report (30 May 2025 21:20):    Rare Enterococcus avium    Growth in fluid media only Escherichia coli    Rare Clostridium perfringens "Susceptibilities not performed"  
 GENERAL SURGERY PROGRESS NOTE     Patient: NANCY SARGENT , 65y ([birth year])Female   MRN: [id number]  Location: [address]  Visit: 05-25-25 Inpatient  Date: 05-26-25 @ 01:52        Admitted :05-25-25 (1d)  LOS: 1d    Procedure/Dx/Injuries: Nosebleed         Events of past 24 hours: Denies any nausea or vomiting. +G, +BM. /83 and low grade tachycardic up to 105  >>> <<<>>> <<<>>> <<<>>> <<<>>> <<<>>> <<<>>> <<<>>> <<<>>> <<<>>> <<<    Vitals:   T(F): 97.4 (05-25-25 @ 23:22), Max: 97.6 (05-25-25 @ 20:35)  HR: 99 (05-25-25 @ 23:22)  BP: 127/84 (05-25-25 @ 23:22)  RR: 18 (05-25-25 @ 23:22)  SpO2: 95% (05-25-25 @ 23:22)      PHYSICAL EXAM:  CONSTITUTIONAL: Well groomed, no apparent distress  ENMT: Oral mucosa with moist membranes.   RESP: No respiratory distress, no use of accessory muscles  CV: Tachycardic  GI: Soft, NT, ND, no rebound, no guarding; no palpable masses; no hernia palpated   - Midline incision clean, dry and intact, some staples still present on lower 1/3 of wound  SKIN: No rashes or ulcers noted  PSYCH: Appropriate insight/judgment; A+O x 3  >>> <<<>>> <<<>>> <<<>>> <<<>>> <<<>>> <<<>>> <<<>>> <<<>>> <<<>>> <<<   Is & Os:   Diet, DASH/TLC:   Sodium & Cholesterol Restricted    Fluids:       Bowel Movement: :   Flatus: :   >>> <<<>>> <<<>>> <<<>>> <<<>>> <<<>>> <<<>>> <<<>>> <<<>>> <<<>>> <<<    MEDICATIONS  (STANDING):  acetaminophen     Tablet .. 650 milliGRAM(s) Oral every 6 hours  amLODIPine   Tablet 10 milliGRAM(s) Oral daily  cyanocobalamin 1000 MICROGram(s) Oral daily  enoxaparin Injectable 40 milliGRAM(s) SubCutaneous every 12 hours  hydrochlorothiazide 25 milliGRAM(s) Oral daily  lactobacillus acidophilus 1 Tablet(s) Oral daily  losartan 100 milliGRAM(s) Oral daily  multivitamin/minerals 1 Tablet(s) Oral daily  pantoprazole    Tablet 40 milliGRAM(s) Oral before breakfast  tiotropium 2.5 MICROgram(s) Inhaler 2 Puff(s) Inhalation daily    MEDICATIONS  (PRN):  albuterol    90 MICROgram(s) HFA Inhaler 2 Puff(s) Inhalation every 6 hours PRN Shortness of Breath and/or Wheezing  ondansetron Injectable 4 milliGRAM(s) IV Push every 6 hours PRN Nausea      DVT PROPHYLAXIS: enoxaparin Injectable 40 milliGRAM(s) SubCutaneous every 12 hours    GI PROPHYLAXIS: pantoprazole    Tablet 40 milliGRAM(s) Oral before breakfast    ANTICOAGULATION:   ANTIBIOTICS:      >>> <<<>>> <<<>>> <<<>>> <<<>>> <<<>>> <<<>>> <<<>>> <<<>>> <<<>>> <<<        LAB/STUDIES:  Labs:  CAPILLARY BLOOD GLUCOSE                              9.1    8.11  )-----------( 252      ( 25 May 2025 18:00 )             29.1       Auto Immature Granulocyte %: 2.0 % (05-25-25 @ 18:00)    05-25    141  |  107  |  11  ----------------------------<  104[H]  4.5   |  23  |  0.6[L]      Calcium: 8.5 mg/dL (05-25-25 @ 18:00)      LFTs:         Coags:     11.50  ----< 0.98    ( 25 May 2025 18:00 )     28.4                Urinalysis Basic - ( 25 May 2025 18:00 )    Color: x / Appearance: x / SG: x / pH: x  Gluc: 104 mg/dL / Ketone: x  / Bili: x / Urobili: x   Blood: x / Protein: x / Nitrite: x   Leuk Esterase: x / RBC: x / WBC x   Sq Epi: x / Non Sq Epi: x / Bacteria: x        
Patient is a 65y old  Female who presents with a chief complaint of Epistaxis       HPI:  64F with a PMHx of HTN, morbid obesity, LOUIS on CPAP, Abby en y gastric bypass, and a recent admission for an incarcerated ventral hernia w/ sbo s/p lap converted to open ventral hernia repair w/ mesh and small bowel resection and s/p ex lap, 30 cm sbr, creation of new side to side ileoileostomy, ventral hernia repair w/ mesh who presents back to the ER from her LTACH facility [facility] after persistent HTN, epistaxis in setting of eliquis use and complex surgical history. Patient states her epistaxis has stopped after self-tamponade for 25 minutes. No complaints regarding midline abdominal ex lap healing incision. She has been tolerating diet, passing regular bowel movements without nausea or vomiting. Other than epistaxis her other complaint is that she claims the LTACH has been neglecting her and that she is not receiving any physical therapy.    Patient has been hospitalized since April s/p multiple abdominal surgeries, discharged to [facility] 5/23 for severe debility with inability to walk, and now returns 2 days later with Epistaxis. She also states that she is now having purulent discharge from her incision. She would like to be reconsidered for acute rehab at this time and feels that she could make good gains in a short time and be able to return home.    #Wound dehiscence and persistent intra-abdominal collection, suspect abscess    5/28 WCX   Rare Enterococcus avium   Ampicillin: S <=2     Growth in fluid media only Escherichia coli ESBL  Trimethoprim/Sulfamethoxazole: R >2/38 Ciprofloxacin: R >2    Rare Clostridium perfringens "Susceptibilities not performed"  < from: CT Abdomen and Pelvis No Cont (05.27.25 @ 09:42) >  Extensive ventral wall postsurgical changes noted with gas and fluid containing midline collection along the lower abdomen, measuring   approximately 4.8 x 4.5 x 10.2 cm.  Last seen by ID inpatient 5/1- At that time Repeat CT showing Redemonstrated large volume complex fluid in the pelvis and along the left paracolic gutter, compatible with blood products. 4/22 IR cx NG (but concerning for purulent appearing fluid)  -Discharged with Midline x ertapenem 1g q24h IV on D/C x 4 weeks E/D 5/19 4/17 fluid cx   Rare Escherichia coli    Rare Corynebacterium tuberculostearicum "Susceptibilities not performed"    4/10 WCX   Rare Bacteroides fragilis "Susceptibilities not performed"    Rare Clostridium clostridioforme "Susceptibilities not performed"  Few Escherichia coli  #Super Morbid Obesity BMI (kg/m2): 52.7, 53.8, 53.8      PHYSICAL EXAM    Vital Signs Last 24 Hrs  T(C): 36.4 (02 Jun 2025 08:11), Max: 36.9 (02 Jun 2025 00:26)  T(F): 97.6 (02 Jun 2025 08:11), Max: 98.4 (02 Jun 2025 00:26)  HR: 98 (02 Jun 2025 08:11) (97 - 108)  BP: 122/78 (02 Jun 2025 08:11) (109/68 - 129/81)  BP(mean): --  RR: 18 (02 Jun 2025 08:11) (18 - 18)  SpO2: 97% (02 Jun 2025 08:11) (95% - 99%)    Parameters below as of 01 Jun 2025 16:04  Patient On (Oxygen Delivery Method): room air        Constitutional - NAD  Chest - CTA  Cardiovascular - S1S2+  Abdomen -  Soft  Extremities -  No calf tenderness   Function : transfer CG assist / ambulate 30'x1, 20'x2 RW CG assist                 upper body dressing min A and lower body dressing dependent     acetaminophen     Tablet .. 650 milliGRAM(s) Oral every 6 hours  albuterol    90 MICROgram(s) HFA Inhaler 2 Puff(s) Inhalation every 6 hours PRN  amLODIPine   Tablet 10 milliGRAM(s) Oral daily  apixaban 5 milliGRAM(s) Oral every 12 hours  chlorhexidine 2% Cloths 1 Application(s) Topical <User Schedule>  cyanocobalamin 1000 MICROGram(s) Oral daily  ergocalciferol 08985 Unit(s) Oral <User Schedule>  hydrochlorothiazide 25 milliGRAM(s) Oral daily  lactated ringers. 1000 milliLiter(s) IV Continuous <Continuous>  lactobacillus acidophilus 1 Tablet(s) Oral daily  losartan 100 milliGRAM(s) Oral daily  meropenem  IVPB 1000 milliGRAM(s) IV Intermittent every 8 hours  multivitamin/minerals 1 Tablet(s) Oral daily  ondansetron Injectable 4 milliGRAM(s) IV Push every 6 hours PRN  pantoprazole    Tablet 40 milliGRAM(s) Oral before breakfast  sodium chloride 0.65% Nasal 1 Spray(s) Both Nostrils every 4 hours PRN  tiotropium 2.5 MICROgram(s) Inhaler 2 Puff(s) Inhalation daily      RECENT LABS/IMAGING                        7.6    6.57  )-----------( 242      ( 01 Jun 2025 21:23 )             24.7     06-01    144  |  106  |  14  ----------------------------<  123[H]  3.2[L]   |  26  |  0.9    Ca    8.2[L]      01 Jun 2025 21:23  Phos  3.3     06-01  Mg     1.6     06-01        Urinalysis Basic - ( 01 Jun 2025 21:23 )    Color: x / Appearance: x / SG: x / pH: x  Gluc: 123 mg/dL / Ketone: x  / Bili: x / Urobili: x   Blood: x / Protein: x / Nitrite: x   Leuk Esterase: x / RBC: x / WBC x   Sq Epi: x / Non Sq Epi: x / Bacteria: x            
GENERAL SURGERY PROGRESS NOTE     NANCY SARGENT  [address] [place] [address] day :8d    24 hour events: midline dressing changed.    PHYSICAL EXAM:  General Appearance: NAD  HEENT: Normocephalic, atraumatic  Heart: NSR  Lungs: No increased work of breathing or accessory muscle use  Abdomen:  Soft, nontender, nondistended. abd binder on, dressing underneath intact.  MSK/Extremities: Warm & well-perfused.   Skin: Warm, dry. No jaundice.      T(F): 97.9 (06-01-25 @ 00:35), Max: 97.9 (06-01-25 @ 00:35)  HR: 93 (06-01-25 @ 00:35) (76 - 93)  BP: 109/65 (06-01-25 @ 00:35) (109/65 - 142/88)  ABP: --  ABP(mean): --  RR: 18 (06-01-25 @ 00:35) (18 - 18)  SpO2: 96% (06-01-25 @ 00:35) (96% - 97%)    IN'S / OUT's:    05-30-25 @ 07:01  -  05-31-25 @ 07:00  --------------------------------------------------------  IN:    IV PiggyBack: 200 mL  Total IN: 200 mL    OUT:    Voided (mL): 1800 mL  Total OUT: 1800 mL    Total NET: -1600 mL          MEDICATIONS  (STANDING):  acetaminophen     Tablet .. 650 milliGRAM(s) Oral every 6 hours  amLODIPine   Tablet 10 milliGRAM(s) Oral daily  ampicillin/sulbactam  IVPB      ampicillin/sulbactam  IVPB 3 Gram(s) IV Intermittent every 6 hours  apixaban 5 milliGRAM(s) Oral every 12 hours  chlorhexidine 2% Cloths 1 Application(s) Topical <User Schedule>  cyanocobalamin 1000 MICROGram(s) Oral daily  ergocalciferol 52799 Unit(s) Oral <User Schedule>  hydrochlorothiazide 25 milliGRAM(s) Oral daily  lactated ringers. 1000 milliLiter(s) (100 mL/Hr) IV Continuous <Continuous>  lactobacillus acidophilus 1 Tablet(s) Oral daily  losartan 100 milliGRAM(s) Oral daily  multivitamin/minerals 1 Tablet(s) Oral daily  pantoprazole    Tablet 40 milliGRAM(s) Oral before breakfast  tiotropium 2.5 MICROgram(s) Inhaler 2 Puff(s) Inhalation daily    MEDICATIONS  (PRN):  albuterol    90 MICROgram(s) HFA Inhaler 2 Puff(s) Inhalation every 6 hours PRN Shortness of Breath and/or Wheezing  ondansetron Injectable 4 milliGRAM(s) IV Push every 6 hours PRN Nausea      LABS  Labs:  CAPILLARY BLOOD GLUCOSE                      LFTs:         Coags:                    RADIOLOGY & ADDITIONAL TESTS:  
GENERAL SURGERY PROGRESS NOTE    Patient: NANCY SARGENT , 65y (62)Female   MRN: [id number]  Location: [address]  Visit: 25 Inpatient  Date: 25 @ 00:46    Events of past 24 hours: patient seen and examined at bedside. midline dressing changed, some purulence noted, labs obtained to assess for leukocytosis, however, WBC is downtrending. No acute complaints, no acute events overnight.     PAST MEDICAL & SURGICAL HISTORY:  Gastric bypass status for obesity      LOUIS (obstructive sleep apnea)      S/P gastric bypass      S/P       S/P small bowel resection      History of total bilateral knee replacement (TKR)      H/O colonoscopy        Vitals:   T(F): 98.4 (25 @ 00:26), Max: 98.6 (25 @ 08:22)  HR: 99 (25 @ 00:26)  BP: 109/68 (25 @ 00:26)  RR: 18 (25 @ 00:26)  SpO2: 95% (25 @ 00:26)      Diet, DASH/TLC:   Sodium & Cholesterol Restricted      Fluids:     I & O's:    25 @ 07:01  -  25 @ 07:00  --------------------------------------------------------  IN:  Total IN: 0 mL    OUT:    Voided (mL): 800 mL  Total OUT: 800 mL    Total NET: -800 mL    PHYSICAL EXAM:  General Appearance: NAD  HEENT: Normocephalic, atraumatic  Heart: NSR  Lungs: No increased work of breathing or accessory muscle use  Abdomen:  Soft, nontender, nondistended. abd binder on, dressing underneath intact.  MSK/Extremities: Warm & well-perfused.   Skin: Warm, dry. No jaundice.    MEDICATIONS  (STANDING):  acetaminophen     Tablet .. 650 milliGRAM(s) Oral every 6 hours  amLODIPine   Tablet 10 milliGRAM(s) Oral daily  apixaban 5 milliGRAM(s) Oral every 12 hours  chlorhexidine 2% Cloths 1 Application(s) Topical <User Schedule>  cyanocobalamin 1000 MICROGram(s) Oral daily  ergocalciferol 11629 Unit(s) Oral <User Schedule>  hydrochlorothiazide 25 milliGRAM(s) Oral daily  lactated ringers. 1000 milliLiter(s) (100 mL/Hr) IV Continuous <Continuous>  lactobacillus acidophilus 1 Tablet(s) Oral daily  losartan 100 milliGRAM(s) Oral daily  meropenem  IVPB 1000 milliGRAM(s) IV Intermittent every 8 hours  multivitamin/minerals 1 Tablet(s) Oral daily  pantoprazole    Tablet 40 milliGRAM(s) Oral before breakfast  potassium chloride    Tablet ER 40 milliEquivalent(s) Oral every 4 hours  tiotropium 2.5 MICROgram(s) Inhaler 2 Puff(s) Inhalation daily    MEDICATIONS  (PRN):  albuterol    90 MICROgram(s) HFA Inhaler 2 Puff(s) Inhalation every 6 hours PRN Shortness of Breath and/or Wheezing  ondansetron Injectable 4 milliGRAM(s) IV Push every 6 hours PRN Nausea  sodium chloride 0.65% Nasal 1 Spray(s) Both Nostrils every 4 hours PRN Nasal Congestion  GI PROPHYLAXIS: pantoprazole    Tablet 40 milliGRAM(s) Oral before breakfast  ANTIBIOTICS:  meropenem  IVPB 1000 milliGRAM(s)    LAB/STUDIES:  Labs:  CAPILLARY BLOOD GLUCOSE                        7.6    6.57  )-----------( 242      ( 2025 21:23 )             24.7       Auto Immature Granulocyte %: 1.4 % (25 @ 21:23)        144  |  106  |  14  ----------------------------<  123[H]  3.2[L]   |  26  |  0.9      Calcium: 8.2 mg/dL (25 @ 21:23)
Procedure/ diagnosis:  s/p lap converted to open ventral hernia repair w/ mesh and small bowel resection and s/p ex lap, 30 cm sbr, creation of new side to side ileoileostomy, ventral hernia repair w/ mesh       PAST MEDICAL & SURGICAL HISTORY:  Gastric bypass status for obesity      LOUIS (obstructive sleep apnea)      S/P gastric bypass      S/P       S/P small bowel resection      History of total bilateral knee replacement (TKR)      H/O colonoscopy            24H EVENTS: CT scan done                       staples removed                      IR consult for drainage    Vital Signs Last 24 Hrs  T(C): 36.7 (27 May 2025 23:38), Max: 36.9 (27 May 2025 12:18)  T(F): 98 (27 May 2025 23:38), Max: 98.4 (27 May 2025 12:18)  HR: 91 (28 May 2025 05:45) (91 - 113)  BP: 117/66 (28 May 2025 05:45) (117/66 - 133/86)  BP(mean): 101 (27 May 2025 16:19) (101 - 101)  RR: 18 (27 May 2025 23:38) (18 - 18)  SpO2: 95% (27 May 2025 23:38) (95% - 96%)    Parameters below as of 27 May 2025 23:38  Patient On (Oxygen Delivery Method): room air    I&O's Detail    27 May 2025 07:01  -  28 May 2025 07:00  --------------------------------------------------------  IN:    Lactated Ringers: 800 mL  Total IN: 800 mL    OUT:    Voided (mL): 1600 mL  Total OUT: 1600 mL    Total NET: -800 mL          Urinalysis Basic - ( 27 May 2025 20:00 )    Color: x / Appearance: x / SG: x / pH: x  Gluc: 109 mg/dL / Ketone: x  / Bili: x / Urobili: x   Blood: x / Protein: x / Nitrite: x   Leuk Esterase: x / RBC: x / WBC x   Sq Epi: x / Non Sq Epi: x / Bacteria: x                   8.2    7.41  )-----------( 247      (  @ 20:00 )             25.9                8.5    7.75  )-----------( 248      (  @ 20:24 )             27.0                    141   |  105   |  14                 Ca: 7.9    BMP:   ----------------------------< 109    M.9   (25 @ 20:00)             3.8    |  21    | 0.9                Ph: 3.8          Urinalysis Basic - ( 27 May 2025 20:00 )    Color: x / Appearance: x / SG: x / pH: x  Gluc: 109 mg/dL / Ketone: x  / Bili: x / Urobili: x   Blood: x / Protein: x / Nitrite: x   Leuk Esterase: x / RBC: x / WBC x   Sq Epi: x / Non Sq Epi: x / Bacteria: x            MEDICATIONS  (STANDING):  acetaminophen     Tablet .. 650 milliGRAM(s) Oral every 6 hours  amLODIPine   Tablet 10 milliGRAM(s) Oral daily  ampicillin/sulbactam  IVPB      ampicillin/sulbactam  IVPB 3 Gram(s) IV Intermittent every 6 hours  chlorhexidine 2% Cloths 1 Application(s) Topical <User Schedule>  cyanocobalamin 1000 MICROGram(s) Oral daily  enoxaparin Injectable 40 milliGRAM(s) SubCutaneous every 12 hours  hydrochlorothiazide 25 milliGRAM(s) Oral daily  lactated ringers. 1000 milliLiter(s) (100 mL/Hr) IV Continuous <Continuous>  lactobacillus acidophilus 1 Tablet(s) Oral daily  losartan 100 milliGRAM(s) Oral daily  multivitamin/minerals 1 Tablet(s) Oral daily  pantoprazole    Tablet 40 milliGRAM(s) Oral before breakfast  tiotropium 2.5 MICROgram(s) Inhaler 2 Puff(s) Inhalation daily    MEDICATIONS  (PRN):  albuterol    90 MICROgram(s) HFA Inhaler 2 Puff(s) Inhalation every 6 hours PRN Shortness of Breath and/or Wheezing  ondansetron Injectable 4 milliGRAM(s) IV Push every 6 hours PRN Nausea      Diet, NPO after Midnight:      NPO Start Date: 27-May-2025,   NPO Start Time: 23:59  Except Medications (25 @ 14:34)  Diet, DASH/TLC:   Sodium & Cholesterol Restricted (25 @ 11:11)      PHYSICAL EXAM:  General Appearance: pt in NAD  Chest: + air entry bilat  CV: S1, S2,  Abdomen: Soft, NT, ND Obese  dressing intact   Extremities: + ROM  Neuro: A&Ox3      
SURGERY PROGRESS NOTE    Patient: NANCY SARGENT , 65y (62)Female   MRN: [id number]  Location: [address]  Visit: 25 Inpatient  Date: 25 @ 07:12    Hospital Day #: 3    Events of past 24 hours: Yesterday patient walked 6 steps, continued to work in the bed with arm and leg exercises and felt better overall. Tolerating her diet, no nausea/vomit. Passing gas and BM. Midline wound with pus, d/u cultures.    PHYSICAL EXAM:  CONSTITUTIONAL: Well groomed, no apparent distress  ENMT: Oral mucosa with moist membranes.   RESP: No respiratory distress, no use of accessory muscles  CV: Tachycardic  GI: Soft, NT, ND, no rebound, no guarding; no palpable masses; no hernia palpated   - Midline incision clean, dry and intact, some staples still present on lower 1/3 of wound  SKIN: No rashes or ulcers noted  PSYCH: Appropriate insight/judgment; A+O x 3    PAST MEDICAL & SURGICAL HISTORY:  Gastric bypass status for obesity      LOUIS (obstructive sleep apnea)      S/P gastric bypass      S/P       S/P small bowel resection      History of total bilateral knee replacement (TKR)      H/O colonoscopy            Vitals:   T(F): 97.7 (25 @ 04:25), Max: 98.2 (25 @ 12:01)  HR: 90 (25 @ 04:25)  BP: 123/83 (25 @ 04:25)  RR: 18 (25 @ 04:25)  SpO2: 97% (25 @ 04:25)      Diet, DASH/TLC:   Sodium & Cholesterol Restricted      Fluids:     I & O's:    25 @ 07:01  -  25 @ 07:00  --------------------------------------------------------  IN:  Total IN: 0 mL    OUT:    Voided (mL): 1700 mL  Total OUT: 1700 mL    Total NET: -1700 mL          MEDICATIONS  (STANDING):  acetaminophen     Tablet .. 650 milliGRAM(s) Oral every 6 hours  amLODIPine   Tablet 10 milliGRAM(s) Oral daily  amoxicillin  875 milliGRAM(s)/clavulanate 1 Tablet(s) Oral every 12 hours  apixaban 5 milliGRAM(s) Oral every 12 hours  cyanocobalamin 1000 MICROGram(s) Oral daily  hydrochlorothiazide 25 milliGRAM(s) Oral daily  lactobacillus acidophilus 1 Tablet(s) Oral daily  losartan 100 milliGRAM(s) Oral daily  multivitamin/minerals 1 Tablet(s) Oral daily  pantoprazole    Tablet 40 milliGRAM(s) Oral before breakfast  tiotropium 2.5 MICROgram(s) Inhaler 2 Puff(s) Inhalation daily    MEDICATIONS  (PRN):  albuterol    90 MICROgram(s) HFA Inhaler 2 Puff(s) Inhalation every 6 hours PRN Shortness of Breath and/or Wheezing  ondansetron Injectable 4 milliGRAM(s) IV Push every 6 hours PRN Nausea      DVT PROPHYLAXIS:   GI PROPHYLAXIS: pantoprazole    Tablet 40 milliGRAM(s) Oral before breakfast    ANTICOAGULATION:   ANTIBIOTICS:  amoxicillin  875 milliGRAM(s)/clavulanate 1 Tablet(s)            LAB/STUDIES:  Labs:  CAPILLARY BLOOD GLUCOSE                              8.5    7.75  )-----------( 248      ( 26 May 2025 20:24 )             27.0       Auto Immature Granulocyte %: 2.2 % (25 @ 20:24)        142  |  106  |  18  ----------------------------<  115[H]  3.8   |  25  |  0.8      Calcium: 8.2 mg/dL (25 @ 20:24)      LFTs:         Coags:     11.50  ----< 0.98    ( 25 May 2025 18:00 )     28.4                Urinalysis Basic - ( 26 May 2025 20:24 )    Color: x / Appearance: x / SG: x / pH: x  Gluc: 115 mg/dL / Ketone: x  / Bili: x / Urobili: x   Blood: x / Protein: x / Nitrite: x   Leuk Esterase: x / RBC: x / WBC x   Sq Epi: x / Non Sq Epi: x / Bacteria: x            
GENERAL SURGERY PROGRESS NOTE    Patient: NANCY SARGENT , 65y (62)Female   MRN: [id number]  Location: [address]  Visit: 25 Inpatient  Date: 25 @ 00:56    Events of past 24 hours: patient seen and examined at bedside. went to IR for drainage of abdominal wall collection, however, no  drainable collection found. Patient says she was able to stand up a few times with nurses. +G/+BM, -n/v.     PAST MEDICAL & SURGICAL HISTORY:  Gastric bypass status for obesity      LOUIS (obstructive sleep apnea)      S/P gastric bypass      S/P       S/P small bowel resection      History of total bilateral knee replacement (TKR)      H/O colonoscopy        Vitals:   T(F): 98.1 (25 @ 15:44), Max: 98.1 (25 @ 15:44)  HR: 106 (25 @ 15:44)  BP: 128/87 (25 @ 15:44)  RR: 18 (25 @ 15:44)  SpO2: 97% (25 @ 15:44)      Diet, DASH/TLC:   Sodium & Cholesterol Restricted      Fluids:     I & O's:    25 @ 07:01  -  25 @ 07:00  --------------------------------------------------------  IN:    Lactated Ringers: 800 mL  Total IN: 800 mL    OUT:    Voided (mL): 1600 mL  Total OUT: 1600 mL    Total NET: -800 mL    PHYSICAL EXAM:  General Appearance: pt in NAD  Chest: normal respiratory effort  CV: HDS  Abdomen: Soft, NT, ND Obese, midline dressing in place, c/d/i  Extremities: + ROM  Neuro: A&Ox3    MEDICATIONS  (STANDING):  acetaminophen     Tablet .. 650 milliGRAM(s) Oral every 6 hours  amLODIPine   Tablet 10 milliGRAM(s) Oral daily  ampicillin/sulbactam  IVPB      ampicillin/sulbactam  IVPB 3 Gram(s) IV Intermittent every 6 hours  apixaban 5 milliGRAM(s) Oral every 12 hours  chlorhexidine 2% Cloths 1 Application(s) Topical <User Schedule>  cyanocobalamin 1000 MICROGram(s) Oral daily  hydrochlorothiazide 25 milliGRAM(s) Oral daily  lactated ringers. 1000 milliLiter(s) (100 mL/Hr) IV Continuous <Continuous>  lactobacillus acidophilus 1 Tablet(s) Oral daily  losartan 100 milliGRAM(s) Oral daily  magnesium sulfate  IVPB 2 Gram(s) IV Intermittent once  multivitamin/minerals 1 Tablet(s) Oral daily  pantoprazole    Tablet 40 milliGRAM(s) Oral before breakfast  potassium chloride    Tablet ER 20 milliEquivalent(s) Oral every 2 hours  tiotropium 2.5 MICROgram(s) Inhaler 2 Puff(s) Inhalation daily    MEDICATIONS  (PRN):  albuterol    90 MICROgram(s) HFA Inhaler 2 Puff(s) Inhalation every 6 hours PRN Shortness of Breath and/or Wheezing  ondansetron Injectable 4 milliGRAM(s) IV Push every 6 hours PRN Nausea    GI PROPHYLAXIS: pantoprazole    Tablet 40 milliGRAM(s) Oral before breakfast    ANTICOAGULATION:   ANTIBIOTICS:  ampicillin/sulbactam  IVPB    ampicillin/sulbactam  IVPB 3 Gram(s)      LAB/STUDIES:  Labs:  CAPILLARY BLOOD GLUCOSE                        8.3    6.46  )-----------( 257      ( 28 May 2025 19:55 )             26.4       Auto Immature Granulocyte %: 1.7 % (25 @ 19:55)        141  |  105  |  13  ----------------------------<  117[H]  3.7   |  24  |  0.9      Calcium: 8.1 mg/dL (25 @ 19:55)
SURGERY PROGRESS NOTE    Patient: NANCY SARGENT , 65y (62)Female   MRN: [id number]  Location: [address]  Visit: 25 Inpatient  Date: 25 @ 00:09    Events of past 24 hours: No acute events over night. Patient continues to do excercises in bed when possible. Tolerating her diet, +G/+BM, -n/v.     PHYSICAL EXAM:  General Appearance: pt in NAD  Chest: normal respiratory effort  CV: HDS  Abdomen: Soft, NT, ND Obese, midline dressing in place, c/d/i  Extremities: + ROM  Neuro: A&Ox3      PAST MEDICAL & SURGICAL HISTORY:  Gastric bypass status for obesity      LOUIS (obstructive sleep apnea)      S/P gastric bypass      S/P       S/P small bowel resection      History of total bilateral knee replacement (TKR)      H/O colonoscopy            Vitals:   T(F): 97.5 (25 @ 23:20), Max: 98.6 (25 @ 08:05)  HR: 98 (25 @ 23:20)  BP: 146/80 (25 @ 23:20)  RR: 18 (25 @ 23:20)  SpO2: 96% (25 @ 23:20)      Diet, DASH/TLC:   Sodium & Cholesterol Restricted      Fluids:     I & O's:    25 @ 07:01  -  25 @ 07:00  --------------------------------------------------------  IN:  Total IN: 0 mL    OUT:    Voided (mL): 2800 mL  Total OUT: 2800 mL    Total NET: -2800 mL          MEDICATIONS  (STANDING):  acetaminophen     Tablet .. 650 milliGRAM(s) Oral every 6 hours  amLODIPine   Tablet 10 milliGRAM(s) Oral daily  ampicillin/sulbactam  IVPB      ampicillin/sulbactam  IVPB 3 Gram(s) IV Intermittent every 6 hours  apixaban 5 milliGRAM(s) Oral every 12 hours  chlorhexidine 2% Cloths 1 Application(s) Topical <User Schedule>  cyanocobalamin 1000 MICROGram(s) Oral daily  ergocalciferol 99351 Unit(s) Oral <User Schedule>  hydrochlorothiazide 25 milliGRAM(s) Oral daily  lactated ringers. 1000 milliLiter(s) (100 mL/Hr) IV Continuous <Continuous>  lactobacillus acidophilus 1 Tablet(s) Oral daily  losartan 100 milliGRAM(s) Oral daily  multivitamin/minerals 1 Tablet(s) Oral daily  pantoprazole    Tablet 40 milliGRAM(s) Oral before breakfast  tiotropium 2.5 MICROgram(s) Inhaler 2 Puff(s) Inhalation daily    MEDICATIONS  (PRN):  albuterol    90 MICROgram(s) HFA Inhaler 2 Puff(s) Inhalation every 6 hours PRN Shortness of Breath and/or Wheezing  ondansetron Injectable 4 milliGRAM(s) IV Push every 6 hours PRN Nausea      DVT PROPHYLAXIS:   GI PROPHYLAXIS: pantoprazole    Tablet 40 milliGRAM(s) Oral before breakfast    ANTICOAGULATION:   ANTIBIOTICS:  ampicillin/sulbactam  IVPB    ampicillin/sulbactam  IVPB 3 Gram(s)            LAB/STUDIES:  Labs:  CAPILLARY BLOOD GLUCOSE                              8.2    7.05  )-----------( 250      ( 29 May 2025 20:00 )             26.7       Auto Immature Granulocyte %: 1.4 % (25 @ 20:00)        142  |  106  |  11  ----------------------------<  100[H]  3.7   |  20  |  0.9      Calcium: 8.0 mg/dL (25 @ 20:00)      LFTs:         Coags:            Urinalysis Basic - ( 29 May 2025 20:00 )    Color: x / Appearance: x / SG: x / pH: x  Gluc: 100 mg/dL / Ketone: x  / Bili: x / Urobili: x   Blood: x / Protein: x / Nitrite: x   Leuk Esterase: x / RBC: x / WBC x   Sq Epi: x / Non Sq Epi: x / Bacteria: x        Culture - Abscess with Gram Stain (collected 28 May 2025 16:55)  Source: Abscess abdominal midline wound  Gram Stain (29 May 2025 02:10):    Few polymorphonuclear leukocytes seen per low power field    No organisms seen per oil power field  Preliminary Report (29 May 2025 22:25):    No growth

## 2025-06-02 NOTE — H&P ADULT - HISTORY OF PRESENT ILLNESS
63 yo F with a PMHx of HTN, morbid obesity, LOUIS on CPAP, elisha en y gastric bypass, and a recent admission for an incarcerated ventral hernia w/ SBO s/p lap converted to open ventral hernia repair w/ mesh and SBR and s/p ex lap, 30 cm sbr, creation of new side to side ileoileostomy, and ventral hernia repair w/ mesh presents back to the ER from her LTACH facility ([facility]) after persistent HTN, epistaxis in setting of eliquis use and complex surgical history. Patient notes her epistaxis has stopped after self-tamponade for 25 minutes. No complaints regarding midline abdominal ex lap healing incision. She has been tolerating diet, passing regular bowel movements and voiding without nausea or vomiting.     Patient has been hospitalized since April 2025 s/p multiple abdominal surgeries, discharged to [facility] 5/23 for severe debility with inability to walk, and now returns 2 days later with epistaxis. She also states that she is now having purulent discharge from her incision. CT AP (5/27) shows extensive ventral wall postsurgical changes with gas and fluid containing midline collection along the lower abdomen, measuring  approximately 4.8 x 4.5 x 10.2 cm. Wound culture positive for rare enterococcus avium (5/28).     #Wound dehiscence and persistent intra-abdominal collection, suspect abscess    5/28 WCX   Rare Enterococcus avium   Ampicillin: S <=2     Growth in fluid media only Escherichia coli ESBL  Trimethoprim/Sulfamethoxazole: R >2/38 Ciprofloxacin: R >2    Rare Clostridium perfringens "Susceptibilities not performed"    Last seen by ID inpatient 5/1- At that time Repeat CT showing Redemonstrated large volume complex fluid in the pelvis and along the left paracolic gutter, compatible with blood products. 4/22 IR cx NG (but concerning for purulent appearing fluid)  -Discharged with Midline x ertapenem 1g q24h IV on D/C x 4 weeks E/D 5/19     4  #Super Morbid Obesity BMI (kg/m2): 52.7, 53.8, 53.8       66 yo F with a PMHx of HTN, morbid obesity, DVT, PE (R distal main with segmental extension) on Eliquis, LOUIS on CPAP, elisha en y gastric bypass, and a recent admission for an incarcerated ventral hernia w/ SBO s/p lap converted to open ventral hernia repair w/ mesh and SBR and s/p ex lap, 30 cm SBR, creation of new side to side ileoileostomy, and ventral hernia repair w/ mesh in April 2025 presents back to the ER 2 days later from her LTACH facility ([place]) after persistent HTN and epistaxis in setting of Eliquis use and complex surgical history. Patient notes her epistaxis has stopped after self-tamponade for 25 minutes. On admission, patient endorsed purulent discharge from her incision. CT AP (5/27) showed extensive ventral wall postsurgical changes with gas and fluid containing midline collection along the lower abdomen, measuring  approximately 4.8 x 4.5 x 10.2 cm. IR evaluated collection and did not recommend a drain. Wound culture positive for rare enterococcus avium (5/28). Patient currently on abx via midline per ID recs. Patient has been tolerating diet, passing regular bowel movements and voiding without nausea or vomiting. Denies SOB, chest pain, fever or chills. Rates her abd pain 1/10 at its worst.     Prior to the debility with proximal LE weakness and marked decline s/p multiple abd surgeries with complications, the patient was independent in ADLs and ambulation. Patient now requires moderate assistance with ambulation and ADLs 2/2 to the debility. Therefore, there is a significant functional decline from baseline. Patients also with medical comorbidities of recent PE in R distal main with segmental extension and DVT on Eliquis, LOUIS on CPAP, HTN, and morbid obesity requiring medication management and monitoring of vitals by Physiatry team and nursing. There are significant comorbidities which warrants acute rehab hospitalization. To return home it is in the patient’s best interest to have acute inpatient rehabilitative services to undergo intensive interdisciplinary therapy. Of note, the patient lives alone with multiple steps and would have difficulty performing ADLs and iADLs individually. Furthermore, the patient is motivated and is able to tolerate up to 3 hours of daily therapy for 5-6 days a week for a total of 15 hours a week. Evaluated by Physiatry and deemed to be an excellent candidate for admission to  for acute inpatient rehab. Admitted to Acute Rehab on 6/2/2025.      Patient seen and examined by Physiatry and is currently functioning at bed mobility minimum assist, sit to stand transfer with CGA, ambulates 30ftx1 with RW, CGA and 1P assist, UBD minimum assist, and LBD dependent..     LS: Patient. lives with  but came from LTACH after last admission  PLOF: Independent with all ADLs and iADLs and ambulates without assistive device.      64 yo F with a PMHx of HTN, severe obesity, DVT, PE (R distal main with segmental extension) on Eliquis, LOUIS on CPAP, elisha en y gastric bypass, and a recent admission for an incarcerated ventral hernia w/ SBO s/p lap converted to open ventral hernia repair w/ mesh and SBR and s/p ex lap, 30 cm SBR, creation of new side to side ileoileostomy, and ventral hernia repair w/ mesh in April 2025 presents back to the ER 2 days later from her LTACH facility ([facility]) after persistent HTN and epistaxis in setting of Eliquis use and complex surgical history. Patient notes her epistaxis has stopped after self-tamponade for 25 minutes. On admission, patient endorsed purulent discharge from her incision. CT AP (5/27) showed extensive ventral wall postsurgical changes with gas and fluid containing midline collection along the lower abdomen, measuring  approximately 4.8 x 4.5 x 10.2 cm. IR evaluated collection and did not recommend a drain. Wound culture positive for rare enterococcus avium (5/28). Patient currently on abx via midline per ID recs. Patient has been tolerating diet, passing regular bowel movements and voiding without nausea or vomiting. Denies SOB, chest pain, fever or chills. Rates her abd pain 1/10 at its worst.     Prior to the debility with proximal LE weakness and marked decline s/p multiple abd surgeries with complications, the patient was independent in ADLs and ambulation. Patient now requires moderate assistance with ambulation and ADLs 2/2 to the debility. Therefore, there is a significant functional decline from baseline. Patients also with medical comorbidities of recent PE in R distal main with segmental extension and DVT on Eliquis, LOUIS on CPAP, HTN, and morbid obesity requiring medication management and monitoring of vitals by Physiatry team and nursing. There are significant comorbidities which warrants acute rehab hospitalization. To return home it is in the patient’s best interest to have acute inpatient rehabilitative services to undergo intensive interdisciplinary therapy. Of note, the patient lives alone with multiple steps and would have difficulty performing ADLs and iADLs individually. Furthermore, the patient is motivated and is able to tolerate up to 3 hours of daily therapy for 5-6 days a week for a total of 15 hours a week. Evaluated by Physiatry and deemed to be an excellent candidate for admission to  for acute inpatient rehab. Admitted to Acute Rehab on 6/2/2025.      Patient seen and examined by Physiatry and is currently functioning at bed mobility minimum assist, sit to stand transfer with CGA, ambulates 30ftx1 with RW, CGA and 1P assist, UBD minimum assist, and LBD dependent..     LS: Patient. lives with  but came from LTACH after last admission  PLOF: Independent with all ADLs and iADLs and ambulates without assistive device.

## 2025-06-02 NOTE — DISCHARGE NOTE NURSING/CASE MANAGEMENT/SOCIAL WORK - PATIENT PORTAL LINK FT
You can access the FollowMyHealth Patient Portal offered by [facility] by registering at the following website: [email or web address]. By joining [facility]’s FollowMyHealth portal, you will also be able to view your health information using other applications (apps) compatible with our system.

## 2025-06-02 NOTE — PROGRESS NOTE ADULT - ASSESSMENT
Imp: Rehab of debility with proximal LE weakness and marked decline from baseline function, s/p multiple abdominal surgeries with complications / HTN, morbid obesity, LOUIS on CPAP, Abby en y gastric bypass, and a recent admission for an incarcerated ventral hernia w/ sbo s/p lap converted to open ventral hernia repair w/ mesh and small bowel resection and s/p ex lap, 30 cm sbr, creation of new side to side ileoileostomy, ventral hernia repair w/ mesh.   Prior to hospitalization she  was independent in all activities. Currently she  needs assist with all ADLs and ambulate short distance with walker with assist. She can tolerate 3hr/day PT/OT and medically necessary. Pt is very motivated. She needs acute inpatient rehab to improve functions for safe return home. Pt also needs physiatrist to monitor her medical status and functional progress during her rehab stay. She warrants acute inpatient rehab.     Plan: continue bedside therapy as tolerated          Good 4a acute inpatient rehab candidate once medically cleared

## 2025-06-02 NOTE — DISCHARGE NOTE PROVIDER - NSDCFUADDINST_GEN_ALL_CORE_FT
-Diet: Continue your DASH diet as tolerated.  -Activity: Avoid heavy lifting or straining (anything over 10-15 pounds) for at least 6 weeks. You may ambulate and otherwise resume normal daily activities as tolerated. Your abdominal binder may help with some discomfort while ambulating. Please take home your incentive spirometer and continue to use 10x/hour while awake.  -Incisions: Your dressings should be changed daily. You may shower, please cover packed vannessa of incisions. Please avoid scrubbing your incisions and do not submerge incisions in water for at least 2 weeks.  -Medications: You may resume your home medications. You may take Tylenol 650mg every 6 hours.  -Follow-up: Please call the office to schedule a follow-up appointment with Dr. Lema within 2 weeks, or follow-up as previously scheduled.  -Please call the office or return to the ED with persistent fever greater than 100.4F, chest pain, shortness of breath, uncontrollable nausea/vomiting/abdominal pain, constipation, bloody bowel movements, abdominal distention, inability to tolerate oral intake.

## 2025-06-02 NOTE — DISCHARGE NOTE PROVIDER - NSDCFUSCHEDAPPT_GEN_ALL_CORE_FT
Mehdi Gtz  [facility]  Scheduled Appointment: 06/18/2025    [facility]  [address]  [address]  Scheduled Appointment: 06/18/2025    Erica Abbott  [facility]  [address]  Scheduled Appointment: 07/25/2025

## 2025-06-02 NOTE — H&P ADULT - REASON FOR ADMISSION
Rehab of debility with proximal LE weakness and marked decline from baseline function, s/p multiple abdominal surgeries with complications / HTN, morbid obesity, LOUIS on CPAP, Abby en y gastric bypass, and a recent admission for an incarcerated ventral hernia w/ sbo s/p lap converted to open ventral hernia repair w/ mesh and small bowel resection and s/p ex lap, 30 cm sbr, creation of new side to side ileoileostomy, ventral hernia repair w/ mesh. Rehab of debility with proximal LE weakness and marked decline from baseline function, s/p multiple abdominal surgeries with complications / HTN, severe obesity, LOUIS on CPAP, Abby en y gastric bypass, and a recent admission for an incarcerated ventral hernia w/ sbo s/p lap converted to open ventral hernia repair w/ mesh and small bowel resection and s/p ex lap, 30 cm sbr, creation of new side to side ileoileostomy, ventral hernia repair w/ mesh.

## 2025-06-02 NOTE — CHART NOTE - NSCHARTNOTESELECT_GEN_ALL_CORE
Wound Care Instruction/Event Note
Wound Drainage/Event Note
Gastroenterology Nutrition/Nutrition Services
Interventional Radiology/Event Note

## 2025-06-02 NOTE — H&P ADULT - ASSESSMENT
#Wound dehiscence and persistent intra-abdominal collection, suspect abscess in setting of multiple abdominal surgeries with complications    5/28 WCX   Rare Enterococcus avium   Ampicillin: S <=2     Growth in fluid media only Escherichia coli ESBL  Trimethoprim/Sulfamethoxazole: R >2/38 Ciprofloxacin: R >2    Rare Clostridium perfringens "Susceptibilities not performed"  -General Surgery following for wound dressing changes   -Per Surgery, dressing change daily with vashe and 0.25" packing in superior midline incision  -ID following for abx recs.   -Continue with multimodal pain regimen  -PT/OT    #HTN  #LOUIS on CPAP  #Morbid obesity  Mrs. Eddy is a 66 yo F with a PMHx of HTN, morbid obesity, DVT, PE (R distal main with segmental extension) on Eliquis, LOUIS on CPAP, abby en y gastric bypass, and a recent admission for an incarcerated ventral hernia w/ SBO s/p lap converted to open ventral hernia repair w/ mesh and SBR and s/p ex lap, 30 cm SBR, creation of new side to side ileoileostomy, and ventral hernia repair w/ mesh in April 2025 presented back to the ER 2 days later from her LTACH facility ([facility]) after persistent HTN and epistaxis in setting of Eliquis use and complex surgical history. Patient found to have extensive ventral wall postsurgical changes with gas and fluid containing midline collection along the lower abdomen, measuring  approximately 4.8 x 4.5 x 10.2 cm and being managed with abx.    Plan:  #Rehab of debility with proximal LE weakness and marked decline from baseline function, s/p multiple abdominal surgeries with complications / HTN, morbid obesity, LOUIS on CPAP, Abby en y gastric bypass, and a recent admission for an incarcerated ventral hernia w/ sbo s/p lap converted to open ventral hernia repair w/ mesh and small bowel resection and s/p ex lap, 30 cm sbr, creation of new side to side ileoileostomy, ventral hernia repair w/ mesh.with impairment in mobility and ADLs and iADLs.The patient presented with co-morbidities requiring close physiatry follow-up at least 3 times a week to monitor his progress and monitor his comorbidities including HTN, PE, DVT, and LOUIS. .The patient's co-morbidities do not limit the patient's ability to participate in rehabilitative therapy. The patient was seen by PT/OT and required min A with bed mobility, CGA with sit to stand transfers, ambulates 30ftx1 with RW, CGA and 1P assist, UBD minimum assist, and LBD dependent.. The patient was previously independent with all ADLs and iADLs without use of AD and now presents with functional decline. The patient can benefit from and tolerate 3 hours of restorative therapy daily. The patient is an excellent acute inpatient rehabilitation candidate.       #Wound dehiscence and persistent intra-abdominal collection, suspect abscess in setting of multiple abdominal surgeries with complications  -5/28 Wcx grew rare Enterococcus avium    -Growth in fluid media only Escherichia coli ESBL    -General Surgery following for wound dressing changes   -Per Surgery, dressing change daily with vashe and 0.25" packing in superior midline incision  -ID following for abx recs. Currently on ampicillin/sulbactam IVPB 3g q6h  -Continue with Tylenol and adjust pain management as needed.  -PT/OT    #PE in R distal main with segmental extension  #DVT  -c/w Eliquis    Epistaxis   -c/w OCEAN spray    #HTN  -Continue with HCTZ and losartan.   Amolodipine held in setting of pitting edema.  -Will continue to monitor hemodynamic status and adjust medications as needed    #LOUIS on CPAP    #Morbid obesity     -Pain control: Tylenol     -GI/Bowel Mgmt: Miralax, Senna    -Bladder management: N/A     -Skin: abd incision site c/d/i    -FEN :N/A    - Diet: DASH/TLC Sodium & Cholesterol Restricted    Precautions / PROPHYLAXIS:      - Falls      - DVT prophylaxis: Eliquis      MEDICAL PROGNOSIS: GOOD            REHAB POTENTIAL: GOOD             ESTIMATED DISPOSITION: HOME WITH HOME CARE       [ x ]  The goals of the IRF admission were discussed with the patient and family member, who agreed             ELOS:  [  x   ] 7-14    [   ]  14-21    [    ]  Other    THERAPY ORDERS and INITIAL INDIVIDUALIZED PLAN OF CARE:  This initial individualized interdisciplinary plan of care, which was established by me (the attending physiatrist), is based on elements from the post admission evaluation. The interdisciplinary therapy program is to be at least 3 hrs a day, at least 5 days per week from from physical, occupational and/ or speech therapies as ordered by me below.      [ x  ] P.T. 90 mins. /day at least 5 out of 7 days:  [  x ] superficial  modalities prn, [ x  ] A/AAROM, [ x  ] PREs, [ x  ] transfer training,            [ x  ] progressive ambulation, [x   ] stairs                                               [ x  ] O.T. 90 mins. /day at least 5 out of 7 days::  [ x  ] modalities prn,  [ x  ]A/AAROM, [ x  ] PREs, [  x ] functional transfer training, [ x  ] ADL training           [ x  ] cognitive/ perceptual eval and training, [   ] splint eval                                                  [   ] S.L.P:  [   ] speech eval, [   ] swallow eval     [   ] Neuropsychology eval     [ x  ] Individualized rec. therapy      RATIONALE FOR INPATIENT ADMISSION - Patient demonstrates the following: (check all that apply)  [X] Medically appropriate for acute rehabilitation admission. Requires interdisiplinary therapy consisting of at least PT and OT, at least 3 hrs. a day at least 5 days a week  [X] Has attainable rehab goals with an appropriate initial discharge plan  [X] Has rehabilitation potential (expected to make a significant improvement within a reasonable period of time)  [X] Requires close medical management by a rehab physician, rehab nursing care,  and comprehensive interdisciplinary team (including PT, OT)    [X] Requires evaluation by a physiatrist at least 3 days a week to evaluate and manage and coordinate rehab and medical problems  —--------------------------------------------------------------------------------------------------------------------------     Mrs. Eddy is a 64 yo F with a PMHx of HTN, severe obesity, DVT, PE (R distal main with segmental extension) on Eliquis, LOUIS on CPAP, abby en y gastric bypass, and a recent admission for an incarcerated ventral hernia w/ SBO s/p lap converted to open ventral hernia repair w/ mesh and SBR and s/p ex lap, 30 cm SBR, creation of new side to side ileoileostomy, and ventral hernia repair w/ mesh in April 2025 presented back to the ER 2 days later from her LTACH facility ([facility]) after persistent HTN and epistaxis in setting of Eliquis use and complex surgical history. Patient found to have extensive ventral wall postsurgical changes with gas and fluid containing midline collection along the lower abdomen, measuring  approximately 4.8 x 4.5 x 10.2 cm and being managed with abx.    Plan:  #Rehab of debility with proximal LE weakness and marked decline from baseline function, s/p multiple abdominal surgeries with complications / HTN, morbid obesity, LOUIS on CPAP, Abby en y gastric bypass, and a recent admission for an incarcerated ventral hernia w/ sbo s/p lap converted to open ventral hernia repair w/ mesh and small bowel resection and s/p ex lap, 30 cm sbr, creation of new side to side ileoileostomy, ventral hernia repair w/ mesh.with impairment in mobility and ADLs and iADLs.The patient presented with co-morbidities requiring close physiatry follow-up at least 3 times a week to monitor his progress and monitor his comorbidities including HTN, PE, DVT, and LOUIS. .The patient's co-morbidities do not limit the patient's ability to participate in rehabilitative therapy. The patient was seen by PT/OT and required min A with bed mobility, CGA with sit to stand transfers, ambulates 30ftx1 with RW, CGA and 1P assist, UBD minimum assist, and LBD dependent.. The patient was previously independent with all ADLs and iADLs without use of AD and now presents with functional decline.   #Wound dehiscence and persistent intra-abdominal collection, suspect abscess in setting of multiple abdominal surgeries with complications  -5/28 Wcx grew rare Enterococcus avium    -Growth in fluid media only Escherichia coli ESBL    -General Surgery following for wound dressing changes   -Per Surgery, dressing change daily with vashe and 0.25" packing in superior midline incision  -ID following for abx recs. Currently on ampicillin/sulbactam IVPB 3g q6h. On discharge from acute rehab to home she   will require Ertapenem 1 gram IVPB daily.    -Continue with Tylenol and adjust pain management as needed.  -She requires acute rehab with 3 hours of daily therapies at least 5 out of 7 days and close physiatry follow up.     #PE in R distal main with segmental extension  #DVT  -c/w Eliquis    Epistaxis   -c/w Ocean nasal spray    #Chronic venous insufficiency  Her blood pressure has been soft. I halted the amlodipine to aid her 2+ bipedal edema.   She avoid salty snacks including salted pretzels.    #HTN  -Continue with HCTZ and losartan.   -Amolodipine was halted in the setting of pitting edema on 6/2.  -Will continue to monitor hemodynamic status and adjust medications as needed; hydralazine could be added if an antihypertensive is needed in the future.     #LOUIS on CPAP  Stable    #Morbid obesity  Dietary education     -Pain control: Tylenol     -GI/Bowel Mgmt: Miralax, Senna    -Bladder management: N/A     -Skin: abd incision site c/d/i    -FEN :N/A    - Diet: DASH/TLC Sodium & Cholesterol Restricted    Precautions / PROPHYLAXIS:      - Falls      - DVT prophylaxis: Eliquis      MEDICAL PROGNOSIS: GOOD            REHAB POTENTIAL: GOOD             ESTIMATED DISPOSITION: HOME WITH HOME CARE       [ x ]  The goals of the IRF admission were discussed with the patient and family member, who agreed             ELOS:  [  x   ] 7-14    [   ]  14-21    [    ]  Other    THERAPY ORDERS and INITIAL INDIVIDUALIZED PLAN OF CARE:  This initial individualized interdisciplinary plan of care, which was established by me (the attending physiatrist), is based on elements from the post admission evaluation. The interdisciplinary therapy program is to be at least 3 hrs a day, at least 5 days per week from from physical, occupational and/ or speech therapies as ordered by me below.      [ x  ] P.T. 90 mins. /day at least 5 out of 7 days:  [  x ] superficial  modalities prn, [ x  ] A/AAROM, [ x  ] PREs, [ x  ] transfer training,            [ x  ] progressive ambulation, [x   ] stairs                                               [ x  ] O.T. 90 mins. /day at least 5 out of 7 days::  [ x  ] modalities prn,  [ x  ]A/AAROM, [ x  ] PREs, [  x ] functional transfer training, [ x  ] ADL training           [ x  ] cognitive/ perceptual eval and training, [   ] splint eval                                                  [   ] S.L.P:  [   ] speech eval, [   ] swallow eval     [   ] Neuropsychology eval     [ x  ] Individualized rec. therapy      RATIONALE FOR INPATIENT ADMISSION - Patient demonstrates the following: (check all that apply)  [X] Medically appropriate for acute rehabilitation admission. Requires interdisciplinary therapy consisting of at least PT and OT, at least 3 hrs. a day at least 5 days a week  [X] Has attainable rehab goals with an appropriate initial discharge plan  [X] Has rehabilitation potential (expected to make a significant improvement within a reasonable period of time)  [X] Requires close medical management by a rehab physician, rehab nursing care,  and comprehensive interdisciplinary team (including PT, OT)    [X] Requires evaluation by a physiatrist at least 3 days a week to evaluate and manage and coordinate rehab and medical problems  —--------------------------------------------------------------------------------------------------------------------------     Mrs. Eddy is a 64 yo F with a PMHx of HTN, severe obesity, DVT, PE (R distal main with segmental extension) on Eliquis, LOUIS on CPAP, abby en y gastric bypass, and a recent admission for an incarcerated ventral hernia w/ SBO s/p lap converted to open ventral hernia repair w/ mesh and SBR and s/p ex lap, 30 cm SBR, creation of new side to side ileoileostomy, and ventral hernia repair w/ mesh in April 2025 presented back to the ER 2 days later from her LTACH facility ([facility]) after persistent HTN and epistaxis in setting of Eliquis use and complex surgical history. Patient found to have extensive ventral wall postsurgical changes with gas and fluid containing midline collection along the lower abdomen, measuring  approximately 4.8 x 4.5 x 10.2 cm and being managed with abx.    Plan:  #Rehab of debility with proximal LE weakness and marked decline from baseline function, s/p multiple abdominal surgeries with complications / HTN, morbid obesity, LOUIS on CPAP, Abby en y gastric bypass, and a recent admission for an incarcerated ventral hernia w/ sbo s/p lap converted to open ventral hernia repair w/ mesh and small bowel resection and s/p ex lap, 30 cm sbr, creation of new side to side ileoileostomy, ventral hernia repair w/ mesh.with impairment in mobility and ADLs and iADLs.The patient presented with co-morbidities requiring close physiatry follow-up at least 3 times a week to monitor his progress and monitor his comorbidities including HTN, PE, DVT, and LOUIS. .The patient's co-morbidities do not limit the patient's ability to participate in rehabilitative therapy. The patient was seen by PT/OT and required min A with bed mobility, CGA with sit to stand transfers, ambulates 30ftx1 with RW, CGA and 1P assist, UBD minimum assist, and LBD dependent.. The patient was previously independent with all ADLs and iADLs without use of AD and now presents with functional decline.   #Wound dehiscence and persistent intra-abdominal collection, suspect abscess in setting of multiple abdominal surgeries with complications  -5/28 Wcx grew rare Enterococcus avium    -Growth in fluid media only Escherichia coli ESBL    -General Surgery following for wound dressing changes   -Per Surgery, dressing change daily with vashe and 0.25" packing in superior midline incision  -Infectious Disease is following. She is currently on Meropenem 1 gram IVPB q8h. On discharge from acute rehab to home she   will require Ertapenem 1 gram IVPB daily.    -Continue with Tylenol and adjust pain management as needed.  -She requires acute rehab with 3 hours of daily therapies at least 5 out of 7 days and close physiatry follow up.     #PE in R distal main with segmental extension  #DVT  -c/w Eliquis    Epistaxis   -c/w Ocean nasal spray    #Chronic venous insufficiency  Her blood pressure has been soft. I halted the amlodipine to aid her 2+ bipedal edema.   She avoid salty snacks including salted pretzels.    #HTN  -Continue with HCTZ and losartan.   -Amolodipine was halted in the setting of pitting edema on 6/2.  -Will continue to monitor hemodynamic status and adjust medications as needed; hydralazine could be added if an antihypertensive is needed in the future.     #LOUIS on CPAP  Stable    #Morbid obesity  Dietary education     -Pain control: Tylenol     -GI/Bowel Mgmt: Miralax, Senna    -Bladder management: N/A     -Skin: abd incision site c/d/i    -FEN :N/A    - Diet: DASH/TLC Sodium & Cholesterol Restricted    Precautions / PROPHYLAXIS:      - Falls      - DVT prophylaxis: Eliquis      MEDICAL PROGNOSIS: GOOD            REHAB POTENTIAL: GOOD             ESTIMATED DISPOSITION: HOME WITH HOME CARE       [ x ]  The goals of the IRF admission were discussed with the patient and family member, who agreed             ELOS:  [  x   ] 7-14    [   ]  14-21    [    ]  Other    THERAPY ORDERS and INITIAL INDIVIDUALIZED PLAN OF CARE:  This initial individualized interdisciplinary plan of care, which was established by me (the attending physiatrist), is based on elements from the post admission evaluation. The interdisciplinary therapy program is to be at least 3 hrs a day, at least 5 days per week from from physical, occupational and/ or speech therapies as ordered by me below.      [ x  ] P.T. 90 mins. /day at least 5 out of 7 days:  [  x ] superficial  modalities prn, [ x  ] A/AAROM, [ x  ] PREs, [ x  ] transfer training,            [ x  ] progressive ambulation, [x   ] stairs                                               [ x  ] O.T. 90 mins. /day at least 5 out of 7 days::  [ x  ] modalities prn,  [ x  ]A/AAROM, [ x  ] PREs, [  x ] functional transfer training, [ x  ] ADL training           [ x  ] cognitive/ perceptual eval and training, [   ] splint eval                                                  [   ] S.L.P:  [   ] speech eval, [   ] swallow eval     [   ] Neuropsychology eval     [ x  ] Individualized rec. therapy      RATIONALE FOR INPATIENT ADMISSION - Patient demonstrates the following: (check all that apply)  [X] Medically appropriate for acute rehabilitation admission. Requires interdisciplinary therapy consisting of at least PT and OT, at least 3 hrs. a day at least 5 days a week  [X] Has attainable rehab goals with an appropriate initial discharge plan  [X] Has rehabilitation potential (expected to make a significant improvement within a reasonable period of time)  [X] Requires close medical management by a rehab physician, rehab nursing care,  and comprehensive interdisciplinary team (including PT, OT)    [X] Requires evaluation by a physiatrist at least 3 days a week to evaluate and manage and coordinate rehab and medical problems  —--------------------------------------------------------------------------------------------------------------------------

## 2025-06-02 NOTE — DISCHARGE NOTE PROVIDER - NSDCCPCAREPLAN_GEN_ALL_CORE_FT
PRINCIPAL DISCHARGE DIAGNOSIS  Diagnosis: Nosebleed  Assessment and Plan of Treatment:       SECONDARY DISCHARGE DIAGNOSES  Diagnosis: H/O abdominal surgery  Assessment and Plan of Treatment:

## 2025-06-02 NOTE — CHART NOTE - NSCHARTNOTEFT_GEN_A_CORE
Wound Care Instructions:  Patient has a midline wound with staples and small 1 cm area of packing. Please remove and replace dressings once daily. Packing 1 cm opening with 0.25 inch packing strip, cut to length appropriate for wound. Cover incision with gauze and secure with paper tape.

## 2025-06-02 NOTE — H&P ADULT - ATTENDING COMMENTS
[FreeTextEntry1] : rto 3 months Patient seen and examined with the resident. We discussed the case. I have directed the care. I edited the note. The patient requires acute rehab with 3 hours of daily therapies at least 5 out of 7 days. She is a 66 yo F with a PMHx of HTN, severe obesity, DVT, PE (R distal main with segmental extension) on Eliquis, LOUIS on CPAP, elisha en y gastric bypass, and a recent admission for an incarcerated ventral hernia w/ SBO s/p lap converted to open ventral hernia repair w/ mesh and SBR and s/p ex lap, 30 cm SBR, creation of new side to side ileoileostomy, and ventral hernia repair w/ mesh in April 2025 presents back to the ER 2 days later from her LTACH facility ([place]) after persistent HTN and epistaxis in setting of Eliquis use and complex surgical history. Patient notes her epistaxis has stopped after self-tamponade for 25 minutes. On admission, patient endorsed purulent discharge from her incision. CT AP (5/27) showed extensive ventral wall postsurgical changes with gas and fluid containing midline collection along the lower abdomen, measuring  approximately 4.8 x 4.5 x 10.2 cm. IR evaluated collection and did not recommend a drain. Wound culture positive for rare enterococcus avium (5/28). Patient currently on abx via midline per ID recs. Patient has been tolerating diet, passing regular bowel movements and voiding without nausea or vomiting. Denies SOB, chest pain, fever or chills. Rates her abd pain 1/10 at its worst.   Prior to the debility with proximal LE weakness and marked decline s/p multiple abd surgeries with complications, the patient was independent in ADLs and ambulation. Patient now requires moderate assistance with ambulation and ADLs 2/2 to the debility. Therefore, there is a significant functional decline from baseline. She requires close physiatry monitoring of her medical comorbidities including recent PE in R distal main with segmental extension and chronic venous insufficiency, DVT on Eliquis, LOUIS on CPAP, HTN, and severe obesity. She is benefitting form close physiatry monitoring on the fist day of her rehab admission. Her blood pressure is soft and she has chronic venous insufficiency. She was advised that she should refrain from salted pretzels snacks and I halted the amlodipine 10 mg daily. Hydralazine could be added to her ARB and HCTZ if another antihypertensive is required.   She requires the intensity of acute rehab  and is currently functioning at bed mobility minimum assist, sit to stand transfer with CGA, ambulates 30ftx1 with RW, CGA and 1P assist, UBD minimum assist, and LBD dependent..   PLOF: Independent with all ADLs and iADLs and ambulates without assistive device.  Close physiatry monitoring will reduce the risk of her rehospitalization. Patient seen and examined with the resident. We discussed the case. I have directed the care. I edited the note. The patient requires acute rehab with 3 hours of daily therapies at least 5 out of 7 days. She is a 66 yo F with a PMHx of HTN, severe obesity, DVT, PE (R distal main with segmental extension) on Eliquis, LOUIS on CPAP, abby en y gastric bypass, and a recent admission for an incarcerated ventral hernia w/ SBO s/p lap converted to open ventral hernia repair w/ mesh and SBR and s/p ex lap, 30 cm SBR, creation of new side to side ileoileostomy, and ventral hernia repair w/ mesh in April 2025 presents back to the ER 2 days later from her LTACH facility ([place]) after persistent HTN and epistaxis in setting of Eliquis use and complex surgical history. Patient notes her epistaxis has stopped after self-tamponade for 25 minutes. On admission, patient endorsed purulent discharge from her incision. CT AP (5/27) showed extensive ventral wall postsurgical changes with gas and fluid containing midline collection along the lower abdomen, measuring  approximately 4.8 x 4.5 x 10.2 cm. IR evaluated collection and did not recommend a drain. Wound culture positive for rare enterococcus avium (5/28). Patient currently on abx via midline per ID recs. Patient has been tolerating diet, passing regular bowel movements and voiding without nausea or vomiting. Denies SOB, chest pain, fever or chills. Rates her abd pain 1/10 at its worst.   Prior to the debility with proximal LE weakness and marked decline s/p multiple abd surgeries with complications, the patient was independent in ADLs and ambulation. Patient now requires moderate assistance with ambulation and ADLs 2/2 to the debility. Therefore, there is a significant functional decline from baseline. She requires close physiatry monitoring of her medical comorbidities including recent PE in R distal main with segmental extension and chronic venous insufficiency, DVT on Eliquis, LOUIS on CPAP, HTN, and severe obesity. She is benefitting form close physiatry monitoring on the fist day of her rehab admission. Her blood pressure is soft and she has chronic venous insufficiency. She was advised that she should refrain from salted pretzels snacks and I halted the amlodipine 10 mg daily. Hydralazine could be added to her ARB and HCTZ if another antihypertensive is required.   She requires the intensity of acute rehab  and is currently functioning at bed mobility minimum assist, sit to stand transfer with CGA, ambulates 30ftx1 with RW, CGA and 1P assist, UBD minimum assist, and LBD dependent..   PLOF: Independent with all ADLs and iADLs and ambulates without assistive device.  Close physiatry monitoring will reduce the risk of her rehospitalization.  #Rehab of debility with proximal LE weakness and marked decline from baseline function, s/p multiple abdominal surgeries with complications / HTN, morbid obesity, LOUIS on CPAP, Abby en y gastric bypass, and a recent admission for an incarcerated ventral hernia w/ sbo s/p lap converted to open ventral hernia repair w/ mesh and small bowel resection and s/p ex lap, 30 cm sbr, creation of new side to side ileoileostomy, ventral hernia repair w/ mesh.with impairment in mobility and ADLs and iADLs.The patient presented with co-morbidities requiring close physiatry follow-up at least 3 times a week to monitor his progress and monitor his comorbidities including HTN, PE, DVT, and LOUIS. .The patient's co-morbidities do not limit the patient's ability to participate in rehabilitative therapy. The patient was seen by PT/OT and required min A with bed mobility, CGA with sit to stand transfers, ambulates 30ftx1 with RW, CGA and 1P assist, UBD minimum assist, and LBD dependent.. The patient was previously independent with all ADLs and iADLs without use of AD and now presents with functional decline.   #Wound dehiscence and persistent intra-abdominal collection, suspect abscess in setting of multiple abdominal surgeries with complications  -5/28 Wcx grew rare Enterococcus avium    -Growth in fluid media only Escherichia coli ESBL    -General Surgery following for wound dressing changes   -Per Surgery, dressing change daily with vashe and 0.25" packing in superior midline incision  -Infectious Disease is following. She is currently on Meropenem 1 gram IVPB q8h. On discharge from acute rehab to home she   will require Ertapenem 1 gram IVPB daily.    -Continue with Tylenol and adjust pain management as needed.  -She requires acute rehab with 3 hours of daily therapies at least 5 out of 7 days and close physiatry follow up.     #PE in R distal main with segmental extension  #DVT  -c/w Eliquis    Epistaxis   -c/w Ocean nasal spray    #Chronic venous insufficiency  Her blood pressure has been soft. I halted the amlodipine to aid her 2+ bipedal edema.   She avoid salty snacks including salted pretzels.  #HTN  -Continue with HCTZ and losartan.   -Amolodipine was halted in the setting of pitting edema on 6/2.  -Will continue to monitor hemodynamic status and adjust medications as needed; hydralazine could be added if an antihypertensive is needed in the future.   #LOUIS on CPAP  Stable  #Morbid obesity  Dietary education Patient seen and examined with the resident. We discussed the case. I have directed the care. I edited the note. The patient requires acute rehab with 3 hours of daily therapies at least 5 out of 7 days. She is a 64 yo F with a PMHx of HTN, severe obesity, DVT, PE (R distal main with segmental extension) on Eliquis, LOUIS on CPAP, abby en y gastric bypass, and a recent admission for an incarcerated ventral hernia w/ SBO s/p lap converted to open ventral hernia repair w/ mesh and SBR and s/p ex lap, 30 cm SBR, creation of new side to side ileoileostomy, and ventral hernia repair w/ mesh in April 2025 presents back to the ER 2 days later from her LTACH facility ([place]) after persistent HTN and epistaxis in setting of Eliquis use and complex surgical history. Patient notes her epistaxis has stopped after self-tamponade for 25 minutes. On admission, patient endorsed purulent discharge from her incision. CT AP (5/27) showed extensive ventral wall postsurgical changes with gas and fluid containing midline collection along the lower abdomen, measuring  approximately 4.8 x 4.5 x 10.2 cm. IR evaluated collection and did not recommend a drain. Wound culture positive for rare enterococcus avium (5/28). Patient currently on abx via midline per ID recs. Patient has been tolerating diet, passing regular bowel movements and voiding without nausea or vomiting. Denies SOB, chest pain, fever or chills. Rates her abd pain 1/10 at its worst.   Prior to the debility with proximal LE weakness and marked decline s/p multiple abd surgeries with complications, the patient was independent in ADLs and ambulation. Patient now requires moderate assistance with ambulation and ADLs 2/2 to the debility. Therefore, there is a significant functional decline from baseline. She requires close physiatry monitoring of her medical comorbidities including recent PE in R distal main with segmental extension and chronic venous insufficiency, DVT on Eliquis, LOUIS on CPAP, HTN, and severe obesity. She is benefitting form close physiatry monitoring on the fist day of her rehab admission. Her blood pressure is soft and she has chronic venous insufficiency. She was advised that she should refrain from salted pretzels snacks and I halted the amlodipine 10 mg daily. Hydralazine could be added to her ARB and HCTZ if another antihypertensive is required.   She requires the intensity of acute rehab  and is currently functioning at bed mobility minimum assist, sit to stand transfer with CGA, ambulates 30ftx1 with RW, CGA and 1P assist, UBD minimum assist, and LBD dependent..   PLOF: Independent with all ADLs and iADLs and ambulates without assistive device.  Close physiatry monitoring will reduce the risk of her rehospitalization.  #Rehab of debility with proximal LE weakness and marked decline from baseline function, s/p multiple abdominal surgeries with complications / HTN, morbid obesity, LOUIS on CPAP, Abby en y gastric bypass, and a recent admission for an incarcerated ventral hernia w/ sbo s/p lap converted to open ventral hernia repair w/ mesh and small bowel resection and s/p ex lap, 30 cm sbr, creation of new side to side ileoileostomy, ventral hernia repair w/ mesh.with impairment in mobility and ADLs and iADLs.The patient presented with co-morbidities requiring close physiatry follow-up at least 3 times a week to monitor his progress and monitor his comorbidities including HTN, PE, DVT, and LOUIS. .The patient's co-morbidities do not limit the patient's ability to participate in rehabilitative therapy. The patient was seen by PT/OT and required min A with bed mobility, CGA with sit to stand transfers, ambulates 30ftx1 with RW, CGA and 1P assist, UBD minimum assist, and LBD dependent.. The patient was previously independent with all ADLs and iADLs without use of AD and now presents with functional decline.   #Wound dehiscence and persistent intra-abdominal collection, suspect abscess in setting of multiple abdominal surgeries with complications  -5/28 Wcx grew rare Enterococcus avium    -Growth in fluid media only Escherichia coli ESBL    -General Surgery following for wound dressing changes   -Per Surgery, dressing change daily with vashe and 0.25" packing in superior midline incision  -Infectious Disease is following. She is currently on Meropenem 1 gram IVPB q8h. On discharge from acute rehab to home she   will require Ertapenem 1 gram IVPB daily.    -Continue with Tylenol and adjust pain management as needed.  -She requires acute rehab with 3 hours of daily therapies at least 5 out of 7 days and close physiatry follow up.   #PE in R distal main with segmental extension  #DVT  -c/w Eliquis  Epistaxis   -c/w Ocean nasal spray  #Chronic venous insufficiency  Her blood pressure has been soft. I halted the amlodipine to aid her 2+ bipedal edema.   She avoid salty snacks including salted pretzels.  #HTN  -Continue with HCTZ and losartan.   -Amolodipine was halted in the setting of pitting edema on 6/2.  -Will continue to monitor hemodynamic status and adjust medications as needed; hydralazine could be added if an antihypertensive is needed in the future.   #LOUIS on CPAP  Stable  #Morbid obesity  Dietary education

## 2025-06-02 NOTE — DISCHARGE NOTE PROVIDER - NSDCMRMEDTOKEN_GEN_ALL_CORE_FT
acetaminophen 325 mg oral tablet: 2 tab(s) orally every 6 hours As needed Mild Pain (1 - 3)  amLODIPine 10 mg oral tablet: 1 tab(s) orally once a day  apixaban 5 mg oral tablet: 1 tab(s) orally every 12 hours  cyanocobalamin 2500 mcg sublingual tablet: 1 tab(s) sublingually once a day  hydroCHLOROthiazide 25 mg oral tablet: 1 tab(s) orally once a day  ipratropium-albuterol 0.5 mg-2.5 mg/3 mL inhalation solution: 3 milliliter(s) by nebulizer every 6 hours as needed for  shortness of breath and/or wheezing  lactobacillus acidophilus oral capsule: 1 cap(s) orally once a day  losartan 100 mg oral tablet: 1 tab(s) orally once a day  Multiple Vitamins with Minerals oral tablet: 1 tab(s) orally once a day  omeprazole 40 mg oral delayed release capsule: 1 cap(s) orally once a day as needed for  heartburn  Wegovy (2.4 mg dose) subcutaneous solution: 2.4 milligram(s) subcutaneously

## 2025-06-02 NOTE — H&P ADULT - NSHPPHYSICALEXAM_GEN_ALL_CORE
General:[  x ] NAD, Resting Comfortable,   [   ] other:                                HEENT: [ x  ] NC/AT, EOMI, PERRL , Normal Conjunctivae,   [   ] other:  Cardio: [  x ] RRR, no murmur,   [   ] other:                              Pulm: [  x ] No Respiratory Distress,  Lungs CTAB,   [   ] other:                       Abdomen: [  x ]ND/NT, Soft, abd incision site c/d/i   [   ] other:    : [  x ] NO RODRIGUES CATHETER, [   ] RODRIGUES CATHETER- no meatal tear, no discharge, [   ] other:                                            MSK: [ x  ] No joint swelling, Full ROM,   [   ] other:                                         Ext: [   ]No C/C/E, No calf tenderness,   [ x  ]other: 2+ pitting edema in BLE    Skin: [   ]intact,   [ x  ] other: abd incision c/d/i                                                                   Neurological Examination:  Cognitive: [  x  ] AAO x 3,   [    ]  other:                                                                        Attention:  [  x  ] intact,   [  x  ]  other:                             Memory: [  x  ] intact,  [    ]  other:      Mood/Affect: [ x   ] wnl,    [    ]  other:                                                                             Communication: [ x   ]Fluent, no dysarthria, following commands:  [    ] other:    CN II - XII:  [  x  ] intact,  [    ] other:                                                                                         Motor:   RIGHT UE:  3+/5 shoulder abduction, 5/5 elbow flexion/extension, 5/5 hand    LEFT    UE: 3+/5 shoulder abduction, 5/5 elbow flexion/extension, 5/5 hand   RIGHT LE: 3/5 hip flexion, 4+/5 knee flexion/extension, 4+/5 DF/PF (limited by pitting edema)  LEFT  LE:  3/5 hip flexion, 4+/5 knee flexion/extension, 4+/5 DF/PF (limited by pitting edema)    Tone: [  x  ] wnl,   [    ]  other:  DTRs: [  x ]symmetric, [   ] other:  Coordination:   [ x   ] intact,   [    ] other:                                                                         Sensory: [ x   ] Intact to light touch,   [    ] other: T(C): 36.8 (06-02-25 @ 16:02), Max: 36.9 (06-02-25 @ 00:26)  T(F): 98.2 (06-02-25 @ 16:02), Max: 98.4 (06-02-25 @ 00:26)  HR: 99 (06-02-25 @ 16:02) (97 - 99)  BP: 115/91 (06-02-25 @ 16:02) (109/68 - 123/77)  BP(mean): 99 (06-02-25 @ 16:02) (99 - 99)  RR: 18 (06-02-25 @ 16:02) (18 - 18)  SpO2: 97% (06-02-25 @ 16:02) (95% - 97%)  General:[  x ] NAD, Resting Comfortable,   [   ] other:                                HEENT: [ x  ] NC/AT, EOMI, PERRL , Normal Conjunctivae,   [   ] other:  Cardio: [  x ] RRR, no murmur,   [   ] other:                              Pulm: [  x ] No Respiratory Distress,  Lungs CTAB,   [   ] other:                       Abdomen: [  x ]ND/NT, Soft, abd incision site c/d/i   [   ] other:    : [  x ] NO RODRIGUES CATHETER, [   ] RODRIGUES CATHETER- no meatal tear, no discharge, [   ] other:                                            MSK: [ x  ] No joint swelling, Full ROM,   [   ] other:                                         Ext: [   ]No C/C/E, No calf tenderness,   [ x  ]other: 2+ pitting edema in BLE    Skin: [   ]intact,   [ x  ] other: abd incision c/d/i                                                                 Neurological Examination:  Cognitive: [  x  ] AAO x 3,   [    ]  other:                                                                        Attention:  [  x  ] intact,   [  x  ]  other: pleasant and cooperative                            Memory: [  x  ] intact,  [    ]  other:      Mood/Affect: [ x   ] wnl,    [    ]  other:                                                                             Communication: [ x   ]Fluent, no dysarthria, following commands:  [    ] other:    CN II - XII:  [  x  ] intact,  [    ] other:                                                                                   Motor:   RIGHT UE:  3+/5 shoulder abduction, 5/5 elbow flexion/extension, 5/5 hand    LEFT    UE: 3+/5 shoulder abduction, 5/5 elbow flexion/extension, 5/5 hand   RIGHT LE: 3/5 hip flexion, 4+/5 knee flexion/extension, 4+/5 DF/PF (limited by pitting edema)  LEFT  LE:  3/5 hip flexion, 4+/5 knee flexion/extension, 4+/5 DF/PF (limited by pitting edema)  Tone: [  x  ] wnl,   [    ]  other:  DTRs: [  x ]symmetric, [   ] other:  Coordination:   [ x   ] intact,   [    ] other:                                                                         Sensory: [ x   ] Intact to light touch,   [    ] other:

## 2025-06-02 NOTE — DISCHARGE NOTE NURSING/CASE MANAGEMENT/SOCIAL WORK - NSDCPEFALRISK_GEN_ALL_CORE
For information on Fall & Injury Prevention, visit: [email or web address] OR  [email or web address] OR  https://www.cdc.gov/steadi/patient.html

## 2025-06-02 NOTE — H&P ADULT - NSHPLABSRESULTS_GEN_ALL_CORE
7.6    6.57  )-----------( 242      ( 01 Jun 2025 21:23 )             24.7       06-01    144  |  106  |  14  ----------------------------<  123[H]  3.2[L]   |  26  |  0.9    Ca    8.2[L]      01 Jun 2025 21:23  Phos  3.3     06-01  Mg     1.6     06-01      CT AP (5/27) showed extensive ventral wall postsurgical changes with gas and fluid containing midline collection along the lower abdomen, measuring  approximately 4.8 x 4.5 x 10.2 cm.

## 2025-06-02 NOTE — DISCHARGE NOTE PROVIDER - HOSPITAL COURSE
HPI: Patient is a 63 y/o F with a PMHx of HTN, morbid obesity, LOUIS (on CPAP), Abby-en-y gastric bypass, a recent admission for an incarcerated ventral hernia w/ SBO. Patient is s/p lap converted to open ventral hernia repair w/ mesh and small bowel resection 4/10 and s/p ex lap, 30 cm sbr, creation of new side to side ileoileostomy, ventral hernia repair w/ mesh 4/17, plus RTOR for evacuation of hemoperitoneum 4/28. Patient presents to the ER from her LTAC facility ([place]) after persistent HTN and epistaxis, in the setting of Eliquis use and complex surgical history. Patient states her epistaxis resolved after self-tamponade for 25 minutes. Patient had no complaints regarding midline abdominal incision. She has been tolerating diet without nausea or vomiting and passing regular bowel movements. Patient does have several complaints about her LTAC facility. Eliquis was held for 24 hours and ENT consulted. ENT recommended BP control, nasal saline spray, and possible trial of Afrin. Purulent drainage noted from midline wound, for which augmentin was started, wound culture was sent, and CT scan was performed. CT 5/27 demonstrated gas and fluid -containing collection at the midline of the lower abdomen, wound culture 5/ HPI: Patient is a 65 y/o F with a PMHx of HTN, morbid obesity, LOUIS (on CPAP), Abby-en-y gastric bypass, a recent admission for an incarcerated ventral hernia w/ SBO. Patient is s/p lap converted to open ventral hernia repair w/ mesh and small bowel resection 4/10 and s/p ex lap, 30 cm sbr, creation of new side to side ileoileostomy, ventral hernia repair w/ mesh 4/17, plus RTOR for evacuation of hemoperitoneum 4/28. Patient presents to the ER from her LTAC facility ([place]) after persistent HTN and epistaxis, in the setting of Eliquis use and complex surgical history. Patient states her epistaxis resolved after self-tamponade for 25 minutes. Patient had no complaints regarding midline abdominal incision. She has been tolerating diet without nausea or vomiting and passing regular bowel movements. Patient does have several complaints about her LTAC facility. Eliquis was held for 24 hours and ENT consulted. ENT recommended BP control, nasal saline spray, and possible trial of Afrin. Purulent drainage noted from midline wound, for which augmentin was started, wound culture was sent, and CT scan was performed. CT 5/27 demonstrated gas and fluid -containing collection at the midline of the lower abdomen, wound culture 5/27   HPI: Patient is a 65 y/o F with a PMHx of HTN, morbid obesity, LOUIS (on CPAP), Abby-en-y gastric bypass, a recent admission for an incarcerated ventral hernia w/ SBO. Patient is s/p lap converted to open ventral hernia repair w/ mesh and small bowel resection 4/10 and s/p ex lap, 30 cm sbr, creation of new side to side ileoileostomy, ventral hernia repair w/ mesh 4/17, plus RTOR for evacuation of hemoperitoneum 4/28. Patient presents to the ER from her LTAC facility ([place]) after persistent HTN and epistaxis, in the setting of Eliquis use and complex surgical history. Patient states her epistaxis resolved after self-tamponade for 25 minutes. Patient had no complaints regarding midline abdominal incision. She has been tolerating diet without nausea or vomiting and passing regular bowel movements. Patient does have several complaints about her LTAC facility. Eliquis was held for 24 hours and ENT consulted. ENT recommended BP control, nasal saline spray, and possible trial of Afrin. Purulent drainage noted from midline wound, for which augmentin was started, wound culture was sent, and CT scan was performed. CT 5/27 demonstrated gas and fluid -containing collection at the midline of the lower abdomen. IR consulted for possible drainage of collection, unable due to no available window. Patient had a midline catheter inserted 5/30. Patient evaluated by ID and recommended 4-6 weeks of ertapenem 1g q24 hrs upon discharge. She was evaluated by Physiatry and accepted to inpatient rehab. Patient tolerating a diet with no nausea or vomiting, ambulating around the unit, voiding, and passing gas/BMs. Plan for discharge to 4A (inpatient rehab unit) today with plan for IV antibiotics for 4 weeks and follow-up with ID, as well as midline wound care.

## 2025-06-03 LAB
-  AMPICILLIN: SIGNIFICANT CHANGE UP
-  GENTAMICIN SYNERGY: SIGNIFICANT CHANGE UP
-  STREPTOMYCIN SYNERGY: SIGNIFICANT CHANGE UP
-  VANCOMYCIN: SIGNIFICANT CHANGE UP
METHOD TYPE: SIGNIFICANT CHANGE UP

## 2025-06-03 PROCEDURE — 99232 SBSQ HOSP IP/OBS MODERATE 35: CPT

## 2025-06-03 RX ADMIN — Medication 1 TABLET(S): at 11:55

## 2025-06-03 RX ADMIN — MEROPENEM 100 MILLIGRAM(S): 1 INJECTION INTRAVENOUS at 21:40

## 2025-06-03 RX ADMIN — APIXABAN 5 MILLIGRAM(S): 2.5 TABLET, FILM COATED ORAL at 06:52

## 2025-06-03 RX ADMIN — ERGOCALCIFEROL 50000 UNIT(S): 1.25 CAPSULE ORAL at 09:54

## 2025-06-03 RX ADMIN — Medication 40 MILLIGRAM(S): at 06:52

## 2025-06-03 RX ADMIN — LOSARTAN POTASSIUM 100 MILLIGRAM(S): 100 TABLET, FILM COATED ORAL at 06:52

## 2025-06-03 RX ADMIN — MEROPENEM 100 MILLIGRAM(S): 1 INJECTION INTRAVENOUS at 14:25

## 2025-06-03 RX ADMIN — TIOTROPIUM BROMIDE INHALATION SPRAY 2 PUFF(S): 3.12 SPRAY, METERED RESPIRATORY (INHALATION) at 11:55

## 2025-06-03 RX ADMIN — APIXABAN 5 MILLIGRAM(S): 2.5 TABLET, FILM COATED ORAL at 17:00

## 2025-06-03 RX ADMIN — MEROPENEM 100 MILLIGRAM(S): 1 INJECTION INTRAVENOUS at 06:51

## 2025-06-03 RX ADMIN — CYANOCOBALAMIN 1000 MICROGRAM(S): 1000 INJECTION INTRAMUSCULAR; SUBCUTANEOUS at 11:55

## 2025-06-03 RX ADMIN — Medication 1 APPLICATION(S): at 07:06

## 2025-06-03 NOTE — PROGRESS NOTE ADULT - ASSESSMENT
MVC 66 yo F with a PMHx of HTN, severe obesity, DVT, PE (R distal main with segmental extension) on Eliquis, LOUIS on CPAP, elisha en y gastric bypass, and a recent admission for an incarcerated ventral hernia w/ SBO s/p lap converted to open ventral hernia repair w/ mesh and SBR and s/p ex lap, 30 cm SBR, creation of new side to side ileoileostomy, and ventral hernia repair w/ mesh in April 2025 presented back to the ER 2 days later from her LTACH facility ([facility]) after persistent HTN and epistaxis in setting of Eliquis use and complex surgical history. Patient found to have extensive ventral wall postsurgical changes with gas and fluid containing midline collection along the lower abdomen, measuring  approximately 4.8 x 4.5 x 10.2 cm and being managed with abx.       # debility with proximal LE weakness and marked decline from baseline function  - rehab per primary team    #Wound dehiscence and persistent intra-abdominal collection, suspect abscess in setting of multiple abdominal surgeries with complications  -5/28 Wcx grew rare Enterococcus avium    -Growth in fluid media only Escherichia coli ESBL    -General Surgery following for wound dressing changes   -Per Surgery, dressing change daily with vashe and 0.25" packing in superior midline incision  -Infectious Disease is following. She is currently on Meropenem 1 gram IVPB q8h. On discharge from acute rehab to home she   will require Ertapenem 1 gram IVPB daily.; unsure end date?  -Continue with Tylenol and adjust pain management as needed.       #PE in R distal main with segmental extension  #DVT  -c/w apixaban 5 milliGRAM(s) Oral every 12 hours    #Chronic venous insufficiency  Her blood pressure has been soft. I halted the amlodipine to aid her 2+ bipedal edema.   She avoid salty snacks including salted pretzels.    #HTN  - hydrochlorothiazide 25 milliGRAM(s) Oral daily  - losartan 100 milliGRAM(s) Oral daily  - primary team held Amolodipine in the setting of pitting edema on 6/2.  - will follow BP and provide recs as needed    #LOUIS on CPAP  Stable    #Morbid obesity  Dietary education     Thank you for allowing us to participate in the care of your patient. We will follow along. If you have any questions or concerns, please feel free to reach out.

## 2025-06-03 NOTE — PROGRESS NOTE ADULT - ATTENDING COMMENTS
Patient seen and examined with the resident. We discussed the case. I have directed the care. I edited the note. The patient requires acute rehab with 3 hours of daily therapies at least 5 out of 7 days and close physiatry follow up.  #Rehab of debility with proximal LE weakness and marked decline from baseline function, s/p multiple abdominal surgeries with complications / HTN, morbid obesity, LOUIS on CPAP, Abby en y gastric bypass, and a recent admission for an incarcerated ventral hernia w/ sbo s/p lap converted to open ventral hernia repair w/ mesh and small bowel resection and s/p ex lap, 30 cm sbr, creation of new side to side ileoileostomy, ventral hernia repair w/ mesh.with impairment in mobility and ADLs and iADLs.The patient presented with co-morbidities requiring close physiatry follow-up at least 3 times a week to monitor his progress and monitor his comorbidities including HTN, PE, DVT, and LOUIS. .The patient's co-morbidities do not limit the patient's ability to participate in rehabilitative therapy. The patient was seen by PT/OT and required min A with bed mobility, CGA with sit to stand transfers, ambulates 30ftx1 with RW, CGA and 1P assist, UBD minimum assist, and LBD dependent.. The patient was previously independent with all ADLs and iADLs without use of AD and now presents with functional decline.   #Wound dehiscence and persistent intra-abdominal collection, suspect abscess in setting of multiple abdominal surgeries with complications  -5/28 Wcx grew rare Enterococcus avium    -Growth in fluid media only Escherichia coli ESBL    -5/30 midline placed  -General Surgery following for wound dressing changes   -Per Surgery, dressing change daily with vashe and 0.25" packing in superior midline incision   -abd binder for support  -Infectious Disease is following. She is currently on Meropenem 1 gram IVPB q8h started 6/1. On discharge from acute rehab to home she will require Ertapenem 1 gram IVPB daily.  Plan to continue abx until 6/29 and follow-up with ID  -Continue with Tylenol and adjust pain management as needed.  -She requires acute rehab with 3 hours of daily therapies at least 5 out of 7 days and close physiatry follow up.   - per surgery, repeat CTAP as outpatient on 6/22    #PE in R distal main with segmental extension  #DVT  -c/w Eliquis    #Epistaxis   -c/w Ocean nasal spray    #Chronic venous insufficiency  Her blood pressure has been soft. I halted the amlodipine to aid her 2+ bipedal edema.   Discussed avoiding salty snacks including salted pretzels.    #HTN  -Continue with HCTZ and losartan.   -Amolodipine was halted in the setting of pitting edema on 6/2.  -Will continue to monitor hemodynamic status and adjust medications as needed; will consider low dose amlodipine if needed     #LOUIS on CPAP  Stable    #Morbid obesity BMI 53.2  Dietary education     -Pain control: Tylenol   -Bladder management: N/A  -Skin: abd incision site c/d/i    -FEN :N/A  - Diet: DASH/TLC Sodium & Cholesterol Restricted Patient seen and examined with the resident. We discussed the case. I have directed the care. I edited the note. The patient requires acute rehab with 3 hours of daily therapies at least 5 out of 7 days and close physiatry follow up.  #Rehab of debility with proximal LE weakness and marked decline from baseline function, s/p multiple abdominal surgeries with complications / HTN, morbid obesity, LOUIS on CPAP, Abby en y gastric bypass, and a recent admission for an incarcerated ventral hernia w/ sbo s/p lap converted to open ventral hernia repair w/ mesh and small bowel resection and s/p ex lap, 30 cm sbr, creation of new side to side ileoileostomy, ventral hernia repair w/ mesh.with impairment in mobility and ADLs and iADLs.The patient presented with co-morbidities requiring close physiatry follow-up at least 3 times a week to monitor his progress and monitor his comorbidities including HTN, PE, DVT, and LOUIS. .The patient's co-morbidities do not limit the patient's ability to participate in rehabilitative therapy. The patient was seen by PT/OT and required min A with bed mobility, CGA with sit to stand transfers, ambulates 30ftx1 with RW, CGA and 1P assist, UBD minimum assist, and LBD dependent.. The patient was previously independent with all ADLs and iADLs without use of AD and now presents with functional decline.   #Wound dehiscence and persistent intra-abdominal collection, suspect abscess in setting of multiple abdominal surgeries with complications  -5/28 Wcx grew rare Enterococcus avium    -Growth in fluid media only Escherichia coli ESBL    -5/30 midline placed  -General Surgery following for wound dressing changes   -Per Surgery, dressing change daily with vashe and 0.25" packing in superior midline incision   -abd binder for support  -Infectious Disease is following. She is currently on Meropenem 1 gram IVPB q8h started 6/1. On discharge from acute rehab to home she will require Ertapenem 1 gram IVPB daily.  Plan to continue abx until 6/29 and follow-up with ID  -Continue with Tylenol and adjust pain management as needed.  -She requires acute rehab with 3 hours of daily therapies at least 5 out of 7 days and close physiatry follow up.   - per surgery, repeat CTAP as outpatient on 6/22    #PE in R distal main with segmental extension  #DVT  -c/w Eliquis    #Epistaxis   -c/w Ocean nasal spray    #Chronic venous insufficiency  Her blood pressure has been soft. I halted the amlodipine to aid her 2+ bipedal edema.   Discussed avoiding salty snacks including salted pretzels.    #HTN  -Continue with HCTZ and losartan.   -Amolodipine was halted in the setting of pitting edema on 6/2.  -Will continue to monitor hemodynamic status and adjust medications as needed; will consider low dose amlodipine if needed vs beta blocker.     #LOUIS on CPAP  Stable    #Morbid obesity BMI 53.2  Dietary education     -Pain control: Tylenol   -Bladder management: N/A  -Skin: abd incision site c/d/i    -FEN :N/A  - Diet: DASH/TLC Sodium & Cholesterol Restricted

## 2025-06-03 NOTE — PROGRESS NOTE ADULT - SUBJECTIVE AND OBJECTIVE BOX
NANCY SARGENT  65y  Female      Patient is a 65y old  Female who presents with a chief complaint of Rehab of debility with proximal LE weakness and marked decline from baseline function, s/p multiple abdominal surgeries with complications / HTN, severe obesity, LOUIS on CPAP, Abby en y gastric bypass, and a recent admission for an incarcerated ventral hernia w/ sbo s/p lap converted to open ventral hernia repair w/ mesh and small bowel resection and s/p ex lap, 30 cm sbr, creation of new side to side ileoileostomy, ventral hernia repair w/ mesh. (02 Jun 2025 13:50)      INTERVAL HPI/OVERNIGHT EVENTS:       T(C): 36.4 (06-03-25 @ 13:00), Max: 36.8 (06-02-25 @ 16:02)  HR: 109 (06-03-25 @ 13:00) (90 - 109)  BP: 122/74 (06-03-25 @ 13:00) (115/91 - 134/77)  RR: 18 (06-03-25 @ 13:00) (18 - 18)  SpO2: 99% (06-03-25 @ 13:00) (97% - 99%)  Wt(kg): --Vital Signs Last 24 Hrs  T(C): 36.4 (03 Jun 2025 13:00), Max: 36.8 (02 Jun 2025 16:02)  T(F): 97.6 (03 Jun 2025 13:00), Max: 98.2 (02 Jun 2025 16:02)  HR: 109 (03 Jun 2025 13:00) (90 - 109)  BP: 122/74 (03 Jun 2025 13:00) (115/91 - 134/77)  BP(mean): 99 (02 Jun 2025 16:02) (99 - 99)  RR: 18 (03 Jun 2025 13:00) (18 - 18)  SpO2: 99% (03 Jun 2025 13:00) (97% - 99%)    Parameters below as of 02 Jun 2025 22:20  Patient On (Oxygen Delivery Method): room air      PHYSICAL EXAM:  GENERAL: NAD, well-groomed, well-developed  PSYCH: no agitation, baseline mentation  NERVOUS SYSTEM:  Alert & Oriented X3, Motor Strength 5/5 B/L upper and lower extremities; Sensory intact; FTN WNL  PULMONARY: Clear to percussion bilaterally; No rales, rhonchi, wheezing, or rubs  CARDIOVASCULAR: Regular rate and rhythm; No murmurs, rubs, or gallops  GI: Soft, Nontender, Nondistended; Bowel sounds present  EXTREMITIES:  2+ Peripheral Pulses, No clubbing, cyanosis, or edema  LYMPH: No lymphadenopathy noted  SKIN: No rashes or lesions    Consultant(s) Notes Reviewed:  [x ] YES  [ ] NO    Discussed with Consultants/Other Providers [ x] YES     LABS                          7.6    6.57  )-----------( 242      ( 01 Jun 2025 21:23 )             24.7     06-01    144  |  106  |  14  ----------------------------<  123[H]  3.2[L]   |  26  |  0.9    Ca    8.2[L]      01 Jun 2025 21:23  Phos  3.3     06-01  Mg     1.6     06-01        Urinalysis Basic - ( 01 Jun 2025 21:23 )    Color: x / Appearance: x / SG: x / pH: x  Gluc: 123 mg/dL / Ketone: x  / Bili: x / Urobili: x   Blood: x / Protein: x / Nitrite: x   Leuk Esterase: x / RBC: x / WBC x   Sq Epi: x / Non Sq Epi: x / Bacteria: x      RADIOLOGY & ADDITIONAL TESTS:  - no images 6/3  Imaging Personally Reviewed:  [ ] YES  [ ] NO    HEALTH ISSUES - PROBLEM Dx:  Pulmonary thromboembolism    MEDICATIONS  (STANDING):  apixaban 5 milliGRAM(s) Oral every 12 hours  chlorhexidine 2% Cloths 1 Application(s) Topical <User Schedule>  cyanocobalamin 1000 MICROGram(s) Oral daily  ergocalciferol 48280 Unit(s) Oral <User Schedule>  hydrochlorothiazide 25 milliGRAM(s) Oral daily  lactobacillus acidophilus 1 Tablet(s) Oral daily  losartan 100 milliGRAM(s) Oral daily  meropenem  IVPB 1000 milliGRAM(s) IV Intermittent every 8 hours  multivitamin/minerals 1 Tablet(s) Oral daily  pantoprazole    Tablet 40 milliGRAM(s) Oral before breakfast  tiotropium 2.5 MICROgram(s) Inhaler 2 Puff(s) Inhalation daily    MEDICATIONS  (PRN):  acetaminophen     Tablet .. 650 milliGRAM(s) Oral every 6 hours PRN Mild Pain (1 - 3)  albuterol    90 MICROgram(s) HFA Inhaler 2 Puff(s) Inhalation every 6 hours PRN Shortness of Breath and/or Wheezing  ondansetron Injectable 4 milliGRAM(s) IV Push every 6 hours PRN Nausea  sodium chloride 0.65% Nasal 1 Spray(s) Both Nostrils every 4 hours PRN Nasal Congestion         NANCY SARGENT  65y  Female      Patient is a 65y old  Female who presents with a chief complaint of Rehab of debility with proximal LE weakness and marked decline from baseline function, s/p multiple abdominal surgeries with complications / HTN, severe obesity, LOUIS on CPAP, Abby en y gastric bypass, and a recent admission for an incarcerated ventral hernia w/ sbo s/p lap converted to open ventral hernia repair w/ mesh and small bowel resection and s/p ex lap, 30 cm sbr, creation of new side to side ileoileostomy, ventral hernia repair w/ mesh. (02 Jun 2025 13:50)      INTERVAL HPI/OVERNIGHT EVENTS: no acute events overnight.       T(C): 36.4 (06-03-25 @ 13:00), Max: 36.8 (06-02-25 @ 16:02)  HR: 109 (06-03-25 @ 13:00) (90 - 109)  BP: 122/74 (06-03-25 @ 13:00) (115/91 - 134/77)  RR: 18 (06-03-25 @ 13:00) (18 - 18)  SpO2: 99% (06-03-25 @ 13:00) (97% - 99%)  Wt(kg): --Vital Signs Last 24 Hrs  T(C): 36.4 (03 Jun 2025 13:00), Max: 36.8 (02 Jun 2025 16:02)  T(F): 97.6 (03 Jun 2025 13:00), Max: 98.2 (02 Jun 2025 16:02)  HR: 109 (03 Jun 2025 13:00) (90 - 109)  BP: 122/74 (03 Jun 2025 13:00) (115/91 - 134/77)  BP(mean): 99 (02 Jun 2025 16:02) (99 - 99)  RR: 18 (03 Jun 2025 13:00) (18 - 18)  SpO2: 99% (03 Jun 2025 13:00) (97% - 99%)    Parameters below as of 02 Jun 2025 22:20  Patient On (Oxygen Delivery Method): room air      PHYSICAL EXAM:  GENERAL: NAD, well-groomed, well-developed, obese  PSYCH: no agitation, baseline mentation  NERVOUS SYSTEM:  Alert & Oriented X3   PULMONARY: Clear to percussion bilaterally; No rales, rhonchi, wheezing, or rubs  CARDIOVASCULAR: Regular rate and rhythm; No murmurs, rubs, or gallops  GI: Soft, Nontender, Nondistended; Bowel sounds present  EXTREMITIES:  No clubbing, cyanosis, or edema  SKIN: No rashes or lesions    Consultant(s) Notes Reviewed:  [x ] YES  [ ] NO    Discussed with Consultants/Other Providers [ x] YES     LABS                          7.6    6.57  )-----------( 242      ( 01 Jun 2025 21:23 )             24.7     06-01    144  |  106  |  14  ----------------------------<  123[H]  3.2[L]   |  26  |  0.9    Ca    8.2[L]      01 Jun 2025 21:23  Phos  3.3     06-01  Mg     1.6     06-01        Urinalysis Basic - ( 01 Jun 2025 21:23 )    Color: x / Appearance: x / SG: x / pH: x  Gluc: 123 mg/dL / Ketone: x  / Bili: x / Urobili: x   Blood: x / Protein: x / Nitrite: x   Leuk Esterase: x / RBC: x / WBC x   Sq Epi: x / Non Sq Epi: x / Bacteria: x      RADIOLOGY & ADDITIONAL TESTS:  - no images 6/3  Imaging Personally Reviewed:  [ ] YES  [ ] NO    HEALTH ISSUES - PROBLEM Dx:  Pulmonary thromboembolism    MEDICATIONS  (STANDING):  apixaban 5 milliGRAM(s) Oral every 12 hours  chlorhexidine 2% Cloths 1 Application(s) Topical <User Schedule>  cyanocobalamin 1000 MICROGram(s) Oral daily  ergocalciferol 64597 Unit(s) Oral <User Schedule>  hydrochlorothiazide 25 milliGRAM(s) Oral daily  lactobacillus acidophilus 1 Tablet(s) Oral daily  losartan 100 milliGRAM(s) Oral daily  meropenem  IVPB 1000 milliGRAM(s) IV Intermittent every 8 hours  multivitamin/minerals 1 Tablet(s) Oral daily  pantoprazole    Tablet 40 milliGRAM(s) Oral before breakfast  tiotropium 2.5 MICROgram(s) Inhaler 2 Puff(s) Inhalation daily    MEDICATIONS  (PRN):  acetaminophen     Tablet .. 650 milliGRAM(s) Oral every 6 hours PRN Mild Pain (1 - 3)  albuterol    90 MICROgram(s) HFA Inhaler 2 Puff(s) Inhalation every 6 hours PRN Shortness of Breath and/or Wheezing  ondansetron Injectable 4 milliGRAM(s) IV Push every 6 hours PRN Nausea  sodium chloride 0.65% Nasal 1 Spray(s) Both Nostrils every 4 hours PRN Nasal Congestion

## 2025-06-03 NOTE — PATIENT PROFILE ADULT - FUNCTIONAL ASSESSMENT - BASIC MOBILITY 6.
1-calculated by average/Not able to assess (calculate score using [facility] averaging method)

## 2025-06-03 NOTE — PATIENT PROFILE ADULT - FALL HARM RISK - HARM RISK INTERVENTIONS
Assistance with ambulation/Assistance OOB with selected safe patient handling equipment/Communicate Risk of Fall with Harm to all staff/Discuss with provider need for PT consult/Monitor gait and stability/Provide patient with walking aids - walker, cane, crutches/Reinforce activity limits and safety measures with patient and family/Tailored Fall Risk Interventions/Use of alarms - bed, chair and/or voice tab/Visual Cue: Yellow wristband and red socks/Bed in lowest position, wheels locked, appropriate side rails in place/Call bell, personal items and telephone in reach/Instruct patient to call for assistance before getting out of bed or chair/Non-slip footwear when patient is out of bed/[place] to call system/Physically safe environment - no spills, clutter or unnecessary equipment/Purposeful Proactive Rounding/Room/bathroom lighting operational, light cord in reach

## 2025-06-03 NOTE — PATIENT PROFILE ADULT - PUBLIC BENEFITS
Ok to discharge from medicine standpoint to [facility]. Recommend Omnice[facility] x 5 days OP to complete ATB course for UTI. Discussed case with ortho PA.     Kathleen Yan, APRN - CNP no

## 2025-06-03 NOTE — PROGRESS NOTE ADULT - ASSESSMENT
Mrs. Eddy is a 66 yo F with a PMHx of HTN, severe obesity, DVT, PE (R distal main with segmental extension) on Eliquis, LOUIS on CPAP, abby en y gastric bypass, and a recent admission for an incarcerated ventral hernia w/ SBO s/p lap converted to open ventral hernia repair w/ mesh and SBR and s/p ex lap, 30 cm SBR, creation of new side to side ileoileostomy, and ventral hernia repair w/ mesh in April 2025 presented back to the ER 2 days later from her LTACH facility ([facility]) after persistent HTN and epistaxis in setting of Eliquis use and complex surgical history. Patient found to have extensive ventral wall postsurgical changes with gas and fluid containing midline collection along the lower abdomen, measuring  approximately 4.8 x 4.5 x 10.2 cm and being managed with abx.    Plan:  #Rehab of debility with proximal LE weakness and marked decline from baseline function, s/p multiple abdominal surgeries with complications / HTN, morbid obesity, LOUIS on CPAP, Abby en y gastric bypass, and a recent admission for an incarcerated ventral hernia w/ sbo s/p lap converted to open ventral hernia repair w/ mesh and small bowel resection and s/p ex lap, 30 cm sbr, creation of new side to side ileoileostomy, ventral hernia repair w/ mesh.with impairment in mobility and ADLs and iADLs.The patient presented with co-morbidities requiring close physiatry follow-up at least 3 times a week to monitor his progress and monitor his comorbidities including HTN, PE, DVT, and LOUIS. .The patient's co-morbidities do not limit the patient's ability to participate in rehabilitative therapy. The patient was seen by PT/OT and required min A with bed mobility, CGA with sit to stand transfers, ambulates 30ftx1 with RW, CGA and 1P assist, UBD minimum assist, and LBD dependent.. The patient was previously independent with all ADLs and iADLs without use of AD and now presents with functional decline.   #Wound dehiscence and persistent intra-abdominal collection, suspect abscess in setting of multiple abdominal surgeries with complications  -5/28 Wcx grew rare Enterococcus avium    -Growth in fluid media only Escherichia coli ESBL    -5/30 midline placed  -General Surgery following for wound dressing changes   -Per Surgery, dressing change daily with vashe and 0.25" packing in superior midline incision   -abd binder for support  -Infectious Disease is following. She is currently on Meropenem 1 gram IVPB q8h started 6/1. On discharge from acute rehab to home she will require Ertapenem 1 gram IVPB daily.  Plan to continue abx until 6/29 and follow-up with ID  -Continue with Tylenol and adjust pain management as needed.  -She requires acute rehab with 3 hours of daily therapies at least 5 out of 7 days and close physiatry follow up.   - per surgery, repeat CTAP as outpatient on 6/22    #PE in R distal main with segmental extension  #DVT  -c/w Eliquis    #Epistaxis   -c/w Ocean nasal spray    #Chronic venous insufficiency  Her blood pressure has been soft. I halted the amlodipine to aid her 2+ bipedal edema.   Discussed avoiding salty snacks including salted pretzels.    #HTN  -Continue with HCTZ and losartan.   -Amolodipine was halted in the setting of pitting edema on 6/2.  -Will continue to monitor hemodynamic status and adjust medications as needed; will consider low dose amlodipine if needed     #LOUIS on CPAP  Stable    #Morbid obesity BMI 53.2  Dietary education     -Pain control: Tylenol   -Bladder management: N/A  -Skin: abd incision site c/d/i    -FEN :N/A  - Diet: DASH/TLC Sodium & Cholesterol Restricted    Precautions / PROPHYLAXIS:    - Falls  - DVT prophylaxis: Eliquis     Mrs. Eddy is a 64 yo F with a PMHx of HTN, severe obesity, DVT, PE (R distal main with segmental extension) on Eliquis, LOUIS on CPAP, abby en y gastric bypass, and a recent admission for an incarcerated ventral hernia w/ SBO s/p lap converted to open ventral hernia repair w/ mesh and SBR and s/p ex lap, 30 cm SBR, creation of new side to side ileoileostomy, and ventral hernia repair w/ mesh in April 2025 presented back to the ER 2 days later from her LTACH facility ([facility]) after persistent HTN and epistaxis in setting of Eliquis use and complex surgical history. Patient found to have extensive ventral wall postsurgical changes with gas and fluid containing midline collection along the lower abdomen, measuring  approximately 4.8 x 4.5 x 10.2 cm and being managed with abx.    Plan:  #Rehab of debility with proximal LE weakness and marked decline from baseline function, s/p multiple abdominal surgeries with complications / HTN, morbid obesity, LOUIS on CPAP, Abby en y gastric bypass, and a recent admission for an incarcerated ventral hernia w/ sbo s/p lap converted to open ventral hernia repair w/ mesh and small bowel resection and s/p ex lap, 30 cm sbr, creation of new side to side ileoileostomy, ventral hernia repair w/ mesh.with impairment in mobility and ADLs and iADLs.The patient presented with co-morbidities requiring close physiatry follow-up at least 3 times a week to monitor his progress and monitor his comorbidities including HTN, PE, DVT, and LOUIS. .The patient's co-morbidities do not limit the patient's ability to participate in rehabilitative therapy. The patient was seen by PT/OT and required min A with bed mobility, CGA with sit to stand transfers, ambulates 30ftx1 with RW, CGA and 1P assist, UBD minimum assist, and LBD dependent.. The patient was previously independent with all ADLs and iADLs without use of AD and now presents with functional decline.   #Wound dehiscence and persistent intra-abdominal collection, suspect abscess in setting of multiple abdominal surgeries with complications  -5/28 Wcx grew rare Enterococcus avium    -Growth in fluid media only Escherichia coli ESBL    -5/30 midline placed  -General Surgery following for wound dressing changes   -Per Surgery, dressing change daily with vashe and 0.25" packing in superior midline incision   -abd binder for support  -Infectious Disease is following. She is currently on Meropenem 1 gram IVPB q8h started 6/1. On discharge from acute rehab to home she will require Ertapenem 1 gram IVPB daily.  Plan to continue abx until 6/29 and follow-up with ID  -Continue with Tylenol and adjust pain management as needed.  -She requires acute rehab with 3 hours of daily therapies at least 5 out of 7 days and close physiatry follow up.   - per surgery, repeat CTAP as outpatient on 6/22    #PE in R distal main with segmental extension  #DVT  -c/w Eliquis    #Epistaxis   -c/w Ocean nasal spray    #Chronic venous insufficiency  Her blood pressure has been soft. I halted the amlodipine to aid her 2+ bipedal edema.   Discussed avoiding salty snacks including salted pretzels.    #HTN  -Continue with HCTZ and losartan.   -Amolodipine was halted in the setting of pitting edema on 6/2.  -Will continue to monitor hemodynamic status and adjust medications as needed; will consider low dose amlodipine if needed vs beta blocker.      #LOUIS on CPAP  Stable    #Morbid obesity BMI 53.2  Dietary education     -Pain control: Tylenol   -Bladder management: N/A  -Skin: abd incision site c/d/i    -FEN :N/A  - Diet: DASH/TLC Sodium & Cholesterol Restricted    Precautions / PROPHYLAXIS:    - Falls  - DVT prophylaxis: Eliquis

## 2025-06-03 NOTE — PROGRESS NOTE ADULT - SUBJECTIVE AND OBJECTIVE BOX
Patient is a 65y old  Female who presents with a chief complaint of Rehab of debility with proximal LE weakness and marked decline from baseline function, s/p multiple abdominal surgeries with complications / HTN, severe obesity, LOUIS on CPAP, Abby en y gastric bypass, and a recent admission for an incarcerated ventral hernia w/ sbo s/p lap converted to open ventral hernia repair w/ mesh and small bowel resection and s/p ex lap, 30 cm sbr, creation of new side to side ileoileostomy, ventral hernia repair w/ mesh. (03 Jun 2025 12:34)      HPI:  64 yo F with a PMHx of HTN, severe obesity, DVT, PE (R distal main with segmental extension) on Eliquis, LOUIS on CPAP, abby en y gastric bypass, and a recent admission for an incarcerated ventral hernia w/ SBO s/p lap converted to open ventral hernia repair w/ mesh and SBR and s/p ex lap, 30 cm SBR, creation of new side to side ileoileostomy, and ventral hernia repair w/ mesh in April 2025 presents back to the ER 2 days later from her LTACH facility ([place]) after persistent HTN and epistaxis in setting of Eliquis use and complex surgical history. Patient notes her epistaxis has stopped after self-tamponade for 25 minutes. On admission, patient endorsed purulent discharge from her incision. CT AP (5/27) showed extensive ventral wall postsurgical changes with gas and fluid containing midline collection along the lower abdomen, measuring  approximately 4.8 x 4.5 x 10.2 cm. IR evaluated collection and did not recommend a drain. Wound culture positive for rare enterococcus avium (5/28). Patient currently on abx via midline per ID recs. Patient has been tolerating diet, passing regular bowel movements and voiding without nausea or vomiting. Denies SOB, chest pain, fever or chills. Rates her abd pain 1/10 at its worst.     Prior to the debility with proximal LE weakness and marked decline s/p multiple abd surgeries with complications, the patient was independent in ADLs and ambulation. Patient now requires moderate assistance with ambulation and ADLs 2/2 to the debility. Therefore, there is a significant functional decline from baseline. Patients also with medical comorbidities of recent PE in R distal main with segmental extension and DVT on Eliquis, LOUIS on CPAP, HTN, and morbid obesity requiring medication management and monitoring of vitals by Physiatry team and nursing. There are significant comorbidities which warrants acute rehab hospitalization. To return home it is in the patient’s best interest to have acute inpatient rehabilitative services to undergo intensive interdisciplinary therapy. Of note, the patient lives alone with multiple steps and would have difficulty performing ADLs and iADLs individually. Furthermore, the patient is motivated and is able to tolerate up to 3 hours of daily therapy for 5-6 days a week for a total of 15 hours a week. Evaluated by Physiatry and deemed to be an excellent candidate for admission to  for acute inpatient rehab. Admitted to Acute Rehab on 6/2/2025.      Patient seen and examined by Physiatry and is currently functioning at bed mobility minimum assist, sit to stand transfer with CGA, ambulates 30ftx1 with RW, CGA and 1P assist, UBD minimum assist, and LBD dependent..     LS: Patient. lives with  but came from [facility] after last admission  PLOF: Independent with all ADLs and iADLs and ambulates without assistive device.      (02 Jun 2025 13:50)      I examined the patient and reviewed the chart. There have been no significant changes since my history and physical except where documented below.    TODAY'S SUBJECTIVE & REVIEW OF SYMPTOMS  No overnight events.   Patient was seen and assessed at bedside. Patient denies any new complaints at this time. No pain.   Tolerating PT/OT.   Tolerating oral diet.   Voiding spontaneously and having regular BM  Vital signs reviewed. Labs pending        Review of Systems:   Constitutional:    [   ] WNL           [   ] poor appetite   [   ] insomnia   [ x  ] tired   Cardio:                [  x ] WNL           [   ] CP   [   ] CEBALLOS   [   ] palpitations               Resp:                   [  x ] WNL           [   ] SOB   [   ] cough   [   ] wheezing   GI:                        [  x ] WNL           [   ] constipation   [   ] diarrhea   [   ] abdominal pain   [   ] nausea   [   ] emesis                                :                      [  x ] WNL           [   ] RODRIGUES  [   ] dysuria   [   ] difficulty voiding             Endo:                   [  x ] WNL          [   ] polyuria   [   ] temperature intolerance                 Skin:                     [   ] WNL          [   ] pain   [ x  ] wound-incision at abd site, c/d/i  [   ] rash   MSK:                    [   ] WNL          [   ] muscle pain   [  ] joint pain/ stiffness   [   ] muscle tenderness   [ x  ] swelling in BLE    Neuro:                 [   ] WNL          [   ] HA   [  ] change in vision   [   ] tremor   [ x  ] weakness   [   ]dysphagia              Cognitive:           [ x  ] WNL           [   ] occasional confusion      Psych:                  [  x ] WNL           [   ] hallucinations   [   ]agitation   [   ] delusion   [   ]depression      PHYSICAL EXAMINATION   T(C): 36.4 (06-03-25 @ 13:00), Max: 36.8 (06-02-25 @ 16:02)  HR: 109 (06-03-25 @ 13:00) (90 - 109)  BP: 122/74 (06-03-25 @ 13:00) (115/91 - 134/77)  RR: 18 (06-03-25 @ 13:00) (18 - 18)  SpO2: 99% (06-03-25 @ 13:00) (97% - 99%)      General:[  x ] NAD, Resting Comfortable,   [   ] other:                                HEENT: [ x  ] NC/AT, EOMI, PERRL , Normal Conjunctivae,   [   ] other:  Cardio: [  x ] RRR, no murmur,   [   ] other:                              Pulm: [  x ] No Respiratory Distress,  Lungs CTAB,   [   ] other:                       Abdomen: [  x ]ND/NT, Soft, abd incision site c/d/i   [   ] other:    : [  x ] NO RODRIGUES CATHETER, [   ] RODRIGUES CATHETER- no meatal tear, no discharge, [   ] other:                                            MSK: [ x  ] No joint swelling, Full ROM,   [   ] other:                                         Ext: [   ]No C/C/E, No calf tenderness,   [ x  ]other: 2+ pitting edema in BLE    Skin: [   ]intact,   [ x  ] other: abd incision c/d/i                                                                 Neurological Examination:  Cognitive: [  x  ] AAO x 3,   [    ]  other:                                                                        Attention:  [  x  ] intact,   [  x  ]  other: pleasant and cooperative                            Memory: [  x  ] intact,  [    ]  other:      Mood/Affect: [ x   ] wnl,    [    ]  other:                                                                             Communication: [ x   ]Fluent, no dysarthria, following commands:  [    ] other:    CN II - XII:  [  x  ] intact,  [    ] other:                                                                                   Motor:   RIGHT UE:  3+/5 shoulder abduction, 5/5 elbow flexion/extension, 5/5 hand    LEFT    UE: 3+/5 shoulder abduction, 5/5 elbow flexion/extension, 5/5 hand   RIGHT LE: 3/5 hip flexion, 4+/5 knee flexion/extension, 4+/5 DF/PF (limited by pitting edema)  LEFT  LE:  3/5 hip flexion, 4+/5 knee flexion/extension, 4+/5 DF/PF (limited by pitting edema)  Tone: [  x  ] wnl,   [    ]  other:  DTRs: [  x ]symmetric, [   ] other:  Coordination:   [ x   ] intact,   [    ] other:                                                                         Sensory: [ x   ] Intact to light touch,   [    ] other:    MEDICATIONS  acetaminophen     Tablet .. 650 milliGRAM(s) Oral every 6 hours PRN  albuterol    90 MICROgram(s) HFA Inhaler 2 Puff(s) Inhalation every 6 hours PRN  apixaban 5 milliGRAM(s) Oral every 12 hours  chlorhexidine 2% Cloths 1 Application(s) Topical <User Schedule>  cyanocobalamin 1000 MICROGram(s) Oral daily  ergocalciferol 47165 Unit(s) Oral <User Schedule>  hydrochlorothiazide 25 milliGRAM(s) Oral daily  lactobacillus acidophilus 1 Tablet(s) Oral daily  losartan 100 milliGRAM(s) Oral daily  meropenem  IVPB 1000 milliGRAM(s) IV Intermittent every 8 hours  multivitamin/minerals 1 Tablet(s) Oral daily  ondansetron Injectable 4 milliGRAM(s) IV Push every 6 hours PRN  pantoprazole    Tablet 40 milliGRAM(s) Oral before breakfast  sodium chloride 0.65% Nasal 1 Spray(s) Both Nostrils every 4 hours PRN  tiotropium 2.5 MICROgram(s) Inhaler 2 Puff(s) Inhalation daily      RECENT LABS/IMAGING                        7.6    6.57  )-----------( 242      ( 01 Jun 2025 21:23 )             24.7     06-01    144  |  106  |  14  ----------------------------<  123[H]  3.2[L]   |  26  |  0.9    Ca    8.2[L]      01 Jun 2025 21:23  Phos  3.3     06-01  Mg     1.6     06-01        Urinalysis Basic - ( 01 Jun 2025 21:23 )    Color: x / Appearance: x / SG: x / pH: x  Gluc: 123 mg/dL / Ketone: x  / Bili: x / Urobili: x   Blood: x / Protein: x / Nitrite: x   Leuk Esterase: x / RBC: x / WBC x   Sq Epi: x / Non Sq Epi: x / Bacteria: x               Patient is a 65y old  Female who presents with a chief complaint of Rehab of debility with proximal LE weakness and marked decline from baseline function, s/p multiple abdominal surgeries with complications / HTN, severe obesity, LOUIS on CPAP, Abby en y gastric bypass, and a recent admission for an incarcerated ventral hernia w/ sbo s/p lap converted to open ventral hernia repair w/ mesh and small bowel resection and s/p ex lap, 30 cm sbr, creation of new side to side ileoileostomy, ventral hernia repair w/ mesh. (03 Jun 2025 12:34)      HPI:  64 yo F with a PMHx of HTN, severe obesity, DVT, PE (R distal main with segmental extension) on Eliquis, LOUIS on CPAP, abby en y gastric bypass, and a recent admission for an incarcerated ventral hernia w/ SBO s/p lap converted to open ventral hernia repair w/ mesh and SBR and s/p ex lap, 30 cm SBR, creation of new side to side ileoileostomy, and ventral hernia repair w/ mesh in April 2025 presents back to the ER 2 days later from her LTACH facility ([place]) after persistent HTN and epistaxis in setting of Eliquis use and complex surgical history. Patient notes her epistaxis has stopped after self-tamponade for 25 minutes. On admission, patient endorsed purulent discharge from her incision. CT AP (5/27) showed extensive ventral wall postsurgical changes with gas and fluid containing midline collection along the lower abdomen, measuring  approximately 4.8 x 4.5 x 10.2 cm. IR evaluated collection and did not recommend a drain. Wound culture positive for rare enterococcus avium (5/28). Patient currently on abx via midline per ID recs. Patient has been tolerating diet, passing regular bowel movements and voiding without nausea or vomiting. Denies SOB, chest pain, fever or chills. Rates her abd pain 1/10 at its worst.     Prior to the debility with proximal LE weakness and marked decline s/p multiple abd surgeries with complications, the patient was independent in ADLs and ambulation. Patient now requires moderate assistance with ambulation and ADLs 2/2 to the debility. Therefore, there is a significant functional decline from baseline. Patients also with medical comorbidities of recent PE in R distal main with segmental extension and DVT on Eliquis, LOUIS on CPAP, HTN, and morbid obesity requiring medication management and monitoring of vitals by Physiatry team and nursing. There are significant comorbidities which warrants acute rehab hospitalization. To return home it is in the patient’s best interest to have acute inpatient rehabilitative services to undergo intensive interdisciplinary therapy. Of note, the patient lives alone with multiple steps and would have difficulty performing ADLs and iADLs individually. Furthermore, the patient is motivated and is able to tolerate up to 3 hours of daily therapy for 5-6 days a week for a total of 15 hours a week. Evaluated by Physiatry and deemed to be an excellent candidate for admission to  for acute inpatient rehab. Admitted to Acute Rehab on 6/2/2025.      Patient seen and examined by Physiatry and is currently functioning at bed mobility minimum assist, sit to stand transfer with CGA, ambulates 30ftx1 with RW, CGA and 1P assist, UBD minimum assist, and LBD dependent..     LS: Patient. lives with  but came from [facility] after last admission  PLOF: Independent with all ADLs and iADLs and ambulates without assistive device.      (02 Jun 2025 13:50)      I examined the patient and reviewed the chart. There have been no significant changes since my history and physical except where documented below.    TODAY'S SUBJECTIVE & REVIEW OF SYMPTOMS  No overnight events.   Patient was seen and assessed at bedside. Patient denies any new complaints at this time. No pain.   Tolerating PT/OT.   Tolerating oral diet.   Voiding spontaneously and having regular BM  Vital signs reviewed. Labs pending        Review of Systems:   Constitutional:    [   ] WNL           [   ] poor appetite   [   ] insomnia   [ x  ] tired   Cardio:                [  x ] WNL           [   ] CP   [   ] CEBALLOS   [   ] palpitations               Resp:                   [  x ] WNL           [   ] SOB   [   ] cough   [   ] wheezing   GI:                        [  x ] WNL           [   ] constipation   [   ] diarrhea   [   ] abdominal pain   [   ] nausea   [   ] emesis                                :                      [  x ] WNL           [   ] RODRIGUES  [   ] dysuria   [   ] difficulty voiding             Endo:                   [  x ] WNL          [   ] polyuria   [   ] temperature intolerance                 Skin:                     [   ] WNL          [   ] pain   [ x  ] wound-incision at abd site, c/d/i  [   ] rash   MSK:                    [   ] WNL          [   ] muscle pain   [  ] joint pain/ stiffness   [   ] muscle tenderness   [ x  ] swelling in BLE    Neuro:                 [   ] WNL          [   ] HA   [  ] change in vision   [   ] tremor   [ x  ] weakness   [   ]dysphagia              Cognitive:           [ x  ] WNL           [   ] occasional confusion      Psych:                  [  x ] WNL           [   ] hallucinations   [   ]agitation   [   ] delusion   [   ]depression    CLOF:  eating independent  transfer modA  UB dressing maxA  LB dressing dependent  toileting dependent  ambulates 40ft with RW and steadyA      PHYSICAL EXAMINATION   T(C): 36.4 (06-03-25 @ 13:00), Max: 36.8 (06-02-25 @ 16:02)  HR: 109 (06-03-25 @ 13:00) (90 - 109)  BP: 122/74 (06-03-25 @ 13:00) (115/91 - 134/77)  RR: 18 (06-03-25 @ 13:00) (18 - 18)  SpO2: 99% (06-03-25 @ 13:00) (97% - 99%)      General:[  x ] NAD, Resting Comfortable,   [   ] other:                                HEENT: [ x  ] NC/AT, EOMI, PERRL , Normal Conjunctivae,   [   ] other:  Cardio: [  x ] RRR, no murmur,   [   ] other:                              Pulm: [  x ] No Respiratory Distress,  Lungs CTAB,   [   ] other:                       Abdomen: [  x ]ND/NT, Soft, abd incision site c/d/i   [   ] other:    : [  x ] NO RODRIGUES CATHETER, [   ] RODRIGUES CATHETER- no meatal tear, no discharge, [   ] other:                                            MSK: [ x  ] No joint swelling, Full ROM,   [   ] other:                                         Ext: [   ]No C/C/E, No calf tenderness,   [ x  ]other: 2+ pitting edema in BLE    Skin: [   ]intact,   [ x  ] other: abd incision c/d/i                                                                 Neurological Examination:  Cognitive: [  x  ] AAO x 3,   [    ]  other:                                                                        Attention:  [  x  ] intact,   [  x  ]  other: pleasant and cooperative                            Memory: [  x  ] intact,  [    ]  other:      Mood/Affect: [ x   ] wnl,    [    ]  other:                                                                             Communication: [ x   ]Fluent, no dysarthria, following commands:  [    ] other:    CN II - XII:  [  x  ] intact,  [    ] other:                                                                                   Motor:   RIGHT UE:  3+/5 shoulder abduction, 5/5 elbow flexion/extension, 5/5 hand    LEFT    UE: 3+/5 shoulder abduction, 5/5 elbow flexion/extension, 5/5 hand   RIGHT LE: 3/5 hip flexion, 4+/5 knee flexion/extension, 4+/5 DF/PF (limited by pitting edema)  LEFT  LE:  3/5 hip flexion, 4+/5 knee flexion/extension, 4+/5 DF/PF (limited by pitting edema)  Tone: [  x  ] wnl,   [    ]  other:  DTRs: [  x ]symmetric, [   ] other:  Coordination:   [ x   ] intact,   [    ] other:                                                                         Sensory: [ x   ] Intact to light touch,   [    ] other:    MEDICATIONS  acetaminophen     Tablet .. 650 milliGRAM(s) Oral every 6 hours PRN  albuterol    90 MICROgram(s) HFA Inhaler 2 Puff(s) Inhalation every 6 hours PRN  apixaban 5 milliGRAM(s) Oral every 12 hours  chlorhexidine 2% Cloths 1 Application(s) Topical <User Schedule>  cyanocobalamin 1000 MICROGram(s) Oral daily  ergocalciferol 61686 Unit(s) Oral <User Schedule>  hydrochlorothiazide 25 milliGRAM(s) Oral daily  lactobacillus acidophilus 1 Tablet(s) Oral daily  losartan 100 milliGRAM(s) Oral daily  meropenem  IVPB 1000 milliGRAM(s) IV Intermittent every 8 hours  multivitamin/minerals 1 Tablet(s) Oral daily  ondansetron Injectable 4 milliGRAM(s) IV Push every 6 hours PRN  pantoprazole    Tablet 40 milliGRAM(s) Oral before breakfast  sodium chloride 0.65% Nasal 1 Spray(s) Both Nostrils every 4 hours PRN  tiotropium 2.5 MICROgram(s) Inhaler 2 Puff(s) Inhalation daily      RECENT LABS/IMAGING                        7.6    6.57  )-----------( 242      ( 01 Jun 2025 21:23 )             24.7     06-01    144  |  106  |  14  ----------------------------<  123[H]  3.2[L]   |  26  |  0.9    Ca    8.2[L]      01 Jun 2025 21:23  Phos  3.3     06-01  Mg     1.6     06-01        Urinalysis Basic - ( 01 Jun 2025 21:23 )    Color: x / Appearance: x / SG: x / pH: x  Gluc: 123 mg/dL / Ketone: x  / Bili: x / Urobili: x   Blood: x / Protein: x / Nitrite: x   Leuk Esterase: x / RBC: x / WBC x   Sq Epi: x / Non Sq Epi: x / Bacteria: x

## 2025-06-04 PROCEDURE — 99232 SBSQ HOSP IP/OBS MODERATE 35: CPT

## 2025-06-04 RX ORDER — LOSARTAN POTASSIUM 100 MG/1
50 TABLET, FILM COATED ORAL DAILY
Refills: 0 | Status: DISCONTINUED | OUTPATIENT
Start: 2025-06-05 | End: 2025-06-11

## 2025-06-04 RX ORDER — LOSARTAN POTASSIUM 100 MG/1
50 TABLET, FILM COATED ORAL ONCE
Refills: 0 | Status: DISCONTINUED | OUTPATIENT
Start: 2025-06-04 | End: 2025-06-04

## 2025-06-04 RX ORDER — LOSARTAN POTASSIUM 100 MG/1
50 TABLET, FILM COATED ORAL ONCE
Refills: 0 | Status: COMPLETED | OUTPATIENT
Start: 2025-06-04 | End: 2025-06-04

## 2025-06-04 RX ADMIN — Medication 1 TABLET(S): at 12:03

## 2025-06-04 RX ADMIN — Medication 1 APPLICATION(S): at 05:35

## 2025-06-04 RX ADMIN — APIXABAN 5 MILLIGRAM(S): 2.5 TABLET, FILM COATED ORAL at 17:06

## 2025-06-04 RX ADMIN — Medication 40 MILLIGRAM(S): at 05:32

## 2025-06-04 RX ADMIN — APIXABAN 5 MILLIGRAM(S): 2.5 TABLET, FILM COATED ORAL at 05:33

## 2025-06-04 RX ADMIN — CYANOCOBALAMIN 1000 MICROGRAM(S): 1000 INJECTION INTRAMUSCULAR; SUBCUTANEOUS at 12:03

## 2025-06-04 RX ADMIN — MEROPENEM 100 MILLIGRAM(S): 1 INJECTION INTRAVENOUS at 16:11

## 2025-06-04 RX ADMIN — MEROPENEM 100 MILLIGRAM(S): 1 INJECTION INTRAVENOUS at 21:43

## 2025-06-04 RX ADMIN — MEROPENEM 100 MILLIGRAM(S): 1 INJECTION INTRAVENOUS at 05:32

## 2025-06-04 NOTE — PROGRESS NOTE ADULT - ASSESSMENT
Mrs. Eddy is a 64 yo F with a PMHx of HTN, severe obesity, DVT, PE (R distal main with segmental extension) on Eliquis, LOUIS on CPAP, abby en y gastric bypass, and a recent admission for an incarcerated ventral hernia w/ SBO s/p lap converted to open ventral hernia repair w/ mesh and SBR and s/p ex lap, 30 cm SBR, creation of new side to side ileoileostomy, and ventral hernia repair w/ mesh in April 2025 presented back to the ER 2 days later from her LTACH facility ([place]) after persistent HTN and epistaxis in setting of Eliquis use and complex surgical history. Patient found to have extensive ventral wall postsurgical changes with gas and fluid containing midline collection along the lower abdomen, measuring  approximately 4.8 x 4.5 x 10.2 cm and being managed with abx.    Plan:  #Rehab of debility with proximal LE weakness and marked decline from baseline function, s/p multiple abdominal surgeries with complications / HTN, morbid obesity, LOUIS on CPAP, Abby en y gastric bypass, and a recent admission for an incarcerated ventral hernia w/ sbo s/p lap converted to open ventral hernia repair w/ mesh and small bowel resection and s/p ex lap, 30 cm sbr, creation of new side to side ileoileostomy, ventral hernia repair w/ mesh.with impairment in mobility and ADLs and iADLs.The patient presented with co-morbidities requiring close physiatry follow-up at least 3 times a week to monitor his progress and monitor his comorbidities including HTN, PE, DVT, and LOUIS. .The patient's co-morbidities do not limit the patient's ability to participate in rehabilitative therapy. The patient was seen by PT/OT and required min A with bed mobility, CGA with sit to stand transfers, ambulates 30ftx1 with RW, CGA and 1P assist, UBD minimum assist, and LBD dependent.. The patient was previously independent with all ADLs and iADLs without use of AD and now presents with functional decline.   #Wound dehiscence and persistent intra-abdominal collection, suspect abscess in setting of multiple abdominal surgeries with complications  -5/28 Wcx grew rare Enterococcus avium    -Growth in fluid media only Escherichia coli ESBL    -5/30 midline placed  -Wound Care Instructions: Patient has a midline wound with staples and small 1 cm area of packing. Please remove and replace dressings once daily. Packing 1 cm opening with 0.25 inch packing strip, cut to length appropriate for wound. Cover incision with gauze and secure with paper tape.  -abd binder for support  -Infectious Disease is following. She is currently on Meropenem 1 gram IVPB q8h started 6/1. On discharge from acute rehab to home she will require Ertapenem 1 gram IVPB daily.  Plan to continue abx until 6/29 and follow-up with ID  -Continue with Tylenol and adjust pain management as needed.  -She requires acute rehab with 3 hours of daily therapies at least 5 out of 7 days and close physiatry follow up.   - per surgery, repeat CTAP as outpatient on 6/22    #PE in R distal main with segmental extension  #DVT  -c/w Eliquis    #Epistaxis   -c/w Ocean nasal spray    #Chronic venous insufficiency  Her blood pressure has been soft. I halted the amlodipine to aid her 2+ bipedal edema.   Discussed avoiding salty snacks including salted pretzels.    #HTN  -Continue with HCTZ and losartan.   -Amolodipine was halted in the setting of pitting edema on 6/2.  -Will continue to monitor hemodynamic status and adjust medications as needed; will consider low dose amlodipine if needed vs beta blocker.      #LOUIS on CPAP  Stable    #Morbid obesity BMI 53.2  Dietary education     -Pain control: Tylenol   -Bladder management: N/A  -Skin: abd incision site c/d/i    -FEN :N/A  - Diet: DASH/TLC Sodium & Cholesterol Restricted    Precautions / PROPHYLAXIS:    - Falls  - DVT prophylaxis: Eliquis     Mrs. Eddy is a 66 yo F with a PMHx of HTN, severe obesity, DVT, PE (R distal main with segmental extension) on Eliquis, LOUIS on CPAP, abby en y gastric bypass, and a recent admission for an incarcerated ventral hernia w/ SBO s/p lap converted to open ventral hernia repair w/ mesh and SBR and s/p ex lap, 30 cm SBR, creation of new side to side ileoileostomy, and ventral hernia repair w/ mesh in April 2025 presented back to the ER 2 days later from her LTACH facility ([facility] [facility]) after persistent HTN and epistaxis in setting of Eliquis use and complex surgical history. Patient found to have extensive ventral wall postsurgical changes with gas and fluid containing midline collection along the lower abdomen, measuring  approximately 4.8 x 4.5 x 10.2 cm and being managed with abx.    Plan:  #Rehab of debility with proximal LE weakness and marked decline from baseline function, s/p multiple abdominal surgeries with complications / HTN, morbid obesity, LOUIS on CPAP, Abby en y gastric bypass, and a recent admission for an incarcerated ventral hernia w/ sbo s/p lap converted to open ventral hernia repair w/ mesh and small bowel resection and s/p ex lap, 30 cm sbr, creation of new side to side ileoileostomy, ventral hernia repair w/ mesh.with impairment in mobility and ADLs and iADLs.The patient presented with co-morbidities requiring close physiatry follow-up at least 3 times a week to monitor his progress and monitor his comorbidities including HTN, PE, DVT, and LOUIS. .The patient's co-morbidities do not limit the patient's ability to participate in rehabilitative therapy. The patient was seen by PT/OT and required min A with bed mobility, CGA with sit to stand transfers, ambulates 30ftx1 with RW, CGA and 1P assist, UBD minimum assist, and LBD dependent.. The patient was previously independent with all ADLs and iADLs without use of AD and now presents with functional decline.   #Wound dehiscence and persistent intra-abdominal collection, suspect abscess in setting of multiple abdominal surgeries with complications  -5/28 Wcx grew rare Enterococcus avium    -Growth in fluid media only Escherichia coli ESBL    -5/30 midline placed  -Wound Care Instructions: Patient has a midline wound with staples and small 1 cm area of packing. Please remove and replace dressings once daily. Packing 1 cm opening with 0.25 inch packing strip, cut to length appropriate for wound. Cover incision with gauze and secure with paper tape.  -abd binder for support  -Infectious Disease is following. She is currently on Meropenem 1 gram IVPB q8h started 6/1. On discharge from acute rehab to home she will require Ertapenem 1 gram IVPB daily.  Plan to continue abx until 6/29 and follow-up with ID  -Continue with Tylenol and adjust pain management as needed.  -She requires acute rehab with 3 hours of daily therapies at least 5 out of 7 days and close physiatry follow up.   - per surgery, repeat CTAP as outpatient on 6/22    #PE in R distal main with segmental extension  #DVT  -c/w Eliquis    #Epistaxis   -c/w Ocean nasal spray    #Chronic venous insufficiency  Her blood pressure has been soft. I halted the amlodipine to aid her 2+ bipedal edema.   Discussed avoiding salty snacks including salted pretzels.    #HTN  -Continue with HCTZ and losartan.   -Amolodipine was halted in the setting of pitting edema on 6/2.  -Will continue to monitor hemodynamic status and adjust medications as needed; will consider low dose amlodipine if needed vs beta blocker.    - HCTZ and Losartan held 6/4 per parameters. Monitor and adjust    #LOUIS on CPAP  Stable    #Morbid obesity BMI 53.2  Dietary education     -Pain control: Tylenol   -Bladder management: N/A  -Skin: abd incision site c/d/i    -FEN :N/A  - Diet: DASH/TLC Sodium & Cholesterol Restricted    Precautions / PROPHYLAXIS:    - Falls  - DVT prophylaxis: Eliquis

## 2025-06-04 NOTE — PROGRESS NOTE ADULT - ASSESSMENT
64 yo F with a PMHx of HTN, severe obesity, DVT, PE (R distal main with segmental extension) on Eliquis, LOUIS on CPAP, elisha en y gastric bypass, and a recent admission for an incarcerated ventral hernia w/ SBO s/p lap converted to open ventral hernia repair w/ mesh and SBR and s/p ex lap, 30 cm SBR, creation of new side to side ileoileostomy, and ventral hernia repair w/ mesh in April 2025 presented back to the ER 2 days later from her LTACH facility ([place]) after persistent HTN and epistaxis in setting of Eliquis use and complex surgical history. Patient found to have extensive ventral wall postsurgical changes with gas and fluid containing midline collection along the lower abdomen, measuring  approximately 4.8 x 4.5 x 10.2 cm and being managed with abx.       # debility with proximal LE weakness and marked decline from baseline function  - rehab per primary team    #Wound dehiscence and persistent intra-abdominal collection, suspect abscess in setting of multiple abdominal surgeries with complications  -5/28 Wcx grew rare Enterococcus avium    -Growth in fluid media only Escherichia coli ESBL    -General Surgery following for wound dressing changes   -Per Surgery, dressing change daily with vashe and 0.25" packing in superior midline incision  -Infectious Disease is following. She is currently on Meropenem 1 gram IVPB q8h. On discharge from acute rehab to home she   will require Ertapenem 1 gram IVPB daily; unsure end date?  -Continue with Tylenol and adjust pain management as needed.       #PE in R distal main with segmental extension  #DVT  -c/w apixaban 5 milliGRAM(s) Oral every 12 hours    #Chronic venous insufficiency  - BLE compressions stockings as tolerated   - elevate BLE when resting   - DASH/TLC diet     #HTN  - hydrochlorothiazide 25 milliGRAM(s) Oral daily  - losartan 100 milliGRAM(s) Oral daily  - primary team held Amolodipine in the setting of pitting edema on 6/2.  - can start Metoprolol Succinate 25mg qd if persistent SBP >140    #LOUIS on CPAP  Stable    #Morbid obesity  Dietary education     Thank you for allowing us to participate in the care of your patient. We will follow along. If you have any questions or concerns, please feel free to reach out.     Total time spent to complete patient's bedside assessment, reviewed medical chart, discussed medical plan of care with team was more than 35 minutes with >50% of time spent face to face with patient, discussion with patient/family and/or coordination of care. 64 yo F with a PMHx of HTN, severe obesity, DVT, PE (R distal main with segmental extension) on Eliquis, LOUIS on CPAP, elisha en y gastric bypass, and a recent admission for an incarcerated ventral hernia w/ SBO s/p lap converted to open ventral hernia repair w/ mesh and SBR and s/p ex lap, 30 cm SBR, creation of new side to side ileoileostomy, and ventral hernia repair w/ mesh in April 2025 presented back to the ER 2 days later from her LTACH facility ([place]) after persistent HTN and epistaxis in setting of Eliquis use and complex surgical history. Patient found to have extensive ventral wall postsurgical changes with gas and fluid containing midline collection along the lower abdomen, measuring  approximately 4.8 x 4.5 x 10.2 cm and being managed with abx.       # debility with proximal LE weakness and marked decline from baseline function  - rehab per primary team    #Wound dehiscence and persistent intra-abdominal collection, suspect abscess in setting of multiple abdominal surgeries with complications  -5/28 Wcx grew rare Enterococcus avium    -Growth in fluid media only Escherichia coli ESBL    -General Surgery following for wound dressing changes   -Per Surgery, dressing change daily with vashe and 0.25" packing in superior midline incision  -Infectious Disease is following. She is currently on Meropenem 1 gram IVPB q8h. On discharge from acute rehab to home she   will require Ertapenem 1 gram IVPB daily; unsure end date?  -Continue with Tylenol and adjust pain management as needed.       #PE in R distal main with segmental extension  #DVT  -c/w apixaban 5 milliGRAM(s) Oral every 12 hours    #Chronic venous insufficiency  - BLE compressions stockings as tolerated   - elevate BLE when resting   - DASH/TLC diet     #HTN  - hydrochlorothiazide 25 milliGRAM(s) Oral daily  - losartan 100 milliGRAM(s) Oral daily  - primary team held Amolodipine in the setting of pitting edema on 6/2.    #LOUIS on CPAP  Stable    #Morbid obesity  Dietary education     Thank you for allowing us to participate in the care of your patient. We will follow along. If you have any questions or concerns, please feel free to reach out.     Total time spent to complete patient's bedside assessment, reviewed medical chart, discussed medical plan of care with team was more than 35 minutes with >50% of time spent face to face with patient, discussion with patient/family and/or coordination of care.

## 2025-06-04 NOTE — PROGRESS NOTE ADULT - SUBJECTIVE AND OBJECTIVE BOX
NANCY SARGENT  65y  Female      Patient is a 65y old  Female who presents with a chief complaint of Rehab of debility with proximal LE weakness and marked decline from baseline function, s/p multiple abdominal surgeries with complications / HTN, severe obesity, LOUIS on CPAP, Abby en y gastric bypass, and a recent admission for an incarcerated ventral hernia w/ sbo s/p lap converted to open ventral hernia repair w/ mesh and small bowel resection and s/p ex lap, 30 cm sbr, creation of new side to side ileoileostomy, ventral hernia repair w/ mesh. (02 Jun 2025 13:50)      INTERVAL HPI/OVERNIGHT EVENTS:     No acute events overnight.   No acute complaints this AM.      Vital Signs Last 24 Hrs  T(C): 36.7 (04 Jun 2025 12:09), Max: 36.7 (04 Jun 2025 12:09)  T(F): 98 (04 Jun 2025 12:09), Max: 98 (04 Jun 2025 12:09)  HR: 109 (04 Jun 2025 12:09) (83 - 109)  BP: 155/86 (04 Jun 2025 12:09) (115/76 - 155/86)  BP(mean): 90 (03 Jun 2025 22:00) (90 - 90)  RR: 20 (04 Jun 2025 12:09) (18 - 20)  SpO2: 96% (04 Jun 2025 05:21) (96% - 96%)    Parameters below as of 03 Jun 2025 22:00  Patient On (Oxygen Delivery Method): room air      PHYSICAL EXAM:  GENERAL: NAD, well-groomed, well-developed, obese  PSYCH: no agitation, baseline mentation  NERVOUS SYSTEM:  Alert & Oriented X3   PULMONARY: Clear to percussion bilaterally; No rales, rhonchi, wheezing, or rubs  CARDIOVASCULAR: Regular rate and rhythm; No murmurs, rubs, or gallops  GI: Soft, Nontender, Nondistended; Bowel sounds present  EXTREMITIES:  No clubbing, cyanosis, or edema  SKIN: No rashes or lesions    Consultant(s) Notes Reviewed:  [x ] YES  [ ] NO    Discussed with Consultants/Other Providers [ x] YES     LABS                          7.6    6.57  )-----------( 242      ( 01 Jun 2025 21:23 )             24.7     06-01    144  |  106  |  14  ----------------------------<  123  3.2   |  26  |  0.9    Ca    8.2      01 Jun 2025 21:23  Phos  3.3     06-01  Mg     1.6     06-01      Urinalysis Basic - ( 01 Jun 2025 21:23 )    Color: x / Appearance: x / SG: x / pH: x  Gluc: 123 mg/dL / Ketone: x  / Bili: x / Urobili: x   Blood: x / Protein: x / Nitrite: x   Leuk Esterase: x / RBC: x / WBC x   Sq Epi: x / Non Sq Epi: x / Bacteria: x      RADIOLOGY & ADDITIONAL TESTS:  - no images 6/3  Imaging Personally Reviewed:  [ ] YES  [ ] NO    HEALTH ISSUES - PROBLEM Dx:  Pulmonary thromboembolism    MEDICATIONS  (STANDING):  apixaban 5 milliGRAM(s) Oral every 12 hours  chlorhexidine 2% Cloths 1 Application(s) Topical <User Schedule>  cyanocobalamin 1000 MICROGram(s) Oral daily  ergocalciferol 34258 Unit(s) Oral <User Schedule>  hydrochlorothiazide 25 milliGRAM(s) Oral daily  lactobacillus acidophilus 1 Tablet(s) Oral daily  losartan 100 milliGRAM(s) Oral daily  meropenem  IVPB 1000 milliGRAM(s) IV Intermittent every 8 hours  multivitamin/minerals 1 Tablet(s) Oral daily  pantoprazole    Tablet 40 milliGRAM(s) Oral before breakfast  tiotropium 2.5 MICROgram(s) Inhaler 2 Puff(s) Inhalation daily    MEDICATIONS  (PRN):  acetaminophen     Tablet .. 650 milliGRAM(s) Oral every 6 hours PRN Mild Pain (1 - 3)  albuterol    90 MICROgram(s) HFA Inhaler 2 Puff(s) Inhalation every 6 hours PRN Shortness of Breath and/or Wheezing  ondansetron Injectable 4 milliGRAM(s) IV Push every 6 hours PRN Nausea  sodium chloride 0.65% Nasal 1 Spray(s) Both Nostrils every 4 hours PRN Nasal Congestion

## 2025-06-04 NOTE — PROGRESS NOTE ADULT - ATTENDING COMMENTS
I reviewed the chart and examined the patient with the resident and we discussed the findings and treatment plan.  The patient is tolerating the rehab program well. I agree with the findings and treatment plan above, which I modified as indicated. The patient requires 3 hrs a day of acute inpatient rehab. No new complaints. VSS. Labs reviewed. No labs since 6/1 with decreasing Hb and K+ and Mg. Repeat labs ordered for am. Medically stable. Tolerating therapies well. Local care to abdominal incision by nursing per surgery instructions. Continue full rehab program.    I read, edited and agree with the physical exam above and assessment:  #Rehab of debility with proximal LE weakness and marked decline from baseline function, s/p multiple abdominal surgeries with complications / HTN, morbid obesity, LOUIS on CPAP, Abby en y gastric bypass, and a recent admission for an incarcerated ventral hernia w/ sbo s/p lap converted to open ventral hernia repair w/ mesh and small bowel resection and s/p ex lap, 30 cm sbr, creation of new side to side ileoileostomy, ventral hernia repair w/ mesh.with impairment in mobility and ADLs and iADLs.The patient presented with co-morbidities requiring close physiatry follow-up at least 3 times a week to monitor his progress and monitor his comorbidities including HTN, PE, DVT, and LOUIS. .The patient's co-morbidities do not limit the patient's ability to participate in rehabilitative therapy. The patient was seen by PT/OT and required min A with bed mobility, CGA with sit to stand transfers, ambulates 30ftx1 with RW, CGA and 1P assist, UBD minimum assist, and LBD dependent.. The patient was previously independent with all ADLs and iADLs without use of AD and now presents with functional decline.   #Wound dehiscence and persistent intra-abdominal collection, suspect abscess in setting of multiple abdominal surgeries with complications  -5/28 Wcx grew rare Enterococcus avium    -Growth in fluid media only Escherichia coli ESBL    -5/30 midline placed  -Wound Care Instructions: Patient has a midline wound with staples and small 1 cm area of packing. Please remove and replace dressings once daily. Packing 1 cm opening with 0.25 inch packing strip, cut to length appropriate for wound. Cover incision with gauze and secure with paper tape.  -abd binder for support  -Infectious Disease is following. She is currently on Meropenem 1 gram IVPB q8h started 6/1. On discharge from acute rehab to home she will require Ertapenem 1 gram IVPB daily.  Plan to continue abx until 6/29 and follow-up with ID  -Continue with Tylenol and adjust pain management as needed.  -She requires acute rehab with 3 hours of daily therapies at least 5 out of 7 days and close physiatry follow up.   - per surgery, repeat CTAP as outpatient on 6/22    #PE in R distal main with segmental extension  #DVT  -c/w Eliquis    #Epistaxis   -c/w Ocean nasal spray    #Chronic venous insufficiency  Her blood pressure has been soft. I halted the amlodipine to aid her 2+ bipedal edema.   Discussed avoiding salty snacks including salted pretzels.    #HTN  -Continue with HCTZ and losartan.   -Amolodipine was halted in the setting of pitting edema on 6/2.  -Will continue to monitor hemodynamic status and adjust medications as needed; will consider low dose amlodipine if needed vs beta blocker.    - HCTZ and Losartan held 6/4 per parameters. Monitor and adjust    #LOUIS on CPAP  Stable    #Morbid obesity BMI 53.2  Dietary education     -Pain control: Tylenol   -Bladder management: N/A  -Skin: abd incision site c/d/i    -FEN :N/A  - Diet: DASH/TLC Sodium & Cholesterol Restricted    Precautions / PROPHYLAXIS:    - Falls  - DVT prophylaxis: Eliquis

## 2025-06-04 NOTE — PROGRESS NOTE ADULT - SUBJECTIVE AND OBJECTIVE BOX
Patient is a 65y old  Female who presents with a chief complaint of Rehab of debility with proximal LE weakness and marked decline from baseline function, s/p multiple abdominal surgeries with complications / HTN, severe obesity, LOUIS on CPAP, Abby en y gastric bypass, and a recent admission for an incarcerated ventral hernia w/ sbo s/p lap converted to open ventral hernia repair w/ mesh and small bowel resection and s/p ex lap, 30 cm sbr, creation of new side to side ileoileostomy, ventral hernia repair w/ mesh. (03 Jun 2025 12:34)      HPI:  64 yo F with a PMHx of HTN, severe obesity, DVT, PE (R distal main with segmental extension) on Eliquis, LOUIS on CPAP, abby en y gastric bypass, and a recent admission for an incarcerated ventral hernia w/ SBO s/p lap converted to open ventral hernia repair w/ mesh and SBR and s/p ex lap, 30 cm SBR, creation of new side to side ileoileostomy, and ventral hernia repair w/ mesh in April 2025 presents back to the ER 2 days later from her LTACH facility ([facility]) after persistent HTN and epistaxis in setting of Eliquis use and complex surgical history. Patient notes her epistaxis has stopped after self-tamponade for 25 minutes. On admission, patient endorsed purulent discharge from her incision. CT AP (5/27) showed extensive ventral wall postsurgical changes with gas and fluid containing midline collection along the lower abdomen, measuring  approximately 4.8 x 4.5 x 10.2 cm. IR evaluated collection and did not recommend a drain. Wound culture positive for rare enterococcus avium (5/28). Patient currently on abx via midline per ID recs. Patient has been tolerating diet, passing regular bowel movements and voiding without nausea or vomiting. Denies SOB, chest pain, fever or chills. Rates her abd pain 1/10 at its worst.     Prior to the debility with proximal LE weakness and marked decline s/p multiple abd surgeries with complications, the patient was independent in ADLs and ambulation. Patient now requires moderate assistance with ambulation and ADLs 2/2 to the debility. Therefore, there is a significant functional decline from baseline. Patients also with medical comorbidities of recent PE in R distal main with segmental extension and DVT on Eliquis, LOUIS on CPAP, HTN, and morbid obesity requiring medication management and monitoring of vitals by Physiatry team and nursing. There are significant comorbidities which warrants acute rehab hospitalization. To return home it is in the patient’s best interest to have acute inpatient rehabilitative services to undergo intensive interdisciplinary therapy. Of note, the patient lives alone with multiple steps and would have difficulty performing ADLs and iADLs individually. Furthermore, the patient is motivated and is able to tolerate up to 3 hours of daily therapy for 5-6 days a week for a total of 15 hours a week. Evaluated by Physiatry and deemed to be an excellent candidate for admission to  for acute inpatient rehab. Admitted to Acute Rehab on 6/2/2025.      Patient seen and examined by Physiatry and is currently functioning at bed mobility minimum assist, sit to stand transfer with CGA, ambulates 30ftx1 with RW, CGA and 1P assist, UBD minimum assist, and LBD dependent..     LS: Patient. lives with  but came from [facility] after last admission  PLOF: Independent with all ADLs and iADLs and ambulates without assistive device.      (02 Jun 2025 13:50)      I examined the patient and reviewed the chart. There have been no significant changes since my history and physical except where documented below.    TODAY'S SUBJECTIVE & REVIEW OF SYMPTOMS  No overnight events. Patient was seen and assessed at bedside.  Patient denies any new complaints at this time. States dressing not changed yesterday.    Tolerating PT/OT. Tolerating oral diet. Voiding spontaneously and having regular BM.  Vital signs reviewed. Labs pending       Review of Systems:   Constitutional:    [   ] WNL           [   ] poor appetite   [   ] insomnia   [ x  ] tired   Cardio:                [  x ] WNL           [   ] CP   [   ] CEBALLOS   [   ] palpitations               Resp:                   [  x ] WNL           [   ] SOB   [   ] cough   [   ] wheezing   GI:                        [  x ] WNL           [   ] constipation   [   ] diarrhea   [   ] abdominal pain   [   ] nausea   [   ] emesis                                :                      [  x ] WNL           [   ] RODRIGUES  [   ] dysuria   [   ] difficulty voiding             Endo:                   [  x ] WNL          [   ] polyuria   [   ] temperature intolerance                 Skin:                     [   ] WNL          [   ] pain   [ x  ] wound-incision at abd site, c/d/i  [   ] rash   MSK:                    [   ] WNL          [   ] muscle pain   [  ] joint pain/ stiffness   [   ] muscle tenderness   [ x  ] swelling in BLE    Neuro:                 [   ] WNL          [   ] HA   [  ] change in vision   [   ] tremor   [ x  ] weakness   [   ]dysphagia              Cognitive:           [ x  ] WNL           [   ] occasional confusion      Psych:                  [  x ] WNL           [   ] hallucinations   [   ]agitation   [   ] delusion   [   ]depression    CLOF:  eating independent  transfer modA  UB dressing maxA  LB dressing dependent  toileting dependent  ambulates 40ft with RW and steadyA      PHYSICAL EXAMINATION   Vital Signs Last 24 Hrs  T(C): 36.7 (04 Jun 2025 12:09), Max: 36.7 (04 Jun 2025 12:09)  T(F): 98 (04 Jun 2025 12:09), Max: 98 (04 Jun 2025 12:09)  HR: 109 (04 Jun 2025 12:09) (83 - 109)  BP: 155/86 (04 Jun 2025 12:09) (115/76 - 155/86)  BP(mean): 90 (03 Jun 2025 22:00) (90 - 90)  RR: 20 (04 Jun 2025 12:09) (18 - 20)  SpO2: 96% (04 Jun 2025 05:21) (96% - 96%)    Parameters below as of 03 Jun 2025 22:00  Patient On (Oxygen Delivery Method): room air      General:[  x ] NAD, Resting Comfortable,   [   ] other:                                HEENT: [ x  ] NC/AT, EOMI, PERRL , Normal Conjunctivae,   [   ] other:  Cardio: [  x ] RRR, no murmur,   [   ] other:                              Pulm: [  x ] No Respiratory Distress,  Lungs CTAB,   [   ] other:                       Abdomen: [  x ]ND/NT, Soft, abd incision site c/d/i   [   ] other:    : [  x ] NO RODRIGUES CATHETER, [   ] RODRIGUES CATHETER- no meatal tear, no discharge, [   ] other:                                            MSK: [ x  ] No joint swelling, Full ROM,   [   ] other:                                         Ext: [   ]No C/C/E, No calf tenderness,   [ x  ]other: 2+ pitting edema in BLE    Skin: [   ]intact,   [ x  ] other: abd incision c/d/i                                                                 Neurological Examination:  Cognitive: [  x  ] AAO x 3,   [    ]  other:                                                                        Attention:  [  x  ] intact,   [  x  ]  other: pleasant and cooperative                            Memory: [  x  ] intact,  [    ]  other:      Mood/Affect: [ x   ] wnl,    [    ]  other:                                                                             Communication: [ x   ]Fluent, no dysarthria, following commands:  [    ] other:    CN II - XII:  [  x  ] intact,  [    ] other:                                                                                   Motor:   RIGHT UE:  3+/5 shoulder abduction, 5/5 elbow flexion/extension, 5/5 hand    LEFT    UE: 3+/5 shoulder abduction, 5/5 elbow flexion/extension, 5/5 hand   RIGHT LE: 3/5 hip flexion, 4+/5 knee flexion/extension, 4+/5 DF/PF (limited by pitting edema)  LEFT  LE:  3/5 hip flexion, 4+/5 knee flexion/extension, 4+/5 DF/PF (limited by pitting edema)  Tone: [  x  ] wnl,   [    ]  other:  DTRs: [  x ]symmetric, [   ] other:  Coordination:   [ x   ] intact,   [    ] other:                                                                         Sensory: [ x   ] Intact to light touch,   [    ] other:      MEDICATIONS  (STANDING):  apixaban 5 milliGRAM(s) Oral every 12 hours  chlorhexidine 2% Cloths 1 Application(s) Topical <User Schedule>  cyanocobalamin 1000 MICROGram(s) Oral daily  ergocalciferol 67194 Unit(s) Oral <User Schedule>  hydrochlorothiazide 25 milliGRAM(s) Oral daily  lactobacillus acidophilus 1 Tablet(s) Oral daily  losartan 100 milliGRAM(s) Oral daily  meropenem  IVPB 1000 milliGRAM(s) IV Intermittent every 8 hours  multivitamin/minerals 1 Tablet(s) Oral daily  pantoprazole    Tablet 40 milliGRAM(s) Oral before breakfast  tiotropium 2.5 MICROgram(s) Inhaler 2 Puff(s) Inhalation daily    MEDICATIONS  (PRN):  acetaminophen     Tablet .. 650 milliGRAM(s) Oral every 6 hours PRN Mild Pain (1 - 3)  albuterol    90 MICROgram(s) HFA Inhaler 2 Puff(s) Inhalation every 6 hours PRN Shortness of Breath and/or Wheezing  ondansetron Injectable 4 milliGRAM(s) IV Push every 6 hours PRN Nausea  sodium chloride 0.65% Nasal 1 Spray(s) Both Nostrils every 4 hours PRN Nasal Congestion        RECENT LABS/IMAGING                        7.6    6.57  )-----------( 242      ( 01 Jun 2025 21:23 )             24.7     06-01    144  |  106  |  14  ----------------------------<  123[H]  3.2[L]   |  26  |  0.9    Ca    8.2[L]      01 Jun 2025 21:23  Phos  3.3     06-01  Mg     1.6     06-01        Urinalysis Basic - ( 01 Jun 2025 21:23 )    Color: x / Appearance: x / SG: x / pH: x  Gluc: 123 mg/dL / Ketone: x  / Bili: x / Urobili: x   Blood: x / Protein: x / Nitrite: x   Leuk Esterase: x / RBC: x / WBC x   Sq Epi: x / Non Sq Epi: x / Bacteria: x               Patient is a 65y old  Female who presents with a chief complaint of Rehab of debility with proximal LE weakness and marked decline from baseline function, s/p multiple abdominal surgeries with complications / HTN, severe obesity, LOUIS on CPAP, Abby en y gastric bypass, and a recent admission for an incarcerated ventral hernia w/ sbo s/p lap converted to open ventral hernia repair w/ mesh and small bowel resection and s/p ex lap, 30 cm sbr, creation of new side to side ileoileostomy, ventral hernia repair w/ mesh. (03 Jun 2025 12:34)      HPI:  66 yo F with a PMHx of HTN, severe obesity, DVT, PE (R distal main with segmental extension) on Eliquis, LOUIS on CPAP, abby en y gastric bypass, and a recent admission for an incarcerated ventral hernia w/ SBO s/p lap converted to open ventral hernia repair w/ mesh and SBR and s/p ex lap, 30 cm SBR, creation of new side to side ileoileostomy, and ventral hernia repair w/ mesh in April 2025 presents back to the ER 2 days later from her LTACH facility ([facility]) after persistent HTN and epistaxis in setting of Eliquis use and complex surgical history. Patient notes her epistaxis has stopped after self-tamponade for 25 minutes. On admission, patient endorsed purulent discharge from her incision. CT AP (5/27) showed extensive ventral wall postsurgical changes with gas and fluid containing midline collection along the lower abdomen, measuring  approximately 4.8 x 4.5 x 10.2 cm. IR evaluated collection and did not recommend a drain. Wound culture positive for rare enterococcus avium (5/28). Patient currently on abx via midline per ID recs. Patient has been tolerating diet, passing regular bowel movements and voiding without nausea or vomiting. Denies SOB, chest pain, fever or chills. Rates her abd pain 1/10 at its worst.     Prior to the debility with proximal LE weakness and marked decline s/p multiple abd surgeries with complications, the patient was independent in ADLs and ambulation. Patient now requires moderate assistance with ambulation and ADLs 2/2 to the debility. Therefore, there is a significant functional decline from baseline. Patients also with medical comorbidities of recent PE in R distal main with segmental extension and DVT on Eliquis, LOUIS on CPAP, HTN, and morbid obesity requiring medication management and monitoring of vitals by Physiatry team and nursing. There are significant comorbidities which warrants acute rehab hospitalization. To return home it is in the patient’s best interest to have acute inpatient rehabilitative services to undergo intensive interdisciplinary therapy. Of note, the patient lives alone with multiple steps and would have difficulty performing ADLs and iADLs individually. Furthermore, the patient is motivated and is able to tolerate up to 3 hours of daily therapy for 5-6 days a week for a total of 15 hours a week. Evaluated by Physiatry and deemed to be an excellent candidate for admission to  for acute inpatient rehab. Admitted to Acute Rehab on 6/2/2025.      Patient seen and examined by Physiatry and is currently functioning at bed mobility minimum assist, sit to stand transfer with CGA, ambulates 30ftx1 with RW, CGA and 1P assist, UBD minimum assist, and LBD dependent..     LS: Patient. lives with  but came from [facility] after last admission  PLOF: Independent with all ADLs and iADLs and ambulates without assistive device.      (02 Jun 2025 13:50)      I examined the patient and reviewed the chart. There have been no significant changes since my history and physical except where documented below.    TODAY'S SUBJECTIVE & REVIEW OF SYMPTOMS  This AM, HCTZ and losartan held as BP did not meet parameters for antihypertensives  Patient was seen and assessed at bedside.  Patient denies any new complaints at this time. States dressing not changed yesterday.    Tolerating PT/OT. Tolerating oral diet. Voiding spontaneously and having regular BM.  Vital signs reviewed. Labs pending       Review of Systems:   Constitutional:    [   ] WNL           [   ] poor appetite   [   ] insomnia   [ x  ] tired   Cardio:                [  x ] WNL           [   ] CP   [   ] CEBALLOS   [   ] palpitations               Resp:                   [  x ] WNL           [   ] SOB   [   ] cough   [   ] wheezing   GI:                        [  x ] WNL           [   ] constipation   [   ] diarrhea   [   ] abdominal pain   [   ] nausea   [   ] emesis                                :                      [  x ] WNL           [   ] RODRIGUES  [   ] dysuria   [   ] difficulty voiding             Endo:                   [  x ] WNL          [   ] polyuria   [   ] temperature intolerance                 Skin:                     [   ] WNL          [   ] pain   [ x  ] wound-incision at abd site, c/d/i  [   ] rash   MSK:                    [   ] WNL          [   ] muscle pain   [  ] joint pain/ stiffness   [   ] muscle tenderness   [ x  ] swelling in BLE    Neuro:                 [   ] WNL          [   ] HA   [  ] change in vision   [   ] tremor   [ x  ] weakness   [   ]dysphagia              Cognitive:           [ x  ] WNL           [   ] occasional confusion      Psych:                  [  x ] WNL           [   ] hallucinations   [   ]agitation   [   ] delusion   [   ]depression    CLOF:  eating independent  transfer modA  UB dressing maxA  LB dressing dependent  toileting dependent  ambulates 40ft with RW and steadyA      PHYSICAL EXAMINATION   Vital Signs Last 24 Hrs  T(C): 36.7 (04 Jun 2025 12:09), Max: 36.7 (04 Jun 2025 12:09)  T(F): 98 (04 Jun 2025 12:09), Max: 98 (04 Jun 2025 12:09)  HR: 109 (04 Jun 2025 12:09) (83 - 109)  BP: 155/86 (04 Jun 2025 12:09) (115/76 - 155/86)  BP(mean): 90 (03 Jun 2025 22:00) (90 - 90)  RR: 20 (04 Jun 2025 12:09) (18 - 20)  SpO2: 96% (04 Jun 2025 05:21) (96% - 96%)    Parameters below as of 03 Jun 2025 22:00  Patient On (Oxygen Delivery Method): room air      General:[  x ] NAD, Resting Comfortable,   [   ] other:                                HEENT: [ x  ] NC/AT, EOMI, PERRL , Normal Conjunctivae,   [   ] other:  Cardio: [  x ] RRR, no murmur,   [   ] other:                              Pulm: [  x ] No Respiratory Distress,  Lungs CTAB,   [   ] other:                       Abdomen: [  x ]ND/NT, Soft, abd incision site c/d/i   [   ] other:    : [  x ] NO RODRIGUES CATHETER, [   ] RODRIGUES CATHETER- no meatal tear, no discharge, [   ] other:                                            MSK: [ x  ] No joint swelling, Full ROM,   [   ] other:                                         Ext: [   ]No C/C/E, No calf tenderness,   [ x  ]other: 2+ pitting edema in BLE    Skin: [   ]intact,   [ x  ] other: abd incision c/d/i                                                                 Neurological Examination:  Cognitive: [  x  ] AAO x 3,   [    ]  other:                                                                        Attention:  [  x  ] intact,   [  x  ]  other: pleasant and cooperative                            Memory: [  x  ] intact,  [    ]  other:      Mood/Affect: [ x   ] wnl,    [    ]  other:                                                                             Communication: [ x   ]Fluent, no dysarthria, following commands:  [    ] other:    CN II - XII:  [  x  ] intact,  [    ] other:                                                                                   Motor:   RIGHT UE:  3+/5 shoulder abduction, 5/5 elbow flexion/extension, 5/5 hand    LEFT    UE: 3+/5 shoulder abduction, 5/5 elbow flexion/extension, 5/5 hand   RIGHT LE: 3/5 hip flexion, 4+/5 knee flexion/extension, 4+/5 DF/PF (limited by pitting edema)  LEFT  LE:  3/5 hip flexion, 4+/5 knee flexion/extension, 4+/5 DF/PF (limited by pitting edema)  Tone: [  x  ] wnl,   [    ]  other:  DTRs: [  x ]symmetric, [   ] other:  Coordination:   [ x   ] intact,   [    ] other:                                                                         Sensory: [ x   ] Intact to light touch,   [    ] other:      MEDICATIONS  (STANDING):  apixaban 5 milliGRAM(s) Oral every 12 hours  chlorhexidine 2% Cloths 1 Application(s) Topical <User Schedule>  cyanocobalamin 1000 MICROGram(s) Oral daily  ergocalciferol 10983 Unit(s) Oral <User Schedule>  hydrochlorothiazide 25 milliGRAM(s) Oral daily  lactobacillus acidophilus 1 Tablet(s) Oral daily  losartan 100 milliGRAM(s) Oral daily  meropenem  IVPB 1000 milliGRAM(s) IV Intermittent every 8 hours  multivitamin/minerals 1 Tablet(s) Oral daily  pantoprazole    Tablet 40 milliGRAM(s) Oral before breakfast  tiotropium 2.5 MICROgram(s) Inhaler 2 Puff(s) Inhalation daily    MEDICATIONS  (PRN):  acetaminophen     Tablet .. 650 milliGRAM(s) Oral every 6 hours PRN Mild Pain (1 - 3)  albuterol    90 MICROgram(s) HFA Inhaler 2 Puff(s) Inhalation every 6 hours PRN Shortness of Breath and/or Wheezing  ondansetron Injectable 4 milliGRAM(s) IV Push every 6 hours PRN Nausea  sodium chloride 0.65% Nasal 1 Spray(s) Both Nostrils every 4 hours PRN Nasal Congestion        RECENT LABS/IMAGING                        7.6    6.57  )-----------( 242      ( 01 Jun 2025 21:23 )             24.7     06-01    144  |  106  |  14  ----------------------------<  123[H]  3.2[L]   |  26  |  0.9    Ca    8.2[L]      01 Jun 2025 21:23  Phos  3.3     06-01  Mg     1.6     06-01        Urinalysis Basic - ( 01 Jun 2025 21:23 )    Color: x / Appearance: x / SG: x / pH: x  Gluc: 123 mg/dL / Ketone: x  / Bili: x / Urobili: x   Blood: x / Protein: x / Nitrite: x   Leuk Esterase: x / RBC: x / WBC x   Sq Epi: x / Non Sq Epi: x / Bacteria: x               Patient is a 65y old  Female who presents with a chief complaint of Rehab of debility with proximal LE weakness and marked decline from baseline function, s/p multiple abdominal surgeries with complications / HTN, severe obesity, LOUIS on CPAP, Abby en y gastric bypass, and a recent admission for an incarcerated ventral hernia w/ sbo s/p lap converted to open ventral hernia repair w/ mesh and small bowel resection and s/p ex lap, 30 cm sbr, creation of new side to side ileoileostomy, ventral hernia repair w/ mesh. (03 Jun 2025 12:34)      HPI:  66 yo F with a PMHx of HTN, severe obesity, DVT, PE (R distal main with segmental extension) on Eliquis, LOUIS on CPAP, abby en y gastric bypass, and a recent admission for an incarcerated ventral hernia w/ SBO s/p lap converted to open ventral hernia repair w/ mesh and SBR and s/p ex lap, 30 cm SBR, creation of new side to side ileoileostomy, and ventral hernia repair w/ mesh in April 2025 presents back to the ER 2 days later from her LTACH facility ([facility]) after persistent HTN and epistaxis in setting of Eliquis use and complex surgical history. Patient notes her epistaxis has stopped after self-tamponade for 25 minutes. On admission, patient endorsed purulent discharge from her incision. CT AP (5/27) showed extensive ventral wall postsurgical changes with gas and fluid containing midline collection along the lower abdomen, measuring  approximately 4.8 x 4.5 x 10.2 cm. IR evaluated collection and did not recommend a drain. Wound culture positive for rare enterococcus avium (5/28). Patient currently on abx via midline per ID recs. Patient has been tolerating diet, passing regular bowel movements and voiding without nausea or vomiting. Denies SOB, chest pain, fever or chills. Rates her abd pain 1/10 at its worst.     Prior to the debility with proximal LE weakness and marked decline s/p multiple abd surgeries with complications, the patient was independent in ADLs and ambulation. Patient now requires moderate assistance with ambulation and ADLs 2/2 to the debility. Therefore, there is a significant functional decline from baseline. Patients also with medical comorbidities of recent PE in R distal main with segmental extension and DVT on Eliquis, LOUIS on CPAP, HTN, and morbid obesity requiring medication management and monitoring of vitals by Physiatry team and nursing. There are significant comorbidities which warrants acute rehab hospitalization. To return home it is in the patient’s best interest to have acute inpatient rehabilitative services to undergo intensive interdisciplinary therapy. Of note, the patient lives alone with multiple steps and would have difficulty performing ADLs and iADLs individually. Furthermore, the patient is motivated and is able to tolerate up to 3 hours of daily therapy for 5-6 days a week for a total of 15 hours a week. Evaluated by Physiatry and deemed to be an excellent candidate for admission to  for acute inpatient rehab. Admitted to Acute Rehab on 6/2/2025.      Patient seen and examined by Physiatry and is currently functioning at bed mobility minimum assist, sit to stand transfer with CGA, ambulates 30ftx1 with RW, CGA and 1P assist, UBD minimum assist, and LBD dependent..     LS: Patient. lives with  but came from [facility] after last admission  PLOF: Independent with all ADLs and iADLs and ambulates without assistive device.      (02 Jun 2025 13:50)      TODAY'S SUBJECTIVE & REVIEW OF SYMPTOMS  This AM, HCTZ and losartan held as BP did not meet parameters for antihypertensives  Patient was seen and assessed at bedside.  Patient denies any new complaints at this time. States dressing not changed yesterday.    Tolerating PT/OT. Tolerating oral diet. Voiding spontaneously and having regular BM.  Vital signs reviewed. Labs pending       Review of Systems:   Constitutional:    [   ] WNL           [   ] poor appetite   [   ] insomnia   [ x  ] tired   Cardio:                [   ] WNL           [   ] CP   [x  ] CEBALLOS   [   ] palpitations               Resp:                   [  x ] WNL           [   ] SOB   [   ] cough   [   ] wheezing   GI:                        [  x ] WNL           [   ] constipation   [   ] diarrhea   [   ] abdominal pain   [   ] nausea   [   ] emesis                                :                      [  x ] WNL           [   ] RODRIGUES  [   ] dysuria   [   ] difficulty voiding             Endo:                   [  x ] WNL          [   ] polyuria   [   ] temperature intolerance                 Skin:                     [   ] WNL          [   ] pain   [ x  ] wound-incision at abd site, c/d/i  [   ] rash   MSK:                    [   ] WNL          [   ] muscle pain   [  ] joint pain/ stiffness   [   ] muscle tenderness   [ x  ] swelling in BLE    Neuro:                 [   ] WNL          [   ] HA   [  ] change in vision   [   ] tremor   [ x  ] weakness   [   ]dysphagia              Cognitive:           [ x  ] WNL           [   ] occasional confusion      Psych:                  [  x ] WNL           [   ] hallucinations   [   ]agitation   [   ] delusion   [   ]depression    CLOF:  eating independent  transfer modA  UB dressing maxA  LB dressing dependent  toileting dependent  ambulates 40ft with RW and steadyA      PHYSICAL EXAMINATION   Vital Signs Last 24 Hrs  T(C): 36.7 (04 Jun 2025 12:09), Max: 36.7 (04 Jun 2025 12:09)  T(F): 98 (04 Jun 2025 12:09), Max: 98 (04 Jun 2025 12:09)  HR: 109 (04 Jun 2025 12:09) (83 - 109)  BP: 155/86 (04 Jun 2025 12:09) (115/76 - 155/86)  BP(mean): 90 (03 Jun 2025 22:00) (90 - 90)  RR: 20 (04 Jun 2025 12:09) (18 - 20)  SpO2: 96% (04 Jun 2025 05:21) (96% - 96%)    Parameters below as of 03 Jun 2025 22:00  Patient On (Oxygen Delivery Method): room air      General:[  x ] NAD, Resting Comfortable,   [   ] other:                                HEENT: [ x  ] NC/AT, EOMI, PERRL , Normal Conjunctivae,   [   ] other:  Cardio: [  x ] RRR, no murmur,   [   ] other:                              Pulm: [  x ] No Respiratory Distress,  Lungs CTAB,   [   ] other:                       Abdomen: [  x ]ND/NT, Soft, abd incision site c/d/i   [   ] other:    : [  x ] NO RODRIGUES CATHETER, [   ] RODRIGUES CATHETER- no meatal tear, no discharge, [   ] other:                                            MSK: [ x  ] No joint swelling, Full ROM,   [   ] other:                                         Ext: [   ]No C/C/E, No calf tenderness,   [ x  ]other: 2+ pitting edema in BLE    Skin: [   ]intact,   [ x  ] other: abd incision c/d/i                                                                 Neurological Examination:  Cognitive: [  x  ] AAO x 3,   [    ]  other:                                                                        Attention:  [  x  ] intact,   [  x  ]  other: pleasant and cooperative                            Memory: [  x  ] intact,  [    ]  other:      Mood/Affect: [ x   ] wnl,    [    ]  other:                                                                             Communication: [ x   ]Fluent, no dysarthria, following commands:  [    ] other:    CN II - XII:  [  x  ] intact,  [    ] other:                                                                                   Motor:   RIGHT UE:  3+/5 shoulder abduction, 5/5 elbow flexion/extension, 5/5 hand    LEFT    UE: 3+/5 shoulder abduction, 5/5 elbow flexion/extension, 5/5 hand   RIGHT LE: 3/5 hip flexion, 4+/5 knee flexion/extension, 4+/5 DF/PF (limited by pitting edema)  LEFT  LE:  3/5 hip flexion, 4+/5 knee flexion/extension, 4+/5 DF/PF (limited by pitting edema)  Tone: [  x  ] wnl,   [    ]  other:  DTRs: [  x ]symmetric, [   ] other:  Coordination:   [ x   ] intact,   [    ] other:                                                                         Sensory: [ x   ] Intact to light touch,   [    ] other:      MEDICATIONS  (STANDING):  apixaban 5 milliGRAM(s) Oral every 12 hours  chlorhexidine 2% Cloths 1 Application(s) Topical <User Schedule>  cyanocobalamin 1000 MICROGram(s) Oral daily  ergocalciferol 50051 Unit(s) Oral <User Schedule>  hydrochlorothiazide 25 milliGRAM(s) Oral daily  lactobacillus acidophilus 1 Tablet(s) Oral daily  losartan 100 milliGRAM(s) Oral daily  meropenem  IVPB 1000 milliGRAM(s) IV Intermittent every 8 hours  multivitamin/minerals 1 Tablet(s) Oral daily  pantoprazole    Tablet 40 milliGRAM(s) Oral before breakfast  tiotropium 2.5 MICROgram(s) Inhaler 2 Puff(s) Inhalation daily    MEDICATIONS  (PRN):  acetaminophen     Tablet .. 650 milliGRAM(s) Oral every 6 hours PRN Mild Pain (1 - 3)  albuterol    90 MICROgram(s) HFA Inhaler 2 Puff(s) Inhalation every 6 hours PRN Shortness of Breath and/or Wheezing  ondansetron Injectable 4 milliGRAM(s) IV Push every 6 hours PRN Nausea  sodium chloride 0.65% Nasal 1 Spray(s) Both Nostrils every 4 hours PRN Nasal Congestion        RECENT LABS/IMAGING                        7.6    6.57  )-----------( 242      ( 01 Jun 2025 21:23 )             24.7     06-01    144  |  106  |  14  ----------------------------<  123[H]  3.2[L]   |  26  |  0.9    Ca    8.2[L]      01 Jun 2025 21:23  Phos  3.3     06-01  Mg     1.6     06-01

## 2025-06-05 LAB
24R-OH-CALCIDIOL SERPL-MCNC: 19 NG/ML — LOW
ALBUMIN SERPL ELPH-MCNC: 3.1 G/DL — LOW (ref 3.5–5.2)
ALP SERPL-CCNC: 110 U/L — SIGNIFICANT CHANGE UP (ref 30–115)
ALT FLD-CCNC: 24 U/L — SIGNIFICANT CHANGE UP (ref 0–41)
ANION GAP SERPL CALC-SCNC: 14 MMOL/L — SIGNIFICANT CHANGE UP (ref 7–14)
AST SERPL-CCNC: 16 U/L — SIGNIFICANT CHANGE UP (ref 0–41)
BASOPHILS # BLD AUTO: 0.03 K/UL — SIGNIFICANT CHANGE UP (ref 0–0.2)
BASOPHILS NFR BLD AUTO: 0.6 % — SIGNIFICANT CHANGE UP (ref 0–1)
BILIRUB SERPL-MCNC: 0.2 MG/DL — SIGNIFICANT CHANGE UP (ref 0.2–1.2)
BLD GP AB SCN SERPL QL: SIGNIFICANT CHANGE UP
BUN SERPL-MCNC: 15 MG/DL — SIGNIFICANT CHANGE UP (ref 10–20)
CALCIUM SERPL-MCNC: 8.2 MG/DL — LOW (ref 8.4–10.5)
CHLORIDE SERPL-SCNC: 107 MMOL/L — SIGNIFICANT CHANGE UP (ref 98–110)
CO2 SERPL-SCNC: 22 MMOL/L — SIGNIFICANT CHANGE UP (ref 17–32)
CREAT SERPL-MCNC: 0.8 MG/DL — SIGNIFICANT CHANGE UP (ref 0.7–1.5)
CRP SERPL-MCNC: 8.4 MG/L — HIGH
EGFR: 82 ML/MIN/1.73M2 — SIGNIFICANT CHANGE UP
EGFR: 82 ML/MIN/1.73M2 — SIGNIFICANT CHANGE UP
EOSINOPHIL # BLD AUTO: 0.11 K/UL — SIGNIFICANT CHANGE UP (ref 0–0.7)
EOSINOPHIL NFR BLD AUTO: 2.2 % — SIGNIFICANT CHANGE UP (ref 0–8)
ERYTHROCYTE [SEDIMENTATION RATE] IN BLOOD: 45 MM/HR — HIGH (ref 0–20)
FOLATE SERPL-MCNC: 14.9 NG/ML — SIGNIFICANT CHANGE UP
GLUCOSE SERPL-MCNC: 109 MG/DL — HIGH (ref 70–99)
HCT VFR BLD CALC: 26.8 % — LOW (ref 37–47)
HGB BLD-MCNC: 8.3 G/DL — LOW (ref 12–16)
IMM GRANULOCYTES NFR BLD AUTO: 1.8 % — HIGH (ref 0.1–0.3)
IRON SATN MFR SERPL: 16 % — SIGNIFICANT CHANGE UP (ref 15–50)
IRON SATN MFR SERPL: 33 UG/DL — LOW (ref 35–150)
LYMPHOCYTES # BLD AUTO: 1.71 K/UL — SIGNIFICANT CHANGE UP (ref 1.2–3.4)
LYMPHOCYTES # BLD AUTO: 34.1 % — SIGNIFICANT CHANGE UP (ref 20.5–51.1)
MAGNESIUM SERPL-MCNC: 1.7 MG/DL — LOW (ref 1.8–2.4)
MCHC RBC-ENTMCNC: 29.7 PG — SIGNIFICANT CHANGE UP (ref 27–31)
MCHC RBC-ENTMCNC: 31 G/DL — LOW (ref 32–37)
MCV RBC AUTO: 96.1 FL — SIGNIFICANT CHANGE UP (ref 81–99)
MONOCYTES # BLD AUTO: 0.3 K/UL — SIGNIFICANT CHANGE UP (ref 0.1–0.6)
MONOCYTES NFR BLD AUTO: 6 % — SIGNIFICANT CHANGE UP (ref 1.7–9.3)
NEUTROPHILS # BLD AUTO: 2.77 K/UL — SIGNIFICANT CHANGE UP (ref 1.4–6.5)
NEUTROPHILS NFR BLD AUTO: 55.3 % — SIGNIFICANT CHANGE UP (ref 42.2–75.2)
NRBC BLD AUTO-RTO: 0 /100 WBCS — SIGNIFICANT CHANGE UP (ref 0–0)
PLATELET # BLD AUTO: 253 K/UL — SIGNIFICANT CHANGE UP (ref 130–400)
PMV BLD: 9.7 FL — SIGNIFICANT CHANGE UP (ref 7.4–10.4)
POTASSIUM SERPL-MCNC: 3.4 MMOL/L — LOW (ref 3.5–5)
POTASSIUM SERPL-SCNC: 3.4 MMOL/L — LOW (ref 3.5–5)
PROT SERPL-MCNC: 5.4 G/DL — LOW (ref 6–8)
RBC # BLD: 2.79 M/UL — LOW (ref 4.2–5.4)
RBC # BLD: 2.79 M/UL — LOW (ref 4.2–5.4)
RBC # FLD: 16.9 % — HIGH (ref 11.5–14.5)
RETICS #: 141.7 K/UL — HIGH (ref 25–125)
RETICS/RBC NFR: 5.1 % — HIGH (ref 0.5–1.5)
SODIUM SERPL-SCNC: 143 MMOL/L — SIGNIFICANT CHANGE UP (ref 135–146)
TIBC SERPL-MCNC: 203 UG/DL — LOW (ref 220–430)
UIBC SERPL-MCNC: 170 UG/DL — SIGNIFICANT CHANGE UP (ref 110–370)
VIT B12 SERPL-MCNC: 780 PG/ML — SIGNIFICANT CHANGE UP (ref 232–1245)
WBC # BLD: 5.01 K/UL — SIGNIFICANT CHANGE UP (ref 4.8–10.8)
WBC # FLD AUTO: 5.01 K/UL — SIGNIFICANT CHANGE UP (ref 4.8–10.8)

## 2025-06-05 PROCEDURE — 99232 SBSQ HOSP IP/OBS MODERATE 35: CPT

## 2025-06-05 RX ORDER — ZINC SULFATE 50(220)MG
220 CAPSULE ORAL EVERY 24 HOURS
Refills: 0 | Status: DISCONTINUED | OUTPATIENT
Start: 2025-06-05 | End: 2025-06-20

## 2025-06-05 RX ORDER — MEROPENEM 1 G/30ML
1000 INJECTION INTRAVENOUS EVERY 8 HOURS
Refills: 0 | Status: DISCONTINUED | OUTPATIENT
Start: 2025-06-05 | End: 2025-06-19

## 2025-06-05 RX ORDER — MAGNESIUM OXIDE 400 MG
400 TABLET ORAL EVERY 12 HOURS
Refills: 0 | Status: DISCONTINUED | OUTPATIENT
Start: 2025-06-05 | End: 2025-06-20

## 2025-06-05 RX ADMIN — MEROPENEM 100 MILLIGRAM(S): 1 INJECTION INTRAVENOUS at 21:30

## 2025-06-05 RX ADMIN — Medication 1 TABLET(S): at 11:46

## 2025-06-05 RX ADMIN — Medication 650 MILLIGRAM(S): at 12:32

## 2025-06-05 RX ADMIN — LOSARTAN POTASSIUM 50 MILLIGRAM(S): 100 TABLET, FILM COATED ORAL at 05:15

## 2025-06-05 RX ADMIN — Medication 40 MILLIGRAM(S): at 05:15

## 2025-06-05 RX ADMIN — MEROPENEM 100 MILLIGRAM(S): 1 INJECTION INTRAVENOUS at 12:32

## 2025-06-05 RX ADMIN — TIOTROPIUM BROMIDE INHALATION SPRAY 2 PUFF(S): 3.12 SPRAY, METERED RESPIRATORY (INHALATION) at 07:40

## 2025-06-05 RX ADMIN — Medication 40 MILLIEQUIVALENT(S): at 12:02

## 2025-06-05 RX ADMIN — APIXABAN 5 MILLIGRAM(S): 2.5 TABLET, FILM COATED ORAL at 17:33

## 2025-06-05 RX ADMIN — APIXABAN 5 MILLIGRAM(S): 2.5 TABLET, FILM COATED ORAL at 05:15

## 2025-06-05 RX ADMIN — Medication 650 MILLIGRAM(S): at 13:46

## 2025-06-05 RX ADMIN — CYANOCOBALAMIN 1000 MICROGRAM(S): 1000 INJECTION INTRAMUSCULAR; SUBCUTANEOUS at 11:45

## 2025-06-05 RX ADMIN — Medication 400 MILLIGRAM(S): at 17:33

## 2025-06-05 RX ADMIN — MEROPENEM 100 MILLIGRAM(S): 1 INJECTION INTRAVENOUS at 05:15

## 2025-06-05 RX ADMIN — Medication 1 APPLICATION(S): at 05:26

## 2025-06-05 NOTE — PROGRESS NOTE ADULT - ASSESSMENT
64 yo F with a PMHx of HTN, severe obesity, DVT, PE (R distal main with segmental extension) on Eliquis, LOUIS on CPAP, elisha en y gastric bypass, and a recent admission for an incarcerated ventral hernia w/ SBO s/p lap converted to open ventral hernia repair w/ mesh and SBR and s/p ex lap, 30 cm SBR, creation of new side to side ileoileostomy, and ventral hernia repair w/ mesh in April 2025 presented back to the ER 2 days later from her LTACH facility ([facility]) after persistent HTN and epistaxis in setting of Eliquis use and complex surgical history. Patient found to have extensive ventral wall postsurgical changes with gas and fluid containing midline collection along the lower abdomen, measuring  approximately 4.8 x 4.5 x 10.2 cm and being managed with abx.       # debility with proximal LE weakness and marked decline from baseline functions  - rehab per primary team    #Wound dehiscence and persistent intra-abdominal collection, suspect abscess in setting of multiple abdominal surgeries with complications  -5/28 Wcx grew rare Enterococcus avium    -Growth in fluid media only Escherichia coli ESBL    -General Surgery following for wound dressing changes   -Per Surgery, dressing change daily with vashe and 0.25" packing in superior midline incision  -Infectious Disease is following. She is currently on Meropenem 1 gram IVPB q8h. On discharge from acute rehab to home she   will require Ertapenem 1 gram IVPB daily  -Continue with Tylenol and adjust pain management as needed.    #PE in R distal main with segmental extension  #DVT  -c/w apixaban 5 milliGRAM(s) Oral every 12 hours    #Chronic venous insufficiency  - BLE compressions stockings as tolerated   - elevate BLE when resting   - DASH/TLC diet     #HTN  - hydrochlorothiazide 25 milliGRAM(s) Oral daily  - losartan 100 milliGRAM(s) Oral daily  - primary team held Amolodipine in the setting of pitting edema on 6/2.    #LOUIS on CPAP  Stable    #Morbid obesity  Dietary education       #Hypomagnesemia   #Hypokalemia   - give Mag sulfate 2g IV x1 + KCl 40meq   - repeat in 24H    Thank you for allowing us to participate in the care of your patient. We will follow along. If you have any questions or concerns, please feel free to reach out.     Total time spent to complete patient's bedside assessment, reviewed medical chart, discussed medical plan of care with team was more than 35 minutes with >50% of time spent face to face with patient, discussion with patient/family and/or coordination of care.

## 2025-06-05 NOTE — PROGRESS NOTE ADULT - ASSESSMENT
Mrs. Eddy is a 66 yo F with a PMHx of HTN, severe obesity, DVT, PE (R distal main with segmental extension) on Eliquis, LOUIS on CPAP, abby en y gastric bypass, and a recent admission for an incarcerated ventral hernia w/ SBO s/p lap converted to open ventral hernia repair w/ mesh and SBR and s/p ex lap, 30 cm SBR, creation of new side to side ileoileostomy, and ventral hernia repair w/ mesh in April 2025 presented back to the ER 2 days later from her LTACH facility ([facility]) after persistent HTN and epistaxis in setting of Eliquis use and complex surgical history. Patient found to have extensive ventral wall postsurgical changes with gas and fluid containing midline collection along the lower abdomen, measuring  approximately 4.8 x 4.5 x 10.2 cm and being managed with abx.    Plan:  #Rehab of debility with proximal LE weakness and marked decline from baseline function, s/p multiple abdominal surgeries with complications / HTN, morbid obesity, LOUIS on CPAP, Abby en y gastric bypass, and a recent admission for an incarcerated ventral hernia w/ sbo s/p lap converted to open ventral hernia repair w/ mesh and small bowel resection and s/p ex lap, 30 cm sbr, creation of new side to side ileoileostomy, ventral hernia repair w/ mesh.with impairment in mobility and ADLs and iADLs.The patient presented with co-morbidities requiring close physiatry follow-up at least 3 times a week to monitor his progress and monitor his comorbidities including HTN, PE, DVT, and LOUIS. .The patient's co-morbidities do not limit the patient's ability to participate in rehabilitative therapy. The patient was seen by PT/OT and required min A with bed mobility, CGA with sit to stand transfers, ambulates 30ftx1 with RW, CGA and 1P assist, UBD minimum assist, and LBD dependent.. The patient was previously independent with all ADLs and iADLs without use of AD and now presents with functional decline.   #Wound dehiscence and persistent intra-abdominal collection, suspect abscess in setting of multiple abdominal surgeries with complications  -5/28 Wcx grew rare Enterococcus avium    -Growth in fluid media only Escherichia coli ESBL    -5/30 midline placed  -Wound Care Instructions: Patient has a midline wound with staples and small 1 cm area of packing. Please remove and replace dressings once daily. Packing 1 cm opening with 0.25 inch packing strip, cut to length appropriate for wound. Cover incision with gauze and secure with paper tape.  -abd binder for support  -Infectious Disease is following. She is currently on Meropenem 1 gram IVPB q8h started 6/1. On discharge from acute rehab to home she will require Ertapenem 1 gram IVPB daily.  Plan to continue abx until 6/29 and follow-up with ID  - continue packing of upper incision daily  -Continue with Tylenol and adjust pain management as needed.  -She requires acute rehab with 3 hours of daily therapies at least 5 out of 7 days and close physiatry follow up.   - per surgery, repeat CTAP as outpatient on 6/22    #PE in R distal main with segmental extension  #DVT  -c/w Eliquis    #s/p Epistaxis   -c/w Ocean nasal spray    #Chronic venous insufficiency  Her blood pressure has been soft. I halted the amlodipine to aid her 2+ bipedal edema.   Discussed avoiding salty snacks including salted pretzels.    #HTN  -Continue with HCTZ and losartan.   -Amolodipine was halted in the setting of pitting edema on 6/2.  -Will continue to monitor hemodynamic status and adjust medications as needed; will consider low dose amlodipine if needed vs beta blocker.    - HCTZ and Losartan held 6/4 per parameters. Monitor and adjust  - Losartan decreased from 100mg to 50mg 6/5  - monitor and adjust    #Hypokalemia  - give oral supplement and recheck in a few days  - on HCTZ    #LOUIS on CPAP  Stable    #Morbid obesity BMI 53.2  Dietary education     -Pain control: Tylenol   -Bladder management: N/A  -Skin: abd incision site c/d/i    -FEN :N/A  - Diet: DASH/TLC Sodium & Cholesterol Restricted    Precautions / PROPHYLAXIS:    - Falls  - DVT prophylaxis: Eliquis

## 2025-06-05 NOTE — PROGRESS NOTE ADULT - ASSESSMENT
ASSESSMENT  64F with a PMHx of HTN, morbid obesity, LOUIS on CPAP, Abby en y gastric bypass, and a recent admission for an incarcerated ventral hernia w/ sbo s/p lap converted to open ventral hernia repair w/ mesh and small bowel resection and s/p ex lap, 30 cm sbr, creation of new side to side ileoileostomy, ventral hernia repair w/ mesh who presents back to the ER from her LTACH facility [facility] after persistent HTN, epistaxis in setting of eliquis use and complex surgical history.       IMPRESSION  #Wound dehiscence and persistent intra-abdominal collection, suspect abscess    6/1 superifical wound cx   Rare Enterococcus avium    Rare Carnobacterium maltaromaticum "Susceptibilities not performed"    5/28 WCX   Rare Enterococcus avium   Ampicillin: S <=2     Growth in fluid media only Escherichia coli ESBL  Trimethoprim/Sulfamethoxazole: R >2/38 Ciprofloxacin: R >2    Rare Clostridium perfringens "Susceptibilities not performed"  < from: CT Abdomen and Pelvis No Cont (05.27.25 @ 09:42) >  Extensive ventral wall postsurgical changes noted with gas and fluid containing midline collection along the lower abdomen, measuring   approximately 4.8 x 4.5 x 10.2 cm.  Last seen by ID inpatient 5/1- At that time Repeat CT showing Redemonstrated large volume complex fluid in the pelvis and along the left paracolic gutter, compatible with blood products. 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 which could result in poor clinical outcomes  #Abx allergy: No Known Allergies      Height (cm): 160 (05-25-25 @ 15:37)  Weight (kg): 135 (05-25-25 @ 20:35)    RECOMMENDATIONS  - Meropenem 1g q8h IV   - Without surgical intervention needs prolonged IV ABX 4-6 weeks (currently has a midline) with repeat CT w/ around week 3 to determine final course  S/D 6/1- (4 weeks is 6/28)  - Appreciate IR consult- no drainable collection    If any questions, please send a message or call on [employer] Teams  Please continue to update ID with any pertinent new laboratory, radiographic findings, or change in clinical status

## 2025-06-05 NOTE — PROGRESS NOTE ADULT - SUBJECTIVE AND OBJECTIVE BOX
NANCY SARGENT  65y, Female  Allergy: No Known Allergies      LOS  3d    CHIEF COMPLAINT: Rehab of debility with proximal LE weakness and marked decline from baseline function, s/p multiple abdominal surgeries with complications / HTN, severe obesity, LOUIS on CPAP, Abby en y gastric bypass, and a recent admission for an incarcerated ventral hernia w/ sbo s/p lap converted to open ventral hernia repair w/ mesh and small bowel resection and s/p ex lap, 30 cm sbr, creation of new side to side ileoileostomy, ventral hernia repair w/ mesh. (04 Jun 2025 13:09)      INTERVAL EVENTS/HPI  - No acute events overnight  - T(F): , Max: 97.8 (06-04-25 @ 20:52)  - Denies any worsening symptoms  - Tolerating medication  - WBC Count: 5.01 (06-05-25 @ 06:22)     - Creatinine: 0.8 (06-05-25 @ 06:22)       ROS  General: Denies rigors, nightsweats  HEENT: Denies headache, rhinorrhea, sore throat, eye pain  CV: Denies CP, palpitations  PULM: Denies wheezing, hemoptysis  GI: Denies hematemesis, hematochezia, melena  : Denies discharge, hematuria  MSK: Denies arthralgias, myalgias  SKIN: Denies rash, lesions  NEURO: Denies paresthesias, weakness  PSYCH: Denies depression, anxiety    VITALS:  T(F): 97.3, Max: 97.8 (06-04-25 @ 20:52)  HR: 103  BP: 125/81  RR: 18Vital Signs Last 24 Hrs  T(C): 36.3 (05 Jun 2025 14:04), Max: 36.6 (04 Jun 2025 20:52)  T(F): 97.3 (05 Jun 2025 14:04), Max: 97.8 (04 Jun 2025 20:52)  HR: 103 (05 Jun 2025 14:04) (97 - 105)  BP: 125/81 (05 Jun 2025 14:04) (114/73 - 128/70)  BP(mean): --  RR: 18 (05 Jun 2025 14:04) (18 - 20)  SpO2: 96% (05 Jun 2025 14:04) (94% - 96%)    Parameters below as of 05 Jun 2025 14:04  Patient On (Oxygen Delivery Method): room air        PHYSICAL EXAM:  Gen: NAD, resting in bed  HEENT: Normocephalic, atraumatic  Neck: supple, no lymphadenopathy  CV: Regular rate & regular rhythm  Lungs: decreased BS at bases, no fremitus  Abdomen: Soft, BS present, dressings  Ext: Warm, well perfused  Neuro: non focal, awake  Skin: no rash, no erythema  Lines: no phlebitis    FH: Non-contributory  Social Hx: Non-contributory    TESTS & MEASUREMENTS:                        8.3    5.01  )-----------( 253      ( 05 Jun 2025 06:22 )             26.8     06-05    143  |  107  |  15  ----------------------------<  109[H]  3.4[L]   |  22  |  0.8    Ca    8.2[L]      05 Jun 2025 06:22  Mg     1.7     06-05    TPro  5.4[L]  /  Alb  3.1[L]  /  TBili  0.2  /  DBili  x   /  AST  16  /  ALT  24  /  AlkPhos  110  06-05      LIVER FUNCTIONS - ( 05 Jun 2025 06:22 )  Alb: 3.1 g/dL / Pro: 5.4 g/dL / ALK PHOS: 110 U/L / ALT: 24 U/L / AST: 16 U/L / GGT: x           Urinalysis Basic - ( 05 Jun 2025 06:22 )    Color: x / Appearance: x / SG: x / pH: x  Gluc: 109 mg/dL / Ketone: x  / Bili: x / Urobili: x   Blood: x / Protein: x / Nitrite: x   Leuk Esterase: x / RBC: x / WBC x   Sq Epi: x / Non Sq Epi: x / Bacteria: x        Culture - Wound Aerobic/Anaerobic (collected 06-01-25 @ 13:49)  Source: Skin/Wound Skin/Wound  Gram Stain (06-02-25 @ 23:08):    Rare polymorphonuclear leukocytes seen per low power field    No organisms seen per oil power field  Preliminary Report (06-03-25 @ 20:42):    Rare Enterococcus avium    Rare Carnobacterium maltaromaticum "Susceptibilities not performed"  Organism: Enterococcus avium (06-03-25 @ 20:41)  Organism: Enterococcus avium (06-03-25 @ 20:41)      -  Streptomycin synergy: S <=1000      -  Vancomycin: S <=0.25      -  Ampicillin: S <=2 Predicts results to ampicillin/sulbactam, amoxacillin-clavulanate and  piperacillin-tazobactam.      Method Type: LOUISE      -  Gentamicin synergy: S <=500    Culture - Abscess with Gram Stain (collected 05-28-25 @ 16:55)  Source: Abscess abdominal midline wound  Gram Stain (05-30-25 @ 21:20):    Few polymorphonuclear leukocytes seen per low power field    No organisms seen per oil power field  Final Report (06-02-25 @ 16:57):    Rare Enterococcus avium    Growth in fluid media only Escherichia coli ESBL    Rare Clostridium perfringens "Susceptibilities not performed"    Few Eggerthella lenta "Susceptibilities not performed"  Organism: Escherichia coli ESBL (06-02-25 @ 16:57)      -  Levofloxacin: R >4      -  Tobramycin: S <=2      -  Aztreonam: R >16      -  Gentamicin: S <=2      -  Cefazolin: R >16      -  Cefepime: R >16      -  Piperacillin/Tazobactam: R <=8      -  Ciprofloxacin: R >2      -  Imipenem: S <=1      -  Ceftriaxone: R >32      -  Ampicillin: R >16 These ampicillin results predict results for amoxicillin      Method Type: LOUISE      -  Meropenem: S <=1      -  Ampicillin/Sulbactam: R <=4/2      -  Trimethoprim/Sulfamethoxazole: R >2/38      -  Tigecycline: S <=2 Interpretations based on FDA breakpoints      -  Ertapenem: S <=0.5  Organism: Enterococcus avium  Escherichia coli ESBL (06-02-25 @ 16:57)  Organism: Enterococcus avium (06-02-25 @ 16:57)      -  Vancomycin: S <=0.25      -  Ampicillin: S <=2 Predicts results to ampicillin/sulbactam, amoxacillin-clavulanate and  piperacillin-tazobactam.      Method Type: [facility]            INFECTIOUS DISEASES TESTING  MRSA PCR Result.: Negative (04-11-25 @ 00:40)      INFLAMMATORY MARKERS  Sedimentation Rate, Erythrocyte: 45 mm/hr (06-05-25 @ 06:22)  C-Reactive Protein: 8.4 mg/L (06-05-25 @ 06:22)  C-Reactive Protein: 37.0 mg/L (05-11-25 @ 21:42)  C-Reactive Protein: 36.1 mg/L (05-08-25 @ 20:00)      RADIOLOGY & ADDITIONAL TESTS:  I have personally reviewed the last available Chest xray  CXR      CT      CARDIOLOGY TESTING  12 Lead ECG:   Ventricular Rate 105 BPM    Atrial Rate 105 BPM    P-R Interval 140 ms    QRS Duration 106 ms    Q-T Interval 330 ms    QTC Calculation(Bazett) 436 ms    P Axis 36 degrees    R Axis 30 degrees    T Axis 7 degrees    Diagnosis Line Sinus tachycardia  Minimal voltage criteria for LVH, may be normal variant ( Scobey product )  Borderline ECG    Confirmed by BARRY SALAZAR, [facility] ([id number]) on 5/26/2025 1:02:45 AM (05-25-25 @ 21:24)      MEDICATIONS  apixaban 5 Oral every 12 hours  chlorhexidine 2% Cloths 1 Topical <User Schedule>  cyanocobalamin 1000 Oral daily  ergocalciferol 60020 Oral <User Schedule>  hydrochlorothiazide 25 Oral daily  lactobacillus acidophilus 1 Oral daily  losartan 50 Oral daily  meropenem  IVPB 1000 IV Intermittent every 8 hours  multivitamin/minerals 1 Oral daily  pantoprazole    Tablet 40 Oral before breakfast  potassium chloride    Tablet ER 40 Oral daily  tiotropium 2.5 MICROgram(s) Inhaler 2 Inhalation daily      WEIGHT  Weight (kg): 132 (06-02-25 @ 22:20)  Creatinine: 0.8 mg/dL (06-05-25 @ 06:22)      ANTIBIOTICS:  meropenem  IVPB 1000 milliGRAM(s) IV Intermittent every 8 hours      All available historical records have been reviewed

## 2025-06-05 NOTE — PROGRESS NOTE ADULT - SUBJECTIVE AND OBJECTIVE BOX
Patient is a 65y old  Female who presents with a chief complaint of Rehab of debility with proximal LE weakness and marked decline from baseline function, s/p multiple abdominal surgeries with complications / HTN, severe obesity, LOUIS on CPAP, Abby en y gastric bypass, and a recent admission for an incarcerated ventral hernia w/ sbo s/p lap converted to open ventral hernia repair w/ mesh and small bowel resection and s/p ex lap, 30 cm sbr, creation of new side to side ileoileostomy, ventral hernia repair w/ mesh. (03 Jun 2025 12:34)      HPI:  64 yo F with a PMHx of HTN, severe obesity, DVT, PE (R distal main with segmental extension) on Eliquis, LOUIS on CPAP, abby en y gastric bypass, and a recent admission for an incarcerated ventral hernia w/ SBO s/p lap converted to open ventral hernia repair w/ mesh and SBR and s/p ex lap, 30 cm SBR, creation of new side to side ileoileostomy, and ventral hernia repair w/ mesh in April 2025 presents back to the ER 2 days later from her LTACH facility ([facility]) after persistent HTN and epistaxis in setting of Eliquis use and complex surgical history. Patient notes her epistaxis has stopped after self-tamponade for 25 minutes. On admission, patient endorsed purulent discharge from her incision. CT AP (5/27) showed extensive ventral wall postsurgical changes with gas and fluid containing midline collection along the lower abdomen, measuring  approximately 4.8 x 4.5 x 10.2 cm. IR evaluated collection and did not recommend a drain. Wound culture positive for rare enterococcus avium (5/28). Patient currently on abx via midline per ID recs. Patient has been tolerating diet, passing regular bowel movements and voiding without nausea or vomiting. Denies SOB, chest pain, fever or chills. Rates her abd pain 1/10 at its worst.     Prior to the debility with proximal LE weakness and marked decline s/p multiple abd surgeries with complications, the patient was independent in ADLs and ambulation. Patient now requires moderate assistance with ambulation and ADLs 2/2 to the debility. Therefore, there is a significant functional decline from baseline. Patients also with medical comorbidities of recent PE in R distal main with segmental extension and DVT on Eliquis, LOUIS on CPAP, HTN, and morbid obesity requiring medication management and monitoring of vitals by Physiatry team and nursing. There are significant comorbidities which warrants acute rehab hospitalization. To return home it is in the patient’s best interest to have acute inpatient rehabilitative services to undergo intensive interdisciplinary therapy. Of note, the patient lives alone with multiple steps and would have difficulty performing ADLs and iADLs individually. Furthermore, the patient is motivated and is able to tolerate up to 3 hours of daily therapy for 5-6 days a week for a total of 15 hours a week. Evaluated by Physiatry and deemed to be an excellent candidate for admission to  for acute inpatient rehab. Admitted to Acute Rehab on 6/2/2025.      Patient seen and examined by Physiatry and is currently functioning at bed mobility minimum assist, sit to stand transfer with CGA, ambulates 30ftx1 with RW, CGA and 1P assist, UBD minimum assist, and LBD dependent..     LS: Patient. lives with  but came from [facility] after last admission  PLOF: Independent with all ADLs and iADLs and ambulates without assistive device.      (02 Jun 2025 13:50)      TODAY'S SUBJECTIVE & REVIEW OF SYMPTOMS    Patient was seen and assessed at bedside.  Patient denies any new complaints at this time. Seen by surgery and ID today.    Tolerating PT/OT. Tolerating oral diet. Voiding spontaneously and having regular BM.  Vital signs reviewed. Labs appreciated.       Review of Systems:   Constitutional:    [   ] WNL           [   ] poor appetite   [   ] insomnia   [ x  ] tired   Cardio:                [   ] WNL           [   ] CP   [x  ] CEBALLOS   [   ] palpitations               Resp:                   [  x ] WNL           [   ] SOB   [   ] cough   [   ] wheezing   GI:                        [  x ] WNL           [   ] constipation   [   ] diarrhea   [   ] abdominal pain   [   ] nausea   [   ] emesis                                :                      [  x ] WNL           [   ] RODRIGUES  [   ] dysuria   [   ] difficulty voiding             Endo:                   [  x ] WNL          [   ] polyuria   [   ] temperature intolerance                 Skin:                     [   ] WNL          [   ] pain   [ x  ] wound-incision at abd site, c/d/i  [   ] rash   MSK:                    [   ] WNL          [   ] muscle pain   [  ] joint pain/ stiffness   [   ] muscle tenderness   [ x  ] swelling in BLE    Neuro:                 [   ] WNL          [   ] HA   [  ] change in vision   [   ] tremor   [ x  ] weakness   [   ]dysphagia              Cognitive:           [ x  ] WNL           [   ] occasional confusion      Psych:                  [  x ] WNL           [   ] hallucinations   [   ]agitation   [   ] delusion   [   ]depression    CLOF:  eating independent  transfer modA  UB dressing maxA  LB dressing dependent  toileting dependent  ambulates 40ft with RW and steadyA      PHYSICAL EXAMINATION     Vital Signs Last 24 Hrs  T(C): 36.3 (05 Jun 2025 14:04), Max: 36.6 (04 Jun 2025 20:52)  T(F): 97.3 (05 Jun 2025 14:04), Max: 97.8 (04 Jun 2025 20:52)  HR: 103 (05 Jun 2025 14:04) (97 - 105)  BP: 125/81 (05 Jun 2025 14:04) (114/73 - 128/70)  BP(mean): --  RR: 18 (05 Jun 2025 14:04) (18 - 20)  SpO2: 96% (05 Jun 2025 14:04) (94% - 96%)    Parameters below as of 05 Jun 2025 14:04  Patient On (Oxygen Delivery Method): room air      General:[  x ] NAD, Resting Comfortable,   [   ] other:                                HEENT: [ x  ] NC/AT, EOMI, PERRL , Normal Conjunctivae,   [   ] other:  Cardio: [  x ] RRR, no murmur,   [   ] other:                              Pulm: [  x ] No Respiratory Distress,  Lungs CTAB,   [   ] other:                       Abdomen: [  x ]ND/NT, Soft, abd incision site c/d/i   [   ] other:    : [  x ] NO RODRIGUES CATHETER, [   ] RODRIGUES CATHETER- no meatal tear, no discharge, [   ] other:                                            MSK: [ x  ] No joint swelling, Full ROM,   [   ] other:                                         Ext: [   ]No C/C/E, No calf tenderness,   [ x  ]other: 2+ pitting edema in BLE    Skin: [   ]intact,   [ x  ] other: abd incision c/d/i                                                                 Neurological Examination:  Cognitive: [  x  ] AAO x 3,   [    ]  other:                                                                        Attention:  [  x  ] intact,   [  x  ]  other: pleasant and cooperative                            Memory: [  x  ] intact,  [    ]  other:      Mood/Affect: [ x   ] wnl,    [    ]  other:                                                                             Communication: [ x   ]Fluent, no dysarthria, following commands:  [    ] other:    CN II - XII:  [  x  ] intact,  [    ] other:                                                                                   Motor:   RIGHT UE:  3+/5 shoulder abduction, 5/5 elbow flexion/extension, 5/5 hand    LEFT    UE: 3+/5 shoulder abduction, 5/5 elbow flexion/extension, 5/5 hand   RIGHT LE: 3/5 hip flexion, 4+/5 knee flexion/extension, 4+/5 DF/PF (limited by pitting edema)  LEFT  LE:  3/5 hip flexion, 4+/5 knee flexion/extension, 4+/5 DF/PF (limited by pitting edema)  Tone: [  x  ] wnl,   [    ]  other:  DTRs: [  x ]symmetric, [   ] other:  Coordination:   [ x   ] intact,   [    ] other:                                                                         Sensory: [ x   ] Intact to light touch,   [    ] other:      MEDICATIONS  (STANDING):  apixaban 5 milliGRAM(s) Oral every 12 hours  ascorbic acid 500 milliGRAM(s) Oral daily  chlorhexidine 2% Cloths 1 Application(s) Topical <User Schedule>  cyanocobalamin 1000 MICROGram(s) Oral daily  ergocalciferol 82165 Unit(s) Oral <User Schedule>  hydrochlorothiazide 25 milliGRAM(s) Oral daily  lactobacillus acidophilus 1 Tablet(s) Oral daily  losartan 50 milliGRAM(s) Oral daily  magnesium oxide 400 milliGRAM(s) Oral every 12 hours  meropenem  IVPB 1000 milliGRAM(s) IV Intermittent every 8 hours  multivitamin/minerals 1 Tablet(s) Oral daily  pantoprazole    Tablet 40 milliGRAM(s) Oral before breakfast  potassium chloride    Tablet ER 40 milliEquivalent(s) Oral daily  tiotropium 2.5 MICROgram(s) Inhaler 2 Puff(s) Inhalation daily  zinc sulfate 220 milliGRAM(s) Oral every 24 hours    MEDICATIONS  (PRN):  acetaminophen     Tablet .. 650 milliGRAM(s) Oral every 6 hours PRN Mild Pain (1 - 3)  albuterol    90 MICROgram(s) HFA Inhaler 2 Puff(s) Inhalation every 6 hours PRN Shortness of Breath and/or Wheezing  ondansetron Injectable 4 milliGRAM(s) IV Push every 6 hours PRN Nausea  sodium chloride 0.65% Nasal 1 Spray(s) Both Nostrils every 4 hours PRN Nasal Congestion        RECENT LABS/IMAGING                          8.3    5.01  )-----------( 253      ( 05 Jun 2025 06:22 )             26.8     06-05    143  |  107  |  15  ----------------------------<  109[H]  3.4[L]   |  22  |  0.8    Ca    8.2[L]      05 Jun 2025 06:22  Mg     1.7     06-05    TPro  5.4[L]  /  Alb  3.1[L]  /  TBili  0.2  /  DBili  x   /  AST  16  /  ALT  24  /  AlkPhos  110  06-05                                7.6    6.57  )-----------( 242      ( 01 Jun 2025 21:23 )             24.7     06-01    144  |  106  |  14  ----------------------------<  123[H]  3.2[L]   |  26  |  0.9    Ca    8.2[L]      01 Jun 2025 21:23  Phos  3.3     06-01  Mg     1.6     06-01

## 2025-06-05 NOTE — CHART NOTE - NSCHARTNOTEFT_GEN_A_CORE
Patient seen and examined  by surgeon dr Lema , lower part of incision is healed . no dehiscence , but middle of the incision is with cavity from bottom  need packing daily , Packing of the mid side  tiny open area (due to abscess (  expressed  pus )  not abx meropenem 1000 mg q8h  until 6/28 ( will be 4 weeks)  ,if needed 6 weeks then until ?7/13 as per ID ,( by 3rd week @ 6/21 She will need a ct abdomen pelvis << to decide the duration of abx )    potassium was 3.4 today , placed on potassium 40 meq daily , repeat labs on mon 6/9 .mag oxide 400 mg q12h also added ,

## 2025-06-05 NOTE — PROGRESS NOTE ADULT - TIME BILLING
I have personally seen and examined this patient.  I have reviewed all pertinent clinical information and reviewed all relevant imaging and diagnostic studies personally.   I counseled the patient about diagnostic testing and treatment plan.   I discussed my recommendations with the primary team & Hospitalist

## 2025-06-05 NOTE — CHART NOTE - NSCHARTNOTEFT_GEN_A_CORE
Wound Care     Midline abdominal wound was examined by Dr. Lema with new dressing in place.     Please perform daily dressing changes.     Dressing:   Dry gauze packed in wound, abdominal pad over with paper tape     Any questions or concerns please call [phone]

## 2025-06-05 NOTE — PROGRESS NOTE ADULT - SUBJECTIVE AND OBJECTIVE BOX
NANCY SARGENT  65y  Female      Patient is a 65y old  Female who presents with a chief complaint of Rehab of debility with proximal LE weakness and marked decline from baseline function, s/p multiple abdominal surgeries with complications / HTN, severe obesity, LOUIS on CPAP, Abby en y gastric bypass, and a recent admission for an incarcerated ventral hernia w/ sbo s/p lap converted to open ventral hernia repair w/ mesh and small bowel resection and s/p ex lap, 30 cm sbr, creation of new side to side ileoileostomy, ventral hernia repair w/ mesh. (02 Jun 2025 13:50)      INTERVAL HPI/OVERNIGHT EVENTS:     No acute events overnight.   No acute complaints this AM.      Vital Signs Last 24 Hrs  T(C): 36.7 (04 Jun 2025 12:09), Max: 36.7 (04 Jun 2025 12:09)  T(F): 98 (04 Jun 2025 12:09), Max: 98 (04 Jun 2025 12:09)  HR: 109 (04 Jun 2025 12:09) (83 - 109)  BP: 155/86 (04 Jun 2025 12:09) (115/76 - 155/86)  BP(mean): 90 (03 Jun 2025 22:00) (90 - 90)  RR: 20 (04 Jun 2025 12:09) (18 - 20)  SpO2: 96% (04 Jun 2025 05:21) (96% - 96%)    Parameters below as of 03 Jun 2025 22:00  Patient On (Oxygen Delivery Method): room air      PHYSICAL EXAM:  GENERAL: NAD, well-groomed, well-developed, obese  PSYCH: no agitation, baseline mentation  NERVOUS SYSTEM:  Alert & Oriented X3   PULMONARY: Clear to percussion bilaterally; No rales, rhonchi, wheezing, or rubs  CARDIOVASCULAR: Regular rate and rhythm; No murmurs, rubs, or gallops  GI: Soft, Nontender, Nondistended; Bowel sounds present  EXTREMITIES:  No clubbing, cyanosis, or edema  SKIN: No rashes or lesions    Consultant(s) Notes Reviewed:  [x ] YES  [ ] NO    Discussed with Consultants/Other Providers [ x] YES     LABS               8.3    5.01  )-----------( 253      ( 05 Jun 2025 06:22 )             26.8     06-05    143  |  107  |  15  ----------------------------<  109  3.4   |  22  |  0.8    Ca    8.2      05 Jun 2025 06:22  Mg     1.7     06-05    TPro  5.4  /  Alb  3.1  /  TBili  0.2  /  DBili  x   /  AST  16  /  ALT  24  /  AlkPhos  110  06-05    eGFR: 82 mL/min/1.73m2 (05 Jun 2025 06:22)        Urinalysis Basic - ( 01 Jun 2025 21:23 )    Color: x / Appearance: x / SG: x / pH: x  Gluc: 123 mg/dL / Ketone: x  / Bili: x / Urobili: x   Blood: x / Protein: x / Nitrite: x   Leuk Esterase: x / RBC: x / WBC x   Sq Epi: x / Non Sq Epi: x / Bacteria: x      RADIOLOGY & ADDITIONAL TESTS:  - no images 6/3  Imaging Personally Reviewed:  [ ] YES  [ ] NO    HEALTH ISSUES - PROBLEM Dx:  Pulmonary thromboembolism        MEDICATIONS  (STANDING):  apixaban 5 milliGRAM(s) Oral every 12 hours  chlorhexidine 2% Cloths 1 Application(s) Topical <User Schedule>  cyanocobalamin 1000 MICROGram(s) Oral daily  ergocalciferol 42762 Unit(s) Oral <User Schedule>  hydrochlorothiazide 25 milliGRAM(s) Oral daily  lactobacillus acidophilus 1 Tablet(s) Oral daily  losartan 50 milliGRAM(s) Oral daily  meropenem  IVPB 1000 milliGRAM(s) IV Intermittent every 8 hours  multivitamin/minerals 1 Tablet(s) Oral daily  pantoprazole    Tablet 40 milliGRAM(s) Oral before breakfast  potassium chloride    Tablet ER 40 milliEquivalent(s) Oral daily  tiotropium 2.5 MICROgram(s) Inhaler 2 Puff(s) Inhalation daily    MEDICATIONS  (PRN):  acetaminophen     Tablet .. 650 milliGRAM(s) Oral every 6 hours PRN Mild Pain (1 - 3)  albuterol    90 MICROgram(s) HFA Inhaler 2 Puff(s) Inhalation every 6 hours PRN Shortness of Breath and/or Wheezing  ondansetron Injectable 4 milliGRAM(s) IV Push every 6 hours PRN Nausea  sodium chloride 0.65% Nasal 1 Spray(s) Both Nostrils every 4 hours PRN Nasal Congestion

## 2025-06-06 LAB
CULTURE RESULTS: ABNORMAL
ORGANISM # SPEC MICROSCOPIC CNT: ABNORMAL
ORGANISM # SPEC MICROSCOPIC CNT: SIGNIFICANT CHANGE UP
SPECIMEN SOURCE: SIGNIFICANT CHANGE UP

## 2025-06-06 PROCEDURE — 99232 SBSQ HOSP IP/OBS MODERATE 35: CPT

## 2025-06-06 RX ADMIN — APIXABAN 5 MILLIGRAM(S): 2.5 TABLET, FILM COATED ORAL at 06:07

## 2025-06-06 RX ADMIN — APIXABAN 5 MILLIGRAM(S): 2.5 TABLET, FILM COATED ORAL at 18:35

## 2025-06-06 RX ADMIN — Medication 40 MILLIGRAM(S): at 06:07

## 2025-06-06 RX ADMIN — MEROPENEM 100 MILLIGRAM(S): 1 INJECTION INTRAVENOUS at 15:56

## 2025-06-06 RX ADMIN — Medication 40 MILLIEQUIVALENT(S): at 13:52

## 2025-06-06 RX ADMIN — MEROPENEM 100 MILLIGRAM(S): 1 INJECTION INTRAVENOUS at 21:58

## 2025-06-06 RX ADMIN — Medication 220 MILLIGRAM(S): at 10:03

## 2025-06-06 RX ADMIN — Medication 1 TABLET(S): at 13:52

## 2025-06-06 RX ADMIN — Medication 400 MILLIGRAM(S): at 06:07

## 2025-06-06 RX ADMIN — CYANOCOBALAMIN 1000 MICROGRAM(S): 1000 INJECTION INTRAMUSCULAR; SUBCUTANEOUS at 13:52

## 2025-06-06 RX ADMIN — Medication 400 MILLIGRAM(S): at 18:35

## 2025-06-06 RX ADMIN — Medication 500 MILLIGRAM(S): at 13:52

## 2025-06-06 RX ADMIN — ERGOCALCIFEROL 50000 UNIT(S): 1.25 CAPSULE ORAL at 13:52

## 2025-06-06 RX ADMIN — Medication 1 TABLET(S): at 12:00

## 2025-06-06 RX ADMIN — TIOTROPIUM BROMIDE INHALATION SPRAY 2 PUFF(S): 3.12 SPRAY, METERED RESPIRATORY (INHALATION) at 09:47

## 2025-06-06 RX ADMIN — Medication 1 APPLICATION(S): at 06:28

## 2025-06-06 RX ADMIN — MEROPENEM 100 MILLIGRAM(S): 1 INJECTION INTRAVENOUS at 06:08

## 2025-06-06 RX ADMIN — LOSARTAN POTASSIUM 50 MILLIGRAM(S): 100 TABLET, FILM COATED ORAL at 06:07

## 2025-06-06 NOTE — PROGRESS NOTE ADULT - ASSESSMENT
Mrs. Eddy is a 66 yo F with a PMHx of HTN, severe obesity, DVT, PE (R distal main with segmental extension) on Eliquis, LOUIS on CPAP, abby en y gastric bypass, and a recent admission for an incarcerated ventral hernia w/ SBO s/p lap converted to open ventral hernia repair w/ mesh and SBR and s/p ex lap, 30 cm SBR, creation of new side to side ileoileostomy, and ventral hernia repair w/ mesh in April 2025 presented back to the ER 2 days later from her LTACH facility ([place]) after persistent HTN and epistaxis in setting of Eliquis use and complex surgical history. Patient found to have extensive ventral wall postsurgical changes with gas and fluid containing midline collection along the lower abdomen, measuring  approximately 4.8 x 4.5 x 10.2 cm and being managed with abx.    Plan:    #Rehab of debility with proximal LE weakness and marked decline from baseline function, s/p multiple abdominal surgeries with complications / HTN, morbid obesity, LOUIS on CPAP, Abby en y gastric bypass, and a recent admission for an incarcerated ventral hernia w/ sbo s/p lap converted to open ventral hernia repair w/ mesh and small bowel resection and s/p ex lap, 30 cm sbr, creation of new side to side ileoileostomy, ventral hernia repair w/ mesh.with impairment in mobility and ADLs and iADLs.The patient presented with co-morbidities requiring close physiatry follow-up at least 3 times a week to monitor his progress and monitor his comorbidities including HTN, PE, DVT, and LOUIS. .The patient's co-morbidities do not limit the patient's ability to participate in rehabilitative therapy. The patient was seen by PT/OT and required min A with bed mobility, CGA with sit to stand transfers, ambulates 30ftx1 with RW, CGA and 1P assist, UBD minimum assist, and LBD dependent.. The patient was previously independent with all ADLs and iADLs without use of AD and now presents with functional decline.   #Wound dehiscence and persistent intra-abdominal collection, suspect abscess in setting of multiple abdominal surgeries with complications  -5/28 Wcx grew rare Enterococcus avium    -Growth in fluid media only Escherichia coli ESBL    -5/30 midline placed  -Wound Care Instructions: Patient has a midline wound with staples and small 1 cm area of packing. Please remove and replace dressings once daily. Packing 1 cm opening with 0.25 inch packing strip, cut to length appropriate for wound. Cover incision with gauze and secure with paper tape.  -abd binder for support  -Infectious Disease is following. She is currently on Meropenem 1 gram IVPB q8h started 6/1. On discharge from acute rehab to home she will require Ertapenem 1 gram IVPB daily.  Plan to continue abx until 6/29 and follow-up with ID  - continue packing of upper incision daily  -Continue with Tylenol and adjust pain management as needed.  -She requires acute rehab with 3 hours of daily therapies at least 5 out of 7 days and close physiatry follow up.   - per surgery, repeat CTAP as outpatient on 6/22    #PE in R distal main with segmental extension  #DVT  -c/w Eliquis  - stable    #Stage 2 buttock pressure injury per wound care nurse specialist  - local care with barrier cream  - monitor    #Chronic venous insufficiency  Her blood pressure has been soft. I halted the amlodipine to aid her 2+ bipedal edema.   Discussed avoiding salty snacks including salted pretzels.    #HTN  -Continue with HCTZ and losartan.   -Amolodipine was halted in the setting of pitting edema on 6/2.  -Will continue to monitor hemodynamic status and adjust medications as needed; will consider low dose amlodipine if needed vs beta blocker.    - HCTZ and Losartan held 6/4 per parameters. Monitor and adjust  - Losartan decreased from 100mg to 50mg 6/5  - monitor and adjust  - good range    #Hypokalemia  - give oral supplement and recheck in a few days  - on HCTZ    #LOUIS on CPAP  Stable    #Morbid obesity BMI 53.2  Dietary education     -Pain control: Tylenol   -Bladder management: N/A  -Skin: abd incision site c/d/i    -FEN :N/A  - Diet: DASH/TLC Sodium & Cholesterol Restricted    Precautions / PROPHYLAXIS:    - Falls  - DVT prophylaxis: Eliquis

## 2025-06-06 NOTE — ADVANCED PRACTICE NURSE CONSULT - ASSESSMENT
Reason for Admission: Rehab of debility with proximal LE weakness and marked decline from baseline function, s/p multiple abdominal surgeries with complications / HTN, severe obesity, LOUIS on CPAP, Abby en y gastric bypass, and a recent admission for an incarcerated ventral hernia w/ sbo s/p lap converted to open ventral hernia repair w/ mesh and small bowel resection and s/p ex lap, 30 cm sbr, creation of new side to side ileoileostomy, ventral hernia repair w/ mesh.  History of Present Illness:   64 yo F with a PMHx of HTN, severe obesity, DVT, PE (R distal main with segmental extension) on Eliquis, LOUIS on CPAP, abby en y gastric bypass, and a recent admission for an incarcerated ventral hernia w/ SBO s/p lap converted to open ventral hernia repair w/ mesh and SBR and s/p ex lap, 30 cm SBR, creation of new side to side ileoileostomy, and ventral hernia repair w/ mesh in April 2025 presents back to the ER 2 days later from her LTACH facility ([place]) after persistent HTN and epistaxis in setting of Eliquis use and complex surgical history. Patient notes her epistaxis has stopped after self-tamponade for 25 minutes. On admission, patient endorsed purulent discharge from her incision. CT AP (5/27) showed extensive ventral wall postsurgical changes with gas and fluid containing midline collection along the lower abdomen, measuring  approximately 4.8 x 4.5 x 10.2 cm. IR evaluated collection and did not recommend a drain. Wound culture positive for rare enterococcus avium (5/28). Patient currently on abx via midline per ID recs. Patient has been tolerating diet, passing regular bowel movements and voiding without nausea or vomiting. Denies SOB, chest pain, fever or chills. Rates her abd pain 1/10 at its worst.     Prior to the debility with proximal LE weakness and marked decline s/p multiple abd surgeries with complications, the patient was independent in ADLs and ambulation. Patient now requires moderate assistance with ambulation and ADLs 2/2 to the debility. Therefore, there is a significant functional decline from baseline. Patients also with medical comorbidities of recent PE in R distal main with segmental extension and DVT on Eliquis, LOUIS on CPAP, HTN, and morbid obesity requiring medication management and monitoring of vitals by Physiatry team and nursing. There are significant comorbidities which warrants acute rehab hospitalization. To return home it is in the patient’s best interest to have acute inpatient rehabilitative services to undergo intensive interdisciplinary therapy. Of note, the patient lives alone with multiple steps and would have difficulty performing ADLs and iADLs individually. Furthermore, the patient is motivated and is able to tolerate up to 3 hours of daily therapy for 5-6 days a week for a total of 15 hours a week. Evaluated by Physiatry and deemed to be an excellent candidate for admission to  for acute inpatient rehab. Admitted to Acute Rehab on 6/2/2025.    Patient seen and examined by Physiatry and is currently functioning at bed mobility minimum assist, sit to stand transfer with CGA, ambulates 30ftx1 with RW, CGA and 1P assist, UBD minimum assist, and LBD dependent..   Patient received in bed, limited mobility, high risk for pressure injury development or progression.    Wound – Buttock stage II entered in error – inappropriate documentation   Type & Location: Coccyx laterally right stage II  Size: ~1cmx0.5cm  Tissue Description: pink tissue with white edges and dry light brown scab tissue proximal  Wound Exudate: none   Wound Edge: intact  Periwound Condition: intact

## 2025-06-06 NOTE — PROGRESS NOTE ADULT - ASSESSMENT
64 yo F with a PMHx of HTN, severe obesity, DVT, PE (R distal main with segmental extension) on Eliquis, LOUIS on CPAP, elisha en y gastric bypass, and a recent admission for an incarcerated ventral hernia w/ SBO s/p lap converted to open ventral hernia repair w/ mesh and SBR and s/p ex lap, 30 cm SBR, creation of new side to side ileoileostomy, and ventral hernia repair w/ mesh in April 2025 presented back to the ER 2 days later from her LTACH facility ([facility]) after persistent HTN and epistaxis in setting of Eliquis use and complex surgical history. Patient found to have extensive ventral wall postsurgical changes with gas and fluid containing midline collection along the lower abdomen, measuring  approximately 4.8 x 4.5 x 10.2 cm and being managed with abx.       # debility with proximal LE weakness and marked decline from baseline functions  - rehab per primary team    #Wound dehiscence and persistent intra-abdominal collection, suspect abscess in setting of multiple abdominal surgeries with complications  -5/28 Wcx grew rare Enterococcus avium    -Growth in fluid media only Escherichia coli ESBL    -General Surgery following for wound dressing changes   -Per Surgery, dressing change daily with vashe and 0.25" packing in superior midline incision  -Infectious Disease is following. She is currently on Meropenem 1 gram IVPB q8h. On discharge from acute rehab to home she   will require Ertapenem 1 gram IVPB daily  -Continue with Tylenol and adjust pain management as needed.    #PE in R distal main with segmental extension  #DVT  -c/w apixaban 5 milliGRAM(s) Oral every 12 hours    #Chronic venous insufficiency  - BLE compressions stockings as tolerated   - elevate BLE when resting   - DASH/TLC diet     #HTN  - c/w Losartan 50mg qd     #LOUIS on CPAP  Stable    #Morbid obesity  Dietary education     #Hypomagnesemia   #Hypokalemia   - Started on Mag oxide 400mg PO q12 + KCl 40 meq qd  - consider rechecking in 24H to avoid transition to hyperkalemia     Thank you for allowing us to participate in the care of your patient. We will follow along. If you have any questions or concerns, please feel free to reach out.     Total time spent to complete patient's bedside assessment, reviewed medical chart, discussed medical plan of care with team was more than 35 minutes with >50% of time spent face to face with patient, discussion with patient/family and/or coordination of care.

## 2025-06-06 NOTE — ADVANCED PRACTICE NURSE CONSULT - RECOMMEDATIONS
Cleanse wound with soap and water, pat dry.  Apply Triad cream twice daily PRN for soiling. Leave open to air.   Skin and incontinence care.  Pressure Injury Prevention.  Assess wound daily and inform primary provider of any changes.   Case discussed with primary RN.  Wound/ ostomy specialist to f/u as needed.   Other recommendations per Primary Team.

## 2025-06-06 NOTE — PROGRESS NOTE ADULT - SUBJECTIVE AND OBJECTIVE BOX
NANCY SARGENT  65y  Female      Patient is a 65y old  Female who presents with a chief complaint of Rehab of debility with proximal LE weakness and marked decline from baseline function, s/p multiple abdominal surgeries with complications / HTN, severe obesity, LOUIS on CPAP, Abby en y gastric bypass, and a recent admission for an incarcerated ventral hernia w/ sbo s/p lap converted to open ventral hernia repair w/ mesh and small bowel resection and s/p ex lap, 30 cm sbr, creation of new side to side ileoileostomy, ventral hernia repair w/ mesh. (02 Jun 2025 13:50)      INTERVAL HPI/OVERNIGHT EVENTS:     No acute events overnight.   No acute complaints this AM.      Vital Signs Last 24 Hrs  T(C): 36.7 (04 Jun 2025 12:09), Max: 36.7 (04 Jun 2025 12:09)  T(F): 98 (04 Jun 2025 12:09), Max: 98 (04 Jun 2025 12:09)  HR: 109 (04 Jun 2025 12:09) (83 - 109)  BP: 155/86 (04 Jun 2025 12:09) (115/76 - 155/86)  BP(mean): 90 (03 Jun 2025 22:00) (90 - 90)  RR: 20 (04 Jun 2025 12:09) (18 - 20)  SpO2: 96% (04 Jun 2025 05:21) (96% - 96%)    Parameters below as of 03 Jun 2025 22:00  Patient On (Oxygen Delivery Method): room air      PHYSICAL EXAM:  GENERAL: NAD, well-groomed, well-developed, obese  PSYCH: no agitation, baseline mentation  NERVOUS SYSTEM:  Alert & Oriented X3   PULMONARY: Clear to percussion bilaterally; No rales, rhonchi, wheezing, or rubs  CARDIOVASCULAR: Regular rate and rhythm; No murmurs, rubs, or gallops  GI: Soft, Nontender, Nondistended; Bowel sounds present  EXTREMITIES:  No clubbing, cyanosis, or edema  SKIN: No rashes or lesions    Consultant(s) Notes Reviewed:  [x ] YES  [ ] NO    Discussed with Consultants/Other Providers [ x] YES     LABS               8.3    5.01  )-----------( 253      ( 05 Jun 2025 06:22 )             26.8     06-05    143  |  107  |  15  ----------------------------<  109  3.4   |  22  |  0.8    Ca    8.2      05 Jun 2025 06:22  Mg     1.7     06-05    TPro  5.4  /  Alb  3.1  /  TBili  0.2  /  DBili  x   /  AST  16  /  ALT  24  /  AlkPhos  110  06-05    eGFR: 82 mL/min/1.73m2 (05 Jun 2025 06:22)        Urinalysis Basic - ( 01 Jun 2025 21:23 )    Color: x / Appearance: x / SG: x / pH: x  Gluc: 123 mg/dL / Ketone: x  / Bili: x / Urobili: x   Blood: x / Protein: x / Nitrite: x   Leuk Esterase: x / RBC: x / WBC x   Sq Epi: x / Non Sq Epi: x / Bacteria: x      RADIOLOGY & ADDITIONAL TESTS:  - no images 6/3  Imaging Personally Reviewed:  [ ] YES  [ ] NO    HEALTH ISSUES - PROBLEM Dx:  Pulmonary thromboembolism        MEDICATIONS  (STANDING):  apixaban 5 milliGRAM(s) Oral every 12 hours  chlorhexidine 2% Cloths 1 Application(s) Topical <User Schedule>  cyanocobalamin 1000 MICROGram(s) Oral daily  ergocalciferol 11522 Unit(s) Oral <User Schedule>  hydrochlorothiazide 25 milliGRAM(s) Oral daily  lactobacillus acidophilus 1 Tablet(s) Oral daily  losartan 50 milliGRAM(s) Oral daily  meropenem  IVPB 1000 milliGRAM(s) IV Intermittent every 8 hours  multivitamin/minerals 1 Tablet(s) Oral daily  pantoprazole    Tablet 40 milliGRAM(s) Oral before breakfast  potassium chloride    Tablet ER 40 milliEquivalent(s) Oral daily  tiotropium 2.5 MICROgram(s) Inhaler 2 Puff(s) Inhalation daily    MEDICATIONS  (PRN):  acetaminophen     Tablet .. 650 milliGRAM(s) Oral every 6 hours PRN Mild Pain (1 - 3)  albuterol    90 MICROgram(s) HFA Inhaler 2 Puff(s) Inhalation every 6 hours PRN Shortness of Breath and/or Wheezing  ondansetron Injectable 4 milliGRAM(s) IV Push every 6 hours PRN Nausea  sodium chloride 0.65% Nasal 1 Spray(s) Both Nostrils every 4 hours PRN Nasal Congestion

## 2025-06-06 NOTE — PROGRESS NOTE ADULT - SUBJECTIVE AND OBJECTIVE BOX
Patient is a 65y old  Female who presents with a chief complaint of Rehab of debility with proximal LE weakness and marked decline from baseline function, s/p multiple abdominal surgeries with complications / HTN, severe obesity, LOUIS on CPAP, Abby en y gastric bypass, and a recent admission for an incarcerated ventral hernia w/ sbo s/p lap converted to open ventral hernia repair w/ mesh and small bowel resection and s/p ex lap, 30 cm sbr, creation of new side to side ileoileostomy, ventral hernia repair w/ mesh. (03 Jun 2025 12:34)      HPI:  64 yo F with a PMHx of HTN, severe obesity, DVT, PE (R distal main with segmental extension) on Eliquis, LOUIS on CPAP, abby en y gastric bypass, and a recent admission for an incarcerated ventral hernia w/ SBO s/p lap converted to open ventral hernia repair w/ mesh and SBR and s/p ex lap, 30 cm SBR, creation of new side to side ileoileostomy, and ventral hernia repair w/ mesh in April 2025 presents back to the ER 2 days later from her LTACH facility ([facility]) after persistent HTN and epistaxis in setting of Eliquis use and complex surgical history. Patient notes her epistaxis has stopped after self-tamponade for 25 minutes. On admission, patient endorsed purulent discharge from her incision. CT AP (5/27) showed extensive ventral wall postsurgical changes with gas and fluid containing midline collection along the lower abdomen, measuring  approximately 4.8 x 4.5 x 10.2 cm. IR evaluated collection and did not recommend a drain. Wound culture positive for rare enterococcus avium (5/28). Patient currently on abx via midline per ID recs. Patient has been tolerating diet, passing regular bowel movements and voiding without nausea or vomiting. Denies SOB, chest pain, fever or chills. Rates her abd pain 1/10 at its worst.     Prior to the debility with proximal LE weakness and marked decline s/p multiple abd surgeries with complications, the patient was independent in ADLs and ambulation. Patient now requires moderate assistance with ambulation and ADLs 2/2 to the debility. Therefore, there is a significant functional decline from baseline. Patients also with medical comorbidities of recent PE in R distal main with segmental extension and DVT on Eliquis, LOUIS on CPAP, HTN, and morbid obesity requiring medication management and monitoring of vitals by Physiatry team and nursing. There are significant comorbidities which warrants acute rehab hospitalization. To return home it is in the patient’s best interest to have acute inpatient rehabilitative services to undergo intensive interdisciplinary therapy. Of note, the patient lives alone with multiple steps and would have difficulty performing ADLs and iADLs individually. Furthermore, the patient is motivated and is able to tolerate up to 3 hours of daily therapy for 5-6 days a week for a total of 15 hours a week. Evaluated by Physiatry and deemed to be an excellent candidate for admission to  for acute inpatient rehab. Admitted to Acute Rehab on 6/2/2025.      Patient seen and examined by Physiatry and is currently functioning at bed mobility minimum assist, sit to stand transfer with CGA, ambulates 30ftx1 with RW, CGA and 1P assist, UBD minimum assist, and LBD dependent..     LS: Patient. lives with  but came from [facility] after last admission  PLOF: Independent with all ADLs and iADLs and ambulates without assistive device.      (02 Jun 2025 13:50)      TODAY'S SUBJECTIVE & REVIEW OF SYMPTOMS    Patient was seen and assessed at bedside.  Patient denies any new complaints at this time. Wound care specialist nurse note appreciated.    Tolerating PT/OT. Tolerating oral diet. Voiding spontaneously and having regular BM.  Vital signs reviewed. Labs appreciated.       Review of Systems:   Constitutional:    [x  ] WNL           [   ] poor appetite   [   ] insomnia   [   ] tired   Cardio:                [   ] WNL           [   ] CP   [x  ] CEBALLOS   [   ] palpitations               Resp:                   [  x ] WNL           [   ] SOB   [   ] cough   [   ] wheezing   GI:                        [  x ] WNL           [   ] constipation   [   ] diarrhea   [   ] abdominal pain   [   ] nausea   [   ] emesis                                :                      [  x ] WNL           [   ] RODRIGUES  [   ] dysuria   [   ] difficulty voiding             Endo:                   [  x ] WNL          [   ] polyuria   [   ] temperature intolerance                 Skin:                     [   ] WNL          [   ] pain   [ x  ] wound-incision at abd site, c/d/i  [   ] rash   MSK:                    [   ] WNL          [   ] muscle pain   [  ] joint pain/ stiffness   [   ] muscle tenderness   [ x  ] swelling in BLE    Neuro:                 [   ] WNL          [   ] HA   [  ] change in vision   [   ] tremor   [ x  ] weakness   [   ]dysphagia              Cognitive:           [ x  ] WNL           [   ] occasional confusion      Psych:                  [  x ] WNL           [   ] hallucinations   [   ]agitation   [   ] delusion   [   ]depression    CLOF:  eating independent  transfer modA  UB dressing maxA  LB dressing dependent  toileting dependent  ambulates 40ft with RW and steadyA      PHYSICAL EXAMINATION     Vital Signs Last 24 Hrs  T(C): 36.4 (06 Jun 2025 06:05), Max: 36.6 (05 Jun 2025 19:15)  T(F): 97.5 (06 Jun 2025 06:05), Max: 97.9 (05 Jun 2025 19:15)  HR: 94 (06 Jun 2025 06:05) (94 - 104)  BP: 133/83 (06 Jun 2025 06:05) (125/81 - 133/83)  BP(mean): --  RR: 19 (06 Jun 2025 06:05) (18 - 19)  SpO2: 98% (06 Jun 2025 06:05) (96% - 98%)    Parameters below as of 06 Jun 2025 06:05  Patient On (Oxygen Delivery Method): BiPAP/CPAP      General:[  x ] NAD, Resting Comfortable,   [   ] other:                                HEENT: [ x  ] NC/AT, EOMI, PERRL , Normal Conjunctivae,   [   ] other:  Cardio: [  x ] RRR, no murmur,   [   ] other:                              Pulm: [  x ] No Respiratory Distress,  Lungs CTAB,   [   ] other:                       Abdomen: [  x ]ND/NT, Soft, abd incision site c/d/i   [   ] other:    : [  x ] NO RODRIGUES CATHETER, [   ] RODRIGUES CATHETER- no meatal tear, no discharge, [   ] other:                                            MSK: [ x  ] No joint swelling, Full ROM,   [   ] other:                                         Ext: [   ]No C/C/E, No calf tenderness,   [ x  ]other: 2+ pitting edema in BLE    Skin: [   ]intact,   [ x  ] other: abd incision c/d/i                                                                 Neurological Examination:  Cognitive: [  x  ] AAO x 3,   [    ]  other:                                                                        Attention:  [  x  ] intact,   [  x  ]  other: pleasant and cooperative                            Memory: [  x  ] intact,  [    ]  other:      Mood/Affect: [ x   ] wnl,    [    ]  other:                                                                             Communication: [ x   ]Fluent, no dysarthria, following commands:  [    ] other:    CN II - XII:  [  x  ] intact,  [    ] other:                                                                                   Motor:   RIGHT UE:  3+/5 shoulder abduction, 5/5 elbow flexion/extension, 5/5 hand    LEFT    UE: 3+/5 shoulder abduction, 5/5 elbow flexion/extension, 5/5 hand   RIGHT LE: 3/5 hip flexion, 4+/5 knee flexion/extension, 4+/5 DF/PF (limited by pitting edema)  LEFT  LE:  3/5 hip flexion, 4+/5 knee flexion/extension, 4+/5 DF/PF (limited by pitting edema)  Tone: [  x  ] wnl,   [    ]  other:  DTRs: [  x ]symmetric, [   ] other:  Coordination:   [ x   ] intact,   [    ] other:                                                                         Sensory: [ x   ] Intact to light touch,   [    ] other:      MEDICATIONS  (STANDING):  apixaban 5 milliGRAM(s) Oral every 12 hours  ascorbic acid 500 milliGRAM(s) Oral daily  chlorhexidine 2% Cloths 1 Application(s) Topical <User Schedule>  cyanocobalamin 1000 MICROGram(s) Oral daily  ergocalciferol 19527 Unit(s) Oral <User Schedule>  hydrochlorothiazide 25 milliGRAM(s) Oral daily  lactobacillus acidophilus 1 Tablet(s) Oral daily  losartan 50 milliGRAM(s) Oral daily  magnesium oxide 400 milliGRAM(s) Oral every 12 hours  meropenem  IVPB 1000 milliGRAM(s) IV Intermittent every 8 hours  multivitamin/minerals 1 Tablet(s) Oral daily  pantoprazole    Tablet 40 milliGRAM(s) Oral before breakfast  potassium chloride    Tablet ER 40 milliEquivalent(s) Oral daily  tiotropium 2.5 MICROgram(s) Inhaler 2 Puff(s) Inhalation daily  zinc sulfate 220 milliGRAM(s) Oral every 24 hours    MEDICATIONS  (PRN):  acetaminophen     Tablet .. 650 milliGRAM(s) Oral every 6 hours PRN Mild Pain (1 - 3)  albuterol    90 MICROgram(s) HFA Inhaler 2 Puff(s) Inhalation every 6 hours PRN Shortness of Breath and/or Wheezing  ondansetron Injectable 4 milliGRAM(s) IV Push every 6 hours PRN Nausea  sodium chloride 0.65% Nasal 1 Spray(s) Both Nostrils every 4 hours PRN Nasal Congestion        RECENT LABS/IMAGING                          8.3    5.01  )-----------( 253      ( 05 Jun 2025 06:22 )             26.8     06-05    143  |  107  |  15  ----------------------------<  109[H]  3.4[L]   |  22  |  0.8    Ca    8.2[L]      05 Jun 2025 06:22  Mg     1.7     06-05    TPro  5.4[L]  /  Alb  3.1[L]  /  TBili  0.2  /  DBili  x   /  AST  16  /  ALT  24  /  AlkPhos  110  06-05                                7.6    6.57  )-----------( 242      ( 01 Jun 2025 21:23 )             24.7     06-01    144  |  106  |  14  ----------------------------<  123[H]  3.2[L]   |  26  |  0.9    Ca    8.2[L]      01 Jun 2025 21:23  Phos  3.3     06-01  Mg     1.6     06-01

## 2025-06-07 LAB
BASOPHILS # BLD AUTO: 0.04 K/UL — SIGNIFICANT CHANGE UP (ref 0–0.2)
BASOPHILS NFR BLD AUTO: 0.5 % — SIGNIFICANT CHANGE UP (ref 0–1)
EOSINOPHIL # BLD AUTO: 0.1 K/UL — SIGNIFICANT CHANGE UP (ref 0–0.7)
EOSINOPHIL NFR BLD AUTO: 1.3 % — SIGNIFICANT CHANGE UP (ref 0–8)
HCT VFR BLD CALC: 30.9 % — LOW (ref 37–47)
HGB BLD-MCNC: 9.4 G/DL — LOW (ref 12–16)
IMM GRANULOCYTES NFR BLD AUTO: 1 % — HIGH (ref 0.1–0.3)
LYMPHOCYTES # BLD AUTO: 2.21 K/UL — SIGNIFICANT CHANGE UP (ref 1.2–3.4)
LYMPHOCYTES # BLD AUTO: 28.9 % — SIGNIFICANT CHANGE UP (ref 20.5–51.1)
MCHC RBC-ENTMCNC: 29.4 PG — SIGNIFICANT CHANGE UP (ref 27–31)
MCHC RBC-ENTMCNC: 30.4 G/DL — LOW (ref 32–37)
MCV RBC AUTO: 96.6 FL — SIGNIFICANT CHANGE UP (ref 81–99)
MONOCYTES # BLD AUTO: 0.67 K/UL — HIGH (ref 0.1–0.6)
MONOCYTES NFR BLD AUTO: 8.8 % — SIGNIFICANT CHANGE UP (ref 1.7–9.3)
NEUTROPHILS # BLD AUTO: 4.55 K/UL — SIGNIFICANT CHANGE UP (ref 1.4–6.5)
NEUTROPHILS NFR BLD AUTO: 59.5 % — SIGNIFICANT CHANGE UP (ref 42.2–75.2)
NRBC BLD AUTO-RTO: 0 /100 WBCS — SIGNIFICANT CHANGE UP (ref 0–0)
PLATELET # BLD AUTO: 287 K/UL — SIGNIFICANT CHANGE UP (ref 130–400)
PMV BLD: 9.6 FL — SIGNIFICANT CHANGE UP (ref 7.4–10.4)
RBC # BLD: 3.2 M/UL — LOW (ref 4.2–5.4)
RBC # FLD: 16.6 % — HIGH (ref 11.5–14.5)
WBC # BLD: 7.65 K/UL — SIGNIFICANT CHANGE UP (ref 4.8–10.8)
WBC # FLD AUTO: 7.65 K/UL — SIGNIFICANT CHANGE UP (ref 4.8–10.8)

## 2025-06-07 RX ADMIN — MEROPENEM 100 MILLIGRAM(S): 1 INJECTION INTRAVENOUS at 14:20

## 2025-06-07 RX ADMIN — Medication 40 MILLIEQUIVALENT(S): at 13:16

## 2025-06-07 RX ADMIN — Medication 1 APPLICATION(S): at 06:01

## 2025-06-07 RX ADMIN — TIOTROPIUM BROMIDE INHALATION SPRAY 2 PUFF(S): 3.12 SPRAY, METERED RESPIRATORY (INHALATION) at 08:25

## 2025-06-07 RX ADMIN — MEROPENEM 100 MILLIGRAM(S): 1 INJECTION INTRAVENOUS at 06:01

## 2025-06-07 RX ADMIN — LOSARTAN POTASSIUM 50 MILLIGRAM(S): 100 TABLET, FILM COATED ORAL at 06:00

## 2025-06-07 RX ADMIN — APIXABAN 5 MILLIGRAM(S): 2.5 TABLET, FILM COATED ORAL at 18:00

## 2025-06-07 RX ADMIN — Medication 1 TABLET(S): at 13:17

## 2025-06-07 RX ADMIN — APIXABAN 5 MILLIGRAM(S): 2.5 TABLET, FILM COATED ORAL at 06:01

## 2025-06-07 RX ADMIN — Medication 400 MILLIGRAM(S): at 18:00

## 2025-06-07 RX ADMIN — Medication 400 MILLIGRAM(S): at 06:00

## 2025-06-07 RX ADMIN — Medication 220 MILLIGRAM(S): at 13:26

## 2025-06-07 RX ADMIN — Medication 40 MILLIGRAM(S): at 06:01

## 2025-06-07 RX ADMIN — Medication 1 TABLET(S): at 13:16

## 2025-06-07 RX ADMIN — MEROPENEM 100 MILLIGRAM(S): 1 INJECTION INTRAVENOUS at 21:20

## 2025-06-07 RX ADMIN — CYANOCOBALAMIN 1000 MICROGRAM(S): 1000 INJECTION INTRAMUSCULAR; SUBCUTANEOUS at 13:16

## 2025-06-07 RX ADMIN — Medication 500 MILLIGRAM(S): at 13:16

## 2025-06-07 NOTE — PROGRESS NOTE ADULT - ASSESSMENT
Mrs. Eddy is a 64 yo F with a PMHx of HTN, severe obesity, DVT, PE (R distal main with segmental extension) on Eliquis, LOUIS on CPAP, abby en y gastric bypass, and a recent admission for an incarcerated ventral hernia w/ SBO s/p lap converted to open ventral hernia repair w/ mesh and SBR and s/p ex lap, 30 cm SBR, creation of new side to side ileoileostomy, and ventral hernia repair w/ mesh in April 2025 presented back to the ER 2 days later from her LTACH facility ([facility]) after persistent HTN and epistaxis in setting of Eliquis use and complex surgical history. Patient found to have extensive ventral wall postsurgical changes with gas and fluid containing midline collection along the lower abdomen, measuring  approximately 4.8 x 4.5 x 10.2 cm and being managed with abx.    Plan:    #Rehab of debility with proximal LE weakness and marked decline from baseline function, s/p multiple abdominal surgeries with complications / HTN, morbid obesity, LOUIS on CPAP, Abby en y gastric bypass, and a recent admission for an incarcerated ventral hernia w/ sbo s/p lap converted to open ventral hernia repair w/ mesh and small bowel resection and s/p ex lap, 30 cm sbr, creation of new side to side ileoileostomy, ventral hernia repair w/ mesh.with impairment in mobility and ADLs and iADLs.The patient presented with co-morbidities requiring close physiatry follow-up at least 3 times a week to monitor his progress and monitor his comorbidities including HTN, PE, DVT, and LOUIS. .The patient's co-morbidities do not limit the patient's ability to participate in rehabilitative therapy. The patient was seen by PT/OT and required min A with bed mobility, CGA with sit to stand transfers, ambulates 30ftx1 with RW, CGA and 1P assist, UBD minimum assist, and LBD dependent.. The patient was previously independent with all ADLs and iADLs without use of AD and now presents with functional decline.   #Wound dehiscence and persistent intra-abdominal collection, suspect abscess in setting of multiple abdominal surgeries with complications  -5/28 Wcx grew rare Enterococcus avium    -Growth in fluid media only Escherichia coli ESBL    -5/30 midline placed  -Wound Care Instructions: Patient has a midline wound with staples and small 1 cm area of packing. Please remove and replace dressings once daily. Packing 1 cm opening with 0.25 inch packing strip, cut to length appropriate for wound. Cover incision with gauze and secure with paper tape.  -abd binder for support  -Infectious Disease is following. She is currently on Meropenem 1 gram IVPB q8h started 6/1. On discharge from acute rehab to home she will require Ertapenem 1 gram IVPB daily.  Plan to continue abx until 6/29 and follow-up with ID  - continue packing of upper incision daily  -Continue with Tylenol and adjust pain management as needed.  -She requires acute rehab with 3 hours of daily therapies at least 5 out of 7 days and close physiatry follow up.   - per surgery, repeat CTAP as outpatient on 6/22    #PE in R distal main with segmental extension  #DVT  -c/w Eliquis  - stable    #Stage 2 buttock pressure injury per wound care nurse specialist  - local care with barrier cream  - monitor    #Chronic venous insufficiency  Her blood pressure has been soft. I halted the amlodipine to aid her 2+ bipedal edema.   Discussed avoiding salty snacks including salted pretzels.    #HTN  -Continue with HCTZ and losartan.   -Amolodipine was halted in the setting of pitting edema on 6/2.  -Will continue to monitor hemodynamic status and adjust medications as needed; will consider low dose amlodipine if needed vs beta blocker.    -c/w HCTZ 25mg  - Losartan decreased from 100mg to 50mg 6/5  - monitor and adjust  - good range    #Hypokalemia  - give oral supplement and recheck in a few days  - on HCTZ    #LOUIS on CPAP  Stable    #Morbid obesity BMI 53.2  Dietary education     -Pain control: Tylenol   -Bladder management: N/A  -Skin: abd incision site c/d/i    -FEN :N/A  - Diet: DASH/TLC Sodium & Cholesterol Restricted    Precautions / PROPHYLAXIS:    - Falls  - DVT prophylaxis: Eliquis

## 2025-06-07 NOTE — PROGRESS NOTE ADULT - SUBJECTIVE AND OBJECTIVE BOX
Patient is a 65y old  Female who presents with a chief complaint of Rehab of debility with proximal LE weakness and marked decline from baseline function, s/p multiple abdominal surgeries with complications / HTN, severe obesity, LOUIS on CPAP, Abby en y gastric bypass, and a recent admission for an incarcerated ventral hernia w/ sbo s/p lap converted to open ventral hernia repair w/ mesh and small bowel resection and s/p ex lap, 30 cm sbr, creation of new side to side ileoileostomy, ventral hernia repair w/ mesh. (03 Jun 2025 12:34)      HPI:  66 yo F with a PMHx of HTN, severe obesity, DVT, PE (R distal main with segmental extension) on Eliquis, LOUIS on CPAP, abby en y gastric bypass, and a recent admission for an incarcerated ventral hernia w/ SBO s/p lap converted to open ventral hernia repair w/ mesh and SBR and s/p ex lap, 30 cm SBR, creation of new side to side ileoileostomy, and ventral hernia repair w/ mesh in April 2025 presents back to the ER 2 days later from her LTACH facility ([place]) after persistent HTN and epistaxis in setting of Eliquis use and complex surgical history. Patient notes her epistaxis has stopped after self-tamponade for 25 minutes. On admission, patient endorsed purulent discharge from her incision. CT AP (5/27) showed extensive ventral wall postsurgical changes with gas and fluid containing midline collection along the lower abdomen, measuring  approximately 4.8 x 4.5 x 10.2 cm. IR evaluated collection and did not recommend a drain. Wound culture positive for rare enterococcus avium (5/28). Patient currently on abx via midline per ID recs. Patient has been tolerating diet, passing regular bowel movements and voiding without nausea or vomiting. Denies SOB, chest pain, fever or chills. Rates her abd pain 1/10 at its worst.     Prior to the debility with proximal LE weakness and marked decline s/p multiple abd surgeries with complications, the patient was independent in ADLs and ambulation. Patient now requires moderate assistance with ambulation and ADLs 2/2 to the debility. Therefore, there is a significant functional decline from baseline. Patients also with medical comorbidities of recent PE in R distal main with segmental extension and DVT on Eliquis, LOUIS on CPAP, HTN, and morbid obesity requiring medication management and monitoring of vitals by Physiatry team and nursing. There are significant comorbidities which warrants acute rehab hospitalization. To return home it is in the patient’s best interest to have acute inpatient rehabilitative services to undergo intensive interdisciplinary therapy. Of note, the patient lives alone with multiple steps and would have difficulty performing ADLs and iADLs individually. Furthermore, the patient is motivated and is able to tolerate up to 3 hours of daily therapy for 5-6 days a week for a total of 15 hours a week. Evaluated by Physiatry and deemed to be an excellent candidate for admission to  for acute inpatient rehab. Admitted to Acute Rehab on 6/2/2025.      Patient seen and examined by Physiatry and is currently functioning at bed mobility minimum assist, sit to stand transfer with CGA, ambulates 30ftx1 with RW, CGA and 1P assist, UBD minimum assist, and LBD dependent..     LS: Patient. lives with  but came from [facility] after last admission  PLOF: Independent with all ADLs and iADLs and ambulates without assistive device.      (02 Jun 2025 13:50)      TODAY'S SUBJECTIVE & REVIEW OF SYMPTOMS  No acute events overnight. Patient was seen and assessed at bedside.  ***    Tolerating PT/OT. Tolerating oral diet. Voiding spontaneously and having regular BM.  Vital signs reviewed.        Review of Systems:   Constitutional:    [x  ] WNL           [   ] poor appetite   [   ] insomnia   [   ] tired   Cardio:                [   ] WNL           [   ] CP   [x  ] CEBALLOS   [   ] palpitations               Resp:                   [  x ] WNL           [   ] SOB   [   ] cough   [   ] wheezing   GI:                        [  x ] WNL           [   ] constipation   [   ] diarrhea   [   ] abdominal pain   [   ] nausea   [   ] emesis                                :                      [  x ] WNL           [   ] RODRIGUES  [   ] dysuria   [   ] difficulty voiding             Endo:                   [  x ] WNL          [   ] polyuria   [   ] temperature intolerance                 Skin:                     [   ] WNL          [   ] pain   [ x  ] wound-incision at abd site, c/d/i  [   ] rash   MSK:                    [   ] WNL          [   ] muscle pain   [  ] joint pain/ stiffness   [   ] muscle tenderness   [ x  ] swelling in BLE    Neuro:                 [   ] WNL          [   ] HA   [  ] change in vision   [   ] tremor   [ x  ] weakness   [   ]dysphagia              Cognitive:           [ x  ] WNL           [   ] occasional confusion      Psych:                  [  x ] WNL           [   ] hallucinations   [   ]agitation   [   ] delusion   [   ]depression    CLOF:  eating independent  transfer modA  UB dressing maxA  LB dressing dependent  toileting dependent  ambulates 40ft with RW and steadyA      PHYSICAL EXAMINATION   Vital Signs Last 24 Hrs  T(C): 36.7 (07 Jun 2025 06:46), Max: 36.7 (07 Jun 2025 06:46)  T(F): 98.1 (07 Jun 2025 06:46), Max: 98.1 (07 Jun 2025 06:46)  HR: 96 (07 Jun 2025 06:46) (96 - 96)  BP: 146/80 (07 Jun 2025 06:46) (125/80 - 146/80)  BP(mean): 102 (07 Jun 2025 06:46) (95 - 102)  RR: 18 (07 Jun 2025 06:46) (18 - 18)  SpO2: 97% (07 Jun 2025 06:46) (97% - 97%)    Parameters below as of 06 Jun 2025 21:59  Patient On (Oxygen Delivery Method): room air        General:[  x ] NAD, Resting Comfortable,   [   ] other:                                HEENT: [ x  ] NC/AT, EOMI, PERRL , Normal Conjunctivae,   [   ] other:  Cardio: [  x ] RRR, no murmur,   [   ] other:                              Pulm: [  x ] No Respiratory Distress,  Lungs CTAB,   [   ] other:                       Abdomen: [  x ]ND/NT, Soft, abd incision site c/d/i   [   ] other:    : [  x ] NO RODRIGUES CATHETER, [   ] RODRIGUES CATHETER- no meatal tear, no discharge, [   ] other:                                            MSK: [ x  ] No joint swelling, Full ROM,   [   ] other:                                         Ext: [   ]No C/C/E, No calf tenderness,   [ x  ]other: 2+ pitting edema in BLE    Skin: [   ]intact,   [ x  ] other: abd incision c/d/i                                                                 Neurological Examination:  Cognitive: [  x  ] AAO x 3,   [    ]  other:                                                                        Attention:  [  x  ] intact,   [  x  ]  other: pleasant and cooperative                            Memory: [  x  ] intact,  [    ]  other:      Mood/Affect: [ x   ] wnl,    [    ]  other:                                                                             Communication: [ x   ]Fluent, no dysarthria, following commands:  [    ] other:    CN II - XII:  [  x  ] intact,  [    ] other:                                                                                   Motor:   RIGHT UE:  3+/5 shoulder abduction, 5/5 elbow flexion/extension, 5/5 hand    LEFT    UE: 3+/5 shoulder abduction, 5/5 elbow flexion/extension, 5/5 hand   RIGHT LE: 3/5 hip flexion, 4+/5 knee flexion/extension, 4+/5 DF/PF (limited by pitting edema)  LEFT  LE:  3/5 hip flexion, 4+/5 knee flexion/extension, 4+/5 DF/PF (limited by pitting edema)  Tone: [  x  ] wnl,   [    ]  other:  DTRs: [  x ]symmetric, [   ] other:  Coordination:   [ x   ] intact,   [    ] other:                                                                         Sensory: [ x   ] Intact to light touch,   [    ] other:      MEDICATIONS  (STANDING):  apixaban 5 milliGRAM(s) Oral every 12 hours  ascorbic acid 500 milliGRAM(s) Oral daily  chlorhexidine 2% Cloths 1 Application(s) Topical <User Schedule>  cyanocobalamin 1000 MICROGram(s) Oral daily  ergocalciferol 85970 Unit(s) Oral <User Schedule>  hydrochlorothiazide 25 milliGRAM(s) Oral daily  lactobacillus acidophilus 1 Tablet(s) Oral daily  losartan 50 milliGRAM(s) Oral daily  magnesium oxide 400 milliGRAM(s) Oral every 12 hours  meropenem  IVPB 1000 milliGRAM(s) IV Intermittent every 8 hours  multivitamin/minerals 1 Tablet(s) Oral daily  pantoprazole    Tablet 40 milliGRAM(s) Oral before breakfast  potassium chloride    Tablet ER 40 milliEquivalent(s) Oral daily  tiotropium 2.5 MICROgram(s) Inhaler 2 Puff(s) Inhalation daily  zinc sulfate 220 milliGRAM(s) Oral every 24 hours    MEDICATIONS  (PRN):  acetaminophen     Tablet .. 650 milliGRAM(s) Oral every 6 hours PRN Mild Pain (1 - 3)  albuterol    90 MICROgram(s) HFA Inhaler 2 Puff(s) Inhalation every 6 hours PRN Shortness of Breath and/or Wheezing  ondansetron Injectable 4 milliGRAM(s) IV Push every 6 hours PRN Nausea  sodium chloride 0.65% Nasal 1 Spray(s) Both Nostrils every 4 hours PRN Nasal Congestion        RECENT LABS/IMAGING                          8.3    5.01  )-----------( 253      ( 05 Jun 2025 06:22 )             26.8     06-05    143  |  107  |  15  ----------------------------<  109[H]  3.4[L]   |  22  |  0.8    Ca    8.2[L]      05 Jun 2025 06:22  Mg     1.7     06-05    TPro  5.4[L]  /  Alb  3.1[L]  /  TBili  0.2  /  DBili  x   /  AST  16  /  ALT  24  /  AlkPhos  110  06-05                                7.6    6.57  )-----------( 242      ( 01 Jun 2025 21:23 )             24.7     06-01    144  |  106  |  14  ----------------------------<  123[H]  3.2[L]   |  26  |  0.9    Ca    8.2[L]      01 Jun 2025 21:23  Phos  3.3     06-01  Mg     1.6     06-01     Patient is a 65y old  Female who presents with a chief complaint of Rehab of debility with proximal LE weakness and marked decline from baseline function, s/p multiple abdominal surgeries with complications / HTN, severe obesity, LOUIS on CPAP, Abby en y gastric bypass, and a recent admission for an incarcerated ventral hernia w/ sbo s/p lap converted to open ventral hernia repair w/ mesh and small bowel resection and s/p ex lap, 30 cm sbr, creation of new side to side ileoileostomy, ventral hernia repair w/ mesh. (03 Jun 2025 12:34)      HPI:  66 yo F with a PMHx of HTN, severe obesity, DVT, PE (R distal main with segmental extension) on Eliquis, LOUIS on CPAP, abby en y gastric bypass, and a recent admission for an incarcerated ventral hernia w/ SBO s/p lap converted to open ventral hernia repair w/ mesh and SBR and s/p ex lap, 30 cm SBR, creation of new side to side ileoileostomy, and ventral hernia repair w/ mesh in April 2025 presents back to the ER 2 days later from her LTACH facility ([place]) after persistent HTN and epistaxis in setting of Eliquis use and complex surgical history. Patient notes her epistaxis has stopped after self-tamponade for 25 minutes. On admission, patient endorsed purulent discharge from her incision. CT AP (5/27) showed extensive ventral wall postsurgical changes with gas and fluid containing midline collection along the lower abdomen, measuring  approximately 4.8 x 4.5 x 10.2 cm. IR evaluated collection and did not recommend a drain. Wound culture positive for rare enterococcus avium (5/28). Patient currently on abx via midline per ID recs. Patient has been tolerating diet, passing regular bowel movements and voiding without nausea or vomiting. Denies SOB, chest pain, fever or chills. Rates her abd pain 1/10 at its worst.     Prior to the debility with proximal LE weakness and marked decline s/p multiple abd surgeries with complications, the patient was independent in ADLs and ambulation. Patient now requires moderate assistance with ambulation and ADLs 2/2 to the debility. Therefore, there is a significant functional decline from baseline. Patients also with medical comorbidities of recent PE in R distal main with segmental extension and DVT on Eliquis, LOUIS on CPAP, HTN, and morbid obesity requiring medication management and monitoring of vitals by Physiatry team and nursing. There are significant comorbidities which warrants acute rehab hospitalization. To return home it is in the patient’s best interest to have acute inpatient rehabilitative services to undergo intensive interdisciplinary therapy. Of note, the patient lives alone with multiple steps and would have difficulty performing ADLs and iADLs individually. Furthermore, the patient is motivated and is able to tolerate up to 3 hours of daily therapy for 5-6 days a week for a total of 15 hours a week. Evaluated by Physiatry and deemed to be an excellent candidate for admission to  for acute inpatient rehab. Admitted to Acute Rehab on 6/2/2025.      Patient seen and examined by Physiatry and is currently functioning at bed mobility minimum assist, sit to stand transfer with CGA, ambulates 30ftx1 with RW, CGA and 1P assist, UBD minimum assist, and LBD dependent..     LS: Patient. lives with  but came from [facility] after last admission  PLOF: Independent with all ADLs and iADLs and ambulates without assistive device.      (02 Jun 2025 13:50)      TODAY'S SUBJECTIVE & REVIEW OF SYMPTOMS  No acute events overnight. Patient was seen and assessed at bedside. Feeling well overall. Concerned with the midline not working as it had a "kink" earlier. However, midline was able to flushed by the nursing staff this morning and was able to complete morning dose of meropenem. Afternoon dose of meropenem currently infusing.  Also states she feels a hard nodule next to the midline. Not painful or bothersome. Will continue to monitor.    Tolerating PT/OT. Tolerating oral diet. Voiding spontaneously and having regular BM.  Vital signs reviewed.        Review of Systems:   Constitutional:    [x  ] WNL           [   ] poor appetite   [   ] insomnia   [   ] tired   Cardio:                [   ] WNL           [   ] CP   [x  ] CEBALLOS   [   ] palpitations               Resp:                   [  x ] WNL           [   ] SOB   [   ] cough   [   ] wheezing   GI:                        [  x ] WNL           [   ] constipation   [   ] diarrhea   [   ] abdominal pain   [   ] nausea   [   ] emesis                                :                      [  x ] WNL           [   ] RODRIGUES  [   ] dysuria   [   ] difficulty voiding             Endo:                   [  x ] WNL          [   ] polyuria   [   ] temperature intolerance                 Skin:                     [   ] WNL          [   ] pain   [ x  ] wound-incision at abd site, c/d/i  [   ] rash   MSK:                    [   ] WNL          [   ] muscle pain   [  ] joint pain/ stiffness   [   ] muscle tenderness   [ x  ] swelling in BLE    Neuro:                 [   ] WNL          [   ] HA   [  ] change in vision   [   ] tremor   [ x  ] weakness   [   ]dysphagia              Cognitive:           [ x  ] WNL           [   ] occasional confusion      Psych:                  [  x ] WNL           [   ] hallucinations   [   ]agitation   [   ] delusion   [   ]depression    CLOF:  eating independent  transfer modA  UB dressing maxA  LB dressing dependent  toileting dependent  ambulates 40ft with RW and steadyA      PHYSICAL EXAMINATION   Vital Signs Last 24 Hrs  T(C): 36.7 (07 Jun 2025 06:46), Max: 36.7 (07 Jun 2025 06:46)  T(F): 98.1 (07 Jun 2025 06:46), Max: 98.1 (07 Jun 2025 06:46)  HR: 96 (07 Jun 2025 06:46) (96 - 96)  BP: 146/80 (07 Jun 2025 06:46) (125/80 - 146/80)  BP(mean): 102 (07 Jun 2025 06:46) (95 - 102)  RR: 18 (07 Jun 2025 06:46) (18 - 18)  SpO2: 97% (07 Jun 2025 06:46) (97% - 97%)    Parameters below as of 06 Jun 2025 21:59  Patient On (Oxygen Delivery Method): room air        General:[  x ] NAD, Resting Comfortable,   [   ] other:                                HEENT: [ x  ] NC/AT, EOMI, PERRL , Normal Conjunctivae,   [   ] other:  Cardio: [  x ] RRR, no murmur,   [   ] other:                              Pulm: [  x ] No Respiratory Distress,  Lungs CTAB,   [   ] other:                       Abdomen: [  x ]ND/NT, Soft, abd incision site c/d/i   [   ] other:    : [  x ] NO RODRIGUES CATHETER, [   ] RODRIGUES CATHETER- no meatal tear, no discharge, [   ] other:                                            MSK: [ x  ] No joint swelling, Full ROM,   [   ] other:                                         Ext: [   ]No C/C/E, No calf tenderness,   [ x  ]other: 2+ pitting edema in BLE    Skin: [   ]intact,   [ x  ] other: abd incision c/d/i                                                                 Neurological Examination:  Cognitive: [  x  ] AAO x 3,   [    ]  other:                                                                        Attention:  [  x  ] intact,   [  x  ]  other: pleasant and cooperative                            Memory: [  x  ] intact,  [    ]  other:      Mood/Affect: [ x   ] wnl,    [    ]  other:                                                                             Communication: [ x   ]Fluent, no dysarthria, following commands:  [    ] other:    CN II - XII:  [  x  ] intact,  [    ] other:                                                                                   Motor:   RIGHT UE:  3+/5 shoulder abduction, 5/5 elbow flexion/extension, 5/5 hand    LEFT    UE: 3+/5 shoulder abduction, 5/5 elbow flexion/extension, 5/5 hand   RIGHT LE: 3/5 hip flexion, 4+/5 knee flexion/extension, 4+/5 DF/PF (limited by pitting edema)  LEFT  LE:  3/5 hip flexion, 4+/5 knee flexion/extension, 4+/5 DF/PF (limited by pitting edema)  Tone: [  x  ] wnl,   [    ]  other:  DTRs: [  x ]symmetric, [   ] other:  Coordination:   [ x   ] intact,   [    ] other:                                                                         Sensory: [ x   ] Intact to light touch,   [    ] other:      MEDICATIONS  (STANDING):  apixaban 5 milliGRAM(s) Oral every 12 hours  ascorbic acid 500 milliGRAM(s) Oral daily  chlorhexidine 2% Cloths 1 Application(s) Topical <User Schedule>  cyanocobalamin 1000 MICROGram(s) Oral daily  ergocalciferol 18001 Unit(s) Oral <User Schedule>  hydrochlorothiazide 25 milliGRAM(s) Oral daily  lactobacillus acidophilus 1 Tablet(s) Oral daily  losartan 50 milliGRAM(s) Oral daily  magnesium oxide 400 milliGRAM(s) Oral every 12 hours  meropenem  IVPB 1000 milliGRAM(s) IV Intermittent every 8 hours  multivitamin/minerals 1 Tablet(s) Oral daily  pantoprazole    Tablet 40 milliGRAM(s) Oral before breakfast  potassium chloride    Tablet ER 40 milliEquivalent(s) Oral daily  tiotropium 2.5 MICROgram(s) Inhaler 2 Puff(s) Inhalation daily  zinc sulfate 220 milliGRAM(s) Oral every 24 hours    MEDICATIONS  (PRN):  acetaminophen     Tablet .. 650 milliGRAM(s) Oral every 6 hours PRN Mild Pain (1 - 3)  albuterol    90 MICROgram(s) HFA Inhaler 2 Puff(s) Inhalation every 6 hours PRN Shortness of Breath and/or Wheezing  ondansetron Injectable 4 milliGRAM(s) IV Push every 6 hours PRN Nausea  sodium chloride 0.65% Nasal 1 Spray(s) Both Nostrils every 4 hours PRN Nasal Congestion        RECENT LABS/IMAGING                          8.3    5.01  )-----------( 253      ( 05 Jun 2025 06:22 )             26.8     06-05    143  |  107  |  15  ----------------------------<  109[H]  3.4[L]   |  22  |  0.8    Ca    8.2[L]      05 Jun 2025 06:22  Mg     1.7     06-05    TPro  5.4[L]  /  Alb  3.1[L]  /  TBili  0.2  /  DBili  x   /  AST  16  /  ALT  24  /  AlkPhos  110  06-05                                7.6    6.57  )-----------( 242      ( 01 Jun 2025 21:23 )             24.7     06-01    144  |  106  |  14  ----------------------------<  123[H]  3.2[L]   |  26  |  0.9    Ca    8.2[L]      01 Jun 2025 21:23  Phos  3.3     06-01  Mg     1.6     06-01

## 2025-06-07 NOTE — CHART NOTE - NSCHARTNOTEFT_GEN_A_CORE
Informed by nursing staff patient with bright streaks of blood on stool this afternoon. Patient on eliquis. Per patient, she has regular daily BM. Denies any straining or history of hemorrhoids. Denies any pain. States this is the first time she has seen blood in her stool. Rectum examined with Surgery PA and PCA present. No signs of blood present. Informed patient to alert nursing staff and providers if she sees blood in her stool again. Will obtain CBC this evening and continue to monitor.     Also, will d/c abd wound care instructions to 4A nursing as surgery has been changing the dressing daily in addition to 4A nursing changing dressings. The Surgery PA changed abd wound dressing today. No packing was seen and no packing was placed. Per surgery PA, they will be changing patient's dressing tomorrow as well. Will follow-up with Surgery team on Monday regarding dressing changes. Informed by nursing staff patient with bright streaks of blood on brown stool this afternoon. Stool not black per nursing. Patient on eliquis. Per patient, she has regular daily BM. Denies any straining or history of hemorrhoids. Denies any pain. States this is the first time she has seen blood when she wiped. Patient states she did not see the stool in the toilet and only saw the bright red blood on the toilet paper. Rectum examined with Surgery PA and PCA present. No signs of blood present. Informed patient to alert nursing staff and providers if she sees blood in her stool again. Will obtain CBC this evening and continue to monitor.     Also, will d/c abd wound care instructions to 4A nursing as surgery has been changing the dressing daily in addition to 4A nursing changing dressings. The Surgery PA changed abd wound dressing today. No packing was seen and no packing was placed. Per surgery PA, they will be changing patient's dressing tomorrow as well. Will follow-up with Surgery team on Monday regarding dressing changes.

## 2025-06-07 NOTE — PROGRESS NOTE ADULT - ATTENDING COMMENTS
Patient seen and examined with the resident. We discussed the case. I have directed the care. I edited the note. The patient requires acute rehab with 3 hours of daily therapies at least 5 out of 7 days and close physiatry follow up.  #Rehab of debility with proximal LE weakness and marked decline from baseline function, s/p multiple abdominal surgeries with complications / HTN, morbid obesity, LOUIS on CPAP, Abby en y gastric bypass, and a recent admission for an incarcerated ventral hernia w/ sbo s/p lap converted to open ventral hernia repair w/ mesh and small bowel resection and s/p ex lap, 30 cm sbr, creation of new side to side ileoileostomy, ventral hernia repair w/ mesh.with impairment in mobility and ADLs and iADLs.The patient presented with co-morbidities requiring close physiatry follow-up at least 3 times a week to monitor his progress and monitor his comorbidities including HTN, PE, DVT, and LOUIS. .The patient's co-morbidities do not limit the patient's ability to participate in rehabilitative therapy. The patient was seen by PT/OT and required min A with bed mobility, CGA with sit to stand transfers, ambulates 30ftx1 with RW, CGA and 1P assist, UBD minimum assist, and LBD dependent.. The patient was previously independent with all ADLs and iADLs without use of AD and now presents with functional decline.   #Wound dehiscence and persistent intra-abdominal collection, suspect abscess in setting of multiple abdominal surgeries with complications  -5/28 Wcx grew rare Enterococcus avium    -Growth in fluid media only Escherichia coli ESBL    -5/30 midline placed  -Wound Care Instructions: Patient has a midline wound with staples and small 1 cm area of packing. Please remove and replace dressings once daily. Packing 1 cm opening with 0.25 inch packing strip, cut to length appropriate for wound. Cover incision with gauze and secure with paper tape.  -abd binder for support  -Infectious Disease is following. She is currently on Meropenem 1 gram IVPB q8h started 6/1. On discharge from acute rehab to home she will require Ertapenem 1 gram IVPB daily.  Plan to continue abx until 6/29 and follow-up with ID  - continue packing of upper incision daily  -Continue with Tylenol and adjust pain management as needed.  -She requires acute rehab with 3 hours of daily therapies at least 5 out of 7 days and close physiatry follow up.   - per surgery, repeat CTAP as outpatient on 6/22    #PE in R distal main with segmental extension  #DVT  -c/w Eliquis  - stable    #Stage 2 buttock pressure injury per wound care nurse specialist  - local care with barrier cream  - monitor    #Chronic venous insufficiency  Her blood pressure has been soft. I halted the amlodipine to aid her 2+ bipedal edema.   Discussed avoiding salty snacks including salted pretzels.    #HTN  -Continue with HCTZ and losartan.   -Amolodipine was halted in the setting of pitting edema on 6/2.  -Will continue to monitor hemodynamic status and adjust medications as needed; will consider low dose amlodipine if needed vs beta blocker.    -c/w HCTZ 25mg  - Losartan decreased from 100mg to 50mg 6/5  - monitor and adjust  - good range    #Hypokalemia  - give oral supplement and recheck in a few days  - on HCTZ    #LOUIS on CPAP  Stable    #Morbid obesity BMI 53.2  Dietary education

## 2025-06-08 RX ADMIN — Medication 220 MILLIGRAM(S): at 12:22

## 2025-06-08 RX ADMIN — Medication 650 MILLIGRAM(S): at 16:36

## 2025-06-08 RX ADMIN — Medication 1 TABLET(S): at 12:22

## 2025-06-08 RX ADMIN — MEROPENEM 100 MILLIGRAM(S): 1 INJECTION INTRAVENOUS at 22:02

## 2025-06-08 RX ADMIN — Medication 650 MILLIGRAM(S): at 17:33

## 2025-06-08 RX ADMIN — Medication 500 MILLIGRAM(S): at 12:23

## 2025-06-08 RX ADMIN — APIXABAN 5 MILLIGRAM(S): 2.5 TABLET, FILM COATED ORAL at 05:15

## 2025-06-08 RX ADMIN — Medication 400 MILLIGRAM(S): at 17:57

## 2025-06-08 RX ADMIN — Medication 40 MILLIGRAM(S): at 05:15

## 2025-06-08 RX ADMIN — Medication 40 MILLIEQUIVALENT(S): at 12:23

## 2025-06-08 RX ADMIN — Medication 1 APPLICATION(S): at 05:20

## 2025-06-08 RX ADMIN — APIXABAN 5 MILLIGRAM(S): 2.5 TABLET, FILM COATED ORAL at 17:57

## 2025-06-08 RX ADMIN — MEROPENEM 100 MILLIGRAM(S): 1 INJECTION INTRAVENOUS at 15:48

## 2025-06-08 RX ADMIN — Medication 400 MILLIGRAM(S): at 05:15

## 2025-06-08 RX ADMIN — CYANOCOBALAMIN 1000 MICROGRAM(S): 1000 INJECTION INTRAMUSCULAR; SUBCUTANEOUS at 12:22

## 2025-06-08 RX ADMIN — MEROPENEM 100 MILLIGRAM(S): 1 INJECTION INTRAVENOUS at 05:14

## 2025-06-08 NOTE — PROGRESS NOTE ADULT - SUBJECTIVE AND OBJECTIVE BOX
Patient is a 65y old  Female who presents with a chief complaint of Rehab of debility with proximal LE weakness and marked decline from baseline function, s/p multiple abdominal surgeries with complications / HTN, severe obesity, LOUIS on CPAP, Abby en y gastric bypass, and a recent admission for an incarcerated ventral hernia w/ sbo s/p lap converted to open ventral hernia repair w/ mesh and small bowel resection and s/p ex lap, 30 cm sbr, creation of new side to side ileoileostomy, ventral hernia repair w/ mesh. (03 Jun 2025 12:34)      HPI:  64 yo F with a PMHx of HTN, severe obesity, DVT, PE (R distal main with segmental extension) on Eliquis, LOUIS on CPAP, abby en y gastric bypass, and a recent admission for an incarcerated ventral hernia w/ SBO s/p lap converted to open ventral hernia repair w/ mesh and SBR and s/p ex lap, 30 cm SBR, creation of new side to side ileoileostomy, and ventral hernia repair w/ mesh in April 2025 presents back to the ER 2 days later from her LTACH facility ([facility]) after persistent HTN and epistaxis in setting of Eliquis use and complex surgical history. Patient notes her epistaxis has stopped after self-tamponade for 25 minutes. On admission, patient endorsed purulent discharge from her incision. CT AP (5/27) showed extensive ventral wall postsurgical changes with gas and fluid containing midline collection along the lower abdomen, measuring  approximately 4.8 x 4.5 x 10.2 cm. IR evaluated collection and did not recommend a drain. Wound culture positive for rare enterococcus avium (5/28). Patient currently on abx via midline per ID recs. Patient has been tolerating diet, passing regular bowel movements and voiding without nausea or vomiting. Denies SOB, chest pain, fever or chills. Rates her abd pain 1/10 at its worst.     Prior to the debility with proximal LE weakness and marked decline s/p multiple abd surgeries with complications, the patient was independent in ADLs and ambulation. Patient now requires moderate assistance with ambulation and ADLs 2/2 to the debility. Therefore, there is a significant functional decline from baseline. Patients also with medical comorbidities of recent PE in R distal main with segmental extension and DVT on Eliquis, LOUIS on CPAP, HTN, and morbid obesity requiring medication management and monitoring of vitals by Physiatry team and nursing. There are significant comorbidities which warrants acute rehab hospitalization. To return home it is in the patient’s best interest to have acute inpatient rehabilitative services to undergo intensive interdisciplinary therapy. Of note, the patient lives alone with multiple steps and would have difficulty performing ADLs and iADLs individually. Furthermore, the patient is motivated and is able to tolerate up to 3 hours of daily therapy for 5-6 days a week for a total of 15 hours a week. Evaluated by Physiatry and deemed to be an excellent candidate for admission to  for acute inpatient rehab. Admitted to Acute Rehab on 6/2/2025.      Patient seen and examined by Physiatry and is currently functioning at bed mobility minimum assist, sit to stand transfer with CGA, ambulates 30ftx1 with RW, CGA and 1P assist, UBD minimum assist, and LBD dependent..     LS: Patient. lives with  but came from [facility] after last admission  PLOF: Independent with all ADLs and iADLs and ambulates without assistive device.      (02 Jun 2025 13:50)      TODAY'S SUBJECTIVE & REVIEW OF SYMPTOMS  No acute events overnight. Feeling well overall.  Afternoon dose of meropenem currently infusing. Had small blood streak on tissue after BM c/w hemorrhoidal bleeding. No discomfort. Feeling stronger daily. Surgery has been monitoring abdominal wound daily.    Tolerating PT/OT. Tolerating oral diet. Voiding spontaneously and having regular BM.  Vital signs reviewed.        Review of Systems:   Constitutional:    [x  ] WNL           [   ] poor appetite   [   ] insomnia   [   ] tired   Cardio:                [   ] WNL           [   ] CP   [x  ] CEBALLOS   [   ] palpitations               Resp:                   [  x ] WNL           [   ] SOB   [   ] cough   [   ] wheezing   GI:                        [  x ] WNL           [   ] constipation   [   ] diarrhea   [   ] abdominal pain   [   ] nausea   [   ] emesis                                :                      [  x ] WNL           [   ] RODRIGUES  [   ] dysuria   [   ] difficulty voiding             Endo:                   [  x ] WNL          [   ] polyuria   [   ] temperature intolerance                 Skin:                     [   ] WNL          [   ] pain   [ x  ] wound-incision at abd site, c/d/i  [   ] rash   MSK:                    [   ] WNL          [   ] muscle pain   [  ] joint pain/ stiffness   [   ] muscle tenderness   [ x  ] swelling in BLE    Neuro:                 [   ] WNL          [   ] HA   [  ] change in vision   [   ] tremor   [ x  ] weakness   [   ]dysphagia              Cognitive:           [ x  ] WNL           [   ] occasional confusion      Psych:                  [  x ] WNL           [   ] hallucinations   [   ]agitation   [   ] delusion   [   ]depression    CLOF:  eating independent  transfer modA  UB dressing maxA  LB dressing dependent  toileting dependent  ambulates 40ft with RW and steadyA      PHYSICAL EXAMINATION     Vital Signs Last 24 Hrs  T(C): 36.4 (08 Jun 2025 05:36), Max: 36.4 (07 Jun 2025 14:55)  T(F): 97.6 (08 Jun 2025 05:36), Max: 97.6 (07 Jun 2025 21:49)  HR: 97 (08 Jun 2025 05:36) (70 - 115)  BP: 119/65 (08 Jun 2025 05:36) (115/74 - 134/69)  BP(mean): 87 (07 Jun 2025 21:49) (87 - 87)  RR: 18 (08 Jun 2025 05:36) (18 - 18)  SpO2: 98% (08 Jun 2025 05:36) (97% - 98%)    Parameters below as of 08 Jun 2025 05:36  Patient On (Oxygen Delivery Method): BiPAP/CPAP        General:[  x ] NAD, Resting Comfortable,   [   ] other:                                HEENT: [ x  ] NC/AT, EOMI, PERRL , Normal Conjunctivae,   [   ] other:  Cardio: [  x ] RRR, no murmur,   [   ] other:                              Pulm: [  x ] No Respiratory Distress,  Lungs CTAB,   [   ] other:                       Abdomen: [  x ]ND/NT, Soft, abd incision site c/d/i   [   ] other:    : [  x ] NO RODRIGUES CATHETER, [   ] RODRIGUES CATHETER- no meatal tear, no discharge, [   ] other:                                            MSK: [ x  ] No joint swelling, Full ROM,   [   ] other:                                         Ext: [   ]No C/C/E, No calf tenderness,   [ x  ]other: 2+ pitting edema in BLE    Skin: [   ]intact,   [ x  ] other: abd incision c/d/i                                                                 Neurological Examination:  Cognitive: [  x  ] AAO x 3,   [    ]  other:                                                                        Attention:  [  x  ] intact,   [  x  ]  other: pleasant and cooperative                            Memory: [  x  ] intact,  [    ]  other:      Mood/Affect: [ x   ] wnl,    [    ]  other:                                                                             Communication: [ x   ]Fluent, no dysarthria, following commands:  [    ] other:    CN II - XII:  [  x  ] intact,  [    ] other:                                                                                   Motor:   RIGHT UE:  3+/5 shoulder abduction, 5/5 elbow flexion/extension, 5/5 hand    LEFT    UE: 3+/5 shoulder abduction, 5/5 elbow flexion/extension, 5/5 hand   RIGHT LE: 3+/5 hip flexion, 4+/5 knee flexion/extension, 4+/5 DF/PF (limited by pitting edema)  LEFT  LE:  3+/5 hip flexion, 4+/5 knee flexion/extension, 4+/5 DF/PF (limited by pitting edema)  Tone: [  x  ] wnl,   [    ]  other:  DTRs: [  x ]symmetric, [   ] other:  Coordination:   [ x   ] intact,   [    ] other:                                                                         Sensory: [ x   ] Intact to light touch,   [    ] other:      MEDICATIONS  (STANDING):  apixaban 5 milliGRAM(s) Oral every 12 hours  ascorbic acid 500 milliGRAM(s) Oral daily  chlorhexidine 2% Cloths 1 Application(s) Topical <User Schedule>  cyanocobalamin 1000 MICROGram(s) Oral daily  ergocalciferol 87076 Unit(s) Oral <User Schedule>  hydrochlorothiazide 25 milliGRAM(s) Oral daily  lactobacillus acidophilus 1 Tablet(s) Oral daily  losartan 50 milliGRAM(s) Oral daily  magnesium oxide 400 milliGRAM(s) Oral every 12 hours  meropenem  IVPB 1000 milliGRAM(s) IV Intermittent every 8 hours  multivitamin/minerals 1 Tablet(s) Oral daily  pantoprazole    Tablet 40 milliGRAM(s) Oral before breakfast  potassium chloride    Tablet ER 40 milliEquivalent(s) Oral daily  tiotropium 2.5 MICROgram(s) Inhaler 2 Puff(s) Inhalation daily  zinc sulfate 220 milliGRAM(s) Oral every 24 hours    MEDICATIONS  (PRN):  acetaminophen     Tablet .. 650 milliGRAM(s) Oral every 6 hours PRN Mild Pain (1 - 3)  albuterol    90 MICROgram(s) HFA Inhaler 2 Puff(s) Inhalation every 6 hours PRN Shortness of Breath and/or Wheezing  ondansetron Injectable 4 milliGRAM(s) IV Push every 6 hours PRN Nausea  sodium chloride 0.65% Nasal 1 Spray(s) Both Nostrils every 4 hours PRN Nasal Congestion        RECENT LABS/IMAGING                          9.4    7.65  )-----------( 287      ( 07 Jun 2025 20:59 )             30.9                               8.3    5.01  )-----------( 253      ( 05 Jun 2025 06:22 )             26.8     06-05    143  |  107  |  15  ----------------------------<  109[H]  3.4[L]   |  22  |  0.8    Ca    8.2[L]      05 Jun 2025 06:22  Mg     1.7     06-05    TPro  5.4[L]  /  Alb  3.1[L]  /  TBili  0.2  /  DBili  x   /  AST  16  /  ALT  24  /  AlkPhos  110  06-05                                7.6    6.57  )-----------( 242      ( 01 Jun 2025 21:23 )             24.7     06-01    144  |  106  |  14  ----------------------------<  123[H]  3.2[L]   |  26  |  0.9    Ca    8.2[L]      01 Jun 2025 21:23  Phos  3.3     06-01  Mg     1.6     06-01     Referring Physician (Optional): WILLA Toledo MD & ELIEZER Partida

## 2025-06-08 NOTE — PROGRESS NOTE ADULT - ASSESSMENT
Mrs. Eddy is a 66 yo F with a PMHx of HTN, severe obesity, DVT, PE (R distal main with segmental extension) on Eliquis, LOUIS on CPAP, abby en y gastric bypass, and a recent admission for an incarcerated ventral hernia w/ SBO s/p lap converted to open ventral hernia repair w/ mesh and SBR and s/p ex lap, 30 cm SBR, creation of new side to side ileoileostomy, and ventral hernia repair w/ mesh in April 2025 presented back to the ER 2 days later from her LTACH facility ([facility]) after persistent HTN and epistaxis in setting of Eliquis use and complex surgical history. Patient found to have extensive ventral wall postsurgical changes with gas and fluid containing midline collection along the lower abdomen, measuring  approximately 4.8 x 4.5 x 10.2 cm and being managed with abx.    Plan:    #Rehab of debility with proximal LE weakness and marked decline from baseline function, s/p multiple abdominal surgeries with complications / HTN, morbid obesity, LOUIS on CPAP, Abby en y gastric bypass, and a recent admission for an incarcerated ventral hernia w/ sbo s/p lap converted to open ventral hernia repair w/ mesh and small bowel resection and s/p ex lap, 30 cm sbr, creation of new side to side ileoileostomy, ventral hernia repair w/ mesh.with impairment in mobility and ADLs and iADLs.The patient presented with co-morbidities requiring close physiatry follow-up at least 3 times a week to monitor his progress and monitor his comorbidities including HTN, PE, DVT, and LOUIS. .The patient's co-morbidities do not limit the patient's ability to participate in rehabilitative therapy. The patient was seen by PT/OT and required min A with bed mobility, CGA with sit to stand transfers, ambulates 30ftx1 with RW, CGA and 1P assist, UBD minimum assist, and LBD dependent.. The patient was previously independent with all ADLs and iADLs without use of AD and now presents with functional decline.   #Wound dehiscence and persistent intra-abdominal collection, suspect abscess in setting of multiple abdominal surgeries with complications  -5/28 Wcx grew rare Enterococcus avium    -Growth in fluid media only Escherichia coli ESBL    -5/30 midline placed  -Wound Care Instructions: Patient has a midline wound with staples and small 1 cm area of packing. Please remove and replace dressings once daily. Packing 1 cm opening with 0.25 inch packing strip, cut to length appropriate for wound. Cover incision with gauze and secure with paper tape.  -abd binder for support  -Infectious Disease is following. She is currently on Meropenem 1 gram IVPB q8h started 6/1. On discharge from acute rehab to home she will require Ertapenem 1 gram IVPB daily.  Plan to continue abx until 6/29 and follow-up with ID  - continue packing of upper incision daily  -Continue with Tylenol and adjust pain management as needed.  -She requires acute rehab with 3 hours of daily therapies at least 5 out of 7 days and close physiatry follow up.   - per surgery, repeat CTAP as outpatient on 6/22  - tolerating therapies well. Strength and endurance improving daily.    #PE in R distal main with segmental extension  #DVT  -c/w Eliquis  - stable    #Stage 2 buttock pressure injury per wound care nurse specialist  - local care with barrier cream  - monitor    #Chronic venous insufficiency  Her blood pressure has been soft. I halted the amlodipine to aid her 2+ bipedal edema.   Discussed avoiding salty snacks including salted pretzels.    #HTN  -Continue with HCTZ and losartan.   -Amolodipine was halted in the setting of pitting edema on 6/2.  -Will continue to monitor hemodynamic status and adjust medications as needed; will consider low dose amlodipine if needed vs beta blocker.    -c/w HCTZ 25mg  - Losartan decreased from 100mg to 50mg 6/5  - monitor and adjust  - good range    #Hypokalemia  - give oral supplement and recheck in a few days  - on HCTZ    #LOUIS on CPAP  Stable    #Morbid obesity BMI 53.2  Dietary education     -Pain control: Tylenol   -Bladder management: N/A  -Skin: abd incision site c/d/i    -FEN :N/A  - Diet: DASH/TLC Sodium & Cholesterol Restricted    Precautions / PROPHYLAXIS:    - Falls  - DVT prophylaxis: Eliquis

## 2025-06-09 LAB
ALBUMIN SERPL ELPH-MCNC: 2.9 G/DL — LOW (ref 3.5–5.2)
ALP SERPL-CCNC: 103 U/L — SIGNIFICANT CHANGE UP (ref 30–115)
ALT FLD-CCNC: 24 U/L — SIGNIFICANT CHANGE UP (ref 0–41)
ANION GAP SERPL CALC-SCNC: 11 MMOL/L — SIGNIFICANT CHANGE UP (ref 7–14)
AST SERPL-CCNC: 22 U/L — SIGNIFICANT CHANGE UP (ref 0–41)
BILIRUB SERPL-MCNC: 0.2 MG/DL — SIGNIFICANT CHANGE UP (ref 0.2–1.2)
BUN SERPL-MCNC: 16 MG/DL — SIGNIFICANT CHANGE UP (ref 10–20)
CALCIUM SERPL-MCNC: 8.4 MG/DL — SIGNIFICANT CHANGE UP (ref 8.4–10.5)
CHLORIDE SERPL-SCNC: 111 MMOL/L — HIGH (ref 98–110)
CO2 SERPL-SCNC: 21 MMOL/L — SIGNIFICANT CHANGE UP (ref 17–32)
CREAT SERPL-MCNC: 0.6 MG/DL — LOW (ref 0.7–1.5)
EGFR: 100 ML/MIN/1.73M2 — SIGNIFICANT CHANGE UP
EGFR: 100 ML/MIN/1.73M2 — SIGNIFICANT CHANGE UP
GLUCOSE SERPL-MCNC: 102 MG/DL — HIGH (ref 70–99)
HCT VFR BLD CALC: 26.2 % — LOW (ref 37–47)
HGB BLD-MCNC: 8 G/DL — LOW (ref 12–16)
MCHC RBC-ENTMCNC: 29.3 PG — SIGNIFICANT CHANGE UP (ref 27–31)
MCHC RBC-ENTMCNC: 30.5 G/DL — LOW (ref 32–37)
MCV RBC AUTO: 96 FL — SIGNIFICANT CHANGE UP (ref 81–99)
NRBC BLD AUTO-RTO: 0 /100 WBCS — SIGNIFICANT CHANGE UP (ref 0–0)
PLATELET # BLD AUTO: 233 K/UL — SIGNIFICANT CHANGE UP (ref 130–400)
PMV BLD: 9.8 FL — SIGNIFICANT CHANGE UP (ref 7.4–10.4)
POTASSIUM SERPL-MCNC: 4.5 MMOL/L — SIGNIFICANT CHANGE UP (ref 3.5–5)
POTASSIUM SERPL-SCNC: 4.5 MMOL/L — SIGNIFICANT CHANGE UP (ref 3.5–5)
PROT SERPL-MCNC: 5.1 G/DL — LOW (ref 6–8)
RBC # BLD: 2.73 M/UL — LOW (ref 4.2–5.4)
RBC # FLD: 16.6 % — HIGH (ref 11.5–14.5)
SODIUM SERPL-SCNC: 143 MMOL/L — SIGNIFICANT CHANGE UP (ref 135–146)
WBC # BLD: 5.33 K/UL — SIGNIFICANT CHANGE UP (ref 4.8–10.8)
WBC # FLD AUTO: 5.33 K/UL — SIGNIFICANT CHANGE UP (ref 4.8–10.8)

## 2025-06-09 PROCEDURE — 99233 SBSQ HOSP IP/OBS HIGH 50: CPT

## 2025-06-09 RX ADMIN — Medication 400 MILLIGRAM(S): at 17:10

## 2025-06-09 RX ADMIN — Medication 1 TABLET(S): at 12:13

## 2025-06-09 RX ADMIN — Medication 1 APPLICATION(S): at 05:44

## 2025-06-09 RX ADMIN — Medication 40 MILLIEQUIVALENT(S): at 12:13

## 2025-06-09 RX ADMIN — Medication 500 MILLIGRAM(S): at 12:13

## 2025-06-09 RX ADMIN — Medication 400 MILLIGRAM(S): at 05:37

## 2025-06-09 RX ADMIN — Medication 40 MILLIGRAM(S): at 05:37

## 2025-06-09 RX ADMIN — MEROPENEM 100 MILLIGRAM(S): 1 INJECTION INTRAVENOUS at 05:37

## 2025-06-09 RX ADMIN — Medication 220 MILLIGRAM(S): at 12:13

## 2025-06-09 RX ADMIN — APIXABAN 5 MILLIGRAM(S): 2.5 TABLET, FILM COATED ORAL at 05:38

## 2025-06-09 RX ADMIN — APIXABAN 5 MILLIGRAM(S): 2.5 TABLET, FILM COATED ORAL at 17:09

## 2025-06-09 RX ADMIN — CYANOCOBALAMIN 1000 MICROGRAM(S): 1000 INJECTION INTRAMUSCULAR; SUBCUTANEOUS at 12:13

## 2025-06-09 RX ADMIN — TIOTROPIUM BROMIDE INHALATION SPRAY 2 PUFF(S): 3.12 SPRAY, METERED RESPIRATORY (INHALATION) at 12:14

## 2025-06-09 RX ADMIN — MEROPENEM 100 MILLIGRAM(S): 1 INJECTION INTRAVENOUS at 21:22

## 2025-06-09 RX ADMIN — MEROPENEM 100 MILLIGRAM(S): 1 INJECTION INTRAVENOUS at 15:07

## 2025-06-09 NOTE — PROGRESS NOTE ADULT - SUBJECTIVE AND OBJECTIVE BOX
Patient is a 65y old  Female who presents with a chief complaint of Rehab of debility with proximal LE weakness and marked decline from baseline function, s/p multiple abdominal surgeries with complications / HTN, severe obesity, LOUIS on CPAP, Abby en y gastric bypass, and a recent admission for an incarcerated ventral hernia w/ sbo s/p lap converted to open ventral hernia repair w/ mesh and small bowel resection and s/p ex lap, 30 cm sbr, creation of new side to side ileoileostomy, ventral hernia repair w/ mesh. (03 Jun 2025 12:34)      HPI:  64 yo F with a PMHx of HTN, severe obesity, DVT, PE (R distal main with segmental extension) on Eliquis, LOUIS on CPAP, abby en y gastric bypass, and a recent admission for an incarcerated ventral hernia w/ SBO s/p lap converted to open ventral hernia repair w/ mesh and SBR and s/p ex lap, 30 cm SBR, creation of new side to side ileoileostomy, and ventral hernia repair w/ mesh in April 2025 presents back to the ER 2 days later from her LTACH facility ([facility]) after persistent HTN and epistaxis in setting of Eliquis use and complex surgical history. Patient notes her epistaxis has stopped after self-tamponade for 25 minutes. On admission, patient endorsed purulent discharge from her incision. CT AP (5/27) showed extensive ventral wall postsurgical changes with gas and fluid containing midline collection along the lower abdomen, measuring  approximately 4.8 x 4.5 x 10.2 cm. IR evaluated collection and did not recommend a drain. Wound culture positive for rare enterococcus avium (5/28). Patient currently on abx via midline per ID recs. Patient has been tolerating diet, passing regular bowel movements and voiding without nausea or vomiting. Denies SOB, chest pain, fever or chills. Rates her abd pain 1/10 at its worst.     Prior to the debility with proximal LE weakness and marked decline s/p multiple abd surgeries with complications, the patient was independent in ADLs and ambulation. Patient now requires moderate assistance with ambulation and ADLs 2/2 to the debility. Therefore, there is a significant functional decline from baseline. Patients also with medical comorbidities of recent PE in R distal main with segmental extension and DVT on Eliquis, LOUIS on CPAP, HTN, and morbid obesity requiring medication management and monitoring of vitals by Physiatry team and nursing. There are significant comorbidities which warrants acute rehab hospitalization. To return home it is in the patient’s best interest to have acute inpatient rehabilitative services to undergo intensive interdisciplinary therapy. Of note, the patient lives alone with multiple steps and would have difficulty performing ADLs and iADLs individually. Furthermore, the patient is motivated and is able to tolerate up to 3 hours of daily therapy for 5-6 days a week for a total of 15 hours a week. Evaluated by Physiatry and deemed to be an excellent candidate for admission to  for acute inpatient rehab. Admitted to Acute Rehab on 6/2/2025.      Patient seen and examined by Physiatry and is currently functioning at bed mobility minimum assist, sit to stand transfer with CGA, ambulates 30ftx1 with RW, CGA and 1P assist, UBD minimum assist, and LBD dependent..     LS: Patient. lives with  but came from [facility] after last admission  PLOF: Independent with all ADLs and iADLs and ambulates without assistive device.      (02 Jun 2025 13:50)      TODAY'S SUBJECTIVE & REVIEW OF SYMPTOMS  No acute events overnight. Feeling well overall. Seen by surgery this morning.  Tolerating PT/OT. Tolerating oral diet. Voiding spontaneously and having regular BM.  Vital signs and labs reviewed.        Review of Systems:   Constitutional:    [x  ] WNL           [   ] poor appetite   [   ] insomnia   [   ] tired   Cardio:                [   ] WNL           [   ] CP   [x  ] CEBALLOS   [   ] palpitations               Resp:                   [  x ] WNL           [   ] SOB   [   ] cough   [   ] wheezing   GI:                        [  x ] WNL           [   ] constipation   [   ] diarrhea   [   ] abdominal pain   [   ] nausea   [   ] emesis                                :                      [  x ] WNL           [   ] RODRIGUES  [   ] dysuria   [   ] difficulty voiding             Endo:                   [  x ] WNL          [   ] polyuria   [   ] temperature intolerance                 Skin:                     [   ] WNL          [   ] pain   [ x  ] wound-incision at abd site, c/d/i  [   ] rash   MSK:                    [   ] WNL          [   ] muscle pain   [  ] joint pain/ stiffness   [   ] muscle tenderness   [ x  ] swelling in BLE    Neuro:                 [   ] WNL          [   ] HA   [  ] change in vision   [   ] tremor   [ x  ] weakness   [   ]dysphagia              Cognitive:           [ x  ] WNL           [   ] occasional confusion      Psych:                  [  x ] WNL           [   ] hallucinations   [   ]agitation   [   ] delusion   [   ]depression    CLOF:  eating independent  transfer modA  UB dressing maxA  LB dressing dependent  toileting dependent  ambulates 40ft with RW and steadyA      PHYSICAL EXAMINATION     Vital Signs Last 24 Hrs  T(C): 36.2 (09 Jun 2025 05:18), Max: 36.2 (09 Jun 2025 05:18)  T(F): 97.1 (09 Jun 2025 05:18), Max: 97.1 (09 Jun 2025 05:18)  HR: 78 (09 Jun 2025 05:18) (78 - 78)  BP: 125/82 (09 Jun 2025 05:18) (125/82 - 125/82)  BP(mean): --  RR: 20 (09 Jun 2025 05:18) (20 - 20)  SpO2: --        General:[  x ] NAD, Resting Comfortable,   [   ] other:                                HEENT: [ x  ] NC/AT, EOMI, PERRL , Normal Conjunctivae,   [   ] other:  Cardio: [  x ] RRR, no murmur,   [   ] other:                              Pulm: [  x ] No Respiratory Distress,  Lungs CTAB,   [   ] other:                       Abdomen: [  x ]ND/NT, Soft, abd incision site c/d/i   [   ] other:    : [  x ] NO RODRIGUES CATHETER, [   ] RODRIGUES CATHETER- no meatal tear, no discharge, [   ] other:                                            MSK: [ x  ] No joint swelling, Full ROM,   [   ] other:                                         Ext: [   ]No C/C/E, No calf tenderness,   [ x  ]other: 2+ pitting edema in BLE    Skin: [   ]intact,   [ x  ] other: abd incision c/d/i                                                                 Neurological Examination:  Cognitive: [  x  ] AAO x 3,   [    ]  other:                                                                        Attention:  [  x  ] intact,   [  x  ]  other: pleasant and cooperative                            Memory: [  x  ] intact,  [    ]  other:      Mood/Affect: [ x   ] wnl,    [    ]  other:                                                                             Communication: [ x   ]Fluent, no dysarthria, following commands:  [    ] other:    CN II - XII:  [  x  ] intact,  [    ] other:                                                                                   Motor:   RIGHT UE:  3+/5 shoulder abduction, 5/5 elbow flexion/extension, 5/5 hand    LEFT    UE: 3+/5 shoulder abduction, 5/5 elbow flexion/extension, 5/5 hand   RIGHT LE: 3+/5 hip flexion, 4+/5 knee flexion/extension, 4+/5 DF/PF (limited by pitting edema)  LEFT  LE:  3+/5 hip flexion, 4+/5 knee flexion/extension, 4+/5 DF/PF (limited by pitting edema)  Tone: [  x  ] wnl,   [    ]  other:  DTRs: [  x ]symmetric, [   ] other:  Coordination:   [ x   ] intact,   [    ] other:                                                                         Sensory: [ x   ] Intact to light touch,   [    ] other:      MEDICATIONS  (STANDING):  apixaban 5 milliGRAM(s) Oral every 12 hours  ascorbic acid 500 milliGRAM(s) Oral daily  chlorhexidine 2% Cloths 1 Application(s) Topical <User Schedule>  cyanocobalamin 1000 MICROGram(s) Oral daily  ergocalciferol 41180 Unit(s) Oral <User Schedule>  hydrochlorothiazide 25 milliGRAM(s) Oral daily  lactobacillus acidophilus 1 Tablet(s) Oral daily  losartan 50 milliGRAM(s) Oral daily  magnesium oxide 400 milliGRAM(s) Oral every 12 hours  meropenem  IVPB 1000 milliGRAM(s) IV Intermittent every 8 hours  multivitamin/minerals 1 Tablet(s) Oral daily  pantoprazole    Tablet 40 milliGRAM(s) Oral before breakfast  potassium chloride    Tablet ER 40 milliEquivalent(s) Oral daily  tiotropium 2.5 MICROgram(s) Inhaler 2 Puff(s) Inhalation daily  zinc sulfate 220 milliGRAM(s) Oral every 24 hours    MEDICATIONS  (PRN):  acetaminophen     Tablet .. 650 milliGRAM(s) Oral every 6 hours PRN Mild Pain (1 - 3)  albuterol    90 MICROgram(s) HFA Inhaler 2 Puff(s) Inhalation every 6 hours PRN Shortness of Breath and/or Wheezing  ondansetron Injectable 4 milliGRAM(s) IV Push every 6 hours PRN Nausea  sodium chloride 0.65% Nasal 1 Spray(s) Both Nostrils every 4 hours PRN Nasal Congestion        RECENT LABS/IMAGING                 9.4    7.65  )-----------( 287      ( 07 Jun 2025 20:59 )             30.9                               8.3    5.01  )-----------( 253      ( 05 Jun 2025 06:22 )             26.8     06-05    143  |  107  |  15  ----------------------------<  109[H]  3.4[L]   |  22  |  0.8    Ca    8.2[L]      05 Jun 2025 06:22  Mg     1.7     06-05    TPro  5.4[L]  /  Alb  3.1[L]  /  TBili  0.2  /  DBili  x   /  AST  16  /  ALT  24  /  AlkPhos  110  06-05

## 2025-06-09 NOTE — PROGRESS NOTE ADULT - ASSESSMENT
Mrs. Eddy is a 66 yo F with a PMHx of HTN, severe obesity, DVT, PE (R distal main with segmental extension) on Eliquis, LOUIS on CPAP, abby en y gastric bypass, and a recent admission for an incarcerated ventral hernia w/ SBO s/p lap converted to open ventral hernia repair w/ mesh and SBR and s/p ex lap, 30 cm SBR, creation of new side to side ileoileostomy, and ventral hernia repair w/ mesh in April 2025 presented back to the ER 2 days later from her LTACH facility ([facility]) after persistent HTN and epistaxis in setting of Eliquis use and complex surgical history. Patient found to have extensive ventral wall postsurgical changes with gas and fluid containing midline collection along the lower abdomen, measuring  approximately 4.8 x 4.5 x 10.2 cm and being managed with abx.    Plan:    #Rehab of debility with proximal LE weakness and marked decline from baseline function, s/p multiple abdominal surgeries with complications / HTN, morbid obesity, LOUIS on CPAP, Abby en y gastric bypass, and a recent admission for an incarcerated ventral hernia w/ sbo s/p lap converted to open ventral hernia repair w/ mesh and small bowel resection and s/p ex lap, 30 cm sbr, creation of new side to side ileoileostomy, ventral hernia repair w/ mesh.with impairment in mobility and ADLs and iADLs.The patient presented with co-morbidities requiring close physiatry follow-up at least 3 times a week to monitor his progress and monitor his comorbidities including HTN, PE, DVT, and LOUIS. .The patient's co-morbidities do not limit the patient's ability to participate in rehabilitative therapy. The patient was seen by PT/OT and required min A with bed mobility, CGA with sit to stand transfers, ambulates 30ftx1 with RW, CGA and 1P assist, UBD minimum assist, and LBD dependent.. The patient was previously independent with all ADLs and iADLs without use of AD and now presents with functional decline.   #Wound dehiscence and persistent intra-abdominal collection, suspect abscess in setting of multiple abdominal surgeries with complications  -5/28 Wcx grew rare Enterococcus avium    -Growth in fluid media only Escherichia coli ESBL    -5/30 midline placed  -Wound Care Instructions: Patient has a midline wound with staples and small 1 cm area of packing. Please remove and replace dressings once daily. Packing 1 cm opening with 0.25 inch packing strip, cut to length appropriate for wound. Cover incision with gauze and secure with paper tape.  -abd binder for support  -Infectious Disease is following. She is currently on Meropenem 1 gram IVPB q8h started 6/1. On discharge from acute rehab to home she will require Ertapenem 1 gram IVPB daily.  Plan to continue abx until 6/29 and follow-up with ID  - continue packing of upper incision daily  -Continue with Tylenol and adjust pain management as needed.  -She requires acute rehab with 3 hours of daily therapies at least 5 out of 7 days and close physiatry follow up.   - per surgery, repeat CTAP as outpatient on 6/22  - tolerating therapies well. Strength and endurance improving daily.    #PE in R distal main with segmental extension  #DVT  -c/w Eliquis  - stable    #Stage 2 buttock pressure injury per wound care nurse specialist  - local care with barrier cream  - monitor    #Chronic venous insufficiency  Her blood pressure has been soft. I halted the amlodipine to aid her 2+ bipedal edema.   Discussed avoiding salty snacks including salted pretzels.    #HTN  -Continue with HCTZ and losartan.   -Amolodipine was halted in the setting of pitting edema on 6/2.  -Will continue to monitor hemodynamic status and adjust medications as needed; will consider low dose amlodipine if needed vs beta blocker.    -c/w HCTZ 25mg  - Losartan decreased from 100mg to 50mg 6/5  - monitor and adjust  - good range    #Hypokalemia  - give oral supplement and recheck in a few days  - on HCTZ  - encouraged not to refuse blood draw    #LOUIS on CPAP  Stable    #Morbid obesity BMI 53.2  Dietary education     -Pain control: Tylenol   -Bladder management: N/A  -Skin: abd incision site c/d/i    -FEN :N/A  - Diet: DASH/TLC Sodium & Cholesterol Restricted    Precautions / PROPHYLAXIS:    - Falls  - DVT prophylaxis: Eliquis

## 2025-06-09 NOTE — CHART NOTE - NSCHARTNOTEFT_GEN_A_CORE
GI NUTRITION SUPPORT TEAM  -  CONSULT NOTE     66 yo F with a PMHx of HTN, severe obesity, DVT, PE (R distal main with segmental extension) on Eliquis, LOUIS on CPAP, elisha en y gastric bypass, and a recent admission for an incarcerated ventral hernia w/ SBO s/p lap converted to open ventral hernia repair w/ mesh and SBR and s/p ex lap, 30 cm SBR, creation of new side to side ileoileostomy, and ventral hernia repair w/ mesh in 2025 presents back to the ER 2 days later from her LTACH facility ([facility]) after persistent HTN and epistaxis in setting of Eliquis use and complex surgical history. Patient notes her epistaxis has stopped after self-tamponade for 25 minutes. On admission, patient endorsed purulent discharge from her incision. CT AP () showed extensive ventral wall postsurgical changes with gas and fluid containing midline collection along the lower abdomen, measuring  approximately 4.8 x 4.5 x 10.2 cm. IR evaluated collection and did not recommend a drain. Wound culture positive for rare enterococcus avium (). Patient currently on abx via midline per ID recs. Patient has been tolerating diet, passing regular bowel movements and voiding without nausea or vomiting. Denies SOB, chest pain, fever or chills. Rates her abd pain 1/10 at its worst.     Prior to the debility with proximal LE weakness and marked decline s/p multiple abd surgeries with complications, the patient was independent in ADLs and ambulation. Patient now requires moderate assistance with ambulation and ADLs 2/2 to the debility. Therefore, there is a significant functional decline from baseline. Patients also with medical comorbidities of recent PE in R distal main with segmental extension and DVT on Eliquis, LOUIS on CPAP, HTN, and morbid obesity requiring medication management and monitoring of vitals by Physiatry team and nursing. There are significant comorbidities which warrants acute rehab hospitalization. To return home it is in the patient’s best interest to have acute inpatient rehabilitative services to undergo intensive interdisciplinary therapy. Of note, the patient lives alone with multiple steps and would have difficulty performing ADLs and iADLs individually. Furthermore, the patient is motivated and is able to tolerate up to 3 hours of daily therapy for 5-6 days a week for a total of 15 hours a week. Evaluated by Physiatry and deemed to be an excellent candidate for admission to  for acute inpatient rehab. Admitted to Acute Rehab on 2025.      Patient seen and examined by Physiatry and is currently functioning at bed mobility minimum assist, sit to stand transfer with CGA, ambulates 30ftx1 with RW, CGA and 1P assist, UBD minimum assist, and LBD dependent..     LS: Patient. lives with  but came from [facility] after last admission  PLOF: Independent with all ADLs and iADLs and ambulates without assistive device.   (2025 13:50)      GI NUTRITION SUPPORT NOTE:    Pt now admitted to acute rehab for rehab of debility with proximal LE weakness and marked decline from baseline function. She is well known from previous recent admissions. Required course of PN via PICC - 2/ anastomotic leak. Has been tolerating PO diet well since coming off TPN and attempts to prioritize protein intake. Not accepting high protein nutritional supplements but able to eat adequately at meals. No nausea or vomiting. Having BM's. Surgery initially concerned with malabsorption (common channel now 100cm). Vitamin D deficiency present on admission 4/3,, tx with therapeutic dosage and is now improving nicely with supplementation. d/w primary surgeon, Dr. Lema, who requested continued close nutrition f/u.       REVIEW OF SYSTEMS:  Negative except as noted above.       PAST MEDICAL/SURGICAL HISTORY:   Gastric bypass status for obesity  LOUIS (obstructive sleep apnea)  S/P gastric bypass  S/P   S/P small bowel resection  History of total bilateral knee replacement (TKR)  H/O colonoscopy      ALLERGIES:  No Known Allergies    VITALS:  T(F): --  HR: --  BP: --  RR: --  SpO2: --    HEIGHT/WEIGHT/BMI:   Height (cm): 157.5 (-), 160 (-25), 160 (-15)  Weight (kg): 132 (-), 135 (-25), 137.8 (-15)  BMI (kg/m2): 53.2 (06-), 52.7 (-), 53.8 (), 53.8 (-15)    PHYSICAL EXAM:   GENERAL: A&O X 3, NAD  HEENT: Moist mucous membranes, Good dentition, No oral lesions  ABDOMEN: Soft, Nontender, Nondistended  EXTREMITIES: 2+ bilat pedal edema     SKIN: warm and dry; stage II sacrum  IV ACCESS: LUE midline cath  ENTERAL ACCESS: none    I/Os:       STANDING MEDICATIONS:   acetaminophen     Tablet .. 650 milliGRAM(s) Oral every 6 hours PRN  albuterol    90 MICROgram(s) HFA Inhaler 2 Puff(s) Inhalation every 6 hours PRN  apixaban 5 milliGRAM(s) Oral every 12 hours  ascorbic acid 500 milliGRAM(s) Oral daily  chlorhexidine 2% Cloths 1 Application(s) Topical <User Schedule>  cyanocobalamin 1000 MICROGram(s) Oral daily  ergocalciferol 22156 Unit(s) Oral <User Schedule>  hydrochlorothiazide 25 milliGRAM(s) Oral daily  lactobacillus acidophilus 1 Tablet(s) Oral daily  losartan 50 milliGRAM(s) Oral daily  magnesium oxide 400 milliGRAM(s) Oral every 12 hours  meropenem  IVPB 1000 milliGRAM(s) IV Intermittent every 8 hours  multivitamin/minerals 1 Tablet(s) Oral daily  ondansetron Injectable 4 milliGRAM(s) IV Push every 6 hours PRN  pantoprazole    Tablet 40 milliGRAM(s) Oral before breakfast  potassium chloride    Tablet ER 40 milliEquivalent(s) Oral daily  sodium chloride 0.65% Nasal 1 Spray(s) Both Nostrils every 4 hours PRN  tiotropium 2.5 MICROgram(s) Inhaler 2 Puff(s) Inhalation daily  zinc sulfate 220 milliGRAM(s) Oral every 24 hours      LABS:                         9.4    7.65  )-----------( 287      ( 2025 20:59 )             30.9     Vitamin D, 25-Hydroxy: 19 ng/mL ( @ :22)  Vitamin D, 25-Hydroxy: 15 ng/mL (25 @ 20:00)  Vitamin D, 25-Hydroxy: 13 ng/mL (25 @ 05:08)    Folate: 14.9 ng/mL ( @ :22)  Folate, Serum: 17.3 ng/mL (25 @ 05:08)    Vitamin B12, Serum: 780 pg/mL (:22)  Vitamin B12, Serum: 1417 pg/mL (25 @ 05:08)    Zinc Level, Plasma: 86 ug/dL ( @ 20:00)  Zinc Level, Plasma: 54 ug/dL (25 @ 05:08)    Triglycerides, Serum: 232: Interpretive Comment:  Optimal Concentration: <150 mg/dL (for adults) ; < 90 mg/dL (for children)  Triglyceride concentration can be influenced when measured in the  non-fasting state. mg/dL (25 @ 04:59)    A1c: 5.8 % (25 @ 05:08)    DIET:   Diet, DASH/TLC:   Sodium & Cholesterol Restricted  Supplement Feeding Modality:  Oral  Ensure Max Cans or Servings Per Day:  1       Frequency:  Two Times a day (25 @ 13:59) [Active]      ASSESSMENT  66 yo F with a PMHx of HTN, severe obesity, DVT, PE (R distal main with segmental extension) on Eliquis, LOUIS on CPAP, elisha en y gastric bypass, and a recent admission for an incarcerated ventral hernia w/ SBO s/p lap converted to open ventral hernia repair w/ mesh and SBR and s/p ex lap, 30 cm SBR, creation of new side to side ileoileostomy, and ventral hernia repair w/ mesh in 2025 presented back to the ER 2 days later from her LTACH facility ([facility]) after persistent HTN and epistaxis in setting of Eliquis use and complex surgical history. Patient found to have extensive ventral wall postsurgical changes with gas and fluid containing midline collection along the lower abdomen, measuring  approximately 4.8 x 4.5 x 10.2 cm and being managed with abx. Now admitted to acute rehab for rehab of debility with proximal LE weakness and marked decline from baseline function.     - class 3 obesity, weight trending down intentionally during hospitalizations   - vitamin D deficiency improved, on supplementation  - stage II sacral pressure wound  - hypertriglyceridemia  - hypomagnesemia, hypokalemia (likely related to hydrochlorathiazide use)  - surgical concern for malabsorption (100cm common channel)    Est nutrient needs: 3901-4442 kcals (hypocaloric 22-25kcals/kg IBW), 105-130g protein (2-2.5g/kg IBW)      PLAN  - clarify pt's height in header please (was 160cm on previous admission, now 157.5cm)  - cont DASH, heart healthy diet with priority on high protein foods as well as foods high in K, Mg (while on hydrochlorothiazide)  - d/c Ensure, pt dislikes and refusing all supplements  - cont MV with minerals daily and vitamin B!2 (was on at home PTA)  - d/c zinc supplementation, no longer indicated  - cont vitamin C daily for now   - cont ergocalciferol twice/wk and repeat 25-oh vitamin D level on   - monitor Mg and K levels, if loose BM's d/c Mg oxide  - repeat lipid profile with next blood draw  - weekly weights, weight loss with preservation of lean body mass is goal   - will follow

## 2025-06-10 RX ADMIN — APIXABAN 5 MILLIGRAM(S): 2.5 TABLET, FILM COATED ORAL at 05:14

## 2025-06-10 RX ADMIN — ERGOCALCIFEROL 50000 UNIT(S): 1.25 CAPSULE ORAL at 12:13

## 2025-06-10 RX ADMIN — Medication 1 TABLET(S): at 12:14

## 2025-06-10 RX ADMIN — Medication 500 MILLIGRAM(S): at 12:14

## 2025-06-10 RX ADMIN — Medication 400 MILLIGRAM(S): at 05:15

## 2025-06-10 RX ADMIN — CYANOCOBALAMIN 1000 MICROGRAM(S): 1000 INJECTION INTRAMUSCULAR; SUBCUTANEOUS at 12:14

## 2025-06-10 RX ADMIN — TIOTROPIUM BROMIDE INHALATION SPRAY 2 PUFF(S): 3.12 SPRAY, METERED RESPIRATORY (INHALATION) at 12:15

## 2025-06-10 RX ADMIN — Medication 40 MILLIGRAM(S): at 05:15

## 2025-06-10 RX ADMIN — Medication 400 MILLIGRAM(S): at 18:16

## 2025-06-10 RX ADMIN — MEROPENEM 100 MILLIGRAM(S): 1 INJECTION INTRAVENOUS at 05:14

## 2025-06-10 RX ADMIN — Medication 40 MILLIEQUIVALENT(S): at 12:14

## 2025-06-10 RX ADMIN — Medication 1 APPLICATION(S): at 05:20

## 2025-06-10 RX ADMIN — APIXABAN 5 MILLIGRAM(S): 2.5 TABLET, FILM COATED ORAL at 18:16

## 2025-06-10 RX ADMIN — MEROPENEM 100 MILLIGRAM(S): 1 INJECTION INTRAVENOUS at 15:30

## 2025-06-10 RX ADMIN — MEROPENEM 100 MILLIGRAM(S): 1 INJECTION INTRAVENOUS at 22:34

## 2025-06-10 RX ADMIN — Medication 220 MILLIGRAM(S): at 12:13

## 2025-06-10 NOTE — PROGRESS NOTE ADULT - ASSESSMENT
Mrs. Eddy is a 66 yo F with a PMHx of HTN, severe obesity, DVT, PE (R distal main with segmental extension) on Eliquis, LOUIS on CPAP, abby en y gastric bypass, and a recent admission for an incarcerated ventral hernia w/ SBO s/p lap converted to open ventral hernia repair w/ mesh and SBR and s/p ex lap, 30 cm SBR, creation of new side to side ileoileostomy, and ventral hernia repair w/ mesh in April 2025 presented back to the ER 2 days later from her LTACH facility ([facility]) after persistent HTN and epistaxis in setting of Eliquis use and complex surgical history. Patient found to have extensive ventral wall postsurgical changes with gas and fluid containing midline collection along the lower abdomen, measuring  approximately 4.8 x 4.5 x 10.2 cm and being managed with abx.    Plan:    #Rehab of debility with proximal LE weakness and marked decline from baseline function, s/p multiple abdominal surgeries with complications / HTN, morbid obesity, LOUIS on CPAP, Abby en y gastric bypass, and a recent admission for an incarcerated ventral hernia w/ sbo s/p lap converted to open ventral hernia repair w/ mesh and small bowel resection and s/p ex lap, 30 cm sbr, creation of new side to side ileoileostomy, ventral hernia repair w/ mesh.with impairment in mobility and ADLs and iADLs.The patient presented with co-morbidities requiring close physiatry follow-up at least 3 times a week to monitor his progress and monitor his comorbidities including HTN, PE, DVT, and LOUIS. .The patient's co-morbidities do not limit the patient's ability to participate in rehabilitative therapy. The patient was seen by PT/OT and required min A with bed mobility, CGA with sit to stand transfers, ambulates 30ftx1 with RW, CGA and 1P assist, UBD minimum assist, and LBD dependent.. The patient was previously independent with all ADLs and iADLs without use of AD and now presents with functional decline.   #Wound dehiscence and persistent intra-abdominal collection, suspect abscess in setting of multiple abdominal surgeries with complications  -5/28 Wcx grew rare Enterococcus avium    -Growth in fluid media only Escherichia coli ESBL    -5/30 midline placed  -Wound Care Instructions: Patient has a midline wound with staples and small 1 cm area of packing. Please remove and replace dressings once daily. Packing 1 cm opening with 0.25 inch packing strip, cut to length appropriate for wound. Cover incision with gauze and secure with paper tape.  -abd binder for support  -Infectious Disease is following. She is currently on Meropenem 1 gram IVPB q8h started 6/1. On discharge from acute rehab to home she will require Ertapenem 1 gram IVPB daily.  Plan to continue abx until 6/29 and follow-up with ID  - continue packing of upper incision daily  -Continue with Tylenol and adjust pain management as needed.  -She requires acute rehab with 3 hours of daily therapies at least 5 out of 7 days and close physiatry follow up.   - per surgery, repeat CTAP on 6/22  - tolerating therapies well. Strength and endurance improving daily.    #PE in R distal main with segmental extension  #DVT  -c/w Eliquis  - stable    #Stage 2 buttock pressure injury per wound care nurse specialist  - local care with barrier cream  - monitor    #Chronic venous insufficiency  Her blood pressure has been soft. I halted the amlodipine to aid her 2+ bipedal edema which is improving    #HTN  -Continue with HCTZ and losartan.   -Amolodipine was halted in the setting of pitting edema on 6/2.  -Will continue to monitor hemodynamic status and adjust medications as needed; will consider low dose amlodipine if needed vs beta blocker.    -c/w HCTZ 25mg  - Losartan decreased from 100mg to 50mg 6/5  - monitor and adjust  - good range    #Hypokalemia  - give oral supplement and recheck in a few days  - on HCTZ  - improved    #LOUIS on CPAP  Stable    #Morbid obesity BMI 53.2  Dietary education     -Pain control: Tylenol   -Bladder management: N/A  -Skin: abd incision site c/d/i    - Diet: DASH/TLC Sodium & Cholesterol Restricted    Precautions / PROPHYLAXIS:    - Falls  - DVT prophylaxis: Eliquis

## 2025-06-10 NOTE — PROGRESS NOTE ADULT - SUBJECTIVE AND OBJECTIVE BOX
Patient is a 65y old  Female who presents with a chief complaint of Rehab of debility with proximal LE weakness and marked decline from baseline function, s/p multiple abdominal surgeries with complications / HTN, severe obesity, LOUIS on CPAP, Abby en y gastric bypass, and a recent admission for an incarcerated ventral hernia w/ sbo s/p lap converted to open ventral hernia repair w/ mesh and small bowel resection and s/p ex lap, 30 cm sbr, creation of new side to side ileoileostomy, ventral hernia repair w/ mesh. (03 Jun 2025 12:34)      HPI:  64 yo F with a PMHx of HTN, severe obesity, DVT, PE (R distal main with segmental extension) on Eliquis, LOUIS on CPAP, abby en y gastric bypass, and a recent admission for an incarcerated ventral hernia w/ SBO s/p lap converted to open ventral hernia repair w/ mesh and SBR and s/p ex lap, 30 cm SBR, creation of new side to side ileoileostomy, and ventral hernia repair w/ mesh in April 2025 presents back to the ER 2 days later from her LTACH facility ([facility]) after persistent HTN and epistaxis in setting of Eliquis use and complex surgical history. Patient notes her epistaxis has stopped after self-tamponade for 25 minutes. On admission, patient endorsed purulent discharge from her incision. CT AP (5/27) showed extensive ventral wall postsurgical changes with gas and fluid containing midline collection along the lower abdomen, measuring  approximately 4.8 x 4.5 x 10.2 cm. IR evaluated collection and did not recommend a drain. Wound culture positive for rare enterococcus avium (5/28). Patient currently on abx via midline per ID recs. Patient has been tolerating diet, passing regular bowel movements and voiding without nausea or vomiting. Denies SOB, chest pain, fever or chills. Rates her abd pain 1/10 at its worst.     Prior to the debility with proximal LE weakness and marked decline s/p multiple abd surgeries with complications, the patient was independent in ADLs and ambulation. Patient now requires moderate assistance with ambulation and ADLs 2/2 to the debility. Therefore, there is a significant functional decline from baseline. Patients also with medical comorbidities of recent PE in R distal main with segmental extension and DVT on Eliquis, LOUIS on CPAP, HTN, and morbid obesity requiring medication management and monitoring of vitals by Physiatry team and nursing. There are significant comorbidities which warrants acute rehab hospitalization. To return home it is in the patient’s best interest to have acute inpatient rehabilitative services to undergo intensive interdisciplinary therapy. Of note, the patient lives alone with multiple steps and would have difficulty performing ADLs and iADLs individually. Furthermore, the patient is motivated and is able to tolerate up to 3 hours of daily therapy for 5-6 days a week for a total of 15 hours a week. Evaluated by Physiatry and deemed to be an excellent candidate for admission to  for acute inpatient rehab. Admitted to Acute Rehab on 6/2/2025.      Patient seen and examined by Physiatry and is currently functioning at bed mobility minimum assist, sit to stand transfer with CGA, ambulates 30ftx1 with RW, CGA and 1P assist, UBD minimum assist, and LBD dependent..     LS: Patient. lives with  but came from [facility] after last admission  PLOF: Independent with all ADLs and iADLs and ambulates without assistive device.      (02 Jun 2025 13:50)      TODAY'S SUBJECTIVE & REVIEW OF SYMPTOMS  No acute events overnight. No new complaints.  Tolerating PT/OT. Tolerating oral diet. Voiding spontaneously and having regular BM.  Vital signs and labs reviewed.        Review of Systems:   Constitutional:    [x  ] WNL           [   ] poor appetite   [   ] insomnia   [   ] tired   Cardio:                [   ] WNL           [   ] CP   [x  ] CEBALLOS - improving   [   ] palpitations               Resp:                   [  x ] WNL           [   ] SOB   [   ] cough   [   ] wheezing   GI:                        [  x ] WNL           [   ] constipation   [   ] diarrhea   [   ] abdominal pain   [   ] nausea   [   ] emesis                                :                      [  x ] WNL           [   ] RODRIGUES  [   ] dysuria   [   ] difficulty voiding             Endo:                   [  x ] WNL          [   ] polyuria   [   ] temperature intolerance                 Skin:                     [   ] WNL          [   ] pain   [ x  ] wound-incision at abd site, c/d/i  [   ] rash   MSK:                    [   ] WNL          [   ] muscle pain   [  ] joint pain/ stiffness   [   ] muscle tenderness   [ x  ] swelling in BLE - improving   Neuro:                 [   ] WNL          [   ] HA   [  ] change in vision   [   ] tremor   [ x  ] weakness   [   ]dysphagia              Cognitive:           [ x  ] WNL           [   ] occasional confusion      Psych:                  [  x ] WNL           [   ] hallucinations   [   ]agitation   [   ] delusion   [   ]depression    CLOF:  transfer SA  dressing mod assist  ambulates 50ft with RW and steadyA  4 steps mod assist      PHYSICAL EXAMINATION     Vital Signs Last 24 Hrs  T(C): 36.6 (10 Stephan 2025 14:15), Max: 36.7 (09 Jun 2025 19:56)  T(F): 97.8 (10 Stephan 2025 14:15), Max: 98.1 (09 Jun 2025 19:56)  HR: 101 (10 Stephan 2025 14:15) (90 - 104)  BP: 133/84 (10 Stephan 2025 14:15) (117/69 - 144/85)  BP(mean): 104 (09 Jun 2025 19:56) (104 - 104)  RR: 18 (10 Stephan 2025 14:15) (18 - 18)  SpO2: 99% (10 Stephan 2025 14:15) (97% - 99%)    Parameters below as of 10 Stephan 2025 14:15  Patient On (Oxygen Delivery Method): room air      General:[  x ] NAD, Resting Comfortable,   [   ] other:                                HEENT: [ x  ] NC/AT, EOMI, PERRL , Normal Conjunctivae,   [   ] other:  Cardio: [  x ] RRR, no murmur,   [   ] other:                              Pulm: [  x ] No Respiratory Distress,  Lungs CTAB,   [   ] other:                       Abdomen: [  x ]ND/NT, Soft, abd incision site c/d/i   [   ] other:    : [  x ] NO RODRIGUES CATHETER, [   ] RODRIGUES CATHETER- no meatal tear, no discharge, [   ] other:                                            MSK: [ x  ] No joint swelling, Full ROM,   [   ] other:                                         Ext: [   ]No C/C/E, No calf tenderness,   [ x  ]other: 2+ pitting edema in BLE    Skin: [   ]intact,   [ x  ] other: abd incision c/d/i                                                                 Neurological Examination:  Cognitive: [  x  ] AAO x 3,   [    ]  other:                                                                        Attention:  [  x  ] intact,   [  x  ]  other: pleasant and cooperative                            Memory: [  x  ] intact,  [    ]  other:      Mood/Affect: [ x   ] wnl,    [    ]  other:                                                                             Communication: [ x   ]Fluent, no dysarthria, following commands:  [    ] other:    CN II - XII:  [  x  ] intact,  [    ] other:                                                                                   Motor:   RIGHT UE:  3+/5 shoulder abduction, 5/5 elbow flexion/extension, 5/5 hand    LEFT    UE: 3+/5 shoulder abduction, 5/5 elbow flexion/extension, 5/5 hand   RIGHT LE: 3+/5 hip flexion, 4+/5 knee flexion/extension, 4+/5 DF/PF (limited by pitting edema)  LEFT  LE:  3+/5 hip flexion, 4+/5 knee flexion/extension, 4+/5 DF/PF (limited by pitting edema)  Tone: [  x  ] wnl,   [    ]  other:  DTRs: [  x ]symmetric, [   ] other:  Coordination:   [ x   ] intact,   [    ] other:                                                                         Sensory: [ x   ] Intact to light touch,   [    ] other:      MEDICATIONS  (STANDING):  apixaban 5 milliGRAM(s) Oral every 12 hours  ascorbic acid 500 milliGRAM(s) Oral daily  chlorhexidine 2% Cloths 1 Application(s) Topical <User Schedule>  cyanocobalamin 1000 MICROGram(s) Oral daily  ergocalciferol 56966 Unit(s) Oral <User Schedule>  hydrochlorothiazide 25 milliGRAM(s) Oral daily  lactobacillus acidophilus 1 Tablet(s) Oral daily  losartan 50 milliGRAM(s) Oral daily  magnesium oxide 400 milliGRAM(s) Oral every 12 hours  meropenem  IVPB 1000 milliGRAM(s) IV Intermittent every 8 hours  multivitamin/minerals 1 Tablet(s) Oral daily  pantoprazole    Tablet 40 milliGRAM(s) Oral before breakfast  potassium chloride    Tablet ER 40 milliEquivalent(s) Oral daily  tiotropium 2.5 MICROgram(s) Inhaler 2 Puff(s) Inhalation daily  zinc sulfate 220 milliGRAM(s) Oral every 24 hours    MEDICATIONS  (PRN):  acetaminophen     Tablet .. 650 milliGRAM(s) Oral every 6 hours PRN Mild Pain (1 - 3)  albuterol    90 MICROgram(s) HFA Inhaler 2 Puff(s) Inhalation every 6 hours PRN Shortness of Breath and/or Wheezing  ondansetron Injectable 4 milliGRAM(s) IV Push every 6 hours PRN Nausea  sodium chloride 0.65% Nasal 1 Spray(s) Both Nostrils every 4 hours PRN Nasal Congestion          RECENT LABS/IMAGING                          8.0    5.33  )-----------( 233      ( 09 Jun 2025 20:00 )             26.2     06-09    143  |  111[H]  |  16  ----------------------------<  102[H]  4.5   |  21  |  0.6[L]    Ca    8.4      09 Jun 2025 20:00    TPro  5.1[L]  /  Alb  2.9[L]  /  TBili  0.2  /  DBili  x   /  AST  22  /  ALT  24  /  AlkPhos  103  06-09                   9.4    7.65  )-----------( 287      ( 07 Jun 2025 20:59 )             30.9                             8.3    5.01  )-----------( 253      ( 05 Jun 2025 06:22 )             26.8     06-05    143  |  107  |  15  ----------------------------<  109[H]  3.4[L]   |  22  |  0.8    Ca    8.2[L]      05 Jun 2025 06:22  Mg     1.7     06-05    TPro  5.4[L]  /  Alb  3.1[L]  /  TBili  0.2  /  DBili  x   /  AST  16  /  ALT  24  /  AlkPhos  110  06-05

## 2025-06-11 RX ADMIN — Medication 400 MILLIGRAM(S): at 06:50

## 2025-06-11 RX ADMIN — Medication 400 MILLIGRAM(S): at 17:42

## 2025-06-11 RX ADMIN — Medication 500 MILLIGRAM(S): at 12:05

## 2025-06-11 RX ADMIN — MEROPENEM 100 MILLIGRAM(S): 1 INJECTION INTRAVENOUS at 21:18

## 2025-06-11 RX ADMIN — MEROPENEM 100 MILLIGRAM(S): 1 INJECTION INTRAVENOUS at 06:51

## 2025-06-11 RX ADMIN — Medication 1 TABLET(S): at 12:05

## 2025-06-11 RX ADMIN — MEROPENEM 100 MILLIGRAM(S): 1 INJECTION INTRAVENOUS at 13:01

## 2025-06-11 RX ADMIN — APIXABAN 5 MILLIGRAM(S): 2.5 TABLET, FILM COATED ORAL at 06:47

## 2025-06-11 RX ADMIN — APIXABAN 5 MILLIGRAM(S): 2.5 TABLET, FILM COATED ORAL at 17:42

## 2025-06-11 RX ADMIN — Medication 1 APPLICATION(S): at 07:00

## 2025-06-11 RX ADMIN — CYANOCOBALAMIN 1000 MICROGRAM(S): 1000 INJECTION INTRAMUSCULAR; SUBCUTANEOUS at 12:05

## 2025-06-11 RX ADMIN — Medication 1 TABLET(S): at 12:04

## 2025-06-11 RX ADMIN — Medication 220 MILLIGRAM(S): at 12:05

## 2025-06-11 RX ADMIN — Medication 40 MILLIGRAM(S): at 06:48

## 2025-06-11 NOTE — PROGRESS NOTE ADULT - SUBJECTIVE AND OBJECTIVE BOX
Patient is a 65y old  Female who presents with a chief complaint of Rehab of debility with proximal LE weakness and marked decline from baseline function, s/p multiple abdominal surgeries with complications / HTN, severe obesity, LOUIS on CPAP, Abby en y gastric bypass, and a recent admission for an incarcerated ventral hernia w/ sbo s/p lap converted to open ventral hernia repair w/ mesh and small bowel resection and s/p ex lap, 30 cm sbr, creation of new side to side ileoileostomy, ventral hernia repair w/ mesh. (03 Jun 2025 12:34)      HPI:  66 yo F with a PMHx of HTN, severe obesity, DVT, PE (R distal main with segmental extension) on Eliquis, LOUIS on CPAP, abby en y gastric bypass, and a recent admission for an incarcerated ventral hernia w/ SBO s/p lap converted to open ventral hernia repair w/ mesh and SBR and s/p ex lap, 30 cm SBR, creation of new side to side ileoileostomy, and ventral hernia repair w/ mesh in April 2025 presents back to the ER 2 days later from her LTACH facility ([facility]) after persistent HTN and epistaxis in setting of Eliquis use and complex surgical history. Patient notes her epistaxis has stopped after self-tamponade for 25 minutes. On admission, patient endorsed purulent discharge from her incision. CT AP (5/27) showed extensive ventral wall postsurgical changes with gas and fluid containing midline collection along the lower abdomen, measuring  approximately 4.8 x 4.5 x 10.2 cm. IR evaluated collection and did not recommend a drain. Wound culture positive for rare enterococcus avium (5/28). Patient currently on abx via midline per ID recs. Patient has been tolerating diet, passing regular bowel movements and voiding without nausea or vomiting. Denies SOB, chest pain, fever or chills. Rates her abd pain 1/10 at its worst.     Prior to the debility with proximal LE weakness and marked decline s/p multiple abd surgeries with complications, the patient was independent in ADLs and ambulation. Patient now requires moderate assistance with ambulation and ADLs 2/2 to the debility. Therefore, there is a significant functional decline from baseline. Patients also with medical comorbidities of recent PE in R distal main with segmental extension and DVT on Eliquis, LOUIS on CPAP, HTN, and morbid obesity requiring medication management and monitoring of vitals by Physiatry team and nursing. There are significant comorbidities which warrants acute rehab hospitalization. To return home it is in the patient’s best interest to have acute inpatient rehabilitative services to undergo intensive interdisciplinary therapy. Of note, the patient lives alone with multiple steps and would have difficulty performing ADLs and iADLs individually. Furthermore, the patient is motivated and is able to tolerate up to 3 hours of daily therapy for 5-6 days a week for a total of 15 hours a week. Evaluated by Physiatry and deemed to be an excellent candidate for admission to  for acute inpatient rehab. Admitted to Acute Rehab on 6/2/2025.      Patient seen and examined by Physiatry and is currently functioning at bed mobility minimum assist, sit to stand transfer with CGA, ambulates 30ftx1 with RW, CGA and 1P assist, UBD minimum assist, and LBD dependent..     LS: Patient. lives with  but came from [facility] after last admission  PLOF: Independent with all ADLs and iADLs and ambulates without assistive device.      (02 Jun 2025 13:50)      TODAY'S SUBJECTIVE & REVIEW OF SYMPTOMS  No acute events overnight. No new complaints.  Tolerating PT/OT. Tolerating oral diet. Voiding spontaneously and having regular BM. Seen with surgical resident this morning. Small open abdominal wound packed with strip gauze.  Vital signs and labs reviewed. Losartan held for the past 5 days per parameters and BP remains well controlled. D/c Losartan.       Review of Systems:   Constitutional:    [x  ] WNL           [   ] poor appetite   [   ] insomnia   [   ] tired   Cardio:                [   ] WNL           [   ] CP   [x  ] CEBALLOS - improving   [   ] palpitations               Resp:                   [  x ] WNL           [   ] SOB   [   ] cough   [   ] wheezing   GI:                        [  x ] WNL           [   ] constipation   [   ] diarrhea   [   ] abdominal pain   [   ] nausea   [   ] emesis                                :                      [  x ] WNL           [   ] RODRIGUES  [   ] dysuria   [   ] difficulty voiding             Endo:                   [  x ] WNL          [   ] polyuria   [   ] temperature intolerance                 Skin:                     [   ] WNL          [   ] pain   [ x  ] wound-incision at abd site, c/d/i  [   ] rash   MSK:                    [   ] WNL          [   ] muscle pain   [  ] joint pain/ stiffness   [   ] muscle tenderness   [ x  ] swelling in BLE - improving   Neuro:                 [   ] WNL          [   ] HA   [  ] change in vision   [   ] tremor   [ x  ] weakness   [   ]dysphagia              Cognitive:           [ x  ] WNL           [   ] occasional confusion      Psych:                  [  x ] WNL           [   ] hallucinations   [   ]agitation   [   ] delusion   [   ]depression    CLOF:  transfer SA  dressing mod assist  ambulates 50ft with RW and steadyA  4 steps mod assist      PHYSICAL EXAMINATION     Vital Signs Last 24 Hrs  T(C): 36.7 (11 Jun 2025 14:13), Max: 36.7 (11 Jun 2025 14:13)  T(F): 98 (11 Jun 2025 14:13), Max: 98 (11 Jun 2025 14:13)  HR: 106 (11 Jun 2025 14:13) (84 - 106)  BP: 149/85 (11 Jun 2025 14:13) (112/74 - 149/85)  BP(mean): 87 (10 Stephan 2025 23:18) (87 - 87)  RR: 18 (11 Jun 2025 14:13) (18 - 18)  SpO2: 99% (11 Jun 2025 14:13) (98% - 99%)    Parameters below as of 11 Jun 2025 14:13  Patient On (Oxygen Delivery Method): room air      General:[  x ] NAD, Resting Comfortable,   [   ] other:                                HEENT: [ x  ] NC/AT, EOMI, PERRL , Normal Conjunctivae,   [   ] other:  Cardio: [  x ] RRR, no murmur,   [   ] other:                              Pulm: [  x ] No Respiratory Distress,  Lungs CTAB,   [   ] other:                       Abdomen: [  x ]ND/NT, Soft, abd incision site c/d/i   [   ] other:    : [  x ] NO RODRIGUES CATHETER, [   ] RODRIGUES CATHETER- no meatal tear, no discharge, [   ] other:                                            MSK: [ x  ] No joint swelling, Full ROM,   [   ] other:                                         Ext: [   ]No C/C/E, No calf tenderness,   [ x  ]other: 2+ pitting edema in BLE    Skin: [   ]intact,   [ x  ] other: abd incision c/d/i   - 1 cm deep open wound along incision, with 1/4 inch gauze packing                                                              Neurological Examination:  Cognitive: [  x  ] AAO x 3,   [    ]  other:                                                                        Attention:  [  x  ] intact,   [  x  ]  other: pleasant and cooperative                            Memory: [  x  ] intact,  [    ]  other:      Mood/Affect: [ x   ] wnl,    [    ]  other:                                                                             Communication: [ x   ]Fluent, no dysarthria, following commands:  [    ] other:    CN II - XII:  [  x  ] intact,  [    ] other:                                                                                   Motor:   RIGHT UE:  3+/5 shoulder abduction, 5/5 elbow flexion/extension, 5/5 hand    LEFT    UE: 3+/5 shoulder abduction, 5/5 elbow flexion/extension, 5/5 hand   RIGHT LE: 3+/5 hip flexion, 4+/5 knee flexion/extension, 4+/5 DF/PF (limited by pitting edema)  LEFT  LE:  3+/5 hip flexion, 4+/5 knee flexion/extension, 4+/5 DF/PF (limited by pitting edema)  Tone: [  x  ] wnl,   [    ]  other:  DTRs: [  x ]symmetric, [   ] other:  Coordination:   [ x   ] intact,   [    ] other:                                                                         Sensory: [ x   ] Intact to light touch,   [    ] other:      MEDICATIONS  (STANDING):  apixaban 5 milliGRAM(s) Oral every 12 hours  ascorbic acid 500 milliGRAM(s) Oral daily  chlorhexidine 2% Cloths 1 Application(s) Topical <User Schedule>  cyanocobalamin 1000 MICROGram(s) Oral daily  ergocalciferol 68810 Unit(s) Oral <User Schedule>  hydrochlorothiazide 25 milliGRAM(s) Oral daily  lactobacillus acidophilus 1 Tablet(s) Oral daily  magnesium oxide 400 milliGRAM(s) Oral every 12 hours  meropenem  IVPB 1000 milliGRAM(s) IV Intermittent every 8 hours  multivitamin/minerals 1 Tablet(s) Oral daily  pantoprazole    Tablet 40 milliGRAM(s) Oral before breakfast  potassium chloride    Tablet ER 20 milliEquivalent(s) Oral daily  tiotropium 2.5 MICROgram(s) Inhaler 2 Puff(s) Inhalation daily  zinc sulfate 220 milliGRAM(s) Oral every 24 hours    MEDICATIONS  (PRN):  acetaminophen     Tablet .. 650 milliGRAM(s) Oral every 6 hours PRN Mild Pain (1 - 3)  albuterol    90 MICROgram(s) HFA Inhaler 2 Puff(s) Inhalation every 6 hours PRN Shortness of Breath and/or Wheezing  ondansetron Injectable 4 milliGRAM(s) IV Push every 6 hours PRN Nausea  sodium chloride 0.65% Nasal 1 Spray(s) Both Nostrils every 4 hours PRN Nasal Congestion            RECENT LABS/IMAGING                          8.0    5.33  )-----------( 233      ( 09 Jun 2025 20:00 )             26.2     06-09    143  |  111[H]  |  16  ----------------------------<  102[H]  4.5   |  21  |  0.6[L]    Ca    8.4      09 Jun 2025 20:00    TPro  5.1[L]  /  Alb  2.9[L]  /  TBili  0.2  /  DBili  x   /  AST  22  /  ALT  24  /  AlkPhos  103  06-09                   9.4    7.65  )-----------( 287      ( 07 Jun 2025 20:59 )             30.9                             8.3    5.01  )-----------( 253      ( 05 Jun 2025 06:22 )             26.8     06-05    143  |  107  |  15  ----------------------------<  109[H]  3.4[L]   |  22  |  0.8    Ca    8.2[L]      05 Jun 2025 06:22  Mg     1.7     06-05    TPro  5.4[L]  /  Alb  3.1[L]  /  TBili  0.2  /  DBili  x   /  AST  16  /  ALT  24  /  AlkPhos  110  06-05

## 2025-06-11 NOTE — CDI QUERY NOTE - NSCDIOTHERTXTBX2_GEN_ALL_CORE_FT
Clinical documentation and/or evidence of the patient’s presentation, evaluation, and medical management, as evidenced below, may support a diagnosis that is not documented in the medical record.  In order to ensure accurate coding and accuracy of the clinical record, the documentation in this patient’s medical record requires additional clarification.      If you think the supporting documentation and/or clinical evidence supports a more specific diagnosis, please include more specific documentation of a diagnosis associated with these findings in your progress note and/or discharge summary.      Please clarify if the potassium level below can be further specified as:  •	Hypokalemia was treated.  •	Clinically insignificant potassium level.  •	Other (specify)      Supporting documentation and/or clinical evidence:   Labs   •	6-1 Potassium 3.2    Treatment  •	6-1 potassium chloride Tablet ER 40mEqPO q4hrs x 2 doses

## 2025-06-11 NOTE — CDI QUERY NOTE - NSCDIOTHERTXTBX_GEN_ALL_CORE_HH
Clinical documentation and/or evidence of the patient’s presentation, evaluation, and medical management, as evidenced below, may support a diagnosis that is not documented in the medical record.  In order to ensure accurate coding and accuracy of the clinical record, the documentation in this patient’s medical record requires additional clarification.      If you think the supporting documentation and/or clinical evidence supports a more specific diagnosis, please include more specific documentation of a diagnosis associated with these findings in your progress note and/or discharge summary.    Please clarify if the suspected intraabdominal abscess can be further specified as:  •	Suspected intraabdominal abscess could not be ruled out, PICC line was inserted and IV ertapenem 1g q24h on D/C. Patient instructed to follow up outpatient with ID.  •	Other (specify)    Supporting documentation and/or clinical evidence:   •	5-25 H&P…  64F… recent admission for an incarcerated ventral hernia w/ sbo s/p lap converted to open ventral hernia repair w/ mesh and small bowel resection and s/p ex lap, 30 cm sbr, creation of new side to side ileostomy, ventral hernia repair w/ mesh  •	5-26 Surgery… Upon inspection of midline wound, attending Dr. Lema noted drainage of purulent discharge. 20-30cc drained, culture sent and Augmentin started.  •	6-1 ID Consult… Last seen by ID inpatient 5/1- At that time Repeat CT showing Redemonstrated large volume complex fluid in the pelvis and along the left paracolic gutter, compatible with blood products…. Per ID note plan was for Midline x ertapenem 1g q24h IV on D/C x 4 weeks E/D 5/19 (per chart antibiotics stopped inpatient 5/6)… Wound dehiscence and persistent intra-abdominal collection, suspect abscess…   5/28 WCX   Rare Enterococcus avium… D/C Unasyn--> Meropenem 1g q8h IV… Without surgical intervention needs PICC x prolonged IV ABX  (IV ertapenem 1g q24hon D/C)  •	6-2 DCS…midline catheter inserted 5/30… ID and recommended 4-6 weeks of ertapenem 1g q24 hrs upon discharge…  follow-up with ID    Imaging  •	5-27 CT Ab/pelvis Radiology Impression… Extensive ventral wall postsurgical changes noted with gas and fluid  containing midline collection along the lower abdomen, measuring  approximately 4.8 x 4.5 x 10.2 cm.

## 2025-06-12 RX ORDER — FUROSEMIDE 10 MG/ML
20 INJECTION INTRAMUSCULAR; INTRAVENOUS DAILY
Refills: 0 | Status: DISCONTINUED | OUTPATIENT
Start: 2025-06-12 | End: 2025-06-20

## 2025-06-12 RX ADMIN — MEROPENEM 100 MILLIGRAM(S): 1 INJECTION INTRAVENOUS at 13:11

## 2025-06-12 RX ADMIN — Medication 220 MILLIGRAM(S): at 13:11

## 2025-06-12 RX ADMIN — Medication 400 MILLIGRAM(S): at 18:47

## 2025-06-12 RX ADMIN — Medication 20 MILLIEQUIVALENT(S): at 11:48

## 2025-06-12 RX ADMIN — MEROPENEM 100 MILLIGRAM(S): 1 INJECTION INTRAVENOUS at 21:12

## 2025-06-12 RX ADMIN — APIXABAN 5 MILLIGRAM(S): 2.5 TABLET, FILM COATED ORAL at 06:34

## 2025-06-12 RX ADMIN — Medication 400 MILLIGRAM(S): at 06:34

## 2025-06-12 RX ADMIN — TIOTROPIUM BROMIDE INHALATION SPRAY 2 PUFF(S): 3.12 SPRAY, METERED RESPIRATORY (INHALATION) at 09:40

## 2025-06-12 RX ADMIN — APIXABAN 5 MILLIGRAM(S): 2.5 TABLET, FILM COATED ORAL at 18:47

## 2025-06-12 RX ADMIN — FUROSEMIDE 20 MILLIGRAM(S): 10 INJECTION INTRAMUSCULAR; INTRAVENOUS at 18:47

## 2025-06-12 RX ADMIN — Medication 1 APPLICATION(S): at 06:36

## 2025-06-12 RX ADMIN — Medication 500 MILLIGRAM(S): at 11:50

## 2025-06-12 RX ADMIN — CYANOCOBALAMIN 1000 MICROGRAM(S): 1000 INJECTION INTRAMUSCULAR; SUBCUTANEOUS at 11:46

## 2025-06-12 RX ADMIN — Medication 40 MILLIGRAM(S): at 06:34

## 2025-06-12 RX ADMIN — Medication 1 TABLET(S): at 11:51

## 2025-06-12 RX ADMIN — Medication 1 TABLET(S): at 11:47

## 2025-06-12 RX ADMIN — MEROPENEM 100 MILLIGRAM(S): 1 INJECTION INTRAVENOUS at 06:34

## 2025-06-12 NOTE — PROGRESS NOTE ADULT - ASSESSMENT
Mrs. Eddy is a 66 yo F with a PMHx of HTN, severe obesity, DVT, PE (R distal main with segmental extension) on Eliquis, LOUIS on CPAP, abby en y gastric bypass, and a recent admission for an incarcerated ventral hernia w/ SBO s/p lap converted to open ventral hernia repair w/ mesh and SBR and s/p ex lap, 30 cm SBR, creation of new side to side ileoileostomy, and ventral hernia repair w/ mesh in April 2025 presented back to the ER 2 days later from her LTACH facility ([facility]) after persistent HTN and epistaxis in setting of Eliquis use and complex surgical history. Patient found to have extensive ventral wall postsurgical changes with gas and fluid containing midline collection along the lower abdomen, measuring  approximately 4.8 x 4.5 x 10.2 cm and being managed with abx.    Plan:    #Rehab of debility with proximal LE weakness and marked decline from baseline function, s/p multiple abdominal surgeries with complications / HTN, morbid obesity, LOUIS on CPAP, Abby en y gastric bypass, and a recent admission for an incarcerated ventral hernia w/ sbo s/p lap converted to open ventral hernia repair w/ mesh and small bowel resection and s/p ex lap, 30 cm sbr, creation of new side to side ileoileostomy, ventral hernia repair w/ mesh.with impairment in mobility and ADLs and iADLs.The patient presented with co-morbidities requiring close physiatry follow-up at least 3 times a week to monitor his progress and monitor his comorbidities including HTN, PE, DVT, and LOUIS. .The patient's co-morbidities do not limit the patient's ability to participate in rehabilitative therapy. The patient was seen by PT/OT and required min A with bed mobility, CGA with sit to stand transfers, ambulates 30ftx1 with RW, CGA and 1P assist, UBD minimum assist, and LBD dependent.. The patient was previously independent with all ADLs and iADLs without use of AD and now presents with functional decline.   #Wound dehiscence and persistent intra-abdominal collection, suspect abscess in setting of multiple abdominal surgeries with complications  -5/28 Wcx grew rare Enterococcus avium    -Growth in fluid media only Escherichia coli ESBL    -5/30 midline placed  -Wound Care Instructions: Patient has a midline wound with staples and small 1 cm area of packing. Please remove and replace dressings once daily. Packing 1 cm opening with 0.25 inch packing strip, cut to length appropriate for wound. Cover incision with gauze and secure with paper tape.  -abd binder for support  -Infectious Disease is following. She is currently on Meropenem 1 gram IVPB q8h started 6/1. On discharge from acute rehab to home she will require Ertapenem 1 gram IVPB daily.  Plan to continue abx until 6/29 and follow-up with ID  - continue packing of upper incision open area daily with strip gauze  -Continue with Tylenol and adjust pain management as needed.  -She requires acute rehab with 3 hours of daily therapies at least 5 out of 7 days and close physiatry follow up.   - per surgery, repeat CTAP on 6/22  - tolerating therapies well. Strength and endurance improving daily.    #PE in R distal main with segmental extension  #DVT  -c/w Eliquis  - stable    #Stage 2 buttock pressure injury per wound care nurse specialist  - local care with barrier cream  - monitor    #Chronic venous insufficiency/ fluctuating LE edema  - did not improve of Norvasc  - will change HCTZ to Lasix 20mg daily and monitor daily weights    #Anemia  - recheck CBC in am    #HTN  -Amolodipine was halted in the setting of pitting edema on 6/2 and low bp  - Losartan decreased from 100mg to 50mg 6/5. BP remained in low range with Losartan held x 5 days. D/c Losartan 6/11 and monitor.  - change HCTZ to toLasix 20mg daily 6/12 for improved edema control and monitor BP    #Hypokalemia  - give oral supplement and recheck in a few days  - improved on standing KCl  - recheck in am    #LOUIS on CPAP  Stable    #Morbid obesity BMI 53.2  Dietary education     -Pain control: Tylenol prn    - Diet: DASH/TLC Sodium & Cholesterol Restricted    Precautions / PROPHYLAXIS:    - Falls  - DVT prophylaxis: Eliquis

## 2025-06-12 NOTE — PROGRESS NOTE ADULT - SUBJECTIVE AND OBJECTIVE BOX
Patient is a 65y old  Female who presents with a chief complaint of Rehab of debility with proximal LE weakness and marked decline from baseline function, s/p multiple abdominal surgeries with complications / HTN, severe obesity, LOUIS on CPAP, Abby en y gastric bypass, and a recent admission for an incarcerated ventral hernia w/ sbo s/p lap converted to open ventral hernia repair w/ mesh and small bowel resection and s/p ex lap, 30 cm sbr, creation of new side to side ileoileostomy, ventral hernia repair w/ mesh. (03 Jun 2025 12:34)      HPI:  64 yo F with a PMHx of HTN, severe obesity, DVT, PE (R distal main with segmental extension) on Eliquis, LOUIS on CPAP, abby en y gastric bypass, and a recent admission for an incarcerated ventral hernia w/ SBO s/p lap converted to open ventral hernia repair w/ mesh and SBR and s/p ex lap, 30 cm SBR, creation of new side to side ileoileostomy, and ventral hernia repair w/ mesh in April 2025 presents back to the ER 2 days later from her LTACH facility ([facility]) after persistent HTN and epistaxis in setting of Eliquis use and complex surgical history. Patient notes her epistaxis has stopped after self-tamponade for 25 minutes. On admission, patient endorsed purulent discharge from her incision. CT AP (5/27) showed extensive ventral wall postsurgical changes with gas and fluid containing midline collection along the lower abdomen, measuring  approximately 4.8 x 4.5 x 10.2 cm. IR evaluated collection and did not recommend a drain. Wound culture positive for rare enterococcus avium (5/28). Patient currently on abx via midline per ID recs. Patient has been tolerating diet, passing regular bowel movements and voiding without nausea or vomiting. Denies SOB, chest pain, fever or chills. Rates her abd pain 1/10 at its worst.     Prior to the debility with proximal LE weakness and marked decline s/p multiple abd surgeries with complications, the patient was independent in ADLs and ambulation. Patient now requires moderate assistance with ambulation and ADLs 2/2 to the debility. Therefore, there is a significant functional decline from baseline. Patients also with medical comorbidities of recent PE in R distal main with segmental extension and DVT on Eliquis, LOUIS on CPAP, HTN, and morbid obesity requiring medication management and monitoring of vitals by Physiatry team and nursing. There are significant comorbidities which warrants acute rehab hospitalization. To return home it is in the patient’s best interest to have acute inpatient rehabilitative services to undergo intensive interdisciplinary therapy. Of note, the patient lives alone with multiple steps and would have difficulty performing ADLs and iADLs individually. Furthermore, the patient is motivated and is able to tolerate up to 3 hours of daily therapy for 5-6 days a week for a total of 15 hours a week. Evaluated by Physiatry and deemed to be an excellent candidate for admission to  for acute inpatient rehab. Admitted to Acute Rehab on 6/2/2025.      Patient seen and examined by Physiatry and is currently functioning at bed mobility minimum assist, sit to stand transfer with CGA, ambulates 30ftx1 with RW, CGA and 1P assist, UBD minimum assist, and LBD dependent..     LS: Patient. lives with  but came from [facility] after last admission  PLOF: Independent with all ADLs and iADLs and ambulates without assistive device.      (02 Jun 2025 13:50)      TODAY'S SUBJECTIVE & REVIEW OF SYMPTOMS  No acute events overnight. No new complaints.  Tolerating PT/OT. Tolerating oral diet. Voiding spontaneously and having regular BM. Seen with surgical resident this morning. Small open abdominal wound packed with strip gauze.  Vital signs and labs reviewed. Increase BLE edema noted. Patient and daughter agreeable to trial of Lasix 20mg daily in place of HCTZ for BP and edema control.       Review of Systems:   Constitutional:    [x  ] WNL           [   ] poor appetite   [   ] insomnia   [   ] tired   Cardio:                [   ] WNL           [   ] CP   [x  ] CEBALLOS - improving   [   ] palpitations               Resp:                   [  x ] WNL           [   ] SOB   [   ] cough   [   ] wheezing   GI:                        [  x ] WNL           [   ] constipation   [   ] diarrhea   [   ] abdominal pain   [   ] nausea   [   ] emesis                                :                      [  x ] WNL           [   ] RODRIGUES  [   ] dysuria   [   ] difficulty voiding             Endo:                   [  x ] WNL          [   ] polyuria   [   ] temperature intolerance                 Skin:                     [   ] WNL          [   ] pain   [ x  ] wound-incision at abd site, c/d/i  [   ] rash   MSK:                    [   ] WNL          [   ] muscle pain   [  ] joint pain/ stiffness   [   ] muscle tenderness   [ x  ] swelling in BLE   Neuro:                 [   ] WNL          [   ] HA   [  ] change in vision   [   ] tremor   [ x  ] weakness   [   ]dysphagia              Cognitive:           [ x  ] WNL           [   ] occasional confusion      Psych:                  [  x ] WNL           [   ] hallucinations   [   ]agitation   [   ] delusion   [   ]depression    CLOF:  transfer SA  dressing mod assist  ambulates 50ft with RW and steadyA  4 steps mod assist      PHYSICAL EXAMINATION     Vital Signs Last 24 Hrs  T(C): 36.4 (12 Jun 2025 12:44), Max: 36.4 (12 Jun 2025 05:29)  T(F): 97.5 (12 Jun 2025 12:44), Max: 97.5 (12 Jun 2025 05:29)  HR: 107 (12 Jun 2025 12:44) (98 - 115)  BP: 144/93 (12 Jun 2025 12:44) (126/76 - 144/93)  BP(mean): 93 (12 Jun 2025 05:29) (93 - 93)  RR: 18 (12 Jun 2025 12:44) (18 - 18)  SpO2: 97% (12 Jun 2025 12:44) (97% - 99%)    Parameters below as of 12 Jun 2025 12:44  Patient On (Oxygen Delivery Method): room air        General:[  x ] NAD, Resting Comfortable,   [   ] other:                                HEENT: [ x  ] NC/AT, EOMI, PERRL , Normal Conjunctivae,   [   ] other:  Cardio: [  x ] RRR, no murmur,   [   ] other:                              Pulm: [  x ] No Respiratory Distress,  Lungs CTAB,   [   ] other:                       Abdomen: [  x ]ND/NT, Soft, abd incision site c/d/i   [   ] other:    : [  x ] NO RODRIGUES CATHETER, [   ] RODRIGUES CATHETER- no meatal tear, no discharge, [   ] other:                                            MSK: [ x  ] No joint swelling, Full ROM,   [   ] other:                                         Ext: [   ]No C/C/E, No calf tenderness,   [ x  ]other: 2+ pitting edema in BLE    Skin: [   ]intact,   [ x  ] other: abd incision c/d/i   - 1 cm deep open wound along incision, with 1/4 inch gauze packing                                                                Neurological Examination:  Cognitive: [  x  ] AAO x 3,   [    ]  other:                                                                        Attention:  [  x  ] intact,   [  x  ]  other: pleasant and cooperative                            Memory: [  x  ] intact,  [    ]  other:      Mood/Affect: [ x   ] wnl,    [    ]  other:                                                                             Communication: [ x   ]Fluent, no dysarthria, following commands:  [    ] other:    CN II - XII:  [  x  ] intact,  [    ] other:                                                                                   Motor:   RIGHT UE:  3+/5 shoulder abduction, 5/5 elbow flexion/extension, 5/5 hand    LEFT    UE: 3+/5 shoulder abduction, 5/5 elbow flexion/extension, 5/5 hand   RIGHT LE: 3+/5 hip flexion, 4+/5 knee flexion/extension, 4+/5 DF/PF (limited by pitting edema)  LEFT  LE:  3+/5 hip flexion, 4+/5 knee flexion/extension, 4+/5 DF/PF (limited by pitting edema)  Tone: [  x  ] wnl,   [    ]  other:  DTRs: [  x ]symmetric, [   ] other:  Coordination:   [ x   ] intact,   [    ] other:                                                                         Sensory: [ x   ] Intact to light touch,   [    ] other:      MEDICATIONS  (STANDING):  apixaban 5 milliGRAM(s) Oral every 12 hours  ascorbic acid 500 milliGRAM(s) Oral daily  chlorhexidine 2% Cloths 1 Application(s) Topical <User Schedule>  cyanocobalamin 1000 MICROGram(s) Oral daily  ergocalciferol 48095 Unit(s) Oral <User Schedule>  hydrochlorothiazide 25 milliGRAM(s) Oral daily  lactobacillus acidophilus 1 Tablet(s) Oral daily  magnesium oxide 400 milliGRAM(s) Oral every 12 hours  meropenem  IVPB 1000 milliGRAM(s) IV Intermittent every 8 hours  multivitamin/minerals 1 Tablet(s) Oral daily  pantoprazole    Tablet 40 milliGRAM(s) Oral before breakfast  potassium chloride    Tablet ER 20 milliEquivalent(s) Oral daily  tiotropium 2.5 MICROgram(s) Inhaler 2 Puff(s) Inhalation daily  zinc sulfate 220 milliGRAM(s) Oral every 24 hours    MEDICATIONS  (PRN):  acetaminophen     Tablet .. 650 milliGRAM(s) Oral every 6 hours PRN Mild Pain (1 - 3)  albuterol    90 MICROgram(s) HFA Inhaler 2 Puff(s) Inhalation every 6 hours PRN Shortness of Breath and/or Wheezing  ondansetron Injectable 4 milliGRAM(s) IV Push every 6 hours PRN Nausea  sodium chloride 0.65% Nasal 1 Spray(s) Both Nostrils every 4 hours PRN Nasal Congestion            RECENT LABS/IMAGING                          8.0    5.33  )-----------( 233      ( 09 Jun 2025 20:00 )             26.2     06-09    143  |  111[H]  |  16  ----------------------------<  102[H]  4.5   |  21  |  0.6[L]    Ca    8.4      09 Jun 2025 20:00    TPro  5.1[L]  /  Alb  2.9[L]  /  TBili  0.2  /  DBili  x   /  AST  22  /  ALT  24  /  AlkPhos  103  06-09                   9.4    7.65  )-----------( 287      ( 07 Jun 2025 20:59 )             30.9                             8.3    5.01  )-----------( 253      ( 05 Jun 2025 06:22 )             26.8     06-05    143  |  107  |  15  ----------------------------<  109[H]  3.4[L]   |  22  |  0.8    Ca    8.2[L]      05 Jun 2025 06:22  Mg     1.7     06-05    TPro  5.4[L]  /  Alb  3.1[L]  /  TBili  0.2  /  DBili  x   /  AST  16  /  ALT  24  /  AlkPhos  110  06-05

## 2025-06-13 LAB
ANION GAP SERPL CALC-SCNC: 14 MMOL/L — SIGNIFICANT CHANGE UP (ref 7–14)
BUN SERPL-MCNC: 14 MG/DL — SIGNIFICANT CHANGE UP (ref 10–20)
CALCIUM SERPL-MCNC: 8.5 MG/DL — SIGNIFICANT CHANGE UP (ref 8.4–10.5)
CHLORIDE SERPL-SCNC: 108 MMOL/L — SIGNIFICANT CHANGE UP (ref 98–110)
CO2 SERPL-SCNC: 23 MMOL/L — SIGNIFICANT CHANGE UP (ref 17–32)
CREAT SERPL-MCNC: 0.7 MG/DL — SIGNIFICANT CHANGE UP (ref 0.7–1.5)
EGFR: 97 ML/MIN/1.73M2 — SIGNIFICANT CHANGE UP
EGFR: 97 ML/MIN/1.73M2 — SIGNIFICANT CHANGE UP
FOLATE SERPL-MCNC: 18.1 NG/ML — SIGNIFICANT CHANGE UP
GLUCOSE SERPL-MCNC: 106 MG/DL — HIGH (ref 70–99)
HCT VFR BLD CALC: 29 % — LOW (ref 37–47)
HGB BLD-MCNC: 8.6 G/DL — LOW (ref 12–16)
IRON SATN MFR SERPL: 15 % — SIGNIFICANT CHANGE UP (ref 15–50)
IRON SATN MFR SERPL: 35 UG/DL — SIGNIFICANT CHANGE UP (ref 35–150)
MAGNESIUM SERPL-MCNC: 1.8 MG/DL — SIGNIFICANT CHANGE UP (ref 1.8–2.4)
MCHC RBC-ENTMCNC: 29.2 PG — SIGNIFICANT CHANGE UP (ref 27–31)
MCHC RBC-ENTMCNC: 29.7 G/DL — LOW (ref 32–37)
MCV RBC AUTO: 98.3 FL — SIGNIFICANT CHANGE UP (ref 81–99)
NRBC BLD AUTO-RTO: 0 /100 WBCS — SIGNIFICANT CHANGE UP (ref 0–0)
PLATELET # BLD AUTO: 248 K/UL — SIGNIFICANT CHANGE UP (ref 130–400)
PMV BLD: 9.8 FL — SIGNIFICANT CHANGE UP (ref 7.4–10.4)
POTASSIUM SERPL-MCNC: 4.3 MMOL/L — SIGNIFICANT CHANGE UP (ref 3.5–5)
POTASSIUM SERPL-SCNC: 4.3 MMOL/L — SIGNIFICANT CHANGE UP (ref 3.5–5)
RBC # BLD: 2.95 M/UL — LOW (ref 4.2–5.4)
RBC # FLD: 16.4 % — HIGH (ref 11.5–14.5)
SODIUM SERPL-SCNC: 145 MMOL/L — SIGNIFICANT CHANGE UP (ref 135–146)
TIBC SERPL-MCNC: 226 UG/DL — SIGNIFICANT CHANGE UP (ref 220–430)
UIBC SERPL-MCNC: 191 UG/DL — SIGNIFICANT CHANGE UP (ref 110–370)
VIT B12 SERPL-MCNC: 604 PG/ML — SIGNIFICANT CHANGE UP (ref 232–1245)
WBC # BLD: 5.02 K/UL — SIGNIFICANT CHANGE UP (ref 4.8–10.8)
WBC # FLD AUTO: 5.02 K/UL — SIGNIFICANT CHANGE UP (ref 4.8–10.8)

## 2025-06-13 RX ADMIN — Medication 500 MILLIGRAM(S): at 12:05

## 2025-06-13 RX ADMIN — CYANOCOBALAMIN 1000 MICROGRAM(S): 1000 INJECTION INTRAMUSCULAR; SUBCUTANEOUS at 12:04

## 2025-06-13 RX ADMIN — MEROPENEM 100 MILLIGRAM(S): 1 INJECTION INTRAVENOUS at 13:08

## 2025-06-13 RX ADMIN — APIXABAN 5 MILLIGRAM(S): 2.5 TABLET, FILM COATED ORAL at 17:38

## 2025-06-13 RX ADMIN — MEROPENEM 100 MILLIGRAM(S): 1 INJECTION INTRAVENOUS at 06:02

## 2025-06-13 RX ADMIN — Medication 40 MILLIGRAM(S): at 06:03

## 2025-06-13 RX ADMIN — Medication 1 TABLET(S): at 12:04

## 2025-06-13 RX ADMIN — Medication 400 MILLIGRAM(S): at 17:38

## 2025-06-13 RX ADMIN — FUROSEMIDE 20 MILLIGRAM(S): 10 INJECTION INTRAMUSCULAR; INTRAVENOUS at 06:03

## 2025-06-13 RX ADMIN — Medication 400 MILLIGRAM(S): at 06:03

## 2025-06-13 RX ADMIN — MEROPENEM 100 MILLIGRAM(S): 1 INJECTION INTRAVENOUS at 21:09

## 2025-06-13 RX ADMIN — Medication 1 APPLICATION(S): at 06:03

## 2025-06-13 RX ADMIN — Medication 220 MILLIGRAM(S): at 12:05

## 2025-06-13 RX ADMIN — ERGOCALCIFEROL 50000 UNIT(S): 1.25 CAPSULE ORAL at 09:36

## 2025-06-13 RX ADMIN — APIXABAN 5 MILLIGRAM(S): 2.5 TABLET, FILM COATED ORAL at 06:04

## 2025-06-13 RX ADMIN — Medication 20 MILLIEQUIVALENT(S): at 12:04

## 2025-06-13 NOTE — PROGRESS NOTE ADULT - SUBJECTIVE AND OBJECTIVE BOX
Patient is a 65y old  Female who presents with a chief complaint of Rehab of debility with proximal LE weakness and marked decline from baseline function, s/p multiple abdominal surgeries with complications / HTN, severe obesity, LOUIS on CPAP, Abby en y gastric bypass, and a recent admission for an incarcerated ventral hernia w/ sbo s/p lap converted to open ventral hernia repair w/ mesh and small bowel resection and s/p ex lap, 30 cm sbr, creation of new side to side ileoileostomy, ventral hernia repair w/ mesh. (03 Jun 2025 12:34)      HPI:  66 yo F with a PMHx of HTN, severe obesity, DVT, PE (R distal main with segmental extension) on Eliquis, LOUIS on CPAP, abby en y gastric bypass, and a recent admission for an incarcerated ventral hernia w/ SBO s/p lap converted to open ventral hernia repair w/ mesh and SBR and s/p ex lap, 30 cm SBR, creation of new side to side ileoileostomy, and ventral hernia repair w/ mesh in April 2025 presents back to the ER 2 days later from her LTACH facility ([place]) after persistent HTN and epistaxis in setting of Eliquis use and complex surgical history. Patient notes her epistaxis has stopped after self-tamponade for 25 minutes. On admission, patient endorsed purulent discharge from her incision. CT AP (5/27) showed extensive ventral wall postsurgical changes with gas and fluid containing midline collection along the lower abdomen, measuring  approximately 4.8 x 4.5 x 10.2 cm. IR evaluated collection and did not recommend a drain. Wound culture positive for rare enterococcus avium (5/28). Patient currently on abx via midline per ID recs. Patient has been tolerating diet, passing regular bowel movements and voiding without nausea or vomiting. Denies SOB, chest pain, fever or chills. Rates her abd pain 1/10 at its worst.     Prior to the debility with proximal LE weakness and marked decline s/p multiple abd surgeries with complications, the patient was independent in ADLs and ambulation. Patient now requires moderate assistance with ambulation and ADLs 2/2 to the debility. Therefore, there is a significant functional decline from baseline. Patients also with medical comorbidities of recent PE in R distal main with segmental extension and DVT on Eliquis, LOUIS on CPAP, HTN, and morbid obesity requiring medication management and monitoring of vitals by Physiatry team and nursing. There are significant comorbidities which warrants acute rehab hospitalization. To return home it is in the patient’s best interest to have acute inpatient rehabilitative services to undergo intensive interdisciplinary therapy. Of note, the patient lives alone with multiple steps and would have difficulty performing ADLs and iADLs individually. Furthermore, the patient is motivated and is able to tolerate up to 3 hours of daily therapy for 5-6 days a week for a total of 15 hours a week. Evaluated by Physiatry and deemed to be an excellent candidate for admission to  for acute inpatient rehab. Admitted to Acute Rehab on 6/2/2025.      Patient seen and examined by Physiatry and is currently functioning at bed mobility minimum assist, sit to stand transfer with CGA, ambulates 30ftx1 with RW, CGA and 1P assist, UBD minimum assist, and LBD dependent..     LS: Patient. lives with  but came from [facility] after last admission  PLOF: Independent with all ADLs and iADLs and ambulates without assistive device.      (02 Jun 2025 13:50)      TODAY'S SUBJECTIVE & REVIEW OF SYMPTOMS  No acute events overnight. No new complaints.  Tolerating PT/OT. Voiding spontaneously and having regular BM.   Vital signs and labs reviewed by me. Increase BLE edema noted. Started on Lasix yesterday Daily wights ordered.       Review of Systems:   Constitutional:       [x  ] WNL           [   ] poor appetite   [   ] insomnia   [   ] tired   Cardio:                [   ] WNL           [   ] CP   [x  ] CEBALLOS - improving   [   ] palpitations               Resp:                   [  x ] WNL           [   ] SOB   [   ] cough   [   ] wheezing   GI:                        [  x ] WNL           [   ] constipation   [   ] diarrhea   [   ] abdominal pain   [   ] nausea   [   ] emesis                                :                      [  x ] WNL           [   ] RODRIGUES  [   ] dysuria   [   ] difficulty voiding             Endo:                   [  x ] WNL          [   ] polyuria   [   ] temperature intolerance                 Skin:                     [   ] WNL          [   ] pain   [ x  ] wound-incision at abd site, c/d/i  [   ] rash   MSK:                    [   ] WNL          [   ] muscle pain   [  ] joint pain/ stiffness   [   ] muscle tenderness   [ x  ] swelling in BLE   Neuro:                 [   ] WNL          [   ] HA   [  ] change in vision   [   ] tremor   [ x  ] weakness   [   ]dysphagia              Cognitive:           [ x  ] WNL           [   ] occasional confusion      Psych:                  [  x ] WNL           [   ] hallucinations   [   ]agitation   [   ] delusion   [   ]depression    CLOF:  transfer SA  dressing mod assist  ambulates 50ft with RW and steadyA  4 steps mod assist      PHYSICAL EXAMINATION     Vital Signs Last 24 Hrs  T(C): 36.6 (13 Jun 2025 12:16), Max: 36.7 (12 Jun 2025 19:01)  T(F): 97.9 (13 Jun 2025 12:16), Max: 98.1 (12 Jun 2025 19:01)  HR: 109 (13 Jun 2025 12:16) (89 - 109)  BP: 130/72 (13 Jun 2025 12:16) (126/81 - 169/82)  BP(mean): --  RR: 18 (13 Jun 2025 12:16) (18 - 18)  SpO2: 97% (13 Jun 2025 12:16) (96% - 98%)    Parameters below as of 12 Jun 2025 19:01  Patient On (Oxygen Delivery Method): room air      General:[  x ] NAD, Resting Comfortable,   [   ] other:                                HEENT: [ x  ] NC/AT, EOMI, PERRL , Normal Conjunctivae,   [   ] other:  Cardio: [  x ] RRR, no murmur,   [   ] other:                              Pulm: [  x ] No Respiratory Distress,  Lungs CTAB,   [   ] other:                       Abdomen: [  x ]ND/NT, Soft, abd incision site c/d/i   [   ] other:    : [  x ] NO RODRIGUES CATHETER, [   ] RODRIGUES CATHETER- no meatal tear, no discharge, [   ] other:                                            MSK: [ x  ] No joint swelling, Full ROM,   [   ] other:                                         Ext: [   ]No C/C/E, No calf tenderness,   [ x  ]other: 2-3+ pitting edema in BLE    Skin: [   ]intact,   [ x  ] other: abd incision c/d/i   - 1 cm deep open wound along incision, with 1/4 inch gauze packing                                                                Neurological Examination:  Cognitive: [  x  ] AAO x 3,   [    ]  other:                                                                        Attention:  [  x  ] intact,   [  x  ]  other: pleasant and cooperative                            Memory: [  x  ] intact,  [    ]  other:      Mood/Affect: [ x   ] wnl,    [    ]  other:                                                                             Communication: [ x   ]Fluent, no dysarthria, following commands:  [    ] other:    CN II - XII:  [  x  ] intact,  [    ] other:                                                                                   Motor:   RIGHT UE:  3+/5 shoulder abduction, 5/5 elbow flexion/extension, 5/5 hand    LEFT    UE: 3+/5 shoulder abduction, 5/5 elbow flexion/extension, 5/5 hand   RIGHT LE: 3+/5 hip flexion, 4+/5 knee flexion/extension, 4+/5 DF/PF (limited by pitting edema)  LEFT  LE:  3+/5 hip flexion, 4+/5 knee flexion/extension, 4+/5 DF/PF (limited by pitting edema)  Tone: [  x  ] wnl,   [    ]  other:  DTRs: [  x ]symmetric, [   ] other:  Coordination:   [ x   ] intact,   [    ] other:                                                                         Sensory: [ x   ] Intact to light touch,   [    ] other:      MEDICATIONS  (STANDING):  apixaban 5 milliGRAM(s) Oral every 12 hours  ascorbic acid 500 milliGRAM(s) Oral daily  chlorhexidine 2% Cloths 1 Application(s) Topical <User Schedule>  cyanocobalamin 1000 MICROGram(s) Oral daily  ergocalciferol 85131 Unit(s) Oral <User Schedule>  furosemide    Tablet 20 milliGRAM(s) Oral daily  lactobacillus acidophilus 1 Tablet(s) Oral daily  magnesium oxide 400 milliGRAM(s) Oral every 12 hours  meropenem  IVPB 1000 milliGRAM(s) IV Intermittent every 8 hours  multivitamin/minerals 1 Tablet(s) Oral daily  pantoprazole    Tablet 40 milliGRAM(s) Oral before breakfast  potassium chloride    Tablet ER 20 milliEquivalent(s) Oral daily  tiotropium 2.5 MICROgram(s) Inhaler 2 Puff(s) Inhalation daily  zinc sulfate 220 milliGRAM(s) Oral every 24 hours    MEDICATIONS  (PRN):  acetaminophen     Tablet .. 650 milliGRAM(s) Oral every 6 hours PRN Mild Pain (1 - 3)  albuterol    90 MICROgram(s) HFA Inhaler 2 Puff(s) Inhalation every 6 hours PRN Shortness of Breath and/or Wheezing  ondansetron Injectable 4 milliGRAM(s) IV Push every 6 hours PRN Nausea  sodium chloride 0.65% Nasal 1 Spray(s) Both Nostrils every 4 hours PRN Nasal Congestion            RECENT LABS/IMAGING                          8.6    5.02  )-----------( 248      ( 13 Jun 2025 06:49 )             29.0     06-13    145  |  108  |  14  ----------------------------<  106[H]  4.3   |  23  |  0.7    Ca    8.5      13 Jun 2025 06:49  Mg     1.8     06-13                                8.0    5.33  )-----------( 233      ( 09 Jun 2025 20:00 )             26.2     06-09    143  |  111[H]  |  16  ----------------------------<  102[H]  4.5   |  21  |  0.6[L]    Ca    8.4      09 Jun 2025 20:00    TPro  5.1[L]  /  Alb  2.9[L]  /  TBili  0.2  /  DBili  x   /  AST  22  /  ALT  24  /  AlkPhos  103  06-09                   9.4    7.65  )-----------( 287      ( 07 Jun 2025 20:59 )             30.9                             8.3    5.01  )-----------( 253      ( 05 Jun 2025 06:22 )             26.8     06-05    143  |  107  |  15  ----------------------------<  109[H]  3.4[L]   |  22  |  0.8    Ca    8.2[L]      05 Jun 2025 06:22  Mg     1.7     06-05    TPro  5.4[L]  /  Alb  3.1[L]  /  TBili  0.2  /  DBili  x   /  AST  16  /  ALT  24  /  AlkPhos  110  06-05

## 2025-06-13 NOTE — CHART NOTE - NSCHARTNOTEFT_GEN_A_CORE
After reiviewing the chat  The patient had suspected subutaneous collection. CT showed  ventral wall postsurgical changes noted with gas and fluid  containing midline collection along the lower abdomen, measuring  approximately 4.8 x 4.5 x 10.2 cm. IR could not drain it. The skin was already draining a collection and we did not want to take her to the OR for debridement. ID recommended  IV ertapenem 1g q24h.    She also had Hypokalemia was treated.  on  6-1 Potassium 3.2 and was given  potassium chloride Tablet ER 40mEqPO q4hrs x 2 doses .

## 2025-06-13 NOTE — PROGRESS NOTE ADULT - ASSESSMENT
Mrs. Eddy is a 66 yo F with a PMHx of HTN, severe obesity, DVT, PE (R distal main with segmental extension) on Eliquis, LOUIS on CPAP, abby en y gastric bypass, and a recent admission for an incarcerated ventral hernia w/ SBO s/p lap converted to open ventral hernia repair w/ mesh and SBR and s/p ex lap, 30 cm SBR, creation of new side to side ileoileostomy, and ventral hernia repair w/ mesh in April 2025 presented back to the ER 2 days later from her LTACH facility ([place]) after persistent HTN and epistaxis in setting of Eliquis use and complex surgical history. Patient found to have extensive ventral wall postsurgical changes with gas and fluid containing midline collection along the lower abdomen, measuring  approximately 4.8 x 4.5 x 10.2 cm and being managed with abx.    Plan:    #Rehab of debility with proximal LE weakness and marked decline from baseline function, s/p multiple abdominal surgeries with complications / HTN, morbid obesity, LOUIS on CPAP, Abby en y gastric bypass, and a recent admission for an incarcerated ventral hernia w/ sbo s/p lap converted to open ventral hernia repair w/ mesh and small bowel resection and s/p ex lap, 30 cm sbr, creation of new side to side ileoileostomy, ventral hernia repair w/ mesh.with impairment in mobility and ADLs and iADLs.The patient presented with co-morbidities requiring close physiatry follow-up at least 3 times a week to monitor his progress and monitor his comorbidities including HTN, PE, DVT, and LOUIS. .The patient's co-morbidities do not limit the patient's ability to participate in rehabilitative therapy. The patient was seen by PT/OT and required min A with bed mobility, CGA with sit to stand transfers, ambulates 30ftx1 with RW, CGA and 1P assist, UBD minimum assist, and LBD dependent.. The patient was previously independent with all ADLs and iADLs without use of AD and now presents with functional decline.   #Wound dehiscence and persistent intra-abdominal collection, suspect abscess in setting of multiple abdominal surgeries with complications  -5/28 Wcx grew rare Enterococcus avium    -Growth in fluid media only Escherichia coli ESBL    -5/30 midline placed  -Wound Care Instructions: Patient has a midline wound with staples and small 1 cm area of packing. Please remove and replace dressings once daily. Packing 1 cm opening with 0.25 inch packing strip, cut to length appropriate for wound. Cover incision with gauze and secure with paper tape.  -abd binder for support  -Infectious Disease is following. She is currently on Meropenem 1 gram IVPB q8h started 6/1. On discharge from acute rehab to home she will require Ertapenem 1 gram IVPB daily.  Plan to continue abx until 6/29 and follow-up with ID  - continue packing of upper incision open area daily with strip gauze  -Continue with Tylenol and adjust pain management as needed.  -She requires acute rehab with 3 hours of daily therapies at least 5 out of 7 days and close physiatry follow up.   - per surgery, repeat CTAP on 6/22  - tolerating therapies well. Strength and endurance improving daily. Continue acute rehab.    #PE in R distal main with segmental extension  #DVT  -c/w Eliquis  - stable, asymptomatic on RA    #Stage 2 buttock pressure injury per wound care nurse specialist  - local care with barrier cream  - monitor    #Chronic venous insufficiency/ fluctuating LE edema  - did not improve off Norvasc  - changed HCTZ to Lasix 20mg daily and monitor daily weights    #Anemia  - recheck CBC in am    #HTN  -Amolodipine was halted in the setting of pitting edema on 6/2 and low bp  - Losartan decreased from 100mg to 50mg 6/5. BP remained in low range with Losartan held x 5 days. D/c Losartan 6/11 and monitor.  - change HCTZ to toLasix 20mg daily 6/12 for improved edema control and monitor BP    #Hypokalemia  - give oral supplement and recheck in a few days  - improved on standing KCl  - stable, WNL    #LOUIS on CPAP  Stable    #Morbid obesity BMI 53.2  Dietary education     -Pain control: Tylenol prn    - Diet: DASH/TLC Sodium & Cholesterol Restricted    Precautions / PROPHYLAXIS:    - Falls  - DVT prophylaxis: Eliquis

## 2025-06-14 RX ADMIN — APIXABAN 5 MILLIGRAM(S): 2.5 TABLET, FILM COATED ORAL at 17:52

## 2025-06-14 RX ADMIN — Medication 400 MILLIGRAM(S): at 17:52

## 2025-06-14 RX ADMIN — Medication 20 MILLIEQUIVALENT(S): at 12:01

## 2025-06-14 RX ADMIN — TIOTROPIUM BROMIDE INHALATION SPRAY 2 PUFF(S): 3.12 SPRAY, METERED RESPIRATORY (INHALATION) at 08:08

## 2025-06-14 RX ADMIN — MEROPENEM 100 MILLIGRAM(S): 1 INJECTION INTRAVENOUS at 21:11

## 2025-06-14 RX ADMIN — MEROPENEM 100 MILLIGRAM(S): 1 INJECTION INTRAVENOUS at 16:57

## 2025-06-14 RX ADMIN — Medication 40 MILLIGRAM(S): at 06:12

## 2025-06-14 RX ADMIN — APIXABAN 5 MILLIGRAM(S): 2.5 TABLET, FILM COATED ORAL at 06:12

## 2025-06-14 RX ADMIN — CYANOCOBALAMIN 1000 MICROGRAM(S): 1000 INJECTION INTRAMUSCULAR; SUBCUTANEOUS at 12:02

## 2025-06-14 RX ADMIN — Medication 400 MILLIGRAM(S): at 06:12

## 2025-06-14 RX ADMIN — Medication 500 MILLIGRAM(S): at 12:01

## 2025-06-14 RX ADMIN — FUROSEMIDE 20 MILLIGRAM(S): 10 INJECTION INTRAMUSCULAR; INTRAVENOUS at 06:12

## 2025-06-14 RX ADMIN — Medication 1 TABLET(S): at 12:01

## 2025-06-14 RX ADMIN — Medication 220 MILLIGRAM(S): at 12:02

## 2025-06-14 RX ADMIN — MEROPENEM 100 MILLIGRAM(S): 1 INJECTION INTRAVENOUS at 06:10

## 2025-06-14 RX ADMIN — Medication 1 TABLET(S): at 12:02

## 2025-06-14 NOTE — PROGRESS NOTE ADULT - ATTENDING COMMENTS
Rehab of debility with proximal LE weakness and marked decline from baseline function, s/p multiple abdominal surgeries with complications. Patient seen and examined with the resident. The treatment plan discussed. Agree with the above.

## 2025-06-14 NOTE — PROGRESS NOTE ADULT - ASSESSMENT
Mrs. Eddy is a 64 yo F with a PMHx of HTN, severe obesity, DVT, PE (R distal main with segmental extension) on Eliquis, LOUIS on CPAP, abby en y gastric bypass, and a recent admission for an incarcerated ventral hernia w/ SBO s/p lap converted to open ventral hernia repair w/ mesh and SBR and s/p ex lap, 30 cm SBR, creation of new side to side ileoileostomy, and ventral hernia repair w/ mesh in April 2025 presented back to the ER 2 days later from her LTACH facility ([place]) after persistent HTN and epistaxis in setting of Eliquis use and complex surgical history. Patient found to have extensive ventral wall postsurgical changes with gas and fluid containing midline collection along the lower abdomen, measuring  approximately 4.8 x 4.5 x 10.2 cm and being managed with abx.    Plan:    #Rehab of debility with proximal LE weakness and marked decline from baseline function, s/p multiple abdominal surgeries with complications / HTN, morbid obesity, LOUIS on CPAP, Abby en y gastric bypass, and a recent admission for an incarcerated ventral hernia w/ sbo s/p lap converted to open ventral hernia repair w/ mesh and small bowel resection and s/p ex lap, 30 cm sbr, creation of new side to side ileoileostomy, ventral hernia repair w/ mesh.with impairment in mobility and ADLs and iADLs.The patient presented with co-morbidities requiring close physiatry follow-up at least 3 times a week to monitor his progress and monitor his comorbidities including HTN, PE, DVT, and LOUIS. .The patient's co-morbidities do not limit the patient's ability to participate in rehabilitative therapy. The patient was seen by PT/OT and required min A with bed mobility, CGA with sit to stand transfers, ambulates 30ftx1 with RW, CGA and 1P assist, UBD minimum assist, and LBD dependent.. The patient was previously independent with all ADLs and iADLs without use of AD and now presents with functional decline.   #Wound dehiscence and persistent intra-abdominal collection, suspect abscess in setting of multiple abdominal surgeries with complications  -5/28 Wcx grew rare Enterococcus avium    -Growth in fluid media only Escherichia coli ESBL    -5/30 midline placed  -Wound Care Instructions: Patient has a midline wound with staples and small 1 cm area of packing. Please remove and replace dressings once daily. Packing 1 cm opening with 0.25 inch packing strip, cut to length appropriate for wound. Cover incision with gauze and secure with paper tape.  -abd binder for support  -Infectious Disease is following. She is currently on Meropenem 1 gram IVPB q8h started 6/1. On discharge from acute rehab to home she will require Ertapenem 1 gram IVPB daily.  Plan to continue abx until 6/29 and follow-up with ID  - continue packing of upper incision open area daily with strip gauze  -Continue with Tylenol and adjust pain management as needed.  -She requires acute rehab with 3 hours of daily therapies at least 5 out of 7 days and close physiatry follow up.   - per surgery, repeat CTAP on 6/22  - tolerating therapies well. Strength and endurance improving daily. Continue acute rehab.    #PE in R distal main with segmental extension  #DVT  -c/w Eliquis  - stable, asymptomatic on RA    #Stage 2 buttock pressure injury per wound care nurse specialist  - local care with barrier cream  - monitor    #Chronic venous insufficiency/ fluctuating LE edema  - did not improve off Norvasc  - changed HCTZ to Lasix 20mg daily and monitor daily weights    #Anemia  - recheck CBC in am    #HTN  -Amolodipine was halted in the setting of pitting edema on 6/2 and low bp  - Losartan decreased from 100mg to 50mg 6/5. BP remained in low range with Losartan held x 5 days. D/c Losartan 6/11 and monitor.  - change HCTZ to toLasix 20mg daily 6/12 for improved edema control and monitor BP    #Hypokalemia  - give oral supplement and recheck in a few days  - improved on standing KCl  - stable, WNL    #LOUIS on CPAP  Stable    #Morbid obesity BMI 53.2  Dietary education     -Pain control: Tylenol prn    - Diet: DASH/TLC Sodium & Cholesterol Restricted    Precautions / PROPHYLAXIS:    - Falls  - DVT prophylaxis: Eliquis

## 2025-06-14 NOTE — PROGRESS NOTE ADULT - SUBJECTIVE AND OBJECTIVE BOX
Patient is a 65y old  Female who presents with a chief complaint of Rehab of debility with proximal LE weakness and marked decline from baseline function, s/p multiple abdominal surgeries with complications / HTN, severe obesity, LOUIS on CPAP, Abby en y gastric bypass, and a recent admission for an incarcerated ventral hernia w/ sbo s/p lap converted to open ventral hernia repair w/ mesh and small bowel resection and s/p ex lap, 30 cm sbr, creation of new side to side ileoileostomy, ventral hernia repair w/ mesh. (03 Jun 2025 12:34)      HPI:  64 yo F with a PMHx of HTN, severe obesity, DVT, PE (R distal main with segmental extension) on Eliquis, LOUIS on CPAP, abby en y gastric bypass, and a recent admission for an incarcerated ventral hernia w/ SBO s/p lap converted to open ventral hernia repair w/ mesh and SBR and s/p ex lap, 30 cm SBR, creation of new side to side ileoileostomy, and ventral hernia repair w/ mesh in April 2025 presents back to the ER 2 days later from her LTACH facility ([facility]) after persistent HTN and epistaxis in setting of Eliquis use and complex surgical history. Patient notes her epistaxis has stopped after self-tamponade for 25 minutes. On admission, patient endorsed purulent discharge from her incision. CT AP (5/27) showed extensive ventral wall postsurgical changes with gas and fluid containing midline collection along the lower abdomen, measuring  approximately 4.8 x 4.5 x 10.2 cm. IR evaluated collection and did not recommend a drain. Wound culture positive for rare enterococcus avium (5/28). Patient currently on abx via midline per ID recs. Patient has been tolerating diet, passing regular bowel movements and voiding without nausea or vomiting. Denies SOB, chest pain, fever or chills. Rates her abd pain 1/10 at its worst.     Prior to the debility with proximal LE weakness and marked decline s/p multiple abd surgeries with complications, the patient was independent in ADLs and ambulation. Patient now requires moderate assistance with ambulation and ADLs 2/2 to the debility. Therefore, there is a significant functional decline from baseline. Patients also with medical comorbidities of recent PE in R distal main with segmental extension and DVT on Eliquis, LOUIS on CPAP, HTN, and morbid obesity requiring medication management and monitoring of vitals by Physiatry team and nursing. There are significant comorbidities which warrants acute rehab hospitalization. To return home it is in the patient’s best interest to have acute inpatient rehabilitative services to undergo intensive interdisciplinary therapy. Of note, the patient lives alone with multiple steps and would have difficulty performing ADLs and iADLs individually. Furthermore, the patient is motivated and is able to tolerate up to 3 hours of daily therapy for 5-6 days a week for a total of 15 hours a week. Evaluated by Physiatry and deemed to be an excellent candidate for admission to  for acute inpatient rehab. Admitted to Acute Rehab on 6/2/2025.      Patient seen and examined by Physiatry and is currently functioning at bed mobility minimum assist, sit to stand transfer with CGA, ambulates 30ftx1 with RW, CGA and 1P assist, UBD minimum assist, and LBD dependent..     LS: Patient. lives with  but came from [facility] after last admission  PLOF: Independent with all ADLs and iADLs and ambulates without assistive device.      (02 Jun 2025 13:50)    TODAY'S SUBJECTIVE & REVIEW OF SYMPTOMS  No acute events overnight. No new complaints.  Tolerating PT/OT. Voiding spontaneously and having regular BM.   Vital signs reviewed. Continues to have BLE edema and on Lasix. Daily wights ordered.     Review of Systems:   Constitutional:       [x  ] WNL           [   ] poor appetite   [   ] insomnia   [   ] tired   Cardio:                [   ] WNL           [   ] CP   [x  ] CEBALLOS - improving   [   ] palpitations               Resp:                   [  x ] WNL           [   ] SOB   [   ] cough   [   ] wheezing   GI:                        [  x ] WNL           [   ] constipation   [   ] diarrhea   [   ] abdominal pain   [   ] nausea   [   ] emesis                                :                      [  x ] WNL           [   ] RODRIGUES  [   ] dysuria   [   ] difficulty voiding             Endo:                   [  x ] WNL          [   ] polyuria   [   ] temperature intolerance                 Skin:                     [   ] WNL          [   ] pain   [ x  ] wound-incision at abd site, c/d/i  [   ] rash   MSK:                    [   ] WNL          [   ] muscle pain   [  ] joint pain/ stiffness   [   ] muscle tenderness   [ x  ] swelling in BLE   Neuro:                 [   ] WNL          [   ] HA   [  ] change in vision   [   ] tremor   [ x  ] weakness   [   ]dysphagia              Cognitive:           [ x  ] WNL           [   ] occasional confusion      Psych:                  [  x ] WNL           [   ] hallucinations   [   ]agitation   [   ] delusion   [   ]depression    CLOF:  transfer SA  dressing mod assist  ambulates 50ft with RW and steadyA  4 steps mod assist      PHYSICAL EXAMINATION   Vital Signs Last 24 Hrs  T(C): 36.7 (14 Jun 2025 12:07), Max: 36.7 (14 Jun 2025 12:07)  T(F): 98.1 (14 Jun 2025 12:07), Max: 98.1 (14 Jun 2025 12:07)  HR: 120 (14 Jun 2025 12:07) (90 - 120)  BP: 135/90 (14 Jun 2025 12:07) (122/75 - 137/81)  BP(mean): --  RR: 18 (14 Jun 2025 12:07) (18 - 18)  SpO2: 97% (14 Jun 2025 12:07) (96% - 97%)    Parameters below as of 14 Jun 2025 12:07  Patient On (Oxygen Delivery Method): room air    General:[  x ] NAD, Resting Comfortable,   [   ] other:                                HEENT: [ x  ] NC/AT, EOMI, PERRL , Normal Conjunctivae,   [   ] other:  Cardio: [  x ] RRR, no murmur,   [   ] other:                              Pulm: [  x ] No Respiratory Distress,  Lungs CTAB,   [   ] other:                       Abdomen: [  x ]ND/NT, Soft, abd incision site c/d/i   [   ] other:    : [  x ] NO RODRIGUES CATHETER, [   ] RODRIGUES CATHETER- no meatal tear, no discharge, [   ] other:                                            MSK: [ x  ] No joint swelling, Full ROM,   [   ] other:                                         Ext: [   ]No C/C/E, No calf tenderness,   [ x  ]other: 2-3+ pitting edema in BLE    Skin: [   ]intact,   [ x  ] other: abd incision c/d/i   - 1 cm deep open wound along incision, with 1/4 inch gauze packing                                                                Neurological Examination:  Cognitive: [  x  ] AAO x 3,   [    ]  other:                                                                        Attention:  [  x  ] intact,   [  x  ]  other: pleasant and cooperative                            Memory: [  x  ] intact,  [    ]  other:      Mood/Affect: [ x   ] wnl,    [    ]  other:                                                                             Communication: [ x   ]Fluent, no dysarthria, following commands:  [    ] other:    CN II - XII:  [  x  ] intact,  [    ] other:                                                                                   Motor:   RIGHT UE:  3+/5 shoulder abduction, 5/5 elbow flexion/extension, 5/5 hand    LEFT    UE: 3+/5 shoulder abduction, 5/5 elbow flexion/extension, 5/5 hand   RIGHT LE: 3+/5 hip flexion, 4+/5 knee flexion/extension, 4+/5 DF/PF (limited by pitting edema)  LEFT  LE:  3+/5 hip flexion, 4+/5 knee flexion/extension, 4+/5 DF/PF (limited by pitting edema)  Tone: [  x  ] wnl,   [    ]  other:  DTRs: [  x ]symmetric, [   ] other:  Coordination:   [ x   ] intact,   [    ] other:                                                                         Sensory: [ x   ] Intact to light touch,   [    ] other:    MEDICATIONS  (STANDING):  apixaban 5 milliGRAM(s) Oral every 12 hours  ascorbic acid 500 milliGRAM(s) Oral daily  chlorhexidine 2% Cloths 1 Application(s) Topical <User Schedule>  cyanocobalamin 1000 MICROGram(s) Oral daily  ergocalciferol 86457 Unit(s) Oral <User Schedule>  furosemide    Tablet 20 milliGRAM(s) Oral daily  lactobacillus acidophilus 1 Tablet(s) Oral daily  magnesium oxide 400 milliGRAM(s) Oral every 12 hours  meropenem  IVPB 1000 milliGRAM(s) IV Intermittent every 8 hours  multivitamin/minerals 1 Tablet(s) Oral daily  pantoprazole    Tablet 40 milliGRAM(s) Oral before breakfast  potassium chloride    Tablet ER 20 milliEquivalent(s) Oral daily  tiotropium 2.5 MICROgram(s) Inhaler 2 Puff(s) Inhalation daily  zinc sulfate 220 milliGRAM(s) Oral every 24 hours    MEDICATIONS  (PRN):  acetaminophen     Tablet .. 650 milliGRAM(s) Oral every 6 hours PRN Mild Pain (1 - 3)  albuterol    90 MICROgram(s) HFA Inhaler 2 Puff(s) Inhalation every 6 hours PRN Shortness of Breath and/or Wheezing  ondansetron Injectable 4 milliGRAM(s) IV Push every 6 hours PRN Nausea  sodium chloride 0.65% Nasal 1 Spray(s) Both Nostrils every 4 hours PRN Nasal Congestion    RECENT LABS/IMAGING                          8.6    5.02  )-----------( 906      ( 13 Jun 2025 06:49 )             29.0     06-13    145  |  108  |  14  ----------------------------<  106[H]  4.3   |  23  |  0.7    Ca    8.5      13 Jun 2025 06:49  Mg     1.8     06-13                                8.0    5.33  )-----------( 233      ( 09 Jun 2025 20:00 )             26.2     06-09    143  |  111[H]  |  16  ----------------------------<  102[H]  4.5   |  21  |  0.6[L]    Ca    8.4      09 Jun 2025 20:00    TPro  5.1[L]  /  Alb  2.9[L]  /  TBili  0.2  /  DBili  x   /  AST  22  /  ALT  24  /  AlkPhos  103  06-09                   9.4    7.65  )-----------( 287      ( 07 Jun 2025 20:59 )             30.9                             8.3    5.01  )-----------( 253      ( 05 Jun 2025 06:22 )             26.8     06-05    143  |  107  |  15  ----------------------------<  109[H]  3.4[L]   |  22  |  0.8    Ca    8.2[L]      05 Jun 2025 06:22  Mg     1.7     06-05    TPro  5.4[L]  /  Alb  3.1[L]  /  TBili  0.2  /  DBili  x   /  AST  16  /  ALT  24  /  AlkPhos  110  06-05

## 2025-06-15 PROCEDURE — 93010 ELECTROCARDIOGRAM REPORT: CPT

## 2025-06-15 RX ADMIN — Medication 400 MILLIGRAM(S): at 05:59

## 2025-06-15 RX ADMIN — FUROSEMIDE 20 MILLIGRAM(S): 10 INJECTION INTRAMUSCULAR; INTRAVENOUS at 05:59

## 2025-06-15 RX ADMIN — Medication 1 TABLET(S): at 11:54

## 2025-06-15 RX ADMIN — Medication 1 TABLET(S): at 11:55

## 2025-06-15 RX ADMIN — CYANOCOBALAMIN 1000 MICROGRAM(S): 1000 INJECTION INTRAMUSCULAR; SUBCUTANEOUS at 11:55

## 2025-06-15 RX ADMIN — Medication 400 MILLIGRAM(S): at 17:14

## 2025-06-15 RX ADMIN — MEROPENEM 100 MILLIGRAM(S): 1 INJECTION INTRAVENOUS at 14:31

## 2025-06-15 RX ADMIN — APIXABAN 5 MILLIGRAM(S): 2.5 TABLET, FILM COATED ORAL at 17:14

## 2025-06-15 RX ADMIN — MEROPENEM 100 MILLIGRAM(S): 1 INJECTION INTRAVENOUS at 06:00

## 2025-06-15 RX ADMIN — Medication 220 MILLIGRAM(S): at 11:55

## 2025-06-15 RX ADMIN — Medication 40 MILLIGRAM(S): at 05:59

## 2025-06-15 RX ADMIN — Medication 500 MILLIGRAM(S): at 11:55

## 2025-06-15 RX ADMIN — APIXABAN 5 MILLIGRAM(S): 2.5 TABLET, FILM COATED ORAL at 05:59

## 2025-06-15 RX ADMIN — Medication 20 MILLIEQUIVALENT(S): at 11:54

## 2025-06-15 RX ADMIN — TIOTROPIUM BROMIDE INHALATION SPRAY 2 PUFF(S): 3.12 SPRAY, METERED RESPIRATORY (INHALATION) at 08:00

## 2025-06-15 RX ADMIN — Medication 1 APPLICATION(S): at 05:59

## 2025-06-15 RX ADMIN — MEROPENEM 100 MILLIGRAM(S): 1 INJECTION INTRAVENOUS at 21:07

## 2025-06-15 NOTE — PROGRESS NOTE ADULT - SUBJECTIVE AND OBJECTIVE BOX
Patient is a 65y old  Female who presents with a chief complaint of Rehab of debility with proximal LE weakness and marked decline from baseline function, s/p multiple abdominal surgeries with complications / HTN, severe obesity, LOUIS on CPAP, Abby en y gastric bypass, and a recent admission for an incarcerated ventral hernia w/ sbo s/p lap converted to open ventral hernia repair w/ mesh and small bowel resection and s/p ex lap, 30 cm sbr, creation of new side to side ileoileostomy, ventral hernia repair w/ mesh. (03 Jun 2025 12:34)      HPI:  66 yo F with a PMHx of HTN, severe obesity, DVT, PE (R distal main with segmental extension) on Eliquis, LOUIS on CPAP, abby en y gastric bypass, and a recent admission for an incarcerated ventral hernia w/ SBO s/p lap converted to open ventral hernia repair w/ mesh and SBR and s/p ex lap, 30 cm SBR, creation of new side to side ileoileostomy, and ventral hernia repair w/ mesh in April 2025 presents back to the ER 2 days later from her LTACH facility ([place]) after persistent HTN and epistaxis in setting of Eliquis use and complex surgical history. Patient notes her epistaxis has stopped after self-tamponade for 25 minutes. On admission, patient endorsed purulent discharge from her incision. CT AP (5/27) showed extensive ventral wall postsurgical changes with gas and fluid containing midline collection along the lower abdomen, measuring  approximately 4.8 x 4.5 x 10.2 cm. IR evaluated collection and did not recommend a drain. Wound culture positive for rare enterococcus avium (5/28). Patient currently on abx via midline per ID recs. Patient has been tolerating diet, passing regular bowel movements and voiding without nausea or vomiting. Denies SOB, chest pain, fever or chills. Rates her abd pain 1/10 at its worst.     Prior to the debility with proximal LE weakness and marked decline s/p multiple abd surgeries with complications, the patient was independent in ADLs and ambulation. Patient now requires moderate assistance with ambulation and ADLs 2/2 to the debility. Therefore, there is a significant functional decline from baseline. Patients also with medical comorbidities of recent PE in R distal main with segmental extension and DVT on Eliquis, LOUIS on CPAP, HTN, and morbid obesity requiring medication management and monitoring of vitals by Physiatry team and nursing. There are significant comorbidities which warrants acute rehab hospitalization. To return home it is in the patient’s best interest to have acute inpatient rehabilitative services to undergo intensive interdisciplinary therapy. Of note, the patient lives alone with multiple steps and would have difficulty performing ADLs and iADLs individually. Furthermore, the patient is motivated and is able to tolerate up to 3 hours of daily therapy for 5-6 days a week for a total of 15 hours a week. Evaluated by Physiatry and deemed to be an excellent candidate for admission to  for acute inpatient rehab. Admitted to Acute Rehab on 6/2/2025.      Patient seen and examined by Physiatry and is currently functioning at bed mobility minimum assist, sit to stand transfer with CGA, ambulates 30ftx1 with RW, CGA and 1P assist, UBD minimum assist, and LBD dependent..     LS: Patient. lives with  but came from [facility] after last admission  PLOF: Independent with all ADLs and iADLs and ambulates without assistive device.      (02 Jun 2025 13:50)    TODAY'S SUBJECTIVE & REVIEW OF SYMPTOMS  No acute events overnight. No new complaints.  Tolerating PT/OT. Voiding spontaneously and having regular BM.   Vital signs reviewed. Has been intermittently tachycardiac. Patient denies symptoms. Hx of PE and on Eliquis. Will order EKG.   Continues to have BLE edema and on Lasix. Daily wights ordered.     Review of Systems:   Constitutional:       [x  ] WNL           [   ] poor appetite   [   ] insomnia   [   ] tired   Cardio:                [   ] WNL           [   ] CP   [x  ] CEBALLOS - improving   [   ] palpitations               Resp:                   [  x ] WNL           [   ] SOB   [   ] cough   [   ] wheezing   GI:                        [  x ] WNL           [   ] constipation   [   ] diarrhea   [   ] abdominal pain   [   ] nausea   [   ] emesis                                :                      [  x ] WNL           [   ] RODRIGUES  [   ] dysuria   [   ] difficulty voiding             Endo:                   [  x ] WNL          [   ] polyuria   [   ] temperature intolerance                 Skin:                     [   ] WNL          [   ] pain   [ x  ] wound-incision at abd site, c/d/i  [   ] rash   MSK:                    [   ] WNL          [   ] muscle pain   [  ] joint pain/ stiffness   [   ] muscle tenderness   [ x  ] swelling in BLE   Neuro:                 [   ] WNL          [   ] HA   [  ] change in vision   [   ] tremor   [ x  ] weakness   [   ]dysphagia              Cognitive:           [ x  ] WNL           [   ] occasional confusion      Psych:                  [  x ] WNL           [   ] hallucinations   [   ]agitation   [   ] delusion   [   ]depression    CLOF:  transfer SA  dressing mod assist  ambulates 50ft with RW and steadyA  4 steps mod assist      PHYSICAL EXAMINATION   Vital Signs Last 24 Hrs  T(C): 36.4 (15 Stephan 2025 12:23), Max: 36.6 (14 Jun 2025 20:43)  T(F): 97.6 (15 Stephan 2025 12:23), Max: 97.8 (14 Jun 2025 20:43)  HR: 109 (15 Stephan 2025 12:23) (94 - 109)  BP: 148/92 (15 Stephan 2025 12:23) (126/78 - 148/92)  BP(mean): 94 (15 Stephan 2025 06:08) (94 - 102)  RR: 18 (15 Stephan 2025 12:23) (18 - 20)  SpO2: 98% (15 Stephan 2025 12:23) (96% - 98%)    Parameters below as of 15 Stephan 2025 12:23  Patient On (Oxygen Delivery Method): room air    General:[  x ] NAD, Resting Comfortable,   [   ] other:                                HEENT: [ x  ] NC/AT, EOMI, PERRL , Normal Conjunctivae,   [   ] other:  Cardio: [  x ] RRR, no murmur,   [   ] other:                              Pulm: [  x ] No Respiratory Distress,  Lungs CTAB,   [   ] other:                       Abdomen: [  x ]ND/NT, Soft, abd incision site c/d/i   [   ] other:    : [  x ] NO RODRIGUES CATHETER, [   ] RODRIGUES CATHETER- no meatal tear, no discharge, [   ] other:                                            MSK: [ x  ] No joint swelling, Full ROM,   [   ] other:                                         Ext: [   ]No C/C/E, No calf tenderness,   [ x  ]other: 2-3+ pitting edema in BLE    Skin: [   ]intact,   [ x  ] other: abd incision c/d/i   - 1 cm deep open wound along incision, with 1/4 inch gauze packing                                                                Neurological Examination:  Cognitive: [  x  ] AAO x 3,   [    ]  other:                                                                        Attention:  [  x  ] intact,   [  x  ]  other: pleasant and cooperative                            Memory: [  x  ] intact,  [    ]  other:      Mood/Affect: [ x   ] wnl,    [    ]  other:                                                                             Communication: [ x   ]Fluent, no dysarthria, following commands:  [    ] other:    CN II - XII:  [  x  ] intact,  [    ] other:                                                                                   Motor:   RIGHT UE:  3+/5 shoulder abduction, 5/5 elbow flexion/extension, 5/5 hand    LEFT    UE: 3+/5 shoulder abduction, 5/5 elbow flexion/extension, 5/5 hand   RIGHT LE: 3+/5 hip flexion, 4+/5 knee flexion/extension, 4+/5 DF/PF (limited by pitting edema)  LEFT  LE:  3+/5 hip flexion, 4+/5 knee flexion/extension, 4+/5 DF/PF (limited by pitting edema)  Tone: [  x  ] wnl,   [    ]  other:  DTRs: [  x ]symmetric, [   ] other:  Coordination:   [ x   ] intact,   [    ] other:                                                                         Sensory: [ x   ] Intact to light touch,   [    ] other:    MEDICATIONS  (STANDING):  apixaban 5 milliGRAM(s) Oral every 12 hours  ascorbic acid 500 milliGRAM(s) Oral daily  chlorhexidine 2% Cloths 1 Application(s) Topical <User Schedule>  cyanocobalamin 1000 MICROGram(s) Oral daily  ergocalciferol 20873 Unit(s) Oral <User Schedule>  furosemide    Tablet 20 milliGRAM(s) Oral daily  lactobacillus acidophilus 1 Tablet(s) Oral daily  magnesium oxide 400 milliGRAM(s) Oral every 12 hours  meropenem  IVPB 1000 milliGRAM(s) IV Intermittent every 8 hours  multivitamin/minerals 1 Tablet(s) Oral daily  pantoprazole    Tablet 40 milliGRAM(s) Oral before breakfast  potassium chloride    Tablet ER 20 milliEquivalent(s) Oral daily  tiotropium 2.5 MICROgram(s) Inhaler 2 Puff(s) Inhalation daily  zinc sulfate 220 milliGRAM(s) Oral every 24 hours    MEDICATIONS  (PRN):  acetaminophen     Tablet .. 650 milliGRAM(s) Oral every 6 hours PRN Mild Pain (1 - 3)  albuterol    90 MICROgram(s) HFA Inhaler 2 Puff(s) Inhalation every 6 hours PRN Shortness of Breath and/or Wheezing  ondansetron Injectable 4 milliGRAM(s) IV Push every 6 hours PRN Nausea  sodium chloride 0.65% Nasal 1 Spray(s) Both Nostrils every 4 hours PRN Nasal Congestion    RECENT LABS/IMAGING                          8.6    5.02  )-----------( 248      ( 13 Jun 2025 06:49 )             29.0     06-13    145  |  108  |  14  ----------------------------<  106[H]  4.3   |  23  |  0.7    Ca    8.5      13 Jun 2025 06:49  Mg     1.8     06-13                                8.0    5.33  )-----------( 233      ( 09 Jun 2025 20:00 )             26.2     06-09    143  |  111[H]  |  16  ----------------------------<  102[H]  4.5   |  21  |  0.6[L]    Ca    8.4      09 Jun 2025 20:00    TPro  5.1[L]  /  Alb  2.9[L]  /  TBili  0.2  /  DBili  x   /  AST  22  /  ALT  24  /  AlkPhos  103  06-09                   9.4    7.65  )-----------( 287      ( 07 Jun 2025 20:59 )             30.9                             8.3    5.01  )-----------( 253      ( 05 Jun 2025 06:22 )             26.8     06-05    143  |  107  |  15  ----------------------------<  109[H]  3.4[L]   |  22  |  0.8    Ca    8.2[L]      05 Jun 2025 06:22  Mg     1.7     06-05    TPro  5.4[L]  /  Alb  3.1[L]  /  TBili  0.2  /  DBili  x   /  AST  16  /  ALT  24  /  AlkPhos  110  06-05

## 2025-06-15 NOTE — PROGRESS NOTE ADULT - ATTENDING COMMENTS
Prescription approved Rehab of debility with proximal LE weakness and marked decline from baseline function, s/p multiple abdominal surgeries with complications. Patient seen and examined with the resident. The treatment plan discussed. Agree with the above.

## 2025-06-15 NOTE — PROGRESS NOTE ADULT - ASSESSMENT
Mrs. Eddy is a 64 yo F with a PMHx of HTN, severe obesity, DVT, PE (R distal main with segmental extension) on Eliquis, LOUIS on CPAP, abby en y gastric bypass, and a recent admission for an incarcerated ventral hernia w/ SBO s/p lap converted to open ventral hernia repair w/ mesh and SBR and s/p ex lap, 30 cm SBR, creation of new side to side ileoileostomy, and ventral hernia repair w/ mesh in April 2025 presented back to the ER 2 days later from her LTACH facility ([place]) after persistent HTN and epistaxis in setting of Eliquis use and complex surgical history. Patient found to have extensive ventral wall postsurgical changes with gas and fluid containing midline collection along the lower abdomen, measuring  approximately 4.8 x 4.5 x 10.2 cm and being managed with abx.    Plan:    #Rehab of debility with proximal LE weakness and marked decline from baseline function, s/p multiple abdominal surgeries with complications / HTN, morbid obesity, LOUIS on CPAP, Abby en y gastric bypass, and a recent admission for an incarcerated ventral hernia w/ sbo s/p lap converted to open ventral hernia repair w/ mesh and small bowel resection and s/p ex lap, 30 cm sbr, creation of new side to side ileoileostomy, ventral hernia repair w/ mesh.with impairment in mobility and ADLs and iADLs.The patient presented with co-morbidities requiring close physiatry follow-up at least 3 times a week to monitor his progress and monitor his comorbidities including HTN, PE, DVT, and LOUIS. .The patient's co-morbidities do not limit the patient's ability to participate in rehabilitative therapy. The patient was seen by PT/OT and required min A with bed mobility, CGA with sit to stand transfers, ambulates 30ftx1 with RW, CGA and 1P assist, UBD minimum assist, and LBD dependent.. The patient was previously independent with all ADLs and iADLs without use of AD and now presents with functional decline.   #Wound dehiscence and persistent intra-abdominal collection, suspect abscess in setting of multiple abdominal surgeries with complications  -5/28 Wcx grew rare Enterococcus avium    -Growth in fluid media only Escherichia coli ESBL    -5/30 midline placed  -Wound Care Instructions: Patient has a midline wound with staples and small 1 cm area of packing. Please remove and replace dressings once daily. Packing 1 cm opening with 0.25 inch packing strip, cut to length appropriate for wound. Cover incision with gauze and secure with paper tape.  -abd binder for support  -Infectious Disease is following. She is currently on Meropenem 1 gram IVPB q8h started 6/1. On discharge from acute rehab to home she will require Ertapenem 1 gram IVPB daily.  Plan to continue abx until 6/29 and follow-up with ID  - continue packing of upper incision open area daily with strip gauze  -Continue with Tylenol and adjust pain management as needed.  -She requires acute rehab with 3 hours of daily therapies at least 5 out of 7 days and close physiatry follow up.   - per surgery, repeat CTAP on 6/22  - tolerating therapies well. Strength and endurance improving daily. Continue acute rehab.    #PE in R distal main with segmental extension  #DVT  -c/w Eliquis  - stable, asymptomatic on RA  -EKG ordered 6/15 for asymptomatic tachycardiac episodes.     #Stage 2 buttock pressure injury per wound care nurse specialist  - local care with barrier cream  - monitor    #Chronic venous insufficiency/ fluctuating LE edema  - did not improve off Norvasc  - changed HCTZ to Lasix 20mg daily and monitor daily weights    #Anemia-stable  -6/13 H/H 8.6/29    #HTN  -Amolodipine was halted in the setting of pitting edema on 6/2 and low bp  - Losartan decreased from 100mg to 50mg 6/5. BP remained in low range with Losartan held x 5 days. D/c Losartan 6/11 and monitor.  - change HCTZ to to Lasix 20mg daily 6/12 for improved edema control and monitor BP    #Hypokalemia  - give oral supplement and recheck in a few days  - improved on standing KCl, on 20mEq KCI   - stable, WNL    #LOUIS on CPAP  Stable    #Morbid obesity BMI 53.2  Dietary education     -Pain control: Tylenol prn    - Diet: DASH/TLC Sodium & Cholesterol Restricted    Precautions / PROPHYLAXIS:    - Falls  - DVT prophylaxis: Eliquis

## 2025-06-16 LAB
ALBUMIN SERPL ELPH-MCNC: 3 G/DL — LOW (ref 3.5–5.2)
ALP SERPL-CCNC: 101 U/L — SIGNIFICANT CHANGE UP (ref 30–115)
ALT FLD-CCNC: 23 U/L — SIGNIFICANT CHANGE UP (ref 0–41)
ANION GAP SERPL CALC-SCNC: 11 MMOL/L — SIGNIFICANT CHANGE UP (ref 7–14)
AST SERPL-CCNC: 18 U/L — SIGNIFICANT CHANGE UP (ref 0–41)
BILIRUB SERPL-MCNC: 0.3 MG/DL — SIGNIFICANT CHANGE UP (ref 0.2–1.2)
BUN SERPL-MCNC: 16 MG/DL — SIGNIFICANT CHANGE UP (ref 10–20)
CALCIUM SERPL-MCNC: 8.2 MG/DL — LOW (ref 8.4–10.5)
CHLORIDE SERPL-SCNC: 108 MMOL/L — SIGNIFICANT CHANGE UP (ref 98–110)
CO2 SERPL-SCNC: 25 MMOL/L — SIGNIFICANT CHANGE UP (ref 17–32)
CREAT SERPL-MCNC: 0.6 MG/DL — LOW (ref 0.7–1.5)
CRP SERPL-MCNC: 7 MG/L — HIGH
EGFR: 100 ML/MIN/1.73M2 — SIGNIFICANT CHANGE UP
EGFR: 100 ML/MIN/1.73M2 — SIGNIFICANT CHANGE UP
ERYTHROCYTE [SEDIMENTATION RATE] IN BLOOD: 47 MM/HR — HIGH (ref 0–20)
GLUCOSE SERPL-MCNC: 94 MG/DL — SIGNIFICANT CHANGE UP (ref 70–99)
HCT VFR BLD CALC: 26.4 % — LOW (ref 37–47)
HGB BLD-MCNC: 7.9 G/DL — LOW (ref 12–16)
MAGNESIUM SERPL-MCNC: 1.8 MG/DL — SIGNIFICANT CHANGE UP (ref 1.8–2.4)
MCHC RBC-ENTMCNC: 29 PG — SIGNIFICANT CHANGE UP (ref 27–31)
MCHC RBC-ENTMCNC: 29.9 G/DL — LOW (ref 32–37)
MCV RBC AUTO: 97.1 FL — SIGNIFICANT CHANGE UP (ref 81–99)
NRBC BLD AUTO-RTO: 0 /100 WBCS — SIGNIFICANT CHANGE UP (ref 0–0)
PLATELET # BLD AUTO: 239 K/UL — SIGNIFICANT CHANGE UP (ref 130–400)
PMV BLD: 9.9 FL — SIGNIFICANT CHANGE UP (ref 7.4–10.4)
POTASSIUM SERPL-MCNC: 4.2 MMOL/L — SIGNIFICANT CHANGE UP (ref 3.5–5)
POTASSIUM SERPL-SCNC: 4.2 MMOL/L — SIGNIFICANT CHANGE UP (ref 3.5–5)
PROT SERPL-MCNC: 5.3 G/DL — LOW (ref 6–8)
RBC # BLD: 2.72 M/UL — LOW (ref 4.2–5.4)
RBC # FLD: 16.3 % — HIGH (ref 11.5–14.5)
SODIUM SERPL-SCNC: 144 MMOL/L — SIGNIFICANT CHANGE UP (ref 135–146)
WBC # BLD: 4.86 K/UL — SIGNIFICANT CHANGE UP (ref 4.8–10.8)
WBC # FLD AUTO: 4.86 K/UL — SIGNIFICANT CHANGE UP (ref 4.8–10.8)

## 2025-06-16 RX ADMIN — Medication 400 MILLIGRAM(S): at 05:48

## 2025-06-16 RX ADMIN — MEROPENEM 100 MILLIGRAM(S): 1 INJECTION INTRAVENOUS at 05:48

## 2025-06-16 RX ADMIN — Medication 20 MILLIEQUIVALENT(S): at 12:26

## 2025-06-16 RX ADMIN — CYANOCOBALAMIN 1000 MICROGRAM(S): 1000 INJECTION INTRAMUSCULAR; SUBCUTANEOUS at 12:27

## 2025-06-16 RX ADMIN — FUROSEMIDE 20 MILLIGRAM(S): 10 INJECTION INTRAMUSCULAR; INTRAVENOUS at 05:48

## 2025-06-16 RX ADMIN — Medication 40 MILLIGRAM(S): at 05:48

## 2025-06-16 RX ADMIN — APIXABAN 5 MILLIGRAM(S): 2.5 TABLET, FILM COATED ORAL at 17:58

## 2025-06-16 RX ADMIN — MEROPENEM 100 MILLIGRAM(S): 1 INJECTION INTRAVENOUS at 21:27

## 2025-06-16 RX ADMIN — Medication 1 APPLICATION(S): at 05:51

## 2025-06-16 RX ADMIN — MEROPENEM 100 MILLIGRAM(S): 1 INJECTION INTRAVENOUS at 15:13

## 2025-06-16 RX ADMIN — Medication 500 MILLIGRAM(S): at 12:27

## 2025-06-16 RX ADMIN — Medication 400 MILLIGRAM(S): at 17:58

## 2025-06-16 RX ADMIN — Medication 220 MILLIGRAM(S): at 12:27

## 2025-06-16 RX ADMIN — Medication 1 TABLET(S): at 12:26

## 2025-06-16 RX ADMIN — TIOTROPIUM BROMIDE INHALATION SPRAY 2 PUFF(S): 3.12 SPRAY, METERED RESPIRATORY (INHALATION) at 08:14

## 2025-06-16 RX ADMIN — APIXABAN 5 MILLIGRAM(S): 2.5 TABLET, FILM COATED ORAL at 05:48

## 2025-06-16 NOTE — PROGRESS NOTE ADULT - ASSESSMENT
Mrs. Eddy is a 64 yo F with a PMHx of HTN, severe obesity, DVT, PE (R distal main with segmental extension) on Eliquis, LOUIS on CPAP, abby en y gastric bypass, and a recent admission for an incarcerated ventral hernia w/ SBO s/p lap converted to open ventral hernia repair w/ mesh and SBR and s/p ex lap, 30 cm SBR, creation of new side to side ileoileostomy, and ventral hernia repair w/ mesh in April 2025 presented back to the ER 2 days later from her LTACH facility ([facility]) after persistent HTN and epistaxis in setting of Eliquis use and complex surgical history. Patient found to have extensive ventral wall postsurgical changes with gas and fluid containing midline collection along the lower abdomen, measuring  approximately 4.8 x 4.5 x 10.2 cm and being managed with abx.    Plan:    #Rehab of debility with proximal LE weakness and marked decline from baseline function, s/p multiple abdominal surgeries with complications / HTN, morbid obesity, LOUIS on CPAP, Abby en y gastric bypass, and a recent admission for an incarcerated ventral hernia w/ sbo s/p lap converted to open ventral hernia repair w/ mesh and small bowel resection and s/p ex lap, 30 cm sbr, creation of new side to side ileoileostomy, ventral hernia repair w/ mesh.with impairment in mobility and ADLs and iADLs.The patient presented with co-morbidities requiring close physiatry follow-up at least 3 times a week to monitor his progress and monitor his comorbidities including HTN, PE, DVT, and LOUIS. .The patient's co-morbidities do not limit the patient's ability to participate in rehabilitative therapy. The patient was seen by PT/OT and required min A with bed mobility, CGA with sit to stand transfers, ambulates 30ftx1 with RW, CGA and 1P assist, UBD minimum assist, and LBD dependent.. The patient was previously independent with all ADLs and iADLs without use of AD and now presents with functional decline.   #Wound dehiscence and persistent intra-abdominal collection, suspect abscess in setting of multiple abdominal surgeries with complications  -5/28 Wcx grew rare Enterococcus avium    -Growth in fluid media only Escherichia coli ESBL    -5/30 midline placed  -Wound Care Instructions: Patient has a midline wound with staples and small 1 cm area of packing. Please remove and replace dressings once daily. Packing 1 cm opening with 0.25 inch packing strip, cut to length appropriate for wound. Cover incision with gauze and secure with paper tape.  -abd binder for support  -Infectious Disease is following. She is currently on Meropenem 1 gram IVPB q8h started 6/1. On discharge from acute rehab to home she will require Ertapenem 1 gram IVPB daily.  Plan to continue abx until 6/29 and follow-up with ID  - continue packing of upper incision open area daily with strip gauze  -Continue with Tylenol and adjust pain management as needed.  -She requires acute rehab with 3 hours of daily therapies at least 5 out of 7 days and close physiatry follow up.   - per surgery, repeat CTAP around 6/22  - tolerating therapies well. Strength and endurance improving daily. Continue acute rehab.    #PE in R distal main with segmental extension  #DVT  -c/w Eliquis  - stable, asymptomatic on RA    #Stage 2 buttock pressure injury per wound care nurse specialist  - local care with barrier cream  - monitor    #Chronic venous insufficiency/ fluctuating LE edema  - did not improve off Norvasc  - changed HCTZ to Lasix 20mg daily and monitor daily weights    #Anemia-stable  -6/13 H/H 8.6/29    #HTN  -Amolodipine was halted in the setting of pitting edema on 6/2 and low bp  - Losartan decreased from 100mg to 50mg 6/5. BP remained in low range with Losartan held x 5 days. D/c Losartan 6/11 and monitor.  - change HCTZ to to Lasix 20mg daily 6/12 for improved edema control and monitor BP  - weight decreasing 291 -> 289#    #Hypokalemia  - give oral supplement and recheck in a few days  - improved on standing KCl, on 20mEq KCI   - stable, WNL    #LOUIS on CPAP  Stable    #Morbid obesity BMI 53.2  Dietary education     -Pain control: Tylenol prn    - Diet: DASH/TLC Sodium & Cholesterol Restricted    Precautions / PROPHYLAXIS:    - Falls  - DVT prophylaxis: Eliquis

## 2025-06-16 NOTE — PROGRESS NOTE ADULT - SUBJECTIVE AND OBJECTIVE BOX
Patient is a 65y old  Female who presents with a chief complaint of Rehab of debility with proximal LE weakness and marked decline from baseline function, s/p multiple abdominal surgeries with complications / HTN, severe obesity, LOUIS on CPAP, Abby en y gastric bypass, and a recent admission for an incarcerated ventral hernia w/ sbo s/p lap converted to open ventral hernia repair w/ mesh and small bowel resection and s/p ex lap, 30 cm sbr, creation of new side to side ileoileostomy, ventral hernia repair w/ mesh. (03 Jun 2025 12:34)      HPI:  64 yo F with a PMHx of HTN, severe obesity, DVT, PE (R distal main with segmental extension) on Eliquis, LOUIS on CPAP, abby en y gastric bypass, and a recent admission for an incarcerated ventral hernia w/ SBO s/p lap converted to open ventral hernia repair w/ mesh and SBR and s/p ex lap, 30 cm SBR, creation of new side to side ileoileostomy, and ventral hernia repair w/ mesh in April 2025 presents back to the ER 2 days later from her LTACH facility ([facility]) after persistent HTN and epistaxis in setting of Eliquis use and complex surgical history. Patient notes her epistaxis has stopped after self-tamponade for 25 minutes. On admission, patient endorsed purulent discharge from her incision. CT AP (5/27) showed extensive ventral wall postsurgical changes with gas and fluid containing midline collection along the lower abdomen, measuring  approximately 4.8 x 4.5 x 10.2 cm. IR evaluated collection and did not recommend a drain. Wound culture positive for rare enterococcus avium (5/28). Patient currently on abx via midline per ID recs. Patient has been tolerating diet, passing regular bowel movements and voiding without nausea or vomiting. Denies SOB, chest pain, fever or chills. Rates her abd pain 1/10 at its worst.     Prior to the debility with proximal LE weakness and marked decline s/p multiple abd surgeries with complications, the patient was independent in ADLs and ambulation. Patient now requires moderate assistance with ambulation and ADLs 2/2 to the debility. Therefore, there is a significant functional decline from baseline. Patients also with medical comorbidities of recent PE in R distal main with segmental extension and DVT on Eliquis, LOUIS on CPAP, HTN, and morbid obesity requiring medication management and monitoring of vitals by Physiatry team and nursing. There are significant comorbidities which warrants acute rehab hospitalization. To return home it is in the patient’s best interest to have acute inpatient rehabilitative services to undergo intensive interdisciplinary therapy. Of note, the patient lives alone with multiple steps and would have difficulty performing ADLs and iADLs individually. Furthermore, the patient is motivated and is able to tolerate up to 3 hours of daily therapy for 5-6 days a week for a total of 15 hours a week. Evaluated by Physiatry and deemed to be an excellent candidate for admission to  for acute inpatient rehab. Admitted to Acute Rehab on 6/2/2025.      Patient seen and examined by Physiatry and is currently functioning at bed mobility minimum assist, sit to stand transfer with CGA, ambulates 30ftx1 with RW, CGA and 1P assist, UBD minimum assist, and LBD dependent..     LS: Patient. lives with  but came from [facility] after last admission  PLOF: Independent with all ADLs and iADLs and ambulates without assistive device.      (02 Jun 2025 13:50)    TODAY'S SUBJECTIVE & REVIEW OF SYMPTOMS  No acute events overnight. No new complaints.  Tolerating PT/OT. Voiding spontaneously and having regular BM.   Vital signs reviewed. Has been intermittently mildly tachycardiac. Patient denies symptoms. Hx of PE and on Eliquis.   Continues to have fluctuating BLE edema and on Lasix. Daily wights ordered.     Review of Systems:   Constitutional:       [x  ] WNL           [   ] poor appetite   [   ] insomnia   [   ] tired   Cardio:                [   ] WNL           [   ] CP   [x  ] CEBALLOS - improving   [   ] palpitations               Resp:                   [  x ] WNL           [   ] SOB   [   ] cough   [   ] wheezing   GI:                        [  x ] WNL           [   ] constipation   [   ] diarrhea   [   ] abdominal pain   [   ] nausea   [   ] emesis                                :                      [  x ] WNL           [   ] RODRIGUES  [   ] dysuria   [   ] difficulty voiding             Endo:                   [  x ] WNL          [   ] polyuria   [   ] temperature intolerance                 Skin:                     [   ] WNL          [   ] pain   [ x  ] wound-incision at abd site, c/d/i  [   ] rash   MSK:                    [   ] WNL          [   ] muscle pain   [  ] joint pain/ stiffness   [   ] muscle tenderness   [ x  ] swelling in BLE   Neuro:                 [   ] WNL          [   ] HA   [  ] change in vision   [   ] tremor   [ x  ] weakness   [   ]dysphagia              Cognitive:           [ x  ] WNL           [   ] occasional confusion      Psych:                  [  x ] WNL           [   ] hallucinations   [   ]agitation   [   ] delusion   [   ]depression    CLOF:  transfer SA  dressing mod assist  ambulates 50ft with RW and steadyA  4 steps mod assist      PHYSICAL EXAMINATION     Vital Signs Last 24 Hrs  T(C): 36.9 (16 Jun 2025 12:21), Max: 36.9 (16 Jun 2025 12:21)  T(F): 98.4 (16 Jun 2025 12:21), Max: 98.4 (16 Jun 2025 12:21)  HR: 101 (16 Jun 2025 12:21) (90 - 101)  BP: 161/90 (16 Jun 2025 12:21) (143/84 - 161/90)  BP(mean): --  RR: 18 (16 Jun 2025 12:21) (18 - 20)  SpO2: 98% (16 Jun 2025 12:21) (98% - 98%)    Parameters below as of 16 Jun 2025 12:21  Patient On (Oxygen Delivery Method): room air      General:[  x ] NAD, Resting Comfortable,   [   ] other:                                HEENT: [ x  ] NC/AT, EOMI, PERRL , Normal Conjunctivae,   [   ] other:  Cardio: [  x ] RRR, no murmur,   [   ] other:                              Pulm: [  x ] No Respiratory Distress,  Lungs CTAB,   [   ] other:                       Abdomen: [  x ]ND/NT, Soft, abd incision site c/d/i   [   ] other:    : [  x ] NO RODRIGUES CATHETER, [   ] RODRIGUES CATHETER- no meatal tear, no discharge, [   ] other:                                            MSK: [ x  ] No joint swelling, Full ROM,   [   ] other:                                         Ext: [   ]No C/C/E, No calf tenderness,   [ x  ]other: 2-3+ pitting edema in BLE    Skin: [   ]intact,   [ x  ] other: abd incision c/d/i   - 1 cm deep open wound along incision, with 1/4 inch gauze packing                                                                Neurological Examination:  Cognitive: [  x  ] AAO x 3,   [    ]  other:                                                                        Attention:  [  x  ] intact,   [  x  ]  other: pleasant and cooperative                            Memory: [  x  ] intact,  [    ]  other:      Mood/Affect: [ x   ] wnl,    [    ]  other:                                                                             Communication: [ x   ]Fluent, no dysarthria, following commands:  [    ] other:    CN II - XII:  [  x  ] intact,  [    ] other:                                                                                   Motor:   RIGHT UE:  3+/5 shoulder abduction, 5/5 elbow flexion/extension, 5/5 hand    LEFT    UE: 3+/5 shoulder abduction, 5/5 elbow flexion/extension, 5/5 hand   RIGHT LE: 3+/5 hip flexion, 4+/5 knee flexion/extension, 4+/5 DF/PF (limited by pitting edema)  LEFT  LE:  3+/5 hip flexion, 4+/5 knee flexion/extension, 4+/5 DF/PF (limited by pitting edema)  Tone: [  x  ] wnl,   [    ]  other:  DTRs: [  x ]symmetric, [   ] other:  Coordination:   [ x   ] intact,   [    ] other:                                                                         Sensory: [ x   ] Intact to light touch,   [    ] other:    MEDICATIONS  (STANDING):  apixaban 5 milliGRAM(s) Oral every 12 hours  ascorbic acid 500 milliGRAM(s) Oral daily  chlorhexidine 2% Cloths 1 Application(s) Topical <User Schedule>  cyanocobalamin 1000 MICROGram(s) Oral daily  ergocalciferol 65017 Unit(s) Oral <User Schedule>  furosemide    Tablet 20 milliGRAM(s) Oral daily  lactobacillus acidophilus 1 Tablet(s) Oral daily  magnesium oxide 400 milliGRAM(s) Oral every 12 hours  meropenem  IVPB 1000 milliGRAM(s) IV Intermittent every 8 hours  multivitamin/minerals 1 Tablet(s) Oral daily  pantoprazole    Tablet 40 milliGRAM(s) Oral before breakfast  potassium chloride    Tablet ER 20 milliEquivalent(s) Oral daily  tiotropium 2.5 MICROgram(s) Inhaler 2 Puff(s) Inhalation daily  zinc sulfate 220 milliGRAM(s) Oral every 24 hours    MEDICATIONS  (PRN):  acetaminophen     Tablet .. 650 milliGRAM(s) Oral every 6 hours PRN Mild Pain (1 - 3)  albuterol    90 MICROgram(s) HFA Inhaler 2 Puff(s) Inhalation every 6 hours PRN Shortness of Breath and/or Wheezing  ondansetron Injectable 4 milliGRAM(s) IV Push every 6 hours PRN Nausea  sodium chloride 0.65% Nasal 1 Spray(s) Both Nostrils every 4 hours PRN Nasal Congestion    RECENT LABS/IMAGING                          7.9    4.86  )-----------( 239      ( 16 Jun 2025 06:38 )             26.4     06-16    144  |  108  |  16  ----------------------------<  94  4.2   |  25  |  0.6[L]    Ca    8.2[L]      16 Jun 2025 06:38  Mg     1.8     06-16    TPro  5.3[L]  /  Alb  3.0[L]  /  TBili  0.3  /  DBili  x   /  AST  18  /  ALT  23  /  AlkPhos  101  06-16                          8.6    5.02  )-----------( 248      ( 13 Jun 2025 06:49 )             29.0     06-13    145  |  108  |  14  ----------------------------<  106[H]  4.3   |  23  |  0.7    Ca    8.5      13 Jun 2025 06:49  Mg     1.8     06-13                                8.0    5.33  )-----------( 233      ( 09 Jun 2025 20:00 )             26.2     06-09    143  |  111[H]  |  16  ----------------------------<  102[H]  4.5   |  21  |  0.6[L]    Ca    8.4      09 Jun 2025 20:00    TPro  5.1[L]  /  Alb  2.9[L]  /  TBili  0.2  /  DBili  x   /  AST  22  /  ALT  24  /  AlkPhos  103  06-09                   9.4    7.65  )-----------( 287      ( 07 Jun 2025 20:59 )             30.9                             8.3    5.01  )-----------( 253      ( 05 Jun 2025 06:22 )             26.8     06-05    143  |  107  |  15  ----------------------------<  109[H]  3.4[L]   |  22  |  0.8    Ca    8.2[L]      05 Jun 2025 06:22  Mg     1.7     06-05    TPro  5.4[L]  /  Alb  3.1[L]  /  TBili  0.2  /  DBili  x   /  AST  16  /  ALT  24  /  AlkPhos  110  06-05

## 2025-06-17 ENCOUNTER — TRANSCRIPTION ENCOUNTER (OUTPATIENT)
Age: 65
End: 2025-06-17

## 2025-06-17 PROCEDURE — 74177 CT ABD & PELVIS W/CONTRAST: CPT | Mod: 26

## 2025-06-17 RX ORDER — ERTAPENEM SODIUM 1 G/1
1 INJECTION, POWDER, LYOPHILIZED, FOR SOLUTION INTRAMUSCULAR; INTRAVENOUS
Qty: 11 | Refills: 0
Start: 2025-06-17 | End: 2025-07-14

## 2025-06-17 RX ORDER — FUROSEMIDE 10 MG/ML
1 INJECTION INTRAMUSCULAR; INTRAVENOUS
Qty: 0 | Refills: 0 | DISCHARGE
Start: 2025-06-17

## 2025-06-17 RX ADMIN — Medication 400 MILLIGRAM(S): at 17:09

## 2025-06-17 RX ADMIN — MEROPENEM 100 MILLIGRAM(S): 1 INJECTION INTRAVENOUS at 21:33

## 2025-06-17 RX ADMIN — Medication 20 MILLIEQUIVALENT(S): at 12:08

## 2025-06-17 RX ADMIN — CYANOCOBALAMIN 1000 MICROGRAM(S): 1000 INJECTION INTRAMUSCULAR; SUBCUTANEOUS at 12:08

## 2025-06-17 RX ADMIN — Medication 400 MILLIGRAM(S): at 05:59

## 2025-06-17 RX ADMIN — APIXABAN 5 MILLIGRAM(S): 2.5 TABLET, FILM COATED ORAL at 06:00

## 2025-06-17 RX ADMIN — Medication 500 MILLIGRAM(S): at 12:09

## 2025-06-17 RX ADMIN — APIXABAN 5 MILLIGRAM(S): 2.5 TABLET, FILM COATED ORAL at 17:09

## 2025-06-17 RX ADMIN — ERGOCALCIFEROL 50000 UNIT(S): 1.25 CAPSULE ORAL at 12:12

## 2025-06-17 RX ADMIN — MEROPENEM 100 MILLIGRAM(S): 1 INJECTION INTRAVENOUS at 15:31

## 2025-06-17 RX ADMIN — Medication 40 MILLIGRAM(S): at 06:01

## 2025-06-17 RX ADMIN — Medication 1 TABLET(S): at 12:08

## 2025-06-17 RX ADMIN — MEROPENEM 100 MILLIGRAM(S): 1 INJECTION INTRAVENOUS at 05:59

## 2025-06-17 RX ADMIN — TIOTROPIUM BROMIDE INHALATION SPRAY 2 PUFF(S): 3.12 SPRAY, METERED RESPIRATORY (INHALATION) at 12:07

## 2025-06-17 RX ADMIN — FUROSEMIDE 20 MILLIGRAM(S): 10 INJECTION INTRAMUSCULAR; INTRAVENOUS at 06:00

## 2025-06-17 NOTE — DISCHARGE NOTE PROVIDER - HOSPITAL COURSE
66 yo F with a PMHx of HTN, severe obesity, DVT, PE (R distal main with segmental extension) on Eliquis, LOUIS on CPAP, abby en y gastric bypass, and a recent admission for an incarcerated ventral hernia w/ SBO s/p lap converted to open ventral hernia repair w/ mesh and SBR and s/p ex lap, 30 cm SBR, creation of new side to side ileoileostomy, and ventral hernia repair w/ mesh in April 2025 presented back to the ER 2 days later from her LTACH facility ([facility]) after persistent HTN and epistaxis in setting of Eliquis use and complex surgical history. Patient found to have extensive ventral wall postsurgical changes with gas and fluid containing midline collection along the lower abdomen, measuring  approximately 4.8 x 4.5 x 10.2 cm and being managed with abx.  #Rehab of debility with proximal LE weakness and marked decline from baseline function, s/p multiple abdominal surgeries with complications / HTN, morbid obesity, LOUIS on CPAP, Abby en y gastric bypass, and a recent admission for an incarcerated ventral hernia w/ sbo s/p lap converted to open ventral hernia repair w/ mesh and small bowel resection and s/p ex lap, 30 cm sbr, creation of new side to side ileoileostomy, ventral hernia repair w/ mesh.with impairment in mobility and ADLs and iADLs.The patient presented with co-morbidities requiring close physiatry follow-up at least 3 times a week to monitor his progress and monitor his comorbidities including HTN, PE, DVT, and LOUIS. .The patient's co-morbidities do not limit the patient's ability to participate in rehabilitative therapy. The patient was seen by PT/OT and required min A with bed mobility, CGA with sit to stand transfers, ambulates 30ftx1 with RW, CGA and 1P assist, UBD minimum assist, and LBD dependent.. The patient was previously independent with all ADLs and iADLs without use of AD and now presents with functional decline.   #Wound dehiscence and persistent intra-abdominal collection, suspect abscess in setting of multiple abdominal surgeries with complications  -5/28 Wcx grew rare Enterococcus avium    -Growth in fluid media only Escherichia coli ESBL    -5/30 midline placed  -Wound Care Instructions: Patient has a midline wound with staples and small 1 cm area of packing. Please remove and replace dressings once daily. Packing 1 cm opening with 0.25 inch packing strip, cut to length appropriate for wound. Cover incision with gauze and secure with paper tape.  -abd binder for support  -Infectious Disease is following. She is currently on Meropenem 1 gram IVPB q8h started 6/1. On discharge from acute rehab to home she will require Ertapenem 1 gram IVPB daily.  Plan to continue abx until 6/29 and follow-up with ID  - continue packing of upper incision open area daily with strip gauze  -Continue with Tylenol PRN and adjust pain management as needed.  -She requires acute rehab with 3 hours of daily therapies at least 5 out of 7 days and close physiatry follow up.   -will obtain CTAP today for final abx duration, discussed with ID  - tolerating therapies well. Strength and endurance improving daily. Continue acute rehab.    #PE in R distal main with segmental extension  #DVT  -c/w Eliquis  - stable, asymptomatic on RA    #Stage 2 buttock pressure injury per wound care nurse specialist  - local care with barrier cream  - monitor    #Chronic venous insufficiency/ fluctuating LE edema  - did not improve off Norvasc  - changed HCTZ to Lasix 20mg daily   -BP stable on Lasix 20mg     #Anemia-stable  -6/13 H/H 8.6/29  -6/16 H/H 7.9/26.4  -continue to trend     #HTN  -Amolodipine was halted in the setting of pitting edema on 6/2 and low bp  - Losartan decreased from 100mg to 50mg 6/5. BP remained in low range with Losartan held x 5 days. D/c Losartan 6/11 and monitor.  - change HCTZ to to Lasix 20mg daily 6/12 for improved edema control and monitor BP  - weight decreasing 291 -> 289#    #Hypokalemia   - give oral supplement and recheck in a few days  - improved on standing KCl, on 20mEq KCI   - Repeat stable, WNL    #LOUIS on CPAP  Stable    #Morbid obesity BMI 53.2  Dietary education     -Pain control: Tylenol prn    - Diet: DASH/TLC Sodium & Cholesterol Restricted    Dispo: Stable for DC to Home with home services Wednesday 6/18        Mrs. Eddy is a 66 yo F with a PMHx of HTN, severe obesity, DVT, PE (R distal main with segmental extension) on Eliquis, LOUIS on CPAP, abby en y gastric bypass, and a recent admission for an incarcerated ventral hernia w/ SBO s/p lap converted to open ventral hernia repair w/ mesh and SBR and s/p ex lap, 30 cm SBR, creation of new side to side ileoileostomy, and ventral hernia repair w/ mesh in April 2025 presented back to the ER 2 days later from her LTACH facility ([facility]) after persistent HTN and epistaxis in setting of Eliquis use and complex surgical history. Patient found to have extensive ventral wall postsurgical changes with gas and fluid containing midline collection along the lower abdomen, measuring  approximately 4.8 x 4.5 x 10.2 cm and being managed with abx.    Plan:    #Rehab of debility with proximal LE weakness and marked decline from baseline function, s/p multiple abdominal surgeries with complications / HTN, morbid obesity, LOUIS on CPAP, Abby en y gastric bypass, and a recent admission for an incarcerated ventral hernia w/ sbo s/p lap converted to open ventral hernia repair w/ mesh and small bowel resection and s/p ex lap, 30 cm sbr, creation of new side to side ileoileostomy, ventral hernia repair w/ mesh.with impairment in mobility and ADLs and iADLs.The patient presented with co-morbidities requiring close physiatry follow-up at least 3 times a week to monitor his progress and monitor his comorbidities including HTN, PE, DVT, and LOUIS. .The patient's co-morbidities do not limit the patient's ability to participate in rehabilitative therapy. The patient was seen by PT/OT and required min A with bed mobility, CGA with sit to stand transfers, ambulates 30ftx1 with RW, CGA and 1P assist, UBD minimum assist, and LBD dependent.. The patient was previously independent with all ADLs and iADLs without use of AD and now presents with functional decline.   #Wound dehiscence and persistent intra-abdominal collection, suspect abscess in setting of multiple abdominal surgeries with complications  -5/28 Wcx grew rare Enterococcus avium    -Growth in fluid media only Escherichia coli ESBL    -5/30 midline placed  -Wound Care Instructions: Patient has a midline wound with staples and small 1 cm area of packing. Please remove and replace dressings once daily. Packing 1 cm opening with 0.25 inch packing strip, cut to length appropriate for wound. Cover incision with gauze and secure with paper tape.  -abd binder for support  -Infectious Disease is following. She is currently on Meropenem 1 gram IVPB q8h started 6/1. On discharge from acute rehab to home she will require Ertapenem 1 gram IVPB daily.   -6/17 repeat CTAP with decreased size in fluid collection       -IR consulted on 6/18, no drainable fluid collection       -per ID, will require additional 4 weeks of abx with repeat CTAP after   - continue packing of upper incision open area daily with strip gauze  -She requires acute rehab with 3 hours of daily therapies at least 5 out of 7 days and close physiatry follow up.   - tolerating therapies well. Strength and endurance improving daily. Continue acute rehab.    #PE in R distal main with segmental extension  #DVT  -c/w Eliquis  - stable, asymptomatic on RA    #Stage 2 buttock pressure injury per wound care nurse specialist  - local care with barrier cream  - monitor    #Chronic venous insufficiency/ fluctuating LE edema  - did not improve off Norvasc  - changed HCTZ to Lasix 20mg daily and monitor daily weights -    weight decreasing 291 -> 289 -> 277    #Anemia-stable  -6/13 H/H 8.6/29  -6/16 H/H 7.9/26.4  -continue to trend     #HTN  -Amolodipine was halted in the setting of pitting edema on 6/2 and low bp  - Losartan decreased from 100mg to 50mg 6/5. BP remained in low range with Losartan held x 5 days. D/c Losartan 6/11 and monitor.  - change HCTZ to to Lasix 20mg daily 6/12 for improved edema control and monitor BP    #Hypokalemia   - give oral supplement and recheck in a few days  - improved on standing KCl, on 20mEq KCI   - stable, WNL    #LOUIS on CPAP  Stable    #Morbid obesity BMI 53.2  Dietary education     -Pain control: Tylenol prn    - Diet: DASH/TLC Sodium & Cholesterol Restricted    Precautions / PROPHYLAXIS:    - Falls  - DVT prophylaxis: Eliquis Mrs. Eddy is a 66 yo F with a PMHx of HTN, severe obesity, DVT, PE (R distal main with segmental extension) on Eliquis, LOUIS on CPAP, abby en y gastric bypass, and a recent admission for an incarcerated ventral hernia w/ SBO s/p lap converted to open ventral hernia repair w/ mesh and SBR and s/p ex lap, 30 cm SBR, creation of new side to side ileoileostomy, and ventral hernia repair w/ mesh in April 2025 presented back to the ER 2 days later from her LTACH facility ([place] [facility]) after persistent HTN and epistaxis in setting of Eliquis use and complex surgical history. Patient found to have extensive ventral wall postsurgical changes with gas and fluid containing midline collection along the lower abdomen, measuring  approximately 4.8 x 4.5 x 10.2 cm and being managed with abx.   Admitted to rehab unit 6/2/25 for severe debilitation.    #Rehab of debility with proximal LE weakness and marked decline from baseline function, s/p multiple abdominal surgeries with complications / HTN, morbid obesity, LOUIS on CPAP, Abby en y gastric bypass, and a recent admission for an incarcerated ventral hernia w/ sbo s/p lap converted to open ventral hernia repair w/ mesh and small bowel resection and s/p ex lap, 30 cm sbr, creation of new side to side ileoileostomy, ventral hernia repair w/ mesh.with impairment in mobility and ADLs and iADLs.The patient presented with co-morbidities requiring close physiatry follow-up at least 3 times a week to monitor his progress and monitor his comorbidities including HTN, PE, DVT, and LOUIS. .The patient's co-morbidities do not limit the patient's ability to participate in rehabilitative therapy. The patient was seen by PT/OT and required min A with bed mobility, CGA with sit to stand transfers, ambulates 30ftx1 with RW, CGA and 1P assist, UBD minimum assist, and LBD dependent.. The patient was previously independent with all ADLs and iADLs without use of AD and now presents with functional decline.     #Wound dehiscence and persistent intra-abdominal collection, suspect abscess in setting of multiple abdominal surgeries with complications  -5/28 Wcx grew rare Enterococcus avium    -Growth in fluid media only Escherichia coli ESBL    -5/30 midline placed  -Wound Care Instructions: Patient has a midline wound with staples and small 1 cm area of packing. Please remove and replace dressings once daily. Packing 1 cm opening with 0.25 inch packing strip, cut to length appropriate for wound. Cover incision with gauze and secure with paper tape.  -abd binder for support  -Infectious Disease is following. She is currently on Meropenem 1 gram IVPB q8h started 6/1. On discharge from acute rehab to home she will require Ertapenem 1 gram IVPB daily.   -6/17 repeat CTAP with decreased size in fluid collection       -IR consulted on 6/18, no drainable fluid collection       -per ID, will require additional 4 weeks of abx with repeat CTAP at that time.  Made steady functional gains and upon discharge ambulating with RW with supervision.  For d/c home today with family. VN [facility] services for home IV antibiotics - Ertapenem daily x 4 weeks. FU ID Dr Perdue. F/U surgeon next week scheduled. VN for daily abdominal wound packing.     #PE in R distal main with segmental extension  #DVT  -c/w Eliquis  - stable, asymptomatic on RA  F/U PCP    #Stage 2 buttock pressure injury per wound care nurse specialist  - local care with barrier cream  F/U VN and PCP    #Chronic venous insufficiency/ fluctuating LE edema  - did not improve off Norvasc  - changed HCTZ to Lasix 20mg daily and monitor daily weights -    weight decreasing 291 -> 289 -> 277  improving  D/c on Lasix and f/u with PCP    #Anemia-stable  -6/13 H/H 8.6/29  -6/16 H/H 7.9/26.4  -continue to trend     #HTN  -Amolodipine was halted in the setting of pitting edema on 6/2 and low bp  - Losartan decreased from 100mg to 50mg 6/5. BP remained in low range with Losartan held x 5 days. D/c Losartan 6/11 and monitor.  - change HCTZ to to Lasix 20mg daily 6/12 for improved edema control and monitor BP  BP now up to 170 systolic. Will resume Losartan 50 mg daily and f/u with PCP as an outpatient    #Hypokalemia   - give oral supplement and recheck in a few days  - improved on standing KCl, on 20mEq KCI   - stable, WNL. F/U PCP.    #LOUIS on CPAP  Stable  F/U PCP    #Morbid obesity BMI 53.2  Dietary education   F/U PCP    -Pain control: Tylenol prn    - Diet: DASH/TLC Sodium & Cholesterol Restricted   Mrs. Eddy is a 66 yo F with a PMHx of HTN, severe obesity, DVT, PE (R distal main with segmental extension) on Eliquis, LOUIS on CPAP, abby en y gastric bypass, and a recent admission for an incarcerated ventral hernia w/ SBO s/p lap converted to open ventral hernia repair w/ mesh and SBR and s/p ex lap, 30 cm SBR, creation of new side to side ileoileostomy, and ventral hernia repair w/ mesh in April 2025 presented back to the ER 2 days later from her LTACH facility ([facility]) after persistent HTN and epistaxis in setting of Eliquis use and complex surgical history. Patient found to have extensive ventral wall postsurgical changes with gas and fluid containing midline collection along the lower abdomen, measuring  approximately 4.8 x 4.5 x 10.2 cm and being managed with abx.   Admitted to rehab unit 6/2/25 for severe debilitation.    #Rehab of debility with proximal LE weakness and marked decline from baseline function, s/p multiple abdominal surgeries with complications / HTN, morbid obesity, LOUIS on CPAP, Abby en y gastric bypass, and a recent admission for an incarcerated ventral hernia w/ sbo s/p lap converted to open ventral hernia repair w/ mesh and small bowel resection and s/p ex lap, 30 cm sbr, creation of new side to side ileoileostomy, ventral hernia repair w/ mesh.with impairment in mobility and ADLs and iADLs.The patient presented with co-morbidities requiring close physiatry follow-up at least 3 times a week to monitor his progress and monitor his comorbidities including HTN, PE, DVT, and LOUIS. .The patient's co-morbidities do not limit the patient's ability to participate in rehabilitative therapy. The patient was seen by PT/OT and required min A with bed mobility, CGA with sit to stand transfers, ambulates 30ftx1 with RW, CGA and 1P assist, UBD minimum assist, and LBD dependent.. The patient was previously independent with all ADLs and iADLs without use of AD and now presents with functional decline.     #Wound dehiscence and persistent intra-abdominal collection, suspect abscess in setting of multiple abdominal surgeries with complications  -5/28 Wcx grew rare Enterococcus avium    -Growth in fluid media only Escherichia coli ESBL    -5/30 midline placed  -Wound Care Instructions: Patient has a midline wound with staples and small 1 cm area of packing. Please remove and replace dressings once daily. Packing 1 cm opening with 0.25 inch packing strip, cut to length appropriate for wound. Cover incision with gauze and secure with paper tape.  -abd binder for support  -Infectious Disease is following. She is currently on Meropenem 1 gram IVPB q8h started 6/1. On discharge from acute rehab to home she will require Ertapenem 1 gram IVPB daily.   -6/17 repeat CTAP with decreased size in fluid collection       -IR consulted on 6/18, no drainable fluid collection         Vital signs reviewed by  dr raines today . BP now running high to 170. Patient is anxious about d/c home. Started Losartan 50mg daily  LE edema appears to be improving with Lasix and ACE wraps.  weight 291# on admission, (6/17) 277.7 pounds -> (6/18) 279.9 pounds  For d/c home today. Discussed with Dr. Lema. No procedure planned. Can be discharged home on IV antibiotics and local wound care. however he would like to give her script for Lovenox 130 mg q12h as dose confirmed with pharmacy staff,  for a week  to use it while holding ELIQUIS started for PE based on 4/21/25 ct chest angio which ruled in for pulmonary embolism . Patient advised to hold Eliquis and use Lovenox injections as per dr Duran advise  if surgery should be scheduled , and to hold ovenox atleast 12 hours before the surgery      -per ID, will require additional 4 weeks of abx with repeat CTAP at that time. as of now  and again upto dr Lema  and Id doctor Perdue  to decide the duration .     Made steady functional gains and upon discharge ambulating with RW with supervision.  For d/c home today with family. [facility] for [facility] IV antibiotics - Ertapenem daily x 4 weeks. FU ID Dr Perdue. F/U surgeon next week scheduled. VN for daily abdominal wound packing.     #PE in R distal main with segmental extension  #DVT  -c/w Eliquis  - stable, asymptomatic on RA  F/U PCP, see above latest  information..     #Stage 2 buttock pressure injury per wound care nurse specialist  - local care with barrier cream  F/U VN and PCP    #Chronic venous insufficiency/ fluctuating LE edema  - did not improve off Norvasc  - changed HCTZ to Lasix 20mg daily and monitor daily weights -    weight decreasing 291 -> 289 -> 277  improving  D/c on Lasix and f/u with PCP and potassium pill  to replenish     #Anemia-stable  -6/13 H/H 8.6/29  -6/16 H/H 7.9/26.4  -continue to trend     #HTN  -Amolodipine was halted in the setting of pitting edema on 6/2 and low bp  - Losartan decreased from 100mg to 50mg 6/5. BP remained in low range with Losartan held x 5 days. D/c Losartan 6/11 and monitor.  - change HCTZ to to Lasix 20mg daily 6/12 for improved edema control and monitor BP  BP now up to 170 systolic. Will resume Losartan 50 mg daily and f/u with PCP as an outpatient    #Hypokalemia   - give oral supplement and recheck in a few days  - improved on standing KCl, on 20mEq KCI   - stable, WNL. F/U PCP.    #LOUIS on CPAP  Stable  F/U PCP    #Morbid obesity BMI 53.2  Dietary education   F/U PCP as advised 1 to 2 weeks .     -Pain control: Tylenol prn    - Diet: DASH/TLC Sodium & Cholesterol Restricted

## 2025-06-17 NOTE — DISCHARGE NOTE PROVIDER - NSDCFUADDINST_GEN_ALL_CORE_FT
Since BP  running high to 170's  . Started Losartan 50mg daily, today bp well controlled ,   LE edema appears to be improving with Lasix and ACE wraps.  weight 291 pounds  on admission, (6/17) 277.7 pounds -> (6/18) 279.9 pounds  For d/c home today. Discussed with Dr. Lema. No procedure planned. Can be discharged home on IV antibiotics and local wound care. however he would like to give her script for Lovenox 130 mg q12h as dose confirmed with pharmacy staff,  for a week  to use it while holding ELIQUIS started for PE based on 4/21/25 ct chest angio which ruled in for pulmonary embolism . Patient advised to hold Eliquis and use Lovenox injections as per dr Duran advise  if surgery should be scheduled , and to hold Lovenox at least 12 hours before the surgery      -per ID, will require additional 4 weeks of abx with repeat CTAP at that time. as of now  and again upto dr Lema  and Id doctor Perdue  to decide the duration .

## 2025-06-17 NOTE — DISCHARGE NOTE PROVIDER - PROVIDER TOKENS
PROVIDER:[TOKEN:[[id number]]],PROVIDER:[TOKEN:[[id number]],ESTABLISHEDPATIENT:[T]],PROVIDER:[TOKEN:[[id number]]]

## 2025-06-17 NOTE — DISCHARGE NOTE PROVIDER - CARE PROVIDERS DIRECT ADDRESSES
[email or web address]

## 2025-06-17 NOTE — DISCHARGE NOTE NURSING/CASE MANAGEMENT/SOCIAL WORK - NSDCPEFALRISK_GEN_ALL_CORE
For information on Fall & Injury Prevention, visit: [email or web address] OR  [email or web address] OR  https://www.cdc.gov/steadi/patient.html

## 2025-06-17 NOTE — DISCHARGE NOTE PROVIDER - CARE PROVIDER_API CALL
Rose Lema  Surgery (General Surgery)  [address] [place] [address]  Phone: [phone]  Fax: [phone]  Follow Up Time:     BENNY PATEL  [address] [place] [address]  Phone: [phone]  Fax: [phone]  Established Patient  Follow Up Time:     Carmen Perdue  Internal Medicine  [address] [place] [address]  Phone: [phone]  Fax: [phone]  Follow Up Time:

## 2025-06-17 NOTE — PROGRESS NOTE ADULT - ASSESSMENT
Mrs. Eddy is a 66 yo F with a PMHx of HTN, severe obesity, DVT, PE (R distal main with segmental extension) on Eliquis, LOUIS on CPAP, abby en y gastric bypass, and a recent admission for an incarcerated ventral hernia w/ SBO s/p lap converted to open ventral hernia repair w/ mesh and SBR and s/p ex lap, 30 cm SBR, creation of new side to side ileoileostomy, and ventral hernia repair w/ mesh in April 2025 presented back to the ER 2 days later from her LTACH facility ([place]) after persistent HTN and epistaxis in setting of Eliquis use and complex surgical history. Patient found to have extensive ventral wall postsurgical changes with gas and fluid containing midline collection along the lower abdomen, measuring  approximately 4.8 x 4.5 x 10.2 cm and being managed with abx.    Plan:    #Rehab of debility with proximal LE weakness and marked decline from baseline function, s/p multiple abdominal surgeries with complications / HTN, morbid obesity, LOUIS on CPAP, Abby en y gastric bypass, and a recent admission for an incarcerated ventral hernia w/ sbo s/p lap converted to open ventral hernia repair w/ mesh and small bowel resection and s/p ex lap, 30 cm sbr, creation of new side to side ileoileostomy, ventral hernia repair w/ mesh.with impairment in mobility and ADLs and iADLs.The patient presented with co-morbidities requiring close physiatry follow-up at least 3 times a week to monitor his progress and monitor his comorbidities including HTN, PE, DVT, and LOUIS. .The patient's co-morbidities do not limit the patient's ability to participate in rehabilitative therapy. The patient was seen by PT/OT and required min A with bed mobility, CGA with sit to stand transfers, ambulates 30ftx1 with RW, CGA and 1P assist, UBD minimum assist, and LBD dependent.. The patient was previously independent with all ADLs and iADLs without use of AD and now presents with functional decline.   #Wound dehiscence and persistent intra-abdominal collection, suspect abscess in setting of multiple abdominal surgeries with complications  -5/28 Wcx grew rare Enterococcus avium    -Growth in fluid media only Escherichia coli ESBL    -5/30 midline placed  -Wound Care Instructions: Patient has a midline wound with staples and small 1 cm area of packing. Please remove and replace dressings once daily. Packing 1 cm opening with 0.25 inch packing strip, cut to length appropriate for wound. Cover incision with gauze and secure with paper tape.  -abd binder for support  -Infectious Disease is following. She is currently on Meropenem 1 gram IVPB q8h started 6/1. On discharge from acute rehab to home she will require Ertapenem 1 gram IVPB daily.  Plan to continue abx until 6/29 and follow-up with ID  - continue packing of upper incision open area daily with strip gauze  -Continue with Tylenol PRN and adjust pain management as needed.  -She requires acute rehab with 3 hours of daily therapies at least 5 out of 7 days and close physiatry follow up.   - per surgery, repeat CTAP around 6/22  - tolerating therapies well. Strength and endurance improving daily. Continue acute rehab.    #PE in R distal main with segmental extension  #DVT  -c/w Eliquis  - stable, asymptomatic on RA    #Stage 2 buttock pressure injury per wound care nurse specialist  - local care with barrier cream  - monitor    #Chronic venous insufficiency/ fluctuating LE edema  - did not improve off Norvasc  - changed HCTZ to Lasix 20mg daily and monitor daily weights    #Anemia-stable  -6/13 H/H 8.6/29  -6/16 H/H 7.9/26.4  -continue to trend     #HTN  -Amolodipine was halted in the setting of pitting edema on 6/2 and low bp  - Losartan decreased from 100mg to 50mg 6/5. BP remained in low range with Losartan held x 5 days. D/c Losartan 6/11 and monitor.  - change HCTZ to to Lasix 20mg daily 6/12 for improved edema control and monitor BP  - weight decreasing 291 -> 289#    #Hypokalemia   - give oral supplement and recheck in a few days  - improved on standing KCl, on 20mEq KCI   - stable, WNL    #LOUIS on CPAP  Stable    #Morbid obesity BMI 53.2  Dietary education     -Pain control: Tylenol prn    - Diet: DASH/TLC Sodium & Cholesterol Restricted    Precautions / PROPHYLAXIS:    - Falls  - DVT prophylaxis: Eliquis     Mrs. Eddy is a 64 yo F with a PMHx of HTN, severe obesity, DVT, PE (R distal main with segmental extension) on Eliquis, LOUIS on CPAP, abby en y gastric bypass, and a recent admission for an incarcerated ventral hernia w/ SBO s/p lap converted to open ventral hernia repair w/ mesh and SBR and s/p ex lap, 30 cm SBR, creation of new side to side ileoileostomy, and ventral hernia repair w/ mesh in April 2025 presented back to the ER 2 days later from her LTACH facility ([facility]) after persistent HTN and epistaxis in setting of Eliquis use and complex surgical history. Patient found to have extensive ventral wall postsurgical changes with gas and fluid containing midline collection along the lower abdomen, measuring  approximately 4.8 x 4.5 x 10.2 cm and being managed with abx.    Plan:    #Rehab of debility with proximal LE weakness and marked decline from baseline function, s/p multiple abdominal surgeries with complications / HTN, morbid obesity, LOUIS on CPAP, Abby en y gastric bypass, and a recent admission for an incarcerated ventral hernia w/ sbo s/p lap converted to open ventral hernia repair w/ mesh and small bowel resection and s/p ex lap, 30 cm sbr, creation of new side to side ileoileostomy, ventral hernia repair w/ mesh.with impairment in mobility and ADLs and iADLs.The patient presented with co-morbidities requiring close physiatry follow-up at least 3 times a week to monitor his progress and monitor his comorbidities including HTN, PE, DVT, and LOUIS. .The patient's co-morbidities do not limit the patient's ability to participate in rehabilitative therapy. The patient was seen by PT/OT and required min A with bed mobility, CGA with sit to stand transfers, ambulates 30ftx1 with RW, CGA and 1P assist, UBD minimum assist, and LBD dependent.. The patient was previously independent with all ADLs and iADLs without use of AD and now presents with functional decline.   #Wound dehiscence and persistent intra-abdominal collection, suspect abscess in setting of multiple abdominal surgeries with complications  -5/28 Wcx grew rare Enterococcus avium    -Growth in fluid media only Escherichia coli ESBL    -5/30 midline placed  -Wound Care Instructions: Patient has a midline wound with staples and small 1 cm area of packing. Please remove and replace dressings once daily. Packing 1 cm opening with 0.25 inch packing strip, cut to length appropriate for wound. Cover incision with gauze and secure with paper tape.  -abd binder for support  -Infectious Disease is following. She is currently on Meropenem 1 gram IVPB q8h started 6/1. On discharge from acute rehab to home she will require Ertapenem 1 gram IVPB daily.  Plan to continue abx until 6/29 and follow-up with ID  - continue packing of upper incision open area daily with strip gauze  -Continue with Tylenol PRN and adjust pain management as needed.  -She requires acute rehab with 3 hours of daily therapies at least 5 out of 7 days and close physiatry follow up.   -will obtain CTAP today for final abx duration, discussed with ID  - tolerating therapies well. Strength and endurance improving daily. Continue acute rehab.    #PE in R distal main with segmental extension  #DVT  -c/w Eliquis  - stable, asymptomatic on RA    #Stage 2 buttock pressure injury per wound care nurse specialist  - local care with barrier cream  - monitor    #Chronic venous insufficiency/ fluctuating LE edema  - did not improve off Norvasc  - changed HCTZ to Lasix 20mg daily and monitor daily weights    #Anemia-stable  -6/13 H/H 8.6/29  -6/16 H/H 7.9/26.4  -continue to trend     #HTN  -Amolodipine was halted in the setting of pitting edema on 6/2 and low bp  - Losartan decreased from 100mg to 50mg 6/5. BP remained in low range with Losartan held x 5 days. D/c Losartan 6/11 and monitor.  - change HCTZ to to Lasix 20mg daily 6/12 for improved edema control and monitor BP  - weight decreasing 291 -> 289#    #Hypokalemia   - give oral supplement and recheck in a few days  - improved on standing KCl, on 20mEq KCI   - stable, WNL    #LOUIS on CPAP  Stable    #Morbid obesity BMI 53.2  Dietary education     -Pain control: Tylenol prn    - Diet: DASH/TLC Sodium & Cholesterol Restricted    Precautions / PROPHYLAXIS:    - Falls  - DVT prophylaxis: Eliquis     Mrs. Eddy is a 66 yo F with a PMHx of HTN, severe obesity, DVT, PE (R distal main with segmental extension) on Eliquis, LOUIS on CPAP, abby en y gastric bypass, and a recent admission for an incarcerated ventral hernia w/ SBO s/p lap converted to open ventral hernia repair w/ mesh and SBR and s/p ex lap, 30 cm SBR, creation of new side to side ileoileostomy, and ventral hernia repair w/ mesh in April 2025 presented back to the ER 2 days later from her LTACH facility ([place]) after persistent HTN and epistaxis in setting of Eliquis use and complex surgical history. Patient found to have extensive ventral wall postsurgical changes with gas and fluid containing midline collection along the lower abdomen, measuring  approximately 4.8 x 4.5 x 10.2 cm and being managed with abx.    Plan:    #Rehab of debility with proximal LE weakness and marked decline from baseline function, s/p multiple abdominal surgeries with complications / HTN, morbid obesity, LOUIS on CPAP, Abby en y gastric bypass, and a recent admission for an incarcerated ventral hernia w/ sbo s/p lap converted to open ventral hernia repair w/ mesh and small bowel resection and s/p ex lap, 30 cm sbr, creation of new side to side ileoileostomy, ventral hernia repair w/ mesh.with impairment in mobility and ADLs and iADLs.The patient presented with co-morbidities requiring close physiatry follow-up at least 3 times a week to monitor his progress and monitor his comorbidities including HTN, PE, DVT, and LOUIS. .The patient's co-morbidities do not limit the patient's ability to participate in rehabilitative therapy. The patient was seen by PT/OT and required min A with bed mobility, CGA with sit to stand transfers, ambulates 30ftx1 with RW, CGA and 1P assist, UBD minimum assist, and LBD dependent.. The patient was previously independent with all ADLs and iADLs without use of AD and now presents with functional decline.   #Wound dehiscence and persistent intra-abdominal collection, suspect abscess in setting of multiple abdominal surgeries with complications  -5/28 Wcx grew rare Enterococcus avium    -Growth in fluid media only Escherichia coli ESBL    -5/30 midline placed  -Wound Care Instructions: Patient has a midline wound with staples and small 1 cm area of packing. Please remove and replace dressings once daily. Packing 1 cm opening with 0.25 inch packing strip, cut to length appropriate for wound. Cover incision with gauze and secure with paper tape.  -abd binder for support  -Infectious Disease is following. She is currently on Meropenem 1 gram IVPB q8h started 6/1. On discharge from acute rehab to home she will require Ertapenem 1 gram IVPB daily.  Plan to continue abx until 6/29 and follow-up with ID  - continue packing of upper incision open area daily with strip gauze  -Continue with Tylenol PRN and adjust pain management as needed.  -She requires acute rehab with 3 hours of daily therapies at least 5 out of 7 days and close physiatry follow up.   -will obtain CTAP today for final abx duration, discussed with ID  - tolerating therapies well. Strength and endurance improving daily. Continue acute rehab.    #PE in R distal main with segmental extension  #DVT  -c/w Eliquis  - stable, asymptomatic on RA    #Stage 2 buttock pressure injury per wound care nurse specialist  - local care with barrier cream  - monitor    #Chronic venous insufficiency/ fluctuating LE edema  - did not improve off Norvasc  - changed HCTZ to Lasix 20mg daily and monitor daily weights -    weight decreasing 291 -> 289 -> 277    #Anemia-stable  -6/13 H/H 8.6/29  -6/16 H/H 7.9/26.4  -continue to trend     #HTN  -Amolodipine was halted in the setting of pitting edema on 6/2 and low bp  - Losartan decreased from 100mg to 50mg 6/5. BP remained in low range with Losartan held x 5 days. D/c Losartan 6/11 and monitor.  - change HCTZ to to Lasix 20mg daily 6/12 for improved edema control and monitor BP    #Hypokalemia   - give oral supplement and recheck in a few days  - improved on standing KCl, on 20mEq KCI   - stable, WNL    #LOUIS on CPAP  Stable    #Morbid obesity BMI 53.2  Dietary education     -Pain control: Tylenol prn    - Diet: DASH/TLC Sodium & Cholesterol Restricted    Precautions / PROPHYLAXIS:    - Falls  - DVT prophylaxis: Eliquis

## 2025-06-17 NOTE — DISCHARGE NOTE NURSING/CASE MANAGEMENT/SOCIAL WORK - FINANCIAL ASSISTANCE
[facility] provides services at a reduced cost to those who are determined to be eligible through [facility]’s financial assistance program. Information regarding [facility]’s financial assistance program can be found by going to [email or web address] or by calling [phone].

## 2025-06-17 NOTE — DISCHARGE NOTE PROVIDER - NSDCCPCAREPLAN_GEN_ALL_CORE_FT
PRINCIPAL DISCHARGE DIAGNOSIS  Diagnosis: Encounter for rehabilitation  Assessment and Plan of Treatment: Presence of Lower extremity weakness  Discharge home with home PT         SECONDARY DISCHARGE DIAGNOSES  Diagnosis: Intra-abdominal infection after surgical procedure  Assessment and Plan of Treatment: Presence of Enterococcus Avium in wound culture   Continue IV anti-biotics until 6/29  Follow up with Infectious disease Dr. Perdue 1-2 weeks post discharge from the hospital   Pain control with Tylenol 650mg  every 6 hours as needed   Follow up with Dr. Lema for staple removal within 1 week of discharge.    Diagnosis: History of pulmonary embolism  Assessment and Plan of Treatment: Continue home Eliquis   Follow up with your Primary care doctor    Diagnosis: Hypertension  Assessment and Plan of Treatment: Started on Lasix 20mg Daily   Do not resume home Losartan/ Hydrochlorthiazide   Check BP at home daily   Follow up with your primary care provider for medication management     PRINCIPAL DISCHARGE DIAGNOSIS  Diagnosis: Encounter for rehabilitation  Assessment and Plan of Treatment:   You were admitted  for the rehab  of debility with proximal LE weakness and marked decline from baseline function,  from multiple abdominal surgeries with complications / HTN, severe obesity, LOUIS on CPAP, Abby en y gastric bypass, and a recent admission for an incarcerated ventral hernia with bowel obstructions and abdominal surgeries which included convertion  to open ventral hernia repair with mesh and small bowel resection and s/p ex lap, 30 cm smal bowel resection and  creation of new side to side ileoileostomy, ventral hernia repair with  mesh.   with Presence of Lower extremity weakness. you were able to participate well in rehab therapy  now ready for   Discharge home with home PT  and few surgical ,medical infectious dseas efollow up .        You also have lower extremity swelling from chronic venous stasis   LE edema appears to be improving with Lasix and ACE wraps.  weight 291 pounds  on admission,on  (6/17) 277.7 pounds -> (6/18) 279.9 pounds, continue lasix instead of hydrochlorthiazide  for better edema control   For d/c home today. Discussed with Dr. Lema. No procedure planned yet for washout or drainge af abscess .  Can be discharged home on IV antibiotics and local wound care. however he would like to give her script for Lovenox 130 mg q12h as dose confirmed with pharmacy staff,  for a week  to use it while holding ELIQUIS started for PE based on 4/21/25 ct chest angio which ruled in for pulmonary embolism . Patient advised to hold Eliquis and use Lovenox injections as per dr Muhammad advise  if surgery should be scheduled , and to hold ovenox atleast 12 hours before the surgery      -per ID, will require additional 4 weeks of abx with repeat CTAP at that time. as of now  and again upto dr Lema  and Id doctor Iron  to decide the duration .         SECONDARY DISCHARGE DIAGNOSES  Diagnosis: Intra-abdominal infection after surgical procedure  Assessment and Plan of Treatment: See above for Presence of Enterococcus Avium in wound culture   Continue IV anti-biotics until   7/15 for now then repeat ct abdomen pelvis   Follow up with Infectious disease Dr. Perdue 1-2 weeks post discharge from the hospital   Pain control with Tylenol 650mg  every 6 hours as needed   Follow up with Dr. Lema for staple removal within 1 week of discharge.    Diagnosis: History of pulmonary embolism  Assessment and Plan of Treatment: Continue home Eliquis   sinc eneed for more abdominal surgery to washout or drainage will need to hold blood thinners to prevent bleeding , since no planed  date for surgery as per dr raines and dr muhammad discussion to continue eliquis for now ,       however he would like to give her script for Lovenox 130 mg q12h as dose confirmed with pharmacy staff,  for a week  to use it while holding ELIQUIS (started for PE based on 4/21/25 ct chest angio ) Patient advised to hold Eliquis and use Lovenox injections as per dr Muhammad advise  if surgery should be scheduled , and to hold ovenox atleast 12 hours before the surgery   Follow up with your Primary care doctor    Diagnosis: Hypertension  Assessment and Plan of Treatment:   Do not resume home Losartan/ Hydrochlorthiazide , just losartan is resumed now for better bp control , lasix instead of HCTZ added for better edema or swelling control onlegs ,  Check BP at home daily keep alog to take it your doctor and for adjustment  of medications .   Follow up with your primary care provider for medication management    Diagnosis: Chronic venous stasis dermatitis of lower extremity  Assessment and Plan of Treatment: YOU were on waterpill at home , continued , now decreased  dose to 20 meq daily , continue ace wraps to legs when out of bed in [facility] to prevent accumulation of swelling in legs . keep legs elevated  whenever you can .  need to check labs which are ordered for every 1 week . Your PMD or dr Lema to advise on continuation of potassium tablet .    Diagnosis: At risk of decubitus ulcer  Assessment and Plan of Treatment: You have reddened skin on buttocks area ,Try to avoid further pressure n the area, keeppractising turning and positioning ,can apply traid cream to improve circulation and sjin protection , use pillows or cushions to prevent pressure . ambulate as much as possible , follow up with your PCP in 1  to 2 weeks ,

## 2025-06-17 NOTE — PROGRESS NOTE ADULT - ATTENDING COMMENTS
I reviewed the chart and examined the patient with the resident and we discussed the findings and treatment plan.  The patient is tolerating the rehab program well. I agree with the findings and treatment plan above, which I modified as indicated. The patient requires 3 hrs a day of acute inpatient rehab. No new complaints. VSS. BP systolic runs to 160 at times. Recently started Lasix and losing weight, which should help. Monitor. Endurance improving steadily. Ambulates with RW with supervision. Climbs 4-5 stairs with CG. Has 2 flights of stairs to negotiate at home. Continue full rehab program.    I read, edited and agree with the physical exam above and assessment:  #Rehab of debility with proximal LE weakness and marked decline from baseline function, s/p multiple abdominal surgeries with complications / HTN, morbid obesity, LOUIS on CPAP, Abby en y gastric bypass, and a recent admission for an incarcerated ventral hernia w/ sbo s/p lap converted to open ventral hernia repair w/ mesh and small bowel resection and s/p ex lap, 30 cm sbr, creation of new side to side ileoileostomy, ventral hernia repair w/ mesh.with impairment in mobility and ADLs and iADLs.The patient presented with co-morbidities requiring close physiatry follow-up at least 3 times a week to monitor his progress and monitor his comorbidities including HTN, PE, DVT, and LOUIS. .The patient's co-morbidities do not limit the patient's ability to participate in rehabilitative therapy. The patient was seen by PT/OT and required min A with bed mobility, CGA with sit to stand transfers, ambulates 30ftx1 with RW, CGA and 1P assist, UBD minimum assist, and LBD dependent.. The patient was previously independent with all ADLs and iADLs without use of AD and now presents with functional decline.   #Wound dehiscence and persistent intra-abdominal collection, suspect abscess in setting of multiple abdominal surgeries with complications  -5/28 Wcx grew rare Enterococcus avium    -Growth in fluid media only Escherichia coli ESBL    -5/30 midline placed  -Wound Care Instructions: Patient has a midline wound with staples and small 1 cm area of packing. Please remove and replace dressings once daily. Packing 1 cm opening with 0.25 inch packing strip, cut to length appropriate for wound. Cover incision with gauze and secure with paper tape.  -abd binder for support  -Infectious Disease is following. She is currently on Meropenem 1 gram IVPB q8h started 6/1. On discharge from acute rehab to home she will require Ertapenem 1 gram IVPB daily.  Plan to continue abx until 6/29 and follow-up with ID  - continue packing of upper incision open area daily with strip gauze  -Continue with Tylenol PRN and adjust pain management as needed.  -She requires acute rehab with 3 hours of daily therapies at least 5 out of 7 days and close physiatry follow up.   -will obtain CTAP today for final abx duration, discussed with ID  - tolerating therapies well. Strength and endurance improving daily. Continue acute rehab.    #PE in R distal main with segmental extension  #DVT  -c/w Eliquis  - stable, asymptomatic on RA    #Stage 2 buttock pressure injury per wound care nurse specialist  - local care with barrier cream  - monitor    #Chronic venous insufficiency/ fluctuating LE edema  - did not improve off Norvasc  - changed HCTZ to Lasix 20mg daily and monitor daily weights -    weight decreasing 291 -> 289 -> 277    #Anemia-stable  -6/13 H/H 8.6/29  -6/16 H/H 7.9/26.4  -continue to trend     #HTN  -Amolodipine was halted in the setting of pitting edema on 6/2 and low bp  - Losartan decreased from 100mg to 50mg 6/5. BP remained in low range with Losartan held x 5 days. D/c Losartan 6/11 and monitor.  - change HCTZ to to Lasix 20mg daily 6/12 for improved edema control and monitor BP    #Hypokalemia   - give oral supplement and recheck in a few days  - improved on standing KCl, on 20mEq KCI   - stable, WNL    #LOUIS on CPAP  Stable    #Morbid obesity BMI 53.2  Dietary education

## 2025-06-17 NOTE — PROGRESS NOTE ADULT - SUBJECTIVE AND OBJECTIVE BOX
Patient is a 65y old  Female who presents with a chief complaint of Rehab of debility with proximal LE weakness and marked decline from baseline function, s/p multiple abdominal surgeries with complications / HTN, severe obesity, LOUIS on CPAP, Abby en y gastric bypass, and a recent admission for an incarcerated ventral hernia w/ sbo s/p lap converted to open ventral hernia repair w/ mesh and small bowel resection and s/p ex lap, 30 cm sbr, creation of new side to side ileoileostomy, ventral hernia repair w/ mesh. (03 Jun 2025 12:34)      HPI:  66 yo F with a PMHx of HTN, severe obesity, DVT, PE (R distal main with segmental extension) on Eliquis, LOUIS on CPAP, abby en y gastric bypass, and a recent admission for an incarcerated ventral hernia w/ SBO s/p lap converted to open ventral hernia repair w/ mesh and SBR and s/p ex lap, 30 cm SBR, creation of new side to side ileoileostomy, and ventral hernia repair w/ mesh in April 2025 presents back to the ER 2 days later from her LTACH facility ([place]) after persistent HTN and epistaxis in setting of Eliquis use and complex surgical history. Patient notes her epistaxis has stopped after self-tamponade for 25 minutes. On admission, patient endorsed purulent discharge from her incision. CT AP (5/27) showed extensive ventral wall postsurgical changes with gas and fluid containing midline collection along the lower abdomen, measuring  approximately 4.8 x 4.5 x 10.2 cm. IR evaluated collection and did not recommend a drain. Wound culture positive for rare enterococcus avium (5/28). Patient currently on abx via midline per ID recs. Patient has been tolerating diet, passing regular bowel movements and voiding without nausea or vomiting. Denies SOB, chest pain, fever or chills. Rates her abd pain 1/10 at its worst.     Prior to the debility with proximal LE weakness and marked decline s/p multiple abd surgeries with complications, the patient was independent in ADLs and ambulation. Patient now requires moderate assistance with ambulation and ADLs 2/2 to the debility. Therefore, there is a significant functional decline from baseline. Patients also with medical comorbidities of recent PE in R distal main with segmental extension and DVT on Eliquis, LOUIS on CPAP, HTN, and morbid obesity requiring medication management and monitoring of vitals by Physiatry team and nursing. There are significant comorbidities which warrants acute rehab hospitalization. To return home it is in the patient’s best interest to have acute inpatient rehabilitative services to undergo intensive interdisciplinary therapy. Of note, the patient lives alone with multiple steps and would have difficulty performing ADLs and iADLs individually. Furthermore, the patient is motivated and is able to tolerate up to 3 hours of daily therapy for 5-6 days a week for a total of 15 hours a week. Evaluated by Physiatry and deemed to be an excellent candidate for admission to  for acute inpatient rehab. Admitted to Acute Rehab on 6/2/2025.      Patient seen and examined by Physiatry and is currently functioning at bed mobility minimum assist, sit to stand transfer with CGA, ambulates 30ftx1 with RW, CGA and 1P assist, UBD minimum assist, and LBD dependent..     LS: Patient. lives with  but came from [facility] after last admission  PLOF: Independent with all ADLs and iADLs and ambulates without assistive device.      (02 Jun 2025 13:50)    TODAY'S SUBJECTIVE & REVIEW OF SYMPTOMS  No acute events overnight. No new complaints. ***  Tolerating PT/OT. Voiding spontaneously and having regular BM.     Vital signs reviewed. Has been intermittently mildly tachycardiac. Patient denies symptoms. Hx of PE and on Eliquis.   Continues to have fluctuating BLE edema and on Lasix. Daily weights ordered.  (6/17) 277.7 pounds      Review of Systems:   Constitutional:       [x  ] WNL           [   ] poor appetite   [   ] insomnia   [   ] tired   Cardio:                [   ] WNL           [   ] CP   [x  ] CEBALLOS - improving   [   ] palpitations               Resp:                   [  x ] WNL           [   ] SOB   [   ] cough   [   ] wheezing   GI:                        [  x ] WNL           [   ] constipation   [   ] diarrhea   [   ] abdominal pain   [   ] nausea   [   ] emesis                                :                      [  x ] WNL           [   ] RODRIGUES  [   ] dysuria   [   ] difficulty voiding             Endo:                   [  x ] WNL          [   ] polyuria   [   ] temperature intolerance                 Skin:                     [   ] WNL          [   ] pain   [ x  ] wound-incision at abd site, c/d/i  [   ] rash   MSK:                    [   ] WNL          [   ] muscle pain   [  ] joint pain/ stiffness   [   ] muscle tenderness   [ x  ] swelling in BLE   Neuro:                 [   ] WNL          [   ] HA   [  ] change in vision   [   ] tremor   [ x  ] weakness   [   ]dysphagia              Cognitive:           [ x  ] WNL           [   ] occasional confusion      Psych:                  [  x ] WNL           [   ] hallucinations   [   ]agitation   [   ] delusion   [   ]depression    CLOF:  transfer SA  dressing mod assist  ambulates 50ft with RW and steadyA  4 steps mod assist      PHYSICAL EXAMINATION   Vital Signs Last 24 Hrs  T(C): 36.6 (17 Jun 2025 05:41), Max: 36.9 (16 Jun 2025 12:21)  T(F): 97.9 (17 Jun 2025 05:41), Max: 98.4 (16 Jun 2025 12:21)  HR: 72 (17 Jun 2025 05:41) (72 - 102)  BP: 125/77 (17 Jun 2025 05:41) (125/77 - 167/92)  BP(mean): 117 (16 Jun 2025 20:45) (117 - 117)  RR: 18 (17 Jun 2025 05:41) (18 - 20)  SpO2: 99% (17 Jun 2025 05:41) (97% - 99%)    Parameters below as of 16 Jun 2025 20:45  Patient On (Oxygen Delivery Method): room air        General:[  x ] NAD, Resting Comfortable,   [   ] other:                                HEENT: [ x  ] NC/AT, EOMI, PERRL , Normal Conjunctivae,   [   ] other:  Cardio: [  x ] RRR, no murmur,   [   ] other:                              Pulm: [  x ] No Respiratory Distress,  Lungs CTAB,   [   ] other:                       Abdomen: [  x ]ND/NT, Soft, abd incision site c/d/i   [   ] other:    : [  x ] NO RODRIGUES CATHETER, [   ] RODRIGUES CATHETER- no meatal tear, no discharge, [   ] other:                                            MSK: [ x  ] No joint swelling, Full ROM,   [   ] other:                                         Ext: [   ]No C/C/E, No calf tenderness,   [ x  ]other: 2-3+ pitting edema in BLE    Skin: [   ]intact,   [ x  ] other: abd incision c/d/i   - 1 cm deep open wound along incision, with 1/4 inch gauze packing                                                                Neurological Examination:  Cognitive: [  x  ] AAO x 3,   [    ]  other:                                                                        Attention:  [  x  ] intact,   [  x  ]  other: pleasant and cooperative                            Memory: [  x  ] intact,  [    ]  other:      Mood/Affect: [ x   ] wnl,    [    ]  other:                                                                             Communication: [ x   ]Fluent, no dysarthria, following commands:  [    ] other:    CN II - XII:  [  x  ] intact,  [    ] other:                                                                                   Motor:   RIGHT UE:  3+/5 shoulder abduction, 5/5 elbow flexion/extension, 5/5 hand    LEFT    UE: 3+/5 shoulder abduction, 5/5 elbow flexion/extension, 5/5 hand   RIGHT LE: 3+/5 hip flexion, 4+/5 knee flexion/extension, 4+/5 DF/PF (limited by pitting edema)  LEFT  LE:  3+/5 hip flexion, 4+/5 knee flexion/extension, 4+/5 DF/PF (limited by pitting edema)  Tone: [  x  ] wnl,   [    ]  other:  DTRs: [  x ]symmetric, [   ] other:  Coordination:   [ x   ] intact,   [    ] other:                                                                         Sensory: [ x   ] Intact to light touch,   [    ] other:        MEDICATION  MEDICATIONS  (STANDING):  apixaban 5 milliGRAM(s) Oral every 12 hours  ascorbic acid 500 milliGRAM(s) Oral daily  chlorhexidine 2% Cloths 1 Application(s) Topical <User Schedule>  cyanocobalamin 1000 MICROGram(s) Oral daily  ergocalciferol 13203 Unit(s) Oral <User Schedule>  furosemide    Tablet 20 milliGRAM(s) Oral daily  lactobacillus acidophilus 1 Tablet(s) Oral daily  magnesium oxide 400 milliGRAM(s) Oral every 12 hours  meropenem  IVPB 1000 milliGRAM(s) IV Intermittent every 8 hours  multivitamin/minerals 1 Tablet(s) Oral daily  pantoprazole    Tablet 40 milliGRAM(s) Oral before breakfast  potassium chloride    Tablet ER 20 milliEquivalent(s) Oral daily  tiotropium 2.5 MICROgram(s) Inhaler 2 Puff(s) Inhalation daily  zinc sulfate 220 milliGRAM(s) Oral every 24 hours    MEDICATIONS  (PRN):  acetaminophen     Tablet .. 650 milliGRAM(s) Oral every 6 hours PRN Mild Pain (1 - 3)  albuterol    90 MICROgram(s) HFA Inhaler 2 Puff(s) Inhalation every 6 hours PRN Shortness of Breath and/or Wheezing  ondansetron Injectable 4 milliGRAM(s) IV Push every 6 hours PRN Nausea  sodium chloride 0.65% Nasal 1 Spray(s) Both Nostrils every 4 hours PRN Nasal Congestion        RECENT LABS/IMAGING                        7.9    4.86  )-----------( 239      ( 16 Jun 2025 06:38 )             26.4     06-16    144  |  108  |  16  ----------------------------<  94  4.2   |  25  |  0.6[L]    Ca    8.2[L]      16 Jun 2025 06:38  Mg     1.8     06-16    TPro  5.3[L]  /  Alb  3.0[L]  /  TBili  0.3  /  DBili  x   /  AST  18  /  ALT  23  /  AlkPhos  101  06-16      Urinalysis Basic - ( 16 Jun 2025 06:38 )    Color: x / Appearance: x / SG: x / pH: x  Gluc: 94 mg/dL / Ketone: x  / Bili: x / Urobili: x   Blood: x / Protein: x / Nitrite: x   Leuk Esterase: x / RBC: x / WBC x   Sq Epi: x / Non Sq Epi: x / Bacteria: x                         Patient is a 65y old  Female who presents with a chief complaint of Rehab of debility with proximal LE weakness and marked decline from baseline function, s/p multiple abdominal surgeries with complications / HTN, severe obesity, LOUIS on CPAP, Abby en y gastric bypass, and a recent admission for an incarcerated ventral hernia w/ sbo s/p lap converted to open ventral hernia repair w/ mesh and small bowel resection and s/p ex lap, 30 cm sbr, creation of new side to side ileoileostomy, ventral hernia repair w/ mesh. (03 Jun 2025 12:34)      HPI:  64 yo F with a PMHx of HTN, severe obesity, DVT, PE (R distal main with segmental extension) on Eliquis, LOUIS on CPAP, abby en y gastric bypass, and a recent admission for an incarcerated ventral hernia w/ SBO s/p lap converted to open ventral hernia repair w/ mesh and SBR and s/p ex lap, 30 cm SBR, creation of new side to side ileoileostomy, and ventral hernia repair w/ mesh in April 2025 presents back to the ER 2 days later from her LTACH facility ([place]) after persistent HTN and epistaxis in setting of Eliquis use and complex surgical history. Patient notes her epistaxis has stopped after self-tamponade for 25 minutes. On admission, patient endorsed purulent discharge from her incision. CT AP (5/27) showed extensive ventral wall postsurgical changes with gas and fluid containing midline collection along the lower abdomen, measuring  approximately 4.8 x 4.5 x 10.2 cm. IR evaluated collection and did not recommend a drain. Wound culture positive for rare enterococcus avium (5/28). Patient currently on abx via midline per ID recs. Patient has been tolerating diet, passing regular bowel movements and voiding without nausea or vomiting. Denies SOB, chest pain, fever or chills. Rates her abd pain 1/10 at its worst.     Prior to the debility with proximal LE weakness and marked decline s/p multiple abd surgeries with complications, the patient was independent in ADLs and ambulation. Patient now requires moderate assistance with ambulation and ADLs 2/2 to the debility. Therefore, there is a significant functional decline from baseline. Patients also with medical comorbidities of recent PE in R distal main with segmental extension and DVT on Eliquis, LOUIS on CPAP, HTN, and morbid obesity requiring medication management and monitoring of vitals by Physiatry team and nursing. There are significant comorbidities which warrants acute rehab hospitalization. To return home it is in the patient’s best interest to have acute inpatient rehabilitative services to undergo intensive interdisciplinary therapy. Of note, the patient lives alone with multiple steps and would have difficulty performing ADLs and iADLs individually. Furthermore, the patient is motivated and is able to tolerate up to 3 hours of daily therapy for 5-6 days a week for a total of 15 hours a week. Evaluated by Physiatry and deemed to be an excellent candidate for admission to  for acute inpatient rehab. Admitted to Acute Rehab on 6/2/2025.      Patient seen and examined by Physiatry and is currently functioning at bed mobility minimum assist, sit to stand transfer with CGA, ambulates 30ftx1 with RW, CGA and 1P assist, UBD minimum assist, and LBD dependent..     LS: Patient. lives with  but came from [facility] after last admission  PLOF: Independent with all ADLs and iADLs and ambulates without assistive device.      (02 Jun 2025 13:50)    TODAY'S SUBJECTIVE & REVIEW OF SYMPTOMS  No acute events overnight. No new complaints.   Tolerating PT/OT. Voiding spontaneously and having regular BM.     Vital signs reviewed. Has been intermittently mildly tachycardiac. Patient denies symptoms. Hx of PE and on Eliquis.   Continues to have fluctuating BLE edema and on Lasix. Daily weights ordered.  (6/17) 277.7 pounds      Review of Systems:   Constitutional:       [x  ] WNL           [   ] poor appetite   [   ] insomnia   [   ] tired   Cardio:                [   ] WNL           [   ] CP   [x  ] CEBALLOS - improving   [   ] palpitations               Resp:                   [  x ] WNL           [   ] SOB   [   ] cough   [   ] wheezing   GI:                        [  x ] WNL           [   ] constipation   [   ] diarrhea   [   ] abdominal pain   [   ] nausea   [   ] emesis                                :                      [  x ] WNL           [   ] RODRIGUES  [   ] dysuria   [   ] difficulty voiding             Endo:                   [  x ] WNL          [   ] polyuria   [   ] temperature intolerance                 Skin:                     [   ] WNL          [   ] pain   [ x  ] wound-incision at abd site, c/d/i  [   ] rash   MSK:                    [   ] WNL          [   ] muscle pain   [  ] joint pain/ stiffness   [   ] muscle tenderness   [ x  ] swelling in BLE   Neuro:                 [   ] WNL          [   ] HA   [  ] change in vision   [   ] tremor   [ x  ] weakness   [   ]dysphagia              Cognitive:           [ x  ] WNL           [   ] occasional confusion      Psych:                  [  x ] WNL           [   ] hallucinations   [   ]agitation   [   ] delusion   [   ]depression    CLOF:  transfer SA  dressing mod assist  ambulates 50ft with RW and steadyA  4 steps mod assist      PHYSICAL EXAMINATION   Vital Signs Last 24 Hrs  T(C): 36.6 (17 Jun 2025 05:41), Max: 36.9 (16 Jun 2025 12:21)  T(F): 97.9 (17 Jun 2025 05:41), Max: 98.4 (16 Jun 2025 12:21)  HR: 72 (17 Jun 2025 05:41) (72 - 102)  BP: 125/77 (17 Jun 2025 05:41) (125/77 - 167/92)  BP(mean): 117 (16 Jun 2025 20:45) (117 - 117)  RR: 18 (17 Jun 2025 05:41) (18 - 20)  SpO2: 99% (17 Jun 2025 05:41) (97% - 99%)    Parameters below as of 16 Jun 2025 20:45  Patient On (Oxygen Delivery Method): room air        General:[  x ] NAD, Resting Comfortable,   [   ] other:                                HEENT: [ x  ] NC/AT, EOMI, PERRL , Normal Conjunctivae,   [   ] other:  Cardio: [  x ] RRR, no murmur,   [   ] other:                              Pulm: [  x ] No Respiratory Distress,  Lungs CTAB,   [   ] other:                       Abdomen: [  x ]ND/NT, Soft, abd incision site c/d/i   [   ] other:    : [  x ] NO RODRIGUES CATHETER, [   ] RODRIGUES CATHETER- no meatal tear, no discharge, [   ] other:                                            MSK: [ x  ] No joint swelling, Full ROM,   [   ] other:                                         Ext: [   ]No C/C/E, No calf tenderness,   [ x  ]other: 2-3+ pitting edema in BLE    Skin: [   ]intact,   [ x  ] other: abd incision c/d/i   - 1 cm deep open wound along incision, with 1/4 inch gauze packing                                                                Neurological Examination:  Cognitive: [  x  ] AAO x 3,   [    ]  other:                                                                        Attention:  [  x  ] intact,   [  x  ]  other: pleasant and cooperative                            Memory: [  x  ] intact,  [    ]  other:      Mood/Affect: [ x   ] wnl,    [    ]  other:                                                                             Communication: [ x   ]Fluent, no dysarthria, following commands:  [    ] other:    CN II - XII:  [  x  ] intact,  [    ] other:                                                                                   Motor:   RIGHT UE:  3+/5 shoulder abduction, 5/5 elbow flexion/extension, 5/5 hand    LEFT    UE: 3+/5 shoulder abduction, 5/5 elbow flexion/extension, 5/5 hand   RIGHT LE: 3+/5 hip flexion, 4+/5 knee flexion/extension, 4+/5 DF/PF (limited by pitting edema)  LEFT  LE:  3+/5 hip flexion, 4+/5 knee flexion/extension, 4+/5 DF/PF (limited by pitting edema)  Tone: [  x  ] wnl,   [    ]  other:  DTRs: [  x ]symmetric, [   ] other:  Coordination:   [ x   ] intact,   [    ] other:                                                                         Sensory: [ x   ] Intact to light touch,   [    ] other:        MEDICATION  MEDICATIONS  (STANDING):  apixaban 5 milliGRAM(s) Oral every 12 hours  ascorbic acid 500 milliGRAM(s) Oral daily  chlorhexidine 2% Cloths 1 Application(s) Topical <User Schedule>  cyanocobalamin 1000 MICROGram(s) Oral daily  ergocalciferol 83807 Unit(s) Oral <User Schedule>  furosemide    Tablet 20 milliGRAM(s) Oral daily  lactobacillus acidophilus 1 Tablet(s) Oral daily  magnesium oxide 400 milliGRAM(s) Oral every 12 hours  meropenem  IVPB 1000 milliGRAM(s) IV Intermittent every 8 hours  multivitamin/minerals 1 Tablet(s) Oral daily  pantoprazole    Tablet 40 milliGRAM(s) Oral before breakfast  potassium chloride    Tablet ER 20 milliEquivalent(s) Oral daily  tiotropium 2.5 MICROgram(s) Inhaler 2 Puff(s) Inhalation daily  zinc sulfate 220 milliGRAM(s) Oral every 24 hours    MEDICATIONS  (PRN):  acetaminophen     Tablet .. 650 milliGRAM(s) Oral every 6 hours PRN Mild Pain (1 - 3)  albuterol    90 MICROgram(s) HFA Inhaler 2 Puff(s) Inhalation every 6 hours PRN Shortness of Breath and/or Wheezing  ondansetron Injectable 4 milliGRAM(s) IV Push every 6 hours PRN Nausea  sodium chloride 0.65% Nasal 1 Spray(s) Both Nostrils every 4 hours PRN Nasal Congestion        RECENT LABS/IMAGING                        7.9    4.86  )-----------( 239      ( 16 Jun 2025 06:38 )             26.4     06-16    144  |  108  |  16  ----------------------------<  94  4.2   |  25  |  0.6[L]    Ca    8.2[L]      16 Jun 2025 06:38  Mg     1.8     06-16    TPro  5.3[L]  /  Alb  3.0[L]  /  TBili  0.3  /  DBili  x   /  AST  18  /  ALT  23  /  AlkPhos  101  06-16      Urinalysis Basic - ( 16 Jun 2025 06:38 )    Color: x / Appearance: x / SG: x / pH: x  Gluc: 94 mg/dL / Ketone: x  / Bili: x / Urobili: x   Blood: x / Protein: x / Nitrite: x   Leuk Esterase: x / RBC: x / WBC x   Sq Epi: x / Non Sq Epi: x / Bacteria: x                         Patient is a 65y old  Female who presents with a chief complaint of Rehab of debility with proximal LE weakness and marked decline from baseline function, s/p multiple abdominal surgeries with complications / HTN, severe obesity, LOUIS on CPAP, Abby en y gastric bypass, and a recent admission for an incarcerated ventral hernia w/ sbo s/p lap converted to open ventral hernia repair w/ mesh and small bowel resection and s/p ex lap, 30 cm sbr, creation of new side to side ileoileostomy, ventral hernia repair w/ mesh. (03 Jun 2025 12:34)      HPI:  64 yo F with a PMHx of HTN, severe obesity, DVT, PE (R distal main with segmental extension) on Eliquis, LOUIS on CPAP, abby en y gastric bypass, and a recent admission for an incarcerated ventral hernia w/ SBO s/p lap converted to open ventral hernia repair w/ mesh and SBR and s/p ex lap, 30 cm SBR, creation of new side to side ileoileostomy, and ventral hernia repair w/ mesh in April 2025 presents back to the ER 2 days later from her LTACH facility ([place]) after persistent HTN and epistaxis in setting of Eliquis use and complex surgical history. Patient notes her epistaxis has stopped after self-tamponade for 25 minutes. On admission, patient endorsed purulent discharge from her incision. CT AP (5/27) showed extensive ventral wall postsurgical changes with gas and fluid containing midline collection along the lower abdomen, measuring  approximately 4.8 x 4.5 x 10.2 cm. IR evaluated collection and did not recommend a drain. Wound culture positive for rare enterococcus avium (5/28). Patient currently on abx via midline per ID recs. Patient has been tolerating diet, passing regular bowel movements and voiding without nausea or vomiting. Denies SOB, chest pain, fever or chills. Rates her abd pain 1/10 at its worst.     Prior to the debility with proximal LE weakness and marked decline s/p multiple abd surgeries with complications, the patient was independent in ADLs and ambulation. Patient now requires moderate assistance with ambulation and ADLs 2/2 to the debility. Therefore, there is a significant functional decline from baseline. Patients also with medical comorbidities of recent PE in R distal main with segmental extension and DVT on Eliquis, LOUIS on CPAP, HTN, and morbid obesity requiring medication management and monitoring of vitals by Physiatry team and nursing. There are significant comorbidities which warrants acute rehab hospitalization. To return home it is in the patient’s best interest to have acute inpatient rehabilitative services to undergo intensive interdisciplinary therapy. Of note, the patient lives alone with multiple steps and would have difficulty performing ADLs and iADLs individually. Furthermore, the patient is motivated and is able to tolerate up to 3 hours of daily therapy for 5-6 days a week for a total of 15 hours a week. Evaluated by Physiatry and deemed to be an excellent candidate for admission to  for acute inpatient rehab. Admitted to Acute Rehab on 6/2/2025.      Patient seen and examined by Physiatry and is currently functioning at bed mobility minimum assist, sit to stand transfer with CGA, ambulates 30ftx1 with RW, CGA and 1P assist, UBD minimum assist, and LBD dependent..     LS: Patient. lives with  but came from [facility] after last admission  PLOF: Independent with all ADLs and iADLs and ambulates without assistive device.      (02 Jun 2025 13:50)    TODAY'S SUBJECTIVE & REVIEW OF SYMPTOMS  No acute events overnight. No new complaints.   Tolerating PT/OT. Voiding spontaneously and having regular BM.     Vital signs reviewed. Has been intermittently mildly tachycardiac. Patient denies symptoms. Hx of PE and on Eliquis.   Continues to have fluctuating BLE edema and on Lasix. Daily weights ordered.  (6/17) 277.7 pounds      Review of Systems:   Constitutional:       [x  ] WNL           [   ] poor appetite   [   ] insomnia   [   ] tired   Cardio:                [ x  ] WNL           [   ] CP   [   ] CEBALLOS - improving   [   ] palpitations               Resp:                   [  x ] WNL           [   ] SOB   [   ] cough   [   ] wheezing   GI:                        [  x ] WNL           [   ] constipation   [   ] diarrhea   [   ] abdominal pain   [   ] nausea   [   ] emesis                                :                      [  x ] WNL           [   ] RODRIGUES  [   ] dysuria   [   ] difficulty voiding             Endo:                   [  x ] WNL          [   ] polyuria   [   ] temperature intolerance                 Skin:                     [   ] WNL          [   ] pain   [ x  ] wound-incision at abd site, c/d/i  [   ] rash   MSK:                    [   ] WNL          [   ] muscle pain   [  ] joint pain/ stiffness   [   ] muscle tenderness   [ x  ] swelling in BLE   Neuro:                 [   ] WNL          [   ] HA   [  ] change in vision   [   ] tremor   [ x  ] weakness   [   ]dysphagia              Cognitive:           [ x  ] WNL           [   ] occasional confusion      Psych:                  [  x ] WNL           [   ] hallucinations   [   ]agitation   [   ] delusion   [   ]depression    CLOF:  transfer Supervision  dressing min/ mod assist  ambulates with RW supervision  5 steps CG      PHYSICAL EXAMINATION   Vital Signs Last 24 Hrs  T(C): 36.6 (17 Jun 2025 05:41), Max: 36.9 (16 Jun 2025 12:21)  T(F): 97.9 (17 Jun 2025 05:41), Max: 98.4 (16 Jun 2025 12:21)  HR: 72 (17 Jun 2025 05:41) (72 - 102)  BP: 125/77 (17 Jun 2025 05:41) (125/77 - 167/92)  BP(mean): 117 (16 Jun 2025 20:45) (117 - 117)  RR: 18 (17 Jun 2025 05:41) (18 - 20)  SpO2: 99% (17 Jun 2025 05:41) (97% - 99%)    Parameters below as of 16 Jun 2025 20:45  Patient On (Oxygen Delivery Method): room air        General:[  x ] NAD, Resting Comfortable,   [   ] other:                                HEENT: [ x  ] NC/AT, EOMI, PERRL , Normal Conjunctivae,   [   ] other:  Cardio: [  x ] RRR, no murmur,   [   ] other:                              Pulm: [  x ] No Respiratory Distress,  Lungs CTAB,   [   ] other:                       Abdomen: [  x ]ND/NT, Soft, abd incision site c/d/i   [   ] other:    : [  x ] NO RODRIGUES CATHETER, [   ] RODRIGUES CATHETER- no meatal tear, no discharge, [   ] other:                                            MSK: [ x  ] No joint swelling, Full ROM,   [   ] other:                                         Ext: [   ]No C/C/E, No calf tenderness,   [ x  ]other: 2-3+ pitting edema in BLE    Skin: [   ]intact,   [ x  ] other: abd incision c/d/i   - 1 cm deep open wound along incision, with 1/4 inch gauze packing                                                                Neurological Examination:  Cognitive: [  x  ] AAO x 3,   [    ]  other:                                                                        Attention:  [  x  ] intact,   [  x  ]  other: pleasant and cooperative                            Memory: [  x  ] intact,  [    ]  other:      Mood/Affect: [ x   ] wnl,    [    ]  other:                                                                             Communication: [ x   ]Fluent, no dysarthria, following commands:  [    ] other:    CN II - XII:  [  x  ] intact,  [    ] other:                                                                                   Motor:   RIGHT UE:  3+/5 shoulder abduction, 5/5 elbow flexion/extension, 5/5 hand    LEFT    UE: 3+/5 shoulder abduction, 5/5 elbow flexion/extension, 5/5 hand   RIGHT LE: 3+/5 hip flexion, 4+/5 knee flexion/extension, 4+/5 DF/PF (limited by pitting edema)  LEFT  LE:  3+/5 hip flexion, 4+/5 knee flexion/extension, 4+/5 DF/PF (limited by pitting edema)  Tone: [  x  ] wnl,   [    ]  other:  DTRs: [  x ]symmetric, [   ] other:  Coordination:   [ x   ] intact,   [    ] other:                                                                         Sensory: [ x   ] Intact to light touch,   [    ] other:        MEDICATION  MEDICATIONS  (STANDING):  apixaban 5 milliGRAM(s) Oral every 12 hours  ascorbic acid 500 milliGRAM(s) Oral daily  chlorhexidine 2% Cloths 1 Application(s) Topical <User Schedule>  cyanocobalamin 1000 MICROGram(s) Oral daily  ergocalciferol 02344 Unit(s) Oral <User Schedule>  furosemide    Tablet 20 milliGRAM(s) Oral daily  lactobacillus acidophilus 1 Tablet(s) Oral daily  magnesium oxide 400 milliGRAM(s) Oral every 12 hours  meropenem  IVPB 1000 milliGRAM(s) IV Intermittent every 8 hours  multivitamin/minerals 1 Tablet(s) Oral daily  pantoprazole    Tablet 40 milliGRAM(s) Oral before breakfast  potassium chloride    Tablet ER 20 milliEquivalent(s) Oral daily  tiotropium 2.5 MICROgram(s) Inhaler 2 Puff(s) Inhalation daily  zinc sulfate 220 milliGRAM(s) Oral every 24 hours    MEDICATIONS  (PRN):  acetaminophen     Tablet .. 650 milliGRAM(s) Oral every 6 hours PRN Mild Pain (1 - 3)  albuterol    90 MICROgram(s) HFA Inhaler 2 Puff(s) Inhalation every 6 hours PRN Shortness of Breath and/or Wheezing  ondansetron Injectable 4 milliGRAM(s) IV Push every 6 hours PRN Nausea  sodium chloride 0.65% Nasal 1 Spray(s) Both Nostrils every 4 hours PRN Nasal Congestion        RECENT LABS/IMAGING                        7.9    4.86  )-----------( 239      ( 16 Jun 2025 06:38 )             26.4     06-16    144  |  108  |  16  ----------------------------<  94  4.2   |  25  |  0.6[L]    Ca    8.2[L]      16 Jun 2025 06:38  Mg     1.8     06-16    TPro  5.3[L]  /  Alb  3.0[L]  /  TBili  0.3  /  DBili  x   /  AST  18  /  ALT  23  /  AlkPhos  101  06-16

## 2025-06-17 NOTE — DISCHARGE NOTE PROVIDER - REASON FOR ADMISSION
Rehab of debility with proximal LE weakness and marked decline from baseline function, s/p multiple abdominal surgeries with complications / HTN, severe obesity, LOUIS on CPAP, Abby en y gastric bypass, and a recent admission for an incarcerated ventral hernia w/ sbo s/p lap converted to open ventral hernia repair w/ mesh and small bowel resection and s/p ex lap, 30 cm sbr, creation of new side to side ileoileostomy, ventral hernia repair w/ mesh.

## 2025-06-17 NOTE — DISCHARGE NOTE NURSING/CASE MANAGEMENT/SOCIAL WORK - PATIENT PORTAL LINK FT
You can access the FollowMyHealth Patient Portal offered by [facility] by registering at the following website: [email or web address]. By joining [facility]’s FollowMyHealth portal, you will also be able to view your health information using other applications (apps) compatible with our system.

## 2025-06-17 NOTE — DISCHARGE NOTE PROVIDER - NSDCFUADDAPPT_GEN_ALL_CORE_FT
Follow up with Dr. Perdue- Infectious Disease Physician  Follow up with Dr. Perdue- Infectious Disease Physician and dr Lema as advised

## 2025-06-17 NOTE — DISCHARGE NOTE PROVIDER - NSDCFUSCHEDAPPT_GEN_ALL_CORE_FT
Erica Abbott  [facility]  GENSUR [address]  Scheduled Appointment: 07/25/2025     Maximo Roach  [facility]  VASCULAR [address]  Scheduled Appointment: 07/07/2025    Rose Lema  [facility]  GENSURG [address]  Scheduled Appointment: 07/09/2025    Erica Abbott  [facility]  GENSURG [address]  Scheduled Appointment: 07/25/2025

## 2025-06-17 NOTE — DISCHARGE NOTE PROVIDER - NSDCMRMEDTOKEN_GEN_ALL_CORE_FT
apixaban 5 mg oral tablet: 1 tab(s) orally every 12 hours  cyanocobalamin 2500 mcg sublingual tablet: 1 tab(s) sublingually once a day  ertapenem 1 g injection: 1 gram(s) intravenously every 24 hours 1g Daily via IV line until 6/29/25  furosemide 20 mg oral tablet: 1 tab(s) orally once a day  ipratropium-albuterol 0.5 mg-2.5 mg/3 mL inhalation solution: 3 milliliter(s) by nebulizer every 6 hours as needed for  shortness of breath and/or wheezing  lactobacillus acidophilus oral capsule: 1 cap(s) orally once a day  Multiple Vitamins with Minerals oral tablet: 1 tab(s) orally once a day  omeprazole 40 mg oral delayed release capsule: 1 cap(s) orally once a day as needed for  heartburn  Wegovy (2.4 mg dose) subcutaneous solution: 2.4 milligram(s) subcutaneously   albuterol 90 mcg/inh inhalation aerosol: 2 puff(s) inhaled 4 times a day as needed for sob and wheezing  apixaban 5 mg oral tablet: 1 tab(s) orally every 12 hours  ascorbic acid 500 mg oral tablet: 1 tablet with sensor orally once a day  cyanocobalamin 2500 mcg sublingual tablet: 1 tab(s) sublingually once a day  ergocalciferol 1.25 mg (50,000 intl units) oral capsule: 1 cap(s) orally 2 times a week Patient s/P  weight loss surgery with malabsorption  , this 2 times  a week is her home dose  ertapenem 1 g injection: 1 gram(s) intravenously every 24 hours 1g Daily via IV line  mid line on left upper  arm , as per id doctor  and dr Lema the surgeon .  furosemide 20 mg oral tablet: 1 tab(s) orally once a day  ipratropium-albuterol 0.5 mg-2.5 mg/3 mL inhalation solution: 3 milliliter(s) by nebulizer every 6 hours as needed for  shortness of breath and/or wheezing  lactobacillus acidophilus oral capsule: 1 cap(s) orally once a day  losartan 50 mg oral tablet: 1 tab(s) orally once a day  magnesium glycinate 200 mg oral tablet: 2 tab(s) orally once a day  Multiple Vitamins with Minerals oral tablet: 1 tab(s) orally once a day  omeprazole 40 mg oral delayed release capsule: 1 cap(s) orally once a day as needed for  heartburn  potassium chloride 20 mEq oral tablet, extended release: 1 tab(s) orally once a day  tiotropium 2.5 mcg/inh inhalation aerosol: 2 puff(s) inhaled once a day  Wegovy (2.4 mg dose) subcutaneous solution: 2.4 milligram(s) subcutaneously  zinc sulfate 220 mg (as elemental zinc 50 mg) oral capsule: 1 tablet with sensor orally once a day take  for 20 days then give  a break then restart  in 2 weeks , to aid in healing the wound

## 2025-06-17 NOTE — CHART NOTE - NSCHARTNOTEFT_GEN_A_CORE
Patient will require heavy duty RW due debility with proximal LE weakness and marked decline from baseline function, s/p multiple abdominal surgeries with complications / HTN, severe obesity, LOUIS on CPAP, Abby en y gastric bypass, and a recent admission for an incarcerated ventral hernia w/ sbo s/p lap converted to open ventral hernia repair w/ mesh and small bowel resection and s/p ex lap, 30 cm sbr, creation of new side to side ileoileostomy, ventral hernia repair w/ mesh for safe DC. Pt's mobility deficit can be sufficiently resolved with the use of the heavy duty rolling walker.   Patient is able to safely use the heavy duty rolling walker.

## 2025-06-18 ENCOUNTER — APPOINTMENT (OUTPATIENT)
Age: 65
End: 2025-06-18

## 2025-06-18 LAB
ANION GAP SERPL CALC-SCNC: 10 MMOL/L — SIGNIFICANT CHANGE UP (ref 7–14)
BASOPHILS # BLD AUTO: 0.01 K/UL — SIGNIFICANT CHANGE UP (ref 0–0.2)
BASOPHILS NFR BLD AUTO: 0.2 % — SIGNIFICANT CHANGE UP (ref 0–1)
BUN SERPL-MCNC: 14 MG/DL — SIGNIFICANT CHANGE UP (ref 10–20)
CALCIUM SERPL-MCNC: 8.3 MG/DL — LOW (ref 8.4–10.5)
CHLORIDE SERPL-SCNC: 109 MMOL/L — SIGNIFICANT CHANGE UP (ref 98–110)
CO2 SERPL-SCNC: 26 MMOL/L — SIGNIFICANT CHANGE UP (ref 17–32)
CREAT SERPL-MCNC: 0.7 MG/DL — SIGNIFICANT CHANGE UP (ref 0.7–1.5)
EGFR: 97 ML/MIN/1.73M2 — SIGNIFICANT CHANGE UP
EGFR: 97 ML/MIN/1.73M2 — SIGNIFICANT CHANGE UP
EOSINOPHIL # BLD AUTO: 0.11 K/UL — SIGNIFICANT CHANGE UP (ref 0–0.7)
EOSINOPHIL NFR BLD AUTO: 2.4 % — SIGNIFICANT CHANGE UP (ref 0–8)
GLUCOSE SERPL-MCNC: 91 MG/DL — SIGNIFICANT CHANGE UP (ref 70–99)
HCT VFR BLD CALC: 26 % — LOW (ref 37–47)
HGB BLD-MCNC: 7.9 G/DL — LOW (ref 12–16)
IMM GRANULOCYTES NFR BLD AUTO: 0.7 % — HIGH (ref 0.1–0.3)
LYMPHOCYTES # BLD AUTO: 1.52 K/UL — SIGNIFICANT CHANGE UP (ref 1.2–3.4)
LYMPHOCYTES # BLD AUTO: 33.8 % — SIGNIFICANT CHANGE UP (ref 20.5–51.1)
MCHC RBC-ENTMCNC: 29 PG — SIGNIFICANT CHANGE UP (ref 27–31)
MCHC RBC-ENTMCNC: 30.4 G/DL — LOW (ref 32–37)
MCV RBC AUTO: 95.6 FL — SIGNIFICANT CHANGE UP (ref 81–99)
MONOCYTES # BLD AUTO: 0.38 K/UL — SIGNIFICANT CHANGE UP (ref 0.1–0.6)
MONOCYTES NFR BLD AUTO: 8.4 % — SIGNIFICANT CHANGE UP (ref 1.7–9.3)
NEUTROPHILS # BLD AUTO: 2.45 K/UL — SIGNIFICANT CHANGE UP (ref 1.4–6.5)
NEUTROPHILS NFR BLD AUTO: 54.5 % — SIGNIFICANT CHANGE UP (ref 42.2–75.2)
NRBC BLD AUTO-RTO: 0 /100 WBCS — SIGNIFICANT CHANGE UP (ref 0–0)
PLATELET # BLD AUTO: 239 K/UL — SIGNIFICANT CHANGE UP (ref 130–400)
PMV BLD: 9.8 FL — SIGNIFICANT CHANGE UP (ref 7.4–10.4)
POTASSIUM SERPL-MCNC: 4 MMOL/L — SIGNIFICANT CHANGE UP (ref 3.5–5)
POTASSIUM SERPL-SCNC: 4 MMOL/L — SIGNIFICANT CHANGE UP (ref 3.5–5)
RBC # BLD: 2.72 M/UL — LOW (ref 4.2–5.4)
RBC # FLD: 16.1 % — HIGH (ref 11.5–14.5)
SODIUM SERPL-SCNC: 145 MMOL/L — SIGNIFICANT CHANGE UP (ref 135–146)
WBC # BLD: 4.5 K/UL — LOW (ref 4.8–10.8)
WBC # FLD AUTO: 4.5 K/UL — LOW (ref 4.8–10.8)

## 2025-06-18 RX ADMIN — Medication 40 MILLIGRAM(S): at 06:39

## 2025-06-18 RX ADMIN — Medication 20 MILLIEQUIVALENT(S): at 12:26

## 2025-06-18 RX ADMIN — CYANOCOBALAMIN 1000 MICROGRAM(S): 1000 INJECTION INTRAMUSCULAR; SUBCUTANEOUS at 12:26

## 2025-06-18 RX ADMIN — Medication 1 TABLET(S): at 12:26

## 2025-06-18 RX ADMIN — TIOTROPIUM BROMIDE INHALATION SPRAY 2 PUFF(S): 3.12 SPRAY, METERED RESPIRATORY (INHALATION) at 07:25

## 2025-06-18 RX ADMIN — MEROPENEM 100 MILLIGRAM(S): 1 INJECTION INTRAVENOUS at 06:39

## 2025-06-18 RX ADMIN — Medication 400 MILLIGRAM(S): at 17:18

## 2025-06-18 RX ADMIN — Medication 500 MILLIGRAM(S): at 12:26

## 2025-06-18 RX ADMIN — Medication 400 MILLIGRAM(S): at 06:39

## 2025-06-18 RX ADMIN — Medication 220 MILLIGRAM(S): at 13:25

## 2025-06-18 RX ADMIN — MEROPENEM 100 MILLIGRAM(S): 1 INJECTION INTRAVENOUS at 13:25

## 2025-06-18 RX ADMIN — FUROSEMIDE 20 MILLIGRAM(S): 10 INJECTION INTRAMUSCULAR; INTRAVENOUS at 06:39

## 2025-06-18 RX ADMIN — MEROPENEM 100 MILLIGRAM(S): 1 INJECTION INTRAVENOUS at 21:13

## 2025-06-18 RX ADMIN — APIXABAN 5 MILLIGRAM(S): 2.5 TABLET, FILM COATED ORAL at 17:18

## 2025-06-18 RX ADMIN — APIXABAN 5 MILLIGRAM(S): 2.5 TABLET, FILM COATED ORAL at 06:39

## 2025-06-18 NOTE — PROGRESS NOTE ADULT - ATTENDING COMMENTS
I reviewed the chart and examined the patient with the resident and we discussed the findings and treatment plan.  The patient is tolerating the rehab program well. I agree with the findings and treatment plan above, which I modified as indicated. The patient requires 3 hrs a day of acute inpatient rehab. No new complaints. Alert. VSS. LE edema and weight appear to be improving on Lasix 20mg. Repeat CT and ID recs appreciated. Fluid collection improving and not approachable to drain per IR. To go home on Ertapenem 1 gm daily x 4 more weeks and get f/u CT scan at that time. Refusing PICC line. Will need to get Midline replace in about 3 weeks. Continue to work on stair climbing and endurance. Antic d/c home Friday.    I read, edited and agree with the physical exam above and assessment:  #Rehab of debility with proximal LE weakness and marked decline from baseline function, s/p multiple abdominal surgeries with complications / HTN, morbid obesity, LOUIS on CPAP, Abby en y gastric bypass, and a recent admission for an incarcerated ventral hernia w/ sbo s/p lap converted to open ventral hernia repair w/ mesh and small bowel resection and s/p ex lap, 30 cm sbr, creation of new side to side ileoileostomy, ventral hernia repair w/ mesh.with impairment in mobility and ADLs and iADLs.The patient presented with co-morbidities requiring close physiatry follow-up at least 3 times a week to monitor his progress and monitor his comorbidities including HTN, PE, DVT, and LOUIS. .The patient's co-morbidities do not limit the patient's ability to participate in rehabilitative therapy. The patient was seen by PT/OT and required min A with bed mobility, CGA with sit to stand transfers, ambulates 30ftx1 with RW, CGA and 1P assist, UBD minimum assist, and LBD dependent.. The patient was previously independent with all ADLs and iADLs without use of AD and now presents with functional decline.   #Wound dehiscence and persistent intra-abdominal collection, suspect abscess in setting of multiple abdominal surgeries with complications  -5/28 Wcx grew rare Enterococcus avium    -Growth in fluid media only Escherichia coli ESBL    -5/30 midline placed  -Wound Care Instructions: Patient has a midline wound with staples and small 1 cm area of packing. Please remove and replace dressings once daily. Packing 1 cm opening with 0.25 inch packing strip, cut to length appropriate for wound. Cover incision with gauze and secure with paper tape.  -abd binder for support  -Infectious Disease is following. She is currently on Meropenem 1 gram IVPB q8h started 6/1. On discharge from acute rehab to home she will require Ertapenem 1 gram IVPB daily.   -6/17 repeat CTAP with decreased size in fluid collection       -IR consulted on 6/18, no drainable fluid collection       -per ID, will require additional 4 weeks of abx with repeat CTAP after   - continue packing of upper incision open area daily with strip gauze  -She requires acute rehab with 3 hours of daily therapies at least 5 out of 7 days and close physiatry follow up.   - tolerating therapies well. Strength and endurance improving daily. Continue acute rehab.    #PE in R distal main with segmental extension  #DVT  -c/w Eliquis  - stable, asymptomatic on RA    #Stage 2 buttock pressure injury per wound care nurse specialist  - local care with barrier cream  - monitor    #Chronic venous insufficiency/ fluctuating LE edema  - did not improve off Norvasc  - changed HCTZ to Lasix 20mg daily and monitor daily weights -    weight decreasing 291 -> 289 -> 277    #Anemia-stable  -6/13 H/H 8.6/29  -6/16 H/H 7.9/26.4  -continue to trend     #HTN  -Amolodipine was halted in the setting of pitting edema on 6/2 and low bp  - Losartan decreased from 100mg to 50mg 6/5. BP remained in low range with Losartan held x 5 days. D/c Losartan 6/11 and monitor.  - change HCTZ to to Lasix 20mg daily 6/12 for improved edema control and monitor BP    #Hypokalemia   - give oral supplement and recheck in a few days  - improved on standing KCl, on 20mEq KCI   - stable, WNL    #LOUIS on CPAP  Stable    #Morbid obesity BMI 53.2  Dietary education

## 2025-06-18 NOTE — PROGRESS NOTE ADULT - TIME BILLING
I have personally seen and examined this patient.  I have reviewed all pertinent clinical information and reviewed all relevant imaging and diagnostic studies personally.   I counseled the patient about diagnostic testing and treatment plan.   I discussed my recommendations with the primary team & Hospitalist I have personally seen and examined this patient.  I have reviewed all pertinent clinical information and reviewed all relevant imaging and diagnostic studies personally.   I counseled the patient about diagnostic testing and treatment plan.   I discussed my recommendations with the primary team Rehab

## 2025-06-18 NOTE — PROGRESS NOTE ADULT - SUBJECTIVE AND OBJECTIVE BOX
Patient is a 65y old  Female who presents with a chief complaint of Rehab of debility with proximal LE weakness and marked decline from baseline function, s/p multiple abdominal surgeries with complications / HTN, severe obesity, LOUIS on CPAP, Abby en y gastric bypass, and a recent admission for an incarcerated ventral hernia w/ sbo s/p lap converted to open ventral hernia repair w/ mesh and small bowel resection and s/p ex lap, 30 cm sbr, creation of new side to side ileoileostomy, ventral hernia repair w/ mesh. (03 Jun 2025 12:34)      HPI:  64 yo F with a PMHx of HTN, severe obesity, DVT, PE (R distal main with segmental extension) on Eliquis, LOUIS on CPAP, abby en y gastric bypass, and a recent admission for an incarcerated ventral hernia w/ SBO s/p lap converted to open ventral hernia repair w/ mesh and SBR and s/p ex lap, 30 cm SBR, creation of new side to side ileoileostomy, and ventral hernia repair w/ mesh in April 2025 presents back to the ER 2 days later from her LTACH facility ([facility]) after persistent HTN and epistaxis in setting of Eliquis use and complex surgical history. Patient notes her epistaxis has stopped after self-tamponade for 25 minutes. On admission, patient endorsed purulent discharge from her incision. CT AP (5/27) showed extensive ventral wall postsurgical changes with gas and fluid containing midline collection along the lower abdomen, measuring  approximately 4.8 x 4.5 x 10.2 cm. IR evaluated collection and did not recommend a drain. Wound culture positive for rare enterococcus avium (5/28). Patient currently on abx via midline per ID recs. Patient has been tolerating diet, passing regular bowel movements and voiding without nausea or vomiting. Denies SOB, chest pain, fever or chills. Rates her abd pain 1/10 at its worst.     Prior to the debility with proximal LE weakness and marked decline s/p multiple abd surgeries with complications, the patient was independent in ADLs and ambulation. Patient now requires moderate assistance with ambulation and ADLs 2/2 to the debility. Therefore, there is a significant functional decline from baseline. Patients also with medical comorbidities of recent PE in R distal main with segmental extension and DVT on Eliquis, LOUIS on CPAP, HTN, and morbid obesity requiring medication management and monitoring of vitals by Physiatry team and nursing. There are significant comorbidities which warrants acute rehab hospitalization. To return home it is in the patient’s best interest to have acute inpatient rehabilitative services to undergo intensive interdisciplinary therapy. Of note, the patient lives alone with multiple steps and would have difficulty performing ADLs and iADLs individually. Furthermore, the patient is motivated and is able to tolerate up to 3 hours of daily therapy for 5-6 days a week for a total of 15 hours a week. Evaluated by Physiatry and deemed to be an excellent candidate for admission to  for acute inpatient rehab. Admitted to Acute Rehab on 6/2/2025.      Patient seen and examined by Physiatry and is currently functioning at bed mobility minimum assist, sit to stand transfer with CGA, ambulates 30ftx1 with RW, CGA and 1P assist, UBD minimum assist, and LBD dependent..     LS: Patient. lives with  but came from [facility] after last admission  PLOF: Independent with all ADLs and iADLs and ambulates without assistive device.      (02 Jun 2025 13:50)    TODAY'S SUBJECTIVE & REVIEW OF SYMPTOMS  No acute events overnight. No new complaints.   Tolerating PT/OT. Voiding spontaneously and having regular BM.     Vital signs reviewed. Has been intermittently mildly tachycardiac. Patient denies symptoms. Hx of PE and on Eliquis.   Continues to have fluctuating BLE edema and on Lasix. Daily weights ordered.  (6/17) 277.7 pounds -> (6/18) 279.9 pounds     Review of Systems:   Constitutional:       [x  ] WNL           [   ] poor appetite   [   ] insomnia   [   ] tired   Cardio:                [ x  ] WNL           [   ] CP   [   ] CEBALLOS - improving   [   ] palpitations               Resp:                   [  x ] WNL           [   ] SOB   [   ] cough   [   ] wheezing   GI:                        [  x ] WNL           [   ] constipation   [   ] diarrhea   [   ] abdominal pain   [   ] nausea   [   ] emesis                                :                      [  x ] WNL           [   ] RODRIGUES  [   ] dysuria   [   ] difficulty voiding             Endo:                   [  x ] WNL          [   ] polyuria   [   ] temperature intolerance                 Skin:                     [   ] WNL          [   ] pain   [ x  ] wound-incision at abd site, c/d/i  [   ] rash   MSK:                    [   ] WNL          [   ] muscle pain   [  ] joint pain/ stiffness   [   ] muscle tenderness   [ x  ] swelling in BLE   Neuro:                 [   ] WNL          [   ] HA   [  ] change in vision   [   ] tremor   [ x  ] weakness   [   ]dysphagia              Cognitive:           [ x  ] WNL           [   ] occasional confusion      Psych:                  [  x ] WNL           [   ] hallucinations   [   ]agitation   [   ] delusion   [   ]depression    CLOF:  transfer Supervision, ambulates 150ft with RW and SPV, 12 steps with 2 handrails and SPV  UB dressing independent LB dressing set-up   eating independent     PHYSICAL EXAMINATION   Vital Signs Last 24 Hrs  T(C): 36.3 (18 Jun 2025 05:34), Max: 36.7 (17 Jun 2025 19:16)  T(F): 97.4 (18 Jun 2025 05:34), Max: 98.1 (17 Jun 2025 19:16)  HR: 67 (18 Jun 2025 05:34) (67 - 103)  BP: 143/82 (18 Jun 2025 05:34) (129/88 - 143/82)  BP(mean): 102 (17 Jun 2025 19:16) (102 - 102)  RR: 18 (18 Jun 2025 05:34) (18 - 18)  SpO2: 97% (17 Jun 2025 19:16) (97% - 97%)    Parameters below as of 17 Jun 2025 19:16  Patient On (Oxygen Delivery Method): room air      General:[  x ] NAD, Resting Comfortable,   [   ] other:                                HEENT: [ x  ] NC/AT, EOMI, PERRL , Normal Conjunctivae,   [   ] other:  Cardio: [  x ] RRR, no murmur,   [   ] other:                              Pulm: [  x ] No Respiratory Distress,  Lungs CTAB,   [   ] other:                       Abdomen: [  x ]ND/NT, Soft, abd incision site c/d/i   [   ] other:    : [  x ] NO RODRIGUES CATHETER, [   ] RODRIGUES CATHETER- no meatal tear, no discharge, [   ] other:                                            MSK: [ x  ] No joint swelling, Full ROM,   [   ] other:                                         Ext: [   ]No C/C/E, No calf tenderness,   [ x  ]other: 2-3+ pitting edema in BLE    Skin: [   ]intact,   [ x  ] other: abd incision c/d/i   - 1 cm deep open wound along incision, with 1/4 inch gauze packing                                                                Neurological Examination:  Cognitive: [  x  ] AAO x 3,   [    ]  other:                                                                        Attention:  [  x  ] intact,   [  x  ]  other: pleasant and cooperative                            Memory: [  x  ] intact,  [    ]  other:      Mood/Affect: [ x   ] wnl,    [    ]  other:                                                                             Communication: [ x   ]Fluent, no dysarthria, following commands:  [    ] other:    CN II - XII:  [  x  ] intact,  [    ] other:                                                                                   Motor:   RIGHT UE:  3+/5 shoulder abduction, 5/5 elbow flexion/extension, 5/5 hand        MEDICATION  MEDICATIONS  (STANDING):  apixaban 5 milliGRAM(s) Oral every 12 hours  ascorbic acid 500 milliGRAM(s) Oral daily  chlorhexidine 2% Cloths 1 Application(s) Topical <User Schedule>  cyanocobalamin 1000 MICROGram(s) Oral daily  ergocalciferol 61622 Unit(s) Oral <User Schedule>  furosemide    Tablet 20 milliGRAM(s) Oral daily  lactobacillus acidophilus 1 Tablet(s) Oral daily  magnesium oxide 400 milliGRAM(s) Oral every 12 hours  meropenem  IVPB 1000 milliGRAM(s) IV Intermittent every 8 hours  multivitamin/minerals 1 Tablet(s) Oral daily  pantoprazole    Tablet 40 milliGRAM(s) Oral before breakfast  potassium chloride    Tablet ER 20 milliEquivalent(s) Oral daily  tiotropium 2.5 MICROgram(s) Inhaler 2 Puff(s) Inhalation daily  zinc sulfate 220 milliGRAM(s) Oral every 24 hours    MEDICATIONS  (PRN):  acetaminophen     Tablet .. 650 milliGRAM(s) Oral every 6 hours PRN Mild Pain (1 - 3)  albuterol    90 MICROgram(s) HFA Inhaler 2 Puff(s) Inhalation every 6 hours PRN Shortness of Breath and/or Wheezing  ondansetron Injectable 4 milliGRAM(s) IV Push every 6 hours PRN Nausea  sodium chloride 0.65% Nasal 1 Spray(s) Both Nostrils every 4 hours PRN Nasal Congestion        RECENT LABS/IMAGING                        7.9    4.50  )-----------( 239      ( 18 Jun 2025 07:03 )             26.0     06-18    145  |  109  |  14  ----------------------------<  91  4.0   |  26  |  0.7    Ca    8.3[L]      18 Jun 2025 07:03        Urinalysis Basic - ( 18 Jun 2025 07:03 )    Color: x / Appearance: x / SG: x / pH: x  Gluc: 91 mg/dL / Ketone: x  / Bili: x / Urobili: x   Blood: x / Protein: x / Nitrite: x   Leuk Esterase: x / RBC: x / WBC x   Sq Epi: x / Non Sq Epi: x / Bacteria: x                        LEFT    UE: 3+/5 shoulder abduction, 5/5 elbow flexion/extension, 5/5 hand   RIGHT LE: 3+/5 hip flexion, 4+/5 knee flexion/extension, 4+/5 DF/PF (limited by pitting edema)  LEFT  LE:  3+/5 hip flexion, 4+/5 knee flexion/extension, 4+/5 DF/PF (limited by pitting edema)  Tone: [  x  ] wnl,   [    ]  other:  DTRs: [  x ]symmetric, [   ] other:  Coordination:   [ x   ] intact,   [    ] other:                                                                         Sensory: [ x   ] Intact to light touch,   [    ] other:       Patient is a 65y old  Female who presents with a chief complaint of Rehab of debility with proximal LE weakness and marked decline from baseline function, s/p multiple abdominal surgeries with complications / HTN, severe obesity, LOUIS on CPAP, Abby en y gastric bypass, and a recent admission for an incarcerated ventral hernia w/ sbo s/p lap converted to open ventral hernia repair w/ mesh and small bowel resection and s/p ex lap, 30 cm sbr, creation of new side to side ileoileostomy, ventral hernia repair w/ mesh. (03 Jun 2025 12:34)      HPI:  66 yo F with a PMHx of HTN, severe obesity, DVT, PE (R distal main with segmental extension) on Eliquis, LOUIS on CPAP, abby en y gastric bypass, and a recent admission for an incarcerated ventral hernia w/ SBO s/p lap converted to open ventral hernia repair w/ mesh and SBR and s/p ex lap, 30 cm SBR, creation of new side to side ileoileostomy, and ventral hernia repair w/ mesh in April 2025 presents back to the ER 2 days later from her LTACH facility ([facility]) after persistent HTN and epistaxis in setting of Eliquis use and complex surgical history. Patient notes her epistaxis has stopped after self-tamponade for 25 minutes. On admission, patient endorsed purulent discharge from her incision. CT AP (5/27) showed extensive ventral wall postsurgical changes with gas and fluid containing midline collection along the lower abdomen, measuring  approximately 4.8 x 4.5 x 10.2 cm. IR evaluated collection and did not recommend a drain. Wound culture positive for rare enterococcus avium (5/28). Patient currently on abx via midline per ID recs. Patient has been tolerating diet, passing regular bowel movements and voiding without nausea or vomiting. Denies SOB, chest pain, fever or chills. Rates her abd pain 1/10 at its worst.     Prior to the debility with proximal LE weakness and marked decline s/p multiple abd surgeries with complications, the patient was independent in ADLs and ambulation. Patient now requires moderate assistance with ambulation and ADLs 2/2 to the debility. Therefore, there is a significant functional decline from baseline. Patients also with medical comorbidities of recent PE in R distal main with segmental extension and DVT on Eliquis, LOUIS on CPAP, HTN, and morbid obesity requiring medication management and monitoring of vitals by Physiatry team and nursing. There are significant comorbidities which warrants acute rehab hospitalization. To return home it is in the patient’s best interest to have acute inpatient rehabilitative services to undergo intensive interdisciplinary therapy. Of note, the patient lives alone with multiple steps and would have difficulty performing ADLs and iADLs individually. Furthermore, the patient is motivated and is able to tolerate up to 3 hours of daily therapy for 5-6 days a week for a total of 15 hours a week. Evaluated by Physiatry and deemed to be an excellent candidate for admission to  for acute inpatient rehab. Admitted to Acute Rehab on 6/2/2025.      Patient seen and examined by Physiatry and is currently functioning at bed mobility minimum assist, sit to stand transfer with CGA, ambulates 30ftx1 with RW, CGA and 1P assist, UBD minimum assist, and LBD dependent..     LS: Patient. lives with  but came from [facility] after last admission  PLOF: Independent with all ADLs and iADLs and ambulates without assistive device.      (02 Jun 2025 13:50)    TODAY'S SUBJECTIVE & REVIEW OF SYMPTOMS  No acute events overnight. No new complaints.   Tolerating PT/OT. Voiding spontaneously and having regular BM.     Vital signs reviewed. Has been intermittently mildly tachycardiac. Patient denies symptoms. Hx of PE and on Eliquis.   Continues to have fluctuating BLE edema and on Lasix. Daily weights ordered.  291# on admission, (6/17) 277.7 pounds -> (6/18) 279.9 pounds     Review of Systems:   Constitutional:       [x  ] WNL           [   ] poor appetite   [   ] insomnia   [   ] tired   Cardio:                [ x  ] WNL           [   ] CP   [   ] CEBALLOS - improving   [   ] palpitations               Resp:                   [  x ] WNL           [   ] SOB   [   ] cough   [   ] wheezing   GI:                        [  x ] WNL           [   ] constipation   [   ] diarrhea   [   ] abdominal pain   [   ] nausea   [   ] emesis                                :                      [  x ] WNL           [   ] RODRIGUES  [   ] dysuria   [   ] difficulty voiding             Endo:                   [  x ] WNL          [   ] polyuria   [   ] temperature intolerance                 Skin:                     [   ] WNL          [   ] pain   [ x  ] wound-incision at abd site, c/d/i  [   ] rash   MSK:                    [   ] WNL          [   ] muscle pain   [  ] joint pain/ stiffness   [   ] muscle tenderness   [ x  ] swelling in BLE   Neuro:                 [   ] WNL          [   ] HA   [  ] change in vision   [   ] tremor   [ x  ] weakness   [   ]dysphagia              Cognitive:           [ x  ] WNL           [   ] occasional confusion      Psych:                  [  x ] WNL           [   ] hallucinations   [   ]agitation   [   ] delusion   [   ]depression    CLOF:  transfer Supervision, ambulates 150ft with RW and SPV, 12 steps with 2 handrails and SPV  UB dressing independent LB dressing set-up   eating independent     PHYSICAL EXAMINATION   Vital Signs Last 24 Hrs  T(C): 36.3 (18 Jun 2025 05:34), Max: 36.7 (17 Jun 2025 19:16)  T(F): 97.4 (18 Jun 2025 05:34), Max: 98.1 (17 Jun 2025 19:16)  HR: 67 (18 Jun 2025 05:34) (67 - 103)  BP: 143/82 (18 Jun 2025 05:34) (129/88 - 143/82)  BP(mean): 102 (17 Jun 2025 19:16) (102 - 102)  RR: 18 (18 Jun 2025 05:34) (18 - 18)  SpO2: 97% (17 Jun 2025 19:16) (97% - 97%)    Parameters below as of 17 Jun 2025 19:16  Patient On (Oxygen Delivery Method): room air      General:[  x ] NAD, Resting Comfortable,   [   ] other:                                HEENT: [ x  ] NC/AT, EOMI, PERRL , Normal Conjunctivae,   [   ] other:  Cardio: [  x ] RRR, no murmur,   [   ] other:                              Pulm: [  x ] No Respiratory Distress,  Lungs CTAB,   [   ] other:                       Abdomen: [  x ]ND/NT, Soft, abd incision site c/d/i   [   ] other:    : [  x ] NO RODRIGUES CATHETER, [   ] RODRIGUES CATHETER- no meatal tear, no discharge, [   ] other:                                            MSK: [ x  ] No joint swelling, Full ROM,   [   ] other:                                         Ext: [   ]No C/C/E, No calf tenderness,   [ x  ]other: 2-3+ pitting edema in BLE    Skin: [   ]intact,   [ x  ] other: abd incision c/d/i   - 1 cm deep open wound along incision, with 1/4 inch gauze packing                                                                Neurological Examination:  Cognitive: [  x  ] AAO x 3,   [    ]  other:                                                                        Attention:  [  x  ] intact,   [  x  ]  other: pleasant and cooperative                            Memory: [  x  ] intact,  [    ]  other:      Mood/Affect: [ x   ] wnl,    [    ]  other:                                                                             Communication: [ x   ]Fluent, no dysarthria, following commands:  [    ] other:    CN II - XII:  [  x  ] intact,  [    ] other:                                                                                   Motor:   RIGHT UE:  3+/5 shoulder abduction, 5/5 elbow flexion/extension, 5/5 hand    LEFT    UE: 3+/5 shoulder abduction, 5/5 elbow flexion/extension, 5/5 hand   RIGHT LE: 3+/5 hip flexion, 4+/5 knee flexion/extension, 4+/5 DF/PF (limited by pitting edema)  LEFT  LE:  3+/5 hip flexion, 4+/5 knee flexion/extension, 4+/5 DF/PF (limited by pitting edema)  Tone: [  x  ] wnl,   [    ]  other:  DTRs: [  x ]symmetric, [   ] other:  Coordination:   [ x   ] intact,   [    ] other:                                                                         Sensory: [ x   ] Intact to light touch,   [    ] other:      MEDICATION  MEDICATIONS  (STANDING):  apixaban 5 milliGRAM(s) Oral every 12 hours  ascorbic acid 500 milliGRAM(s) Oral daily  chlorhexidine 2% Cloths 1 Application(s) Topical <User Schedule>  cyanocobalamin 1000 MICROGram(s) Oral daily  ergocalciferol 54671 Unit(s) Oral <User Schedule>  furosemide    Tablet 20 milliGRAM(s) Oral daily  lactobacillus acidophilus 1 Tablet(s) Oral daily  magnesium oxide 400 milliGRAM(s) Oral every 12 hours  meropenem  IVPB 1000 milliGRAM(s) IV Intermittent every 8 hours  multivitamin/minerals 1 Tablet(s) Oral daily  pantoprazole    Tablet 40 milliGRAM(s) Oral before breakfast  potassium chloride    Tablet ER 20 milliEquivalent(s) Oral daily  tiotropium 2.5 MICROgram(s) Inhaler 2 Puff(s) Inhalation daily  zinc sulfate 220 milliGRAM(s) Oral every 24 hours    MEDICATIONS  (PRN):  acetaminophen     Tablet .. 650 milliGRAM(s) Oral every 6 hours PRN Mild Pain (1 - 3)  albuterol    90 MICROgram(s) HFA Inhaler 2 Puff(s) Inhalation every 6 hours PRN Shortness of Breath and/or Wheezing  ondansetron Injectable 4 milliGRAM(s) IV Push every 6 hours PRN Nausea  sodium chloride 0.65% Nasal 1 Spray(s) Both Nostrils every 4 hours PRN Nasal Congestion        RECENT LABS/IMAGING                        7.9    4.50  )-----------( 239      ( 18 Jun 2025 07:03 )             26.0     06-18    145  |  109  |  14  ----------------------------<  91  4.0   |  26  |  0.7    Ca    8.3[L]      18 Jun 2025 07:03

## 2025-06-18 NOTE — PROGRESS NOTE ADULT - ASSESSMENT
ASSESSMENT  64F with a PMHx of HTN, morbid obesity, LOUIS on CPAP, Abby en y gastric bypass, and a recent admission for an incarcerated ventral hernia w/ sbo s/p lap converted to open ventral hernia repair w/ mesh and small bowel resection and s/p ex lap, 30 cm sbr, creation of new side to side ileoileostomy, ventral hernia repair w/ mesh who presents back to the ER from her LTACH facility [facility] after persistent HTN, epistaxis in setting of eliquis use and complex surgical history.       IMPRESSION  #Wound dehiscence and persistent intra-abdominal collection, suspect abscess    6/1 superifical wound cx   Rare Enterococcus avium    Rare Carnobacterium maltaromaticum "Susceptibilities not performed"    5/28 WCX   Rare Enterococcus avium   Ampicillin: S <=2     Growth in fluid media only Escherichia coli ESBL  Trimethoprim/Sulfamethoxazole: R >2/38 Ciprofloxacin: R >2    Rare Clostridium perfringens "Susceptibilities not performed"  < from: CT Abdomen and Pelvis No Cont (05.27.25 @ 09:42) >  Extensive ventral wall postsurgical changes noted with gas and fluid containing midline collection along the lower abdomen, measuring   approximately 4.8 x 4.5 x 10.2 cm.  Last seen by ID inpatient 5/1- At that time Repeat CT showing Redemonstrated large volume complex fluid in the pelvis and along the left paracolic gutter, compatible with blood products. 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 which could result in poor clinical outcomes  #Abx allergy: No Known Allergies      Height (cm): 160 (05-25-25 @ 15:37)  Weight (kg): 135 (05-25-25 @ 20:35)    RECOMMENDATIONS  - F/u CTAP w/  - Meropenem 1g q8h IV   - Appreciate IR consult- no drainable collection    If any questions, please send a message or call on [employer] Teams  Please continue to update ID with any pertinent new laboratory, radiographic findings, or change in clinical status ASSESSMENT  64F with a PMHx of HTN, morbid obesity, LOUIS on CPAP, Abby en y gastric bypass, and a recent admission for an incarcerated ventral hernia w/ sbo s/p lap converted to open ventral hernia repair w/ mesh and small bowel resection and s/p ex lap, 30 cm sbr, creation of new side to side ileoileostomy, ventral hernia repair w/ mesh who presents back to the ER from her LTACH facility [facility] after persistent HTN, epistaxis in setting of eliquis use and complex surgical history.       IMPRESSION  #Wound dehiscence and persistent intra-abdominal collection, suspect abscess  < from: CT Abdomen and Pelvis w/ IV Cont (06.17.25 @ 16:24) >  1. Since May 27, 2025, redemonstration of extensive ventral abdominal   wall postsurgical changes with decreased size ill-defined collection of   fluid and air measuring approximately 4.7 x 2.6 x 6.1 cm (previously 5.5   x 2.8 x 9.1 cm, remeasured.)  2. Slightly decreased small bibasilar opacities/atelectasis.  3. Remainder of findings are overall unchanged on this short-term   follow-up exam.    6/1 superifical wound cx   Rare Enterococcus avium    Rare Carnobacterium maltaromaticum "Susceptibilities not performed"    5/28 WCX   Rare Enterococcus avium   Ampicillin: S <=2     Growth in fluid media only Escherichia coli ESBL  Trimethoprim/Sulfamethoxazole: R >2/38 Ciprofloxacin: R >2    Rare Clostridium perfringens "Susceptibilities not performed"  < from: CT Abdomen and Pelvis No Cont (05.27.25 @ 09:42) >  Extensive ventral wall postsurgical changes noted with gas and fluid containing midline collection along the lower abdomen, measuring   approximately 4.8 x 4.5 x 10.2 cm.  Last seen by ID inpatient 5/1- At that time Repeat CT showing Redemonstrated large volume complex fluid in the pelvis and along the left paracolic gutter, compatible with blood products. 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 Can give a script for another 4 weeks E/D 7/15. Will need repeat CTAP w/ to determine final course  - Per IR/Surgery no plan for drainage  - Meropenem 1g q8h IV , ertapenem 1g q24h IV on D/C  - Please order weekly CBC, CMP, ESR/CRP  - ID follow-up with Dr. Adal Lucero for Telehealth. We will call the patient between 8:00-4:30      [address]       [phone]       Fax [phone]       If any questions, please send a message or call on [employer] Teams  Please continue to update ID with any pertinent new laboratory, radiographic findings, or change in clinical status

## 2025-06-18 NOTE — CONSULT NOTE ADULT - SUBJECTIVE AND OBJECTIVE BOX
INTERVENTIONAL RADIOLOGY CONSULT:     HPI:  66 yo F with a PMHx of HTN, severe obesity, DVT, PE (R distal main with segmental extension) on Eliquis, LOUIS on CPAP, elisha en y gastric bypass, and a recent admission for an incarcerated ventral hernia w/ SBO s/p lap converted to open ventral hernia repair w/ mesh and SBR and s/p ex lap, 30 cm SBR, creation of new side to side ileoileostomy, and ventral hernia repair w/ mesh in 2025 presents back to the ER 2 days later from her LTACH facility ([place]) after persistent HTN and epistaxis in setting of Eliquis use and complex surgical history. Patient notes her epistaxis has stopped after self-tamponade for 25 minutes. On admission, patient endorsed purulent discharge from her incision. CT AP () showed extensive ventral wall postsurgical changes with gas and fluid containing midline collection along the lower abdomen, measuring  approximately 4.8 x 4.5 x 10.2 cm. IR evaluated collection and did not recommend a drain. Wound culture positive for rare enterococcus avium (). Patient currently on abx via midline per ID recs. Patient has been tolerating diet, passing regular bowel movements and voiding without nausea or vomiting. Denies SOB, chest pain, fever or chills. Rates her abd pain 1/10 at its worst.     Prior to the debility with proximal LE weakness and marked decline s/p multiple abd surgeries with complications, the patient was independent in ADLs and ambulation. Patient now requires moderate assistance with ambulation and ADLs 2/2 to the debility. Therefore, there is a significant functional decline from baseline. Patients also with medical comorbidities of recent PE in R distal main with segmental extension and DVT on Eliquis, LOUIS on CPAP, HTN, and morbid obesity requiring medication management and monitoring of vitals by Physiatry team and nursing. There are significant comorbidities which warrants acute rehab hospitalization. To return home it is in the patient’s best interest to have acute inpatient rehabilitative services to undergo intensive interdisciplinary therapy. Of note, the patient lives alone with multiple steps and would have difficulty performing ADLs and iADLs individually. Furthermore, the patient is motivated and is able to tolerate up to 3 hours of daily therapy for 5-6 days a week for a total of 15 hours a week. Evaluated by Physiatry and deemed to be an excellent candidate for admission to  for acute inpatient rehab. Admitted to Acute Rehab on 2025.      Patient seen and examined by Physiatry and is currently functioning at bed mobility minimum assist, sit to stand transfer with CGA, ambulates 30ftx1 with RW, CGA and 1P assist, UBD minimum assist, and LBD dependent..     LS: Patient. lives with  but came from [facility] after last admission  PLOF: Independent with all ADLs and iADLs and ambulates without assistive device.      (2025 13:50)      PAST MEDICAL & SURGICAL HISTORY:  Gastric bypass status for obesity      LOUIS (obstructive sleep apnea)      S/P gastric bypass      S/P       S/P small bowel resection      History of total bilateral knee replacement (TKR)      H/O colonoscopy            MEDICATIONS  (STANDING):  apixaban 5 milliGRAM(s) Oral every 12 hours  ascorbic acid 500 milliGRAM(s) Oral daily  chlorhexidine 2% Cloths 1 Application(s) Topical <User Schedule>  cyanocobalamin 1000 MICROGram(s) Oral daily  ergocalciferol 91799 Unit(s) Oral <User Schedule>  furosemide    Tablet 20 milliGRAM(s) Oral daily  lactobacillus acidophilus 1 Tablet(s) Oral daily  magnesium oxide 400 milliGRAM(s) Oral every 12 hours  meropenem  IVPB 1000 milliGRAM(s) IV Intermittent every 8 hours  multivitamin/minerals 1 Tablet(s) Oral daily  pantoprazole    Tablet 40 milliGRAM(s) Oral before breakfast  potassium chloride    Tablet ER 20 milliEquivalent(s) Oral daily  tiotropium 2.5 MICROgram(s) Inhaler 2 Puff(s) Inhalation daily  zinc sulfate 220 milliGRAM(s) Oral every 24 hours    MEDICATIONS  (PRN):  acetaminophen     Tablet .. 650 milliGRAM(s) Oral every 6 hours PRN Mild Pain (1 - 3)  albuterol    90 MICROgram(s) HFA Inhaler 2 Puff(s) Inhalation every 6 hours PRN Shortness of Breath and/or Wheezing  ondansetron Injectable 4 milliGRAM(s) IV Push every 6 hours PRN Nausea  sodium chloride 0.65% Nasal 1 Spray(s) Both Nostrils every 4 hours PRN Nasal Congestion      Allergies    No Known Allergies    Intolerances        Physical Exam:   Vital Signs Last 24 Hrs  T(C): 36.3 (2025 05:34), Max: 36.7 (2025 19:16)  T(F): 97.4 (2025 05:34), Max: 98.1 (:16)  HR: 67 (2025 05:34) (67 - 103)  BP: 143/82 (2025 05:34) (129/88 - 143/82)  BP(mean): 102 (:16) (102 - 102)  RR: 18 (2025 05:34) (18 - 18)  SpO2: 97% (:16) (97% - 97%)    Parameters below as of :16  Patient On (Oxygen Delivery Method): room air        Labs:                         7.9    4.50  )-----------( 239      ( 2025 07:03 )             26.0     06-18    145  |  109  |  14  ----------------------------<  91  4.0   |  26  |  0.7    Ca    8.3[L]      2025 07:03          Pertinent labs:                      7.9    4.50  )-----------( 239      ( 2025 07:03 )             26.0       06-18    145  |  109  |  14  ----------------------------<  91  4.0   |  26  |  0.7    Ca    8.3[L]      2025 07:03            Radiology imaging reviewed.     ASSESSMENT/PLAN:     #decreased size ill-defined collection of fluid and air measuring approximately 4.7 x 2.6 x 6.1 cm (previously 5.5 x 2.8 x 9.1 cm, remeasured.)  - fluid collection decreasing in size  - not amenable to drainage  - c/w abx  - IR to sign off; reconsult as needed    Thank you for the courtesy of this consult, please call [phone] with any further questions.

## 2025-06-18 NOTE — PROGRESS NOTE ADULT - ASSESSMENT
Mrs. Eddy is a 66 yo F with a PMHx of HTN, severe obesity, DVT, PE (R distal main with segmental extension) on Eliquis, LOUIS on CPAP, abby en y gastric bypass, and a recent admission for an incarcerated ventral hernia w/ SBO s/p lap converted to open ventral hernia repair w/ mesh and SBR and s/p ex lap, 30 cm SBR, creation of new side to side ileoileostomy, and ventral hernia repair w/ mesh in April 2025 presented back to the ER 2 days later from her LTACH facility ([place]) after persistent HTN and epistaxis in setting of Eliquis use and complex surgical history. Patient found to have extensive ventral wall postsurgical changes with gas and fluid containing midline collection along the lower abdomen, measuring  approximately 4.8 x 4.5 x 10.2 cm and being managed with abx.    Plan:    #Rehab of debility with proximal LE weakness and marked decline from baseline function, s/p multiple abdominal surgeries with complications / HTN, morbid obesity, LOUIS on CPAP, Abby en y gastric bypass, and a recent admission for an incarcerated ventral hernia w/ sbo s/p lap converted to open ventral hernia repair w/ mesh and small bowel resection and s/p ex lap, 30 cm sbr, creation of new side to side ileoileostomy, ventral hernia repair w/ mesh.with impairment in mobility and ADLs and iADLs.The patient presented with co-morbidities requiring close physiatry follow-up at least 3 times a week to monitor his progress and monitor his comorbidities including HTN, PE, DVT, and LOUIS. .The patient's co-morbidities do not limit the patient's ability to participate in rehabilitative therapy. The patient was seen by PT/OT and required min A with bed mobility, CGA with sit to stand transfers, ambulates 30ftx1 with RW, CGA and 1P assist, UBD minimum assist, and LBD dependent.. The patient was previously independent with all ADLs and iADLs without use of AD and now presents with functional decline.   #Wound dehiscence and persistent intra-abdominal collection, suspect abscess in setting of multiple abdominal surgeries with complications  -5/28 Wcx grew rare Enterococcus avium    -Growth in fluid media only Escherichia coli ESBL    -5/30 midline placed  -Wound Care Instructions: Patient has a midline wound with staples and small 1 cm area of packing. Please remove and replace dressings once daily. Packing 1 cm opening with 0.25 inch packing strip, cut to length appropriate for wound. Cover incision with gauze and secure with paper tape.  -abd binder for support  -Infectious Disease is following. She is currently on Meropenem 1 gram IVPB q8h started 6/1. On discharge from acute rehab to home she will require Ertapenem 1 gram IVPB daily.   -6/17 repeat CTAP with decreased size in fluid collection       -IR consulted on 6/18, no drainable fluid collection       -per ID, will require additional 4 weeks of abx with repeat CTAP after   - continue packing of upper incision open area daily with strip gauze  -Continue with Tylenol PRN and adjust pain management as needed.  -She requires acute rehab with 3 hours of daily therapies at least 5 out of 7 days and close physiatry follow up.   - tolerating therapies well. Strength and endurance improving daily. Continue acute rehab.    #PE in R distal main with segmental extension  #DVT  -c/w Eliquis  - stable, asymptomatic on RA    #Stage 2 buttock pressure injury per wound care nurse specialist  - local care with barrier cream  - monitor    #Chronic venous insufficiency/ fluctuating LE edema  - did not improve off Norvasc  - changed HCTZ to Lasix 20mg daily and monitor daily weights -    weight decreasing 291 -> 289 -> 277    #Anemia-stable  -6/13 H/H 8.6/29  -6/16 H/H 7.9/26.4  -continue to trend     #HTN  -Amolodipine was halted in the setting of pitting edema on 6/2 and low bp  - Losartan decreased from 100mg to 50mg 6/5. BP remained in low range with Losartan held x 5 days. D/c Losartan 6/11 and monitor.  - change HCTZ to to Lasix 20mg daily 6/12 for improved edema control and monitor BP    #Hypokalemia   - give oral supplement and recheck in a few days  - improved on standing KCl, on 20mEq KCI   - stable, WNL    #LOUIS on CPAP  Stable    #Morbid obesity BMI 53.2  Dietary education     -Pain control: Tylenol prn    - Diet: DASH/TLC Sodium & Cholesterol Restricted    Precautions / PROPHYLAXIS:    - Falls  - DVT prophylaxis: Eliquis     Mrs. Eddy is a 64 yo F with a PMHx of HTN, severe obesity, DVT, PE (R distal main with segmental extension) on Eliquis, LOUIS on CPAP, abby en y gastric bypass, and a recent admission for an incarcerated ventral hernia w/ SBO s/p lap converted to open ventral hernia repair w/ mesh and SBR and s/p ex lap, 30 cm SBR, creation of new side to side ileoileostomy, and ventral hernia repair w/ mesh in April 2025 presented back to the ER 2 days later from her LTACH facility ([place]) after persistent HTN and epistaxis in setting of Eliquis use and complex surgical history. Patient found to have extensive ventral wall postsurgical changes with gas and fluid containing midline collection along the lower abdomen, measuring  approximately 4.8 x 4.5 x 10.2 cm and being managed with abx.    Plan:    #Rehab of debility with proximal LE weakness and marked decline from baseline function, s/p multiple abdominal surgeries with complications / HTN, morbid obesity, LOUIS on CPAP, Abby en y gastric bypass, and a recent admission for an incarcerated ventral hernia w/ sbo s/p lap converted to open ventral hernia repair w/ mesh and small bowel resection and s/p ex lap, 30 cm sbr, creation of new side to side ileoileostomy, ventral hernia repair w/ mesh.with impairment in mobility and ADLs and iADLs.The patient presented with co-morbidities requiring close physiatry follow-up at least 3 times a week to monitor his progress and monitor his comorbidities including HTN, PE, DVT, and LOUIS. .The patient's co-morbidities do not limit the patient's ability to participate in rehabilitative therapy. The patient was seen by PT/OT and required min A with bed mobility, CGA with sit to stand transfers, ambulates 30ftx1 with RW, CGA and 1P assist, UBD minimum assist, and LBD dependent.. The patient was previously independent with all ADLs and iADLs without use of AD and now presents with functional decline.   #Wound dehiscence and persistent intra-abdominal collection, suspect abscess in setting of multiple abdominal surgeries with complications  -5/28 Wcx grew rare Enterococcus avium    -Growth in fluid media only Escherichia coli ESBL    -5/30 midline placed  -Wound Care Instructions: Patient has a midline wound with staples and small 1 cm area of packing. Please remove and replace dressings once daily. Packing 1 cm opening with 0.25 inch packing strip, cut to length appropriate for wound. Cover incision with gauze and secure with paper tape.  -abd binder for support  -Infectious Disease is following. She is currently on Meropenem 1 gram IVPB q8h started 6/1. On discharge from acute rehab to home she will require Ertapenem 1 gram IVPB daily.   -6/17 repeat CTAP with decreased size in fluid collection       -IR consulted on 6/18, no drainable fluid collection       -per ID, will require additional 4 weeks of abx with repeat CTAP after   - continue packing of upper incision open area daily with strip gauze  -She requires acute rehab with 3 hours of daily therapies at least 5 out of 7 days and close physiatry follow up.   - tolerating therapies well. Strength and endurance improving daily. Continue acute rehab.    #PE in R distal main with segmental extension  #DVT  -c/w Eliquis  - stable, asymptomatic on RA    #Stage 2 buttock pressure injury per wound care nurse specialist  - local care with barrier cream  - monitor    #Chronic venous insufficiency/ fluctuating LE edema  - did not improve off Norvasc  - changed HCTZ to Lasix 20mg daily and monitor daily weights -    weight decreasing 291 -> 289 -> 277    #Anemia-stable  -6/13 H/H 8.6/29  -6/16 H/H 7.9/26.4  -continue to trend     #HTN  -Amolodipine was halted in the setting of pitting edema on 6/2 and low bp  - Losartan decreased from 100mg to 50mg 6/5. BP remained in low range with Losartan held x 5 days. D/c Losartan 6/11 and monitor.  - change HCTZ to to Lasix 20mg daily 6/12 for improved edema control and monitor BP    #Hypokalemia   - give oral supplement and recheck in a few days  - improved on standing KCl, on 20mEq KCI   - stable, WNL    #LOUIS on CPAP  Stable    #Morbid obesity BMI 53.2  Dietary education     -Pain control: Tylenol prn    - Diet: DASH/TLC Sodium & Cholesterol Restricted    Precautions / PROPHYLAXIS:    - Falls  - DVT prophylaxis: Eliquis

## 2025-06-18 NOTE — CHART NOTE - NSCHARTNOTEFT_GEN_A_CORE
Patient is on long duration of antibiotics , IR  cannot do the drainage of abdominal abscess. Pt will have  to go for repeat Ct scan abd and pelvis in 4 weeks  from yesterday  to follow up the abscess ,  and may still need abx ertapenem additionally , pt has a midline inserted on 6/12 that is good only for 29 days , She will need to come to ER for another mid line insertion by 7/9 .  Patient was given the option of getting  a Picc line  while still in rehab , patient opted it out  due to more complications involved  with Picc line insertion , Patient verbalized understanding as above , She will be discharged home on friday 6/20  with midline on left forearm .

## 2025-06-18 NOTE — PROGRESS NOTE ADULT - SUBJECTIVE AND OBJECTIVE BOX
NANCY SARGENT  65y, Female  Allergy: No Known Allergies      LOS  16d    CHIEF COMPLAINT: Rehab of debility with proximal LE weakness and marked decline from baseline function, s/p multiple abdominal surgeries with complications / HTN, severe obesity, LOUIS on CPAP, Abby en y gastric bypass, and a recent admission for an incarcerated ventral hernia w/ sbo s/p lap converted to open ventral hernia repair w/ mesh and small bowel resection and s/p ex lap, 30 cm sbr, creation of new side to side ileoileostomy, ventral hernia repair w/ mesh. (17 Jun 2025 12:39)      INTERVAL EVENTS/HPI  - No acute events overnight  - T(F): , Max: 98.1 (06-17-25 @ 19:16)  - Tolerating medication  - WBC Count: 4.86 (06-16-25 @ 06:38)     - Creatinine: 0.6 (06-16-25 @ 06:38)       ROS  ***    VITALS:  T(F): 97.4, Max: 98.1 (06-17-25 @ 19:16)  HR: 67  BP: 143/82  RR: 18Vital Signs Last 24 Hrs  T(C): 36.3 (18 Jun 2025 05:34), Max: 36.7 (17 Jun 2025 19:16)  T(F): 97.4 (18 Jun 2025 05:34), Max: 98.1 (17 Jun 2025 19:16)  HR: 67 (18 Jun 2025 05:34) (67 - 103)  BP: 143/82 (18 Jun 2025 05:34) (129/88 - 143/82)  BP(mean): 102 (17 Jun 2025 19:16) (102 - 102)  RR: 18 (18 Jun 2025 05:34) (18 - 18)  SpO2: 97% (17 Jun 2025 19:16) (97% - 97%)    Parameters below as of 17 Jun 2025 19:16  Patient On (Oxygen Delivery Method): room air        PHYSICAL EXAM:  ***    FH: Non-contributory  Social Hx: Non-contributory    TESTS & MEASUREMENTS:                        7.9    4.86  )-----------( 239      ( 16 Jun 2025 06:38 )             26.4     06-16    144  |  108  |  16  ----------------------------<  94  4.2   |  25  |  0.6[L]    Ca    8.2[L]      16 Jun 2025 06:38  Mg     1.8     06-16    TPro  5.3[L]  /  Alb  3.0[L]  /  TBili  0.3  /  DBili  x   /  AST  18  /  ALT  23  /  AlkPhos  101  06-16      LIVER FUNCTIONS - ( 16 Jun 2025 06:38 )  Alb: 3.0 g/dL / Pro: 5.3 g/dL / ALK PHOS: 101 U/L / ALT: 23 U/L / AST: 18 U/L / GGT: x           Urinalysis Basic - ( 16 Jun 2025 06:38 )    Color: x / Appearance: x / SG: x / pH: x  Gluc: 94 mg/dL / Ketone: x  / Bili: x / Urobili: x   Blood: x / Protein: x / Nitrite: x   Leuk Esterase: x / RBC: x / WBC x   Sq Epi: x / Non Sq Epi: x / Bacteria: x        Culture - Wound Aerobic/Anaerobic (collected 06-01-25 @ 13:49)  Source: Skin/Wound Skin/Wound  Gram Stain (06-02-25 @ 23:08):    Rare polymorphonuclear leukocytes seen per low power field    No organisms seen per oil power field  Final Report (06-06-25 @ 15:35):    Rare Enterococcus avium    Rare Carnobacterium maltaromaticum "Susceptibilities not performed"  Organism: Enterococcus avium (06-06-25 @ 15:35)  Organism: Enterococcus avium (06-06-25 @ 15:35)      -  Streptomycin synergy: S <=1000      -  Vancomycin: S <=0.25      -  Ampicillin: S <=2 Predicts results to ampicillin/sulbactam, amoxacillin-clavulanate and  piperacillin-tazobactam.      Method Type: LOUISE      -  Gentamicin synergy: S <=500    Culture - Abscess with Gram Stain (collected 05-28-25 @ 16:55)  Source: Abscess abdominal midline wound  Gram Stain (05-30-25 @ 21:20):    Few polymorphonuclear leukocytes seen per low power field    No organisms seen per oil power field  Final Report (06-02-25 @ 16:57):    Rare Enterococcus avium    Growth in fluid media only Escherichia coli ESBL    Rare Clostridium perfringens "Susceptibilities not performed"    Few Anil benavidez "Susceptibilities not performed"  Organism: Escherichia coli ESBL (06-02-25 @ 16:57)      -  Levofloxacin: R >4      -  Tobramycin: S <=2      -  Aztreonam: R >16      -  Gentamicin: S <=2      -  Cefazolin: R >16      -  Cefepime: R >16      -  Piperacillin/Tazobactam: R <=8      -  Ciprofloxacin: R >2      -  Imipenem: S <=1      -  Ceftriaxone: R >32      -  Ampicillin: R >16 These ampicillin results predict results for amoxicillin      Method Type: LOUISE      -  Meropenem: S <=1      -  Ampicillin/Sulbactam: R <=4/2      -  Trimethoprim/Sulfamethoxazole: R >2/38      -  Tigecycline: S <=2 Interpretations based on FDA breakpoints      -  Ertapenem: S <=0.5  Organism: Enterococcus avium  Escherichia coli ESBL (06-02-25 @ 16:57)  Organism: Enterococcus avium (06-02-25 @ 16:57)      -  Vancomycin: S <=0.25      -  Ampicillin: S <=2 Predicts results to ampicillin/sulbactam, amoxacillin-clavulanate and  piperacillin-tazobactam.      Method Type: LOUISE            INFECTIOUS DISEASES TESTING  MRSA PCR Result.: Negative (04-11-25 @ 00:40)  strept    INFLAMMATORY MARKERS  Sedimentation Rate, Erythrocyte: 47 mm/hr (06-16-25 @ 06:38)  C-Reactive Protein: 7.0 mg/L (06-16-25 @ 06:38)  Sedimentation Rate, Erythrocyte: 45 mm/hr (06-05-25 @ 06:22)  C-Reactive Protein: 8.4 mg/L (06-05-25 @ 06:22)      RADIOLOGY & ADDITIONAL TESTS:  I have personally reviewed the last available Chest xray  CXR      CT      CARDIOLOGY TESTING  12 Lead ECG:   Ventricular Rate 106 BPM    Atrial Rate 106 BPM    P-R Interval 140 ms    QRS Duration 102 ms    Q-T Interval 326 ms    QTC Calculation(Bazett) 433 ms    P Axis 26 degrees    R Axis 0 degrees    T Axis 4 degrees    Diagnosis Line Sinus tachycardia  Minimal voltage criteria for LVH, may be normal variant ( [place] product )  Possible Inferior infarct , age undetermined  Possible Anterior infarct , age undetermined  Abnormal ECG (06-15-25 @ 17:48)      MEDICATIONS  apixaban 5 Oral every 12 hours  ascorbic acid 500 Oral daily  chlorhexidine 2% Cloths 1 Topical <User Schedule>  cyanocobalamin 1000 Oral daily  ergocalciferol 06419 Oral <User Schedule>  furosemide    Tablet 20 Oral daily  lactobacillus acidophilus 1 Oral daily  magnesium oxide 400 Oral every 12 hours  meropenem  IVPB 1000 IV Intermittent every 8 hours  multivitamin/minerals 1 Oral daily  pantoprazole    Tablet 40 Oral before breakfast  potassium chloride    Tablet ER 20 Oral daily  tiotropium 2.5 MICROgram(s) Inhaler 2 Inhalation daily  zinc sulfate 220 Oral every 24 hours      WEIGHT  Weight (kg): 132 (06-02-25 @ 22:20)      ANTIBIOTICS:  meropenem  IVPB 1000 milliGRAM(s) IV Intermittent every 8 hours      All available historical records have been reviewed       NANCY SARGENT  65y, Female  Allergy: No Known Allergies      LOS  16d    CHIEF COMPLAINT: Rehab of debility with proximal LE weakness and marked decline from baseline function, s/p multiple abdominal surgeries with complications / HTN, severe obesity, LOUIS on CPAP, Abby en y gastric bypass, and a recent admission for an incarcerated ventral hernia w/ sbo s/p lap converted to open ventral hernia repair w/ mesh and small bowel resection and s/p ex lap, 30 cm sbr, creation of new side to side ileoileostomy, ventral hernia repair w/ mesh. (17 Jun 2025 12:39)      INTERVAL EVENTS/HPI  - No acute events overnight  - T(F): , Max: 98.1 (06-17-25 @ 19:16)  - Tolerating medication  - WBC Count: 4.86 (06-16-25 @ 06:38)     - Creatinine: 0.6 (06-16-25 @ 06:38)       ROS  General: Denies rigors, nightsweats  HEENT: Denies headache, rhinorrhea, sore throat, eye pain  CV: Denies CP, palpitations  PULM: Denies wheezing, hemoptysis  GI: Denies hematemesis, hematochezia, melena  : Denies discharge, hematuria  MSK: Denies arthralgias, myalgias  SKIN: Denies rash, lesions  NEURO: Denies paresthesias, weakness  PSYCH: Denies depression, anxiety     VITALS:  T(F): 97.4, Max: 98.1 (06-17-25 @ 19:16)  HR: 67  BP: 143/82  RR: 18Vital Signs Last 24 Hrs  T(C): 36.3 (18 Jun 2025 05:34), Max: 36.7 (17 Jun 2025 19:16)  T(F): 97.4 (18 Jun 2025 05:34), Max: 98.1 (17 Jun 2025 19:16)  HR: 67 (18 Jun 2025 05:34) (67 - 103)  BP: 143/82 (18 Jun 2025 05:34) (129/88 - 143/82)  BP(mean): 102 (17 Jun 2025 19:16) (102 - 102)  RR: 18 (18 Jun 2025 05:34) (18 - 18)  SpO2: 97% (17 Jun 2025 19:16) (97% - 97%)    Parameters below as of 17 Jun 2025 19:16  Patient On (Oxygen Delivery Method): room air        PHYSICAL EXAM:  Gen: NAD, resting in bed  HEENT: Normocephalic, atraumatic  Neck: supple, no lymphadenopathy  CV: Regular rate & regular rhythm  Lungs: decreased BS at bases, no fremitus  Abdomen: Soft, BS present dressings  Ext: Warm, well perfused  Neuro: non focal, awake  Skin: no rash, no erythema  Lines: no phlebitis     FH: Non-contributory  Social Hx: Non-contributory    TESTS & MEASUREMENTS:                        7.9    4.86  )-----------( 239      ( 16 Jun 2025 06:38 )             26.4     06-16    144  |  108  |  16  ----------------------------<  94  4.2   |  25  |  0.6[L]    Ca    8.2[L]      16 Jun 2025 06:38  Mg     1.8     06-16    TPro  5.3[L]  /  Alb  3.0[L]  /  TBili  0.3  /  DBili  x   /  AST  18  /  ALT  23  /  AlkPhos  101  06-16      LIVER FUNCTIONS - ( 16 Jun 2025 06:38 )  Alb: 3.0 g/dL / Pro: 5.3 g/dL / ALK PHOS: 101 U/L / ALT: 23 U/L / AST: 18 U/L / GGT: x           Urinalysis Basic - ( 16 Jun 2025 06:38 )    Color: x / Appearance: x / SG: x / pH: x  Gluc: 94 mg/dL / Ketone: x  / Bili: x / Urobili: x   Blood: x / Protein: x / Nitrite: x   Leuk Esterase: x / RBC: x / WBC x   Sq Epi: x / Non Sq Epi: x / Bacteria: x        Culture - Wound Aerobic/Anaerobic (collected 06-01-25 @ 13:49)  Source: Skin/Wound Skin/Wound  Gram Stain (06-02-25 @ 23:08):    Rare polymorphonuclear leukocytes seen per low power field    No organisms seen per oil power field  Final Report (06-06-25 @ 15:35):    Rare Enterococcus avium    Rare Carnobacterium maltaromaticum "Susceptibilities not performed"  Organism: Enterococcus avium (06-06-25 @ 15:35)  Organism: Enterococcus avium (06-06-25 @ 15:35)      -  Streptomycin synergy: S <=1000      -  Vancomycin: S <=0.25      -  Ampicillin: S <=2 Predicts results to ampicillin/sulbactam, amoxacillin-clavulanate and  piperacillin-tazobactam.      Method Type: LOUISE      -  Gentamicin synergy: S <=500    Culture - Abscess with Gram Stain (collected 05-28-25 @ 16:55)  Source: Abscess abdominal midline wound  Gram Stain (05-30-25 @ 21:20):    Few polymorphonuclear leukocytes seen per low power field    No organisms seen per oil power field  Final Report (06-02-25 @ 16:57):    Rare Enterococcus avium    Growth in fluid media only Escherichia coli ESBL    Rare Clostridium perfringens "Susceptibilities not performed"    Few Eggerthella lenta "Susceptibilities not performed"  Organism: Escherichia coli ESBL (06-02-25 @ 16:57)      -  Levofloxacin: R >4      -  Tobramycin: S <=2      -  Aztreonam: R >16      -  Gentamicin: S <=2      -  Cefazolin: R >16      -  Cefepime: R >16      -  Piperacillin/Tazobactam: R <=8      -  Ciprofloxacin: R >2      -  Imipenem: S <=1      -  Ceftriaxone: R >32      -  Ampicillin: R >16 These ampicillin results predict results for amoxicillin      Method Type: LOUISE      -  Meropenem: S <=1      -  Ampicillin/Sulbactam: R <=4/2      -  Trimethoprim/Sulfamethoxazole: R >2/38      -  Tigecycline: S <=2 Interpretations based on FDA breakpoints      -  Ertapenem: S <=0.5  Organism: Enterococcus avium  Escherichia coli ESBL (06-02-25 @ 16:57)  Organism: Enterococcus avium (06-02-25 @ 16:57)      -  Vancomycin: S <=0.25      -  Ampicillin: S <=2 Predicts results to ampicillin/sulbactam, amoxacillin-clavulanate and  piperacillin-tazobactam.      Method Type: LOUISE            INFECTIOUS DISEASES TESTING  MRSA PCR Result.: Negative (04-11-25 @ 00:40)  strept    INFLAMMATORY MARKERS  Sedimentation Rate, Erythrocyte: 47 mm/hr (06-16-25 @ 06:38)  C-Reactive Protein: 7.0 mg/L (06-16-25 @ 06:38)  Sedimentation Rate, Erythrocyte: 45 mm/hr (06-05-25 @ 06:22)  C-Reactive Protein: 8.4 mg/L (06-05-25 @ 06:22)      RADIOLOGY & ADDITIONAL TESTS:  I have personally reviewed the last available Chest xray  CXR      CT      CARDIOLOGY TESTING  12 Lead ECG:   Ventricular Rate 106 BPM    Atrial Rate 106 BPM    P-R Interval 140 ms    QRS Duration 102 ms    Q-T Interval 326 ms    QTC Calculation(Bazett) 433 ms    P Axis 26 degrees    R Axis 0 degrees    T Axis 4 degrees    Diagnosis Line Sinus tachycardia  Minimal voltage criteria for LVH, may be normal variant ( [place] product )  Possible Inferior infarct , age undetermined  Possible Anterior infarct , age undetermined  Abnormal ECG (06-15-25 @ 17:48)      MEDICATIONS  apixaban 5 Oral every 12 hours  ascorbic acid 500 Oral daily  chlorhexidine 2% Cloths 1 Topical <User Schedule>  cyanocobalamin 1000 Oral daily  ergocalciferol 64872 Oral <User Schedule>  furosemide    Tablet 20 Oral daily  lactobacillus acidophilus 1 Oral daily  magnesium oxide 400 Oral every 12 hours  meropenem  IVPB 1000 IV Intermittent every 8 hours  multivitamin/minerals 1 Oral daily  pantoprazole    Tablet 40 Oral before breakfast  potassium chloride    Tablet ER 20 Oral daily  tiotropium 2.5 MICROgram(s) Inhaler 2 Inhalation daily  zinc sulfate 220 Oral every 24 hours      WEIGHT  Weight (kg): 132 (06-02-25 @ 22:20)      ANTIBIOTICS:  meropenem  IVPB 1000 milliGRAM(s) IV Intermittent every 8 hours      All available historical records have been reviewed

## 2025-06-19 RX ORDER — ERTAPENEM SODIUM 1 G/1
1000 INJECTION, POWDER, LYOPHILIZED, FOR SOLUTION INTRAMUSCULAR; INTRAVENOUS
Refills: 0 | Status: COMPLETED | OUTPATIENT
Start: 2025-06-20 | End: 2025-06-20

## 2025-06-19 RX ORDER — LOSARTAN POTASSIUM 100 MG/1
50 TABLET, FILM COATED ORAL ONCE
Refills: 0 | Status: COMPLETED | OUTPATIENT
Start: 2025-06-19 | End: 2025-06-19

## 2025-06-19 RX ORDER — SODIUM CHLORIDE 9 G/1000ML
1000 INJECTION, SOLUTION INTRAVENOUS
Refills: 0 | Status: DISCONTINUED | OUTPATIENT
Start: 2025-06-19 | End: 2025-06-19

## 2025-06-19 RX ORDER — LOSARTAN POTASSIUM 100 MG/1
50 TABLET, FILM COATED ORAL DAILY
Refills: 0 | Status: DISCONTINUED | OUTPATIENT
Start: 2025-06-20 | End: 2025-06-20

## 2025-06-19 RX ORDER — MEROPENEM 1 G/30ML
1000 INJECTION INTRAVENOUS
Refills: 0 | Status: COMPLETED | OUTPATIENT
Start: 2025-06-19 | End: 2025-06-19

## 2025-06-19 RX ADMIN — Medication 20 MILLIEQUIVALENT(S): at 11:21

## 2025-06-19 RX ADMIN — Medication 1 TABLET(S): at 11:21

## 2025-06-19 RX ADMIN — CYANOCOBALAMIN 1000 MICROGRAM(S): 1000 INJECTION INTRAMUSCULAR; SUBCUTANEOUS at 11:21

## 2025-06-19 RX ADMIN — MEROPENEM 100 MILLIGRAM(S): 1 INJECTION INTRAVENOUS at 06:14

## 2025-06-19 RX ADMIN — Medication 220 MILLIGRAM(S): at 11:21

## 2025-06-19 RX ADMIN — APIXABAN 5 MILLIGRAM(S): 2.5 TABLET, FILM COATED ORAL at 06:13

## 2025-06-19 RX ADMIN — LOSARTAN POTASSIUM 50 MILLIGRAM(S): 100 TABLET, FILM COATED ORAL at 17:52

## 2025-06-19 RX ADMIN — MEROPENEM 100 MILLIGRAM(S): 1 INJECTION INTRAVENOUS at 21:33

## 2025-06-19 RX ADMIN — FUROSEMIDE 20 MILLIGRAM(S): 10 INJECTION INTRAMUSCULAR; INTRAVENOUS at 06:14

## 2025-06-19 RX ADMIN — Medication 400 MILLIGRAM(S): at 06:14

## 2025-06-19 RX ADMIN — Medication 400 MILLIGRAM(S): at 17:52

## 2025-06-19 RX ADMIN — Medication 40 MILLIGRAM(S): at 06:14

## 2025-06-19 RX ADMIN — Medication 500 MILLIGRAM(S): at 11:21

## 2025-06-19 RX ADMIN — APIXABAN 5 MILLIGRAM(S): 2.5 TABLET, FILM COATED ORAL at 17:51

## 2025-06-19 RX ADMIN — Medication 1 APPLICATION(S): at 06:13

## 2025-06-19 RX ADMIN — MEROPENEM 100 MILLIGRAM(S): 1 INJECTION INTRAVENOUS at 13:30

## 2025-06-19 NOTE — PROGRESS NOTE ADULT - ASSESSMENT
Mrs. Eddy is a 64 yo F with a PMHx of HTN, severe obesity, DVT, PE (R distal main with segmental extension) on Eliquis, LOUIS on CPAP, abby en y gastric bypass, and a recent admission for an incarcerated ventral hernia w/ SBO s/p lap converted to open ventral hernia repair w/ mesh and SBR and s/p ex lap, 30 cm SBR, creation of new side to side ileoileostomy, and ventral hernia repair w/ mesh in April 2025 presented back to the ER 2 days later from her LTACH facility ([facility]) after persistent HTN and epistaxis in setting of Eliquis use and complex surgical history. Patient found to have extensive ventral wall postsurgical changes with gas and fluid containing midline collection along the lower abdomen, measuring  approximately 4.8 x 4.5 x 10.2 cm and being managed with abx.    Plan:    #Rehab of debility with proximal LE weakness and marked decline from baseline function, s/p multiple abdominal surgeries with complications / HTN, morbid obesity, LOUIS on CPAP, Abby en y gastric bypass, and a recent admission for an incarcerated ventral hernia w/ sbo s/p lap converted to open ventral hernia repair w/ mesh and small bowel resection and s/p ex lap, 30 cm sbr, creation of new side to side ileoileostomy, ventral hernia repair w/ mesh.with impairment in mobility and ADLs and iADLs.The patient presented with co-morbidities requiring close physiatry follow-up at least 3 times a week to monitor his progress and monitor his comorbidities including HTN, PE, DVT, and LOUIS. .The patient's co-morbidities do not limit the patient's ability to participate in rehabilitative therapy. The patient was seen by PT/OT and required min A with bed mobility, CGA with sit to stand transfers, ambulates 30ftx1 with RW, CGA and 1P assist, UBD minimum assist, and LBD dependent.. The patient was previously independent with all ADLs and iADLs without use of AD and now presents with functional decline.   #Wound dehiscence and persistent intra-abdominal collection, suspect abscess in setting of multiple abdominal surgeries with complications  -5/28 Wcx grew rare Enterococcus avium    -Growth in fluid media only Escherichia coli ESBL    -5/30 midline placed  -Wound Care Instructions: Patient has a midline wound with staples and small 1 cm area of packing. Please remove and replace dressings once daily. Packing 1 cm opening with 0.25 inch packing strip, cut to length appropriate for wound. Cover incision with gauze and secure with paper tape.  -abd binder for support  -Infectious Disease is following. She is currently on Meropenem 1 gram IVPB q8h started 6/1. On discharge from acute rehab to home she will require Ertapenem 1 gram IVPB daily.   -6/17 repeat CTAP with decreased size in fluid collection       -IR consulted on 6/18, no drainable fluid collection       -per ID, will require additional 4 weeks of abx with repeat CTAP after   - continue packing of upper incision open area daily with strip gauze  -She requires acute rehab with 3 hours of daily therapies at least 5 out of 7 days and close physiatry follow up.   - tolerating therapies well. Strength and endurance improving daily.   Ambulating with RW with supervision.  For d/c home in am with family.    #PE in R distal main with segmental extension  #DVT  -c/w Eliquis  - stable, asymptomatic on RA  F/U PCP    #Stage 2 buttock pressure injury per wound care nurse specialist  - local care with barrier cream  F/U VN and PCP    #Chronic venous insufficiency/ fluctuating LE edema  - did not improve off Norvasc  - changed HCTZ to Lasix 20mg daily and monitor daily weights -    weight decreasing 291 -> 289 -> 277  improving  D/c on Lasix and f/u with PCP    #Anemia-stable  -6/13 H/H 8.6/29  -6/16 H/H 7.9/26.4  -continue to trend     #HTN  -Amolodipine was halted in the setting of pitting edema on 6/2 and low bp  - Losartan decreased from 100mg to 50mg 6/5. BP remained in low range with Losartan held x 5 days. D/c Losartan 6/11 and monitor.  - change HCTZ to to Lasix 20mg daily 6/12 for improved edema control and monitor BP  BP now up to 170 systolic. Will resume Losartan 50 mg daily and f/u with PCP as an outpatient    #Hypokalemia   - give oral supplement and recheck in a few days  - improved on standing KCl, on 20mEq KCI   - stable, WNL    #LOUIS on CPAP  Stable    #Morbid obesity BMI 53.2  Dietary education     -Pain control: Tylenol prn    - Diet: DASH/TLC Sodium & Cholesterol Restricted    Precautions / PROPHYLAXIS:    - Falls  - DVT prophylaxis: Eliquis

## 2025-06-19 NOTE — PROGRESS NOTE ADULT - ASSESSMENT
ASSESSMENT:  64F with a PMHx of HTN, morbid obesity, LOUIS on CPAP, Abby en y gastric bypass, and a recent admission for an incarcerated ventral hernia w/ sbo s/p lap converted to open ventral hernia repair w/ mesh and small bowel resection and s/p ex lap, 30 cm sbr, creation of new side to side ileoileostomy, ventral hernia repair w/ mesh who presents back to the ER from her LTACH facility [facility] after persistent HTN, epistaxis in setting of eliquis use and complex surgical history. Now admitted to  inpatient rehab    PLAN:  - Plan for wound debridement tomorrow   - Pre op   - NPO, IVF at [facility]   - Consent to be obtained by attending   - Encourage ambulation   - Care as per inpatient rehab      BLUE TEAM SPECTRA: [id number]

## 2025-06-19 NOTE — PROGRESS NOTE ADULT - SUBJECTIVE AND OBJECTIVE BOX
Patient is a 65y old  Female who presents with a chief complaint of Rehab of debility with proximal LE weakness and marked decline from baseline function, s/p multiple abdominal surgeries with complications / HTN, severe obesity, LOIUS on CPAP, Abby en y gastric bypass, and a recent admission for an incarcerated ventral hernia w/ sbo s/p lap converted to open ventral hernia repair w/ mesh and small bowel resection and s/p ex lap, 30 cm sbr, creation of new side to side ileoileostomy, ventral hernia repair w/ mesh. (03 Jun 2025 12:34)      HPI:  66 yo F with a PMHx of HTN, severe obesity, DVT, PE (R distal main with segmental extension) on Eliquis, LOUIS on CPAP, abby en y gastric bypass, and a recent admission for an incarcerated ventral hernia w/ SBO s/p lap converted to open ventral hernia repair w/ mesh and SBR and s/p ex lap, 30 cm SBR, creation of new side to side ileoileostomy, and ventral hernia repair w/ mesh in April 2025 presents back to the ER 2 days later from her LTACH facility ([facility]) after persistent HTN and epistaxis in setting of Eliquis use and complex surgical history. Patient notes her epistaxis has stopped after self-tamponade for 25 minutes. On admission, patient endorsed purulent discharge from her incision. CT AP (5/27) showed extensive ventral wall postsurgical changes with gas and fluid containing midline collection along the lower abdomen, measuring  approximately 4.8 x 4.5 x 10.2 cm. IR evaluated collection and did not recommend a drain. Wound culture positive for rare enterococcus avium (5/28). Patient currently on abx via midline per ID recs. Patient has been tolerating diet, passing regular bowel movements and voiding without nausea or vomiting. Denies SOB, chest pain, fever or chills. Rates her abd pain 1/10 at its worst.     Prior to the debility with proximal LE weakness and marked decline s/p multiple abd surgeries with complications, the patient was independent in ADLs and ambulation. Patient now requires moderate assistance with ambulation and ADLs 2/2 to the debility. Therefore, there is a significant functional decline from baseline. Patients also with medical comorbidities of recent PE in R distal main with segmental extension and DVT on Eliquis, LOUIS on CPAP, HTN, and morbid obesity requiring medication management and monitoring of vitals by Physiatry team and nursing. There are significant comorbidities which warrants acute rehab hospitalization. To return home it is in the patient’s best interest to have acute inpatient rehabilitative services to undergo intensive interdisciplinary therapy. Of note, the patient lives alone with multiple steps and would have difficulty performing ADLs and iADLs individually. Furthermore, the patient is motivated and is able to tolerate up to 3 hours of daily therapy for 5-6 days a week for a total of 15 hours a week. Evaluated by Physiatry and deemed to be an excellent candidate for admission to  for acute inpatient rehab. Admitted to Acute Rehab on 6/2/2025.      Patient seen and examined by Physiatry and is currently functioning at bed mobility minimum assist, sit to stand transfer with CGA, ambulates 30ftx1 with RW, CGA and 1P assist, UBD minimum assist, and LBD dependent..     LS: Patient. lives with  but came from [facility] after last admission  PLOF: Independent with all ADLs and iADLs and ambulates without assistive device.      (02 Jun 2025 13:50)    TODAY'S SUBJECTIVE & REVIEW OF SYMPTOMS  No acute events overnight. No new complaints.   Tolerating PT/OT. Voiding spontaneously and having regular BM.     Vital signs reviewed by me. BP now running high to 170. Patient is anxious about d/c home. Will start Losartan.  LE edema appears to be improving with Lasix and ACE wraps.  291# on admission, (6/17) 277.7 pounds -> (6/18) 279.9 pounds  For d/c home in am. Discussed with Dr. White. No procedure planned. Can be discharged home on IV antibiotics and local wound care.     Review of Systems:   Constitutional:       [x  ] WNL           [   ] poor appetite   [   ] insomnia   [   ] tired   Cardio:                [ x  ] WNL           [   ] CP   [   ] CEBALLOS - improving   [   ] palpitations               Resp:                   [  x ] WNL           [   ] SOB   [   ] cough   [   ] wheezing   GI:                        [  x ] WNL           [   ] constipation   [   ] diarrhea   [   ] abdominal pain   [   ] nausea   [   ] emesis                                :                      [  x ] WNL           [   ] RODRIGUES  [   ] dysuria   [   ] difficulty voiding             Endo:                   [  x ] WNL          [   ] polyuria   [   ] temperature intolerance                 Skin:                     [   ] WNL          [   ] pain   [ x  ] wound-incision at abd site, c/d/i  [   ] rash   MSK:                    [   ] WNL          [   ] muscle pain   [  ] joint pain/ stiffness   [   ] muscle tenderness   [ x  ] swelling in BLE   Neuro:                 [   ] WNL          [   ] HA   [  ] change in vision   [   ] tremor   [ x  ] weakness   [   ]dysphagia              Cognitive:           [ x  ] WNL           [   ] occasional confusion      Psych:                  [  x ] WNL           [   ] hallucinations   [   ]agitation   [   ] delusion   [   ]depression    CLOF:  transfer Supervision, ambulates 150ft with RW and SPV, 12 steps with 2 handrails and SPV  UB dressing independent LB dressing set-up   eating independent     PHYSICAL EXAMINATION     Vital Signs Last 24 Hrs  T(C): 36.6 (19 Jun 2025 14:00), Max: 36.7 (18 Jun 2025 19:44)  T(F): 97.9 (19 Jun 2025 14:00), Max: 98.1 (18 Jun 2025 19:44)  HR: 103 (19 Jun 2025 16:32) (97 - 106)  BP: 170/101 (19 Jun 2025 16:32) (135/83 - 173/93)  BP(mean): 124 (19 Jun 2025 16:32) (107 - 124)  RR: 18 (19 Jun 2025 14:00) (18 - 18)  SpO2: --        General:[  x ] NAD, Resting Comfortable,   [   ] other:                                HEENT: [ x  ] NC/AT, EOMI, PERRL , Normal Conjunctivae,   [   ] other:  Cardio: [  x ] RRR, no murmur,   [   ] other:                              Pulm: [  x ] No Respiratory Distress,  Lungs CTAB,   [   ] other:                       Abdomen: [  x ]ND/NT, Soft, abd incision site c/d/i   [   ] other:    : [  x ] NO RODRIGUES CATHETER, [   ] RODRIGUES CATHETER- no meatal tear, no discharge, [   ] other:                                            MSK: [ x  ] No joint swelling, Full ROM,   [   ] other:                                         Ext: [   ]No C/C/E, No calf tenderness,   [ x  ]other: 2-3+ pitting edema in BLE    Skin: [   ]intact,   [ x  ] other: abd incision c/d/i   - 1 cm deep open wound along incision, with 1/4 inch gauze packing                                                                Neurological Examination:  Cognitive: [  x  ] AAO x 3,   [    ]  other:                                                                        Attention:  [  x  ] intact,   [    ]  other:                             Memory: [  x  ] intact,  [    ]  other:      Mood/Affect: [ x   ] wnl,    [    ]  other:                                                                             Communication: [ x   ]Fluent, no dysarthria, following commands:  [    ] other:    CN II - XII:  [  x  ] intact,  [    ] other:                                                                                   Motor:   RIGHT UE:  4/5 shoulder abduction, 5/5 elbow flexion/extension, 5/5 hand    LEFT    UE: 4/5 shoulder abduction, 5/5 elbow flexion/extension, 5/5 hand   RIGHT LE: 3+/5 hip flexion, 4+/5 knee flexion/extension, 4+/5 DF/PF (limited by pitting edema)  LEFT  LE:  3+/5 hip flexion, 4+/5 knee flexion/extension, 4+/5 DF/PF (limited by pitting edema)  Tone: [  x  ] wnl,   [    ]  other:  DTRs: [  x ]symmetric, [   ] other:  Coordination:   [ x   ] intact,   [    ] other:                                                                         Sensory: [ x   ] Intact to light touch,   [    ] other:      MEDICATION  MEDICATIONS  (STANDING):  apixaban 5 milliGRAM(s) Oral every 12 hours  ascorbic acid 500 milliGRAM(s) Oral daily  chlorhexidine 2% Cloths 1 Application(s) Topical <User Schedule>  cyanocobalamin 1000 MICROGram(s) Oral daily  ergocalciferol 64371 Unit(s) Oral <User Schedule>  furosemide    Tablet 20 milliGRAM(s) Oral daily  lactobacillus acidophilus 1 Tablet(s) Oral daily  magnesium oxide 400 milliGRAM(s) Oral every 12 hours  meropenem  IVPB 1000 milliGRAM(s) IV Intermittent every 8 hours  multivitamin/minerals 1 Tablet(s) Oral daily  pantoprazole    Tablet 40 milliGRAM(s) Oral before breakfast  potassium chloride    Tablet ER 20 milliEquivalent(s) Oral daily  tiotropium 2.5 MICROgram(s) Inhaler 2 Puff(s) Inhalation daily  zinc sulfate 220 milliGRAM(s) Oral every 24 hours    MEDICATIONS  (PRN):  acetaminophen     Tablet .. 650 milliGRAM(s) Oral every 6 hours PRN Mild Pain (1 - 3)  albuterol    90 MICROgram(s) HFA Inhaler 2 Puff(s) Inhalation every 6 hours PRN Shortness of Breath and/or Wheezing  ondansetron Injectable 4 milliGRAM(s) IV Push every 6 hours PRN Nausea  sodium chloride 0.65% Nasal 1 Spray(s) Both Nostrils every 4 hours PRN Nasal Congestion        RECENT LABS/IMAGING                        7.9    4.50  )-----------( 239      ( 18 Jun 2025 07:03 )             26.0     06-18    145  |  109  |  14  ----------------------------<  91  4.0   |  26  |  0.7    Ca    8.3[L]      18 Jun 2025 07:03

## 2025-06-19 NOTE — CHART NOTE - NSCHARTNOTEFT_GEN_A_CORE
Losartan 50 mg daily added since bp is in 170's .  will continue  at home too .     ICU Vital Signs Last 24 Hrs  T(C): 36.6 (19 Jun 2025 14:00), Max: 36.7 (18 Jun 2025 19:44)  T(F): 97.9 (19 Jun 2025 14:00), Max: 98.1 (18 Jun 2025 19:44)  HR: 103 (19 Jun 2025 16:32) (97 - 106)  BP: 170/101 (19 Jun 2025 16:32) (135/83 - 173/93)  BP(mean): 124 (19 Jun 2025 16:32) (107 - 124)    RR: 18 (19 Jun 2025 14:00) (18 - 18)

## 2025-06-19 NOTE — PROGRESS NOTE ADULT - SUBJECTIVE AND OBJECTIVE BOX
GENERAL SURGERY PROGRESS NOTE    Patient: NANCY SARGENT , 65y (62)Female   MRN: [id number]  Location: [address]  Visit: 25 Inpatient  Date: 25 @ 07:45      PAST MEDICAL & SURGICAL HISTORY:  Gastric bypass status for obesity  LOUIS (obstructive sleep apnea)  S/P gastric bypass  S/P   S/P small bowel resection  History of total bilateral knee replacement (TKR)  H/O colonoscopy        Vitals:   T(F): 96.8 (25 @ 05:46), Max: 98.1 (25 @ 19:44)  HR: 97 (25 @ 05:46)  BP: 135/83 (25 @ 05:46)  RR: 18 (25 @ 05:46)  SpO2: --      Diet, DASH/TLC:   Sodium & Cholesterol Restricted  Supplement Feeding Modality:  Oral  Ensure Max Cans or Servings Per Day:  1       Frequency:  Two Times a day      Fluids:     I & O's:    25 @ 07:01  -  25 @ 07:00  --------------------------------------------------------  IN:  Total IN: 0 mL    OUT:    Voided (mL): 1100 mL  Total OUT: 1100 mL    Total NET: -1100 mL      PHYSICAL EXAM:  General: NAD,  HEENT: NCAT,   Cardiac: RRR   Respiratory: Normal respiratory effort,  Abdomen: Soft, non-distended, non-tender, abdominal dressing in place  Skin: Warm/dry, normal color,     MEDICATIONS  (STANDING):  apixaban 5 milliGRAM(s) Oral every 12 hours  ascorbic acid 500 milliGRAM(s) Oral daily  chlorhexidine 2% Cloths 1 Application(s) Topical <User Schedule>  cyanocobalamin 1000 MICROGram(s) Oral daily  ergocalciferol 08331 Unit(s) Oral <User Schedule>  furosemide    Tablet 20 milliGRAM(s) Oral daily  lactobacillus acidophilus 1 Tablet(s) Oral daily  magnesium oxide 400 milliGRAM(s) Oral every 12 hours  meropenem  IVPB 1000 milliGRAM(s) IV Intermittent every 8 hours  multivitamin/minerals 1 Tablet(s) Oral daily  pantoprazole    Tablet 40 milliGRAM(s) Oral before breakfast  potassium chloride    Tablet ER 20 milliEquivalent(s) Oral daily  tiotropium 2.5 MICROgram(s) Inhaler 2 Puff(s) Inhalation daily  zinc sulfate 220 milliGRAM(s) Oral every 24 hours    MEDICATIONS  (PRN):  acetaminophen     Tablet .. 650 milliGRAM(s) Oral every 6 hours PRN Mild Pain (1 - 3)  albuterol    90 MICROgram(s) HFA Inhaler 2 Puff(s) Inhalation every 6 hours PRN Shortness of Breath and/or Wheezing  ondansetron Injectable 4 milliGRAM(s) IV Push every 6 hours PRN Nausea  sodium chloride 0.65% Nasal 1 Spray(s) Both Nostrils every 4 hours PRN Nasal Congestion      DVT PROPHYLAXIS:   GI PROPHYLAXIS: pantoprazole    Tablet 40 milliGRAM(s) Oral before breakfast    ANTICOAGULATION:   ANTIBIOTICS:  meropenem  IVPB 1000 milliGRAM(s)            LAB/STUDIES:  Labs:  CAPILLARY BLOOD GLUCOSE                              7.9    4.50  )-----------( 239      ( 2025 07:03 )             26.0         06-18    145  |  109  |  14  ----------------------------<  91  4.0   |  26  |  0.7          LFTs:         Coags:            Urinalysis Basic - ( 2025 07:03 )    Color: x / Appearance: x / SG: x / pH: x  Gluc: 91 mg/dL / Ketone: x  / Bili: x / Urobili: x   Blood: x / Protein: x / Nitrite: x   Leuk Esterase: x / RBC: x / WBC x   Sq Epi: x / Non Sq Epi: x / Bacteria: x                IMAGING:

## 2025-06-20 VITALS
OXYGEN SATURATION: 96 % | DIASTOLIC BLOOD PRESSURE: 92 MMHG | TEMPERATURE: 98 F | RESPIRATION RATE: 18 BRPM | HEART RATE: 111 BPM | SYSTOLIC BLOOD PRESSURE: 151 MMHG

## 2025-06-20 RX ORDER — ALBUTEROL SULFATE 2.5 MG/3ML
2 VIAL, NEBULIZER (ML) INHALATION
Qty: 0 | Refills: 0 | DISCHARGE
Start: 2025-06-20

## 2025-06-20 RX ORDER — ALBUTEROL SULFATE 2.5 MG/3ML
2 VIAL, NEBULIZER (ML) INHALATION
Qty: 2 | Refills: 0
Start: 2025-06-20 | End: 2025-07-19

## 2025-06-20 RX ORDER — LOSARTAN POTASSIUM 100 MG/1
1 TABLET, FILM COATED ORAL
Qty: 30 | Refills: 0
Start: 2025-06-20 | End: 2025-07-19

## 2025-06-20 RX ORDER — ZINC SULFATE 50(220)MG
1 CAPSULE ORAL
Qty: 0 | Refills: 0 | DISCHARGE
Start: 2025-06-20

## 2025-06-20 RX ORDER — APIXABAN 2.5 MG/1
1 TABLET, FILM COATED ORAL
Qty: 60 | Refills: 0
Start: 2025-06-20 | End: 2025-07-19

## 2025-06-20 RX ORDER — CYANOCOBALAMIN 1000 UG/ML
1 INJECTION INTRAMUSCULAR; SUBCUTANEOUS
Qty: 30 | Refills: 0
Start: 2025-06-20 | End: 2025-07-19

## 2025-06-20 RX ORDER — OMEPRAZOLE 20 MG/1
1 CAPSULE, DELAYED RELEASE ORAL
Qty: 30 | Refills: 0
Start: 2025-06-20 | End: 2025-07-19

## 2025-06-20 RX ORDER — ERTAPENEM SODIUM 1 G/1
1 INJECTION, POWDER, LYOPHILIZED, FOR SOLUTION INTRAMUSCULAR; INTRAVENOUS
Qty: 60 | Refills: 0
Start: 2025-06-20 | End: 2025-08-18

## 2025-06-20 RX ORDER — TIOTROPIUM BROMIDE INHALATION SPRAY 3.12 UG/1
2 SPRAY, METERED RESPIRATORY (INHALATION)
Qty: 1 | Refills: 1
Start: 2025-06-20 | End: 2025-08-18

## 2025-06-20 RX ORDER — TIOTROPIUM BROMIDE INHALATION SPRAY 3.12 UG/1
2 SPRAY, METERED RESPIRATORY (INHALATION)
Qty: 1 | Refills: 0
Start: 2025-06-20 | End: 2025-07-19

## 2025-06-20 RX ORDER — ERGOCALCIFEROL 1.25 MG/1
1 CAPSULE ORAL
Qty: 9 | Refills: 0
Start: 2025-06-20 | End: 2025-07-19

## 2025-06-20 RX ORDER — ENOXAPARIN SODIUM 100 MG/ML
40 INJECTION SUBCUTANEOUS EVERY 24 HOURS
Refills: 0 | Status: DISCONTINUED | OUTPATIENT
Start: 2025-06-20 | End: 2025-06-20

## 2025-06-20 RX ORDER — FUROSEMIDE 10 MG/ML
1 INJECTION INTRAMUSCULAR; INTRAVENOUS
Qty: 30 | Refills: 0
Start: 2025-06-20 | End: 2025-07-19

## 2025-06-20 RX ORDER — IPRATROPIUM BROMIDE AND ALBUTEROL SULFATE .5; 2.5 MG/3ML; MG/3ML
3 SOLUTION RESPIRATORY (INHALATION)
Refills: 0 | DISCHARGE

## 2025-06-20 RX ORDER — LOSARTAN POTASSIUM 100 MG/1
1 TABLET, FILM COATED ORAL
Qty: 30 | Refills: 0 | DISCHARGE
Start: 2025-06-20 | End: 2025-07-19

## 2025-06-20 RX ADMIN — FUROSEMIDE 20 MILLIGRAM(S): 10 INJECTION INTRAMUSCULAR; INTRAVENOUS at 06:33

## 2025-06-20 RX ADMIN — CYANOCOBALAMIN 1000 MICROGRAM(S): 1000 INJECTION INTRAMUSCULAR; SUBCUTANEOUS at 12:04

## 2025-06-20 RX ADMIN — Medication 1 APPLICATION(S): at 06:35

## 2025-06-20 RX ADMIN — Medication 400 MILLIGRAM(S): at 06:34

## 2025-06-20 RX ADMIN — APIXABAN 5 MILLIGRAM(S): 2.5 TABLET, FILM COATED ORAL at 06:34

## 2025-06-20 RX ADMIN — Medication 40 MILLIGRAM(S): at 06:34

## 2025-06-20 RX ADMIN — Medication 220 MILLIGRAM(S): at 12:04

## 2025-06-20 RX ADMIN — ERGOCALCIFEROL 50000 UNIT(S): 1.25 CAPSULE ORAL at 10:15

## 2025-06-20 RX ADMIN — TIOTROPIUM BROMIDE INHALATION SPRAY 2 PUFF(S): 3.12 SPRAY, METERED RESPIRATORY (INHALATION) at 08:13

## 2025-06-20 RX ADMIN — Medication 500 MILLIGRAM(S): at 12:04

## 2025-06-20 RX ADMIN — Medication 1 TABLET(S): at 12:04

## 2025-06-20 RX ADMIN — LOSARTAN POTASSIUM 50 MILLIGRAM(S): 100 TABLET, FILM COATED ORAL at 06:34

## 2025-06-20 RX ADMIN — ERTAPENEM SODIUM 100 MILLIGRAM(S): 1 INJECTION, POWDER, LYOPHILIZED, FOR SOLUTION INTRAMUSCULAR; INTRAVENOUS at 06:34

## 2025-06-20 RX ADMIN — Medication 20 MILLIEQUIVALENT(S): at 12:04

## 2025-06-20 NOTE — PROGRESS NOTE ADULT - TIME BILLING
Time spent with final H&P, patient education regarding discharge, reviewing chart including vitals, meds and labs and reviewing and completing provider discharge note and med. rec.

## 2025-06-20 NOTE — PROGRESS NOTE ADULT - ASSESSMENT
ASSESSMENT:  64F with a PMHx of HTN, morbid obesity, LOUIS on CPAP, Abby en y gastric bypass, and a recent admission for an incarcerated ventral hernia w/ sbo s/p lap converted to open ventral hernia repair w/ mesh and small bowel resection and s/p ex lap, 30 cm sbr, creation of new side to side ileoileostomy, ventral hernia repair w/ mesh who presents back to the ER from her LTACH facility [facility] after persistent HTN, epistaxis in setting of eliquis use and complex surgical history. Now admitted to 4A inpatient rehab    PLAN:  - Plan for wound debridement next week  - Hold Eliquis  - Consent to be obtained by attending   - Encourage ambulation  - Daily packing/dressing changes  - Care as per inpatient rehab      BLUE TEAM SPECTRA: [id number]

## 2025-06-20 NOTE — CHART NOTE - NSCHARTNOTESELECT_GEN_ALL_CORE
Event Note
Gastroenterology Nutrition/Nutrition Services
new pharmacy  [facility] on [address]/Event Note
Event Note
Event Note
wound care

## 2025-06-20 NOTE — PROGRESS NOTE ADULT - PROVIDER SPECIALTY LIST ADULT
Hospitalist
Rehab Medicine
Surgery
Infectious Disease
Rehab Medicine
Surgery
Hospitalist
Rehab Medicine
Infectious Disease

## 2025-06-20 NOTE — CHART NOTE - NSCHARTNOTEFT_GEN_A_CORE
as per pt information and requests  her meds resent to [facility] on [address] after discontinuing all the meds sent to [facility].

## 2025-06-20 NOTE — PROGRESS NOTE ADULT - ASSESSMENT
Mrs. Eddy is a 64 yo F with a PMHx of HTN, severe obesity, DVT, PE (R distal main with segmental extension) on Eliquis, LOUIS on CPAP, abby en y gastric bypass, and a recent admission for an incarcerated ventral hernia w/ SBO s/p lap converted to open ventral hernia repair w/ mesh and SBR and s/p ex lap, 30 cm SBR, creation of new side to side ileoileostomy, and ventral hernia repair w/ mesh in April 2025 presented back to the ER 2 days later from her LTACH facility ([facility]) after persistent HTN and epistaxis in setting of Eliquis use and complex surgical history. Patient found to have extensive ventral wall postsurgical changes with gas and fluid containing midline collection along the lower abdomen, measuring  approximately 4.8 x 4.5 x 10.2 cm and being managed with abx.    Plan:    #Rehab of debility with proximal LE weakness and marked decline from baseline function, s/p multiple abdominal surgeries with complications / HTN, morbid obesity, LOUIS on CPAP, Abby en y gastric bypass, and a recent admission for an incarcerated ventral hernia w/ sbo s/p lap converted to open ventral hernia repair w/ mesh and small bowel resection and s/p ex lap, 30 cm sbr, creation of new side to side ileoileostomy, ventral hernia repair w/ mesh.with impairment in mobility and ADLs and iADLs.The patient presented with co-morbidities requiring close physiatry follow-up at least 3 times a week to monitor his progress and monitor his comorbidities including HTN, PE, DVT, and LOUIS. .The patient's co-morbidities do not limit the patient's ability to participate in rehabilitative therapy. The patient was seen by PT/OT and required min A with bed mobility, CGA with sit to stand transfers, ambulates 30ftx1 with RW, CGA and 1P assist, UBD minimum assist, and LBD dependent.. The patient was previously independent with all ADLs and iADLs without use of AD and now presents with functional decline.   #Wound dehiscence and persistent intra-abdominal collection, suspect abscess in setting of multiple abdominal surgeries with complications  -5/28 Wcx grew rare Enterococcus avium    -Growth in fluid media only Escherichia coli ESBL    -5/30 midline placed  -Wound Care Instructions: Patient has a midline wound with staples and small 1 cm area of packing. Please remove and replace dressings once daily. Packing 1 cm opening with 0.25 inch packing strip, cut to length appropriate for wound. Cover incision with gauze and secure with paper tape.  -abd binder for support  -Infectious Disease is following. She is currently on Meropenem 1 gram IVPB q8h started 6/1. On discharge from acute rehab to home she will require Ertapenem 1 gram IVPB daily.   -6/17 repeat CTAP with decreased size in fluid collection       -IR consulted on 6/18, no drainable fluid collection       -per ID, will require additional 4 weeks of abx with repeat CTAP after   - continue packing of upper incision open area daily with strip gauze  -She requires acute rehab with 3 hours of daily therapies at least 5 out of 7 days and close physiatry follow up.   - tolerating therapies well. Strength and endurance improving daily.   Ambulating with RW with supervision.  For d/c home today with family. VN [facility] services for home IV antibiotics - Ertapenem daily x 4 weeks. FU ID Dr Perdue. F/U surgeon next week scheduled. VN for daily abdominal wound packing.     #PE in R distal main with segmental extension  #DVT  -c/w Eliquis  - stable, asymptomatic on RA  F/U PCP    #Stage 2 buttock pressure injury per wound care nurse specialist  - local care with barrier cream  F/U VN and PCP    #Chronic venous insufficiency/ fluctuating LE edema  - did not improve off Norvasc  - changed HCTZ to Lasix 20mg daily and monitor daily weights -    weight decreasing 291 -> 289 -> 277  improving  D/c on Lasix and f/u with PCP    #Anemia-stable  -6/13 H/H 8.6/29  -6/16 H/H 7.9/26.4  -continue to trend     #HTN  -Amolodipine was halted in the setting of pitting edema on 6/2 and low bp  - Losartan decreased from 100mg to 50mg 6/5. BP remained in low range with Losartan held x 5 days. D/c Losartan 6/11 and monitor.  - change HCTZ to to Lasix 20mg daily 6/12 for improved edema control and monitor BP  BP now up to 170 systolic. Will resume Losartan 50 mg daily and f/u with PCP as an outpatient    #Hypokalemia   - give oral supplement and recheck in a few days  - improved on standing KCl, on 20mEq KCI   - stable, WNL. F/U PCP.    #LOUIS on CPAP  Stable    #Morbid obesity BMI 53.2  Dietary education     -Pain control: Tylenol prn    - Diet: DASH/TLC Sodium & Cholesterol Restricted    For further summary of hospital course and d/c med rec and patient instructions, see Provider D/C Note, reviewed and signed by me.      Mrs. Eddy is a 66 yo F with a PMHx of HTN, severe obesity, DVT, PE (R distal main with segmental extension) on Eliquis, LOUIS on CPAP, abby en y gastric bypass, and a recent admission for an incarcerated ventral hernia w/ SBO s/p lap converted to open ventral hernia repair w/ mesh and SBR and s/p ex lap, 30 cm SBR, creation of new side to side ileoileostomy, and ventral hernia repair w/ mesh in April 2025 presented back to the ER 2 days later from her LTACH facility ([facility]) after persistent HTN and epistaxis in setting of Eliquis use and complex surgical history. Patient found to have extensive ventral wall postsurgical changes with gas and fluid containing midline collection along the lower abdomen, measuring  approximately 4.8 x 4.5 x 10.2 cm and being managed with abx.    Plan:    #Rehab of debility with proximal LE weakness and marked decline from baseline function, s/p multiple abdominal surgeries with complications / HTN, morbid obesity, LOUIS on CPAP, Abby en y gastric bypass, and a recent admission for an incarcerated ventral hernia w/ sbo s/p lap converted to open ventral hernia repair w/ mesh and small bowel resection and s/p ex lap, 30 cm sbr, creation of new side to side ileoileostomy, ventral hernia repair w/ mesh.with impairment in mobility and ADLs and iADLs.The patient presented with co-morbidities requiring close physiatry follow-up at least 3 times a week to monitor his progress and monitor his comorbidities including HTN, PE, DVT, and LOUIS. .The patient's co-morbidities do not limit the patient's ability to participate in rehabilitative therapy. The patient was seen by PT/OT and required min A with bed mobility, CGA with sit to stand transfers, ambulates 30ftx1 with RW, CGA and 1P assist, UBD minimum assist, and LBD dependent.. The patient was previously independent with all ADLs and iADLs without use of AD and now presents with functional decline.     #Wound dehiscence and persistent intra-abdominal collection, suspect abscess in setting of multiple abdominal surgeries with complications  -5/28 Wcx grew rare Enterococcus avium    -Growth in fluid media only Escherichia coli ESBL    -5/30 midline placed  -Wound Care Instructions: Patient has a midline wound with staples and small 1 cm area of packing. Please remove and replace dressings once daily. Packing 1 cm opening with 0.25 inch packing strip, cut to length appropriate for wound. Cover incision with gauze and secure with paper tape.  -abd binder for support  -Infectious Disease is following. She is currently on Meropenem 1 gram IVPB q8h started 6/1. On discharge from acute rehab to home she will require Ertapenem 1 gram IVPB daily.   -6/17 repeat CTAP with decreased size in fluid collection       -IR consulted on 6/18, no drainable fluid collection       -per ID, will require additional 4 weeks of abx with repeat CTAP after   - continue packing of upper incision open area daily with strip gauze    Ambulating with RW with supervision.  For d/c home today with family. [facility] for [facility] IV antibiotics - Ertapenem daily x 4 weeks. FU ID Dr Perdue. F/U surgeon next week scheduled. VN for daily abdominal wound packing.     #PE in R distal main with segmental extension  #DVT  -c/w Eliquis  - stable, asymptomatic on RA  F/U PCP    #Stage 2 buttock pressure injury per wound care nurse specialist  - local care with barrier cream  F/U VN and PCP    #Chronic venous insufficiency/ fluctuating LE edema  - did not improve off Norvasc  - changed HCTZ to Lasix 20mg daily and monitor daily weights -    weight decreasing 291 -> 289 -> 277  improving  D/c on Lasix and f/u with PCP    #Anemia-stable  -6/13 H/H 8.6/29  -6/16 H/H 7.9/26.4  -continue to trend     #HTN  -Amolodipine was halted in the setting of pitting edema on 6/2 and low bp  - Losartan decreased from 100mg to 50mg 6/5. BP remained in low range with Losartan held x 5 days. D/c Losartan 6/11 and monitor.  - change HCTZ to to Lasix 20mg daily 6/12 for improved edema control and monitor BP  BP now up to 170 systolic. Will resume Losartan 50 mg daily and f/u with PCP as an outpatient    #Hypokalemia   - give oral supplement and recheck in a few days  - improved on standing KCl, on 20mEq KCI   - stable, WNL. F/U PCP.    #LOUIS on CPAP  Stable    #Morbid obesity BMI 53.2  Dietary education     -Pain control: Tylenol prn    - Diet: DASH/TLC Sodium & Cholesterol Restricted    For further summary of hospital course and d/c med rec and patient instructions, see Provider D/C Note, reviewed and signed by me.

## 2025-06-20 NOTE — PROGRESS NOTE ADULT - SUBJECTIVE AND OBJECTIVE BOX
Patient is a 65y old  Female who presents with a chief complaint of Rehab of debility with proximal LE weakness and marked decline from baseline function, s/p multiple abdominal surgeries with complications / HTN, severe obesity, LOUIS on CPAP, Abby en y gastric bypass, and a recent admission for an incarcerated ventral hernia w/ sbo s/p lap converted to open ventral hernia repair w/ mesh and small bowel resection and s/p ex lap, 30 cm sbr, creation of new side to side ileoileostomy, ventral hernia repair w/ mesh. (03 Jun 2025 12:34)      HPI:  64 yo F with a PMHx of HTN, severe obesity, DVT, PE (R distal main with segmental extension) on Eliquis, LOUIS on CPAP, abby en y gastric bypass, and a recent admission for an incarcerated ventral hernia w/ SBO s/p lap converted to open ventral hernia repair w/ mesh and SBR and s/p ex lap, 30 cm SBR, creation of new side to side ileoileostomy, and ventral hernia repair w/ mesh in April 2025 presents back to the ER 2 days later from her LTACH facility ([facility]) after persistent HTN and epistaxis in setting of Eliquis use and complex surgical history. Patient notes her epistaxis has stopped after self-tamponade for 25 minutes. On admission, patient endorsed purulent discharge from her incision. CT AP (5/27) showed extensive ventral wall postsurgical changes with gas and fluid containing midline collection along the lower abdomen, measuring  approximately 4.8 x 4.5 x 10.2 cm. IR evaluated collection and did not recommend a drain. Wound culture positive for rare enterococcus avium (5/28). Patient currently on abx via midline per ID recs. Patient has been tolerating diet, passing regular bowel movements and voiding without nausea or vomiting. Denies SOB, chest pain, fever or chills. Rates her abd pain 1/10 at its worst.     Prior to the debility with proximal LE weakness and marked decline s/p multiple abd surgeries with complications, the patient was independent in ADLs and ambulation. Patient now requires moderate assistance with ambulation and ADLs 2/2 to the debility. Therefore, there is a significant functional decline from baseline. Patients also with medical comorbidities of recent PE in R distal main with segmental extension and DVT on Eliquis, LOUIS on CPAP, HTN, and morbid obesity requiring medication management and monitoring of vitals by Physiatry team and nursing. There are significant comorbidities which warrants acute rehab hospitalization. To return home it is in the patient’s best interest to have acute inpatient rehabilitative services to undergo intensive interdisciplinary therapy. Of note, the patient lives alone with multiple steps and would have difficulty performing ADLs and iADLs individually. Furthermore, the patient is motivated and is able to tolerate up to 3 hours of daily therapy for 5-6 days a week for a total of 15 hours a week. Evaluated by Physiatry and deemed to be an excellent candidate for admission to  for acute inpatient rehab. Admitted to Acute Rehab on 6/2/2025.      Patient seen and examined by Physiatry and is currently functioning at bed mobility minimum assist, sit to stand transfer with CGA, ambulates 30ftx1 with RW, CGA and 1P assist, UBD minimum assist, and LBD dependent..     LS: Patient. lives with  but came from [facility] after last admission  PLOF: Independent with all ADLs and iADLs and ambulates without assistive device.      (02 Jun 2025 13:50)    TODAY'S SUBJECTIVE & REVIEW OF SYMPTOMS  No acute events overnight. No new complaints.   Tolerating PT/OT. Voiding spontaneously and having regular BM.     Vital signs reviewed by me. BP now running high to 170. Patient is anxious about d/c home. Started Losartan 50mg daily  LE edema appears to be improving with Lasix and ACE wraps.  291# on admission, (6/17) 277.7 pounds -> (6/18) 279.9 pounds  For d/c home today. Discussed with Dr. White. No procedure planned. Can be discharged home on IV antibiotics and local wound care.     Review of Systems:   Constitutional:       [x  ] WNL           [   ] poor appetite   [   ] insomnia   [   ] tired   Cardio:                [ x  ] WNL           [   ] CP   [   ] CEBALLOS - improving   [   ] palpitations               Resp:                   [  x ] WNL           [   ] SOB   [   ] cough   [   ] wheezing   GI:                        [  x ] WNL           [   ] constipation   [   ] diarrhea   [   ] abdominal pain   [   ] nausea   [   ] emesis                                :                      [  x ] WNL           [   ] RODRIGUES  [   ] dysuria   [   ] difficulty voiding             Endo:                   [  x ] WNL          [   ] polyuria   [   ] temperature intolerance                 Skin:                     [   ] WNL          [   ] pain   [ x  ] wound-incision at abd site, c/d/i  [   ] rash   MSK:                    [   ] WNL          [   ] muscle pain   [  ] joint pain/ stiffness   [   ] muscle tenderness   [ x  ] swelling in BLE   Neuro:                 [   ] WNL          [   ] HA   [  ] change in vision   [   ] tremor   [ x  ] weakness   [   ]dysphagia              Cognitive:           [ x  ] WNL           [   ] occasional confusion      Psych:                  [  x ] WNL           [   ] hallucinations   [   ]agitation   [   ] delusion   [   ]depression    CLOF:  transfer Supervision, ambulates 150ft with RW and SPV, 12 steps with 2 handrails and SPV  UB dressing independent LB dressing set-up   eating independent     PHYSICAL EXAMINATION     Vital Signs Last 24 Hrs  T(C): 36.4 (20 Jun 2025 05:35), Max: 36.6 (19 Jun 2025 14:00)  T(F): 97.5 (20 Jun 2025 05:35), Max: 97.9 (19 Jun 2025 14:00)  HR: 91 (20 Jun 2025 05:35) (91 - 106)  BP: 137/83 (20 Jun 2025 05:35) (137/83 - 173/93)  BP(mean): 110 (19 Jun 2025 20:00) (110 - 124)  RR: 18 (20 Jun 2025 05:35) (18 - 18)  SpO2: 97% (19 Jun 2025 20:00) (97% - 97%)    Parameters below as of 19 Jun 2025 20:00  Patient On (Oxygen Delivery Method): room air        General:[  x ] NAD, Resting Comfortable,   [   ] other:                                HEENT: [ x  ] NC/AT, EOMI, PERRL , Normal Conjunctivae,   [   ] other:  Cardio: [  x ] RRR, no murmur,   [   ] other:                              Pulm: [  x ] No Respiratory Distress,  Lungs CTAB,   [   ] other:                       Abdomen: [  x ]ND/NT, Soft, abd incision site c/d/i   [   ] other:    : [  x ] NO RODRIGUES CATHETER, [   ] RODRIGUES CATHETER- no meatal tear, no discharge, [   ] other:                                            MSK: [ x  ] No joint swelling, Full ROM,   [   ] other:                                         Ext: [   ]No C/C/E, No calf tenderness,   [ x  ]other: 2-3+ pitting edema in BLE    Skin: [   ]intact,   [ x  ] other: abd incision c/d/i   - 1 cm deep open wound along incision, with 1/4 inch gauze packing                                                                Neurological Examination:  Cognitive: [  x  ] AAO x 3,   [    ]  other:                                                                        Attention:  [  x  ] intact,   [    ]  other:                             Memory: [  x  ] intact,  [    ]  other:      Mood/Affect: [ x   ] wnl,    [    ]  other:                                                                             Communication: [ x   ]Fluent, no dysarthria, following commands:  [    ] other:    CN II - XII:  [  x  ] intact,  [    ] other:                                                                                   Motor:   RIGHT UE:  4/5 shoulder abduction, 5/5 elbow flexion/extension, 5/5 hand    LEFT    UE: 4/5 shoulder abduction, 5/5 elbow flexion/extension, 5/5 hand   RIGHT LE: 3+/5 hip flexion, 4+/5 knee flexion/extension, 4+/5 DF/PF (limited by pitting edema)  LEFT  LE:  3+/5 hip flexion, 4+/5 knee flexion/extension, 4+/5 DF/PF (limited by pitting edema)  Tone: [  x  ] wnl,   [    ]  other:  DTRs: [  x ]symmetric, [   ] other:  Coordination:   [ x   ] intact,   [    ] other:                                                                         Sensory: [ x   ] Intact to light touch,   [    ] other:      MEDICATION  MEDICATIONS  (STANDING):  apixaban 5 milliGRAM(s) Oral every 12 hours  ascorbic acid 500 milliGRAM(s) Oral daily  chlorhexidine 2% Cloths 1 Application(s) Topical <User Schedule>  cyanocobalamin 1000 MICROGram(s) Oral daily  ergocalciferol 35565 Unit(s) Oral <User Schedule>  furosemide    Tablet 20 milliGRAM(s) Oral daily  lactobacillus acidophilus 1 Tablet(s) Oral daily  magnesium oxide 400 milliGRAM(s) Oral every 12 hours  meropenem  IVPB 1000 milliGRAM(s) IV Intermittent every 8 hours  multivitamin/minerals 1 Tablet(s) Oral daily  pantoprazole    Tablet 40 milliGRAM(s) Oral before breakfast  potassium chloride    Tablet ER 20 milliEquivalent(s) Oral daily  tiotropium 2.5 MICROgram(s) Inhaler 2 Puff(s) Inhalation daily  zinc sulfate 220 milliGRAM(s) Oral every 24 hours    MEDICATIONS  (PRN):  acetaminophen     Tablet .. 650 milliGRAM(s) Oral every 6 hours PRN Mild Pain (1 - 3)  albuterol    90 MICROgram(s) HFA Inhaler 2 Puff(s) Inhalation every 6 hours PRN Shortness of Breath and/or Wheezing  ondansetron Injectable 4 milliGRAM(s) IV Push every 6 hours PRN Nausea  sodium chloride 0.65% Nasal 1 Spray(s) Both Nostrils every 4 hours PRN Nasal Congestion        RECENT LABS/IMAGING                        7.9    4.50  )-----------( 239      ( 18 Jun 2025 07:03 )             26.0     06-18    145  |  109  |  14  ----------------------------<  91  4.0   |  26  |  0.7    Ca    8.3[L]      18 Jun 2025 07:03

## 2025-06-20 NOTE — PROGRESS NOTE ADULT - SUBJECTIVE AND OBJECTIVE BOX
SURGERY PROGRESS NOTE    24 HR EVENTS: NAEO. Abdominal exam unchanged. HTN to SBP 170s, low grade tachycardia to 106 HR max. Otherwise vitals WNL.    OBJECTIVE:  Vital Signs Last 24 Hrs  T(C): 36.3 (19 Jun 2025 20:00), Max: 36.6 (19 Jun 2025 14:00)  T(F): 97.4 (19 Jun 2025 20:00), Max: 97.9 (19 Jun 2025 14:00)  HR: 101 (19 Jun 2025 20:00) (97 - 106)  BP: 157/86 (19 Jun 2025 20:00) (135/83 - 173/93)  BP(mean): 110 (19 Jun 2025 20:00) (110 - 124)  RR: 18 (19 Jun 2025 20:00) (18 - 18)  SpO2: 97% (19 Jun 2025 20:00) (97% - 97%)    Parameters below as of 19 Jun 2025 20:00  Patient On (Oxygen Delivery Method): room air        I&O's Summary    18 Jun 2025 07:01  -  19 Jun 2025 07:00  --------------------------------------------------------  IN: 0 mL / OUT: 1100 mL / NET: -1100 mL        PHYSICAL EXAM:  General: NAD, awake and alert  HEENT: NCAT  Cardiac: RRR   Respiratory: Normal respiratory effort on RA  Abdomen: Soft, non-distended, non-tender, abdominal dressing in place- C/D/I  Skin: Warm/dry, normal color    LABS:                        7.9    4.50  )-----------( 239      ( 18 Jun 2025 07:03 )             26.0     06-18    145  |  109  |  14  ----------------------------<  91  4.0   |  26  |  0.7    Ca    8.3[L]      18 Jun 2025 07:03        Urinalysis Basic - ( 18 Jun 2025 07:03 )    Color: x / Appearance: x / SG: x / pH: x  Gluc: 91 mg/dL / Ketone: x  / Bili: x / Urobili: x   Blood: x / Protein: x / Nitrite: x   Leuk Esterase: x / RBC: x / WBC x   Sq Epi: x / Non Sq Epi: x / Bacteria: x        RADIOLOGY & ADDITIONAL STUDIES: Isotretinoin Pregnancy And Lactation Text: This medication is Pregnancy Category X and is considered extremely dangerous during pregnancy. It is unknown if it is excreted in breast milk.

## 2025-06-24 PROBLEM — M79.89 LEFT LEG SWELLING: Status: ACTIVE | Noted: 2025-06-24

## 2025-06-26 ENCOUNTER — APPOINTMENT (OUTPATIENT)
Dept: VASCULAR SURGERY | Facility: CLINIC | Age: 65
End: 2025-06-26
Payer: COMMERCIAL

## 2025-06-26 ENCOUNTER — NON-APPOINTMENT (OUTPATIENT)
Age: 65
End: 2025-06-26

## 2025-06-26 DIAGNOSIS — Z79.01 LONG TERM (CURRENT) USE OF ANTICOAGULANTS: ICD-10-CM

## 2025-06-26 DIAGNOSIS — G47.33 OBSTRUCTIVE SLEEP APNEA (ADULT) (PEDIATRIC): ICD-10-CM

## 2025-06-26 DIAGNOSIS — D84.81 IMMUNODEFICIENCY DUE TO CONDITIONS CLASSIFIED ELSEWHERE: ICD-10-CM

## 2025-06-26 DIAGNOSIS — I10 ESSENTIAL (PRIMARY) HYPERTENSION: ICD-10-CM

## 2025-06-26 DIAGNOSIS — I87.2 VENOUS INSUFFICIENCY (CHRONIC) (PERIPHERAL): ICD-10-CM

## 2025-06-26 DIAGNOSIS — Z86.711 PERSONAL HISTORY OF PULMONARY EMBOLISM: ICD-10-CM
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
## 2025-06-26 DIAGNOSIS — M62.81 MUSCLE WEAKNESS (GENERALIZED): ICD-10-CM

## 2025-06-26 DIAGNOSIS — E66.01 MORBID (SEVERE) OBESITY DUE TO EXCESS CALORIES: ICD-10-CM

## 2025-06-26 DIAGNOSIS — E83.42 HYPOMAGNESEMIA: ICD-10-CM

## 2025-06-26 DIAGNOSIS — Z98.891 HISTORY OF UTERINE SCAR FROM PREVIOUS SURGERY: ICD-10-CM

## 2025-06-26 DIAGNOSIS — Z71.3 DIETARY COUNSELING AND SURVEILLANCE: ICD-10-CM

## 2025-06-26 DIAGNOSIS — B96.29 OTHER ESCHERICHIA COLI [E. COLI] AS THE CAUSE OF DISEASES CLASSIFIED ELSEWHERE: ICD-10-CM

## 2025-06-26 DIAGNOSIS — L89.302 PRESSURE ULCER OF UNSPECIFIED BUTTOCK, STAGE 2: ICD-10-CM

## 2025-06-26 DIAGNOSIS — Z98.84 BARIATRIC SURGERY STATUS: ICD-10-CM

## 2025-06-26 DIAGNOSIS — K65.1 PERITONEAL ABSCESS: ICD-10-CM

## 2025-06-26 DIAGNOSIS — Z96.653 PRESENCE OF ARTIFICIAL KNEE JOINT, BILATERAL: ICD-10-CM

## 2025-06-26 DIAGNOSIS — T81.31XD DISRUPTION OF EXTERNAL OPERATION (SURGICAL) WOUND, NOT ELSEWHERE CLASSIFIED, SUBSEQUENT ENCOUNTER: ICD-10-CM
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
## 2025-06-26 DIAGNOSIS — R53.81 OTHER MALAISE: ICD-10-CM

## 2025-06-26 DIAGNOSIS — B95.2 ENTEROCOCCUS AS THE CAUSE OF DISEASES CLASSIFIED ELSEWHERE: ICD-10-CM

## 2025-06-26 DIAGNOSIS — T81.43XD INFECTION FOLLOWING A PROCEDURE, ORGAN AND SPACE SURGICAL SITE, SUBSEQUENT ENCOUNTER: ICD-10-CM

## 2025-06-26 DIAGNOSIS — Z98.890 OTHER SPECIFIED POSTPROCEDURAL STATES: ICD-10-CM

## 2025-06-26 DIAGNOSIS — E87.6 HYPOKALEMIA: ICD-10-CM

## 2025-06-26 DIAGNOSIS — Z93.2 ILEOSTOMY STATUS: ICD-10-CM

## 2025-06-26 PROCEDURE — 93970 EXTREMITY STUDY: CPT

## 2025-06-26 NOTE — ASU PATIENT PROFILE, ADULT - FALL HARM RISK - RISK INTERVENTIONS

## 2025-06-26 NOTE — ASU PATIENT PROFILE, ADULT - AS SC BRADEN NUTRITION
(4) excellent Minoxidil Counseling: Minoxidil is a topical medication which can increase blood flow where it is applied. It is uncertain how this medication increases hair growth. Side effects are uncommon and include stinging and allergic reactions.

## 2025-06-27 ENCOUNTER — TRANSCRIPTION ENCOUNTER (OUTPATIENT)
Age: 65
End: 2025-06-27

## 2025-06-27 ENCOUNTER — RESULT REVIEW (OUTPATIENT)
Age: 65
End: 2025-06-27

## 2025-06-27 ENCOUNTER — OUTPATIENT (OUTPATIENT)
Dept: OUTPATIENT SERVICES | Facility: HOSPITAL | Age: 65
LOS: 1 days | Discharge: HOME | End: 2025-06-27
Payer: COMMERCIAL

## 2025-06-27 VITALS
RESPIRATION RATE: 18 BRPM | SYSTOLIC BLOOD PRESSURE: 128 MMHG | OXYGEN SATURATION: 95 % | HEART RATE: 95 BPM | DIASTOLIC BLOOD PRESSURE: 81 MMHG

## 2025-06-27 VITALS
RESPIRATION RATE: 18 BRPM | DIASTOLIC BLOOD PRESSURE: 87 MMHG | HEIGHT: 62 IN | HEART RATE: 98 BPM | WEIGHT: 278 LBS | OXYGEN SATURATION: 100 % | TEMPERATURE: 98 F | SYSTOLIC BLOOD PRESSURE: 148 MMHG
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
## 2025-06-27 DIAGNOSIS — Z98.84 BARIATRIC SURGERY STATUS: Chronic | ICD-10-CM

## 2025-06-27 DIAGNOSIS — Z86.718 PERSONAL HISTORY OF OTHER VENOUS THROMBOSIS AND EMBOLISM: ICD-10-CM

## 2025-06-27 DIAGNOSIS — Z79.2 LONG TERM (CURRENT) USE OF ANTIBIOTICS: ICD-10-CM

## 2025-06-27 DIAGNOSIS — T81.31XA DISRUPTION OF EXTERNAL OPERATION (SURGICAL) WOUND, NOT ELSEWHERE CLASSIFIED, INITIAL ENCOUNTER: ICD-10-CM

## 2025-06-27 DIAGNOSIS — I26.99 OTHER PULMONARY EMBOLISM WITHOUT ACUTE COR PULMONALE: ICD-10-CM
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
## 2025-06-27 DIAGNOSIS — T81.321A DISRUPTION OR DEHISCENCE OF CLOSURE OF INTERNAL OPERATION (SURGICAL) WOUND OF ABDOMINAL WALL MUSCLE OR FASCIA, INITIAL ENCOUNTER: ICD-10-CM

## 2025-06-27 DIAGNOSIS — Z90.49 ACQUIRED ABSENCE OF OTHER SPECIFIED PARTS OF DIGESTIVE TRACT: Chronic | ICD-10-CM

## 2025-06-27 DIAGNOSIS — Z98.890 OTHER SPECIFIED POSTPROCEDURAL STATES: Chronic | ICD-10-CM

## 2025-06-27 DIAGNOSIS — Z79.01 LONG TERM (CURRENT) USE OF ANTICOAGULANTS: ICD-10-CM

## 2025-06-27 DIAGNOSIS — E66.01 MORBID (SEVERE) OBESITY DUE TO EXCESS CALORIES: ICD-10-CM

## 2025-06-27 DIAGNOSIS — Z98.891 HISTORY OF UTERINE SCAR FROM PREVIOUS SURGERY: Chronic | ICD-10-CM

## 2025-06-27 DIAGNOSIS — Z96.653 PRESENCE OF ARTIFICIAL KNEE JOINT, BILATERAL: Chronic | ICD-10-CM

## 2025-06-27 DIAGNOSIS — G47.33 OBSTRUCTIVE SLEEP APNEA (ADULT) (PEDIATRIC): ICD-10-CM

## 2025-06-27 PROCEDURE — 88304 TISSUE EXAM BY PATHOLOGIST: CPT | Mod: 26

## 2025-06-27 PROCEDURE — 11011 DEBRIDE SKIN MUSC AT FX SITE: CPT | Mod: 78

## 2025-06-27 PROCEDURE — 87116 MYCOBACTERIA CULTURE: CPT

## 2025-06-27 PROCEDURE — 87075 CULTR BACTERIA EXCEPT BLOOD: CPT

## 2025-06-27 PROCEDURE — 87070 CULTURE OTHR SPECIMN AEROBIC: CPT

## 2025-06-27 PROCEDURE — ZZZZZ: CPT

## 2025-06-27 PROCEDURE — 87205 SMEAR GRAM STAIN: CPT

## 2025-06-27 PROCEDURE — 87102 FUNGUS ISOLATION CULTURE: CPT

## 2025-06-27 PROCEDURE — 87077 CULTURE AEROBIC IDENTIFY: CPT

## 2025-06-27 PROCEDURE — 88304 TISSUE EXAM BY PATHOLOGIST: CPT

## 2025-06-27 PROCEDURE — 87206 SMEAR FLUORESCENT/ACID STAI: CPT

## 2025-06-27 RX ORDER — SODIUM CHLORIDE 9 G/1000ML
1000 INJECTION, SOLUTION INTRAVENOUS
Refills: 0 | Status: DISCONTINUED | OUTPATIENT
Start: 2025-06-27 | End: 2025-06-27

## 2025-06-27 RX ORDER — HYDROMORPHONE/SOD CHLOR,ISO/PF 2 MG/10 ML
0.5 SYRINGE (ML) INJECTION
Refills: 0 | Status: DISCONTINUED | OUTPATIENT
Start: 2025-06-27 | End: 2025-06-27

## 2025-06-27 RX ORDER — ONDANSETRON HCL/PF 4 MG/2 ML
4 VIAL (ML) INJECTION ONCE
Refills: 0 | Status: DISCONTINUED | OUTPATIENT
Start: 2025-06-27 | End: 2025-06-27

## 2025-06-27 RX ORDER — ENOXAPARIN SODIUM 100 MG/ML
100 INJECTION SUBCUTANEOUS
Refills: 0 | DISCHARGE

## 2025-06-27 NOTE — H&P ADULT - HISTORY OF PRESENT ILLNESS
66 yo F with a PMHx of HTN, severe obesity, DVT, PE (R distal main with segmental extension) on Eliquis, LOUIS on CPAP, elisha en y gastric bypass, and a recent admission for an incarcerated ventral hernia w/ SBO s/p lap converted to open ventral hernia repair w/ mesh and SBR and s/p ex lap, 30 cm SBR, creation of new side to side ileoileostomy, and ventral hernia repair w/ mesh in April 2025 presents for surgery with Dr. Lema for an elective excisional debridement of the anterior abdominal wall. Patient last presented to the hospital in May 2025 and described new-onset purulent discharge from her midline incision. CT AP (5/27) showed extensive ventral wall postsurgical changes with gas and fluid containing midline collection along the lower abdomen, measuring  approximately 4.8 x 4.5 x 10.2 cm. IR evaluated collection and did not recommend a drain. Wound culture positive for rare enterococcus avium (5/28). Patient was admitted 5/27/2025 for epistaxis and midline wound drainage. Upon discharge, she was admitted to inpatient rehab (4A at [facility]) from 6/2/2025 to 6/20/2025, then discharged home. Patient currently on abx via midline per ID recs.

## 2025-06-27 NOTE — H&P ADULT - ASSESSMENT
ASSESSMENT:  64F with a PMHx of HTN, morbid obesity, LOUIS on CPAP, Abby en y gastric bypass, and a recent admission for an incarcerated ventral hernia w/ sbo s/p lap converted to open ventral hernia repair w/ mesh and small bowel resection and s/p ex lap, 30 cm sbr, creation of new side to side ileoileostomy, ventral hernia repair w/ mesh who presents back to the ER from her LTACH facility [facility] after persistent HTN, epistaxis in setting of eliquis use and complex surgical history. Recently admitted to 4A inpatient rehab, discharged home. Returning for elective surgery for with Dr. Lema.    PLAN:  - F/u OR      BLUE TEAM SPECTRA: [id number]

## 2025-06-27 NOTE — ASU DISCHARGE PLAN (ADULT/PEDIATRIC) - FINANCIAL ASSISTANCE
[facility] provides services at a reduced cost to those who are determined to be eligible through [facility]’s financial assistance program. Information regarding [facility]’s financial assistance program can be found by going to [email or web address] or by calling [phone].

## 2025-06-27 NOTE — BRIEF OPERATIVE NOTE - OPERATION/FINDINGS
Excisional debridement of anterior abdominal wall. Small incision x2 made over previous midline incision for drainage and washout of anterior abdominal wall abscess cavity. Cavity packed with betadine kerlix. Hemostasis achieved.

## 2025-06-27 NOTE — ASU DISCHARGE PLAN (ADULT/PEDIATRIC) - ASU DC SPECIAL INSTRUCTIONSFT
Dressings: You may remove packing in 24 hours and repack wound with soaked gauze, and wet to dry dressing. Wound should be repacked daily. Packing will be changed in office when you follow up with Dr. Lema.    Pain control: You may take over-the-counter tylenol and motrin every 6 hours. If you take motrin take with food.      Follow up: Follow up with your surgeon, Dr. Lema, on Monday 6/30. Call for an appointment if you do not have one already scheduled. Please call with any questions or concerns including fevers, worsening pain, pus from the wounds, or redness of the skin.     If you develop and new or concerning symptoms call the office or return to the emergency room immediately.

## 2025-06-27 NOTE — H&P ADULT - NSHPPHYSICALEXAM_GEN_ALL_CORE
PHYSICAL EXAM:  General: NAD, awake and alert  HEENT: NCAT  Cardiac: RRR   Respiratory: Normal respiratory effort  Abdomen: Soft, non-distended, non-tender. Discontinuity in midline abdominal incision with small amount of purulent drainage, abdominal dressing in place  Skin: Warm/dry, normal color

## 2025-06-27 NOTE — BRIEF OPERATIVE NOTE - NSICDXBRIEFPROCEDURE_GEN_ALL_CORE_FT
PROCEDURES:  Irrigation and excisional debridement of dermis and epidermis 27-Jun-2025 17:21:57  Tera Bullock

## 2025-06-27 NOTE — CHART NOTE - NSCHARTNOTEFT_GEN_A_CORE
PACU ANESTHESIA ADMISSION NOTE      Procedure:   Post op diagnosis:      ____  Intubated  TV:______       Rate: ______      FiO2: ______    __x__  Patent Airway    __x__  Full return of protective reflexes    __x__  Full recovery from anesthesia / back to baseline status    Vitals:  T(C): 36.5 (06-27-25 @ 16:09), Max: 36.5 (06-27-25 @ 14:25)  HR: 98 (06-27-25 @ 16:09) (98 - 98)  BP: 148/87 (06-27-25 @ 16:09) (148/87 - 148/87)  RR: 18 (06-27-25 @ 16:09) (18 - 18)  SpO2: 100% (06-27-25 @ 16:09) (100% - 100%)    Mental Status:  __x__ Awake   ___x__ Alert   _____ Drowsy   _____ Sedated    Nausea/Vomiting:  __x__ NO  ______Yes,   See Post - Op Orders          Pain Scale (0-10):  __0___    Treatment: ____ None    __x__ See Post - Op/PCA Orders    Post - Operative Fluids:   ____ Oral   __x__ See Post - Op Orders    Plan: Discharge:   ___x_Home       _____Floor     _____Critical Care    _____  Other:_________________    Comments: Patient had smooth intraoperative event, no anesthesia complication.  PACU Vital signs: HR:   92         BP:     152   / 68         RR: 14            O2 Sat: 100      %     Temp 97.5f

## 2025-06-27 NOTE — ASU DISCHARGE PLAN (ADULT/PEDIATRIC) - CARE PROVIDER_API CALL
Rose Lema  Surgery (General Surgery)  [address] [place] [id number]  Phone: [phone]  Fax: [phone]  Scheduled Appointment: 06/30/2025

## 2025-06-28 LAB
GRAM STN FLD: SIGNIFICANT CHANGE UP
NIGHT BLUE STAIN TISS: SIGNIFICANT CHANGE UP
SPECIMEN SOURCE: SIGNIFICANT CHANGE UP
SPECIMEN SOURCE: SIGNIFICANT CHANGE UP

## 2025-06-30 ENCOUNTER — APPOINTMENT (OUTPATIENT)
Dept: SURGERY | Facility: CLINIC | Age: 65
End: 2025-06-30
Payer: COMMERCIAL

## 2025-06-30 ENCOUNTER — OUTPATIENT (OUTPATIENT)
Dept: OUTPATIENT SERVICES | Facility: HOSPITAL | Age: 65
LOS: 1 days | End: 2025-06-30
Payer: COMMERCIAL

## 2025-06-30 VITALS
HEIGHT: 63 IN | TEMPERATURE: 97 F | DIASTOLIC BLOOD PRESSURE: 88 MMHG | WEIGHT: 278 LBS | BODY MASS INDEX: 49.26 KG/M2 | OXYGEN SATURATION: 98 % | SYSTOLIC BLOOD PRESSURE: 140 MMHG | HEART RATE: 106 BPM

## 2025-06-30 DIAGNOSIS — Z98.890 OTHER SPECIFIED POSTPROCEDURAL STATES: Chronic | ICD-10-CM

## 2025-06-30 DIAGNOSIS — Z96.653 PRESENCE OF ARTIFICIAL KNEE JOINT, BILATERAL: Chronic | ICD-10-CM

## 2025-06-30 DIAGNOSIS — S21.001A UNSPECIFIED OPEN WOUND OF RIGHT BREAST, INITIAL ENCOUNTER: ICD-10-CM

## 2025-06-30 DIAGNOSIS — S31.105A UNSPECIFIED OPEN WOUND OF ABDOMINAL WALL, PERIUMBILIC REGION WITHOUT PENETRATION INTO PERITONEAL CAVITY, INITIAL ENCOUNTER: ICD-10-CM

## 2025-06-30 DIAGNOSIS — Z98.891 HISTORY OF UTERINE SCAR FROM PREVIOUS SURGERY: Chronic | ICD-10-CM

## 2025-06-30 DIAGNOSIS — Z90.49 ACQUIRED ABSENCE OF OTHER SPECIFIED PARTS OF DIGESTIVE TRACT: Chronic | ICD-10-CM

## 2025-06-30 PROCEDURE — 99024 POSTOP FOLLOW-UP VISIT: CPT

## 2025-06-30 PROCEDURE — 11011 DEBRIDE SKIN MUSC AT FX SITE: CPT | Mod: 78

## 2025-06-30 PROCEDURE — 11042 DBRDMT SUBQ TIS 1ST 20SQCM/<: CPT

## 2025-07-01 NOTE — DISCHARGE NOTE PROVIDER - NSDCHHNEEDSERVICE_GEN_ALL_CORE
Patient arrived to unit via stretcher escorted by patient transport and  at BS. Transferred t bed with 3PA tolerated. AAOX4, VSS, NADN at this time. Left lower extremity wrapped in elastic band and cast padding, elevated on blankets. 20g to LFA and patent. SCD's applied to un affected leg and tolerated well. Bed in lowest position, call light in reach along with personal items.  
Patient returned to unit via stretcher, AAOX4, VSS. Transferred patient back to bed with 3PA . C/O  of pain 7/10. PRN meds administered and effective. Pur wick applied and LLE elevated. Bed in lowest position, call light in reach along with personal items.  
Patient transport at  to take patient down to CT for testing via stretcher. Transferred to stretcher with 3PA and tolerated well. Plan of care ongoing.  
Pt left floor with transport to [facility]  at bedside purweick removed,    
Rehabilitation services/Teaching and training/Wound care and assessment

## 2025-07-02 LAB — SURGICAL PATHOLOGY STUDY: SIGNIFICANT CHANGE UP

## 2025-07-03 LAB — GRAM STN FLD: ABNORMAL

## 2025-07-07 ENCOUNTER — APPOINTMENT (OUTPATIENT)
Dept: VASCULAR SURGERY | Facility: CLINIC | Age: 65
End: 2025-07-07
Payer: COMMERCIAL

## 2025-07-07 VITALS
BODY MASS INDEX: 51.16 KG/M2 | SYSTOLIC BLOOD PRESSURE: 141 MMHG | HEART RATE: 108 BPM | DIASTOLIC BLOOD PRESSURE: 87 MMHG | HEIGHT: 62 IN | WEIGHT: 278 LBS

## 2025-07-07 PROCEDURE — 99203 OFFICE O/P NEW LOW 30 MIN: CPT

## 2025-07-07 RX ORDER — FUROSEMIDE 80 MG/1
TABLET ORAL
Refills: 0 | Status: ACTIVE | COMMUNITY

## 2025-07-07 RX ORDER — APIXABAN 5 MG/1
5 TABLET, FILM COATED ORAL TWICE DAILY
Qty: 180 | Refills: 1 | Status: ACTIVE | COMMUNITY
Start: 2025-07-07 | End: 1900-01-01

## 2025-07-07 RX ORDER — LOSARTAN POTASSIUM 100 MG/1
TABLET, FILM COATED ORAL
Refills: 0 | Status: ACTIVE | COMMUNITY

## 2025-07-07 RX ORDER — APIXABAN 5 MG/1
TABLET, FILM COATED ORAL
Refills: 0 | Status: ACTIVE | COMMUNITY
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
## 2025-07-08 DIAGNOSIS — S31.105A UNSPECIFIED OPEN WOUND OF ABDOMINAL WALL, PERIUMBILIC REGION WITHOUT PENETRATION INTO PERITONEAL CAVITY, INITIAL ENCOUNTER: ICD-10-CM

## 2025-07-08 DIAGNOSIS — T81.31XA DISRUPTION OF EXTERNAL OPERATION (SURGICAL) WOUND, NOT ELSEWHERE CLASSIFIED, INITIAL ENCOUNTER: ICD-10-CM

## 2025-07-08 DIAGNOSIS — Z98.890 OTHER SPECIFIED POSTPROCEDURAL STATES: ICD-10-CM
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
## 2025-07-08 PROBLEM — I82.493 DEEP VEIN THROMBOSIS (DVT) OF OTHER VEIN OF BOTH LOWER EXTREMITIES: Status: ACTIVE | Noted: 2025-07-07

## 2025-07-08 PROBLEM — M62.89 HERNIA OF FASCIA: Status: ACTIVE | Noted: 2025-07-08

## 2025-07-09 ENCOUNTER — APPOINTMENT (OUTPATIENT)
Dept: GASTROENTEROLOGY | Facility: CLINIC | Age: 65
End: 2025-07-09

## 2025-07-09 ENCOUNTER — APPOINTMENT (OUTPATIENT)
Dept: SURGERY | Facility: CLINIC | Age: 65
End: 2025-07-09
Payer: COMMERCIAL

## 2025-07-09 ENCOUNTER — LABORATORY RESULT (OUTPATIENT)
Age: 65
End: 2025-07-09

## 2025-07-09 ENCOUNTER — OUTPATIENT (OUTPATIENT)
Dept: OUTPATIENT SERVICES | Facility: HOSPITAL | Age: 65
LOS: 1 days | End: 2025-07-09

## 2025-07-09 ENCOUNTER — OUTPATIENT (OUTPATIENT)
Dept: OUTPATIENT SERVICES | Facility: HOSPITAL | Age: 65
LOS: 1 days | End: 2025-07-09
Payer: COMMERCIAL

## 2025-07-09 VITALS
WEIGHT: 278 LBS | BODY MASS INDEX: 51.16 KG/M2 | OXYGEN SATURATION: 95 % | HEIGHT: 62 IN | HEART RATE: 110 BPM | TEMPERATURE: 97 F

## 2025-07-09 VITALS — WEIGHT: 277 LBS | BODY MASS INDEX: 50.97 KG/M2 | HEIGHT: 62 IN

## 2025-07-09 DIAGNOSIS — Z96.653 PRESENCE OF ARTIFICIAL KNEE JOINT, BILATERAL: Chronic | ICD-10-CM

## 2025-07-09 DIAGNOSIS — Z00.00 ENCOUNTER FOR GENERAL ADULT MEDICAL EXAMINATION WITHOUT ABNORMAL FINDINGS: ICD-10-CM

## 2025-07-09 DIAGNOSIS — Z98.84 BARIATRIC SURGERY STATUS: Chronic | ICD-10-CM

## 2025-07-09 DIAGNOSIS — Z90.49 ACQUIRED ABSENCE OF OTHER SPECIFIED PARTS OF DIGESTIVE TRACT: Chronic | ICD-10-CM

## 2025-07-09 DIAGNOSIS — Z98.890 OTHER SPECIFIED POSTPROCEDURAL STATES: Chronic | ICD-10-CM

## 2025-07-09 DIAGNOSIS — S31.105A UNSPECIFIED OPEN WOUND OF ABDOMINAL WALL, PERIUMBILIC REGION WITHOUT PENETRATION INTO PERITONEAL CAVITY, INITIAL ENCOUNTER: ICD-10-CM

## 2025-07-09 DIAGNOSIS — Z98.891 HISTORY OF UTERINE SCAR FROM PREVIOUS SURGERY: Chronic | ICD-10-CM

## 2025-07-09 PROCEDURE — 97597 DBRDMT OPN WND 1ST 20 CM/<: CPT

## 2025-07-09 PROCEDURE — 99213 OFFICE O/P EST LOW 20 MIN: CPT | Mod: 25

## 2025-07-09 PROCEDURE — 99203 OFFICE O/P NEW LOW 30 MIN: CPT | Mod: 25

## 2025-07-09 PROCEDURE — 99024 POSTOP FOLLOW-UP VISIT: CPT

## 2025-07-10 PROBLEM — E55.9 VITAMIN D DEFICIENCY: Status: ACTIVE | Noted: 2025-07-10

## 2025-07-11 ENCOUNTER — RESULT REVIEW (OUTPATIENT)
Age: 65
End: 2025-07-11

## 2025-07-11 ENCOUNTER — OUTPATIENT (OUTPATIENT)
Dept: OUTPATIENT SERVICES | Facility: HOSPITAL | Age: 65
LOS: 1 days | Discharge: HOME | End: 2025-07-11
Payer: COMMERCIAL

## 2025-07-11 ENCOUNTER — TRANSCRIPTION ENCOUNTER (OUTPATIENT)
Age: 65
End: 2025-07-11

## 2025-07-11 DIAGNOSIS — Z98.891 HISTORY OF UTERINE SCAR FROM PREVIOUS SURGERY: Chronic | ICD-10-CM

## 2025-07-11 DIAGNOSIS — T81.30XA DISRUPTION OF WOUND, UNSPECIFIED, INITIAL ENCOUNTER: ICD-10-CM

## 2025-07-11 DIAGNOSIS — Z98.84 BARIATRIC SURGERY STATUS: Chronic | ICD-10-CM

## 2025-07-11 DIAGNOSIS — Z96.653 PRESENCE OF ARTIFICIAL KNEE JOINT, BILATERAL: Chronic | ICD-10-CM

## 2025-07-11 DIAGNOSIS — Z90.49 ACQUIRED ABSENCE OF OTHER SPECIFIED PARTS OF DIGESTIVE TRACT: Chronic | ICD-10-CM

## 2025-07-11 DIAGNOSIS — Z98.890 OTHER SPECIFIED POSTPROCEDURAL STATES: Chronic | ICD-10-CM

## 2025-07-11 LAB
CULTURE RESULTS: ABNORMAL
SPECIMEN SOURCE: SIGNIFICANT CHANGE UP

## 2025-07-11 PROCEDURE — 36410 VNPNXR 3YR/> PHY/QHP DX/THER: CPT | Mod: LT

## 2025-07-11 PROCEDURE — 76937 US GUIDE VASCULAR ACCESS: CPT

## 2025-07-11 PROCEDURE — C1751: CPT

## 2025-07-11 NOTE — PRE PROCEDURE NOTE - PRE PROCEDURE EVALUATION
Vascular & Interventional Radiology Pre-Procedure Note    Procedure Name: image guided midline placement      Allergies: No Known Allergies      Exam  General: NAD, AAO x3, no distress  Chest: breathing comfortably on room air, CTAB  Abdomen: soft, non-tender, non- distended   Extremities: positive pulses bilaterally x4      Consentable: [x ] Yes   [ ] No     Plan:   -65y Female presents for image guided midline placement  -Risks/Benefits/alternatives explained with the patient and/or healthcare proxy and witnessed informed consent obtained.

## 2025-07-11 NOTE — ASU DISCHARGE PLAN (ADULT/PEDIATRIC) - FINANCIAL ASSISTANCE
[facility] provides services at a reduced cost to those who are determined to be eligible through [facility]’s financial assistance program. Information regarding [facility]’s financial assistance program can be found by going to [email or web address] or by calling [phone].

## 2025-07-14 DIAGNOSIS — S31.105A UNSPECIFIED OPEN WOUND OF ABDOMINAL WALL, PERIUMBILIC REGION WITHOUT PENETRATION INTO PERITONEAL CAVITY, INITIAL ENCOUNTER: ICD-10-CM

## 2025-07-14 DIAGNOSIS — I96 GANGRENE, NOT ELSEWHERE CLASSIFIED: ICD-10-CM
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
## 2025-07-14 DIAGNOSIS — T81.31XA DISRUPTION OF EXTERNAL OPERATION (SURGICAL) WOUND, NOT ELSEWHERE CLASSIFIED, INITIAL ENCOUNTER: ICD-10-CM
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
## 2025-07-14 DIAGNOSIS — Z98.890 OTHER SPECIFIED POSTPROCEDURAL STATES: ICD-10-CM

## 2025-07-16 ENCOUNTER — OUTPATIENT (OUTPATIENT)
Dept: OUTPATIENT SERVICES | Facility: HOSPITAL | Age: 65
LOS: 1 days | End: 2025-07-16
Payer: COMMERCIAL

## 2025-07-16 DIAGNOSIS — S31.105A UNSPECIFIED OPEN WOUND OF ABDOMINAL WALL, PERIUMBILIC REGION WITHOUT PENETRATION INTO PERITONEAL CAVITY, INITIAL ENCOUNTER: ICD-10-CM

## 2025-07-16 DIAGNOSIS — Z90.49 ACQUIRED ABSENCE OF OTHER SPECIFIED PARTS OF DIGESTIVE TRACT: Chronic | ICD-10-CM

## 2025-07-16 DIAGNOSIS — Z98.84 BARIATRIC SURGERY STATUS: Chronic | ICD-10-CM

## 2025-07-16 DIAGNOSIS — Z98.891 HISTORY OF UTERINE SCAR FROM PREVIOUS SURGERY: Chronic | ICD-10-CM

## 2025-07-16 DIAGNOSIS — Z96.653 PRESENCE OF ARTIFICIAL KNEE JOINT, BILATERAL: Chronic | ICD-10-CM

## 2025-07-16 DIAGNOSIS — Z98.890 OTHER SPECIFIED POSTPROCEDURAL STATES: Chronic | ICD-10-CM

## 2025-07-16 DIAGNOSIS — S31.105D UNSPECIFIED OPEN WOUND OF ABDOMINAL WALL, PERIUMBILIC REGION WITHOUT PENETRATION INTO PERITONEAL CAVITY, SUBSEQUENT ENCOUNTER: ICD-10-CM

## 2025-07-16 PROCEDURE — 99213 OFFICE O/P EST LOW 20 MIN: CPT | Mod: 25

## 2025-07-16 PROCEDURE — 11043 DBRDMT MUSC&/FSCA 1ST 20/<: CPT

## 2025-07-17 DIAGNOSIS — T81.31XA DISRUPTION OF EXTERNAL OPERATION (SURGICAL) WOUND, NOT ELSEWHERE CLASSIFIED, INITIAL ENCOUNTER: ICD-10-CM

## 2025-07-17 DIAGNOSIS — Z98.890 OTHER SPECIFIED POSTPROCEDURAL STATES: ICD-10-CM

## 2025-07-17 DIAGNOSIS — I96 GANGRENE, NOT ELSEWHERE CLASSIFIED: ICD-10-CM

## 2025-07-17 DIAGNOSIS — S31.105A UNSPECIFIED OPEN WOUND OF ABDOMINAL WALL, PERIUMBILIC REGION WITHOUT PENETRATION INTO PERITONEAL CAVITY, INITIAL ENCOUNTER: ICD-10-CM
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
## 2025-07-22 ENCOUNTER — OUTPATIENT (OUTPATIENT)
Dept: OUTPATIENT SERVICES | Facility: HOSPITAL | Age: 65
LOS: 1 days | End: 2025-07-22
Payer: COMMERCIAL

## 2025-07-22 ENCOUNTER — RESULT REVIEW (OUTPATIENT)
Age: 65
End: 2025-07-22

## 2025-07-22 DIAGNOSIS — T81.30XA DISRUPTION OF WOUND, UNSPECIFIED, INITIAL ENCOUNTER: ICD-10-CM

## 2025-07-22 DIAGNOSIS — Z98.84 BARIATRIC SURGERY STATUS: Chronic | ICD-10-CM

## 2025-07-22 DIAGNOSIS — Z90.49 ACQUIRED ABSENCE OF OTHER SPECIFIED PARTS OF DIGESTIVE TRACT: Chronic | ICD-10-CM

## 2025-07-22 DIAGNOSIS — Z96.653 PRESENCE OF ARTIFICIAL KNEE JOINT, BILATERAL: Chronic | ICD-10-CM

## 2025-07-22 DIAGNOSIS — Z98.891 HISTORY OF UTERINE SCAR FROM PREVIOUS SURGERY: Chronic | ICD-10-CM

## 2025-07-22 DIAGNOSIS — Z00.8 ENCOUNTER FOR OTHER GENERAL EXAMINATION: ICD-10-CM

## 2025-07-22 DIAGNOSIS — K65.1 PERITONEAL ABSCESS: ICD-10-CM

## 2025-07-22 PROCEDURE — 74177 CT ABD & PELVIS W/CONTRAST: CPT

## 2025-07-22 PROCEDURE — 74177 CT ABD & PELVIS W/CONTRAST: CPT | Mod: 26

## 2025-07-23 ENCOUNTER — OUTPATIENT (OUTPATIENT)
Dept: OUTPATIENT SERVICES | Facility: HOSPITAL | Age: 65
LOS: 1 days | End: 2025-07-23
Payer: COMMERCIAL

## 2025-07-23 DIAGNOSIS — Z96.653 PRESENCE OF ARTIFICIAL KNEE JOINT, BILATERAL: Chronic | ICD-10-CM

## 2025-07-23 DIAGNOSIS — K65.1 PERITONEAL ABSCESS: ICD-10-CM

## 2025-07-23 DIAGNOSIS — S31.105D UNSPECIFIED OPEN WOUND OF ABDOMINAL WALL, PERIUMBILIC REGION WITHOUT PENETRATION INTO PERITONEAL CAVITY, SUBSEQUENT ENCOUNTER: ICD-10-CM

## 2025-07-23 DIAGNOSIS — Z90.49 ACQUIRED ABSENCE OF OTHER SPECIFIED PARTS OF DIGESTIVE TRACT: Chronic | ICD-10-CM

## 2025-07-23 DIAGNOSIS — T81.30XA DISRUPTION OF WOUND, UNSPECIFIED, INITIAL ENCOUNTER: ICD-10-CM

## 2025-07-23 DIAGNOSIS — Z98.891 HISTORY OF UTERINE SCAR FROM PREVIOUS SURGERY: Chronic | ICD-10-CM

## 2025-07-23 DIAGNOSIS — Z98.890 OTHER SPECIFIED POSTPROCEDURAL STATES: Chronic | ICD-10-CM

## 2025-07-23 PROCEDURE — 99203 OFFICE O/P NEW LOW 30 MIN: CPT | Mod: 25

## 2025-07-23 PROCEDURE — 99213 OFFICE O/P EST LOW 20 MIN: CPT | Mod: 25

## 2025-07-23 PROCEDURE — 97597 DBRDMT OPN WND 1ST 20 CM/<: CPT

## 2025-07-25 ENCOUNTER — APPOINTMENT (OUTPATIENT)
Dept: SURGERY | Facility: CLINIC | Age: 65
End: 2025-07-25

## 2025-07-25 VITALS
HEART RATE: 104 BPM | SYSTOLIC BLOOD PRESSURE: 138 MMHG | WEIGHT: 276 LBS | DIASTOLIC BLOOD PRESSURE: 88 MMHG | TEMPERATURE: 97 F | OXYGEN SATURATION: 97 % | HEIGHT: 62.2 IN | BODY MASS INDEX: 50.15 KG/M2

## 2025-07-25 PROCEDURE — 99215 OFFICE O/P EST HI 40 MIN: CPT

## 2025-07-28 DIAGNOSIS — Z98.890 OTHER SPECIFIED POSTPROCEDURAL STATES: ICD-10-CM

## 2025-07-28 DIAGNOSIS — I96 GANGRENE, NOT ELSEWHERE CLASSIFIED: ICD-10-CM

## 2025-07-28 DIAGNOSIS — T81.31XA DISRUPTION OF EXTERNAL OPERATION (SURGICAL) WOUND, NOT ELSEWHERE CLASSIFIED, INITIAL ENCOUNTER: ICD-10-CM
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
## 2025-07-28 DIAGNOSIS — S31.105A UNSPECIFIED OPEN WOUND OF ABDOMINAL WALL, PERIUMBILIC REGION WITHOUT PENETRATION INTO PERITONEAL CAVITY, INITIAL ENCOUNTER: ICD-10-CM

## 2025-07-29 ENCOUNTER — NON-APPOINTMENT (OUTPATIENT)
Age: 65
End: 2025-07-29

## 2025-07-30 ENCOUNTER — OUTPATIENT (OUTPATIENT)
Dept: OUTPATIENT SERVICES | Facility: HOSPITAL | Age: 65
LOS: 1 days | End: 2025-07-30
Payer: COMMERCIAL

## 2025-07-30 DIAGNOSIS — Z96.653 PRESENCE OF ARTIFICIAL KNEE JOINT, BILATERAL: Chronic | ICD-10-CM

## 2025-07-30 DIAGNOSIS — Z98.891 HISTORY OF UTERINE SCAR FROM PREVIOUS SURGERY: Chronic | ICD-10-CM

## 2025-07-30 DIAGNOSIS — Z90.49 ACQUIRED ABSENCE OF OTHER SPECIFIED PARTS OF DIGESTIVE TRACT: Chronic | ICD-10-CM

## 2025-07-30 DIAGNOSIS — Z98.84 BARIATRIC SURGERY STATUS: Chronic | ICD-10-CM

## 2025-07-30 DIAGNOSIS — S31.105D UNSPECIFIED OPEN WOUND OF ABDOMINAL WALL, PERIUMBILIC REGION WITHOUT PENETRATION INTO PERITONEAL CAVITY, SUBSEQUENT ENCOUNTER: ICD-10-CM

## 2025-07-30 PROCEDURE — 99213 OFFICE O/P EST LOW 20 MIN: CPT

## 2025-07-31 ENCOUNTER — APPOINTMENT (OUTPATIENT)
Dept: PULMONOLOGY | Facility: CLINIC | Age: 65
End: 2025-07-31
Payer: COMMERCIAL

## 2025-07-31 ENCOUNTER — OUTPATIENT (OUTPATIENT)
Dept: OUTPATIENT SERVICES | Facility: HOSPITAL | Age: 65
LOS: 1 days | End: 2025-07-31
Payer: COMMERCIAL

## 2025-07-31 VITALS
OXYGEN SATURATION: 97 % | HEART RATE: 94 BPM | HEIGHT: 62 IN | RESPIRATION RATE: 12 BRPM | DIASTOLIC BLOOD PRESSURE: 80 MMHG | WEIGHT: 277 LBS | SYSTOLIC BLOOD PRESSURE: 130 MMHG | BODY MASS INDEX: 50.97 KG/M2

## 2025-07-31 VITALS
HEART RATE: 84 BPM | DIASTOLIC BLOOD PRESSURE: 82 MMHG | OXYGEN SATURATION: 96 % | TEMPERATURE: 98 F | WEIGHT: 276.9 LBS | HEIGHT: 62 IN | RESPIRATION RATE: 18 BRPM | SYSTOLIC BLOOD PRESSURE: 141 MMHG

## 2025-07-31 DIAGNOSIS — T81.31XD DISRUPTION OF EXTERNAL OPERATION (SURGICAL) WOUND, NOT ELSEWHERE CLASSIFIED, SUBSEQUENT ENCOUNTER: ICD-10-CM

## 2025-07-31 DIAGNOSIS — S31.105D UNSPECIFIED OPEN WOUND OF ABDOMINAL WALL, PERIUMBILIC REGION WITHOUT PENETRATION INTO PERITONEAL CAVITY, SUBSEQUENT ENCOUNTER: ICD-10-CM

## 2025-07-31 DIAGNOSIS — Z98.890 OTHER SPECIFIED POSTPROCEDURAL STATES: Chronic | ICD-10-CM

## 2025-07-31 DIAGNOSIS — G47.33 OBSTRUCTIVE SLEEP APNEA (ADULT) (PEDIATRIC): ICD-10-CM

## 2025-07-31 DIAGNOSIS — Z01.818 ENCOUNTER FOR OTHER PREPROCEDURAL EXAMINATION: ICD-10-CM

## 2025-07-31 DIAGNOSIS — Z98.84 BARIATRIC SURGERY STATUS: Chronic | ICD-10-CM

## 2025-07-31 DIAGNOSIS — Z98.891 HISTORY OF UTERINE SCAR FROM PREVIOUS SURGERY: Chronic | ICD-10-CM

## 2025-07-31 DIAGNOSIS — I26.99 OTHER PULMONARY EMBOLISM W/OUT ACUTE COR PULMONALE: ICD-10-CM

## 2025-07-31 DIAGNOSIS — Z96.653 PRESENCE OF ARTIFICIAL KNEE JOINT, BILATERAL: Chronic | ICD-10-CM

## 2025-07-31 DIAGNOSIS — Z90.49 ACQUIRED ABSENCE OF OTHER SPECIFIED PARTS OF DIGESTIVE TRACT: Chronic | ICD-10-CM

## 2025-07-31 DIAGNOSIS — I96 GANGRENE, NOT ELSEWHERE CLASSIFIED: ICD-10-CM

## 2025-07-31 DIAGNOSIS — Z98.890 OTHER SPECIFIED POSTPROCEDURAL STATES: ICD-10-CM

## 2025-07-31 DIAGNOSIS — K25.3 ACUTE GASTRIC ULCER WITHOUT HEMORRHAGE OR PERFORATION: ICD-10-CM

## 2025-07-31 PROCEDURE — 99214 OFFICE O/P EST MOD 30 MIN: CPT | Mod: 25

## 2025-07-31 PROCEDURE — 99214 OFFICE O/P EST MOD 30 MIN: CPT

## 2025-07-31 PROCEDURE — 93010 ELECTROCARDIOGRAM REPORT: CPT

## 2025-07-31 PROCEDURE — 93005 ELECTROCARDIOGRAM TRACING: CPT

## 2025-07-31 PROCEDURE — G2211 COMPLEX E/M VISIT ADD ON: CPT

## 2025-07-31 RX ORDER — METOPROLOL SUCCINATE 50 MG/1
1 TABLET, EXTENDED RELEASE ORAL
Refills: 0 | DISCHARGE

## 2025-08-01 DIAGNOSIS — Z01.818 ENCOUNTER FOR OTHER PREPROCEDURAL EXAMINATION: ICD-10-CM

## 2025-08-01 DIAGNOSIS — K25.3 ACUTE GASTRIC ULCER WITHOUT HEMORRHAGE OR PERFORATION: ICD-10-CM

## 2025-08-01 PROBLEM — I10 ESSENTIAL (PRIMARY) HYPERTENSION: Chronic | Status: ACTIVE | Noted: 2025-07-31

## 2025-08-01 PROBLEM — I26.99 OTHER PULMONARY EMBOLISM WITHOUT ACUTE COR PULMONALE: Chronic | Status: ACTIVE | Noted: 2025-07-31

## 2025-08-01 PROBLEM — I82.409 ACUTE EMBOLISM AND THROMBOSIS OF UNSPECIFIED DEEP VEINS OF UNSPECIFIED LOWER EXTREMITY: Chronic | Status: ACTIVE | Noted: 2025-07-31

## 2025-08-05 ENCOUNTER — RESULT REVIEW (OUTPATIENT)
Age: 65
End: 2025-08-05

## 2025-08-05 ENCOUNTER — OUTPATIENT (OUTPATIENT)
Dept: OUTPATIENT SERVICES | Facility: HOSPITAL | Age: 65
LOS: 1 days | Discharge: HOME | End: 2025-08-05
Payer: COMMERCIAL

## 2025-08-05 VITALS
SYSTOLIC BLOOD PRESSURE: 162 MMHG | DIASTOLIC BLOOD PRESSURE: 92 MMHG | HEART RATE: 94 BPM | RESPIRATION RATE: 20 BRPM | TEMPERATURE: 98 F | OXYGEN SATURATION: 99 %

## 2025-08-05 VITALS
SYSTOLIC BLOOD PRESSURE: 173 MMHG | HEART RATE: 89 BPM | RESPIRATION RATE: 20 BRPM | TEMPERATURE: 98 F | OXYGEN SATURATION: 96 % | DIASTOLIC BLOOD PRESSURE: 89 MMHG

## 2025-08-05 DIAGNOSIS — Z98.84 BARIATRIC SURGERY STATUS: Chronic | ICD-10-CM

## 2025-08-05 DIAGNOSIS — Z98.890 OTHER SPECIFIED POSTPROCEDURAL STATES: Chronic | ICD-10-CM

## 2025-08-05 DIAGNOSIS — Z96.653 PRESENCE OF ARTIFICIAL KNEE JOINT, BILATERAL: Chronic | ICD-10-CM

## 2025-08-05 DIAGNOSIS — K25.3 ACUTE GASTRIC ULCER WITHOUT HEMORRHAGE OR PERFORATION: ICD-10-CM

## 2025-08-05 DIAGNOSIS — Z98.891 HISTORY OF UTERINE SCAR FROM PREVIOUS SURGERY: Chronic | ICD-10-CM

## 2025-08-05 DIAGNOSIS — Z90.49 ACQUIRED ABSENCE OF OTHER SPECIFIED PARTS OF DIGESTIVE TRACT: Chronic | ICD-10-CM

## 2025-08-05 PROCEDURE — 97606 NEG PRS WND THER DME>50 SQCM: CPT | Mod: XS

## 2025-08-05 PROCEDURE — 11011 DEBRIDE SKIN MUSC AT FX SITE: CPT

## 2025-08-05 PROCEDURE — 88305 TISSUE EXAM BY PATHOLOGIST: CPT | Mod: 26

## 2025-08-05 PROCEDURE — 87206 SMEAR FLUORESCENT/ACID STAI: CPT

## 2025-08-05 PROCEDURE — 87070 CULTURE OTHR SPECIMN AEROBIC: CPT

## 2025-08-05 PROCEDURE — 88312 SPECIAL STAINS GROUP 1: CPT

## 2025-08-05 PROCEDURE — 88305 TISSUE EXAM BY PATHOLOGIST: CPT

## 2025-08-05 PROCEDURE — 88312 SPECIAL STAINS GROUP 1: CPT | Mod: 26

## 2025-08-05 PROCEDURE — 87116 MYCOBACTERIA CULTURE: CPT

## 2025-08-05 PROCEDURE — 87205 SMEAR GRAM STAIN: CPT

## 2025-08-05 PROCEDURE — 87075 CULTR BACTERIA EXCEPT BLOOD: CPT

## 2025-08-05 PROCEDURE — 87077 CULTURE AEROBIC IDENTIFY: CPT

## 2025-08-05 PROCEDURE — C1751: CPT

## 2025-08-05 RX ORDER — HYDROMORPHONE/SOD CHLOR,ISO/PF 2 MG/10 ML
0.5 SYRINGE (ML) INJECTION
Refills: 0 | Status: DISCONTINUED | OUTPATIENT
Start: 2025-08-05 | End: 2025-08-05

## 2025-08-05 RX ORDER — HYDROMORPHONE/SOD CHLOR,ISO/PF 2 MG/10 ML
1 SYRINGE (ML) INJECTION
Refills: 0 | Status: DISCONTINUED | OUTPATIENT
Start: 2025-08-05 | End: 2025-08-05

## 2025-08-05 RX ORDER — LABETALOL HYDROCHLORIDE 200 MG/1
10 TABLET, FILM COATED ORAL
Refills: 0 | Status: DISCONTINUED | OUTPATIENT
Start: 2025-08-05 | End: 2025-08-05

## 2025-08-05 RX ORDER — ONDANSETRON HCL/PF 4 MG/2 ML
4 VIAL (ML) INJECTION ONCE
Refills: 0 | Status: DISCONTINUED | OUTPATIENT
Start: 2025-08-05 | End: 2025-08-05

## 2025-08-07 ENCOUNTER — APPOINTMENT (OUTPATIENT)
Dept: SURGERY | Facility: CLINIC | Age: 65
End: 2025-08-07
Payer: COMMERCIAL

## 2025-08-07 VITALS
OXYGEN SATURATION: 97 % | WEIGHT: 277 LBS | SYSTOLIC BLOOD PRESSURE: 142 MMHG | HEART RATE: 89 BPM | BODY MASS INDEX: 50.97 KG/M2 | HEIGHT: 62 IN | DIASTOLIC BLOOD PRESSURE: 80 MMHG

## 2025-08-07 DIAGNOSIS — M62.9 DISORDER OF MUSCLE, UNSPECIFIED: ICD-10-CM

## 2025-08-07 PROCEDURE — 99024 POSTOP FOLLOW-UP VISIT: CPT

## 2025-08-08 LAB — SURGICAL PATHOLOGY STUDY: SIGNIFICANT CHANGE UP

## 2025-08-10 LAB
CULTURE RESULTS: ABNORMAL
GRAM STN FLD: ABNORMAL
SPECIMEN SOURCE: SIGNIFICANT CHANGE UP

## 2025-08-11 ENCOUNTER — APPOINTMENT (OUTPATIENT)
Dept: SURGERY | Facility: CLINIC | Age: 65
End: 2025-08-11
Payer: COMMERCIAL

## 2025-08-11 VITALS
HEIGHT: 62 IN | DIASTOLIC BLOOD PRESSURE: 90 MMHG | BODY MASS INDEX: 50.97 KG/M2 | WEIGHT: 277 LBS | HEART RATE: 99 BPM | SYSTOLIC BLOOD PRESSURE: 142 MMHG | OXYGEN SATURATION: 96 %

## 2025-08-11 DIAGNOSIS — Z79.01 LONG TERM (CURRENT) USE OF ANTICOAGULANTS: ICD-10-CM

## 2025-08-11 DIAGNOSIS — K43.6 OTHER AND UNSPECIFIED VENTRAL HERNIA WITH OBSTRUCTION, W/OUT GANGRENE: ICD-10-CM

## 2025-08-11 DIAGNOSIS — G47.33 OBSTRUCTIVE SLEEP APNEA (ADULT) (PEDIATRIC): ICD-10-CM

## 2025-08-11 DIAGNOSIS — Z96.653 PRESENCE OF ARTIFICIAL KNEE JOINT, BILATERAL: ICD-10-CM

## 2025-08-11 DIAGNOSIS — Z86.711 PERSONAL HISTORY OF PULMONARY EMBOLISM: ICD-10-CM

## 2025-08-11 DIAGNOSIS — L02.211 CUTANEOUS ABSCESS OF ABDOMINAL WALL: ICD-10-CM

## 2025-08-11 DIAGNOSIS — Z86.718 PERSONAL HISTORY OF OTHER VENOUS THROMBOSIS AND EMBOLISM: ICD-10-CM

## 2025-08-11 DIAGNOSIS — Z98.84 BARIATRIC SURGERY STATUS: ICD-10-CM

## 2025-08-11 DIAGNOSIS — K92.89 OTHER SPECIFIED DISEASES OF THE DIGESTIVE SYSTEM: ICD-10-CM

## 2025-08-11 DIAGNOSIS — I10 ESSENTIAL (PRIMARY) HYPERTENSION: ICD-10-CM

## 2025-08-11 PROCEDURE — 99024 POSTOP FOLLOW-UP VISIT: CPT

## 2025-08-14 ENCOUNTER — APPOINTMENT (OUTPATIENT)
Dept: SURGERY | Facility: CLINIC | Age: 65
End: 2025-08-14

## 2025-08-15 ENCOUNTER — APPOINTMENT (OUTPATIENT)
Dept: SURGERY | Facility: CLINIC | Age: 65
End: 2025-08-15

## 2025-08-15 VITALS
DIASTOLIC BLOOD PRESSURE: 80 MMHG | BODY MASS INDEX: 49.69 KG/M2 | WEIGHT: 270 LBS | HEIGHT: 62 IN | HEART RATE: 93 BPM | OXYGEN SATURATION: 95 % | SYSTOLIC BLOOD PRESSURE: 132 MMHG

## 2025-08-15 PROCEDURE — 99024 POSTOP FOLLOW-UP VISIT: CPT

## 2025-08-22 ENCOUNTER — APPOINTMENT (OUTPATIENT)
Dept: SURGERY | Facility: CLINIC | Age: 65
End: 2025-08-22
Payer: COMMERCIAL

## 2025-08-22 PROCEDURE — 99024 POSTOP FOLLOW-UP VISIT: CPT

## 2025-08-22 PROCEDURE — 97605 NEG PRS WND THER DME<=50SQCM: CPT

## 2025-08-29 ENCOUNTER — APPOINTMENT (OUTPATIENT)
Dept: SURGERY | Facility: CLINIC | Age: 65
End: 2025-08-29
Payer: COMMERCIAL

## 2025-08-29 VITALS
BODY MASS INDEX: 49.69 KG/M2 | SYSTOLIC BLOOD PRESSURE: 126 MMHG | HEIGHT: 62 IN | WEIGHT: 270 LBS | DIASTOLIC BLOOD PRESSURE: 80 MMHG | OXYGEN SATURATION: 96 % | HEART RATE: 93 BPM

## 2025-08-29 DIAGNOSIS — M62.9 DISORDER OF MUSCLE, UNSPECIFIED: ICD-10-CM

## 2025-08-29 PROCEDURE — 97605 NEG PRS WND THER DME<=50SQCM: CPT

## 2025-09-04 ENCOUNTER — OUTPATIENT (OUTPATIENT)
Dept: OUTPATIENT SERVICES | Facility: HOSPITAL | Age: 65
LOS: 1 days | End: 2025-09-04
Payer: COMMERCIAL

## 2025-09-04 DIAGNOSIS — Z96.653 PRESENCE OF ARTIFICIAL KNEE JOINT, BILATERAL: Chronic | ICD-10-CM

## 2025-09-04 DIAGNOSIS — Z98.890 OTHER SPECIFIED POSTPROCEDURAL STATES: Chronic | ICD-10-CM

## 2025-09-04 DIAGNOSIS — Z00.8 ENCOUNTER FOR OTHER GENERAL EXAMINATION: ICD-10-CM

## 2025-09-04 DIAGNOSIS — Z98.891 HISTORY OF UTERINE SCAR FROM PREVIOUS SURGERY: Chronic | ICD-10-CM

## 2025-09-04 DIAGNOSIS — Z98.84 BARIATRIC SURGERY STATUS: Chronic | ICD-10-CM

## 2025-09-04 DIAGNOSIS — Z90.49 ACQUIRED ABSENCE OF OTHER SPECIFIED PARTS OF DIGESTIVE TRACT: Chronic | ICD-10-CM

## 2025-09-04 DIAGNOSIS — J84.9 INTERSTITIAL PULMONARY DISEASE, UNSPECIFIED: ICD-10-CM

## 2025-09-04 PROCEDURE — A9540: CPT

## 2025-09-04 PROCEDURE — 71046 X-RAY EXAM CHEST 2 VIEWS: CPT | Mod: 26

## 2025-09-04 PROCEDURE — 78582 LUNG VENTILAT&PERFUS IMAGING: CPT | Mod: 26

## 2025-09-04 PROCEDURE — A9567: CPT

## 2025-09-04 PROCEDURE — 78582 LUNG VENTILAT&PERFUS IMAGING: CPT

## 2025-09-04 PROCEDURE — 71046 X-RAY EXAM CHEST 2 VIEWS: CPT

## 2025-09-05 ENCOUNTER — APPOINTMENT (OUTPATIENT)
Dept: SURGERY | Facility: CLINIC | Age: 65
End: 2025-09-05
Payer: COMMERCIAL

## 2025-09-05 VITALS
BODY MASS INDEX: 49.87 KG/M2 | OXYGEN SATURATION: 97 % | HEIGHT: 62 IN | TEMPERATURE: 97 F | HEART RATE: 93 BPM | DIASTOLIC BLOOD PRESSURE: 102 MMHG | WEIGHT: 271 LBS | SYSTOLIC BLOOD PRESSURE: 160 MMHG

## 2025-09-05 DIAGNOSIS — J84.9 INTERSTITIAL PULMONARY DISEASE, UNSPECIFIED: ICD-10-CM

## 2025-09-05 DIAGNOSIS — K43.9 VENTRAL HERNIA W/OUT OBSTRUCTION OR GANGRENE: ICD-10-CM

## 2025-09-05 PROCEDURE — 99215 OFFICE O/P EST HI 40 MIN: CPT

## 2025-09-18 ENCOUNTER — APPOINTMENT (OUTPATIENT)
Dept: PULMONOLOGY | Facility: CLINIC | Age: 65
End: 2025-09-18
Payer: COMMERCIAL

## 2025-09-18 VITALS
SYSTOLIC BLOOD PRESSURE: 130 MMHG | HEART RATE: 102 BPM | BODY MASS INDEX: 49.69 KG/M2 | OXYGEN SATURATION: 97 % | DIASTOLIC BLOOD PRESSURE: 80 MMHG | WEIGHT: 270 LBS | HEIGHT: 62 IN | RESPIRATION RATE: 12 BRPM

## 2025-09-18 DIAGNOSIS — I82.493 ACUTE EMBOLISM AND THROMBOSIS OF OTHER SPECIFIED DEEP VEIN OF LOWER EXTREMITY, BILATERAL: ICD-10-CM

## 2025-09-18 DIAGNOSIS — G47.33 OBSTRUCTIVE SLEEP APNEA (ADULT) (PEDIATRIC): ICD-10-CM

## 2025-09-18 DIAGNOSIS — I26.99 OTHER PULMONARY EMBOLISM W/OUT ACUTE COR PULMONALE: ICD-10-CM

## 2025-09-18 PROCEDURE — G2211 COMPLEX E/M VISIT ADD ON: CPT

## 2025-09-18 PROCEDURE — 99214 OFFICE O/P EST MOD 30 MIN: CPT

## 2025-09-19 ENCOUNTER — APPOINTMENT (OUTPATIENT)
Dept: SURGERY | Facility: CLINIC | Age: 65
End: 2025-09-19
Payer: COMMERCIAL

## 2025-09-19 VITALS
DIASTOLIC BLOOD PRESSURE: 82 MMHG | HEART RATE: 100 BPM | HEIGHT: 62 IN | TEMPERATURE: 97 F | BODY MASS INDEX: 49.69 KG/M2 | SYSTOLIC BLOOD PRESSURE: 138 MMHG | WEIGHT: 270 LBS | OXYGEN SATURATION: 95 %

## 2025-09-19 PROCEDURE — 99215 OFFICE O/P EST HI 40 MIN: CPT

## 2025-09-19 RX ORDER — AMOXICILLIN AND CLAVULANATE POTASSIUM 875; 125 MG/1; MG/1
875-125 TABLET, COATED ORAL
Qty: 14 | Refills: 2 | Status: ACTIVE | COMMUNITY
Start: 2025-09-19 | End: 1900-01-01